# Patient Record
Sex: FEMALE | Race: BLACK OR AFRICAN AMERICAN | Employment: FULL TIME | URBAN - METROPOLITAN AREA
[De-identification: names, ages, dates, MRNs, and addresses within clinical notes are randomized per-mention and may not be internally consistent; named-entity substitution may affect disease eponyms.]

---

## 2017-02-10 ENCOUNTER — PROCEDURE VISIT (OUTPATIENT)
Dept: CARDIOLOGY CLINIC | Age: 52
End: 2017-02-10

## 2017-02-10 ENCOUNTER — OFFICE VISIT (OUTPATIENT)
Dept: CARDIOLOGY CLINIC | Age: 52
End: 2017-02-10

## 2017-02-10 VITALS
OXYGEN SATURATION: 94 % | HEIGHT: 67 IN | HEART RATE: 78 BPM | WEIGHT: 200 LBS | SYSTOLIC BLOOD PRESSURE: 130 MMHG | DIASTOLIC BLOOD PRESSURE: 84 MMHG | BODY MASS INDEX: 31.39 KG/M2

## 2017-02-10 DIAGNOSIS — R06.83 SNORES: ICD-10-CM

## 2017-02-10 DIAGNOSIS — I51.7 LVH (LEFT VENTRICULAR HYPERTROPHY): ICD-10-CM

## 2017-02-10 DIAGNOSIS — Q24.6 CONGENITAL HEART BLOCK: ICD-10-CM

## 2017-02-10 DIAGNOSIS — Z95.0 CARDIAC PACEMAKER IN SITU: ICD-10-CM

## 2017-02-10 DIAGNOSIS — I48.92 ATRIAL FIBRILLATION AND FLUTTER (HCC): Primary | ICD-10-CM

## 2017-02-10 DIAGNOSIS — R06.09 DYSPNEA ON EXERTION: ICD-10-CM

## 2017-02-10 DIAGNOSIS — I48.91 ATRIAL FIBRILLATION AND FLUTTER (HCC): Primary | ICD-10-CM

## 2017-02-10 PROCEDURE — 99214 OFFICE O/P EST MOD 30 MIN: CPT | Performed by: INTERNAL MEDICINE

## 2017-02-10 PROCEDURE — 93280 PM DEVICE PROGR EVAL DUAL: CPT | Performed by: INTERNAL MEDICINE

## 2017-05-19 ENCOUNTER — TELEPHONE (OUTPATIENT)
Dept: CARDIOLOGY CLINIC | Age: 52
End: 2017-05-19

## 2017-06-09 ENCOUNTER — TELEPHONE (OUTPATIENT)
Dept: SLEEP MEDICINE | Age: 52
End: 2017-06-09

## 2017-07-12 ENCOUNTER — TELEPHONE (OUTPATIENT)
Dept: CARDIOLOGY CLINIC | Age: 52
End: 2017-07-12

## 2017-07-28 ENCOUNTER — HOSPITAL ENCOUNTER (OUTPATIENT)
Dept: MAMMOGRAPHY | Age: 52
Discharge: HOME | End: 2017-07-28
Attending: INTERNAL MEDICINE | Admitting: INTERNAL MEDICINE

## 2017-07-28 DIAGNOSIS — Z12.31 VISIT FOR SCREENING MAMMOGRAM: ICD-10-CM

## 2017-09-20 ENCOUNTER — TELEPHONE (OUTPATIENT)
Dept: CARDIOLOGY CLINIC | Age: 52
End: 2017-09-20

## 2017-09-27 ENCOUNTER — OFFICE VISIT (OUTPATIENT)
Dept: CARDIOLOGY CLINIC | Age: 52
End: 2017-09-27

## 2017-09-27 VITALS
BODY MASS INDEX: 32.33 KG/M2 | SYSTOLIC BLOOD PRESSURE: 120 MMHG | WEIGHT: 206.4 LBS | DIASTOLIC BLOOD PRESSURE: 80 MMHG | HEART RATE: 80 BPM

## 2017-09-27 DIAGNOSIS — I48.91 ATRIAL FIBRILLATION AND FLUTTER (HCC): ICD-10-CM

## 2017-09-27 DIAGNOSIS — I51.7 LVH (LEFT VENTRICULAR HYPERTROPHY): ICD-10-CM

## 2017-09-27 DIAGNOSIS — Z95.0 PACEMAKER: ICD-10-CM

## 2017-09-27 DIAGNOSIS — I10 ESSENTIAL HYPERTENSION: Primary | ICD-10-CM

## 2017-09-27 DIAGNOSIS — E78.5 OTHER AND UNSPECIFIED HYPERLIPIDEMIA: ICD-10-CM

## 2017-09-27 DIAGNOSIS — I48.92 ATRIAL FIBRILLATION AND FLUTTER (HCC): ICD-10-CM

## 2017-09-27 PROCEDURE — 99214 OFFICE O/P EST MOD 30 MIN: CPT | Performed by: INTERNAL MEDICINE

## 2017-09-27 RX ORDER — AMLODIPINE BESYLATE 5 MG/1
5 TABLET ORAL DAILY
Qty: 90 TABLET | Refills: 3
Start: 2017-09-27 | End: 2018-06-29 | Stop reason: SDUPTHER

## 2017-11-20 RX ORDER — VALSARTAN 80 MG/1
TABLET ORAL
Qty: 90 TABLET | Refills: 3 | Status: SHIPPED | OUTPATIENT
Start: 2017-11-20 | End: 2018-01-23 | Stop reason: SDUPTHER

## 2017-11-20 NOTE — TELEPHONE ENCOUNTER
Ms. Santos Blanche reports her pharmacy has been trying to have her valsartan (DIOVAN) 80 MG tablet refilled but it doesn't appear that we have received that request.  Please fill this medication to [facility] on [address] right away as she has been out of this for a couple of days. She last saw Dr. Ange Hewitt 9-27-17.

## 2017-11-20 NOTE — TELEPHONE ENCOUNTER
Medication: Valsartan     Strength: 80mg    Directions: 1 tab daily    Last visit : 09/27/2017    Next Visit :09/28/2018

## 2017-12-14 ENCOUNTER — TELEPHONE (OUTPATIENT)
Dept: CARDIOLOGY CLINIC | Age: 52
End: 2017-12-14

## 2017-12-14 DIAGNOSIS — I48.91 ATRIAL FIBRILLATION AND FLUTTER (HCC): Primary | ICD-10-CM

## 2017-12-14 DIAGNOSIS — I48.92 ATRIAL FIBRILLATION AND FLUTTER (HCC): Primary | ICD-10-CM

## 2018-01-23 RX ORDER — VALSARTAN 80 MG/1
TABLET ORAL
Qty: 90 TABLET | Refills: 3 | Status: SHIPPED | OUTPATIENT
Start: 2018-01-23 | End: 2018-09-11

## 2018-02-28 ENCOUNTER — OFFICE VISIT (OUTPATIENT)
Dept: CARDIOLOGY CLINIC | Age: 53
End: 2018-02-28

## 2018-02-28 ENCOUNTER — PROCEDURE VISIT (OUTPATIENT)
Dept: CARDIOLOGY CLINIC | Age: 53
End: 2018-02-28

## 2018-02-28 VITALS
BODY MASS INDEX: 32.18 KG/M2 | DIASTOLIC BLOOD PRESSURE: 80 MMHG | WEIGHT: 205 LBS | HEIGHT: 67 IN | HEART RATE: 66 BPM | SYSTOLIC BLOOD PRESSURE: 118 MMHG

## 2018-02-28 DIAGNOSIS — Z95.0 CARDIAC PACEMAKER IN SITU: ICD-10-CM

## 2018-02-28 DIAGNOSIS — R42 VERTIGO: ICD-10-CM

## 2018-02-28 DIAGNOSIS — Q24.6 CONGENITAL HEART BLOCK: ICD-10-CM

## 2018-02-28 DIAGNOSIS — I48.91 ATRIAL FIBRILLATION, UNSPECIFIED TYPE (HCC): Primary | ICD-10-CM

## 2018-02-28 PROCEDURE — 93280 PM DEVICE PROGR EVAL DUAL: CPT | Performed by: INTERNAL MEDICINE

## 2018-02-28 PROCEDURE — 99214 OFFICE O/P EST MOD 30 MIN: CPT | Performed by: INTERNAL MEDICINE

## 2018-02-28 NOTE — PROGRESS NOTES
Chief Complaint   Patient presents with    Atrial Fibrillation     on flecainide    Hypertension    Dizziness     vertigo     HPI:  Ms. Tommy Castellanos is as an established patient. Her history includes AF, PPM for congenital heart block and syncope, and LVH. She was started on flecainide in Dec. 2015 by my partners at [facility]. She apparently spontaneously converted to sinus, but has had recurrent atrial fib. She is only taking flecainide 50 mg daily now, she doesn't like the way it makes her feel at higher dose. Has tried propafenone in the past also and didn't like the way that made her feel. Dual Pacemaker implant 2012 due to congenital heart block. Interval history: Today America Sandy presents to office for new symptoms of dizziness. Describes as spinning and happens from lying to sitting in the morning. Patient feeling well otherwise. She has been going more frequently getting hair styled and more water has been entering her ears. Patient denies lightheadedness, dizziness, chest pain, palpitations, orthopnea, edema, presyncope or syncope. Continues taking medications, including Xarelto. Referred for sleep study last visit that has not been completed to present. Interrogation today shows normal sensing and pacing. No episodes of atrial fib since last visit.  Battery life 5 years today    Past Medical History:   Diagnosis Date    Anemia     Atrial fibrillation and flutter (HCC)     Atrial flutter (HCC)     CHB (complete heart block) (HCC)     Congenital heart disease     GERD (gastroesophageal reflux disease)     Headache(784.0)     History of complete heart block     Hyperlipidemia     Hypertension     Syncope      Past Surgical History:   Procedure Laterality Date    PACEMAKER INSERTION  11/29/12    dual chamber PPM, [employer]    TUBAL LIGATION      UTERINE FIBROID SURGERY       Social History   Substance Use Topics    Smoking status: Never Smoker    Smokeless tobacco: Never Used    Alcohol use No scar.     7/15/2015 Echo: Left ventricular size is normal. There is mild concentric left   Ventricular hypertrophy. Ejection fraction is visually estimated to be 40% The inferoseptum is hypokinetic( rv paced). The left atrium is mildly dilated. The aortic valve appears very mildly sclerotic but opens well. The right ventricle is upper normal to mildly dilated in size. Mild mitral regurgitation is present. There is mild tricuspid regurgitation with RVSP estimated at 26 mmHg. Pacemaker / ICD lead was visualized in the right heart. ECG today : 2/2//2018 personally reviewed  Electronic ventricular pacemaker  -possibly demand type   Pacemaker EC    66bpm    Assessment:  1. Paroxysmal atrial fibrillation ([address]): HR regular & controlled. ECG showed SR today. Remains on Xarelto, Lopressor, flecainide   2. LVH (left ventricular hypertrophy): by ECHO 7/2015 with EF 40%. 3. Congenital heart block: episodes of syncope. S/P dual chamber pacemaker 11/29/12. Normal function today - no atrial arrhythmias      4. MOREIRA:    Has resolved, no complaints today   5. Snores: untreated sleep apnea can increase frequency of PAF  Previous sleep study referral to Dr. Alpa Guevara, not completed to date  6. Vertigo today    Plan:  1. Discuss vertigo/tiniitus with PCP for eval and treatment and return if not satisfied   2. No change in medications - continue Flecainide 50 mg daily and Xarelto 20 mg daily  3. Remote pacemaker transmission in three months  4.  FU in 1 year with device check same day and flecainide follow up    Dr. Etta Koenig.  [place]

## 2018-03-01 ENCOUNTER — TELEPHONE (OUTPATIENT)
Dept: CARDIOLOGY CLINIC | Age: 53
End: 2018-03-01

## 2018-03-01 NOTE — TELEPHONE ENCOUNTER
Pt said she is wondering if her she should be concerned with her heart rate being low and  having her pacemaker, they turned it up and down. Assessment:  1. Paroxysmal atrial fibrillation ([address]): HR regular & controlled. ECG showed SR today. Remains on Xarelto, Lopressor, flecainide   2. LVH (left ventricular hypertrophy): by ECHO 7/2015 with EF 40%. 3. Congenital heart block: episodes of syncope. S/P dual chamber pacemaker 11/29/12. Normal function today - no atrial arrhythmias      4. MOREIRA:    Has resolved, no complaints today   5. Snores: untreated sleep apnea can increase frequency of PAF  Previous sleep study referral to Dr. Lino Score, not completed to date  6. Vertigo today     Plan:  1. Discuss vertigo/tiniitus with PCP for eval and treatment and return if not satisfied   2. No change in medications - continue Flecainide 50 mg daily and Xarelto 20 mg daily  3. Remote pacemaker transmission in three months  4.  FU in 1 year with device check same day and flecainide follow up     Dr[facility] Minerv[facility] Linder. [facility]      Instructions         Return in about 1 year (around 2/28/2019). 1.  Discuss vertigo with PCP  2. No change in medications - continue Flecainide 50 mg daily and Xarelto 20 mg daily  3.   Remote pacemaker transmission in three months  4.  FU in 1 year with device check same day.

## 2018-03-01 NOTE — TELEPHONE ENCOUNTER
Pt calling was in yesterday with [facility], pt would like to talk to someone about her heart rate being low [facility] was talking about this in the appt. Pt was thinking about it last night and has more questions about this.  Pls call to advise Thank you

## 2018-04-30 ENCOUNTER — TELEPHONE (OUTPATIENT)
Dept: CARDIOLOGY CLINIC | Age: 53
End: 2018-04-30

## 2018-06-29 ENCOUNTER — OFFICE VISIT (OUTPATIENT)
Dept: CARDIOLOGY CLINIC | Age: 53
End: 2018-06-29

## 2018-06-29 ENCOUNTER — PROCEDURE VISIT (OUTPATIENT)
Dept: CARDIOLOGY CLINIC | Age: 53
End: 2018-06-29

## 2018-06-29 VITALS
DIASTOLIC BLOOD PRESSURE: 76 MMHG | SYSTOLIC BLOOD PRESSURE: 118 MMHG | HEIGHT: 67 IN | BODY MASS INDEX: 33.43 KG/M2 | HEART RATE: 73 BPM | WEIGHT: 213 LBS

## 2018-06-29 DIAGNOSIS — I51.7 LVH (LEFT VENTRICULAR HYPERTROPHY): ICD-10-CM

## 2018-06-29 DIAGNOSIS — R06.09 DYSPNEA ON EXERTION: ICD-10-CM

## 2018-06-29 DIAGNOSIS — Z79.899 ENCOUNTER FOR MONITORING FLECAINIDE THERAPY: ICD-10-CM

## 2018-06-29 DIAGNOSIS — Z51.81 ENCOUNTER FOR MONITORING FLECAINIDE THERAPY: ICD-10-CM

## 2018-06-29 DIAGNOSIS — Q24.6 CONGENITAL HEART BLOCK: ICD-10-CM

## 2018-06-29 DIAGNOSIS — R06.83 SNORES: ICD-10-CM

## 2018-06-29 DIAGNOSIS — I48.91 ATRIAL FIBRILLATION AND FLUTTER (HCC): Primary | ICD-10-CM

## 2018-06-29 DIAGNOSIS — I48.92 ATRIAL FIBRILLATION AND FLUTTER (HCC): Primary | ICD-10-CM

## 2018-06-29 DIAGNOSIS — Z95.0 CARDIAC PACEMAKER IN SITU: ICD-10-CM

## 2018-06-29 DIAGNOSIS — R42 VERTIGO: ICD-10-CM

## 2018-06-29 PROCEDURE — 93280 PM DEVICE PROGR EVAL DUAL: CPT | Performed by: INTERNAL MEDICINE

## 2018-06-29 PROCEDURE — 99215 OFFICE O/P EST HI 40 MIN: CPT | Performed by: INTERNAL MEDICINE

## 2018-06-29 RX ORDER — POTASSIUM CHLORIDE 750 MG/1
10 TABLET, EXTENDED RELEASE ORAL DAILY
Qty: 30 TABLET | Refills: 1 | Status: SHIPPED | OUTPATIENT
Start: 2018-06-29 | End: 2018-09-11

## 2018-06-29 RX ORDER — AMLODIPINE BESYLATE 2.5 MG/1
2.5 TABLET ORAL DAILY
Qty: 30 TABLET | Refills: 1 | Status: SHIPPED | OUTPATIENT
Start: 2018-06-29 | End: 2018-08-30 | Stop reason: SDUPTHER

## 2018-06-29 RX ORDER — METOPROLOL TARTRATE 50 MG/1
50 TABLET, FILM COATED ORAL 2 TIMES DAILY
Qty: 60 TABLET | Refills: 1 | Status: SHIPPED | OUTPATIENT
Start: 2018-06-29 | End: 2018-09-15 | Stop reason: SDUPTHER

## 2018-07-05 ENCOUNTER — HOSPITAL ENCOUNTER (OUTPATIENT)
Dept: OTHER | Age: 53
Discharge: HOME | End: 2018-07-05
Attending: INTERNAL MEDICINE | Admitting: INTERNAL MEDICINE

## 2018-07-05 DIAGNOSIS — Z79.899 ENCOUNTER FOR MONITORING FLECAINIDE THERAPY: ICD-10-CM

## 2018-07-05 DIAGNOSIS — Z51.81 ENCOUNTER FOR MONITORING FLECAINIDE THERAPY: ICD-10-CM

## 2018-07-05 LAB
ALBUMIN SERPL-MCNC: 4.6 G/DL (ref 3.4–5)
ANION GAP SERPL CALCULATED.3IONS-SCNC: 16 MMOL/L (ref 3–16)
BUN BLDV-MCNC: 8 MG/DL (ref 7–20)
CALCIUM SERPL-MCNC: 9.8 MG/DL (ref 8.3–10.6)
CHLORIDE BLD-SCNC: 103 MMOL/L (ref 99–110)
CO2: 23 MMOL/L (ref 21–32)
CREAT SERPL-MCNC: 0.7 MG/DL (ref 0.6–1.1)
GFR AFRICAN AMERICAN: >60
GFR NON-AFRICAN AMERICAN: >60
GLUCOSE BLD-MCNC: 89 MG/DL (ref 70–99)
PHOSPHORUS: 2.5 MG/DL (ref 2.5–4.9)
POTASSIUM SERPL-SCNC: 3.8 MMOL/L (ref 3.5–5.1)
SODIUM BLD-SCNC: 142 MMOL/L (ref 136–145)

## 2018-07-10 ENCOUNTER — TELEPHONE (OUTPATIENT)
Dept: CARDIOLOGY CLINIC | Age: 53
End: 2018-07-10

## 2018-07-10 NOTE — TELEPHONE ENCOUNTER
Pt states she has already returned monitor to [facility].  Notified her that her potassium results were WNL

## 2018-07-11 ENCOUNTER — TELEPHONE (OUTPATIENT)
Dept: CARDIOLOGY CLINIC | Age: 53
End: 2018-07-11

## 2018-07-11 NOTE — TELEPHONE ENCOUNTER
Pt calling wanting the results of her labs from last week done at [facility]?  Pls call to advise Thank you

## 2018-07-24 ENCOUNTER — HOSPITAL ENCOUNTER (OUTPATIENT)
Dept: NON INVASIVE DIAGNOSTICS | Age: 53
Discharge: HOME | End: 2018-07-24
Attending: INTERNAL MEDICINE | Admitting: INTERNAL MEDICINE

## 2018-07-24 DIAGNOSIS — I51.7 LVH (LEFT VENTRICULAR HYPERTROPHY): ICD-10-CM

## 2018-07-24 DIAGNOSIS — I51.7 CARDIOMEGALY: ICD-10-CM

## 2018-07-24 LAB
LEFT VENTRICULAR EJECTION FRACTION HIGH VALUE: 25 %
LEFT VENTRICULAR EJECTION FRACTION MODE: NORMAL
LV EF: 25 %
LVEF MODALITY: NORMAL

## 2018-07-30 ENCOUNTER — HOSPITAL ENCOUNTER (OUTPATIENT)
Dept: MAMMOGRAPHY | Age: 53
Discharge: HOME | End: 2018-07-30
Payer: COMMERCIAL

## 2018-07-30 ENCOUNTER — TELEPHONE (OUTPATIENT)
Dept: CARDIOLOGY CLINIC | Age: 53
End: 2018-07-30

## 2018-07-30 DIAGNOSIS — Z12.31 VISIT FOR SCREENING MAMMOGRAM: ICD-10-CM

## 2018-07-30 PROCEDURE — 77067 SCR MAMMO BI INCL CAD: CPT

## 2018-07-31 NOTE — TELEPHONE ENCOUNTER
Her LVEF has dropped to 25%, per [facility] note FU in 6-7 months would be dec 2018, should she follow up sooner?

## 2018-08-01 ENCOUNTER — TELEPHONE (OUTPATIENT)
Dept: CARDIOLOGY CLINIC | Age: 53
End: 2018-08-01

## 2018-08-01 NOTE — TELEPHONE ENCOUNTER
Notified pt that she has had decrease in LVEF.  Pt confirmed that she is not currently taking flecainide

## 2018-08-08 NOTE — TELEPHONE ENCOUNTER
Spoke on phone. She is concerned about CAD with unexplained reduced ef. She has chest pain and sob with exertion. She will logically need bi-v upgrade, thus venogram to determine if extraction needed.   Please remind me this Friday when at [facility] to work out the details of scheduing

## 2018-08-28 ENCOUNTER — TELEPHONE (OUTPATIENT)
Dept: CARDIOLOGY CLINIC | Age: 53
End: 2018-08-28

## 2018-08-28 NOTE — TELEPHONE ENCOUNTER
I spoke to Dr. Juanjo Briceño about this pt. He acknowledges her anxiety. He plans to pursue a venogram with the patient but is aware that this will likely involve further questions-- therefore, he wants to keep the appt with her in office at [facility] at the scheduled appt on 9/19/18 at [facility] location. He wants it at [facility] because he wants to view the images from the ECHO which are viewable at [facility]. Please explain to her that he has no openings prior to this date due to being at multiple other locations. Everything will be scheduled, handled and explained with her on the appt date on 9/19/18. Please let her know that this is per Dr. Juanjo Briceño. Thanks!

## 2018-08-28 NOTE — TELEPHONE ENCOUNTER
Pt calling to check with [facility] if she needs to move up appt and if he still wants her to have angiogram. Please call to adv thank you

## 2018-08-30 RX ORDER — AMLODIPINE BESYLATE 2.5 MG/1
TABLET ORAL
Qty: 30 TABLET | Refills: 3 | Status: SHIPPED | OUTPATIENT
Start: 2018-08-30 | End: 2018-09-19 | Stop reason: ALTCHOICE

## 2018-08-31 ENCOUNTER — TELEPHONE (OUTPATIENT)
Dept: CARDIOLOGY CLINIC | Age: 53
End: 2018-08-31

## 2018-08-31 NOTE — TELEPHONE ENCOUNTER
Medication Refill    When was your last appointment with cardiology?  (if 1year or longer, please schedule an appointment)    Medication needing refilled:amlodipine     Doseage of the medication:2.5    How are you taking this medication (QD, BID, TID, QID, PRN):    Patient want a 30 or 90 day supply called in:    Which Pharmacy are we sending the medication to:[facility] Phone number:    Pharmacy Fax number:NEEDS FOR THE WEEKEND

## 2018-08-31 NOTE — TELEPHONE ENCOUNTER
Spoke to the pt. This refill was sent 8/30/18, to [facility], [phone].

## 2018-09-11 PROBLEM — R07.9 CHEST PAIN: Status: ACTIVE | Noted: 2018-09-11

## 2018-09-17 RX ORDER — METOPROLOL TARTRATE 50 MG/1
TABLET, FILM COATED ORAL
Qty: 60 TABLET | Refills: 5 | Status: SHIPPED | OUTPATIENT
Start: 2018-09-17 | End: 2019-02-07 | Stop reason: ALTCHOICE

## 2018-09-19 ENCOUNTER — OFFICE VISIT (OUTPATIENT)
Dept: CARDIOLOGY CLINIC | Age: 53
End: 2018-09-19

## 2018-09-19 ENCOUNTER — TELEPHONE (OUTPATIENT)
Dept: CARDIOLOGY CLINIC | Age: 53
End: 2018-09-19

## 2018-09-19 ENCOUNTER — PROCEDURE VISIT (OUTPATIENT)
Dept: CARDIOLOGY CLINIC | Age: 53
End: 2018-09-19

## 2018-09-19 VITALS
SYSTOLIC BLOOD PRESSURE: 117 MMHG | HEIGHT: 67 IN | WEIGHT: 209 LBS | HEART RATE: 64 BPM | DIASTOLIC BLOOD PRESSURE: 81 MMHG | BODY MASS INDEX: 32.8 KG/M2

## 2018-09-19 DIAGNOSIS — I48.0 PAROXYSMAL ATRIAL FIBRILLATION (HCC): Primary | ICD-10-CM

## 2018-09-19 DIAGNOSIS — R06.83 SNORES: ICD-10-CM

## 2018-09-19 DIAGNOSIS — I10 ESSENTIAL HYPERTENSION: ICD-10-CM

## 2018-09-19 DIAGNOSIS — Z95.0 CARDIAC PACEMAKER IN SITU: ICD-10-CM

## 2018-09-19 DIAGNOSIS — Z95.0 PACEMAKER: ICD-10-CM

## 2018-09-19 DIAGNOSIS — I48.0 PAROXYSMAL ATRIAL FIBRILLATION (HCC): ICD-10-CM

## 2018-09-19 DIAGNOSIS — I51.9 LV DYSFUNCTION: ICD-10-CM

## 2018-09-19 DIAGNOSIS — Z95.0 PACEMAKER: Primary | ICD-10-CM

## 2018-09-19 DIAGNOSIS — Q24.6 CONGENITAL HEART BLOCK: ICD-10-CM

## 2018-09-19 PROCEDURE — 93280 PM DEVICE PROGR EVAL DUAL: CPT | Performed by: INTERNAL MEDICINE

## 2018-09-19 PROCEDURE — 99215 OFFICE O/P EST HI 40 MIN: CPT | Performed by: INTERNAL MEDICINE

## 2018-09-19 RX ORDER — LISINOPRIL 10 MG/1
10 TABLET ORAL DAILY
Qty: 90 TABLET | Refills: 3 | Status: SHIPPED | OUTPATIENT
Start: 2018-09-19 | End: 2018-10-03 | Stop reason: SDUPTHER

## 2018-10-03 ENCOUNTER — HOSPITAL ENCOUNTER (OUTPATIENT)
Age: 53
Discharge: HOME | End: 2018-10-03
Payer: COMMERCIAL

## 2018-10-03 ENCOUNTER — OFFICE VISIT (OUTPATIENT)
Dept: CARDIOLOGY CLINIC | Age: 53
End: 2018-10-03
Payer: COMMERCIAL

## 2018-10-03 VITALS
WEIGHT: 209 LBS | HEIGHT: 67 IN | SYSTOLIC BLOOD PRESSURE: 138 MMHG | BODY MASS INDEX: 32.8 KG/M2 | DIASTOLIC BLOOD PRESSURE: 86 MMHG | HEART RATE: 68 BPM

## 2018-10-03 DIAGNOSIS — I50.22 SYSTOLIC CHF, CHRONIC (HCC): Primary | ICD-10-CM

## 2018-10-03 DIAGNOSIS — I10 ESSENTIAL HYPERTENSION: ICD-10-CM

## 2018-10-03 DIAGNOSIS — Z95.0 CARDIAC PACEMAKER IN SITU: ICD-10-CM

## 2018-10-03 DIAGNOSIS — I48.0 PAROXYSMAL ATRIAL FIBRILLATION (HCC): ICD-10-CM

## 2018-10-03 DIAGNOSIS — I50.22 SYSTOLIC CHF, CHRONIC (HCC): ICD-10-CM

## 2018-10-03 LAB
ANION GAP SERPL CALCULATED.3IONS-SCNC: 14 MMOL/L (ref 3–16)
BUN BLDV-MCNC: 13 MG/DL (ref 7–20)
CALCIUM SERPL-MCNC: 9.5 MG/DL (ref 8.3–10.6)
CHLORIDE BLD-SCNC: 105 MMOL/L (ref 99–110)
CO2: 23 MMOL/L (ref 21–32)
CREAT SERPL-MCNC: 1 MG/DL (ref 0.6–1.1)
GFR AFRICAN AMERICAN: >60
GFR NON-AFRICAN AMERICAN: 58
GLUCOSE BLD-MCNC: 82 MG/DL (ref 70–99)
POTASSIUM SERPL-SCNC: 4.4 MMOL/L (ref 3.5–5.1)
PRO-BNP: 325 PG/ML (ref 0–124)
SODIUM BLD-SCNC: 142 MMOL/L (ref 136–145)

## 2018-10-03 PROCEDURE — 83880 ASSAY OF NATRIURETIC PEPTIDE: CPT

## 2018-10-03 PROCEDURE — 80048 BASIC METABOLIC PNL TOTAL CA: CPT

## 2018-10-03 PROCEDURE — 36415 COLL VENOUS BLD VENIPUNCTURE: CPT

## 2018-10-03 PROCEDURE — 99244 OFF/OP CNSLTJ NEW/EST MOD 40: CPT | Performed by: INTERNAL MEDICINE

## 2018-10-03 RX ORDER — LISINOPRIL 10 MG/1
TABLET ORAL
Qty: 90 TABLET | Refills: 3 | Status: SHIPPED | OUTPATIENT
Start: 2018-10-03 | End: 2018-12-14 | Stop reason: SDUPTHER

## 2018-10-03 NOTE — PROGRESS NOTES
appetite loss, blood in stools. No change in bowel or bladder habits. · Genitourinary: No dysuria, trouble voiding, or hematuria. · Musculoskeletal:  No gait disturbance, weakness or joint complaints. · Integumentary: No rash or pruritis. · Neurological: No headache, diplopia, change in muscle strength, numbness or tingling. No change in gait, balance, coordination, mood, affect, memory, mentation, behavior. · Psychiatric: No anxiety, no depression. · Endocrine: No malaise, fatigue or temperature intolerance. No excessive thirst, fluid intake, or urination. No tremor. · Hematologic/Lymphatic: No abnormal bruising or bleeding, blood clots or swollen lymph nodes. · Allergic/Immunologic: No nasal congestion or hives. Physical Examination:    Vitals:    10/03/18 1151   BP: 138/86   Pulse: 68   Weight: 209 lb (94.8 kg)   Height: 5' 6.5\" (1.689 m)     Body mass index is 33.23 kg/m². Wt Readings from Last 3 Encounters:   10/03/18 209 lb (94.8 kg)   09/19/18 209 lb (94.8 kg)   09/11/18 210 lb 5.1 oz (95.4 kg)     BP Readings from Last 3 Encounters:   10/03/18 138/86   09/19/18 117/81   09/11/18 121/65     Constitutional and General Appearance:   WD/WN in NAD  HEENT:  NC/AT  PRIYANKA  No problems with hearing  Skin:  Warm, dry  Respiratory:  · Normal excursion and expansion without use of accessory muscles  · Resp Auscultation: Normal breath sounds without dullness  Cardiovascular:  · The apical impulses not displaced  · Heart tones are crisp and normal  · Cervical veins are not engorged  · The carotid upstroke is normal in amplitude and contour without delay or bruit  · JVP less than 8 cm H2O  RRR with nl S1 and S2 without m,r,g  · Peripheral pulses are symmetrical and full  · There is no clubbing, cyanosis of the extremities.   · No edema  · Femoral Arteries: 2+ and equal  · Pedal Pulses: 2+ and equal   Neck:  · No thyromegaly  Abdomen:  · No masses or tenderness  · Liver/Spleen: No Abnormalities

## 2018-10-03 NOTE — LETTER
 Sulfa Antibiotics      Current Outpatient Prescriptions   Medication Sig Dispense Refill    metoprolol tartrate (LOPRESSOR) 50 MG tablet TAKE ONE TABLET BY MOUTH TWICE A DAY 60 tablet 5    rivaroxaban (XARELTO) 20 MG TABS tablet Take 1 tablet by mouth daily (with breakfast) 90 tablet 3    ranitidine (ZANTAC) 150 MG tablet Take 1 tablet by mouth as needed for Heartburn 90 tablet 3    atorvastatin (LIPITOR) 40 MG tablet Take 1 tablet by mouth daily 60 tablet 1    IRON, FERROUS GLUCONATE, PO Take by mouth 2 times daily       lisinopril (PRINIVIL;ZESTRIL) 10 MG tablet Take 1 tablet by mouth daily 90 tablet 3     No current facility-administered medications for this visit. Past Medical History:   Diagnosis Date    Anemia     Atrial fibrillation and flutter (HCC)     Atrial flutter (HCC)     CHB (complete heart block) (HCC)     Congenital heart disease     GERD (gastroesophageal reflux disease)     Headache(784.0)     History of complete heart block     Hyperlipidemia     Hypertension     Syncope     Systolic CHF, chronic ([facility] [address] 10/3/2018     Past Surgical History:   Procedure Laterality Date    PACEMAKER INSERTION  11/29/12    dual chamber PPM, [employer]    TUBAL LIGATION      UTERINE FIBROID SURGERY       Family History   Problem Relation Age of Onset    Cancer Father     Heart Disease Neg Hx     High Blood Pressure Neg Hx     High Cholesterol Neg Hx      Social History     Social History    Marital status:      Spouse name: N/A    Number of children: N/A    Years of education: N/A     Occupational History    Not on file.      Social History Main Topics    Smoking status: Never Smoker    Smokeless tobacco: Never Used    Alcohol use No    Drug use: No    Sexual activity: Not Currently      Comment:      Other Topics Concern    Not on file     Social History Narrative    No narrative on file       Review of Systems:

## 2018-10-11 ENCOUNTER — TELEPHONE (OUTPATIENT)
Dept: CARDIOLOGY CLINIC | Age: 53
End: 2018-10-11

## 2018-10-17 ENCOUNTER — TELEPHONE (OUTPATIENT)
Dept: CARDIOLOGY CLINIC | Age: 53
End: 2018-10-17

## 2018-10-17 DIAGNOSIS — I50.22 SYSTOLIC CHF, CHRONIC (HCC): ICD-10-CM

## 2018-10-17 DIAGNOSIS — I10 ESSENTIAL HYPERTENSION: Primary | ICD-10-CM

## 2018-10-17 DIAGNOSIS — Q24.6 CONGENITAL HEART BLOCK: ICD-10-CM

## 2018-10-17 DIAGNOSIS — I51.7 LVH (LEFT VENTRICULAR HYPERTROPHY): ICD-10-CM

## 2018-10-17 DIAGNOSIS — I48.0 PAROXYSMAL ATRIAL FIBRILLATION (HCC): ICD-10-CM

## 2018-10-17 RX ORDER — SPIRONOLACTONE 25 MG/1
12.5 TABLET ORAL DAILY
Qty: 30 TABLET | Refills: 5 | Status: SHIPPED | OUTPATIENT
Start: 2018-10-17 | End: 2019-03-21 | Stop reason: SDUPTHER

## 2018-10-18 ENCOUNTER — HOSPITAL ENCOUNTER (OUTPATIENT)
Age: 53
Discharge: HOME | End: 2018-10-18
Payer: COMMERCIAL

## 2018-10-18 ENCOUNTER — TELEPHONE (OUTPATIENT)
Dept: CARDIOLOGY CLINIC | Age: 53
End: 2018-10-18

## 2018-10-18 DIAGNOSIS — I51.7 LVH (LEFT VENTRICULAR HYPERTROPHY): ICD-10-CM

## 2018-10-18 DIAGNOSIS — I50.22 SYSTOLIC CHF, CHRONIC (HCC): ICD-10-CM

## 2018-10-18 DIAGNOSIS — Z79.899 ENCOUNTER FOR LONG-TERM (CURRENT) USE OF MEDICATIONS: ICD-10-CM

## 2018-10-18 DIAGNOSIS — R06.02 SOB (SHORTNESS OF BREATH): ICD-10-CM

## 2018-10-18 DIAGNOSIS — I50.22 SYSTOLIC CHF, CHRONIC (HCC): Primary | ICD-10-CM

## 2018-10-18 DIAGNOSIS — Q24.6 CONGENITAL HEART BLOCK: ICD-10-CM

## 2018-10-18 LAB
ANION GAP SERPL CALCULATED.3IONS-SCNC: 12 MMOL/L (ref 3–16)
BUN BLDV-MCNC: 17 MG/DL (ref 7–20)
CALCIUM SERPL-MCNC: 9.3 MG/DL (ref 8.3–10.6)
CHLORIDE BLD-SCNC: 106 MMOL/L (ref 99–110)
CO2: 24 MMOL/L (ref 21–32)
CREAT SERPL-MCNC: 0.9 MG/DL (ref 0.6–1.1)
GFR AFRICAN AMERICAN: >60
GFR NON-AFRICAN AMERICAN: >60
GLUCOSE BLD-MCNC: 86 MG/DL (ref 70–99)
POTASSIUM SERPL-SCNC: 4.2 MMOL/L (ref 3.5–5.1)
PRO-BNP: 472 PG/ML (ref 0–124)
SODIUM BLD-SCNC: 142 MMOL/L (ref 136–145)

## 2018-10-18 PROCEDURE — 80048 BASIC METABOLIC PNL TOTAL CA: CPT

## 2018-10-18 PROCEDURE — 83880 ASSAY OF NATRIURETIC PEPTIDE: CPT

## 2018-10-18 PROCEDURE — 36415 COLL VENOUS BLD VENIPUNCTURE: CPT

## 2018-10-18 NOTE — TELEPHONE ENCOUNTER
I spoke with patient ad we reviewed her medications. She states that she has had a headache on and off since her stress test a few weeks ago. She thinks it's been since she was given contrast for the test.  She has bee having  occasional headaches that has resolved over the past 24 hours. She discussed the findings of her stress test with Dr Cherelle Li I her last OV 10/3. She feels better today, currently no headache. Picked up her spironolactone pills from [place] and will start this medication. Ashlee Quinones it was explained to her what the spironolactone and lisinopril was for and she verbalized understanding. Al questions were answered. She was offered an appointment with KAYLA NP tomorrow to follow up on her BP and symptoms she had earlier in the week. She wants to wait ans see how she feels in a few days. She was instructed to take her BP daily for the next few days and call if SBP is greater than 140 or if she has continuous headache, dizziness. She states that she has a little sob with walking distances since the stress test. She will talya back with any problems and if doing ok will see NATHALIE/KAYLA NP at CHF class 10/30.

## 2018-10-29 ENCOUNTER — TELEPHONE (OUTPATIENT)
Dept: CARDIOLOGY CLINIC | Age: 53
End: 2018-10-29

## 2018-10-30 ENCOUNTER — OFFICE VISIT (OUTPATIENT)
Dept: PULMONOLOGY | Age: 53
End: 2018-10-30
Payer: COMMERCIAL

## 2018-10-30 ENCOUNTER — OFFICE VISIT (OUTPATIENT)
Dept: CARDIOLOGY CLINIC | Age: 53
End: 2018-10-30

## 2018-10-30 VITALS
HEIGHT: 67 IN | OXYGEN SATURATION: 99 % | HEART RATE: 65 BPM | DIASTOLIC BLOOD PRESSURE: 86 MMHG | SYSTOLIC BLOOD PRESSURE: 136 MMHG | BODY MASS INDEX: 32.65 KG/M2 | WEIGHT: 208 LBS

## 2018-10-30 VITALS
HEIGHT: 67 IN | HEART RATE: 80 BPM | DIASTOLIC BLOOD PRESSURE: 74 MMHG | WEIGHT: 208.8 LBS | BODY MASS INDEX: 32.77 KG/M2 | SYSTOLIC BLOOD PRESSURE: 130 MMHG

## 2018-10-30 DIAGNOSIS — E66.9 NON MORBID OBESITY, UNSPECIFIED OBESITY TYPE: Chronic | ICD-10-CM

## 2018-10-30 DIAGNOSIS — R06.83 SNORING: ICD-10-CM

## 2018-10-30 DIAGNOSIS — I48.0 PAROXYSMAL ATRIAL FIBRILLATION (HCC): Chronic | ICD-10-CM

## 2018-10-30 DIAGNOSIS — G47.10 HYPERSOMNIA: Primary | ICD-10-CM

## 2018-10-30 DIAGNOSIS — I10 ESSENTIAL HYPERTENSION: Chronic | ICD-10-CM

## 2018-10-30 DIAGNOSIS — I50.22 SYSTOLIC CHF, CHRONIC (HCC): Primary | ICD-10-CM

## 2018-10-30 DIAGNOSIS — I50.22 SYSTOLIC CHF, CHRONIC (HCC): Chronic | ICD-10-CM

## 2018-10-30 PROCEDURE — 99999 PR OFFICE/OUTPT VISIT,PROCEDURE ONLY: CPT | Performed by: NURSE PRACTITIONER

## 2018-10-30 PROCEDURE — 99244 OFF/OP CNSLTJ NEW/EST MOD 40: CPT | Performed by: INTERNAL MEDICINE

## 2018-10-30 ASSESSMENT — ENCOUNTER SYMPTOMS
APNEA: 0
VOMITING: 0
ABDOMINAL PAIN: 0
CHEST TIGHTNESS: 0
RHINORRHEA: 0
ALLERGIC/IMMUNOLOGIC NEGATIVE: 1
EYE PAIN: 0
CHOKING: 0
SHORTNESS OF BREATH: 0
PHOTOPHOBIA: 0
NAUSEA: 0
ABDOMINAL DISTENTION: 0

## 2018-10-30 ASSESSMENT — SLEEP AND FATIGUE QUESTIONNAIRES
HOW LIKELY ARE YOU TO NOD OFF OR FALL ASLEEP WHILE WATCHING TV: 2
HOW LIKELY ARE YOU TO NOD OFF OR FALL ASLEEP WHEN YOU ARE A PASSENGER IN A CAR FOR AN HOUR WITHOUT A BREAK: 1
HOW LIKELY ARE YOU TO NOD OFF OR FALL ASLEEP WHILE SITTING INACTIVE IN A PUBLIC PLACE: 1
HOW LIKELY ARE YOU TO NOD OFF OR FALL ASLEEP WHILE SITTING AND READING: 0
NECK CIRCUMFERENCE (INCHES): 15.5
HOW LIKELY ARE YOU TO NOD OFF OR FALL ASLEEP IN A CAR, WHILE STOPPED FOR A FEW MINUTES IN TRAFFIC: 0
HOW LIKELY ARE YOU TO NOD OFF OR FALL ASLEEP WHILE SITTING QUIETLY AFTER LUNCH WITHOUT ALCOHOL: 1
HOW LIKELY ARE YOU TO NOD OFF OR FALL ASLEEP WHILE LYING DOWN TO REST IN THE AFTERNOON WHEN CIRCUMSTANCES PERMIT: 3
ESS TOTAL SCORE: 8
HOW LIKELY ARE YOU TO NOD OFF OR FALL ASLEEP WHILE SITTING AND TALKING TO SOMEONE: 0

## 2018-10-30 NOTE — PROGRESS NOTES
Negative for difficulty urinating, dysuria, frequency and urgency. Musculoskeletal: Negative. Negative for neck pain and neck stiffness. Skin: Negative. Allergic/Immunologic: Negative. Neurological: Negative for tremors, seizures, syncope and weakness. Hematological: Negative for adenopathy. Does not bruise/bleed easily. Psychiatric/Behavioral: Positive for sleep disturbance. Negative for agitation, behavioral problems and confusion. Objective:     Vitals:  Weight BMI   Wt Readings from Last 3 Encounters:   10/30/18 208 lb (94.3 kg)   10/30/18 208 lb 12.8 oz (94.7 kg)   10/03/18 209 lb (94.8 kg)    Body mass index is 33.07 kg/m². BP HR SaO2   BP Readings from Last 3 Encounters:   10/30/18 136/86   10/30/18 130/74   10/03/18 138/86    Pulse Readings from Last 3 Encounters:   10/30/18 65   10/30/18 80   10/03/18 68    SpO2 Readings from Last 3 Encounters:   10/30/18 99%   09/11/18 100%   02/10/17 94%        Physical Exam   Constitutional: She is oriented to person, place, and time. Vital signs are normal. She appears well-developed and well-nourished. Non-toxic appearance. She does not have a sickly appearance. She is not intubated. HENT:   Head: Normocephalic and atraumatic. Not macrocephalic and not microcephalic. Right Ear: External ear normal.   Left Ear: External ear normal.   Nose: Mucosal edema and septal deviation present. Mouth/Throat: Uvula is midline and mucous membranes are normal. Posterior oropharyngeal edema present. No oropharyngeal exudate or tonsillar abscesses. Tonsils:   normal size, normal appearance  Post. Pharynx:   normal mucosa  Neck circumference: 15.5  Inches     Eyes: Pupils are equal, round, and reactive to light. Conjunctivae, EOM and lids are normal.   Neck: Trachea normal and normal range of motion. Neck supple. No tracheal deviation present. No thyroid mass and no thyromegaly present.    Cardiovascular: Normal rate, S1 normal and S2 normal.  A regularly

## 2018-10-31 ENCOUNTER — TELEPHONE (OUTPATIENT)
Dept: CARDIOLOGY CLINIC | Age: 53
End: 2018-10-31

## 2018-11-09 ENCOUNTER — HOSPITAL ENCOUNTER (OUTPATIENT)
Age: 53
Discharge: HOME | End: 2018-11-09
Payer: COMMERCIAL

## 2018-11-09 ENCOUNTER — TELEPHONE (OUTPATIENT)
Dept: CARDIOLOGY CLINIC | Age: 53
End: 2018-11-09

## 2018-11-09 DIAGNOSIS — I50.22 SYSTOLIC CHF, CHRONIC (HCC): ICD-10-CM

## 2018-11-09 DIAGNOSIS — I50.22 SYSTOLIC CHF, CHRONIC (HCC): Primary | ICD-10-CM

## 2018-11-09 DIAGNOSIS — R06.09 DYSPNEA ON EXERTION: ICD-10-CM

## 2018-11-09 LAB
ANION GAP SERPL CALCULATED.3IONS-SCNC: 11 MMOL/L (ref 3–16)
BUN BLDV-MCNC: 12 MG/DL (ref 7–20)
CALCIUM SERPL-MCNC: 9.6 MG/DL (ref 8.3–10.6)
CHLORIDE BLD-SCNC: 107 MMOL/L (ref 99–110)
CO2: 25 MMOL/L (ref 21–32)
CREAT SERPL-MCNC: 0.9 MG/DL (ref 0.6–1.1)
GFR AFRICAN AMERICAN: >60
GFR NON-AFRICAN AMERICAN: >60
GLUCOSE BLD-MCNC: 71 MG/DL (ref 70–99)
POTASSIUM SERPL-SCNC: 3.9 MMOL/L (ref 3.5–5.1)
PRO-BNP: 894 PG/ML (ref 0–124)
SODIUM BLD-SCNC: 143 MMOL/L (ref 136–145)

## 2018-11-09 PROCEDURE — 80048 BASIC METABOLIC PNL TOTAL CA: CPT

## 2018-11-09 PROCEDURE — 83880 ASSAY OF NATRIURETIC PEPTIDE: CPT

## 2018-11-09 PROCEDURE — 36415 COLL VENOUS BLD VENIPUNCTURE: CPT

## 2018-11-15 ENCOUNTER — TELEPHONE (OUTPATIENT)
Dept: CARDIOLOGY CLINIC | Age: 53
End: 2018-11-15

## 2018-11-27 ENCOUNTER — TELEPHONE (OUTPATIENT)
Dept: CARDIOLOGY CLINIC | Age: 53
End: 2018-11-27

## 2018-11-27 DIAGNOSIS — I50.22 SYSTOLIC CHF, CHRONIC (HCC): Primary | ICD-10-CM

## 2018-11-28 NOTE — TELEPHONE ENCOUNTER
I spoke with pt and she agreed  To take the Monday appointment. She will check her schedule when she get home to check her calendar and will let us know which appointment she will keep.

## 2018-11-30 ENCOUNTER — HOSPITAL ENCOUNTER (OUTPATIENT)
Age: 53
Discharge: HOME | End: 2018-11-30
Payer: COMMERCIAL

## 2018-11-30 ENCOUNTER — HOSPITAL ENCOUNTER (OUTPATIENT)
Dept: SLEEP CENTER | Age: 53
Discharge: HOME | End: 2018-11-30
Payer: COMMERCIAL

## 2018-11-30 DIAGNOSIS — R06.83 SNORING: ICD-10-CM

## 2018-11-30 DIAGNOSIS — I50.22 SYSTOLIC CHF, CHRONIC (HCC): ICD-10-CM

## 2018-11-30 DIAGNOSIS — I50.22 SYSTOLIC CHF, CHRONIC (HCC): Chronic | ICD-10-CM

## 2018-11-30 DIAGNOSIS — G47.10 HYPERSOMNIA: ICD-10-CM

## 2018-11-30 LAB
ANION GAP SERPL CALCULATED.3IONS-SCNC: 11 MMOL/L (ref 3–16)
BUN BLDV-MCNC: 12 MG/DL (ref 7–20)
CALCIUM SERPL-MCNC: 10 MG/DL (ref 8.3–10.6)
CHLORIDE BLD-SCNC: 103 MMOL/L (ref 99–110)
CO2: 26 MMOL/L (ref 21–32)
CREAT SERPL-MCNC: 0.9 MG/DL (ref 0.6–1.1)
GFR AFRICAN AMERICAN: >60
GFR NON-AFRICAN AMERICAN: >60
GLUCOSE BLD-MCNC: 87 MG/DL (ref 70–99)
POTASSIUM SERPL-SCNC: 4.1 MMOL/L (ref 3.5–5.1)
PRO-BNP: 441 PG/ML (ref 0–124)
SODIUM BLD-SCNC: 140 MMOL/L (ref 136–145)

## 2018-11-30 PROCEDURE — 95810 POLYSOM 6/> YRS 4/> PARAM: CPT

## 2018-11-30 PROCEDURE — 83880 ASSAY OF NATRIURETIC PEPTIDE: CPT

## 2018-11-30 PROCEDURE — 36415 COLL VENOUS BLD VENIPUNCTURE: CPT

## 2018-11-30 PROCEDURE — 80048 BASIC METABOLIC PNL TOTAL CA: CPT

## 2018-12-03 ENCOUNTER — TELEPHONE (OUTPATIENT)
Dept: CARDIOLOGY CLINIC | Age: 53
End: 2018-12-03

## 2018-12-06 PROCEDURE — 95810 POLYSOM 6/> YRS 4/> PARAM: CPT | Performed by: INTERNAL MEDICINE

## 2018-12-10 ENCOUNTER — TELEPHONE (OUTPATIENT)
Dept: PULMONOLOGY | Age: 53
End: 2018-12-10

## 2018-12-14 ENCOUNTER — HOSPITAL ENCOUNTER (OUTPATIENT)
Age: 53
Discharge: HOME | End: 2018-12-14
Payer: COMMERCIAL

## 2018-12-14 ENCOUNTER — TELEPHONE (OUTPATIENT)
Dept: CARDIOLOGY CLINIC | Age: 53
End: 2018-12-14

## 2018-12-14 ENCOUNTER — OFFICE VISIT (OUTPATIENT)
Dept: CARDIOLOGY CLINIC | Age: 53
End: 2018-12-14
Payer: COMMERCIAL

## 2018-12-14 VITALS
WEIGHT: 204 LBS | SYSTOLIC BLOOD PRESSURE: 136 MMHG | BODY MASS INDEX: 32.02 KG/M2 | HEIGHT: 67 IN | HEART RATE: 61 BPM | DIASTOLIC BLOOD PRESSURE: 89 MMHG

## 2018-12-14 DIAGNOSIS — Q24.6 CONGENITAL HEART BLOCK: ICD-10-CM

## 2018-12-14 DIAGNOSIS — I50.22 SYSTOLIC CHF, CHRONIC (HCC): Primary | ICD-10-CM

## 2018-12-14 DIAGNOSIS — I50.22 SYSTOLIC CHF, CHRONIC (HCC): ICD-10-CM

## 2018-12-14 DIAGNOSIS — Z95.0 CARDIAC PACEMAKER IN SITU: ICD-10-CM

## 2018-12-14 DIAGNOSIS — I48.0 PAROXYSMAL ATRIAL FIBRILLATION (HCC): Primary | ICD-10-CM

## 2018-12-14 DIAGNOSIS — I10 ESSENTIAL HYPERTENSION: ICD-10-CM

## 2018-12-14 DIAGNOSIS — Z95.0 PACEMAKER: ICD-10-CM

## 2018-12-14 DIAGNOSIS — R06.09 DYSPNEA ON EXERTION: ICD-10-CM

## 2018-12-14 LAB
ANION GAP SERPL CALCULATED.3IONS-SCNC: 14 MMOL/L (ref 3–16)
BUN BLDV-MCNC: 15 MG/DL (ref 7–20)
CALCIUM SERPL-MCNC: 9.4 MG/DL (ref 8.3–10.6)
CHLORIDE BLD-SCNC: 103 MMOL/L (ref 99–110)
CO2: 23 MMOL/L (ref 21–32)
CREAT SERPL-MCNC: 0.9 MG/DL (ref 0.6–1.1)
GFR AFRICAN AMERICAN: >60
GFR NON-AFRICAN AMERICAN: >60
GLUCOSE BLD-MCNC: 85 MG/DL (ref 70–99)
POTASSIUM SERPL-SCNC: 4.5 MMOL/L (ref 3.5–5.1)
PRO-BNP: 389 PG/ML (ref 0–124)
SODIUM BLD-SCNC: 140 MMOL/L (ref 136–145)

## 2018-12-14 PROCEDURE — 93280 PM DEVICE PROGR EVAL DUAL: CPT | Performed by: INTERNAL MEDICINE

## 2018-12-14 PROCEDURE — 80048 BASIC METABOLIC PNL TOTAL CA: CPT

## 2018-12-14 PROCEDURE — 36415 COLL VENOUS BLD VENIPUNCTURE: CPT

## 2018-12-14 PROCEDURE — 83880 ASSAY OF NATRIURETIC PEPTIDE: CPT

## 2018-12-14 PROCEDURE — 99214 OFFICE O/P EST MOD 30 MIN: CPT | Performed by: INTERNAL MEDICINE

## 2018-12-14 RX ORDER — LISINOPRIL 20 MG/1
TABLET ORAL
Qty: 60 TABLET | Refills: 5 | Status: SHIPPED | OUTPATIENT
Start: 2018-12-14 | End: 2019-01-07 | Stop reason: SINTOL

## 2019-01-03 DIAGNOSIS — G47.33 OBSTRUCTIVE SLEEP APNEA (ADULT) (PEDIATRIC): Primary | ICD-10-CM

## 2019-01-07 ENCOUNTER — OFFICE VISIT (OUTPATIENT)
Dept: CARDIOLOGY CLINIC | Age: 54
End: 2019-01-07
Payer: COMMERCIAL

## 2019-01-07 VITALS
BODY MASS INDEX: 32.38 KG/M2 | WEIGHT: 206.3 LBS | OXYGEN SATURATION: 98 % | SYSTOLIC BLOOD PRESSURE: 122 MMHG | HEIGHT: 67 IN | HEART RATE: 62 BPM | DIASTOLIC BLOOD PRESSURE: 60 MMHG | RESPIRATION RATE: 15 BRPM

## 2019-01-07 DIAGNOSIS — I48.0 PAROXYSMAL ATRIAL FIBRILLATION (HCC): ICD-10-CM

## 2019-01-07 DIAGNOSIS — Z95.0 PACEMAKER: ICD-10-CM

## 2019-01-07 DIAGNOSIS — G47.33 OSA (OBSTRUCTIVE SLEEP APNEA): ICD-10-CM

## 2019-01-07 DIAGNOSIS — I50.42 CHRONIC COMBINED SYSTOLIC AND DIASTOLIC HEART FAILURE (HCC): Primary | ICD-10-CM

## 2019-01-07 PROCEDURE — 99214 OFFICE O/P EST MOD 30 MIN: CPT | Performed by: CLINICAL NURSE SPECIALIST

## 2019-01-07 RX ORDER — LOSARTAN POTASSIUM 100 MG/1
100 TABLET ORAL DAILY
Qty: 90 TABLET | Refills: 1 | Status: SHIPPED | OUTPATIENT
Start: 2019-01-07 | End: 2019-08-07 | Stop reason: SDUPTHER

## 2019-02-01 ENCOUNTER — HOSPITAL ENCOUNTER (OUTPATIENT)
Dept: SLEEP CENTER | Age: 54
Discharge: HOME | End: 2019-02-01
Payer: COMMERCIAL

## 2019-02-01 DIAGNOSIS — G47.33 OBSTRUCTIVE SLEEP APNEA (ADULT) (PEDIATRIC): ICD-10-CM

## 2019-02-01 PROCEDURE — 95811 POLYSOM 6/>YRS CPAP 4/> PARM: CPT

## 2019-02-06 PROCEDURE — 95811 POLYSOM 6/>YRS CPAP 4/> PARM: CPT | Performed by: INTERNAL MEDICINE

## 2019-02-07 ENCOUNTER — OFFICE VISIT (OUTPATIENT)
Dept: CARDIOLOGY CLINIC | Age: 54
End: 2019-02-07
Payer: COMMERCIAL

## 2019-02-07 ENCOUNTER — HOSPITAL ENCOUNTER (OUTPATIENT)
Age: 54
Discharge: HOME | End: 2019-02-07
Payer: COMMERCIAL

## 2019-02-07 VITALS
RESPIRATION RATE: 16 BRPM | WEIGHT: 206.4 LBS | SYSTOLIC BLOOD PRESSURE: 138 MMHG | HEIGHT: 67 IN | DIASTOLIC BLOOD PRESSURE: 84 MMHG | HEART RATE: 64 BPM | BODY MASS INDEX: 32.39 KG/M2 | OXYGEN SATURATION: 97 %

## 2019-02-07 DIAGNOSIS — I50.42 CHRONIC COMBINED SYSTOLIC AND DIASTOLIC HEART FAILURE (HCC): Primary | ICD-10-CM

## 2019-02-07 DIAGNOSIS — I50.42 CHRONIC COMBINED SYSTOLIC AND DIASTOLIC HEART FAILURE (HCC): ICD-10-CM

## 2019-02-07 DIAGNOSIS — G47.33 OSA (OBSTRUCTIVE SLEEP APNEA): ICD-10-CM

## 2019-02-07 DIAGNOSIS — I48.0 PAF (PAROXYSMAL ATRIAL FIBRILLATION) (HCC): ICD-10-CM

## 2019-02-07 DIAGNOSIS — Z95.0 PACEMAKER: ICD-10-CM

## 2019-02-07 LAB
ANION GAP SERPL CALCULATED.3IONS-SCNC: 14 MMOL/L (ref 3–16)
BUN BLDV-MCNC: 12 MG/DL (ref 7–20)
CALCIUM SERPL-MCNC: 9.4 MG/DL (ref 8.3–10.6)
CHLORIDE BLD-SCNC: 105 MMOL/L (ref 99–110)
CO2: 24 MMOL/L (ref 21–32)
CREAT SERPL-MCNC: 0.9 MG/DL (ref 0.6–1.1)
GFR AFRICAN AMERICAN: >60
GFR NON-AFRICAN AMERICAN: >60
GLUCOSE BLD-MCNC: 85 MG/DL (ref 70–99)
POTASSIUM SERPL-SCNC: 3.9 MMOL/L (ref 3.5–5.1)
PRO-BNP: 651 PG/ML (ref 0–124)
SODIUM BLD-SCNC: 143 MMOL/L (ref 136–145)

## 2019-02-07 PROCEDURE — 83880 ASSAY OF NATRIURETIC PEPTIDE: CPT

## 2019-02-07 PROCEDURE — 80048 BASIC METABOLIC PNL TOTAL CA: CPT

## 2019-02-07 PROCEDURE — 36415 COLL VENOUS BLD VENIPUNCTURE: CPT

## 2019-02-07 PROCEDURE — 99214 OFFICE O/P EST MOD 30 MIN: CPT | Performed by: CLINICAL NURSE SPECIALIST

## 2019-02-07 RX ORDER — METOPROLOL TARTRATE 50 MG/1
TABLET, FILM COATED ORAL
Qty: 60 TABLET | Refills: 5 | Status: CANCELLED | OUTPATIENT
Start: 2019-02-07

## 2019-02-07 RX ORDER — CARVEDILOL 12.5 MG/1
12.5 TABLET ORAL 2 TIMES DAILY
Qty: 60 TABLET | Refills: 2 | Status: SHIPPED | OUTPATIENT
Start: 2019-02-07 | End: 2019-03-21

## 2019-02-11 ENCOUNTER — TELEPHONE (OUTPATIENT)
Dept: PULMONOLOGY | Age: 54
End: 2019-02-11

## 2019-02-11 ENCOUNTER — TELEPHONE (OUTPATIENT)
Dept: CARDIOLOGY CLINIC | Age: 54
End: 2019-02-11

## 2019-03-18 ENCOUNTER — PROCEDURE VISIT (OUTPATIENT)
Dept: CARDIOLOGY CLINIC | Age: 54
End: 2019-03-18
Payer: COMMERCIAL

## 2019-03-18 ENCOUNTER — OFFICE VISIT (OUTPATIENT)
Dept: CARDIOLOGY CLINIC | Age: 54
End: 2019-03-18
Payer: COMMERCIAL

## 2019-03-18 VITALS
RESPIRATION RATE: 14 BRPM | WEIGHT: 201 LBS | DIASTOLIC BLOOD PRESSURE: 74 MMHG | SYSTOLIC BLOOD PRESSURE: 136 MMHG | HEIGHT: 66 IN | HEART RATE: 68 BPM | BODY MASS INDEX: 32.3 KG/M2 | OXYGEN SATURATION: 99 %

## 2019-03-18 DIAGNOSIS — Z95.0 PACEMAKER: ICD-10-CM

## 2019-03-18 DIAGNOSIS — I10 ESSENTIAL HYPERTENSION: ICD-10-CM

## 2019-03-18 DIAGNOSIS — G47.33 OBSTRUCTIVE SLEEP APNEA (ADULT) (PEDIATRIC): ICD-10-CM

## 2019-03-18 DIAGNOSIS — I51.9 LV DYSFUNCTION: ICD-10-CM

## 2019-03-18 DIAGNOSIS — Q24.6 CONGENITAL HEART BLOCK: ICD-10-CM

## 2019-03-18 DIAGNOSIS — I48.0 PAROXYSMAL ATRIAL FIBRILLATION (HCC): Primary | ICD-10-CM

## 2019-03-18 PROCEDURE — 99214 OFFICE O/P EST MOD 30 MIN: CPT | Performed by: INTERNAL MEDICINE

## 2019-03-18 PROCEDURE — 93280 PM DEVICE PROGR EVAL DUAL: CPT | Performed by: INTERNAL MEDICINE

## 2019-03-18 PROCEDURE — 93000 ELECTROCARDIOGRAM COMPLETE: CPT | Performed by: INTERNAL MEDICINE

## 2019-03-19 ENCOUNTER — HOSPITAL ENCOUNTER (EMERGENCY)
Age: 54
Discharge: HOME | End: 2019-03-19
Attending: FAMILY MEDICINE
Payer: COMMERCIAL

## 2019-03-19 ENCOUNTER — APPOINTMENT (OUTPATIENT)
Dept: CT IMAGING | Age: 54
End: 2019-03-19
Payer: COMMERCIAL

## 2019-03-19 VITALS
BODY MASS INDEX: 28.41 KG/M2 | SYSTOLIC BLOOD PRESSURE: 153 MMHG | HEART RATE: 57 BPM | DIASTOLIC BLOOD PRESSURE: 108 MMHG | TEMPERATURE: 97.3 F | HEIGHT: 67 IN | WEIGHT: 181 LBS | RESPIRATION RATE: 16 BRPM | OXYGEN SATURATION: 100 %

## 2019-03-19 DIAGNOSIS — R51.9 ACUTE NONINTRACTABLE HEADACHE, UNSPECIFIED HEADACHE TYPE: Primary | ICD-10-CM

## 2019-03-19 DIAGNOSIS — R11.0 NAUSEA: ICD-10-CM

## 2019-03-19 DIAGNOSIS — I48.20 CHRONIC ATRIAL FIBRILLATION (HCC): ICD-10-CM

## 2019-03-19 DIAGNOSIS — Z79.01 ADEQUATE ANTICOAGULATION ON ANTICOAGULANT THERAPY: ICD-10-CM

## 2019-03-19 LAB
A/G RATIO: 1.6 (ref 1.1–2.2)
ALBUMIN SERPL-MCNC: 4.6 G/DL (ref 3.4–5)
ALP BLD-CCNC: 62 U/L (ref 40–129)
ALT SERPL-CCNC: 15 U/L (ref 10–40)
ANION GAP SERPL CALCULATED.3IONS-SCNC: 13 MMOL/L (ref 3–16)
AST SERPL-CCNC: 17 U/L (ref 15–37)
BASOPHILS ABSOLUTE: 0 K/UL (ref 0–0.2)
BASOPHILS RELATIVE PERCENT: 1.2 %
BILIRUB SERPL-MCNC: 0.7 MG/DL (ref 0–1)
BUN BLDV-MCNC: 11 MG/DL (ref 7–20)
CALCIUM SERPL-MCNC: 9.2 MG/DL (ref 8.3–10.6)
CHLORIDE BLD-SCNC: 106 MMOL/L (ref 99–110)
CO2: 23 MMOL/L (ref 21–32)
CREAT SERPL-MCNC: 0.8 MG/DL (ref 0.6–1.1)
EKG ATRIAL RATE: 70 BPM
EKG DIAGNOSIS: NORMAL
EKG Q-T INTERVAL: 500 MS
EKG QRS DURATION: 168 MS
EKG QTC CALCULATION (BAZETT): 515 MS
EKG R AXIS: 99 DEGREES
EKG T AXIS: -76 DEGREES
EKG VENTRICULAR RATE: 64 BPM
EOSINOPHILS ABSOLUTE: 0.2 K/UL (ref 0–0.6)
EOSINOPHILS RELATIVE PERCENT: 5.9 %
GFR AFRICAN AMERICAN: >60
GFR NON-AFRICAN AMERICAN: >60
GLOBULIN: 2.8 G/DL
GLUCOSE BLD-MCNC: 99 MG/DL (ref 70–99)
HCT VFR BLD CALC: 41.8 % (ref 36–48)
HEMOGLOBIN: 14 G/DL (ref 12–16)
INR BLD: 1.24 (ref 0.86–1.14)
LYMPHOCYTES ABSOLUTE: 1.2 K/UL (ref 1–5.1)
LYMPHOCYTES RELATIVE PERCENT: 31 %
MCH RBC QN AUTO: 31.3 PG (ref 26–34)
MCHC RBC AUTO-ENTMCNC: 33.5 G/DL (ref 31–36)
MCV RBC AUTO: 93.4 FL (ref 80–100)
MONOCYTES ABSOLUTE: 0.3 K/UL (ref 0–1.3)
MONOCYTES RELATIVE PERCENT: 7.5 %
NEUTROPHILS ABSOLUTE: 2.2 K/UL (ref 1.7–7.7)
NEUTROPHILS RELATIVE PERCENT: 54.4 %
PDW BLD-RTO: 15.7 % (ref 12.4–15.4)
PLATELET # BLD: 225 K/UL (ref 135–450)
PMV BLD AUTO: 8.1 FL (ref 5–10.5)
POTASSIUM SERPL-SCNC: 4.5 MMOL/L (ref 3.5–5.1)
PRO-BNP: 539 PG/ML (ref 0–124)
PROTHROMBIN TIME: 14.1 SEC (ref 9.8–13)
RBC # BLD: 4.47 M/UL (ref 4–5.2)
SODIUM BLD-SCNC: 142 MMOL/L (ref 136–145)
TOTAL PROTEIN: 7.4 G/DL (ref 6.4–8.2)
TROPONIN: <0.01 NG/ML
WBC # BLD: 4 K/UL (ref 4–11)

## 2019-03-19 PROCEDURE — 84484 ASSAY OF TROPONIN QUANT: CPT

## 2019-03-19 PROCEDURE — 93010 ELECTROCARDIOGRAM REPORT: CPT | Performed by: INTERNAL MEDICINE

## 2019-03-19 PROCEDURE — 80053 COMPREHEN METABOLIC PANEL: CPT

## 2019-03-19 PROCEDURE — 85610 PROTHROMBIN TIME: CPT

## 2019-03-19 PROCEDURE — 83880 ASSAY OF NATRIURETIC PEPTIDE: CPT

## 2019-03-19 PROCEDURE — 70450 CT HEAD/BRAIN W/O DYE: CPT

## 2019-03-19 PROCEDURE — 93005 ELECTROCARDIOGRAM TRACING: CPT | Performed by: FAMILY MEDICINE

## 2019-03-19 PROCEDURE — 6360000002 HC RX W HCPCS: Performed by: PHYSICIAN ASSISTANT

## 2019-03-19 PROCEDURE — 85025 COMPLETE CBC W/AUTO DIFF WBC: CPT

## 2019-03-19 PROCEDURE — 96375 TX/PRO/DX INJ NEW DRUG ADDON: CPT

## 2019-03-19 PROCEDURE — 96374 THER/PROPH/DIAG INJ IV PUSH: CPT

## 2019-03-19 PROCEDURE — 99284 EMERGENCY DEPT VISIT MOD MDM: CPT

## 2019-03-19 RX ORDER — DIPHENHYDRAMINE HYDROCHLORIDE 50 MG/ML
25 INJECTION INTRAMUSCULAR; INTRAVENOUS ONCE
Status: COMPLETED | OUTPATIENT
Start: 2019-03-19 | End: 2019-03-19

## 2019-03-19 RX ORDER — BUTALBITAL, ACETAMINOPHEN AND CAFFEINE 300; 40; 50 MG/1; MG/1; MG/1
1 CAPSULE ORAL EVERY 4 HOURS PRN
Qty: 21 CAPSULE | Refills: 0 | Status: SHIPPED | OUTPATIENT
Start: 2019-03-19 | End: 2019-08-07 | Stop reason: SDUPTHER

## 2019-03-19 RX ORDER — ONDANSETRON 4 MG/1
4 TABLET, ORALLY DISINTEGRATING ORAL EVERY 8 HOURS PRN
Qty: 20 TABLET | Refills: 0 | Status: SHIPPED | OUTPATIENT
Start: 2019-03-19 | End: 2020-08-31

## 2019-03-19 RX ORDER — TIZANIDINE 4 MG/1
4 TABLET ORAL EVERY 6 HOURS PRN
COMMUNITY
End: 2019-03-21

## 2019-03-19 RX ADMIN — PROCHLORPERAZINE EDISYLATE 10 MG: 5 INJECTION INTRAMUSCULAR; INTRAVENOUS at 06:27

## 2019-03-19 RX ADMIN — DIPHENHYDRAMINE HYDROCHLORIDE 25 MG: 50 INJECTION, SOLUTION INTRAMUSCULAR; INTRAVENOUS at 06:27

## 2019-03-19 ASSESSMENT — ENCOUNTER SYMPTOMS
PHOTOPHOBIA: 1
BACK PAIN: 1
SINUS PAIN: 0
SORE THROAT: 0
DIARRHEA: 0
SHORTNESS OF BREATH: 0
VOMITING: 0
NAUSEA: 1
CHEST TIGHTNESS: 0
ABDOMINAL PAIN: 0

## 2019-03-19 ASSESSMENT — PAIN DESCRIPTION - ONSET: ONSET: GRADUAL

## 2019-03-19 ASSESSMENT — PAIN DESCRIPTION - PROGRESSION: CLINICAL_PROGRESSION: GRADUALLY WORSENING

## 2019-03-19 ASSESSMENT — PAIN DESCRIPTION - ORIENTATION: ORIENTATION: POSTERIOR

## 2019-03-19 ASSESSMENT — PAIN DESCRIPTION - PAIN TYPE: TYPE: ACUTE PAIN

## 2019-03-19 ASSESSMENT — PAIN SCALES - GENERAL: PAINLEVEL_OUTOF10: 9

## 2019-03-19 ASSESSMENT — PAIN DESCRIPTION - LOCATION: LOCATION: HEAD

## 2019-03-19 ASSESSMENT — PAIN DESCRIPTION - FREQUENCY: FREQUENCY: CONTINUOUS

## 2019-03-20 RX ORDER — RIVAROXABAN 20 MG/1
TABLET, FILM COATED ORAL
Qty: 90 TABLET | Refills: 3 | Status: SHIPPED | OUTPATIENT
Start: 2019-03-20 | End: 2020-04-16

## 2019-03-21 ENCOUNTER — OFFICE VISIT (OUTPATIENT)
Dept: CARDIOLOGY CLINIC | Age: 54
End: 2019-03-21
Payer: COMMERCIAL

## 2019-03-21 VITALS
RESPIRATION RATE: 15 BRPM | DIASTOLIC BLOOD PRESSURE: 74 MMHG | HEART RATE: 54 BPM | SYSTOLIC BLOOD PRESSURE: 122 MMHG | WEIGHT: 201.8 LBS | OXYGEN SATURATION: 98 % | BODY MASS INDEX: 31.67 KG/M2 | HEIGHT: 67 IN

## 2019-03-21 DIAGNOSIS — G47.33 OSA (OBSTRUCTIVE SLEEP APNEA): ICD-10-CM

## 2019-03-21 DIAGNOSIS — I50.22 SYSTOLIC CHF, CHRONIC (HCC): Primary | ICD-10-CM

## 2019-03-21 DIAGNOSIS — Z95.0 PACEMAKER: ICD-10-CM

## 2019-03-21 DIAGNOSIS — I48.0 PAROXYSMAL ATRIAL FIBRILLATION (HCC): ICD-10-CM

## 2019-03-21 PROCEDURE — 99214 OFFICE O/P EST MOD 30 MIN: CPT | Performed by: CLINICAL NURSE SPECIALIST

## 2019-03-21 RX ORDER — SPIRONOLACTONE 25 MG/1
25 TABLET ORAL DAILY
Qty: 30 TABLET | Refills: 0 | Status: SHIPPED | OUTPATIENT
Start: 2019-03-21 | End: 2019-04-02 | Stop reason: SDUPTHER

## 2019-03-21 RX ORDER — CARVEDILOL 12.5 MG/1
6.25 TABLET ORAL 2 TIMES DAILY
Qty: 60 TABLET | Refills: 0
Start: 2019-03-21 | End: 2019-04-02 | Stop reason: SDUPTHER

## 2019-03-25 ENCOUNTER — TELEPHONE (OUTPATIENT)
Dept: CARDIOLOGY CLINIC | Age: 54
End: 2019-03-25

## 2019-03-26 ENCOUNTER — HOSPITAL ENCOUNTER (OUTPATIENT)
Dept: CARDIAC CATH/INVASIVE PROCEDURES | Age: 54
Setting detail: OUTPATIENT SURGERY
Discharge: HOME | End: 2019-03-26
Attending: INTERNAL MEDICINE | Admitting: INTERNAL MEDICINE
Payer: COMMERCIAL

## 2019-03-26 PROBLEM — I82.B12 LEFT SUBCLAVIAN VEIN THROMBOSIS (HCC): Status: ACTIVE | Noted: 2019-03-26

## 2019-03-26 PROCEDURE — 36005 INJECTION EXT VENOGRAPHY: CPT

## 2019-03-26 PROCEDURE — 36005 INJECTION EXT VENOGRAPHY: CPT | Performed by: INTERNAL MEDICINE

## 2019-03-26 PROCEDURE — 75820 VEIN X-RAY ARM/LEG: CPT

## 2019-03-26 PROCEDURE — 6360000004 HC RX CONTRAST MEDICATION: Performed by: INTERNAL MEDICINE

## 2019-03-26 RX ADMIN — IOPAMIDOL 10 ML: 755 INJECTION, SOLUTION INTRAVENOUS at 12:49

## 2019-03-28 ENCOUNTER — TELEPHONE (OUTPATIENT)
Dept: CARDIOLOGY CLINIC | Age: 54
End: 2019-03-28

## 2019-03-28 NOTE — TELEPHONE ENCOUNTER
Pt states when she was here on 3/21/19, [employer] was going to send a letter to PCP for leave of absence from work. Please call pt to advise if that has been done.

## 2019-04-02 ENCOUNTER — OFFICE VISIT (OUTPATIENT)
Dept: CARDIOLOGY CLINIC | Age: 54
End: 2019-04-02
Payer: COMMERCIAL

## 2019-04-02 ENCOUNTER — HOSPITAL ENCOUNTER (OUTPATIENT)
Age: 54
Discharge: HOME | End: 2019-04-02
Payer: COMMERCIAL

## 2019-04-02 VITALS
HEIGHT: 67 IN | BODY MASS INDEX: 32.8 KG/M2 | WEIGHT: 209 LBS | HEART RATE: 64 BPM | OXYGEN SATURATION: 99 % | DIASTOLIC BLOOD PRESSURE: 84 MMHG | RESPIRATION RATE: 15 BRPM | SYSTOLIC BLOOD PRESSURE: 148 MMHG

## 2019-04-02 DIAGNOSIS — I50.22 SYSTOLIC CHF, CHRONIC (HCC): ICD-10-CM

## 2019-04-02 DIAGNOSIS — G47.33 OSA (OBSTRUCTIVE SLEEP APNEA): ICD-10-CM

## 2019-04-02 DIAGNOSIS — I48.0 PAROXYSMAL ATRIAL FIBRILLATION (HCC): ICD-10-CM

## 2019-04-02 DIAGNOSIS — Z95.0 PACEMAKER: ICD-10-CM

## 2019-04-02 DIAGNOSIS — I50.42 CHRONIC COMBINED SYSTOLIC AND DIASTOLIC HEART FAILURE (HCC): Primary | ICD-10-CM

## 2019-04-02 LAB
ANION GAP SERPL CALCULATED.3IONS-SCNC: 11 MMOL/L (ref 3–16)
BUN BLDV-MCNC: 10 MG/DL (ref 7–20)
CALCIUM SERPL-MCNC: 9.2 MG/DL (ref 8.3–10.6)
CHLORIDE BLD-SCNC: 104 MMOL/L (ref 99–110)
CO2: 28 MMOL/L (ref 21–32)
CREAT SERPL-MCNC: 0.8 MG/DL (ref 0.6–1.1)
GFR AFRICAN AMERICAN: >60
GFR NON-AFRICAN AMERICAN: >60
GLUCOSE BLD-MCNC: 70 MG/DL (ref 70–99)
POTASSIUM SERPL-SCNC: 3.7 MMOL/L (ref 3.5–5.1)
PRO-BNP: 1261 PG/ML (ref 0–124)
SODIUM BLD-SCNC: 143 MMOL/L (ref 136–145)

## 2019-04-02 PROCEDURE — 80048 BASIC METABOLIC PNL TOTAL CA: CPT

## 2019-04-02 PROCEDURE — 36415 COLL VENOUS BLD VENIPUNCTURE: CPT

## 2019-04-02 PROCEDURE — 99214 OFFICE O/P EST MOD 30 MIN: CPT | Performed by: CLINICAL NURSE SPECIALIST

## 2019-04-02 PROCEDURE — 83880 ASSAY OF NATRIURETIC PEPTIDE: CPT

## 2019-04-02 RX ORDER — CARVEDILOL 12.5 MG/1
12.5 TABLET ORAL 2 TIMES DAILY
Qty: 60 TABLET | Refills: 0 | Status: SHIPPED | OUTPATIENT
Start: 2019-04-02 | End: 2019-08-07 | Stop reason: SDUPTHER

## 2019-04-02 RX ORDER — AMLODIPINE BESYLATE 2.5 MG/1
2.5 TABLET ORAL DAILY
Qty: 30 TABLET | Refills: 3 | Status: SHIPPED | OUTPATIENT
Start: 2019-04-02 | End: 2019-08-07 | Stop reason: SDUPTHER

## 2019-04-02 RX ORDER — SPIRONOLACTONE 25 MG/1
25 TABLET ORAL DAILY
Qty: 30 TABLET | Refills: 0 | Status: SHIPPED | OUTPATIENT
Start: 2019-04-02 | End: 2019-08-07 | Stop reason: SDUPTHER

## 2019-04-02 NOTE — PATIENT INSTRUCTIONS
1.  Recommend her to be off work while she adjust to cpap and awaiting LV lead replacement at least 3 months  2. Add norvasc 2.5 mg   3. Check blood work today  4.   RTO April 22 at 1 pm

## 2019-04-02 NOTE — PROGRESS NOTES
[address]  Progress Note    Primary Care Doctor:  Samira Gardner    Chief Complaint   Patient presents with   [facility] BP has been high lately since last week, fluctuating  170/110 last evening    Congestive Heart Failure    Atrial Fibrillation    Sleep Apnea    Headache    Dizziness        History of Present Illness:  46 y.o. female with history of sHF, LVH, AF on xarelto (follows with Dr Steffi Perez), had been on flecainide, dual chamber pacemaker 2012 due to congenital heart block  LVEF had been 55% in 2015 and now 25%. No lisinopril due to cough. She is a cook at [employer]    I had the pleasure of seeing Gaurang Adams in follow up for sHF. She is ambulatory and by herself today. She is here for an earlier visit for hypertension and dizziness. Last week she noticed her BP was 170/110 last evening. She has had some chronic headaches which she has Fioricet and is not using them. She has tried tylenol which is not helping. She went to  her cpap and was told she needed to pay money upfront and was not ready for it. She is now scheduled to pick it up next Monday. She denies any chest pain, edema or sob. Past Medical History:   has a past medical history of Anemia, Atrial fibrillation and flutter ([address] Atrial flutter ([address] CHB (complete heart block) ([address] Congenital heart disease, GERD (gastroesophageal reflux disease), Headache(784.0), History of complete heart block, Hyperlipidemia, Hypertension, Syncope, and Systolic CHF, chronic ([address] Surgical History:   has a past surgical history that includes Uterine fibroid surgery; Pacemaker insertion (11/29/12); and Tubal ligation. Social History:   reports that she has never smoked. She has never used smokeless tobacco. She reports that she does not drink alcohol or use drugs.    Family History:   Family History   Problem Relation Age of Onset    Cancer Father     Heart Disease Neg Hx     High Blood Pressure Neg Hx     High Cholesterol Neg Hx        Home Medications:  Prior to Admission medications    Medication Sig Start Date End Date Taking? Authorizing Provider   spironolactone (ALDACTONE) 25 MG tablet Take 1 tablet by mouth daily 3/21/19  Yes CATRACHITO Keenan - CNS   carvedilol (COREG) 12.5 MG tablet Take 0.5 tablets by mouth 2 times daily  Patient taking differently: Take 6.25 mg by mouth 2 times daily Indications: taking 1 tablet po bid now per PCP  3/21/19  Yes CATRACHITO Jennings - CNS   XARELTO 20 MG TABS tablet TAKE ONE TABLET BY MOUTH DAILY WITH BREAKFAST 3/20/19  Yes Tarik Liang MD   butalbital-APAP-caffeine (FIORICET) -40 MG CAPS per capsule Take 1 capsule by mouth every 4 hours as needed for Headaches or Migraine 3/19/19 4/2/19 Yes Rossi Ojeda PA-C   ondansetron (ZOFRAN ODT) 4 MG disintegrating tablet Take 1 tablet by mouth every 8 hours as needed for Nausea 3/19/19  Yes Rossi Ojeda PA-C   losartan (COZAAR) 100 MG tablet Take 1 tablet by mouth daily 1/7/19  Yes CATRACHITO Keenan - CNS   ranitidine (ZANTAC) 150 MG tablet Take 1 tablet by mouth as needed for Heartburn 11/18/16  Yes Grace Sanchez MD   atorvastatin (LIPITOR) 40 MG tablet Take 1 tablet by mouth daily 11/18/16  Yes Grace Sanchez MD        Allergies:  Latex; Codeine; Lisinopril; and Sulfa antibiotics     Review of Systems:   · Constitutional: there has been no unanticipated weight loss. There's been no change in energy level, sleep pattern, or activity level. Headache/dizziness  · Eyes: No visual changes or diplopia. No scleral icterus. · ENT: No Headaches, hearing loss or vertigo. No mouth sores or sore throat. · Cardiovascular: Reviewed in HPI  · Respiratory: No cough or wheezing, no sputum production. No hematemesis. · Gastrointestinal: No abdominal pain, appetite loss, blood in stools. No change in bowel or bladder habits.   · Genitourinary: No dysuria, trouble voiding, or hematuria. · Musculoskeletal:  No gait disturbance, weakness or joint complaints. · Integumentary: No rash or pruritis. · Neurological: No headache, diplopia, change in muscle strength, numbness or tingling. No change in gait, balance, coordination, mood, affect, memory, mentation, behavior. · Psychiatric: No anxiety, no depression. · Endocrine: No malaise, fatigue or temperature intolerance. No excessive thirst, fluid intake, or urination. No tremor. · Hematologic/Lymphatic: No abnormal bruising or bleeding, blood clots or swollen lymph nodes. · Allergic/Immunologic: No nasal congestion or hives. Physical Examination:    Vitals:    04/02/19 1046 04/02/19 1053   BP: (!) 158/82 (!) 148/84   Site: Left Upper Arm Left Upper Arm   Position: Sitting Sitting   Cuff Size: Large Adult Large Adult   Pulse: 64    Resp: 15    SpO2: 99%    Weight: 209 lb (94.8 kg)    Height: 5' 6.6\" (1.692 m)         Constitutional and General Appearance: Warm and dry, no apparent distress, normal coloration  HEENT:  Normocephalic, atraumatic  Respiratory:  · Normal excursion and expansion without use of accessory muscles  · Resp Auscultation: Normal breath sounds without dullness  Cardiovascular:  · The apical impulses not displaced  · Heart tones are crisp and normal  · JVP normal cm H2O  · irregular rate and rhythm  · Peripheral pulses are symmetrical and full  · There is no clubbing, cyanosis of the extremities.   · no edema  · Pedal Pulses: 2+ and equal   Abdomen:  · No masses or tenderness  · Liver/Spleen: No Abnormalities Noted  Neurological/Psychiatric:  · Alert and oriented in all spheres  · Moves all extremities well  · Exhibits normal gait balance and coordination  · No abnormalities of mood, affect, memory, mentation, or behavior are noted    Lab Data:    CBC:   Lab Results   Component Value Date    WBC 4.0 03/19/2019    WBC 5.9 09/11/2018    WBC 5.1 11/24/2015    RBC 4.47 03/19/2019    RBC 4.25 09/11/2018    RBC 3.73 11/24/2015    HGB 14.0 03/19/2019    HGB 12.8 09/11/2018    HGB 9.9 11/24/2015    HCT 41.8 03/19/2019    HCT 37.6 09/11/2018    HCT 31.1 11/24/2015    MCV 93.4 03/19/2019    MCV 88.6 09/11/2018    MCV 83.3 11/24/2015    RDW 15.7 03/19/2019    RDW 17.4 09/11/2018    RDW 17.0 11/24/2015     03/19/2019     09/11/2018     11/24/2015     BMP:  Lab Results   Component Value Date     03/19/2019     02/07/2019     12/14/2018    K 4.5 03/19/2019    K 3.9 02/07/2019    K 4.5 12/14/2018     03/19/2019     02/07/2019     12/14/2018    CO2 23 03/19/2019    CO2 24 02/07/2019    CO2 23 12/14/2018    PHOS 2.5 07/05/2018    BUN 11 03/19/2019    BUN 12 02/07/2019    BUN 15 12/14/2018    CREATININE 0.8 03/19/2019    CREATININE 0.9 02/07/2019    CREATININE 0.9 12/14/2018     BNP:   Lab Results   Component Value Date    PROBNP 539 03/19/2019    PROBNP 651 02/07/2019    PROBNP 389 12/14/2018     Cardiac Imaging:    Stress test 9/11/18  Enlarged LV with mild global hypokinesis. EF 51%  There is normal isotope uptake at stress and rest. There is no evidence of  myocardial ischemia or scar. ECHO 7/24/18:  Summary   -Left ventricular cavity size is mildly dilated. -There is moderate concentric left ventricular hypertrophy.   -Overall left ventricular systolic function appears severely reduced with  and ejection fraction on the 25 % range.   -There is diffuse global hypokinesis. -Grade II diastolic dysfunction with elevated LV filling pressures. E/e\"=16.2.   -Mitral annular calcification is present. -Mild-moderate mitral regurgitation.   -Aortic valve appears sclerotic but opens adequately. -Trivial aortic regurgitation.   -Trivial tricuspid regurgitation with RVSP of 26 mmHg. -Mild pulmonic regurgitation present.   -Dilated left atrium with a volume of 72 ml.   -Pacer / ICD wire is visualized in the right heart.     GXT cathie 7/15/2015:   Left ventricular ejection fraction of 55%. The LV wall motion is normal.  There is normal isotope uptake at stress and rest.   There is no evidence of myocardial ischemia or scar. Assessment:    1. Chronic combined systolic and diastolic heart failure (HCC) on ace, BB and aldosterone antagonist   2. Paroxysmal atrial fibrillation (HCC) on xarelto   3. DERECK (obstructive sleep apnea) in process of obtaining cpap   4. Pacemaker        Plan:   1. Recommend her to be off work while she adjusts to cpap and awaiting LV lead replacement at least 3 months. She also is depressed which I have asked her to speak with her PCP regarding this. 2.  Add norvasc 2.5 mg for BP control  3. Check blood work today  4. RTO April 22 at 1 pm    I appreciate the opportunity of cooperating in the care of this individual.    BELKIS Chao, 4/2/2019, 11:12 AM    QUALITY MEASURES  1. Tobacco Cessation Counseling: NA  2. Retake of BP if >140/90:   NA  3. Documentation to PCP/referring for new patient:  Sent to PCP at close of office visit  4. CAD patient on anti-platelet: NA  5. CAD patient on STATIN therapy:  NA  6.  Patient with CHF and aFib on anticoagulation:  Yes

## 2019-04-03 ENCOUNTER — TELEPHONE (OUTPATIENT)
Dept: CARDIOLOGY CLINIC | Age: 54
End: 2019-04-03

## 2019-04-03 DIAGNOSIS — I50.42 CHRONIC COMBINED SYSTOLIC AND DIASTOLIC HEART FAILURE (HCC): Primary | ICD-10-CM

## 2019-04-03 RX ORDER — FUROSEMIDE 20 MG/1
20 TABLET ORAL DAILY
Qty: 30 TABLET | Refills: 1 | Status: SHIPPED | OUTPATIENT
Start: 2019-04-03 | End: 2019-06-14

## 2019-04-03 NOTE — TELEPHONE ENCOUNTER
I relayed results and instructions on lasix and recheck of labs to the patient. Informed her script was sent to her [facility] and to stay on spironolactone as well.   She verbalized understanding

## 2019-04-03 NOTE — TELEPHONE ENCOUNTER
----- Message from Marylene Hirschfeld, APRN - CNS sent at 4/3/2019  8:40 AM EDT -----  Fluid level is up which is new  Want to start some lasix 20 mg daily  Make sure she knows to keep taking aldactone as well  Recheck labs in 2 weeks  thanks

## 2019-04-04 ENCOUNTER — TELEPHONE (OUTPATIENT)
Dept: CARDIOLOGY CLINIC | Age: 54
End: 2019-04-04

## 2019-04-04 NOTE — TELEPHONE ENCOUNTER
Called pt to advise her that [facility] is OOT this week, she said it was RG that told her:    Plan:   1. Recommend her to be off work while she adjusts to cpap and awaiting LV lead replacement at least 3 months. She also is depressed which I have asked her to speak with her PCP regarding this. 2.  Add norvasc 2.5 mg for BP control  3. Check blood work today  4.   RTO April 22 at 1 pm     She needs FMLA filled out for this please

## 2019-04-04 NOTE — TELEPHONE ENCOUNTER
Pt calling wants to do her Rehab at [facility] will need the referral faxed to them at [phone] Ph [phone].  Pls call to advise Thank you

## 2019-04-04 NOTE — TELEPHONE ENCOUNTER
LM indicating that rehab was not discussed at office visits and also NPRG's comments. Instructed patient if she did want rehab, NPRG would place the order and we would fax it to [facility], but reminded her that she would have to pay out of pocket for this. Instructed to call us back only if she does want rehab ordered.

## 2019-04-05 NOTE — TELEPHONE ENCOUNTER
I LM on patient's phone regarding [employer] paperwork. It was on my desk this morning. I will let her know when it is done next week    As to the lasix, I could not find an allergy to this.   I found an allergy for lisinopril  Would like for her to try lasix or let me know when and where she had allergy for lasix  thanks

## 2019-04-12 ENCOUNTER — HOSPITAL ENCOUNTER (OUTPATIENT)
Age: 54
Discharge: HOME | End: 2019-04-12
Payer: COMMERCIAL

## 2019-04-12 ENCOUNTER — TELEPHONE (OUTPATIENT)
Dept: CARDIOLOGY CLINIC | Age: 54
End: 2019-04-12

## 2019-04-12 DIAGNOSIS — I50.42 CHRONIC COMBINED SYSTOLIC AND DIASTOLIC HEART FAILURE (HCC): ICD-10-CM

## 2019-04-12 LAB
ANION GAP SERPL CALCULATED.3IONS-SCNC: 11 MMOL/L (ref 3–16)
BUN BLDV-MCNC: 11 MG/DL (ref 7–20)
CALCIUM SERPL-MCNC: 9.2 MG/DL (ref 8.3–10.6)
CHLORIDE BLD-SCNC: 105 MMOL/L (ref 99–110)
CO2: 25 MMOL/L (ref 21–32)
CREAT SERPL-MCNC: 1.1 MG/DL (ref 0.6–1.1)
GFR AFRICAN AMERICAN: >60
GFR NON-AFRICAN AMERICAN: 52
GLUCOSE BLD-MCNC: 72 MG/DL (ref 70–99)
POTASSIUM SERPL-SCNC: 4 MMOL/L (ref 3.5–5.1)
PRO-BNP: 261 PG/ML (ref 0–124)
SODIUM BLD-SCNC: 141 MMOL/L (ref 136–145)

## 2019-04-12 PROCEDURE — 83880 ASSAY OF NATRIURETIC PEPTIDE: CPT

## 2019-04-12 PROCEDURE — 80048 BASIC METABOLIC PNL TOTAL CA: CPT

## 2019-04-12 PROCEDURE — 36415 COLL VENOUS BLD VENIPUNCTURE: CPT

## 2019-04-12 NOTE — TELEPHONE ENCOUNTER
Pt calling needs to know when she can  her FMLA paperwork? Pt states her employer needs this as soon as possible. NPRG told her to give her 5 days that was on 04/05/19.  Pls call to advise Thank you

## 2019-04-12 NOTE — TELEPHONE ENCOUNTER
I called the patient and let her know that the forms [facility] Hailee Felix MA on 4/8/19. I wasn't sure where originals were, so I made copies of scanned paperwork and let the patient know I would place at the , she is going to  this afternoon. I checked with RYAN BEAR after speaking with the patient, she stated she mailed the originals to the patient last week.

## 2019-04-15 ENCOUNTER — TELEPHONE (OUTPATIENT)
Dept: CARDIOLOGY CLINIC | Age: 54
End: 2019-04-15

## 2019-04-19 ENCOUNTER — TELEPHONE (OUTPATIENT)
Dept: CARDIOLOGY CLINIC | Age: 54
End: 2019-04-19

## 2019-04-19 NOTE — TELEPHONE ENCOUNTER
Viviane from [facility] scheduling states she has an order for cardiac rehab signed by [facility] and they require a doctors signature. Please resend to fax [phone]. Any questions please call [phone].

## 2019-04-19 NOTE — TELEPHONE ENCOUNTER
Pt has called back, her work is denying the 3 month leave. She stated that she now needs a work excuse for employer for the time from 4-1-19 to 4-23-19. She would like to get this as soon as possible. Pls call to advise. Thank you.

## 2019-04-19 NOTE — TELEPHONE ENCOUNTER
Her work needs to send us back a copy of what the patient is disputing  I see what I sent in (media section) and there is not date on it  Maybe her PCP put a date on it

## 2019-04-19 NOTE — TELEPHONE ENCOUNTER
I did not send it to [facility] rehab, I sent it to [facility] rehab  Please ask them fax us the referral

## 2019-04-22 ENCOUNTER — HOSPITAL ENCOUNTER (OUTPATIENT)
Age: 54
Discharge: HOME | End: 2019-04-22
Payer: COMMERCIAL

## 2019-04-22 ENCOUNTER — TELEPHONE (OUTPATIENT)
Dept: CARDIOLOGY CLINIC | Age: 54
End: 2019-04-22

## 2019-04-22 ENCOUNTER — OFFICE VISIT (OUTPATIENT)
Dept: CARDIOLOGY CLINIC | Age: 54
End: 2019-04-22
Payer: COMMERCIAL

## 2019-04-22 ENCOUNTER — PROCEDURE VISIT (OUTPATIENT)
Dept: CARDIOLOGY CLINIC | Age: 54
End: 2019-04-22
Payer: COMMERCIAL

## 2019-04-22 VITALS
HEART RATE: 74 BPM | HEIGHT: 67 IN | DIASTOLIC BLOOD PRESSURE: 82 MMHG | SYSTOLIC BLOOD PRESSURE: 122 MMHG | BODY MASS INDEX: 32.04 KG/M2 | OXYGEN SATURATION: 98 % | WEIGHT: 204.12 LBS

## 2019-04-22 DIAGNOSIS — Q24.6 CONGENITAL HEART BLOCK: ICD-10-CM

## 2019-04-22 DIAGNOSIS — Z95.0 PACEMAKER: ICD-10-CM

## 2019-04-22 DIAGNOSIS — I50.42 CHRONIC COMBINED SYSTOLIC AND DIASTOLIC HEART FAILURE (HCC): Primary | ICD-10-CM

## 2019-04-22 DIAGNOSIS — I50.22 SYSTOLIC CHF, CHRONIC (HCC): ICD-10-CM

## 2019-04-22 DIAGNOSIS — I48.0 PAROXYSMAL ATRIAL FIBRILLATION (HCC): ICD-10-CM

## 2019-04-22 DIAGNOSIS — G47.33 OSA (OBSTRUCTIVE SLEEP APNEA): ICD-10-CM

## 2019-04-22 DIAGNOSIS — I10 ESSENTIAL HYPERTENSION: ICD-10-CM

## 2019-04-22 DIAGNOSIS — R06.09 DYSPNEA ON EXERTION: ICD-10-CM

## 2019-04-22 LAB
ANION GAP SERPL CALCULATED.3IONS-SCNC: 12 MMOL/L (ref 3–16)
BUN BLDV-MCNC: 11 MG/DL (ref 7–20)
CALCIUM SERPL-MCNC: 9.3 MG/DL (ref 8.3–10.6)
CHLORIDE BLD-SCNC: 107 MMOL/L (ref 99–110)
CO2: 26 MMOL/L (ref 21–32)
CREAT SERPL-MCNC: 0.9 MG/DL (ref 0.6–1.1)
GFR AFRICAN AMERICAN: >60
GFR NON-AFRICAN AMERICAN: >60
GLUCOSE BLD-MCNC: 91 MG/DL (ref 70–99)
POTASSIUM SERPL-SCNC: 3.8 MMOL/L (ref 3.5–5.1)
PRO-BNP: 378 PG/ML (ref 0–124)
SODIUM BLD-SCNC: 145 MMOL/L (ref 136–145)

## 2019-04-22 PROCEDURE — 83880 ASSAY OF NATRIURETIC PEPTIDE: CPT

## 2019-04-22 PROCEDURE — 36415 COLL VENOUS BLD VENIPUNCTURE: CPT

## 2019-04-22 PROCEDURE — 99214 OFFICE O/P EST MOD 30 MIN: CPT | Performed by: CLINICAL NURSE SPECIALIST

## 2019-04-22 PROCEDURE — 80048 BASIC METABOLIC PNL TOTAL CA: CPT

## 2019-04-22 PROCEDURE — 93280 PM DEVICE PROGR EVAL DUAL: CPT | Performed by: INTERNAL MEDICINE

## 2019-04-22 NOTE — PROGRESS NOTES
Home Medications:  Prior to Admission medications    Medication Sig Start Date End Date Taking? Authorizing Provider   furosemide (LASIX) 20 MG tablet Take 1 tablet by mouth daily 4/3/19  Yes CATRACHITO Luke   spironolactone (ALDACTONE) 25 MG tablet Take 1 tablet by mouth daily 4/2/19  Yes CATRACHITO Melendez   carvedilol (COREG) 12.5 MG tablet Take 1 tablet by mouth 2 times daily 4/2/19  Yes CATRACHITO Melendez   amLODIPine (NORVASC) 2.5 MG tablet Take 1 tablet by mouth daily 4/2/19  Yes CATRACHITO Melendez   XARELTO 20 MG TABS tablet TAKE ONE TABLET BY MOUTH DAILY WITH BREAKFAST 3/20/19  Yes Flip Wallace MD   ondansetron (ZOFRAN ODT) 4 MG disintegrating tablet Take 1 tablet by mouth every 8 hours as needed for Nausea 3/19/19  Yes Ty Gillespie PA-C   losartan (COZAAR) 100 MG tablet Take 1 tablet by mouth daily 1/7/19  Yes CATRACHITO Keenan - CNS   ranitidine (ZANTAC) 150 MG tablet Take 1 tablet by mouth as needed for Heartburn 11/18/16  Yes Unruly Velez MD   atorvastatin (LIPITOR) 40 MG tablet Take 1 tablet by mouth daily 11/18/16  Yes Unruly Velez MD   butalbital-APAP-caffeine (FIORICET) -40 MG CAPS per capsule Take 1 capsule by mouth every 4 hours as needed for Headaches or Migraine 3/19/19 4/2/19  Ty Gillespie PA-C        Allergies:  Latex; Codeine; Lisinopril; and Sulfa antibiotics     Review of Systems:   · Constitutional: there has been no unanticipated weight loss. There's been no change in energy level, sleep pattern, or activity level. fatigue  · Eyes: No visual changes or diplopia. No scleral icterus. · ENT: No Headaches, hearing loss or vertigo. No mouth sores or sore throat. · Cardiovascular: Reviewed in HPI  · Respiratory: No cough or wheezing, no sputum production. No hematemesis. · Gastrointestinal: No abdominal pain, appetite loss, blood in stools. No change in bowel or bladder habits.   · Genitourinary: No dysuria, trouble voiding, or hematuria. · Musculoskeletal:  No gait disturbance, weakness or joint complaints. · Integumentary: No rash or pruritis. · Neurological: No headache, diplopia, change in muscle strength, numbness or tingling. No change in gait, balance, coordination, mood, affect, memory, mentation, behavior. · Psychiatric: No anxiety, no depression. · Endocrine: No malaise, fatigue or temperature intolerance. No excessive thirst, fluid intake, or urination. No tremor. · Hematologic/Lymphatic: No abnormal bruising or bleeding, blood clots or swollen lymph nodes. · Allergic/Immunologic: No nasal congestion or hives. Physical Examination:    Vitals:    04/22/19 1254   BP: 122/82   Site: Left Upper Arm   Position: Sitting   Cuff Size: Medium Adult   Pulse: 74   SpO2: 98%   Weight: 204 lb 1.9 oz (92.6 kg)   Height: 5' 6.6\" (1.692 m)        Constitutional and General Appearance: Warm and dry, no apparent distress, normal coloration  HEENT:  Normocephalic, atraumatic  Respiratory:  · Normal excursion and expansion without use of accessory muscles  · Resp Auscultation: Normal breath sounds without dullness  Cardiovascular:  · The apical impulses not displaced  · Heart tones are crisp and normal  · JVP normal cm H2O  · irregular rate and rhythm  · Peripheral pulses are symmetrical and full  · There is no clubbing, cyanosis of the extremities.   · no edema  · Pedal Pulses: 2+ and equal   Abdomen:  · No masses or tenderness  · Liver/Spleen: No Abnormalities Noted  Neurological/Psychiatric:  · Alert and oriented in all spheres  · Moves all extremities well  · Exhibits normal gait balance and coordination  · No abnormalities of mood, affect, memory, mentation, or behavior are noted    Lab Data:    CBC:   Lab Results   Component Value Date    WBC 4.0 03/19/2019    WBC 5.9 09/11/2018    WBC 5.1 11/24/2015    RBC 4.47 03/19/2019    RBC 4.25 09/11/2018    RBC 3.73 11/24/2015    HGB 14.0 03/19/2019    HGB 12.8 09/11/2018    HGB 9.9 11/24/2015    HCT 41.8 03/19/2019    HCT 37.6 09/11/2018    HCT 31.1 11/24/2015    MCV 93.4 03/19/2019    MCV 88.6 09/11/2018    MCV 83.3 11/24/2015    RDW 15.7 03/19/2019    RDW 17.4 09/11/2018    RDW 17.0 11/24/2015     03/19/2019     09/11/2018     11/24/2015     BMP:  Lab Results   Component Value Date     04/12/2019     04/02/2019     03/19/2019    K 4.0 04/12/2019    K 3.7 04/02/2019    K 4.5 03/19/2019     04/12/2019     04/02/2019     03/19/2019    CO2 25 04/12/2019    CO2 28 04/02/2019    CO2 23 03/19/2019    PHOS 2.5 07/05/2018    BUN 11 04/12/2019    BUN 10 04/02/2019    BUN 11 03/19/2019    CREATININE 1.1 04/12/2019    CREATININE 0.8 04/02/2019    CREATININE 0.8 03/19/2019     BNP:   Lab Results   Component Value Date    PROBNP 261 04/12/2019    PROBNP 1,261 04/02/2019    PROBNP 539 03/19/2019     Cardiac Imaging:    Stress test 9/11/18  Enlarged LV with mild global hypokinesis. EF 51%  There is normal isotope uptake at stress and rest. There is no evidence of  myocardial ischemia or scar. ECHO 7/24/18:  Summary   -Left ventricular cavity size is mildly dilated. -There is moderate concentric left ventricular hypertrophy.   -Overall left ventricular systolic function appears severely reduced with  and ejection fraction on the 25 % range.   -There is diffuse global hypokinesis. -Grade II diastolic dysfunction with elevated LV filling pressures. E/e\"=16.2.   -Mitral annular calcification is present. -Mild-moderate mitral regurgitation.   -Aortic valve appears sclerotic but opens adequately. -Trivial aortic regurgitation.   -Trivial tricuspid regurgitation with RVSP of 26 mmHg. -Mild pulmonic regurgitation present.   -Dilated left atrium with a volume of 72 ml.   -Pacer / ICD wire is visualized in the right heart. GXT cathie 7/15/2015:   Left ventricular ejection fraction of 55%.   The LV wall motion is normal.  There is normal isotope uptake at stress and rest.   There is no evidence of myocardial ischemia or scar. Assessment:    1. Chronic combined systolic and diastolic heart failure (HCC) on ace, BB and aldosterone antagonist   2. Paroxysmal atrial fibrillation (HCC) on xarelto   3. DERECK (obstructive sleep apnea) in process of obtaining cpap   4. Pacemaker        Plan:   1. Continue all current medications   2. RTO in 2 months  3. Will fill out [place] life form and patient will come to pick it up    Recommend patient being off work from 4/2-7/1/19    Check echo after lead implant, possible extraction. I appreciate the opportunity of cooperating in the care of this individual.    Karla Barnes, CNS, 4/22/2019, 1:10 PM    QUALITY MEASURES  1. Tobacco Cessation Counseling: NA  2. Retake of BP if >140/90:   NA  3. Documentation to PCP/referring for new patient:  Sent to PCP at close of office visit  4. CAD patient on anti-platelet: NA  5. CAD patient on STATIN therapy:  NA  6.  Patient with CHF and aFib on anticoagulation:  Yes

## 2019-04-22 NOTE — TELEPHONE ENCOUNTER
Anni Schroeder from [address] calling states pt called there last week to [facility] rehab, Anni Schroeder needs a referral signed by a Dr christianson and NP. She will also need last Echo Results, Labs (specifically a Lipid) and any other testing on pt faxed to [phone].  Pls call to advise Thank you

## 2019-04-23 ENCOUNTER — TELEPHONE (OUTPATIENT)
Dept: CARDIOLOGY CLINIC | Age: 54
End: 2019-04-23

## 2019-04-23 NOTE — TELEPHONE ENCOUNTER
Calling to let us know the pt has decided to rehab at [facility], after all. Can a referral be sent, with a doctors signature? It can not be an NP. Will need other notes/test results faxed, as well.     Yes I have the request and will have Dr Nery Birch sign it tomorrow

## 2019-04-25 ENCOUNTER — TELEPHONE (OUTPATIENT)
Dept: CARDIOLOGY CLINIC | Age: 54
End: 2019-04-25

## 2019-04-25 NOTE — TELEPHONE ENCOUNTER
I faxed a disability form on this patient yesterday, but did not receive an order for rehab to fax to [facility] on her.     I've printed out requested medical records, will fax with form once received

## 2019-04-25 NOTE — TELEPHONE ENCOUNTER
Patient is asking if you have faxed ([phone]) the specific dates off to [employer] that she discussed with you at last OV? There wasn't a form, she stated you were going to indicate dates to be off work from 4/2/19 to 7/1/19.

## 2019-04-25 NOTE — TELEPHONE ENCOUNTER
----- Message from Marylene Hirschfeld, APRN - CNS sent at 4/24/2019  8:54 PM EDT -----  Labs are good   Continue current medications  thanks

## 2019-04-26 ENCOUNTER — TELEPHONE (OUTPATIENT)
Dept: CARDIOLOGY CLINIC | Age: 54
End: 2019-04-26

## 2019-04-29 ENCOUNTER — TELEPHONE (OUTPATIENT)
Dept: CARDIOLOGY CLINIC | Age: 54
End: 2019-04-29

## 2019-04-29 NOTE — TELEPHONE ENCOUNTER
Pt asking if specfic dates of 4/2/19 -7/1/19 were faxed to [address] [place] [address] and if [facility] paper work was done.  Please call pt to advise

## 2019-04-30 NOTE — TELEPHONE ENCOUNTER
This information was faxed on 4/25/19 (it was scanned into chart under media) patient made aware today via phone conversation

## 2019-05-01 ENCOUNTER — TELEPHONE (OUTPATIENT)
Dept: CARDIOLOGY CLINIC | Age: 54
End: 2019-05-01

## 2019-05-01 NOTE — TELEPHONE ENCOUNTER
Wanted to make SERGEI aware that today pt had multiple PVCs on her EKG. She has had this on other rehab days but today was more than the others. As regards to her rehab she is progressing well. Pls call with any questions. Thank you.

## 2019-05-08 ENCOUNTER — TELEPHONE (OUTPATIENT)
Dept: CARDIOLOGY CLINIC | Age: 54
End: 2019-05-08

## 2019-05-08 NOTE — TELEPHONE ENCOUNTER
Called and spoke with patient who informed me that they have not received any of her information. She did not discuss picking up the original forms. I informed he that these forms have been faxed 3 times with confirmation. I did ask that the patient contact [employer] and find out the correct fax number and find out who's number of the fax that we have on file belongs to. She will contact me back.

## 2019-05-08 NOTE — TELEPHONE ENCOUNTER
Call received from [facility] in [address] at [address] She informed me that they have not received the Doctor's statement. Although we have faxed it to [facility]. She ask that I fax it to her self at [phone]. Sent and confirmation received.

## 2019-05-14 ENCOUNTER — OFFICE VISIT (OUTPATIENT)
Dept: CARDIOLOGY CLINIC | Age: 54
End: 2019-05-14
Payer: COMMERCIAL

## 2019-05-14 ENCOUNTER — PROCEDURE VISIT (OUTPATIENT)
Dept: CARDIOLOGY CLINIC | Age: 54
End: 2019-05-14
Payer: COMMERCIAL

## 2019-05-14 VITALS
OXYGEN SATURATION: 98 % | DIASTOLIC BLOOD PRESSURE: 96 MMHG | WEIGHT: 206 LBS | HEIGHT: 67 IN | BODY MASS INDEX: 32.33 KG/M2 | HEART RATE: 57 BPM | SYSTOLIC BLOOD PRESSURE: 142 MMHG

## 2019-05-14 DIAGNOSIS — Z95.0 PACEMAKER: ICD-10-CM

## 2019-05-14 DIAGNOSIS — I51.9 LV DYSFUNCTION: ICD-10-CM

## 2019-05-14 DIAGNOSIS — I48.91 ATRIAL FIBRILLATION AND FLUTTER (HCC): Primary | ICD-10-CM

## 2019-05-14 DIAGNOSIS — I48.92 ATRIAL FIBRILLATION AND FLUTTER (HCC): Primary | ICD-10-CM

## 2019-05-14 DIAGNOSIS — I48.0 PAROXYSMAL ATRIAL FIBRILLATION (HCC): ICD-10-CM

## 2019-05-14 DIAGNOSIS — G47.33 OBSTRUCTIVE SLEEP APNEA (ADULT) (PEDIATRIC): ICD-10-CM

## 2019-05-14 DIAGNOSIS — Z01.818 PRE-OP TESTING: ICD-10-CM

## 2019-05-14 DIAGNOSIS — Q24.6 CONGENITAL HEART BLOCK: ICD-10-CM

## 2019-05-14 PROCEDURE — 93000 ELECTROCARDIOGRAM COMPLETE: CPT | Performed by: INTERNAL MEDICINE

## 2019-05-14 PROCEDURE — 99214 OFFICE O/P EST MOD 30 MIN: CPT | Performed by: INTERNAL MEDICINE

## 2019-05-14 PROCEDURE — 93280 PM DEVICE PROGR EVAL DUAL: CPT | Performed by: INTERNAL MEDICINE

## 2019-05-14 NOTE — PROGRESS NOTES
[address]   Electrophysiology    Date: 5/14/2019      CC: SOB   HPI: Jackie Victor is a 46 y.o. female with a PMH of complete AV block, s/p dual chamber pacemaker implantation in 2012 by Dr. Sean Oropeza, persistent atrial fibrillation, systolic CHF, HTN, HLD, who has been referred for upgrading the device to Biv-AICD. She's had history of paroxysmal atrial fibrillation the past, treated initially with flecainide however flecainide was stopped due to severe LV dysfunction. She is on anticoagulation. She states that cardioversion discussed with her but she was not interested in at the time. Patient is following with heart failure team.  She is participating cardiac rehab. Patient is on optimal medical therapy with beta blockers and ARB and Aldactone. Has noted increasing shortness of breath with activity but she feels lightheaded and dizzy. An echocardiography was done and showed severe LV dysfunction with ejection fraction of 25%. Patient has occasional fluttering however feels dizzy and lightheaded frequently. She was seen by Dr. Sean Oropeza for upgrading the device to BiV-ICD. The left-sided venogram has been done which showed acute occlusion of the subclavian vein. Patient has been referred for extraction and upgrade. Today she reports having dystonic exertion with activity. Denies having any syncope. Compliant with her medical therapy. She tires very easily.       Past Medical History:   Diagnosis Date    Anemia     Atrial fibrillation and flutter (HCC)     Atrial flutter (HCC)     CHB (complete heart block) (HCC)     Congenital heart disease     GERD (gastroesophageal reflux disease)     Headache(784.0)     History of complete heart block     Hyperlipidemia     Hypertension     Syncope     Systolic CHF, chronic ([facility] [address] 10/3/2018        Past Surgical History:   Procedure Laterality Date    PACEMAKER INSERTION  11/29/12    dual chamber PPM, [employer]    TUBAL LIGATION  UTERINE FIBROID SURGERY         Allergies   Allergen Reactions    Latex     Codeine      Hives      Lisinopril      cough    Sulfa Antibiotics        Social History:   reports that she has never smoked. She has never used smokeless tobacco. She reports that she does not drink alcohol or use drugs. Family History:  family history includes Cancer in her father. Review of System:  All other systems reviewed except for that noted above. Pertinent negatives and positives are:      General: negative for fever, chills + fatigue  Ophthalmic ROS: negative for - eye pain or loss of vision  ENT ROS: negative for - headaches, sore throat   Respiratory: negative for - cough, sputum + MOREIRA  Cardiovascular: Reviewed in HPI  Gastrointestinal: negative for - abdominal pain, diarrhea, N/V  Hematology: negative for - bleeding, blood clots, bruising or jaundice  Genito-Urinary:  negative for - Dysuria or incontinence  Musculoskeletal: negative for - Joint swelling, muscle pain  Neurological: negative for - confusion, dizziness, headaches   Psychiatric: No anxiety, no depression. Dermatological: negative for - rash    Physical Examination:  Vitals:    05/14/19 1531   BP: (!) 142/96   Pulse:    SpO2:       Wt Readings from Last 3 Encounters:   05/14/19 206 lb (93.4 kg)   04/22/19 204 lb 1.9 oz (92.6 kg)   04/02/19 209 lb (94.8 kg)       · Constitutional: Oriented. No distress. · Head: Normocephalic and atraumatic. · Mouth/Throat: Oropharynx is clear and moist.   · Eyes: Conjunctivae normal. EOM are normal.   · Neck: Neck supple. No rigidity. No JVD present. · Cardiovascular: Normal rate, Irregular rhythm, S1&S2. Faint systolic murmur  · Pulmonary/Chest: Bilateral respiratory sounds. No wheezes, No rhonchi. · Abdominal: Soft. Bowel sounds present. No distension, No tenderness. · Musculoskeletal: No tenderness. No edema    · Lymphadenopathy: Has no cervical adenopathy. · Neurological: Alert and oriented. Cranial nerve appears intact, No Gross deficit   · Skin: Skin is warm and dry. No rash noted. · Psychiatric: Has a normal behavior       Labs, diagnostic and imaging results reviewed. Reviewed. Lab Results   Component Value Date    CREATININE 0.9 2019    CREATININE 1.1 2019    AST 17 2019    ALT 15 2019       EC19  Afib, paced ventricular rhythm     Lexiscan cathie 18:  Enlarged LV with mild global hypokinesis.  EF 51%  There is normal isotope uptake at stress and rest.   There is no evidence of myocardial ischemia or scar.     ECHO 18:  Summary   -Left ventricular cavity size is mildly dilated.   -There is moderate concentric left ventricular hypertrophy.   -Overall left ventricular systolic function appears severely reduced with   and ejection fraction on the 25 % range.   -There is diffuse global hypokinesis.   -Grade II diastolic dysfunction with elevated LV filling pressures.   E/e\"=16.2.   -Mitral annular calcification is present.   -Mild-moderate mitral regurgitation.   -Aortic valve appears sclerotic but opens adequately.   -Trivial aortic regurgitation.   -Trivial tricuspid regurgitation with RVSP of 26 mmHg.   -Mild pulmonic regurgitation present.   -Dilated left atrium with a volume of 72 ml.   -Pacer / ICD wire is visualized in the right heart.       Medication:  Current Outpatient Medications   Medication Sig Dispense Refill    furosemide (LASIX) 20 MG tablet Take 1 tablet by mouth daily 30 tablet 1    spironolactone (ALDACTONE) 25 MG tablet Take 1 tablet by mouth daily 30 tablet 0    carvedilol (COREG) 12.5 MG tablet Take 1 tablet by mouth 2 times daily 60 tablet 0    amLODIPine (NORVASC) 2.5 MG tablet Take 1 tablet by mouth daily 30 tablet 3    XARELTO 20 MG TABS tablet TAKE ONE TABLET BY MOUTH DAILY WITH BREAKFAST 90 tablet 3    butalbital-APAP-caffeine (FIORICET) -40 MG CAPS per capsule Take 1 capsule by mouth every 4 hours as needed for Headaches or Migraine 21 capsule 0    ondansetron (ZOFRAN ODT) 4 MG disintegrating tablet Take 1 tablet by mouth every 8 hours as needed for Nausea 20 tablet 0    losartan (COZAAR) 100 MG tablet Take 1 tablet by mouth daily 90 tablet 1    ranitidine (ZANTAC) 150 MG tablet Take 1 tablet by mouth as needed for Heartburn 90 tablet 3    atorvastatin (LIPITOR) 40 MG tablet Take 1 tablet by mouth daily 60 tablet 1     No current facility-administered medications for this visit. Assessment and plan:     Non-Ischemic cardiomyopathy, severe LV systolic function with EF: 25%, NYHAFC III, Stage C on medical therapy, Wide QRS  > 150 msec, paced rhythm. Patient is on guideline directed medical therapy for more than three months with BB and ARB. Decision aid tool for patient considering ICD implantation for primary prevention \"[place] Program for Patient-Centered Decision\" were used for shared decision making and a copy was given to patient for review. Patient has a resonable expectation of survival of more than one year. Patient is a candidate for AICD implantation for primary prevention. Also being paced frequently because of complete AV block by her pacemaker and has a wide QRS, therefore she is a candidate for biventricular AICD. 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 Best option would be extraction of the RV lead and replacement with RV defibrillator lead an adding left ventricular lead. Risks, benefits and alternative of each were discussed in detail with patient. Printed material about lead extraction, risks, benefits and alternatives were given to patient. Patient was encouraged to review information given, and call back with any questions about risks, benefits and alternative of procedure. Patient understand that lead extraction could be a potentially life threatening procedure.    The risks, benefits and alternatives of the lead extraction and AICD implantation were discussed with the patient. The risks including, but not limited to, the risks of bleeding, infection, pain, device malfunction, lead dislodgement, radiation exposure, injury to cardiac and surrounding structures (including pneumothorax), stroke, cardiac perforation, tamponade, need for emergent heart surgery, injury to esophagus, myocardial infarction and death were discussed in detail. Verbalized understanding and would like to proceed with it. Will hold anticoagulation 2 days prior to procedure. Continue with aggressive medical therapy. Persistent atrial fibrillation:     She has had persistent atrial fibrillation. She used to be treated with flecainide which stopped due to LV dysfunction. Discussed cardioversion/ablation in future after bi-V upgrade   Remains on Xarelto and Coreg       CHB    S/P dual chamber pacemaker 11/29/12. Wide LB paced QRS   Upgrade to Biv PPM to be scheduled with laser lead extraction     LV function 25% by ECHO 7/24/18. (EF as 51% by stress test 9/11/18).      DERECK    Continue with CPAP     Thank you for allowing me to participate in the care of Mert Uribe. Further evaluation will be based upon the patient's clinical course and testing results. All questions and concerns were addressed to the patient/family. Alternatives to my treatment were discussed. I have discussed the above stated plan and the patient verbalized understanding and agreed with the plan. NOTE: This report was transcribed using voice recognition software. Every effort was made to ensure accuracy, however, inadvertent computerized transcription errors may be present. Kacie Cerda MD, MPH  [facility]   Office: [phone]     Scribe attestation:  This note was scribed in the presence of Kacie Cerda MD by Liana Jenkins RN  Physician Attestation: I, Dr. Kacie Cerda, confirm that the scribe's documentation has been prepared under my direction and personally reviewed by me in its entirety. I also confirm that the note above accurately reflects all work, treatment, procedures, and medical decision making performed by me.

## 2019-05-14 NOTE — PROGRESS NOTES
Patient comes in for programming evaluation for her pacemaker. All sensing and pacing parameters are within normal range. Pt has known AF and is taking Xarelto. No changes need to be made at this time. Please see interrogation for more detail. Patient will see Dr. Vicky Fofana today.

## 2019-05-15 ENCOUNTER — TELEPHONE (OUTPATIENT)
Dept: CARDIOLOGY CLINIC | Age: 54
End: 2019-05-15

## 2019-05-15 NOTE — TELEPHONE ENCOUNTER
Pt saw RMM yesterday. He wants to replace her pacemaker with a defibrillator. She is wanting to know if she needs to see [facility] before this is done or will he discuss this with M before hand? Pls call to advise. Thank you.

## 2019-05-16 ENCOUNTER — TELEPHONE (OUTPATIENT)
Dept: CARDIOLOGY CLINIC | Age: 54
End: 2019-05-16

## 2019-05-16 NOTE — TELEPHONE ENCOUNTER
The form has an entry for      Symptoms first occurred on 10/3/18; this was her first visit with heart     failure, saw SERGEI. I entered this on the form at bottom as first date seen in our office. We can't imagine what else they are looking for.    Refaxed to [employer]

## 2019-05-16 NOTE — TELEPHONE ENCOUNTER
Patient says there is a date missing on the disability form that was filled out for her . She will not get any money until the form is corrected and re-faxed.  Please call patient to discuss

## 2019-05-16 NOTE — TELEPHONE ENCOUNTER
[employer] calling stating they received the form that was just sent this morning and they are still missing the date of disability (the first date treated).  Please correct form and re-fax

## 2019-06-07 ENCOUNTER — TELEPHONE (OUTPATIENT)
Dept: CARDIOLOGY CLINIC | Age: 54
End: 2019-06-07

## 2019-06-07 NOTE — TELEPHONE ENCOUNTER
Called Shira gerber. Explained that she is scheduled for a laser lead extraction and biv implant.  Patient is asymptomatic with PVC's

## 2019-06-14 ENCOUNTER — HOSPITAL ENCOUNTER (OUTPATIENT)
Age: 54
Discharge: HOME | End: 2019-06-14
Payer: COMMERCIAL

## 2019-06-14 ENCOUNTER — TELEPHONE (OUTPATIENT)
Dept: CARDIOLOGY CLINIC | Age: 54
End: 2019-06-14

## 2019-06-14 ENCOUNTER — OFFICE VISIT (OUTPATIENT)
Dept: CARDIOLOGY CLINIC | Age: 54
End: 2019-06-14
Payer: COMMERCIAL

## 2019-06-14 VITALS
OXYGEN SATURATION: 98 % | SYSTOLIC BLOOD PRESSURE: 116 MMHG | HEIGHT: 67 IN | DIASTOLIC BLOOD PRESSURE: 78 MMHG | HEART RATE: 60 BPM | BODY MASS INDEX: 32.33 KG/M2 | WEIGHT: 206 LBS

## 2019-06-14 DIAGNOSIS — Z95.0 PACEMAKER: ICD-10-CM

## 2019-06-14 DIAGNOSIS — I50.22 SYSTOLIC CHF, CHRONIC (HCC): ICD-10-CM

## 2019-06-14 DIAGNOSIS — G47.33 OSA (OBSTRUCTIVE SLEEP APNEA): ICD-10-CM

## 2019-06-14 DIAGNOSIS — I50.42 CHRONIC COMBINED SYSTOLIC AND DIASTOLIC HEART FAILURE (HCC): Primary | ICD-10-CM

## 2019-06-14 DIAGNOSIS — I48.0 PAROXYSMAL ATRIAL FIBRILLATION (HCC): ICD-10-CM

## 2019-06-14 LAB
ANION GAP SERPL CALCULATED.3IONS-SCNC: 11 MMOL/L (ref 3–16)
BUN BLDV-MCNC: 11 MG/DL (ref 7–20)
CALCIUM SERPL-MCNC: 9.9 MG/DL (ref 8.3–10.6)
CHLORIDE BLD-SCNC: 105 MMOL/L (ref 99–110)
CO2: 25 MMOL/L (ref 21–32)
CREAT SERPL-MCNC: 0.8 MG/DL (ref 0.6–1.1)
GFR AFRICAN AMERICAN: >60
GFR NON-AFRICAN AMERICAN: >60
GLUCOSE BLD-MCNC: 87 MG/DL (ref 70–99)
POTASSIUM SERPL-SCNC: 3.9 MMOL/L (ref 3.5–5.1)
PRO-BNP: 509 PG/ML (ref 0–124)
SODIUM BLD-SCNC: 141 MMOL/L (ref 136–145)

## 2019-06-14 PROCEDURE — 99214 OFFICE O/P EST MOD 30 MIN: CPT | Performed by: CLINICAL NURSE SPECIALIST

## 2019-06-14 PROCEDURE — 83880 ASSAY OF NATRIURETIC PEPTIDE: CPT

## 2019-06-14 PROCEDURE — 80048 BASIC METABOLIC PNL TOTAL CA: CPT

## 2019-06-14 PROCEDURE — 36415 COLL VENOUS BLD VENIPUNCTURE: CPT

## 2019-06-14 RX ORDER — FUROSEMIDE 20 MG/1
TABLET ORAL
Qty: 30 TABLET | Refills: 1 | Status: SHIPPED | OUTPATIENT
Start: 2019-06-14 | End: 2019-08-07 | Stop reason: SDUPTHER

## 2019-06-14 RX ORDER — BUTALBITAL, ACETAMINOPHEN AND CAFFEINE 50; 325; 40 MG/1; MG/1; MG/1
1 TABLET ORAL EVERY 4 HOURS PRN
COMMUNITY
End: 2021-04-07 | Stop reason: ALTCHOICE

## 2019-06-14 NOTE — TELEPHONE ENCOUNTER
----- Message from CATRACHITO Eden - CNS sent at 6/14/2019  2:24 PM EDT -----  Fluid level up a little  Recommend lasix 20mg 4 days a week  thanks

## 2019-06-14 NOTE — PROGRESS NOTES
MD Allison   furosemide (LASIX) 20 MG tablet Take 1 tablet by mouth daily  Patient taking differently: Take 10 mg by mouth daily  4/3/19  Yes Cris Octdamion, APRN - CNS   spironolactone (ALDACTONE) 25 MG tablet Take 1 tablet by mouth daily 4/2/19  Yes Pat Octave, APRN - CNS   carvedilol (COREG) 12.5 MG tablet Take 1 tablet by mouth 2 times daily 4/2/19  Yes Pat Octave, APRN - CNS   amLODIPine (NORVASC) 2.5 MG tablet Take 1 tablet by mouth daily 4/2/19  Yes Pat Octave, APRN - CNS   XARELTO 20 MG TABS tablet TAKE ONE TABLET BY MOUTH DAILY WITH BREAKFAST 3/20/19  Yes Vanessa Batres MD   ondansetron (ZOFRAN ODT) 4 MG disintegrating tablet Take 1 tablet by mouth every 8 hours as needed for Nausea 3/19/19  Yes Nesha Kaplan PA-C   losartan (COZAAR) 100 MG tablet Take 1 tablet by mouth daily 1/7/19  Yes Rema Rivers APRN - CNS   ranitidine (ZANTAC) 150 MG tablet Take 1 tablet by mouth as needed for Heartburn 11/18/16  Yes Nanette Ibarra MD   atorvastatin (LIPITOR) 40 MG tablet Take 1 tablet by mouth daily 11/18/16  Yes Nanette Ibarra MD   butalbital-APAP-caffeine (FIORICET) -40 MG CAPS per capsule Take 1 capsule by mouth every 4 hours as needed for Headaches or Migraine 3/19/19 5/14/19  Nesha Kaplan PA-C        Allergies:  Latex; Codeine; Lisinopril; and Sulfa antibiotics     Review of Systems:   · Constitutional: there has been no unanticipated weight loss. There's been no change in energy level, sleep pattern, or activity level. · Eyes: No visual changes or diplopia. No scleral icterus. · ENT: No Headaches, hearing loss or vertigo. No mouth sores or sore throat. · Cardiovascular: Reviewed in HPI  · Respiratory: No cough or wheezing, no sputum production. No hematemesis. · Gastrointestinal: No abdominal pain, appetite loss, blood in stools. No change in bowel or bladder habits. · Genitourinary: No dysuria, trouble voiding, or hematuria.   · Musculoskeletal: No gait disturbance, weakness or joint complaints. · Integumentary: No rash or pruritis. · Neurological: No headache, diplopia, change in muscle strength, numbness or tingling. No change in gait, balance, coordination, mood, affect, memory, mentation, behavior. · Psychiatric: No anxiety, no depression. · Endocrine: No malaise, fatigue or temperature intolerance. No excessive thirst, fluid intake, or urination. No tremor. · Hematologic/Lymphatic: No abnormal bruising or bleeding, blood clots or swollen lymph nodes. · Allergic/Immunologic: No nasal congestion or hives. Physical Examination:    Vitals:    06/14/19 0920   BP: 116/78   Pulse: 60   SpO2: 98%   Weight: 206 lb (93.4 kg)   Height: 5' 6.5\" (1.689 m)        Constitutional and General Appearance: Warm and dry, no apparent distress, normal coloration  HEENT:  Normocephalic, atraumatic  Respiratory:  · Normal excursion and expansion without use of accessory muscles  · Resp Auscultation: Normal breath sounds without dullness  Cardiovascular:  · The apical impulses not displaced  · Heart tones are crisp and normal  · JVP normal cm H2O  · irregular rate and rhythm  · Peripheral pulses are symmetrical and full  · There is no clubbing, cyanosis of the extremities.   · no edema  · Pedal Pulses: 2+ and equal   Abdomen:  · No masses or tenderness  · Liver/Spleen: No Abnormalities Noted  Neurological/Psychiatric:  · Alert and oriented in all spheres  · Moves all extremities well  · Exhibits normal gait balance and coordination  · No abnormalities of mood, affect, memory, mentation, or behavior are noted    Lab Data:    CBC:   Lab Results   Component Value Date    WBC 4.0 03/19/2019    WBC 5.9 09/11/2018    WBC 5.1 11/24/2015    RBC 4.47 03/19/2019    RBC 4.25 09/11/2018    RBC 3.73 11/24/2015    HGB 14.0 03/19/2019    HGB 12.8 09/11/2018    HGB 9.9 11/24/2015    HCT 41.8 03/19/2019    HCT 37.6 09/11/2018    HCT 31.1 11/24/2015    MCV 93.4 03/19/2019    MCV 88.6 09/11/2018    MCV 83.3 11/24/2015    RDW 15.7 03/19/2019    RDW 17.4 09/11/2018    RDW 17.0 11/24/2015     03/19/2019     09/11/2018     11/24/2015     BMP:  Lab Results   Component Value Date     04/22/2019     04/12/2019     04/02/2019    K 3.8 04/22/2019    K 4.0 04/12/2019    K 3.7 04/02/2019     04/22/2019     04/12/2019     04/02/2019    CO2 26 04/22/2019    CO2 25 04/12/2019    CO2 28 04/02/2019    PHOS 2.5 07/05/2018    BUN 11 04/22/2019    BUN 11 04/12/2019    BUN 10 04/02/2019    CREATININE 0.9 04/22/2019    CREATININE 1.1 04/12/2019    CREATININE 0.8 04/02/2019     BNP:   Lab Results   Component Value Date    PROBNP 378 04/22/2019    PROBNP 261 04/12/2019    PROBNP 1,261 04/02/2019     Cardiac Imaging:    Stress test 9/11/18  Enlarged LV with mild global hypokinesis. EF 51%  There is normal isotope uptake at stress and rest. There is no evidence of  myocardial ischemia or scar. ECHO 7/24/18:  Summary   -Left ventricular cavity size is mildly dilated. -There is moderate concentric left ventricular hypertrophy.   -Overall left ventricular systolic function appears severely reduced with  and ejection fraction on the 25 % range.   -There is diffuse global hypokinesis. -Grade II diastolic dysfunction with elevated LV filling pressures. E/e\"=16.2.   -Mitral annular calcification is present. -Mild-moderate mitral regurgitation.   -Aortic valve appears sclerotic but opens adequately. -Trivial aortic regurgitation.   -Trivial tricuspid regurgitation with RVSP of 26 mmHg. -Mild pulmonic regurgitation present.   -Dilated left atrium with a volume of 72 ml.   -Pacer / ICD wire is visualized in the right heart. GXT cathie 7/15/2015:   Left ventricular ejection fraction of 55%. The LV wall motion is normal.  There is normal isotope uptake at stress and rest.   There is no evidence of myocardial ischemia or scar. Assessment:    1.  Chronic combined systolic and diastolic heart failure (HCC) on ace, BB and aldosterone antagonist   2. Paroxysmal atrial fibrillation (HCC) on xarelto   3. DERECK (obstructive sleep apnea) in process of obtaining cpap   4. Pacemaker        Plan:   1. Check blood work   2. Check echo with next appt   3. Continue all current medications  4. RTO in 3 months  5. Recommend you calling regarding cpap machine      I appreciate the opportunity of cooperating in the care of this individual.    BELKIS Sandoval, 6/14/2019, 9:24 AM    QUALITY MEASURES  1. Tobacco Cessation Counseling: NA  2. Retake of BP if >140/90:   NA  3. Documentation to PCP/referring for new patient:  Sent to PCP at close of office visit  4. CAD patient on anti-platelet: NA  5. CAD patient on STATIN therapy:  NA  6.  Patient with CHF and aFib on anticoagulation:  Yes

## 2019-06-19 ENCOUNTER — TELEPHONE (OUTPATIENT)
Dept: CARDIOLOGY CLINIC | Age: 54
End: 2019-06-19

## 2019-06-19 NOTE — TELEPHONE ENCOUNTER
LM for patient to call and let us know if she wants the Continuing Disability Claim form from [facility] faxed .   Does she want to  the original? Or we can mail it to her.  (will place copy to scan with fax confirmation if faxed for her chart)

## 2019-06-24 ENCOUNTER — TELEPHONE (OUTPATIENT)
Dept: CARDIOLOGY CLINIC | Age: 54
End: 2019-06-24

## 2019-06-24 NOTE — TELEPHONE ENCOUNTER
No return call received. I looked back at past form encounters and noted:     Me      5/16/19 11:43 AM   Note      Patient stated that the [facility] form needs to indicate the off dates of 4/2/- 7/1/19 and fax it to [employer], NOT [facility] as [employer] will take care of communicating with [facility].     Dates written on [facility] form and faxed to Yasmani Charles [phone] per request            I faxed this [address] form, physician statement dated 6/17/19 to [address] at [phone]  Fax Confirmation

## 2019-06-25 NOTE — TELEPHONE ENCOUNTER
Disability claim form faxed to [employer] at number provided below.       Fax confirmation [phone] 7:44am
She received a fax that was ineligible. She believes it was disability claim form. She is asking if it can be resent to a new fax number of [phone]? Thank you.
53
none

## 2019-07-01 ENCOUNTER — OFFICE VISIT (OUTPATIENT)
Dept: CARDIOLOGY CLINIC | Age: 54
End: 2019-07-01
Payer: COMMERCIAL

## 2019-07-01 ENCOUNTER — NURSE ONLY (OUTPATIENT)
Dept: CARDIOLOGY CLINIC | Age: 54
End: 2019-07-01
Payer: COMMERCIAL

## 2019-07-01 VITALS
HEIGHT: 66 IN | SYSTOLIC BLOOD PRESSURE: 142 MMHG | DIASTOLIC BLOOD PRESSURE: 88 MMHG | HEART RATE: 38 BPM | WEIGHT: 206 LBS | RESPIRATION RATE: 16 BRPM | BODY MASS INDEX: 33.11 KG/M2

## 2019-07-01 DIAGNOSIS — Z95.0 PACEMAKER: ICD-10-CM

## 2019-07-01 DIAGNOSIS — I44.2 COMPLETE HEART BLOCK (HCC): ICD-10-CM

## 2019-07-01 DIAGNOSIS — I48.19 PERSISTENT ATRIAL FIBRILLATION (HCC): ICD-10-CM

## 2019-07-01 DIAGNOSIS — Q24.6 CONGENITAL HEART BLOCK: ICD-10-CM

## 2019-07-01 DIAGNOSIS — I50.30 CONGESTIVE HEART FAILURE WITH LEFT VENTRICULAR DIASTOLIC DYSFUNCTION, NYHA CLASS 2 (HCC): Primary | ICD-10-CM

## 2019-07-01 DIAGNOSIS — Z95.0 CARDIAC PACEMAKER: ICD-10-CM

## 2019-07-01 PROCEDURE — 93280 PM DEVICE PROGR EVAL DUAL: CPT | Performed by: INTERNAL MEDICINE

## 2019-07-01 PROCEDURE — 99213 OFFICE O/P EST LOW 20 MIN: CPT | Performed by: INTERNAL MEDICINE

## 2019-07-01 NOTE — PROGRESS NOTES
Procedure Laterality Date    PACEMAKER INSERTION  12    dual chamber PPM, [employer]tr[employer]    TUBAL LIGATION      UTERINE FIBROID SURGERY       Social History     Tobacco Use    Smoking status: Never Smoker    Smokeless tobacco: Never Used   Substance Use Topics    Alcohol use: No    Drug use: No     Family History   Problem Relation Age of Onset    Cancer Father     Heart Disease Neg Hx     High Blood Pressure Neg Hx     High Cholesterol Neg Hx      Review of Systems   General: No fevers, chills, fatigue, or night sweats. fatigue  HEENT: No blurry or decreased vision. No changes in hearing, nasal discharge or sore throat. Tinnitus right ear/? Water in ear  Cardiovascular: See HPI. Respiratory: No cough, hemoptysis, or wheezing. No history of asthma. Snores   Gastrointestinal: No abdominal pain, but anemic. Genito-Urinary: No dysuria or hematuria. No urgency or polyuria. Musculoskeletal: No complaints of joint pain, joint swelling or muscular weakness/soreness. Neurological:vertigo    No numbness/tingling, speech problems or weakness. No history of a stroke or TIA. Psychological: No anxiety or depression  Hematological and Lymphatic: No abnormal bleeding or bruising, blood clots, jaundice. Endocrine: No malaise/lethargy, palpitations, polydipsia/polyuria, temperature intolerance or unexpected weight changes. Skin: No rashes or non-healing ulcers. Physical Exam:  There were no vitals filed for this visit. General:  Alert and oriented. No acute distress. heavy  HEENT:    No trauma. EOMI. Neck:  Supple, no LAD    Cardiovascular: RRR. S1 S2 without murmur, gallop, or rub. Lungs:  Clear to auscultation and percussion bilaterally. No rales, wheezes, or rhonchi. Abd:  Soft, non-tender, non-distended. No peritoneal signs. Ext:  No clubbing, cyanosis. Trace edema. Neuro:  No gross abnormalities  Skin:  No rashes or skin breakdown.    Device site intact    EC19

## 2019-07-02 ENCOUNTER — TELEPHONE (OUTPATIENT)
Dept: CARDIOLOGY CLINIC | Age: 54
End: 2019-07-02

## 2019-07-08 ENCOUNTER — TELEPHONE (OUTPATIENT)
Dept: CARDIOLOGY CLINIC | Age: 54
End: 2019-07-08

## 2019-07-31 ENCOUNTER — HOSPITAL ENCOUNTER (OUTPATIENT)
Dept: MAMMOGRAPHY | Age: 54
Discharge: HOME | End: 2019-07-31
Payer: COMMERCIAL

## 2019-07-31 DIAGNOSIS — Z12.31 VISIT FOR SCREENING MAMMOGRAM: ICD-10-CM

## 2019-07-31 PROCEDURE — 77067 SCR MAMMO BI INCL CAD: CPT

## 2019-08-07 ENCOUNTER — OFFICE VISIT (OUTPATIENT)
Dept: CARDIOLOGY CLINIC | Age: 54
End: 2019-08-07
Payer: COMMERCIAL

## 2019-08-07 VITALS
SYSTOLIC BLOOD PRESSURE: 118 MMHG | RESPIRATION RATE: 16 BRPM | DIASTOLIC BLOOD PRESSURE: 70 MMHG | HEART RATE: 60 BPM | WEIGHT: 207.8 LBS | HEIGHT: 67 IN | BODY MASS INDEX: 32.62 KG/M2 | OXYGEN SATURATION: 99 %

## 2019-08-07 DIAGNOSIS — I48.0 PAROXYSMAL ATRIAL FIBRILLATION (HCC): ICD-10-CM

## 2019-08-07 DIAGNOSIS — I50.42 CHRONIC COMBINED SYSTOLIC AND DIASTOLIC HEART FAILURE (HCC): Primary | ICD-10-CM

## 2019-08-07 DIAGNOSIS — Z95.0 PACEMAKER: ICD-10-CM

## 2019-08-07 DIAGNOSIS — G47.33 OSA (OBSTRUCTIVE SLEEP APNEA): ICD-10-CM

## 2019-08-07 PROCEDURE — 99214 OFFICE O/P EST MOD 30 MIN: CPT | Performed by: CLINICAL NURSE SPECIALIST

## 2019-08-07 RX ORDER — AMLODIPINE BESYLATE 2.5 MG/1
2.5 TABLET ORAL DAILY
Qty: 30 TABLET | Refills: 3 | Status: SHIPPED | OUTPATIENT
Start: 2019-08-07 | End: 2019-09-30

## 2019-08-07 RX ORDER — LOSARTAN POTASSIUM 100 MG/1
100 TABLET ORAL DAILY
Qty: 90 TABLET | Refills: 1 | Status: SHIPPED | OUTPATIENT
Start: 2019-08-07 | End: 2020-02-24 | Stop reason: ALTCHOICE

## 2019-08-07 RX ORDER — SPIRONOLACTONE 25 MG/1
25 TABLET ORAL DAILY
Qty: 30 TABLET | Refills: 0 | Status: SHIPPED | OUTPATIENT
Start: 2019-08-07 | End: 2019-08-28 | Stop reason: SDUPTHER

## 2019-08-07 RX ORDER — FUROSEMIDE 20 MG/1
TABLET ORAL
Qty: 30 TABLET | Refills: 1 | Status: SHIPPED | OUTPATIENT
Start: 2019-08-07 | End: 2019-10-29 | Stop reason: SDUPTHER

## 2019-08-07 RX ORDER — CARVEDILOL 12.5 MG/1
12.5 TABLET ORAL 2 TIMES DAILY
Qty: 60 TABLET | Refills: 0 | Status: SHIPPED | OUTPATIENT
Start: 2019-08-07 | End: 2019-08-28 | Stop reason: SDUPTHER

## 2019-08-08 ENCOUNTER — TELEPHONE (OUTPATIENT)
Dept: CARDIOLOGY CLINIC | Age: 54
End: 2019-08-08

## 2019-08-08 NOTE — TELEPHONE ENCOUNTER
Continuing Disability Claim form dated 8/8/19 completed, copy made and placed in scan file for chart and phone call made LVM for patient that she may pick original up at front dest.

## 2019-08-09 ENCOUNTER — TELEPHONE (OUTPATIENT)
Dept: CARDIOLOGY CLINIC | Age: 54
End: 2019-08-09

## 2019-08-12 ENCOUNTER — HOSPITAL ENCOUNTER (OUTPATIENT)
Age: 54
Discharge: HOME | End: 2019-08-12
Payer: COMMERCIAL

## 2019-08-12 ENCOUNTER — HOSPITAL ENCOUNTER (OUTPATIENT)
Dept: NON INVASIVE DIAGNOSTICS | Age: 54
Discharge: HOME | End: 2019-08-12
Payer: COMMERCIAL

## 2019-08-12 DIAGNOSIS — I10 ESSENTIAL HYPERTENSION: ICD-10-CM

## 2019-08-12 DIAGNOSIS — I50.42 CHRONIC COMBINED SYSTOLIC AND DIASTOLIC HEART FAILURE (HCC): ICD-10-CM

## 2019-08-12 DIAGNOSIS — R06.09 DYSPNEA ON EXERTION: ICD-10-CM

## 2019-08-12 DIAGNOSIS — I50.22 SYSTOLIC CHF, CHRONIC (HCC): ICD-10-CM

## 2019-08-12 LAB
ANION GAP SERPL CALCULATED.3IONS-SCNC: 12 MMOL/L (ref 3–16)
BUN BLDV-MCNC: 10 MG/DL (ref 7–20)
CALCIUM SERPL-MCNC: 9.5 MG/DL (ref 8.3–10.6)
CHLORIDE BLD-SCNC: 104 MMOL/L (ref 99–110)
CO2: 26 MMOL/L (ref 21–32)
CREAT SERPL-MCNC: 0.8 MG/DL (ref 0.6–1.1)
GFR AFRICAN AMERICAN: >60
GFR NON-AFRICAN AMERICAN: >60
GLUCOSE BLD-MCNC: 94 MG/DL (ref 70–99)
LEFT VENTRICULAR EJECTION FRACTION HIGH VALUE: 35 %
LEFT VENTRICULAR EJECTION FRACTION MODE: NORMAL
LV EF: 30 %
POTASSIUM SERPL-SCNC: 4 MMOL/L (ref 3.5–5.1)
PRO-BNP: 401 PG/ML (ref 0–124)
SODIUM BLD-SCNC: 142 MMOL/L (ref 136–145)

## 2019-08-12 PROCEDURE — 80048 BASIC METABOLIC PNL TOTAL CA: CPT

## 2019-08-12 PROCEDURE — 36415 COLL VENOUS BLD VENIPUNCTURE: CPT

## 2019-08-12 PROCEDURE — 93308 TTE F-UP OR LMTD: CPT

## 2019-08-12 PROCEDURE — 83880 ASSAY OF NATRIURETIC PEPTIDE: CPT

## 2019-08-13 ENCOUNTER — TELEPHONE (OUTPATIENT)
Dept: CARDIOLOGY CLINIC | Age: 54
End: 2019-08-13

## 2019-08-13 NOTE — TELEPHONE ENCOUNTER
----- Message from CATRACHITO Fraser - CNS sent at 8/13/2019  1:48 PM EDT -----  Labs are great  Continue current medications  thanks

## 2019-08-14 NOTE — TELEPHONE ENCOUNTER
Pt calling. The letter needs to have the starting date 8/19/19 and that there are no restrictions. Please re-write letter and call pt to advise when it is done.

## 2019-08-27 NOTE — PROGRESS NOTES
carvedilol (COREG) 12.5 MG tablet Take 1 tablet by mouth 2 times daily 8/7/19   Ric Salguero, APRN - CNS   losartan (COZAAR) 100 MG tablet Take 1 tablet by mouth daily 8/7/19   Rema Bentley, APRN - CNS   amLODIPine (NORVASC) 2.5 MG tablet Take 1 tablet by mouth daily 8/7/19   Rema Arevaloesting, APRN - CNS   butalbital-acetaminophen-caffeine (FIORICET, ESGIC) -40 MG per tablet Take 1 tablet by mouth every 4 hours as needed for Headaches    Historical Provider, MD   XARELTO 20 MG TABS tablet TAKE ONE TABLET BY MOUTH DAILY WITH BREAKFAST 3/20/19   Christiano Parry MD   ondansetron (ZOFRAN ODT) 4 MG disintegrating tablet Take 1 tablet by mouth every 8 hours as needed for Nausea 3/19/19   Monte Brunner, PA-C   ranitidine (ZANTAC) 150 MG tablet Take 1 tablet by mouth as needed for Heartburn 11/18/16   Jax Oconnor MD   atorvastatin (LIPITOR) 40 MG tablet Take 1 tablet by mouth daily 11/18/16   Jax Oconnor MD        Allergies:  Latex; Codeine; Lisinopril; and Sulfa antibiotics     ROS:   Review of Systems   Constitutional: Positive for fatigue. Respiratory: Negative. Cardiovascular: Negative. Gastrointestinal: Negative. Genitourinary: Negative. Musculoskeletal: Negative. Skin: Negative. Neurological: Negative. Hematological: Negative. Psychiatric/Behavioral: Negative. Physical Examination:    Vitals:    08/28/19 1307   BP: 108/64   Site: Left Upper Arm   Position: Sitting   Cuff Size: Large Adult   Pulse: (!) 37   Resp: 15   SpO2: 99%   Weight: 205 lb 8 oz (93.2 kg)   Height: 5' 6.5\" (1.689 m)           Physical Exam   Constitutional: She is oriented to person, place, and time. She appears well-developed and well-nourished. HENT:   Head: Normocephalic and atraumatic. Eyes: Pupils are equal, round, and reactive to light. EOM are normal.   Neck: Normal range of motion. Neck supple.    Cardiovascular: Normal rate, normal heart sounds and intact distal

## 2019-08-28 ENCOUNTER — NURSE ONLY (OUTPATIENT)
Dept: CARDIOLOGY CLINIC | Age: 54
End: 2019-08-28
Payer: COMMERCIAL

## 2019-08-28 ENCOUNTER — OFFICE VISIT (OUTPATIENT)
Dept: CARDIOLOGY CLINIC | Age: 54
End: 2019-08-28
Payer: COMMERCIAL

## 2019-08-28 VITALS
DIASTOLIC BLOOD PRESSURE: 64 MMHG | WEIGHT: 205.5 LBS | BODY MASS INDEX: 32.25 KG/M2 | OXYGEN SATURATION: 99 % | HEART RATE: 61 BPM | SYSTOLIC BLOOD PRESSURE: 108 MMHG | RESPIRATION RATE: 15 BRPM | HEIGHT: 67 IN

## 2019-08-28 DIAGNOSIS — Z95.0 CARDIAC PACEMAKER IN SITU: ICD-10-CM

## 2019-08-28 DIAGNOSIS — Q24.6 CONGENITAL HEART BLOCK: ICD-10-CM

## 2019-08-28 DIAGNOSIS — Z95.0 PACEMAKER: ICD-10-CM

## 2019-08-28 DIAGNOSIS — I42.0 DILATED CARDIOMYOPATHY (HCC): Primary | ICD-10-CM

## 2019-08-28 DIAGNOSIS — I10 ESSENTIAL HYPERTENSION: ICD-10-CM

## 2019-08-28 DIAGNOSIS — I48.0 PAROXYSMAL ATRIAL FIBRILLATION (HCC): ICD-10-CM

## 2019-08-28 DIAGNOSIS — I50.22 SYSTOLIC CHF, CHRONIC (HCC): ICD-10-CM

## 2019-08-28 PROCEDURE — 99214 OFFICE O/P EST MOD 30 MIN: CPT | Performed by: NURSE PRACTITIONER

## 2019-08-28 PROCEDURE — 93280 PM DEVICE PROGR EVAL DUAL: CPT | Performed by: INTERNAL MEDICINE

## 2019-08-28 RX ORDER — CARVEDILOL 12.5 MG/1
12.5 TABLET ORAL 2 TIMES DAILY
Qty: 60 TABLET | Refills: 5 | Status: SHIPPED | OUTPATIENT
Start: 2019-08-28 | End: 2020-02-24 | Stop reason: SDUPTHER

## 2019-08-28 RX ORDER — SPIRONOLACTONE 25 MG/1
25 TABLET ORAL DAILY
Qty: 30 TABLET | Refills: 2 | Status: SHIPPED | OUTPATIENT
Start: 2019-08-28 | End: 2020-02-24 | Stop reason: SDUPTHER

## 2019-08-28 ASSESSMENT — ENCOUNTER SYMPTOMS
GASTROINTESTINAL NEGATIVE: 1
RESPIRATORY NEGATIVE: 1

## 2019-08-28 NOTE — LETTER
[address] [place], [place] [address]shaquille Velazco [address] [id number]  Phone: [phone]  Fax: [phone]    CATRACHITO Grant CNP        August 28, 2019     Patient: Antoinette Golden   YOB: 1965   Date of Visit: 8/28/2019       To Whom It May Concern: It is my medical opinion that Wilmer Brochure may return to light duty immediately with the following restrictions: lifting/carrying not to exceed 10 lbs. , pushing/pulling not to exceed 10 lbs. , bending/stooping not to exceed 2 x per hour, no ladders, no stairs, no overhead work. If you have any questions or concerns, please don't hesitate to call.     Sincerely,        CATRACHITO Grant CNP

## 2019-08-29 ENCOUNTER — TELEPHONE (OUTPATIENT)
Dept: CARDIOLOGY CLINIC | Age: 54
End: 2019-08-29

## 2019-08-29 NOTE — TELEPHONE ENCOUNTER
Saw Marnie Roxi yesterday and when she got home she got a letter in the mail saying the the date on her FMLA papers had  . Could NPKV or NPRG pull up her FMLA papers and just update with new dates and fax in ?  Please call patient to let her know this was done

## 2019-09-06 ENCOUNTER — OFFICE VISIT (OUTPATIENT)
Dept: PULMONOLOGY | Age: 54
End: 2019-09-06
Payer: COMMERCIAL

## 2019-09-06 VITALS
BODY MASS INDEX: 33.27 KG/M2 | HEIGHT: 66 IN | HEART RATE: 69 BPM | OXYGEN SATURATION: 99 % | SYSTOLIC BLOOD PRESSURE: 110 MMHG | WEIGHT: 207 LBS | DIASTOLIC BLOOD PRESSURE: 70 MMHG

## 2019-09-06 DIAGNOSIS — I10 ESSENTIAL HYPERTENSION: ICD-10-CM

## 2019-09-06 DIAGNOSIS — I51.7 LVH (LEFT VENTRICULAR HYPERTROPHY): ICD-10-CM

## 2019-09-06 DIAGNOSIS — I42.0 DILATED CARDIOMYOPATHY (HCC): ICD-10-CM

## 2019-09-06 DIAGNOSIS — G47.33 OBSTRUCTIVE SLEEP APNEA (ADULT) (PEDIATRIC): Primary | ICD-10-CM

## 2019-09-06 DIAGNOSIS — E66.9 CLASS 1 OBESITY WITHOUT SERIOUS COMORBIDITY WITH BODY MASS INDEX (BMI) OF 33.0 TO 33.9 IN ADULT, UNSPECIFIED OBESITY TYPE: ICD-10-CM

## 2019-09-06 DIAGNOSIS — I48.19 PERSISTENT ATRIAL FIBRILLATION (HCC): ICD-10-CM

## 2019-09-06 PROBLEM — E66.811 CLASS 1 OBESITY WITHOUT SERIOUS COMORBIDITY WITH BODY MASS INDEX (BMI) OF 33.0 TO 33.9 IN ADULT: Status: ACTIVE | Noted: 2019-09-06

## 2019-09-06 PROCEDURE — 99214 OFFICE O/P EST MOD 30 MIN: CPT | Performed by: NURSE PRACTITIONER

## 2019-09-06 ASSESSMENT — ENCOUNTER SYMPTOMS
EYE PAIN: 0
COUGH: 0
SHORTNESS OF BREATH: 0
RHINORRHEA: 0
EYE REDNESS: 0
SINUS PRESSURE: 0
APNEA: 0
ABDOMINAL DISTENTION: 0
ABDOMINAL PAIN: 0

## 2019-09-06 ASSESSMENT — SLEEP AND FATIGUE QUESTIONNAIRES
HOW LIKELY ARE YOU TO NOD OFF OR FALL ASLEEP WHILE SITTING AND READING: 0
HOW LIKELY ARE YOU TO NOD OFF OR FALL ASLEEP WHILE SITTING INACTIVE IN A PUBLIC PLACE: 0
HOW LIKELY ARE YOU TO NOD OFF OR FALL ASLEEP WHILE WATCHING TV: 1
HOW LIKELY ARE YOU TO NOD OFF OR FALL ASLEEP WHEN YOU ARE A PASSENGER IN A CAR FOR AN HOUR WITHOUT A BREAK: 0
HOW LIKELY ARE YOU TO NOD OFF OR FALL ASLEEP WHILE SITTING QUIETLY AFTER LUNCH WITHOUT ALCOHOL: 0
HOW LIKELY ARE YOU TO NOD OFF OR FALL ASLEEP WHILE SITTING AND TALKING TO SOMEONE: 0
HOW LIKELY ARE YOU TO NOD OFF OR FALL ASLEEP IN A CAR, WHILE STOPPED FOR A FEW MINUTES IN TRAFFIC: 0
HOW LIKELY ARE YOU TO NOD OFF OR FALL ASLEEP WHILE LYING DOWN TO REST IN THE AFTERNOON WHEN CIRCUMSTANCES PERMIT: 2
ESS TOTAL SCORE: 3

## 2019-09-06 NOTE — PROGRESS NOTES
Aniceto Figueroa         : 1965    Diagnosis: [x] DERECK (G47.33) [] CSA (G47.31) [] Apnea (G47.30)   Length of Need: [x] 12 Months [] 99 Months [] Other:    Machine (JIM!): [x] Respironics Dream Station      Auto [] ResMed AirSense     Auto [] Other:     []  CPAP () [] Bilevel ()   Mode: [] Auto [] Spontaneous    Mode: [] Auto [] Spontaneous                 AUTO BIPAP - Settings (Pedro)  IPAP Max: 25 cmH2O  EPAP Min: 11 cmH2O  Pressure Support Min: 3  Pressure Support Max: 7          Comfort Settings:   - Ramp Pressure:  cmH2O                                        - Ramp time: 15 min                                     -  Flex/EPR - 3 full time                                    - For ResMed Bilevel (TiMax-4 sec   TiMin- 0.2 sec)     Humidifier: [x] Heated ()        [x] Water chamber replacement ()/ 1 per 6 months        Mask:   [x] Nasal () /1 per 3 months [] Full Face () /1 per 3 months   [x] Patient choice -Size and fit mask [] Patient Choice - Size and fit mask   [] Dispense:  [] Dispense:    [x] Headgear () / 1 per 3 months [] Headgear () / 1 per 3 months   [] Replacement Nasal Cushion ()/2 per month [] Interface Replacement ()/1 per month   [x] Replacement Nasal Pillows ()/2 per month         Tubing: [x] Heated ()/1 per 3 months    [] Standard ()/1 per 3 months [] Other:           Filters: [x] Non-disposable ()/1 per 6 months     [x] Ultra-Fine, Disposable ()/2 per month        Miscellaneous: [] Chin Strap ()/ 1 per 6 months [] O2 bleed-in:       LPM   [] Oximetry on CPAP/Bilevel []  Other:          Start Order Date: 19    MEDICAL JUSTIFICATION:  I, the undersigned, certify that the above prescribed supplies are medically necessary for this patients wellbeing.   In my opinion, the supplies are both reasonable and necessary in reference to accepted standards of medicalpractice in treatment of this patients condition.     CATRACHITO Kingston CNP      NPI: [id number]       Order Signed Date: 09/06/19    Electronically signed by CATRACHITO Kingston CNP on 9/6/2019 at 11:54 AM

## 2019-09-06 NOTE — PROGRESS NOTES
comorbidity with body mass index (BMI) of 33.0 to 33.9 in adult, unspecified obesity type         The chronic medical conditions listed are directly related to the primary diagnosis listed above. The management of the primary diagnosis affects the secondary diagnosis and vice versa. Review of Systems   Constitutional: Negative for appetite change, chills, fatigue and fever. HENT: Negative for congestion, nosebleeds, rhinorrhea and sinus pressure. Eyes: Negative for pain and redness. Respiratory: Negative for apnea, cough and shortness of breath. Cardiovascular: Negative for chest pain and palpitations. Gastrointestinal: Negative for abdominal distention and abdominal pain. Neurological: Negative for dizziness and headaches. Psychiatric/Behavioral: Negative for sleep disturbance.        Social History     Socioeconomic History    Marital status:      Spouse name: Not on file    Number of children: Not on file    Years of education: Not on file    Highest education level: Not on file   Occupational History    Not on file   Social Needs    Financial resource strain: Not on file    Food insecurity:     Worry: Not on file     Inability: Not on file    Transportation needs:     Medical: Not on file     Non-medical: Not on file   Tobacco Use    Smoking status: Never Smoker    Smokeless tobacco: Never Used   Substance and Sexual Activity    Alcohol use: No    Drug use: No    Sexual activity: Not Currently     Comment:    Lifestyle    Physical activity:     Days per week: Not on file     Minutes per session: Not on file    Stress: Not on file   Relationships    Social connections:     Talks on phone: Not on file     Gets together: Not on file     Attends Congregational service: Not on file     Active member of club or organization: Not on file     Attends meetings of clubs or organizations: Not on file     Relationship status: Not on file    Intimate partner violence:     Fear of current or ex partner: Not on file     Emotionally abused: Not on file     Physically abused: Not on file     Forced sexual activity: Not on file   Other Topics Concern    Not on file   Social History Narrative    Not on file       Prior to Admission medications    Medication Sig Start Date End Date Taking?  Authorizing Provider   spironolactone (ALDACTONE) 25 MG tablet Take 1 tablet by mouth daily 8/28/19  Yes Kiya Mendietag, APRN - CNP   carvedilol (COREG) 12.5 MG tablet Take 1 tablet by mouth 2 times daily 8/28/19  Yes Kiya Mendietag, APRN - CNP   furosemide (LASIX) 20 MG tablet 20 mg 4 days a week 8/7/19  Yes Rema Leigh, APRN - CNS   losartan (COZAAR) 100 MG tablet Take 1 tablet by mouth daily 8/7/19  Yes Rema Rivers, APRN - CNS   amLODIPine (NORVASC) 2.5 MG tablet Take 1 tablet by mouth daily 8/7/19  Yes Steven Savage, APRN - CNS   butalbital-acetaminophen-caffeine (FIORICET, ESGIC) -40 MG per tablet Take 1 tablet by mouth every 4 hours as needed for Headaches   Yes Historical Provider, MD   XARELTO 20 MG TABS tablet TAKE ONE TABLET BY MOUTH DAILY WITH BREAKFAST 3/20/19  Yes Sophia Espinoza MD   ondansetron (ZOFRAN ODT) 4 MG disintegrating tablet Take 1 tablet by mouth every 8 hours as needed for Nausea 3/19/19  Yes Teresa Walls PA-C   ranitidine (ZANTAC) 150 MG tablet Take 1 tablet by mouth as needed for Heartburn 11/18/16  Yes Mikel Guerrier MD   atorvastatin (LIPITOR) 40 MG tablet Take 1 tablet by mouth daily 11/18/16  Yes Mikel Guerrier MD       Allergies as of 09/06/2019 - Review Complete 09/06/2019   Allergen Reaction Noted    Latex  10/26/2012    Codeine  10/26/2012    Lisinopril  01/07/2019    Sulfa antibiotics  10/26/2012       Patient Active Problem List   Diagnosis    HTN (hypertension)    Other and unspecified hyperlipidemia    Congenital heart block    Pacemaker    Cardiac pacemaker in situ    Dyspnea on exertion    Headache    LVH (left ventricular hypertrophy)    Persistent atrial fibrillation (HCC)    Chest pain    Systolic CHF, chronic (HCC)    Hypersomnia    Obstructive sleep apnea (adult) (pediatric)    LV dysfunction    Left subclavian vein thrombosis (HCC)    Dilated cardiomyopathy (HCC)    Class 1 obesity without serious comorbidity with body mass index (BMI) of 33.0 to 33.9 in adult       Past Medical History:   Diagnosis Date    Anemia     Atrial fibrillation and flutter (HCC)     Atrial flutter (HCC)     CHB (complete heart block) (HCC)     Class 1 obesity without serious comorbidity with body mass index (BMI) of 33.0 to 33.9 in adult 9/6/2019    Congenital heart disease     GERD (gastroesophageal reflux disease)     Headache(784.0)     History of complete heart block     Hyperlipidemia     Hypertension     Syncope     Systolic CHF, chronic ([facility] [address] 10/3/2018       Past Surgical History:   Procedure Laterality Date    PACEMAKER INSERTION  11/29/12    dual chamber PPM, [employer]    TUBAL LIGATION      UTERINE FIBROID SURGERY         Family History   Problem Relation Age of Onset    Cancer Father     Heart Disease Neg Hx     High Blood Pressure Neg Hx     High Cholesterol Neg Hx        Vitals:  Weight BMI   Wt Readings from Last 3 Encounters:   09/06/19 207 lb (93.9 kg)   08/28/19 205 lb 8 oz (93.2 kg)   08/07/19 207 lb 12.8 oz (94.3 kg)    Body mass index is 33.41 kg/m². BP HR SaO2   BP Readings from Last 3 Encounters:   09/06/19 110/70   08/28/19 108/64   08/07/19 118/70    Pulse Readings from Last 3 Encounters:   09/06/19 69   08/28/19 61   08/07/19 60    SpO2 Readings from Last 3 Encounters:   09/06/19 99%   08/28/19 99%   08/07/19 99%        Assessment/Plan:     Dilated cardiomyopathy (HCC)  Chronic- Stable. Cont meds per PCP and other physicians. HTN (hypertension)  Chronic- Stable. Cont meds per PCP and other physicians. LVH (left ventricular hypertrophy)  Chronic- Stable.   Cont meds per PCP and other

## 2019-09-06 NOTE — PROGRESS NOTES
condition.     CATRACHITO Durham CNP      NPI: [id number]       Order Signed Date: 09/06/19    Electronically signed by CATRACHITO Durham CNP on 9/6/2019 at 11:54 AM

## 2019-09-11 ENCOUNTER — TELEPHONE (OUTPATIENT)
Dept: CARDIOLOGY CLINIC | Age: 54
End: 2019-09-11

## 2019-09-11 NOTE — TELEPHONE ENCOUNTER
Pt calling needs to talk to [facility] about her disability papers.  Pls call to advise Thank you

## 2019-09-17 ENCOUNTER — TELEPHONE (OUTPATIENT)
Dept: CARDIOLOGY CLINIC | Age: 54
End: 2019-09-17

## 2019-09-18 NOTE — TELEPHONE ENCOUNTER
Shwetha Garcia is out of the office  We did receive some paper work which will have to go to medical records for copies

## 2019-09-30 ENCOUNTER — OFFICE VISIT (OUTPATIENT)
Dept: CARDIOLOGY CLINIC | Age: 54
End: 2019-09-30
Payer: COMMERCIAL

## 2019-09-30 VITALS
HEIGHT: 67 IN | DIASTOLIC BLOOD PRESSURE: 80 MMHG | WEIGHT: 206 LBS | HEART RATE: 60 BPM | BODY MASS INDEX: 32.33 KG/M2 | SYSTOLIC BLOOD PRESSURE: 130 MMHG | OXYGEN SATURATION: 97 % | RESPIRATION RATE: 16 BRPM

## 2019-09-30 DIAGNOSIS — G47.33 OSA (OBSTRUCTIVE SLEEP APNEA): ICD-10-CM

## 2019-09-30 DIAGNOSIS — I48.0 PAROXYSMAL ATRIAL FIBRILLATION (HCC): Primary | ICD-10-CM

## 2019-09-30 DIAGNOSIS — Z95.0 PACEMAKER: ICD-10-CM

## 2019-09-30 DIAGNOSIS — I50.42 CHRONIC COMBINED SYSTOLIC AND DIASTOLIC HEART FAILURE (HCC): ICD-10-CM

## 2019-09-30 PROCEDURE — 99214 OFFICE O/P EST MOD 30 MIN: CPT | Performed by: CLINICAL NURSE SPECIALIST

## 2019-09-30 RX ORDER — AMLODIPINE BESYLATE 5 MG/1
5 TABLET ORAL DAILY
Qty: 30 TABLET | Refills: 1 | Status: SHIPPED | OUTPATIENT
Start: 2019-09-30 | End: 2019-10-22 | Stop reason: SDUPTHER

## 2019-09-30 NOTE — PROGRESS NOTES
MCV 88.6 09/11/2018    MCV 83.3 11/24/2015    RDW 15.7 03/19/2019    RDW 17.4 09/11/2018    RDW 17.0 11/24/2015     03/19/2019     09/11/2018     11/24/2015     BMP:  Lab Results   Component Value Date     08/12/2019     06/14/2019     04/22/2019    K 4.0 08/12/2019    K 3.9 06/14/2019    K 3.8 04/22/2019     08/12/2019     06/14/2019     04/22/2019    CO2 26 08/12/2019    CO2 25 06/14/2019    CO2 26 04/22/2019    PHOS 2.5 07/05/2018    BUN 10 08/12/2019    BUN 11 06/14/2019    BUN 11 04/22/2019    CREATININE 0.8 08/12/2019    CREATININE 0.8 06/14/2019    CREATININE 0.9 04/22/2019     BNP:   Lab Results   Component Value Date    PROBNP 401 08/12/2019    PROBNP 509 06/14/2019    PROBNP 378 04/22/2019     Cardiac Imaging:       Echo 8/12/2019  Summary   -Limited echocardiogram was performed to evaluate LV ejection fraction.   -Left ventricular cavity size is mildly dilated.   -There is moderate concentric left ventricular hypertrophy.   -Overall left ventricular systolic function appears moderately reduced with   an ejection fraction on the 30-35%.  -There is moderate global diffuse hypokinesis.   -Indeterminate diastolic dysfunction due to irregular heart rate.   -Moderate mitral regurgitation.   -Aortic valve appears sclerotic but opens adequately.   -Mild to moderate tricuspid regurgitation.   -The left atrium is moderately dilated.   -Pacer / ICD wire is visualized in the right heart. Stress test 9/11/18  Enlarged LV with mild global hypokinesis. EF 51%  There is normal isotope uptake at stress and rest. There is no evidence of  myocardial ischemia or scar. ECHO 7/24/18:  Summary   -Left ventricular cavity size is mildly dilated. -There is moderate concentric left ventricular hypertrophy.   -Overall left ventricular systolic function appears severely reduced with  and ejection fraction on the 25 % range.   -There is diffuse global hypokinesis.

## 2019-10-07 ENCOUNTER — TELEPHONE (OUTPATIENT)
Dept: CARDIOLOGY CLINIC | Age: 54
End: 2019-10-07

## 2019-10-22 DIAGNOSIS — I50.42 CHRONIC COMBINED SYSTOLIC AND DIASTOLIC HEART FAILURE (HCC): ICD-10-CM

## 2019-10-23 RX ORDER — AMLODIPINE BESYLATE 5 MG/1
5 TABLET ORAL DAILY
Qty: 30 TABLET | Refills: 3 | Status: SHIPPED | OUTPATIENT
Start: 2019-10-23 | End: 2020-01-21 | Stop reason: SDUPTHER

## 2019-10-30 RX ORDER — FUROSEMIDE 20 MG/1
TABLET ORAL
Qty: 16 TABLET | Refills: 1 | Status: SHIPPED | OUTPATIENT
Start: 2019-10-30 | End: 2019-12-23

## 2019-12-17 ENCOUNTER — TELEPHONE (OUTPATIENT)
Dept: CARDIOLOGY CLINIC | Age: 54
End: 2019-12-17

## 2019-12-18 ENCOUNTER — NURSE ONLY (OUTPATIENT)
Dept: CARDIOLOGY CLINIC | Age: 54
End: 2019-12-18
Payer: COMMERCIAL

## 2019-12-18 ENCOUNTER — HOSPITAL ENCOUNTER (OUTPATIENT)
Age: 54
Discharge: HOME | End: 2019-12-18
Payer: COMMERCIAL

## 2019-12-18 DIAGNOSIS — Q24.6 CONGENITAL HEART BLOCK: ICD-10-CM

## 2019-12-18 DIAGNOSIS — I50.42 CHRONIC COMBINED SYSTOLIC AND DIASTOLIC HEART FAILURE (HCC): ICD-10-CM

## 2019-12-18 DIAGNOSIS — Z95.0 PACEMAKER: ICD-10-CM

## 2019-12-18 LAB
ANION GAP SERPL CALCULATED.3IONS-SCNC: 10 MMOL/L (ref 3–16)
BUN BLDV-MCNC: 11 MG/DL (ref 7–20)
CALCIUM SERPL-MCNC: 9.6 MG/DL (ref 8.3–10.6)
CHLORIDE BLD-SCNC: 105 MMOL/L (ref 99–110)
CO2: 27 MMOL/L (ref 21–32)
CREAT SERPL-MCNC: 0.9 MG/DL (ref 0.6–1.1)
GFR AFRICAN AMERICAN: >60
GFR NON-AFRICAN AMERICAN: >60
GLUCOSE BLD-MCNC: 91 MG/DL (ref 70–99)
POTASSIUM SERPL-SCNC: 4 MMOL/L (ref 3.5–5.1)
PRO-BNP: 261 PG/ML (ref 0–124)
SODIUM BLD-SCNC: 142 MMOL/L (ref 136–145)

## 2019-12-18 PROCEDURE — 83880 ASSAY OF NATRIURETIC PEPTIDE: CPT

## 2019-12-18 PROCEDURE — 93280 PM DEVICE PROGR EVAL DUAL: CPT | Performed by: INTERNAL MEDICINE

## 2019-12-18 PROCEDURE — 36415 COLL VENOUS BLD VENIPUNCTURE: CPT

## 2019-12-18 PROCEDURE — 80048 BASIC METABOLIC PNL TOTAL CA: CPT

## 2019-12-19 ENCOUNTER — TELEPHONE (OUTPATIENT)
Dept: CARDIOLOGY CLINIC | Age: 54
End: 2019-12-19

## 2020-01-16 ENCOUNTER — NURSE ONLY (OUTPATIENT)
Dept: CARDIOLOGY CLINIC | Age: 55
End: 2020-01-16
Payer: COMMERCIAL

## 2020-01-16 ENCOUNTER — HOSPITAL ENCOUNTER (OUTPATIENT)
Age: 55
Discharge: HOME | End: 2020-01-16
Payer: COMMERCIAL

## 2020-01-16 ENCOUNTER — OFFICE VISIT (OUTPATIENT)
Dept: CARDIOLOGY CLINIC | Age: 55
End: 2020-01-16
Payer: COMMERCIAL

## 2020-01-16 VITALS
HEIGHT: 67 IN | BODY MASS INDEX: 32.39 KG/M2 | OXYGEN SATURATION: 99 % | DIASTOLIC BLOOD PRESSURE: 83 MMHG | WEIGHT: 206.4 LBS | HEART RATE: 80 BPM | SYSTOLIC BLOOD PRESSURE: 140 MMHG

## 2020-01-16 LAB
ANION GAP SERPL CALCULATED.3IONS-SCNC: 13 MMOL/L (ref 3–16)
BUN BLDV-MCNC: 11 MG/DL (ref 7–20)
CALCIUM SERPL-MCNC: 9.6 MG/DL (ref 8.3–10.6)
CHLORIDE BLD-SCNC: 103 MMOL/L (ref 99–110)
CO2: 22 MMOL/L (ref 21–32)
CREAT SERPL-MCNC: 0.9 MG/DL (ref 0.6–1.1)
GFR AFRICAN AMERICAN: >60
GFR NON-AFRICAN AMERICAN: >60
GLUCOSE BLD-MCNC: 87 MG/DL (ref 70–99)
POTASSIUM SERPL-SCNC: 4.1 MMOL/L (ref 3.5–5.1)
PRO-BNP: 464 PG/ML (ref 0–124)
SODIUM BLD-SCNC: 138 MMOL/L (ref 136–145)

## 2020-01-16 PROCEDURE — 99214 OFFICE O/P EST MOD 30 MIN: CPT | Performed by: CLINICAL NURSE SPECIALIST

## 2020-01-16 PROCEDURE — 83880 ASSAY OF NATRIURETIC PEPTIDE: CPT

## 2020-01-16 PROCEDURE — 93280 PM DEVICE PROGR EVAL DUAL: CPT | Performed by: INTERNAL MEDICINE

## 2020-01-16 PROCEDURE — 80048 BASIC METABOLIC PNL TOTAL CA: CPT

## 2020-01-16 PROCEDURE — 36415 COLL VENOUS BLD VENIPUNCTURE: CPT

## 2020-01-16 NOTE — PROGRESS NOTES
Patient comes in for programming evaluation for her pacemaker. Interrogations shows known AF. Patient remains on Xarelto. All sensing and pacing parameters are within normal range. No changes need to be made at this time. Please see interrogation for more detail. Patient will follow up in 3 months in office or remotely.

## 2020-01-16 NOTE — PROGRESS NOTES
[facility]  Progress Note    Primary Care Doctor:  Samira Gardner    Chief Complaint   Patient presents with    3 Month Follow-Up        History of Present Illness:  53 y.o. female with history of sHF, LVH, AF on xarelto (follows with Dr Janelle Li), had been on flecainide, dual chamber pacemaker 2012 due to congenital heart block  LVEF had been 55% in 2015 and now 25%. No lisinopril due to cough. She is a cook at [employer]    I had the pleasure of seeing Evangelina Capone in follow up for sHF. She is ambulatory and by her self today. She has been using her cpap for a few months but only 3 days a week. Her HR shows 46, pacer interrogated shows PVCs HR is 80. She continues to feel fatigued, no sob, palpitations or leg edema. We have discussed entresto in past as she was not interested. Past Medical History:   has a past medical history of Anemia, Atrial fibrillation and flutter ([address] Atrial flutter ([address] CHB (complete heart block) ([address] Class 1 obesity without serious comorbidity with body mass index (BMI) of 33.0 to 33.9 in adult, Congenital heart disease, GERD (gastroesophageal reflux disease), Headache(784.0), History of complete heart block, Hyperlipidemia, Hypertension, Syncope, and Systolic CHF, chronic ([address] Surgical History:   has a past surgical history that includes Uterine fibroid surgery; Pacemaker insertion (11/29/12); and Tubal ligation. Social History:   reports that she has never smoked. She has never used smokeless tobacco. She reports that she does not drink alcohol or use drugs. Family History:   Family History   Problem Relation Age of Onset    Cancer Father     Heart Disease Neg Hx     High Blood Pressure Neg Hx     High Cholesterol Neg Hx        Home Medications:  Prior to Admission medications    Medication Sig Start Date End Date Taking?  Authorizing Provider   furosemide (LASIX) 20 MG tablet TAKE ONE TABLET BY MOUTH 4 DAYS A WEEK 12/23/19  Yes CATRACHITO Ochoa - CNS   amLODIPine (NORVASC) 5 MG tablet Take 1 tablet by mouth daily 10/23/19  Yes CATRACHITO Craft - CNS   spironolactone (ALDACTONE) 25 MG tablet Take 1 tablet by mouth daily 8/28/19  Yes CATRACHITO Tuttle - CNP   carvedilol (COREG) 12.5 MG tablet Take 1 tablet by mouth 2 times daily 8/28/19  Yes CATRACHITO Tuttle - CNP   losartan (COZAAR) 100 MG tablet Take 1 tablet by mouth daily 8/7/19  Yes CATRACHITO Ochoa - CNS   butalbital-acetaminophen-caffeine (FIORICET, ESGIC) -40 MG per tablet Take 1 tablet by mouth every 4 hours as needed for Headaches   Yes Historical Provider, MD   XARELTO 20 MG TABS tablet TAKE ONE TABLET BY MOUTH DAILY WITH BREAKFAST 3/20/19  Yes Robby Ley MD   ondansetron (ZOFRAN ODT) 4 MG disintegrating tablet Take 1 tablet by mouth every 8 hours as needed for Nausea 3/19/19  Yes Nima Correia PA-C   atorvastatin (LIPITOR) 40 MG tablet Take 1 tablet by mouth daily 11/18/16  Yes Ximena Alcocer MD   ranitidine (ZANTAC) 150 MG tablet Take 1 tablet by mouth as needed for Heartburn  Patient not taking: Reported on 1/16/2020 11/18/16   Ximena Alcocer MD        Allergies:  Latex; Codeine; Lisinopril; and Sulfa antibiotics     Review of Systems:   · Constitutional: there has been no unanticipated weight loss. There's been no change in energy level, sleep pattern, or activity level. · Eyes: No visual changes or diplopia. No scleral icterus. · ENT: No Headaches, hearing loss or vertigo. No mouth sores or sore throat. · Cardiovascular: Reviewed in HPI  · Respiratory: No cough or wheezing, no sputum production. No hematemesis. · Gastrointestinal: No abdominal pain, appetite loss, blood in stools. No change in bowel or bladder habits. · Genitourinary: No dysuria, trouble voiding, or hematuria. · Musculoskeletal:  No gait disturbance, weakness or joint complaints. · Integumentary: No rash or pruritis.   · Neurological: No headache, diplopia, change in muscle strength, numbness or tingling. No change in gait, balance, coordination, mood, affect, memory, mentation, behavior. · Psychiatric: No anxiety, no depression. · Endocrine: No malaise, fatigue or temperature intolerance. No excessive thirst, fluid intake, or urination. No tremor. · Hematologic/Lymphatic: No abnormal bruising or bleeding, blood clots or swollen lymph nodes. · Allergic/Immunologic: No nasal congestion or hives. Physical Examination:    Vitals:    01/16/20 1135   BP: (!) 140/83   Site: Left Upper Arm   Position: Sitting   Cuff Size: Large Adult   Pulse: (!) 46   SpO2: 99%   Weight: 206 lb 6.4 oz (93.6 kg)   Height: 5' 6.5\" (1.689 m)        Constitutional and General Appearance: Warm and dry, no apparent distress, normal coloration  HEENT:  Normocephalic, atraumatic  Respiratory:  · Normal excursion and expansion without use of accessory muscles  · Resp Auscultation: Normal breath sounds without dullness  Cardiovascular:  · The apical impulses not displaced  · Heart tones are crisp and normal  · JVP normal cm H2O  · regular rate and rhythm  · Peripheral pulses are symmetrical and full  · There is no clubbing, cyanosis of the extremities.   · no edema  · Pedal Pulses: 2+ and equal   Abdomen:  · No masses or tenderness  · Liver/Spleen: No Abnormalities Noted  Neurological/Psychiatric:  · Alert and oriented in all spheres  · Moves all extremities well  · Exhibits normal gait balance and coordination  · No abnormalities of mood, affect, memory, mentation, or behavior are noted    Lab Data:    CBC:   Lab Results   Component Value Date    WBC 4.0 03/19/2019    WBC 5.9 09/11/2018    WBC 5.1 11/24/2015    RBC 4.47 03/19/2019    RBC 4.25 09/11/2018    RBC 3.73 11/24/2015    HGB 14.0 03/19/2019    HGB 12.8 09/11/2018    HGB 9.9 11/24/2015    HCT 41.8 03/19/2019    HCT 37.6 09/11/2018    HCT 31.1 11/24/2015    MCV 93.4 03/19/2019    MCV 88.6 09/11/2018    MCV 83.3 11/24/2015    RDW 15.7 03/19/2019    RDW 17.4 09/11/2018    RDW 17.0 11/24/2015     03/19/2019     09/11/2018     11/24/2015     BMP:  Lab Results   Component Value Date     12/18/2019     08/12/2019     06/14/2019    K 4.0 12/18/2019    K 4.0 08/12/2019    K 3.9 06/14/2019     12/18/2019     08/12/2019     06/14/2019    CO2 27 12/18/2019    CO2 26 08/12/2019    CO2 25 06/14/2019    PHOS 2.5 07/05/2018    BUN 11 12/18/2019    BUN 10 08/12/2019    BUN 11 06/14/2019    CREATININE 0.9 12/18/2019    CREATININE 0.8 08/12/2019    CREATININE 0.8 06/14/2019     BNP:   Lab Results   Component Value Date    PROBNP 261 12/18/2019    PROBNP 401 08/12/2019    PROBNP 509 06/14/2019     Cardiac Imaging:     Echo 8/12/2019  Summary   -Limited echocardiogram was performed to evaluate LV ejection fraction.   -Left ventricular cavity size is mildly dilated.   -There is moderate concentric left ventricular hypertrophy.   -Overall left ventricular systolic function appears moderately reduced with   an ejection fraction on the 30-35%.  -There is moderate global diffuse hypokinesis.   -Indeterminate diastolic dysfunction due to irregular heart rate.   -Moderate mitral regurgitation.   -Aortic valve appears sclerotic but opens adequately.   -Mild to moderate tricuspid regurgitation.   -The left atrium is moderately dilated.   -Pacer / ICD wire is visualized in the right heart. Stress test 9/11/18  Enlarged LV with mild global hypokinesis. EF 51%  There is normal isotope uptake at stress and rest. There is no evidence of  myocardial ischemia or scar. ECHO 7/24/18:  Summary   -Left ventricular cavity size is mildly dilated. -There is moderate concentric left ventricular hypertrophy.   -Overall left ventricular systolic function appears severely reduced with  and ejection fraction on the 25 % range.   -There is diffuse global hypokinesis.    -Grade II diastolic dysfunction with elevated LV filling pressures. E/e\"=16.2.   -Mitral annular calcification is present. -Mild-moderate mitral regurgitation.   -Aortic valve appears sclerotic but opens adequately. -Trivial aortic regurgitation.   -Trivial tricuspid regurgitation with RVSP of 26 mmHg. -Mild pulmonic regurgitation present.   -Dilated left atrium with a volume of 72 ml.   -Pacer / ICD wire is visualized in the right heart. GXT cathie 7/15/2015:   Left ventricular ejection fraction of 55%. The LV wall motion is normal.  There is normal isotope uptake at stress and rest.   There is no evidence of myocardial ischemia or scar. Assessment:    1. Chronic combined systolic and diastolic heart failure (HCC) on ace, BB and aldosterone antagonist   2. Paroxysmal atrial fibrillation (HCC) on xarelto   3. DERECK (obstructive sleep apnea) using cpap   4. Pacemaker        Plan:   1. Will check echo   2. If ejection fraction still low, will switch to entresto. She agrees to trying this  3. RTO in 1 month  4. Labs done prior to appointment today  5. Letter for work done  6. Wear cpap every night    I appreciate the opportunity of cooperating in the care of this individual.    BELKIS Lucero, 1/16/2020, 11:42 AM    QUALITY MEASURES  1. Tobacco Cessation Counseling: NA  2. Retake of BP if >140/90:   NA  3. Documentation to PCP/referring for new patient:  Sent to PCP at close of office visit  4. CAD patient on anti-platelet: NA  5. CAD patient on STATIN therapy:  NA  6.  Patient with CHF and aFib on anticoagulation:  Yes

## 2020-01-17 ENCOUNTER — TELEPHONE (OUTPATIENT)
Dept: CARDIOLOGY CLINIC | Age: 55
End: 2020-01-17

## 2020-01-17 NOTE — TELEPHONE ENCOUNTER
----- Message from CATRACHITO Mccain - CNS sent at 1/17/2020  8:26 AM EST -----  Fluid level up just a little bit  Take an extra 20 mg of lasix for 2 days  thanks
Patient informed of results and instructions on lasix.   Verbalized understanding
- DVT: heparin gtt

## 2020-01-21 ENCOUNTER — TELEPHONE (OUTPATIENT)
Dept: CARDIOLOGY CLINIC | Age: 55
End: 2020-01-21

## 2020-01-21 RX ORDER — AMLODIPINE BESYLATE 5 MG/1
5 TABLET ORAL DAILY
Qty: 30 TABLET | Refills: 3 | Status: SHIPPED | OUTPATIENT
Start: 2020-01-21 | End: 2020-06-09 | Stop reason: ALTCHOICE

## 2020-02-24 ENCOUNTER — OFFICE VISIT (OUTPATIENT)
Dept: CARDIOLOGY CLINIC | Age: 55
End: 2020-02-24
Payer: COMMERCIAL

## 2020-02-24 ENCOUNTER — HOSPITAL ENCOUNTER (OUTPATIENT)
Dept: NON INVASIVE DIAGNOSTICS | Age: 55
Discharge: HOME | End: 2020-02-24
Payer: COMMERCIAL

## 2020-02-24 VITALS
BODY MASS INDEX: 33.12 KG/M2 | DIASTOLIC BLOOD PRESSURE: 62 MMHG | HEIGHT: 67 IN | WEIGHT: 211 LBS | SYSTOLIC BLOOD PRESSURE: 124 MMHG | OXYGEN SATURATION: 98 % | HEART RATE: 68 BPM

## 2020-02-24 LAB
LEFT VENTRICULAR EJECTION FRACTION HIGH VALUE: 35 %
LEFT VENTRICULAR EJECTION FRACTION MODE: NORMAL
LV EF: 30 %

## 2020-02-24 PROCEDURE — 93308 TTE F-UP OR LMTD: CPT

## 2020-02-24 PROCEDURE — 93321 DOPPLER ECHO F-UP/LMTD STD: CPT

## 2020-02-24 PROCEDURE — 99214 OFFICE O/P EST MOD 30 MIN: CPT | Performed by: CLINICAL NURSE SPECIALIST

## 2020-02-24 PROCEDURE — 93320 DOPPLER ECHO COMPLETE: CPT

## 2020-02-24 RX ORDER — CARVEDILOL 12.5 MG/1
12.5 TABLET ORAL 2 TIMES DAILY
Qty: 60 TABLET | Refills: 5 | Status: SHIPPED | OUTPATIENT
Start: 2020-02-24 | End: 2020-08-31 | Stop reason: SDUPTHER

## 2020-02-24 RX ORDER — FUROSEMIDE 20 MG/1
TABLET ORAL
Qty: 16 TABLET | Refills: 0 | Status: SHIPPED | OUTPATIENT
Start: 2020-02-24 | End: 2020-03-10

## 2020-02-24 RX ORDER — LOSARTAN POTASSIUM 100 MG/1
100 TABLET ORAL DAILY
Qty: 90 TABLET | Refills: 1 | Status: CANCELLED | OUTPATIENT
Start: 2020-02-24

## 2020-02-24 RX ORDER — SPIRONOLACTONE 25 MG/1
25 TABLET ORAL DAILY
Qty: 30 TABLET | Refills: 0 | Status: SHIPPED | OUTPATIENT
Start: 2020-02-24 | End: 2020-04-14 | Stop reason: SDUPTHER

## 2020-02-24 NOTE — PATIENT INSTRUCTIONS
1.  Stop losartan  2. Start entresto 24-26 mg twice a day  3. Check blood work in 7-10 days  4. RTO in 1 month  5.   Refilled lasix, aldactone and coreg

## 2020-02-25 ENCOUNTER — TELEPHONE (OUTPATIENT)
Dept: CARDIOLOGY CLINIC | Age: 55
End: 2020-02-25

## 2020-02-28 ENCOUNTER — TELEPHONE (OUTPATIENT)
Dept: CARDIOLOGY CLINIC | Age: 55
End: 2020-02-28

## 2020-02-28 NOTE — TELEPHONE ENCOUNTER
Called and spoke with patient and informed her that it was noted in last visit to continue medication and refills were sent.

## 2020-02-28 NOTE — TELEPHONE ENCOUNTER
Needs to go over med list . Karen Kitchen remember if she is supposed to continue taking the carvedilol and spironolactone or not .

## 2020-03-10 RX ORDER — FUROSEMIDE 20 MG/1
TABLET ORAL
Qty: 17 TABLET | Refills: 0 | Status: SHIPPED | OUTPATIENT
Start: 2020-03-10 | End: 2020-04-09

## 2020-03-25 NOTE — TELEPHONE ENCOUNTER
Contacted the patient this morning and relayed NPRG's comments. She stated that someone else had called her and relayed this information - I do not see any documentation on this. I apologized to the patient for the double contact on this. She verbalized understanding.

## 2020-04-09 ENCOUNTER — TELEPHONE (OUTPATIENT)
Dept: CARDIOLOGY CLINIC | Age: 55
End: 2020-04-09

## 2020-04-09 RX ORDER — FUROSEMIDE 20 MG/1
TABLET ORAL
Qty: 17 TABLET | Refills: 0 | Status: SHIPPED | OUTPATIENT
Start: 2020-04-09 | End: 2020-04-14 | Stop reason: SDUPTHER

## 2020-04-14 ENCOUNTER — VIRTUAL VISIT (OUTPATIENT)
Dept: CARDIOLOGY CLINIC | Age: 55
End: 2020-04-14
Payer: COMMERCIAL

## 2020-04-14 PROCEDURE — 99441 PR PHYS/QHP TELEPHONE EVALUATION 5-10 MIN: CPT | Performed by: CLINICAL NURSE SPECIALIST

## 2020-04-14 RX ORDER — FUROSEMIDE 20 MG/1
TABLET ORAL
Qty: 17 TABLET | Refills: 0 | Status: SHIPPED | OUTPATIENT
Start: 2020-04-14 | End: 2020-05-08

## 2020-04-14 RX ORDER — SACUBITRIL AND VALSARTAN 24; 26 MG/1; MG/1
1 TABLET, FILM COATED ORAL 2 TIMES DAILY
Qty: 60 TABLET | Refills: 0 | Status: SHIPPED
Start: 2020-04-14 | End: 2020-06-09 | Stop reason: SINTOL

## 2020-04-14 RX ORDER — SPIRONOLACTONE 25 MG/1
25 TABLET ORAL DAILY
Qty: 30 TABLET | Refills: 0 | Status: SHIPPED | OUTPATIENT
Start: 2020-04-14 | End: 2020-06-09 | Stop reason: SDUPTHER

## 2020-04-14 NOTE — PROGRESS NOTES
Pramod Vincent is a 53 y.o. female evaluated via telephone on 4/14/2020. Consent:  She and/or health care decision maker is aware that that she may receive a bill for this telephone service, depending on her insurance coverage, and has provided verbal consent to proceed: Yes      Documentation:  I communicated with the patient and/or health care decision maker. Details of this discussion including any medical advice provided: me      I affirm this is a Patient Initiated Episode with an Established Patient who has not had a related appointment within my department in the past 7 days or scheduled within the next 24 hours. Total Time: minutes: 5-10 minutes    Note: not billable if this call serves to triage the patient into an appointment for the relevant concern      [facility]  Progress Note    Primary Care Doctor:  No primary care provider on file. Chief Complaint   Patient presents with    Follow-up     unsure if sob or anxiety due to all this stuff that's going on        History of Present Illness:  53 y.o. female with history of sHF, LVH, AF on xarelto (follows with Dr Tonie Boyce), had been on flecainide, dual chamber pacemaker 2012 due to congenital heart block  LVEF had been 55% in 2015 and now 25%. No lisinopril due to cough. She is a cook at [employer]    I had the pleasure of seeing/virtual Pramod Vincent in follow up for sHF. She is unable to weigh self due to no scale. She spoke to her PCP yesterday for sinus congestion and was told to start claritin. She denies any chest pain, palpitations, lightheadedness or edema. She does state that she is having a lot of anxiety due to current coronavirus in the world. She is taking all her medications and needs refills.       Past Medical History:   has a past medical history of Anemia, Atrial fibrillation and flutter ([address] Atrial flutter ([address] CHB (complete heart block) ([address] Class 1 obesity without serious K 4.1 01/16/2020    K 4.0 12/18/2019    K 4.0 08/12/2019     01/16/2020     12/18/2019     08/12/2019    CO2 22 01/16/2020    CO2 27 12/18/2019    CO2 26 08/12/2019    PHOS 2.5 07/05/2018    BUN 11 01/16/2020    BUN 11 12/18/2019    BUN 10 08/12/2019    CREATININE 0.9 01/16/2020    CREATININE 0.9 12/18/2019    CREATININE 0.8 08/12/2019     BNP:   Lab Results   Component Value Date    PROBNP 464 01/16/2020    PROBNP 261 12/18/2019    PROBNP 401 08/12/2019     Cardiac Imaging:  Echo 2/24/2020  Summary   -Limited echocardiogram was performed to evaluate EF.   -Normal left ventricle size, mild to moderate left ventricular hypertrophy,   and moderately reduced systolic function with an estimated ejection fraction   of 30-35%. -There is moderate diffuse hypokinesis. -Grade III diastolic dysfunction . E/e\"=10.7.   -Mild mitral regurgitation.   -Mild tricuspid regurgitation.   -The left atrium is mild to moderate dilated. -Right ventricular systolic function appears mildly reduced .   -Estimated pulmonary artery systolic pressure is borderline normal at 28-33   mmHg assuming a right atrial pressure of 8 mmHg.   -Pacer / ICD wire is visualized in the right heart.     Echo 8/12/2019  Summary   -Limited echocardiogram was performed to evaluate LV ejection fraction.   -Left ventricular cavity size is mildly dilated.   -There is moderate concentric left ventricular hypertrophy.   -Overall left ventricular systolic function appears moderately reduced with   an ejection fraction on the 30-35%.  -There is moderate global diffuse hypokinesis.   -Indeterminate diastolic dysfunction due to irregular heart rate.   -Moderate mitral regurgitation.   -Aortic valve appears sclerotic but opens adequately.   -Mild to moderate tricuspid regurgitation.   -The left atrium is moderately dilated.   -Pacer / ICD wire is visualized in the right heart. Stress test 9/11/18  Enlarged LV with mild global hypokinesis. EF 51%  There is normal isotope uptake at stress and rest. There is no evidence of  myocardial ischemia or scar. ECHO 7/24/18:  Summary   -Left ventricular cavity size is mildly dilated. -There is moderate concentric left ventricular hypertrophy.   -Overall left ventricular systolic function appears severely reduced with  and ejection fraction on the 25 % range.   -There is diffuse global hypokinesis. -Grade II diastolic dysfunction with elevated LV filling pressures. E/e\"=16.2.   -Mitral annular calcification is present. -Mild-moderate mitral regurgitation.   -Aortic valve appears sclerotic but opens adequately. -Trivial aortic regurgitation.   -Trivial tricuspid regurgitation with RVSP of 26 mmHg. -Mild pulmonic regurgitation present.   -Dilated left atrium with a volume of 72 ml.   -Pacer / ICD wire is visualized in the right heart. GXT cathie 7/15/2015:   Left ventricular ejection fraction of 55%. The LV wall motion is normal.  There is normal isotope uptake at stress and rest.   There is no evidence of myocardial ischemia or scar. 4/14/2020    TELEHEALTH EVALUATION -- Audio/Visual [facility])    Review of Systems    Prior to Visit Medications    Medication Sig Taking?  Authorizing Provider   furosemide (LASIX) 20 MG tablet TAKE ONE TABLET BY MOUTH 4 DAYS A WEEK Yes CATRACHITO Keenan - CNS   spironolactone (ALDACTONE) 25 MG tablet Take 1 tablet by mouth daily Yes CATRACHITO Ramey - CNS   carvedilol (COREG) 12.5 MG tablet Take 1 tablet by mouth 2 times daily Yes CATRACHITO Ramey - CNS   sacubitril-valsartan (ENTRESTO) 24-26 MG per tablet Take 1 tablet by mouth 2 times daily Yes CATRACHITO Keenan - CNS   amLODIPine (NORVASC) 5 MG tablet Take 1 tablet by mouth daily Yes CATRACHITO Ramey - CNS   butalbital-acetaminophen-caffeine (FIORICET, ESGIC) -40 MG per tablet Take 1 tablet by mouth every 4 hours as needed for Headaches Yes Historical Provider, MD   XARELTO 20 MG TABS tablet TAKE ONE TABLET BY MOUTH DAILY WITH BREAKFAST Yes Frank Mcgrath MD   ondansetron (ZOFRAN ODT) 4 MG disintegrating tablet Take 1 tablet by mouth every 8 hours as needed for Nausea Yes Dallas Cullen PA-C   atorvastatin (LIPITOR) 40 MG tablet Take 1 tablet by mouth daily Yes Everton Tiwari MD       Social History     Tobacco Use    Smoking status: Never Smoker    Smokeless tobacco: Never Used   Substance Use Topics    Alcohol use: No    Drug use: No        PHYSICAL EXAMINATION:  [ INSTRUCTIONS:  \"[x]\" Indicates a positive item  \"[]\" Indicates a negative item  -- DELETE ALL ITEMS NOT EXAMINED]  Vital Signs: (As obtained by patient/caregiver or practitioner observation)    Blood pressure-  Heart rate-    Respiratory rate-    Temperature-  Pulse oximetry-     Constitutional: [x] Appears well-developed and well-nourished [x] No apparent distress      [] Abnormal-   Mental status  [x] Alert and awake  [x] Oriented to person/place/time []Able to follow commands      Eyes:  EOM    [x]  Normal  [] Abnormal-  Sclera  [x]  Normal  [] Abnormal -         Discharge []  None visible  [] Abnormal -    HENT:   [x] Normocephalic, atraumatic.   [] Abnormal   [x] Mouth/Throat: Mucous membranes are moist.     External Ears [x] Normal  [] Abnormal-     Neck: [] No visualized mass     Pulmonary/Chest: [x] Respiratory effort normal.  [x] No visualized signs of difficulty breathing or respiratory distress        [] Abnormal-      Musculoskeletal:   [] Normal gait with no signs of ataxia         [] Normal range of motion of neck        [] Abnormal-       Neurological:        [x] No Facial Asymmetry (Cranial nerve 7 motor function) (limited exam to video visit)          [] No gaze palsy        [] Abnormal-         Skin:        [x] No significant exanthematous lesions or discoloration noted on facial skin         [] Abnormal-            Psychiatric:       [x] Normal Affect [] No

## 2020-04-16 RX ORDER — RIVAROXABAN 20 MG/1
TABLET, FILM COATED ORAL
Qty: 90 TABLET | Refills: 1 | Status: SHIPPED | OUTPATIENT
Start: 2020-04-16 | End: 2020-10-20

## 2020-04-16 NOTE — TELEPHONE ENCOUNTER
LF 3/20/19 #90, 3 by [facility] was VV 4/14/20 with HF NPRG  FU 5/28/20  Labs 1/16/20

## 2020-05-11 ENCOUNTER — TELEPHONE (OUTPATIENT)
Dept: CARDIOLOGY CLINIC | Age: 55
End: 2020-05-11

## 2020-05-12 ENCOUNTER — HOSPITAL ENCOUNTER (OUTPATIENT)
Age: 55
Discharge: HOME | End: 2020-05-12
Payer: MEDICAID

## 2020-05-12 LAB
ANION GAP SERPL CALCULATED.3IONS-SCNC: 11 MMOL/L (ref 3–16)
BUN BLDV-MCNC: 10 MG/DL (ref 7–20)
CALCIUM SERPL-MCNC: 9.3 MG/DL (ref 8.3–10.6)
CHLORIDE BLD-SCNC: 105 MMOL/L (ref 99–110)
CO2: 20 MMOL/L (ref 21–32)
CREAT SERPL-MCNC: 0.9 MG/DL (ref 0.6–1.1)
GFR AFRICAN AMERICAN: >60
GFR NON-AFRICAN AMERICAN: >60
GLUCOSE BLD-MCNC: 101 MG/DL (ref 70–99)
POTASSIUM SERPL-SCNC: 3.8 MMOL/L (ref 3.5–5.1)
PRO-BNP: 514 PG/ML (ref 0–124)
SODIUM BLD-SCNC: 136 MMOL/L (ref 136–145)

## 2020-05-12 PROCEDURE — 80048 BASIC METABOLIC PNL TOTAL CA: CPT

## 2020-05-12 PROCEDURE — 36415 COLL VENOUS BLD VENIPUNCTURE: CPT

## 2020-05-12 PROCEDURE — 83880 ASSAY OF NATRIURETIC PEPTIDE: CPT

## 2020-05-13 ENCOUNTER — TELEPHONE (OUTPATIENT)
Dept: CARDIOLOGY CLINIC | Age: 55
End: 2020-05-13

## 2020-06-09 ENCOUNTER — OFFICE VISIT (OUTPATIENT)
Dept: CARDIOLOGY CLINIC | Age: 55
End: 2020-06-09
Payer: MEDICAID

## 2020-06-09 VITALS
BODY MASS INDEX: 33.9 KG/M2 | OXYGEN SATURATION: 97 % | DIASTOLIC BLOOD PRESSURE: 64 MMHG | WEIGHT: 216 LBS | HEART RATE: 59 BPM | HEIGHT: 67 IN | SYSTOLIC BLOOD PRESSURE: 122 MMHG

## 2020-06-09 PROCEDURE — 3017F COLORECTAL CA SCREEN DOC REV: CPT | Performed by: CLINICAL NURSE SPECIALIST

## 2020-06-09 PROCEDURE — G8417 CALC BMI ABV UP PARAM F/U: HCPCS | Performed by: CLINICAL NURSE SPECIALIST

## 2020-06-09 PROCEDURE — G8427 DOCREV CUR MEDS BY ELIG CLIN: HCPCS | Performed by: CLINICAL NURSE SPECIALIST

## 2020-06-09 PROCEDURE — 99214 OFFICE O/P EST MOD 30 MIN: CPT | Performed by: CLINICAL NURSE SPECIALIST

## 2020-06-09 PROCEDURE — 1036F TOBACCO NON-USER: CPT | Performed by: CLINICAL NURSE SPECIALIST

## 2020-06-09 RX ORDER — FUROSEMIDE 20 MG/1
TABLET ORAL
Qty: 17 TABLET | Refills: 0 | OUTPATIENT
Start: 2020-06-09

## 2020-06-09 RX ORDER — AMLODIPINE BESYLATE 5 MG/1
5 TABLET ORAL DAILY
Qty: 30 TABLET | Refills: 3 | Status: CANCELLED | OUTPATIENT
Start: 2020-06-09

## 2020-06-09 RX ORDER — SPIRONOLACTONE 25 MG/1
25 TABLET ORAL DAILY
Qty: 30 TABLET | Refills: 1 | Status: SHIPPED | OUTPATIENT
Start: 2020-06-09 | End: 2020-08-31 | Stop reason: SDUPTHER

## 2020-06-09 RX ORDER — FUROSEMIDE 20 MG/1
TABLET ORAL
Qty: 17 TABLET | Refills: 0 | Status: SHIPPED | OUTPATIENT
Start: 2020-06-09 | End: 2020-07-06 | Stop reason: SDUPTHER

## 2020-06-09 RX ORDER — HYDRALAZINE HYDROCHLORIDE 25 MG/1
25 TABLET, FILM COATED ORAL 2 TIMES DAILY
Qty: 60 TABLET | Refills: 1 | Status: SHIPPED | OUTPATIENT
Start: 2020-06-09 | End: 2020-08-31 | Stop reason: SDUPTHER

## 2020-06-09 NOTE — PROGRESS NOTES
RDW 17.4 09/11/2018    RDW 17.0 11/24/2015     03/19/2019     09/11/2018     11/24/2015     BMP:  Lab Results   Component Value Date     05/12/2020     01/16/2020     12/18/2019    K 3.8 05/12/2020    K 4.1 01/16/2020    K 4.0 12/18/2019     05/12/2020     01/16/2020     12/18/2019    CO2 20 05/12/2020    CO2 22 01/16/2020    CO2 27 12/18/2019    PHOS 2.5 07/05/2018    BUN 10 05/12/2020    BUN 11 01/16/2020    BUN 11 12/18/2019    CREATININE 0.9 05/12/2020    CREATININE 0.9 01/16/2020    CREATININE 0.9 12/18/2019     BNP:   Lab Results   Component Value Date    PROBNP 514 05/12/2020    PROBNP 464 01/16/2020    PROBNP 261 12/18/2019     Cardiac Imaging:  Echo 2/24/2020  Summary   -Limited echocardiogram was performed to evaluate EF.   -Normal left ventricle size, mild to moderate left ventricular hypertrophy,   and moderately reduced systolic function with an estimated ejection fraction   of 30-35%. -There is moderate diffuse hypokinesis. -Grade III diastolic dysfunction . E/e\"=10.7.   -Mild mitral regurgitation.   -Mild tricuspid regurgitation.   -The left atrium is mild to moderate dilated. -Right ventricular systolic function appears mildly reduced .   -Estimated pulmonary artery systolic pressure is borderline normal at 28-33   mmHg assuming a right atrial pressure of 8 mmHg.   -Pacer / ICD wire is visualized in the right heart.     Echo 8/12/2019  Summary   -Limited echocardiogram was performed to evaluate LV ejection fraction.   -Left ventricular cavity size is mildly dilated.   -There is moderate concentric left ventricular hypertrophy.   -Overall left ventricular systolic function appears moderately reduced with   an ejection fraction on the 30-35%.    -There is moderate global diffuse hypokinesis.   -Indeterminate diastolic dysfunction due to irregular heart rate.   -Moderate mitral regurgitation.   -Aortic valve appears sclerotic but opens

## 2020-06-10 RX ORDER — SACUBITRIL AND VALSARTAN 24; 26 MG/1; MG/1
TABLET, FILM COATED ORAL
Qty: 60 TABLET | Refills: 0 | OUTPATIENT
Start: 2020-06-10

## 2020-06-26 ENCOUNTER — HOSPITAL ENCOUNTER (EMERGENCY)
Age: 55
Discharge: LEFT BEFORE DISCHARGE | End: 2020-06-26
Attending: EMERGENCY MEDICINE
Payer: MEDICAID

## 2020-06-26 VITALS
RESPIRATION RATE: 16 BRPM | DIASTOLIC BLOOD PRESSURE: 99 MMHG | OXYGEN SATURATION: 96 % | HEART RATE: 80 BPM | TEMPERATURE: 98.1 F | SYSTOLIC BLOOD PRESSURE: 153 MMHG

## 2020-06-26 PROCEDURE — 4500000002 HC ER NO CHARGE

## 2020-06-27 ENCOUNTER — HOSPITAL ENCOUNTER (EMERGENCY)
Age: 55
Discharge: HOME | End: 2020-06-27
Attending: EMERGENCY MEDICINE
Payer: MEDICAID

## 2020-06-27 ENCOUNTER — APPOINTMENT (OUTPATIENT)
Dept: GENERAL RADIOLOGY | Age: 55
End: 2020-06-27
Payer: MEDICAID

## 2020-06-27 VITALS
HEART RATE: 61 BPM | WEIGHT: 216 LBS | HEIGHT: 67 IN | BODY MASS INDEX: 33.9 KG/M2 | SYSTOLIC BLOOD PRESSURE: 129 MMHG | DIASTOLIC BLOOD PRESSURE: 74 MMHG | OXYGEN SATURATION: 100 % | RESPIRATION RATE: 19 BRPM | TEMPERATURE: 98 F

## 2020-06-27 LAB
ALBUMIN SERPL-MCNC: 4.4 G/DL (ref 3.4–5)
ALP BLD-CCNC: 51 U/L (ref 40–129)
ALT SERPL-CCNC: 13 U/L (ref 10–40)
ANION GAP SERPL CALCULATED.3IONS-SCNC: 11 MMOL/L (ref 3–16)
AST SERPL-CCNC: 25 U/L (ref 15–37)
BASE EXCESS VENOUS: 0 MMOL/L (ref -3–3)
BASOPHILS ABSOLUTE: 0.1 K/UL (ref 0–0.2)
BASOPHILS RELATIVE PERCENT: 1.8 %
BILIRUB SERPL-MCNC: 1.1 MG/DL (ref 0–1)
BILIRUBIN DIRECT: <0.2 MG/DL (ref 0–0.3)
BILIRUBIN, INDIRECT: ABNORMAL MG/DL (ref 0–1)
BUN BLDV-MCNC: 7 MG/DL (ref 7–20)
CALCIUM SERPL-MCNC: 9.6 MG/DL (ref 8.3–10.6)
CARBOXYHEMOGLOBIN: 1.9 % (ref 0–1.5)
CHLORIDE BLD-SCNC: 106 MMOL/L (ref 99–110)
CO2: 22 MMOL/L (ref 21–32)
CREAT SERPL-MCNC: 0.8 MG/DL (ref 0.6–1.1)
EKG ATRIAL RATE: 56 BPM
EKG DIAGNOSIS: NORMAL
EKG Q-T INTERVAL: 486 MS
EKG QRS DURATION: 170 MS
EKG QTC CALCULATION (BAZETT): 486 MS
EKG R AXIS: 53 DEGREES
EKG T AXIS: 243 DEGREES
EKG VENTRICULAR RATE: 60 BPM
EOSINOPHILS ABSOLUTE: 0.2 K/UL (ref 0–0.6)
EOSINOPHILS RELATIVE PERCENT: 4.4 %
GFR AFRICAN AMERICAN: >60
GFR NON-AFRICAN AMERICAN: >60
GLUCOSE BLD-MCNC: 115 MG/DL (ref 70–99)
HCO3 VENOUS: 23.1 MMOL/L (ref 23–29)
HCT VFR BLD CALC: 40.9 % (ref 36–48)
HEMOGLOBIN: 14 G/DL (ref 12–16)
INR BLD: 1.43 (ref 0.86–1.14)
LACTIC ACID: 1.1 MMOL/L (ref 0.4–2)
LIPASE: 23 U/L (ref 13–60)
LYMPHOCYTES ABSOLUTE: 1.1 K/UL (ref 1–5.1)
LYMPHOCYTES RELATIVE PERCENT: 23.4 %
MCH RBC QN AUTO: 31.6 PG (ref 26–34)
MCHC RBC AUTO-ENTMCNC: 34.1 G/DL (ref 31–36)
MCV RBC AUTO: 92.5 FL (ref 80–100)
METHEMOGLOBIN VENOUS: 0.3 %
MONOCYTES ABSOLUTE: 0.4 K/UL (ref 0–1.3)
MONOCYTES RELATIVE PERCENT: 9.1 %
NEUTROPHILS ABSOLUTE: 2.8 K/UL (ref 1.7–7.7)
NEUTROPHILS RELATIVE PERCENT: 61.3 %
O2 CONTENT, VEN: 19 VOL %
O2 SAT, VEN: 100 %
O2 THERAPY: ABNORMAL
PCO2, VEN: 32.3 MMHG (ref 40–50)
PDW BLD-RTO: 15.3 % (ref 12.4–15.4)
PH VENOUS: 7.46 (ref 7.35–7.45)
PLATELET # BLD: 261 K/UL (ref 135–450)
PMV BLD AUTO: 7.5 FL (ref 5–10.5)
PO2, VEN: 171 MMHG (ref 25–40)
POTASSIUM REFLEX MAGNESIUM: 5.1 MMOL/L (ref 3.5–5.1)
PRO-BNP: 267 PG/ML (ref 0–124)
PROTHROMBIN TIME: 16.6 SEC (ref 10–13.2)
RBC # BLD: 4.43 M/UL (ref 4–5.2)
S PYO AG THROAT QL: NEGATIVE
SODIUM BLD-SCNC: 139 MMOL/L (ref 136–145)
TCO2 CALC VENOUS: 54 MMOL/L
TOTAL PROTEIN: 7.7 G/DL (ref 6.4–8.2)
TROPONIN: <0.01 NG/ML
WBC # BLD: 4.6 K/UL (ref 4–11)

## 2020-06-27 PROCEDURE — 93010 ELECTROCARDIOGRAM REPORT: CPT | Performed by: INTERNAL MEDICINE

## 2020-06-27 PROCEDURE — 83690 ASSAY OF LIPASE: CPT

## 2020-06-27 PROCEDURE — 80048 BASIC METABOLIC PNL TOTAL CA: CPT

## 2020-06-27 PROCEDURE — 85610 PROTHROMBIN TIME: CPT

## 2020-06-27 PROCEDURE — 2580000003 HC RX 258: Performed by: NURSE PRACTITIONER

## 2020-06-27 PROCEDURE — 80076 HEPATIC FUNCTION PANEL: CPT

## 2020-06-27 PROCEDURE — U0002 COVID-19 LAB TEST NON-CDC: HCPCS

## 2020-06-27 PROCEDURE — 87081 CULTURE SCREEN ONLY: CPT

## 2020-06-27 PROCEDURE — 96374 THER/PROPH/DIAG INJ IV PUSH: CPT

## 2020-06-27 PROCEDURE — 83605 ASSAY OF LACTIC ACID: CPT

## 2020-06-27 PROCEDURE — 82803 BLOOD GASES ANY COMBINATION: CPT

## 2020-06-27 PROCEDURE — 93005 ELECTROCARDIOGRAM TRACING: CPT | Performed by: EMERGENCY MEDICINE

## 2020-06-27 PROCEDURE — 85025 COMPLETE CBC W/AUTO DIFF WBC: CPT

## 2020-06-27 PROCEDURE — 6360000002 HC RX W HCPCS: Performed by: NURSE PRACTITIONER

## 2020-06-27 PROCEDURE — 87880 STREP A ASSAY W/OPTIC: CPT

## 2020-06-27 PROCEDURE — 99285 EMERGENCY DEPT VISIT HI MDM: CPT

## 2020-06-27 PROCEDURE — U0003 INFECTIOUS AGENT DETECTION BY NUCLEIC ACID (DNA OR RNA); SEVERE ACUTE RESPIRATORY SYNDROME CORONAVIRUS 2 (SARS-COV-2) (CORONAVIRUS DISEASE [COVID-19]), AMPLIFIED PROBE TECHNIQUE, MAKING USE OF HIGH THROUGHPUT TECHNOLOGIES AS DESCRIBED BY CMS-2020-01-R: HCPCS

## 2020-06-27 PROCEDURE — 84484 ASSAY OF TROPONIN QUANT: CPT

## 2020-06-27 PROCEDURE — 83880 ASSAY OF NATRIURETIC PEPTIDE: CPT

## 2020-06-27 PROCEDURE — 96375 TX/PRO/DX INJ NEW DRUG ADDON: CPT

## 2020-06-27 PROCEDURE — 71045 X-RAY EXAM CHEST 1 VIEW: CPT

## 2020-06-27 RX ORDER — ONDANSETRON 2 MG/ML
4 INJECTION INTRAMUSCULAR; INTRAVENOUS EVERY 30 MIN PRN
Status: DISCONTINUED | OUTPATIENT
Start: 2020-06-27 | End: 2020-06-27 | Stop reason: HOSPADM

## 2020-06-27 RX ORDER — 0.9 % SODIUM CHLORIDE 0.9 %
1000 INTRAVENOUS SOLUTION INTRAVENOUS ONCE
Status: COMPLETED | OUTPATIENT
Start: 2020-06-27 | End: 2020-06-27

## 2020-06-27 RX ORDER — KETOROLAC TROMETHAMINE 30 MG/ML
30 INJECTION, SOLUTION INTRAMUSCULAR; INTRAVENOUS ONCE
Status: COMPLETED | OUTPATIENT
Start: 2020-06-27 | End: 2020-06-27

## 2020-06-27 RX ADMIN — KETOROLAC TROMETHAMINE 30 MG: 30 INJECTION, SOLUTION INTRAMUSCULAR at 09:42

## 2020-06-27 RX ADMIN — SODIUM CHLORIDE 1000 ML: 9 INJECTION, SOLUTION INTRAVENOUS at 09:42

## 2020-06-27 ASSESSMENT — ENCOUNTER SYMPTOMS
NAUSEA: 0
SHORTNESS OF BREATH: 1
COUGH: 1
VOMITING: 0
BLOOD IN STOOL: 0
ABDOMINAL PAIN: 0
RHINORRHEA: 0
CONSTIPATION: 0
SORE THROAT: 0
DIARRHEA: 0

## 2020-06-27 ASSESSMENT — PAIN SCALES - GENERAL: PAINLEVEL_OUTOF10: 4

## 2020-06-27 NOTE — ED NOTES
Discharge instructions given with verbal understanding.  Pt agrees to self isolate for 15 days     Lino Curry RN  06/27/20 4619

## 2020-06-27 NOTE — ED PROVIDER NOTES
Please see the [facility] note independent  EKG: Sinus rhythm, normal axis, no murmurs, no acute ST segment abnormality.       Ramos Ferreira MD  42/40/06 6651
FUNCTION PANEL   LIPASE   COVID-19   COVID-19   COVID-19       All other labs werewithin normal range or not returned as of this dictation. EKG: All EKG's are interpreted by the Emergency Department Physician who either signs or Co-signs this chart in the absence of acardiologist.  Please see their note for interpretation of EKG. RADIOLOGY:   Interpretation per the Radiologist below, if available at the time of this note:    XR CHEST PORTABLE    (Results Pending)     No results found. PROCEDURES   Unless otherwise noted below, none     Procedures    CRITICAL CARE TIME     n/a    CONSULTS:  None      EMERGENCY DEPARTMENT COURSE and DIFFERENTIAL DIAGNOSIS/MDM:   Vitals:    Vitals:    06/27/20 0838   BP: (!) 144/84   Pulse: 60   Resp: 18   Temp: 98 °F (36.7 °C)   TempSrc: Oral   SpO2: 98%   Weight: 216 lb (98 kg)   Height: 5' 6.5\" (1.689 m)       Jesica Felix was given the following medications:  Medications   0.9 % sodium chloride bolus (has no administration in time range)   ketorolac (TORADOL) injection 30 mg (has no administration in time range)   ondansetron (ZOFRAN) injection 4 mg (has no administration in time range)       Jesica Felix was evaluated in the emergency department with concern for shortness of breath. She also has cough. She is not tachypneic, tachycardic, or hypoxic on room air. Laboratory evaluation is unremarkable. There is no evidence of sepsis, meningitis, epiglottitis bacterial , acute bacterial sinusitis, streptococcal pharyngitis, otitis media, or other bacterial infection that would be managed with antibiotics. The patient is tolerating PO without difficulty and is in no acute distress on exam.  BS clear to ascultation. Supportive care recommended at this time and the patient can be managed as an outpatient. Strict return precautions given for changing or worsening pain, trouble swallowing or breathing, neck stiffness, fever, or rash.  The patient was specifically

## 2020-06-28 ENCOUNTER — HOSPITAL ENCOUNTER (EMERGENCY)
Age: 55
Discharge: HOME | End: 2020-06-28
Attending: STUDENT IN AN ORGANIZED HEALTH CARE EDUCATION/TRAINING PROGRAM
Payer: MEDICAID

## 2020-06-28 VITALS
HEART RATE: 61 BPM | BODY MASS INDEX: 34.55 KG/M2 | RESPIRATION RATE: 18 BRPM | DIASTOLIC BLOOD PRESSURE: 94 MMHG | OXYGEN SATURATION: 98 % | HEIGHT: 66 IN | TEMPERATURE: 98 F | SYSTOLIC BLOOD PRESSURE: 142 MMHG | WEIGHT: 215 LBS

## 2020-06-28 LAB — SARS-COV-2, PCR: NOT DETECTED

## 2020-06-28 PROCEDURE — 6370000000 HC RX 637 (ALT 250 FOR IP): Performed by: STUDENT IN AN ORGANIZED HEALTH CARE EDUCATION/TRAINING PROGRAM

## 2020-06-28 PROCEDURE — 99282 EMERGENCY DEPT VISIT SF MDM: CPT

## 2020-06-28 RX ORDER — LIDOCAINE HYDROCHLORIDE 20 MG/ML
15 SOLUTION OROPHARYNGEAL PRN
Qty: 1 BOTTLE | Refills: 0 | Status: SHIPPED | OUTPATIENT
Start: 2020-06-28 | End: 2020-08-31

## 2020-06-28 RX ORDER — LIDOCAINE HYDROCHLORIDE 20 MG/ML
15 SOLUTION OROPHARYNGEAL ONCE
Status: COMPLETED | OUTPATIENT
Start: 2020-06-28 | End: 2020-06-28

## 2020-06-28 RX ADMIN — LIDOCAINE HYDROCHLORIDE 15 ML: 20 SOLUTION ORAL; TOPICAL at 07:52

## 2020-06-28 ASSESSMENT — PAIN SCALES - GENERAL: PAINLEVEL_OUTOF10: 9

## 2020-06-28 NOTE — ED NOTES
Pt. Mouth is red with no obvious signs of thrush that she states she has, given Viscous lidocaine to swish, swirl, and spit. Told to return if running a fever of has an abscess open and begin draining puss to flollw up with dentist or PCP.  Take tylenol or Ibuprofen for fever greater than 100.4     Luis Gay RN  06/28/20 1309

## 2020-06-28 NOTE — ED PROVIDER NOTES
9:12 am COMPARISON: Chest x-ray dated 09/11/2018 HISTORY: ORDERING SYSTEM PROVIDED HISTORY: sob ro chf/pna TECHNOLOGIST PROVIDED HISTORY: Reason for exam:->sob ro chf/pna Reason for Exam: SHORTNESS OF BREATH AND NAUSEA. NON SMOKER. HISTORY OF CHF, HTN, GERD, CONGENTIAL HEART DISEASE, ATRIAL FLUTTER AND A-FIB. Acuity: Acute Type of Exam: Initial FINDINGS: LINES/TUBES/OTHER: Left subclavian dual lead pacemaker is noted with leads grossly unchanged in position. HEART/MEDIASTINUM: The cardiomediastinal silhouette is within normal limits. PLEURA/LUNGS: There are no focal consolidations or pleural effusions. There is no appreciable pneumothorax. BONES/SOFT TISSUE: No acute abnormality. No radiographic evidence of acute pulmonary disease. PROCEDURES:   Procedures    ASSESSMENT AND PLAN:  Lety Harvey is a 63 y.o. female [place] this morning with concerns that she had thrush or we did believe that she had thrush. Exam was unremarkable and no thrush. Nonetheless, patient continues to insist that she had thrush. However, it was only with pictures that we showed patient that she was convinced. I also brought multiple or other colleagues to convince patient who did not believe that she did not have thrush. At the end she was convinced. I gave her lidocaine oral mouthwash for any tongue soreness. Was discharged home in stable condition. I believe her symptoms were psychogenic in nature. ClINICAL IMPRESSION:  1.  Soreness of tongue          PATIENT REFERRED TO:  [facility]) Pre-Services  [phone]  Schedule an appointment as soon as possible for a visit in 2 days        DISCHARGE MEDICATIONS:  Discharge Medication List as of 6/28/2020  8:12 AM      START taking these medications    Details   lidocaine viscous hcl (XYLOCAINE) 2 % SOLN solution Take 15 mLs by mouth as needed for Irritation, Disp-1 Bottle, R-0Print           DISCONTINUED MEDICATIONS:  Discharge Medication List as of 6/28/2020  8:12 AM DISPOSITION Decision To Discharge 06/28/2020 08:05:58 AM  -We have instructed the patient, Karely Mayer) to return to the ED or call her PCP if her pain/symptoms worsen. -Findings and recommendations explained to patient. She expressed understanding and agreed with the plan. Yakelin Jenkins MD (electronically signed)  6/29/2020  _________________________________________________________________________________________  _________________________________________________________________________________________  This record is transcribed utilizing voice recognition technology. There are inherent limitations in this technology. In addition, there may be limitations in editing of this report. If there are any discrepancies, please contact me directly.         Yakelin Jenkins MD  06/29/20 0150

## 2020-06-29 ENCOUNTER — CARE COORDINATION (OUTPATIENT)
Dept: FAMILY MEDICINE CLINIC | Age: 55
End: 2020-06-29

## 2020-06-29 LAB — S PYO THROAT QL CULT: NORMAL

## 2020-07-06 RX ORDER — FUROSEMIDE 20 MG/1
TABLET ORAL
Qty: 17 TABLET | Refills: 0 | Status: SHIPPED | OUTPATIENT
Start: 2020-07-06 | End: 2020-08-31 | Stop reason: SDUPTHER

## 2020-07-10 ENCOUNTER — APPOINTMENT (OUTPATIENT)
Dept: CT IMAGING | Age: 55
End: 2020-07-10
Payer: MEDICAID

## 2020-07-10 ENCOUNTER — HOSPITAL ENCOUNTER (EMERGENCY)
Age: 55
Discharge: HOME | End: 2020-07-10
Payer: MEDICAID

## 2020-07-10 VITALS
HEIGHT: 66 IN | SYSTOLIC BLOOD PRESSURE: 159 MMHG | TEMPERATURE: 98 F | RESPIRATION RATE: 16 BRPM | OXYGEN SATURATION: 100 % | WEIGHT: 210 LBS | DIASTOLIC BLOOD PRESSURE: 91 MMHG | HEART RATE: 60 BPM | BODY MASS INDEX: 33.75 KG/M2

## 2020-07-10 LAB
A/G RATIO: 1.2 (ref 1.1–2.2)
ALBUMIN SERPL-MCNC: 4.3 G/DL (ref 3.4–5)
ALP BLD-CCNC: 48 U/L (ref 40–129)
ALT SERPL-CCNC: 17 U/L (ref 10–40)
ANION GAP SERPL CALCULATED.3IONS-SCNC: 13 MMOL/L (ref 3–16)
AST SERPL-CCNC: 36 U/L (ref 15–37)
BASOPHILS ABSOLUTE: 0 K/UL (ref 0–0.2)
BASOPHILS RELATIVE PERCENT: 0.5 %
BILIRUB SERPL-MCNC: 0.8 MG/DL (ref 0–1)
BILIRUBIN URINE: NEGATIVE
BLOOD, URINE: NEGATIVE
BUN BLDV-MCNC: 5 MG/DL (ref 7–20)
CALCIUM SERPL-MCNC: 9.4 MG/DL (ref 8.3–10.6)
CHLORIDE BLD-SCNC: 104 MMOL/L (ref 99–110)
CLARITY: CLEAR
CO2: 21 MMOL/L (ref 21–32)
COLOR: YELLOW
CREAT SERPL-MCNC: 0.8 MG/DL (ref 0.6–1.1)
EKG ATRIAL RATE: 60 BPM
EKG DIAGNOSIS: NORMAL
EKG Q-T INTERVAL: 492 MS
EKG QRS DURATION: 170 MS
EKG QTC CALCULATION (BAZETT): 492 MS
EKG R AXIS: 57 DEGREES
EKG T AXIS: 265 DEGREES
EKG VENTRICULAR RATE: 60 BPM
EOSINOPHILS ABSOLUTE: 0.2 K/UL (ref 0–0.6)
EOSINOPHILS RELATIVE PERCENT: 4.9 %
GFR AFRICAN AMERICAN: >60
GFR NON-AFRICAN AMERICAN: >60
GLOBULIN: 3.5 G/DL
GLUCOSE BLD-MCNC: 106 MG/DL (ref 70–99)
GLUCOSE URINE: NEGATIVE MG/DL
HCT VFR BLD CALC: 40.1 % (ref 36–48)
HEMOGLOBIN: 13.5 G/DL (ref 12–16)
KETONES, URINE: NEGATIVE MG/DL
LACTIC ACID: 1 MMOL/L (ref 0.4–2)
LEUKOCYTE ESTERASE, URINE: NEGATIVE
LIPASE: 25 U/L (ref 13–60)
LYMPHOCYTES ABSOLUTE: 1.1 K/UL (ref 1–5.1)
LYMPHOCYTES RELATIVE PERCENT: 22.4 %
MCH RBC QN AUTO: 31.6 PG (ref 26–34)
MCHC RBC AUTO-ENTMCNC: 33.7 G/DL (ref 31–36)
MCV RBC AUTO: 93.7 FL (ref 80–100)
MICROSCOPIC EXAMINATION: NORMAL
MONOCYTES ABSOLUTE: 0.4 K/UL (ref 0–1.3)
MONOCYTES RELATIVE PERCENT: 9 %
NEUTROPHILS ABSOLUTE: 3.1 K/UL (ref 1.7–7.7)
NEUTROPHILS RELATIVE PERCENT: 63.2 %
NITRITE, URINE: NEGATIVE
PDW BLD-RTO: 15.2 % (ref 12.4–15.4)
PH UA: 7.5 (ref 5–8)
PLATELET # BLD: 258 K/UL (ref 135–450)
PMV BLD AUTO: 7.9 FL (ref 5–10.5)
POTASSIUM REFLEX MAGNESIUM: 4.8 MMOL/L (ref 3.5–5.1)
PROTEIN UA: NEGATIVE MG/DL
RBC # BLD: 4.27 M/UL (ref 4–5.2)
SODIUM BLD-SCNC: 138 MMOL/L (ref 136–145)
SPECIFIC GRAVITY UA: 1.01 (ref 1–1.03)
TOTAL PROTEIN: 7.8 G/DL (ref 6.4–8.2)
TROPONIN: <0.01 NG/ML
URINE REFLEX TO CULTURE: NORMAL
URINE TYPE: NORMAL
UROBILINOGEN, URINE: 0.2 E.U./DL
WBC # BLD: 5 K/UL (ref 4–11)

## 2020-07-10 PROCEDURE — 83605 ASSAY OF LACTIC ACID: CPT

## 2020-07-10 PROCEDURE — 2580000003 HC RX 258: Performed by: PHYSICIAN ASSISTANT

## 2020-07-10 PROCEDURE — 80053 COMPREHEN METABOLIC PANEL: CPT

## 2020-07-10 PROCEDURE — 74177 CT ABD & PELVIS W/CONTRAST: CPT

## 2020-07-10 PROCEDURE — 96365 THER/PROPH/DIAG IV INF INIT: CPT

## 2020-07-10 PROCEDURE — 93010 ELECTROCARDIOGRAM REPORT: CPT | Performed by: INTERNAL MEDICINE

## 2020-07-10 PROCEDURE — 81003 URINALYSIS AUTO W/O SCOPE: CPT

## 2020-07-10 PROCEDURE — 85025 COMPLETE CBC W/AUTO DIFF WBC: CPT

## 2020-07-10 PROCEDURE — 6370000000 HC RX 637 (ALT 250 FOR IP): Performed by: PHYSICIAN ASSISTANT

## 2020-07-10 PROCEDURE — 6360000002 HC RX W HCPCS: Performed by: PHYSICIAN ASSISTANT

## 2020-07-10 PROCEDURE — 99284 EMERGENCY DEPT VISIT MOD MDM: CPT

## 2020-07-10 PROCEDURE — 83690 ASSAY OF LIPASE: CPT

## 2020-07-10 PROCEDURE — 93005 ELECTROCARDIOGRAM TRACING: CPT | Performed by: EMERGENCY MEDICINE

## 2020-07-10 PROCEDURE — 6360000004 HC RX CONTRAST MEDICATION: Performed by: PHYSICIAN ASSISTANT

## 2020-07-10 PROCEDURE — 84484 ASSAY OF TROPONIN QUANT: CPT

## 2020-07-10 RX ORDER — 0.9 % SODIUM CHLORIDE 0.9 %
1000 INTRAVENOUS SOLUTION INTRAVENOUS ONCE
Status: COMPLETED | OUTPATIENT
Start: 2020-07-10 | End: 2020-07-10

## 2020-07-10 RX ORDER — OMEPRAZOLE 40 MG/1
40 CAPSULE, DELAYED RELEASE ORAL
Qty: 90 CAPSULE | Refills: 1 | Status: SHIPPED | OUTPATIENT
Start: 2020-07-10 | End: 2020-12-07

## 2020-07-10 RX ORDER — DIPHENOXYLATE HYDROCHLORIDE AND ATROPINE SULFATE 2.5; .025 MG/1; MG/1
1 TABLET ORAL 4 TIMES DAILY PRN
Qty: 20 TABLET | Refills: 0 | Status: SHIPPED | OUTPATIENT
Start: 2020-07-10 | End: 2020-07-20

## 2020-07-10 RX ORDER — ONDANSETRON 2 MG/ML
4 INJECTION INTRAMUSCULAR; INTRAVENOUS EVERY 30 MIN PRN
Status: COMPLETED | OUTPATIENT
Start: 2020-07-10 | End: 2020-07-10

## 2020-07-10 RX ORDER — DICYCLOMINE HYDROCHLORIDE 10 MG/1
10 CAPSULE ORAL
Qty: 30 CAPSULE | Refills: 0 | Status: SHIPPED | OUTPATIENT
Start: 2020-07-10 | End: 2020-08-31 | Stop reason: ALTCHOICE

## 2020-07-10 RX ORDER — DICYCLOMINE HYDROCHLORIDE 10 MG/1
10 CAPSULE ORAL ONCE
Status: COMPLETED | OUTPATIENT
Start: 2020-07-10 | End: 2020-07-10

## 2020-07-10 RX ADMIN — SODIUM CHLORIDE 1000 ML: 9 INJECTION, SOLUTION INTRAVENOUS at 10:58

## 2020-07-10 RX ADMIN — IOPAMIDOL 75 ML: 755 INJECTION, SOLUTION INTRAVENOUS at 11:20

## 2020-07-10 RX ADMIN — ONDANSETRON 4 MG: 2 INJECTION INTRAMUSCULAR; INTRAVENOUS at 10:58

## 2020-07-10 RX ADMIN — DICYCLOMINE HYDROCHLORIDE 10 MG: 10 CAPSULE ORAL at 12:41

## 2020-07-10 RX ADMIN — ONDANSETRON 4 MG: 2 INJECTION INTRAMUSCULAR; INTRAVENOUS at 11:44

## 2020-07-10 ASSESSMENT — PAIN SCALES - GENERAL: PAINLEVEL_OUTOF10: 8

## 2020-07-10 ASSESSMENT — PAIN DESCRIPTION - LOCATION: LOCATION: ABDOMEN

## 2020-07-10 ASSESSMENT — PAIN DESCRIPTION - PAIN TYPE: TYPE: ACUTE PAIN

## 2020-07-10 NOTE — ED NOTES
Pt d/c instructions given, v/u. New scripts for home discussed, pt v/u. IV removed without complications. A&O with no signs of distress. Wheel chair offered, pt declined. Denies needs at this time. Pt ambualted to exit.         Edilma Hayward RN  07/10/20 1300

## 2020-07-10 NOTE — ED NOTES
Unsuccussful IV attempt x2 by this RN. Second RN asked for attempt.        Manny Cesar RN  07/10/20 3289

## 2020-07-10 NOTE — ED NOTES
Unsuccussful IV attempt x2 by Luana IVERSON. Ga Melara RN asked for attempt.        Belgica Padilla RN  07/10/20 2244

## 2020-07-10 NOTE — ED PROVIDER NOTES
EKG:  Read by me in the absence of a cardiologist shows:   Ventricular paced rhythm, axis left, conduction delay from pacing, associated repolarization abnormalities, no major change from prior study     We discussed this patient's clinical presentation but I did not perform face-to-face evaluation. Please see SHARAN Kenneyd note for further information on this visit.         Deni Monson MD  07/10/20 1038

## 2020-07-14 ASSESSMENT — ENCOUNTER SYMPTOMS
NAUSEA: 1
SORE THROAT: 0
ABDOMINAL PAIN: 1
CONSTIPATION: 0
COUGH: 0
BACK PAIN: 0
SHORTNESS OF BREATH: 0
DIARRHEA: 1
EYE PAIN: 0
VOMITING: 1
RHINORRHEA: 0

## 2020-07-14 NOTE — ED PROVIDER NOTES
[facility]        Pt Name: Sofie Christy  MRN: [id number]  [place] 1965  Date of evaluation: 7/10/2020  Provider: Arthur Carroll PA-C  PCP: No primary care provider on file. Evaluation by LOUIS. My supervising physician was available for consultation. CHIEF COMPLAINT       Chief Complaint   Patient presents with    Abdominal Pain     Pt reporting abdominal pain with emesis and diarrhea since yesterday. lower abdominal with pain through to her chest that feels like heartburn       HISTORY OF PRESENT ILLNESS   (Location/Symptom, Timing/Onset, Context/Setting, Quality, Duration, Modifying Factors, Severity)  Note limiting factors. Sofie Christy is a 63 y.o. female who presents here to the emergency department, the patient states that she developed heartburn, and her epigastrium with a little bit of some nausea and some vomiting and some diarrhea. She states that her pain level is 8/10, she saw her family doctor, was referred to the GI however because of the epigastric discomfort and the diarrhea and the abdominal pain she decided to come to the emergency department for evaluation. Nothing seems to make her feel completely better. Nothing seems to make it worse as well. Nursing Notes were all reviewed and agreed with or any disagreements were addressed  in the HPI. REVIEW OF SYSTEMS    (2-9 systems for level 4, 10 or more for level 5)     Review of Systems   Constitutional: Negative for chills, diaphoresis and fever. HENT: Negative for congestion, ear pain, rhinorrhea and sore throat. Eyes: Negative for pain and visual disturbance. Respiratory: Negative for cough and shortness of breath. Cardiovascular: Positive for chest pain. Negative for palpitations and leg swelling. Gastrointestinal: Positive for abdominal pain, diarrhea, nausea and vomiting. Negative for constipation.    Genitourinary: Negative for decreased urine volume, dysuria, frequency and urgency. Musculoskeletal: Negative for back pain and neck pain. Skin: Negative for rash and wound. Neurological: Negative for dizziness and light-headedness.        PAST MEDICAL HISTORY     Past Medical History:   Diagnosis Date    Anemia     Atrial fibrillation and flutter ([facility]     Atrial flutter (HCC)     CHB (complete heart block) (HCC)     Class 1 obesity without serious comorbidity with body mass index (BMI) of 33.0 to 33.9 in adult 9/6/2019    Congenital heart disease     GERD (gastroesophageal reflux disease)     Headache(784.0)     History of complete heart block     Hyperlipidemia     Hypertension     Syncope     Systolic CHF, chronic ([facility] 10/3/2018       SURGICAL HISTORY     Past Surgical History:   Procedure Laterality Date    PACEMAKER INSERTION  11/29/12    dual chamber PPM, [employer]    TUBAL LIGATION      UTERINE FIBROID SURGERY         CURRENTMEDICATIONS       Discharge Medication List as of 7/10/2020 12:53 PM      CONTINUE these medications which have NOT CHANGED    Details   furosemide (LASIX) 20 MG tablet TAKE ONE TABLET BY MOUTH 4 DAYS A WEEK, Disp-17 tablet, R-0Normal      lidocaine viscous hcl (XYLOCAINE) 2 % SOLN solution Take 15 mLs by mouth as needed for Irritation, Disp-1 Bottle, R-0Print      spironolactone (ALDACTONE) 25 MG tablet Take 1 tablet by mouth daily, Disp-30 tablet, R-1Normal      hydrALAZINE (APRESOLINE) 25 MG tablet Take 1 tablet by mouth 2 times daily, Disp-60 tablet, R-1Normal      XARELTO 20 MG TABS tablet TAKE ONE TABLET BY MOUTH DAILY WITH BREAKFAST, Disp-90 tablet, R-1Normal      carvedilol (COREG) 12.5 MG tablet Take 1 tablet by mouth 2 times daily, Disp-60 tablet, R-5Normal      butalbital-acetaminophen-caffeine (FIORICET, ESGIC) -40 MG per tablet Take 1 tablet by mouth every 4 hours as needed for HeadachesHistorical Med      ondansetron (ZOFRAN ODT) 4 MG disintegrating tablet Take 1 tablet by mouth every 8 hours as needed for Nausea, Disp-20 tablet, R-0Print      atorvastatin (LIPITOR) 40 MG tablet Take 1 tablet by mouth daily, Disp-60 tablet, R-1             ALLERGIES     Latex; Codeine; Entresto [sacubitril-valsartan];  Lisinopril; and Sulfa antibiotics    FAMILYHISTORY       Family History   Problem Relation Age of Onset    Cancer Father     Heart Disease Neg Hx     High Blood Pressure Neg Hx     High Cholesterol Neg Hx         SOCIAL HISTORY       Social History     Socioeconomic History    Marital status:      Spouse name: None    Number of children: None    Years of education: None    Highest education level: None   Occupational History    None   Social Needs    Financial resource strain: None    Food insecurity     Worry: None     Inability: None    Transportation needs     Medical: None     Non-medical: None   Tobacco Use    Smoking status: Never Smoker    Smokeless tobacco: Never Used   Substance and Sexual Activity    Alcohol use: No    Drug use: No    Sexual activity: Not Currently     Comment:    Lifestyle    Physical activity     Days per week: None     Minutes per session: None    Stress: None   Relationships    Social connections     Talks on phone: None     Gets together: None     Attends Spiritism service: None     Active member of club or organization: None     Attends meetings of clubs or organizations: None     Relationship status: None    Intimate partner violence     Fear of current or ex partner: None     Emotionally abused: None     Physically abused: None     Forced sexual activity: None   Other Topics Concern    None   Social History Narrative    None       SCREENINGS             PHYSICAL EXAM    (up to 7 for level 4, 8 or more for level 5)     ED Triage Vitals [07/10/20 0850]   BP Temp Temp Source Pulse Resp SpO2 Height Weight   (!) 145/89 98 °F (36.7 °C) Oral 62 16 96 % 5' 6\" (1.676 m) 210 lb (95.3 kg)       Physical Exam  Vitals signs and nursing note reviewed. Constitutional:       Appearance: Normal appearance. She is well-developed. She is not diaphoretic. HENT:      Head: Normocephalic and atraumatic. Right Ear: External ear normal.      Left Ear: External ear normal.      Nose: Nose normal.   Eyes:      General:         Right eye: No discharge. Left eye: No discharge. Neck:      Musculoskeletal: Normal range of motion and neck supple. Cardiovascular:      Rate and Rhythm: Normal rate and regular rhythm. Heart sounds: Normal heart sounds. No murmur. No friction rub. No gallop. Pulmonary:      Effort: Pulmonary effort is normal. No respiratory distress. Breath sounds: Normal breath sounds. No stridor. No wheezing or rales. Chest:      Chest wall: No tenderness. Abdominal:      General: Bowel sounds are normal. There is no distension or abdominal bruit. Palpations: Abdomen is soft. Abdomen is not rigid. There is no mass or pulsatile mass. Tenderness: There is abdominal tenderness in the epigastric area, left upper quadrant and left lower quadrant. There is no guarding or rebound. Negative signs include Welsh's sign and McBurney's sign. Musculoskeletal: Normal range of motion. Skin:     General: Skin is warm and dry. Coloration: Skin is not pale. Neurological:      Mental Status: She is alert and oriented to person, place, and time.    Psychiatric:         Behavior: Behavior normal.         DIAGNOSTIC RESULTS   LABS:    Labs Reviewed   COMPREHENSIVE METABOLIC PANEL W/ REFLEX TO MG FOR LOW K - Abnormal; Notable for the following components:       Result Value    Glucose 106 (*)     BUN 5 (*)     All other components within normal limits    Narrative:     Performed at:  [facility] [address], [place], [facility] [address]   Phone [phone]   CBC WITH AUTO DIFFERENTIAL    Narrative:     Performed at:  [facility]  [place] [address]   [address], [address]   Phone [phone]   LIPASE    Narrative:     Performed at:  [facility] [address],  [place], [address]   Phone [phone]   LACTIC ACID, PLASMA    Narrative:     Performed at:  [facility]  [address],  [place], [address]   Phone [phone]   URINE RT REFLEX TO CULTURE    Narrative:     Performed at:  [facility] [address], [place]   Phone [phone]   TROPONIN    Narrative:     Performed at:  [facility] [address], [place]   Phone [phone]       All other labs were within normal range or not returned as of this dictation. EKG: All EKG's are interpreted by the Emergency Department Physician who either signs orCo-signs this chart in the absence of a cardiologist.  Please see their note for interpretation of EKG. RADIOLOGY:   Non-plain film images such as CT, Ultrasound and MRI are read by the radiologist. Plain radiographic images are visualized andpreliminarily interpreted by the  ED Provider with the below findings:        Interpretation perthe Radiologist below, if available at the time of this note:    CT ABDOMEN PELVIS W IV CONTRAST Additional Contrast? None   Final Result   Enlarged heterogeneous uterine fundus, likely a large fibroid. Pelvic   ultrasound recommended for further evaluation on a nonemergent basis. Otherwise unremarkable CT abdomen and pelvis. Ct Abdomen Pelvis W Iv Contrast Additional Contrast? None    Result Date: 7/10/2020  EXAMINATION: CT OF THE ABDOMEN AND PELVIS WITH CONTRAST 7/10/2020 11:22 am TECHNIQUE: CT of the abdomen and pelvis was performed with the administration of intravenous contrast. Multiplanar reformatted images are provided for review.  Dose modulation, iterative reconstruction, and/or weight based adjustment of the mA/kV was utilized to reduce the radiation dose to as low as reasonably achievable. COMPARISON: None. HISTORY: ORDERING SYSTEM PROVIDED HISTORY: epigastric and lower abdominal pain with diarrhea TECHNOLOGIST PROVIDED HISTORY: If patient is on cardiac monitor and/or pulse ox, they may be taken off cardiac monitor and pulse ox, left on O2 if currently on. All monitors reattached when patient returns to room. Additional Contrast?->None Reason for exam:->epigastric and lower abdominal pain with diarrhea Reason for Exam: epigastric and lower abdominal pain with diarrhea Acuity: Acute Type of Exam: Initial FINDINGS: Lower Chest: Mild linear opacities are seen within both lung bases which may be related to atelectasis versus scar. Organs: The liver, spleen, pancreas, gallbladder, and adrenal glands are without acute process. Both kidneys are symmetric in size and enhancement. No evidence of hydronephrosis or hydroureter. GI/Bowel: The stomach is collapsed, limiting evaluation. The small and large bowel are normal in caliber. No areas of focal wall thickening or obstruction identified. The appendix is visualized and appears normal. Pelvis: The bladder is unremarkable. Lower uterus is unremarkable. The uterine fundus is enlarged and heterogeneous with a probable large fibroid which measures 11 x 7 by 5 cm. Some calcifications are seen along the left aspect of this. The adnexa are unremarkable. Peritoneum/Retroperitoneum: The visualized vasculature is without acute process. No lymphadenopathy, free intraperitoneal air, free fluid, or focal fluid collection identified. Bones/Soft Tissues: There is a tiny fat containing umbilical hernia. Soft tissues and osseous structures are without acute process. Enlarged heterogeneous uterine fundus, likely a large fibroid. Pelvic ultrasound recommended for further evaluation on a nonemergent basis. Otherwise unremarkable CT abdomen and pelvis.          PROCEDURES   Unless otherwise noted below, none     Procedures    CRITICAL CARE TIME   N/A    CONSULTS:  None      EMERGENCY DEPARTMENT COURSE and DIFFERENTIAL DIAGNOSIS/MDM:   Vitals:    Vitals:    07/10/20 1200 07/10/20 1215 07/10/20 1230 07/10/20 1245   BP: (!) 155/90 (!) 151/87 (!) 157/90 (!) 159/91   Pulse: 60 60 60 60   Resp: 18 20 14 16   Temp:       TempSrc:       SpO2:       Weight:       Height:           Patient was given thefollowing medications:  Medications   0.9 % sodium chloride bolus (0 mLs Intravenous Stopped 7/10/20 1249)   ondansetron (ZOFRAN) injection 4 mg (4 mg Intravenous Given 7/10/20 1144)   dicyclomine (BENTYL) capsule 10 mg (10 mg Oral Given 7/10/20 1241)   iopamidol (ISOVUE-370) 76 % injection 75 mL (75 mLs Intravenous Given 7/10/20 1120)           Reevaluation is reassuring, the patient stated that she felt much better after the medications. There is no evidence of diverticulitis, acute perforation, appendicitis, or acute infection at this time. We will encourage her to follow-up with her GI doctor in the next several days, and try the medications that we prescribed. FINAL IMPRESSION      1. Lower abdominal pain    2. Gastroesophageal reflux disease, esophagitis presence not specified          DISPOSITION/PLAN   DISPOSITION Decision To Discharge 07/10/2020 12:48:24 PM      PATIENT REFERREDTO:  go to your GI doctor    Call   For a recheck in 2-7 days      DISCHARGE MEDICATIONS:  Discharge Medication List as of 7/10/2020 12:53 PM      START taking these medications    Details   omeprazole (PRILOSEC) 40 MG delayed release capsule Take 1 capsule by mouth every morning (before breakfast), Disp-90 capsule, R-1Print      dicyclomine (BENTYL) 10 MG capsule Take 1 capsule by mouth 4 times daily (before meals and nightly) for 20 days, Disp-30 capsule, R-0Print      diphenoxylate-atropine (LOMOTIL) 2.5-0.025 MG per tablet Take 1 tablet by mouth 4 times daily as needed for Diarrhea for up to 10 days. , Disp-20 tablet,

## 2020-08-05 ENCOUNTER — HOSPITAL ENCOUNTER (OUTPATIENT)
Dept: MAMMOGRAPHY | Age: 55
Discharge: HOME | End: 2020-08-05
Payer: MEDICAID

## 2020-08-05 PROCEDURE — 77067 SCR MAMMO BI INCL CAD: CPT

## 2020-08-31 ENCOUNTER — OFFICE VISIT (OUTPATIENT)
Dept: CARDIOLOGY CLINIC | Age: 55
End: 2020-08-31
Payer: MEDICAID

## 2020-08-31 ENCOUNTER — TELEPHONE (OUTPATIENT)
Dept: CARDIOLOGY CLINIC | Age: 55
End: 2020-08-31

## 2020-08-31 ENCOUNTER — HOSPITAL ENCOUNTER (OUTPATIENT)
Age: 55
Discharge: HOME | End: 2020-08-31
Payer: MEDICAID

## 2020-08-31 VITALS
DIASTOLIC BLOOD PRESSURE: 76 MMHG | WEIGHT: 203 LBS | BODY MASS INDEX: 31.86 KG/M2 | OXYGEN SATURATION: 99 % | HEIGHT: 67 IN | SYSTOLIC BLOOD PRESSURE: 118 MMHG | HEART RATE: 60 BPM

## 2020-08-31 LAB
ANION GAP SERPL CALCULATED.3IONS-SCNC: 10 MMOL/L (ref 3–16)
BUN BLDV-MCNC: 6 MG/DL (ref 7–20)
CALCIUM SERPL-MCNC: 9.8 MG/DL (ref 8.3–10.6)
CHLORIDE BLD-SCNC: 105 MMOL/L (ref 99–110)
CO2: 27 MMOL/L (ref 21–32)
CREAT SERPL-MCNC: 0.9 MG/DL (ref 0.6–1.1)
GFR AFRICAN AMERICAN: >60
GFR NON-AFRICAN AMERICAN: >60
GLUCOSE BLD-MCNC: 111 MG/DL (ref 70–99)
POTASSIUM SERPL-SCNC: 4 MMOL/L (ref 3.5–5.1)
PRO-BNP: 373 PG/ML (ref 0–124)
SODIUM BLD-SCNC: 142 MMOL/L (ref 136–145)

## 2020-08-31 PROCEDURE — G8427 DOCREV CUR MEDS BY ELIG CLIN: HCPCS | Performed by: CLINICAL NURSE SPECIALIST

## 2020-08-31 PROCEDURE — 3017F COLORECTAL CA SCREEN DOC REV: CPT | Performed by: CLINICAL NURSE SPECIALIST

## 2020-08-31 PROCEDURE — 36415 COLL VENOUS BLD VENIPUNCTURE: CPT

## 2020-08-31 PROCEDURE — G8417 CALC BMI ABV UP PARAM F/U: HCPCS | Performed by: CLINICAL NURSE SPECIALIST

## 2020-08-31 PROCEDURE — 80048 BASIC METABOLIC PNL TOTAL CA: CPT

## 2020-08-31 PROCEDURE — 1036F TOBACCO NON-USER: CPT | Performed by: CLINICAL NURSE SPECIALIST

## 2020-08-31 PROCEDURE — 83880 ASSAY OF NATRIURETIC PEPTIDE: CPT

## 2020-08-31 PROCEDURE — 99214 OFFICE O/P EST MOD 30 MIN: CPT | Performed by: CLINICAL NURSE SPECIALIST

## 2020-08-31 RX ORDER — SPIRONOLACTONE 25 MG/1
25 TABLET ORAL DAILY
Qty: 30 TABLET | Refills: 2 | Status: SHIPPED | OUTPATIENT
Start: 2020-08-31 | End: 2021-06-11 | Stop reason: SDUPTHER

## 2020-08-31 RX ORDER — CARVEDILOL 12.5 MG/1
12.5 TABLET ORAL 2 TIMES DAILY
Qty: 60 TABLET | Refills: 5 | Status: ON HOLD
Start: 2020-08-31 | End: 2021-01-25 | Stop reason: HOSPADM

## 2020-08-31 RX ORDER — ATORVASTATIN CALCIUM 40 MG/1
40 TABLET, FILM COATED ORAL DAILY
Qty: 60 TABLET | Refills: 3 | Status: CANCELLED | OUTPATIENT
Start: 2020-08-31

## 2020-08-31 RX ORDER — HYDRALAZINE HYDROCHLORIDE 25 MG/1
25 TABLET, FILM COATED ORAL 2 TIMES DAILY
Qty: 60 TABLET | Refills: 2 | Status: SHIPPED | OUTPATIENT
Start: 2020-08-31 | End: 2020-12-07 | Stop reason: DRUGHIGH

## 2020-08-31 RX ORDER — FAMOTIDINE 20 MG/1
40 TABLET, FILM COATED ORAL 2 TIMES DAILY
COMMUNITY
Start: 2020-08-13 | End: 2020-10-22

## 2020-08-31 RX ORDER — FUROSEMIDE 20 MG/1
TABLET ORAL
Qty: 17 TABLET | Refills: 2 | Status: SHIPPED | OUTPATIENT
Start: 2020-08-31 | End: 2020-12-07 | Stop reason: SDUPTHER

## 2020-08-31 NOTE — TELEPHONE ENCOUNTER
----- Message from CATRACHITO Burns - CNS sent at 8/31/2020  3:16 PM EDT -----  Fluid level is up from 267 to 373  Ask her to take a lasix 20 mg daily for 2 days  thanks

## 2020-08-31 NOTE — PATIENT INSTRUCTIONS
1. Check blood work today  2. Refer to Dr Rhodia Mohs for GI evaluation belching, sour taste  3. Refills   4.   RTO in 3 months

## 2020-08-31 NOTE — PROGRESS NOTES
[address]  Progress Note    Primary Care Doctor:  No primary care provider on file. Chief Complaint   Patient presents with    Follow-up     2 month f/u    Congestive Heart Failure    Cardiomyopathy    Other     having a colonoscopy soon        History of Present Illness:  63 y.o. female with history of sHF, LVH, AF on xarelto (follows with Dr Tello Perez), had been on flecainide, dual chamber pacemaker 2012 due to congenital heart block  LVEF had been 55% in 2015 and now 25%. No lisinopril due to cough. She is a cook at [address]  Angioedema to [facility] 3/2020    I had the pleasure of seeing/virtual Juan Freedman in follow up for sHF. She is ambulatory and by her self today. She is complaining of sour taste in her mouth, belching and bloated abdomen. She saw GI at [facility] and is to schedule EGD and colonoscopy. She denies any sob, palpitations, edema, chest pain or lightheadedness. She is tolerating hydralazine as she can not tolerate entresto. Past Medical History:   has a past medical history of Anemia, Atrial fibrillation and flutter ([address] Atrial flutter ([address] CHB (complete heart block) ([address] Class 1 obesity without serious comorbidity with body mass index (BMI) of 33.0 to 33.9 in adult, Congenital heart disease, GERD (gastroesophageal reflux disease), Headache(784.0), History of complete heart block, Hyperlipidemia, Hypertension, Syncope, and Systolic CHF, chronic ([address] Surgical History:   has a past surgical history that includes Uterine fibroid surgery; Pacemaker insertion (11/29/12); and Tubal ligation. Social History:    reports that she has never smoked. She has never used smokeless tobacco. She reports that she does not drink alcohol or use drugs.    Family History:   Family History   Problem Relation Age of Onset    Cancer Father     Heart Disease Neg Hx     High Blood Pressure Neg Hx     High Cholesterol Neg Hx        Home Medications:  Prior to Admission medications    Medication Sig Start Date End Date Taking? Authorizing Provider   famotidine (PEPCID) 20 MG tablet Take 40 mg by mouth 2 times daily 8/13/20  Yes Historical Provider, MD   omeprazole (PRILOSEC) 40 MG delayed release capsule Take 1 capsule by mouth every morning (before breakfast) 7/10/20  Yes Izzy Santana PA-C   furosemide (LASIX) 20 MG tablet TAKE ONE TABLET BY MOUTH 4 DAYS A WEEK 7/6/20  Yes Rema Rivers APRN - CNS   spironolactone (ALDACTONE) 25 MG tablet Take 1 tablet by mouth daily 6/9/20  Yes Rema Rivers APRN - CNS   hydrALAZINE (APRESOLINE) 25 MG tablet Take 1 tablet by mouth 2 times daily 6/9/20  Yes CATRACHITO Arnold - CNS   XARELTO 20 MG TABS tablet TAKE ONE TABLET BY MOUTH DAILY WITH BREAKFAST 4/16/20  Yes CATRACHITO Gallagher - CNP   carvedilol (COREG) 12.5 MG tablet Take 1 tablet by mouth 2 times daily 2/24/20  Yes CATRACHITO Arnold - CNS   butalbital-acetaminophen-caffeine (FIORICET, ESGIC) -40 MG per tablet Take 1 tablet by mouth every 4 hours as needed for Headaches   Yes Historical Provider, MD   ondansetron (ZOFRAN ODT) 4 MG disintegrating tablet Take 1 tablet by mouth every 8 hours as needed for Nausea 3/19/19  Yes Devyn Augustine PA-C   atorvastatin (LIPITOR) 40 MG tablet Take 1 tablet by mouth daily 11/18/16  Yes Jorge Herron MD   dicyclomine (BENTYL) 10 MG capsule Take 1 capsule by mouth 4 times daily (before meals and nightly) for 20 days 7/10/20 7/30/20  Izzy Santana PA-C   lidocaine viscous hcl (XYLOCAINE) 2 % SOLN solution Take 15 mLs by mouth as needed for Irritation  Patient not taking: Reported on 8/31/2020 6/28/20   Minh Heredia MD        Allergies:  Latex; Codeine; Entresto [sacubitril-valsartan]; Lisinopril; and Sulfa antibiotics     Review of Systems:   · Constitutional: there has been no unanticipated weight loss. There's been no change in energy level, sleep pattern, or activity level.     · Eyes: No visual changes or diplopia. No scleral icterus. · ENT: No Headaches, hearing loss or vertigo. No mouth sores or sore throat. · Cardiovascular: Reviewed in HPI  · Respiratory: No cough or wheezing, no sputum production. No hematemesis. · Gastrointestinal: No abdominal pain, appetite loss, blood in stools. No change in bowel or bladder habits. · Genitourinary: No dysuria, trouble voiding, or hematuria. · Musculoskeletal:  No gait disturbance, weakness or joint complaints. · Integumentary: No rash or pruritis. · Neurological: No headache, diplopia, change in muscle strength, numbness or tingling. No change in gait, balance, coordination, mood, affect, memory, mentation, behavior. · Psychiatric: No anxiety, no depression. · Endocrine: No malaise, fatigue or temperature intolerance. No excessive thirst, fluid intake, or urination. No tremor. · Hematologic/Lymphatic: No abnormal bruising or bleeding, blood clots or swollen lymph nodes. · Allergic/Immunologic: No nasal congestion or hives. Physical Examination:    Vitals:    08/31/20 0836   BP: 118/76   Site: Left Upper Arm   Position: Sitting   Cuff Size: Large Adult   Pulse: 60   SpO2: 99%   Weight: 203 lb (92.1 kg)   Height: 5' 6.5\" (1.689 m)        Constitutional and General Appearance: Warm and dry, no apparent distress, normal coloration  HEENT:  Normocephalic, atraumatic  Respiratory:  · Normal excursion and expansion without use of accessory muscles  · Resp Auscultation: Normal breath sounds without dullness  Cardiovascular:  · The apical impulses not displaced  · Heart tones are crisp and normal  · JVP normal cm H2O  · regular rate and rhythm  · Peripheral pulses are symmetrical and full  · There is no clubbing, cyanosis of the extremities.   · no edema  · Pedal Pulses: 2+ and equal   Abdomen:  · No masses or tenderness  · Liver/Spleen: No Abnormalities Noted  Neurological/Psychiatric:  · Alert and oriented in all spheres  · Moves all extremities of 8 mmHg.   -Pacer / ICD wire is visualized in the right heart.     Echo 8/12/2019  Summary   -Limited echocardiogram was performed to evaluate LV ejection fraction.   -Left ventricular cavity size is mildly dilated.   -There is moderate concentric left ventricular hypertrophy.   -Overall left ventricular systolic function appears moderately reduced with   an ejection fraction on the 30-35%.  -There is moderate global diffuse hypokinesis.   -Indeterminate diastolic dysfunction due to irregular heart rate.   -Moderate mitral regurgitation.   -Aortic valve appears sclerotic but opens adequately.   -Mild to moderate tricuspid regurgitation.   -The left atrium is moderately dilated.   -Pacer / ICD wire is visualized in the right heart. Stress test 9/11/18  Enlarged LV with mild global hypokinesis. EF 51%  There is normal isotope uptake at stress and rest. There is no evidence of  myocardial ischemia or scar. ECHO 7/24/18:  Summary   -Left ventricular cavity size is mildly dilated. -There is moderate concentric left ventricular hypertrophy.   -Overall left ventricular systolic function appears severely reduced with  and ejection fraction on the 25 % range.   -There is diffuse global hypokinesis. -Grade II diastolic dysfunction with elevated LV filling pressures. E/e\"=16.2.   -Mitral annular calcification is present. -Mild-moderate mitral regurgitation.   -Aortic valve appears sclerotic but opens adequately. -Trivial aortic regurgitation.   -Trivial tricuspid regurgitation with RVSP of 26 mmHg. -Mild pulmonic regurgitation present.   -Dilated left atrium with a volume of 72 ml.   -Pacer / ICD wire is visualized in the right heart. GXT cathie 7/15/2015:   Left ventricular ejection fraction of 55%. The LV wall motion is normal.  There is normal isotope uptake at stress and rest.   There is no evidence of myocardial ischemia or scar. Assessment:    1.  Chronic combined systolic and diastolic heart failure (HCC) on hydralazine, BB and aldosterone antagonist   2. Paroxysmal atrial fibrillation (HCC) on xarelto   3. DERECK (obstructive sleep apnea)    4. Pacemaker    5. Belching        Plan:   1. Check blood work today  2. Refer to Dr Billy Calderon for GI evaluation belching, sour taste  3. Refills   4. RTO in 3 months    Discussed with Dr Meredith Rutherford, ok for patient to hold xarelto and proceed with EGD/[facility] at [facility] if patient chooses to have it done there. Needs to be done in hospital setting    She needs to follow up with device clinic and Dr Dejan Chau, CNS, 8/31/2020, 8:45 AM    QUALITY MEASURES  1. Tobacco Cessation Counseling: NA  2. Retake of BP if >140/90:   NA  3. Documentation to PCP/referring for new patient: no PCP  4. CAD patient on anti-platelet: NA  5. CAD patient on STATIN therapy:  NA  6.  Patient with CHF and aFib on anticoagulation:  Yes

## 2020-09-21 ENCOUNTER — TELEPHONE (OUTPATIENT)
Dept: CARDIOLOGY CLINIC | Age: 55
End: 2020-09-21

## 2020-09-21 NOTE — TELEPHONE ENCOUNTER
Wants to let [facility] know that she has a virtual appt with Dr Kumar Harvey 10/14/20 at 3pm . Patient is asking that [facility] [place] send whatever she was going to send to Dr Kumar Harvey.

## 2020-10-08 ENCOUNTER — OFFICE VISIT (OUTPATIENT)
Dept: CARDIOLOGY CLINIC | Age: 55
End: 2020-10-08
Payer: MEDICAID

## 2020-10-08 ENCOUNTER — NURSE ONLY (OUTPATIENT)
Dept: CARDIOLOGY CLINIC | Age: 55
End: 2020-10-08
Payer: MEDICAID

## 2020-10-08 VITALS
HEART RATE: 73 BPM | OXYGEN SATURATION: 99 % | WEIGHT: 197 LBS | TEMPERATURE: 97.5 F | BODY MASS INDEX: 30.92 KG/M2 | HEIGHT: 67 IN | SYSTOLIC BLOOD PRESSURE: 130 MMHG | DIASTOLIC BLOOD PRESSURE: 84 MMHG

## 2020-10-08 PROCEDURE — 93000 ELECTROCARDIOGRAM COMPLETE: CPT | Performed by: INTERNAL MEDICINE

## 2020-10-08 PROCEDURE — G8484 FLU IMMUNIZE NO ADMIN: HCPCS | Performed by: INTERNAL MEDICINE

## 2020-10-08 PROCEDURE — 1036F TOBACCO NON-USER: CPT | Performed by: INTERNAL MEDICINE

## 2020-10-08 PROCEDURE — G8417 CALC BMI ABV UP PARAM F/U: HCPCS | Performed by: INTERNAL MEDICINE

## 2020-10-08 PROCEDURE — 3017F COLORECTAL CA SCREEN DOC REV: CPT | Performed by: INTERNAL MEDICINE

## 2020-10-08 PROCEDURE — 93288 INTERROG EVL PM/LDLS PM IP: CPT | Performed by: INTERNAL MEDICINE

## 2020-10-08 PROCEDURE — 99213 OFFICE O/P EST LOW 20 MIN: CPT | Performed by: INTERNAL MEDICINE

## 2020-10-08 PROCEDURE — G8427 DOCREV CUR MEDS BY ELIG CLIN: HCPCS | Performed by: INTERNAL MEDICINE

## 2020-10-08 NOTE — PROGRESS NOTES
[facility]   Cardiac Consultation    Referring Provider:  No primary care provider on file. Chief Complaint   Patient presents with    Follow-up     afib - sometimes SOB, mainly fatigue    Pre-op Exam     colonoscopy/EDG    Other     [employer] Care link in office today       HPI:  Darius Greer is a 63 y.o. female is an established patient being seen in follow up for pacer placed for congential complete heart block. Her history includes AF, PPM for congenital heart block and syncope. She was started on flecainide in Dec. 2015 by my partners at [facility] for afib but it was stopped after LV function declined. She is being treated for chf by chf team. 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 She has chronic fatigue. She has increased shortness of breath with very little walking, which persists. She finds herself having to stop and rest while just walking into work. She tires very easily. The heart failure team wanted her to do cardiac rehab, and that has helped. Her sleep study was profoundly abnormal. She failed cpap machine, but bi-pap better. She has not been using bi-pap machine but maybe 3 times a week. Interval history: Today she prsents to office for follow up she states she got her bi-pap machine but has not been complaint with wearing states she wears about three times a week without any change in her level of fatigue. Her pacer device was interrogated today and is functioning normally. Interestingly she has been pacing less, today 45% sensed. She states she has been having a lot of stomach issues and has not been able to eat a lot and she is scheduled to undergo and upper and lower endoscopy.        Past Medical History:   Diagnosis Date    Anemia     Atrial fibrillation and flutter (HCC)     Atrial flutter (HCC)     CHB (complete heart block) (HCC)     Congenital heart disease     GERD (gastroesophageal reflux percussion bilaterally. No rales, wheezes, or rhonchi. Abd:  Soft, non-tender, non-distended. No peritoneal signs. Ext:  No clubbing, cyanosis. Trace edema. Neuro:  No gross abnormalities  Skin:  No rashes or skin breakdown. ECG: 10/8/20  N/A  QTcH      Lexiscan cathie 9/11/18:  Enlarged LV with mild global hypokinesis. EF 51%  There is normal isotope uptake at stress and rest.   There is no evidence of myocardial ischemia or scar. ECHO 7/24/18:  Summary   -Left ventricular cavity size is mildly dilated.   -There is moderate concentric left ventricular hypertrophy.   -Overall left ventricular systolic function appears severely reduced with   and ejection fraction on the 25 % range.   -There is diffuse global hypokinesis.   -Grade II diastolic dysfunction with elevated LV filling pressures.   E/e\"=16.2.   -Mitral annular calcification is present.   -Mild-moderate mitral regurgitation.   -Aortic valve appears sclerotic but opens adequately.   -Trivial aortic regurgitation.   -Trivial tricuspid regurgitation with RVSP of 26 mmHg.   -Mild pulmonic regurgitation present.   -Dilated left atrium with a volume of 72 ml.   -Pacer / ICD wire is visualized in the right heart. Venogram: 3/28/19  Results: multiple collaterals without clear connection between axillary and subclavian or maybe even innominate vein. 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 The LV wall motion is normal.  There is normal isotope uptake at stress and rest.   There is no evidence of myocardial ischemia or scar.     Echo 2/24/2020   Summary   -Limited echocardiogram was performed to evaluate EF.   -Normal left ventricle size, mild to moderate left ventricular hypertrophy, and moderately reduced systolic function with an estimated ejection fraction of 30-35%. -There is moderate diffuse hypokinesis. -Grade III diastolic dysfunction . E/e\"=10.7.   -Mild mitral regurgitation.   -Mild tricuspid regurgitation.   -The left atrium is mild to moderate dilated. -Right ventricular systolic function appears mildly reduced .   -Estimated pulmonary artery systolic pressure is borderline normal at 28-33 mmHg assuming a right atrial pressure of 8 mmHg.   -Pacer / ICD wire is visualized in the right heart. ECHO 7/15/2015:   Left ventricular size is normal. There is mild concentric left   Ventricular hypertrophy. Ejection fraction is visually estimated to be 40%. The inferoseptum is hypokinetic (rv paced). The left atrium is mildly dilated. The aortic valve appears very mildly sclerotic but opens well. The right ventricle is upper normal to mildly dilated in size. Mild mitral regurgitation is present. There is mild tricuspid regurgitation with RVSP estimated at 26 mmHg. Pacemaker / ICD lead was visualized in the right heart. Assessment:    1) Paroxysmal atrial fibrillation ([address]): She has a [id number] Score (CHF, HTN, gender)  On Xarelto 20 mg daily for  thromboembolic risk reduction. Tolerating well no signs or symptoms of abnormal bruising or bleeding. On Carvedilol for rate control of afib, anti-remodeling. 2) Chronic combined systolic and diastolic heart failure  LV function 25% by ECHO 7/24/18. (EF as 51% by stress test 9/11/18). S/P dual chamber pacemaker 11/29/12. Wide LB paced QRS, right at 150 ms  LVEF 30-35% per Echo 2/24/2020. Recommended implantation of LV lead for upgrade to BiV ICD for primary prevention of SCD. Patient will be seeing Electrophysiologist Dr. Elizabeth Cespedes in the near future to discuss. Euvolemic  On Guideline directed medical therapy followed by chf team   Aldosterone antagonist: Spironolactone   Diuretic: Furosemide.     Beta Blocker: Carvedilol    Complete Heart

## 2020-10-12 NOTE — PROGRESS NOTES
carelink express - see pdf for details  PCA Runs: >[phone]  73% AT/AF Sharon for device lifetime  presents in Afib  26mo left on battery  normal device function

## 2020-10-20 ENCOUNTER — TELEPHONE (OUTPATIENT)
Dept: CARDIOLOGY CLINIC | Age: 55
End: 2020-10-20

## 2020-10-20 RX ORDER — RIVAROXABAN 20 MG/1
TABLET, FILM COATED ORAL
Qty: 90 TABLET | Refills: 3 | Status: SHIPPED | OUTPATIENT
Start: 2020-10-20 | End: 2021-05-19 | Stop reason: ALTCHOICE

## 2020-10-20 NOTE — TELEPHONE ENCOUNTER
CARDIAC CLEARANCE     What type of procedure are you having?  colonoscopy  Which physician is performing your procedure? Dr Nabila Olmedo    When is your procedure scheduled for?  10/27/20  Where are you having this procedure? [facility]  Are you taking Blood Thinners? xarelto   If so what? (Name/dose/frequesncy)     Does the surgeon want you to stop your blood thinner? YES If so for how long? 3 days prior    Phone Number and Contact Name for Physicians office: [phone].       Fax number to send information:    Also asking to send her allergies

## 2020-10-20 NOTE — TELEPHONE ENCOUNTER
Medication Refill    Medication needing refilled: xarelto     Dosage of the medication: 20 mg    How are you taking this medication (QD, BID, TID, QID, PRN): once daily    30 or 90 day supply called in: 90 day     Which Pharmacy are we sending the medication to?: [facility], [place]

## 2020-10-21 ENCOUNTER — OFFICE VISIT (OUTPATIENT)
Dept: PRIMARY CARE CLINIC | Age: 55
End: 2020-10-21
Payer: MEDICAID

## 2020-10-21 PROCEDURE — G8428 CUR MEDS NOT DOCUMENT: HCPCS | Performed by: NURSE PRACTITIONER

## 2020-10-21 PROCEDURE — G8417 CALC BMI ABV UP PARAM F/U: HCPCS | Performed by: NURSE PRACTITIONER

## 2020-10-21 PROCEDURE — 99211 OFF/OP EST MAY X REQ PHY/QHP: CPT | Performed by: NURSE PRACTITIONER

## 2020-10-21 NOTE — PATIENT INSTRUCTIONS

## 2020-10-21 NOTE — TELEPHONE ENCOUNTER
Received previous request on 10/19/2020. Letter has already been typed and faxed. Will resend to [facility] along with allergies.

## 2020-10-21 NOTE — PROGRESS NOTES
Jorge Yasmeen received a viral test for COVID-19. They were educated on isolation and quarantine as appropriate. For any symptoms, they were directed to seek care from their PCP, given contact information to establish with a doctor, directed to an urgent care or the emergency room.

## 2020-10-22 NOTE — PROGRESS NOTES
Name_______________________________________Printed:____________________  Date and time of surgery___10/27/2020   0900_____________________Arrival Time:____0730   [facility]____________   1. The instructions given regarding when and if a patient needs to stop oral intake prior to surgery varies. Follow the specific instructions you were given                  ___Nothing to eat or to drink after Midnight the night before. __X__Endoscopy patient follow your DRS instructions-generally you will be doing a part of the prep after Midnight                   ____Carbo loading or ERAS instructions will be given to select patients-if you have been given those instructions -please do the following                           The evening before your surgery after dinner before midnight drink 40 ounces of gatorade. If you are diabetic use sugar free. The morning of surgery drink 40 ounces of water. This needs to be finished 3 hours prior to your surgery start time. 2. Take the following pills with a small sip of water on the morning of surgery___HYDRALAZINE, CARVEDILOL________________________________________________                  Do not take blood pressure medications ending in pril or sartan the mono prior to surgery or the morning of surgery_   3. Aspirin, Ibuprofen, Advil, Naproxen, Vitamin E and other Anti-inflammatory products and supplements should be stopped for 5 -7days before surgery or as directed by your physician. 4. Check with your Doctor regarding stopping Plavix, Coumadin,Eliquis, Lovenox,Effient,Pradaxa,Xarelto, Fragmin or other blood thinners and follow their instructions. 5. Do not smoke, and do not drink any alcoholic beverages 24 hours prior to surgery. This includes NA Beer. Refrain from the usage of any recreational drugs. 6. You may brush your teeth and gargle the morning of surgery. DO NOT SWALLOW WATER   7.  You MUST make arrangements for a responsible adult to stay on site while you are here and take you home after your surgery. You will not be allowed to leave alone or drive yourself home. It is strongly suggested someone stay with you the first 24 hrs. Your surgery will be cancelled if you do not have a ride home. 8. A parent/legal guardian must accompany a child scheduled for surgery and plan to stay at the hospital until the child is discharged. Please do not bring other children with you. 9. Please wear simple, loose fitting clothing to the hospital.  Nancy Wallis not bring valuables (money, credit cards, checkbooks, etc.) Do not wear any makeup (including no eye makeup) or nail polish on your fingers or toes. 10. DO NOT wear any jewelry or piercings on day of surgery. All body piercing jewelry must be removed. 11. If you have ___dentures, they will be removed before going to the OR; we will provide you a container. If you wear ___contact lenses or ___glasses, they will be removed; please bring a case for them. 12. Please see your family doctor/pediatrician for a history & physical and/or concerning medications. Bring any test results/reports from your physician's office. PCP__________________Phone___________H&P Appt. Date________             13 If you  have a Living Will and Durable Power of  for Healthcare, please bring in a copy. 15. Notify your Surgeon if you develop any illness between now and surgery  time, cough, cold, fever, sore throat, nausea, vomiting, etc.  Please notify your surgeon if you experience dizziness, shortness of breath or blurred vision between now & the time of your surgery             15. DO NOT shave your operative site 96 hours prior to surgery. For face & neck surgery, men may use an electric razor 48 hours prior to surgery. 16. Shower the night before or morning of surgery using an antibacterial soap or as you have been instructed.              17. To provide excellent care visitors will be limited to one in the room at any given time. 18.  Please bring picture ID and insurance card. 19.  Visit our web site for additional information:  [email or web address]              20.During flu season no children under the age of 15 are permitted in the hospital for the safety of all patients. 21. If you take a long acting insulin in the evening only  take half of your usual  dose the night  before your procedure              22. If you use a c-pap please bring DOS if staying overnight,             23.For your convenience [facility] has a pharmacy on site to fill your prescriptions. 24. If you use oxygen and have a portable tank please bring it  with you the DOS             25. Bring a complete list of all your medications with name and dose include any supplements. 26. Other__________________________________________   *Please call pre admission testing if you any further questions   [facility] [address] [place] [address]. [place]  [phone]   [address] [place] [address]       All above information reviewed with patient in person or by phone. Patient verbalizes understanding. All questions and concerns addressed. Patient/Rep___PATIENT_________________                                                                                                                             There is a one visitor policy at [facility] for all surgeries and endoscopies. Whether the visitor can stay or will be asked to wait in the car will depend on the current policy and if social distancing can be maintained. The policy is subject to change at any time. Please make sure the visitor has a cell phone that is on,charged and able to accept calls, as this may be the way that the staff communicates with them. Pain management is NO VISITOR policyThe patients ride is expected to remain in the car with a cell phone for communication. If the ride is leaving the hospital grounds please make sure they are back in time for pickup. Have the patient inform the staff on arrival what their rides plans are while the patient is in the facility. At the MAIN there is one visitor allowed. Please note that the visitor policy is subject to change.        PRE OP INSTRUCTIONS

## 2020-10-23 LAB — SARS-COV-2, PCR: NOT DETECTED

## 2020-10-23 NOTE — RESULT ENCOUNTER NOTE

## 2020-10-27 ENCOUNTER — HOSPITAL ENCOUNTER (OUTPATIENT)
Age: 55
Setting detail: OUTPATIENT SURGERY
Discharge: HOME | End: 2020-10-27
Attending: INTERNAL MEDICINE | Admitting: INTERNAL MEDICINE
Payer: MEDICAID

## 2020-10-27 ENCOUNTER — ANESTHESIA (OUTPATIENT)
Dept: ENDOSCOPY | Age: 55
End: 2020-10-27
Payer: MEDICAID

## 2020-10-27 ENCOUNTER — ANESTHESIA EVENT (OUTPATIENT)
Dept: ENDOSCOPY | Age: 55
End: 2020-10-27
Payer: MEDICAID

## 2020-10-27 VITALS
RESPIRATION RATE: 16 BRPM | WEIGHT: 188 LBS | DIASTOLIC BLOOD PRESSURE: 98 MMHG | TEMPERATURE: 98.3 F | HEIGHT: 67 IN | OXYGEN SATURATION: 100 % | BODY MASS INDEX: 29.51 KG/M2 | SYSTOLIC BLOOD PRESSURE: 169 MMHG | HEART RATE: 61 BPM

## 2020-10-27 VITALS
SYSTOLIC BLOOD PRESSURE: 117 MMHG | OXYGEN SATURATION: 100 % | RESPIRATION RATE: 1 BRPM | DIASTOLIC BLOOD PRESSURE: 64 MMHG

## 2020-10-27 PROCEDURE — 6360000002 HC RX W HCPCS: Performed by: INTERNAL MEDICINE

## 2020-10-27 PROCEDURE — 3609017100 HC EGD: Performed by: INTERNAL MEDICINE

## 2020-10-27 PROCEDURE — 2709999900 HC NON-CHARGEABLE SUPPLY: Performed by: INTERNAL MEDICINE

## 2020-10-27 PROCEDURE — 7100000010 HC PHASE II RECOVERY - FIRST 15 MIN: Performed by: INTERNAL MEDICINE

## 2020-10-27 PROCEDURE — 3700000000 HC ANESTHESIA ATTENDED CARE: Performed by: INTERNAL MEDICINE

## 2020-10-27 PROCEDURE — 88305 TISSUE EXAM BY PATHOLOGIST: CPT

## 2020-10-27 PROCEDURE — 6370000000 HC RX 637 (ALT 250 FOR IP): Performed by: ANESTHESIOLOGY

## 2020-10-27 PROCEDURE — 2580000003 HC RX 258: Performed by: INTERNAL MEDICINE

## 2020-10-27 PROCEDURE — 6360000002 HC RX W HCPCS: Performed by: NURSE ANESTHETIST, CERTIFIED REGISTERED

## 2020-10-27 PROCEDURE — 7100000001 HC PACU RECOVERY - ADDTL 15 MIN: Performed by: INTERNAL MEDICINE

## 2020-10-27 PROCEDURE — 3609010600 HC COLONOSCOPY POLYPECTOMY SNARE/COLD BIOPSY: Performed by: INTERNAL MEDICINE

## 2020-10-27 PROCEDURE — 2500000003 HC RX 250 WO HCPCS: Performed by: NURSE ANESTHETIST, CERTIFIED REGISTERED

## 2020-10-27 PROCEDURE — 3700000001 HC ADD 15 MINUTES (ANESTHESIA): Performed by: INTERNAL MEDICINE

## 2020-10-27 PROCEDURE — 7100000000 HC PACU RECOVERY - FIRST 15 MIN: Performed by: INTERNAL MEDICINE

## 2020-10-27 PROCEDURE — 7100000011 HC PHASE II RECOVERY - ADDTL 15 MIN: Performed by: INTERNAL MEDICINE

## 2020-10-27 RX ORDER — ONDANSETRON 2 MG/ML
4 INJECTION INTRAMUSCULAR; INTRAVENOUS EVERY 6 HOURS PRN
Status: DISCONTINUED | OUTPATIENT
Start: 2020-10-27 | End: 2020-10-27 | Stop reason: HOSPADM

## 2020-10-27 RX ORDER — METOCLOPRAMIDE 5 MG/1
5 TABLET ORAL 4 TIMES DAILY
Qty: 120 TABLET | Refills: 5 | Status: SHIPPED | OUTPATIENT
Start: 2020-10-27 | End: 2021-04-07 | Stop reason: ALTCHOICE

## 2020-10-27 RX ORDER — KETAMINE HYDROCHLORIDE 10 MG/ML
INJECTION, SOLUTION INTRAMUSCULAR; INTRAVENOUS PRN
Status: DISCONTINUED | OUTPATIENT
Start: 2020-10-27 | End: 2020-10-27 | Stop reason: SDUPTHER

## 2020-10-27 RX ORDER — PROPOFOL 10 MG/ML
INJECTION, EMULSION INTRAVENOUS PRN
Status: DISCONTINUED | OUTPATIENT
Start: 2020-10-27 | End: 2020-10-27 | Stop reason: SDUPTHER

## 2020-10-27 RX ORDER — LIDOCAINE HYDROCHLORIDE 20 MG/ML
INJECTION, SOLUTION EPIDURAL; INFILTRATION; INTRACAUDAL; PERINEURAL PRN
Status: DISCONTINUED | OUTPATIENT
Start: 2020-10-27 | End: 2020-10-27 | Stop reason: SDUPTHER

## 2020-10-27 RX ORDER — CARVEDILOL 6.25 MG/1
6.25 TABLET ORAL ONCE
Status: COMPLETED | OUTPATIENT
Start: 2020-10-27 | End: 2020-10-27

## 2020-10-27 RX ORDER — GLYCOPYRROLATE 1 MG/5 ML
SYRINGE (ML) INTRAVENOUS PRN
Status: DISCONTINUED | OUTPATIENT
Start: 2020-10-27 | End: 2020-10-27 | Stop reason: SDUPTHER

## 2020-10-27 RX ORDER — EPHEDRINE SULFATE 50 MG/ML
INJECTION INTRAVENOUS PRN
Status: DISCONTINUED | OUTPATIENT
Start: 2020-10-27 | End: 2020-10-27 | Stop reason: SDUPTHER

## 2020-10-27 RX ORDER — ETOMIDATE 2 MG/ML
INJECTION INTRAVENOUS PRN
Status: DISCONTINUED | OUTPATIENT
Start: 2020-10-27 | End: 2020-10-27 | Stop reason: SDUPTHER

## 2020-10-27 RX ORDER — SODIUM CHLORIDE 9 MG/ML
INJECTION, SOLUTION INTRAVENOUS CONTINUOUS
Status: DISCONTINUED | OUTPATIENT
Start: 2020-10-27 | End: 2020-10-27 | Stop reason: HOSPADM

## 2020-10-27 RX ADMIN — EPHEDRINE SULFATE 5 MG: 50 INJECTION, SOLUTION INTRAVENOUS at 09:28

## 2020-10-27 RX ADMIN — SODIUM CHLORIDE: 9 INJECTION, SOLUTION INTRAVENOUS at 09:07

## 2020-10-27 RX ADMIN — PROPOFOL 10 MG: 10 INJECTION, EMULSION INTRAVENOUS at 09:30

## 2020-10-27 RX ADMIN — PROPOFOL 30 MG: 10 INJECTION, EMULSION INTRAVENOUS at 09:18

## 2020-10-27 RX ADMIN — PROPOFOL 30 MG: 10 INJECTION, EMULSION INTRAVENOUS at 09:26

## 2020-10-27 RX ADMIN — PROPOFOL 30 MG: 10 INJECTION, EMULSION INTRAVENOUS at 09:20

## 2020-10-27 RX ADMIN — Medication 0.2 MG: at 09:12

## 2020-10-27 RX ADMIN — ETOMIDATE 10 MG: 20 INJECTION, SOLUTION INTRAVENOUS at 09:12

## 2020-10-27 RX ADMIN — PROPOFOL 10 MG: 10 INJECTION, EMULSION INTRAVENOUS at 09:28

## 2020-10-27 RX ADMIN — PROPOFOL 30 MG: 10 INJECTION, EMULSION INTRAVENOUS at 09:24

## 2020-10-27 RX ADMIN — KETAMINE HYDROCHLORIDE 30 MG: 10 INJECTION, SOLUTION INTRAMUSCULAR; INTRAVENOUS at 09:12

## 2020-10-27 RX ADMIN — SODIUM CHLORIDE: 9 INJECTION, SOLUTION INTRAVENOUS at 07:43

## 2020-10-27 RX ADMIN — EPHEDRINE SULFATE 5 MG: 50 INJECTION, SOLUTION INTRAVENOUS at 09:31

## 2020-10-27 RX ADMIN — PROPOFOL 30 MG: 10 INJECTION, EMULSION INTRAVENOUS at 09:22

## 2020-10-27 RX ADMIN — ONDANSETRON 4 MG: 2 INJECTION INTRAMUSCULAR; INTRAVENOUS at 07:45

## 2020-10-27 RX ADMIN — LIDOCAINE HYDROCHLORIDE 80 MG: 20 INJECTION, SOLUTION EPIDURAL; INFILTRATION; INTRACAUDAL; PERINEURAL at 09:10

## 2020-10-27 RX ADMIN — PROPOFOL 10 MG: 10 INJECTION, EMULSION INTRAVENOUS at 09:32

## 2020-10-27 RX ADMIN — CARVEDILOL 6.25 MG: 6.25 TABLET, FILM COATED ORAL at 10:30

## 2020-10-27 RX ADMIN — PROPOFOL 10 MG: 10 INJECTION, EMULSION INTRAVENOUS at 09:29

## 2020-10-27 RX ADMIN — ETOMIDATE 10 MG: 20 INJECTION, SOLUTION INTRAVENOUS at 09:15

## 2020-10-27 ASSESSMENT — PULMONARY FUNCTION TESTS
PIF_VALUE: 1

## 2020-10-27 ASSESSMENT — PAIN - FUNCTIONAL ASSESSMENT: PAIN_FUNCTIONAL_ASSESSMENT: 0-10

## 2020-10-27 NOTE — H&P
[address] [place] [address]   Pre-operative History and Physical    Patient: Isauro Smith  : 1965  CSN:     History Obtained From: patient and/or guardian. HISTORY OF PRESENT ILLNESS:    The patient is a 63 y.o. female with GERD, alternating constipation, diarrhea and weight loss here for EGD and colonoscopy. Past Medical History:        Diagnosis Date    Anemia     Atrial fibrillation and flutter (HCC)     Atrial flutter (HCC)     CHB (complete heart block) (HCC)     Class 1 obesity without serious comorbidity with body mass index (BMI) of 33.0 to 33.9 in adult 2019    Congenital heart disease     GERD (gastroesophageal reflux disease)     Headache(784.0)     History of complete heart block     Hyperlipidemia     Hypertension     Syncope     Systolic CHF, chronic ([facility] [address] 10/3/2018     Past Surgical History:        Procedure Laterality Date    PACEMAKER INSERTION  12    dual chamber PPM, [employer]    TUBAL LIGATION      UTERINE FIBROID SURGERY       Medications Prior to Admission:   No current facility-administered medications on file prior to encounter.       Current Outpatient Medications on File Prior to Encounter   Medication Sig Dispense Refill    XARELTO 20 MG TABS tablet TAKE ONE TABLET BY MOUTH DAILY WITH BREAKFAST 90 tablet 3    carvedilol (COREG) 12.5 MG tablet Take 1 tablet by mouth 2 times daily 60 tablet 5    furosemide (LASIX) 20 MG tablet TAKE ONE TABLET BY MOUTH 4 DAYS A WEEK (Patient taking differently: Take 20 mg by mouth AS NEEDED FOR SWELLING) 17 tablet 2    hydrALAZINE (APRESOLINE) 25 MG tablet Take 1 tablet by mouth 2 times daily 60 tablet 2    spironolactone (ALDACTONE) 25 MG tablet Take 1 tablet by mouth daily 30 tablet 2    omeprazole (PRILOSEC) 40 MG delayed release capsule Take 1 capsule by mouth every morning (before breakfast) 90 capsule 1    butalbital-acetaminophen-caffeine (FIORICET, ESGIC) -40 MG per tablet Take 1 tablet by mouth every 4 hours as needed for Headaches      atorvastatin (LIPITOR) 40 MG tablet Take 1 tablet by mouth daily 60 tablet 1     Allergies:  Latex; Codeine; Entresto [sacubitril-valsartan]; Lisinopril; and Sulfa antibiotics        Social History:   Social History     Tobacco Use    Smoking status: Never Smoker    Smokeless tobacco: Never Used   Substance Use Topics    Alcohol use: No     Family History:   Family History   Problem Relation Age of Onset    Cancer Father     Heart Disease Neg Hx     High Blood Pressure Neg Hx     High Cholesterol Neg Hx        PHYSICAL EXAM:      Ht 5' 7\" (1.702 m)   Wt 188 lb (85.3 kg)   BMI 29.44 kg/m²  I        Heart:  RRR,  Normal S1S2    Lungs:  CTA Bilat, normal effort    Abdomen:  ND NT      ASA Grade:  ASA 3 - Patient with moderate systemic disease with functional limitations    Mallampati Class:  Class I: Soft palate, uvula, fauces, pillars visible  __________  Class II: Soft palate, uvula, fauces visible  ____X_____   Class III: Soft palate, base of uvula visible  __________  Class IV: Hard palate only visible   __________      ASSESSMENT AND PLAN:    1. Patient is a 63 y.o. female here for EGD and colonoscopy under anesthesia. 2.  Procedure options, risks and benefits reviewed with patient. Patient expresses understanding.      Edd Velasquez MD  [address]  10/27/2020

## 2020-10-27 NOTE — PROGRESS NOTES
Awake alert & oriented. States some relief from sore throat. Dr Elkins Mantle here & gave pt an Rx for reglan. Abdomen soft. Patient discharged per wheelchair to the care of responsible party. No additional questions voiced related to discharge information. Patient discharged with all personal items.

## 2020-10-27 NOTE — ANESTHESIA POSTPROCEDURE EVALUATION
Department of Anesthesiology  Postprocedure Note    Patient: Kelly Sethi  MRN: [id number]  YOB: 1965  Date of evaluation: 10/27/2020  Time:  10:18 AM     Procedure Summary     Date:  10/27/20 Room / Location:  [address] [place] [address]    Anesthesia Start:  9093 Anesthesia Stop:  1121    Procedures:       EGD DIAGNOSTIC ONLY (N/A Abdomen)      COLONOSCOPY POLYPECTOMY SNARE/COLD BIOPSY (N/A ) Diagnosis:  (GERD K21.9)    Surgeon:  Lucy Melton MD Responsible Provider:  Ruben Amaral MD    Anesthesia Type:  MAC ASA Status:  2          Anesthesia Type: MAC    Sandra Phase I: Sandra Score: 8    Sandra Phase II:      Last vitals: Reviewed and per EMR flowsheets.        Anesthesia Post Evaluation    Patient location during evaluation: PACU  Patient participation: complete - patient participated  Level of consciousness: awake and awake and alert  Pain score: 2  Airway patency: patent  Nausea & Vomiting: no vomiting  Complications: no  Cardiovascular status: blood pressure returned to baseline  Respiratory status: acceptable  Hydration status: euvolemic

## 2020-10-27 NOTE — PROGRESS NOTES
Dr Charles Labor informed pt did not have coreg within 24 hours. Order received. Pt took am dose from home meds.

## 2020-10-27 NOTE — BRIEF OP NOTE
Brief Postoperative Note - Full Note in Chart Review/Procedures tab       Patient: Jrared Guerrero  YOB: 1965  MRN: [id number]    Date of Procedure: 10/27/2020    Pre-Op Diagnosis:  GERD K21.9     CRC screening    Post-Op Diagnosis: Same       Procedure(s):  EGD DIAGNOSTIC ONLY  COLONOSCOPY POLYPECTOMY SNARE/COLD BIOPSY    Surgeon(s):  Edd Velasquez MD    Assistant:  * No surgical staff found *    Anesthesia: Monitor Anesthesia Care    Estimated Blood Loss (mL): Minimal    Complications: None    Specimens:   ID Type Source Tests Collected by Time Destination   A : Descending colon polyp x1 Tissue Colon SURGICAL PATHOLOGY Edd Velasquez MD 10/27/2020 8706        Implants:  * No implants in log *      Drains: * No LDAs found *    Findings:  Small hiatal hernia    Otherwise normal Esophagogastroduodenoscopy     Benign neoplasm of descending colon - D12.4    Normal terminal ileum    Rec:  Check pathology - patient to call office in 5 days for results. Recall colonoscopy in 5 - 10 yrs depending on pathology  Benefiber 1 tbsp by mouth twice daily  Follow up in office in 3 months  Follow up with primary MD as usual.  Resume Xarelto tomorrow - 10/28/2020.     Electronically signed by Edd Velasquez MD on 10/27/2020 at 9:42 AM

## 2020-10-27 NOTE — ANESTHESIA PRE PROCEDURE
Department of Anesthesiology  Preprocedure Note       Name:  Darius Greer   Age:  63 y.o.  :  1965                                          MRN:  [id number]         Date:  10/27/2020      Surgeon: Tashi Gonzales):  Yoni Hammond MD    Procedure: Procedure(s):  EGD DIAGNOSTIC ONLY  COLONOSCOPY DIAGNOSTIC    Medications prior to admission:   Prior to Admission medications    Medication Sig Start Date End Date Taking? Authorizing Provider   XARELTO 20 MG TABS tablet TAKE ONE TABLET BY MOUTH DAILY WITH BREAKFAST 10/20/20   Stefania Murdock MD   carvedilol (COREG) 12.5 MG tablet Take 1 tablet by mouth 2 times daily 20   Rema Rivers, APRN - CNS   furosemide (LASIX) 20 MG tablet TAKE ONE TABLET BY MOUTH 4 DAYS A WEEK  Patient taking differently: Take 20 mg by mouth AS NEEDED FOR SWELLING 20   Majo Bhandari APRN - CNS   hydrALAZINE (APRESOLINE) 25 MG tablet Take 1 tablet by mouth 2 times daily 20   Majo Bhandari APRN - CNS   spironolactone (ALDACTONE) 25 MG tablet Take 1 tablet by mouth daily 20   Rema Rivers, APRN - CNS   omeprazole (PRILOSEC) 40 MG delayed release capsule Take 1 capsule by mouth every morning (before breakfast) 7/10/20   Delray Files, PA-C   butalbital-acetaminophen-caffeine (FIORICET, ESGIC) -16 MG per tablet Take 1 tablet by mouth every 4 hours as needed for Headaches    Historical Provider, MD   atorvastatin (LIPITOR) 40 MG tablet Take 1 tablet by mouth daily 16   Constantine Benson MD       Current medications:    No current facility-administered medications for this encounter.       Current Outpatient Medications   Medication Sig Dispense Refill    XARELTO 20 MG TABS tablet TAKE ONE TABLET BY MOUTH DAILY WITH BREAKFAST 90 tablet 3    carvedilol (COREG) 12.5 MG tablet Take 1 tablet by mouth 2 times daily 60 tablet 5    furosemide (LASIX) 20 MG tablet TAKE ONE TABLET BY MOUTH 4 DAYS A WEEK (Patient taking differently: Take 20 mg by mouth AS NEEDED FOR SWELLING) 17 tablet 2    hydrALAZINE (APRESOLINE) 25 MG tablet Take 1 tablet by mouth 2 times daily 60 tablet 2    spironolactone (ALDACTONE) 25 MG tablet Take 1 tablet by mouth daily 30 tablet 2    omeprazole (PRILOSEC) 40 MG delayed release capsule Take 1 capsule by mouth every morning (before breakfast) 90 capsule 1    butalbital-acetaminophen-caffeine (FIORICET, ESGIC) -40 MG per tablet Take 1 tablet by mouth every 4 hours as needed for Headaches      atorvastatin (LIPITOR) 40 MG tablet Take 1 tablet by mouth daily 60 tablet 1       Allergies: Allergies   Allergen Reactions    Latex     Codeine      Hives      Entresto [Sacubitril-Valsartan]      Lips and tongue swelling    Lisinopril      cough    Sulfa Antibiotics Nausea Only       Problem List:    Patient Active Problem List   Diagnosis Code    HTN (hypertension) I10    Other and unspecified hyperlipidemia E78.5    Congenital heart block Q24.6    Pacemaker Z95.0    Cardiac pacemaker in situ Z95.0    Dyspnea on exertion R06.00    Headache R51.9    LVH (left ventricular hypertrophy) I51.7    Persistent atrial fibrillation (HCC) I48.19    Chest pain Q83.4    Systolic CHF, chronic (HCC) I50.22    Hypersomnia G47.10    Obstructive sleep apnea (adult) (pediatric) G47.33    LV dysfunction I51.9    Left subclavian vein thrombosis (HCC) I82. B12    Dilated cardiomyopathy (HCC) I42.0    Class 1 obesity without serious comorbidity with body mass index (BMI) of 33.0 to 33.9 in adult E66.9, Z68.33       Past Medical History:        Diagnosis Date    Anemia     Atrial fibrillation and flutter (HCC)     Atrial flutter (HCC)     CHB (complete heart block) (HCC)     Class 1 obesity without serious comorbidity with body mass index (BMI) of 33.0 to 33.9 in adult 9/6/2019    Congenital heart disease     GERD (gastroesophageal reflux disease)     Headache(784.0)     History of complete heart block     Hyperlipidemia  Hypertension     Syncope     Systolic CHF, chronic ([facility] [address] 10/3/2018       Past Surgical History:        Procedure Laterality Date    PACEMAKER INSERTION  11/29/12    dual chamber PPM, [employer]    TUBAL LIGATION      UTERINE FIBROID SURGERY         Social History:    Social History     Tobacco Use    Smoking status: Never Smoker    Smokeless tobacco: Never Used   Substance Use Topics    Alcohol use: No                                Counseling given: Not Answered      Vital Signs (Current):   Vitals:    10/22/20 1345   Weight: 197 lb (89.4 kg)   Height: 5' 7\" (1.702 m)                                              BP Readings from Last 3 Encounters:   10/08/20 130/84   08/31/20 118/76   07/10/20 (!) 159/91       NPO Status:                                                                                 BMI:   Wt Readings from Last 3 Encounters:   10/22/20 197 lb (89.4 kg)   10/08/20 197 lb (89.4 kg)   08/31/20 203 lb (92.1 kg)     Body mass index is 30.85 kg/m². CBC:   Lab Results   Component Value Date    WBC 5.0 07/10/2020    RBC 4.27 07/10/2020    HGB 13.5 07/10/2020    HCT 40.1 07/10/2020    MCV 93.7 07/10/2020    RDW 15.2 07/10/2020     07/10/2020       CMP:   Lab Results   Component Value Date     08/31/2020    K 4.0 08/31/2020    K 4.8 07/10/2020     08/31/2020    CO2 27 08/31/2020    BUN 6 08/31/2020    CREATININE 0.9 08/31/2020    GFRAA >60 08/31/2020    AGRATIO 1.2 07/10/2020    LABGLOM >60 08/31/2020    GLUCOSE 111 08/31/2020    PROT 7.8 07/10/2020    CALCIUM 9.8 08/31/2020    BILITOT 0.8 07/10/2020    ALKPHOS 48 07/10/2020    AST 36 07/10/2020    ALT 17 07/10/2020       POC Tests: No results for input(s): POCGLU, POCNA, POCK, POCCL, POCBUN, POCHEMO, POCHCT in the last 72 hours.     Coags:   Lab Results   Component Value Date    PROTIME 16.6 06/27/2020    INR 1.43 06/27/2020       HCG (If Applicable): No results found for: PREGTESTUR, PREGSERUM, HCG, HCGQUANT     ABGs: No results found for: PHART, PO2ART, UNQ0KCD, TZL8XPY, BEART, H4JHFYAY     Type & Screen (If Applicable):  No results found for: LABABO, LABRH    Drug/Infectious Status (If Applicable):  No results found for: HIV, HEPCAB    COVID-19 Screening (If Applicable):   Lab Results   Component Value Date    COVID19 Not Detected 10/21/2020         Anesthesia Evaluation  Patient summary reviewed and Nursing notes reviewed  Airway: Mallampati: II  TM distance: >3 FB   Neck ROM: full  Mouth opening: > = 3 FB Dental:          Pulmonary:   (+) sleep apnea: on CPAP,                             Cardiovascular:  Exercise tolerance: poor (<4 METS),   (+) hypertension: moderate, pacemaker: pacemaker, CHF: diastolic,                ROS comment: Echo 2019   -Limited echocardiogram was performed to evaluate EF.   -Normal left ventricle size, mild to moderate left ventricular hypertrophy,   and moderately reduced systolic function with an estimated ejection fraction   of 30-35%. -There is moderate diffuse hypokinesis. -Grade III diastolic dysfunction . E/e\"=10.7.   -Mild mitral regurgitation.   -Mild tricuspid regurgitation.   -The left atrium is mild to moderate dilated. -Right ventricular systolic function appears mildly reduced .   -Estimated pulmonary artery systolic pressure is borderline normal at 28-33   mmHg assuming a right atrial pressure of 8 mmHg.   -Pacer / ICD wire is visualized in the right heart. Neuro/Psych:   (+) headaches: migraine headaches,             GI/Hepatic/Renal:   (+) GERD: well controlled,           Endo/Other:                     Abdominal:           Vascular:                                        Anesthesia Plan      MAC     ASA 2       Induction: intravenous. MIPS: Prophylactic antiemetics administered. Anesthetic plan and risks discussed with patient. Plan discussed with CRNA.                   Marifer Davenport MD   10/27/2020

## 2020-12-07 ENCOUNTER — TELEPHONE (OUTPATIENT)
Dept: CARDIOLOGY CLINIC | Age: 55
End: 2020-12-07

## 2020-12-07 ENCOUNTER — OFFICE VISIT (OUTPATIENT)
Dept: CARDIOLOGY CLINIC | Age: 55
End: 2020-12-07
Payer: MEDICAID

## 2020-12-07 ENCOUNTER — HOSPITAL ENCOUNTER (OUTPATIENT)
Age: 55
Discharge: HOME | End: 2020-12-07
Payer: MEDICAID

## 2020-12-07 VITALS
OXYGEN SATURATION: 98 % | HEIGHT: 67 IN | HEART RATE: 60 BPM | DIASTOLIC BLOOD PRESSURE: 90 MMHG | BODY MASS INDEX: 29.82 KG/M2 | SYSTOLIC BLOOD PRESSURE: 148 MMHG | WEIGHT: 190 LBS

## 2020-12-07 LAB
ANION GAP SERPL CALCULATED.3IONS-SCNC: 10 MMOL/L (ref 3–16)
BUN BLDV-MCNC: 10 MG/DL (ref 7–20)
CALCIUM SERPL-MCNC: 9.2 MG/DL (ref 8.3–10.6)
CHLORIDE BLD-SCNC: 107 MMOL/L (ref 99–110)
CO2: 25 MMOL/L (ref 21–32)
CREAT SERPL-MCNC: 0.7 MG/DL (ref 0.6–1.1)
GFR AFRICAN AMERICAN: >60
GFR NON-AFRICAN AMERICAN: >60
GLUCOSE BLD-MCNC: 90 MG/DL (ref 70–99)
MAGNESIUM: 2.1 MG/DL (ref 1.8–2.4)
POTASSIUM SERPL-SCNC: 3.6 MMOL/L (ref 3.5–5.1)
PRO-BNP: 2064 PG/ML (ref 0–124)
SODIUM BLD-SCNC: 142 MMOL/L (ref 136–145)

## 2020-12-07 PROCEDURE — 80048 BASIC METABOLIC PNL TOTAL CA: CPT

## 2020-12-07 PROCEDURE — G8417 CALC BMI ABV UP PARAM F/U: HCPCS | Performed by: CLINICAL NURSE SPECIALIST

## 2020-12-07 PROCEDURE — 36415 COLL VENOUS BLD VENIPUNCTURE: CPT

## 2020-12-07 PROCEDURE — 99214 OFFICE O/P EST MOD 30 MIN: CPT | Performed by: CLINICAL NURSE SPECIALIST

## 2020-12-07 PROCEDURE — G8484 FLU IMMUNIZE NO ADMIN: HCPCS | Performed by: CLINICAL NURSE SPECIALIST

## 2020-12-07 PROCEDURE — G8427 DOCREV CUR MEDS BY ELIG CLIN: HCPCS | Performed by: CLINICAL NURSE SPECIALIST

## 2020-12-07 PROCEDURE — 83880 ASSAY OF NATRIURETIC PEPTIDE: CPT

## 2020-12-07 PROCEDURE — 1036F TOBACCO NON-USER: CPT | Performed by: CLINICAL NURSE SPECIALIST

## 2020-12-07 PROCEDURE — 83735 ASSAY OF MAGNESIUM: CPT

## 2020-12-07 PROCEDURE — 3017F COLORECTAL CA SCREEN DOC REV: CPT | Performed by: CLINICAL NURSE SPECIALIST

## 2020-12-07 RX ORDER — HYDRALAZINE HYDROCHLORIDE 25 MG/1
25 TABLET, FILM COATED ORAL 3 TIMES DAILY
Qty: 90 TABLET | Refills: 2 | Status: ON HOLD
Start: 2020-12-07 | End: 2021-01-21 | Stop reason: SINTOL

## 2020-12-07 RX ORDER — FUROSEMIDE 20 MG/1
TABLET ORAL
Qty: 17 TABLET | Refills: 2 | Status: SHIPPED | OUTPATIENT
Start: 2020-12-07 | End: 2020-12-08

## 2020-12-07 NOTE — TELEPHONE ENCOUNTER
----- Message from CATRACHITO Baez - CNS sent at 12/7/2020  2:56 PM EST -----  Her fluid level is really elevated 373->[phone]  I want her to increase lasix to 40 mg for 1 week and then 20 mg daily  Watch her BP and weight  Check labs again in 10 days  thanks

## 2020-12-07 NOTE — PATIENT INSTRUCTIONS
1.  Increase hydralazine to 25 mg three times a day  2. Check blood work including magnesium  3. Refilled lasix and hydralazine  4. Try and increase wearing cpap to every night  5. Call me if BP remains >130  6. RTO in 3 months  7.   Make an appt with Dr Dyan Suárez regarding pacing lead

## 2020-12-07 NOTE — PROGRESS NOTES
[address]  Progress Note    Primary Care Doctor:  No primary care provider on file. Chief Complaint   Patient presents with    3 Month Follow-Up    Congestive Heart Failure        History of Present Illness:  63 y.o. female with history of sHF, LVH, AF on xarelto (follows with Dr Edward Mar), had been on flecainide, dual chamber pacemaker 2012 due to congenital heart block  LVEF had been 55% in 2015 and now 25%. No lisinopril due to cough. She is a cook at [address]  Angioedema to [facility] 3/2020    I had the pleasure of seeing/virtual Suzie Pfeiffer in follow up for sHF. She is ambulatory and by her self today. She saw Dr Chelsey Araya and had EGD (hiatal hernia) and colonoscopy (polyp) done. She is now on reglan and fiber. She is complaining of some pain in his left upper thigh when she sleeps. Her norvasc was stopped by her PCP and BP is elevated. She saw Dr Edward Mar recently for device and appt. She has not made an appt with Dr Gatito Calderon for lead upgrade. She denies any sob, edema or palpitations. She is wearing her cpap 3 nights a week all night. Past Medical History:   has a past medical history of Anemia, Atrial fibrillation and flutter ([address] Atrial flutter ([address] CHB (complete heart block) ([address] Class 1 obesity without serious comorbidity with body mass index (BMI) of 33.0 to 33.9 in adult, Congenital heart disease, GERD (gastroesophageal reflux disease), Headache(784.0), History of complete heart block, Hyperlipidemia, Hypertension, Syncope, and Systolic CHF, chronic ([address] Surgical History:   has a past surgical history that includes Uterine fibroid surgery; Pacemaker insertion (11/29/12); Tubal ligation; Upper gastrointestinal endoscopy (N/A, 10/27/2020); and Colonoscopy (N/A, 10/27/2020). Social History:    reports that she has never smoked. She has never used smokeless tobacco. She reports that she does not drink alcohol or use drugs.    Family History: Family History   Problem Relation Age of Onset    Cancer Father     Heart Disease Neg Hx     High Blood Pressure Neg Hx     High Cholesterol Neg Hx        Home Medications:  Prior to Admission medications    Medication Sig Start Date End Date Taking? Authorizing Provider   metoclopramide (REGLAN) 5 MG tablet Take 1 tablet by mouth 4 times daily 10/27/20  Yes Jimmy Vick MD   XARELTO 20 MG TABS tablet TAKE ONE TABLET BY MOUTH DAILY WITH BREAKFAST 10/20/20  Yes Keya Hardy MD   carvedilol (COREG) 12.5 MG tablet Take 1 tablet by mouth 2 times daily 8/31/20  Yes CATRACHITO Glover - CNS   furosemide (LASIX) 20 MG tablet TAKE ONE TABLET BY MOUTH 4 DAYS A WEEK  Patient taking differently: Take 20 mg by mouth AS NEEDED FOR SWELLING 8/31/20  Yes CATRACHITO Macedo - CNS   hydrALAZINE (APRESOLINE) 25 MG tablet Take 1 tablet by mouth 2 times daily 8/31/20  Yes CATRACHITO Keenan - CNS   spironolactone (ALDACTONE) 25 MG tablet Take 1 tablet by mouth daily 8/31/20  Yes CATRACHITO Glover - CNS   butalbital-acetaminophen-caffeine (FIORICET, ESGIC) -40 MG per tablet Take 1 tablet by mouth every 4 hours as needed for Headaches   Yes Historical Provider, MD   atorvastatin (LIPITOR) 40 MG tablet Take 1 tablet by mouth daily 11/18/16  Yes Yonas Davis MD        Allergies:  Latex; Codeine; Entresto [sacubitril-valsartan]; Lisinopril; and Sulfa antibiotics     Review of Systems:   · Constitutional: there has been no unanticipated weight loss. There's been no change in energy level, sleep pattern, or activity level. · Eyes: No visual changes or diplopia. No scleral icterus. · ENT: No Headaches, hearing loss or vertigo. No mouth sores or sore throat. · Cardiovascular: Reviewed in HPI  · Respiratory: No cough or wheezing, no sputum production. No hematemesis. · Gastrointestinal: No abdominal pain, appetite loss, blood in stools.  No change in bowel or bladder habits. · Genitourinary: No dysuria, trouble voiding, or hematuria. · Musculoskeletal:  No gait disturbance, weakness or joint complaints. · Integumentary: No rash or pruritis. · Neurological: No headache, diplopia, change in muscle strength, numbness or tingling. No change in gait, balance, coordination, mood, affect, memory, mentation, behavior. · Psychiatric: No anxiety, no depression. · Endocrine: No malaise, fatigue or temperature intolerance. No excessive thirst, fluid intake, or urination. No tremor. · Hematologic/Lymphatic: No abnormal bruising or bleeding, blood clots or swollen lymph nodes. · Allergic/Immunologic: No nasal congestion or hives. Physical Examination:    Vitals:    12/07/20 0833   BP: (!) 148/90   Site: Left Upper Arm   Position: Sitting   Cuff Size: Medium Adult   Pulse: 60   SpO2: 98%   Weight: 190 lb (86.2 kg)   Height: 5' 7\" (1.702 m)        Constitutional and General Appearance: Warm and dry, no apparent distress, normal coloration  HEENT:  Normocephalic, atraumatic  Respiratory:  · Normal excursion and expansion without use of accessory muscles  · Resp Auscultation: Normal breath sounds without dullness  Cardiovascular:  · The apical impulses not displaced  · Heart tones are crisp and normal  · JVP normal cm H2O  · regular rate and rhythm  · Peripheral pulses are symmetrical and full  · There is no clubbing, cyanosis of the extremities.   · no edema  · Pedal Pulses: 2+ and equal   Abdomen:  · No masses or tenderness  · Liver/Spleen: No Abnormalities Noted  Neurological/Psychiatric:  · Alert and oriented in all spheres  · Moves all extremities well  · Exhibits normal gait balance and coordination  · No abnormalities of mood, affect, memory, mentation, or behavior are noted      Lab Data:    CBC:   Lab Results   Component Value Date    WBC 5.0 07/10/2020    WBC 4.6 06/27/2020    WBC 4.0 03/19/2019    RBC 4.27 07/10/2020    RBC 4.43 06/27/2020    RBC 4.47 03/19/2019    HGB 13.5 07/10/2020    HGB 14.0 06/27/2020    HGB 14.0 03/19/2019    HCT 40.1 07/10/2020    HCT 40.9 06/27/2020    HCT 41.8 03/19/2019    MCV 93.7 07/10/2020    MCV 92.5 06/27/2020    MCV 93.4 03/19/2019    RDW 15.2 07/10/2020    RDW 15.3 06/27/2020    RDW 15.7 03/19/2019     07/10/2020     06/27/2020     03/19/2019     BMP:  Lab Results   Component Value Date     08/31/2020     07/10/2020     06/27/2020    K 4.0 08/31/2020    K 4.8 07/10/2020    K 5.1 06/27/2020    K 3.8 05/12/2020    K 4.1 01/16/2020     08/31/2020     07/10/2020     06/27/2020    CO2 27 08/31/2020    CO2 21 07/10/2020    CO2 22 06/27/2020    PHOS 2.5 07/05/2018    BUN 6 08/31/2020    BUN 5 07/10/2020    BUN 7 06/27/2020    CREATININE 0.9 08/31/2020    CREATININE 0.8 07/10/2020    CREATININE 0.8 06/27/2020     BNP:   Lab Results   Component Value Date    PROBNP 373 08/31/2020    PROBNP 267 06/27/2020    PROBNP 514 05/12/2020     Cardiac Imaging:  Echo 2/24/2020  Summary   -Limited echocardiogram was performed to evaluate EF.   -Normal left ventricle size, mild to moderate left ventricular hypertrophy,   and moderately reduced systolic function with an estimated ejection fraction   of 30-35%. -There is moderate diffuse hypokinesis. -Grade III diastolic dysfunction . E/e\"=10.7.   -Mild mitral regurgitation.   -Mild tricuspid regurgitation.   -The left atrium is mild to moderate dilated.    -Right ventricular systolic function appears mildly reduced .   -Estimated pulmonary artery systolic pressure is borderline normal at 28-33   mmHg assuming a right atrial pressure of 8 mmHg.   -Pacer / ICD wire is visualized in the right heart.     Echo 8/12/2019  Summary   -Limited echocardiogram was performed to evaluate LV ejection fraction.   -Left ventricular cavity size is mildly dilated.   -There is moderate concentric left ventricular hypertrophy.   -Overall left ventricular systolic function appears moderately reduced with   an ejection fraction on the 30-35%.  -There is moderate global diffuse hypokinesis.   -Indeterminate diastolic dysfunction due to irregular heart rate.   -Moderate mitral regurgitation.   -Aortic valve appears sclerotic but opens adequately.   -Mild to moderate tricuspid regurgitation.   -The left atrium is moderately dilated.   -Pacer / ICD wire is visualized in the right heart. Stress test 9/11/18  Enlarged LV with mild global hypokinesis. EF 51%  There is normal isotope uptake at stress and rest. There is no evidence of  myocardial ischemia or scar. ECHO 7/24/18:  Summary   -Left ventricular cavity size is mildly dilated. -There is moderate concentric left ventricular hypertrophy.   -Overall left ventricular systolic function appears severely reduced with  and ejection fraction on the 25 % range.   -There is diffuse global hypokinesis. -Grade II diastolic dysfunction with elevated LV filling pressures. E/e\"=16.2.   -Mitral annular calcification is present. -Mild-moderate mitral regurgitation.   -Aortic valve appears sclerotic but opens adequately. -Trivial aortic regurgitation.   -Trivial tricuspid regurgitation with RVSP of 26 mmHg. -Mild pulmonic regurgitation present.   -Dilated left atrium with a volume of 72 ml.   -Pacer / ICD wire is visualized in the right heart. GXT cathie 7/15/2015:   Left ventricular ejection fraction of 55%. The LV wall motion is normal.  There is normal isotope uptake at stress and rest.   There is no evidence of myocardial ischemia or scar. Assessment:    1. Chronic combined systolic and diastolic heart failure (HCC) on hydralazine, BB and aldosterone antagonist   2. Paroxysmal atrial fibrillation (HCC) on xarelto   3. DERECK (obstructive sleep apnea)    4. Pacemaker        Plan:   1. Increase hydralazine to 25 mg three times a day  2. Check blood work including magnesium  3. Refilled lasix and hydralazine  4.   Try and increase wearing cpap to every night  5. Call me if BP remains >130  6. RTO in 3 months  7. Make an appt with Dr Sheree Schilder regarding pacing lead      BELKIS Griffiths, 12/7/2020, 8:40 AM    QUALITY MEASURES  1. Tobacco Cessation Counseling: NA  2. Retake of BP if >140/90:   NA  3. Documentation to PCP/referring for new patient: no PCP  4. CAD patient on anti-platelet: NA  5. CAD patient on STATIN therapy:  NA  6.  Patient with CHF and aFib on anticoagulation:  Yes

## 2020-12-08 RX ORDER — FUROSEMIDE 20 MG/1
TABLET ORAL
Qty: 90 TABLET | Refills: 1 | Status: ON HOLD
Start: 2020-12-08 | End: 2021-01-25 | Stop reason: HOSPADM

## 2020-12-15 ENCOUNTER — TELEPHONE (OUTPATIENT)
Dept: CARDIOLOGY CLINIC | Age: 55
End: 2020-12-15

## 2020-12-15 ENCOUNTER — NURSE ONLY (OUTPATIENT)
Dept: CARDIOLOGY CLINIC | Age: 55
End: 2020-12-15
Payer: MEDICAID

## 2020-12-15 PROCEDURE — 93280 PM DEVICE PROGR EVAL DUAL: CPT | Performed by: INTERNAL MEDICINE

## 2020-12-15 NOTE — TELEPHONE ENCOUNTER
Pt calling back. She states that the Lasix is making her fell terrible. She is so dizzy that she is stumbling aroud. She says she cant take it any more. She says she has a bad HA her BP was 159/102.  Her BP is not stable

## 2020-12-15 NOTE — TELEPHONE ENCOUNTER
Last OV 12/7/20 KAYLA CNP  Spoke to the pt-states the Furosemide 20 mg dosage is too much. She just doesn't feel right. Stated she will be in this morning for a device check, around 10:30.

## 2020-12-15 NOTE — TELEPHONE ENCOUNTER
I spoke with pt and she states that she has been taking this med for months and has always made her feel this way.

## 2020-12-15 NOTE — TELEPHONE ENCOUNTER
I have asked her to change her hydralazine to 50 mg twice a day and take lasix 20 mg bid at different time then her hydralazine  She is on reglan which I asked her to cut back to 3 times a day  I am concerned that she is having increased HF symptoms along with drinking gatorade  I have offered her a dose of IV lasix  If she is not better tomorrow she will call me

## 2020-12-15 NOTE — PROGRESS NOTES
Patient comes in for programming evaluation for her pacemaker. All sensing and pacing parameters are within normal range. Battery life 27 months   AP 35%.  56.6%. Known AF with a 58.2% burden. Currently in AF today. Patient remains on Xarelto and Coreg. No changes need to be made at this time. Please see interrogation for more detail. Patient will follow up in 3 months in office or remotely.

## 2021-01-05 ENCOUNTER — TELEPHONE (OUTPATIENT)
Dept: CARDIOLOGY CLINIC | Age: 56
End: 2021-01-05

## 2021-01-05 NOTE — TELEPHONE ENCOUNTER
The pharmacist wont refill her furosemide because of a prior reaction . Patient made an appt with [facility] for this Friday to discuss meds. Patient asking for orders to get labs done before appt . She wants to check her kidney function. Please call to let her know orders are in system and she will have them drawn tomorrow.

## 2021-01-06 ENCOUNTER — HOSPITAL ENCOUNTER (OUTPATIENT)
Age: 56
Discharge: HOME | End: 2021-01-06
Payer: MEDICAID

## 2021-01-06 DIAGNOSIS — I50.42 CHRONIC COMBINED SYSTOLIC AND DIASTOLIC HEART FAILURE (HCC): ICD-10-CM

## 2021-01-06 LAB
ANION GAP SERPL CALCULATED.3IONS-SCNC: 10 MMOL/L (ref 3–16)
BUN BLDV-MCNC: 8 MG/DL (ref 7–20)
CALCIUM SERPL-MCNC: 9.3 MG/DL (ref 8.3–10.6)
CHLORIDE BLD-SCNC: 106 MMOL/L (ref 99–110)
CO2: 25 MMOL/L (ref 21–32)
CREAT SERPL-MCNC: 0.7 MG/DL (ref 0.6–1.1)
GFR AFRICAN AMERICAN: >60
GFR NON-AFRICAN AMERICAN: >60
GLUCOSE BLD-MCNC: 83 MG/DL (ref 70–99)
POTASSIUM SERPL-SCNC: 3.4 MMOL/L (ref 3.5–5.1)
PRO-BNP: 722 PG/ML (ref 0–124)
SODIUM BLD-SCNC: 141 MMOL/L (ref 136–145)

## 2021-01-06 PROCEDURE — 83880 ASSAY OF NATRIURETIC PEPTIDE: CPT

## 2021-01-06 PROCEDURE — 80048 BASIC METABOLIC PNL TOTAL CA: CPT

## 2021-01-06 PROCEDURE — 36415 COLL VENOUS BLD VENIPUNCTURE: CPT

## 2021-01-07 ENCOUNTER — TELEPHONE (OUTPATIENT)
Dept: CARDIOLOGY CLINIC | Age: 56
End: 2021-01-07

## 2021-01-19 PROCEDURE — 93005 ELECTROCARDIOGRAM TRACING: CPT | Performed by: EMERGENCY MEDICINE

## 2021-01-19 PROCEDURE — 99284 EMERGENCY DEPT VISIT MOD MDM: CPT

## 2021-01-20 ENCOUNTER — HOSPITAL ENCOUNTER (INPATIENT)
Age: 56
LOS: 5 days | Discharge: HOME | DRG: 179 | End: 2021-01-25
Attending: EMERGENCY MEDICINE | Admitting: INTERNAL MEDICINE
Payer: MEDICAID

## 2021-01-20 ENCOUNTER — APPOINTMENT (OUTPATIENT)
Dept: GENERAL RADIOLOGY | Age: 56
DRG: 179 | End: 2021-01-20
Payer: MEDICAID

## 2021-01-20 DIAGNOSIS — R52 PAIN IN PACEMAKER POCKET: ICD-10-CM

## 2021-01-20 DIAGNOSIS — R55 NEAR SYNCOPE: ICD-10-CM

## 2021-01-20 DIAGNOSIS — I47.29 PAROXYSMAL VENTRICULAR TACHYCARDIA: Primary | ICD-10-CM

## 2021-01-20 PROBLEM — I47.20 VT (VENTRICULAR TACHYCARDIA) (HCC): Status: ACTIVE | Noted: 2021-01-20

## 2021-01-20 PROBLEM — I47.20 PAROXYSMAL VENTRICULAR TACHYCARDIA: Status: ACTIVE | Noted: 2021-01-20

## 2021-01-20 LAB
A/G RATIO: 1.6 (ref 1.1–2.2)
ALBUMIN SERPL-MCNC: 4.6 G/DL (ref 3.4–5)
ALP BLD-CCNC: 66 U/L (ref 40–129)
ALT SERPL-CCNC: 19 U/L (ref 10–40)
ANION GAP SERPL CALCULATED.3IONS-SCNC: 15 MMOL/L (ref 3–16)
APTT: 49 SEC (ref 24.2–36.2)
AST SERPL-CCNC: 28 U/L (ref 15–37)
BASOPHILS ABSOLUTE: 0 K/UL (ref 0–0.2)
BASOPHILS RELATIVE PERCENT: 0.7 %
BILIRUB SERPL-MCNC: 0.3 MG/DL (ref 0–1)
BUN BLDV-MCNC: 6 MG/DL (ref 7–20)
CALCIUM SERPL-MCNC: 9.8 MG/DL (ref 8.3–10.6)
CHLORIDE BLD-SCNC: 106 MMOL/L (ref 99–110)
CO2: 19 MMOL/L (ref 21–32)
CREAT SERPL-MCNC: 0.7 MG/DL (ref 0.6–1.1)
EKG ATRIAL RATE: 357 BPM
EKG ATRIAL RATE: 357 BPM
EKG DIAGNOSIS: NORMAL
EKG DIAGNOSIS: NORMAL
EKG Q-T INTERVAL: 476 MS
EKG Q-T INTERVAL: 488 MS
EKG QRS DURATION: 142 MS
EKG QRS DURATION: 168 MS
EKG QTC CALCULATION (BAZETT): 491 MS
EKG QTC CALCULATION (BAZETT): 491 MS
EKG R AXIS: 61 DEGREES
EKG R AXIS: 92 DEGREES
EKG T AXIS: 257 DEGREES
EKG T AXIS: 257 DEGREES
EKG VENTRICULAR RATE: 61 BPM
EKG VENTRICULAR RATE: 64 BPM
EOSINOPHILS ABSOLUTE: 0.2 K/UL (ref 0–0.6)
EOSINOPHILS RELATIVE PERCENT: 3.1 %
GFR AFRICAN AMERICAN: >60
GFR NON-AFRICAN AMERICAN: >60
GLOBULIN: 2.9 G/DL
GLUCOSE BLD-MCNC: 107 MG/DL (ref 70–99)
HCT VFR BLD CALC: 30.6 % (ref 36–48)
HCT VFR BLD CALC: 32.7 % (ref 36–48)
HEMOGLOBIN: 10 G/DL (ref 12–16)
HEMOGLOBIN: 10.6 G/DL (ref 12–16)
LV EF: 28 %
LVEF MODALITY: NORMAL
LYMPHOCYTES ABSOLUTE: 1.3 K/UL (ref 1–5.1)
LYMPHOCYTES RELATIVE PERCENT: 21.7 %
MAGNESIUM: 2.4 MG/DL (ref 1.8–2.4)
MCH RBC QN AUTO: 30.6 PG (ref 26–34)
MCH RBC QN AUTO: 30.6 PG (ref 26–34)
MCHC RBC AUTO-ENTMCNC: 32.5 G/DL (ref 31–36)
MCHC RBC AUTO-ENTMCNC: 32.5 G/DL (ref 31–36)
MCV RBC AUTO: 94.1 FL (ref 80–100)
MCV RBC AUTO: 94.3 FL (ref 80–100)
MONOCYTES ABSOLUTE: 0.4 K/UL (ref 0–1.3)
MONOCYTES RELATIVE PERCENT: 6.4 %
NEUTROPHILS ABSOLUTE: 4.1 K/UL (ref 1.7–7.7)
NEUTROPHILS RELATIVE PERCENT: 68.1 %
PDW BLD-RTO: 15.9 % (ref 12.4–15.4)
PDW BLD-RTO: 16.3 % (ref 12.4–15.4)
PLATELET # BLD: 264 K/UL (ref 135–450)
PLATELET # BLD: 269 K/UL (ref 135–450)
PMV BLD AUTO: 7.9 FL (ref 5–10.5)
PMV BLD AUTO: 8 FL (ref 5–10.5)
POTASSIUM REFLEX MAGNESIUM: 3.8 MMOL/L (ref 3.5–5.1)
PRO-BNP: 1221 PG/ML (ref 0–124)
RBC # BLD: 3.25 M/UL (ref 4–5.2)
RBC # BLD: 3.46 M/UL (ref 4–5.2)
REASON FOR REJECTION: NORMAL
REJECTED TEST: NORMAL
SODIUM BLD-SCNC: 140 MMOL/L (ref 136–145)
TOTAL PROTEIN: 7.5 G/DL (ref 6.4–8.2)
TROPONIN: <0.01 NG/ML
WBC # BLD: 5.3 K/UL (ref 4–11)
WBC # BLD: 6.1 K/UL (ref 4–11)

## 2021-01-20 PROCEDURE — 83735 ASSAY OF MAGNESIUM: CPT

## 2021-01-20 PROCEDURE — 99152 MOD SED SAME PHYS/QHP 5/>YRS: CPT

## 2021-01-20 PROCEDURE — 2580000003 HC RX 258: Performed by: INTERNAL MEDICINE

## 2021-01-20 PROCEDURE — 85730 THROMBOPLASTIN TIME PARTIAL: CPT

## 2021-01-20 PROCEDURE — 2500000003 HC RX 250 WO HCPCS

## 2021-01-20 PROCEDURE — 93458 L HRT ARTERY/VENTRICLE ANGIO: CPT | Performed by: INTERNAL MEDICINE

## 2021-01-20 PROCEDURE — 2000000000 HC ICU R&B

## 2021-01-20 PROCEDURE — 5A2204Z RESTORATION OF CARDIAC RHYTHM, SINGLE: ICD-10-PCS | Performed by: INTERNAL MEDICINE

## 2021-01-20 PROCEDURE — C1751 CATH, INF, PER/CENT/MIDLINE: HCPCS

## 2021-01-20 PROCEDURE — 92960 CARDIOVERSION ELECTRIC EXT: CPT

## 2021-01-20 PROCEDURE — 84484 ASSAY OF TROPONIN QUANT: CPT

## 2021-01-20 PROCEDURE — 85027 COMPLETE CBC AUTOMATED: CPT

## 2021-01-20 PROCEDURE — 36415 COLL VENOUS BLD VENIPUNCTURE: CPT

## 2021-01-20 PROCEDURE — 6360000002 HC RX W HCPCS

## 2021-01-20 PROCEDURE — 2709999900 HC NON-CHARGEABLE SUPPLY

## 2021-01-20 PROCEDURE — 99152 MOD SED SAME PHYS/QHP 5/>YRS: CPT | Performed by: INTERNAL MEDICINE

## 2021-01-20 PROCEDURE — 36592 COLLECT BLOOD FROM PICC: CPT

## 2021-01-20 PROCEDURE — 93005 ELECTROCARDIOGRAM TRACING: CPT | Performed by: EMERGENCY MEDICINE

## 2021-01-20 PROCEDURE — 6360000002 HC RX W HCPCS: Performed by: EMERGENCY MEDICINE

## 2021-01-20 PROCEDURE — 86900 BLOOD TYPING SEROLOGIC ABO: CPT

## 2021-01-20 PROCEDURE — 2580000003 HC RX 258: Performed by: EMERGENCY MEDICINE

## 2021-01-20 PROCEDURE — 6360000002 HC RX W HCPCS: Performed by: INTERNAL MEDICINE

## 2021-01-20 PROCEDURE — 99153 MOD SED SAME PHYS/QHP EA: CPT

## 2021-01-20 PROCEDURE — 6370000000 HC RX 637 (ALT 250 FOR IP): Performed by: INTERNAL MEDICINE

## 2021-01-20 PROCEDURE — 4A023N7 MEASUREMENT OF CARDIAC SAMPLING AND PRESSURE, LEFT HEART, PERCUTANEOUS APPROACH: ICD-10-PCS | Performed by: ANESTHESIOLOGY

## 2021-01-20 PROCEDURE — 36556 INSERT NON-TUNNEL CV CATH: CPT | Performed by: INTERNAL MEDICINE

## 2021-01-20 PROCEDURE — 92960 CARDIOVERSION ELECTRIC EXT: CPT | Performed by: INTERNAL MEDICINE

## 2021-01-20 PROCEDURE — 83880 ASSAY OF NATRIURETIC PEPTIDE: CPT

## 2021-01-20 PROCEDURE — C1769 GUIDE WIRE: HCPCS

## 2021-01-20 PROCEDURE — 85025 COMPLETE CBC W/AUTO DIFF WBC: CPT

## 2021-01-20 PROCEDURE — 6360000004 HC RX CONTRAST MEDICATION: Performed by: INTERNAL MEDICINE

## 2021-01-20 PROCEDURE — 93458 L HRT ARTERY/VENTRICLE ANGIO: CPT

## 2021-01-20 PROCEDURE — 93010 ELECTROCARDIOGRAM REPORT: CPT | Performed by: INTERNAL MEDICINE

## 2021-01-20 PROCEDURE — 86923 COMPATIBILITY TEST ELECTRIC: CPT

## 2021-01-20 PROCEDURE — 86850 RBC ANTIBODY SCREEN: CPT

## 2021-01-20 PROCEDURE — 99291 CRITICAL CARE FIRST HOUR: CPT | Performed by: INTERNAL MEDICINE

## 2021-01-20 PROCEDURE — 71045 X-RAY EXAM CHEST 1 VIEW: CPT

## 2021-01-20 PROCEDURE — 80053 COMPREHEN METABOLIC PANEL: CPT

## 2021-01-20 PROCEDURE — 86901 BLOOD TYPING SEROLOGIC RH(D): CPT

## 2021-01-20 PROCEDURE — C1894 INTRO/SHEATH, NON-LASER: HCPCS

## 2021-01-20 PROCEDURE — 93306 TTE W/DOPPLER COMPLETE: CPT

## 2021-01-20 RX ORDER — PROMETHAZINE HYDROCHLORIDE 25 MG/1
12.5 TABLET ORAL EVERY 6 HOURS PRN
Status: DISCONTINUED | OUTPATIENT
Start: 2021-01-20 | End: 2021-01-25 | Stop reason: HOSPADM

## 2021-01-20 RX ORDER — ONDANSETRON 2 MG/ML
4 INJECTION INTRAMUSCULAR; INTRAVENOUS EVERY 6 HOURS PRN
Status: DISCONTINUED | OUTPATIENT
Start: 2021-01-20 | End: 2021-01-25

## 2021-01-20 RX ORDER — HYDRALAZINE HYDROCHLORIDE 25 MG/1
25 TABLET, FILM COATED ORAL 3 TIMES DAILY
Status: DISCONTINUED | OUTPATIENT
Start: 2021-01-20 | End: 2021-01-21

## 2021-01-20 RX ORDER — ACETAMINOPHEN 325 MG/1
650 TABLET ORAL EVERY 6 HOURS PRN
Status: DISCONTINUED | OUTPATIENT
Start: 2021-01-20 | End: 2021-01-25 | Stop reason: HOSPADM

## 2021-01-20 RX ORDER — CARVEDILOL 6.25 MG/1
12.5 TABLET ORAL 2 TIMES DAILY
Status: DISCONTINUED | OUTPATIENT
Start: 2021-01-20 | End: 2021-01-20

## 2021-01-20 RX ORDER — POLYETHYLENE GLYCOL 3350 17 G/17G
17 POWDER, FOR SOLUTION ORAL DAILY PRN
Status: DISCONTINUED | OUTPATIENT
Start: 2021-01-20 | End: 2021-01-25 | Stop reason: HOSPADM

## 2021-01-20 RX ORDER — HEPARIN SODIUM 1000 [USP'U]/ML
60 INJECTION, SOLUTION INTRAVENOUS; SUBCUTANEOUS PRN
Status: DISCONTINUED | OUTPATIENT
Start: 2021-01-20 | End: 2021-01-25

## 2021-01-20 RX ORDER — ACETAMINOPHEN 650 MG/1
650 SUPPOSITORY RECTAL EVERY 6 HOURS PRN
Status: DISCONTINUED | OUTPATIENT
Start: 2021-01-20 | End: 2021-01-25 | Stop reason: HOSPADM

## 2021-01-20 RX ORDER — FUROSEMIDE 20 MG/1
20 TABLET ORAL DAILY
Status: DISCONTINUED | OUTPATIENT
Start: 2021-01-20 | End: 2021-01-25 | Stop reason: HOSPADM

## 2021-01-20 RX ORDER — ATORVASTATIN CALCIUM 40 MG/1
40 TABLET, FILM COATED ORAL DAILY
Status: DISCONTINUED | OUTPATIENT
Start: 2021-01-20 | End: 2021-01-25 | Stop reason: HOSPADM

## 2021-01-20 RX ORDER — SODIUM CHLORIDE 0.9 % (FLUSH) 0.9 %
10 SYRINGE (ML) INJECTION EVERY 12 HOURS SCHEDULED
Status: DISCONTINUED | OUTPATIENT
Start: 2021-01-20 | End: 2021-01-25 | Stop reason: HOSPADM

## 2021-01-20 RX ORDER — BUTALBITAL, ACETAMINOPHEN AND CAFFEINE 50; 325; 40 MG/1; MG/1; MG/1
1 TABLET ORAL EVERY 4 HOURS PRN
Status: DISCONTINUED | OUTPATIENT
Start: 2021-01-20 | End: 2021-01-25 | Stop reason: HOSPADM

## 2021-01-20 RX ORDER — SODIUM CHLORIDE 0.9 % (FLUSH) 0.9 %
10 SYRINGE (ML) INJECTION PRN
Status: DISCONTINUED | OUTPATIENT
Start: 2021-01-20 | End: 2021-01-25 | Stop reason: HOSPADM

## 2021-01-20 RX ORDER — HEPARIN SODIUM 10000 [USP'U]/100ML
12 INJECTION, SOLUTION INTRAVENOUS CONTINUOUS
Status: DISPENSED | OUTPATIENT
Start: 2021-01-20 | End: 2021-01-22

## 2021-01-20 RX ORDER — CARVEDILOL 25 MG/1
25 TABLET ORAL 2 TIMES DAILY
Status: DISCONTINUED | OUTPATIENT
Start: 2021-01-20 | End: 2021-01-25 | Stop reason: HOSPADM

## 2021-01-20 RX ORDER — HYDRALAZINE HYDROCHLORIDE 20 MG/ML
10 INJECTION INTRAMUSCULAR; INTRAVENOUS EVERY 4 HOURS PRN
Status: DISCONTINUED | OUTPATIENT
Start: 2021-01-20 | End: 2021-01-21

## 2021-01-20 RX ORDER — HEPARIN SODIUM 1000 [USP'U]/ML
30 INJECTION, SOLUTION INTRAVENOUS; SUBCUTANEOUS PRN
Status: DISCONTINUED | OUTPATIENT
Start: 2021-01-20 | End: 2021-01-25

## 2021-01-20 RX ORDER — METOCLOPRAMIDE 10 MG/1
5 TABLET ORAL 4 TIMES DAILY PRN
Status: DISCONTINUED | OUTPATIENT
Start: 2021-01-20 | End: 2021-01-25 | Stop reason: HOSPADM

## 2021-01-20 RX ORDER — SPIRONOLACTONE 25 MG/1
25 TABLET ORAL DAILY
Status: DISCONTINUED | OUTPATIENT
Start: 2021-01-20 | End: 2021-01-25 | Stop reason: HOSPADM

## 2021-01-20 RX ADMIN — CARVEDILOL 25 MG: 25 TABLET, FILM COATED ORAL at 21:08

## 2021-01-20 RX ADMIN — ACETAMINOPHEN 650 MG: 325 TABLET ORAL at 16:12

## 2021-01-20 RX ADMIN — IOPAMIDOL 54 ML: 755 INJECTION, SOLUTION INTRAVENOUS at 15:55

## 2021-01-20 RX ADMIN — ACETAMINOPHEN 650 MG: 325 TABLET ORAL at 22:07

## 2021-01-20 RX ADMIN — CARVEDILOL 12.5 MG: 6.25 TABLET, FILM COATED ORAL at 07:40

## 2021-01-20 RX ADMIN — AMIODARONE HYDROCHLORIDE 0.5 MG/MIN: 50 INJECTION, SOLUTION INTRAVENOUS at 20:51

## 2021-01-20 RX ADMIN — AMIODARONE HYDROCHLORIDE 0.5 MG/MIN: 50 INJECTION, SOLUTION INTRAVENOUS at 05:28

## 2021-01-20 RX ADMIN — HEPARIN SODIUM 12 UNITS/KG/HR: 10000 INJECTION, SOLUTION INTRAVENOUS at 18:26

## 2021-01-20 RX ADMIN — Medication 10 ML: at 21:09

## 2021-01-20 ASSESSMENT — PAIN DESCRIPTION - PROGRESSION: CLINICAL_PROGRESSION: NOT CHANGED

## 2021-01-20 ASSESSMENT — PAIN DESCRIPTION - LOCATION: LOCATION: NECK

## 2021-01-20 ASSESSMENT — PAIN DESCRIPTION - ONSET: ONSET: GRADUAL

## 2021-01-20 ASSESSMENT — PAIN DESCRIPTION - PAIN TYPE: TYPE: ACUTE PAIN;SURGICAL PAIN

## 2021-01-20 ASSESSMENT — PAIN SCALES - GENERAL
PAINLEVEL_OUTOF10: 0
PAINLEVEL_OUTOF10: 5
PAINLEVEL_OUTOF10: 0

## 2021-01-20 ASSESSMENT — PAIN DESCRIPTION - DESCRIPTORS: DESCRIPTORS: HEADACHE

## 2021-01-20 ASSESSMENT — PAIN DESCRIPTION - ORIENTATION: ORIENTATION: MID

## 2021-01-20 NOTE — PROGRESS NOTES
Pt returned to CVU from cath lab. Pt in NSR with occasional PAC's. Right radial TR band in place with 14 mL air. Will start removing air at 1600. Pt lethargic, however, denies pain. Will continue to monitor.     Electronically signed by Anuj Kimball RN on 1/20/2021 at 4:22 PM

## 2021-01-20 NOTE — PROGRESS NOTES
Report received from CVU RN. Pt's heart rhythm is A-flutter with rate in 60's.     Electronically signed by Perez Beaver RN on 1/20/2021 at 7:26 AM

## 2021-01-20 NOTE — PRE SEDATION
[facility] 10[facility]2018         Surgical History:  Past Surgical History:   Procedure Laterality Date    COLONOSCOPY N/A 10/27/2020    COLONOSCOPY POLYPECTOMY SNARE/COLD BIOPSY performed by Ajay Ferguson MD at [place] [address]  11/29/12    dual chamber PPM, [employer]    TUBAL LIGATION      UPPER GASTROINTESTINAL ENDOSCOPY N/A 10/27/2020    EGD DIAGNOSTIC ONLY performed by Ajay Ferguson MD at [place] [address]           Medications:  Current Facility-Administered Medications   Medication Dose Route Frequency Provider Last Rate Last Admin    amiodarone (CORDARONE) 450 mg in dextrose 5 % 250 mL infusion  0.5 mg/min Intravenous Continuous Ericka Kenny V, DO 16.7 mL/hr at 01/20/21 0528 0.5 mg/min at 01/20/21 0528    sodium chloride flush 0.9 % injection 10 mL  10 mL Intravenous 2 times per day Eulice Marking, MD        sodium chloride flush 0.9 % injection 10 mL  10 mL Intravenous PRN Eulice Marking, MD        promethazine (PHENERGAN) tablet 12.5 mg  12.5 mg Oral Q6H PRN Eulice Marking, MD        Or    ondansetron (ZOFRAN) injection 4 mg  4 mg Intravenous Q6H PRN Eulice Marking, MD        polyethylene glycol (GLYCOLAX) packet 17 g  17 g Oral Daily PRN Eulice Marking, MD        acetaminophen (TYLENOL) tablet 650 mg  650 mg Oral Q6H PRN Eulice Marking, MD        Or    acetaminophen (TYLENOL) suppository 650 mg  650 mg Rectal Q6H PRN Eulice Marking, MD        butalbital-acetaminophen-caffeine (FIORICET, ESGIC) per tablet 1 tablet  1 tablet Oral Q4H PRN Eulice Marking, MD        rivaroxaban (XARELTO) tablet 20 mg  20 mg Oral Daily with breakfast Eulice Marking, MD        atorvastatin (LIPITOR) tablet 40 mg  40 mg Oral Daily Eulice Marking, MD        hydrALAZINE (APRESOLINE) tablet 25 mg  25 mg Oral TID Eulice Marking, MD        carvedilol (COREG) tablet 12.5 mg  12.5 mg Oral BID Eulice Marking, MD   12.5 mg at 01/20/21 0740    furosemide (LASIX) tablet 20 mg  20 mg Oral Daily Renae Case MD        spironolactone (ALDACTONE) tablet 25 mg  25 mg Oral Daily Renae Case MD        metoclopramide (REGLAN) tablet 5 mg  5 mg Oral 4x Daily PRN Renae Case MD        perflutren lipid microspheres (DEFINITY) injection 1.65 mg  1.5 mL Intravenous ONCE PRN CATRACHITO Lynch - CNP               Pre-Sedation:    Pre-Sedation Documentation and Exam:  I have assessed the patient and agree with the H&P present on the chart. Prior History of Anesthesia Complications:   none    Modified Mallampati:  II (soft palate, uvula, fauces visible)    ASA Classification:  Class 2 - A normal healthy patient with mild systemic disease      Sandra Scale: Activity:  2 - Able to move 4 extremities voluntarily on command  Respiration:  2 - Able to breathe deeply and cough freely  Circulation:  2 - BP+/- 20mmHg of normal  Consciousness:  2 - Fully awake  Oxygen Saturation (color):  2 - Able to maintain oxygen saturation >92% on room air    Sedation/Anesthesia Plan:  Guard the patient's safety and welfare. Minimize physical discomfort and pain. Minimize negative psychological responses to treatment by providing sedation and analgesia and maximize the potential amnesia. Patient to meet pre-procedure discharge plan.     Medication Planned:  midazolam intravenously and fentanyl intravenously    Patient is an appropriate candidate for plan of sedation: yes      Electronically signed by Chinedu Davidson MD on 1/20/2021 at 12:39 PM

## 2021-01-20 NOTE — PROGRESS NOTES
[facility] PROGRESS NOTE    1/20/2021 7:25 AM        Name: Matteo Taylor . Admitted: 1/20/2021  Primary Care Provider: No primary care provider on file. (Tel: None)      Subjective: Wava Prim Pt was seen this am with  at bedside  She was also just previously seen by Dr Tawanna Vargas. Pt was agreeable to planned cardioversion today to get her out of At flutter. During our bedside conversation she began to rub her chest and then the  monitor showed a 7 second run of V tach. She was tearful following the event.   Cardiology again at bedside     Chart review also indicates a 4 second run of V tach in the ED     Reviewed interval ancillary notes    Current Medications      amiodarone (CORDARONE) 450 mg in dextrose 5 % 250 mL infusion, Continuous      sodium chloride flush 0.9 % injection 10 mL, 2 times per day      sodium chloride flush 0.9 % injection 10 mL, PRN      promethazine (PHENERGAN) tablet 12.5 mg, Q6H PRN    Or      ondansetron (ZOFRAN) injection 4 mg, Q6H PRN      polyethylene glycol (GLYCOLAX) packet 17 g, Daily PRN      acetaminophen (TYLENOL) tablet 650 mg, Q6H PRN    Or      acetaminophen (TYLENOL) suppository 650 mg, Q6H PRN      butalbital-acetaminophen-caffeine (FIORICET, ESGIC) per tablet 1 tablet, Q4H PRN      rivaroxaban (XARELTO) tablet 20 mg, Daily with breakfast      atorvastatin (LIPITOR) tablet 40 mg, Daily      hydrALAZINE (APRESOLINE) tablet 25 mg, TID      carvedilol (COREG) tablet 12.5 mg, BID      furosemide (LASIX) tablet 20 mg, Daily      spironolactone (ALDACTONE) tablet 25 mg, Daily      metoclopramide (REGLAN) tablet 5 mg, 4x Daily PRN        Objective:  BP (!) 164/115   Pulse 71   Temp 96.9 °F (36.1 °C) (Temporal)   Resp 22   Ht 5' 7\" (1.702 m)   Wt 190 lb 0.6 oz (86.2 kg)   SpO2 100%   BMI 29.76 kg/m²   No intake or output data in the 24 hours ending 01/20/21 0725   Wt Readings from Last 3 Encounters:   01/20/21 190 lb 0.6 oz (86.2 kg)   12/07/20 190 lb (86.2 kg)   10/27/20 188 lb (85.3 kg)       General appearance:  Appears anxious after V tach , tearful   Eyes: Sclera clear. Pupils equal.  ENT: Moist oral mucosa. Trachea midline, no adenopathy. Cardiovascular: Regular rhythm, normal S1, S2. No murmur. No edema in lower extremities  Respiratory: Not using accessory muscles. Good inspiratory effort. Clear to auscultation bilaterally, no wheeze or crackles. GI: Abdomen soft, no tenderness, not distended, normal bowel sounds  Musculoskeletal: No cyanosis in digits, neck supple  Neurology: CN 2-12 grossly intact. No speech or motor deficits  Psych: Normal affect. Alert and oriented in time, place and person  Skin: Warm, dry, normal turgor    Labs and Tests:  CBC:   Recent Labs     01/20/21  0433   WBC 6.1   HGB 10.6*        BMP:    Recent Labs     01/20/21  0433      K 3.8      CO2 19*   BUN 6*   CREATININE 0.7   GLUCOSE 107*     Hepatic:   Recent Labs     01/20/21  0433   AST 28   ALT 19   BILITOT 0.3   ALKPHOS 66     Chest xray  No acute disease. EKG shows paced rhythm     Echo  Feb 2020  Summary   -Limited echocardiogram was performed to evaluate EF.   -Normal left ventricle size, mild to moderate left ventricular hypertrophy,   and moderately reduced systolic function with an estimated ejection fraction   of 30-35%. -There is moderate diffuse hypokinesis. -Grade III diastolic dysfunction . E/e\"=10.7.   -Mild mitral regurgitation.   -Mild tricuspid regurgitation.   -The left atrium is mild to moderate dilated. -Right ventricular systolic function appears mildly reduced .   -Estimated pulmonary artery systolic pressure is borderline normal at 28-33   mmHg assuming a right atrial pressure of 8 mmHg.   -Pacer / ICD wire is visualized in the right heart.     Problem List  Principal Problem:    Paroxysmal ventricular tachycardia (HCC)  Active Problems:    HTN (hypertension)    Cardiac pacemaker in situ    Persistent atrial fibrillation (HCC)    Systolic CHF, chronic (HCC)    Obstructive sleep apnea (adult) (pediatric)    Dilated cardiomyopathy (HCC)    Class 1 obesity without serious comorbidity with body mass index (BMI) of 33.0 to 33.9 in adult  Resolved Problems:    * No resolved hospital problems. *       Assessment & Plan:   1. Paroxysmal V Tach:  Pt on amiodarone,  Dr Marcia Cook at bedside. EP to coordinate with CT surgery and anesthesia BIV pacer with ICD. Magnesium and electrolytes in range    2. Persistent Atrial fib:  Anticipate cardioversion this am at the bedside. She is AC with Xarelto   3. HTN:  On BB, hydralazine and Aldactone  4.  Chronic systolic HF : on BB, aldactone,  Has noted allergy to ACE  5. DERECK;  CPAP at hs       Diet: DIET GENERAL;  Code:Full Code  DVT PPX      CATRACHITO Patel - CNP   1/20/2021 7:25 AM

## 2021-01-20 NOTE — PROGRESS NOTES
1114: 8 second run of V-tach, self resolved. Pt describes feeling \"funny and lightheaded\". Pulse not lost.  Returned to A-flutter, plan for cardioversion at bedside.     Electronically signed by Lauri Etienne RN on 1/20/2021 at 11:35 AM

## 2021-01-20 NOTE — PROGRESS NOTES
H&P Update    I have reviewed the history and physical and examined the patient and updated with relevant changes. Consent: I have discussed with the patient and/or the patient representative the indication, alternatives, and the possible risks and/or complications of the planned procedure and the anesthesia methods. The patient and/or patient representative appear to understand and agree to proceed. Vitals:    01/20/21 1130   BP: (!) 176/104   Pulse: 61   Resp: 24   Temp:    SpO2:      Prior to Admission medications    Medication Sig Start Date End Date Taking?  Authorizing Provider   furosemide (LASIX) 20 MG tablet 40 mg for 1 week and then 20 mg daily 12/8/20  Yes CATRACHITO Modi - CNS   hydrALAZINE (APRESOLINE) 25 MG tablet Take 1 tablet by mouth 3 times daily 12/7/20  Yes CATRACHITO Keenan - CNS   metoclopramide (REGLAN) 5 MG tablet Take 1 tablet by mouth 4 times daily  Patient taking differently: Take 5 mg by mouth 4 times daily as needed  10/27/20  Yes Karlos Alba MD   XARELTO 20 MG TABS tablet TAKE ONE TABLET BY MOUTH DAILY WITH BREAKFAST 10/20/20  Yes Dada Garcia MD   carvedilol (COREG) 12.5 MG tablet Take 1 tablet by mouth 2 times daily 8/31/20  Yes CATRACHITO Stevens   spironolactone (ALDACTONE) 25 MG tablet Take 1 tablet by mouth daily 8/31/20  Yes CATRACHITO Stevens   butalbital-acetaminophen-caffeine (FIORICET, ESGIC) -40 MG per tablet Take 1 tablet by mouth every 4 hours as needed for Headaches   Yes Historical Provider, MD   atorvastatin (LIPITOR) 40 MG tablet Take 1 tablet by mouth daily 11/18/16  Yes Narciso Staton MD     Past Medical History:   Diagnosis Date    Anemia     Atrial fibrillation and flutter ([address]     Atrial flutter ([address]     CHB (complete heart block) ([address]     Class 1 obesity without serious comorbidity with body mass index (BMI) of 33.0 to 33.9 in adult 9/6/2019    Congenital heart disease     GERD (gastroesophageal reflux disease)     Headache(784.0)     History of complete heart block     Hyperlipidemia     Hypertension     Syncope     Systolic [facility] chronic ([facility] 10/3/2018     Past Surgical History:   Procedure Laterality Date    COLONOSCOPY N/A 10/27/2020    COLONOSCOPY POLYPECTOMY SNARE/COLD BIOPSY performed by Kendra Carson MD at [place] [address]  11/29/12    dual chamber PPM, [employer]    TUBAL LIGATION      UPPER GASTROINTESTINAL ENDOSCOPY N/A 10/27/2020    EGD DIAGNOSTIC ONLY performed by Kendra Carson MD at [place] [address]       Allergies   Allergen Reactions    Latex     Codeine      Hives      Entresto [Sacubitril-Valsartan]      Lips and tongue swelling    Lisinopril      cough    Nitroglycerin Hives    Sulfa Antibiotics Nausea Only       Pre-Sedation Documentation and Exam:   I have personally completed a history, physical exam & review of systems for this patient (see notes).     Mallampati Airway Assessment:  Class I     Prior History of Anesthesia Complications:   None    ASA Classification:  Class 3 - A patient with severe systemic disease that limits activity but is not incapacitating    Sedation/ Anesthesia Plan:   Intravenous sedation    Medications Planned:   Brevital intravenously     Patient is an appropriate candidate for plan of sedation:   Yes    Electronically signed by Tita Ayala MD on 1/20/2021 at 3:33 PM

## 2021-01-20 NOTE — PROGRESS NOTES
Pt to cath lab with Zoll monitor per cath lab RN's.     Electronically signed by Edwin Baeza RN on 1/20/2021 at 2:02 PM

## 2021-01-20 NOTE — ED PROVIDER NOTES
CHIEF COMPLAINT  Dizziness (since 1900 off and on tonight. Pt stated that every time she takes a certain medicine, it makes her feel dizzy but this is the worst. Medicine is hydralazine)      HISTORY OF PRESENT ILLNESS  Brent Knapp is a 63 y.o. female who presents to the ED with feelings of palpitations and lightheadedness tonight. She has a history of pacemaker and complete heart block as well as CHF. She is apparently also candidate for biventricular pacemaker in the future. She has a history of syncope with a heart block but denies any syncope tonight. No other complaints, modifying factors or associated symptoms. I have reviewed the following from the nursing documentation.     Past Medical History:   Diagnosis Date    Anemia     Atrial fibrillation and flutter (HCC)     Atrial flutter (HCC)     CHB (complete heart block) (HCC)     Class 1 obesity without serious comorbidity with body mass index (BMI) of 33.0 to 33.9 in adult 9/6/2019    Congenital heart disease     GERD (gastroesophageal reflux disease)     Headache(784.0)     History of complete heart block     Hyperlipidemia     Hypertension     Syncope     [facility]     Past Surgical History:   Procedure Laterality Date    COLONOSCOPY N/A 10/27/2020    COLONOSCOPY POLYPECTOMY SNARE/COLD BIOPSY performed by Al Noe MD at [place] [address]  11/29/12    dual chamber PPM, [employer]    TUBAL LIGATION      UPPER GASTROINTESTINAL ENDOSCOPY N/A 10/27/2020    EGD DIAGNOSTIC ONLY performed by Al Noe MD at [facility]       Family History   Problem Relation Age of Onset    Cancer Father     Heart Disease Neg Hx     High Blood Pressure Neg Hx     High Cholesterol Neg Hx      Social History     Socioeconomic History    Marital status:      Spouse name: Not on file    Number of children: Not on file    Years of education: Not on file    Highest education level: Not on file   Occupational History    Not on file   Social Needs    Financial resource strain: Not on file    Food insecurity     Worry: Not on file     Inability: Not on file    Transportation needs     Medical: Not on file     Non-medical: Not on file   Tobacco Use    Smoking status: Never Smoker    Smokeless tobacco: Never Used   Substance and Sexual Activity    Alcohol use: No    Drug use: No    Sexual activity: Not on file   Lifestyle    Physical activity     Days per week: Not on file     Minutes per session: Not on file    Stress: Not on file   Relationships    Social connections     Talks on phone: Not on file     Gets together: Not on file     Attends Orthodox service: Not on file     Active member of club or organization: Not on file     Attends meetings of clubs or organizations: Not on file     Relationship status: Not on file    Intimate partner violence     Fear of current or ex partner: Not on file     Emotionally abused: Not on file     Physically abused: Not on file     Forced sexual activity: Not on file   Other Topics Concern    Not on file   Social History Narrative    Not on file     No current facility-administered medications for this encounter.       Current Outpatient Medications   Medication Sig Dispense Refill    furosemide (LASIX) 20 MG tablet 40 mg for 1 week and then 20 mg daily 90 tablet 1    hydrALAZINE (APRESOLINE) 25 MG tablet Take 1 tablet by mouth 3 times daily 90 tablet 2    metoclopramide (REGLAN) 5 MG tablet Take 1 tablet by mouth 4 times daily (Patient taking differently: Take 5 mg by mouth 4 times daily as needed ) 120 tablet 5    XARELTO 20 MG TABS tablet TAKE ONE TABLET BY MOUTH DAILY WITH BREAKFAST 90 tablet 3    carvedilol (COREG) 12.5 MG tablet Take 1 tablet by mouth 2 times daily 60 tablet 5    spironolactone (ALDACTONE) 25 MG tablet Take 1 tablet by mouth daily 30 tablet 2    butalbital-acetaminophen-caffeine (FIORICET, ESGIC) -40 MG per tablet Take 1 tablet by mouth every 4 hours as needed for Headaches      atorvastatin (LIPITOR) 40 MG tablet Take 1 tablet by mouth daily 60 tablet 1     Allergies   Allergen Reactions    Latex     Codeine      Hives      Entresto [Sacubitril-Valsartan]      Lips and tongue swelling    Lisinopril      cough    Nitroglycerin Hives    Sulfa Antibiotics Nausea Only       REVIEW OF SYSTEMS  10 systems reviewed, pertinent positives per HPI otherwise noted to be negative. PHYSICAL EXAM  BP (!) 171/90   Pulse 62   Temp 97.3 °F (36.3 °C)   Resp 20   Ht 5' 7\" (1.702 m)   Wt 189 lb (85.7 kg)   SpO2 100%   BMI 29.60 kg/m²   GENERAL APPEARANCE: Awake and alert. Cooperative. No acute distress. HEAD: Normocephalic. Atraumatic. EYES: PERRL. EOM's grossly intact. ENT: Mucous membranes are moist.   NECK: Supple. HEART: RRR. CHEST/LUNGS: Chest atraumatic, nontender, respirations unlabored. CTAB. Good air exchange. Speaking comfortably in full sentences. BACK: No midline spinal tenderness or step-off. ABDOMEN: Soft. Non-distended. Non-tender. No guarding or rebound. Normal bowel sounds. EXTREMITIES: No peripheral edema. Moves all extremities equally. All extremities neurovascularly intact. RECTAL/: Deferred  SKIN: Warm and dry. No acute rashes. NEUROLOGICAL: Alert and oriented. CN 2-12 intact, No gross facial drooping. Strength 5/5, sensation intact. Normal coordination. Gait normal.   PSYCHIATRIC: Normal mood and affect. LABS  I have reviewed all labs for this visit.    Results for orders placed or performed during the hospital encounter of 01/20/21   CBC Auto Differential   Result Value Ref Range    WBC 6.1 4.0 - 11.0 K/uL    RBC 3.46 (L) 4.00 - 5.20 M/uL    Hemoglobin 10.6 (L) 12.0 - 16.0 g/dL    Hematocrit 32.7 (L) 36.0 - 48.0 %    MCV 94.3 80.0 - 100.0 fL    MCH 30.6 26.0 - 34.0 pg    MCHC 32.5 31.0 - 36.0 g/dL    RDW 16.3 (H) 12.4 - 15.4 %    Platelets 120 614 - 943 K/uL    MPV 8.0 5.0 - 10.5 fL    Neutrophils % 68.1 %    Lymphocytes % 21.7 %    Monocytes % 6.4 %    Eosinophils % 3.1 %    Basophils % 0.7 %    Neutrophils Absolute 4.1 1.7 - 7.7 K/uL    Lymphocytes Absolute 1.3 1.0 - 5.1 K/uL    Monocytes Absolute 0.4 0.0 - 1.3 K/uL    Eosinophils Absolute 0.2 0.0 - 0.6 K/uL    Basophils Absolute 0.0 0.0 - 0.2 K/uL   EKG 12 Lead   Result Value Ref Range    Ventricular Rate 64 BPM    Atrial Rate 357 BPM    QRS Duration 142 ms    Q-T Interval 476 ms    QTc Calculation (Bazett) 491 ms    R Axis 92 degrees    T Axis 257 degrees    Diagnosis Electronic ventricular pacemaker        EKG  EKG interpreted by me.  pacemaker but appears to have underlying atrial flutter with PVCs. No significant ST elevation or depression. RADIOLOGY  X-RAYS:  I have reviewed radiologic plain film image(s). ALL OTHER NON-PLAIN FILM IMAGES SUCH AS CT, ULTRASOUND AND MRI HAVE BEEN READ BY THE RADIOLOGIST. XR CHEST PORTABLE    (Results Pending)          CRITICAL CARE TIME ATTESTATION (45 minutes):    Due to the high probability of imminent or life-threatening deterioration this patient required my direct attention, interventions and personal management. I personally provided 45 minutes of critical care time exclusive of time spent on separate billable procedures. Time includes review and interpretation of laboratory data, review and interpretation of radiology results, discussions with consultants as applicable while monitoring for potential decompensation. Interventions were otherwise performed as documented above. ED COURSE/MDM  Patient seen and evaluated. Pacemaker interrogated I did speak with [employer] representative appears the patient has been having paroxysmal episodes of ventricular tachycardia as well as persistent atrial tachycardic arrhythmia that is reading up to 400. It appears device although his operating is programmed.   Her heart rate has been consistently in the 60s here as well as on the monitor in the EKG. Blood pressure is remained stable. I will speak with cardiology. 0507: I spoke with cardiology NP Pablo Moon will start amiodarone drip. All diagnostic tests reviewed and results discussed with patient. Plan of care discussed with patient. Patient in agreement with plan. CLINICAL IMPRESSION  1. Paroxysmal ventricular tachycardia ([address]    2. Near syncope        Blood pressure (!) 171/90, pulse 62, temperature 97.3 °F (36.3 °C), resp. rate 20, height 5' 7\" (1.702 m), weight 189 lb (85.7 kg), SpO2 100 %, not currently breastfeeding. DISPOSITION  Zoë Saldana was admitted in stable condition. This chart was generated in part by using Dragon Dictation system and may contain errors related to that system including errors in grammar, punctuation, and spelling, as well as words and phrases that may be inappropriate. When dictating, effort is made to correct spelling/grammar errors. If there are any questions or concerns please feel free to contact the dictating provider for clarification.      Mathieu Medina DO  ATTENDING, [address] [facility], DO  01/20/21 7330

## 2021-01-20 NOTE — CONSULTS
[facility]   Electrophysiology Consultation   Date: 1/20/2021  Reason for Consultation: Palpitation    Consult Requesting Physician: Mariaelena Rutledge MD   Chief Complaint   Patient presents with    Dizziness     since 1900 off and on tonight. Pt stated that every time she takes a certain medicine, it makes her feel dizzy but this is the worst. Medicine is hydralazine       CC: Palpitation   HPI: Nick Nguyen is a 63 y.o. female who has presented to the hospital with palpitation and lighthededness. Patient has history of AV block s/p dual chamber pacemaker implantation in 2012, HFrEF, HTN, HLD and persistent atrial fibrillation      She was seen in 2019 for upgrading the device to Biv-AICD. 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 Patient canceled her lead extraction and the BiV upgrade. States that she was afraid of the procedure at the time. She states that for the past few days she has been feeling dizzy and lightheaded. She also has had fluttering and feel like that she may blackout the past.  She has had episodes of nonsustained VT on telemetry in the emergency room and has been started on IV amiodarone. Patient also feels some chest pressure with these episodes of nonsustained ventricular tachycardia. While in CDU she had two runs of symptomatic ventricular tachycardia, associated with presyncope and symptoms. Patient has had history of paroxysmal atrial fibrillation in the past and was treated on Xarelto. She remains in atrial fibrillation. She is compliant with her anticoagulation. Assessment and plan:     - Symptomatic ventricular tachycardia   History of cardiomyopathy and LV dysfunction. Wide QRS pacing rhythm. I had seen the patient in office before and lead extraction and Biv-AICD upgrade had been discussed with her.  She is on OMT and follows with heart failure team.      Again discussed risk and benefits of lead extraction with her and her family members at the bedside. The risks including, but not limited to, the risks of bleeding, infection, pain, device malfunction, lead dislodgement, radiation exposure, injury to cardiac and surrounding structures (including pneumothorax), stroke, cardiac perforation, tamponade, need for emergent heart surgery, injury to esophagus, myocardial infarction and death were discussed in detail. Patient understand that lead extraction could be a potentially life threatening procedure. Patient verbalized understanding and would like to proceed. Needs coverage with CT surgery and also anesthesia prior to lead extraction and BiV ICD upgrade. Will have interventional cardiology to assess if she needs a cardiac cath. - Persistent Atrial fibrillation   Patient appears to be in persistent atrial fibrillation. She has history of paroxysmal atrial fibrillation the past but appears to be in persistent atrial fibrillation per her device interrogation. She has been on anticoagulation with Xarelto and was compliant with it. Atrial fibrillation can trigger her ventricular tachycardia. Discussed treatment options including cardioversion and/or ablation. She is on IV amiodarone loading therapy at this time. Plan for cardioversion today and if she has recurrent A. fib flutter then ablation can be considered in future     - HFrEF:    On medical therapy with carvedilol   She has allergic reaction to ACE inhibitor and also Entresto   Last ejection fraction was low 35%. - Congenital AV block:    S/p pacemaker implantation on 11/29/2012   Device interrogataion with atrial fibrillation since 1/19/2021 Afib    Persistent atiral fibrillation    AP 57%   : 68%     Discussed with nursing staff.        Active Hospital Problems    Diagnosis Date Noted    Persistent atrial fibrillation [facility]) [I48.19]      Priority: High    Cardiac pacemaker in situ [Z95.0] 12/12/2012     Priority: High    HTN (hypertension) [I10] 10/26/2012     Priority: High    Paroxysmal ventricular tachycardia ([facility] [I47.2] 2021    Class 1 obesity without serious comorbidity with body mass index (BMI) of 33.0 to 33.9 in adult [E66.9, Z68.33] 2019    Dilated cardiomyopathy ([facility] [I42.0] 2019    Obstructive sleep apnea (adult) (pediatric) [R13.42]     Systolic CHF, chronic ([facility] [I50.22] 10/03/2018       Diagnostic studies:     ECG: Atrial fibrillation/flutter, paced ventricular rhythm     2018: Myoview    Enlarged LV with mild global hypokinesis. EF 51%    There is normal isotope uptake at stress and rest. There is no evidence of    myocardial ischemia or scar.           Echo: 2020  -Limited echocardiogram was performed to evaluate EF.   -Normal left ventricle size, mild to moderate left ventricular hypertrophy,   and moderately reduced systolic function with an estimated ejection fraction   of 30-35%. -There is moderate diffuse hypokinesis. -Grade III diastolic dysfunction . E/e\"=10.7.   -Mild mitral regurgitation.   -Mild tricuspid regurgitation.   -The left atrium is mild to moderate dilated. -Right ventricular systolic function appears mildly reduced .   -Estimated pulmonary artery systolic pressure is borderline normal at 28-33   mmHg assuming a right atrial pressure of 8 mmHg.   -Pacer / ICD wire is visualized in the right heart. I independently reviewed the cardiac diagnostic studies, ECG and relevant imaging studies.      Lab Results   Component Value Date    LVEF 30 2020    LVEFMODE Echo 2020     No results found for: TSHFT4, TSH    Physical Examination:  Vitals:    21 0730   BP: (!) 161/86   Pulse: 60   Resp: 20   Temp: 96.8 °F (36 °C)   SpO2: 100%      In: 35.7 [I.V.:35.7]  Out: -    Wt Readings from Last 3 Encounters:   21 190 lb 0.6 oz (86.2 kg)   20 190 lb (86.2 kg)   10/27/20 188 lb (85.3 kg)     Temp  Av °F (36.1 °C)  Min: 96.8 °F (36 °C)  Max: 97.3 °F (36.3 °C)  Pulse Av.8  Min: 60  Max: 71  BP  Min: 149/119  Max: 181/98  SpO2  Av.4 %  Min: 97 %  Max: 100 %    Intake/Output Summary (Last 24 hours) at 2021 0842  Last data filed at 2021 0730  Gross per 24 hour   Intake 35.7 ml   Output    Net 35.7 ml         I independently reviewed all cardiac tracing from cardiac telemetry. · Constitutional: Oriented. No distress. · Head: Normocephalic and atraumatic. · Mouth/Throat: Oropharynx is clear and moist.   · Eyes: Conjunctivae normal. EOM are normal.   · Neck: Neck supple. No JVD present. · Cardiovascular: Normal rate, regular rhythm, S1&S2. · Pulmonary/Chest: Bilateral respiratory sounds. No rhonchi. · Abdominal: Soft. No tenderness. · Musculoskeletal: No tenderness. No edema    · Lymphadenopathy: Has no cervical adenopathy. · Neurological: Alert and oriented. Follows command, No Gross deficit   · Skin: Skin is warm, No rash noted. · Psychiatric: Has a normal behavior       Scheduled Meds:   sodium chloride flush  10 mL Intravenous 2 times per day    rivaroxaban  20 mg Oral Daily with breakfast    atorvastatin  40 mg Oral Daily    hydrALAZINE  25 mg Oral TID    carvedilol  12.5 mg Oral BID    furosemide  20 mg Oral Daily    spironolactone  25 mg Oral Daily     Continuous Infusions:   amiodarone 450mg/250ml D5W infusion 0.5 mg/min (21 0528)     PRN Meds:.sodium chloride flush, promethazine **OR** ondansetron, polyethylene glycol, acetaminophen **OR** acetaminophen, butalbital-acetaminophen-caffeine, metoclopramide     Review of System:  [x] Full ROS obtained and negative except as mentioned in HPI    Prior to Admission medications    Medication Sig Start Date End Date Taking?  Authorizing Provider   furosemide (LASIX) 20 MG tablet 40 mg for 1 week and then 20 mg daily 20  Yes CATRACHITO Gould - CNS   hydrALAZINE (APRESOLINE) 25 MG tablet Take 1 tablet by mouth 3 times daily 20  Yes CATRACHITO Patiño - CNS metoclopramide (REGLAN) 5 MG tablet Take 1 tablet by mouth 4 times daily  Patient taking differently: Take 5 mg by mouth 4 times daily as needed  10/27/20  Yes Kendra Carson MD   XARELTO 20 MG TABS tablet TAKE ONE TABLET BY MOUTH DAILY WITH BREAKFAST 10/20/20  Yes Carvin Phoenix, MD   carvedilol (COREG) 12.5 MG tablet Take 1 tablet by mouth 2 times daily 8/31/20  Yes Rema Rivers, APRN - CNS   spironolactone (ALDACTONE) 25 MG tablet Take 1 tablet by mouth daily 8/31/20  Yes [facility]   butalbital-acetaminophen-caffeine (FIORICET, ESGIC) -40 MG per tablet Take 1 tablet by mouth every 4 hours as needed for Headaches   Yes Historical Provider, MD   atorvastatin (LIPITOR) 40 MG tablet Take 1 tablet by mouth daily 11/18/16  Yes Daryn Gardiner MD       Past Medical History:   Diagnosis Date    Anemia     Atrial fibrillation and flutter ([address]     Atrial flutter ([address]     CHB (complete heart block) ([address]     Class 1 obesity without serious comorbidity with body mass index (BMI) of 33.0 to 33.9 in adult 9/6/2019    Congenital heart disease     GERD (gastroesophageal reflux disease)     Headache(784.0)     History of complete heart block     Hyperlipidemia     Hypertension     Syncope     Systolic CHF, chronic ([address] 10/3/2018        Past Surgical History:   Procedure Laterality Date    COLONOSCOPY N/A 10/27/2020    COLONOSCOPY POLYPECTOMY SNARE/COLD BIOPSY performed by Kendra Carson MD at [place] [address]  11/29/12    dual chamber PPM, [employer]    TUBAL LIGATION      UPPER GASTROINTESTINAL ENDOSCOPY N/A 10/27/2020    EGD DIAGNOSTIC ONLY performed by Kendra Carson MD at [place] [address]         Allergies   Allergen Reactions    Latex     Codeine      Hives      Entresto [Sacubitril-Valsartan]      Lips and tongue swelling    Lisinopril      cough    Nitroglycerin Hives    Sulfa Antibiotics Nausea Only       Social History:  Reviewed. reports that she has never smoked. She has never used smokeless tobacco. She reports that she does not drink alcohol or use drugs. Family History:  Reviewed. Reviewed. No family history of SCD. Relevant and available labs, and cardiovascular diagnostics reviewed. Reviewed. Recent Labs     01/20/21  0433      K 3.8      CO2 19*   BUN 6*   CREATININE 0.7     Recent Labs     01/20/21  0433   WBC 6.1   HGB 10.6*   HCT 32.7*   MCV 94.3        Estimated Creatinine Clearance: 101 mL/min (based on SCr of 0.7 mg/dL). No results found for: BNP    I independently reviewed all cardiac tracing from cardiac telemetry. I independently reviewed relevant and available cardiac diagnostic tests ECG, CXR, Echo, Stress test, Device interrogation, Holter, CT scan. Complex medical condition with multiple medical problems affecting prognosis and outcome of EP interventions  Severe exacerbation of underlying medical condition requiring hospitalization and at risk of decompensation. Total critical care time was 40 minutes, for caring of this patient with life-threatening condition and organ failure, excluding separately reportable procedures. Services, included in critical care time were chart data review, documentation time, obtaining info from patient, review of nursing notes, and vital sign assessment and management of the patient. All questions and concerns were addressed to the patient/family. Alternatives to my treatment were discussed. I have discussed the above stated plan and the patient verbalized understanding and agreed with the plan. NOTE: This report was transcribed using voice recognition software. Every effort was made to ensure accuracy, however, inadvertent computerized transcription errors may be present.      Christ Loo MD, MPH  [facility]   Office: [phone]  Fax: [phone]

## 2021-01-20 NOTE — PROCEDURES
[facility]     Electrophysiology Procedure Note       Date of Procedure: 1/20/2021  Patient's Name: Nick Nguyen  YOB: 1965   Medical Record Number: [id number]  Procedure Performed by: Ajay Harris MD    Procedures performed:  IV sedation. External Electrical cardioversion     Indication of the procedure: Persistent atrial flutter     Details of procedure: The patient was brought to the cath lab area in a fasting and non-sedated state. The risks, benefits and alternatives of the procedure were discussed with the patient. The patient opted to proceed with the procedure. Written informed consent was signed and placed in the chart. A timeout protocol was completed to identify the patient and the procedure being performed. Patient is on chronic anticoagulation therapy. Then we used brevital for sedation and electrical DC cardioversion was perfomred using 200J, synchronized shock. Patient was converted to sinus rhythm. The patient tolerated the procedure well and there were no complications.      Conclusion:   Successful external DC cardioversion of atrial  flutter

## 2021-01-20 NOTE — OP NOTE
[address]  Procedure Note    Procedure: LHC, insertion RIJ TLC  Indication: VT, sob    Procedure Details:  Consent Access Bleed R Sedat Start Stop Versed Fentanyl Contrast Fluoro EBL Comp Spec   Yes RRA low MCSFC 1434 1518 0.5 25 35 4.7 <20 None None   *Ultrasound Note: Ultrasound guidance used to determine aforementioned artery patency, size (>2mm), anatomic variations and ideal puncture location. Real-time ultrasound utilized concurrent with vascular needle entry into the artery. Image(s) permanently recorded and reported in the patient chart. *Sedation Note: MCSFC = minimal conscious sedation for comfort    Findings:  Artery Findings/Result   LM Normal   LAD Normal   Cx Normal   RI NA   RCA Normal   LVEDP 6   LVG NA     Intervention:   None    Post Cath Dx:   Normal coronaries    Procedure Note  Patient with no adequate IV access with amiodarone infusing. TLC placed in RIJ under sterile conditions with fluoro guidance. No complications or blood loss.

## 2021-01-20 NOTE — H&P
Hospital Medicine History & Physical      PCP: No primary care provider on file. Date of Admission: 1/20/2021    Date of Service: Pt seen/examined on 01/20/21 and Admitted to Inpatient with expected LOS greater than two midnights due to medical therapy. Chief Complaint: Palpitations and dizziness      History Of Present Illness:  Gama العلي is 63 y.o. female who presented with complaint of palpitations. Symptom onset was acute for a time period of 1 day. The severity is described as moderate. The course of his symptoms over time is current. The symptoms improved with amiodrone drip and worsened with none. The patient's symptom is associated with dizziness. Gama العلي is 63 y.o. female with history of dilated cardiomyopathy and ventricular tachycardia, pacemaker present with history of complete heart block. She presents to the ER with complaint of palpitation and lightheadedness  Patient is a candidate for biventricular pacemaker in the future  Patient denies any syncope or chest pain today  In the ED patient was found and short runs of V. Tach  In the ED pacemaker was interrogated by [employer]. This showed intermittent V. tach since December 2020 as well as persistent a flutter currently tonight  Patient in the ED was started on amiodarone drip and since then no further V. Tach.    Patient is well-known to Dr. Talia King and is admitted to the ICU for further management      Past Medical History:          Diagnosis Date    Anemia     Atrial fibrillation and flutter ([address]     Atrial flutter ([address]     CHB (complete heart block) ([address]     Class 1 obesity without serious comorbidity with body mass index (BMI) of 33.0 to 33.9 in adult 9/6/2019    Congenital heart disease     GERD (gastroesophageal reflux disease)     Headache(784.0)     History of complete heart block     Hyperlipidemia     Hypertension     Syncope     Systolic CHF, chronic ([address] 10/3/2018       Past Surgical History:          Procedure Laterality Date    COLONOSCOPY N/A 10/27/2020    COLONOSCOPY POLYPECTOMY SNARE/COLD BIOPSY performed by Jaron Lane MD at [place] [address]  11/29/12    dual chamber PPM, [employer]    TUBAL LIGATION      UPPER GASTROINTESTINAL ENDOSCOPY N/A 10/27/2020    EGD DIAGNOSTIC ONLY performed by Jaron Lane MD at [place] [address]         Medications Prior to Admission:      Prior to Admission medications    Medication Sig Start Date End Date Taking? Authorizing Provider   furosemide (LASIX) 20 MG tablet 40 mg for 1 week and then 20 mg daily 12/8/20  Yes CATRACHITO Sneed   hydrALAZINE (APRESOLINE) 25 MG tablet Take 1 tablet by mouth 3 times daily 12/7/20  Yes CATRACHITO Rivas   metoclopramide (REGLAN) 5 MG tablet Take 1 tablet by mouth 4 times daily  Patient taking differently: Take 5 mg by mouth 4 times daily as needed  10/27/20  Yes Jaron Lane MD   XARELTO 20 MG TABS tablet TAKE ONE TABLET BY MOUTH DAILY WITH BREAKFAST 10/20/20  Yes Rashard Rubi MD   carvedilol (COREG) 12.5 MG tablet Take 1 tablet by mouth 2 times daily 8/31/20  Yes CATRACHITO Rivas   spironolactone (ALDACTONE) 25 MG tablet Take 1 tablet by mouth daily 8/31/20  Yes CATRACHITO Rivas   butalbital-acetaminophen-caffeine (FIORICET, ESGIC) -40 MG per tablet Take 1 tablet by mouth every 4 hours as needed for Headaches   Yes Historical Provider, MD   atorvastatin (LIPITOR) 40 MG tablet Take 1 tablet by mouth daily 11/18/16  Yes Anderson Lagos MD       Allergies:  Latex, Codeine, Entresto [sacubitril-valsartan], Lisinopril, Nitroglycerin, and Sulfa antibiotics    Social History:      The patient currently lives at home    TOBACCO:   reports that she has never smoked. She has never used smokeless tobacco.  ETOH:   reports no history of alcohol use.   E-Cigarettes/Vaping Use     Questions Responses    E-Cigarette/Vaping Use Never User    Start Date     Passive Exposure     Quit Date     Counseling Given     Comments             Family History:      Reviewed in detail and negative for DM, CAD, Cancer, CVA. Positive as follows:        Problem Relation Age of Onset    Cancer Father     Heart Disease Neg Hx     High Blood Pressure Neg Hx     High Cholesterol Neg Hx        REVIEW OF SYSTEMS:   Pertinent positives as noted in the HPI. All other systems reviewed and negative. PHYSICAL EXAM PERFORMED:    BP (!) 157/95   Pulse 60   Temp 97.3 °F (36.3 °C)   Resp 23   Ht 5' 7\" (1.702 m)   Wt 189 lb (85.7 kg)   SpO2 100%   BMI 29.60 kg/m²     General appearance:  No apparent distress, appears stated age and cooperative. HEENT:  Normal cephalic, atraumatic without obvious deformity. Pupils equal, round, and reactive to light. Extra ocular muscles intact. Conjunctivae/corneas clear. Neck: Supple, with full range of motion. No jugular venous distention. Trachea midline. Respiratory:  Normal respiratory effort. Clear to auscultation, bilaterally without Rales/Wheezes/Rhonchi. Cardiovascular:  Regular rate and rhythm with normal S1/S2 without murmurs, rubs or gallops. Abdomen: Soft, non-tender, non-distended with normal bowel sounds. Musculoskeletal:  No clubbing, cyanosis or edema bilaterally. Full range of motion without deformity. Skin: Skin color, texture, turgor normal.  No rashes or lesions. Neurologic:  Neurovascularly intact without any focal sensory/motor deficits.  Cranial nerves: II-XII intact, grossly non-focal.  Psychiatric:  Alert and oriented, thought content appropriate, normal insight  Capillary Refill: Brisk,< 3 seconds   Peripheral Pulses: +2 palpable, equal bilaterally       Labs:     Recent Labs     01/20/21  0433   WBC 6.1   HGB 10.6*   HCT 32.7*        Recent Labs     01/20/21  0433      K 3.8      CO2 19*   BUN 6*   CREATININE 0.7   CALCIUM 9.8 Recent Labs     01/20/21  0433   AST 28   ALT 19   BILITOT 0.3   ALKPHOS 66     No results for input(s): INR in the last 72 hours. Recent Labs     01/20/21  0433   TROPONINI <0.01       Urinalysis:      Lab Results   Component Value Date    NITRU Negative 07/10/2020    BLOODU Negative 07/10/2020    SPECGRAV 1.015 07/10/2020    GLUCOSEU Negative 07/10/2020       Radiology:     CXR: I have reviewed the CXR with the following interpretation: No acute cardiopulmonary disease  EKG:  I have reviewed the EKG with the following interpretation: Electronically ventricular paced rhythm      ASSESSMENT:    1. Paroxysmal ventricular tachycardia   2. Paroxysmal atrial fibrillation and atrial flutter  3. Cardiac pacemaker in situ  4. Dilated cardiomyopathy  5. Systolic CHF, chronic   6. Hypertension  7. Obstructive sleep apnea  8. Morbid obesity        PLAN:    1. Admit to CVICU with telemetry  2. Continue amiodarone drip  3. Consult cardiology  4. Pacemaker already interrogated in the ED. Review data with cardiology. 5. Patient complains dizziness with hydralazine. Started recently ? 6. Review her current BP meds with cardiology, adjust as needed  7. Continue usual diuretics Lasix and Aldactone with no change in doses  8. Continue Xarelto 20 mg daily      DVT Prophylaxis: On Xarelto  Diet: Regular diet  Code Status: Full code    PT/OT Eval Status: Not ordered    Dispo - Admit as inpatient       Jagruti Schmitt MD    Thank you No primary care provider on file. for the opportunity to be involved in this patient's care. If you have any questions or concerns please feel free to contact me at [phone].

## 2021-01-20 NOTE — CARE COORDINATION
[facility] Case Management Discharge Planning Assessment     RN/SW discharge planner met with patient (and family member) to discuss reason for admission,   current living situation, and potential needs at the time of discharge    Insurance/Demographics Verified: [employer] [address]   [] Apartment [] Condo [] Hotel [] Homeless [x] House [] Shelter   [] AL/IL/SNF           []  LTC     Confirmed w/:   Living w/: [] Alone [] Children [] Parent [] Spouse        Primary Care Provider / Last visit to PCP   Florencio Andrade 16 NOV 2020   Specialty Providers   [] [address]           Level of Functioning / DME   [x] no DME [x] Independent w/ ADLs [] Dependent w/ ADLs   [] BiPAP/CPAP [] BSC [] Cane [] Rollator   [] Hospital Bed [] Home O2   w/   [] Scooter [] Shower Chair [] Cailin Elder [] Wheelchair        Current [employer]   [x] n/a  []  CoA: w/: [] HD [] PD   [] [facility]           [] [facility] Aide  [] Nursing   [] PT  [] OT  [] SW        Medication compliance issues:  none identified    Financial issues that could impact healthcare:   none identified      Discussed and provided facilities of choice if transition to a skilled nursing facility is required at   the time of discharge. Tentative Discharge Planning   [] Acute Rehab [] Home Alone [] Home w/ Family   [] [facility]                                                                  [] [place]   []  RN [] PT [] OT [] SW   [] Hospice [] LTAC/Select   [] SNF /  [] LTC        Discussed with patient and/or family that on the day of discharge home tentative time of   discharge will be between 10 AM and noon.      Transportation at the time of discharge: dagoberto (Piyush Hunt)  OSMANI Fletcher, RN   RN Case Manager  [facility] Case Management  [phone]

## 2021-01-20 NOTE — ED NOTES
Bed: 10  Expected date:   Expected time:   Means of arrival: [facility]  Comments:     Heber Garrett RN  01/20/21 7750

## 2021-01-21 LAB
ABO/RH: NORMAL
ANION GAP SERPL CALCULATED.3IONS-SCNC: 12 MMOL/L (ref 3–16)
ANTIBODY SCREEN: NORMAL
APTT: 71.3 SEC (ref 24.2–36.2)
APTT: 77.6 SEC (ref 24.2–36.2)
APTT: 79.4 SEC (ref 24.2–36.2)
BASOPHILS ABSOLUTE: 0.1 K/UL (ref 0–0.2)
BASOPHILS RELATIVE PERCENT: 0.9 %
BUN BLDV-MCNC: 8 MG/DL (ref 7–20)
CALCIUM SERPL-MCNC: 9.6 MG/DL (ref 8.3–10.6)
CHLORIDE BLD-SCNC: 104 MMOL/L (ref 99–110)
CO2: 24 MMOL/L (ref 21–32)
CREAT SERPL-MCNC: 0.9 MG/DL (ref 0.6–1.1)
EOSINOPHILS ABSOLUTE: 0.2 K/UL (ref 0–0.6)
EOSINOPHILS RELATIVE PERCENT: 3.4 %
GFR AFRICAN AMERICAN: >60
GFR NON-AFRICAN AMERICAN: >60
GLUCOSE BLD-MCNC: 106 MG/DL (ref 70–99)
HCT VFR BLD CALC: 31.9 % (ref 36–48)
HEMOGLOBIN: 10.4 G/DL (ref 12–16)
INR BLD: 1.11 (ref 0.86–1.14)
LYMPHOCYTES ABSOLUTE: 1.6 K/UL (ref 1–5.1)
LYMPHOCYTES RELATIVE PERCENT: 26.5 %
MCH RBC QN AUTO: 30.7 PG (ref 26–34)
MCHC RBC AUTO-ENTMCNC: 32.5 G/DL (ref 31–36)
MCV RBC AUTO: 94.4 FL (ref 80–100)
MONOCYTES ABSOLUTE: 0.5 K/UL (ref 0–1.3)
MONOCYTES RELATIVE PERCENT: 7.7 %
NEUTROPHILS ABSOLUTE: 3.7 K/UL (ref 1.7–7.7)
NEUTROPHILS RELATIVE PERCENT: 61.5 %
PDW BLD-RTO: 16.1 % (ref 12.4–15.4)
PLATELET # BLD: 268 K/UL (ref 135–450)
PMV BLD AUTO: 8 FL (ref 5–10.5)
POTASSIUM SERPL-SCNC: 3.5 MMOL/L (ref 3.5–5.1)
PROTHROMBIN TIME: 12.9 SEC (ref 10–13.2)
RBC # BLD: 3.38 M/UL (ref 4–5.2)
SODIUM BLD-SCNC: 140 MMOL/L (ref 136–145)
WBC # BLD: 6 K/UL (ref 4–11)

## 2021-01-21 PROCEDURE — 2000000000 HC ICU R&B

## 2021-01-21 PROCEDURE — 6370000000 HC RX 637 (ALT 250 FOR IP): Performed by: INTERNAL MEDICINE

## 2021-01-21 PROCEDURE — 85610 PROTHROMBIN TIME: CPT

## 2021-01-21 PROCEDURE — 80048 BASIC METABOLIC PNL TOTAL CA: CPT

## 2021-01-21 PROCEDURE — 6360000002 HC RX W HCPCS: Performed by: NURSE PRACTITIONER

## 2021-01-21 PROCEDURE — 6370000000 HC RX 637 (ALT 250 FOR IP): Performed by: NURSE PRACTITIONER

## 2021-01-21 PROCEDURE — 85730 THROMBOPLASTIN TIME PARTIAL: CPT

## 2021-01-21 PROCEDURE — 6360000002 HC RX W HCPCS: Performed by: EMERGENCY MEDICINE

## 2021-01-21 PROCEDURE — 85025 COMPLETE CBC W/AUTO DIFF WBC: CPT

## 2021-01-21 PROCEDURE — 99233 SBSQ HOSP IP/OBS HIGH 50: CPT | Performed by: NURSE PRACTITIONER

## 2021-01-21 PROCEDURE — 2580000003 HC RX 258: Performed by: EMERGENCY MEDICINE

## 2021-01-21 PROCEDURE — 2580000003 HC RX 258: Performed by: INTERNAL MEDICINE

## 2021-01-21 PROCEDURE — 36592 COLLECT BLOOD FROM PICC: CPT

## 2021-01-21 RX ORDER — NIFEDIPINE 30 MG/1
30 TABLET, EXTENDED RELEASE ORAL DAILY PRN
Status: DISCONTINUED | OUTPATIENT
Start: 2021-01-21 | End: 2021-01-25 | Stop reason: HOSPADM

## 2021-01-21 RX ORDER — M-VIT,TX,IRON,MINS/CALC/FOLIC 27MG-0.4MG
1 TABLET ORAL DAILY
COMMUNITY

## 2021-01-21 RX ADMIN — NIFEDIPINE 30 MG: 30 TABLET, FILM COATED, EXTENDED RELEASE ORAL at 10:53

## 2021-01-21 RX ADMIN — Medication 10 ML: at 20:30

## 2021-01-21 RX ADMIN — BUTALBITAL, ACETAMINOPHEN, AND CAFFEINE 1 TABLET: 50; 325; 40 TABLET ORAL at 20:36

## 2021-01-21 RX ADMIN — CARVEDILOL 25 MG: 25 TABLET, FILM COATED ORAL at 20:30

## 2021-01-21 RX ADMIN — AMIODARONE HYDROCHLORIDE 0.5 MG/MIN: 50 INJECTION, SOLUTION INTRAVENOUS at 11:59

## 2021-01-21 RX ADMIN — HEPARIN SODIUM 10 UNITS/KG/HR: 10000 INJECTION, SOLUTION INTRAVENOUS at 20:31

## 2021-01-21 RX ADMIN — CARVEDILOL 25 MG: 25 TABLET, FILM COATED ORAL at 09:06

## 2021-01-21 RX ADMIN — Medication 10 ML: at 09:08

## 2021-01-21 RX ADMIN — SPIRONOLACTONE 25 MG: 25 TABLET ORAL at 09:06

## 2021-01-21 RX ADMIN — BUTALBITAL, ACETAMINOPHEN, AND CAFFEINE 1 TABLET: 50; 325; 40 TABLET ORAL at 05:23

## 2021-01-21 RX ADMIN — FUROSEMIDE 20 MG: 20 TABLET ORAL at 09:06

## 2021-01-21 ASSESSMENT — PAIN SCALES - GENERAL
PAINLEVEL_OUTOF10: 0

## 2021-01-21 ASSESSMENT — PAIN DESCRIPTION - ONSET: ONSET: ON-GOING

## 2021-01-21 ASSESSMENT — PAIN DESCRIPTION - ORIENTATION: ORIENTATION: MID

## 2021-01-21 ASSESSMENT — PAIN DESCRIPTION - DESCRIPTORS: DESCRIPTORS: HEADACHE

## 2021-01-21 ASSESSMENT — PAIN DESCRIPTION - PROGRESSION: CLINICAL_PROGRESSION: RESOLVED

## 2021-01-21 ASSESSMENT — PAIN DESCRIPTION - PAIN TYPE: TYPE: ACUTE PAIN

## 2021-01-21 NOTE — PROGRESS NOTES
[facility] PROGRESS NOTE    1/21/2021 4:13 PM        Name: Alize Lanier . Admitted: 1/20/2021  Primary Care Provider: No primary care provider on file. (Tel: None)    Brief Course:  64 yo F with dilated cardiomyopathy, HTN, complete heart block s/p PPM, Afib on Xarelto, chronic systolic CHF, anemia, obesity came to ER with complaints of dizziness. Admitted as inpatient for VT. Followed by Cardiology. Patient found to be in atrial flutter with runs of symptomatic VT. Underwent LHC 1/20/2020 that showed normal coronaries. She had episode of VT with syncope in cath lab prior to [facility]. Underwent Successful external DC cardioversion of atrial  flutter on 1/20/21. For laser lead extraction with BiV ICD placement on 1/22/21. CC:  Dizziness    Subjective:  . Patient denies CP, SOB, HA or abdominal pain. Not dizzy. Ready to proceed for ICD.     Reviewed interval ancillary notes    Current Medications      NIFEdipine (PROCARDIA XL) extended release tablet 30 mg, Daily PRN      amiodarone (CORDARONE) 450 mg in dextrose 5 % 250 mL infusion, Continuous      sodium chloride flush 0.9 % injection 10 mL, 2 times per day      sodium chloride flush 0.9 % injection 10 mL, PRN      promethazine (PHENERGAN) tablet 12.5 mg, Q6H PRN    Or      ondansetron (ZOFRAN) injection 4 mg, Q6H PRN      polyethylene glycol (GLYCOLAX) packet 17 g, Daily PRN      acetaminophen (TYLENOL) tablet 650 mg, Q6H PRN    Or      acetaminophen (TYLENOL) suppository 650 mg, Q6H PRN      butalbital-acetaminophen-caffeine (FIORICET, ESGIC) per tablet 1 tablet, Q4H PRN      atorvastatin (LIPITOR) tablet 40 mg, Daily      furosemide (LASIX) tablet 20 mg, Daily      spironolactone (ALDACTONE) tablet 25 mg, Daily      metoclopramide (REGLAN) tablet 5 mg, 4x Daily PRN      perflutren lipid microspheres (DEFINITY) injection 1.65 mg, ONCE PRN      heparin (porcine) injection 5,170 Units, PRN      heparin (porcine) injection 2,590 Units, PRN      heparin 25,000 units in dextrose 5% 250 mL (premix) infusion, Continuous      carvedilol (COREG) tablet 25 mg, BID        Objective:  /82   Pulse 78   Temp 98.1 °F (36.7 °C) (Temporal)   Resp 17   Ht 5' 7\" (1.702 m)   Wt 190 lb 0.6 oz (86.2 kg)   SpO2 99%   BMI 29.76 kg/m²     Intake/Output Summary (Last 24 hours) at 1/21/2021 1613  Last data filed at 1/21/2021 1400  Gross per 24 hour   Intake 1491 ml   Output 1050 ml   Net 441 ml      Wt Readings from Last 3 Encounters:   01/21/21 190 lb 0.6 oz (86.2 kg)   12/07/20 190 lb (86.2 kg)   10/27/20 188 lb (85.3 kg)       General appearance:  Appears comfortable, sitting in chair  Eyes: Sclera clear. Pupils equal.  ENT: Moist oral mucosa. Trachea midline, no adenopathy. Cardiovascular: Regular rhythm, normal S1, S2. No murmur. No edema in lower extremities. L PPM  Respiratory: Not using accessory muscles. Good inspiratory effort. Clear to auscultation bilaterally, no wheeze or crackles. GI: Abdomen soft, no tenderness, not distended, normal bowel sounds  Musculoskeletal: No cyanosis in digits, neck supple  Neurology: Grossly intact. No speech or motor deficits  Psych: Normal affect. Alert and oriented in time, place and person  Skin: Warm, dry, normal turgor  Extremity exam shows brisk capillary refill. Peripheral pulses are palpable in lower extremities     Labs and Tests:  CBC:   Recent Labs     01/20/21  0433 01/20/21  1650   WBC 6.1 5.3   HGB 10.6* 10.0*    269     BMP:    Recent Labs     01/20/21 0433      K 3.8      CO2 19*   BUN 6*   CREATININE 0.7   GLUCOSE 107*     Hepatic:   Recent Labs     01/20/21 0433   AST 28   ALT 19   BILITOT 0.3   ALKPHOS 66     XR CHEST PORTABLE   Final Result   No acute disease.                Problem List  Principal Problem:    VT (ventricular tachycardia) (HCC)  Active Problems:    HTN

## 2021-01-21 NOTE — PROGRESS NOTES
[facility]   Electrophysiology Progress Note   Date: 1/21/2021  Admit Date: 1/20/2021     Reason for follow up: VT    Chief Complaint:   Chief Complaint   Patient presents with    Dizziness     since 1900 off and on tonight. Pt stated that every time she takes a certain medicine, it makes her feel dizzy but this is the worst. Medicine is hydralazine     History of Present Illness: History obtained from patient and medical record. Kimberly Paula is a 63 y.o. female with a past medical history of HTN, HLD, CHB s/p PPM, anemia, atrial fibrillation, ,sCHF, and nonischemic cardiomyopathy who presented with dizziness and found to be in aflutter with runs of symptomatic VT. S/p LHC 1/20/2020 that showed normal coronaries. She had episode of VT with syncope in cath lab prior to [facility]. Interval Hx: Today, she is being seen for follow up. Plans for laser lead extraction with biv-AICD implant on Friday. On amiodarone gtt. On heparin gtt for aflutter. No arrhythmia on monitor overnight tonight. Reports palpitations overnight. No new complaints today. No major events overnight. Denies having chest pain, shortness of breath, orthopnea/PND, cough, or dizziness. Patient seen and examined. Clinical notes reviewed. Telemetry reviewed.     Problem List:   Patient Active Problem List    Diagnosis Date Noted    Persistent atrial fibrillation ([facility]      Priority: High    LVH (left ventricular hypertrophy) 06/11/2014     Priority: High    Headache 01/16/2013     Priority: High    Cardiac pacemaker in situ 12/12/2012     Priority: High    Dyspnea on exertion 12/12/2012     Priority: High    Pacemaker 11/30/2012     Priority: High    HTN (hypertension) 10/26/2012     Priority: High    Other and unspecified hyperlipidemia 10/26/2012     Priority: High    Congenital heart block 10/26/2012     Priority: High    VT (ventricular tachycardia) ([facility] 01/20/2021    Class 1 obesity without serious comorbidity with body mass index (BMI) of 33.0 to 33.9 in adult 09/06/2019    Dilated cardiomyopathy ([facility] 08/28/2019    Left subclavian vein thrombosis ([facility] 03/26/2019    LV dysfunction 03/18/2019    Obstructive sleep apnea (adult) (pediatric)     Hypersomnia     Syst[facility], chronic ([facility] 10/03/2018    Chest pain 09/11/2018      Assessment and Plan:  VT   - Symptomatic NSVT with syncope yesterday   - No recurence on monitor, occ PVC couplet   - Admits to intermittent palpitations    - On amiodarone gtt   - Plans for laser lead extraction and Biv-AICD tomorrow  Persistent Atrial Firbillation   - S/p DCCV 1/20/2021   - On heparin gtt   - On amiodarone   - Follow as OP for recurrence and consider ablation  CHB   - S/p PPM   -  on monitor   - Device has been interrogated and reviewed by EP provider  Cardiomyopathy/sCHF   - Appears compensated   - On BB, aldactone, and lasix   - Allergy to ACE/ARB   - Continue strict I&O, daily wts, and sodium/fluid restriction  HTN   - Not tolerating hydralazine, allergy to ACE/ARB, on full dose BB, will add nifedipine      All pertinent information and plan of care discussed with the EP physician. Multiple medical conditions with risk of decompensation. Allergies: Allergies   Allergen Reactions    Latex     Codeine      Hives      Entresto [Sacubitril-Valsartan]      Lips and tongue swelling    Lisinopril      cough    Nitroglycerin Hives    Sulfa Antibiotics Nausea Only       Home Meds:  Prior to Visit Medications    Medication Sig Taking?  Authorizing Provider   furosemide (LASIX) 20 MG tablet 40 mg for 1 week and then 20 mg daily Yes Rema Herron APRN - CNS   hydrALAZINE (APRESOLINE) 25 MG tablet Take 1 tablet by mouth 3 times daily Yes CATRACHITO Liu - CNS   metoclopramide (REGLAN) 5 MG tablet Take 1 tablet by mouth 4 times daily  Patient taking differently: Take 5 mg by mouth 4 times daily as needed  Yes Eliana Ramos MD   XARELTO 20 MG TABS tablet TAKE ONE TABLET BY MOUTH DAILY WITH BREAKFAST Yes Nette Gee MD   carvedilol (COREG) 12.5 MG tablet Take 1 tablet by mouth 2 times daily Yes CATRACHITO Palumbo - CNS   spironolactone (ALDACTONE) 25 MG tablet Take 1 tablet by mouth daily Yes CATRACHITO Palumbo - CNS   butalbital-acetaminophen-caffeine (FIORICET, ESGIC) -40 MG per tablet Take 1 tablet by mouth every 4 hours as needed for Headaches Yes Historical Provider, MD   atorvastatin (LIPITOR) 40 MG tablet Take 1 tablet by mouth daily Yes Naya Yoon MD      Scheduled Meds:   sodium chloride flush  10 mL Intravenous 2 times per day    rivaroxaban  20 mg Oral Daily with breakfast    atorvastatin  40 mg Oral Daily    hydrALAZINE  25 mg Oral TID    furosemide  20 mg Oral Daily    spironolactone  25 mg Oral Daily    carvedilol  25 mg Oral BID     Continuous Infusions:   amiodarone 450mg/250ml D5W infusion 0.5 mg/min (01/20/21 2051)    heparin (PORCINE) Infusion 10 Units/kg/hr (01/21/21 0116)     PRN Meds:sodium chloride flush, promethazine **OR** ondansetron, polyethylene glycol, acetaminophen **OR** acetaminophen, butalbital-acetaminophen-caffeine, metoclopramide, perflutren lipid microspheres, heparin (porcine), heparin (porcine), hydrALAZINE   Past Medical History:  Past Medical History:   Diagnosis Date    Anemia     Atrial fibrillation and flutter (HCC)     Atrial flutter (HCC)     CHB (complete heart block) (HCC)     Class 1 obesity without serious comorbidity with body mass index (BMI) of 33.0 to 33.9 in adult 9/6/2019    Congenital heart disease     GERD (gastroesophageal reflux disease)     Headache(784.0)     History of complete heart block     Hyperlipidemia     Hypertension     Syncope     Systolic CHF, chronic ([facility] 10/3/2018        Past Surgical History:    has a past surgical history that includes Uterine fibroid surgery; Pacemaker insertion (11/29/12); Tubal ligation;  Upper gastrointestinal endoscopy (N/A, 10/27/2020); and Colonoscopy (N/A, 10/27/2020). Social History:  Reviewed. reports that she has never smoked. She has never used smokeless tobacco. She reports that she does not drink alcohol or use drugs. Family History:  Reviewed. family history includes Cancer in her father. Denies family history of sudden cardiac death, arrhythmia, premature CAD    Review of Systems:  · Constitutional: Negative for fever, weight changes, or weakness  · Skin: Negative for bruising, bleeding, blood clots, or changes in skin pigment  · HEENT: Negative for vision changes or dysphagia  · Respiratory: Reviewed in HPI  · Cardiovascular: Reviewed in HPI  · Gastrointestinal: Negative for abdominal pain, N/V/D, constipation, or black/tarry stools  · Genito-Urinary: Negative for hematuria  · Musculoskeletal: No focal weakness  · Neurological/Psych: Negative for confusion or TIA-like symptoms. No anxiety, depression, or insomnia    Physical Examination:  Vitals:    01/21/21 0800   BP: (!) 144/97   Pulse: 78   Resp: 16   Temp: 96.9 °F (36.1 °C)   SpO2: 98%      In: 951 [P.O.:480; I.V.:471]  Out: 1050    Wt Readings from Last 3 Encounters:   01/21/21 190 lb 0.6 oz (86.2 kg)   12/07/20 190 lb (86.2 kg)   10/27/20 188 lb (85.3 kg)       Intake/Output Summary (Last 24 hours) at 1/21/2021 0841  Last data filed at 1/21/2021 0800  Gross per 24 hour   Intake 951 ml   Output 1050 ml   Net -99 ml     Telemetry: Personally Reviewed SR/paced/occ PVCs  · Constitutional: Cooperative and in no apparent distress, and appears well nourished  · Skin: Warm and pink; no cyanosis, bruising, or clubbing  · HEENT: Symmetric and normocephalic. Conjunctiva pink with clear sclera. Mucus membranes pink and moist.   · Cardiovascular: regular and rhythm. S1 & S2, negative for murmurs. Peripheral pulses 2+, capillary refill < 3 seconds. negative elevation of JVP. No edema  · Respiratory: Respirations symmetric and unlabored.  Lungs clear to auscultation bilaterally, no wheezing, crackles, or rhonchi  · Gastrointestinal: Abdomen soft and round. Bowel sounds normoactive in all quadrants. · Musculoskeletal: No focal weakness. · Neurologic/Psych: Awake and orientated to person, place and time. Calm affect, appropriate mood    Pertinent labs, diagnostic, device, and imaging results reviewed as a part of this visit    Labs:    BMP:   Recent Labs     21  0433      K 3.8      CO2 19*   BUN 6*   CREATININE 0.7   MG 2.40     Estimated Creatinine Clearance: 101 mL/min (based on SCr of 0.7 mg/dL). CBC:   Recent Labs     21  0433 21  1650   WBC 6.1 5.3   HGB 10.6* 10.0*   HCT 32.7* 30.6*   MCV 94.3 94.1    269     Thyroid: No results found for: TSH, E0ZRGKJ, J5AGCYM, THYROIDAB  Lipids: No results found for: CHOL, HDL, TRIG  LFTS:   Lab Results   Component Value Date    ALT 19 2021    AST 28 2021    ALKPHOS 66 2021    PROT 7.5 2021    AGRATIO 1.6 2021    BILITOT 0.3 2021     Cardiac Enzymes:   Lab Results   Component Value Date    TROPONINI <0.01 2021    TROPONINI <0.01 07/10/2020    TROPONINI <0.01 2020     Coags:   Lab Results   Component Value Date    PROTIME 16.6 2020    INR 1.43 2020     EC2021:     ECHO:  2021  Summary   Left ventricular size is mildly increased. Left ventricular systolic function is severely depressed with ejection   fraction estimated at 25-30%. Grade II diastolic dysfunction with elevated LV filling pressures. Moderate mitral regurgitation. The left atrium is moderate to severely dilated. Aortic valve appears sclerotic but opens adequately. Mild aortic regurgitation is present. Mild to moderate tricuspid regurgitation. Systolic pulmonary artery pressure   (SPAP) is estimated at 35 mmHg. Pacer / ICD wire is visualized in the right ventricle. Normal right ventricular size and function.     Cath: 1/20/2021  Findings:  Artery Findings/Result   LM Normal   LAD Normal   Cx Normal   RI NA   RCA Normal   LVEDP 6   LVG NA      Intervention:         None    All questions and concerns were addressed to the patient. Alternatives to my treatment were discussed. I have discussed the above stated plan with patient and the nurse. The patient verbalized understanding and agreed with the plan. Thank you for allowing to us to participate in the care of Tonya Preciado.     Star Harper, CATRACHITO-CNP  [address]   Office: [phone]

## 2021-01-21 NOTE — ACP (ADVANCE CARE PLANNING)
Advanced Care Planning Note. Purpose of Encounter: Advanced care planning in light of VT  Parties In Attendance: Patient  Decisional Capacity: Yes  Subjective: Patient denies CP, SOB, HA or abdominal pain  Objective: Cr 0.7 on 21  Goals of Care Determination: Patient wants full support (CPR, vent, surgery, HD, trach, PEG)  Plan:  Hep gtt. ICD. Amio gtt  Code Status: Full code   Time spent on Advanced care Plannin minutes  Advanced Care Planning Documents: Completed advanced directives on chart, sister is the POA.     Dia Kayser, MD  2021 4:22 PM

## 2021-01-22 ENCOUNTER — ANESTHESIA (OUTPATIENT)
Dept: CARDIAC CATH/INVASIVE PROCEDURES | Age: 56
DRG: 179 | End: 2021-01-22
Payer: MEDICAID

## 2021-01-22 ENCOUNTER — ANESTHESIA EVENT (OUTPATIENT)
Dept: CARDIAC CATH/INVASIVE PROCEDURES | Age: 56
DRG: 179 | End: 2021-01-22
Payer: MEDICAID

## 2021-01-22 ENCOUNTER — APPOINTMENT (OUTPATIENT)
Dept: GENERAL RADIOLOGY | Age: 56
DRG: 179 | End: 2021-01-22
Payer: MEDICAID

## 2021-01-22 VITALS
DIASTOLIC BLOOD PRESSURE: 102 MMHG | OXYGEN SATURATION: 100 % | TEMPERATURE: 95.2 F | SYSTOLIC BLOOD PRESSURE: 133 MMHG | RESPIRATION RATE: 3 BRPM

## 2021-01-22 LAB
ANION GAP SERPL CALCULATED.3IONS-SCNC: 9 MMOL/L (ref 3–16)
APTT: 78.2 SEC (ref 24.2–36.2)
BASOPHILS ABSOLUTE: 0 K/UL (ref 0–0.2)
BASOPHILS RELATIVE PERCENT: 0.8 %
BUN BLDV-MCNC: 8 MG/DL (ref 7–20)
CALCIUM SERPL-MCNC: 9.2 MG/DL (ref 8.3–10.6)
CHLORIDE BLD-SCNC: 103 MMOL/L (ref 99–110)
CO2: 26 MMOL/L (ref 21–32)
CREAT SERPL-MCNC: 0.8 MG/DL (ref 0.6–1.1)
EOSINOPHILS ABSOLUTE: 0.2 K/UL (ref 0–0.6)
EOSINOPHILS RELATIVE PERCENT: 3.3 %
GFR AFRICAN AMERICAN: >60
GFR NON-AFRICAN AMERICAN: >60
GLUCOSE BLD-MCNC: 106 MG/DL (ref 70–99)
HCT VFR BLD CALC: 29.6 % (ref 36–48)
HEMOGLOBIN: 9.6 G/DL (ref 12–16)
INR BLD: 1.11 (ref 0.86–1.14)
LV EF: 28 %
LVEF MODALITY: NORMAL
LYMPHOCYTES ABSOLUTE: 1.2 K/UL (ref 1–5.1)
LYMPHOCYTES RELATIVE PERCENT: 22.8 %
MAGNESIUM: 2.1 MG/DL (ref 1.8–2.4)
MCH RBC QN AUTO: 30.4 PG (ref 26–34)
MCHC RBC AUTO-ENTMCNC: 32.4 G/DL (ref 31–36)
MCV RBC AUTO: 93.9 FL (ref 80–100)
MONOCYTES ABSOLUTE: 0.5 K/UL (ref 0–1.3)
MONOCYTES RELATIVE PERCENT: 8.9 %
NEUTROPHILS ABSOLUTE: 3.3 K/UL (ref 1.7–7.7)
NEUTROPHILS RELATIVE PERCENT: 64.2 %
PDW BLD-RTO: 16.3 % (ref 12.4–15.4)
PLATELET # BLD: 250 K/UL (ref 135–450)
PMV BLD AUTO: 7.6 FL (ref 5–10.5)
POTASSIUM SERPL-SCNC: 3.1 MMOL/L (ref 3.5–5.1)
PROTHROMBIN TIME: 12.9 SEC (ref 10–13.2)
RBC # BLD: 3.15 M/UL (ref 4–5.2)
SODIUM BLD-SCNC: 138 MMOL/L (ref 136–145)
WBC # BLD: 5.2 K/UL (ref 4–11)

## 2021-01-22 PROCEDURE — 85730 THROMBOPLASTIN TIME PARTIAL: CPT

## 2021-01-22 PROCEDURE — 3600000017 HC SURGERY HYBRID ADDL 15MIN

## 2021-01-22 PROCEDURE — C1777 LEAD, AICD, ENDO SINGLE COIL: HCPCS

## 2021-01-22 PROCEDURE — 02HL3KZ INSERTION OF DEFIBRILLATOR LEAD INTO LEFT VENTRICLE, PERCUTANEOUS APPROACH: ICD-10-PCS | Performed by: INTERNAL MEDICINE

## 2021-01-22 PROCEDURE — 2580000003 HC RX 258: Performed by: INTERNAL MEDICINE

## 2021-01-22 PROCEDURE — 0JPT0PZ REMOVAL OF CARDIAC RHYTHM RELATED DEVICE FROM TRUNK SUBCUTANEOUS TISSUE AND FASCIA, OPEN APPROACH: ICD-10-PCS | Performed by: INTERNAL MEDICINE

## 2021-01-22 PROCEDURE — 6370000000 HC RX 637 (ALT 250 FOR IP): Performed by: INTERNAL MEDICINE

## 2021-01-22 PROCEDURE — 36592 COLLECT BLOOD FROM PICC: CPT

## 2021-01-22 PROCEDURE — B24BZZ4 ULTRASONOGRAPHY OF HEART WITH AORTA, TRANSESOPHAGEAL: ICD-10-PCS | Performed by: INTERNAL MEDICINE

## 2021-01-22 PROCEDURE — 71045 X-RAY EXAM CHEST 1 VIEW: CPT

## 2021-01-22 PROCEDURE — 2000000000 HC ICU R&B

## 2021-01-22 PROCEDURE — C1769 GUIDE WIRE: HCPCS

## 2021-01-22 PROCEDURE — 36556 INSERT NON-TUNNEL CV CATH: CPT | Performed by: INTERNAL MEDICINE

## 2021-01-22 PROCEDURE — 6360000002 HC RX W HCPCS: Performed by: INTERNAL MEDICINE

## 2021-01-22 PROCEDURE — 2709999900 HC NON-CHARGEABLE SUPPLY

## 2021-01-22 PROCEDURE — 33225 L VENTRIC PACING LEAD ADD-ON: CPT | Performed by: INTERNAL MEDICINE

## 2021-01-22 PROCEDURE — C1887 CATHETER, GUIDING: HCPCS

## 2021-01-22 PROCEDURE — 6360000004 HC RX CONTRAST MEDICATION: Performed by: INTERNAL MEDICINE

## 2021-01-22 PROCEDURE — 02HK3KZ INSERTION OF DEFIBRILLATOR LEAD INTO RIGHT VENTRICLE, PERCUTANEOUS APPROACH: ICD-10-PCS | Performed by: INTERNAL MEDICINE

## 2021-01-22 PROCEDURE — C1882 AICD, OTHER THAN SING/DUAL: HCPCS

## 2021-01-22 PROCEDURE — C1773 RET DEV, INSERTABLE: HCPCS

## 2021-01-22 PROCEDURE — 33234 REMOVAL OF PACEMAKER SYSTEM: CPT | Performed by: INTERNAL MEDICINE

## 2021-01-22 PROCEDURE — 33210 INSERT ELECTRD/PM CATH SNGL: CPT | Performed by: INTERNAL MEDICINE

## 2021-01-22 PROCEDURE — 80048 BASIC METABOLIC PNL TOTAL CA: CPT

## 2021-01-22 PROCEDURE — 3600000007 HC SURGERY HYBRID BASE

## 2021-01-22 PROCEDURE — 85025 COMPLETE CBC W/AUTO DIFF WBC: CPT

## 2021-01-22 PROCEDURE — 02HV33Z INSERTION OF INFUSION DEVICE INTO SUPERIOR VENA CAVA, PERCUTANEOUS APPROACH: ICD-10-PCS | Performed by: INTERNAL MEDICINE

## 2021-01-22 PROCEDURE — C1900 LEAD, CORONARY VENOUS: HCPCS

## 2021-01-22 PROCEDURE — C1894 INTRO/SHEATH, NON-LASER: HCPCS

## 2021-01-22 PROCEDURE — 2500000003 HC RX 250 WO HCPCS

## 2021-01-22 PROCEDURE — 33249 INSJ/RPLCMT DEFIB W/LEAD(S): CPT | Performed by: INTERNAL MEDICINE

## 2021-01-22 PROCEDURE — 6360000002 HC RX W HCPCS: Performed by: EMERGENCY MEDICINE

## 2021-01-22 PROCEDURE — 2580000003 HC RX 258

## 2021-01-22 PROCEDURE — 6360000002 HC RX W HCPCS: Performed by: NURSE ANESTHETIST, CERTIFIED REGISTERED

## 2021-01-22 PROCEDURE — C2628 CATHETER, OCCLUSION: HCPCS

## 2021-01-22 PROCEDURE — APPNB15 APP NON BILLABLE TIME 0-15 MINS: Performed by: NURSE PRACTITIONER

## 2021-01-22 PROCEDURE — 2720000010 HC SURG SUPPLY STERILE

## 2021-01-22 PROCEDURE — 02PA3MZ REMOVAL OF CARDIAC LEAD FROM HEART, PERCUTANEOUS APPROACH: ICD-10-PCS | Performed by: INTERNAL MEDICINE

## 2021-01-22 PROCEDURE — 6360000002 HC RX W HCPCS

## 2021-01-22 PROCEDURE — 36620 INSERTION CATHETER ARTERY: CPT | Performed by: INTERNAL MEDICINE

## 2021-01-22 PROCEDURE — 04HY32Z INSERTION OF MONITORING DEVICE INTO LOWER ARTERY, PERCUTANEOUS APPROACH: ICD-10-PCS | Performed by: INTERNAL MEDICINE

## 2021-01-22 PROCEDURE — 2580000003 HC RX 258: Performed by: EMERGENCY MEDICINE

## 2021-01-22 PROCEDURE — 2580000003 HC RX 258: Performed by: NURSE ANESTHETIST, CERTIFIED REGISTERED

## 2021-01-22 PROCEDURE — 85610 PROTHROMBIN TIME: CPT

## 2021-01-22 PROCEDURE — 83735 ASSAY OF MAGNESIUM: CPT

## 2021-01-22 PROCEDURE — 0JH609Z INSERTION OF CARDIAC RESYNCHRONIZATION DEFIBRILLATOR PULSE GENERATOR INTO CHEST SUBCUTANEOUS TISSUE AND FASCIA, OPEN APPROACH: ICD-10-PCS | Performed by: INTERNAL MEDICINE

## 2021-01-22 PROCEDURE — 2500000003 HC RX 250 WO HCPCS: Performed by: NURSE ANESTHETIST, CERTIFIED REGISTERED

## 2021-01-22 PROCEDURE — 99233 SBSQ HOSP IP/OBS HIGH 50: CPT | Performed by: INTERNAL MEDICINE

## 2021-01-22 PROCEDURE — 2780000010 HC IMPLANT OTHER

## 2021-01-22 PROCEDURE — B2111ZZ FLUOROSCOPY OF MULTIPLE CORONARY ARTERIES USING LOW OSMOLAR CONTRAST: ICD-10-PCS | Performed by: INTERNAL MEDICINE

## 2021-01-22 RX ORDER — FENTANYL CITRATE 50 UG/ML
INJECTION, SOLUTION INTRAMUSCULAR; INTRAVENOUS PRN
Status: DISCONTINUED | OUTPATIENT
Start: 2021-01-22 | End: 2021-01-22 | Stop reason: SDUPTHER

## 2021-01-22 RX ORDER — SODIUM CHLORIDE 0.9 % (FLUSH) 0.9 %
10 SYRINGE (ML) INJECTION PRN
Status: DISCONTINUED | OUTPATIENT
Start: 2021-01-22 | End: 2021-01-25 | Stop reason: HOSPADM

## 2021-01-22 RX ORDER — SODIUM CHLORIDE 9 MG/ML
INJECTION, SOLUTION INTRAVENOUS CONTINUOUS PRN
Status: DISCONTINUED | OUTPATIENT
Start: 2021-01-22 | End: 2021-01-22 | Stop reason: SDUPTHER

## 2021-01-22 RX ORDER — SODIUM CHLORIDE 9 MG/ML
INJECTION, SOLUTION INTRAVENOUS
Status: COMPLETED
Start: 2021-01-22 | End: 2021-01-22

## 2021-01-22 RX ORDER — DEXAMETHASONE SODIUM PHOSPHATE 4 MG/ML
INJECTION, SOLUTION INTRA-ARTICULAR; INTRALESIONAL; INTRAMUSCULAR; INTRAVENOUS; SOFT TISSUE PRN
Status: DISCONTINUED | OUTPATIENT
Start: 2021-01-22 | End: 2021-01-22 | Stop reason: SDUPTHER

## 2021-01-22 RX ORDER — MEPERIDINE HYDROCHLORIDE 25 MG/ML
12.5 INJECTION INTRAMUSCULAR; INTRAVENOUS; SUBCUTANEOUS EVERY 5 MIN PRN
Status: DISCONTINUED | OUTPATIENT
Start: 2021-01-22 | End: 2021-01-25 | Stop reason: HOSPADM

## 2021-01-22 RX ORDER — EPINEPHRINE 1 MG/ML
INJECTION, SOLUTION, CONCENTRATE INTRAVENOUS PRN
Status: DISCONTINUED | OUTPATIENT
Start: 2021-01-22 | End: 2021-01-22 | Stop reason: SDUPTHER

## 2021-01-22 RX ORDER — ESMOLOL HYDROCHLORIDE 10 MG/ML
INJECTION INTRAVENOUS PRN
Status: DISCONTINUED | OUTPATIENT
Start: 2021-01-22 | End: 2021-01-22 | Stop reason: SDUPTHER

## 2021-01-22 RX ORDER — VANCOMYCIN HYDROCHLORIDE 1 G/20ML
INJECTION, POWDER, LYOPHILIZED, FOR SOLUTION INTRAVENOUS PRN
Status: DISCONTINUED | OUTPATIENT
Start: 2021-01-22 | End: 2021-01-22 | Stop reason: SDUPTHER

## 2021-01-22 RX ORDER — FENTANYL CITRATE 50 UG/ML
50 INJECTION, SOLUTION INTRAMUSCULAR; INTRAVENOUS EVERY 5 MIN PRN
Status: DISCONTINUED | OUTPATIENT
Start: 2021-01-22 | End: 2021-01-25 | Stop reason: HOSPADM

## 2021-01-22 RX ORDER — ONDANSETRON 2 MG/ML
4 INJECTION INTRAMUSCULAR; INTRAVENOUS
Status: ACTIVE | OUTPATIENT
Start: 2021-01-22 | End: 2021-01-22

## 2021-01-22 RX ORDER — FENTANYL CITRATE 50 UG/ML
25 INJECTION, SOLUTION INTRAMUSCULAR; INTRAVENOUS EVERY 5 MIN PRN
Status: DISCONTINUED | OUTPATIENT
Start: 2021-01-22 | End: 2021-01-25 | Stop reason: HOSPADM

## 2021-01-22 RX ORDER — ROCURONIUM BROMIDE 10 MG/ML
INJECTION, SOLUTION INTRAVENOUS PRN
Status: DISCONTINUED | OUTPATIENT
Start: 2021-01-22 | End: 2021-01-22 | Stop reason: SDUPTHER

## 2021-01-22 RX ORDER — ONDANSETRON 2 MG/ML
INJECTION INTRAMUSCULAR; INTRAVENOUS PRN
Status: DISCONTINUED | OUTPATIENT
Start: 2021-01-22 | End: 2021-01-22 | Stop reason: SDUPTHER

## 2021-01-22 RX ORDER — POTASSIUM CHLORIDE 29.8 MG/ML
20 INJECTION INTRAVENOUS PRN
Status: DISCONTINUED | OUTPATIENT
Start: 2021-01-22 | End: 2021-01-25 | Stop reason: HOSPADM

## 2021-01-22 RX ORDER — PROPOFOL 10 MG/ML
INJECTION, EMULSION INTRAVENOUS PRN
Status: DISCONTINUED | OUTPATIENT
Start: 2021-01-22 | End: 2021-01-22 | Stop reason: SDUPTHER

## 2021-01-22 RX ORDER — ONDANSETRON 2 MG/ML
4 INJECTION INTRAMUSCULAR; INTRAVENOUS EVERY 6 HOURS PRN
Status: DISCONTINUED | OUTPATIENT
Start: 2021-01-22 | End: 2021-01-25 | Stop reason: HOSPADM

## 2021-01-22 RX ORDER — LABETALOL HYDROCHLORIDE 5 MG/ML
5 INJECTION, SOLUTION INTRAVENOUS EVERY 10 MIN PRN
Status: DISCONTINUED | OUTPATIENT
Start: 2021-01-22 | End: 2021-01-25 | Stop reason: HOSPADM

## 2021-01-22 RX ORDER — TRAMADOL HYDROCHLORIDE 50 MG/1
50 TABLET ORAL EVERY 6 HOURS PRN
Status: DISCONTINUED | OUTPATIENT
Start: 2021-01-22 | End: 2021-01-25 | Stop reason: HOSPADM

## 2021-01-22 RX ORDER — SODIUM CHLORIDE 0.9 % (FLUSH) 0.9 %
10 SYRINGE (ML) INJECTION EVERY 12 HOURS SCHEDULED
Status: DISCONTINUED | OUTPATIENT
Start: 2021-01-22 | End: 2021-01-25 | Stop reason: HOSPADM

## 2021-01-22 RX ADMIN — DEXAMETHASONE SODIUM PHOSPHATE 4 MG: 4 INJECTION, SOLUTION INTRAMUSCULAR; INTRAVENOUS at 07:50

## 2021-01-22 RX ADMIN — FENTANYL CITRATE 50 MCG: 50 INJECTION INTRAMUSCULAR; INTRAVENOUS at 08:40

## 2021-01-22 RX ADMIN — VANCOMYCIN HYDROCHLORIDE 1500 MG: 1 INJECTION, POWDER, LYOPHILIZED, FOR SOLUTION INTRAVENOUS at 07:48

## 2021-01-22 RX ADMIN — ACETAMINOPHEN 650 MG: 325 TABLET ORAL at 22:41

## 2021-01-22 RX ADMIN — FENTANYL CITRATE 25 MCG: 50 INJECTION INTRAMUSCULAR; INTRAVENOUS at 07:29

## 2021-01-22 RX ADMIN — TRAMADOL HYDROCHLORIDE 50 MG: 50 TABLET, FILM COATED ORAL at 12:20

## 2021-01-22 RX ADMIN — PROPOFOL 40 MG: 10 INJECTION, EMULSION INTRAVENOUS at 07:33

## 2021-01-22 RX ADMIN — CARVEDILOL 25 MG: 25 TABLET, FILM COATED ORAL at 12:22

## 2021-01-22 RX ADMIN — FENTANYL CITRATE 25 MCG: 50 INJECTION INTRAMUSCULAR; INTRAVENOUS at 07:28

## 2021-01-22 RX ADMIN — EPINEPHRINE 10 MCG: 1 INJECTION, SOLUTION INTRAMUSCULAR; SUBCUTANEOUS at 08:51

## 2021-01-22 RX ADMIN — SODIUM CHLORIDE: 9 INJECTION, SOLUTION INTRAVENOUS at 06:47

## 2021-01-22 RX ADMIN — ATORVASTATIN CALCIUM 40 MG: 40 TABLET, FILM COATED ORAL at 22:42

## 2021-01-22 RX ADMIN — POTASSIUM CHLORIDE 20 MEQ: 29.8 INJECTION, SOLUTION INTRAVENOUS at 04:01

## 2021-01-22 RX ADMIN — ESMOLOL HYDROCHLORIDE 10 MG: 10 INJECTION, SOLUTION INTRAVENOUS at 08:40

## 2021-01-22 RX ADMIN — Medication 1 SPRAY: at 13:17

## 2021-01-22 RX ADMIN — Medication 10 ML: at 12:23

## 2021-01-22 RX ADMIN — Medication 10 ML: at 12:21

## 2021-01-22 RX ADMIN — SUGAMMADEX 200 MG: 100 INJECTION, SOLUTION INTRAVENOUS at 10:13

## 2021-01-22 RX ADMIN — ROCURONIUM BROMIDE 10 MG: 10 INJECTION, SOLUTION INTRAVENOUS at 09:42

## 2021-01-22 RX ADMIN — ROCURONIUM BROMIDE 30 MG: 10 INJECTION, SOLUTION INTRAVENOUS at 08:37

## 2021-01-22 RX ADMIN — TRAMADOL HYDROCHLORIDE 50 MG: 50 TABLET, FILM COATED ORAL at 18:36

## 2021-01-22 RX ADMIN — FUROSEMIDE 20 MG: 20 TABLET ORAL at 12:22

## 2021-01-22 RX ADMIN — VANCOMYCIN HYDROCHLORIDE 1500 MG: 10 INJECTION, POWDER, LYOPHILIZED, FOR SOLUTION INTRAVENOUS at 18:52

## 2021-01-22 RX ADMIN — FENTANYL CITRATE 25 MCG: 50 INJECTION INTRAMUSCULAR; INTRAVENOUS at 07:32

## 2021-01-22 RX ADMIN — ACETAMINOPHEN 650 MG: 325 TABLET ORAL at 15:28

## 2021-01-22 RX ADMIN — ONDANSETRON 4 MG: 2 INJECTION INTRAMUSCULAR; INTRAVENOUS at 07:50

## 2021-01-22 RX ADMIN — BENZOCAINE AND MENTHOL 1 LOZENGE: 15; 3.6 LOZENGE ORAL at 21:12

## 2021-01-22 RX ADMIN — ROCURONIUM BROMIDE 10 MG: 10 INJECTION, SOLUTION INTRAVENOUS at 09:05

## 2021-01-22 RX ADMIN — SODIUM CHLORIDE: 9 INJECTION, SOLUTION INTRAVENOUS at 07:44

## 2021-01-22 RX ADMIN — PROPOFOL 20 MG: 10 INJECTION, EMULSION INTRAVENOUS at 07:35

## 2021-01-22 RX ADMIN — FENTANYL CITRATE 25 MCG: 50 INJECTION INTRAMUSCULAR; INTRAVENOUS at 07:25

## 2021-01-22 RX ADMIN — ROCURONIUM BROMIDE 10 MG: 10 INJECTION, SOLUTION INTRAVENOUS at 08:11

## 2021-01-22 RX ADMIN — ONDANSETRON 4 MG: 2 INJECTION INTRAMUSCULAR; INTRAVENOUS at 10:44

## 2021-01-22 RX ADMIN — ESMOLOL HYDROCHLORIDE 10 MG: 10 INJECTION, SOLUTION INTRAVENOUS at 08:37

## 2021-01-22 RX ADMIN — POTASSIUM CHLORIDE 20 MEQ: 29.8 INJECTION, SOLUTION INTRAVENOUS at 05:25

## 2021-01-22 RX ADMIN — ACETAMINOPHEN 650 MG: 325 TABLET ORAL at 05:35

## 2021-01-22 RX ADMIN — BENZOCAINE AND MENTHOL 1 LOZENGE: 15; 3.6 LOZENGE ORAL at 12:21

## 2021-01-22 RX ADMIN — PHENYLEPHRINE HYDROCHLORIDE 20 MCG/MIN: 10 INJECTION INTRAVENOUS at 08:46

## 2021-01-22 RX ADMIN — FENTANYL CITRATE 50 MCG: 50 INJECTION INTRAMUSCULAR; INTRAVENOUS at 08:37

## 2021-01-22 RX ADMIN — SPIRONOLACTONE 25 MG: 25 TABLET ORAL at 12:22

## 2021-01-22 RX ADMIN — IOPAMIDOL 15 ML: 755 INJECTION, SOLUTION INTRAVENOUS at 10:05

## 2021-01-22 RX ADMIN — ROCURONIUM BROMIDE 40 MG: 10 INJECTION, SOLUTION INTRAVENOUS at 07:35

## 2021-01-22 RX ADMIN — AMIODARONE HYDROCHLORIDE 0.5 MG/MIN: 50 INJECTION, SOLUTION INTRAVENOUS at 03:02

## 2021-01-22 RX ADMIN — PROPOFOL 20 MG: 10 INJECTION, EMULSION INTRAVENOUS at 07:34

## 2021-01-22 RX ADMIN — CARVEDILOL 25 MG: 25 TABLET, FILM COATED ORAL at 22:42

## 2021-01-22 RX ADMIN — ONDANSETRON 4 MG: 2 INJECTION INTRAMUSCULAR; INTRAVENOUS at 05:29

## 2021-01-22 RX ADMIN — BENZOCAINE AND MENTHOL 1 LOZENGE: 15; 3.6 LOZENGE ORAL at 18:36

## 2021-01-22 ASSESSMENT — PAIN DESCRIPTION - PAIN TYPE
TYPE: SURGICAL PAIN
TYPE: ACUTE PAIN

## 2021-01-22 ASSESSMENT — PULMONARY FUNCTION TESTS
PIF_VALUE: 24
PIF_VALUE: 22
PIF_VALUE: 21
PIF_VALUE: 0
PIF_VALUE: 21
PIF_VALUE: 23
PIF_VALUE: 21
PIF_VALUE: 23
PIF_VALUE: 21
PIF_VALUE: 23
PIF_VALUE: 24
PIF_VALUE: 2
PIF_VALUE: 21
PIF_VALUE: 23
PIF_VALUE: 22
PIF_VALUE: 28
PIF_VALUE: 22
PIF_VALUE: 17
PIF_VALUE: 24
PIF_VALUE: 21
PIF_VALUE: 23
PIF_VALUE: 21
PIF_VALUE: 22
PIF_VALUE: 21
PIF_VALUE: 0
PIF_VALUE: 22
PIF_VALUE: 21
PIF_VALUE: 23
PIF_VALUE: 0
PIF_VALUE: 21
PIF_VALUE: 22
PIF_VALUE: 22
PIF_VALUE: 24
PIF_VALUE: 21
PIF_VALUE: 0
PIF_VALUE: 23
PIF_VALUE: 22
PIF_VALUE: 21
PIF_VALUE: 3
PIF_VALUE: 23
PIF_VALUE: 23
PIF_VALUE: 19
PIF_VALUE: 23
PIF_VALUE: 19
PIF_VALUE: 22
PIF_VALUE: 23
PIF_VALUE: 22
PIF_VALUE: 2
PIF_VALUE: 23
PIF_VALUE: 23
PIF_VALUE: 22
PIF_VALUE: 23
PIF_VALUE: 21
PIF_VALUE: 22
PIF_VALUE: 21
PIF_VALUE: 22
PIF_VALUE: 24
PIF_VALUE: 23
PIF_VALUE: 21
PIF_VALUE: 23
PIF_VALUE: 18
PIF_VALUE: 21
PIF_VALUE: 22
PIF_VALUE: 27
PIF_VALUE: 22
PIF_VALUE: 24
PIF_VALUE: 0
PIF_VALUE: 1
PIF_VALUE: 2
PIF_VALUE: 22
PIF_VALUE: 0
PIF_VALUE: 19
PIF_VALUE: 21
PIF_VALUE: 23
PIF_VALUE: 21
PIF_VALUE: 22
PIF_VALUE: 23
PIF_VALUE: 22
PIF_VALUE: 21
PIF_VALUE: 3
PIF_VALUE: 21
PIF_VALUE: 22
PIF_VALUE: 0
PIF_VALUE: 21
PIF_VALUE: 21
PIF_VALUE: 22
PIF_VALUE: 23
PIF_VALUE: 22
PIF_VALUE: 23
PIF_VALUE: 21
PIF_VALUE: 0
PIF_VALUE: 23
PIF_VALUE: 24
PIF_VALUE: 20
PIF_VALUE: 17
PIF_VALUE: 22
PIF_VALUE: 3
PIF_VALUE: 22
PIF_VALUE: 0
PIF_VALUE: 24
PIF_VALUE: 19
PIF_VALUE: 0
PIF_VALUE: 21

## 2021-01-22 ASSESSMENT — PAIN SCALES - GENERAL
PAINLEVEL_OUTOF10: 0
PAINLEVEL_OUTOF10: 3
PAINLEVEL_OUTOF10: 0
PAINLEVEL_OUTOF10: 4
PAINLEVEL_OUTOF10: 7
PAINLEVEL_OUTOF10: 0
PAINLEVEL_OUTOF10: 0

## 2021-01-22 ASSESSMENT — PAIN DESCRIPTION - PROGRESSION
CLINICAL_PROGRESSION: GRADUALLY IMPROVING

## 2021-01-22 ASSESSMENT — PAIN DESCRIPTION - ORIENTATION: ORIENTATION: RIGHT;UPPER

## 2021-01-22 NOTE — FLOWSHEET NOTE
01/21/21 1907   Encounter Summary   Services provided to: Patient   Referral/Consult From: Nurse;Physician   Support System Family members;Significant other   Continue Visiting   (Completed Healthcare POA 1/21/21 TJR)   Complexity of Encounter High   Length of Encounter 1 hour   Advance Care Planning Yes   Advance Directives (For Healthcare)   Healthcare Directive Yes, patient has an advance directive for healthcare treatment   Type of Healthcare Directive Durable power of  for health care   Copy in Chart Yes, copy in chart   Chart Copy Status : [facility] Appointed Adult siblings   Healthcare Agent's Name Roxann Glaser [facility] Agent's Phone Number [phone]   If you are unable to speak for yourself, does your Healthcare Agent or Legal Spokesperson know your healthcare wishes? Yes       Patient completed HCPOA   Please see ACP Note for full info.     Electronically signed by Grisel Hightower on 1/21/2021 at 7:12 PM

## 2021-01-22 NOTE — ACP (ADVANCE CARE PLANNING)
Advance Care Planning     General Advance Care Planning (ACP) Conversation    Date of Conversation: 1/21/2021   Conducted with: Patient with Decision Making CapacityPatient with Decision Making Capacity    Healthcare Decision Maker:    Primary Decision Maker: Carolina Alfredo - Sister [phone]    Secondary Decision Maker: Velinda Babinski - [facility] - [phone]    Click here to complete [employer] including selection of the Healthcare Decision Maker Relationship (ie \"Primary\")      Content/Action Overview:   Has ACP document(s) on file - reflects the patient's care preferences  Reviewed DNR/DNI and patient elects Full Code (Attempt Resuscitation)        Length of Voluntary ACP Conversation in minutes:  1 hour    Brent Parkinson

## 2021-01-22 NOTE — ANESTHESIA POSTPROCEDURE EVALUATION
Department of Anesthesiology  Postprocedure Note    Patient: Milly Preston  MRN: [id number]  YOB: 1965  Date of evaluation: 1/22/2021  Time:  10:56 AM     Procedure Summary     Date: 01/22/21 Room / Location: [facility] Cath Lab    Anesthesia Start: 8846 Anesthesia Stop: 1054    Procedure: LASER LEAD EXTRACTION Diagnosis:     Scheduled Providers:  Responsible Provider: Franko Forrest MD    Anesthesia Type: general ASA Status: 3          Anesthesia Type: general    Sandra Phase I:      Sandra Phase II:      Last vitals: Reviewed and per EMR flowsheets.        Anesthesia Post Evaluation    Patient location during evaluation: PACU  Patient participation: complete - patient participated  Level of consciousness: awake  Airway patency: patent  Nausea & Vomiting: no nausea and no vomiting  Complications: no  Cardiovascular status: blood pressure returned to baseline  Respiratory status: acceptable  Hydration status: euvolemic

## 2021-01-22 NOTE — PROGRESS NOTES
Continue IV amiodarone and switch to PO tomorrow if no recurrent VT on monitor. Dosing will be amiodarone 400 mg bid x 1 week, 400 mg daily for 2 weeks and then 200 mg daily after that.      Julio Alvarez, APRN-CNP

## 2021-01-22 NOTE — PROCEDURES
[address]     Electrophysiology Procedure Note       Date of Procedure: 1/22/2021  Patient's Name: Nick Nguyen  YOB: 1965   Medical Record Number: [id number]  Procedure Performed by: Noemi Walker MD    Procedures performed:    Laser lead extraction of the right ventricular pacing lead        Removal of pacemaker pulse generator    Insertion of a new right ventricular defibrillator  lead under fluoroscopy    Insertion of a new left ventricular lead under fluoroscopy    Implantation of a new MRI compatible Biv-AICD pulse generator    Insertion of a temproary pacemaker    Insertion of right femoral venous central line    Insertion of right femoral arterial line    Removal of temporary pacing wire    Programming of the pacemaker before the procedure   Electronic analysis of lead and device    Ultrasound for venous and arterial access   Insertion of Bridge balloon   Venogram of extremity  This was an unusually difficult and more prolonged procedure due to the need for snaring of the lead. Indication of the procedure:   Nick Nguyen is 63 y.o. female   with near 100% ventricular pacing and newly developed cardiomyopathy and's symptomatic ventricular tachycardia who requires ICD for secondary prevention and also resynchronization therapy due to severely reduced ejection fraction and congestive heart failure symptoms class II 3. Patient was followed by     Details of procedure: The patient was brought to the electrophysiology laboratory in stable condition. The patient was in a fasting and non-sedated state. The risks, benefits and alternatives of the lead extraction were discussed with patient and his family members.  The risks including, but not limited to, the risks of bleeding, infection, radiation exposure, injury to vascular, cardiac and surrounding structures (including pneumothorax), stroke, cardiac perforation, tamponade, need for emergent open heart surgery, myocardial infarction and death were discussed in detail. The patient opted to proceed with the procedure. A timeout protocol was completed to identify the patient and the procedure being performed. Patient underwent general anesthesia. Cardiothoracic surgery was informed. 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 Patient and family members decided to proceed with lead extraction, and implanting a new lead on the same side of the old implant. Written informed consent was signed and placed in the chart. Prophylactic antibiotic was given. Selective venography of the left upper extremity was previously done which showed total occlusion of the vein. Patient had history of occluded left subclavian. 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 The patient was prepped and draped in a sterile fashion. A timeout protocol was completed to identify the patient and the procedure being performed. Patient underwent general anesthesia with anesthesia team. Cardiothoracic surgery was informed. Chest and groin was prepped and draped in the usual sterile fashion. two 6F and a 12 Japanese right femoral venous sheath was placed inside the right femoral vein using modified seldinger technique. Device was interrogated, and programmed to VVI 70. An amplantz wire was advanced from the  right  12F femoral sheath to the right subclavian vein for potential deployment of balloon in SVC if needed.   The balloon was then advanced and positioning was confirmed after contrast insertion. Then the balloon was retracted back into IVC. Since the patient was pacemaker dependant, a temporary pacing wire was inserted into the right ventricle under fluoroscopy and was connected to a temporary pacemaker. ZAMZAM probe was inserted and images were obtained. A 4F right femoral arterial line was placed inside the right femoral artery for hemodynamic monitoring during the procedure. After injection of 2% lidocaine in the left pectoral area, an incision was made over the old scar and extended to to the pocket using electrocautery and blunt dissection. The old pulse generator was explanted. Leads and device were released from the scar tissues inside the pocket using electrocautery. We attempted again to perform access. Using a micropuncture needle access into left subclavian vein was achieved however injection of contrast showed total occlusion of the vein. The a stylet was advanced inside the  ventricular lead and under fluoroscopy we tried to retract the screw at the tip of the lead which was not successful. And during this process the lead spontaneously pulled back some. The lead was cut. A locking stylet was advanced inside the lead and locked in. Suture tie was made around the lead. Laser sheath over the lead and using gentle traction and counter-traction and advancement of the laser sheath over the lead, the sheath was advanced alf through the subclavian vein through some scar tissue. However the lead pulled back and due to the fact that we did not have any possibility of traction anymore and no access we then had to go back to the groin and advanced the gooseneck snare into the right atrium. The tip of the right ventricular lead was a snared and locked and pulled back. I then went back up and continue with the extraction using the railing that we provided.   We were able to release the lead from all attachments and advanced the sheath to the tip of the lead.  Then the gooseneck snare was released. Lead was removed and 2 wires were advanced through the sheath for maintaining access. A long 9 [place] sheath was advanced to the access. Under fluoroscopy, we advance a new right ventricular defibrillator lead and placed it into the RV. The lead was fixated to the RV. After confirming appropriate function, the sheath was split and removed. The lead was secured to the underlying tissue using suture material.       Then using a long sheath and guidewire, we gained access to the coronary sinus. CS venography was performed and a high lateral and a postero lateral branch of the coronary sinus was found. Then using a wire, a sheath was advanced into the posterior lateral branch. Then under fluoroscopy a LV lead was advanced into this branch. After confirming appropriate function, the sheath was split and removed. The lead was secured to the underlying tissue using suture material.       . The pocket was irrigated with an   solution. The leads were then connected to the new pulse generator  (Biv-AICD) which was then placed into the cleaned pocket. The pulse generator was sutured inside the pocket. The pocket was then closed in three separate subcutaneous layers using 2-0 & 3-0 Vicryl and subcuticular layer using 4-0 Vicryl. TYRX was used to reduce risk of infection. The skin was covered with pressure dressing. All sponge and needle counts were reported as correct at the end of the procedure. The patient tolerated the procedure well and there were no immediate complications. Patient was transported to the holding area in stable condition. EBL less than 50 ml     Lead and device information:           Plan:   The patient will be admitted to the CVU and have usual post-implant care, including chest x-ray, and antibiotic therapy and interrogation of the device.

## 2021-01-22 NOTE — ANESTHESIA PRE PROCEDURE
NIFEdipine (PROCARDIA XL) extended release tablet 30 mg  30 mg Oral Daily PRN CATRACHITO Christianson CNP   30 mg at 01/21/21 1053    amiodarone (CORDARONE) 450 mg in dextrose 5 % 250 mL infusion  0.5 mg/min Intravenous Continuous Jayden Kimmie V, DO 16.7 mL/hr at 01/22/21 0302 0.5 mg/min at 01/22/21 0302    sodium chloride flush 0.9 % injection 10 mL  10 mL Intravenous 2 times per day Jagruti Schmitt MD   10 mL at 01/21/21 2030    sodium chloride flush 0.9 % injection 10 mL  10 mL Intravenous PRN Jagruti Schmitt MD        promethazine (PHENERGAN) tablet 12.5 mg  12.5 mg Oral Q6H PRN Jagruti Schmitt MD        Or    ondansetron (ZOFRAN) injection 4 mg  4 mg Intravenous Q6H PRN Jagruti Schmitt MD   4 mg at 01/22/21 0529    polyethylene glycol (GLYCOLAX) packet 17 g  17 g Oral Daily PRN Jagruti Schmitt MD        acetaminophen (TYLENOL) tablet 650 mg  650 mg Oral Q6H PRN Jagruti Schmitt MD   650 mg at 01/22/21 0535    Or    acetaminophen (TYLENOL) suppository 650 mg  650 mg Rectal Q6H PRN Jagruti Schmitt MD        butalbital-acetaminophen-caffeine (FIORICET, ESGIC) per tablet 1 tablet  1 tablet Oral Q4H PRN Jagruti Schmitt MD   1 tablet at 01/21/21 2036    atorvastatin (LIPITOR) tablet 40 mg  40 mg Oral Daily Jagruti Schmitt MD        furosemide (LASIX) tablet 20 mg  20 mg Oral Daily Jagruti Schmitt MD   20 mg at 01/21/21 3830    spironolactone (ALDACTONE) tablet 25 mg  25 mg Oral Daily Jagruti Schmitt MD   25 mg at 01/21/21 1054    metoclopramide (REGLAN) tablet 5 mg  5 mg Oral 4x Daily PRN Jagruti Schmitt MD        perflutren lipid microspheres (DEFINITY) injection 1.65 mg  1.5 mL Intravenous ONCE PRN ACTRACHITO Christianson CNP        heparin (porcine) injection 5,170 Units  60 Units/kg Intravenous PRN Gordo Krause MD        heparin (porcine) injection 2,590 Units  30 Units/kg Intravenous LANCE Krause MD        carvedilol (COREG) tablet 25 mg  25 mg Oral BID Gordo Krause MD   25 mg at 01/21/21 2030 [facility]    UTERINE FIBROID SURGERY         Social History:    Social History     Tobacco Use    Smoking status: Never Smoker    Smokeless tobacco: Never Used   Substance Use Topics    Alcohol use: No                                Counseling given: Not Answered      Vital Signs (Current): There were no vitals filed for this visit. BP Readings from Last 3 Encounters:   01/22/21 117/75   12/07/20 (!) 148/90   10/27/20 (!) 169/98       NPO Status:                                                                                 BMI:   Wt Readings from Last 3 Encounters:   01/22/21 187 lb 2.7 oz (84.9 kg)   12/07/20 190 lb (86.2 kg)   10/27/20 188 lb (85.3 kg)     There is no height or weight on file to calculate BMI.    CBC:   Lab Results   Component Value Date    WBC 5.2 01/22/2021    RBC 3.15 01/22/2021    HGB 9.6 01/22/2021    HCT 29.6 01/22/2021    MCV 93.9 01/22/2021    RDW 16.3 01/22/2021     01/22/2021       CMP:   Lab Results   Component Value Date     01/22/2021    K 3.1 01/22/2021    K 3.8 01/20/2021     01/22/2021    CO2 26 01/22/2021    BUN 8 01/22/2021    CREATININE 0.8 01/22/2021    GFRAA >60 01/22/2021    AGRATIO 1.6 01/20/2021    LABGLOM >60 01/22/2021    GLUCOSE 106 01/22/2021    PROT 7.5 01/20/2021    CALCIUM 9.2 01/22/2021    BILITOT 0.3 01/20/2021    ALKPHOS 66 01/20/2021    AST 28 01/20/2021    ALT 19 01/20/2021       POC Tests: No results for input(s): POCGLU, POCNA, POCK, POCCL, POCBUN, POCHEMO, POCHCT in the last 72 hours.     Coags:   Lab Results   Component Value Date    PROTIME 12.9 01/22/2021    INR 1.11 01/22/2021    APTT 78.2 01/22/2021       HCG (If Applicable): No results found for: PREGTESTUR, PREGSERUM, HCG, HCGQUANT     ABGs: No results found for: PHART, PO2ART, HCG7IPM, CAD9HYJ, BEART, I2ERIYPU     Type & Screen (If Applicable):  No results found for: LABABO, LABRH    Drug/Infectious Status (If Applicable):  No results found for: HIV, HEPCAB    COVID-19 Screening (If Applicable):   Lab Results   Component Value Date    COVID19 Not Detected 10/21/2020         Anesthesia Evaluation  Patient summary reviewed and Nursing notes reviewed  Airway: Mallampati: II  TM distance: >3 FB   Neck ROM: full  Mouth opening: > = 3 FB Dental:          Pulmonary:   (+) sleep apnea: on CPAP,                             Cardiovascular:  Exercise tolerance: poor (<4 METS),   (+) hypertension: moderate, pacemaker: pacemaker, CHF: diastolic,                ROS comment: Echo 2019   -Limited echocardiogram was performed to evaluate EF.   -Normal left ventricle size, mild to moderate left ventricular hypertrophy,   and moderately reduced systolic function with an estimated ejection fraction   of 30-35%. -There is moderate diffuse hypokinesis. -Grade III diastolic dysfunction . E/e\"=10.7.   -Mild mitral regurgitation.   -Mild tricuspid regurgitation.   -The left atrium is mild to moderate dilated. -Right ventricular systolic function appears mildly reduced .   -Estimated pulmonary artery systolic pressure is borderline normal at 28-33   mmHg assuming a right atrial pressure of 8 mmHg.   -Pacer / ICD wire is visualized in the right heart. Neuro/Psych:   (+) headaches: migraine headaches,             GI/Hepatic/Renal:   (+) GERD: well controlled,           Endo/Other:                     Abdominal:           Vascular:                                          Anesthesia Plan      general     ASA 3       Induction: intravenous. MIPS: Prophylactic antiemetics administered. Anesthetic plan and risks discussed with patient. Plan discussed with CRNA.                   Amara Chatterjee MD   1/22/2021

## 2021-01-22 NOTE — FLOWSHEET NOTE
Pt arrived to unit. VSS. Pt remains on 2 L O2 for comfort. R groin site is CDI, pressure dressing applied secondary to pt coughing frequently. Pt verbalizes throat discomfort, states, \"I feel like I can't breathe\". Ice chips administered. Pt placed in reverse trendelenburg. CXR taken.  Electronically signed by Bentley Baer RN on 1/22/2021 at 11:27 AM

## 2021-01-22 NOTE — PROGRESS NOTES
[facility] PROGRESS NOTE    1/22/2021 2:46 PM        Name: Zoë Saldana . Admitted: 1/20/2021  Primary Care Provider: No primary care provider on file. (Tel: None)      Brief Course:  64 yo F with dilated cardiomyopathy, HTN, complete heart block s/p PPM, Afib on Xarelto, chronic systolic CHF, anemia, obesity came to ER with complaints of dizziness. Admitted as inpatient for VT. Followed by Cardiology. Patient found to be in atrial flutter with runs of symptomatic VT. Underwent LHC 1/20/2020 that showed normal coronaries. She had episode of VT with syncope in cath lab prior to [address] [place] [address]. Underwent Successful external DC cardioversion of atrial  flutter on 1/20/21. ·  On 1/22/21, Laser lead extraction of the right ventricular pacing lead   ·    · Removal of pacemaker pulse generator   · Insertion of a new right ventricular defibrillator  lead under fluoroscopy   · Insertion of a new left ventricular lead under fluoroscopy   · Implantation of a new MRI compatible Biv-AICD pulse generator   · Insertion of a temproary pacemaker   · Insertion of right femoral venous central line   · Insertion of right femoral arterial line   · Removal of temporary pacing wire   · Programming of the pacemaker before the procedure  · Electronic analysis of lead and device   · Ultrasound for venous and arterial access  · Insertion of Bridge balloon  Venogram of extremity      CC:  Dizziness    Subjective:  . Seen after ICD in CVU. Complaining of sore throat and SOB. No CP, HA or abdominal pain. No fevers.     Reviewed interval ancillary notes    Current Medications      potassium chloride 20 mEq/50 mL IVPB (Central Line), PRN      meperidine (DEMEROL) injection 12.5 mg, Q5 Min PRN      ondansetron (ZOFRAN) injection 4 mg, Once PRN      labetalol (NORMODYNE;TRANDATE) injection 5 mg, Q10 Min PRN      fentaNYL (SUBLIMAZE) injection 25 mcg, Q5 Min PRN      fentaNYL (SUBLIMAZE) injection 25 mcg, Q5 Min PRN      fentaNYL (SUBLIMAZE) injection 50 mcg, Q5 Min PRN      sodium chloride flush 0.9 % injection 10 mL, 2 times per day      sodium chloride flush 0.9 % injection 10 mL, PRN      vancomycin (VANCOCIN) 1,500 mg in dextrose 5 % 250 mL IVPB, Q12H      traMADol (ULTRAM) tablet 50 mg, Q6H PRN      ondansetron (ZOFRAN) injection 4 mg, Q6H PRN      phenol 1.4 % mouth spray 1 spray, Q2H PRN      benzocaine-menthol (CEPACOL SORE THROAT) lozenge 1 lozenge, Q2H PRN      NIFEdipine (PROCARDIA XL) extended release tablet 30 mg, Daily PRN      amiodarone (CORDARONE) 450 mg in dextrose 5 % 250 mL infusion, Continuous      sodium chloride flush 0.9 % injection 10 mL, 2 times per day      sodium chloride flush 0.9 % injection 10 mL, PRN      promethazine (PHENERGAN) tablet 12.5 mg, Q6H PRN    Or      ondansetron (ZOFRAN) injection 4 mg, Q6H PRN      polyethylene glycol (GLYCOLAX) packet 17 g, Daily PRN      acetaminophen (TYLENOL) tablet 650 mg, Q6H PRN    Or      acetaminophen (TYLENOL) suppository 650 mg, Q6H PRN      butalbital-acetaminophen-caffeine (FIORICET, ESGIC) per tablet 1 tablet, Q4H PRN      atorvastatin (LIPITOR) tablet 40 mg, Daily      furosemide (LASIX) tablet 20 mg, Daily      spironolactone (ALDACTONE) tablet 25 mg, Daily      metoclopramide (REGLAN) tablet 5 mg, 4x Daily PRN      perflutren lipid microspheres (DEFINITY) injection 1.65 mg, ONCE PRN      heparin (porcine) injection 5,170 Units, PRN      heparin (porcine) injection 2,590 Units, PRN      carvedilol (COREG) tablet 25 mg, BID        Objective:  /79   Pulse 75   Temp 95.8 °F (35.4 °C) (Temporal)   Resp 18   Ht 5' 7\" (1.702 m)   Wt 187 lb 2.7 oz (84.9 kg)   SpO2 97%   BMI 29.32 kg/m²     Intake/Output Summary (Last 24 hours) at 1/22/2021 1446  Last data filed at 1/22/2021 1319  Gross per 24 hour   Intake 1617.5 ml   Output 550 ml   Net 1067.5 ml      Wt Readings from Last 3 Encounters:   01/22/21 187 lb 2.7 oz (84.9 kg)   12/07/20 190 lb (86.2 kg)   10/27/20 188 lb (85.3 kg)       General appearance:  Appears comfortable, sitting in bed  Eyes: Sclera clear. Pupils equal.  ENT: Moist oral mucosa. Trachea midline, no adenopathy. No stridor  Cardiovascular: Regular rhythm, normal S1, S2. No murmur. No edema in lower extremities. L ICD is covered  Respiratory: Not using accessory muscles. Good inspiratory effort. Clear to auscultation bilaterally, no wheeze or crackles. GI: Abdomen soft, no tenderness, not distended, normal bowel sounds  Musculoskeletal: No cyanosis in digits, neck supple  Neurology: Grossly intact. No speech or motor deficits  Psych: Normal affect. Alert and oriented in time, place and person  Skin: Warm, dry, normal turgor  Extremity exam shows brisk capillary refill. Peripheral pulses are palpable in lower extremities     Labs and Tests:  CBC:   Recent Labs     01/20/21  1650 01/21/21  1810 01/22/21  0310   WBC 5.3 6.0 5.2   HGB 10.0* 10.4* 9.6*    268 250     BMP:    Recent Labs     01/20/21  0433 01/21/21  1810 01/22/21  0310    140 138   K 3.8 3.5 3.1*    104 103   CO2 19* 24 26   BUN 6* 8 8   CREATININE 0.7 0.9 0.8   GLUCOSE 107* 106* 106*     Hepatic:   Recent Labs     01/20/21  0433   AST 28   ALT 19   BILITOT 0.3   ALKPHOS 66     XR CHEST PORTABLE   Final Result   No acute findings. No pneumothorax. Gaseous distention of the stomach. XR CHEST PORTABLE   Final Result   No acute disease.                Problem List  Principal Problem:    Paroxysmal ventricular tachycardia (HCC)  Active Problems:    HTN (hypertension)    Cardiac pacemaker in situ    Persistent atrial fibrillation (HCC)    Systolic CHF, chronic (HCC)    Obstructive sleep apnea (adult) (pediatric)    Dilated cardiomyopathy (HCC)    Class 1 obesity without serious comorbidity with body mass index (BMI) of 33.0 to 33.9 in

## 2021-01-22 NOTE — FLOWSHEET NOTE
Schultz catheter removed. Pt tolerated well. Sarah care performed.  Electronically signed by Faraz Perez RN on 1/22/2021 at 5:10 PM

## 2021-01-22 NOTE — PROGRESS NOTES
[facility] 80   Electrophysiology Progress Note     Admit Date: 2021     Reason for follow up: VT, cardiomyopathy     HPI and Interval History:   Patient seen and examined. Clinical notes reviewed. Telemetry reviewed. No new complaint today. No major events overnight. Denies having chest pain, shortness of breath, dyspnea on exertion, Orthopnea, PND at the time of this visit. Review of System:  All other systems reviewed and are negative except for that noted above. Pertinent negatives are:     · General: negative for fever, chills   · Ophthalmic ROS: negative for - eye pain or loss of vision  · ENT ROS: negative for - headaches, sore throat   · Respiratory: negative for - cough, sputum  · Cardiovascular: Reviewed in HPI  · Gastrointestinal: negative for - abdominal pain, diarrhea, N/V  · Hematology: negative for - bleeding, blood clots, bruising or jaundice  · Genito-Urinary:  negative for - Dysuria or incontinence  · Musculoskeletal: negative for - Joint swelling, muscle pain  · Neurological: negative for - confusion, dizziness, headaches   · Psychiatric: No anxiety, no depression. · Dermatological: negative for - rash      Physical Examination:  Vitals:    21 0315   BP: 117/75   Pulse: 75   Resp: 18   Temp: 97 °F (36.1 °C)   SpO2: 95%      In: 477.5 [I.V.:477.5]  Out: -    Wt Readings from Last 3 Encounters:   21 187 lb 2.7 oz (84.9 kg)   20 190 lb (86.2 kg)   10/27/20 188 lb (85.3 kg)     Temp  Av.5 °F (36.4 °C)  Min: 96.9 °F (36.1 °C)  Max: 98.1 °F (36.7 °C)  Pulse  Av.2  Min: 75  Max: 86  BP  Min: 110/78  Max: 145/83  SpO2  Av.8 %  Min: 95 %  Max: 99 %  FiO2   Av.4 %  Min: 21 %  Max: 99 %    Intake/Output Summary (Last 24 hours) at 2021 0730  Last data filed at 2021 0630  Gross per 24 hour   Intake 1257.5 ml   Output    Net 1257.5 ml       · Telemetry: Atrial fibrillation   · Constitutional: Oriented. No distress.    · Head: Normocephalic and atraumatic. · Mouth/Throat: Oropharynx is clear and moist.   · Eyes: Conjunctivae normal. EOM are normal.   · Neck: Neck supple. No rigidity. No JVD present. · Cardiovascular: Normal rate, regular rhythm, S1&S2. · Pulmonary/Chest: Bilateral respiratory sounds. No wheezes, No rhonchi. · Abdominal: Soft. Bowel sounds present. No distension, No tenderness. · Musculoskeletal: No tenderness. No edema    · Lymphadenopathy: Has no cervical adenopathy. · Neurological: Alert and oriented. Cranial nerve appears intact, No Gross deficit   · Skin: Skin is warm and dry. No rash noted. · Psychiatric: Has a normal behavior     Labs, diagnostic and imaging results reviewed. Reviewed. Recent Labs     01/20/21  0433 01/21/21  1810 01/22/21  0310    140 138   K 3.8 3.5 3.1*    104 103   CO2 19* 24 26   BUN 6* 8 8   CREATININE 0.7 0.9 0.8     Recent Labs     01/20/21  1650 01/21/21  1810 01/22/21  0310   WBC 5.3 6.0 5.2   HGB 10.0* 10.4* 9.6*   HCT 30.6* 31.9* 29.6*   MCV 94.1 94.4 93.9    268 250     Lab Results   Component Value Date    TROPONINI <0.01 01/20/2021     Estimated Creatinine Clearance: 88 mL/min (based on SCr of 0.8 mg/dL).    No results found for: BNP  Lab Results   Component Value Date    PROTIME 12.9 01/22/2021    PROTIME 12.9 01/21/2021    PROTIME 16.6 06/27/2020    INR 1.11 01/22/2021    INR 1.11 01/21/2021    INR 1.43 06/27/2020     No results found for: CHOL, HDL, TRIG    Scheduled Meds:   sodium chloride flush  10 mL Intravenous 2 times per day    atorvastatin  40 mg Oral Daily    furosemide  20 mg Oral Daily    spironolactone  25 mg Oral Daily    carvedilol  25 mg Oral BID     Continuous Infusions:   amiodarone 450mg/250ml D5W infusion 0.5 mg/min (01/22/21 0302)     PRN Meds:potassium chloride, meperidine, ondansetron, labetalol, fentanNYL, fentanNYL, fentanNYL, NIFEdipine, sodium chloride flush, promethazine **OR** ondansetron, polyethylene glycol, acetaminophen **OR** acetaminophen, butalbital-acetaminophen-caffeine, metoclopramide, perflutren lipid microspheres, heparin (porcine), heparin (porcine)     Patient Active Problem List    Diagnosis Date Noted    Persistent atrial fibrillation ([facility]      Priority: High    LVH (left ventricular hypertrophy) 06/11/2014     Priority: High    Headache 01/16/2013     Priority: High    Cardiac pacemaker in situ 12/12/2012     Priority: High    Dyspnea on exertion 12/12/2012     Priority: High    Pacemaker 11/30/2012     Priority: High    HTN (hypertension) 10/26/2012     Priority: High    Other and unspecified hyperlipidemia 10/26/2012     Priority: High    Congenital heart block 10/26/2012     Priority: High    VT (ventricular tachycardia) ([address] 01/20/2021    Class 1 obesity without serious comorbidity with body mass index (BMI) of 33.0 to 33.9 in adult 09/06/2019    Dilated cardiomyopathy ([address] 08/28/2019    Left subclavian vein thrombosis ([address] 03/26/2019    LV dysfunction 03/18/2019    Obstructive sleep apnea (adult) (pediatric)     Hypersomnia     Systolic CHF, chronic ([address] 10/03/2018    Chest pain 09/11/2018      Active Hospital Problems    Diagnosis Date Noted    Persistent atrial fibrillation ([address] [I48.19]      Priority: High    Cardiac pacemaker in situ [Z95.0] 12/12/2012     Priority: High    HTN (hypertension) [I10] 10/26/2012     Priority: High    VT (ventricular tachycardia) ([address] [I47.2] 01/20/2021    Class 1 obesity without serious comorbidity with body mass index (BMI) of 33.0 to 33.9 in adult [E66.9, Z68.33] 09/06/2019    Dilated cardiomyopathy ([address] [I42.0] 08/28/2019    Obstructive sleep apnea (adult) (pediatric) [L14.36]     Systolic CHF, chronic ([address] [I50.22] 10/03/2018       Assessment:       Plan:   Assessment and Plan:  VT              - Symptomatic NSVT with syncope yesterday              - No recurence on monitor, occ PVC couplet              - Admits to intermittent palpitations - On amiodarone gtt              - will proceed with laser lead extraction and Biv-AICD     Risks, benefits and alternative of lead extraction were discussed with patient. The risks including, but not limited to, the risks of bleeding, infection, pain, device malfunction, lead dislodgement, radiation exposure, injury to cardiac and surrounding structures (including pneumothorax), stroke, cardiac perforation, tamponade, need for emergent heart surgery, injury to esophagus, myocardial infarction and death were discussed in detail. Patient understand that lead extraction could be a potentially life threatening procedure. Educational material about lead extraction, risks, benefits and alternatives were given to patient. Patient was encouraged to review information, and call back with any questions. Patient verbalized understanding and would like to proceed. Persistent Atrial Firbillation              - S/p DCCV 1/20/2021              - On heparin gtt              - On amiodarone              - Follow as OP for recurrence and consider ablation  CHB              - S/p PPM              -  on monitor              - Device has been interrogated and reviewed by EP provider  Cardiomyopathy/sCHF              - Appears compensated              - On BB, aldactone, and lasix              - Allergy to ACE/ARB              - Continue strict I&O, daily wts, and sodium/fluid restriction  HTN              - Not tolerating hydralazine, allergy to ACE/ARB, on full dose BB, will add nifedipine             NOTE: This report was transcribed using voice recognition software. Every effort was made to ensure accuracy, however, inadvertent computerized transcription errors may be present.

## 2021-01-23 LAB
ANION GAP SERPL CALCULATED.3IONS-SCNC: 12 MMOL/L (ref 3–16)
APTT: 27.8 SEC (ref 24.2–36.2)
BASOPHILS ABSOLUTE: 0 K/UL (ref 0–0.2)
BASOPHILS RELATIVE PERCENT: 0.4 %
BUN BLDV-MCNC: 8 MG/DL (ref 7–20)
CALCIUM SERPL-MCNC: 9.1 MG/DL (ref 8.3–10.6)
CHLORIDE BLD-SCNC: 106 MMOL/L (ref 99–110)
CO2: 22 MMOL/L (ref 21–32)
CREAT SERPL-MCNC: 0.9 MG/DL (ref 0.6–1.1)
EOSINOPHILS ABSOLUTE: 0 K/UL (ref 0–0.6)
EOSINOPHILS RELATIVE PERCENT: 0.2 %
GFR AFRICAN AMERICAN: >60
GFR NON-AFRICAN AMERICAN: >60
GLUCOSE BLD-MCNC: 110 MG/DL (ref 70–99)
HCT VFR BLD CALC: 25.8 % (ref 36–48)
HEMOGLOBIN: 8.4 G/DL (ref 12–16)
LYMPHOCYTES ABSOLUTE: 1.1 K/UL (ref 1–5.1)
LYMPHOCYTES RELATIVE PERCENT: 12.3 %
MAGNESIUM: 2 MG/DL (ref 1.8–2.4)
MCH RBC QN AUTO: 30.8 PG (ref 26–34)
MCHC RBC AUTO-ENTMCNC: 32.7 G/DL (ref 31–36)
MCV RBC AUTO: 94.2 FL (ref 80–100)
MONOCYTES ABSOLUTE: 0.9 K/UL (ref 0–1.3)
MONOCYTES RELATIVE PERCENT: 10.6 %
NEUTROPHILS ABSOLUTE: 6.6 K/UL (ref 1.7–7.7)
NEUTROPHILS RELATIVE PERCENT: 76.5 %
PDW BLD-RTO: 16 % (ref 12.4–15.4)
PLATELET # BLD: 220 K/UL (ref 135–450)
PMV BLD AUTO: 7.6 FL (ref 5–10.5)
POTASSIUM SERPL-SCNC: 3.5 MMOL/L (ref 3.5–5.1)
RBC # BLD: 2.74 M/UL (ref 4–5.2)
SODIUM BLD-SCNC: 140 MMOL/L (ref 136–145)
WBC # BLD: 8.7 K/UL (ref 4–11)

## 2021-01-23 PROCEDURE — 6370000000 HC RX 637 (ALT 250 FOR IP): Performed by: INTERNAL MEDICINE

## 2021-01-23 PROCEDURE — 85730 THROMBOPLASTIN TIME PARTIAL: CPT

## 2021-01-23 PROCEDURE — 2000000000 HC ICU R&B

## 2021-01-23 PROCEDURE — 93284 PRGRMG EVAL IMPLANTABLE DFB: CPT | Performed by: INTERNAL MEDICINE

## 2021-01-23 PROCEDURE — 2580000003 HC RX 258: Performed by: INTERNAL MEDICINE

## 2021-01-23 PROCEDURE — 80048 BASIC METABOLIC PNL TOTAL CA: CPT

## 2021-01-23 PROCEDURE — 85025 COMPLETE CBC W/AUTO DIFF WBC: CPT

## 2021-01-23 PROCEDURE — 83735 ASSAY OF MAGNESIUM: CPT

## 2021-01-23 RX ORDER — AMIODARONE HYDROCHLORIDE 200 MG/1
400 TABLET ORAL 2 TIMES DAILY
Status: DISCONTINUED | OUTPATIENT
Start: 2021-01-23 | End: 2021-01-25

## 2021-01-23 RX ADMIN — FUROSEMIDE 20 MG: 20 TABLET ORAL at 08:37

## 2021-01-23 RX ADMIN — BENZOCAINE AND MENTHOL 1 LOZENGE: 15; 3.6 LOZENGE ORAL at 16:17

## 2021-01-23 RX ADMIN — ACETAMINOPHEN 650 MG: 325 TABLET ORAL at 21:24

## 2021-01-23 RX ADMIN — AMIODARONE HYDROCHLORIDE 400 MG: 200 TABLET ORAL at 16:07

## 2021-01-23 RX ADMIN — CARVEDILOL 25 MG: 25 TABLET, FILM COATED ORAL at 08:37

## 2021-01-23 RX ADMIN — CARVEDILOL 25 MG: 25 TABLET, FILM COATED ORAL at 21:24

## 2021-01-23 RX ADMIN — BENZOCAINE AND MENTHOL 1 LOZENGE: 15; 3.6 LOZENGE ORAL at 11:28

## 2021-01-23 RX ADMIN — ACETAMINOPHEN 650 MG: 325 TABLET ORAL at 13:51

## 2021-01-23 RX ADMIN — Medication 10 ML: at 08:38

## 2021-01-23 RX ADMIN — Medication 10 ML: at 21:35

## 2021-01-23 RX ADMIN — TRAMADOL HYDROCHLORIDE 50 MG: 50 TABLET, FILM COATED ORAL at 11:27

## 2021-01-23 RX ADMIN — SPIRONOLACTONE 25 MG: 25 TABLET ORAL at 08:37

## 2021-01-23 RX ADMIN — ATORVASTATIN CALCIUM 40 MG: 40 TABLET, FILM COATED ORAL at 21:24

## 2021-01-23 ASSESSMENT — PAIN SCALES - GENERAL
PAINLEVEL_OUTOF10: 2
PAINLEVEL_OUTOF10: 3
PAINLEVEL_OUTOF10: 4
PAINLEVEL_OUTOF10: 2
PAINLEVEL_OUTOF10: 0
PAINLEVEL_OUTOF10: 2

## 2021-01-23 ASSESSMENT — PAIN DESCRIPTION - PROGRESSION: CLINICAL_PROGRESSION: GRADUALLY IMPROVING

## 2021-01-23 ASSESSMENT — PAIN DESCRIPTION - PAIN TYPE: TYPE: ACUTE PAIN;SURGICAL PAIN

## 2021-01-23 ASSESSMENT — PAIN DESCRIPTION - ORIENTATION
ORIENTATION: LEFT
ORIENTATION: LEFT

## 2021-01-23 NOTE — PLAN OF CARE
Problem: Falls - Risk of:  Goal: Will remain free from falls  Description: Will remain free from falls  Outcome: Ongoing     Problem: Pain:  Goal: Pain level will decrease  Description: Pain level will decrease  Outcome: Ongoing     Problem: Cardiac:  Goal: Ability to maintain an adequate cardiac output will improve  Description: Ability to maintain an adequate cardiac output will improve  Outcome: Ongoing       Electronically signed by Tico Nguyen RN on 1/23/2021 at 10:35 AM

## 2021-01-23 NOTE — PROGRESS NOTES
[facility] PROGRESS NOTE    1/23/2021 1:59 PM        Name: Brigette Ward . Admitted: 1/20/2021  Primary Care Provider: No primary care provider on file. (Tel: None)      Brief Course:  62 yo F with dilated cardiomyopathy, HTN, complete heart block s/p PPM, Afib on Xarelto, chronic systolic CHF, anemia, obesity came to ER with complaints of dizziness. Admitted as inpatient for VT. Followed by Cardiology. Patient found to be in atrial flutter with runs of symptomatic VT. Underwent LHC 1/20/2020 that showed normal coronaries. She had episode of VT with syncope in cath lab prior to [facility]. Underwent Successful external DC cardioversion of atrial  flutter on 1/20/21. ·  On 1/22/21, Laser lead extraction of the right ventricular pacing lead   ·    · Removal of pacemaker pulse generator   · Insertion of a new right ventricular defibrillator  lead under fluoroscopy   · Insertion of a new left ventricular lead under fluoroscopy   · Implantation of a new MRI compatible Biv-AICD pulse generator   · Insertion of a temproary pacemaker   · Insertion of right femoral venous central line   · Insertion of right femoral arterial line   · Removal of temporary pacing wire   · Programming of the pacemaker before the procedure  · Electronic analysis of lead and device   · Ultrasound for venous and arterial access  · Insertion of Bridge balloon  Venogram of extremity     Needs PT/OT eval when ok with Cardio as she says not ready to go home on 1/23. CC:  Dizziness    Subjective:  . Patient feels weak and tired. Sore in L neck and chest at ICD. No CP, HA or abdominal pain. No fevers.     Reviewed interval ancillary notes    Current Medications      potassium chloride 20 mEq/50 mL IVPB (Central Line), PRN      meperidine (DEMEROL) injection 12.5 mg, Q5 Min PRN      labetalol (NORMODYNE;TRANDATE) injection 5 mg, Q10 Min PRN     fentaNYL (SUBLIMAZE) injection 25 mcg, Q5 Min PRN      fentaNYL (SUBLIMAZE) injection 25 mcg, Q5 Min PRN      fentaNYL (SUBLIMAZE) injection 50 mcg, Q5 Min PRN      sodium chloride flush 0.9 % injection 10 mL, 2 times per day      sodium chloride flush 0.9 % injection 10 mL, PRN      traMADol (ULTRAM) tablet 50 mg, Q6H PRN      ondansetron (ZOFRAN) injection 4 mg, Q6H PRN      phenol 1.4 % mouth spray 1 spray, Q2H PRN      benzocaine-menthol (CEPACOL SORE THROAT) lozenge 1 lozenge, Q2H PRN      NIFEdipine (PROCARDIA XL) extended release tablet 30 mg, Daily PRN      amiodarone (CORDARONE) 450 mg in dextrose 5 % 250 mL infusion, Continuous      sodium chloride flush 0.9 % injection 10 mL, 2 times per day      sodium chloride flush 0.9 % injection 10 mL, PRN      promethazine (PHENERGAN) tablet 12.5 mg, Q6H PRN    Or      ondansetron (ZOFRAN) injection 4 mg, Q6H PRN      polyethylene glycol (GLYCOLAX) packet 17 g, Daily PRN      acetaminophen (TYLENOL) tablet 650 mg, Q6H PRN    Or      acetaminophen (TYLENOL) suppository 650 mg, Q6H PRN      butalbital-acetaminophen-caffeine (FIORICET, ESGIC) per tablet 1 tablet, Q4H PRN      atorvastatin (LIPITOR) tablet 40 mg, Daily      furosemide (LASIX) tablet 20 mg, Daily      spironolactone (ALDACTONE) tablet 25 mg, Daily      metoclopramide (REGLAN) tablet 5 mg, 4x Daily PRN      perflutren lipid microspheres (DEFINITY) injection 1.65 mg, ONCE PRN      heparin (porcine) injection 5,170 Units, PRN      heparin (porcine) injection 2,590 Units, PRN      carvedilol (COREG) tablet 25 mg, BID        Objective:  /70   Pulse 75   Temp 97 °F (36.1 °C) (Temporal)   Resp 18   Ht 5' 7\" (1.702 m)   Wt 194 lb 0.1 oz (88 kg)   SpO2 96%   BMI 30.39 kg/m²     Intake/Output Summary (Last 24 hours) at 1/23/2021 1359  Last data filed at 1/23/2021 0602  Gross per 24 hour   Intake 480 ml   Output 875 ml   Net -395 ml      Wt Readings from Last 3 Encounters: 01/23/21 194 lb 0.1 oz (88 kg)   12/07/20 190 lb (86.2 kg)   10/27/20 188 lb (85.3 kg)       General appearance:  Appears comfortable, sitting in bed  Eyes: Sclera clear. Pupils equal.  ENT: Moist oral mucosa. Trachea midline, no adenopathy. No stridor  Cardiovascular: Regular rhythm, normal S1, S2. No murmur. No edema in lower extremities. L ICD is covered  Respiratory: Not using accessory muscles. Good inspiratory effort. Clear to auscultation bilaterally, no wheeze or crackles. GI: Abdomen soft, no tenderness, not distended, normal bowel sounds  Musculoskeletal: No cyanosis in digits, neck supple  Neurology: Grossly intact. No speech or motor deficits  Psych: Normal affect. Alert and oriented in time, place and person  Skin: Warm, dry, normal turgor  Extremity exam shows brisk capillary refill. Peripheral pulses are palpable in lower extremities     Labs and Tests:  CBC:   Recent Labs     01/21/21  1810 01/22/21  0310 01/23/21  0528   WBC 6.0 5.2 8.7   HGB 10.4* 9.6* 8.4*    250 220     BMP:    Recent Labs     01/21/21  1810 01/22/21  0310 01/23/21  0528    138 140   K 3.5 3.1* 3.5    103 106   CO2 24 26 22   BUN 8 8 8   CREATININE 0.9 0.8 0.9   GLUCOSE 106* 106* 110*     Hepatic:   No results for input(s): AST, ALT, ALB, BILITOT, ALKPHOS in the last 72 hours. XR CHEST PORTABLE   Final Result   No acute findings. No pneumothorax. Gaseous distention of the stomach. XR CHEST PORTABLE   Final Result   No acute disease. Problem List  Principal Problem:    Paroxysmal ventricular tachycardia (HCC)  Active Problems:    HTN (hypertension)    Cardiac pacemaker in situ    Persistent atrial fibrillation (HCC)    Systolic CHF, chronic (HCC)    Obstructive sleep apnea (adult) (pediatric)    Dilated cardiomyopathy (HCC)    Class 1 obesity without serious comorbidity with body mass index (BMI) of 33.0 to 33.9 in adult  Resolved Problems:    * No resolved hospital problems. *       Assessment & Plan:   1. Cardiac diet  2. Off Hep gtt per Cardio on 1/22/21  3. Cont Coreg and Lasix  4. Stopped Xarelto   5. Cont Aldactone  6. Cont Amio gtt  7. PT/OT eval when activity ok with Cardio    IV Access: R IJ TLC  Schultz: No  Diet: DIET CARDIAC;  Code:Full Code  DVT PPX SCD  Disposition Home    Discussed with patient and nursing. Needs PT/OT eval when ok with Cardio. Home when ok with Cardio.     Tosha Arellano MD   1/23/2021 1:59 PM

## 2021-01-24 LAB
ANION GAP SERPL CALCULATED.3IONS-SCNC: 10 MMOL/L (ref 3–16)
BLOOD BANK DISPENSE STATUS: NORMAL
BLOOD BANK PRODUCT CODE: NORMAL
BPU ID: NORMAL
BUN BLDV-MCNC: 10 MG/DL (ref 7–20)
CALCIUM SERPL-MCNC: 9.1 MG/DL (ref 8.3–10.6)
CHLORIDE BLD-SCNC: 105 MMOL/L (ref 99–110)
CO2: 24 MMOL/L (ref 21–32)
CREAT SERPL-MCNC: 1 MG/DL (ref 0.6–1.1)
DESCRIPTION BLOOD BANK: NORMAL
GFR AFRICAN AMERICAN: >60
GFR NON-AFRICAN AMERICAN: 57
GLUCOSE BLD-MCNC: 150 MG/DL (ref 70–99)
MAGNESIUM: 1.9 MG/DL (ref 1.8–2.4)
POTASSIUM REFLEX MAGNESIUM: 3 MMOL/L (ref 3.5–5.1)
POTASSIUM SERPL-SCNC: 5 MMOL/L (ref 3.5–5.1)
SODIUM BLD-SCNC: 139 MMOL/L (ref 136–145)

## 2021-01-24 PROCEDURE — 6370000000 HC RX 637 (ALT 250 FOR IP): Performed by: INTERNAL MEDICINE

## 2021-01-24 PROCEDURE — 2000000000 HC ICU R&B

## 2021-01-24 PROCEDURE — 99232 SBSQ HOSP IP/OBS MODERATE 35: CPT | Performed by: NURSE PRACTITIONER

## 2021-01-24 PROCEDURE — 84132 ASSAY OF SERUM POTASSIUM: CPT

## 2021-01-24 PROCEDURE — 2580000003 HC RX 258: Performed by: INTERNAL MEDICINE

## 2021-01-24 PROCEDURE — 6360000002 HC RX W HCPCS: Performed by: INTERNAL MEDICINE

## 2021-01-24 PROCEDURE — 80048 BASIC METABOLIC PNL TOTAL CA: CPT

## 2021-01-24 PROCEDURE — 83735 ASSAY OF MAGNESIUM: CPT

## 2021-01-24 RX ORDER — MAGNESIUM SULFATE IN WATER 40 MG/ML
2000 INJECTION, SOLUTION INTRAVENOUS ONCE
Status: COMPLETED | OUTPATIENT
Start: 2021-01-24 | End: 2021-01-24

## 2021-01-24 RX ADMIN — AMIODARONE HYDROCHLORIDE 400 MG: 200 TABLET ORAL at 08:14

## 2021-01-24 RX ADMIN — BENZOCAINE AND MENTHOL 1 LOZENGE: 15; 3.6 LOZENGE ORAL at 01:16

## 2021-01-24 RX ADMIN — PROMETHAZINE HYDROCHLORIDE 12.5 MG: 25 TABLET ORAL at 14:33

## 2021-01-24 RX ADMIN — Medication 10 ML: at 22:26

## 2021-01-24 RX ADMIN — ENOXAPARIN SODIUM 40 MG: 40 INJECTION SUBCUTANEOUS at 13:39

## 2021-01-24 RX ADMIN — POTASSIUM BICARBONATE 40 MEQ: 782 TABLET, EFFERVESCENT ORAL at 16:08

## 2021-01-24 RX ADMIN — Medication 10 ML: at 08:15

## 2021-01-24 RX ADMIN — CARVEDILOL 25 MG: 25 TABLET, FILM COATED ORAL at 22:25

## 2021-01-24 RX ADMIN — POTASSIUM BICARBONATE 40 MEQ: 782 TABLET, EFFERVESCENT ORAL at 17:43

## 2021-01-24 RX ADMIN — POTASSIUM BICARBONATE 40 MEQ: 782 TABLET, EFFERVESCENT ORAL at 13:39

## 2021-01-24 RX ADMIN — AMIODARONE HYDROCHLORIDE 400 MG: 200 TABLET ORAL at 20:13

## 2021-01-24 RX ADMIN — SPIRONOLACTONE 25 MG: 25 TABLET ORAL at 08:14

## 2021-01-24 RX ADMIN — TRAMADOL HYDROCHLORIDE 50 MG: 50 TABLET, FILM COATED ORAL at 20:13

## 2021-01-24 RX ADMIN — TRAMADOL HYDROCHLORIDE 50 MG: 50 TABLET, FILM COATED ORAL at 06:49

## 2021-01-24 RX ADMIN — CARVEDILOL 25 MG: 25 TABLET, FILM COATED ORAL at 08:14

## 2021-01-24 RX ADMIN — Medication 10 ML: at 22:27

## 2021-01-24 RX ADMIN — FUROSEMIDE 20 MG: 20 TABLET ORAL at 08:14

## 2021-01-24 RX ADMIN — MAGNESIUM SULFATE HEPTAHYDRATE 2000 MG: 40 INJECTION, SOLUTION INTRAVENOUS at 12:28

## 2021-01-24 ASSESSMENT — PAIN SCALES - GENERAL
PAINLEVEL_OUTOF10: 3
PAINLEVEL_OUTOF10: 0
PAINLEVEL_OUTOF10: 8
PAINLEVEL_OUTOF10: 5

## 2021-01-24 ASSESSMENT — PAIN DESCRIPTION - PROGRESSION
CLINICAL_PROGRESSION: NOT CHANGED

## 2021-01-24 ASSESSMENT — PAIN DESCRIPTION - PAIN TYPE
TYPE: ACUTE PAIN
TYPE: ACUTE PAIN;SURGICAL PAIN

## 2021-01-24 ASSESSMENT — PAIN DESCRIPTION - LOCATION: LOCATION: THROAT

## 2021-01-24 ASSESSMENT — PAIN DESCRIPTION - ORIENTATION: ORIENTATION: RIGHT

## 2021-01-24 ASSESSMENT — PAIN DESCRIPTION - ONSET: ONSET: ON-GOING

## 2021-01-24 ASSESSMENT — PAIN - FUNCTIONAL ASSESSMENT: PAIN_FUNCTIONAL_ASSESSMENT: ACTIVITIES ARE NOT PREVENTED

## 2021-01-24 NOTE — PROGRESS NOTES
hours.    Urinalysis:      Lab Results   Component Value Date    NITRU Negative 07/10/2020    BLOODU Negative 07/10/2020    SPECGRAV 1.015 07/10/2020    GLUCOSEU Negative 07/10/2020       Radiology:  XR CHEST PORTABLE   Final Result   No acute findings. No pneumothorax. Gaseous distention of the stomach. XR CHEST PORTABLE   Final Result   No acute disease. Assessment/Plan:    Active Hospital Problems    Diagnosis Date Noted    [facility] [I48.19]      Priority: High    Cardiac pacemaker in situ [Z95.0] 12/12/2012     Priority: High    HTN (hypertension) [I10] 10/26/2012     Priority: High    Paroxysmal ventricular tachycardia (HCC) [I47.2] 01/20/2021    Class 1 obesity without serious comorbidity with body mass index (BMI) of 33.0 to 33.9 in adult [E66.9, Z68.33] 09/06/2019    Dilated cardiomyopathy ([facility] [address] [I42.0] 08/28/2019    Obstructive sleep apnea (adult) (pediatric) [Q20.78]     Systolic CHF, chronic ([facility] [address] [I50.22] 10/03/2018       Acute Medical Issues Being Addressed:  40-year-old admitted with all potation's and dizziness    Persistent atrial fibrillation with nonsustained ventricular tachycardia with syncope  S/p laser lead extraction and BiV AICD  Was on amnio drip has been changed over to oral amiodarone- 400 mg bid x 1 week, 400 mg daily for 2 weeks and then 200 mg daily after that. Patient found to be in atrial flutter with runs of symptomatic VT.   Underwent LHC 1/20/2020 that showed normal coronaries  We will discuss with cardiology regarding timing of restarting anticoagulation    Chronic severe systolic heart failure  Currently on carvedilol Lasix spironolactone  Patient allergic to ACE inhibitors hence no ACE or ARB is used  Currently euvolemic    Hypokalemia  Replace aggressively with magnesium and repeat levels    Await physical and occupational therapy evaluation and then discharge probably tomorrow  DVT Prophylaxis: sc lovenox for now Diet: DIET CARDIAC;  Code Status: Full Code      Dispo - once acute medical processes have resolved    Felicity Fritz MD

## 2021-01-24 NOTE — PROGRESS NOTES
[facility]   Cardiology Progress Note     Date: 1/24/2021  Admit Date: 1/20/2021     Reason for consultation: atrial fibrillation    Chief Complaint:   Chief Complaint   Patient presents with    Dizziness     since 1900 off and on tonight. Pt stated that every time she takes a certain medicine, it makes her feel dizzy but this is the worst. Medicine is hydralazine       History of Present Illness: History obtained from patient and medical record. Araseli Rapp is a 63 y.o. female with a past medical history of HTN, HLD, CHB s/p PPM, anemia, atrial fibrillation, ,sCHF, and nonischemic cardiomyopathy who presented with dizziness and found to be in aflutter with runs of symptomatic VT. S/p LHC 1/20/2020 that showed normal coronaries. She had episode of VT with syncope in cath lab prior to [facility]. She had BiV AICD 1/22/21. Interval Hx: Today, she is nauseated from potassium. She has been getting up and moving without difficulty. Dressing to left chest and sling in place      Patient seen and examined. Clinical notes reviewed. Telemetry reviewed. No new complaints today. No major events overnight. Denies having chest pain, palpitations, shortness of breath, orthopnea/PND, cough, or dizziness at the time of this visit. Allergies: Allergies   Allergen Reactions    Latex     Codeine      Hives      Entresto [Sacubitril-Valsartan]      Lips and tongue swelling    Lisinopril      cough    Nitroglycerin Hives    Sulfa Antibiotics Nausea Only    Hydralazine      headaches       Home Meds:  Prior to Visit Medications    Medication Sig Taking?  Authorizing Provider   Multiple Vitamins-Minerals (THERAPEUTIC MULTIVITAMIN-MINERALS) tablet Take 1 tablet by mouth daily Yes Historical Provider, MD   furosemide (LASIX) 20 MG tablet 40 mg for 1 week and then 20 mg daily Yes Rema Rivers, APRN - CNS   metoclopramide (REGLAN) 5 MG tablet Take 1 tablet by mouth 4 times daily Patient taking differently: Take 5 mg by mouth 4 times daily as needed  Yes Duane Peyer, MD   XARELTO 20 MG TABS tablet TAKE ONE TABLET BY MOUTH DAILY WITH BREAKFAST Yes Su Watts MD   carvedilol (COREG) 12.5 MG tablet Take 1 tablet by mouth 2 times daily Yes CATRACHITO Sauer - CNS   spironolactone (ALDACTONE) 25 MG tablet Take 1 tablet by mouth daily Yes CATRACHITO Sauer - CNS   butalbital-acetaminophen-caffeine (FIORICET, ESGIC) -40 MG per tablet Take 1 tablet by mouth every 4 hours as needed for Headaches Yes Historical Provider, MD   atorvastatin (LIPITOR) 40 MG tablet Take 1 tablet by mouth daily Yes Breanna Davenport MD      Scheduled Meds:   potassium bicarb-citric acid  40 mEq Oral Q2H    enoxaparin  40 mg Subcutaneous Daily    amiodarone  400 mg Oral BID    sodium chloride flush  10 mL Intravenous 2 times per day    sodium chloride flush  10 mL Intravenous 2 times per day    atorvastatin  40 mg Oral Daily    furosemide  20 mg Oral Daily    spironolactone  25 mg Oral Daily    carvedilol  25 mg Oral BID     Continuous Infusions:  PRN Meds:potassium chloride, meperidine, labetalol, fentanNYL, fentanNYL, fentanNYL, sodium chloride flush, traMADol, ondansetron, phenol, benzocaine-menthol, NIFEdipine, sodium chloride flush, promethazine **OR** ondansetron, polyethylene glycol, acetaminophen **OR** acetaminophen, butalbital-acetaminophen-caffeine, metoclopramide, perflutren lipid microspheres, heparin (porcine), heparin (porcine)     Past Medical History:  Past Medical History:   Diagnosis Date    Anemia     Atrial fibrillation and flutter (HCC)     Atrial flutter (HCC)     [facility] block) [facility]     Class 1 obesity without serious comorbidity with body mass index (BMI) of 33.0 to 33.9 in adult 9/6/2019    Congenital heart disease     GERD (gastroesophageal reflux disease)     Headache(784.0)     History of complete heart block     Hyperlipidemia  Hypertension     Syncope     Systolic [facility], chronic ([facility] 10/3/2018        Past Surgical History:    has a past surgical history that includes Uterine fibroid surgery; Pacemaker insertion (11/29/12); Tubal ligation; Upper gastrointestinal endoscopy (N/A, 10/27/2020); and Colonoscopy (N/A, 10/27/2020). Social History:  Reviewed. reports that she has never smoked. She has never used smokeless tobacco. She reports that she does not drink alcohol or use drugs. Family History:  Reviewed. family history includes Cancer in her father. Denies family history of sudden cardiac death, arrhythmia, premature CAD    Review of Systems:  · Constitutional: Negative for fever, night sweats, chills, weight changes, or weakness  · Skin: Negative for rash, dry skin, pruritus, bruising, bleeding, blood clots, or changes in skin pigment  · HEENT: Negative for vision changes, ringing in the ears, sore throat, dysphagia, or swollen lymph nodes  · Respiratory: Reviewed in HPI  · Cardiovascular: Reviewed in HPI  · Gastrointestinal: Negative for abdominal pain, N/V/D, constipation, or black/tarry stools  · Genito-Urinary: Negative for dysuria, incontinence, urgency, or hematuria  · Musculoskeletal: Negative for joint swelling, muscle pain, or injuries  · Neurological/Psych: Negative for confusion, seizures, headaches, balance issues or TIA-like symptoms.  No anxiety, depression, or insomnia    Physical Examination:  Vitals:    01/24/21 0752   BP: 114/76   Pulse: 76   Resp: 20   Temp: 98 °F (36.7 °C)   SpO2: 95%      In: -   Out: 1400    Wt Readings from Last 3 Encounters:   01/24/21 190 lb 11.2 oz (86.5 kg)   12/07/20 190 lb (86.2 kg)   10/27/20 188 lb (85.3 kg)       Intake/Output Summary (Last 24 hours) at 1/24/2021 1453  Last data filed at 1/24/2021 0653  Gross per 24 hour   Intake    Output 1400 ml   Net -1400 ml       Telemetry: Personally Reviewed  - AV paced Docia Limb <0.01 07/10/2020    TROPONINI <0.01 2020     Coags:   Lab Results   Component Value Date    PROTIME 12.9 2021    INR 1.11 2021     EC2021:      ECHO:  2021  Summary   Left ventricular size is mildly increased.   Left ventricular systolic function is severely depressed with ejection   fraction estimated at 25-30%.  Grade II diastolic dysfunction with elevated LV filling pressures.   Moderate mitral regurgitation.   The left atrium is moderate to severely dilated.   Aortic valve appears sclerotic but opens adequately.   Mild aortic regurgitation is present.   Mild to moderate tricuspid regurgitation.  Systolic pulmonary artery pressure   (SPAP) is estimated at 35 mmHg.   Pacer / ICD wire is visualized in the right ventricle.   Normal right ventricular size and function.     Cath: 2021  Findings:  Artery Findings/Result   LM Normal   LAD Normal   Cx Normal   RI NA   RCA Normal   LVEDP 6   LVG NA      Intervention:         None    Problem List:   Patient Active Problem List    Diagnosis Date Noted    Persistent atrial fibrillation ([facility]      Priority: High    LVH (left ventricular hypertrophy) 2014     Priority: High    Headache 2013     Priority: High    Cardiac pacemaker in situ 2012     Priority: High    Dyspnea on exertion 2012     Priority: High    Pacemaker 2012     Priority: High    HTN (hypertension) 10/26/2012     Priority: High    Other and unspecified hyperlipidemia 10/26/2012     Priority: High    Congenital heart block 10/26/2012     Priority: High    Paroxysmal ventricular tachycardia ([facility] 2021    Class 1 obesity without serious comorbidity with body mass index (BMI) of 33.0 to 33.9 in adult 2019    Dilated cardiomyopathy ([address] 2019    Left subclavian vein thrombosis ([address] 2019    LV dysfunction 2019    Obstructive sleep apnea (adult) (pediatric)     Hypersomnia  Systolic CHF, chronic ([facility] 10/03/2018    Chest pain 09/11/2018        Assessment and Plan:     VT  ~ no ectopy on monitor overnight, AV paced  ~ s/p laser lead extraction and Biv-AICD 1/22  ~ cath (1/20/21) normal coronaries   ~ PO amio     Persistent Afib  ~ s/p DCCV 1/20/21  ~ on PO amio   ~ f/u OP for recurrence and consider ablation    CHB  ~ s/p PPM  ~ AV paced on the monitor     Systolic HF  ~ on BB / spironolactone / lasix   ~ allergyy to ACE/ARB  ~ appears compensated   ~ weights stable, down 4 lbs overnight     HTN  ~ controlled     Plan: Discussed with Dr. Kimberlee Severs. OK to resume xarelto tomorrow. Multiple medical conditions with risk of decompensation. All pertinent information and plan of care discussed with the physician. All questions and concerns were addressed to the patient. Alternatives to my treatment were discussed. I have discussed the above stated plan with patient and the nurse. The patient verbalized understanding and agreed with the plan. Thank you for allowing to us to participate in the care of Matteo Taylor.     Jarrod WINSTON-CNP  [facility]   Office: [phone]

## 2021-01-25 ENCOUNTER — NURSE ONLY (OUTPATIENT)
Dept: CARDIOLOGY CLINIC | Age: 56
End: 2021-01-25
Payer: MEDICAID

## 2021-01-25 VITALS
TEMPERATURE: 98.1 F | BODY MASS INDEX: 29.76 KG/M2 | OXYGEN SATURATION: 97 % | RESPIRATION RATE: 20 BRPM | HEIGHT: 67 IN | SYSTOLIC BLOOD PRESSURE: 108 MMHG | WEIGHT: 189.6 LBS | DIASTOLIC BLOOD PRESSURE: 73 MMHG | HEART RATE: 75 BPM

## 2021-01-25 DIAGNOSIS — I42.0 DILATED CARDIOMYOPATHY (HCC): ICD-10-CM

## 2021-01-25 DIAGNOSIS — Z95.810 ICD (IMPLANTABLE CARDIOVERTER-DEFIBRILLATOR), BIVENTRICULAR, IN SITU: ICD-10-CM

## 2021-01-25 DIAGNOSIS — I51.9 LV DYSFUNCTION: ICD-10-CM

## 2021-01-25 DIAGNOSIS — I51.7 LVH (LEFT VENTRICULAR HYPERTROPHY): ICD-10-CM

## 2021-01-25 DIAGNOSIS — I50.22 SYSTOLIC CHF, CHRONIC (HCC): ICD-10-CM

## 2021-01-25 DIAGNOSIS — I47.29 PAROXYSMAL VENTRICULAR TACHYCARDIA: ICD-10-CM

## 2021-01-25 DIAGNOSIS — Q24.6 CONGENITAL HEART BLOCK: ICD-10-CM

## 2021-01-25 DIAGNOSIS — I48.19 PERSISTENT ATRIAL FIBRILLATION (HCC): ICD-10-CM

## 2021-01-25 DIAGNOSIS — Z95.810 ICD (IMPLANTABLE CARDIOVERTER-DEFIBRILLATOR), BIVENTRICULAR, IN SITU: Primary | ICD-10-CM

## 2021-01-25 LAB
ANION GAP SERPL CALCULATED.3IONS-SCNC: 10 MMOL/L (ref 3–16)
BASOPHILS ABSOLUTE: 0.1 K/UL (ref 0–0.2)
BASOPHILS RELATIVE PERCENT: 0.9 %
BUN BLDV-MCNC: 11 MG/DL (ref 7–20)
CALCIUM SERPL-MCNC: 9.2 MG/DL (ref 8.3–10.6)
CHLORIDE BLD-SCNC: 103 MMOL/L (ref 99–110)
CO2: 25 MMOL/L (ref 21–32)
CREAT SERPL-MCNC: 0.8 MG/DL (ref 0.6–1.1)
EOSINOPHILS ABSOLUTE: 0.2 K/UL (ref 0–0.6)
EOSINOPHILS RELATIVE PERCENT: 3.4 %
GFR AFRICAN AMERICAN: >60
GFR NON-AFRICAN AMERICAN: >60
GLUCOSE BLD-MCNC: 112 MG/DL (ref 70–99)
HCT VFR BLD CALC: 27 % (ref 36–48)
HEMOGLOBIN: 8.8 G/DL (ref 12–16)
LYMPHOCYTES ABSOLUTE: 1.1 K/UL (ref 1–5.1)
LYMPHOCYTES RELATIVE PERCENT: 20.2 %
MAGNESIUM: 2.4 MG/DL (ref 1.8–2.4)
MCH RBC QN AUTO: 30.4 PG (ref 26–34)
MCHC RBC AUTO-ENTMCNC: 32.4 G/DL (ref 31–36)
MCV RBC AUTO: 93.7 FL (ref 80–100)
MONOCYTES ABSOLUTE: 0.5 K/UL (ref 0–1.3)
MONOCYTES RELATIVE PERCENT: 9.2 %
NEUTROPHILS ABSOLUTE: 3.8 K/UL (ref 1.7–7.7)
NEUTROPHILS RELATIVE PERCENT: 66.3 %
PDW BLD-RTO: 15.7 % (ref 12.4–15.4)
PLATELET # BLD: 210 K/UL (ref 135–450)
PMV BLD AUTO: 7.3 FL (ref 5–10.5)
POTASSIUM SERPL-SCNC: 4.3 MMOL/L (ref 3.5–5.1)
RBC # BLD: 2.88 M/UL (ref 4–5.2)
SODIUM BLD-SCNC: 138 MMOL/L (ref 136–145)
WBC # BLD: 5.7 K/UL (ref 4–11)

## 2021-01-25 PROCEDURE — 6370000000 HC RX 637 (ALT 250 FOR IP): Performed by: INTERNAL MEDICINE

## 2021-01-25 PROCEDURE — 2580000003 HC RX 258: Performed by: INTERNAL MEDICINE

## 2021-01-25 PROCEDURE — 85025 COMPLETE CBC W/AUTO DIFF WBC: CPT

## 2021-01-25 PROCEDURE — 36592 COLLECT BLOOD FROM PICC: CPT

## 2021-01-25 PROCEDURE — 80048 BASIC METABOLIC PNL TOTAL CA: CPT

## 2021-01-25 PROCEDURE — 83735 ASSAY OF MAGNESIUM: CPT

## 2021-01-25 PROCEDURE — 99233 SBSQ HOSP IP/OBS HIGH 50: CPT | Performed by: INTERNAL MEDICINE

## 2021-01-25 PROCEDURE — 93284 PRGRMG EVAL IMPLANTABLE DFB: CPT | Performed by: INTERNAL MEDICINE

## 2021-01-25 RX ORDER — FUROSEMIDE 20 MG/1
20 TABLET ORAL DAILY
Qty: 60 TABLET | Refills: 3 | Status: ON HOLD | OUTPATIENT
Start: 2021-01-26 | End: 2021-04-19 | Stop reason: SDUPTHER

## 2021-01-25 RX ORDER — NIFEDIPINE 30 MG/1
30 TABLET, FILM COATED, EXTENDED RELEASE ORAL DAILY PRN
Qty: 30 TABLET | Refills: 3 | Status: ON HOLD | OUTPATIENT
Start: 2021-01-25 | End: 2021-02-09 | Stop reason: HOSPADM

## 2021-01-25 RX ORDER — CARVEDILOL 25 MG/1
25 TABLET ORAL 2 TIMES DAILY
Qty: 60 TABLET | Refills: 3 | Status: ON HOLD | OUTPATIENT
Start: 2021-01-25 | End: 2021-04-19 | Stop reason: HOSPADM

## 2021-01-25 RX ORDER — TRAMADOL HYDROCHLORIDE 50 MG/1
50 TABLET ORAL EVERY 6 HOURS PRN
Qty: 12 TABLET | Refills: 0 | Status: SHIPPED | OUTPATIENT
Start: 2021-01-25 | End: 2021-01-28

## 2021-01-25 RX ORDER — AMIODARONE HYDROCHLORIDE 400 MG/1
TABLET ORAL
Qty: 30 TABLET | Refills: 1 | Status: SHIPPED | OUTPATIENT
Start: 2021-01-25 | End: 2021-02-24 | Stop reason: SDUPTHER

## 2021-01-25 RX ORDER — AMIODARONE HYDROCHLORIDE 200 MG/1
400 TABLET ORAL DAILY
Status: DISCONTINUED | OUTPATIENT
Start: 2021-01-25 | End: 2021-01-25 | Stop reason: HOSPADM

## 2021-01-25 RX ADMIN — CARVEDILOL 25 MG: 25 TABLET, FILM COATED ORAL at 08:58

## 2021-01-25 RX ADMIN — Medication 10 ML: at 08:58

## 2021-01-25 RX ADMIN — SPIRONOLACTONE 25 MG: 25 TABLET ORAL at 08:58

## 2021-01-25 RX ADMIN — FUROSEMIDE 20 MG: 20 TABLET ORAL at 08:58

## 2021-01-25 RX ADMIN — AMIODARONE HYDROCHLORIDE 400 MG: 200 TABLET ORAL at 08:58

## 2021-01-25 RX ADMIN — TRAMADOL HYDROCHLORIDE 50 MG: 50 TABLET, FILM COATED ORAL at 08:58

## 2021-01-25 ASSESSMENT — PAIN SCALES - GENERAL
PAINLEVEL_OUTOF10: 4
PAINLEVEL_OUTOF10: 0

## 2021-01-25 ASSESSMENT — PAIN DESCRIPTION - PROGRESSION
CLINICAL_PROGRESSION: NOT CHANGED
CLINICAL_PROGRESSION: NOT CHANGED

## 2021-01-25 NOTE — DISCHARGE SUMMARY
Patient: Brent Knapp     Gender: female  : 1965   Age: 63 y.o. MRN: [id number]    Admitting Physician: Nikkie Ames MD  Discharge Physician: Alec Bhat MD     Code Status: Full Code     Admit Date: 2021   Discharge Date:  2021    Disposition:  Home    Discharge Diagnoses:  Persistent atrial fibrillation with nonsustained ventricular tachycardia with syncope  Chronic severe systolic heart failure  Active Hospital Problems    Diagnosis Date Noted    Persistent atrial fibrillation ([address] [I48.19]      Priority: High    HTN (hypertension) [I10] 10/26/2012     Priority: High    Paroxysmal ventricular tachycardia ([address] [I47.2] 2021    Class 1 obesity without serious comorbidity with body mass index (BMI) of 33.0 to 33.9 in adult [E66.9, Z68.33] 2019    Dilated cardiomyopathy ([address] [I42.0] 2019    Obstructive sleep apnea (adult) (pediatric) [X31.96]     Systolic CHF, chronic ([address] [I50.22] 10/03/2018       Follow-up appointments:  one week    Outpatient to do list: fu with EP    Condition at Discharge:  Stable    Hospital Course:     63 y.o. female presented with recurrent episodes of symptomatic ventricular tachycardia, lightheadedness and dizziness. She has history of AV block status post dual-chamber pacemaker in , HFrEF, hypertension, hyperlipidemia and persistent atrial fibrillation. Underwent cardioversion for atrial fibrillation. Had cardiac catheterization for ventricular tachycardia which showed no significant obstructive coronary artery disease. Underwent lead extraction of right ventricular pacing lead and upgrade to biventricular AICD. He also has been loaded with amiodarone.       50-year-old admitted with all potation's and dizziness     Persistent atrial fibrillation with nonsustained ventricular tachycardia with syncope  S/p laser lead extraction and BiV AICD  Was on amnio drip has been changed over to oral amiodarone- 400 mg bid x 1 week, 400 mg daily for 2 weeks and then 200 mg daily after that.    Patient found to be in atrial flutter with runs of symptomatic VT.  Underwent LHC 1/20/2020 that showed normal coronaries  Started on xarelto  Chronic severe systolic heart failure  Currently on carvedilol Lasix spironolactone  Patient allergic to ACE inhibitors hence no ACE or ARB is used  Currently euvolemic       Discharge Medications:   Current Discharge Medication List      START taking these medications    Details   amiodarone (PACERONE) 400 MG tablet Take  2 tab daily for 2 weeks then 1 tab daily  Qty: 30 tablet, Refills: 1      NIFEdipine (ADALAT CC) 30 MG extended release tablet Take 1 tablet by mouth daily as needed (BP >160/90)  Qty: 30 tablet, Refills: 3           Current Discharge Medication List      CONTINUE these medications which have CHANGED    Details   carvedilol (COREG) 25 MG tablet Take 1 tablet by mouth 2 times daily  Qty: 60 tablet, Refills: 3      furosemide (LASIX) 20 MG tablet Take 1 tablet by mouth daily  Qty: 60 tablet, Refills: 3           Current Discharge Medication List      CONTINUE these medications which have NOT CHANGED    Details   Multiple Vitamins-Minerals (THERAPEUTIC MULTIVITAMIN-MINERALS) tablet Take 1 tablet by mouth daily      metoclopramide (REGLAN) 5 MG tablet Take 1 tablet by mouth 4 times daily  Qty: 120 tablet, Refills: 5      XARELTO 20 MG TABS tablet TAKE ONE TABLET BY MOUTH DAILY WITH BREAKFAST  Qty: 90 tablet, Refills: 3      spironolactone (ALDACTONE) 25 MG tablet Take 1 tablet by mouth daily  Qty: 30 tablet, Refills: 2      butalbital-acetaminophen-caffeine (FIORICET, ESGIC) -40 MG per tablet Take 1 tablet by mouth every 4 hours as needed for Headaches    Associated Diagnoses: Chronic combined systolic and diastolic heart failure (HCC)      atorvastatin (LIPITOR) 40 MG tablet Take 1 tablet by mouth daily  Qty: 60 tablet, Refills: 1           Current Discharge Medication List          Discharge ROS:  A complete review of systems was asked and negative     Discharge Exam:    /73   Pulse 75   Temp 98.1 °F (36.7 °C) (Temporal)   Resp 20   Ht 5' 7\" (1.702 m)   Wt 189 lb 9.5 oz (86 kg)   SpO2 97%   BMI 29.69 kg/m²   General appearance:  NAD  HEENT:   Normal cephalic, atraumatic, moist mucous membranes, no oropharyngeal erythema or exudate  Heart[de-identified] Normal s1/s2, RRR, no murmurs, gallops, or rubs. no leg edema  Lungs:  Normal respiratory effort. Clear to auscultation, bilaterally without Rales/Wheezes/Rhonchi. Abdomen: Soft, non-tender, non-distended, bowel sounds present, no masses  Musculoskeletal:  No clubbing, no cyanosis, *  Neurologic:  Neurovascularly intact without any focal sensory/motor deficits. Cranial nerves: II-XII intact, grossly non-focal.  Left pacemaker site dressed  Labs: For convenience and continuity at follow-up the following most recent labs are provided:    Lab Results   Component Value Date    WBC 5.7 01/25/2021    HGB 8.8 01/25/2021    HCT 27.0 01/25/2021    MCV 93.7 01/25/2021     01/25/2021     01/25/2021    K 4.3 01/25/2021    K 3.0 01/24/2021     01/25/2021    CO2 25 01/25/2021    BUN 11 01/25/2021    CREATININE 0.8 01/25/2021    CALCIUM 9.2 01/25/2021    PHOS 2.5 07/05/2018    ALKPHOS 66 01/20/2021    ALT 19 01/20/2021    AST 28 01/20/2021    BILITOT 0.3 01/20/2021    BILIDIR <0.2 06/27/2020    LABALBU 4.6 01/20/2021     Lab Results   Component Value Date    INR 1.11 01/22/2021    INR 1.11 01/21/2021    INR 1.43 (H) 06/27/2020           The patient was seen and examined on day of discharge and this discharge summary is in conjunction with any daily progress note from day of discharge. Time Spent on discharge is 45 minutes  in the examination, evaluation, counseling and review of medications and discharge plan.       Note that greater  than 30 minutes was spent in preparing discharge papers, discussing discharge with patient, medication review, etc. Signed:    Alec Bhat MD   1/25/2021      Thank you No primary care provider on file. for the opportunity to be involved in this patient's care.  If you have any questions or concerns please feel free to contact me

## 2021-01-25 NOTE — PROGRESS NOTES
[facility]   Electrophysiology Progress Note     Admit Date: 1/20/2021     Reason for follow up: Lead extraction    HPI and Interval History: 63 y.o. female presented with recurrent episodes of symptomatic ventricular tachycardia, lightheadedness and dizziness. She has history of AV block status post dual-chamber pacemaker in 2012, HFrEF, hypertension, hyperlipidemia and persistent atrial fibrillation. Underwent cardioversion for atrial fibrillation. Had cardiac catheterization for ventricular tachycardia which showed no significant obstructive coronary artery disease. Underwent lead extraction of right ventricular pacing lead and upgrade to biventricular AICD. He also has been loaded with amiodarone. Patient seen and examined. Clinical notes reviewed. Telemetry reviewed. No new complaint today. No major events overnight. Denies having chest pain, shortness of breath, dyspnea on exertion, Orthopnea, PND at the time of this visit. Assessment and plan:     -Symptomatic ventricular tachycardia   Status post cardiac cath with no significant coronary artery disease   She has history of HFrEF. Change amiodarone to 400 mg daily for 2 weeks followed by 200 daily after that. Status post AICD implantation.    -History of AV block, status post biventricular ICD implantation   Device functions normally   Chest x-ray with no pneumothorax. Incision intact and clean   Follow-up in device clinic within a week to check incision and device. Follow-up after month in EP office    -Persistent atrial fibrillation   Patient has remained in sinus rhythm after cardioversion   High [id number] score and high risk for stroke and thromboembolism. Anticoagulation is recommended. Resume Xarelto. We have discussed treatment strategy for atrial fibrillation. If she has recurrent atrial fibrillation, will consider ablation. -HFrEF   Continue with medical therapy.   She has an allergy to Examination:  Vitals:    21 0500   BP: 108/73   Pulse: 75   Resp: 20   Temp: 98.1 °F (36.7 °C)   SpO2: 97%      In: 10 [I.V.:10]  Out: -    Wt Readings from Last 3 Encounters:   21 189 lb 9.5 oz (86 kg)   20 190 lb (86.2 kg)   10/27/20 188 lb (85.3 kg)     Temp  Av.1 °F (36.7 °C)  Min: 97.3 °F (36.3 °C)  Max: 98.8 °F (37.1 °C)  Pulse  Av.3  Min: 75  Max: 82  BP  Min: 108/73  Max: 138/82  SpO2  Av.7 %  Min: 96 %  Max: 97 %    Intake/Output Summary (Last 24 hours) at 2021 0817  Last data filed at 2021 2226  Gross per 24 hour   Intake 10 ml   Output    Net 10 ml       I independently reviewed all cardiac tracing from cardiac telemetry. · Constitutional: Oriented. No distress. · Head: Normocephalic and atraumatic. · Mouth/Throat: Oropharynx is clear and moist.   · Eyes: Conjunctivae normal. EOM are normal.   · Neck: Neck supple. No JVD present. · Cardiovascular: Normal rate, regular rhythm, S1&S2. Incision clean. · Pulmonary/Chest: Bilateral respiratory sounds. No rhonchi. · Abdominal: Soft. No tenderness. · Musculoskeletal: No tenderness. No edema    · Lymphadenopathy: Has no cervical adenopathy. · Neurological: Alert and oriented. Follows command, No Gross deficit   · Skin: Skin is warm, No rash noted.    · Psychiatric: Has a normal behavior     Scheduled Meds:   enoxaparin  40 mg Subcutaneous Daily    amiodarone  400 mg Oral BID    sodium chloride flush  10 mL Intravenous 2 times per day    sodium chloride flush  10 mL Intravenous 2 times per day    atorvastatin  40 mg Oral Daily    furosemide  20 mg Oral Daily    spironolactone  25 mg Oral Daily    carvedilol  25 mg Oral BID     Continuous Infusions:  PRN Meds:potassium chloride, meperidine, labetalol, fentanNYL, fentanNYL, fentanNYL, sodium chloride flush, traMADol, ondansetron, phenol, benzocaine-menthol, NIFEdipine, sodium chloride flush, promethazine **OR** ondansetron, polyethylene glycol, cardiac tracing from cardiac telemetry. I independently reviewed relevant and available cardiac diagnostic tests ECG, CXR, Echo, Stress test, Device interrogation, Holter, CT scan. Thank you for allowing me to participate in the care of Kenrick Galvez     All questions and concerns were addressed to the patient/family. Alternatives to my treatment were discussed. I have discussed the above stated plan and the patient verbalized understanding and agreed with the plan. NOTE: This report was transcribed using voice recognition software. Every effort was made to ensure accuracy, however, inadvertent computerized transcription errors may be present.      Dennys Melgar MD, MPH  [facility]   Office: [phone]  Fax: [phone]

## 2021-01-25 NOTE — PROGRESS NOTES
CLINICAL PHARMACY NOTE: MEDS TO [address] Select Patient?: No  Total # of Prescriptions Filled: 4   The following medications were delivered to the patient:  · Nifedipine  · Carvedilol  · Furosemide  · Amiodarone  Total # of Interventions Completed: 1  Time Spent (min): 75    Additional Documentation:  Clarified amiodarone scrpt with Dr Jarrod Alonso on perfect serve  Delivered to patient    Ashanti Hernandez

## 2021-01-27 ENCOUNTER — TELEPHONE (OUTPATIENT)
Dept: CARDIOLOGY CLINIC | Age: 56
End: 2021-01-27

## 2021-01-27 NOTE — TELEPHONE ENCOUNTER
Pt complains of being hot her temperature is 98. 1. Denies fever, chills, diziness,SOB. She complains of redness under her breast that is tender. Instructed to keep site clean and dry. She is coming in to the office tomorrow and we will check.

## 2021-01-28 ENCOUNTER — NURSE ONLY (OUTPATIENT)
Dept: CARDIOLOGY CLINIC | Age: 56
End: 2021-01-28
Payer: MEDICAID

## 2021-01-28 DIAGNOSIS — Q24.6 CONGENITAL HEART BLOCK: ICD-10-CM

## 2021-01-28 DIAGNOSIS — I51.9 LV DYSFUNCTION: ICD-10-CM

## 2021-01-28 DIAGNOSIS — I51.7 LVH (LEFT VENTRICULAR HYPERTROPHY): ICD-10-CM

## 2021-01-28 DIAGNOSIS — Z95.810 ICD (IMPLANTABLE CARDIOVERTER-DEFIBRILLATOR), BIVENTRICULAR, IN SITU: ICD-10-CM

## 2021-01-28 DIAGNOSIS — I42.0 DILATED CARDIOMYOPATHY (HCC): Primary | ICD-10-CM

## 2021-01-28 DIAGNOSIS — I50.22 SYSTOLIC CHF, CHRONIC (HCC): ICD-10-CM

## 2021-01-28 DIAGNOSIS — I47.29 PAROXYSMAL VENTRICULAR TACHYCARDIA: ICD-10-CM

## 2021-01-28 PROCEDURE — 93284 PRGRMG EVAL IMPLANTABLE DFB: CPT | Performed by: INTERNAL MEDICINE

## 2021-01-28 PROCEDURE — 93281 PM DEVICE PROGR EVAL MULTI: CPT | Performed by: INTERNAL MEDICINE

## 2021-01-28 NOTE — PROGRESS NOTES
Patient comes in for programming evaluation for her defibrillator. Upgrade to BiV ICD on 1/22/2021. JOSEFA shows all sensing and pacing parameters are within normal range. Lead trends stable. Battery life 6.2 years  AP 95.4%.  96.2%. No episodes noted. Patient remains on Xarelto, amiodarone and Coreg. ZAYRA Jeronimo reviewed Wound site today. Please see her note. No changes need to be made at this time. Please see interrogation for more detail. Optivol is initializing. Patient will follow up as scheduled. Home Monitor shipped on 1/27/2021.

## 2021-01-28 NOTE — PROGRESS NOTES
Bandage removed from device site. Some mild swelling and tenderness observed. No drainage redness or warmth to the site. Patient instructed that she can shower starting today and pat the area dry and await the steri strips to come off. Contacted [facility] regarding if her remote box has been shipped.  Site under right breast slightly irritated advised to put antibiotic ointment on it if it continues to bother her

## 2021-02-03 ENCOUNTER — TELEPHONE (OUTPATIENT)
Dept: CARDIOLOGY CLINIC | Age: 56
End: 2021-02-03

## 2021-02-03 NOTE — TELEPHONE ENCOUNTER
Please schedule her with me tomorrow anytime with device interrogation. I cannot see her on Friday. If symptoms are severe or worsen she should go to the emergency room.       CATRACHITO Casillas-CNP

## 2021-02-03 NOTE — TELEPHONE ENCOUNTER
Pt calling she is feeling short of breath weakness and asking if she can come in sooner for her appt on 02/08?  pls call to advise thank you

## 2021-02-04 ENCOUNTER — NURSE ONLY (OUTPATIENT)
Dept: CARDIOLOGY CLINIC | Age: 56
End: 2021-02-04
Payer: MEDICAID

## 2021-02-04 ENCOUNTER — OFFICE VISIT (OUTPATIENT)
Dept: CARDIOLOGY CLINIC | Age: 56
End: 2021-02-04
Payer: MEDICAID

## 2021-02-04 ENCOUNTER — HOSPITAL ENCOUNTER (OUTPATIENT)
Age: 56
Discharge: HOME | End: 2021-02-04
Payer: MEDICAID

## 2021-02-04 VITALS
BODY MASS INDEX: 31.12 KG/M2 | DIASTOLIC BLOOD PRESSURE: 88 MMHG | WEIGHT: 193.6 LBS | OXYGEN SATURATION: 98 % | SYSTOLIC BLOOD PRESSURE: 118 MMHG | HEART RATE: 77 BPM | HEIGHT: 66 IN

## 2021-02-04 DIAGNOSIS — Z95.810 ICD (IMPLANTABLE CARDIOVERTER-DEFIBRILLATOR), BIVENTRICULAR, IN SITU: ICD-10-CM

## 2021-02-04 DIAGNOSIS — I47.20 VENTRICULAR TACHYCARDIA: Primary | ICD-10-CM

## 2021-02-04 DIAGNOSIS — I42.0 DILATED CARDIOMYOPATHY (HCC): ICD-10-CM

## 2021-02-04 DIAGNOSIS — Q24.6 CONGENITAL HEART BLOCK: ICD-10-CM

## 2021-02-04 DIAGNOSIS — I48.0 PAROXYSMAL ATRIAL FIBRILLATION (HCC): ICD-10-CM

## 2021-02-04 DIAGNOSIS — I51.9 LV DYSFUNCTION: ICD-10-CM

## 2021-02-04 DIAGNOSIS — D64.9 ANEMIA, UNSPECIFIED TYPE: ICD-10-CM

## 2021-02-04 DIAGNOSIS — I50.22 SYSTOLIC CHF, CHRONIC (HCC): ICD-10-CM

## 2021-02-04 DIAGNOSIS — I50.43 ACUTE ON CHRONIC COMBINED SYSTOLIC AND DIASTOLIC CHF (CONGESTIVE HEART FAILURE) (HCC): ICD-10-CM

## 2021-02-04 DIAGNOSIS — I47.29 PAROXYSMAL VENTRICULAR TACHYCARDIA: ICD-10-CM

## 2021-02-04 DIAGNOSIS — I51.7 LVH (LEFT VENTRICULAR HYPERTROPHY): ICD-10-CM

## 2021-02-04 DIAGNOSIS — I48.19 PERSISTENT ATRIAL FIBRILLATION (HCC): ICD-10-CM

## 2021-02-04 LAB
HCT VFR BLD CALC: 28.8 % (ref 36–48)
HEMOGLOBIN: 9.4 G/DL (ref 12–16)
MCH RBC QN AUTO: 30.3 PG (ref 26–34)
MCHC RBC AUTO-ENTMCNC: 32.7 G/DL (ref 31–36)
MCV RBC AUTO: 92.4 FL (ref 80–100)
PDW BLD-RTO: 15.4 % (ref 12.4–15.4)
PLATELET # BLD: 388 K/UL (ref 135–450)
PMV BLD AUTO: 7.3 FL (ref 5–10.5)
RBC # BLD: 3.11 M/UL (ref 4–5.2)
WBC # BLD: 5.5 K/UL (ref 4–11)

## 2021-02-04 PROCEDURE — 1036F TOBACCO NON-USER: CPT | Performed by: NURSE PRACTITIONER

## 2021-02-04 PROCEDURE — G8427 DOCREV CUR MEDS BY ELIG CLIN: HCPCS | Performed by: NURSE PRACTITIONER

## 2021-02-04 PROCEDURE — 93000 ELECTROCARDIOGRAM COMPLETE: CPT | Performed by: NURSE PRACTITIONER

## 2021-02-04 PROCEDURE — 1111F DSCHRG MED/CURRENT MED MERGE: CPT | Performed by: NURSE PRACTITIONER

## 2021-02-04 PROCEDURE — 93284 PRGRMG EVAL IMPLANTABLE DFB: CPT | Performed by: INTERNAL MEDICINE

## 2021-02-04 PROCEDURE — G8484 FLU IMMUNIZE NO ADMIN: HCPCS | Performed by: NURSE PRACTITIONER

## 2021-02-04 PROCEDURE — 3017F COLORECTAL CA SCREEN DOC REV: CPT | Performed by: NURSE PRACTITIONER

## 2021-02-04 PROCEDURE — 99214 OFFICE O/P EST MOD 30 MIN: CPT | Performed by: NURSE PRACTITIONER

## 2021-02-04 PROCEDURE — 85027 COMPLETE CBC AUTOMATED: CPT

## 2021-02-04 PROCEDURE — 36415 COLL VENOUS BLD VENIPUNCTURE: CPT

## 2021-02-04 PROCEDURE — G8417 CALC BMI ABV UP PARAM F/U: HCPCS | Performed by: NURSE PRACTITIONER

## 2021-02-04 NOTE — PATIENT INSTRUCTIONS
- Lasix 40 mg daily for 3 days  - Get blood work today   - Reduce amiodarone 400 mg daily on 2/6  - Call office Monday if symptoms not improved, go to the emergency room if worsening  - Follow up with HF in 2 weeks if symptoms not resolved

## 2021-02-04 NOTE — PROGRESS NOTES
Patient comes in for programming evaluation for her defibrillator. All sensing and pacing parameters are within normal range. Battery life 6.6 years  AP 92.3%.  98.8%. No episodes noted. Patient remains on Xarelto, amiodarone and Coreg. No changes need to be made at this time. Please see interrogation for more detail. Optivol is still initializing. Patient will see Gerardo Zhang today and follow up in 3 months in office or remotely.

## 2021-02-04 NOTE — PROGRESS NOTES
[facility], [place]   Electrophysiology  Hernandez Shelter, APRN-CNP  Attending EP: Dr. Velia Rush   Date: 2/4/2021  I had the privilege of visiting Antonio Hazel in the office. Chief Complaint: No chief complaint on file. History of Present Illness: History obtained from patient and medical record. Antonio Hazel is 63 y.o. female with a past medical history of HTN, CHB s/p PPM, sCHF, CHD, syncope, and atrial fibrillation/flutter. 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 S/p LHC 1/20/2020 that showed normal coronaries. She had episode of VT with syncope in cath lab prior to [facility]. S/p extraction of RV pacing lead and Biv-AICD upgrade for cardiomyopathy and VT. Also loaded with amiodarone. Interval history: Today, Antonio Hazel is being seen for acute visit for SOB over the last 3-4 days. Reports orthopnea her hands felt a little tight at first and she was unable to get her rings on and off. She denies BLE swelling or weight gain and she does weight herself daily. Denies CP or palpitations. There is no arrhythmia on her device. She continues to have some mild swelling around the device site. Also admits to back pain on inspiration. Device interrogation looks normal and no arrhythmia noted. Incision healing well, mild swelling, mild discomfort remains. Denies having chest pain, palpitations, shortness of breath, orthopnea/PND, cough, or dizziness at the time of this visit. With regard to medication therapy the patient has been compliant with prescribed regimen. She has tolerated therapy to date.      Assessment:  Symptomatic VT    - Device shows no arrythmia   - On amiodarone 400 mg 2 times daily with taper as ordered   - No recurrence on device   - No VT on device  Implantable device/Cardiomyopathy   - S/p extraction of RV pacing lead and Biv-AICD upgrade   - The CIED was interrogated and I reviewed all data    - Device interrogation today shows FLORENCIO 6.6 yrs, AT/AF 0, AP 92.3%,  98.8%  Persistent Atrial Firbillation   - S/p DCCV 1/20/2021   - On Xarelto 20 mg daily, no s/s of bleeding   - On amiodarone and Coreg   - Device shows No afib since placement  CHB   - Pacing with dual chamber Biv-AICD   -   on ECG  Acute on Chronic combined diastolic and systolic heart failure (NYHA Class III)  - SOB x 3 days with orthopnea and mild peripheral edema                    ~ EF 25-30% per echo   - On BB, aldactone, and lasix   - Allergy to ACE/ARB   - Aggressive medical therapy with risk factor modification    - Discussed importance of daily monitoring weight, low sodium diet and fluid restriction   - Follows with HF team  HTN-goal <130/80   - Controlled   - Continue current medications   - Encouraged patient to check BP at home, log and bring to office visits  - Discussed lifestyle modifications, weight loss, low sodium diet   Anemia   - Worsening SOB   - Chronic anemia on AC, will check    - No reports of bleeding or abnormal stool  Plan  - Lasix 40 mg daily for 3 days  - Reduce amiodarone 400 mg daily on 2/6  - Call office Monday if symptoms not improved, go to the emergency room if worsening  - Follow up with HF in 2 weeks if symptoms not resolved    F/U: Follow-up with EP as scheduled  -Follow up with device clinic as scheduled  -Call Kylah  at [phone] with any questions    Allergies: Allergies   Allergen Reactions    Latex     Codeine      Hives      Entresto [Sacubitril-Valsartan]      Lips and tongue swelling    Lisinopril      cough    Nitroglycerin Hives    Sulfa Antibiotics Nausea Only    Hydralazine      headaches     Home Medications:  Prior to Visit Medications    Medication Sig Taking?  Authorizing Provider   amiodarone (PACERONE) 400 MG tablet Take  2 tab daily for 2 weeks then 1 tab daily  Felicity Fritz MD carvedilol (COREG) 25 MG tablet Take 1 tablet by mouth 2 times daily  Shakeel Hall MD   NIFEdipine (ADALAT CC) 30 MG extended release tablet Take 1 tablet by mouth daily as needed (BP >160/90)  Shakeel Hall MD   furosemide (LASIX) 20 MG tablet Take 1 tablet by mouth daily  Shakeel Hall MD   Multiple Vitamins-Minerals (THERAPEUTIC MULTIVITAMIN-MINERALS) tablet Take 1 tablet by mouth daily  Historical Provider, MD   metoclopramide (REGLAN) 5 MG tablet Take 1 tablet by mouth 4 times daily  Patient taking differently: Take 5 mg by mouth 4 times daily as needed   Chiara Craven MD   XARELTO 20 MG TABS tablet TAKE ONE TABLET BY MOUTH DAILY WITH BREAKFAST  Onelia Lyn MD   spironolactone (ALDACTONE) 25 MG tablet Take 1 tablet by mouth daily  Caretha Chaz, APRN - CNS   butalbital-acetaminophen-caffeine (FIORICET, ESGIC) -40 MG per tablet Take 1 tablet by mouth every 4 hours as needed for Headaches  Historical Provider, MD   atorvastatin (LIPITOR) 40 MG tablet Take 1 tablet by mouth daily  Fermín Lance MD      Past Medical History:  Past Medical History:   Diagnosis Date    Anemia     Atrial fibrillation and flutter ([address]     Atrial flutter ([address]     CHB (complete heart block) ([address]     Class 1 obesity without serious comorbidity with body mass index (BMI) of 33.0 to 33.9 in adult 9/6/2019    Congenital heart disease     GERD (gastroesophageal reflux disease)     Headache(784.0)     History of complete heart block     Hyperlipidemia     Hypertension     Syncope     Systolic CHF, chronic ([address] 10/3/2018     Past Surgical History:    has a past surgical history that includes Uterine fibroid surgery; Pacemaker insertion (11/29/12); Tubal ligation; Upper gastrointestinal endoscopy (N/A, 10/27/2020); and Colonoscopy (N/A, 10/27/2020). Social History:  Reviewed. reports that she has never smoked.  She has never used smokeless tobacco. She reports that she does not drink alcohol or use activities with a goal of 150 min/week of moderate level activity or 10,000 steps per day. Encouraged to perform as much activity as tolerated     I have addressed the patient's cardiac risk factors and adjusted pharmacologic treatment as needed. In addition, I have reinforced the need for patient directed risk factor modification. I independently reviewed the device check interrogation and ECG    All questions and concerns were addressed with the patient. Alternatives to treatment were discussed. Thank you for allowing to us to participate in the care of Brent Knapp.     CATRACHITO Bergeron-CNP  [facility]    Office: [phone]

## 2021-02-05 ENCOUNTER — TELEPHONE (OUTPATIENT)
Dept: CARDIOLOGY CLINIC | Age: 56
End: 2021-02-05

## 2021-02-05 NOTE — TELEPHONE ENCOUNTER
----- Message from CATRACHITO Gutierrez CNP sent at 2/5/2021 10:38 AM EST -----  Please let patient know her blood counts are improved.  No change in [address] [place] [address],[address], APRN-CNP

## 2021-02-08 ENCOUNTER — APPOINTMENT (OUTPATIENT)
Dept: GENERAL RADIOLOGY | Age: 56
DRG: 203 | End: 2021-02-08
Payer: MEDICAID

## 2021-02-08 ENCOUNTER — TELEPHONE (OUTPATIENT)
Dept: CARDIOLOGY CLINIC | Age: 56
End: 2021-02-08

## 2021-02-08 ENCOUNTER — HOSPITAL ENCOUNTER (INPATIENT)
Age: 56
LOS: 1 days | Discharge: HOME | DRG: 203 | End: 2021-02-09
Attending: EMERGENCY MEDICINE | Admitting: INTERNAL MEDICINE
Payer: MEDICAID

## 2021-02-08 ENCOUNTER — APPOINTMENT (OUTPATIENT)
Dept: CT IMAGING | Age: 56
DRG: 203 | End: 2021-02-08
Payer: MEDICAID

## 2021-02-08 DIAGNOSIS — R06.02 SHORTNESS OF BREATH: Primary | ICD-10-CM

## 2021-02-08 DIAGNOSIS — I51.7 CARDIOMEGALY: ICD-10-CM

## 2021-02-08 PROBLEM — R06.09 EXERTIONAL DYSPNEA: Status: ACTIVE | Noted: 2021-02-08

## 2021-02-08 LAB
A/G RATIO: 1.5 (ref 1.1–2.2)
ALBUMIN SERPL-MCNC: 4.7 G/DL (ref 3.4–5)
ALP BLD-CCNC: 75 U/L (ref 40–129)
ALT SERPL-CCNC: 24 U/L (ref 10–40)
ANION GAP SERPL CALCULATED.3IONS-SCNC: 12 MMOL/L (ref 3–16)
APTT: 36.4 SEC (ref 24.2–36.2)
AST SERPL-CCNC: 21 U/L (ref 15–37)
BASOPHILS ABSOLUTE: 0 K/UL (ref 0–0.2)
BASOPHILS RELATIVE PERCENT: 0.9 %
BILIRUB SERPL-MCNC: 0.4 MG/DL (ref 0–1)
BUN BLDV-MCNC: 9 MG/DL (ref 7–20)
CALCIUM SERPL-MCNC: 9.7 MG/DL (ref 8.3–10.6)
CHLORIDE BLD-SCNC: 100 MMOL/L (ref 99–110)
CO2: 25 MMOL/L (ref 21–32)
CREAT SERPL-MCNC: 0.8 MG/DL (ref 0.6–1.1)
EKG ATRIAL RATE: 77 BPM
EKG DIAGNOSIS: NORMAL
EKG P AXIS: 9 DEGREES
EKG P-R INTERVAL: 128 MS
EKG Q-T INTERVAL: 494 MS
EKG QRS DURATION: 150 MS
EKG QTC CALCULATION (BAZETT): 559 MS
EKG R AXIS: 216 DEGREES
EKG T AXIS: 47 DEGREES
EKG VENTRICULAR RATE: 77 BPM
EOSINOPHILS ABSOLUTE: 0.2 K/UL (ref 0–0.6)
EOSINOPHILS RELATIVE PERCENT: 4 %
GFR AFRICAN AMERICAN: >60
GFR NON-AFRICAN AMERICAN: >60
GLOBULIN: 3.2 G/DL
GLUCOSE BLD-MCNC: 102 MG/DL (ref 70–99)
HCT VFR BLD CALC: 31.4 % (ref 36–48)
HEMOGLOBIN: 10.3 G/DL (ref 12–16)
INR BLD: 1.59 (ref 0.86–1.14)
LYMPHOCYTES ABSOLUTE: 1.2 K/UL (ref 1–5.1)
LYMPHOCYTES RELATIVE PERCENT: 21.6 %
MAGNESIUM: 1.8 MG/DL (ref 1.8–2.4)
MCH RBC QN AUTO: 30.5 PG (ref 26–34)
MCHC RBC AUTO-ENTMCNC: 32.8 G/DL (ref 31–36)
MCV RBC AUTO: 93.1 FL (ref 80–100)
MONOCYTES ABSOLUTE: 0.5 K/UL (ref 0–1.3)
MONOCYTES RELATIVE PERCENT: 8.5 %
NEUTROPHILS ABSOLUTE: 3.6 K/UL (ref 1.7–7.7)
NEUTROPHILS RELATIVE PERCENT: 65 %
PDW BLD-RTO: 15.6 % (ref 12.4–15.4)
PLATELET # BLD: 403 K/UL (ref 135–450)
PMV BLD AUTO: 6.8 FL (ref 5–10.5)
POTASSIUM SERPL-SCNC: 3.2 MMOL/L (ref 3.5–5.1)
PRO-BNP: 76 PG/ML (ref 0–124)
PROTHROMBIN TIME: 18.5 SEC (ref 10–13.2)
RBC # BLD: 3.37 M/UL (ref 4–5.2)
SODIUM BLD-SCNC: 137 MMOL/L (ref 136–145)
TOTAL PROTEIN: 7.9 G/DL (ref 6.4–8.2)
TROPONIN: <0.01 NG/ML
WBC # BLD: 5.6 K/UL (ref 4–11)

## 2021-02-08 PROCEDURE — 6360000004 HC RX CONTRAST MEDICATION: Performed by: PHYSICIAN ASSISTANT

## 2021-02-08 PROCEDURE — G0378 HOSPITAL OBSERVATION PER HR: HCPCS

## 2021-02-08 PROCEDURE — 2000000000 HC ICU R&B

## 2021-02-08 PROCEDURE — 85730 THROMBOPLASTIN TIME PARTIAL: CPT

## 2021-02-08 PROCEDURE — 71045 X-RAY EXAM CHEST 1 VIEW: CPT

## 2021-02-08 PROCEDURE — 85025 COMPLETE CBC W/AUTO DIFF WBC: CPT

## 2021-02-08 PROCEDURE — 82746 ASSAY OF FOLIC ACID SERUM: CPT

## 2021-02-08 PROCEDURE — 83735 ASSAY OF MAGNESIUM: CPT

## 2021-02-08 PROCEDURE — 99284 EMERGENCY DEPT VISIT MOD MDM: CPT

## 2021-02-08 PROCEDURE — 96374 THER/PROPH/DIAG INJ IV PUSH: CPT

## 2021-02-08 PROCEDURE — 71260 CT THORAX DX C+: CPT

## 2021-02-08 PROCEDURE — 80053 COMPREHEN METABOLIC PANEL: CPT

## 2021-02-08 PROCEDURE — 84484 ASSAY OF TROPONIN QUANT: CPT

## 2021-02-08 PROCEDURE — U0003 INFECTIOUS AGENT DETECTION BY NUCLEIC ACID (DNA OR RNA); SEVERE ACUTE RESPIRATORY SYNDROME CORONAVIRUS 2 (SARS-COV-2) (CORONAVIRUS DISEASE [COVID-19]), AMPLIFIED PROBE TECHNIQUE, MAKING USE OF HIGH THROUGHPUT TECHNOLOGIES AS DESCRIBED BY CMS-2020-01-R: HCPCS

## 2021-02-08 PROCEDURE — 6370000000 HC RX 637 (ALT 250 FOR IP): Performed by: EMERGENCY MEDICINE

## 2021-02-08 PROCEDURE — 4500000024 HC ED LEVEL 4 PROCEDURE

## 2021-02-08 PROCEDURE — 85610 PROTHROMBIN TIME: CPT

## 2021-02-08 PROCEDURE — 93005 ELECTROCARDIOGRAM TRACING: CPT | Performed by: EMERGENCY MEDICINE

## 2021-02-08 PROCEDURE — 82728 ASSAY OF FERRITIN: CPT

## 2021-02-08 PROCEDURE — 6360000002 HC RX W HCPCS: Performed by: PHYSICIAN ASSISTANT

## 2021-02-08 PROCEDURE — 85045 AUTOMATED RETICULOCYTE COUNT: CPT

## 2021-02-08 PROCEDURE — 93010 ELECTROCARDIOGRAM REPORT: CPT | Performed by: INTERNAL MEDICINE

## 2021-02-08 PROCEDURE — 83550 IRON BINDING TEST: CPT

## 2021-02-08 PROCEDURE — 83880 ASSAY OF NATRIURETIC PEPTIDE: CPT

## 2021-02-08 PROCEDURE — 2100000000 HC CCU R&B

## 2021-02-08 PROCEDURE — 83540 ASSAY OF IRON: CPT

## 2021-02-08 PROCEDURE — 82607 VITAMIN B-12: CPT

## 2021-02-08 RX ORDER — BUTALBITAL, ACETAMINOPHEN AND CAFFEINE 50; 325; 40 MG/1; MG/1; MG/1
1 TABLET ORAL EVERY 4 HOURS PRN
Status: DISCONTINUED | OUTPATIENT
Start: 2021-02-08 | End: 2021-02-09 | Stop reason: HOSPADM

## 2021-02-08 RX ORDER — MECLIZINE HCL 12.5 MG/1
25 TABLET ORAL 3 TIMES DAILY PRN
Status: DISCONTINUED | OUTPATIENT
Start: 2021-02-08 | End: 2021-02-09 | Stop reason: HOSPADM

## 2021-02-08 RX ORDER — ATORVASTATIN CALCIUM 40 MG/1
40 TABLET, FILM COATED ORAL NIGHTLY
Status: DISCONTINUED | OUTPATIENT
Start: 2021-02-09 | End: 2021-02-09 | Stop reason: HOSPADM

## 2021-02-08 RX ORDER — M-VIT,TX,IRON,MINS/CALC/FOLIC 27MG-0.4MG
1 TABLET ORAL DAILY
Status: DISCONTINUED | OUTPATIENT
Start: 2021-02-09 | End: 2021-02-09 | Stop reason: HOSPADM

## 2021-02-08 RX ORDER — ONDANSETRON 2 MG/ML
4 INJECTION INTRAMUSCULAR; INTRAVENOUS ONCE
Status: COMPLETED | OUTPATIENT
Start: 2021-02-08 | End: 2021-02-08

## 2021-02-08 RX ORDER — SPIRONOLACTONE 25 MG/1
25 TABLET ORAL DAILY
Status: DISCONTINUED | OUTPATIENT
Start: 2021-02-09 | End: 2021-02-09 | Stop reason: HOSPADM

## 2021-02-08 RX ORDER — ONDANSETRON 2 MG/ML
4 INJECTION INTRAMUSCULAR; INTRAVENOUS EVERY 6 HOURS PRN
Status: CANCELLED | OUTPATIENT
Start: 2021-02-08

## 2021-02-08 RX ORDER — POTASSIUM CHLORIDE 20 MEQ/1
40 TABLET, EXTENDED RELEASE ORAL PRN
Status: DISCONTINUED | OUTPATIENT
Start: 2021-02-08 | End: 2021-02-09 | Stop reason: HOSPADM

## 2021-02-08 RX ORDER — POLYETHYLENE GLYCOL 3350 17 G/17G
17 POWDER, FOR SOLUTION ORAL DAILY PRN
Status: DISCONTINUED | OUTPATIENT
Start: 2021-02-08 | End: 2021-02-09 | Stop reason: HOSPADM

## 2021-02-08 RX ORDER — AMIODARONE HYDROCHLORIDE 200 MG/1
400 TABLET ORAL DAILY
Status: DISCONTINUED | OUTPATIENT
Start: 2021-02-09 | End: 2021-02-09 | Stop reason: HOSPADM

## 2021-02-08 RX ORDER — CARVEDILOL 25 MG/1
25 TABLET ORAL 2 TIMES DAILY
Status: DISCONTINUED | OUTPATIENT
Start: 2021-02-09 | End: 2021-02-09 | Stop reason: SDUPTHER

## 2021-02-08 RX ORDER — SODIUM CHLORIDE 0.9 % (FLUSH) 0.9 %
10 SYRINGE (ML) INJECTION PRN
Status: DISCONTINUED | OUTPATIENT
Start: 2021-02-08 | End: 2021-02-09 | Stop reason: HOSPADM

## 2021-02-08 RX ORDER — ACETAMINOPHEN 325 MG/1
650 TABLET ORAL EVERY 6 HOURS PRN
Status: DISCONTINUED | OUTPATIENT
Start: 2021-02-08 | End: 2021-02-09 | Stop reason: HOSPADM

## 2021-02-08 RX ORDER — SODIUM CHLORIDE 0.9 % (FLUSH) 0.9 %
10 SYRINGE (ML) INJECTION EVERY 12 HOURS SCHEDULED
Status: DISCONTINUED | OUTPATIENT
Start: 2021-02-09 | End: 2021-02-09 | Stop reason: HOSPADM

## 2021-02-08 RX ORDER — POTASSIUM CHLORIDE 20 MEQ/1
40 TABLET, EXTENDED RELEASE ORAL ONCE
Status: COMPLETED | OUTPATIENT
Start: 2021-02-08 | End: 2021-02-08

## 2021-02-08 RX ORDER — POTASSIUM CHLORIDE 20 MEQ/1
40 TABLET, EXTENDED RELEASE ORAL ONCE
Status: DISCONTINUED | OUTPATIENT
Start: 2021-02-08 | End: 2021-02-09 | Stop reason: HOSPADM

## 2021-02-08 RX ORDER — FUROSEMIDE 20 MG/1
20 TABLET ORAL DAILY
Status: DISCONTINUED | OUTPATIENT
Start: 2021-02-09 | End: 2021-02-09 | Stop reason: HOSPADM

## 2021-02-08 RX ORDER — MAGNESIUM SULFATE 1 G/100ML
1000 INJECTION INTRAVENOUS PRN
Status: DISCONTINUED | OUTPATIENT
Start: 2021-02-08 | End: 2021-02-09 | Stop reason: HOSPADM

## 2021-02-08 RX ORDER — ACETAMINOPHEN 650 MG/1
650 SUPPOSITORY RECTAL EVERY 6 HOURS PRN
Status: DISCONTINUED | OUTPATIENT
Start: 2021-02-08 | End: 2021-02-09 | Stop reason: HOSPADM

## 2021-02-08 RX ORDER — POTASSIUM CHLORIDE 7.45 MG/ML
10 INJECTION INTRAVENOUS PRN
Status: DISCONTINUED | OUTPATIENT
Start: 2021-02-08 | End: 2021-02-09 | Stop reason: HOSPADM

## 2021-02-08 RX ORDER — PROMETHAZINE HYDROCHLORIDE 25 MG/1
12.5 TABLET ORAL EVERY 6 HOURS PRN
Status: CANCELLED | OUTPATIENT
Start: 2021-02-08

## 2021-02-08 RX ORDER — OXYCODONE HYDROCHLORIDE 5 MG/1
5 TABLET ORAL EVERY 6 HOURS PRN
Status: DISCONTINUED | OUTPATIENT
Start: 2021-02-08 | End: 2021-02-09 | Stop reason: HOSPADM

## 2021-02-08 RX ADMIN — ONDANSETRON 4 MG: 2 INJECTION INTRAMUSCULAR; INTRAVENOUS at 18:45

## 2021-02-08 RX ADMIN — IOPAMIDOL 75 ML: 755 INJECTION, SOLUTION INTRAVENOUS at 18:40

## 2021-02-08 RX ADMIN — POTASSIUM CHLORIDE 40 MEQ: 1500 TABLET, EXTENDED RELEASE ORAL at 23:00

## 2021-02-08 ASSESSMENT — ENCOUNTER SYMPTOMS
VOMITING: 0
CHEST TIGHTNESS: 1
SHORTNESS OF BREATH: 1
ABDOMINAL PAIN: 0
NAUSEA: 0
DIARRHEA: 0
WHEEZING: 0
RHINORRHEA: 0
COUGH: 0

## 2021-02-08 ASSESSMENT — PAIN SCALES - GENERAL: PAINLEVEL_OUTOF10: 7

## 2021-02-08 NOTE — TELEPHONE ENCOUNTER
Pt calling she has some questions about her medication does of amiodarone pls call to advise thank you

## 2021-02-08 NOTE — ED PROVIDER NOTES
[facility]        Pt Name: Danie Barajas  MRN: [id number]  [place] 1965  Date of evaluation: 2/8/2021  Provider: Yudi Wong PA-C  PCP: No primary care provider on file. I have seen and evaluated this patient with my supervising physician Bernardo Swain, [address] [place] [address]       Chief Complaint   Patient presents with    Chest Pain     From home, chest pain 8/10 and SOB X2 days, PT states it feels like a heaviness, no radiating. Defibrillator placed 1 week ago        HISTORY OF PRESENT ILLNESS   (Location, Timing/Onset, Context/Setting, Quality, Duration, Modifying Factors, Severity, Associated Signs and Symptoms)  Note limiting factors. Danie Barajas is a 63 y.o. female who presents for evaluation of shortness of breath over the past 2 to 3 days. Worse with exertion. Reports chest tightness that she rates an 8/10 but denies any other chest pains. States that her whole chest feels heavy. There is no radiation of the pain. She states that she did have a cough several days ago that is since resolved, nonproductive. No fevers or chills. Of note, she does have history of atrial fibrillation is anticoagulated on Xarelto as well as frequent episodes of V. tach for which she has a defibrillator that was placed 1 week ago. States that she contacted her cardiologist regarding current symptoms and they encouraged her come to the ED for further evaluation management. She denies abdominal pain nausea vomiting or diarrhea. No dizziness/lightheadedness, weakness, visual disturbance or syncope. She has no other complaints or concerns at this time. Nursing Notes were all reviewed and agreed with or any disagreements were addressed in the HPI. REVIEW OF SYSTEMS    (2-9 systems for level 4, 10 or more for level 5)     Review of Systems   Constitutional: Negative for appetite change, chills and fever. HENT: Negative for congestion and rhinorrhea. Eyes: Negative for visual disturbance. Respiratory: Positive for chest tightness and shortness of breath. Negative for cough and wheezing. Cardiovascular: Negative for chest pain. Gastrointestinal: Negative for abdominal pain, diarrhea, nausea and vomiting. Genitourinary: Negative for difficulty urinating, dysuria and hematuria. Musculoskeletal: Negative for neck pain and neck stiffness. Skin: Negative for rash. Neurological: Negative for dizziness, syncope, weakness, light-headedness and headaches. Positives and Pertinent negatives as per HPI. Except as noted above in the ROS, all other systems were reviewed and negative.        PAST MEDICAL HISTORY     Past Medical History:   Diagnosis Date    Anemia     Atrial fibrillation and flutter ([address]     Atrial flutter ([address]     CHB (complete heart block) (HCC)     Class 1 obesity without serious comorbidity with body mass index (BMI) of 33.0 to 33.9 in adult 9/6/2019    Congenital heart disease     GERD (gastroesophageal reflux disease)     Headache(784.0)     History of complete heart block     Hyperlipidemia     Hypertension     Syncope     Systolic CHF, chronic ([address] 10/3/2018         SURGICAL HISTORY     Past Surgical History:   Procedure Laterality Date    COLONOSCOPY N/A 10/27/2020    COLONOSCOPY POLYPECTOMY SNARE/COLD BIOPSY performed by Sheryl Pérez MD at [place] [address]  11/29/12    dual chamber PPM, [employer]    TUBAL LIGATION      UPPER GASTROINTESTINAL ENDOSCOPY N/A 10/27/2020    EGD DIAGNOSTIC ONLY performed by Sheryl Pérez MD at [address] [place] [address]/[facility]       Previous Medications    AMIODARONE (PACERONE) 400 MG TABLET    Take  2 tab daily for 2 weeks then 1 tab daily    ATORVASTATIN (LIPITOR) 40 MG TABLET    Take 1 tablet by mouth daily BUTALBITAL-ACETAMINOPHEN-CAFFEINE (FIORICET, ESGIC) -40 MG PER TABLET    Take 1 tablet by mouth every 4 hours as needed for Headaches    CARVEDILOL (COREG) 25 MG TABLET    Take 1 tablet by mouth 2 times daily    FUROSEMIDE (LASIX) 20 MG TABLET    Take 1 tablet by mouth daily    METOCLOPRAMIDE (REGLAN) 5 MG TABLET    Take 1 tablet by mouth 4 times daily    MULTIPLE VITAMINS-MINERALS (THERAPEUTIC MULTIVITAMIN-MINERALS) TABLET    Take 1 tablet by mouth daily    NIFEDIPINE (ADALAT CC) 30 MG EXTENDED RELEASE TABLET    Take 1 tablet by mouth daily as needed (BP >160/90)    SPIRONOLACTONE (ALDACTONE) 25 MG TABLET    Take 1 tablet by mouth daily    XARELTO 20 MG TABS TABLET    TAKE ONE TABLET BY MOUTH DAILY WITH BREAKFAST         ALLERGIES     Latex, Codeine, Entresto [sacubitril-valsartan], Lisinopril, Nitroglycerin, Sulfa antibiotics, and Hydralazine    FAMILYHISTORY       Family History   Problem Relation Age of Onset    Cancer Father     Heart Disease Neg Hx     High Blood Pressure Neg Hx     High Cholesterol Neg Hx           SOCIAL HISTORY       Social History     Tobacco Use    Smoking status: Never Smoker    Smokeless tobacco: Never Used   Substance Use Topics    Alcohol use: No    Drug use: No       SCREENINGS    [place] Coma Scale  Eye Opening: Spontaneous  Best Verbal Response: Oriented  Best Motor Response: Obeys commands  Cali Coma Scale Score: 15        PHYSICAL EXAM    (up to 7 for level 4, 8 or more for level 5)     ED Triage Vitals   BP Temp Temp Source Pulse Resp SpO2 Height Weight   02/08/21 1451 02/08/21 1451 02/08/21 1451 02/08/21 1451 02/08/21 1451 02/08/21 1451 02/08/21 1451 02/08/21 1448   128/89 98.4 °F (36.9 °C) Oral 77 18 100 % 5' 6.5\" (1.689 m) 193 lb 6 oz (87.7 kg)       Physical Exam  Vitals signs and nursing note reviewed. Constitutional:       General: She is not in acute distress. Appearance: She is well-developed. She is ill-appearing. She is not toxic-appearing or diaphoretic. HENT:      Head: Normocephalic and atraumatic. Right Ear: External ear normal.      Left Ear: External ear normal.      Nose: Nose normal.   Eyes:      General:         Right eye: No discharge. Left eye: No discharge. Neck:      Musculoskeletal: Normal range of motion and neck supple. Cardiovascular:      Rate and Rhythm: Normal rate and regular rhythm. Heart sounds: Normal heart sounds. Pulmonary:      Effort: Pulmonary effort is normal. No respiratory distress. Breath sounds: Normal breath sounds. Chest:      Chest wall: No tenderness. Abdominal:      General: There is no distension. Palpations: Abdomen is soft. Tenderness: There is no abdominal tenderness. Musculoskeletal: Normal range of motion. Skin:     General: Skin is warm and dry. Neurological:      Mental Status: She is alert and oriented to person, place, and time.    Psychiatric:         Behavior: Behavior normal.         DIAGNOSTIC RESULTS   LABS:    Labs Reviewed   CBC WITH AUTO DIFFERENTIAL - Abnormal; Notable for the following components:       Result Value    RBC 3.37 (*)     Hemoglobin 10.3 (*)     Hematocrit 31.4 (*)     RDW 15.6 (*)     All other components within normal limits    Narrative:     Performed at:  [facility] [address], [place], [address] [facility]   Phone [phone]   COMPREHENSIVE METABOLIC PANEL - Abnormal; Notable for the following components:    Potassium 3.2 (*)     Glucose 102 (*)     All other components within normal limits    Narrative:     Performed at:  [facility] [address], [place], [facility]   Phone [phone]   APTT - Abnormal; Notable for the following components:    aPTT 36.4 (*)     All other components within normal limits    Narrative:     Performed at: [facility]  [address] [facility],  [place], [address]   Phone [phone]   PROTIME-INR - Abnormal; Notable for the following components:    Protime 18.5 (*)     INR 1.59 (*)     All other components within normal limits    Narrative:     Performed at:  [facility]  [address] [facility],  [place], [address]   Phone [phone]   TROPONIN    Narrative:     Performed at:  [facility] [address] [facility],  [place], [address]   Phone [phone]   BRAIN NATRIURETIC PEPTIDE    Narrative:     Performed at:  [facility] [address] [facility],  [place], [address]   Phone [phone]       All other labs were within normal range or not returned as of this dictation. EKG: All EKG's are interpreted by the Emergency Department Physician in the absence of a cardiologist.  Please see their note for interpretation of EKG. RADIOLOGY:   Non-plain film images such as CT, Ultrasound and MRI are read by the radiologist. Plain radiographic images are visualized and preliminarily interpreted by the ED Provider with the below findings:        Interpretation per the Radiologist below, if available at the time of this note:    CT CHEST PULMONARY EMBOLISM W CONTRAST   Final Result   No pulmonary embolus is identified. Subsegmental atelectasis in the lung bases. Moderate cardiomegaly. XR CHEST PORTABLE   Final Result   Cardiomegaly with no acute cardiopulmonary process demonstrated           No results found.         PROCEDURES   Unless otherwise noted below, none     Procedures    CRITICAL CARE TIME   N/A    CONSULTS:  IP CONSULT TO CARDIOLOGY      EMERGENCY DEPARTMENT COURSE and DIFFERENTIAL DIAGNOSIS/MDM:   Vitals:    Vitals:    02/08/21 2000 02/08/21 2030 02/08/21 2100 02/08/21 2130   BP: 131/76 126/72 127/80 132/78   Pulse: 75 76 75 75   Resp: 16 21 17 19   Temp: TempSrc:       SpO2:       Weight:       Height:           Patient was given the following medications:  Medications   iopamidol (ISOVUE-370) 76 % injection 75 mL (75 mLs Intravenous Given 2/8/21 1840)   ondansetron (ZOFRAN) injection 4 mg (4 mg Intravenous Given 2/8/21 1845)   iopamidol (ISOVUE-370) 76 % injection 75 mL (75 mLs Intravenous Given 2/8/21 1840)           Patient presents for evaluation of shortness of breath and chest tightness. On exam, she appears uncomfortable but is in no acute distress and nontoxic. Vitals are stable and she is afebrile. Lungs are clear to auscultation bilaterally, chest is nontender and abdomen is benign. AICD present in the left upper chest with no sign of infection, increasing redness swelling or drainage. Please see attending note for EKG interpretation. CBC and CMP are remarkable for mild hypokalemia with a K of 3.2. She was given oral supplementation in the ED. Troponin is negative. Coags elevated with INR of 1.59. BNP 76.  Covid swab is pending. CT shows no evidence of pulmonary emboli. She has subsegmental atelectasis in the lung bases and moderate cardiomegaly. Pacemaker was interrogated and shows questionable 6-second episode of V. tach yesterday at 133 bpm.  He states that the cut off for cardioversion is 182 bpm.  Otherwise unremarkable. I spoke with the on-call cardiology nurse practitioner who recommends admission for cardiac consultation and likely echo to evaluate for possible etiology of cardiomegaly and dyspnea. Hospitalist resume care the patient this time. Patient was informed and agreeable. She is stable for admission.       Critical Care

## 2021-02-08 NOTE — ED NOTES
Pt reports that sometimes the contrast dye used during CT makes her nauseated. Order received for zofran in case of need. Pt to CT.      Armida Almeida RN  02/08/21 1839

## 2021-02-08 NOTE — TELEPHONE ENCOUNTER
Continues with SOB with some improvement. Increase 40 mg bid for 3 more days. Follow up with NPRG sooner. Continue amiodarone 200 mg bid for 2 weeks and then 200 mg daily     Please send interrogation. If becomes severe please go to the emergency room.      CATRACHITO Brown-CNP

## 2021-02-08 NOTE — TELEPHONE ENCOUNTER
Spoke to the pt-stated she feels like her air gets \" cut off\", since decreasing the Amiodarone. The botttle states 400 mg tabs, but she has 200 mg tabs in it. Confirmed the information with the [address] They do not stock the Amiodarone 400 mg tabs, so the 200 mg tabs were substituted.

## 2021-02-09 VITALS
HEIGHT: 67 IN | OXYGEN SATURATION: 99 % | DIASTOLIC BLOOD PRESSURE: 82 MMHG | TEMPERATURE: 97.8 F | WEIGHT: 176.15 LBS | BODY MASS INDEX: 27.65 KG/M2 | HEART RATE: 75 BPM | SYSTOLIC BLOOD PRESSURE: 119 MMHG | RESPIRATION RATE: 16 BRPM

## 2021-02-09 LAB
A/G RATIO: 1.6 (ref 1.1–2.2)
ALBUMIN SERPL-MCNC: 4.3 G/DL (ref 3.4–5)
ALP BLD-CCNC: 66 U/L (ref 40–129)
ALT SERPL-CCNC: 20 U/L (ref 10–40)
ANION GAP SERPL CALCULATED.3IONS-SCNC: 9 MMOL/L (ref 3–16)
AST SERPL-CCNC: 17 U/L (ref 15–37)
BILIRUB SERPL-MCNC: 0.4 MG/DL (ref 0–1)
BUN BLDV-MCNC: 8 MG/DL (ref 7–20)
CALCIUM SERPL-MCNC: 9.3 MG/DL (ref 8.3–10.6)
CHLORIDE BLD-SCNC: 104 MMOL/L (ref 99–110)
CO2: 26 MMOL/L (ref 21–32)
CREAT SERPL-MCNC: 0.8 MG/DL (ref 0.6–1.1)
FERRITIN: 65.6 NG/ML (ref 15–150)
FOLATE: >20 NG/ML (ref 4.78–24.2)
GFR AFRICAN AMERICAN: >60
GFR NON-AFRICAN AMERICAN: >60
GLOBULIN: 2.7 G/DL
GLUCOSE BLD-MCNC: 98 MG/DL (ref 70–99)
HCT VFR BLD CALC: 29.3 % (ref 36–48)
IMMATURE RETIC FRACT: 0.53 (ref 0.21–0.37)
IRON SATURATION: 11 % (ref 15–50)
IRON: 33 UG/DL (ref 37–145)
LV EF: 33 %
LVEF MODALITY: NORMAL
MAGNESIUM: 2 MG/DL (ref 1.8–2.4)
PHOSPHORUS: 3.2 MG/DL (ref 2.5–4.9)
POTASSIUM SERPL-SCNC: 3.8 MMOL/L (ref 3.5–5.1)
RETICULOCYTE ABSOLUTE COUNT: 0.1 M/UL (ref 0.02–0.1)
RETICULOCYTE COUNT PCT: 3.2 % (ref 0.5–2.18)
SARS-COV-2: NOT DETECTED
SODIUM BLD-SCNC: 139 MMOL/L (ref 136–145)
TOTAL IRON BINDING CAPACITY: 311 UG/DL (ref 260–445)
TOTAL PROTEIN: 7 G/DL (ref 6.4–8.2)
TROPONIN: <0.01 NG/ML
TROPONIN: <0.01 NG/ML
VITAMIN B-12: 789 PG/ML (ref 211–911)

## 2021-02-09 PROCEDURE — G0378 HOSPITAL OBSERVATION PER HR: HCPCS

## 2021-02-09 PROCEDURE — 6370000000 HC RX 637 (ALT 250 FOR IP): Performed by: INTERNAL MEDICINE

## 2021-02-09 PROCEDURE — 84484 ASSAY OF TROPONIN QUANT: CPT

## 2021-02-09 PROCEDURE — 84100 ASSAY OF PHOSPHORUS: CPT

## 2021-02-09 PROCEDURE — 2580000003 HC RX 258: Performed by: INTERNAL MEDICINE

## 2021-02-09 PROCEDURE — 93306 TTE W/DOPPLER COMPLETE: CPT

## 2021-02-09 PROCEDURE — 80053 COMPREHEN METABOLIC PANEL: CPT

## 2021-02-09 PROCEDURE — 83735 ASSAY OF MAGNESIUM: CPT

## 2021-02-09 PROCEDURE — 99223 1ST HOSP IP/OBS HIGH 75: CPT | Performed by: INTERNAL MEDICINE

## 2021-02-09 RX ORDER — DOCUSATE SODIUM 100 MG/1
100 CAPSULE, LIQUID FILLED ORAL DAILY
Status: DISCONTINUED | OUTPATIENT
Start: 2021-02-09 | End: 2021-02-09 | Stop reason: HOSPADM

## 2021-02-09 RX ORDER — CARVEDILOL 25 MG/1
25 TABLET ORAL 2 TIMES DAILY WITH MEALS
Status: DISCONTINUED | OUTPATIENT
Start: 2021-02-09 | End: 2021-02-09 | Stop reason: HOSPADM

## 2021-02-09 RX ORDER — LANOLIN ALCOHOL/MO/W.PET/CERES
3 CREAM (GRAM) TOPICAL ONCE
Status: DISCONTINUED | OUTPATIENT
Start: 2021-02-09 | End: 2021-02-09 | Stop reason: HOSPADM

## 2021-02-09 RX ORDER — LANOLIN ALCOHOL/MO/W.PET/CERES
3 CREAM (GRAM) TOPICAL NIGHTLY PRN
Status: DISCONTINUED | OUTPATIENT
Start: 2021-02-09 | End: 2021-02-09 | Stop reason: HOSPADM

## 2021-02-09 RX ORDER — FERROUS SULFATE 325(65) MG
325 TABLET ORAL 2 TIMES DAILY WITH MEALS
Status: DISCONTINUED | OUTPATIENT
Start: 2021-02-09 | End: 2021-02-09 | Stop reason: HOSPADM

## 2021-02-09 RX ADMIN — FUROSEMIDE 20 MG: 20 TABLET ORAL at 09:08

## 2021-02-09 RX ADMIN — ACETAMINOPHEN 650 MG: 325 TABLET ORAL at 12:29

## 2021-02-09 RX ADMIN — FERROUS SULFATE TAB 325 MG (65 MG ELEMENTAL FE) 325 MG: 325 (65 FE) TAB at 09:08

## 2021-02-09 RX ADMIN — Medication 1 TABLET: at 09:08

## 2021-02-09 RX ADMIN — AMIODARONE HYDROCHLORIDE 400 MG: 200 TABLET ORAL at 09:08

## 2021-02-09 RX ADMIN — CARVEDILOL 25 MG: 25 TABLET, FILM COATED ORAL at 09:08

## 2021-02-09 RX ADMIN — CARVEDILOL 25 MG: 25 TABLET, FILM COATED ORAL at 00:13

## 2021-02-09 RX ADMIN — Medication 10 ML: at 09:08

## 2021-02-09 RX ADMIN — SPIRONOLACTONE 25 MG: 25 TABLET ORAL at 09:08

## 2021-02-09 ASSESSMENT — PAIN SCALES - GENERAL
PAINLEVEL_OUTOF10: 0
PAINLEVEL_OUTOF10: 0
PAINLEVEL_OUTOF10: 6
PAINLEVEL_OUTOF10: 0

## 2021-02-09 NOTE — CARE COORDINATION
Patient admitted as Observation/OPIB or Inpatient status with a Readmission Risk Score of 18% or less. with an anticipated short hospitalization length of stay. Chart reviewed and patient lives in private residence. The patient is independent in ADLs and IADLs and has presented with no needs for discharge at this time. Discussed with patient's nurse and requested that case management be notified if discharge needs are identified. Case Management will continue to follow progress and update discharge plan as needed.     Jason Tracey BSN, RN  RN Case Manager  [phone]

## 2021-02-09 NOTE — CONSULTS
 furosemide  20 mg Oral Daily    therapeutic multivitamin-minerals  1 tablet Oral Daily    spironolactone  25 mg Oral Daily    rivaroxaban  20 mg Oral Daily    sodium chloride flush  10 mL Intravenous 2 times per day    potassium chloride  40 mEq Oral Once         Allergies:  Latex, Codeine, Entresto [sacubitril-valsartan], Lisinopril, Nitroglycerin, Sulfa antibiotics, and Hydralazine     ? Medications and dosages reviewed. ROS:  ?Full ROS obtained and negative except as mentioned in HPI      Physical Examination:    Vitals:    02/09/21 0815   BP: 119/82   Pulse: 75   Resp: 17   Temp: 97.2 °F (36.2 °C)   SpO2: 99%        · GENERAL: Well developed, well nourished, No acute distress  · NEUROLOGICAL: Alert and oriented  · PSYCH: Calm affect  · SKIN: Warm and dry, No visible rash,   · EYES: Pupils equal and round, Sclera non-icteric,   · HENT:  External ears and nose unremarkable, mucus membranes moist  · MUSCULOSKELETAL: Normal cephalic, neck supple  · CAROTID: Normal upstroke, no bruits  · CARDIAC: JVP normal, Normal PMI, regular rate and rhythm, normal S1S2, no murmur, rub, or gallop  · RESPIRATORY: Normal respiratory effort, clear to auscultation bilaterally  · EXTREMITIES: No edema  · GASTROINTESTINAL: normal bowel sounds, soft, non-tender, No hepatomegaly     All testing and labs listed below were personally reviewed. CTPA  Impression:  No pulmonary embolus is identified. Subsegmental atelectasis in the lung bases. Moderate cardiomegaly. CXR    Left subclavian AICD noted. Interval removal of right IJ central venous   line. Cardiomegaly. Pulmonary vasculature within normal limits. Lungs   clear. Costophrenic angles sharp   Impression:  Cardiomegaly with no acute cardiopulmonary process demonstrated     LABS  WBC5.6K/uL RBC3. 37Low M/uL Whascjyoho83.3Low g/dL Avhoguqyve81.4Low % MCV93. 1fL MCH30.5pg MCHC32.8g/dL RDW15. 6High % Zasjqoaed537R/uL Calcium9.7mg/dL Total Protein7.9g/dL Albumin4.7g/dL Albumin/Globulin Ratio1. 5 Total Bilirubin0.4mg/dL Alkaline Iicnefesmox93E/L ALT24U/L AST21U/L Globulin3.2g/dL   Evqaaa475ejsu/L Potassium3.2Low mmol/L Xuuqrxjx604egdq/L ZK602atpj/L Anion Gap12 Jtkxhoq945Palb mg/dL BUN9mg/dL CREATININE0.8mg/dL   Pro-IOQ11qw/mL   Retic Ct Pct3. 20High % Retic Ct Abs0.102High M/uL Immature Retic Fract0. 53High Qcrbeqtocs76.3Low %   Mlbh81Myc ug/dL QMLG448kj/dL Iron Edbzszlrpy22Jya %   Gjfpao647alfj/L Potassium3.8mmol/L Mvygkcpm064wrdl/L VD186kgyn/L Anion Gap9 Eyhealz67kj/dL BUN8mg/dL CREATININE0.8mg/dL   Troponin<0.01ng/mL     ZAMZAM 1/22/21   Ejection fraction is visually estimated to be 25-30 %. Mild mitral regurgitation is present. There is no evidence of mass or thrombus in the left atrium or appendage. The left atrium is dilated. Tricuspid aortic valve. Mild aortic regurgitation is present. Pacemaker / ICD lead is visualized in the right atrium. Mobile structure on   RV lead in atrium. ECHO 1/20/21   Left ventricular size is mildly increased. Left ventricular systolic function is severely depressed with ejection   fraction estimated at 25-30%. Grade II diastolic dysfunction with elevated LV filling pressures. Moderate mitral regurgitation. The left atrium is moderate to severely dilated. Aortic valve appears sclerotic but opens adequately. Mild aortic regurgitation is present. Mild to moderate tricuspid regurgitation. Systolic pulmonary artery pressure   (SPAP) is estimated at 35 mmHg. Pacer / ICD wire is visualized in the right ventricle. Normal right ventricular size and function    EKG: (tracings reviewed)  AV sequential pacemaker      CARDIAC CATH 1/20/2021  Findings:  Artery Findings/Result   LM Normal   LAD Normal   Cx Normal   RI NA   RCA Normal   LVEDP 6   LVG NA           Assessment:     Shortness of breath:  Etiology unclear. All testing normal. No evidence of decompensated CHF. COVID now back and negative. I do not have any other suggestions and cardiac status appears stable. Cardiomyopathy:  Non ischemia. S/p AICD. On coreg. Angioedema with entresto. Stable. No CHF    Afib:  Remains in sinus on amio load    VT:  No recurrence. Has AICD.  Follow    Plan:  Stable cardiac status  OK to d/c from cardiac standpoint and f/u 2/17 as planned    Thank you for allowing me to participate in the care of this individual.      Heath Balderas M.D., [facility]

## 2021-02-09 NOTE — ED NOTES
Pt continues to rest in no distress at this time. Pt provided with water and icepack per request. Pt denies further needs. Remains connected to cardiac monitor. Waiting for dispo.      Jimy Weiss RN  02/08/21 2045

## 2021-02-09 NOTE — H&P
Hospital Medicine History and Physical    2/8/2021    Date of Admission: 2/8/2021    Date of Service: Pt seen/examined on 2/8/2021 and admitted to inpatient. Assessment/plan:  1. Exertional dyspnea, chest discomfort. Admit to CVICU for close monitoring. Obtain serial troponin. Continue home dose of Xarelto. Echocardiogram ordered per cardiology recommendation (based on information obtained from the emergency room provider). Follow-up pending COVID-19 test result (for rule out). 2. Status post biventricular AICD placement. She does have some postoperative left chest wall tenderness on exam.. Oxycodone for pain. 3. Chronic combined systolic and diastolic CHF with most recent ejection fraction of 25 to 98%, grade 2 diastolic dysfunction. Currently not in CHF exacerbation. Continue home dose of diuretics (Aldactone, Lasix), Coreg. Consider adding ACE inhibitor/ARB. Maintain on fluid restrictions. Monitor volume status closely. 4. History of ventricular tachycardia status post AICD placement. Patient was recently prescribed amiodarone, advised to take 400 mg twice daily x2 weeks; start amiodarone 400 mg daily tomorrow (it has been more than 2 weeks since the prescription was started). 5. Prolonged QTC noted on EKG. Likely secondary to underlying AICD placement. Potassium was slightly low in the emergency room, p.o. replacement given. Monitor closely. Avoid further QTprolonging medications, besides amiodarone. 6. Hypokalemia. Received 40 mEq of p.o. potassium in the emergency room. Will give additional 40 mEq. Will check magnesium level. Continue to monitor electrolytes, replace as needed. 7. History of normocytic anemia. Check iron studies, B12/folate. Monitor counts closely.     Activities: Up with assist  Prophylaxis: On Xarelto  Code status: Full code    ==========================================================  Chief complaint:  Chief Complaint   Patient presents with  Chest Pain     From home, chest pain 8/10 and SOB X2 days, PT states it feels like a heaviness, no radiating. Defibrillator placed 1 week ago        History of Presenting Illness: This is a pleasant 63 y.o. female with history of normocytic anemia, atrial fibrillation/flutter, history of complete heart block, gastroesophageal reflux disease, chronic combined systolic and diastolic heart failure (most recent echocardiogram with ejection fraction of 25 to 84%, grade 2 diastolic dysfunction), who is status post MRI-compatible biventricular AICD placement for ventricular tachycardia (on January 22, 2021), who presents to the emergency room with complaints of chest pain/heaviness, exertional dyspnea, ongoing for 2 days. No syncope and no AICD discharge. No cough or fever or chills.   She discussed with her cardiologist who recommends presentation to the emergency room with plan to obtain echocardiogram.    Past Medical History:      Diagnosis Date    Anemia     Atrial fibrillation and flutter ([address]     Atrial flutter ([address]     CHB (complete heart block) ([address]     Class 1 obesity without serious comorbidity with body mass index (BMI) of 33.0 to 33.9 in adult 9/6/2019    Congenital heart disease     GERD (gastroesophageal reflux disease)     Headache(784.0)     History of complete heart block     Hyperlipidemia     Hypertension     Syncope     Systolic CHF, chronic ([address] 10/3/2018       Past Surgical History:      Procedure Laterality Date    COLONOSCOPY N/A 10/27/2020    COLONOSCOPY POLYPECTOMY SNARE/COLD BIOPSY performed by Master Wheeler MD at [place] [id number]  11/29/12    dual chamber PPM, [employer]    TUBAL LIGATION      UPPER GASTROINTESTINAL ENDOSCOPY N/A 10/27/2020    EGD DIAGNOSTIC ONLY performed by Master Wheeler MD at [place] [id number]         Medications (prior to admission):  Prior to Admission medications Medication Sig Start Date End Date Taking? Authorizing Provider   amiodarone (PACERONE) 400 MG tablet Take  2 tab daily for 2 weeks then 1 tab daily 1/25/21  Yes Rodolfo Smith MD   carvedilol (COREG) 25 MG tablet Take 1 tablet by mouth 2 times daily 1/25/21  Yes Rodolfo Smith MD   NIFEdipine (ADALAT CC) 30 MG extended release tablet Take 1 tablet by mouth daily as needed (BP >160/90) 1/25/21  Yes Rodolfo Smith MD   furosemide (LASIX) 20 MG tablet Take 1 tablet by mouth daily 1/26/21  Yes Rodolfo Smith MD   Multiple Vitamins-Minerals (THERAPEUTIC MULTIVITAMIN-MINERALS) tablet Take 1 tablet by mouth daily   Yes Historical Provider, MD   metoclopramide (REGLAN) 5 MG tablet Take 1 tablet by mouth 4 times daily  Patient taking differently: Take 5 mg by mouth 4 times daily as needed  10/27/20  Yes Tammy Dickey MD   XARELTO 20 MG TABS tablet TAKE ONE TABLET BY MOUTH DAILY WITH BREAKFAST 10/20/20  Yes Edelmira Alegre MD   spironolactone (ALDACTONE) 25 MG tablet Take 1 tablet by mouth daily 8/31/20  Yes Paddy Due, APRN - CNS   butalbital-acetaminophen-caffeine (FIORICET, ESGIC) -40 MG per tablet Take 1 tablet by mouth every 4 hours as needed for Headaches   Yes Historical Provider, MD   atorvastatin (LIPITOR) 40 MG tablet Take 1 tablet by mouth daily 11/18/16  Yes Natali Lyn MD       Allergy(ies):  Latex, Codeine, Entresto [sacubitril-valsartan], Lisinopril, Nitroglycerin, Sulfa antibiotics, and Hydralazine    Social History:  TOBACCO:  reports that she has never smoked. She has never used smokeless tobacco.  ETOH:  reports no history of alcohol use. Family History:      Problem Relation Age of Onset    Cancer Father     Heart Disease Neg Hx     High Blood Pressure Neg Hx     High Cholesterol Neg Hx        Review of Systems:  Pertinent positives are listed in HPI. At least 10-point ROS reviewed and were negative.      Vitals and physical examination: /81   Pulse 75   Temp 98.4 °F (36.9 °C) (Oral)   Resp 22   Ht 5' 6.5\" (1.689 m)   Wt 190 lb (86.2 kg)   SpO2 100%   BMI 30.21 kg/m²   Gen/overall appearance: Not in acute distress. Alert. Oriented x3. Head: Normocephalic, atraumatic  Eyes: EOMI, good acuity  ENT: Oral mucosa moist  Neck: No JVD, thyromegaly  CVS: Nml S1S2, no MRG, RRR  Pulm: Clear bilaterally. No crackles/wheezes  Gastrointestinal: Soft, NT/ND, +BS  Musculoskeletal: No edema. Warm  Neuro: No focal deficit. Moves extremity spontaneously. Psychiatry: Appropriate affect. Not agitated. Skin: Warm, dry with normal turgor. No rash  Capillary refill: Brisk,< 3 seconds   Peripheral Pulses: +2 palpable, equal bilaterally       Labs/imaging/EKG:  CBC:   Recent Labs     02/08/21  1543   WBC 5.6   HGB 10.3*        BMP:    Recent Labs     02/08/21  1543      K 3.2*      CO2 25   BUN 9   CREATININE 0.8   GLUCOSE 102*     Hepatic:   Recent Labs     02/08/21  1543   AST 21   ALT 24   BILITOT 0.4   ALKPHOS 75         Xr Chest Portable    Result Date: 2/8/2021  EXAMINATION: ONE XRAY VIEW OF THE CHEST 2/8/2021 3:19 pm COMPARISON: 01/22/2021 HISTORY: ORDERING SYSTEM PROVIDED HISTORY: SOB TECHNOLOGIST PROVIDED HISTORY: Reason for exam:->SOB Reason for Exam: sob Acuity: Unknown Type of Exam: Unknown FINDINGS: Left subclavian AICD noted. Interval removal of right IJ central venous line. Cardiomegaly. Pulmonary vasculature within normal limits. Lungs clear.   Costophrenic angles sharp     Cardiomegaly with no acute cardiopulmonary process demonstrated       Ct Chest Pulmonary Embolism W Contrast    Result Date: 2/8/2021

## 2021-02-09 NOTE — DISCHARGE SUMMARY
Hospital Discharge Summary    Patient's PCP: No primary care provider on file. Admit Date: 2/8/2021   Discharge Date: 2/9/2021    Admitting Physician: Dr. Brayden Rodriguez MD  Discharge Physician: Dr. Ashanti Lynch     Consults:   IP CONSULT TO CARDIOLOGY    Brief HPI:   This is a pleasant 63 y.o. female with history of normocytic anemia, atrial fibrillation/flutter, history of complete heart block, gastroesophageal reflux disease, chronic combined systolic and diastolic heart failure (most recent echocardiogram with ejection fraction of 25 to 97%, grade 2 diastolic dysfunction), who is status post MRI-compatible biventricular AICD placement for ventricular tachycardia (on January 22, 2021), who presents to the emergency room with complaints of chest pain/heaviness, exertional dyspnea, ongoing for 2 days. No syncope and no AICD discharge. No cough or fever or chills. She discussed with her cardiologist who recommends presentation to the emergency room with plan to obtain echocardiogram.      Brief hospital course:   1. Exertional dyspnea, chest discomfort. Admit to CVICU for close monitoring. Obtain serial troponin. Continue home dose of Xarelto. Echocardiogram ordered per cardiology recommendation (based on information obtained from the emergency room provider). Follow-up pending COVID-19 test result (for rule out). 2. Status post biventricular AICD placement. She does have some postoperative left chest wall tenderness on exam.. Oxycodone for pain. 3. Chronic combined systolic and diastolic CHF with most recent ejection fraction of 25 to 12%, grade 2 diastolic dysfunction. Currently not in CHF exacerbation. Continue home dose of diuretics (Aldactone, Lasix), Coreg. Consider adding ACE inhibitor/ARB. Maintain on fluid restrictions. Monitor volume status closely. 4. History of ventricular tachycardia status post AICD placement.   Patient was recently prescribed amiodarone, advised to take 400 mg twice daily x2 weeks; start amiodarone 400 mg daily tomorrow (it has been more than 2 weeks since the prescription was started). 5. Prolonged QTC noted on EKG. Likely secondary to underlying AICD placement. Potassium was slightly low in the emergency room, p.o. replacement given. Monitor closely. Avoid further QTprolonging medications, besides amiodarone. 6. Hypokalemia. Received 40 mEq of p.o. potassium in the emergency room. Will give additional 40 mEq. Will check magnesium level. Continue to monitor electrolytes, replace as needed. 7. History of normocytic anemia. Check iron studies, B12/folate. Monitor counts closely. Patient was evaluated by cardiology, Covid test negative. Patient had echocardiogram with no change to prior, no irregular rhythms noted, not decompensated. Patient's  did state that she did not take her amiodarone as prescribed and was only taking 200 mg daily instead of 400 mg daily and he feels that is why she was having some lightheadedness. I advised patient to take medicine as was prescribed by electrophysiologist and he can follow back up with electrophysiologist as scheduled and call their office for any further needs. Discharge Diagnoses: Active Problems:    Exertional dyspnea  Resolved Problems:    * No resolved hospital problems. *      Physical Exam: /82   Pulse 75   Temp 97.8 °F (36.6 °C) (Temporal)   Resp 16   Ht 5' 7\" (1.702 m)   Wt 176 lb 2.4 oz (79.9 kg)   SpO2 99%   BMI 27.59 kg/m²   Gen/overall appearance: Not in acute distress. Alert. Head: Normocephalic, atraumatic  Eyes: EOMI, good acuity  ENT:- Oral mucosa moist  Neck: No JVD, thyromegaly  CVS: Nml S1S2, no MRG, RRR  Pulm: Clear bilaterally. No crackles/wheezes  Gastrointestinal: Soft, NT/ND, +BS  Musculoskeletal: No edema. Warm  Neuro: No focal deficit. Moves extremity spontaneously. Psychiatry: Appropriate affect. Not agitated.   Skin: Warm, dry with normal lisethgor. No rash        Significant diagnostic studies that may require follow up:  Echo Transesophageal    Result Date: 1/22/2021  Transesophageal Echocardiography Report (ZAMZAM)  Demographics   Patient Name      Ivan Rios   Date of Study     01/22/2021          Gender             Female   Patient Number    [id number]          Date of Birth      1965   Visit Number      [id number]           Age                63 year(s)   Accession Number  [id number]          Room Number        65   Corporate ID      [id number]            Kev Mendoza MD  Interpreting       Linda Harris MD  Physician                             Physician   The procedure was explained in detail to the patient. Risks,  complications and alternative treatments were reviewed. Written consent  was obtained. Procedure Type of Study   ZAMZAM procedure:ECHOCARDIOGRAM TRANSESOPHAGEAL. Procedure Date Date: 01/22/2021 Start: 08:45 AM Study Location: [address] [place] [address] Technical Quality: Adequate visualization Additional Indications:Lead extraction. Patient Status: Routine Height: 67 inches Weight: 190 pounds BSA: 1.98 m2 BMI: 29.76 kg/m2 ZAMZAM Performed By: the attending alone  Procedure Informed Consent  Procedure consent form obtained. Conclusions   Summary  Ejection fraction is visually estimated to be 25-30 %. Mild mitral regurgitation is present. There is no evidence of mass or thrombus in the left atrium or appendage. The left atrium is dilated. Tricuspid aortic valve. Mild aortic regurgitation is present. Pacemaker / ICD lead is visualized in the right atrium. Mobile structure on  RV lead in atrium.    Signature   ------------------------------------------------------------------  Electronically signed by Linda Harris MD (Interpreting  physician) on 01/22/2021 at 03:56 PM  ------------------------------------------------------------------   Findings   Left Ventricle  Ejection fraction is visually estimated to be 25-30 %. Mitral Valve  Mild mitral regurgitation is present. Left Atrium  There is no evidence of mass or thrombus in the left atrium or appendage. The left atrium is dilated. Aortic Valve  Tricuspid aortic valve. Mild aortic regurgitation is present. Aorta  The aortic root is normal in size. Right Ventricle  Right ventricular systolic function is normal .  Pacer / ICD wire is visualized in the right ventricle. Tricuspid Valve  Mild tricuspid regurgitation. Right Atrium  Pacemaker / ICD lead is visualized in the right atrium. Mobile structure on  RV lead in atrium. Pulmonic Valve  No evidence of pulmonic valve regurgitation. Pericardial Effusion  There is a trivial pericardial effusion. Pleural Effusion  There is no pleural effusion. Echo Complete 2d W Doppler W Color    Result Date: 2/9/2021  Transthoracic Echocardiography Report (TTE)  Demographics   Patient Name       Leticia Awad   Date of Study      02/09/2021         Gender              Female   Patient Number     [id number]         Date of Birth       1965   Visit Number       [id number]          Age                 63 year(s)   Accession Number   [id number]         Room Number         2918   Corporate ID       [id number]           Sidra Alfaro, RVT   Ordering Physician Claudine Mendez Interpreting        Lencho Lamar MD                 Physician           N, , [facility]  Procedure Type of Study   TTE procedure:ECHOCARDIOGRAM COMPLETE 2D W DOPPLER W COLOR. Procedure Date Date: 02/09/2021 Start: 10:27 AM Study Location: [facility] Technical Quality: Adequate visualization Additional Indications:Recent AICD [employer].  Patient Status: Routine Height: 67 inches Weight: 176 pounds BSA: 1.92 m2 BMI: 27.57 kg/m2 BP: 160/95 mmHg  Conclusions   Summary  Left ventricular size is mildly increased. Moderately reduced global systolic function with an ejection fraction  estimated at 30-35%. Global hypokinesis noted. Grade II diastolic dysfunction with elevated LV filling pressures. There is mild mitral regurgitation noted. Mild left atrial enlargement noted. Aortic valve appears sclerotic but opens adequately. Mild aortic regurgitation. There is mild tricuspid regurgitation with a RVSP estimation of 25 mmHg. Pacer / ICD wire is visualized in the right ventricle. The right ventricle is normal in size and function. Signature   ------------------------------------------------------------------  Electronically signed by Tamy Joy, [facility]  (Interpreting physician) on 02/09/2021 at 12:05 PM  ------------------------------------------------------------------   Findings   Left Ventricle  Normal left ventricular wall thickness. Left ventricular size is mildly increased. Moderately reduced global systolic function with an ejection fraction  estimated at 30-35%. Global hypokinesis noted. Grade II diastolic dysfunction with elevated LV filling pressures. Avg.  E/e'=15.0   Mitral Valve  Thickened mitral valve without evidence of stenosis. There is mild mitral regurgitation noted. Left Atrium  Mild left atrial enlargement noted. Aortic Valve  Aortic valve appears sclerotic but opens adequately. Mild aortic regurgitation. Aorta  The aortic root is normal in size. Right Ventricle  The right ventricle is normal in size and function. TAPSE is estimated at  1.78 cm. Pacer / ICD wire is visualized in the right ventricle. Tricuspid Valve  Tricuspid valve is structurally normal.  There is mild tricuspid regurgitation with a RVSP estimation of 25 mmHg. Right Atrium  The right atrial size is normal.   Pulmonic Valve  The pulmonic valve is structurally normal. Trivial pulmonic regurgitation  present. Pericardial Effusion  No pericardial effusion noted.    Pleural Effusion No pleural effusion. Miscellaneous  The inferior vena cava appears normal in size with normal respiratory  variation. M-Mode/2D Measurements (cm)   LV Diastolic Dimension: 1.73 cm LV Systolic Dimension: 9.85 cm  LV Septum Diastolic: 2.52 cm  LV PW Diastolic: 1.47 cm        AO Root Dimension: 2.7 cm                                  LA Dimension: 4.2 cm                                  LA Area: 21.2 cm2  LVOT: 2 cm                      LA volume/Index: 68.2 ml /36 ml/m2  Doppler Measurements   AV Peak Velocity: 175 cm/s     MV Peak E-Wave: 67.3 cm/s  AV Peak Gradient: 12.25 mmHg   MV Peak A-Wave: 39 cm/s  AV Mean Gradient: 6 mmHg       MV E/A Ratio: 1.73  LVOT Peak Velocity: 102 cm/s  AV Area (Continuity):1.8 cm2   TR Velocity:227 cm/s  TR Gradient:20.61 mmHg  Estimated RAP:3 mmHg  Estimated RVSP: 23 mmHg  E' Septal Velocity: 3.71 cm/s  E' Lateral Velocity: 6.58 cm/s   Aortic Valve   Peak Velocity: 175 cm/s    Mean Velocity: 112 cm/s  Peak Gradient: 12.25 mmHg  Mean Gradient: 6 mmHg  Area (continuity): 1.8 cm2  AV VTI: 35.7 cm  Aorta   Aortic Root: 2.7 cm  Ascending Aorta: 2.9 cm  LVOT Diameter: 2 cm      Echo Complete    Result Date: 1/20/2021  Transthoracic Echocardiography Report (TTE)  Demographics   Patient Name        Mireya MATUTE   Date of Study       01/20/2021  Gender                 Female   Patient Number      [id number]  Date of Birth          1965   Visit Number        [id number]   Age                    63 year(s)   Accession Number    [id number]  Room Number            3909   Corporate ID        [id number]    Sonographer            Zhang Rosas RVT   Ordering Physician              Interpreting Physician Stacey Licea DO, [address]  Procedure Type of Study   TTE procedure:ECHOCARDIOGRAM COMPLETE 2D W DOPPLER W COLOR.   Procedure Date Date: 01/20/2021 Start: 01:03 PM Study Location: Portable Technical Quality: Adequate visualization Additional Indications:Arrhythmia, pacemaker. Patient Status: Routine Height: 67 inches Weight: 190 pounds BSA: 1.98 m2 BMI: 29.76 kg/m2 HR: 60 bpm BP: 166/89 mmHg  Conclusions   Summary  Left ventricular size is mildly increased. Left ventricular systolic function is severely depressed with ejection  fraction estimated at 25-30%. Grade II diastolic dysfunction with elevated LV filling pressures. Moderate mitral regurgitation. The left atrium is moderate to severely dilated. Aortic valve appears sclerotic but opens adequately. Mild aortic regurgitation is present. Mild to moderate tricuspid regurgitation. Systolic pulmonary artery pressure  (SPAP) is estimated at 35 mmHg. Pacer / ICD wire is visualized in the right ventricle. Normal right ventricular size and function. Signature   ------------------------------------------------------------------  Electronically signed by Anita Mohan, [facility]  (Interpreting physician) on 01/20/2021 at 03:05 PM  ------------------------------------------------------------------   Findings   Left Ventricle  Left ventricular systolic function is severely depressed with ejection  fraction estimated at 25-30%. Global hypokinesis with regional variations. There is mild concentric left ventricular hypertrophy. Left ventricular size is mildly increased. Grade II diastolic dysfunction with elevated LV filling pressures. Mitral Valve  Mitral valve leaflets appear mildly thickened. Moderate mitral regurgitation. Left Atrium  The left atrium is moderate to severely dilated. Aortic Valve  Aortic valve appears sclerotic but opens adequately. Mild aortic regurgitation is present. Aorta  The aortic root is normal in size. Right Ventricle  Pacer / ICD wire is visualized in the right ventricle. Normal right ventricular size and function.    Tricuspid Valve  Tricuspid valve is structurally normal.  Mild to subclavian AICD noted. Interval removal of right IJ central venous line. Cardiomegaly. Pulmonary vasculature within normal limits. Lungs clear. Costophrenic angles sharp     Cardiomegaly with no acute cardiopulmonary process demonstrated     Xr Chest Portable    Result Date: 1/22/2021  EXAMINATION: ONE XRAY VIEW OF THE CHEST 1/22/2021 11:07 am COMPARISON: January 28, 2021 HISTORY: ORDERING SYSTEM PROVIDED HISTORY: Pacemaker placement and rule out pneumothorax TECHNOLOGIST PROVIDED HISTORY: Reason for exam:->Pacemaker placement and rule out pneumothorax Reason for Exam: Pacemaker placement and rule out pneumothorax Acuity: Acute Type of Exam: Initial FINDINGS: Cardiac silhouette is enlarged. Left-sided cardiac device. Right IJ catheter with tip projecting in the region of the cavoatrial junction. Lungs appear clear. Gaseous distention of the stomach. No acute findings. No pneumothorax. Gaseous distention of the stomach. Xr Chest Portable    Result Date: 1/20/2021  EXAMINATION: ONE XRAY VIEW OF THE CHEST 1/20/2021 3:40 am COMPARISON: 06/27/2020 HISTORY: ORDERING SYSTEM PROVIDED HISTORY: cp TECHNOLOGIST PROVIDED HISTORY: Reason for exam:->cp Reason for Exam: chest pain and dizziness Acuity: Acute Type of Exam: Initial Relevant Medical/Surgical History: previous A-FIB and FLUTTER,,hypertension,CHB and CHF FINDINGS: The lungs are clear. The costophrenic angles are sharp. The heart size is mildly enlarged. Left chest pacemaker is again seen. There is no discernible pneumothorax. No acute disease. Ct Chest Pulmonary Embolism W Contrast    Result Date: 2/8/2021  EXAMINATION: CTA OF THE CHEST 2/8/2021 6:22 pm TECHNIQUE: CTA of the chest was performed after the administration of intravenous contrast.  Multiplanar reformatted images are provided for review. MIP images are provided for review.  Dose modulation, iterative reconstruction, and/or weight based adjustment of the mA/kV was utilized to reduce the radiation dose to as low as reasonably achievable. COMPARISON: Chest x-ray, today. Yaneth Luther HISTORY: ORDERING SYSTEM PROVIDED HISTORY: sop, post-op TECHNOLOGIST PROVIDED HISTORY: Reason for exam:->sop, post-op Decision Support Exception->Emergency Medical Condition (MA) Reason for Exam: sop, post-op Acuity: Acute Type of Exam: Initial Relevant Medical/Surgical History: unable to raise left arm due to recent pacemaker placement. FINDINGS: Pulmonary Arteries: Pulmonary arteries are adequately opacified for evaluation. No evidence of intraluminal filling defect to suggest pulmonary embolism. Main pulmonary artery is normal in caliber. Mediastinum: There is a left transvenous biventricular ICD. Moderate cardiomegaly is noted. Thoracic aorta is tortuous, without aneurysm. There is no mediastinal or hilar adenopathy. Lungs/pleura: Bibasilar bands of subsegmental atelectasis are present. No pleural effusion is identified. Upper Abdomen: The visualized adrenal glands are unremarkable. Small hiatal hernia is noted. Soft Tissues/Bones: No acute osseous abnormalities appreciated. The chest wall is otherwise unremarkable. No pulmonary embolus is identified. Subsegmental atelectasis in the lung bases. Moderate cardiomegaly. Treatments: As above.       Discharge Medications:     Medication List      CHANGE how you take these medications    metoclopramide 5 MG tablet  Commonly known as: REGLAN  Take 1 tablet by mouth 4 times daily  What changed:   · when to take this  · reasons to take this        CONTINUE taking these medications    amiodarone 400 MG tablet  Commonly known as: PACERONE  Take  2 tab daily for 2 weeks then 1 tab daily  Notes to patient: Use: Restore a regular heart rhythm, lower heart rate  Side effects: upset stomach, constipation, dizziness, headache     atorvastatin 40 MG tablet  Commonly known as: LIPITOR  Take 1 tablet by mouth daily  Notes to patient: Use:  Cholesterol reduction Side effects:  Muscle pain or soreness, stomach and intestinal upset     butalbital-acetaminophen-caffeine -40 MG per tablet  Commonly known as: FIORICET, ESGIC     carvedilol 25 MG tablet  Commonly known as: COREG  Take 1 tablet by mouth 2 times daily  Notes to patient: Use:  Lowers blood pressure and heart rate, takes workload off heart  Side effects:  Tiredness, shortness of breath, trouble sleeping, impotence     furosemide 20 MG tablet  Commonly known as: LASIX  Take 1 tablet by mouth daily  Notes to patient: Use: treat heart failure, fluid retention, lower blood pressure. Side effects: frequent urination, weakness, muscle cramps, increased sensitivity to light, nausea, and dizziness     spironolactone 25 MG tablet  Commonly known as: ALDACTONE  Take 1 tablet by mouth daily  Notes to patient: Use: treat heart failure, fluid retention, lower blood pressure. Side effects: frequent urination, weakness, muscle cramps, increased sensitivity to light, nausea, and dizziness     therapeutic multivitamin-minerals tablet  Notes to patient: supplement     Xarelto 20 MG Tabs tablet  Generic drug: rivaroxaban  TAKE ONE TABLET BY MOUTH DAILY WITH BREAKFAST  Notes to patient: Use:  Reduces risk of blood clots  Side effects:  Unusual bleeding, bruising, stomach irritation        STOP taking these medications    NIFEdipine 30 MG extended release tablet  Commonly known as: ADALAT CC            Activity: activity as tolerated  Diet: DIET CARDIAC; Daily Fluid Restriction: 2000 ml      Disposition: home  Discharged Condition: Stable  Follow Up:   No follow-up provider specified. Code status:  Full Code         Total time spent on discharge, finalizing medications, referrals and arranging outpatient follow up was more than 30 minutes      Thank you Dr. Dudley primary care provider on file. for the opportunity to be involved in this patients care.

## 2021-02-09 NOTE — PROGRESS NOTES
Clinical Pharmacy Note    HF Core Measures Assessment    Latest EF: 25-30% Jan 2021    ACE/ARB (EF<40%):  No - hx cough with lisinopril + lip swelling with entresto  Evidence based BB (bisoprolol, metoprolol XL, carvedilol) (EF<40%): carvedilol 25 mg BID at home  Aldosterone Antagonist (EF<35%): spironolactone 25 mg daily    (May consider the use of hydralazine + nitrate in patients intolerant to ACEI/ARB)    Patrick Harris, PharmD, BCPS  Clinical Pharmacist  [id number]

## 2021-02-11 ENCOUNTER — TELEPHONE (OUTPATIENT)
Dept: CARDIOLOGY CLINIC | Age: 56
End: 2021-02-11

## 2021-02-11 NOTE — TELEPHONE ENCOUNTER
Pt calling she felt like a \"Shock\" in her chest and she sent a transmission over and wants to know if someone could look at it and see if anything showed up? Pt states her phone doesn't ring it just lights up so if she doesn't answer she wants a detailed msg left on VM.  Pls call to advise Thank you

## 2021-02-17 ENCOUNTER — HOSPITAL ENCOUNTER (OUTPATIENT)
Age: 56
Discharge: HOME | End: 2021-02-17
Payer: MEDICAID

## 2021-02-17 ENCOUNTER — TELEPHONE (OUTPATIENT)
Dept: CARDIOLOGY CLINIC | Age: 56
End: 2021-02-17

## 2021-02-17 ENCOUNTER — OFFICE VISIT (OUTPATIENT)
Dept: CARDIOLOGY CLINIC | Age: 56
End: 2021-02-17
Payer: MEDICAID

## 2021-02-17 VITALS
HEART RATE: 75 BPM | SYSTOLIC BLOOD PRESSURE: 118 MMHG | HEIGHT: 67 IN | BODY MASS INDEX: 30.59 KG/M2 | OXYGEN SATURATION: 97 % | DIASTOLIC BLOOD PRESSURE: 62 MMHG | WEIGHT: 194.9 LBS

## 2021-02-17 DIAGNOSIS — I47.20 VENTRICULAR TACHYCARDIA: ICD-10-CM

## 2021-02-17 DIAGNOSIS — E55.9 HYPOVITAMINOSIS D: ICD-10-CM

## 2021-02-17 DIAGNOSIS — Z95.810 ICD (IMPLANTABLE CARDIOVERTER-DEFIBRILLATOR), BIVENTRICULAR, IN SITU: ICD-10-CM

## 2021-02-17 DIAGNOSIS — D50.9 IRON DEFICIENCY ANEMIA, UNSPECIFIED IRON DEFICIENCY ANEMIA TYPE: ICD-10-CM

## 2021-02-17 DIAGNOSIS — I50.22 SYSTOLIC CHF, CHRONIC (HCC): ICD-10-CM

## 2021-02-17 DIAGNOSIS — G47.33 OSA (OBSTRUCTIVE SLEEP APNEA): ICD-10-CM

## 2021-02-17 DIAGNOSIS — I50.42 CHRONIC COMBINED SYSTOLIC AND DIASTOLIC HEART FAILURE (HCC): Primary | ICD-10-CM

## 2021-02-17 DIAGNOSIS — I48.0 PAROXYSMAL ATRIAL FIBRILLATION (HCC): ICD-10-CM

## 2021-02-17 DIAGNOSIS — I50.42 CHRONIC COMBINED SYSTOLIC AND DIASTOLIC HEART FAILURE (HCC): ICD-10-CM

## 2021-02-17 LAB
ANION GAP SERPL CALCULATED.3IONS-SCNC: 12 MMOL/L (ref 3–16)
BUN BLDV-MCNC: 8 MG/DL (ref 7–20)
CALCIUM SERPL-MCNC: 9.9 MG/DL (ref 8.3–10.6)
CHLORIDE BLD-SCNC: 104 MMOL/L (ref 99–110)
CO2: 27 MMOL/L (ref 21–32)
CREAT SERPL-MCNC: 0.9 MG/DL (ref 0.6–1.1)
GFR AFRICAN AMERICAN: >60
GFR NON-AFRICAN AMERICAN: >60
GLUCOSE BLD-MCNC: 96 MG/DL (ref 70–99)
POTASSIUM SERPL-SCNC: 3.8 MMOL/L (ref 3.5–5.1)
PRO-BNP: 82 PG/ML (ref 0–124)
SODIUM BLD-SCNC: 143 MMOL/L (ref 136–145)
VITAMIN D 25-HYDROXY: 16.1 NG/ML

## 2021-02-17 PROCEDURE — 80048 BASIC METABOLIC PNL TOTAL CA: CPT

## 2021-02-17 PROCEDURE — 1036F TOBACCO NON-USER: CPT | Performed by: CLINICAL NURSE SPECIALIST

## 2021-02-17 PROCEDURE — G8417 CALC BMI ABV UP PARAM F/U: HCPCS | Performed by: CLINICAL NURSE SPECIALIST

## 2021-02-17 PROCEDURE — G8484 FLU IMMUNIZE NO ADMIN: HCPCS | Performed by: CLINICAL NURSE SPECIALIST

## 2021-02-17 PROCEDURE — 1111F DSCHRG MED/CURRENT MED MERGE: CPT | Performed by: CLINICAL NURSE SPECIALIST

## 2021-02-17 PROCEDURE — 80151 DRUG ASSAY AMIODARONE: CPT

## 2021-02-17 PROCEDURE — G8427 DOCREV CUR MEDS BY ELIG CLIN: HCPCS | Performed by: CLINICAL NURSE SPECIALIST

## 2021-02-17 PROCEDURE — 3017F COLORECTAL CA SCREEN DOC REV: CPT | Performed by: CLINICAL NURSE SPECIALIST

## 2021-02-17 PROCEDURE — 99214 OFFICE O/P EST MOD 30 MIN: CPT | Performed by: CLINICAL NURSE SPECIALIST

## 2021-02-17 PROCEDURE — 36415 COLL VENOUS BLD VENIPUNCTURE: CPT

## 2021-02-17 PROCEDURE — 82306 VITAMIN D 25 HYDROXY: CPT

## 2021-02-17 PROCEDURE — 83880 ASSAY OF NATRIURETIC PEPTIDE: CPT

## 2021-02-17 RX ORDER — AMIODARONE HYDROCHLORIDE 200 MG/1
200 TABLET ORAL DAILY
COMMUNITY
Start: 2021-01-25 | End: 2021-02-24 | Stop reason: SDUPTHER

## 2021-02-17 RX ORDER — SODIUM CHLORIDE 0.9 % (FLUSH) 0.9 %
10 SYRINGE (ML) INJECTION PRN
Status: CANCELLED | OUTPATIENT
Start: 2021-02-17

## 2021-02-17 RX ORDER — SACUBITRIL AND VALSARTAN 24; 26 MG/1; MG/1
0.5 TABLET, FILM COATED ORAL NIGHTLY
Qty: 60 TABLET | Refills: 0
Start: 2021-02-17 | End: 2021-06-11

## 2021-02-17 RX ORDER — SODIUM CHLORIDE 9 MG/ML
INJECTION, SOLUTION INTRAVENOUS CONTINUOUS
Status: CANCELLED | OUTPATIENT
Start: 2021-02-17

## 2021-02-17 NOTE — PATIENT INSTRUCTIONS
1.  Continue amiodarone 200 mg daily  2. Will check amiodarone level and other labs   3. Will schedule a couple doses of IV iron  4. I will call you after I speak to Dr Gita Fuller  5.   RTO 1 month

## 2021-02-17 NOTE — PROGRESS NOTES
[facility]  Progress Note    Primary Care Doctor:  No primary care provider on file. Chief Complaint   Patient presents with    Congestive Heart Failure    Shortness of Breath        History of Present Illness:  63 y.o. female with history of sHF, LVH, AF on xarelto (follows with Dr Aram Harden), had been on flecainide, dual chamber pacemaker 2012 due to congenital heart block  LVEF had been 55% in 2015 and now 25%. No lisinopril due to cough. She is a cook at [employer]  Lip numbness to entresto 3/2020 hydralazine caused headaches  10/2020 EGD (hiatal hernia) and colonoscopy (polyp)     I had the pleasure of seeing/virtual Arvind Mclain in follow up for sHF. She is ambulatory and by her self today. She has been in the hospital twice since I last saw her. She had an ICD placed 1/22/21, C done 1/20/21 normal cors. Iron studies are low and her Hgb has been low since beginning of Jan.  She recently had EGD and colon done. She denies any dark stools. She is confused on her dose of amiodarone. She stopped the hydralazine due to headaches, no lisinopril for cough and entresto for lip numbness (no swelling). She is agreeable to retry the entresto. She continues to have some warmth feelings, no arrhythmias seen on device today. She denies any edema or chest pain. Past Medical History:   has a past medical history of Anemia, Atrial fibrillation and flutter ([address] Atrial flutter ([address] CHB (complete heart block) ([address] Class 1 obesity without serious comorbidity with body mass index (BMI) of 33.0 to 33.9 in adult, Congenital heart disease, GERD (gastroesophageal reflux disease), Headache(784.0), History of complete heart block, Hyperlipidemia, Hypertension, Syncope, and Systolic CHF, chronic ([address] Surgical History:   has a past surgical history that includes Uterine fibroid surgery; Pacemaker insertion (11/29/12); Tubal ligation;  Upper gastrointestinal endoscopy (N/A, 10/27/2020); and Colonoscopy (N/A, 10/27/2020). Social History:    reports that she has never smoked. She has never used smokeless tobacco. She reports that she does not drink alcohol or use drugs. Family History:   Family History   Problem Relation Age of Onset    Cancer Father     Heart Disease Neg Hx     High Blood Pressure Neg Hx     High Cholesterol Neg Hx        Home Medications:  Prior to Admission medications    Medication Sig Start Date End Date Taking?  Authorizing Provider   amiodarone (CORDARONE) 200 MG tablet Take 200 mg by mouth daily  1/25/21  Yes Historical Provider, MD   carvedilol (COREG) 25 MG tablet Take 1 tablet by mouth 2 times daily 1/25/21  Yes Felicity Fritz MD   furosemide (LASIX) 20 MG tablet Take 1 tablet by mouth daily 1/26/21  Yes Felicity Fritz MD   Multiple Vitamins-Minerals (THERAPEUTIC MULTIVITAMIN-MINERALS) tablet Take 1 tablet by mouth daily   Yes Historical Provider, MD   metoclopramide (REGLAN) 5 MG tablet Take 1 tablet by mouth 4 times daily  Patient taking differently: Take 5 mg by mouth 4 times daily as needed  10/27/20  Yes Chen Guadalupe MD   XARELTO 20 MG TABS tablet TAKE ONE TABLET BY MOUTH DAILY WITH BREAKFAST 10/20/20  Yes Marisela Milian MD   spironolactone (ALDACTONE) 25 MG tablet Take 1 tablet by mouth daily 8/31/20  Yes Phyllistine CATRACHITO Cosby - CNS   butalbital-acetaminophen-caffeine (FIORICET, ESGIC) -40 MG per tablet Take 1 tablet by mouth every 4 hours as needed for Headaches   Yes Historical Provider, MD   atorvastatin (LIPITOR) 40 MG tablet Take 1 tablet by mouth daily 11/18/16  Yes Juanis Reyes MD   amiodarone (PACERONE) 400 MG tablet Take  2 tab daily for 2 weeks then 1 tab daily  Patient not taking: Reported on 2/17/2021 1/25/21   Felicity Fritz MD        Allergies:  Latex, Codeine, Entresto [sacubitril-valsartan], Lisinopril, Nitroglycerin, Sulfa antibiotics, and Hydralazine     Review of Systems:   · Constitutional: there has been no unanticipated weight loss. There's been no change in energy level, sleep pattern, or activity level. · Eyes: No visual changes or diplopia. No scleral icterus. · ENT: No Headaches, hearing loss or vertigo. No mouth sores or sore throat. · Cardiovascular: Reviewed in HPI  · Respiratory: No cough or wheezing, no sputum production. No hematemesis. · Gastrointestinal: No abdominal pain, appetite loss, blood in stools. No change in bowel or bladder habits. · Genitourinary: No dysuria, trouble voiding, or hematuria. · Musculoskeletal:  No gait disturbance, weakness or joint complaints. · Integumentary: No rash or pruritis. · Neurological: No headache, diplopia, change in muscle strength, numbness or tingling. No change in gait, balance, coordination, mood, affect, memory, mentation, behavior. · Psychiatric: No anxiety, no depression. · Endocrine: No malaise, fatigue or temperature intolerance. No excessive thirst, fluid intake, or urination. No tremor. · Hematologic/Lymphatic: No abnormal bruising or bleeding, blood clots or swollen lymph nodes. · Allergic/Immunologic: No nasal congestion or hives. Physical Examination:    Vitals:    02/17/21 1013   BP: 118/62   Pulse: 75   SpO2: 97%   Weight: 194 lb 14.4 oz (88.4 kg)   Height: 5' 6.5\" (1.689 m)        Constitutional and General Appearance: Warm and dry, no apparent distress, normal coloration  HEENT:  Normocephalic, atraumatic  Respiratory:  · Normal excursion and expansion without use of accessory muscles  · Resp Auscultation: Normal breath sounds without dullness  Cardiovascular:  · The apical impulses not displaced  · Heart tones are crisp and normal  · JVP normal cm H2O  · regular rate and rhythm  · Peripheral pulses are symmetrical and full  · There is no clubbing, cyanosis of the extremities.   · no edema  · Pedal Pulses: 2+ and equal   Abdomen:  · No masses or tenderness  · Liver/Spleen: No Abnormalities Noted  Neurological/Psychiatric:  · Alert and oriented in all spheres  · Moves all extremities well  · Exhibits normal gait balance and coordination  · No abnormalities of mood, affect, memory, mentation, or behavior are noted      Lab Data:    CBC:   Lab Results   Component Value Date    WBC 5.6 02/08/2021    WBC 5.5 02/04/2021    WBC 5.7 01/25/2021    RBC 3.37 02/08/2021    RBC 3.11 02/04/2021    RBC 2.88 01/25/2021    HGB 10.3 02/08/2021    HGB 9.4 02/04/2021    HGB 8.8 01/25/2021    HCT 29.3 02/08/2021    HCT 31.4 02/08/2021    HCT 28.8 02/04/2021    MCV 93.1 02/08/2021    MCV 92.4 02/04/2021    MCV 93.7 01/25/2021    RDW 15.6 02/08/2021    RDW 15.4 02/04/2021    RDW 15.7 01/25/2021     02/08/2021     02/04/2021     01/25/2021     BMP:  Lab Results   Component Value Date     02/09/2021     02/08/2021     01/25/2021    K 3.8 02/09/2021    K 3.2 02/08/2021    K 4.3 01/25/2021    K 3.0 01/24/2021    K 3.8 01/20/2021    K 4.8 07/10/2020     02/09/2021     02/08/2021     01/25/2021    CO2 26 02/09/2021    CO2 25 02/08/2021    CO2 25 01/25/2021    PHOS 3.2 02/09/2021    PHOS 2.5 07/05/2018    BUN 8 02/09/2021    BUN 9 02/08/2021    BUN 11 01/25/2021    CREATININE 0.8 02/09/2021    CREATININE 0.8 02/08/2021    CREATININE 0.8 01/25/2021     BNP:   Lab Results   Component Value Date    PROBNP 76 02/08/2021    PROBNP 1,221 01/20/2021    PROBNP 722 01/06/2021     Cardiac Imaging:  Echo 2/9/2021  Summary   Left ventricular size is mildly increased. Moderately reduced global systolic function with an ejection fraction   estimated at 30-35%. Global hypokinesis noted. Grade II diastolic dysfunction with elevated LV filling pressures. There is mild mitral regurgitation noted. Mild left atrial enlargement noted. Aortic valve appears sclerotic but opens adequately. Mild aortic regurgitation. There is mild tricuspid regurgitation with a RVSP estimation of 25 mmHg.       Pacer / ICD wire is visualized in the right ventricle. The right ventricle is normal in size and function    [facility] Dr Amirah Benjamin 1/20/21  Findings:  Artery Findings/Result   LM Normal   LAD Normal   Cx Normal   RI NA   RCA Normal   LVEDP 6   LVG NA      Intervention:         None     Post Cath Dx:       Normal coronaries      Echo 2/24/2020  Summary   -Limited echocardiogram was performed to evaluate EF.   -Normal left ventricle size, mild to moderate left ventricular hypertrophy,   and moderately reduced systolic function with an estimated ejection fraction   of 30-35%. -There is moderate diffuse hypokinesis. -Grade III diastolic dysfunction . E/e\"=10.7.   -Mild mitral regurgitation.   -Mild tricuspid regurgitation.   -The left atrium is mild to moderate dilated. -Right ventricular systolic function appears mildly reduced .   -Estimated pulmonary artery systolic pressure is borderline normal at 28-33   mmHg assuming a right atrial pressure of 8 mmHg.   -Pacer / ICD wire is visualized in the right heart.     Echo 8/12/2019  Summary   -Limited echocardiogram was performed to evaluate LV ejection fraction.   -Left ventricular cavity size is mildly dilated.   -There is moderate concentric left ventricular hypertrophy.   -Overall left ventricular systolic function appears moderately reduced with   an ejection fraction on the 30-35%.  -There is moderate global diffuse hypokinesis.   -Indeterminate diastolic dysfunction due to irregular heart rate.   -Moderate mitral regurgitation.   -Aortic valve appears sclerotic but opens adequately.   -Mild to moderate tricuspid regurgitation.   -The left atrium is moderately dilated.   -Pacer / ICD wire is visualized in the right heart. Stress test 9/11/18  Enlarged LV with mild global hypokinesis. EF 51%  There is normal isotope uptake at stress and rest. There is no evidence of  myocardial ischemia or scar. ECHO 7/24/18:  Summary   -Left ventricular cavity size is mildly dilated.    -There is moderate concentric left ventricular hypertrophy.   -Overall left ventricular systolic function appears severely reduced with  and ejection fraction on the 25 % range.   -There is diffuse global hypokinesis. -Grade II diastolic dysfunction with elevated LV filling pressures. E/e\"=16.2.   -Mitral annular calcification is present. -Mild-moderate mitral regurgitation.   -Aortic valve appears sclerotic but opens adequately. -Trivial aortic regurgitation.   -Trivial tricuspid regurgitation with RVSP of 26 mmHg. -Mild pulmonic regurgitation present.   -Dilated left atrium with a volume of 72 ml.   -Pacer / ICD wire is visualized in the right heart. GXT cathie 7/15/2015:   Left ventricular ejection fraction of 55%. The LV wall motion is normal.  There is normal isotope uptake at stress and rest.   There is no evidence of myocardial ischemia or scar. Assessment:    1. Chronic combined systolic and diastolic heart failure (HCC) on bb and aldosterone antagonist   2. Ventricular tachycardia ([address]    3. Hypovitaminosis D    4. Paroxysmal atrial fibrillation (HCC) on xarelto   5. DERECK (obstructive sleep apnea)    6. ICD (implantable cardioverter-defibrillator), biventricular, in situ    7. Iron deficiency anemia, unspecified iron deficiency anemia type        Plan:   1. Continue amiodarone 200 mg daily  2. Will check amiodarone level and other labs   3. Will schedule a couple doses of IV iron in infusion center  4. I will call you after I speak to Dr Michele Padilla  5. RTO 1 month    Discussed with Dr Michele Padilla and will restart entresto half of a 24-26 mg at night and slowly increase     Discussed with NPAL clarification of amiodarone and to check amiodarone level    BELKIS Pickering, 2/17/2021, 12:24 PM    QUALITY MEASURES  1. Tobacco Cessation Counseling: NA  2. Retake of BP if >140/90:   NA  3. Documentation to PCP/referring for new patient: no PCP  4. CAD patient on anti-platelet: NA  5.  CAD patient on STATIN therapy:  NA  6.  Patient with CHF and aFib on anticoagulation:  Yes

## 2021-02-17 NOTE — TELEPHONE ENCOUNTER
Lab results not in yet. Our office can call when lab results are in to go over medication and lab results.

## 2021-02-18 ENCOUNTER — TELEPHONE (OUTPATIENT)
Dept: CARDIOLOGY CLINIC | Age: 56
End: 2021-02-18

## 2021-02-18 DIAGNOSIS — I50.42 CHRONIC COMBINED SYSTOLIC AND DIASTOLIC HEART FAILURE (HCC): Primary | ICD-10-CM

## 2021-02-18 RX ORDER — ERGOCALCIFEROL 1.25 MG/1
50000 CAPSULE ORAL WEEKLY
Qty: 4 CAPSULE | Refills: 5 | Status: SHIPPED | OUTPATIENT
Start: 2021-02-18 | End: 2021-08-02

## 2021-02-18 NOTE — TELEPHONE ENCOUNTER
Pt call again for results . Found in results. Relayed message per NPRG. She verbalized understanding.

## 2021-02-18 NOTE — PROGRESS NOTES
[facility]   Electrophysiology Follow up   Date: 2/18/2021  I had the privilege of visiting Brigette Ward in the office. CC: VT    HPI: Brigette Ward is a 63 y.o. female with a past medical history of HTN, CHB s/p PPM, sCHF with HFrEF, CHD, syncope, and atrial fibrillation/flutter. She was seen in 2019 for device upgrade and left sided venogram showed occlusion of left subclavian     Hospitalized 1/20/2021 with dizziness and found to be in aflutter. She also had runs of symptomatic VT. S/p C 1/20/2020 that showed normal coronaries. She had episode of VT with syncope in cath lab prior to [facility]. S/p extraction of RV pacing lead and Biv-AICD upgrade for cardiomyopathy and VT. Also loaded with amiodarone. Farida Shepard presents to the office in follow up. She states that she is doing ok except for having a heaviness in her chest and back. Evaluated by HF and found to have FeDef anemia. She is scheduled to have an iron infusion tomorrow. She does not have complaints of arrhythmia since implantation and none noted on her device    Assessment and plan:     Symptomatic VT                      - On amiodarone 200 mg daily   - No recurrence on current therapy   - I  Have discussed with patient side effects of amiodarone including but not limited to thyroid disease, discoloration of skin and cornea and pulmonary fibrosis. Patient would like to continue with it and we discussed weaning off amiodarone in next 3-6 months.  If recurrent off amiodarone she may need an ablation       Cardiomyopathy              - S/p extraction of RV pacing lead and Biv-AICD upgrade 1/22/2021              - The CIED was interrogated and I reviewed all data               - Device interrogation today shows FLORENCIO 7 yrs, AP 97.8%,  99.7%       Persistent Atrial Firbillation/Flutter    She was in atrial flutter in hospital.              - S/p DCCV 1/20/2021  Paroxysmal ventricular tachycardia ([facility] 2021    Class 1 obesity without serious comorbidity with body mass index (BMI) of 33.0 to 33.9 in adult 2019    Dilated cardiomyopathy ([facility] 2019    Left subclavian vein thrombosis ([facility] 2019    LV dysfunction 2019    Obstructive sleep apnea (adult) (pediatric)     Hypersomnia     Systolic CHF, chronic ([facility] 10/03/2018    Chest pain 2018     Diagnostic studies:        EC21  Biv paced     ECHO:  2021  Summary   Left ventricular size is mildly increased.   Left ventricular systolic function is severely depressed with ejection   fraction estimated at 25-30%.  Grade II diastolic dysfunction with elevated LV filling pressures.   Moderate mitral regurgitation.   The left atrium is moderate to severely dilated.   Aortic valve appears sclerotic but opens adequately.   Mild aortic regurgitation is present.   Mild to moderate tricuspid regurgitation. Systolic pulmonary artery pressure   (SPAP) is estimated at 35 mmHg.   Pacer / ICD wire is visualized in the right ventricle.   Normal right ventricular size and function.     Cath: 2021  Findings:  Artery Findings/Result  LM Normal  LAD Normal  Cx Normal  RI NA  RCA Normal  LVEDP 6  LVG NA     Intervention:         None      I independently reviewed the cardiac diagnostic studies, ECG and relevant imaging studies. Lab Results   Component Value Date    LVEF 28 2021    LVEFMODE Echo 2020     No results found for: TSHFT4, TSH      Physical Examination:  There were no vitals filed for this visit. Wt Readings from Last 3 Encounters:   21 194 lb 14.4 oz (88.4 kg)   21 176 lb 2.4 oz (79.9 kg)   21 193 lb 9.6 oz (87.8 kg)       · Constitutional: Oriented. No distress. · Head: Normocephalic and atraumatic. · Mouth/Throat: Oropharynx is clear and moist.   · Eyes: Conjunctivae normal. EOM are normal.   · Neck: Neck supple. No JVD present. butalbital-acetaminophen-caffeine (FIORICET, ESGIC) -40 MG per tablet Take 1 tablet by mouth every 4 hours as needed for Headaches    Historical Provider, MD   atorvastatin (LIPITOR) 40 MG tablet Take 1 tablet by mouth daily 11/18/16   Christina Hook MD       Allergies   Allergen Reactions    Latex     Codeine      Hives      Lisinopril      cough    Nitroglycerin Hives    Sulfa Antibiotics Nausea Only    Hydralazine      headaches       Social History:  Reviewed. reports that she has never smoked. She has never used smokeless tobacco. She reports that she does not drink alcohol or use drugs. Family History:  Reviewed. Reviewed. No family history of SCD. Relevant and available labs, and cardiovascular diagnostics reviewed. Reviewed. I independently reviewed relevant and available cardiac diagnostic tests ECG, CXR, Echo, Stress test, Device interrogation, Holter, CT scan. - The patient is counseled to follow a low salt diet to assure blood pressure remains controlled for cardiovascular risk factor modification.   - The patient is counseled to avoid excess caffeine, and energy drinks as this may exacerbated ectopy and arrhythmia. - The patient is counseled to get regular exercise 3-5 times per week to control cardiovascular risk factors. - The patient is counseled to lose weigt to control cardiovascular risk factors. - The patient is counseled to avoid tobacco use. All questions and concerns were addressed to the patient/family. Alternatives to my treatment were discussed. I have discussed the above stated plan and the patient verbalized understanding and agreed with the plan. Scribe attestation:  This note was scribed in the presence of Tomas Dsouza MD by Noe Coe RN Physician Attestation: I, Dr. Mona Jo, confirm that the scribe's documentation has been prepared under my direction and personally reviewed by me in its entirety. I also confirm that the note above accurately reflects all work, treatment, procedures, and medical decision making performed by me. NOTE: This report was transcribed using voice recognition software. Every effort was made to ensure accuracy, however, inadvertent computerized transcription errors may be present.      Mona Jo MD, MPH  [facility]   Office: [phone]  Fax: [phone]

## 2021-02-18 NOTE — TELEPHONE ENCOUNTER
----- Message from CATRACHITO Fletcher - CNS sent at 2/18/2021  8:20 AM EST -----  Vitamin d level is low start vitamin d 95960 once a week   Script sent to pharmacy  Fluid level is great and potassium is normal    Recheck labs in 2 weeks as I started her on entresto  thanks

## 2021-02-19 LAB
AMIODARONE LEVEL: 1 UG/ML (ref 0.5–2)
DES-AMIOD: 0.6 UG/ML

## 2021-02-23 ENCOUNTER — NURSE ONLY (OUTPATIENT)
Dept: CARDIOLOGY CLINIC | Age: 56
End: 2021-02-23
Payer: MEDICAID

## 2021-02-23 ENCOUNTER — OFFICE VISIT (OUTPATIENT)
Dept: CARDIOLOGY CLINIC | Age: 56
End: 2021-02-23
Payer: MEDICAID

## 2021-02-23 VITALS
BODY MASS INDEX: 31.6 KG/M2 | WEIGHT: 196.66 LBS | HEIGHT: 66 IN | HEART RATE: 80 BPM | DIASTOLIC BLOOD PRESSURE: 82 MMHG | OXYGEN SATURATION: 99 % | SYSTOLIC BLOOD PRESSURE: 116 MMHG

## 2021-02-23 DIAGNOSIS — Q24.6 CONGENITAL HEART BLOCK: ICD-10-CM

## 2021-02-23 DIAGNOSIS — I42.0 DILATED CARDIOMYOPATHY (HCC): ICD-10-CM

## 2021-02-23 DIAGNOSIS — Z95.810 ICD (IMPLANTABLE CARDIOVERTER-DEFIBRILLATOR), BIVENTRICULAR, IN SITU: ICD-10-CM

## 2021-02-23 DIAGNOSIS — I47.29 PAROXYSMAL VENTRICULAR TACHYCARDIA: ICD-10-CM

## 2021-02-23 DIAGNOSIS — I51.9 LV DYSFUNCTION: ICD-10-CM

## 2021-02-23 DIAGNOSIS — I10 ESSENTIAL HYPERTENSION: Primary | ICD-10-CM

## 2021-02-23 DIAGNOSIS — I50.22 SYSTOLIC CHF, CHRONIC (HCC): ICD-10-CM

## 2021-02-23 DIAGNOSIS — I48.19 PERSISTENT ATRIAL FIBRILLATION (HCC): ICD-10-CM

## 2021-02-23 DIAGNOSIS — I51.7 LVH (LEFT VENTRICULAR HYPERTROPHY): ICD-10-CM

## 2021-02-23 PROCEDURE — 3017F COLORECTAL CA SCREEN DOC REV: CPT | Performed by: INTERNAL MEDICINE

## 2021-02-23 PROCEDURE — G8484 FLU IMMUNIZE NO ADMIN: HCPCS | Performed by: INTERNAL MEDICINE

## 2021-02-23 PROCEDURE — 93289 INTERROG DEVICE EVAL HEART: CPT | Performed by: INTERNAL MEDICINE

## 2021-02-23 PROCEDURE — 1111F DSCHRG MED/CURRENT MED MERGE: CPT | Performed by: INTERNAL MEDICINE

## 2021-02-23 PROCEDURE — G8417 CALC BMI ABV UP PARAM F/U: HCPCS | Performed by: INTERNAL MEDICINE

## 2021-02-23 PROCEDURE — 1036F TOBACCO NON-USER: CPT | Performed by: INTERNAL MEDICINE

## 2021-02-23 PROCEDURE — G8427 DOCREV CUR MEDS BY ELIG CLIN: HCPCS | Performed by: INTERNAL MEDICINE

## 2021-02-23 PROCEDURE — 93284 PRGRMG EVAL IMPLANTABLE DFB: CPT | Performed by: INTERNAL MEDICINE

## 2021-02-23 PROCEDURE — 99214 OFFICE O/P EST MOD 30 MIN: CPT | Performed by: INTERNAL MEDICINE

## 2021-02-23 RX ORDER — NIFEDIPINE 30 MG/1
30 TABLET, FILM COATED, EXTENDED RELEASE ORAL DAILY
COMMUNITY
End: 2021-02-23 | Stop reason: SDUPTHER

## 2021-02-23 RX ORDER — NIFEDIPINE 30 MG/1
30 TABLET, FILM COATED, EXTENDED RELEASE ORAL DAILY
Qty: 90 TABLET | Refills: 3 | Status: SHIPPED | OUTPATIENT
Start: 2021-02-23 | End: 2021-06-11 | Stop reason: ALTCHOICE

## 2021-02-23 NOTE — PROGRESS NOTES
Patient comes in for programming evaluation for her defibrillator. All sensing and pacing parameters are within normal range. Battery life 6.6 years  AP 97.8%.  99.7%. No episodes noted. Patient remains on Xarelto, amiodarone and Coreg. Today we changed the VT Monitor Interval from 400 ms (150 bpm) to 430 ms (140 bpm). Please see interrogation for more detail. Optivol is still initializing. Patient will see Dr. Cristo Barkley today and follow up in 3 months in office or remotely.

## 2021-02-24 ENCOUNTER — HOSPITAL ENCOUNTER (OUTPATIENT)
Dept: ONCOLOGY | Age: 56
Setting detail: INFUSION SERIES
Discharge: HOME | End: 2021-02-24
Payer: MEDICAID

## 2021-02-24 VITALS
DIASTOLIC BLOOD PRESSURE: 68 MMHG | TEMPERATURE: 97.4 F | RESPIRATION RATE: 16 BRPM | HEART RATE: 74 BPM | SYSTOLIC BLOOD PRESSURE: 110 MMHG

## 2021-02-24 DIAGNOSIS — D50.9 IRON DEFICIENCY ANEMIA, UNSPECIFIED IRON DEFICIENCY ANEMIA TYPE: Primary | ICD-10-CM

## 2021-02-24 PROCEDURE — 2580000003 HC RX 258: Performed by: CLINICAL NURSE SPECIALIST

## 2021-02-24 PROCEDURE — 96365 THER/PROPH/DIAG IV INF INIT: CPT

## 2021-02-24 PROCEDURE — 6360000002 HC RX W HCPCS: Performed by: CLINICAL NURSE SPECIALIST

## 2021-02-24 RX ORDER — SODIUM CHLORIDE 9 MG/ML
INJECTION, SOLUTION INTRAVENOUS CONTINUOUS
Status: CANCELLED | OUTPATIENT
Start: 2021-03-03

## 2021-02-24 RX ORDER — SODIUM CHLORIDE 0.9 % (FLUSH) 0.9 %
10 SYRINGE (ML) INJECTION PRN
Status: CANCELLED | OUTPATIENT
Start: 2021-03-03

## 2021-02-24 RX ORDER — AMIODARONE HYDROCHLORIDE 200 MG/1
200 TABLET ORAL DAILY
Qty: 90 TABLET | Refills: 1 | Status: ON HOLD
Start: 2021-02-24 | End: 2021-04-19 | Stop reason: HOSPADM

## 2021-02-24 RX ORDER — SODIUM CHLORIDE 0.9 % (FLUSH) 0.9 %
10 SYRINGE (ML) INJECTION PRN
Status: DISCONTINUED | OUTPATIENT
Start: 2021-02-24 | End: 2021-02-25 | Stop reason: HOSPADM

## 2021-02-24 RX ORDER — SODIUM CHLORIDE 9 MG/ML
INJECTION, SOLUTION INTRAVENOUS CONTINUOUS
Status: ACTIVE | OUTPATIENT
Start: 2021-02-24 | End: 2021-02-24

## 2021-02-24 RX ADMIN — SODIUM CHLORIDE: 9 INJECTION, SOLUTION INTRAVENOUS at 10:07

## 2021-02-24 RX ADMIN — Medication 200 MG: at 10:09

## 2021-02-24 RX ADMIN — Medication 10 ML: at 10:07

## 2021-02-24 NOTE — PROGRESS NOTES
Patient ambulatory to department for first of two doses of venofer. Tolerated venofer infusion well with no adverse rx noted. Given avs with information about venofer. Verbally told about most common possible side effects. To return next week for next infusion.

## 2021-02-24 NOTE — TELEPHONE ENCOUNTER
Received refill request for amiodarone from [facility] pharmacy.     Last ov: 2021 RMM    Last labs:bmp 2021,     Last EK2021    Last Refill: 2021 #30 with 1 refill    Next appointment:2021 RMM

## 2021-02-24 NOTE — TELEPHONE ENCOUNTER
Medication Refill    Medication needing refilled:amiodarone   Dosage of the medication:    How are you taking this medication (QD, BID, TID, QID, PRN):    30 or 90 day supply called in:    When will you run out of your medication:    Which Pharmacy are we sending the medication to?: [facility]

## 2021-03-03 ENCOUNTER — HOSPITAL ENCOUNTER (OUTPATIENT)
Dept: ONCOLOGY | Age: 56
Setting detail: INFUSION SERIES
Discharge: HOME | End: 2021-03-03
Payer: MEDICAID

## 2021-03-03 VITALS
SYSTOLIC BLOOD PRESSURE: 105 MMHG | RESPIRATION RATE: 16 BRPM | DIASTOLIC BLOOD PRESSURE: 76 MMHG | HEART RATE: 73 BPM | TEMPERATURE: 97.4 F

## 2021-03-03 DIAGNOSIS — D50.9 IRON DEFICIENCY ANEMIA, UNSPECIFIED IRON DEFICIENCY ANEMIA TYPE: Primary | ICD-10-CM

## 2021-03-03 PROCEDURE — 6360000002 HC RX W HCPCS: Performed by: CLINICAL NURSE SPECIALIST

## 2021-03-03 PROCEDURE — 2580000003 HC RX 258: Performed by: CLINICAL NURSE SPECIALIST

## 2021-03-03 PROCEDURE — 96365 THER/PROPH/DIAG IV INF INIT: CPT

## 2021-03-03 RX ORDER — SODIUM CHLORIDE 9 MG/ML
INJECTION, SOLUTION INTRAVENOUS CONTINUOUS
Status: CANCELLED | OUTPATIENT
Start: 2021-03-03

## 2021-03-03 RX ORDER — SODIUM CHLORIDE 9 MG/ML
INJECTION, SOLUTION INTRAVENOUS CONTINUOUS
Status: DISCONTINUED | OUTPATIENT
Start: 2021-03-03 | End: 2021-03-03

## 2021-03-03 RX ORDER — SODIUM CHLORIDE 0.9 % (FLUSH) 0.9 %
10 SYRINGE (ML) INJECTION PRN
Status: CANCELLED | OUTPATIENT
Start: 2021-03-03

## 2021-03-03 RX ORDER — SODIUM CHLORIDE 0.9 % (FLUSH) 0.9 %
10 SYRINGE (ML) INJECTION PRN
Status: DISCONTINUED | OUTPATIENT
Start: 2021-03-03 | End: 2021-03-03

## 2021-03-03 RX ADMIN — SODIUM CHLORIDE: 9 INJECTION, SOLUTION INTRAVENOUS at 09:30

## 2021-03-03 RX ADMIN — Medication 10 ML: at 09:31

## 2021-03-03 RX ADMIN — Medication 200 MG: at 09:30

## 2021-03-11 ENCOUNTER — TELEPHONE (OUTPATIENT)
Dept: CARDIOLOGY CLINIC | Age: 56
End: 2021-03-11

## 2021-03-11 ENCOUNTER — NURSE ONLY (OUTPATIENT)
Dept: CARDIOLOGY CLINIC | Age: 56
End: 2021-03-11
Payer: MEDICAID

## 2021-03-11 DIAGNOSIS — I42.0 DILATED CARDIOMYOPATHY (HCC): ICD-10-CM

## 2021-03-11 DIAGNOSIS — I47.29 PAROXYSMAL VENTRICULAR TACHYCARDIA: ICD-10-CM

## 2021-03-11 DIAGNOSIS — I48.19 PERSISTENT ATRIAL FIBRILLATION (HCC): ICD-10-CM

## 2021-03-11 DIAGNOSIS — Q24.6 CONGENITAL HEART BLOCK: ICD-10-CM

## 2021-03-11 DIAGNOSIS — I50.22 SYSTOLIC CHF, CHRONIC (HCC): ICD-10-CM

## 2021-03-11 DIAGNOSIS — I51.7 LVH (LEFT VENTRICULAR HYPERTROPHY): ICD-10-CM

## 2021-03-11 DIAGNOSIS — Z95.810 ICD (IMPLANTABLE CARDIOVERTER-DEFIBRILLATOR), BIVENTRICULAR, IN SITU: ICD-10-CM

## 2021-03-11 DIAGNOSIS — I51.9 LV DYSFUNCTION: ICD-10-CM

## 2021-03-11 PROCEDURE — 93284 PRGRMG EVAL IMPLANTABLE DFB: CPT | Performed by: INTERNAL MEDICINE

## 2021-03-11 NOTE — TELEPHONE ENCOUNTER
We received remote transmission from patient's monitor at home. Transmission shows normal sensing and pacing function. No episodes noted. What Patient is describing sounds like it could be PNS. Will Bring Patient into the office today for a device check. Called and spoke with Patient. She will be here today for a 12 o'clock device check.  Thanks

## 2021-03-11 NOTE — TELEPHONE ENCOUNTER
Last 2 days having rapid HR that is getting worse . The rapid HR always happens when sitting or laying down . When she leans forward it helps slow it down . She feels the rapid HR at the top of her belly . She sent a transmission last night and this morning could some one look at them and call her ?

## 2021-03-17 ENCOUNTER — OFFICE VISIT (OUTPATIENT)
Dept: CARDIOLOGY CLINIC | Age: 56
End: 2021-03-17
Payer: MEDICAID

## 2021-03-17 ENCOUNTER — HOSPITAL ENCOUNTER (OUTPATIENT)
Age: 56
Discharge: HOME | End: 2021-03-17
Payer: MEDICAID

## 2021-03-17 VITALS
BODY MASS INDEX: 31.55 KG/M2 | SYSTOLIC BLOOD PRESSURE: 106 MMHG | WEIGHT: 201 LBS | HEART RATE: 76 BPM | OXYGEN SATURATION: 100 % | DIASTOLIC BLOOD PRESSURE: 64 MMHG | HEIGHT: 67 IN

## 2021-03-17 DIAGNOSIS — Z95.810 ICD (IMPLANTABLE CARDIOVERTER-DEFIBRILLATOR), BIVENTRICULAR, IN SITU: ICD-10-CM

## 2021-03-17 DIAGNOSIS — E55.9 HYPOVITAMINOSIS D: ICD-10-CM

## 2021-03-17 DIAGNOSIS — I50.42 CHRONIC COMBINED SYSTOLIC AND DIASTOLIC HEART FAILURE (HCC): Primary | ICD-10-CM

## 2021-03-17 DIAGNOSIS — I50.22 SYSTOLIC CHF, CHRONIC (HCC): ICD-10-CM

## 2021-03-17 DIAGNOSIS — G47.33 OSA (OBSTRUCTIVE SLEEP APNEA): ICD-10-CM

## 2021-03-17 DIAGNOSIS — I48.0 PAROXYSMAL ATRIAL FIBRILLATION (HCC): ICD-10-CM

## 2021-03-17 DIAGNOSIS — D50.9 IRON DEFICIENCY ANEMIA, UNSPECIFIED IRON DEFICIENCY ANEMIA TYPE: ICD-10-CM

## 2021-03-17 DIAGNOSIS — I50.42 CHRONIC COMBINED SYSTOLIC AND DIASTOLIC HEART FAILURE (HCC): ICD-10-CM

## 2021-03-17 LAB
ANION GAP SERPL CALCULATED.3IONS-SCNC: 11 MMOL/L (ref 3–16)
BUN BLDV-MCNC: 7 MG/DL (ref 7–20)
CALCIUM SERPL-MCNC: 9.3 MG/DL (ref 8.3–10.6)
CHLORIDE BLD-SCNC: 105 MMOL/L (ref 99–110)
CO2: 27 MMOL/L (ref 21–32)
CREAT SERPL-MCNC: 0.9 MG/DL (ref 0.6–1.1)
GFR AFRICAN AMERICAN: >60
GFR NON-AFRICAN AMERICAN: >60
GLUCOSE BLD-MCNC: 94 MG/DL (ref 70–99)
HCT VFR BLD CALC: 34.8 % (ref 36–48)
HEMOGLOBIN: 11.6 G/DL (ref 12–16)
MCH RBC QN AUTO: 30.2 PG (ref 26–34)
MCHC RBC AUTO-ENTMCNC: 33.3 G/DL (ref 31–36)
MCV RBC AUTO: 90.7 FL (ref 80–100)
PDW BLD-RTO: 16 % (ref 12.4–15.4)
PLATELET # BLD: 270 K/UL (ref 135–450)
PMV BLD AUTO: 8.1 FL (ref 5–10.5)
POTASSIUM SERPL-SCNC: 3.8 MMOL/L (ref 3.5–5.1)
PRO-BNP: 97 PG/ML (ref 0–124)
RBC # BLD: 3.84 M/UL (ref 4–5.2)
SODIUM BLD-SCNC: 143 MMOL/L (ref 136–145)
VITAMIN D 25-HYDROXY: 18.4 NG/ML
WBC # BLD: 4.2 K/UL (ref 4–11)

## 2021-03-17 PROCEDURE — G8427 DOCREV CUR MEDS BY ELIG CLIN: HCPCS | Performed by: CLINICAL NURSE SPECIALIST

## 2021-03-17 PROCEDURE — G8417 CALC BMI ABV UP PARAM F/U: HCPCS | Performed by: CLINICAL NURSE SPECIALIST

## 2021-03-17 PROCEDURE — 82306 VITAMIN D 25 HYDROXY: CPT

## 2021-03-17 PROCEDURE — 36415 COLL VENOUS BLD VENIPUNCTURE: CPT

## 2021-03-17 PROCEDURE — 3017F COLORECTAL CA SCREEN DOC REV: CPT | Performed by: CLINICAL NURSE SPECIALIST

## 2021-03-17 PROCEDURE — 83880 ASSAY OF NATRIURETIC PEPTIDE: CPT

## 2021-03-17 PROCEDURE — G8484 FLU IMMUNIZE NO ADMIN: HCPCS | Performed by: CLINICAL NURSE SPECIALIST

## 2021-03-17 PROCEDURE — 1036F TOBACCO NON-USER: CPT | Performed by: CLINICAL NURSE SPECIALIST

## 2021-03-17 PROCEDURE — 99214 OFFICE O/P EST MOD 30 MIN: CPT | Performed by: CLINICAL NURSE SPECIALIST

## 2021-03-17 PROCEDURE — 85027 COMPLETE CBC AUTOMATED: CPT

## 2021-03-17 PROCEDURE — 80048 BASIC METABOLIC PNL TOTAL CA: CPT

## 2021-03-17 NOTE — PROGRESS NOTES
[facility], [place], operated by [facility]  Progress Note    Primary Care Doctor:  No primary care provider on file. Chief Complaint   Patient presents with    Congestive Heart Failure        History of Present Illness:  63 y.o. female with history of sHF, LVH, AF on xarelto (follows with Dr Maru Rich), had been on flecainide, dual chamber pacemaker 2012 due to congenital heart block  LVEF had been 55% in 2015 and now 25%. No lisinopril due to cough. She is a cook at [employer]  Lip numbness to entresto 3/2020 hydralazine caused headaches  10/2020 EGD (hiatal hernia) and colonoscopy (polyp)   ICD placed 1/22/21, LHC done 1/20/21 normal cors    I had the pleasure of seeing/virtual Brigette Ward in follow up for sHF. She is ambulatory and by her self today. Her optival shows normal impedence and increased activity level. She is complaining of muscle tightness in the back of her neck that has a specific area of touch. She states that pain can start in her lower back and moves up. She had #1 covid vaccine. She is tolerating entresto half of a 24-26 just at night time. BP is on the soft side today to increase. She denies any increased sob, palpitations or edema. Past Medical History:   has a past medical history of Anemia, Atrial fibrillation and flutter ([address] Atrial flutter ([address] CHB (complete heart block) ([address] Class 1 obesity without serious comorbidity with body mass index (BMI) of 33.0 to 33.9 in adult, Congenital heart disease, GERD (gastroesophageal reflux disease), Headache(784.0), History of complete heart block, Hyperlipidemia, Hypertension, Syncope, and Systolic CHF, chronic ([address] Surgical History:   has a past surgical history that includes Uterine fibroid surgery; Pacemaker insertion (11/29/12); Tubal ligation; Upper gastrointestinal endoscopy (N/A, 10/27/2020); Colonoscopy (N/A, 10/27/2020); and Cardiac defibrillator placement (01/2021).    Social History:    reports that she has never smoked. She has never used smokeless tobacco. She reports that she does not drink alcohol or use drugs. Family History:   Family History   Problem Relation Age of Onset    Cancer Father     Heart Disease Neg Hx     High Blood Pressure Neg Hx     High Cholesterol Neg Hx        Home Medications:  Prior to Admission medications    Medication Sig Start Date End Date Taking?  Authorizing Provider   amiodarone (CORDARONE) 200 MG tablet Take 1 tablet by mouth daily 2/24/21  Yes CATRACHITO Moran - CNP   vitamin D (ERGOCALCIFEROL) 1.25 MG (21989 UT) CAPS capsule Take 1 capsule by mouth once a week 2/18/21  Yes CATRACHITO Alvarez   sacubitril-valsartan (ENTRESTO) 24-26 MG per tablet Take 0.5 tablets by mouth nightly 2/17/21  Yes CATRACHITO Alvarez   carvedilol (COREG) 25 MG tablet Take 1 tablet by mouth 2 times daily 1/25/21  Yes José Miguel Clolins MD   furosemide (LASIX) 20 MG tablet Take 1 tablet by mouth daily 1/26/21  Yes José Miguel Collins MD   Multiple Vitamins-Minerals (THERAPEUTIC MULTIVITAMIN-MINERALS) tablet Take 1 tablet by mouth daily   Yes Historical Provider, MD   XARELTO 20 MG TABS tablet TAKE ONE TABLET BY MOUTH DAILY WITH BREAKFAST 10/20/20  Yes Su Watts MD   spironolactone (ALDACTONE) 25 MG tablet Take 1 tablet by mouth daily 8/31/20  Yes CATRACHITO Alvarez   butalbital-acetaminophen-caffeine (FIORICET, ESGIC) -40 MG per tablet Take 1 tablet by mouth every 4 hours as needed for Headaches   Yes Historical Provider, MD   atorvastatin (LIPITOR) 40 MG tablet Take 1 tablet by mouth daily 11/18/16  Yes Breanna Davenport MD   NIFEdipine (ADALAT CC) 30 MG extended release tablet Take 1 tablet by mouth daily 2/23/21   Destin Hunter MD   metoclopramide (REGLAN) 5 MG tablet Take 1 tablet by mouth 4 times daily  Patient not taking: Reported on 3/17/2021 10/27/20   Duane Peyer, MD        Allergies:  Latex, Codeine, Lisinopril, Nitroglycerin, Sulfa antibiotics, and Liver/Spleen: No Abnormalities Noted  Neurological/Psychiatric:  · Alert and oriented in all spheres  · Moves all extremities well  · Exhibits normal gait balance and coordination  · No abnormalities of mood, affect, memory, mentation, or behavior are noted      Lab Data:    CBC:   Lab Results   Component Value Date    WBC 5.6 02/08/2021    WBC 5.5 02/04/2021    WBC 5.7 01/25/2021    RBC 3.37 02/08/2021    RBC 3.11 02/04/2021    RBC 2.88 01/25/2021    HGB 10.3 02/08/2021    HGB 9.4 02/04/2021    HGB 8.8 01/25/2021    HCT 29.3 02/08/2021    HCT 31.4 02/08/2021    HCT 28.8 02/04/2021    MCV 93.1 02/08/2021    MCV 92.4 02/04/2021    MCV 93.7 01/25/2021    RDW 15.6 02/08/2021    RDW 15.4 02/04/2021    RDW 15.7 01/25/2021     02/08/2021     02/04/2021     01/25/2021     BMP:  Lab Results   Component Value Date     02/17/2021     02/09/2021     02/08/2021    K 3.8 02/17/2021    K 3.8 02/09/2021    K 3.2 02/08/2021    K 3.0 01/24/2021    K 3.8 01/20/2021    K 4.8 07/10/2020     02/17/2021     02/09/2021     02/08/2021    CO2 27 02/17/2021    CO2 26 02/09/2021    CO2 25 02/08/2021    PHOS 3.2 02/09/2021    PHOS 2.5 07/05/2018    BUN 8 02/17/2021    BUN 8 02/09/2021    BUN 9 02/08/2021    CREATININE 0.9 02/17/2021    CREATININE 0.8 02/09/2021    CREATININE 0.8 02/08/2021     BNP:   Lab Results   Component Value Date    PROBNP 82 02/17/2021    PROBNP 76 02/08/2021    PROBNP 1,221 01/20/2021     Cardiac Imaging:  Echo 2/9/2021  Summary   Left ventricular size is mildly increased. Moderately reduced global systolic function with an ejection fraction   estimated at 30-35%. Global hypokinesis noted. Grade II diastolic dysfunction with elevated LV filling pressures. There is mild mitral regurgitation noted. Mild left atrial enlargement noted. Aortic valve appears sclerotic but opens adequately. Mild aortic regurgitation.    There is mild tricuspid regurgitation with a RVSP estimation of 25 mmHg. Pacer / ICD wire is visualized in the right ventricle. The right ventricle is normal in size and function    [facility]  Veronica Knee 1/20/21  Findings:  Artery Findings/Result   LM Normal   LAD Normal   Cx Normal   RI NA   RCA Normal   LVEDP 6   LVG NA      Intervention:         None     Post Cath Dx:       Normal coronaries      Echo 2/24/2020  Summary   -Limited echocardiogram was performed to evaluate EF.   -Normal left ventricle size, mild to moderate left ventricular hypertrophy,   and moderately reduced systolic function with an estimated ejection fraction   of 30-35%. -There is moderate diffuse hypokinesis. -Grade III diastolic dysfunction . E/e\"=10.7.   -Mild mitral regurgitation.   -Mild tricuspid regurgitation.   -The left atrium is mild to moderate dilated. -Right ventricular systolic function appears mildly reduced .   -Estimated pulmonary artery systolic pressure is borderline normal at 28-33   mmHg assuming a right atrial pressure of 8 mmHg.   -Pacer / ICD wire is visualized in the right heart.     Echo 8/12/2019  Summary   -Limited echocardiogram was performed to evaluate LV ejection fraction.   -Left ventricular cavity size is mildly dilated.   -There is moderate concentric left ventricular hypertrophy.   -Overall left ventricular systolic function appears moderately reduced with   an ejection fraction on the 30-35%.  -There is moderate global diffuse hypokinesis.   -Indeterminate diastolic dysfunction due to irregular heart rate.   -Moderate mitral regurgitation.   -Aortic valve appears sclerotic but opens adequately.   -Mild to moderate tricuspid regurgitation.   -The left atrium is moderately dilated.   -Pacer / ICD wire is visualized in the right heart. Stress test 9/11/18  Enlarged LV with mild global hypokinesis. EF 51%  There is normal isotope uptake at stress and rest. There is no evidence of  myocardial ischemia or scar.     ECHO 7/24/18:  Summary -Left ventricular cavity size is mildly dilated. -There is moderate concentric left ventricular hypertrophy.   -Overall left ventricular systolic function appears severely reduced with  and ejection fraction on the 25 % range.   -There is diffuse global hypokinesis. -Grade II diastolic dysfunction with elevated LV filling pressures. E/e\"=16.2.   -Mitral annular calcification is present. -Mild-moderate mitral regurgitation.   -Aortic valve appears sclerotic but opens adequately. -Trivial aortic regurgitation.   -Trivial tricuspid regurgitation with RVSP of 26 mmHg. -Mild pulmonic regurgitation present.   -Dilated left atrium with a volume of 72 ml.   -Pacer / ICD wire is visualized in the right heart. GXT cathie 7/15/2015:   Left ventricular ejection fraction of 55%. The LV wall motion is normal.  There is normal isotope uptake at stress and rest.   There is no evidence of myocardial ischemia or scar. Assessment:    1. Chronic combined systolic and diastolic heart failure (HCC) on arni, bb and aldosterone antagonist   2. Hypovitaminosis D    3. Paroxysmal atrial fibrillation (HCC)    4. DERECK (obstructive sleep apnea)    5. ICD (implantable cardioverter-defibrillator), biventricular, in situ    6. Iron deficiency anemia, unspecified iron deficiency anemia type        Plan:   1. Continue all current medications  2. Speak to PCP regarding back and neck discomfort  3. Check blood work today  4. RTO in 2-3 months    BELKIS Cordova, 3/17/2021, 10:15 AM    QUALITY MEASURES  1. Tobacco Cessation Counseling: NA  2. Retake of BP if >140/90:   NA  3. Documentation to PCP/referring for new patient: no PCP  4. CAD patient on anti-platelet: NA  5. CAD patient on STATIN therapy:  NA  6.  Patient with CHF and aFib on anticoagulation:  Yes

## 2021-03-17 NOTE — PATIENT INSTRUCTIONS
1.  Continue all current medications  2. Speak to PCP regarding back and neck discomfort  3. Check blood work today  4.   RTO in 2-3 months

## 2021-03-18 ENCOUNTER — TELEPHONE (OUTPATIENT)
Dept: CARDIOLOGY CLINIC | Age: 56
End: 2021-03-18

## 2021-03-18 NOTE — TELEPHONE ENCOUNTER
----- Message from CATRACHITO Pope - CNS sent at 3/17/2021  6:46 PM EDT -----  Hemoglobin much improved   Vitamin d still low continue vitamin d [id number] once a week  Kidney labs are perfect  thanks

## 2021-04-07 ENCOUNTER — TELEPHONE (OUTPATIENT)
Dept: CARDIOLOGY CLINIC | Age: 56
End: 2021-04-07

## 2021-04-07 ENCOUNTER — HOSPITAL ENCOUNTER (INPATIENT)
Age: 56
LOS: 12 days | Discharge: HOME | DRG: 220 | End: 2021-04-19
Attending: HOSPITALIST | Admitting: HOSPITALIST
Payer: MEDICAID

## 2021-04-07 ENCOUNTER — APPOINTMENT (OUTPATIENT)
Dept: GENERAL RADIOLOGY | Age: 56
DRG: 220 | End: 2021-04-07
Payer: MEDICAID

## 2021-04-07 DIAGNOSIS — D64.9 SYMPTOMATIC ANEMIA: Primary | ICD-10-CM

## 2021-04-07 DIAGNOSIS — R06.09 EXERTIONAL DYSPNEA: ICD-10-CM

## 2021-04-07 DIAGNOSIS — R10.0 SURGICAL ABDOMEN: ICD-10-CM

## 2021-04-07 DIAGNOSIS — I50.22 SYSTOLIC CHF, CHRONIC (HCC): ICD-10-CM

## 2021-04-07 DIAGNOSIS — R26.81 GAIT INSTABILITY: ICD-10-CM

## 2021-04-07 DIAGNOSIS — D64.9 ANEMIA, UNSPECIFIED TYPE: ICD-10-CM

## 2021-04-07 DIAGNOSIS — Z95.810 STATUS POST IMPLANTATION OF AUTOMATIC CARDIOVERTER/DEFIBRILLATOR (AICD): ICD-10-CM

## 2021-04-07 DIAGNOSIS — R53.83 PROFOUND FATIGUE: ICD-10-CM

## 2021-04-07 DIAGNOSIS — Z79.01 ADEQUATE ANTICOAGULATION ON ANTICOAGULANT THERAPY: ICD-10-CM

## 2021-04-07 LAB
A/G RATIO: 1.7 (ref 1.1–2.2)
ABO/RH: NORMAL
ALBUMIN SERPL-MCNC: 4.3 G/DL (ref 3.4–5)
ALP BLD-CCNC: 52 U/L (ref 40–129)
ALT SERPL-CCNC: 20 U/L (ref 10–40)
ANION GAP SERPL CALCULATED.3IONS-SCNC: 9 MMOL/L (ref 3–16)
ANTIBODY SCREEN: NORMAL
AST SERPL-CCNC: 23 U/L (ref 15–37)
BASE EXCESS VENOUS: -0.6 MMOL/L (ref -3–3)
BASOPHILS ABSOLUTE: 0.1 K/UL (ref 0–0.2)
BASOPHILS RELATIVE PERCENT: 0.6 %
BILIRUB SERPL-MCNC: <0.2 MG/DL (ref 0–1)
BUN BLDV-MCNC: 25 MG/DL (ref 7–20)
CALCIUM SERPL-MCNC: 8.8 MG/DL (ref 8.3–10.6)
CARBOXYHEMOGLOBIN: 3.4 % (ref 0–1.5)
CHLORIDE BLD-SCNC: 107 MMOL/L (ref 99–110)
CO2: 23 MMOL/L (ref 21–32)
CREAT SERPL-MCNC: 0.8 MG/DL (ref 0.6–1.1)
EKG ATRIAL RATE: 75 BPM
EKG DIAGNOSIS: NORMAL
EKG P-R INTERVAL: 120 MS
EKG Q-T INTERVAL: 494 MS
EKG QRS DURATION: 154 MS
EKG QTC CALCULATION (BAZETT): 551 MS
EKG R AXIS: -86 DEGREES
EKG T AXIS: 71 DEGREES
EKG VENTRICULAR RATE: 75 BPM
EOSINOPHILS ABSOLUTE: 0.2 K/UL (ref 0–0.6)
EOSINOPHILS RELATIVE PERCENT: 2 %
GFR AFRICAN AMERICAN: >60
GFR NON-AFRICAN AMERICAN: >60
GLOBULIN: 2.5 G/DL
GLUCOSE BLD-MCNC: 107 MG/DL (ref 70–99)
HCO3 VENOUS: 24.5 MMOL/L (ref 23–29)
HCT VFR BLD CALC: 21.6 % (ref 36–48)
HCT VFR BLD CALC: 22.5 % (ref 36–48)
HEMOGLOBIN, VEN, REDUCED: 5 %
HEMOGLOBIN: 7.1 G/DL (ref 12–16)
HEMOGLOBIN: 7.4 G/DL (ref 12–16)
INR BLD: 1.52 (ref 0.86–1.14)
LACTIC ACID, SEPSIS: 0.8 MMOL/L (ref 0.4–1.9)
LACTIC ACID, SEPSIS: 1.2 MMOL/L (ref 0.4–1.9)
LIPASE: 25 U/L (ref 13–60)
LYMPHOCYTES ABSOLUTE: 1.3 K/UL (ref 1–5.1)
LYMPHOCYTES RELATIVE PERCENT: 12.3 %
MCH RBC QN AUTO: 30.2 PG (ref 26–34)
MCHC RBC AUTO-ENTMCNC: 33 G/DL (ref 31–36)
MCV RBC AUTO: 91.4 FL (ref 80–100)
METHEMOGLOBIN VENOUS: 0.7 %
MONOCYTES ABSOLUTE: 0.6 K/UL (ref 0–1.3)
MONOCYTES RELATIVE PERCENT: 6.2 %
NEUTROPHILS ABSOLUTE: 8.2 K/UL (ref 1.7–7.7)
NEUTROPHILS RELATIVE PERCENT: 78.9 %
O2 CONTENT, VEN: 9 VOL %
O2 SAT, VEN: 95 %
O2 THERAPY: ABNORMAL
OCCULT BLOOD DIAGNOSTIC: ABNORMAL
PCO2, VEN: 41.2 MMHG (ref 40–50)
PDW BLD-RTO: 18 % (ref 12.4–15.4)
PH VENOUS: 7.38 (ref 7.35–7.45)
PLATELET # BLD: 242 K/UL (ref 135–450)
PMV BLD AUTO: 8.1 FL (ref 5–10.5)
PO2, VEN: 70.6 MMHG (ref 25–40)
POTASSIUM REFLEX MAGNESIUM: 3.7 MMOL/L (ref 3.5–5.1)
PRO-BNP: 43 PG/ML (ref 0–124)
PROTHROMBIN TIME: 17.7 SEC (ref 10–13.2)
RBC # BLD: 2.37 M/UL (ref 4–5.2)
SODIUM BLD-SCNC: 139 MMOL/L (ref 136–145)
TCO2 CALC VENOUS: 58 MMOL/L
TOTAL PROTEIN: 6.8 G/DL (ref 6.4–8.2)
TROPONIN: <0.01 NG/ML
WBC # BLD: 10.4 K/UL (ref 4–11)

## 2021-04-07 PROCEDURE — 36415 COLL VENOUS BLD VENIPUNCTURE: CPT

## 2021-04-07 PROCEDURE — 6360000002 HC RX W HCPCS: Performed by: PHYSICIAN ASSISTANT

## 2021-04-07 PROCEDURE — C9113 INJ PANTOPRAZOLE SODIUM, VIA: HCPCS | Performed by: PHYSICIAN ASSISTANT

## 2021-04-07 PROCEDURE — 83605 ASSAY OF LACTIC ACID: CPT

## 2021-04-07 PROCEDURE — 84484 ASSAY OF TROPONIN QUANT: CPT

## 2021-04-07 PROCEDURE — 1200000000 HC SEMI PRIVATE

## 2021-04-07 PROCEDURE — 2580000003 HC RX 258: Performed by: PHYSICIAN ASSISTANT

## 2021-04-07 PROCEDURE — 86901 BLOOD TYPING SEROLOGIC RH(D): CPT

## 2021-04-07 PROCEDURE — 86923 COMPATIBILITY TEST ELECTRIC: CPT

## 2021-04-07 PROCEDURE — 85025 COMPLETE CBC W/AUTO DIFF WBC: CPT

## 2021-04-07 PROCEDURE — 83690 ASSAY OF LIPASE: CPT

## 2021-04-07 PROCEDURE — 86900 BLOOD TYPING SEROLOGIC ABO: CPT

## 2021-04-07 PROCEDURE — 85610 PROTHROMBIN TIME: CPT

## 2021-04-07 PROCEDURE — 87040 BLOOD CULTURE FOR BACTERIA: CPT

## 2021-04-07 PROCEDURE — 82803 BLOOD GASES ANY COMBINATION: CPT

## 2021-04-07 PROCEDURE — G0328 FECAL BLOOD SCRN IMMUNOASSAY: HCPCS

## 2021-04-07 PROCEDURE — 93005 ELECTROCARDIOGRAM TRACING: CPT | Performed by: PHYSICIAN ASSISTANT

## 2021-04-07 PROCEDURE — 6370000000 HC RX 637 (ALT 250 FOR IP): Performed by: INTERNAL MEDICINE

## 2021-04-07 PROCEDURE — 85014 HEMATOCRIT: CPT

## 2021-04-07 PROCEDURE — 85018 HEMOGLOBIN: CPT

## 2021-04-07 PROCEDURE — 96365 THER/PROPH/DIAG IV INF INIT: CPT

## 2021-04-07 PROCEDURE — 86850 RBC ANTIBODY SCREEN: CPT

## 2021-04-07 PROCEDURE — 36430 TRANSFUSION BLD/BLD COMPNT: CPT

## 2021-04-07 PROCEDURE — 80053 COMPREHEN METABOLIC PANEL: CPT

## 2021-04-07 PROCEDURE — P9016 RBC LEUKOCYTES REDUCED: HCPCS

## 2021-04-07 PROCEDURE — 93010 ELECTROCARDIOGRAM REPORT: CPT | Performed by: INTERNAL MEDICINE

## 2021-04-07 PROCEDURE — 71045 X-RAY EXAM CHEST 1 VIEW: CPT

## 2021-04-07 PROCEDURE — 83880 ASSAY OF NATRIURETIC PEPTIDE: CPT

## 2021-04-07 PROCEDURE — 99283 EMERGENCY DEPT VISIT LOW MDM: CPT

## 2021-04-07 PROCEDURE — 2580000003 HC RX 258: Performed by: HOSPITALIST

## 2021-04-07 RX ORDER — PROMETHAZINE HYDROCHLORIDE 25 MG/1
12.5 TABLET ORAL EVERY 6 HOURS PRN
Status: DISCONTINUED | OUTPATIENT
Start: 2021-04-07 | End: 2021-04-13 | Stop reason: SDUPTHER

## 2021-04-07 RX ORDER — SPIRONOLACTONE 25 MG/1
25 TABLET ORAL DAILY
Status: DISCONTINUED | OUTPATIENT
Start: 2021-04-08 | End: 2021-04-19 | Stop reason: HOSPADM

## 2021-04-07 RX ORDER — POLYETHYLENE GLYCOL 3350 17 G/17G
17 POWDER, FOR SOLUTION ORAL DAILY PRN
Status: DISCONTINUED | OUTPATIENT
Start: 2021-04-07 | End: 2021-04-19 | Stop reason: HOSPADM

## 2021-04-07 RX ORDER — SODIUM CHLORIDE 0.9 % (FLUSH) 0.9 %
5-40 SYRINGE (ML) INJECTION EVERY 12 HOURS SCHEDULED
Status: DISCONTINUED | OUTPATIENT
Start: 2021-04-07 | End: 2021-04-13 | Stop reason: SDUPTHER

## 2021-04-07 RX ORDER — SODIUM CHLORIDE 9 MG/ML
25 INJECTION, SOLUTION INTRAVENOUS PRN
Status: DISCONTINUED | OUTPATIENT
Start: 2021-04-07 | End: 2021-04-13 | Stop reason: SDUPTHER

## 2021-04-07 RX ORDER — FUROSEMIDE 20 MG/1
20 TABLET ORAL DAILY
Status: DISCONTINUED | OUTPATIENT
Start: 2021-04-08 | End: 2021-04-19 | Stop reason: HOSPADM

## 2021-04-07 RX ORDER — ACETAMINOPHEN 650 MG/1
650 SUPPOSITORY RECTAL EVERY 6 HOURS PRN
Status: DISCONTINUED | OUTPATIENT
Start: 2021-04-07 | End: 2021-04-19 | Stop reason: HOSPADM

## 2021-04-07 RX ORDER — NIFEDIPINE 30 MG/1
30 TABLET, EXTENDED RELEASE ORAL DAILY
Status: DISCONTINUED | OUTPATIENT
Start: 2021-04-08 | End: 2021-04-19 | Stop reason: HOSPADM

## 2021-04-07 RX ORDER — CARVEDILOL 25 MG/1
25 TABLET ORAL 2 TIMES DAILY WITH MEALS
Status: DISCONTINUED | OUTPATIENT
Start: 2021-04-07 | End: 2021-04-07

## 2021-04-07 RX ORDER — ATORVASTATIN CALCIUM 40 MG/1
40 TABLET, FILM COATED ORAL DAILY
Status: DISCONTINUED | OUTPATIENT
Start: 2021-04-08 | End: 2021-04-19 | Stop reason: HOSPADM

## 2021-04-07 RX ORDER — ACETAMINOPHEN 325 MG/1
650 TABLET ORAL EVERY 6 HOURS PRN
Status: DISCONTINUED | OUTPATIENT
Start: 2021-04-07 | End: 2021-04-19 | Stop reason: HOSPADM

## 2021-04-07 RX ORDER — SODIUM CHLORIDE 9 MG/ML
INJECTION, SOLUTION INTRAVENOUS PRN
Status: DISCONTINUED | OUTPATIENT
Start: 2021-04-07 | End: 2021-04-13 | Stop reason: SDUPTHER

## 2021-04-07 RX ORDER — SODIUM CHLORIDE 0.9 % (FLUSH) 0.9 %
5-40 SYRINGE (ML) INJECTION PRN
Status: DISCONTINUED | OUTPATIENT
Start: 2021-04-07 | End: 2021-04-13 | Stop reason: SDUPTHER

## 2021-04-07 RX ORDER — CARVEDILOL 6.25 MG/1
6.25 TABLET ORAL 2 TIMES DAILY WITH MEALS
Status: DISCONTINUED | OUTPATIENT
Start: 2021-04-07 | End: 2021-04-19 | Stop reason: HOSPADM

## 2021-04-07 RX ORDER — ONDANSETRON 2 MG/ML
4 INJECTION INTRAMUSCULAR; INTRAVENOUS EVERY 6 HOURS PRN
Status: DISCONTINUED | OUTPATIENT
Start: 2021-04-07 | End: 2021-04-13 | Stop reason: SDUPTHER

## 2021-04-07 RX ORDER — AMIODARONE HYDROCHLORIDE 200 MG/1
200 TABLET ORAL DAILY
Status: DISCONTINUED | OUTPATIENT
Start: 2021-04-08 | End: 2021-04-09

## 2021-04-07 RX ADMIN — SODIUM CHLORIDE 80 MG: 9 INJECTION, SOLUTION INTRAVENOUS at 13:22

## 2021-04-07 RX ADMIN — CARVEDILOL 6.25 MG: 6.25 TABLET, FILM COATED ORAL at 18:31

## 2021-04-07 RX ADMIN — SODIUM CHLORIDE 8 MG/HR: 9 INJECTION, SOLUTION INTRAVENOUS at 14:35

## 2021-04-07 RX ADMIN — Medication 10 ML: at 20:07

## 2021-04-07 RX ADMIN — SODIUM CHLORIDE 8 MG/HR: 9 INJECTION, SOLUTION INTRAVENOUS at 22:56

## 2021-04-07 ASSESSMENT — ENCOUNTER SYMPTOMS
SHORTNESS OF BREATH: 1
VOMITING: 0
NAUSEA: 1
COUGH: 0
ABDOMINAL PAIN: 1
DIARRHEA: 0
BACK PAIN: 0
CHEST TIGHTNESS: 0

## 2021-04-07 ASSESSMENT — PAIN SCALES - GENERAL: PAINLEVEL_OUTOF10: 0

## 2021-04-07 ASSESSMENT — PAIN DESCRIPTION - DESCRIPTORS: DESCRIPTORS: ACHING

## 2021-04-07 NOTE — TELEPHONE ENCOUNTER
Please have her send an interrogation, the amiodarone should be preventing VT not causing it. Please let me know once complete and I will review it.

## 2021-04-07 NOTE — CONSULTS
Gastroenterology Consult Note        Patient: Dejan Hughes  : 1965  Acct#:      Date:  2021    Subjective:       History of Present Illness  Patient is a 63 y.o.  female admitted with anemia who is seen in consult for anemia. History of CHF with an EF of 25%, atrial fibrillation on Xarelto (last took this last night), status post pacemaker for congenital heart block. Status post ICD placement 2021. Left heart cath 2021 with normal coronaries per last cardiology note. She was seen by Dr. Tung Galindo 3/17/2021 for 1 month history of epigastric pain. Pain is described as sharp and intermittent. It can radiate to the back and may come on with eating. But other times just comes on randomly. Not brought on with exertion. No associated nausea or vomiting. She had an ultrasound in the office that did show gallstones versus sludge. Plan was for an EGD next week. Patient reports fatigue and weakness over the past few days. Came to the ER for this. Noted to be anemic with a hemoglobin of 7. She reports dark stool over the past 2 days. No hematochezia. Her mid abdominal pain is stable. Has had shortness of breath with activity. Denies NSAID use. Has noted some blood with blowing her nose for the past 3 days. No vaginal bleeding.     Past Medical History:   Diagnosis Date    Anemia     Atrial fibrillation and flutter (HCC)     Atrial flutter (HCC)     CHB (complete heart block) (HCC)     Class 1 obesity without serious comorbidity with body mass index (BMI) of 33.0 to 33.9 in adult 2019    Congenital heart disease     GERD (gastroesophageal reflux disease)     Headache(784.0)     History of complete heart block     Hyperlipidemia     Hypertension     PONV (postoperative nausea and vomiting)     Sleep apnea     uses CPAP    Syncope     Systolic CHF, chronic ([facility] 10/3/2018      Past Surgical History:   Procedure Laterality Date    CARDIAC DEFIBRILLATOR PLACEMENT  01/2021    [employer]tr[employer]    COLONOSCOPY N/A 10/27/2020    COLONOSCOPY POLYPECTOMY SNARE/COLD BIOPSY performed by Marlon Spears MD at [place] [address]  11/29/12    dual chamber PPM, [employer]    TUBAL LIGATION      UPPER GASTROINTESTINAL ENDOSCOPY N/A 10/27/2020    EGD DIAGNOSTIC ONLY performed by Marlon Spears MD at [place] [id number]        Past Endoscopic History  EGD and colonoscopy with Dr Christopher Gil 10/2020     Pre-Op Diagnosis:          GERD K21.9                                            CRC screening  Findings:           Small hiatal hernia                              Otherwise normal Esophagogastroduodenoscopy                               Benign neoplasm of descending colon - D12.4                              Normal terminal ileum     Rec:  Check pathology - patient to call office in 5 days for results. Recall colonoscopy in 5 - 10 yrs depending on pathology  Benefiber 1 tbsp by mouth twice daily  Follow up in office in 3 months  Follow up with primary MD as usual.  Resume Xarelto tomorrow - 10/28/2020.     Admission Meds  No current facility-administered medications on file prior to encounter.       Current Outpatient Medications on File Prior to Encounter   Medication Sig Dispense Refill    amiodarone (CORDARONE) 200 MG tablet Take 1 tablet by mouth daily 90 tablet 1    NIFEdipine (ADALAT CC) 30 MG extended release tablet Take 1 tablet by mouth daily 90 tablet 3    vitamin D (ERGOCALCIFEROL) 1.25 MG (81806 UT) CAPS capsule Take 1 capsule by mouth once a week 4 capsule 5    sacubitril-valsartan (ENTRESTO) 24-26 MG per tablet Take 0.5 tablets by mouth nightly 60 tablet 0    carvedilol (COREG) 25 MG tablet Take 1 tablet by mouth 2 times daily 60 tablet 3    furosemide (LASIX) 20 MG tablet Take 1 tablet by mouth daily 60 tablet 3    Multiple Vitamins-Minerals (THERAPEUTIC MULTIVITAMIN-MINERALS) tablet Take 1 tablet by mouth daily      metoclopramide (REGLAN) 5 MG tablet Take 1 tablet by mouth 4 times daily (Patient not taking: Reported on 3/17/2021) 120 tablet 5    XARELTO 20 MG TABS tablet TAKE ONE TABLET BY MOUTH DAILY WITH BREAKFAST 90 tablet 3    spironolactone (ALDACTONE) 25 MG tablet Take 1 tablet by mouth daily 30 tablet 2    butalbital-acetaminophen-caffeine (FIORICET, ESGIC) -40 MG per tablet Take 1 tablet by mouth every 4 hours as needed for Headaches      atorvastatin (LIPITOR) 40 MG tablet Take 1 tablet by mouth daily 60 tablet 1           Allergies  Allergies   Allergen Reactions    Latex      rash    Codeine      Hives      Lisinopril      cough    Nitroglycerin Hives    Sulfa Antibiotics Nausea Only    Hydralazine      headaches      Social   Social History     Tobacco Use    Smoking status: Never Smoker    Smokeless tobacco: Never Used   Substance Use Topics    Alcohol use: No        Family History   Problem Relation Age of Onset    Cancer Father     Heart Disease Neg Hx     High Blood Pressure Neg Hx     High Cholesterol Neg Hx         Review of Systems  Constitutional: negative for fevers, chills, sweats    Ears, nose, mouth, throat, and face: negative for nasal congestion and sore throat   Respiratory: negative for cough and shortness of breath   Cardiovascular: negative for chest pain and dyspnea   Gastrointestinal: see hpi   Genitourinary:negative for dysuria and frequency   Integument/breast: negative for pruritus and rash   Hematologic/lymphatic: negative for lymphadenopathy and easy bruising   Musculoskeletal:negative for arthralgias and myalgias   Neurological: negative for dizziness and weakness         Physical Exam  Blood pressure 112/69, pulse 78, temperature 98.3 °F (36.8 °C), temperature source Oral, resp. rate 18, height 5' 6.5\" (1.689 m), weight 198 lb (89.8 kg), SpO2 100 %, not currently breastfeeding.     General appearance: alert, cooperative, no distress, appears stated age  Eyes: Anicteric  Head: Normocephalic, without obvious abnormality  Lungs: clear to auscultation bilaterally, Normal Effort  Heart: regular rate and rhythm, normal S1 and S2, no murmurs or rubs  Abdomen: soft, mild tenderness in the epigastrium and right upper quadrant just below right costal margin. Bowel sounds normal. No masses,  no organomegaly. Extremities: atraumatic, no cyanosis or edema  Skin: warm and dry, no jaundice  Neuro: Grossly intact, A&OX3  Musculoskeletal: 5/5  strength BUE      Data Review:    Recent Labs     04/07/21  1012   WBC 10.4   HGB 7.1*   HCT 21.6*   MCV 91.4        Recent Labs     04/07/21  1012      K 3.7      CO2 23   BUN 25*   CREATININE 0.8     Recent Labs     04/07/21  1012   AST 23   ALT 20   BILITOT <0.2   ALKPHOS 52     Recent Labs     04/07/21  1012   LIPASE 25.0     Recent Labs     04/07/21  1012   PROTIME 17.7*   INR 1.52*     No results for input(s): PTT in the last 72 hours. No results for input(s): OCCULTBLD in the last 72 hours. Imaging Studies:      Assessment:     Active Problems:    * No active hospital problems. *  Resolved Problems:    * No resolved hospital problems. *    Anemia -hemoglobin was down to 8.4 in January. Patient reports receiving a couple infusions of iron since then. Hemoglobin was up to 11.6 in March. Now hemoglobin 7.1. Patient reports dark stools over the past 2 days. Stool was brown per report from ER. Intermittent epigastric/right upper quadrant pain - recent ultrasound in the office showed gallstones versus sludge. CHF  A. Fib-last took Xarelto last night. Recommendations:   -Getting 1 unit packed red blood cells today  -Serial H&H  -IV PPI  -clear liquids today  -NPO midnight  -Plan EGD tomorrow after transfusion today    Discussed with Dr. Geetha Wahl PA-C  [facility]    I have personally performed a face to face diagnostic evaluation on this patient.   I have interviewed and examined the patient and I agree with the findings and recommended plan of care. In summary, my findings and plan are the following: Pt reports a 1 year h/o epigastric pain and anemia. EGD and colon in Oct unrevealing. Pt states stool was dark but denies melena or hematochezia. Stool Heme +. Pt on Xarelto. Will plan on EGD in am after transfusion. If EGD normal, plan on a capsule endo.        Kaia Clemens MD  [address] and [address]

## 2021-04-07 NOTE — ED PROVIDER NOTES
[facility]        Pt Name: Lele Milian  MRN: [id number]  [place] 1965  Date of evaluation: 4/7/2021  Provider: Renetta Kelsey PA-C  PCP: No primary care provider on file. LOUIS. I have evaluated this patient. My supervising physician was available for consultation. CHIEF COMPLAINT       Chief Complaint   Patient presents with    Fatigue     Reports taking medication for her heart, believes it to be \"amiodarone\" since January that makes her feel weak. HISTORY OF PRESENT ILLNESS   (Location, Timing/Onset, Context/Setting, Quality, Duration, Modifying Factors, Severity, Associated Signs and Symptoms)  Note limiting factors. Lele Milian is a 63 y.o. female presents the emergency department with difficulties as a pertains to profound fatigue malaise with intermittent symptoms of chest pain and shortness of breath. Patient states that she has not been feeling well for a number of weeks. She initially thought that the symptoms could be related to the addition of amiodarone to her regimen for her heart disease. Patient states she did place a call to cardiology and at one point they actually cut her dose in half but she still having symptoms. She states she does have some intermittent chest discomfort as well as shortness of breath which is mostly along the lines of dyspnea on exertion. She states that she is under active care of Radha Zaratechild from [facility]. She has had some difficulties with reports of dark coloration in her stool and is supposed to have an upcoming endoscopy for GI related symptoms next week. Patient is noted to be anticoagulated with Eliquis. She does have a history of anemia and confirms that the stool has changed in color but she is not had any active rectal bleeding she is aware of. He she is equivocal nausea but no vomiting or diarrhea.   States that she has had ongoing care and recent COLONOSCOPY POLYPECTOMY SNARE/COLD BIOPSY performed by Matt Glasgow MD at [facility] 3  11/29/12    dual chamber PPM, Medtronic    TUBAL LIGATION      UPPER GASTROINTESTINAL ENDOSCOPY N/A 10/27/2020    EGD DIAGNOSTIC ONLY performed by Matt Glasgow MD at [address] [place] [address]       Previous Medications    AMIODARONE (CORDARONE) 200 MG TABLET    Take 1 tablet by mouth daily    ATORVASTATIN (LIPITOR) 40 MG TABLET    Take 1 tablet by mouth daily    CARVEDILOL (COREG) 25 MG TABLET    Take 1 tablet by mouth 2 times daily    FUROSEMIDE (LASIX) 20 MG TABLET    Take 1 tablet by mouth daily    MULTIPLE VITAMINS-MINERALS (THERAPEUTIC MULTIVITAMIN-MINERALS) TABLET    Take 1 tablet by mouth daily    NIFEDIPINE (ADALAT CC) 30 MG EXTENDED RELEASE TABLET    Take 1 tablet by mouth daily    SACUBITRIL-VALSARTAN (ENTRESTO) 24-26 MG PER TABLET    Take 0.5 tablets by mouth nightly    SPIRONOLACTONE (ALDACTONE) 25 MG TABLET    Take 1 tablet by mouth daily    VITAMIN D (ERGOCALCIFEROL) 1.25 MG (84562 UT) CAPS CAPSULE    Take 1 capsule by mouth once a week    XARELTO 20 MG TABS TABLET    TAKE ONE TABLET BY MOUTH DAILY WITH BREAKFAST         ALLERGIES     Latex, Codeine, Lisinopril, Nitroglycerin, Sulfa antibiotics, and Hydralazine    FAMILYHISTORY       Family History   Problem Relation Age of Onset    Cancer Father     Heart Disease Neg Hx     High Blood Pressure Neg Hx     High Cholesterol Neg Hx           SOCIAL HISTORY       Social History     Tobacco Use    Smoking status: Never Smoker    Smokeless tobacco: Never Used   Substance Use Topics    Alcohol use: No    Drug use: No       SCREENINGS             PHYSICAL EXAM    (up to 7 for level 4, 8 or more for level 5)     ED Triage Vitals [04/07/21 0934]   BP Temp Temp Source Pulse Resp SpO2 Height Weight   112/69 98.3 °F (36.8 °C) Oral 78 18 100 % 5' 6.5\" (1.689 m) 198 lb (89.8 kg) Physical Exam  Vitals signs and nursing note reviewed. Constitutional:       General: She is awake. She is not in acute distress. Appearance: Normal appearance. She is well-developed. She is not ill-appearing or diaphoretic. HENT:      Head: Normocephalic and atraumatic. No raccoon eyes, Bhatt's sign, contusion or laceration. Right Ear: Hearing and external ear normal.      Left Ear: Hearing and external ear normal.      Nose: Nose normal.      Mouth/Throat:      Lips: Pink. Mouth: Mucous membranes are moist.   Eyes:      General: No scleral icterus. Right eye: No discharge. Left eye: No discharge. Conjunctiva/sclera: Conjunctivae normal.   Neck:      Musculoskeletal: Normal range of motion. Vascular: No JVD. Cardiovascular:      Rate and Rhythm: Normal rate and regular rhythm. Heart sounds: No murmur. No friction rub. No gallop. Comments: Defibrillator chest noted. No significant peripheral edema bilateral lower extremities. Calf supple. Negative Homans. Pulmonary:      Effort: Pulmonary effort is normal. No accessory muscle usage or respiratory distress. Breath sounds: Normal breath sounds. No wheezing, rhonchi or rales. Abdominal:      General: Abdomen is flat. There is no distension. Palpations: Abdomen is soft. Abdomen is not rigid. There is no mass. Tenderness: There is no abdominal tenderness. There is no guarding or rebound. Genitourinary:     Rectum: Guaiac result negative. Comments: Exterior skin tags without jessica hemorrhoids noted. No active rectal bleeding. Brown stool rectal vault. Guaiac grossly negative. Skin:     General: Skin is warm and dry. Neurological:      Mental Status: She is alert and oriented to person, place, and time. GCS: GCS eye subscore is 4. GCS verbal subscore is 5. GCS motor subscore is 6. Cranial Nerves: No cranial nerve deficit. Sensory: No sensory deficit. Coordination: Coordination normal.   Psychiatric:         Behavior: Behavior normal. Behavior is cooperative.          DIAGNOSTIC RESULTS   LABS:    Labs Reviewed   CBC WITH AUTO DIFFERENTIAL - Abnormal; Notable for the following components:       Result Value    RBC 2.37 (*)     Hemoglobin 7.1 (*)     Hematocrit 21.6 (*)     RDW 18.0 (*)     Neutrophils Absolute 8.2 (*)     All other components within normal limits    Narrative:     Performed at:  [facility] [address]   Phone [phone]   COMPREHENSIVE METABOLIC PANEL W/ REFLEX TO MG FOR LOW K - Abnormal; Notable for the following components:    Glucose 107 (*)     BUN 25 (*)     All other components within normal limits    Narrative:     Performed at:  [facility] [address]   Phone [phone]   PROTIME-INR - Abnormal; Notable for the following components:    Protime 17.7 (*)     INR 1.52 (*)     All other components within normal limits    Narrative:     Performed at:  [facility] [address], [place]   Phone [phone]   CULTURE, BLOOD 1   CULTURE, BLOOD 2   LIPASE    Narrative:     Performed at:  [facility] [address], [place]   Phone [phone]   TROPONIN    Narrative:     Performed at:  [facility] [address], [place]   Phone [phone] PEPTIDE    Narrative:     Performed at:  [facility] [address], [place]   Phone [phone]   LACTATE, SEPSIS    Narrative:     Performed at:  [facility] [address], [place]   Phone [phone]   URINE RT REFLEX TO CULTURE   BLOOD GAS, VENOUS   LACTATE, SEPSIS   BLOOD OCCULT STOOL DIAGNOSTIC   TYPE AND SCREEN PREPARE RBC (CROSSMATCH)       All other labs were within normal range or not returned as of this dictation. EKG: All EKG's are interpreted by the Emergency Department Physician in the absence of a cardiologist.  Please see their note for interpretation of EKG. RADIOLOGY:   Non-plain film images such as CT, Ultrasound and MRI are read by the radiologist. Plain radiographic images are visualized and preliminarily interpreted by the ED Provider with the below findings:        Interpretation per the Radiologist below, if available at the time of this note:    XR CHEST PORTABLE   Final Result   No active cardiopulmonary disease           No results found. PROCEDURES   Unless otherwise noted below, none     Procedures    CRITICAL CARE TIME   Because of the high probability of sudden clinical deterioration of the patients condition and to prevent further deterioration, my critical care time involved my full attention to the patients condition, and included chart data review, documentation, medication ordering, viewing the patients old records, reevaluation of the patient's cardiac, pulmonary, and neurological status. Reassessing vital signs. Consutlations with off service physician. Ordering, interpreting reviewing diagnostic testing. Therefore my critical care time was 35 minutes of direct attention to the patients condition and did not include time spent on procedures. I have discussed with the patient the rationale for blood transfusion, its benefits in treating or preventing symptomatic anemia, reversal anticoagulant effect and dangerously low hemoglobin levels which could lead to fatigue, organ damage or death, and its risk which include: mild transfusion reactions, rare risk of blood borne infection, or more serious but rare allergic reactions. I have discussed the alternatives to transfusion, including the risk and consequences of not receiving transfusion.  The patient had an opportunity to ask questions and had agreed to proceed with transfusion of packed red blood cells. CONSULTS:  IP CONSULT TO GI  IP CONSULT TO CARDIOLOGY      EMERGENCY DEPARTMENT COURSE and DIFFERENTIAL DIAGNOSIS/MDM:   Vitals:    Vitals:    04/07/21 0934   BP: 112/69   Pulse: 78   Resp: 18   Temp: 98.3 °F (36.8 °C)   TempSrc: Oral   SpO2: 100%   Weight: 198 lb (89.8 kg)   Height: 5' 6.5\" (1.689 m)       Patient was given the following medications:  Medications   0.9 % sodium chloride infusion (has no administration in time range)   pantoprazole (PROTONIX) 80 mg in sodium chloride 0.9 % 50 mL bolus (80 mg Intravenous New Bag 4/7/21 1322)   pantoprazole (PROTONIX) 80 mg in sodium chloride 0.9 % 100 mL infusion (has no administration in time range)           The patient's detailed history of present illness is documented as above. Upon arrival to the emergency department the patient's vital signs are as documented. The patient is noted to be hemodynamically stable and afebrile. Physical examination findings are as above. IV access was obtained laboratory testing and work-up was initiated. Today's EKG demonstrates a paced rhythm. Rate is 75. Please see attending physician details for further EKG interpretation as well as potential comparison. CBC demonstrates no evidence of leukocytosis. She does have significant worsening anemia today with a hemoglobin of 7.1 and hematocrit of 21.6. This plus reports of GI potential source of the fact that she is anticoagulated I did discuss with the patient options for this and she was consented and transfused. BUN is 25 creatinine is 0.8 nonfasting glucose 107 electrolytes and LFTs unremarkable. Lipase 25. Troponin is less than 0.01 proBNP is 43 while her INR is 1.52 on novel anticoagulation portable chest x-ray demonstrates no evidence of acute cardiopulmonary process.   In light of the above-mentioned and because of the patient's symptomatic anemia I did discuss with her in great detail blood transfusion. She is markedly symptomatic is anticoagulated and has a potential intermittent bleeding source as above to have an endoscopy. She was started on Protonix bolus as well as drip telephone call was placed to GI she was consented 2 units of packed red blood cells have been crossed with the initial unit being transfused. I did place a telephone call spoke to cardiology who sent the patient here to the emergency department as well to GI. The case was discussed with Pierre Willis. She is aware of the above-mentioned. GI will follow. I then spoke with Dr. Karl Baig. He is aware that the interrogation is still outstanding but I do have a good reason to believe that the patient's anemia are the likely cause of her fatigue and will contact him accordingly if anything should come up abnormal on her interrogation which would be highly unlikely as she is not had what she reports to be any particular events. Case was then discussed with the hospitalist.  Patient will be admitted to medical surgical services for ongoing care management the diagnoses below. FINAL IMPRESSION      1. Symptomatic anemia    2. Adequate anticoagulation on anticoagulant therapy    3. Profound fatigue    4.  Status post implantation of automatic cardioverter/defibrillator (AICD)          DISPOSITION/PLAN   DISPOSITION Decision To Admit 04/07/2021 12:32:59 PM      DISCONTINUED MEDICATIONS:  Discontinued Medications    BUTALBITAL-ACETAMINOPHEN-CAFFEINE (FIORICET, ESGIC) -40 MG PER TABLET    Take 1 tablet by mouth every 4 hours as needed for Headaches    METOCLOPRAMIDE (REGLAN) 5 MG TABLET    Take 1 tablet by mouth 4 times daily            (Please note that portions of this note were completed with a voice recognition program.  Efforts were made to edit the dictations but occasionally words are mis-transcribed.)    Azucena Smith PA-C (electronically signed)            Raymundo Reece PA-C  04/07/21 2151

## 2021-04-07 NOTE — LETTER
[facility]  [address] [address]  Phone: [phone]    No name on file. April 19, 2021     Patient: Emre Godoy   YOB: 1965   Date of Visit: 4/7/2021       To Whom It May Concern: It is my medical opinion that someone stay with patient first 24 hours upon discharge. Discharge date 4/19/2021. If you have any questions or concerns, please don't hesitate to call.     Sincerely,

## 2021-04-07 NOTE — ED NOTES
Pharmacy Medication History Note      List of current medications patient is taking is complete. Source of information: Claudia Dixonj 22 made to medication list:  Medications flagged for removal (include reason, ex. noncompliance):  none    Medications removed (include reason, ex. therapy complete or physician discontinued):  Reglan- therapy completed  Fioricet- therapy completed    Medications added/doses adjusted:  none    Other notes (ex. Recent course of antibiotics, Coumadin dosing):  Denies use of other OTC or herbal medications. Last dose times updated. Jeffrey Tamez, PharmD  ED Pharmacist [id number]  4/7/2021    No current facility-administered medications on file prior to encounter.       Current Outpatient Medications on File Prior to Encounter   Medication Sig Dispense Refill    amiodarone (CORDARONE) 200 MG tablet Take 1 tablet by mouth daily 90 tablet 1    NIFEdipine (ADALAT CC) 30 MG extended release tablet Take 1 tablet by mouth daily 90 tablet 3    vitamin D (ERGOCALCIFEROL) 1.25 MG (61120 UT) CAPS capsule Take 1 capsule by mouth once a week 4 capsule 5    sacubitril-valsartan (ENTRESTO) 24-26 MG per tablet Take 0.5 tablets by mouth nightly 60 tablet 0    carvedilol (COREG) 25 MG tablet Take 1 tablet by mouth 2 times daily 60 tablet 3    furosemide (LASIX) 20 MG tablet Take 1 tablet by mouth daily 60 tablet 3    Multiple Vitamins-Minerals (THERAPEUTIC MULTIVITAMIN-MINERALS) tablet Take 1 tablet by mouth daily      XARELTO 20 MG TABS tablet TAKE ONE TABLET BY MOUTH DAILY WITH BREAKFAST 90 tablet 3    spironolactone (ALDACTONE) 25 MG tablet Take 1 tablet by mouth daily 30 tablet 2    atorvastatin (LIPITOR) 40 MG tablet Take 1 tablet by mouth daily 60 tablet 1    [DISCONTINUED] metoclopramide (REGLAN) 5 MG tablet Take 1 tablet by mouth 4 times daily (Patient not taking: Reported on 3/17/2021) 120 tablet 5    [DISCONTINUED] butalbital-acetaminophen-caffeine (FIORICET, ESGIC) -40 MG per tablet Take 1 tablet by mouth every 4 hours as needed for Headaches

## 2021-04-07 NOTE — TELEPHONE ENCOUNTER
Pt calling she is having issues with the amiodarone. It is causing her to have VTach, Muscle weakness and she's weak. Needs to know what to do?  Pls call to advise Thank you

## 2021-04-07 NOTE — ED PROVIDER NOTES
I did not see or evaluate this patient. I only interpreted their EKG. Reveals V paced rhythm. No scarbosa criteria met. Ventricular rate 75.   Similar to prior EKG performed in February 2020      Willam Bell MD  04/07/21 1432

## 2021-04-07 NOTE — H&P
HOSPITALISTS HISTORY AND PHYSICAL    4/7/2021 2:09 PM    Patient Information:  Abhijeet Means is a 63 y.o. female [id number]  PCP:  No primary care provider on file. (Tel: None )    Chief complaint:    Chief Complaint   Patient presents with    Fatigue     Reports taking medication for her heart, believes it to be \"amiodarone\" since January that makes her feel weak. History of Present Illness:  Perla Mata is a 63 y.o. female who presented with Complaints of generalized fatigue weak and not feeling well. Patient with complex medical history of CHF A. fib CAD chronic anemia who has been feeling weak and tired. With low hemoglobin. Profound weakness malaise with chest pain and shortness of breath times. Has not felt well for weeks. Patient is letting it could be related to amiodarone that was the new regimen for him. Was seen by cardiology in the office was sent here for further evaluation. Patient has been having intermittent shortness of breath with dyspnea on exertion. No fever chills. Follows Dr. Jordan Polo for GI patient has had some difficulty with dark coloration of stools and suppressed EGD in next week. Patient is on anticoagulation Eliquis  REVIEW OF SYSTEMS:   Constitutional: Negative for fever,chills or night sweats  ENT: Negative for rhinorrhea, epistaxis, hoarseness, sore throat. Respiratory: Negative for shortness of breath,wheezing  Cardiovascular: Negative for chest pain, palpitations   Gastrointestinal: Negative for nausea, vomiting, diarrhea  Genitourinary: Negative for polyuria, dysuria   Hematologic/Lymphatic: Negative for bleeding tendency, easy bruising  Musculoskeletal: Negative for myalgias and arthralgias  Neurologic: Negative for confusion,dysarthria. Skin: Negative for itching,rash, good capillary refill. Psychiatric: Negative for depression,anxiety, agitation.   Endocrine: Negative for polydipsia,polyuria,heat Yenny Ray intolerance. Past Medical History:   has a past medical history of Anemia, Atrial fibrillation and flutter ([facility] Atrial flutter ([facility] Class 1 obesity without serious comorbidity with body mass index (BMI) of 33.0 to 33.9 in adult, Congenital heart disease, GERD (gastroesophageal reflux disease), Headache(784.0), History of complete heart block, Hyperlipidemia, Hypertension, PONV (postoperative nausea and vomiting), Sleep apnea, Syncope, and Systolic CHF, chronic ([facility] Past Surgical History:   has a past surgical history that includes Uterine fibroid surgery; Pacemaker insertion (11/29/12); Tubal ligation; Upper gastrointestinal endoscopy (N/A, 10/27/2020); Colonoscopy (N/A, 10/27/2020); and Cardiac defibrillator placement (01/2021). Medications:  No current facility-administered medications on file prior to encounter. Current Outpatient Medications on File Prior to Encounter   Medication Sig Dispense Refill    amiodarone (CORDARONE) 200 MG tablet Take 1 tablet by mouth daily 90 tablet 1    NIFEdipine (ADALAT CC) 30 MG extended release tablet Take 1 tablet by mouth daily 90 tablet 3    vitamin D (ERGOCALCIFEROL) 1.25 MG (99888 UT) CAPS capsule Take 1 capsule by mouth once a week 4 capsule 5    sacubitril-valsartan (ENTRESTO) 24-26 MG per tablet Take 0.5 tablets by mouth nightly 60 tablet 0    carvedilol (COREG) 25 MG tablet Take 1 tablet by mouth 2 times daily 60 tablet 3    furosemide (LASIX) 20 MG tablet Take 1 tablet by mouth daily 60 tablet 3    Multiple Vitamins-Minerals (THERAPEUTIC MULTIVITAMIN-MINERALS) tablet Take 1 tablet by mouth daily      XARELTO 20 MG TABS tablet TAKE ONE TABLET BY MOUTH DAILY WITH BREAKFAST 90 tablet 3    spironolactone (ALDACTONE) 25 MG tablet Take 1 tablet by mouth daily 30 tablet 2    atorvastatin (LIPITOR) 40 MG tablet Take 1 tablet by mouth daily 60 tablet 1       Allergies:   Allergies   Allergen Reactions    Latex rash    Codeine      Hives      Lisinopril      cough    Nitroglycerin Hives    Sulfa Antibiotics Nausea Only    Hydralazine      headaches        Social History:   reports that she has never smoked. She has never used smokeless tobacco. She reports that she does not drink alcohol or use drugs. Family History:  family history includes Cancer in her father. ,     Physical Exam:  /69   Pulse 78   Temp 98.3 °F (36.8 °C) (Oral)   Resp 18   Ht 5' 6.5\" (1.689 m)   Wt 198 lb (89.8 kg)   SpO2 100%   BMI 31.48 kg/m²     General appearance:  Appears comfortable. Well nourished  Eyes: Sclera clear, pupils equal  ENT: Moist mucus membranes, no thrush. Trachea midline. Cardiovascular: Regular rhythm, normal S1, S2. No murmur, gallop, rub. No edema in lower extremities  Respiratory: Clear to auscultation bilaterally, no wheeze, good inspiratory effort  Gastrointestinal: Abdomen soft, non-tender, not distended, normal bowel sounds  Musculoskeletal: No cyanosis in digits, neck supple  Neurology: Cranial nerves grossly intact. Alert and oriented in time, place and person. No speech or motor deficits  Psychiatry: Appropriate affect. Not agitated  Skin: Warm, dry, normal turgor, no rash    Labs:  CBC:   Lab Results   Component Value Date    WBC 10.4 04/07/2021    RBC 2.37 04/07/2021    HGB 7.1 04/07/2021    HCT 21.6 04/07/2021    MCV 91.4 04/07/2021    MCH 30.2 04/07/2021    MCHC 33.0 04/07/2021    RDW 18.0 04/07/2021     04/07/2021    MPV 8.1 04/07/2021     BMP:    Lab Results   Component Value Date     04/07/2021    K 3.7 04/07/2021     04/07/2021    CO2 23 04/07/2021    BUN 25 04/07/2021    CREATININE 0.8 04/07/2021    CALCIUM 8.8 04/07/2021    GFRAA >60 04/07/2021    LABGLOM >60 04/07/2021    GLUCOSE 107 04/07/2021       Chest Xray:   EKG:    I visualized CXR images and EKG strips       Problem List  Active Problems:    Fatigue  Resolved Problems:    * No resolved hospital problems. *        Assessment/Plan:   Symptomatic anemia  -With concern for possible bleed given patient presentation  -IV Protonix started  -Frequent H&H monitoring  -Patient is getting transfused 2 units of blood  -GI consulted further evaluation per them      Chronic CHF  -Hold off on Entresto continue rest of the medication  -Continue Lasix    Chronic A. Fib  -On amiodarone which will be continued  -We will hold off on anticoagulation for now until further work-up        Allyssa Reid MD    4/7/2021 2:09 PM

## 2021-04-07 NOTE — TELEPHONE ENCOUNTER
Spoke with the patient and she is currently in the ED here at [facility] so a transmission is not able to be sent at this time

## 2021-04-07 NOTE — LETTER
[facility]  EP Procedure Sheet    4/9/21  Ariela Talavera  1965  EP Procedures     Pacemaker implant (single/dual)  EP Study    ICD implant (single/dual)  Atrial flutter ablation (ZAMZAM Y/N)    Biv implant ICD  Tilt Table    Biv implant PPM  Atrial fibrillation ablation (ZAMZAM Yes)    Generator Change (PPM/ICD/BiV)  SVT ablation    Lead revision (RV/LA/RA) (<1 month)  VT ablation      Lead extraction +/- upgrade (BiV/PPM/ICD)  VT Ischemic/ non-ischemic    Loop implant/ removal  VT RVOT    Cardioversion  VT Left sided   xxx ZAMZAM (Watchman consideration)  AVN ablation     Equipment     Medtronic   WONG Mapping System    [place]  [id number] [address] [employer]  CryoAblation    Biotronik  Laser Lead Extraction     EP Procedures Scheduling Request    # hours Requested   Scheduled  Date:   Specific Day  Completed    Anesthesia  F/u Date:   CT surgery backup  COVID     Overnight stay      Location [facility]       Pre-Procedure Labs / Imaging     PT/INR  Type & cross    CBC  Units PRBC    BMP/Mg  Units FFP    Venogram  Cardiac CTA for Pulmonary vein mapping     RN INITIALS:     Patient Instructions  Do not eat or drink after midnight the night prior to procedure  Dx: PAF

## 2021-04-07 NOTE — PROGRESS NOTES
Pt up to unit from ED. Vitals obtained. Pt denies pain at this time. Pt oriented to room and how to use call light, pt verbalized understanding. Pt expresses no further needs at this time. Call light in reach. Fall precautions in place.

## 2021-04-08 ENCOUNTER — ANESTHESIA EVENT (OUTPATIENT)
Dept: ENDOSCOPY | Age: 56
DRG: 220 | End: 2021-04-08
Payer: MEDICAID

## 2021-04-08 ENCOUNTER — ANESTHESIA (OUTPATIENT)
Dept: ENDOSCOPY | Age: 56
DRG: 220 | End: 2021-04-08
Payer: MEDICAID

## 2021-04-08 ENCOUNTER — APPOINTMENT (OUTPATIENT)
Dept: CT IMAGING | Age: 56
DRG: 220 | End: 2021-04-08
Payer: MEDICAID

## 2021-04-08 VITALS
SYSTOLIC BLOOD PRESSURE: 96 MMHG | DIASTOLIC BLOOD PRESSURE: 53 MMHG | OXYGEN SATURATION: 99 % | RESPIRATION RATE: 14 BRPM

## 2021-04-08 LAB
ANION GAP SERPL CALCULATED.3IONS-SCNC: 7 MMOL/L (ref 3–16)
ANISOCYTOSIS: ABNORMAL
BASOPHILS ABSOLUTE: 0 K/UL (ref 0–0.2)
BASOPHILS RELATIVE PERCENT: 0.6 %
BLOOD BANK DISPENSE STATUS: NORMAL
BLOOD BANK DISPENSE STATUS: NORMAL
BLOOD BANK PRODUCT CODE: NORMAL
BLOOD BANK PRODUCT CODE: NORMAL
BPU ID: NORMAL
BPU ID: NORMAL
BUN BLDV-MCNC: 22 MG/DL (ref 7–20)
CALCIUM SERPL-MCNC: 8.6 MG/DL (ref 8.3–10.6)
CHLORIDE BLD-SCNC: 110 MMOL/L (ref 99–110)
CO2: 23 MMOL/L (ref 21–32)
CREAT SERPL-MCNC: 0.8 MG/DL (ref 0.6–1.1)
DESCRIPTION BLOOD BANK: NORMAL
DESCRIPTION BLOOD BANK: NORMAL
EOSINOPHILS ABSOLUTE: 0.2 K/UL (ref 0–0.6)
EOSINOPHILS RELATIVE PERCENT: 1.9 %
GFR AFRICAN AMERICAN: >60
GFR NON-AFRICAN AMERICAN: >60
GLUCOSE BLD-MCNC: 102 MG/DL (ref 70–99)
HCT VFR BLD CALC: 20.1 % (ref 36–48)
HCT VFR BLD CALC: 22.2 % (ref 36–48)
HCT VFR BLD CALC: 23.9 % (ref 36–48)
HCT VFR BLD CALC: 23.9 % (ref 36–48)
HCT VFR BLD CALC: 24.4 % (ref 36–48)
HEMOGLOBIN: 6.6 G/DL (ref 12–16)
HEMOGLOBIN: 7.1 G/DL (ref 12–16)
HEMOGLOBIN: 7.8 G/DL (ref 12–16)
HEMOGLOBIN: 7.9 G/DL (ref 12–16)
HEMOGLOBIN: 8 G/DL (ref 12–16)
LYMPHOCYTES ABSOLUTE: 1.6 K/UL (ref 1–5.1)
LYMPHOCYTES RELATIVE PERCENT: 20.1 %
MACROCYTES: ABNORMAL
MCH RBC QN AUTO: 28.9 PG (ref 26–34)
MCHC RBC AUTO-ENTMCNC: 32.9 G/DL (ref 31–36)
MCV RBC AUTO: 87.8 FL (ref 80–100)
MONOCYTES ABSOLUTE: 0.6 K/UL (ref 0–1.3)
MONOCYTES RELATIVE PERCENT: 7.4 %
NEUTROPHILS ABSOLUTE: 5.6 K/UL (ref 1.7–7.7)
NEUTROPHILS RELATIVE PERCENT: 70 %
PDW BLD-RTO: 22.5 % (ref 12.4–15.4)
PLATELET # BLD: 189 K/UL (ref 135–450)
PLATELET SLIDE REVIEW: ADEQUATE
PMV BLD AUTO: 7.2 FL (ref 5–10.5)
POLYCHROMASIA: ABNORMAL
POTASSIUM SERPL-SCNC: 3.5 MMOL/L (ref 3.5–5.1)
RBC # BLD: 2.72 M/UL (ref 4–5.2)
SLIDE REVIEW: ABNORMAL
SODIUM BLD-SCNC: 140 MMOL/L (ref 136–145)
WBC # BLD: 8 K/UL (ref 4–11)

## 2021-04-08 PROCEDURE — 36569 INSJ PICC 5 YR+ W/O IMAGING: CPT

## 2021-04-08 PROCEDURE — APPNB30 APP NON BILLABLE TIME 0-30 MINS: Performed by: NURSE PRACTITIONER

## 2021-04-08 PROCEDURE — 6360000004 HC RX CONTRAST MEDICATION

## 2021-04-08 PROCEDURE — 7100000000 HC PACU RECOVERY - FIRST 15 MIN: Performed by: INTERNAL MEDICINE

## 2021-04-08 PROCEDURE — C9113 INJ PANTOPRAZOLE SODIUM, VIA: HCPCS | Performed by: PHYSICIAN ASSISTANT

## 2021-04-08 PROCEDURE — 88341 IMHCHEM/IMCYTCHM EA ADD ANTB: CPT

## 2021-04-08 PROCEDURE — 85018 HEMOGLOBIN: CPT

## 2021-04-08 PROCEDURE — 6360000002 HC RX W HCPCS: Performed by: NURSE ANESTHETIST, CERTIFIED REGISTERED

## 2021-04-08 PROCEDURE — 3609013800 HC EGD SUBMUCOSAL/BOTOX INJECTION: Performed by: INTERNAL MEDICINE

## 2021-04-08 PROCEDURE — 85014 HEMATOCRIT: CPT

## 2021-04-08 PROCEDURE — 3700000001 HC ADD 15 MINUTES (ANESTHESIA): Performed by: INTERNAL MEDICINE

## 2021-04-08 PROCEDURE — 2580000003 HC RX 258

## 2021-04-08 PROCEDURE — 85025 COMPLETE CBC W/AUTO DIFF WBC: CPT

## 2021-04-08 PROCEDURE — 2580000003 HC RX 258: Performed by: NURSE PRACTITIONER

## 2021-04-08 PROCEDURE — 6360000002 HC RX W HCPCS: Performed by: PHYSICIAN ASSISTANT

## 2021-04-08 PROCEDURE — 2500000003 HC RX 250 WO HCPCS: Performed by: NURSE ANESTHETIST, CERTIFIED REGISTERED

## 2021-04-08 PROCEDURE — C1751 CATH, INF, PER/CENT/MIDLINE: HCPCS

## 2021-04-08 PROCEDURE — 80048 BASIC METABOLIC PNL TOTAL CA: CPT

## 2021-04-08 PROCEDURE — 3609013000 HC EGD TRANSORAL CONTROL BLEEDING ANY METHOD: Performed by: INTERNAL MEDICINE

## 2021-04-08 PROCEDURE — 6360000002 HC RX W HCPCS: Performed by: INTERNAL MEDICINE

## 2021-04-08 PROCEDURE — 3700000000 HC ANESTHESIA ATTENDED CARE: Performed by: INTERNAL MEDICINE

## 2021-04-08 PROCEDURE — 6360000004 HC RX CONTRAST MEDICATION: Performed by: SURGERY

## 2021-04-08 PROCEDURE — 88305 TISSUE EXAM BY PATHOLOGIST: CPT

## 2021-04-08 PROCEDURE — 0W3P8ZZ CONTROL BLEEDING IN GASTROINTESTINAL TRACT, VIA NATURAL OR ARTIFICIAL OPENING ENDOSCOPIC: ICD-10-PCS | Performed by: INTERNAL MEDICINE

## 2021-04-08 PROCEDURE — 2709999900 HC NON-CHARGEABLE SUPPLY: Performed by: INTERNAL MEDICINE

## 2021-04-08 PROCEDURE — 6370000000 HC RX 637 (ALT 250 FOR IP): Performed by: INTERNAL MEDICINE

## 2021-04-08 PROCEDURE — 2580000003 HC RX 258: Performed by: INTERNAL MEDICINE

## 2021-04-08 PROCEDURE — 2720000010 HC SURG SUPPLY STERILE: Performed by: INTERNAL MEDICINE

## 2021-04-08 PROCEDURE — 7100000001 HC PACU RECOVERY - ADDTL 15 MIN: Performed by: INTERNAL MEDICINE

## 2021-04-08 PROCEDURE — 2580000003 HC RX 258: Performed by: HOSPITALIST

## 2021-04-08 PROCEDURE — 3609012400 HC EGD TRANSORAL BIOPSY SINGLE/MULTIPLE: Performed by: INTERNAL MEDICINE

## 2021-04-08 PROCEDURE — 2580000003 HC RX 258: Performed by: PHYSICIAN ASSISTANT

## 2021-04-08 PROCEDURE — 36415 COLL VENOUS BLD VENIPUNCTURE: CPT

## 2021-04-08 PROCEDURE — 1200000000 HC SEMI PRIVATE

## 2021-04-08 PROCEDURE — 74177 CT ABD & PELVIS W/CONTRAST: CPT

## 2021-04-08 PROCEDURE — 88342 IMHCHEM/IMCYTCHM 1ST ANTB: CPT

## 2021-04-08 PROCEDURE — 99222 1ST HOSP IP/OBS MODERATE 55: CPT | Performed by: SURGERY

## 2021-04-08 PROCEDURE — APPSS60 APP SPLIT SHARED TIME 46-60 MINUTES: Performed by: NURSE PRACTITIONER

## 2021-04-08 PROCEDURE — 6360000002 HC RX W HCPCS: Performed by: NURSE PRACTITIONER

## 2021-04-08 RX ORDER — SODIUM CHLORIDE 9 MG/ML
INJECTION, SOLUTION INTRAVENOUS CONTINUOUS
Status: DISCONTINUED | OUTPATIENT
Start: 2021-04-08 | End: 2021-04-13 | Stop reason: SDUPTHER

## 2021-04-08 RX ORDER — SODIUM CHLORIDE 9 MG/ML
INJECTION, SOLUTION INTRAVENOUS
Status: COMPLETED
Start: 2021-04-08 | End: 2021-04-08

## 2021-04-08 RX ORDER — LIDOCAINE HYDROCHLORIDE 20 MG/ML
INJECTION, SOLUTION EPIDURAL; INFILTRATION; INTRACAUDAL; PERINEURAL PRN
Status: DISCONTINUED | OUTPATIENT
Start: 2021-04-08 | End: 2021-04-08 | Stop reason: SDUPTHER

## 2021-04-08 RX ORDER — SODIUM CHLORIDE 9 MG/ML
INJECTION, SOLUTION INTRAVENOUS PRN
Status: COMPLETED | OUTPATIENT
Start: 2021-04-08 | End: 2021-04-08

## 2021-04-08 RX ORDER — PROPOFOL 10 MG/ML
INJECTION, EMULSION INTRAVENOUS CONTINUOUS PRN
Status: DISCONTINUED | OUTPATIENT
Start: 2021-04-08 | End: 2021-04-08 | Stop reason: SDUPTHER

## 2021-04-08 RX ORDER — ONDANSETRON 2 MG/ML
INJECTION INTRAMUSCULAR; INTRAVENOUS PRN
Status: DISCONTINUED | OUTPATIENT
Start: 2021-04-08 | End: 2021-04-08 | Stop reason: SDUPTHER

## 2021-04-08 RX ORDER — EPINEPHRINE 0.1 MG/ML
SYRINGE (ML) INJECTION PRN
Status: DISCONTINUED | OUTPATIENT
Start: 2021-04-08 | End: 2021-04-08 | Stop reason: HOSPADM

## 2021-04-08 RX ORDER — PROPOFOL 10 MG/ML
INJECTION, EMULSION INTRAVENOUS PRN
Status: DISCONTINUED | OUTPATIENT
Start: 2021-04-08 | End: 2021-04-08 | Stop reason: SDUPTHER

## 2021-04-08 RX ADMIN — SODIUM CHLORIDE: 9 INJECTION, SOLUTION INTRAVENOUS at 08:45

## 2021-04-08 RX ADMIN — SODIUM CHLORIDE 8 MG/HR: 9 INJECTION, SOLUTION INTRAVENOUS at 11:48

## 2021-04-08 RX ADMIN — SODIUM CHLORIDE: 9 INJECTION, SOLUTION INTRAVENOUS at 00:13

## 2021-04-08 RX ADMIN — PROPOFOL 50 MG: 10 INJECTION, EMULSION INTRAVENOUS at 09:05

## 2021-04-08 RX ADMIN — SODIUM CHLORIDE: 9 INJECTION, SOLUTION INTRAVENOUS at 08:54

## 2021-04-08 RX ADMIN — PROPOFOL 50 MG: 10 INJECTION, EMULSION INTRAVENOUS at 09:03

## 2021-04-08 RX ADMIN — Medication 10 ML: at 21:08

## 2021-04-08 RX ADMIN — IOHEXOL 50 ML: 240 INJECTION, SOLUTION INTRATHECAL; INTRAVASCULAR; INTRAVENOUS; ORAL at 15:53

## 2021-04-08 RX ADMIN — PROPOFOL 150 MCG/KG/MIN: 10 INJECTION, EMULSION INTRAVENOUS at 09:05

## 2021-04-08 RX ADMIN — PROPOFOL 100 MG: 10 INJECTION, EMULSION INTRAVENOUS at 08:59

## 2021-04-08 RX ADMIN — IOPAMIDOL 75 ML: 755 INJECTION, SOLUTION INTRAVENOUS at 18:05

## 2021-04-08 RX ADMIN — ONDANSETRON 4 MG: 2 INJECTION INTRAMUSCULAR; INTRAVENOUS at 08:56

## 2021-04-08 RX ADMIN — PROPOFOL 50 MG: 10 INJECTION, EMULSION INTRAVENOUS at 09:01

## 2021-04-08 RX ADMIN — IOHEXOL 50 ML: 240 INJECTION, SOLUTION INTRATHECAL; INTRAVASCULAR; INTRAVENOUS; ORAL at 10:55

## 2021-04-08 RX ADMIN — CARVEDILOL 6.25 MG: 6.25 TABLET, FILM COATED ORAL at 16:29

## 2021-04-08 RX ADMIN — LIDOCAINE HYDROCHLORIDE 100 MG: 20 INJECTION, SOLUTION EPIDURAL; INFILTRATION; INTRACAUDAL; PERINEURAL at 08:59

## 2021-04-08 RX ADMIN — PIPERACILLIN AND TAZOBACTAM 3375 MG: 3; .375 INJECTION, POWDER, LYOPHILIZED, FOR SOLUTION INTRAVENOUS at 11:58

## 2021-04-08 ASSESSMENT — PULMONARY FUNCTION TESTS
PIF_VALUE: 1

## 2021-04-08 ASSESSMENT — PAIN - FUNCTIONAL ASSESSMENT: PAIN_FUNCTIONAL_ASSESSMENT: 0-10

## 2021-04-08 ASSESSMENT — PAIN DESCRIPTION - LOCATION: LOCATION: ABDOMEN

## 2021-04-08 NOTE — OP NOTE
Operative Note      EGD    Large amount of retained blood noted in distal D2/D3. Visualization initially difficult due to active bleeding but appeared to have a lesion on stalk with active bleeding. Scope exchanged for a peds colonoscopy and then able to advance beyond the lesion. Active oozing persisted but exact source on the lesion difficult to identify. 3 clips were placed at the base and epi injected. lesion better visualized at that point. appeared to be a large lipoma. Bx obtained to r/o a GIST but submucosal tissue appeared to be adipose. Pt off of Xarelto for a little more than 24 hrs    Surgical consult. Not sure that this lesion would explain the pain the pt has been having off and on for months.  Recent US showed small gallstones so will ask surgery to address as well    Aline Alejo MD

## 2021-04-08 NOTE — CONSULTS
[facility]     Consult                                                     Patient Name: Reford Estimable  MRN: [id number]  YOB: 1965  Admission date: 4/7/2021 12:21 PM   Date of evaluation: 4/8/2021  Primary Care Physician: No primary care provider on file. Requesting physician: Onel Knox MD  Reason for consult: Abdominal pain  History of Present Illness:    Ms. Billy Prieto is a 63 y.o. female who presents to ED primarily with fatigue that had become so debilitating she had difficulty ambulating. Progressively worse for the last few weeks. Also notes sharp epigastric abdominal pain that is constant but exacerbated with eating, hunching forward. Adjusting to plant-based diet. Additionally notes nausea, hot flashes, dyspnea, icteric sclera, diarrhea with dark but not black stools. Lost 20 lbs unintentionally but gained back over the last year. Denies fever, chills, chest pain, emesis, dysuria. Anemia is known and workup with Dr. Liv Pardo EGD/colonoscopy 10/2020 was unrevealing. Cholelithiasis also confirmed by prior ultrasound. EGD today showed a bleeding lipoma in second portion of duodenum that was controlled. Abdominal surgical history includes uterine fibroidectomy and tubal ligation. Medical history includes CHF, atrial fibrillation, medical coagulopathy on Xarelto, DERECK, GERD.  No personal or family history of colorectal, pancreatic, or gastric malignancy    Past Medical History:        Diagnosis Date    Anemia     Atrial fibrillation and flutter (HCC)     Atrial flutter (HCC)     CHB (complete heart block) (HCC)     Class 1 obesity without serious comorbidity with body mass index (BMI) of 33.0 to 33.9 in adult 9/6/2019    Congenital heart disease     GERD (gastroesophageal reflux disease)     Headache(784.0)     History of complete heart block     Hyperlipidemia     Hypertension     PONV (postoperative nausea and vomiting)     Sleep apnea     uses CPAP    Syncope     Systolic CHF, chronic ([facility] [address] 10/3/2018       Past Surgical History:        Procedure Laterality Date    CARDIAC DEFIBRILLATOR PLACEMENT  01/2021    [employer]tr[employer]    COLONOSCOPY N/A 10/27/2020    COLONOSCOPY POLYPECTOMY SNARE/COLD BIOPSY performed by Polo Miller MD at [place] [address]  11/29/12    dual chamber PPM, [employer]    TUBAL LIGATION      UPPER GASTROINTESTINAL ENDOSCOPY N/A 10/27/2020    EGD DIAGNOSTIC ONLY performed by Polo Miller MD at [place] [address] N/A 4/8/2021    EGD CONTROL HEMORRHAGE WITH APPLICATION OF 3 CLIPS TO DUODENAL MASS performed by Luiz Alfred MD at [place] [address] N/A 4/8/2021    EGD BIOPSY DUODENAL MASS performed by Luiz Alfred MD at [place] [address] N/A 4/8/2021    EGD SUBMUCOSAL INJECTION OF 0.6 MG OF EPINEPRINE INTO BASE OF DUODENAL MASS performed by Luiz Alfred MD at [place] [address]         Scheduled Meds:   piperacillin-tazobactam  3,375 mg Intravenous Q8H    amiodarone  200 mg Oral Daily    atorvastatin  40 mg Oral Daily    furosemide  20 mg Oral Daily    NIFEdipine  30 mg Oral Daily    spironolactone  25 mg Oral Daily    sodium chloride flush  5-40 mL Intravenous 2 times per day    carvedilol  6.25 mg Oral BID WC     Continuous Infusions:   sodium chloride 100 mL/hr at 04/08/21 0845    sodium chloride      pantoprozole (PROTONIX) infusion 8 mg/hr (04/08/21 1515)    sodium chloride       PRN Meds:.sodium chloride, sodium chloride flush, sodium chloride, promethazine **OR** ondansetron, polyethylene glycol, acetaminophen **OR** acetaminophen    Prior to Admission medications    Medication Sig Start Date End Date Taking?  Authorizing Provider   amiodarone (CORDARONE) 200 MG tablet Take 1 tablet by mouth daily 2/24/21  Yes Jason Aguiar APRN - CNP   NIFEdipine (ADALAT CC) 30 MG extended release tablet Take 1 tablet by mouth daily 2/23/21  Yes Geovanni Saravia MD   vitamin D (ERGOCALCIFEROL) 1.25 MG (22264 UT) CAPS capsule Take 1 capsule by mouth once a week 2/18/21  Yes CATRACHITO Nolen   sacubitril-valsartan (ENTRESTO) 24-26 MG per tablet Take 0.5 tablets by mouth nightly 2/17/21  Yes CATRACHITO Nolen   carvedilol (COREG) 25 MG tablet Take 1 tablet by mouth 2 times daily 1/25/21  Yes Paty Damon MD   furosemide (LASIX) 20 MG tablet Take 1 tablet by mouth daily 1/26/21  Yes Paty Damon MD   Multiple Vitamins-Minerals (THERAPEUTIC MULTIVITAMIN-MINERALS) tablet Take 1 tablet by mouth daily   Yes Historical Provider, MD   XARELTO 20 MG TABS tablet TAKE ONE TABLET BY MOUTH DAILY WITH BREAKFAST 10/20/20  Yes Heidy Lieberman MD   spironolactone (ALDACTONE) 25 MG tablet Take 1 tablet by mouth daily 8/31/20  Yes CATRACHITO Nolen   atorvastatin (LIPITOR) 40 MG tablet Take 1 tablet by mouth daily 11/18/16  Yes Thu Killian MD        Allergies:  Latex, Codeine, Lisinopril, Nitroglycerin, Sulfa antibiotics, and Hydralazine    Social History:   Social History     Socioeconomic History    Marital status:      Spouse name: None    Number of children: 3    Years of education: None    Highest education level: None   Occupational History    None   Social Needs    Financial resource strain: None    Food insecurity     Worry: None     Inability: None    Transportation needs     Medical: None     Non-medical: None   Tobacco Use    Smoking status: Never Smoker    Smokeless tobacco: Never Used   Substance and Sexual Activity    Alcohol use: No    Drug use: No    Sexual activity: Yes     Partners: Male   Lifestyle    Physical activity     Days per week: None     Minutes per session: None    Stress: None   Relationships    Social connections     Talks on phone: None     Gets together: None     Attends [employer] witness service: None Active member of club or organization: None     Attends meetings of clubs or organizations: None     Relationship status: None    Intimate partner violence     Fear of current or ex partner: None     Emotionally abused: None     Physically abused: None     Forced sexual activity: None   Other Topics Concern    None   Social History Narrative    None       Family History:    Family History   Problem Relation Age of Onset    Cancer Father     Heart Disease Neg Hx     High Blood Pressure Neg Hx     High Cholesterol Neg Hx        Review of Systems:  CONSTITUTIONAL:  Negative except as above  HEENT:  negative  RESPIRATORY:  negative  CARDIOVASCULAR:  negative  GASTROINTESTINAL:  negative except as above   GENITOURINARY:  negative  HEMATOLOGIC/LYMPHATIC:  negative  NEUROLOGICAL:  Negative      Vital Signs:  Patient Vitals for the past 24 hrs:   BP Temp Temp src Pulse Resp SpO2 Weight   04/08/21 1627 124/78 97.6 °F (36.4 °C) Axillary 74 18 100 % --   04/08/21 1225 116/78 98.2 °F (36.8 °C) Oral 79 18 100 % --   04/08/21 1023 114/66 97.7 °F (36.5 °C) Oral 79 18 94 % --   04/08/21 1000 130/80 -- -- 81 19 98 % --   04/08/21 0950 126/69 97.7 °F (36.5 °C) Temporal 76 16 99 % --   04/08/21 0945 123/67 -- -- 77 20 98 % --   04/08/21 0940 111/71 -- -- 78 20 97 % --   04/08/21 0934 117/64 97.4 °F (36.3 °C) Temporal 77 21 97 % --   04/08/21 0824 108/67 98.9 °F (37.2 °C) Temporal 80 16 100 % --   04/08/21 0815 -- -- -- 80 -- -- --   04/08/21 0800 120/69 97.9 °F (36.6 °C) Oral 76 18 100 % --   04/08/21 0453 -- -- -- -- -- -- 203 lb 1.6 oz (92.1 kg)   04/08/21 0417 102/62 98.2 °F (36.8 °C) Oral 75 18 98 % --   04/08/21 0338 103/66 97.9 °F (36.6 °C) Oral 73 18 96 % --   04/08/21 0128 108/74 98.2 °F (36.8 °C) Oral 74 18 96 % --   04/08/21 0052 101/62 98.3 °F (36.8 °C) Oral 75 18 98 % --   04/07/21 2300 102/69 97.5 °F (36.4 °C) Oral 76 16 98 % --   04/07/21 1959 106/70 98.4 °F (36.9 °C) Oral 75 16 99 % --   04/07/21 1745 116/68 97.9 °F (36.6 °C) Oral 61 16 97 % --      TEMPERATURE HISTORY 24H: Temp (24hrs), Av °F (36.7 °C), Min:97.4 °F (36.3 °C), Max:98.9 °F (37.2 °C)    BLOOD PRESSURE HISTORY: Systolic (40CAK), KJB:451 , Min:96 , MAS:083    Diastolic (42KEG), RYF:48, Min:52, Max:80    Admission Weight: 198 lb (89.8 kg)       Intake/Output Summary (Last 24 hours) at 2021 1702  Last data filed at 2021 1005  Gross per 24 hour   Intake 870.83 ml   Output --   Net 870.83 ml     Drain/tube Output:         Physical Exam:  CONSTITUTIONAL:  alert, no apparent distress   NEUROLOGIC:  Mental Status Exam:  Level of Alertness:   awake  Orientation:  Oriented to person, place, time  EYES:  sclera clear  HENT:  normocepalic, without obvious abnormality  NECK:  supple, symmetrical, trachea midline  LUNGS:  clear to auscultation  CARDIOVASCULAR:  regular rate and rhythm   ABDOMEN: soft, non-distended, mild epigastric tenderness with radiation to right upper quadrant  SKIN:  no bruising or bleeding, normal skin color, texture, turgor and no redness, warmth, or swelling      Labs:    CBC:    Recent Labs     21  1012 21  1012 21  2338 21  0552 21  1046   WBC 10.4  --   --  8.0  --    HGB 7.1*   < > 6.6* 7.8* 8.0*   HCT 21.6*   < > 20.1* 23.9* 24.4*     --   --  189  --     < > = values in this interval not displayed. BMP:    Recent Labs     21  1012 21  0552    140   K 3.7 3.5    110   CO2 23 23   BUN 25* 22*   CREATININE 0.8 0.8   GLUCOSE 107* 102*     Hepatic:   Recent Labs     21  1012   AST 23   ALT 20   BILITOT <0.2   ALKPHOS 52     Amylase: No results for input(s): AMYLASE in the last 72 hours. Lipase:   Recent Labs     21  1012   LIPASE 25.0     Mag:    No results for input(s): MG in the last 72 hours. Phos:   No results for input(s): PHOS in the last 72 hours.    Coags:   Recent Labs     21  1012   INR 1.52*       Imaging:  I have personally reviewed the following films:  Xr Chest Portable    Result Date: 4/7/2021  EXAMINATION: ONE XRAY VIEW OF THE CHEST 4/7/2021 9:53 am COMPARISON: 02/08/2021 HISTORY: ORDERING SYSTEM PROVIDED HISTORY: CP TECHNOLOGIST PROVIDED HISTORY: Reason for exam:->CP Reason for Exam: Fatigue (Reports taking medication for her heart, believes it to be \"amiodarone\" since January that makes her feel weak. ) Acuity: Unknown Type of Exam: Unknown FINDINGS: Left subclavian triple lead pacer noted. Cardiomegaly. Pulmonary vasculature within normal limits. Lungs clear. Costophrenic angles sharp     No active cardiopulmonary disease       Cultures:  Relevant cultures documented under results section     Assessment:  Patient Active Problem List   Diagnosis    HTN (hypertension)    Other and unspecified hyperlipidemia    Congenital heart block    Dyspnea on exertion    Headache    LVH (left ventricular hypertrophy)    Persistent atrial fibrillation (HCC)    Chest pain    Systolic CHF, chronic (HCC)    Hypersomnia    Obstructive sleep apnea (adult) (pediatric)    LV dysfunction    Left subclavian vein thrombosis (HCC)    Dilated cardiomyopathy (HCC)    Class 1 obesity without serious comorbidity with body mass index (BMI) of 33.0 to 33.9 in adult    Paroxysmal ventricular tachycardia ([address]    ICD (implantable cardioverter-defibrillator), biventricular, in situ    Exertional dyspnea    Iron deficiency anemia    Fatigue    Symptomatic anemia     Duodenal mass  Cholelithiasis  Upper GI bleed  Acute blood loss anemia  CHF  Atrial fibrillation  Medical coagulopathy    Plan:  1. Monitor for active bleeding, vital signs, passing blood, hemoglobin. If re-bleeds, will need IR consultation for embolization  2. Hold anticoagulation  3. PPI gtt per GI  4. Pathology pending, check tumor markers. Will eventually likely need resection. Ideally continue workup as outpatient with endoscopic ultrasound. CT pancreas protocol ordered  5.  Antibiotics for possible cholecystitis, may need cholecystectomy at some point. Would be part of whipple if needed. Delay cholecystectomy for now  6. Pain medication and antiemetics as needed with caution as may mask worsening exam  7. Defer management of remainder of medical comorbidities to primary and consulting teams    This plan was discussed at length with the patient. She was understanding and in agreement with the plan  Thank you for the consult and allowing me to participate in the care of this patient. I look forward to following her this admission    Flavio Garnica MD, FACS  4/8/2021  5:02 PM

## 2021-04-08 NOTE — PROGRESS NOTES
Pt back up to unit from CT. VSS. Pt repositioned in bed and expresses no further needs at this time. Call light in reach, fall precautions in place.

## 2021-04-08 NOTE — PROGRESS NOTES
Pt arrived from Endo to PACU bay 8. Report received from OR staff. Pt arouses easily to voice. RA, NSR, VSS. Will continue to monitor.

## 2021-04-08 NOTE — ANESTHESIA PRE PROCEDURE
 Persistent atrial fibrillation (HCC) I48.19    Chest pain U33.4    Systolic CHF, chronic (HCC) I50.22    Hypersomnia G47.10    Obstructive sleep apnea (adult) (pediatric) G47.33    LV dysfunction I51.9    Left subclavian vein thrombosis (HCC) I82. B12    Dilated cardiomyopathy (HCC) I42.0    Class 1 obesity without serious comorbidity with body mass index (BMI) of 33.0 to 33.9 in adult E66.9, Z68.33    Paroxysmal ventricular tachycardia (HCC) I47.2    ICD (implantable cardioverter-defibrillator), biventricular, in situ Z95.810    Exertional dyspnea R06.00    Iron deficiency anemia D50.9    Fatigue R53.83       Past Medical History:        Diagnosis Date    Anemia     Atrial fibrillation and flutter (HCC)     Atrial flutter (HCC)     CHB (complete heart block) (HCC)     Class 1 obesity without serious comorbidity with body mass index (BMI) of 33.0 to 33.9 in adult 9/6/2019    Congenital heart disease     GERD (gastroesophageal reflux disease)     Headache(784.0)     History of complete heart block     Hyperlipidemia     Hypertension     PONV (postoperative nausea and vomiting)     Sleep apnea     uses CPAP    Syncope     Systolic CHF, chronic ([facility] [address] 10/3/2018       Past Surgical History:        Procedure Laterality Date    CARDIAC DEFIBRILLATOR PLACEMENT  01/2021    [employer]    COLONOSCOPY N/A 10/27/2020    COLONOSCOPY POLYPECTOMY SNARE/COLD BIOPSY performed by Karlene Bourgeois MD at [place] [address]  11/29/12    dual chamber PPM, [employer]    TUBAL LIGATION      UPPER GASTROINTESTINAL ENDOSCOPY N/A 10/27/2020    EGD DIAGNOSTIC ONLY performed by Karlene Bourgeois MD at [place] [address]         Social History:    Social History     Tobacco Use    Smoking status: Never Smoker    Smokeless tobacco: Never Used   Substance Use Topics    Alcohol use:  No                                Counseling given: Not Answered      Vital Signs (Current): There were no vitals filed for this visit. BP Readings from Last 3 Encounters:   04/08/21 120/69   03/17/21 106/64   03/03/21 105/76       NPO Status:                                                                                 BMI:   Wt Readings from Last 3 Encounters:   04/08/21 203 lb 1.6 oz (92.1 kg)   03/17/21 201 lb (91.2 kg)   02/23/21 196 lb 10.6 oz (89.2 kg)     There is no height or weight on file to calculate BMI.    CBC:   Lab Results   Component Value Date    WBC 8.0 04/08/2021    RBC 2.72 04/08/2021    HGB 7.8 04/08/2021    HCT 23.9 04/08/2021    MCV 87.8 04/08/2021    RDW 22.5 04/08/2021     04/08/2021       CMP:   Lab Results   Component Value Date     04/08/2021    K 3.5 04/08/2021    K 3.7 04/07/2021     04/08/2021    CO2 23 04/08/2021    BUN 22 04/08/2021    CREATININE 0.8 04/08/2021    GFRAA >60 04/08/2021    AGRATIO 1.7 04/07/2021    LABGLOM >60 04/08/2021    GLUCOSE 102 04/08/2021    PROT 6.8 04/07/2021    CALCIUM 8.6 04/08/2021    BILITOT <0.2 04/07/2021    ALKPHOS 52 04/07/2021    AST 23 04/07/2021    ALT 20 04/07/2021       POC Tests: No results for input(s): POCGLU, POCNA, POCK, POCCL, POCBUN, POCHEMO, POCHCT in the last 72 hours.     Coags:   Lab Results   Component Value Date    PROTIME 17.7 04/07/2021    INR 1.52 04/07/2021    APTT 36.4 02/08/2021       HCG (If Applicable): No results found for: PREGTESTUR, PREGSERUM, HCG, HCGQUANT     ABGs: No results found for: PHART, PO2ART, ICN1HZG, FAB6CUH, BEART, R8YIOJTN     Type & Screen (If Applicable):  No results found for: LABABO, LABRH    Drug/Infectious Status (If Applicable):  No results found for: HIV, HEPCAB    COVID-19 Screening (If Applicable):   Lab Results   Component Value Date    COVID19 Not Detected 02/08/2021    COVID19 Not Detected 10/21/2020         Anesthesia Evaluation  Patient summary reviewed and Nursing notes reviewed   history of anesthetic

## 2021-04-08 NOTE — PROGRESS NOTES
Received from floor per stretcher by transport. Alert and oriented. Respirations easy on room air. VSS.

## 2021-04-08 NOTE — CARE COORDINATION
CM attempted to see pt and having a bedside procedure done at this time. Pt had EGD today with 3 clips placed. Surgery and GI following, CT of pancreas to be done. Pt currently on room air and no therapy ordered at this time. CM will follow for d/c plan.      Clay Bernstien RN, BSN  [phone]

## 2021-04-08 NOTE — ANESTHESIA POSTPROCEDURE EVALUATION
Department of Anesthesiology  Postprocedure Note    Patient: Mahsa Sanchez  MRN: [id number]  YOB: 1965  Date of evaluation: 4/8/2021  Time:  10:59 AM     Procedure Summary     Date: 04/08/21 Room / Location: [address] [place] [address]    Anesthesia Start: 7394 Anesthesia Stop: 1200    Procedures:       EGD CONTROL HEMORRHAGE WITH APPLICATION OF 3 CLIPS TO DUODENAL MASS (N/A Abdomen)      EGD BIOPSY DUODENAL MASS (N/A Abdomen)      EGD SUBMUCOSAL INJECTION OF 0.6 MG OF EPINEPRINE INTO BASE OF DUODENAL MASS (N/A Abdomen) Diagnosis: (Anemia)    Surgeons: Faisal Donovan MD Responsible Provider: Clint Lorenzo MD    Anesthesia Type: MAC ASA Status: 3          Anesthesia Type: MAC    Sandra Phase I: Sandra Score: 10    Sandra Phase II:      Last vitals: Reviewed and per EMR flowsheets.        Anesthesia Post Evaluation    Patient location during evaluation: PACU  Patient participation: complete - patient participated  Level of consciousness: awake and alert  Airway patency: patent  Nausea & Vomiting: no nausea and no vomiting  Complications: no  Cardiovascular status: hemodynamically stable  Respiratory status: acceptable  Hydration status: stable

## 2021-04-08 NOTE — PROGRESS NOTES
Prior to being transferred to floor, patient stated she was having pain in stomach. 5/10. Patient declined any pain meds until she was back up on floor.

## 2021-04-08 NOTE — PROGRESS NOTES
Shift assessment completed. Routine vitals stable. Scheduled medications given. Patient is awake, alert and oriented. Respirations are easy and unlabored. Patient does not appear to be in distress. Call light within reach. Bed alarm on. Microcalcifications of the breast  04/18/2018  Left Breast  Active  Danelle Velasquez

## 2021-04-09 LAB
A/G RATIO: 1.7 (ref 1.1–2.2)
ALBUMIN SERPL-MCNC: 3.8 G/DL (ref 3.4–5)
ALP BLD-CCNC: 42 U/L (ref 40–129)
ALT SERPL-CCNC: 16 U/L (ref 10–40)
ANION GAP SERPL CALCULATED.3IONS-SCNC: 8 MMOL/L (ref 3–16)
APTT: 18.3 SEC (ref 24.2–36.2)
AST SERPL-CCNC: 20 U/L (ref 15–37)
BASOPHILS ABSOLUTE: 0 K/UL (ref 0–0.2)
BASOPHILS RELATIVE PERCENT: 0.5 %
BILIRUB SERPL-MCNC: 0.4 MG/DL (ref 0–1)
BUN BLDV-MCNC: 15 MG/DL (ref 7–20)
CA 125: 3.8 U/ML (ref 0–35)
CALCIUM SERPL-MCNC: 8.4 MG/DL (ref 8.3–10.6)
CEA: 1.2 NG/ML (ref 0–5)
CHLORIDE BLD-SCNC: 109 MMOL/L (ref 99–110)
CO2: 23 MMOL/L (ref 21–32)
CREAT SERPL-MCNC: 0.8 MG/DL (ref 0.6–1.1)
EOSINOPHILS ABSOLUTE: 0.2 K/UL (ref 0–0.6)
EOSINOPHILS RELATIVE PERCENT: 2.5 %
GFR AFRICAN AMERICAN: >60
GFR NON-AFRICAN AMERICAN: >60
GLOBULIN: 2.2 G/DL
GLUCOSE BLD-MCNC: 98 MG/DL (ref 70–99)
HCT VFR BLD CALC: 20.8 % (ref 36–48)
HCT VFR BLD CALC: 21.5 % (ref 36–48)
HCT VFR BLD CALC: 22.5 % (ref 36–48)
HEMOGLOBIN: 6.7 G/DL (ref 12–16)
HEMOGLOBIN: 7.2 G/DL (ref 12–16)
HEMOGLOBIN: 7.3 G/DL (ref 12–16)
INR BLD: 1.07 (ref 0.86–1.14)
LACTIC ACID: 0.8 MMOL/L (ref 0.4–2)
LIPASE: 35 U/L (ref 13–60)
LYMPHOCYTES ABSOLUTE: 1.1 K/UL (ref 1–5.1)
LYMPHOCYTES RELATIVE PERCENT: 15.7 %
MAGNESIUM: 2.2 MG/DL (ref 1.8–2.4)
MCH RBC QN AUTO: 29.6 PG (ref 26–34)
MCHC RBC AUTO-ENTMCNC: 33.3 G/DL (ref 31–36)
MCV RBC AUTO: 89 FL (ref 80–100)
MONOCYTES ABSOLUTE: 0.5 K/UL (ref 0–1.3)
MONOCYTES RELATIVE PERCENT: 7 %
NEUTROPHILS ABSOLUTE: 5.2 K/UL (ref 1.7–7.7)
NEUTROPHILS RELATIVE PERCENT: 74.3 %
PDW BLD-RTO: 22.4 % (ref 12.4–15.4)
PHOSPHORUS: 3.4 MG/DL (ref 2.5–4.9)
PLATELET # BLD: 183 K/UL (ref 135–450)
PMV BLD AUTO: 7.2 FL (ref 5–10.5)
POTASSIUM SERPL-SCNC: 3.5 MMOL/L (ref 3.5–5.1)
PREALBUMIN: 25.4 MG/DL (ref 20–40)
PROTHROMBIN TIME: 12.4 SEC (ref 10–13.2)
RBC # BLD: 2.42 M/UL (ref 4–5.2)
SODIUM BLD-SCNC: 140 MMOL/L (ref 136–145)
TOTAL PROTEIN: 6 G/DL (ref 6.4–8.2)
TRANSFERRIN: 212 MG/DL (ref 200–360)
WBC # BLD: 7 K/UL (ref 4–11)

## 2021-04-09 PROCEDURE — 85014 HEMATOCRIT: CPT

## 2021-04-09 PROCEDURE — 6360000002 HC RX W HCPCS: Performed by: PHYSICIAN ASSISTANT

## 2021-04-09 PROCEDURE — 2580000003 HC RX 258: Performed by: NURSE PRACTITIONER

## 2021-04-09 PROCEDURE — 85018 HEMOGLOBIN: CPT

## 2021-04-09 PROCEDURE — 83690 ASSAY OF LIPASE: CPT

## 2021-04-09 PROCEDURE — 83735 ASSAY OF MAGNESIUM: CPT

## 2021-04-09 PROCEDURE — 36415 COLL VENOUS BLD VENIPUNCTURE: CPT

## 2021-04-09 PROCEDURE — 2580000003 HC RX 258

## 2021-04-09 PROCEDURE — 82378 CARCINOEMBRYONIC ANTIGEN: CPT

## 2021-04-09 PROCEDURE — 36592 COLLECT BLOOD FROM PICC: CPT

## 2021-04-09 PROCEDURE — 2580000003 HC RX 258: Performed by: INTERNAL MEDICINE

## 2021-04-09 PROCEDURE — 6360000002 HC RX W HCPCS: Performed by: NURSE PRACTITIONER

## 2021-04-09 PROCEDURE — 84466 ASSAY OF TRANSFERRIN: CPT

## 2021-04-09 PROCEDURE — 82105 ALPHA-FETOPROTEIN SERUM: CPT

## 2021-04-09 PROCEDURE — 2580000003 HC RX 258: Performed by: HOSPITALIST

## 2021-04-09 PROCEDURE — 80053 COMPREHEN METABOLIC PANEL: CPT

## 2021-04-09 PROCEDURE — 6370000000 HC RX 637 (ALT 250 FOR IP): Performed by: HOSPITALIST

## 2021-04-09 PROCEDURE — 86304 IMMUNOASSAY TUMOR CA 125: CPT

## 2021-04-09 PROCEDURE — 85730 THROMBOPLASTIN TIME PARTIAL: CPT

## 2021-04-09 PROCEDURE — 84100 ASSAY OF PHOSPHORUS: CPT

## 2021-04-09 PROCEDURE — APPSS15 APP SPLIT SHARED TIME 0-15 MINUTES: Performed by: NURSE PRACTITIONER

## 2021-04-09 PROCEDURE — 86301 IMMUNOASSAY TUMOR CA 19-9: CPT

## 2021-04-09 PROCEDURE — 6370000000 HC RX 637 (ALT 250 FOR IP): Performed by: INTERNAL MEDICINE

## 2021-04-09 PROCEDURE — 99255 IP/OBS CONSLTJ NEW/EST HI 80: CPT | Performed by: INTERNAL MEDICINE

## 2021-04-09 PROCEDURE — 84134 ASSAY OF PREALBUMIN: CPT

## 2021-04-09 PROCEDURE — 85610 PROTHROMBIN TIME: CPT

## 2021-04-09 PROCEDURE — 2580000003 HC RX 258: Performed by: PHYSICIAN ASSISTANT

## 2021-04-09 PROCEDURE — 85025 COMPLETE CBC W/AUTO DIFF WBC: CPT

## 2021-04-09 PROCEDURE — APPNB30 APP NON BILLABLE TIME 0-30 MINS: Performed by: NURSE PRACTITIONER

## 2021-04-09 PROCEDURE — 1200000000 HC SEMI PRIVATE

## 2021-04-09 PROCEDURE — C9113 INJ PANTOPRAZOLE SODIUM, VIA: HCPCS | Performed by: PHYSICIAN ASSISTANT

## 2021-04-09 PROCEDURE — 83605 ASSAY OF LACTIC ACID: CPT

## 2021-04-09 PROCEDURE — 99232 SBSQ HOSP IP/OBS MODERATE 35: CPT | Performed by: SURGERY

## 2021-04-09 RX ORDER — AMIODARONE HYDROCHLORIDE 200 MG/1
100 TABLET ORAL DAILY
Status: DISCONTINUED | OUTPATIENT
Start: 2021-04-10 | End: 2021-04-19 | Stop reason: HOSPADM

## 2021-04-09 RX ORDER — PANTOPRAZOLE SODIUM 40 MG/1
40 TABLET, DELAYED RELEASE ORAL
Status: DISCONTINUED | OUTPATIENT
Start: 2021-04-10 | End: 2021-04-14

## 2021-04-09 RX ORDER — SODIUM CHLORIDE 9 MG/ML
INJECTION, SOLUTION INTRAVENOUS
Status: COMPLETED
Start: 2021-04-09 | End: 2021-04-09

## 2021-04-09 RX ORDER — CARVEDILOL 3.12 MG/1
3.12 TABLET ORAL ONCE
Status: COMPLETED | OUTPATIENT
Start: 2021-04-09 | End: 2021-04-09

## 2021-04-09 RX ADMIN — SPIRONOLACTONE 25 MG: 25 TABLET ORAL at 10:23

## 2021-04-09 RX ADMIN — CARVEDILOL 3.12 MG: 3.12 TABLET, FILM COATED ORAL at 18:50

## 2021-04-09 RX ADMIN — PIPERACILLIN AND TAZOBACTAM 3375 MG: 3; .375 INJECTION, POWDER, LYOPHILIZED, FOR SOLUTION INTRAVENOUS at 10:23

## 2021-04-09 RX ADMIN — CARVEDILOL 6.25 MG: 6.25 TABLET, FILM COATED ORAL at 10:23

## 2021-04-09 RX ADMIN — PIPERACILLIN AND TAZOBACTAM 3375 MG: 3; .375 INJECTION, POWDER, LYOPHILIZED, FOR SOLUTION INTRAVENOUS at 01:34

## 2021-04-09 RX ADMIN — AMIODARONE HYDROCHLORIDE 200 MG: 200 TABLET ORAL at 10:23

## 2021-04-09 RX ADMIN — PIPERACILLIN AND TAZOBACTAM 3375 MG: 3; .375 INJECTION, POWDER, LYOPHILIZED, FOR SOLUTION INTRAVENOUS at 18:50

## 2021-04-09 RX ADMIN — NIFEDIPINE 30 MG: 30 TABLET, FILM COATED, EXTENDED RELEASE ORAL at 10:23

## 2021-04-09 RX ADMIN — ACETAMINOPHEN 650 MG: 325 TABLET ORAL at 15:40

## 2021-04-09 RX ADMIN — Medication 10 ML: at 10:23

## 2021-04-09 RX ADMIN — Medication 10 ML: at 22:08

## 2021-04-09 RX ADMIN — FUROSEMIDE 20 MG: 20 TABLET ORAL at 10:23

## 2021-04-09 RX ADMIN — SODIUM CHLORIDE 8 MG/HR: 9 INJECTION, SOLUTION INTRAVENOUS at 01:50

## 2021-04-09 RX ADMIN — SODIUM CHLORIDE: 9 INJECTION, SOLUTION INTRAVENOUS at 15:39

## 2021-04-09 RX ADMIN — ATORVASTATIN CALCIUM 40 MG: 40 TABLET, FILM COATED ORAL at 22:12

## 2021-04-09 RX ADMIN — SODIUM CHLORIDE 100 ML: 9 INJECTION, SOLUTION INTRAVENOUS at 10:28

## 2021-04-09 ASSESSMENT — PAIN SCALES - GENERAL
PAINLEVEL_OUTOF10: 0
PAINLEVEL_OUTOF10: 0

## 2021-04-09 NOTE — PROGRESS NOTES
Physician Progress Note      Luis Irving  CSN #:                  [id number]  :                       1965  ADMIT DATE:       2021 12:21 PM  DISCH DATE:  RESPONDING  PROVIDER #:        Bhavin Gandhi MD          QUERY TEXT:    Pt admitted with fatigue. Noted documentation of upper GI bleed on  by   surgery. If possible, please document in progress notes and discharge   summary:      The medical record reflects the following:  Risk Factors: Unknown etiology pending tests. DD lipoma, pancreatic mass, GIST  Clinical Indicators: HGB a few weeks ago was 11.6, 7.1 on admission. Large   amount of blood in the distal duodenum. Per GI \"Visualization initially   difficult due to active bleeding but appeared to have a lesion on stalk with   active bleeding. ..to be a large lipoma. \"  Treatment: PRBC, epinephrine, 3 clips  Options provided:  -- Upper GI bleed confirmed present on admission  -- Upper GI bleed confirmed not present on admission  -- Upper GI bleed ruled out  -- Other - I will add my own diagnosis  -- Disagree - Not applicable / Not valid  -- Disagree - Clinically unable to determine / Unknown  -- Refer to Clinical Documentation Reviewer    PROVIDER RESPONSE TEXT:    The diagnosis of Upper GI bleed was confirmed as present on admission.     Query created by: Jam Castañeda on 2021 12:57 PM      Electronically signed by:  Bhavin Gandhi MD 2021 1:26 PM

## 2021-04-09 NOTE — PROGRESS NOTES
Gastroenterology Progress Note            Willadean Baumgarten is a 63 y.o. female patient. 1. Symptomatic anemia    2. Adequate anticoagulation on anticoagulant therapy    3. Profound fatigue    4. Status post implantation of automatic cardioverter/defibrillator (AICD)        SUBJECTIVE:  Pt denies any abd pain or melena (no BM). ROS:  Cardiovascular ROS: no chest pain or dyspnea on exertion  Gastrointestinal ROS: no abdominal pain, change in bowel habits, or black or bloody stools  Respiratory ROS: no cough, shortness of breath, or wheezing    Physical    VITALS:  BP (!) 150/80   Pulse 89   Temp 97.8 °F (36.6 °C) (Axillary)   Resp 18   Ht 5' 6.5\" (1.689 m)   Wt 202 lb 8 oz (91.9 kg)   SpO2 100%   BMI 32.19 kg/m²   TEMPERATURE:  Current - Temp: 97.8 °F (36.6 °C); Max - Temp  Av.7 °F (36.5 °C)  Min: 97.4 °F (36.3 °C)  Max: 98.2 °F (36.8 °C)    NAD  RRR, Nl s1s2  Lungs CTA Bilaterally, normal effort  Abdomen soft, ND, NT, no HSM, Bowel sounds normal  AAOx3, No asterixis     Data    CBC:   Lab Results   Component Value Date    WBC 7.0 2021    RBC 2.42 2021    HGB 7.2 2021    HCT 21.5 2021    MCV 89.0 2021    MCH 29.6 2021    MCHC 33.3 2021    RDW 22.4 2021     2021    MPV 7.2 2021     Hepatic Function Panel:    Lab Results   Component Value Date    ALKPHOS 42 2021    ALT 16 2021    AST 20 2021    PROT 6.0 2021    BILITOT 0.4 2021    BILIDIR <0.2 2020    IBILI see below 2020       Assessment:     GI bleed: Pt with chronic iron def of unknown origin admitted with jessica GI blood loss. EGD found a large lesion in the 3rd portion of the duodenum. After bleeding was slowed with clips and epi, it appeared to be a large lipoma (GIST less likely but possible). Bx obtained. CT did not demonstrate an overt lipoma (will review with RAD).  Pt did well since the procedure with no signs of active bleeding. Hg stable. Pt also has been having chronic upper abd pain - ? Ulcerated lipoma vs cholelithiasis. Pt now off Xarelto for 48 hrs. Trying to arrange for EUS today but unlikely to happen. ?  MRI but pt with clips (can do an exam but limited scan time - <15 min). Hg stable. Plan:  If no further bleeding, anticipate attempt at EUS next week although not sure can reach with an EUS.   Surgery on board  Cardiology likely will need to be on board if surgery anticipated given pt's significant heart history   Continue PPI for now  Clears for now    Mika Oro MD

## 2021-04-09 NOTE — CONSULTS
[address]   Electrophysiology Consultation   Date: 4/9/2021  Reason for Consultation: Cardiomypathy   Consult Requesting Physician: Adwoa Medina MD   Chief Complaint   Patient presents with    Fatigue     Reports taking medication for her heart, believes it to be \"amiodarone\" since January that makes her feel weak. CC: Fatigue    HPI: Renay Finn is a 63 y.o. female who presented to hospital complaining of generalized fatigue and not feeling well. Patient noted to be severely anemic and received 20 soft blood transfusion. Seen by GI and underwent EGD and found to have active bleeding and treated with clips and epi injection. She had a lesion and distal stomach appeared to be a large lipoma. Biopsy has been done. Patient has history of atrial fibrillation/flutter and was on Xarelto which has been stopped. 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 Cardiac catheterization on 1/20/2020 with normal coronaries. RV lead was extracted and biventricular AICD was inserted on 1/22/2021. She was also loaded with amiodarone. Due to atrial fibrillation/flutter she was treated with Xarelto. She has cardioversion on 1/20/2021. Assessment and plan:     -Paroxysmal atrial fibrillation/ flutter   Patient has history of atrial flutter/fibrillation in the past and has high [id number] score require anticoagulation. However he has had GI bleeding and is not a good candidate for long-term anticoagulation. Status post EGD   GI opinion with regard to when anticoagulation can be resumed    COURTNEY closure with watchman were discussed. Other options including AtriClip by CT surgery and LARIAT were discussed. Risks, benefits and alternatives were discussed.  Explained that these procedures may decrease the risk of stroke but it cannot eliminate the risk of stroke. Watchman device implantation was discussed with patient. I explained to the patient that most ischemic stroke in patient with atrial fibrillation are due to a thrombus/clot in the left atrial appendage. However some patients do have a stroke with different reasons including hemorrhage. Watchman device only protects against the stroke associated with left atrial appendage related clots/thrombus. Success rate and complications associated with such a procedure have been discussed. It was also emphasized that the watchman procedure can only be pursued if there is no evidence of thrombus in the left atrial appendage. It was also explained the need for short term coumadin therapy followed by antiplatelet therapy. Patient will need a preoperative ZAMZAM. I have explained that Watchman procedure is an invasive procedure and has risks associated with it including but not limited to bleeding, injury to the heart, infection, radiation exposure, stroke, cardiac perforation, tamponade, need for emergent heart surgery, myocardial infarction and death. Will arrange for ZAMZAM as outpatient to assess for watchman eligibility and further work-up. Resume anticoagulation when okay with GI. If he is at high risk for bleeding per GI opinion then will hold off on anticoagulation for now. - Patient also states that the amiodarone causing her fatigue. Reduce the dose 100 mg.       -AV block status post biventricular AICD for HFrEF   Follow-up in device clinic    -HFrEF   On Coreg   Resume Entresto when blood pressure stable and no further intervention is needed. Needs follow-up with heart failure team      No further EP recommendation at this time we will arrange for outpatient ZAMZAM. Resume Entresto at discharge. Anticoagulation timing per GI if she can tolerate it. Schedule office will arrange for outpatient ZAMZAM and follow-up. Will sign off. Call with questions. Discussed with nursing staff.      Active obtained and negative except as mentioned in HPI    Prior to Admission medications    Medication Sig Start Date End Date Taking?  Authorizing Provider   amiodarone (CORDARONE) 200 MG tablet Take 1 tablet by mouth daily 2/24/21  Yes Joanie Nurse, CATRACHITO - CNP   NIFEdipine (ADALAT CC) 30 MG extended release tablet Take 1 tablet by mouth daily 2/23/21  Yes Nevin Hutson MD   vitamin D (ERGOCALCIFEROL) 1.25 MG (12085 UT) CAPS capsule Take 1 capsule by mouth once a week 2/18/21  Yes CATRACHITO Brasher   sacubitril-valsartan (ENTRESTO) 24-26 MG per tablet Take 0.5 tablets by mouth nightly 2/17/21  Yes CATRACHITO Brasher   carvedilol (COREG) 25 MG tablet Take 1 tablet by mouth 2 times daily 1/25/21  Yes Trenton Ramey MD   furosemide (LASIX) 20 MG tablet Take 1 tablet by mouth daily 1/26/21  Yes Trenton Ramey MD   Multiple Vitamins-Minerals (THERAPEUTIC MULTIVITAMIN-MINERALS) tablet Take 1 tablet by mouth daily   Yes Historical Provider, MD   XARELTO 20 MG TABS tablet TAKE ONE TABLET BY MOUTH DAILY WITH BREAKFAST 10/20/20  Yes Alma Delia Cook MD   spironolactone (ALDACTONE) 25 MG tablet Take 1 tablet by mouth daily 8/31/20  Yes CATRACHITO Brasher   atorvastatin (LIPITOR) 40 MG tablet Take 1 tablet by mouth daily 11/18/16  Yes Yasmin Xavier MD       Past Medical History:   Diagnosis Date    Anemia     [facility]     Atrial flutter [facility]     CHB (complete heart block) ([facility]     Class 1 obesity without serious comorbidity with body mass index (BMI) of 33.0 to 33.9 in adult 9/6/2019    Congenital heart disease     GERD (gastroesophageal reflux disease)     Headache(784.0)     History of complete heart block     Hyperlipidemia     Hypertension     PONV (postoperative nausea and vomiting)     Sleep apnea     uses CPAP    Syncope     Systolic CHF, chronic ([facility] [address] 10/3/2018        Past Surgical History:   Procedure Laterality Date    CARDIAC DEFIBRILLATOR PLACEMENT  01/2021    [employer]    COLONOSCOPY N/A 10/27/2020    COLONOSCOPY POLYPECTOMY SNARE/COLD BIOPSY performed by Arnulfo Gilmore MD at [place] [address]  11/29/12    dual chamber PPM, [employer]    TUBAL LIGATION      UPPER GASTROINTESTINAL ENDOSCOPY N/A 10/27/2020    EGD DIAGNOSTIC ONLY performed by Arnulfo Gilmore MD at [place] [address] 4/8/2021    EGD CONTROL HEMORRHAGE WITH APPLICATION OF 3 CLIPS TO DUODENAL MASS performed by Valentine Martinez MD at [place] [address] N/A 4/8/2021    EGD BIOPSY DUODENAL MASS performed by Valentine Martinez MD at [place] [address] N/A 4/8/2021    EGD SUBMUCOSAL INJECTION OF 0.6 MG OF EPINEPRINE INTO BASE OF DUODENAL MASS performed by Valentine Martinez MD at [place] [address]         Allergies   Allergen Reactions    Latex      rash    Codeine      Hives      Lisinopril      cough    Nitroglycerin Hives    Sulfa Antibiotics Nausea Only    Hydralazine      headaches       Social History:  Reviewed. reports that she has never smoked. She has never used smokeless tobacco. She reports that she does not drink alcohol or use drugs. Family History:  Reviewed. Reviewed. No family history of SCD. Relevant and available labs, and cardiovascular diagnostics reviewed. Reviewed.    Recent Labs     04/07/21  1012 04/08/21  0552 04/09/21  0448    140 140   K 3.7 3.5 3.5    110 109   CO2 23 23 23   PHOS  --   --  3.4   BUN 25* 22* 15   CREATININE 0.8 0.8 0.8     Recent Labs     04/07/21  1012 04/07/21  1012 04/08/21  0552 04/08/21  0552 04/08/21  1656 04/08/21  2320 04/09/21  0448   WBC 10.4  --  8.0  --   --   --  7.0   HGB 7.1*   < > 7.8*   < > 7.9* 7.1* 7.2*   HCT 21.6*   < > 23.9*   < > 23.9* 22.2* 21.5*   MCV 91.4  --  87.8  --   --   --  89.0     --  189  --   --   --  183

## 2021-04-09 NOTE — PROGRESS NOTES
[address] [facility]        Progress Note    Patient Name: Vishal Baldwin  MRN: [id number]  YOB: 1965  Date of Evaluation: 2021    Chief Complaint: Fatigue    Subjective:  No acute events overnight  Pain resolved, denies any at present  No nausea or vomiting, tolerating clear liquid diet  No further blood per rectum or stool  Resting in bed at this time      Vital Signs:  Patient Vitals for the past 24 hrs:   BP Temp Temp src Pulse Resp SpO2 Weight   21 0416 -- -- -- -- -- -- 202 lb 8 oz (91.9 kg)   21 0404 (!) 150/80 97.8 °F (36.6 °C) Axillary 89 18 100 % --   21 2303 106/74 98 °F (36.7 °C) Oral 75 18 96 % --   21 2100 (!) 104/58 97.9 °F (36.6 °C) Oral 69 18 -- --   21 1830 117/75 97.4 °F (36.3 °C) Axillary 77 18 100 % --   21 1627 124/78 97.6 °F (36.4 °C) Axillary 74 18 100 % --   21 1225 116/78 98.2 °F (36.8 °C) Oral 79 18 100 % --   21 1023 114/66 97.7 °F (36.5 °C) Oral 79 18 94 % --   21 1000 130/80 -- -- 81 19 98 % --   21 0950 126/69 97.7 °F (36.5 °C) Temporal 76 16 99 % --   21 0945 123/67 -- -- 77 20 98 % --   21 0940 111/71 -- -- 78 20 97 % --   21 0934 117/64 97.4 °F (36.3 °C) Temporal 77 21 97 % --   21 0824 108/67 98.9 °F (37.2 °C) Temporal 80 16 100 % --      TEMPERATURE HISTORY 24H: Temp (24hrs), Av.9 °F (36.6 °C), Min:97.4 °F (36.3 °C), Max:98.9 °F (37.2 °C)    BLOOD PRESSURE HISTORY: Systolic (54SCK), NQU:852 , Min:96 , MZO:367    Diastolic (31MBY), QIF:21, Min:53, Max:80      Intake/Output:  I/O last 3 completed shifts: In: 475 [I.V.:475]  Out: -   No intake/output data recorded.   Drain/tube Output:       Physical Exam:  General: awake, alert, oriented to  person, place, time  Cardiovascular:  regular rate and rhythm and no murmur noted  Lungs: clear to auscultation  Abdomen: soft, nontender, nondistended, bowel sounds normal     Labs:  CBC:    Recent Labs 04/07/21  1012 04/07/21  1012 04/08/21  0552 04/08/21  0552 04/08/21  1656 04/08/21  2320 04/09/21  0448   WBC 10.4  --  8.0  --   --   --  7.0   HGB 7.1*   < > 7.8*   < > 7.9* 7.1* 7.2*   HCT 21.6*   < > 23.9*   < > 23.9* 22.2* 21.5*     --  189  --   --   --  183    < > = values in this interval not displayed. BMP:    Recent Labs     04/07/21  1012 04/08/21  0552 04/09/21  0448    140 140   K 3.7 3.5 3.5    110 109   CO2 23 23 23   BUN 25* 22* 15   CREATININE 0.8 0.8 0.8   GLUCOSE 107* 102* 98     Hepatic:    Recent Labs     04/07/21  1012 04/09/21  0448   AST 23 20   ALT 20 16   BILITOT <0.2 0.4   ALKPHOS 52 42     Amylase:  No results found for: AMYLASE  Lipase:    Lab Results   Component Value Date    LIPASE 35.0 04/09/2021    LIPASE 25.0 04/07/2021    LIPASE 25.0 07/10/2020      Mag:    Lab Results   Component Value Date    MG 2.20 04/09/2021    MG 2.00 02/09/2021     Phos:     Lab Results   Component Value Date    PHOS 3.4 04/09/2021    PHOS 3.2 02/09/2021      Coags:   Lab Results   Component Value Date    PROTIME 12.4 04/09/2021    INR 1.07 04/09/2021    APTT 18.3 04/09/2021       Cultures:  Anaerobic culture  No results found for: LABANAE  Fungus stain  No results found for requested labs within last 30 days. Gram stain  No results found for requested labs within last 30 days. Organism  No results found for: [facility]  Surgical culture  No results found for: CXSURG  Blood culture 1  Results in Past 30 Days  Result Component Current Result Ref Range Previous Result Ref Range   Blood Culture, Routine No Growth to date. Any change in status will be called. (4/7/2021)  Not in Time Range      Blood culture 2  Results in Past 30 Days  Result Component Current Result Ref Range Previous Result Ref Range   Culture, Blood 2 No Growth to date.   Any change in status will be called. (4/7/2021)  Not in Time Range      Fecal occult  Results in Past 30 Days  Result Component Current Result Ref Range Previous Result Ref Range   Occult Blood Diagnostic Result: POSITIVE  Normal range: Negative   (A) (4/7/2021)  Not in Time Range      GI bacterial pathogens by PCR  No results found for requested labs within last 30 days. C. difficile  No results found for requested labs within last 30 days. Urine culture  No results found for: LABURIN    Pathology:  Pathology results pending     Imaging:  I have personally reviewed the following films:    Ct Abdomen Pelvis W Iv Contrast Additional Contrast? Oral    Result Date: 4/8/2021  EXAMINATION: CT OF THE ABDOMEN AND PELVIS WITH CONTRAST 4/8/2021 6:05 pm TECHNIQUE: CT of the abdomen and pelvis was performed with the administration of intravenous contrast. Multiplanar reformatted images are provided for review. Dose modulation, iterative reconstruction, and/or weight based adjustment of the mA/kV was utilized to reduce the radiation dose to as low as reasonably achievable. COMPARISON: CT abdomen and pelvis July 10, 2020 HISTORY: ORDERING SYSTEM PROVIDED HISTORY: Pancreas protocol, possible pancreatic mass/lesion in 2nd portion duodenum, near ampulla? TECHNOLOGIST PROVIDED HISTORY: Reason for exam:->Pancreas protocol, possible pancreatic mass/lesion in 2nd portion duodenum, near ampulla? Additional Contrast?->Oral Reason for Exam: Pancreas protocol, possible pancreatic mass/lesion in 2nd portion duodenum, near ampulla? Acuity: Acute Type of Exam: Initial FINDINGS: Lower chest: No acute findings. No evidence of metastatic disease of the visualized lower chest. Organs: No definitive pancreatic mass is demonstrated. This includes the area of the 2nd duodenum near the ampulla. No acute findings or evidence of metastatic disease of the liver or other organs throughout the abdomen. GI/Bowel: There are 3 new surgical clips in the proximal jejunum/distal duodenum location at the ligament Treitz location. No acute findings of bowel throughout the abdomen and pelvis.   No obstruction. No evidence of colitis. Pelvis: No evidence of metastatic disease. No acute findings. Normal appearance of the bladder. Fibroid uterus again noted, no significant change. Normal appearance of the bladder. Peritoneum/Retroperitoneum: Normal size lymph nodes. No ascites. No acute findings. Bones/Soft Tissues: No suspicious osteoblastic bone lesions and no definitive suspicious osteolytic bone lesions. No acute osseous findings. No definitive evidence of a pancreatic mass and no evidence of metastatic disease or acute abnormality of the abdomen/pelvis. If persistent concern for an occult pancreatic mass consider endoscopic ultrasound or PET-CT. 3 separate surgical clips placed within bowel lumen near the ligament of Treitz since the comparison. This is presumed to be related to interval GI bleeding treatment. Fibroid uterus without significant change in appearance from the comparison     Scheduled Meds:   piperacillin-tazobactam  3,375 mg Intravenous Q8H    amiodarone  200 mg Oral Daily    atorvastatin  40 mg Oral Daily    furosemide  20 mg Oral Daily    NIFEdipine  30 mg Oral Daily    spironolactone  25 mg Oral Daily    sodium chloride flush  5-40 mL Intravenous 2 times per day    carvedilol  6.25 mg Oral BID WC     Continuous Infusions:   sodium chloride 100 mL/hr at 04/08/21 0845    sodium chloride      pantoprozole (PROTONIX) infusion 8 mg/hr (04/09/21 0150)    sodium chloride       PRN Meds:.sodium chloride, sodium chloride flush, sodium chloride, promethazine **OR** ondansetron, polyethylene glycol, acetaminophen **OR** acetaminophen      Assessment:  63 y.o. female admitted with   1. Symptomatic anemia    2. Adequate anticoagulation on anticoagulant therapy    3. Profound fatigue    4.  Status post implantation of automatic cardioverter/defibrillator (AICD)        Duodenal mass  Abdominal pain  Cholelithiasis  Upper GI bleed  Acute blood loss anemia  CHF, EF 30-35%  Atrial fibrillation  Medical coagulopathy, on Xarelto--last dose on 4/6/2021      Plan:  1. Monitor for bleeding, if re-bleeds would need repeat endoscopy verses IR intervention; PPI gtt, GI following  2. CT pancreas protocol unrevealing, EGD pathology pending, tumor markers pending, eventual EUS per GI  3. Advance to full liquid diet as tolerated; monitor bowel function  4. IV hydration until PO intake is adequate; monitor and correct electrolytes  5. Antibiotics--on Zosyn for possible cholecystitis, no surgical intervention at this time  6. Activity as tolerated  7. PRN analgesics and antiemetics--minimizing narcotics as tolerated  8. Hold anticoagulants  9. Management of medical comorbid etiologies per primary team and consulting services    EDUCATION:  Educated patient on plan of care and disease process--all questions answered. Plans discussed with patient and nursing. Reviewed and discussed with Dr. Evita Heard. Signed:  CATRACHITO Hernandez - CNP  4/9/2021 8:20 AM    I have personally performed a face to face diagnostic evaluation on this patient. I have interviewed and examined the patient and I agree with the assessment above. In summary, my findings and plan are the following:   Ms. Asim Green is a 63 y.o. female who presents with   Duodenal mass  Cholelithiasis  Upper GI bleed  Acute blood loss anemia  CHF  Atrial fibrillation  Medical coagulopathy     Plan:  1. Monitor for active bleeding, vital signs, passing blood, hemoglobin. Stable currently but still anemic. If re-bleeds, will need IR consultation for embolization vs endoscopy. GI input noted, possible EUS  2. Hold anticoagulation  3. PPI gtt per GI  4. Pathology pending, check tumor markers. Will eventually likely need resection if mass can be confirmed. CT pancreas protocol negative for mass, but was visualized on recent endoscopy clearly and biopsied  5. Antibiotics for possible cholecystitis, may need cholecystectomy at some point.  Would be part of whipple if needed. Delay cholecystectomy for now  6. Pain medication and antiemetics as needed with caution as may mask worsening exam  7. Defer management of remainder of medical comorbidities to primary and consulting teams    Flavio Mendoza MD, FACS  4/9/2021  1:45 PM

## 2021-04-09 NOTE — PROGRESS NOTES
[facility] PROGRESS NOTE    4/9/2021 3:58 PM        Name: Zuhair Reyes . Admitted: 4/7/2021  Primary Care Provider: No primary care provider on file. (Tel: None)                        Subjective:  . No acute events overnight. Resting well. Pain control. Diet ok. Labs reviewed  Denies any chest pain sob. Reviewed interval ancillary notes    Current Medications  [START ON 4/10/2021] pantoprazole (PROTONIX) tablet 40 mg, QAM AC  [START ON 4/10/2021] amiodarone (CORDARONE) tablet 100 mg, Daily  0.9 % sodium chloride infusion, Continuous  piperacillin-tazobactam (ZOSYN) 3,375 mg in dextrose 5 % 50 mL IVPB extended infusion (mini-bag), Q8H  0.9 % sodium chloride infusion, PRN  atorvastatin (LIPITOR) tablet 40 mg, Daily  furosemide (LASIX) tablet 20 mg, Daily  NIFEdipine (PROCARDIA XL) extended release tablet 30 mg, Daily  spironolactone (ALDACTONE) tablet 25 mg, Daily  sodium chloride flush 0.9 % injection 5-40 mL, 2 times per day  sodium chloride flush 0.9 % injection 5-40 mL, PRN  0.9 % sodium chloride infusion, PRN  promethazine (PHENERGAN) tablet 12.5 mg, Q6H PRN    Or  ondansetron (ZOFRAN) injection 4 mg, Q6H PRN  polyethylene glycol (GLYCOLAX) packet 17 g, Daily PRN  acetaminophen (TYLENOL) tablet 650 mg, Q6H PRN    Or  acetaminophen (TYLENOL) suppository 650 mg, Q6H PRN  carvedilol (COREG) tablet 6.25 mg, BID WC        Objective:  /76   Pulse 76   Temp 97.4 °F (36.3 °C) (Oral)   Resp 18   Ht 5' 6.5\" (1.689 m)   Wt 202 lb 8 oz (91.9 kg)   SpO2 100%   BMI 32.19 kg/m²   No intake or output data in the 24 hours ending 04/09/21 1558   Wt Readings from Last 3 Encounters:   04/09/21 202 lb 8 oz (91.9 kg)   03/17/21 201 lb (91.2 kg)   02/23/21 196 lb 10.6 oz (89.2 kg)       General appearance:  Appears comfortable  Eyes: Sclera clear.  Pupils equal.  ENT: Moist oral mucosa. Trachea midline, no adenopathy. Cardiovascular: Regular rhythm, normal S1, S2. No murmur. No edema in lower extremities  Respiratory: Not using accessory muscles. Good inspiratory effort. Clear to auscultation bilaterally, no wheeze or crackles. GI: Abdomen soft, no tenderness, not distended, normal bowel sounds  Musculoskeletal: No cyanosis in digits, neck supple  Neurology: CN 2-12 grossly intact. No speech or motor deficits  Psych: Normal affect. Alert and oriented in time, place and person  Skin: Warm, dry, normal turgor    Labs and Tests:  CBC:   Recent Labs     04/07/21  1012 04/07/21  1012 04/08/21  0552 04/08/21  0552 04/08/21  2320 04/09/21  0448 04/09/21  1017   WBC 10.4  --  8.0  --   --  7.0  --    HGB 7.1*   < > 7.8*   < > 7.1* 7.2* 7.3*     --  189  --   --  183  --     < > = values in this interval not displayed. BMP:    Recent Labs     04/07/21  1012 04/08/21  0552 04/09/21  0448    140 140   K 3.7 3.5 3.5    110 109   CO2 23 23 23   BUN 25* 22* 15   CREATININE 0.8 0.8 0.8   GLUCOSE 107* 102* 98     Hepatic:   Recent Labs     04/07/21  1012 04/09/21  0448   AST 23 20   ALT 20 16   BILITOT <0.2 0.4   ALKPHOS 52 42       Discussed care with family and patient             Spent 30  minutes with patient and family at bedside and on unit reviewing medical records and labs, spent greater than 50% time counseling patient and family on diagnosis and plan   Problem List  Active Problems:    Fatigue    Symptomatic anemia  Resolved Problems:    * No resolved hospital problems. *       Assessment & Plan:   1. Symptomatic anemia  -With GI bleed. -Underwent EGD found to have large large lesion of the duodenum  -Patient underwent 3 clipping. With epi  -patient also had a large lipoma biopsy was done at the time  -Hemoglobin has been stable since then  -Continue to remain off Xarelto for 48 hours      Duodenal mass  -Surgery is following. CT scan noted.   -Pathology pending.  -Continue to follow the recommendation    A. Dionna  -Appreciate cardiology recommendation  -    Heart failure  -Continue current regimen.         Diet: DIET FULL LIQUID;  Code:Full Code  DVT PPX lovedorindax       Barby Kirkpatrick MD   4/9/2021 3:58 PM

## 2021-04-10 LAB
ANION GAP SERPL CALCULATED.3IONS-SCNC: 10 MMOL/L (ref 3–16)
BASOPHILS ABSOLUTE: 0 K/UL (ref 0–0.2)
BASOPHILS RELATIVE PERCENT: 0.7 %
BLOOD BANK DISPENSE STATUS: NORMAL
BLOOD BANK DISPENSE STATUS: NORMAL
BLOOD BANK PRODUCT CODE: NORMAL
BLOOD BANK PRODUCT CODE: NORMAL
BPU ID: NORMAL
BPU ID: NORMAL
BUN BLDV-MCNC: 9 MG/DL (ref 7–20)
CALCIUM SERPL-MCNC: 8.5 MG/DL (ref 8.3–10.6)
CHLORIDE BLD-SCNC: 109 MMOL/L (ref 99–110)
CO2: 23 MMOL/L (ref 21–32)
CREAT SERPL-MCNC: 1 MG/DL (ref 0.6–1.1)
DESCRIPTION BLOOD BANK: NORMAL
DESCRIPTION BLOOD BANK: NORMAL
EOSINOPHILS ABSOLUTE: 0.3 K/UL (ref 0–0.6)
EOSINOPHILS RELATIVE PERCENT: 4 %
GFR AFRICAN AMERICAN: >60
GFR NON-AFRICAN AMERICAN: 57
GLUCOSE BLD-MCNC: 102 MG/DL (ref 70–99)
GLUCOSE BLD-MCNC: 111 MG/DL (ref 70–99)
HCT VFR BLD CALC: 23.6 % (ref 36–48)
HCT VFR BLD CALC: 24.9 % (ref 36–48)
HEMOGLOBIN: 7.8 G/DL (ref 12–16)
HEMOGLOBIN: 8.1 G/DL (ref 12–16)
LYMPHOCYTES ABSOLUTE: 1.1 K/UL (ref 1–5.1)
LYMPHOCYTES RELATIVE PERCENT: 16 %
MCH RBC QN AUTO: 29.4 PG (ref 26–34)
MCHC RBC AUTO-ENTMCNC: 32.3 G/DL (ref 31–36)
MCV RBC AUTO: 90.8 FL (ref 80–100)
MONOCYTES ABSOLUTE: 0.6 K/UL (ref 0–1.3)
MONOCYTES RELATIVE PERCENT: 8.3 %
NEUTROPHILS ABSOLUTE: 4.7 K/UL (ref 1.7–7.7)
NEUTROPHILS RELATIVE PERCENT: 71 %
PDW BLD-RTO: 20.6 % (ref 12.4–15.4)
PERFORMED ON: ABNORMAL
PLATELET # BLD: 209 K/UL (ref 135–450)
PMV BLD AUTO: 7.2 FL (ref 5–10.5)
POTASSIUM SERPL-SCNC: 3.5 MMOL/L (ref 3.5–5.1)
RBC # BLD: 2.75 M/UL (ref 4–5.2)
SODIUM BLD-SCNC: 142 MMOL/L (ref 136–145)
WBC # BLD: 6.6 K/UL (ref 4–11)

## 2021-04-10 PROCEDURE — 6360000002 HC RX W HCPCS: Performed by: NURSE PRACTITIONER

## 2021-04-10 PROCEDURE — 80048 BASIC METABOLIC PNL TOTAL CA: CPT

## 2021-04-10 PROCEDURE — 85025 COMPLETE CBC W/AUTO DIFF WBC: CPT

## 2021-04-10 PROCEDURE — 2580000003 HC RX 258: Performed by: NURSE PRACTITIONER

## 2021-04-10 PROCEDURE — 94760 N-INVAS EAR/PLS OXIMETRY 1: CPT

## 2021-04-10 PROCEDURE — 85014 HEMATOCRIT: CPT

## 2021-04-10 PROCEDURE — 6370000000 HC RX 637 (ALT 250 FOR IP): Performed by: INTERNAL MEDICINE

## 2021-04-10 PROCEDURE — 1200000000 HC SEMI PRIVATE

## 2021-04-10 PROCEDURE — 36430 TRANSFUSION BLD/BLD COMPNT: CPT

## 2021-04-10 PROCEDURE — 6370000000 HC RX 637 (ALT 250 FOR IP): Performed by: HOSPITALIST

## 2021-04-10 PROCEDURE — 85018 HEMOGLOBIN: CPT

## 2021-04-10 PROCEDURE — 99232 SBSQ HOSP IP/OBS MODERATE 35: CPT | Performed by: SURGERY

## 2021-04-10 PROCEDURE — 2580000003 HC RX 258: Performed by: HOSPITALIST

## 2021-04-10 PROCEDURE — 99221 1ST HOSP IP/OBS SF/LOW 40: CPT | Performed by: PSYCHIATRY & NEUROLOGY

## 2021-04-10 PROCEDURE — 6360000002 HC RX W HCPCS: Performed by: HOSPITALIST

## 2021-04-10 PROCEDURE — 2580000003 HC RX 258: Performed by: INTERNAL MEDICINE

## 2021-04-10 RX ORDER — SODIUM CHLORIDE 9 MG/ML
INJECTION, SOLUTION INTRAVENOUS PRN
Status: DISCONTINUED | OUTPATIENT
Start: 2021-04-10 | End: 2021-04-13 | Stop reason: SDUPTHER

## 2021-04-10 RX ADMIN — CARVEDILOL 6.25 MG: 6.25 TABLET, FILM COATED ORAL at 16:59

## 2021-04-10 RX ADMIN — NIFEDIPINE 30 MG: 30 TABLET, FILM COATED, EXTENDED RELEASE ORAL at 08:41

## 2021-04-10 RX ADMIN — SODIUM CHLORIDE: 9 INJECTION, SOLUTION INTRAVENOUS at 06:42

## 2021-04-10 RX ADMIN — ATORVASTATIN CALCIUM 40 MG: 40 TABLET, FILM COATED ORAL at 22:02

## 2021-04-10 RX ADMIN — SPIRONOLACTONE 25 MG: 25 TABLET ORAL at 08:40

## 2021-04-10 RX ADMIN — FUROSEMIDE 20 MG: 20 TABLET ORAL at 08:40

## 2021-04-10 RX ADMIN — ONDANSETRON 4 MG: 2 INJECTION INTRAMUSCULAR; INTRAVENOUS at 03:53

## 2021-04-10 RX ADMIN — PANTOPRAZOLE SODIUM 40 MG: 40 TABLET, DELAYED RELEASE ORAL at 08:40

## 2021-04-10 RX ADMIN — PIPERACILLIN AND TAZOBACTAM 3375 MG: 3; .375 INJECTION, POWDER, LYOPHILIZED, FOR SOLUTION INTRAVENOUS at 03:24

## 2021-04-10 RX ADMIN — PIPERACILLIN AND TAZOBACTAM 3375 MG: 3; .375 INJECTION, POWDER, LYOPHILIZED, FOR SOLUTION INTRAVENOUS at 18:42

## 2021-04-10 RX ADMIN — Medication 10 ML: at 22:02

## 2021-04-10 RX ADMIN — CARVEDILOL 6.25 MG: 6.25 TABLET, FILM COATED ORAL at 08:40

## 2021-04-10 RX ADMIN — PIPERACILLIN AND TAZOBACTAM 3375 MG: 3; .375 INJECTION, POWDER, LYOPHILIZED, FOR SOLUTION INTRAVENOUS at 11:43

## 2021-04-10 ASSESSMENT — PAIN SCALES - GENERAL
PAINLEVEL_OUTOF10: 0
PAINLEVEL_OUTOF10: 6

## 2021-04-10 ASSESSMENT — PAIN - FUNCTIONAL ASSESSMENT
PAIN_FUNCTIONAL_ASSESSMENT: ACTIVITIES ARE NOT PREVENTED
PAIN_FUNCTIONAL_ASSESSMENT: ACTIVITIES ARE NOT PREVENTED

## 2021-04-10 ASSESSMENT — PAIN DESCRIPTION - PAIN TYPE
TYPE: ACUTE PAIN
TYPE: ACUTE PAIN

## 2021-04-10 ASSESSMENT — PAIN DESCRIPTION - FREQUENCY
FREQUENCY: CONTINUOUS
FREQUENCY: INTERMITTENT

## 2021-04-10 ASSESSMENT — PAIN DESCRIPTION - PROGRESSION: CLINICAL_PROGRESSION: GRADUALLY WORSENING

## 2021-04-10 ASSESSMENT — PAIN DESCRIPTION - ORIENTATION
ORIENTATION: RIGHT
ORIENTATION: RIGHT

## 2021-04-10 ASSESSMENT — PAIN DESCRIPTION - DESCRIPTORS: DESCRIPTORS: SHARP

## 2021-04-10 NOTE — PROGRESS NOTES
Samia Riley is a 63 y.o. female patient.     Chief complaint-GI bleeding    HPI-63year-old female seen in follow-up for GI bleeding  Current Facility-Administered Medications   Medication Dose Route Frequency Provider Last Rate Last Admin    0.9 % sodium chloride infusion   Intravenous PRN Rere Mcginnis PA-C        pantoprazole (PROTONIX) tablet 40 mg  40 mg Oral QAM AC Katrina Garcia MD   40 mg at 04/10/21 0840    amiodarone (CORDARONE) tablet 100 mg  100 mg Oral Daily Ramon Tim MD        0.9 % sodium chloride infusion   Intravenous Continuous Katrina Garcia  mL/hr at 04/10/21 0642 New Bag at 04/10/21 0642    piperacillin-tazobactam (ZOSYN) 3,375 mg in dextrose 5 % 50 mL IVPB extended infusion (mini-bag)  3,375 mg Intravenous Q8H CATRACHITO Ramirez - CNP 12.5 mL/hr at 04/10/21 1143 3,375 mg at 04/10/21 1143    0.9 % sodium chloride infusion   Intravenous PRN Mickiel Fabry, PA-C        atorvastatin (LIPITOR) tablet 40 mg  40 mg Oral Daily Matt Greco MD   40 mg at 04/09/21 2212    furosemide (LASIX) tablet 20 mg  20 mg Oral Daily Matt Greco MD   20 mg at 04/10/21 0840    NIFEdipine (PROCARDIA XL) extended release tablet 30 mg  30 mg Oral Daily Matt Greco MD   30 mg at 04/10/21 0841    spironolactone (ALDACTONE) tablet 25 mg  25 mg Oral Daily Dusty Manuel MD   25 mg at 04/10/21 0840    sodium chloride flush 0.9 % injection 5-40 mL  5-40 mL Intravenous 2 times per day Dusty Manuel MD   10 mL at 04/09/21 2208    sodium chloride flush 0.9 % injection 5-40 mL  5-40 mL Intravenous PRN Matt Greco MD        0.9 % sodium chloride infusion  25 mL Intravenous PRN Matt Greco MD        promethazine (PHENERGAN) tablet 12.5 mg  12.5 mg Oral Q6H PRN Matt Greco MD        Or    ondansetron (ZOFRAN) injection 4 mg  4 mg Intravenous Q6H PRN Dusty Manuel MD   4 mg at 04/10/21 0353    polyethylene glycol (GLYCOLAX) packet 17 g  17 g Oral Daily PRN Dusty Manuel MD        acetaminophen (TYLENOL) tablet 650 mg  650 mg Oral Q6H PRN Matt Greco MD   650 mg at 04/09/21 1540    Or    acetaminophen (TYLENOL) suppository 650 mg  650 mg Rectal Q6H PRN Matt Greco MD        carvedilol (COREG) tablet 6.25 mg  6.25 mg Oral BID WC Maddie Hernandez MD   6.25 mg at 04/10/21 0840     Allergies   Allergen Reactions    Latex      rash    Codeine      Hives      Lisinopril      cough    Nitroglycerin Hives    Sulfa Antibiotics Nausea Only    Hydralazine      headaches     Active Problems:    Fatigue    Symptomatic anemia  Resolved Problems:    * No resolved hospital problems. *    Blood pressure 108/67, pulse 76, temperature 97.9 °F (36.6 °C), resp. rate 18, height 5' 6.5\" (1.689 m), weight 201 lb 9.6 oz (91.4 kg), SpO2 100 %, not currently breastfeeding. Subjective:  Symptoms:  Stable. Activity level: Normal.    Pain:  She reports no pain. Objective:  General Appearance:  Comfortable. Vital signs: (most recent): Blood pressure 108/67, pulse 76, temperature 97.9 °F (36.6 °C), resp. rate 18, height 5' 6.5\" (1.689 m), weight 201 lb 9.6 oz (91.4 kg), SpO2 100 %, not currently breastfeeding. Output: Producing urine and producing stool. Lungs:  Normal effort and normal respiratory rate. Abdomen: Abdomen is soft and non-distended. There is no abdominal tenderness. Neurological: Patient is alert and oriented to person, place and time. Skin:  Warm and dry. 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 .  The patient required another unit of packed red blood cells overnight. She had been on Xarelto prior to admission. CT scan reviewed. It shows clips near the base of the ligament of Treitz. No masses noted at the site or in the pancreas. Continue supportive care and transfusion as needed.   Will plan to discuss with GI team and will continue to follow.     Uriah Cheng MD  4/10/2021

## 2021-04-10 NOTE — PROGRESS NOTES
Pt would like to have more to eat than full liquids message sent to Dr Elder Khalil will wait til the AM and check with gi.

## 2021-04-10 NOTE — PROGRESS NOTES
Gastroenterology Progress Note            Sally Urbina is a 63 y.o. female patient. 1. Symptomatic anemia    2. Adequate anticoagulation on anticoagulant therapy    3. Profound fatigue    4. Status post implantation of automatic cardioverter/defibrillator (AICD)        SUBJECTIVE:  No further bleeding thus far. No ab pain. No cp or sob    Physical    VITALS:  /67   Pulse 76   Temp 97.9 °F (36.6 °C)   Resp 18   Ht 5' 6.5\" (1.689 m)   Wt 201 lb 9.6 oz (91.4 kg)   SpO2 100%   BMI 32.05 kg/m²   TEMPERATURE:  Current - Temp: 97.9 °F (36.6 °C); Max - Temp  Av.8 °F (36.6 °C)  Min: 97.3 °F (36.3 °C)  Max: 98.2 °F (36.8 °C)    Abdomen soft, ND, NT, no HSM, Bowel sounds normal   rrr nl s1s2  Cta bilat. Nl effort. Data      Recent Labs     21  0552 21  1017 21  2250 04/10/21  0422   WBC 8.0  --  7.0  --   --  6.6   HGB 7.8*   < > 7.2* 7.3* 6.7* 8.1*   HCT 23.9*   < > 21.5* 22.5* 20.8* 24.9*   MCV 87.8  --  89.0  --   --  90.8     --  183  --   --  209    < > = values in this interval not displayed. Recent Labs     21  0552 04/09/21  0448 04/10/21  0422    140 142   K 3.5 3.5 3.5    109 109   CO2 23 23 23   PHOS  --  3.4  --    BUN 22* 15 9   CREATININE 0.8 0.8 1.0     Recent Labs     21   AST 20   ALT 16   BILITOT 0.4   ALKPHOS 42     Recent Labs     21   LIPASE 35.0             ASSESSMENT   1. Hematochezia- from lesion in D3 that appears c/w lipoma but ddx includes GIST. 2. Acute blood loss anemia      PLAN    1. prbc support to keep hgb >7.   2. Will try to arrange for eus of lesion. 3. Will likely need surgical resection.      Wilman Garcia MD  [address] [facility]

## 2021-04-10 NOTE — CONSULTS
Psychiatry Consultation/Initial Inpatient Amparo Salinas M.D.  4/10/2021  2:38 PM      Referring Provider:  Angela Arana MD    Recommendations:    1. Adj d/o c dep mood-This pt has been through a lot, especially in the last several months. Her new dx of an abd mass has been \"the icing on the cake,\" and is causing her anxiety, anger, and sadness, all quite appropriate given the circumstances. She elects to forego psychopharm tx at this point, and I've recommend therapy for her when she leave the hospital, which she whole-heartedly embraced. She'll see if she can get a list of therapists so perhaps we can recommend one. Thank you for allowing me to care for this patient. Please call the psych consult line with any further questions. Diagnosis:    Axis I  Adj d/o c dep mood    Axis III       Diagnosis Date    Anemia     Atrial fibrillation and flutter (HCC)     Atrial flutter (HCC)     CHB (complete heart block) (HCC)     Class 1 obesity without serious comorbidity with body mass index (BMI) of 33.0 to 33.9 in adult 9/6/2019    Congenital heart disease     GERD (gastroesophageal reflux disease)     Headache(784.0)     History of complete heart block     Hyperlipidemia     Hypertension     PONV (postoperative nausea and vomiting)     Sleep apnea     uses CPAP    Syncope     [facility]2018      Active Problems:    Fatigue    Symptomatic anemia  Resolved Problems:    * No resolved hospital problems. *       Axis IV  Family stress, new illness.        pantoprazole  40 mg Oral QAM AC    amiodarone  100 mg Oral Daily    piperacillin-tazobactam  3,375 mg Intravenous Q8H    atorvastatin  40 mg Oral Daily    furosemide  20 mg Oral Daily    NIFEdipine  30 mg Oral Daily    spironolactone  25 mg Oral Daily    sodium chloride flush  5-40 mL Intravenous 2 times per day    carvedilol  6.25 mg Oral BID WC     sodium chloride, sodium chloride, sodium chloride flush, sodium chloride, promethazine **OR** ondansetron, polyethylene glycol, acetaminophen **OR** acetaminophen    Examination  Review of Systems - Negative except abd pain, fatigue    Recent Results (from the past 168 hour(s))   CBC Auto Differential    Collection Time: 04/07/21 10:12 AM   Result Value Ref Range    WBC 10.4 4.0 - 11.0 K/uL    RBC 2.37 (L) 4.00 - 5.20 M/uL    Hemoglobin 7.1 (L) 12.0 - 16.0 g/dL    Hematocrit 21.6 (L) 36.0 - 48.0 %    MCV 91.4 80.0 - 100.0 fL    MCH 30.2 26.0 - 34.0 pg    MCHC 33.0 31.0 - 36.0 g/dL    RDW 18.0 (H) 12.4 - 15.4 %    Platelets 661 930 - 603 K/uL    MPV 8.1 5.0 - 10.5 fL    Neutrophils % 78.9 %    Lymphocytes % 12.3 %    Monocytes % 6.2 %    Eosinophils % 2.0 %    Basophils % 0.6 %    Neutrophils Absolute 8.2 (H) 1.7 - 7.7 K/uL    Lymphocytes Absolute 1.3 1.0 - 5.1 K/uL    Monocytes Absolute 0.6 0.0 - 1.3 K/uL    Eosinophils Absolute 0.2 0.0 - 0.6 K/uL    Basophils Absolute 0.1 0.0 - 0.2 K/uL   Comprehensive Metabolic Panel w/ Reflex to MG    Collection Time: 04/07/21 10:12 AM   Result Value Ref Range    Sodium 139 136 - 145 mmol/L    Potassium reflex Magnesium 3.7 3.5 - 5.1 mmol/L    Chloride 107 99 - 110 mmol/L    CO2 23 21 - 32 mmol/L    Anion Gap 9 3 - 16    Glucose 107 (H) 70 - 99 mg/dL    BUN 25 (H) 7 - 20 mg/dL    CREATININE 0.8 0.6 - 1.1 mg/dL    GFR Non-African American >60 >60    GFR African American >60 >60    Calcium 8.8 8.3 - 10.6 mg/dL    Total Protein 6.8 6.4 - 8.2 g/dL    Albumin 4.3 3.4 - 5.0 g/dL    Albumin/Globulin Ratio 1.7 1.1 - 2.2    Total Bilirubin <0.2 0.0 - 1.0 mg/dL    Alkaline Phosphatase 52 40 - 129 U/L    ALT 20 10 - 40 U/L    AST 23 15 - 37 U/L    Globulin 2.5 g/dL   Lipase    Collection Time: 04/07/21 10:12 AM   Result Value Ref Range    Lipase 25.0 13.0 - 60.0 U/L   Troponin    Collection Time: 04/07/21 10:12 AM   Result Value Ref Range    Troponin <0.01 <0.01 ng/mL   Brain Natriuretic Peptide    Collection Time: 04/07/21 10:12 AM Result Value Ref Range    Pro-BNP 43 0 - 124 pg/mL   Protime-INR    Collection Time: 04/07/21 10:12 AM   Result Value Ref Range    Protime 17.7 (H) 10.0 - 13.2 sec    INR 1.52 (H) 0.86 - 1.14   Lactate, Sepsis    Collection Time: 04/07/21 10:12 AM   Result Value Ref Range    Lactic Acid, Sepsis 1.2 0.4 - 1.9 mmol/L   Culture, Blood 1    Collection Time: 04/07/21 10:12 AM    Specimen: Blood   Result Value Ref Range    Blood Culture, Routine       No Growth to date. Any change in status will be called. Culture, Blood 2    Collection Time: 04/07/21 10:12 AM    Specimen: Blood   Result Value Ref Range    Culture, Blood 2       No Growth to date. Any change in status will be called.    TYPE AND SCREEN    Collection Time: 04/07/21 10:12 AM   Result Value Ref Range    ABO/Rh O NEG     Antibody Screen NEG    PREPARE RBC (CROSSMATCH), 2 Units    Collection Time: 04/07/21 10:12 AM   Result Value Ref Range    Product Code Blood Bank W7570K05     Description Blood Bank Red Blood Cells, Leuko-reduced     Unit Number [id number]     Dispense Status Blood Bank transfused     Product Code Blood Bank [id number]     Description Blood Bank Red Blood Cells, Apheresis, Leuko-reduced     Unit Number [id number]     Dispense Status Blood Bank released    PREPARE RBC (CROSSMATCH), 1 Units    Collection Time: 04/07/21 10:12 AM   Result Value Ref Range    Product Code Blood Bank O7791T84     Description Blood Bank Red Blood Cells, Leuko-reduced     Unit Number [id number]     Dispense Status Blood Bank transfused     Product Code Blood Bank [id number]     Description Blood Bank Red Blood Cells, Leuko-reduced     Unit Number [id number]     Dispense Status Blood Bank transfused    EKG 12 Lead    Collection Time: 04/07/21 10:17 AM   Result Value Ref Range    Ventricular Rate 75 BPM    Atrial Rate 75 BPM    P-R Interval 120 ms    QRS Duration 154 ms    Q-T Interval 494 ms    QTc Calculation (Bazett) 551 ms    R Axis -86 degrees    T Axis 71 degrees    Diagnosis       AV sequential or dual chamber electronic pacemakerConfirmed by LISA Rankin MD ([id number]) on 4/7/2021 5:15:27 PM   Blood Gas, Venous    Collection Time: 04/07/21  2:00 PM   Result Value Ref Range    pH, Samir 7.382 7.350 - 7.450    pCO2, Samir 41.2 40.0 - 50.0 mmHg    pO2, Samir 70.6 (H) 25 - 40 mmHg    HCO3, Venous 24.5 23.0 - 29.0 mmol/L    Base Excess, Samir -0.6 -3.0 - 3.0 mmol/L    O2 Sat, Samir 95 Not Established %    Carboxyhemoglobin 3.4 (H) 0.0 - 1.5 %    MetHgb, Samir 0.7 <1.5 %    TC02 (Calc), Samir 58 Not Established mmol/L    O2 Content, Samir 9 Not Established VOL %    O2 Therapy Unknown     Hemoglobin, Samir, Reduced 5 %   Lactate, Sepsis    Collection Time: 04/07/21  2:00 PM   Result Value Ref Range    Lactic Acid, Sepsis 0.8 0.4 - 1.9 mmol/L   Blood occult stool #1    Collection Time: 04/07/21  2:00 PM   Result Value Ref Range    Occult Blood Diagnostic Result: POSITIVE  Normal range: Negative   (A)    Hemoglobin and hematocrit, blood    Collection Time: 04/07/21  6:34 PM   Result Value Ref Range    Hemoglobin 7.4 (L) 12.0 - 16.0 g/dL    Hematocrit 22.5 (L) 36.0 - 48.0 %   Hemoglobin and hematocrit, blood    Collection Time: 04/07/21 11:38 PM   Result Value Ref Range    Hemoglobin 6.6 (LL) 12.0 - 16.0 g/dL    Hematocrit 20.1 (LL) 36.0 - 48.0 %   CBC auto differential    Collection Time: 04/08/21  5:52 AM   Result Value Ref Range    WBC 8.0 4.0 - 11.0 K/uL    RBC 2.72 (L) 4.00 - 5.20 M/uL    Hemoglobin 7.8 (L) 12.0 - 16.0 g/dL    Hematocrit 23.9 (L) 36.0 - 48.0 %    MCV 87.8 80.0 - 100.0 fL    MCH 28.9 26.0 - 34.0 pg    MCHC 32.9 31.0 - 36.0 g/dL    RDW 22.5 (H) 12.4 - 15.4 %    Platelets 042 060 - 510 K/uL    MPV 7.2 5.0 - 10.5 fL    PLATELET SLIDE REVIEW Adequate     SLIDE REVIEW see below     Neutrophils % 70.0 %    Lymphocytes % 20.1 %    Monocytes % 7.4 %    Eosinophils % 1.9 %    Basophils % 0.6 %    Neutrophils Absolute 5.6 1.7 - 7.7 K/uL    Lymphocytes Absolute 1.6 1.0 - 5.1 K/uL    Monocytes Absolute 0.6 0.0 - 1.3 K/uL    Eosinophils Absolute 0.2 0.0 - 0.6 K/uL    Basophils Absolute 0.0 0.0 - 0.2 K/uL    Anisocytosis 2+ (A)     Macrocytes Occasional (A)     Polychromasia 1+ (A)    Basic Metabolic Panel    Collection Time: 04/08/21  5:52 AM   Result Value Ref Range    Sodium 140 136 - 145 mmol/L    Potassium 3.5 3.5 - 5.1 mmol/L    Chloride 110 99 - 110 mmol/L    CO2 23 21 - 32 mmol/L    Anion Gap 7 3 - 16    Glucose 102 (H) 70 - 99 mg/dL    BUN 22 (H) 7 - 20 mg/dL    CREATININE 0.8 0.6 - 1.1 mg/dL    GFR Non-African American >60 >60    GFR African American >60 >60    Calcium 8.6 8.3 - 10.6 mg/dL   Hemoglobin and hematocrit, blood    Collection Time: 04/08/21 10:46 AM   Result Value Ref Range    Hemoglobin 8.0 (L) 12.0 - 16.0 g/dL    Hematocrit 24.4 (L) 36.0 - 48.0 %   Hemoglobin and hematocrit, blood    Collection Time: 04/08/21  4:56 PM   Result Value Ref Range    Hemoglobin 7.9 (L) 12.0 - 16.0 g/dL    Hematocrit 23.9 (L) 36.0 - 48.0 %   Hemoglobin and hematocrit, blood    Collection Time: 04/08/21 11:20 PM   Result Value Ref Range    Hemoglobin 7.1 (L) 12.0 - 16.0 g/dL    Hematocrit 22.2 (L) 36.0 - 48.0 %   Protime-INR    Collection Time: 04/09/21  4:47 AM   Result Value Ref Range    Protime 12.4 10.0 - 13.2 sec    INR 1.07 0.86 - 1.14   Lactic Acid, Plasma    Collection Time: 04/09/21  4:47 AM   Result Value Ref Range    Lactic Acid 0.8 0.4 - 2.0 mmol/L   APTT    Collection Time: 04/09/21  4:47 AM   Result Value Ref Range    aPTT 18.3 (L) 24.2 - 36.2 sec   CBC auto differential    Collection Time: 04/09/21  4:48 AM   Result Value Ref Range    WBC 7.0 4.0 - 11.0 K/uL    RBC 2.42 (L) 4.00 - 5.20 M/uL    Hemoglobin 7.2 (L) 12.0 - 16.0 g/dL    Hematocrit 21.5 (L) 36.0 - 48.0 %    MCV 89.0 80.0 - 100.0 fL    MCH 29.6 26.0 - 34.0 pg    MCHC 33.3 31.0 - 36.0 g/dL    RDW 22.4 (H) 12.4 - 15.4 %    Platelets 645 999 - 274 K/uL    MPV 7.2 5.0 - 10.5 fL    Neutrophils % 74.3 %    Lymphocytes % 15.7 %    Monocytes % 7.0 %    Eosinophils % 2.5 %    Basophils % 0.5 %    Neutrophils Absolute 5.2 1.7 - 7.7 K/uL    Lymphocytes Absolute 1.1 1.0 - 5.1 K/uL    Monocytes Absolute 0.5 0.0 - 1.3 K/uL    Eosinophils Absolute 0.2 0.0 - 0.6 K/uL    Basophils Absolute 0.0 0.0 - 0.2 K/uL   Comprehensive Metabolic Panel    Collection Time: 04/09/21  4:48 AM   Result Value Ref Range    Sodium 140 136 - 145 mmol/L    Potassium 3.5 3.5 - 5.1 mmol/L    Chloride 109 99 - 110 mmol/L    CO2 23 21 - 32 mmol/L    Anion Gap 8 3 - 16    Glucose 98 70 - 99 mg/dL    BUN 15 7 - 20 mg/dL    CREATININE 0.8 0.6 - 1.1 mg/dL    GFR Non-African American >60 >60    GFR African American >60 >60    Calcium 8.4 8.3 - 10.6 mg/dL    Total Protein 6.0 (L) 6.4 - 8.2 g/dL    Albumin 3.8 3.4 - 5.0 g/dL    Albumin/Globulin Ratio 1.7 1.1 - 2.2    Total Bilirubin 0.4 0.0 - 1.0 mg/dL    Alkaline Phosphatase 42 40 - 129 U/L    ALT 16 10 - 40 U/L    AST 20 15 - 37 U/L    Globulin 2.2 g/dL   Lipase    Collection Time: 04/09/21  4:48 AM   Result Value Ref Range    Lipase 35.0 13.0 - 60.0 U/L   Magnesium    Collection Time: 04/09/21  4:48 AM   Result Value Ref Range    Magnesium 2.20 1.80 - 2.40 mg/dL   Phosphorus    Collection Time: 04/09/21  4:48 AM   Result Value Ref Range    Phosphorus 3.4 2.5 - 4.9 mg/dL   Prealbumin    Collection Time: 04/09/21  4:48 AM   Result Value Ref Range    Prealbumin 25.4 20.0 - 40.0 mg/dL   Transferrin    Collection Time: 04/09/21  4:48 AM   Result Value Ref Range    Transferrin 212.0 200.0 - 360.0 mg/dL       Collection Time: 04/09/21  4:48 AM   Result Value Ref Range     3.8 0.0 - 35.0 U/mL   CEA    Collection Time: 04/09/21  4:48 AM   Result Value Ref Range    CEA 1.2 0.0 - 5.0 ng/mL   Hemoglobin and hematocrit, blood    Collection Time: 04/09/21 10:17 AM   Result Value Ref Range    Hemoglobin 7.3 (L) 12.0 - 16.0 g/dL    Hematocrit 22.5 (L) 36.0 - 48.0 %   Hemoglobin and hematocrit, blood    Collection Time: reflux disease)     Headache(784.0)     History of complete heart block     Hyperlipidemia     Hypertension     PONV (postoperative nausea and vomiting)     Sleep apnea     uses CPAP    Syncope     Systolic CHF, chronic (HCC) 10/3/2018       Allergies   Allergen Reactions    Latex      rash    Codeine      Hives      Lisinopril      cough    Nitroglycerin Hives    Sulfa Antibiotics Nausea Only    Hydralazine      headaches       PPsyHx:  none    CD Hx:  n/a    Social History     Socioeconomic History    Marital status:      Spouse name: None    Number of children: 3    Years of education: None    Highest education level: None   Occupational History    None   Social Needs    Financial resource strain: None    Food insecurity     Worry: None     Inability: None    Transportation needs     Medical: None     Non-medical: None   Tobacco Use    Smoking status: Never Smoker    Smokeless tobacco: Never Used   Substance and Sexual Activity    Alcohol use: No    Drug use: No    Sexual activity: Yes     Partners: Male   Lifestyle    Physical activity     Days per week: None     Minutes per session: None    Stress: None   Relationships    Social connections     Talks on phone: None     Gets together: None     Attends Yazidism service: None     Active member of club or organization: None     Attends meetings of clubs or organizations: None     Relationship status: None    Intimate partner violence     Fear of current or ex partner: None     Emotionally abused: None     Physically abused: None     Forced sexual activity: None   Other Topics Concern    None   Social History Narrative    None       Family History   Problem Relation Age of Onset    Cancer Father     Heart Disease Neg Hx     High Blood Pressure Neg Hx     High Cholesterol Neg Hx

## 2021-04-10 NOTE — PROGRESS NOTES
[facility] PROGRESS NOTE    4/10/2021 12:43 PM        Name: Brayan Chavez . Admitted: 4/7/2021  Primary Care Provider: No primary care provider on file. (Tel: None)                        Subjective:  . No acute events overnight. Resting well. Pain control. Diet ok. Labs reviewed  Denies any chest pain sob.  -Noted drop in hemoglobin.     Reviewed interval ancillary notes    Current Medications  0.9 % sodium chloride infusion, PRN  pantoprazole (PROTONIX) tablet 40 mg, QAM AC  amiodarone (CORDARONE) tablet 100 mg, Daily  0.9 % sodium chloride infusion, Continuous  piperacillin-tazobactam (ZOSYN) 3,375 mg in dextrose 5 % 50 mL IVPB extended infusion (mini-bag), Q8H  0.9 % sodium chloride infusion, PRN  atorvastatin (LIPITOR) tablet 40 mg, Daily  furosemide (LASIX) tablet 20 mg, Daily  NIFEdipine (PROCARDIA XL) extended release tablet 30 mg, Daily  spironolactone (ALDACTONE) tablet 25 mg, Daily  sodium chloride flush 0.9 % injection 5-40 mL, 2 times per day  sodium chloride flush 0.9 % injection 5-40 mL, PRN  0.9 % sodium chloride infusion, PRN  promethazine (PHENERGAN) tablet 12.5 mg, Q6H PRN    Or  ondansetron (ZOFRAN) injection 4 mg, Q6H PRN  polyethylene glycol (GLYCOLAX) packet 17 g, Daily PRN  acetaminophen (TYLENOL) tablet 650 mg, Q6H PRN    Or  acetaminophen (TYLENOL) suppository 650 mg, Q6H PRN  carvedilol (COREG) tablet 6.25 mg, BID WC        Objective:  /67   Pulse 76   Temp 97.9 °F (36.6 °C)   Resp 18   Ht 5' 6.5\" (1.689 m)   Wt 201 lb 9.6 oz (91.4 kg)   SpO2 100%   BMI 32.05 kg/m²     Intake/Output Summary (Last 24 hours) at 4/10/2021 1243  Last data filed at 4/10/2021 0840  Gross per 24 hour   Intake 2456 ml   Output --   Net 2456 ml      Wt Readings from Last 3 Encounters:   04/10/21 201 lb 9.6 oz (91.4 kg)   03/17/21 201 lb (91.2 kg)   02/23/21 196 lb 10.6 oz (89.2 kg)       General appearance:  Appears comfortable  Eyes: Sclera clear. Pupils equal.  ENT: Moist oral mucosa. Trachea midline, no adenopathy. Cardiovascular: Regular rhythm, normal S1, S2. No murmur. No edema in lower extremities  Respiratory: Not using accessory muscles. Good inspiratory effort. Clear to auscultation bilaterally, no wheeze or crackles. GI: Abdomen soft, no tenderness, not distended, normal bowel sounds  Musculoskeletal: No cyanosis in digits, neck supple  Neurology: CN 2-12 grossly intact. No speech or motor deficits  Psych: Normal affect. Alert and oriented in time, place and person  Skin: Warm, dry, normal turgor    Labs and Tests:  CBC:   Recent Labs     04/08/21  0552 04/08/21  0552 04/09/21  0448 04/09/21  1017 04/09/21  2250 04/10/21  0422   WBC 8.0  --  7.0  --   --  6.6   HGB 7.8*   < > 7.2* 7.3* 6.7* 8.1*     --  183  --   --  209    < > = values in this interval not displayed. BMP:    Recent Labs     04/08/21  0552 04/09/21  0448 04/10/21  0422    140 142   K 3.5 3.5 3.5    109 109   CO2 23 23 23   BUN 22* 15 9   CREATININE 0.8 0.8 1.0   GLUCOSE 102* 98 102*     Hepatic:   Recent Labs     04/09/21 0448   AST 20   ALT 16   BILITOT 0.4   ALKPHOS 42       Discussed care with family and patient             Spent 30  minutes with patient and family at bedside and on unit reviewing medical records and labs, spent greater than 50% time counseling patient and family on diagnosis and plan   Problem List  Active Problems:    Fatigue    Symptomatic anemia  Resolved Problems:    * No resolved hospital problems. *       Assessment & Plan:   1. Symptomatic anemia  -With GI bleed. -Underwent EGD found to have large large lesion of the duodenum  -Patient underwent 3 clipping.   With epi  -patient also had a large lipoma biopsy was done at the time  -Hemoglobin did drop getting 1 unit transfused last night  -Keep hemoglobin above 7  -Plan for EUS of the lesion  -Surgery also following. Duodenal mass  -Surgery is following. CT scan noted. -Pathology pending.  -Continue to follow the recommendation    A. Fib  -Appreciate cardiology recommendation  -    Heart failure  -Continue current regimen.         Diet: DIET FULL LIQUID;  Code:Full Code  DVT PPX lovenox       Denise Smith MD   4/10/2021 12:42 PM

## 2021-04-10 NOTE — PROGRESS NOTES
Patient complaining of sharp stabbing pain to right side. States that she gets it everytime she takes amiodarone. Offered patient tylenol but she declines at this time.

## 2021-04-10 NOTE — PROGRESS NOTES
Patient called out complaining of nausea at this time. No emesis at present. Patient complaining of sharp stabbing right sided pain at this time. Patient was complaining of pain earlier and offered patient tylenol and she declined. PRN zofran given at this time and awaiting response. Offered to message MD and get patient something for pain but patient states that she does not want anything else for pain. Patient resting in bed at this time.

## 2021-04-11 LAB
ANION GAP SERPL CALCULATED.3IONS-SCNC: 9 MMOL/L (ref 3–16)
BASOPHILS ABSOLUTE: 0 K/UL (ref 0–0.2)
BASOPHILS RELATIVE PERCENT: 0.5 %
BLOOD CULTURE, ROUTINE: NORMAL
BUN BLDV-MCNC: 5 MG/DL (ref 7–20)
CALCIUM SERPL-MCNC: 8.5 MG/DL (ref 8.3–10.6)
CHLORIDE BLD-SCNC: 109 MMOL/L (ref 99–110)
CO2: 24 MMOL/L (ref 21–32)
CREAT SERPL-MCNC: 0.9 MG/DL (ref 0.6–1.1)
CULTURE, BLOOD 2: NORMAL
EOSINOPHILS ABSOLUTE: 0.2 K/UL (ref 0–0.6)
EOSINOPHILS RELATIVE PERCENT: 4.4 %
GFR AFRICAN AMERICAN: >60
GFR NON-AFRICAN AMERICAN: >60
GLUCOSE BLD-MCNC: 108 MG/DL (ref 70–99)
HCT VFR BLD CALC: 23.1 % (ref 36–48)
HCT VFR BLD CALC: 24.3 % (ref 36–48)
HCT VFR BLD CALC: 24.9 % (ref 36–48)
HCT VFR BLD CALC: 26.5 % (ref 36–48)
HEMOGLOBIN: 7.7 G/DL (ref 12–16)
HEMOGLOBIN: 8.1 G/DL (ref 12–16)
HEMOGLOBIN: 8.2 G/DL (ref 12–16)
HEMOGLOBIN: 8.7 G/DL (ref 12–16)
LYMPHOCYTES ABSOLUTE: 0.8 K/UL (ref 1–5.1)
LYMPHOCYTES RELATIVE PERCENT: 13.4 %
MCH RBC QN AUTO: 30 PG (ref 26–34)
MCHC RBC AUTO-ENTMCNC: 33.2 G/DL (ref 31–36)
MCV RBC AUTO: 90.1 FL (ref 80–100)
MONOCYTES ABSOLUTE: 0.4 K/UL (ref 0–1.3)
MONOCYTES RELATIVE PERCENT: 7.3 %
NEUTROPHILS ABSOLUTE: 4.2 K/UL (ref 1.7–7.7)
NEUTROPHILS RELATIVE PERCENT: 74.4 %
PDW BLD-RTO: 21 % (ref 12.4–15.4)
PLATELET # BLD: 235 K/UL (ref 135–450)
PMV BLD AUTO: 6.8 FL (ref 5–10.5)
POTASSIUM SERPL-SCNC: 3 MMOL/L (ref 3.5–5.1)
POTASSIUM SERPL-SCNC: 3.2 MMOL/L (ref 3.5–5.1)
RBC # BLD: 2.7 M/UL (ref 4–5.2)
SODIUM BLD-SCNC: 142 MMOL/L (ref 136–145)
WBC # BLD: 5.6 K/UL (ref 4–11)

## 2021-04-11 PROCEDURE — 85025 COMPLETE CBC W/AUTO DIFF WBC: CPT

## 2021-04-11 PROCEDURE — 6370000000 HC RX 637 (ALT 250 FOR IP): Performed by: INTERNAL MEDICINE

## 2021-04-11 PROCEDURE — 6360000002 HC RX W HCPCS: Performed by: NURSE PRACTITIONER

## 2021-04-11 PROCEDURE — 1200000000 HC SEMI PRIVATE

## 2021-04-11 PROCEDURE — 85014 HEMATOCRIT: CPT

## 2021-04-11 PROCEDURE — 85018 HEMOGLOBIN: CPT

## 2021-04-11 PROCEDURE — 2580000003 HC RX 258: Performed by: HOSPITALIST

## 2021-04-11 PROCEDURE — 80048 BASIC METABOLIC PNL TOTAL CA: CPT

## 2021-04-11 PROCEDURE — 99232 SBSQ HOSP IP/OBS MODERATE 35: CPT | Performed by: SURGERY

## 2021-04-11 PROCEDURE — 84132 ASSAY OF SERUM POTASSIUM: CPT

## 2021-04-11 PROCEDURE — 6370000000 HC RX 637 (ALT 250 FOR IP): Performed by: HOSPITALIST

## 2021-04-11 PROCEDURE — 2580000003 HC RX 258

## 2021-04-11 PROCEDURE — 2580000003 HC RX 258: Performed by: NURSE PRACTITIONER

## 2021-04-11 PROCEDURE — 2580000003 HC RX 258: Performed by: INTERNAL MEDICINE

## 2021-04-11 PROCEDURE — 36415 COLL VENOUS BLD VENIPUNCTURE: CPT

## 2021-04-11 PROCEDURE — 6360000002 HC RX W HCPCS: Performed by: HOSPITALIST

## 2021-04-11 RX ORDER — POTASSIUM CHLORIDE 20 MEQ/1
40 TABLET, EXTENDED RELEASE ORAL PRN
Status: DISCONTINUED | OUTPATIENT
Start: 2021-04-11 | End: 2021-04-19 | Stop reason: HOSPADM

## 2021-04-11 RX ORDER — POTASSIUM CHLORIDE 7.45 MG/ML
10 INJECTION INTRAVENOUS PRN
Status: DISCONTINUED | OUTPATIENT
Start: 2021-04-11 | End: 2021-04-19 | Stop reason: HOSPADM

## 2021-04-11 RX ORDER — SODIUM CHLORIDE 9 MG/ML
INJECTION, SOLUTION INTRAVENOUS
Status: COMPLETED
Start: 2021-04-11 | End: 2021-04-11

## 2021-04-11 RX ORDER — POTASSIUM CHLORIDE 20 MEQ/1
40 TABLET, EXTENDED RELEASE ORAL ONCE
Status: COMPLETED | OUTPATIENT
Start: 2021-04-11 | End: 2021-04-11

## 2021-04-11 RX ADMIN — PIPERACILLIN AND TAZOBACTAM 3375 MG: 3; .375 INJECTION, POWDER, LYOPHILIZED, FOR SOLUTION INTRAVENOUS at 12:19

## 2021-04-11 RX ADMIN — Medication 10 ML: at 20:37

## 2021-04-11 RX ADMIN — CARVEDILOL 6.25 MG: 6.25 TABLET, FILM COATED ORAL at 15:44

## 2021-04-11 RX ADMIN — POTASSIUM CHLORIDE 10 MEQ: 7.46 INJECTION, SOLUTION INTRAVENOUS at 12:20

## 2021-04-11 RX ADMIN — SODIUM CHLORIDE: 9 INJECTION, SOLUTION INTRAVENOUS at 05:19

## 2021-04-11 RX ADMIN — PANTOPRAZOLE SODIUM 40 MG: 40 TABLET, DELAYED RELEASE ORAL at 08:01

## 2021-04-11 RX ADMIN — NIFEDIPINE 30 MG: 30 TABLET, FILM COATED, EXTENDED RELEASE ORAL at 08:01

## 2021-04-11 RX ADMIN — PIPERACILLIN AND TAZOBACTAM 3375 MG: 3; .375 INJECTION, POWDER, LYOPHILIZED, FOR SOLUTION INTRAVENOUS at 02:55

## 2021-04-11 RX ADMIN — AMIODARONE HYDROCHLORIDE 100 MG: 200 TABLET ORAL at 15:46

## 2021-04-11 RX ADMIN — POLYETHYLENE GLYCOL 3350 17 G: 17 POWDER, FOR SOLUTION ORAL at 18:54

## 2021-04-11 RX ADMIN — CARVEDILOL 6.25 MG: 6.25 TABLET, FILM COATED ORAL at 08:01

## 2021-04-11 RX ADMIN — POTASSIUM CHLORIDE 10 MEQ: 7.46 INJECTION, SOLUTION INTRAVENOUS at 14:40

## 2021-04-11 RX ADMIN — ATORVASTATIN CALCIUM 40 MG: 40 TABLET, FILM COATED ORAL at 20:37

## 2021-04-11 RX ADMIN — FUROSEMIDE 20 MG: 20 TABLET ORAL at 08:01

## 2021-04-11 RX ADMIN — POTASSIUM CHLORIDE 40 MEQ: 1500 TABLET, EXTENDED RELEASE ORAL at 18:54

## 2021-04-11 RX ADMIN — PIPERACILLIN AND TAZOBACTAM 3375 MG: 3; .375 INJECTION, POWDER, LYOPHILIZED, FOR SOLUTION INTRAVENOUS at 18:53

## 2021-04-11 RX ADMIN — SODIUM CHLORIDE: 9 INJECTION, SOLUTION INTRAVENOUS at 23:04

## 2021-04-11 RX ADMIN — SODIUM CHLORIDE: 9 INJECTION, SOLUTION INTRAVENOUS at 14:46

## 2021-04-11 RX ADMIN — SPIRONOLACTONE 25 MG: 25 TABLET ORAL at 09:09

## 2021-04-11 NOTE — PROGRESS NOTES
Gastroenterology Progress Note            Cam Ellison is a 63 y.o. female patient. 1. Symptomatic anemia    2. Adequate anticoagulation on anticoagulant therapy    3. Profound fatigue    4. Status post implantation of automatic cardioverter/defibrillator (AICD)        SUBJECTIVE:  No bleeding. Denies ab pain. Physical    VITALS:  BP 98/65   Pulse 75   Temp 97.7 °F (36.5 °C)   Resp 18   Ht 5' 6.5\" (1.689 m)   Wt 199 lb 11.2 oz (90.6 kg)   SpO2 95%   BMI 31.75 kg/m²   TEMPERATURE:  Current - Temp: 97.7 °F (36.5 °C); Max - Temp  Av.7 °F (36.5 °C)  Min: 97.1 °F (36.2 °C)  Max: 98 °F (36.7 °C)    Abdomen soft, ND, NT, no HSM, Bowel sounds normal   Obese  nad  eomi  cta bilat nl effort. Data      Recent Labs     04/09/21  0448 04/09/21  0448 04/10/21  0422 04/10/21  1955 04/11/21  0305   WBC 7.0  --  6.6  --   --    HGB 7.2*   < > 8.1* 7.8* 7.7*   HCT 21.5*   < > 24.9* 23.6* 23.1*   MCV 89.0  --  90.8  --   --      --  209  --   --     < > = values in this interval not displayed. Recent Labs     04/09/21  0448 04/10/21  0422    142   K 3.5 3.5    109   CO2 23 23   PHOS 3.4  --    BUN 15 9   CREATININE 0.8 1.0     Recent Labs     21   AST 20   ALT 16   BILITOT 0.4   ALKPHOS 42     Recent Labs     21   LIPASE 35.0       ASSESSMENT   1. Hematochezia- from lesion in D3 that appears c/w lipoma but ddx includes GIST. 2. Acute blood loss anemia        PLAN    1. prbc support to keep hgb >7.    2. Will try to arrange for eus of lesion.     3. Will likely need surgical resection.      Barry Sky MD  [address]

## 2021-04-11 NOTE — PROGRESS NOTES
Pt feeling good this AM.  VS are stable and she is advanced to soft diet and NPO at midnite for possible procedure. I was unable to get a good blood draw from midline, so lab jacquelin blood at 8:30 and 2. Potassium was 3.o so will start k replacement IV. Pt placed on tele. Pt decided to take her amnodorone  This afternoon.

## 2021-04-11 NOTE — PROGRESS NOTES
[facility] PROGRESS NOTE    4/11/2021 8:45 AM        Name: Bam Flores . Admitted: 4/7/2021  Primary Care Provider: No primary care provider on file. (Tel: None)                        Subjective:  . No acute events overnight. Resting well. Pain control. Diet ok. Labs reviewed  Denies any chest pain sob.  -Noted drop in hemoglobin.     Reviewed interval ancillary notes    Current Medications  0.9 % sodium chloride infusion, PRN  pantoprazole (PROTONIX) tablet 40 mg, QAM AC  amiodarone (CORDARONE) tablet 100 mg, Daily  0.9 % sodium chloride infusion, Continuous  piperacillin-tazobactam (ZOSYN) 3,375 mg in dextrose 5 % 50 mL IVPB extended infusion (mini-bag), Q8H  0.9 % sodium chloride infusion, PRN  atorvastatin (LIPITOR) tablet 40 mg, Daily  furosemide (LASIX) tablet 20 mg, Daily  NIFEdipine (PROCARDIA XL) extended release tablet 30 mg, Daily  spironolactone (ALDACTONE) tablet 25 mg, Daily  sodium chloride flush 0.9 % injection 5-40 mL, 2 times per day  sodium chloride flush 0.9 % injection 5-40 mL, PRN  0.9 % sodium chloride infusion, PRN  promethazine (PHENERGAN) tablet 12.5 mg, Q6H PRN    Or  ondansetron (ZOFRAN) injection 4 mg, Q6H PRN  polyethylene glycol (GLYCOLAX) packet 17 g, Daily PRN  acetaminophen (TYLENOL) tablet 650 mg, Q6H PRN    Or  acetaminophen (TYLENOL) suppository 650 mg, Q6H PRN  carvedilol (COREG) tablet 6.25 mg, BID WC        Objective:  BP 98/65   Pulse 75   Temp 97.7 °F (36.5 °C)   Resp 18   Ht 5' 6.5\" (1.689 m)   Wt 199 lb 11.2 oz (90.6 kg)   SpO2 95%   BMI 31.75 kg/m²     Intake/Output Summary (Last 24 hours) at 4/11/2021 0845  Last data filed at 4/10/2021 1658  Gross per 24 hour   Intake 800 ml   Output --   Net 800 ml      Wt Readings from Last 3 Encounters:   04/11/21 199 lb 11.2 oz (90.6 kg)   03/17/21 201 lb (91.2 kg)   02/23/21 196 CT scan noted. -Pathology pending.  -Continue to follow the recommendation    A. Fib  -Appreciate cardiology recommendation  -    Heart failure  -Continue current regimen. Diet: DIET LOW FIBER;   Diet NPO, After Midnight  Code:Full Code  DVT PPX lovenox       Linda Coleman MD   4/11/2021 8:45 AM

## 2021-04-11 NOTE — PROGRESS NOTES
Pt on tele to receive IV potassium. HR is 75 V paced. Pt received I 1/2 10 meq bags then she said it made her mid chest hurt. I stopped the potassium and her chest pain is better and would prefer oral potassium pill.  What would you like her to have and when should we recheck

## 2021-04-11 NOTE — PROGRESS NOTES
Jean Carlos Truong is a 63 y.o. female patient.     Chief complaint-upper GI bleeding    HPI-63year-old female seen in follow-up for upper GI bleeding  Current Facility-Administered Medications   Medication Dose Route Frequency Provider Last Rate Last Admin    potassium chloride (KLOR-CON M) extended release tablet 40 mEq  40 mEq Oral PRN Angelita Lancaster MD        Or    potassium bicarb-citric acid (EFFER-K) effervescent tablet 40 mEq  40 mEq Oral PRN Matt Greco MD        Or    potassium chloride 10 mEq/100 mL IVPB (Peripheral Line)  10 mEq Intravenous PRN Angelita Lancaster  mL/hr at 04/11/21 1220 10 mEq at 04/11/21 1220    sodium chloride 0.9 % infusion             0.9 % sodium chloride infusion   Intravenous PRN Huong Ley PA-C        pantoprazole (PROTONIX) tablet 40 mg  40 mg Oral QAM AC Tony Kim MD   40 mg at 04/11/21 0801    amiodarone (CORDARONE) tablet 100 mg  100 mg Oral Daily Teresa Zimmerman MD        0.9 % sodium chloride infusion   Intravenous Continuous Matt Greco MD 75 mL/hr at 04/11/21 1200 Rate Change at 04/11/21 1200    piperacillin-tazobactam (ZOSYN) 3,375 mg in dextrose 5 % 50 mL IVPB extended infusion (mini-bag)  3,375 mg Intravenous Q8H CATRACHITO Bonilla - CNP 12.5 mL/hr at 04/11/21 1219 3,375 mg at 04/11/21 1219    0.9 % sodium chloride infusion   Intravenous PRN Tera Arellano PA-C        atorvastatin (LIPITOR) tablet 40 mg  40 mg Oral Daily Matt Greco MD   40 mg at 04/10/21 2202    furosemide (LASIX) tablet 20 mg  20 mg Oral Daily Matt Greco MD   20 mg at 04/11/21 0801    NIFEdipine (PROCARDIA XL) extended release tablet 30 mg  30 mg Oral Daily Matt Greco MD   30 mg at 04/11/21 0801    spironolactone (ALDACTONE) tablet 25 mg  25 mg Oral Daily Angelita Lancaster MD   25 mg at 04/11/21 0909    sodium chloride flush 0.9 % injection 5-40 mL  5-40 mL Intravenous 2 times per day Angelita Lancaster MD   10 mL at 04/10/21 7821    sodium chloride flush 0.9 % injection 5-40 mL  5-40 mL Intravenous PRN Matt Greco MD        0.9 % sodium chloride infusion  25 mL Intravenous PRN Matt Greco MD        promethazine (PHENERGAN) tablet 12.5 mg  12.5 mg Oral Q6H PRN Matt Greco MD        Or    ondansetron (ZOFRAN) injection 4 mg  4 mg Intravenous Q6H PRN Matt Greco MD   4 mg at 04/10/21 0353    polyethylene glycol (GLYCOLAX) packet 17 g  17 g Oral Daily PRN Matt Greco MD        acetaminophen (TYLENOL) tablet 650 mg  650 mg Oral Q6H PRN Matt Greco MD   650 mg at 04/09/21 1540    Or    acetaminophen (TYLENOL) suppository 650 mg  650 mg Rectal Q6H PRN Matt Greco MD        carvedilol (COREG) tablet 6.25 mg  6.25 mg Oral BID WC Lonia Cough, MD   6.25 mg at 04/11/21 0801     Allergies   Allergen Reactions    Latex      rash    Codeine      Hives      Lisinopril      cough    Nitroglycerin Hives    Sulfa Antibiotics Nausea Only    Hydralazine      headaches     Active Problems:    Fatigue    Symptomatic anemia  Resolved Problems:    * No resolved hospital problems. *    Blood pressure 98/65, pulse 75, temperature 97.7 °F (36.5 °C), resp. rate 18, height 5' 6.5\" (1.689 m), weight 199 lb 11.2 oz (90.6 kg), SpO2 95 %, not currently breastfeeding. Subjective:  Symptoms:  Stable. Diet:  Dietary issues: Soft diet ordered. Activity level: Normal.    Pain:  She reports no pain. Objective:  General Appearance:  Comfortable. Vital signs: (most recent): Blood pressure 98/65, pulse 75, temperature 97.7 °F (36.5 °C), resp. rate 18, height 5' 6.5\" (1.689 m), weight 199 lb 11.2 oz (90.6 kg), SpO2 95 %, not currently breastfeeding. Output: Producing urine and producing stool. Lungs:  Normal effort and normal respiratory rate. Abdomen: Abdomen is soft and non-distended. There is no abdominal tenderness. Neurological: Patient is alert and oriented to person, place and time. Skin:  Warm and dry.       Assessment & Plan 78-year-old female seen in follow-up for an upper GI bleed which is felt to be secondary to an ulceration overlying a lipoma in the fourth portion of the duodenum/proximal jejunum. No signs of ongoing bleeding at this point time. EUS is planned for sometime this week per the GI team.  Soft diet today.     Alireza Mc MD  4/11/2021

## 2021-04-12 ENCOUNTER — ANESTHESIA EVENT (OUTPATIENT)
Dept: OPERATING ROOM | Age: 56
DRG: 220 | End: 2021-04-12
Payer: MEDICAID

## 2021-04-12 ENCOUNTER — APPOINTMENT (OUTPATIENT)
Dept: GENERAL RADIOLOGY | Age: 56
DRG: 220 | End: 2021-04-12
Payer: MEDICAID

## 2021-04-12 LAB
ANION GAP SERPL CALCULATED.3IONS-SCNC: 8 MMOL/L (ref 3–16)
BASOPHILS ABSOLUTE: 0 K/UL (ref 0–0.2)
BASOPHILS RELATIVE PERCENT: 0.5 %
BUN BLDV-MCNC: 8 MG/DL (ref 7–20)
CALCIUM SERPL-MCNC: 8.7 MG/DL (ref 8.3–10.6)
CHLORIDE BLD-SCNC: 112 MMOL/L (ref 99–110)
CO2: 23 MMOL/L (ref 21–32)
CREAT SERPL-MCNC: 0.9 MG/DL (ref 0.6–1.1)
EKG ATRIAL RATE: 75 BPM
EKG DIAGNOSIS: NORMAL
EKG P-R INTERVAL: 182 MS
EKG Q-T INTERVAL: 504 MS
EKG QRS DURATION: 164 MS
EKG QTC CALCULATION (BAZETT): 562 MS
EKG R AXIS: -41 DEGREES
EKG T AXIS: 53 DEGREES
EKG VENTRICULAR RATE: 75 BPM
EOSINOPHILS ABSOLUTE: 0.3 K/UL (ref 0–0.6)
EOSINOPHILS RELATIVE PERCENT: 3.9 %
GFR AFRICAN AMERICAN: >60
GFR NON-AFRICAN AMERICAN: >60
GLUCOSE BLD-MCNC: 104 MG/DL (ref 70–99)
HCT VFR BLD CALC: 24.4 % (ref 36–48)
HCT VFR BLD CALC: 25 % (ref 36–48)
HCT VFR BLD CALC: 25.7 % (ref 36–48)
HCT VFR BLD CALC: 27.1 % (ref 36–48)
HEMOGLOBIN: 8 G/DL (ref 12–16)
HEMOGLOBIN: 8.3 G/DL (ref 12–16)
HEMOGLOBIN: 8.3 G/DL (ref 12–16)
HEMOGLOBIN: 8.9 G/DL (ref 12–16)
LYMPHOCYTES ABSOLUTE: 1.1 K/UL (ref 1–5.1)
LYMPHOCYTES RELATIVE PERCENT: 16.6 %
MCH RBC QN AUTO: 29.5 PG (ref 26–34)
MCHC RBC AUTO-ENTMCNC: 32.6 G/DL (ref 31–36)
MCV RBC AUTO: 90.4 FL (ref 80–100)
MONOCYTES ABSOLUTE: 0.5 K/UL (ref 0–1.3)
MONOCYTES RELATIVE PERCENT: 8.3 %
NEUTROPHILS ABSOLUTE: 4.5 K/UL (ref 1.7–7.7)
NEUTROPHILS RELATIVE PERCENT: 70.7 %
PDW BLD-RTO: 21.6 % (ref 12.4–15.4)
PLATELET # BLD: 249 K/UL (ref 135–450)
PMV BLD AUTO: 7.1 FL (ref 5–10.5)
POTASSIUM SERPL-SCNC: 3.5 MMOL/L (ref 3.5–5.1)
RBC # BLD: 2.7 M/UL (ref 4–5.2)
SODIUM BLD-SCNC: 143 MMOL/L (ref 136–145)
WBC # BLD: 6.4 K/UL (ref 4–11)

## 2021-04-12 PROCEDURE — 36415 COLL VENOUS BLD VENIPUNCTURE: CPT

## 2021-04-12 PROCEDURE — APPSS15 APP SPLIT SHARED TIME 0-15 MINUTES: Performed by: NURSE PRACTITIONER

## 2021-04-12 PROCEDURE — 1200000000 HC SEMI PRIVATE

## 2021-04-12 PROCEDURE — 80048 BASIC METABOLIC PNL TOTAL CA: CPT

## 2021-04-12 PROCEDURE — 2580000003 HC RX 258: Performed by: NURSE PRACTITIONER

## 2021-04-12 PROCEDURE — 74018 RADEX ABDOMEN 1 VIEW: CPT

## 2021-04-12 PROCEDURE — 85025 COMPLETE CBC W/AUTO DIFF WBC: CPT

## 2021-04-12 PROCEDURE — 93005 ELECTROCARDIOGRAM TRACING: CPT | Performed by: PHYSICIAN ASSISTANT

## 2021-04-12 PROCEDURE — 2580000003 HC RX 258: Performed by: HOSPITALIST

## 2021-04-12 PROCEDURE — 99232 SBSQ HOSP IP/OBS MODERATE 35: CPT | Performed by: SURGERY

## 2021-04-12 PROCEDURE — 85018 HEMOGLOBIN: CPT

## 2021-04-12 PROCEDURE — APPNB30 APP NON BILLABLE TIME 0-30 MINS: Performed by: NURSE PRACTITIONER

## 2021-04-12 PROCEDURE — 85014 HEMATOCRIT: CPT

## 2021-04-12 PROCEDURE — 6370000000 HC RX 637 (ALT 250 FOR IP): Performed by: HOSPITALIST

## 2021-04-12 PROCEDURE — 6370000000 HC RX 637 (ALT 250 FOR IP): Performed by: INTERNAL MEDICINE

## 2021-04-12 PROCEDURE — 6360000002 HC RX W HCPCS: Performed by: NURSE PRACTITIONER

## 2021-04-12 PROCEDURE — 93010 ELECTROCARDIOGRAM REPORT: CPT | Performed by: INTERNAL MEDICINE

## 2021-04-12 RX ORDER — PANTOPRAZOLE SODIUM 40 MG/1
40 TABLET, DELAYED RELEASE ORAL
Qty: 30 TABLET | Refills: 3 | Status: SHIPPED | OUTPATIENT
Start: 2021-04-13

## 2021-04-12 RX ADMIN — PIPERACILLIN AND TAZOBACTAM 3375 MG: 3; .375 INJECTION, POWDER, LYOPHILIZED, FOR SOLUTION INTRAVENOUS at 18:19

## 2021-04-12 RX ADMIN — ATORVASTATIN CALCIUM 40 MG: 40 TABLET, FILM COATED ORAL at 20:47

## 2021-04-12 RX ADMIN — PIPERACILLIN AND TAZOBACTAM 3375 MG: 3; .375 INJECTION, POWDER, LYOPHILIZED, FOR SOLUTION INTRAVENOUS at 11:15

## 2021-04-12 RX ADMIN — NIFEDIPINE 30 MG: 30 TABLET, FILM COATED, EXTENDED RELEASE ORAL at 09:20

## 2021-04-12 RX ADMIN — PIPERACILLIN AND TAZOBACTAM 3375 MG: 3; .375 INJECTION, POWDER, LYOPHILIZED, FOR SOLUTION INTRAVENOUS at 03:14

## 2021-04-12 RX ADMIN — SPIRONOLACTONE 25 MG: 25 TABLET ORAL at 09:20

## 2021-04-12 RX ADMIN — CARVEDILOL 6.25 MG: 6.25 TABLET, FILM COATED ORAL at 16:28

## 2021-04-12 RX ADMIN — CARVEDILOL 6.25 MG: 6.25 TABLET, FILM COATED ORAL at 09:20

## 2021-04-12 RX ADMIN — Medication 10 ML: at 08:00

## 2021-04-12 RX ADMIN — FUROSEMIDE 20 MG: 20 TABLET ORAL at 09:20

## 2021-04-12 NOTE — PROGRESS NOTES
[facility]        Progress Note    Patient Name: Courtney Villarreal  MRN: [id number]  YOB: 1965  Date of Evaluation: 2021    Chief Complaint: Fatigue    Subjective:  No acute events overnight  Pain resolved, denies any at present  No nausea or vomiting, tolerating soft diet  Passing stool, still somewhat dark but not black  Up to chair at this time      Vital Signs:  Patient Vitals for the past 24 hrs:   BP Temp Temp src Pulse Resp SpO2 Weight   21 1222 99/70 98 °F (36.7 °C) Axillary 79 18 99 % --   21 0815 (!) 151/70 98.1 °F (36.7 °C) Axillary 77 18 98 % --   21 0431 -- -- -- -- -- -- 209 lb 6 oz (95 kg)   21 0429 115/75 97.7 °F (36.5 °C) Oral 75 18 95 % --   21 2340 114/74 98 °F (36.7 °C) Oral 77 18 96 % --   21 2106 -- -- -- 75 -- -- --   21 2033 103/67 97.8 °F (36.6 °C) Oral 75 18 97 % --   21 1614 (!) 110/58 98 °F (36.7 °C) Axillary 75 18 99 % --      TEMPERATURE HISTORY 24H: Temp (24hrs), Av.9 °F (36.6 °C), Min:97.7 °F (36.5 °C), Max:98.1 °F (36.7 °C)    BLOOD PRESSURE HISTORY: Systolic (63WVC), RRH:684 , Min:98 , ABELARDO:349    Diastolic (18MEQ), POJ:86, Min:58, Max:75      Intake/Output:  I/O last 3 completed shifts: In: 350 [P.O.:350]  Out: -   No intake/output data recorded. Drain/tube Output:       Physical Exam:  General: awake, alert, oriented to  person, place, time  Cardiovascular:  regular rate and rhythm and no murmur noted  Lungs: clear to auscultation  Abdomen: soft, nontender, nondistended, bowel sounds normal     Labs:  CBC:    Recent Labs     04/10/21  0422 04/10/21  0422 21  0840 21  0840 21  2127 21  04221  1036   WBC 6.6  --  5.6  --   --  6.4  --    HGB 8.1*   < > 8.1*   < > 8.2* 8.0* 8.3*   HCT 24.9*   < > 24.3*   < > 24.9* 24.4* 25.7*     --  235  --   --  249  --     < > = values in this interval not displayed.      BMP:    Recent Labs 04/10/21  0422 04/11/21  0840 04/11/21  1716 04/12/21 0421    142  --  143   K 3.5 3.0* 3.2* 3.5    109  --  112*   CO2 23 24  --  23   BUN 9 5*  --  8   CREATININE 1.0 0.9  --  0.9   GLUCOSE 102* 108*  --  104*     Hepatic:    No results for input(s): AST, ALT, ALB, BILITOT, ALKPHOS in the last 72 hours. Amylase:  No results found for: AMYLASE  Lipase:    Lab Results   Component Value Date    LIPASE 35.0 04/09/2021    LIPASE 25.0 04/07/2021    LIPASE 25.0 07/10/2020      Mag:    Lab Results   Component Value Date    MG 2.20 04/09/2021    MG 2.00 02/09/2021     Phos:     Lab Results   Component Value Date    PHOS 3.4 04/09/2021    PHOS 3.2 02/09/2021      Coags:   Lab Results   Component Value Date    PROTIME 12.4 04/09/2021    INR 1.07 04/09/2021    APTT 18.3 04/09/2021       Cultures:  Anaerobic culture  No results found for: LABANAE  Fungus stain  No results found for requested labs within last 30 days. Gram stain  No results found for requested labs within last 30 days. Organism  No results found for: [facility]  Surgical culture  No results found for: CXSURG  Blood culture 1  Results in Past 30 Days  Result Component Current Result Ref Range Previous Result Ref Range   Blood Culture, Routine No Growth after 4 days of incubation. (4/7/2021)  Not in Time Range      Blood culture 2  Results in Past 30 Days  Result Component Current Result Ref Range Previous Result Ref Range   Culture, Blood 2 No Growth after 4 days of incubation. (4/7/2021)  Not in Time Range      Fecal occult  Results in Past 30 Days  Result Component Current Result Ref Range Previous Result Ref Range   Occult Blood Diagnostic Result: POSITIVE  Normal range: Negative   (A) (4/7/2021)  Not in Time Range      GI bacterial pathogens by PCR  No results found for requested labs within last 30 days. C. difficile  No results found for requested labs within last 30 days.      Urine culture  No results found for: [facility]    Pathology:  EGD 4/8/2021--FINAL DIAGNOSIS:     Duodenum, mass, biopsy:      - Small intestinal mucosa with no significant pathologic change- See        Comment. COMMENT: Small portion of subepithelial/ submucosal tissue is present and   contains benign smooth muscle (Desmin positive, GIST marker    negative). No evidence of malignancy is identified in this biopsy. Clinical correlation. Imaging:  I have personally reviewed the following films:    No results found. Scheduled Meds:   pantoprazole  40 mg Oral QAM AC    amiodarone  100 mg Oral Daily    piperacillin-tazobactam  3,375 mg Intravenous Q8H    atorvastatin  40 mg Oral Daily    furosemide  20 mg Oral Daily    NIFEdipine  30 mg Oral Daily    spironolactone  25 mg Oral Daily    sodium chloride flush  5-40 mL Intravenous 2 times per day    carvedilol  6.25 mg Oral BID WC     Continuous Infusions:   sodium chloride      sodium chloride 75 mL/hr at 04/11/21 2304    sodium chloride      sodium chloride       PRN Meds:.potassium chloride **OR** potassium alternative oral replacement **OR** potassium chloride, sodium chloride, sodium chloride, sodium chloride flush, sodium chloride, promethazine **OR** ondansetron, polyethylene glycol, acetaminophen **OR** acetaminophen      Assessment:  63 y.o. female admitted with   1. Symptomatic anemia    2. Adequate anticoagulation on anticoagulant therapy    3. Profound fatigue    4. Status post implantation of automatic cardioverter/defibrillator (AICD)        Duodenal mass  Abdominal pain  Cholelithiasis  Upper GI bleed  Acute blood loss anemia  CHF, EF 30-35%  Atrial fibrillation  Medical coagulopathy, on Xarelto--last dose on 4/6/2021      Plan:  1. Possible surgical resection of duodenal lesion with cholecystectomy tomorrow verses conservative management/monitoring; pathology from EGD unrevealing, CEA/ WNL, CA 19-9 pending  2.  Hemoglobin remains stable, continue PPI, GI following  3. Tolerating soft/general diet; monitor bowel function  4. IV hydration until PO intake is adequate; monitor and correct electrolytes  5. Antibiotics--on Zosyn for possible cholecystitis  6. Activity as tolerated  7. PRN analgesics and antiemetics--minimizing narcotics as tolerated  8. Hold anticoagulants  9. Management of medical comorbid etiologies per primary team and consulting services    EDUCATION:  Educated patient on plan of care and disease process--all questions answered. Plans discussed with patient and nursing. Reviewed and discussed with Dr. Enrique Johnson. Signed:  CATRACHITO Ren - CNP  4/12/2021 3:37 PM    I have personally performed a face to face diagnostic evaluation on this patient. I have interviewed and examined the patient and I agree with the assessment above. In summary, my findings and plan are the following:   Ms. Prince Pickett is a 63 y.o. female who presents with   Duodenal mass  Cholelithiasis  Upper GI bleed  Acute blood loss anemia  CHF, EF 30-35%  Atrial fibrillation  Medical coagulopathy, on Xarelto--last dose on 4/6/2021    Plan:  1. With history of upper GI bleed, acute blood loss anemia, duodenal mass with visualized bleeding and unable to rule out GIST tumor, recommend excision. Additionally this would not explain the post prandial pain for the last few months and recommend cholecystectomy as well. Details of procedure discussed. All questions answered. Risks include bleeding, infection, damage to surrounding structures, need for additional procedures as well as the perioperative risks associated with general anesthesia itself. Alternatives include observation with close surveillance. Wishes to proceed and tentatively on OR schedule for tomorrow morning   2. NPO after midnight  3. Perioperative antibiotics  4. IVF with caution, history of CHF  5. Pain medication and antiemetics as needed with caution  6.  Defer management of remainder of medical comorbidities to

## 2021-04-12 NOTE — PROGRESS NOTES
[facility] PROGRESS NOTE    4/12/2021 9:00 AM        Name: Venita Ruff . Admitted: 4/7/2021  Primary Care Provider: No primary care provider on file. (Tel: None)                        Subjective:  . No acute events overnight. Resting well. Pain control. Diet ok. Labs reviewed  Denies any chest pain sob.  -Noted drop in hemoglobin.     Reviewed interval ancillary notes    Current Medications  potassium chloride (KLOR-CON M) extended release tablet 40 mEq, PRN    Or  potassium bicarb-citric acid (EFFER-K) effervescent tablet 40 mEq, PRN    Or  potassium chloride 10 mEq/100 mL IVPB (Peripheral Line), PRN  0.9 % sodium chloride infusion, PRN  pantoprazole (PROTONIX) tablet 40 mg, QAM AC  amiodarone (CORDARONE) tablet 100 mg, Daily  0.9 % sodium chloride infusion, Continuous  piperacillin-tazobactam (ZOSYN) 3,375 mg in dextrose 5 % 50 mL IVPB extended infusion (mini-bag), Q8H  0.9 % sodium chloride infusion, PRN  atorvastatin (LIPITOR) tablet 40 mg, Daily  furosemide (LASIX) tablet 20 mg, Daily  NIFEdipine (PROCARDIA XL) extended release tablet 30 mg, Daily  spironolactone (ALDACTONE) tablet 25 mg, Daily  sodium chloride flush 0.9 % injection 5-40 mL, 2 times per day  sodium chloride flush 0.9 % injection 5-40 mL, PRN  0.9 % sodium chloride infusion, PRN  promethazine (PHENERGAN) tablet 12.5 mg, Q6H PRN    Or  ondansetron (ZOFRAN) injection 4 mg, Q6H PRN  polyethylene glycol (GLYCOLAX) packet 17 g, Daily PRN  acetaminophen (TYLENOL) tablet 650 mg, Q6H PRN    Or  acetaminophen (TYLENOL) suppository 650 mg, Q6H PRN  carvedilol (COREG) tablet 6.25 mg, BID WC        Objective:  BP (!) 151/70   Pulse 77   Temp 98.1 °F (36.7 °C) (Axillary)   Resp 18   Ht 5' 6.5\" (1.689 m)   Wt 209 lb 6 oz (95 kg)   SpO2 98%   BMI 33.29 kg/m²     Intake/Output Summary (Last 24 hours) at 4/12/2021 0900  Last data filed at 4/11/2021 1830  Gross per 24 hour   Intake 900 ml   Output --   Net 900 ml      Wt Readings from Last 3 Encounters:   04/12/21 209 lb 6 oz (95 kg)   03/17/21 201 lb (91.2 kg)   02/23/21 196 lb 10.6 oz (89.2 kg)       General appearance:  Appears comfortable  Eyes: Sclera clear. Pupils equal.  ENT: Moist oral mucosa. Trachea midline, no adenopathy. Cardiovascular: Regular rhythm, normal S1, S2. No murmur. No edema in lower extremities  Respiratory: Not using accessory muscles. Good inspiratory effort. Clear to auscultation bilaterally, no wheeze or crackles. GI: Abdomen soft, no tenderness, not distended, normal bowel sounds  Musculoskeletal: No cyanosis in digits, neck supple  Neurology: CN 2-12 grossly intact. No speech or motor deficits  Psych: Normal affect. Alert and oriented in time, place and person  Skin: Warm, dry, normal turgor    Labs and Tests:  CBC:   Recent Labs     04/10/21  0422 04/10/21  0422 04/11/21  0840 04/11/21  1407 04/11/21  2127 04/12/21  0421   WBC 6.6  --  5.6  --   --  6.4   HGB 8.1*   < > 8.1* 8.7* 8.2* 8.0*     --  235  --   --  249    < > = values in this interval not displayed. BMP:    Recent Labs     04/10/21  0422 04/11/21  0840 04/11/21  1716 04/12/21  0421    142  --  143   K 3.5 3.0* 3.2* 3.5    109  --  112*   CO2 23 24  --  23   BUN 9 5*  --  8   CREATININE 1.0 0.9  --  0.9   GLUCOSE 102* 108*  --  104*     Hepatic:   No results for input(s): AST, ALT, ALB, BILITOT, ALKPHOS in the last 72 hours. Discussed care with family and patient             Spent 30  minutes with patient and family at bedside and on unit reviewing medical records and labs, spent greater than 50% time counseling patient and family on diagnosis and plan   Problem List  Active Problems:    Fatigue    Symptomatic anemia  Resolved Problems:    * No resolved hospital problems. *       Assessment & Plan:   1. Symptomatic anemia  -With GI bleed. Probably secondary to ulceration overying lipoma   -Underwent EGD found to have large large lesion of the duodenum  -Patient underwent 3 clipping. With epi  -patient also had a large lipoma biopsy was done at the time  -hgb stable  Continue to hold [facility]            Duodenal mass  -Surgery is following. CT scan noted. -Pathology pending.  -plan for ex lap and excision likely tomorrow     A. Fib  -Appreciate cardiology recommendation  -    Heart failure  -Continue current regimen. Diet: DIET LOW FIBER;   Diet NPO, After Midnight Exceptions are: Sips with Meds  Code:Full Code  DVT PPX lovenox       Porsha Leblanc MD   4/12/2021 9:00 AM

## 2021-04-12 NOTE — PROGRESS NOTES
Physician Progress Note      Corey Vasquez  CSN #:                  [id number]  :                       1965  ADMIT DATE:       2021 12:21 PM  DISCH DATE:  RESPONDING  PROVIDER #:        Venancio Simms MD          QUERY TEXT:    Patient admitted with a bleed from a duodenal lesion and is on chronic   anticoagulation. If possible, please document in the progress notes and   discharge summary if you are evaluating and/or treating any of the following: The medical record reflects the following:  Risk Factors: Mass in duodenum  Clinical Indicators: Pt being worked up for a mass in the duodenum. large   amount of blood found during EGD. Treatment: Stop Xarelto, EGD with clips and epinephrine injection, multiple   units of PRBC  Options provided:  -- Bleeding associated with Xarelto. -- Bleeding unrelated to anticoagulation  -- Other - I will add my own diagnosis  -- Disagree - Not applicable / Not valid  -- Disagree - Clinically unable to determine / Unknown  -- Refer to Clinical Documentation Reviewer    PROVIDER RESPONSE TEXT:    Patient bleeding is unrelated to anticoagulation.     Query created by: Casper Alfaro on 2021 6:54 AM      Electronically signed by:  Venancio Simms MD 2021 8:17 AM

## 2021-04-12 NOTE — PROGRESS NOTES
Messaged on-call NP about patient's complaints of chest discomfort. 0147 Stat EKG ordered. 0200 Patient states that her pain has decreased. She does not need anything for pain at this time. No new pain medications ordered.

## 2021-04-12 NOTE — PROGRESS NOTES
Patient's head to toe assessment complete at this time. Routine VSS. Patient is resting comfortably in bed with respirations even and unlabored. Patient is awake, alert, and oriented. Midline flushed, no brisk blood return noted. All nightly medications given per MAR. No other needs expressed at this time, call light within reach. Bed alarm engaged. Will continue to monitor.

## 2021-04-12 NOTE — PROGRESS NOTES
Shift assessment complete. VSS. Scheduled medications given. No complaints of pain. Call light within reach.  Bed alarm on. NO further needs at this time

## 2021-04-12 NOTE — ANESTHESIA PRE PROCEDURE
Department of Anesthesiology  Preprocedure Note       Name:  Catherine López   Age:  63 y.o.  :  1965                                          MRN:  [id number]         Date:  2021      Surgeon: Lucien Harrington):  Divya Sotelo MD    Procedure: Procedure(s):  EXPLORATORY LAPAROTOMY, RESECTION OF DUODENAL MASS, CHOLECYSTECTOMY WITH INTRAOPERATIVE CHOLANGIOGRAM, POSSIBLE BOWEL RESECTION    Medications prior to admission:   Prior to Admission medications    Medication Sig Start Date End Date Taking? Authorizing Provider   pantoprazole (PROTONIX) 40 MG tablet Take 1 tablet by mouth every morning (before breakfast) 21   Ye Rush MD   amiodarone (CORDARONE) 200 MG tablet Take 1 tablet by mouth daily 21   CATRACHITO Rudolph - CNP   NIFEdipine (ADALAT CC) 30 MG extended release tablet Take 1 tablet by mouth daily 21   Danni Gaona MD   vitamin D (ERGOCALCIFEROL) 1.25 MG (17507 UT) CAPS capsule Take 1 capsule by mouth once a week 21   CATRACHITO Rueda - BELKIS   sacubitril-valsartan (ENTRESTO) 24-26 MG per tablet Take 0.5 tablets by mouth nightly 21   CATRACHITO Robins   carvedilol (COREG) 25 MG tablet Take 1 tablet by mouth 2 times daily 21   Ondina Stark MD   furosemide (LASIX) 20 MG tablet Take 1 tablet by mouth daily 21   Ondina Stark MD   Multiple Vitamins-Minerals (THERAPEUTIC MULTIVITAMIN-MINERALS) tablet Take 1 tablet by mouth daily    Historical Provider, MD   XARELTO 20 MG TABS tablet TAKE ONE TABLET BY MOUTH DAILY WITH BREAKFAST 10/20/20   Tiburcio Miller MD   spironolactone (ALDACTONE) 25 MG tablet Take 1 tablet by mouth daily 20   CATRACHITO Rueda   atorvastatin (LIPITOR) 40 MG tablet Take 1 tablet by mouth daily 16   Alvaro Reed MD       Current medications:    No current facility-administered medications for this visit.       Current Outpatient Medications   Medication Sig Dispense Refill    [START ON 4/13/2021] pantoprazole (PROTONIX) 40 MG tablet Take 1 tablet by mouth every morning (before breakfast) 30 tablet 3     Facility-Administered Medications Ordered in Other Visits   Medication Dose Route Frequency Provider Last Rate Last Admin    potassium chloride (KLOR-CON M) extended release tablet 40 mEq  40 mEq Oral PRN Barry Jones MD        Or    potassium bicarb-citric acid (EFFER-K) effervescent tablet 40 mEq  40 mEq Oral PRN Barry Jones MD        Or    potassium chloride 10 mEq/100 mL IVPB (Peripheral Line)  10 mEq Intravenous PRN Barry Jones MD   Stopped at 04/11/21 1630    0.9 % sodium chloride infusion   Intravenous PRN Barber Victor PA-C        pantoprazole (PROTONIX) tablet 40 mg  40 mg Oral QAM AC Valentine Martinez MD   40 mg at 04/11/21 0801    amiodarone (CORDARONE) tablet 100 mg  100 mg Oral Daily Sonia Ware MD   Stopped at 04/12/21 0920    0.9 % sodium chloride infusion   Intravenous Continuous Matt Greco MD 75 mL/hr at 04/11/21 2304 New Bag at 04/11/21 2304    piperacillin-tazobactam (ZOSYN) 3,375 mg in dextrose 5 % 50 mL IVPB extended infusion (mini-bag)  3,375 mg Intravenous Q8H CATRACHITO Anna - CNP   Stopped at 04/12/21 0722    0.9 % sodium chloride infusion   Intravenous PRN Thong Patino PA-C        atorvastatin (LIPITOR) tablet 40 mg  40 mg Oral Daily Matt Greco MD   40 mg at 04/11/21 2037    furosemide (LASIX) tablet 20 mg  20 mg Oral Daily Matt Greco MD   20 mg at 04/12/21 0920    NIFEdipine (PROCARDIA XL) extended release tablet 30 mg  30 mg Oral Daily Matt Greco MD   30 mg at 04/12/21 0920    spironolactone (ALDACTONE) tablet 25 mg  25 mg Oral Daily Barry Jones MD   25 mg at 04/12/21 0920    sodium chloride flush 0.9 % injection 5-40 mL  5-40 mL Intravenous 2 times per day Barry Jones MD   10 mL at 04/11/21 2037    sodium chloride flush 0.9 % injection 5-40 mL  5-40 mL Intravenous PRN Matt Greco MD        0.9 % sodium chloride infusion 25 mL Intravenous PRN Matt Greco MD        promethazine (PHENERGAN) tablet 12.5 mg  12.5 mg Oral Q6H PRN Matt Greco MD        Or    ondansetron (ZOFRAN) injection 4 mg  4 mg Intravenous Q6H PRN Matt Greco MD   4 mg at 04/10/21 0353    polyethylene glycol (GLYCOLAX) packet 17 g  17 g Oral Daily PRN Porsha Leblanc MD   17 g at 04/11/21 1854    acetaminophen (TYLENOL) tablet 650 mg  650 mg Oral Q6H PRN Matt Greco MD   650 mg at 04/09/21 1540    Or    acetaminophen (TYLENOL) suppository 650 mg  650 mg Rectal Q6H PRN Matt Greco MD        carvedilol (COREG) tablet 6.25 mg  6.25 mg Oral BID WC Paco Cifuentes MD   6.25 mg at 04/12/21 0920       Allergies: Allergies   Allergen Reactions    Latex      rash    Codeine      Hives      Lisinopril      cough    Nitroglycerin Hives    Sulfa Antibiotics Nausea Only    Hydralazine      headaches       Problem List:    Patient Active Problem List   Diagnosis Code    HTN (hypertension) I10    Other and unspecified hyperlipidemia E78.5    Congenital heart block Q24.6    Dyspnea on exertion R06.00    Headache R51.9    LVH (left ventricular hypertrophy) I51.7    Persistent atrial fibrillation (HCC) I48.19    Chest pain D80.0    Systolic CHF, chronic (HCC) I50.22    Hypersomnia G47.10    Obstructive sleep apnea (adult) (pediatric) G47.33    LV dysfunction I51.9    Left subclavian vein thrombosis (HCC) I82. B12    Dilated cardiomyopathy (HCC) I42.0    Class 1 obesity without serious comorbidity with body mass index (BMI) of 33.0 to 33.9 in adult E66.9, Z68.33    Paroxysmal ventricular tachycardia (HCC) I47.2    ICD (implantable cardioverter-defibrillator), biventricular, in situ Z95.810    Exertional dyspnea R06.00    Iron deficiency anemia D50.9    Fatigue R53.83    Symptomatic anemia D64.9       Past Medical History:        Diagnosis Date    Anemia     Atrial fibrillation and flutter (HCC)     Atrial flutter (HCC)     CHB fibrillation and atrial flutter, CHF: diastolic and systolic, MOREIRA:,             Echocardiogram reviewed               ROS comment: Echo 2019   -Limited echocardiogram was performed to evaluate EF.   -Normal left ventricle size, mild to moderate left ventricular hypertrophy,   and moderately reduced systolic function with an estimated ejection fraction   of 30-35%. -There is moderate diffuse hypokinesis. -Grade III diastolic dysfunction . E/e\"=10.7.   -Mild mitral regurgitation.   -Mild tricuspid regurgitation.   -The left atrium is mild to moderate dilated. -Right ventricular systolic function appears mildly reduced .   -Estimated pulmonary artery systolic pressure is borderline normal at 28-33   mmHg assuming a right atrial pressure of 8 mmHg.   -Pacer / ICD wire is visualized in the right heart. Neuro/Psych:   (+) headaches: migraine headaches,             GI/Hepatic/Renal:   (+) GERD: well controlled,           Endo/Other:                     Abdominal:           Vascular:                                          Anesthesia Plan      MAC and general     ASA 3       Induction: intravenous. Plan discussed with CRNA and attending.                   CATRACHITO Bruno - CRNA   4/12/2021

## 2021-04-12 NOTE — PROGRESS NOTES
Routine vitals stable. Scheduled medications given. No complaints of pain. Pt denies any further needs at this time. Call light within reach.

## 2021-04-12 NOTE — PROGRESS NOTES
Gastroenterology Progress Note            Cam Ellison is a 63 y.o. female patient. 1. Symptomatic anemia    2. Adequate anticoagulation on anticoagulant therapy    3. Profound fatigue    4. Status post implantation of automatic cardioverter/defibrillator (AICD)        SUBJECTIVE:   Feels well. Some dark stool still   No cp or sob    Physical    VITALS:  BP (!) 151/70   Pulse 77   Temp 98.1 °F (36.7 °C) (Axillary)   Resp 18   Ht 5' 6.5\" (1.689 m)   Wt 209 lb 6 oz (95 kg)   SpO2 98%   BMI 33.29 kg/m²   TEMPERATURE:  Current - Temp: 98.1 °F (36.7 °C); Max - Temp  Av.9 °F (36.6 °C)  Min: 97.7 °F (36.5 °C)  Max: 98.1 °F (36.7 °C)    Abdomen soft, ND, NT, no HSM, Bowel sounds normal   nad  rrr nl s1s2  aox3    Data      Recent Labs     04/10/21  0422 04/10/21  0422 04/11/21  0840 21  1407 21  2127 21  0421   WBC 6.6  --  5.6  --   --  6.4   HGB 8.1*   < > 8.1* 8.7* 8.2* 8.0*   HCT 24.9*   < > 24.3* 26.5* 24.9* 24.4*   MCV 90.8  --  90.1  --   --  90.4     --  235  --   --  249    < > = values in this interval not displayed. Recent Labs     04/10/21  0422 04/11/21  0840 21  1716 21  0421    142  --  143   K 3.5 3.0* 3.2* 3.5    109  --  112*   CO2 23 24  --  23   BUN 9 5*  --  8   CREATININE 1.0 0.9  --  0.9           ASSESSMENT   1. Hematochezia- from lesion in D3 that appears c/w lipoma but ddx includes GIST.    2. Acute blood loss anemia        PLAN    1. prbc support to keep hgb >7.    2. Will likely need surgical resection.   D/w Dr. Matteo Camp. I do not think EUS changes the treatment option here. I did d/w my partner Dr. Melinda Mac and he agrees.   Also it is very distal in the duodenum and may not be reachable with eus scope.      Barry Sky MD  [address]

## 2021-04-13 ENCOUNTER — ANESTHESIA (OUTPATIENT)
Dept: OPERATING ROOM | Age: 56
DRG: 220 | End: 2021-04-13
Payer: MEDICAID

## 2021-04-13 ENCOUNTER — APPOINTMENT (OUTPATIENT)
Dept: GENERAL RADIOLOGY | Age: 56
DRG: 220 | End: 2021-04-13
Payer: MEDICAID

## 2021-04-13 ENCOUNTER — NURSE ONLY (OUTPATIENT)
Dept: CARDIOLOGY CLINIC | Age: 56
End: 2021-04-13
Payer: MEDICAID

## 2021-04-13 VITALS
OXYGEN SATURATION: 100 % | SYSTOLIC BLOOD PRESSURE: 93 MMHG | RESPIRATION RATE: 11 BRPM | TEMPERATURE: 97.2 F | DIASTOLIC BLOOD PRESSURE: 54 MMHG

## 2021-04-13 DIAGNOSIS — I50.22 SYSTOLIC CHF, CHRONIC (HCC): ICD-10-CM

## 2021-04-13 DIAGNOSIS — I48.19 PERSISTENT ATRIAL FIBRILLATION (HCC): ICD-10-CM

## 2021-04-13 DIAGNOSIS — I47.29 PAROXYSMAL VENTRICULAR TACHYCARDIA: ICD-10-CM

## 2021-04-13 DIAGNOSIS — Q24.6 CONGENITAL HEART BLOCK: ICD-10-CM

## 2021-04-13 DIAGNOSIS — Z95.810 ICD (IMPLANTABLE CARDIOVERTER-DEFIBRILLATOR), BIVENTRICULAR, IN SITU: ICD-10-CM

## 2021-04-13 DIAGNOSIS — I51.9 LV DYSFUNCTION: ICD-10-CM

## 2021-04-13 DIAGNOSIS — I51.7 LVH (LEFT VENTRICULAR HYPERTROPHY): ICD-10-CM

## 2021-04-13 DIAGNOSIS — I42.0 DILATED CARDIOMYOPATHY (HCC): ICD-10-CM

## 2021-04-13 LAB
ABO/RH: NORMAL
AFP: 2.4 UG/L
ANION GAP SERPL CALCULATED.3IONS-SCNC: 8 MMOL/L (ref 3–16)
ANTIBODY SCREEN: NORMAL
BASOPHILS ABSOLUTE: 0 K/UL (ref 0–0.2)
BASOPHILS RELATIVE PERCENT: 0.7 %
BUN BLDV-MCNC: 6 MG/DL (ref 7–20)
CA 19-9: 8 U/ML (ref 0–35)
CALCIUM SERPL-MCNC: 8.6 MG/DL (ref 8.3–10.6)
CHLORIDE BLD-SCNC: 112 MMOL/L (ref 99–110)
CO2: 23 MMOL/L (ref 21–32)
CREAT SERPL-MCNC: 0.9 MG/DL (ref 0.6–1.1)
EOSINOPHILS ABSOLUTE: 0.3 K/UL (ref 0–0.6)
EOSINOPHILS RELATIVE PERCENT: 5.7 %
GFR AFRICAN AMERICAN: >60
GFR NON-AFRICAN AMERICAN: >60
GLUCOSE BLD-MCNC: 99 MG/DL (ref 70–99)
HCT VFR BLD CALC: 24.7 % (ref 36–48)
HCT VFR BLD CALC: 26.5 % (ref 36–48)
HCT VFR BLD CALC: 29.2 % (ref 36–48)
HEMOGLOBIN: 8.4 G/DL (ref 12–16)
HEMOGLOBIN: 8.5 G/DL (ref 12–16)
HEMOGLOBIN: 9.2 G/DL (ref 12–16)
LYMPHOCYTES ABSOLUTE: 1 K/UL (ref 1–5.1)
LYMPHOCYTES RELATIVE PERCENT: 18 %
MCH RBC QN AUTO: 30.6 PG (ref 26–34)
MCHC RBC AUTO-ENTMCNC: 33.9 G/DL (ref 31–36)
MCV RBC AUTO: 90.2 FL (ref 80–100)
MONOCYTES ABSOLUTE: 0.4 K/UL (ref 0–1.3)
MONOCYTES RELATIVE PERCENT: 7.5 %
NEUTROPHILS ABSOLUTE: 3.8 K/UL (ref 1.7–7.7)
NEUTROPHILS RELATIVE PERCENT: 68.1 %
PDW BLD-RTO: 21.9 % (ref 12.4–15.4)
PLATELET # BLD: 264 K/UL (ref 135–450)
PMV BLD AUTO: 6.6 FL (ref 5–10.5)
POTASSIUM SERPL-SCNC: 3.3 MMOL/L (ref 3.5–5.1)
RBC # BLD: 2.74 M/UL (ref 4–5.2)
SODIUM BLD-SCNC: 143 MMOL/L (ref 136–145)
WBC # BLD: 5.6 K/UL (ref 4–11)

## 2021-04-13 PROCEDURE — 2500000003 HC RX 250 WO HCPCS: Performed by: NURSE ANESTHETIST, CERTIFIED REGISTERED

## 2021-04-13 PROCEDURE — 3600000004 HC SURGERY LEVEL 4 BASE: Performed by: SURGERY

## 2021-04-13 PROCEDURE — 36415 COLL VENOUS BLD VENIPUNCTURE: CPT

## 2021-04-13 PROCEDURE — 43631 REMOVAL OF STOMACH PARTIAL: CPT | Performed by: SURGERY

## 2021-04-13 PROCEDURE — 1200000000 HC SEMI PRIVATE

## 2021-04-13 PROCEDURE — 2580000003 HC RX 258: Performed by: NURSE PRACTITIONER

## 2021-04-13 PROCEDURE — 6370000000 HC RX 637 (ALT 250 FOR IP): Performed by: INTERNAL MEDICINE

## 2021-04-13 PROCEDURE — 6370000000 HC RX 637 (ALT 250 FOR IP): Performed by: NURSE PRACTITIONER

## 2021-04-13 PROCEDURE — 6360000002 HC RX W HCPCS: Performed by: NURSE PRACTITIONER

## 2021-04-13 PROCEDURE — 6360000002 HC RX W HCPCS: Performed by: SURGERY

## 2021-04-13 PROCEDURE — 94760 N-INVAS EAR/PLS OXIMETRY 1: CPT

## 2021-04-13 PROCEDURE — 7100000001 HC PACU RECOVERY - ADDTL 15 MIN: Performed by: SURGERY

## 2021-04-13 PROCEDURE — 0FT40ZZ RESECTION OF GALLBLADDER, OPEN APPROACH: ICD-10-PCS | Performed by: SURGERY

## 2021-04-13 PROCEDURE — 85025 COMPLETE CBC W/AUTO DIFF WBC: CPT

## 2021-04-13 PROCEDURE — C1729 CATH, DRAINAGE: HCPCS | Performed by: SURGERY

## 2021-04-13 PROCEDURE — 2580000003 HC RX 258: Performed by: SURGERY

## 2021-04-13 PROCEDURE — 88304 TISSUE EXAM BY PATHOLOGIST: CPT

## 2021-04-13 PROCEDURE — 6360000002 HC RX W HCPCS: Performed by: NURSE ANESTHETIST, CERTIFIED REGISTERED

## 2021-04-13 PROCEDURE — 85018 HEMOGLOBIN: CPT

## 2021-04-13 PROCEDURE — 6360000004 HC RX CONTRAST MEDICATION: Performed by: SURGERY

## 2021-04-13 PROCEDURE — 80048 BASIC METABOLIC PNL TOTAL CA: CPT

## 2021-04-13 PROCEDURE — 2709999900 HC NON-CHARGEABLE SUPPLY: Performed by: SURGERY

## 2021-04-13 PROCEDURE — 2580000003 HC RX 258: Performed by: HOSPITALIST

## 2021-04-13 PROCEDURE — 85014 HEMATOCRIT: CPT

## 2021-04-13 PROCEDURE — 86850 RBC ANTIBODY SCREEN: CPT

## 2021-04-13 PROCEDURE — 86901 BLOOD TYPING SEROLOGIC RH(D): CPT

## 2021-04-13 PROCEDURE — 86900 BLOOD TYPING SEROLOGIC ABO: CPT

## 2021-04-13 PROCEDURE — 74300 X-RAY BILE DUCTS/PANCREAS: CPT

## 2021-04-13 PROCEDURE — 2580000003 HC RX 258: Performed by: NURSE ANESTHETIST, CERTIFIED REGISTERED

## 2021-04-13 PROCEDURE — 88305 TISSUE EXAM BY PATHOLOGIST: CPT

## 2021-04-13 PROCEDURE — 6360000002 HC RX W HCPCS: Performed by: ANESTHESIOLOGY

## 2021-04-13 PROCEDURE — 47605 CHOLECYSTECTOMY W/CHOLANG: CPT | Performed by: SURGERY

## 2021-04-13 PROCEDURE — 3700000000 HC ANESTHESIA ATTENDED CARE: Performed by: SURGERY

## 2021-04-13 PROCEDURE — 0DB90ZZ EXCISION OF DUODENUM, OPEN APPROACH: ICD-10-PCS | Performed by: SURGERY

## 2021-04-13 PROCEDURE — 7100000000 HC PACU RECOVERY - FIRST 15 MIN: Performed by: SURGERY

## 2021-04-13 PROCEDURE — 3600000014 HC SURGERY LEVEL 4 ADDTL 15MIN: Performed by: SURGERY

## 2021-04-13 PROCEDURE — 3700000001 HC ADD 15 MINUTES (ANESTHESIA): Performed by: SURGERY

## 2021-04-13 RX ORDER — KETAMINE HCL IN NACL, ISO-OSM 100MG/10ML
SYRINGE (ML) INJECTION PRN
Status: DISCONTINUED | OUTPATIENT
Start: 2021-04-13 | End: 2021-04-13 | Stop reason: SDUPTHER

## 2021-04-13 RX ORDER — MIDAZOLAM HYDROCHLORIDE 1 MG/ML
INJECTION INTRAMUSCULAR; INTRAVENOUS PRN
Status: DISCONTINUED | OUTPATIENT
Start: 2021-04-13 | End: 2021-04-13 | Stop reason: SDUPTHER

## 2021-04-13 RX ORDER — SODIUM CHLORIDE 0.9 % (FLUSH) 0.9 %
5-40 SYRINGE (ML) INJECTION EVERY 12 HOURS SCHEDULED
Status: DISCONTINUED | OUTPATIENT
Start: 2021-04-13 | End: 2021-04-19 | Stop reason: HOSPADM

## 2021-04-13 RX ORDER — SODIUM CHLORIDE 9 MG/ML
25 INJECTION, SOLUTION INTRAVENOUS PRN
Status: DISCONTINUED | OUTPATIENT
Start: 2021-04-13 | End: 2021-04-13 | Stop reason: HOSPADM

## 2021-04-13 RX ORDER — HYDROMORPHONE HYDROCHLORIDE 1 MG/ML
0.5 INJECTION, SOLUTION INTRAMUSCULAR; INTRAVENOUS; SUBCUTANEOUS
Status: DISCONTINUED | OUTPATIENT
Start: 2021-04-13 | End: 2021-04-18

## 2021-04-13 RX ORDER — HYDRALAZINE HYDROCHLORIDE 20 MG/ML
5 INJECTION INTRAMUSCULAR; INTRAVENOUS EVERY 10 MIN PRN
Status: DISCONTINUED | OUTPATIENT
Start: 2021-04-13 | End: 2021-04-13 | Stop reason: HOSPADM

## 2021-04-13 RX ORDER — DEXAMETHASONE SODIUM PHOSPHATE 4 MG/ML
INJECTION, SOLUTION INTRA-ARTICULAR; INTRALESIONAL; INTRAMUSCULAR; INTRAVENOUS; SOFT TISSUE PRN
Status: DISCONTINUED | OUTPATIENT
Start: 2021-04-13 | End: 2021-04-13 | Stop reason: SDUPTHER

## 2021-04-13 RX ORDER — ONDANSETRON 2 MG/ML
4 INJECTION INTRAMUSCULAR; INTRAVENOUS EVERY 6 HOURS PRN
Status: DISCONTINUED | OUTPATIENT
Start: 2021-04-13 | End: 2021-04-19 | Stop reason: HOSPADM

## 2021-04-13 RX ORDER — POTASSIUM BICARBONATE 25 MEQ/1
25 TABLET, EFFERVESCENT ORAL ONCE
Status: DISCONTINUED | OUTPATIENT
Start: 2021-04-13 | End: 2021-04-13

## 2021-04-13 RX ORDER — PROPOFOL 10 MG/ML
INJECTION, EMULSION INTRAVENOUS PRN
Status: DISCONTINUED | OUTPATIENT
Start: 2021-04-13 | End: 2021-04-13 | Stop reason: SDUPTHER

## 2021-04-13 RX ORDER — ONDANSETRON 2 MG/ML
4 INJECTION INTRAMUSCULAR; INTRAVENOUS
Status: DISCONTINUED | OUTPATIENT
Start: 2021-04-13 | End: 2021-04-13 | Stop reason: HOSPADM

## 2021-04-13 RX ORDER — ONDANSETRON 2 MG/ML
INJECTION INTRAMUSCULAR; INTRAVENOUS PRN
Status: DISCONTINUED | OUTPATIENT
Start: 2021-04-13 | End: 2021-04-13 | Stop reason: SDUPTHER

## 2021-04-13 RX ORDER — HYDROMORPHONE HYDROCHLORIDE 1 MG/ML
0.25 INJECTION, SOLUTION INTRAMUSCULAR; INTRAVENOUS; SUBCUTANEOUS
Status: DISCONTINUED | OUTPATIENT
Start: 2021-04-13 | End: 2021-04-18

## 2021-04-13 RX ORDER — SODIUM CHLORIDE 0.9 % (FLUSH) 0.9 %
10 SYRINGE (ML) INJECTION PRN
Status: DISCONTINUED | OUTPATIENT
Start: 2021-04-13 | End: 2021-04-13 | Stop reason: HOSPADM

## 2021-04-13 RX ORDER — MAGNESIUM HYDROXIDE 1200 MG/15ML
LIQUID ORAL CONTINUOUS PRN
Status: COMPLETED | OUTPATIENT
Start: 2021-04-13 | End: 2021-04-13

## 2021-04-13 RX ORDER — HYDROMORPHONE HCL 110MG/55ML
PATIENT CONTROLLED ANALGESIA SYRINGE INTRAVENOUS PRN
Status: DISCONTINUED | OUTPATIENT
Start: 2021-04-13 | End: 2021-04-13 | Stop reason: SDUPTHER

## 2021-04-13 RX ORDER — ONDANSETRON 4 MG/1
4 TABLET, ORALLY DISINTEGRATING ORAL EVERY 6 HOURS PRN
Status: DISCONTINUED | OUTPATIENT
Start: 2021-04-13 | End: 2021-04-19 | Stop reason: HOSPADM

## 2021-04-13 RX ORDER — LIDOCAINE HYDROCHLORIDE 20 MG/ML
INJECTION, SOLUTION EPIDURAL; INFILTRATION; INTRACAUDAL; PERINEURAL PRN
Status: DISCONTINUED | OUTPATIENT
Start: 2021-04-13 | End: 2021-04-13 | Stop reason: SDUPTHER

## 2021-04-13 RX ORDER — FENTANYL CITRATE 50 UG/ML
25 INJECTION, SOLUTION INTRAMUSCULAR; INTRAVENOUS EVERY 5 MIN PRN
Status: DISCONTINUED | OUTPATIENT
Start: 2021-04-13 | End: 2021-04-13 | Stop reason: HOSPADM

## 2021-04-13 RX ORDER — SODIUM CHLORIDE, SODIUM LACTATE, POTASSIUM CHLORIDE, CALCIUM CHLORIDE 600; 310; 30; 20 MG/100ML; MG/100ML; MG/100ML; MG/100ML
INJECTION, SOLUTION INTRAVENOUS CONTINUOUS
Status: DISCONTINUED | OUTPATIENT
Start: 2021-04-13 | End: 2021-04-13

## 2021-04-13 RX ORDER — LABETALOL HYDROCHLORIDE 5 MG/ML
5 INJECTION, SOLUTION INTRAVENOUS EVERY 10 MIN PRN
Status: DISCONTINUED | OUTPATIENT
Start: 2021-04-13 | End: 2021-04-13 | Stop reason: HOSPADM

## 2021-04-13 RX ORDER — GLYCOPYRROLATE 1 MG/5 ML
SYRINGE (ML) INTRAVENOUS PRN
Status: DISCONTINUED | OUTPATIENT
Start: 2021-04-13 | End: 2021-04-13 | Stop reason: SDUPTHER

## 2021-04-13 RX ORDER — SODIUM CHLORIDE 9 MG/ML
25 INJECTION, SOLUTION INTRAVENOUS PRN
Status: DISCONTINUED | OUTPATIENT
Start: 2021-04-13 | End: 2021-04-19 | Stop reason: HOSPADM

## 2021-04-13 RX ORDER — PROCHLORPERAZINE EDISYLATE 5 MG/ML
5 INJECTION INTRAMUSCULAR; INTRAVENOUS
Status: COMPLETED | OUTPATIENT
Start: 2021-04-13 | End: 2021-04-13

## 2021-04-13 RX ORDER — SODIUM CHLORIDE 9 MG/ML
INJECTION, SOLUTION INTRAVENOUS CONTINUOUS PRN
Status: DISCONTINUED | OUTPATIENT
Start: 2021-04-13 | End: 2021-04-13 | Stop reason: SDUPTHER

## 2021-04-13 RX ORDER — SODIUM CHLORIDE 9 MG/ML
INJECTION, SOLUTION INTRAVENOUS CONTINUOUS
Status: DISCONTINUED | OUTPATIENT
Start: 2021-04-13 | End: 2021-04-18

## 2021-04-13 RX ORDER — POTASSIUM BICARBONATE 25 MEQ/1
25 TABLET, EFFERVESCENT ORAL ONCE
Status: COMPLETED | OUTPATIENT
Start: 2021-04-13 | End: 2021-04-13

## 2021-04-13 RX ORDER — VECURONIUM BROMIDE 1 MG/ML
INJECTION, POWDER, LYOPHILIZED, FOR SOLUTION INTRAVENOUS PRN
Status: DISCONTINUED | OUTPATIENT
Start: 2021-04-13 | End: 2021-04-13 | Stop reason: SDUPTHER

## 2021-04-13 RX ORDER — HYDROMORPHONE HCL 110MG/55ML
0.5 PATIENT CONTROLLED ANALGESIA SYRINGE INTRAVENOUS EVERY 5 MIN PRN
Status: DISCONTINUED | OUTPATIENT
Start: 2021-04-13 | End: 2021-04-13 | Stop reason: HOSPADM

## 2021-04-13 RX ORDER — MORPHINE SULFATE 2 MG/ML
2 INJECTION, SOLUTION INTRAMUSCULAR; INTRAVENOUS
Status: DISCONTINUED | OUTPATIENT
Start: 2021-04-13 | End: 2021-04-13 | Stop reason: ALTCHOICE

## 2021-04-13 RX ORDER — FENTANYL CITRATE 50 UG/ML
INJECTION, SOLUTION INTRAMUSCULAR; INTRAVENOUS PRN
Status: DISCONTINUED | OUTPATIENT
Start: 2021-04-13 | End: 2021-04-13 | Stop reason: SDUPTHER

## 2021-04-13 RX ORDER — LIDOCAINE HYDROCHLORIDE 10 MG/ML
1 INJECTION, SOLUTION EPIDURAL; INFILTRATION; INTRACAUDAL; PERINEURAL
Status: DISCONTINUED | OUTPATIENT
Start: 2021-04-13 | End: 2021-04-13 | Stop reason: HOSPADM

## 2021-04-13 RX ORDER — MAGNESIUM SULFATE HEPTAHYDRATE 500 MG/ML
INJECTION, SOLUTION INTRAMUSCULAR; INTRAVENOUS PRN
Status: DISCONTINUED | OUTPATIENT
Start: 2021-04-13 | End: 2021-04-13 | Stop reason: SDUPTHER

## 2021-04-13 RX ORDER — SODIUM CHLORIDE 0.9 % (FLUSH) 0.9 %
10 SYRINGE (ML) INJECTION EVERY 12 HOURS SCHEDULED
Status: DISCONTINUED | OUTPATIENT
Start: 2021-04-13 | End: 2021-04-13 | Stop reason: HOSPADM

## 2021-04-13 RX ORDER — SODIUM CHLORIDE 0.9 % (FLUSH) 0.9 %
5-40 SYRINGE (ML) INJECTION PRN
Status: DISCONTINUED | OUTPATIENT
Start: 2021-04-13 | End: 2021-04-19 | Stop reason: HOSPADM

## 2021-04-13 RX ORDER — MORPHINE SULFATE 4 MG/ML
4 INJECTION, SOLUTION INTRAMUSCULAR; INTRAVENOUS
Status: DISCONTINUED | OUTPATIENT
Start: 2021-04-13 | End: 2021-04-13 | Stop reason: ALTCHOICE

## 2021-04-13 RX ORDER — SUCCINYLCHOLINE/SOD CL,ISO/PF 200MG/10ML
SYRINGE (ML) INTRAVENOUS PRN
Status: DISCONTINUED | OUTPATIENT
Start: 2021-04-13 | End: 2021-04-13 | Stop reason: SDUPTHER

## 2021-04-13 RX ADMIN — Medication 10 MG: at 09:15

## 2021-04-13 RX ADMIN — DEXAMETHASONE SODIUM PHOSPHATE 8 MG: 4 INJECTION, SOLUTION INTRAMUSCULAR; INTRAVENOUS at 07:50

## 2021-04-13 RX ADMIN — SUGAMMADEX 200 MG: 100 INJECTION, SOLUTION INTRAVENOUS at 09:38

## 2021-04-13 RX ADMIN — Medication 10 ML: at 21:31

## 2021-04-13 RX ADMIN — Medication 10 MG: at 08:41

## 2021-04-13 RX ADMIN — PROPOFOL 150 MG: 10 INJECTION, EMULSION INTRAVENOUS at 07:38

## 2021-04-13 RX ADMIN — HYDROMORPHONE HYDROCHLORIDE 0.5 MG: 2 INJECTION, SOLUTION INTRAMUSCULAR; INTRAVENOUS; SUBCUTANEOUS at 10:45

## 2021-04-13 RX ADMIN — Medication 0.2 MG: at 07:23

## 2021-04-13 RX ADMIN — SODIUM CHLORIDE: 9 INJECTION, SOLUTION INTRAVENOUS at 08:25

## 2021-04-13 RX ADMIN — HYDROMORPHONE HYDROCHLORIDE 0.5 MG: 1 INJECTION, SOLUTION INTRAMUSCULAR; INTRAVENOUS; SUBCUTANEOUS at 14:09

## 2021-04-13 RX ADMIN — HYDROMORPHONE HYDROCHLORIDE 0.5 MG: 1 INJECTION, SOLUTION INTRAMUSCULAR; INTRAVENOUS; SUBCUTANEOUS at 21:30

## 2021-04-13 RX ADMIN — FENTANYL CITRATE 25 MCG: 50 INJECTION, SOLUTION INTRAMUSCULAR; INTRAVENOUS at 10:15

## 2021-04-13 RX ADMIN — FENTANYL CITRATE 50 MCG: 50 INJECTION, SOLUTION INTRAMUSCULAR; INTRAVENOUS at 07:38

## 2021-04-13 RX ADMIN — PIPERACILLIN AND TAZOBACTAM 3375 MG: 3; .375 INJECTION, POWDER, LYOPHILIZED, FOR SOLUTION INTRAVENOUS at 18:21

## 2021-04-13 RX ADMIN — HYDROMORPHONE HYDROCHLORIDE 0.5 MG: 2 INJECTION, SOLUTION INTRAMUSCULAR; INTRAVENOUS; SUBCUTANEOUS at 10:35

## 2021-04-13 RX ADMIN — FENTANYL CITRATE 25 MCG: 50 INJECTION, SOLUTION INTRAMUSCULAR; INTRAVENOUS at 09:26

## 2021-04-13 RX ADMIN — PIPERACILLIN AND TAZOBACTAM 3375 MG: 3; .375 INJECTION, POWDER, LYOPHILIZED, FOR SOLUTION INTRAVENOUS at 12:30

## 2021-04-13 RX ADMIN — SODIUM CHLORIDE: 9 INJECTION, SOLUTION INTRAVENOUS at 03:35

## 2021-04-13 RX ADMIN — SODIUM CHLORIDE: 9 INJECTION, SOLUTION INTRAVENOUS at 07:30

## 2021-04-13 RX ADMIN — FENTANYL CITRATE 25 MCG: 50 INJECTION, SOLUTION INTRAMUSCULAR; INTRAVENOUS at 10:30

## 2021-04-13 RX ADMIN — Medication 160 MG: at 07:38

## 2021-04-13 RX ADMIN — POTASSIUM BICARBONATE 25 MEQ: 978 TABLET, EFFERVESCENT ORAL at 15:47

## 2021-04-13 RX ADMIN — PANTOPRAZOLE SODIUM 40 MG: 40 TABLET, DELAYED RELEASE ORAL at 05:33

## 2021-04-13 RX ADMIN — MAGNESIUM SULFATE HEPTAHYDRATE 1 G: 500 INJECTION, SOLUTION INTRAMUSCULAR; INTRAVENOUS at 07:50

## 2021-04-13 RX ADMIN — MIDAZOLAM 2 MG: 1 INJECTION INTRAMUSCULAR; INTRAVENOUS at 07:38

## 2021-04-13 RX ADMIN — VECURONIUM BROMIDE 10 MG: 1 INJECTION, POWDER, LYOPHILIZED, FOR SOLUTION INTRAVENOUS at 07:50

## 2021-04-13 RX ADMIN — HYDROMORPHONE HYDROCHLORIDE 0.5 MG: 2 INJECTION, SOLUTION INTRAMUSCULAR; INTRAVENOUS; SUBCUTANEOUS at 10:18

## 2021-04-13 RX ADMIN — ONDANSETRON 4 MG: 2 INJECTION INTRAMUSCULAR; INTRAVENOUS at 07:50

## 2021-04-13 RX ADMIN — LIDOCAINE HYDROCHLORIDE 100 MG: 20 INJECTION, SOLUTION EPIDURAL; INFILTRATION; INTRACAUDAL; PERINEURAL at 07:38

## 2021-04-13 RX ADMIN — PROCHLORPERAZINE EDISYLATE 5 MG: 5 INJECTION INTRAMUSCULAR; INTRAVENOUS at 10:18

## 2021-04-13 RX ADMIN — SODIUM CHLORIDE: 9 INJECTION, SOLUTION INTRAVENOUS at 11:48

## 2021-04-13 RX ADMIN — Medication 10 MG: at 08:19

## 2021-04-13 RX ADMIN — ENOXAPARIN SODIUM 40 MG: 40 INJECTION SUBCUTANEOUS at 18:21

## 2021-04-13 RX ADMIN — PIPERACILLIN AND TAZOBACTAM 3375 MG: 3; .375 INJECTION, POWDER, LYOPHILIZED, FOR SOLUTION INTRAVENOUS at 03:27

## 2021-04-13 RX ADMIN — Medication 10 ML: at 12:39

## 2021-04-13 RX ADMIN — HYDROMORPHONE HYDROCHLORIDE 0.5 MG: 2 INJECTION, SOLUTION INTRAMUSCULAR; INTRAVENOUS; SUBCUTANEOUS at 09:42

## 2021-04-13 RX ADMIN — ATORVASTATIN CALCIUM 40 MG: 40 TABLET, FILM COATED ORAL at 21:31

## 2021-04-13 ASSESSMENT — PULMONARY FUNCTION TESTS
PIF_VALUE: 16
PIF_VALUE: 18
PIF_VALUE: 17
PIF_VALUE: 18
PIF_VALUE: 17
PIF_VALUE: 1
PIF_VALUE: 17
PIF_VALUE: 17
PIF_VALUE: 1
PIF_VALUE: 17
PIF_VALUE: 17
PIF_VALUE: 18
PIF_VALUE: 18
PIF_VALUE: 17
PIF_VALUE: 1
PIF_VALUE: 17
PIF_VALUE: 17
PIF_VALUE: 18
PIF_VALUE: 17
PIF_VALUE: 18
PIF_VALUE: 14
PIF_VALUE: 17
PIF_VALUE: 4
PIF_VALUE: 1
PIF_VALUE: 17
PIF_VALUE: 16
PIF_VALUE: 17
PIF_VALUE: 16
PIF_VALUE: 16
PIF_VALUE: 17
PIF_VALUE: 18
PIF_VALUE: 17
PIF_VALUE: 18
PIF_VALUE: 17
PIF_VALUE: 16
PIF_VALUE: 1
PIF_VALUE: 17
PIF_VALUE: 14
PIF_VALUE: 16
PIF_VALUE: 17
PIF_VALUE: 17
PIF_VALUE: 16
PIF_VALUE: 16
PIF_VALUE: 18
PIF_VALUE: 16
PIF_VALUE: 18
PIF_VALUE: 17
PIF_VALUE: 14
PIF_VALUE: 18
PIF_VALUE: 14
PIF_VALUE: 17
PIF_VALUE: 16
PIF_VALUE: 18
PIF_VALUE: 17
PIF_VALUE: 17
PIF_VALUE: 1
PIF_VALUE: 17
PIF_VALUE: 17
PIF_VALUE: 16
PIF_VALUE: 17

## 2021-04-13 ASSESSMENT — PAIN SCALES - GENERAL
PAINLEVEL_OUTOF10: 6
PAINLEVEL_OUTOF10: 7
PAINLEVEL_OUTOF10: 9
PAINLEVEL_OUTOF10: 7
PAINLEVEL_OUTOF10: 0
PAINLEVEL_OUTOF10: 7

## 2021-04-13 ASSESSMENT — PAIN DESCRIPTION - PAIN TYPE
TYPE: SURGICAL PAIN
TYPE: SURGICAL PAIN

## 2021-04-13 ASSESSMENT — PAIN DESCRIPTION - PROGRESSION: CLINICAL_PROGRESSION: GRADUALLY IMPROVING

## 2021-04-13 ASSESSMENT — PAIN DESCRIPTION - DESCRIPTORS: DESCRIPTORS: PATIENT UNABLE TO DESCRIBE

## 2021-04-13 NOTE — PROGRESS NOTES
Routine vitals stable. Patient awake and alert. No complaints of pain. Respirations easy and unlabored. Pt denies any further needs at this time. Call light within reach. Bed alarm on.

## 2021-04-13 NOTE — PROGRESS NOTES
[facility] Device Check/Pre surgery  Rep Checked Device  Therapies disabled  Patient programmed DOO 75 per anesthesia

## 2021-04-13 NOTE — ANESTHESIA PRE PROCEDURE
Department of Anesthesiology  Preprocedure Note       Name:  Mahsa Sanchez   Age:  63 y.o.  :  1965                                          MRN:  [id number]         Date:  2021      Surgeon: Ok Floor):  Tommy Olson MD    Procedure: Procedure(s):  EXPLORATORY LAPAROTOMY, RESECTION OF DUODENAL MASS, CHOLECYSTECTOMY WITH INTRAOPERATIVE CHOLANGIOGRAM, POSSIBLE BOWEL RESECTION    Medications prior to admission:   Prior to Admission medications    Medication Sig Start Date End Date Taking? Authorizing Provider   pantoprazole (PROTONIX) 40 MG tablet Take 1 tablet by mouth every morning (before breakfast) 21   Porsha Leblanc MD   amiodarone (CORDARONE) 200 MG tablet Take 1 tablet by mouth daily 21   CATRACHITO Cuba - CNP   NIFEdipine (ADALAT CC) 30 MG extended release tablet Take 1 tablet by mouth daily 21   Adam Bañuelos MD   vitamin D (ERGOCALCIFEROL) 1.25 MG (62479 UT) CAPS capsule Take 1 capsule by mouth once a week 21   CATRACHITO Dorantes - BELKIS   sacubitril-valsartan (ENTRESTO) 24-26 MG per tablet Take 0.5 tablets by mouth nightly 21   CATRACHITO Biggs - CNS   carvedilol (COREG) 25 MG tablet Take 1 tablet by mouth 2 times daily 21   Barbara Cruz MD   furosemide (LASIX) 20 MG tablet Take 1 tablet by mouth daily 21   Barbara Cruz MD   Multiple Vitamins-Minerals (THERAPEUTIC MULTIVITAMIN-MINERALS) tablet Take 1 tablet by mouth daily    Historical Provider, MD   XARELTO 20 MG TABS tablet TAKE ONE TABLET BY MOUTH DAILY WITH BREAKFAST 10/20/20   Sandee Shi MD   spironolactone (ALDACTONE) 25 MG tablet Take 1 tablet by mouth daily 20   CATRACHITO Dorantes   atorvastatin (LIPITOR) 40 MG tablet Take 1 tablet by mouth daily 16   Tano Valenzuela MD       Current medications:    No current facility-administered medications for this visit.       Current Outpatient Medications   Medication Sig Dispense Refill    pantoprazole (PROTONIX) 40 MG tablet Take 1 tablet by mouth every morning (before breakfast) 30 tablet 3     Facility-Administered Medications Ordered in Other Visits   Medication Dose Route Frequency Provider Last Rate Last Admin    potassium chloride (KLOR-CON M) extended release tablet 40 mEq  40 mEq Oral PRN Porsha Leblanc MD        Or    potassium bicarb-citric acid (EFFER-K) effervescent tablet 40 mEq  40 mEq Oral PRN Porsha Leblanc MD        Or    potassium chloride 10 mEq/100 mL IVPB (Peripheral Line)  10 mEq Intravenous PRN Porsha Leblanc MD   Stopped at 04/11/21 1630    0.9 % sodium chloride infusion   Intravenous PRN Ray Smith PA-C        pantoprazole (PROTONIX) tablet 40 mg  40 mg Oral QAM AC Faisal Donovan MD   40 mg at 04/13/21 0533    amiodarone (CORDARONE) tablet 100 mg  100 mg Oral Daily Adam Bañuelos MD   Stopped at 04/12/21 0920    0.9 % sodium chloride infusion   Intravenous Continuous Matt Greco MD 75 mL/hr at 04/13/21 0335 New Bag at 04/13/21 0335    piperacillin-tazobactam (ZOSYN) 3,375 mg in dextrose 5 % 50 mL IVPB extended infusion (mini-bag)  3,375 mg Intravenous Q8H CATRACHITO Kline - CNP 12.5 mL/hr at 04/13/21 0327 3,375 mg at 04/13/21 0327    0.9 % sodium chloride infusion   Intravenous PRN Radha Everett PA-C        atorvastatin (LIPITOR) tablet 40 mg  40 mg Oral Daily Matt Greco MD   40 mg at 04/12/21 2047    furosemide (LASIX) tablet 20 mg  20 mg Oral Daily Matt Greco MD   20 mg at 04/12/21 0920    NIFEdipine (PROCARDIA XL) extended release tablet 30 mg  30 mg Oral Daily Matt Greco MD   30 mg at 04/12/21 0920    spironolactone (ALDACTONE) tablet 25 mg  25 mg Oral Daily Porsha Leblanc MD   25 mg at 04/12/21 0920    sodium chloride flush 0.9 % injection 5-40 mL  5-40 mL Intravenous 2 times per day Porsha Leblanc MD   10 mL at 04/12/21 0800    sodium chloride flush 0.9 % injection 5-40 mL  5-40 mL Intravenous PRN Matt Greco MD        0.9 % sodium chloride infusion 25 mL Intravenous PRN Matt Greco MD        promethazine (PHENERGAN) tablet 12.5 mg  12.5 mg Oral Q6H PRN Matt Greco MD        Or    ondansetron (ZOFRAN) injection 4 mg  4 mg Intravenous Q6H PRN Matt Greco MD   4 mg at 04/10/21 0353    polyethylene glycol (GLYCOLAX) packet 17 g  17 g Oral Daily PRN Guillermina Alvarez MD   17 g at 04/11/21 1854    acetaminophen (TYLENOL) tablet 650 mg  650 mg Oral Q6H PRN Matt Greco MD   650 mg at 04/09/21 1540    Or    acetaminophen (TYLENOL) suppository 650 mg  650 mg Rectal Q6H PRN Matt Greco MD        carvedilol (COREG) tablet 6.25 mg  6.25 mg Oral BID WC Babs Elias MD   6.25 mg at 04/12/21 1628       Allergies: Allergies   Allergen Reactions    Latex      rash    Codeine      Hives      Lisinopril      cough    Nitroglycerin Hives    Sulfa Antibiotics Nausea Only    Hydralazine      headaches       Problem List:    Patient Active Problem List   Diagnosis Code    HTN (hypertension) I10    Other and unspecified hyperlipidemia E78.5    Congenital heart block Q24.6    Dyspnea on exertion R06.00    Headache R51.9    LVH (left ventricular hypertrophy) I51.7    Persistent atrial fibrillation (HCC) I48.19    Chest pain U38.9    Systolic CHF, chronic (HCC) I50.22    Hypersomnia G47.10    Obstructive sleep apnea (adult) (pediatric) G47.33    LV dysfunction I51.9    Left subclavian vein thrombosis (HCC) I82. B12    Dilated cardiomyopathy (HCC) I42.0    Class 1 obesity without serious comorbidity with body mass index (BMI) of 33.0 to 33.9 in adult E66.9, Z68.33    Paroxysmal ventricular tachycardia (HCC) I47.2    ICD (implantable cardioverter-defibrillator), biventricular, in situ Z95.810    Exertional dyspnea R06.00    Iron deficiency anemia D50.9    Fatigue R53.83    Symptomatic anemia D64.9       Past Medical History:        Diagnosis Date    Anemia     Atrial fibrillation and flutter (HCC)     Atrial flutter (HCC)     CHB (complete heart block) (HCC)     Class 1 obesity without serious comorbidity with body mass index (BMI) of 33.0 to 33.9 in adult 9/6/2019    Congenital heart disease     GERD (gastroesophageal reflux disease)     Headache(784.0)     History of complete heart block     Hyperlipidemia     Hypertension     PONV (postoperative nausea and vomiting)     Sleep apnea     uses CPAP    Syncope     Systolic CHF, chronic ([address] 10/3/2018       Past Surgical History:        Procedure Laterality Date    CARDIAC DEFIBRILLATOR PLACEMENT  01/2021    [employer]    COLONOSCOPY N/A 10/27/2020    COLONOSCOPY POLYPECTOMY SNARE/COLD BIOPSY performed by Claudia Cerda MD at [place] [address]  11/29/12    dual chamber PPM, [employer]    TUBAL LIGATION      UPPER GASTROINTESTINAL ENDOSCOPY N/A 10/27/2020    EGD DIAGNOSTIC ONLY performed by Claudia Cerda MD at [address] [place] [address] 4/8/2021    EGD CONTROL HEMORRHAGE WITH APPLICATION OF 3 CLIPS TO DUODENAL MASS performed by Kvng Bond MD at [address] [place] [address] N/A 4/8/2021    EGD BIOPSY DUODENAL MASS performed by Kvng Bond MD at [address] [place] [address] N/A 4/8/2021    EGD SUBMUCOSAL INJECTION OF 0.6 MG OF EPINEPRINE INTO BASE OF DUODENAL MASS performed by Kvng Bond MD at [place] [address]         Social History:    Social History     Tobacco Use    Smoking status: Never Smoker    Smokeless tobacco: Never Used   Substance Use Topics    Alcohol use: No                                Counseling given: Not Answered      Vital Signs (Current): There were no vitals filed for this visit.                                            BP Readings from Last 3 Encounters:   04/13/21 116/77   04/08/21 (!) 96/53   03/17/21 106/64       NPO Status: BMI:   Wt Readings from Last 3 Encounters:   04/13/21 216 lb 1.6 oz (98 kg)   03/17/21 201 lb (91.2 kg)   02/23/21 196 lb 10.6 oz (89.2 kg)     There is no height or weight on file to calculate BMI.    CBC:   Lab Results   Component Value Date    WBC 5.6 04/13/2021    RBC 2.74 04/13/2021    HGB 8.4 04/13/2021    HCT 24.7 04/13/2021    MCV 90.2 04/13/2021    RDW 21.9 04/13/2021     04/13/2021       CMP:   Lab Results   Component Value Date     04/13/2021    K 3.3 04/13/2021    K 3.7 04/07/2021     04/13/2021    CO2 23 04/13/2021    BUN 6 04/13/2021    CREATININE 0.9 04/13/2021    GFRAA >60 04/13/2021    AGRATIO 1.7 04/09/2021    LABGLOM >60 04/13/2021    GLUCOSE 99 04/13/2021    PROT 6.0 04/09/2021    CALCIUM 8.6 04/13/2021    BILITOT 0.4 04/09/2021    ALKPHOS 42 04/09/2021    AST 20 04/09/2021    ALT 16 04/09/2021       POC Tests:   No results for input(s): POCGLU, POCNA, POCK, POCCL, POCBUN, POCHEMO, POCHCT in the last 72 hours. Coags:   Lab Results   Component Value Date    PROTIME 12.4 04/09/2021    INR 1.07 04/09/2021    APTT 18.3 04/09/2021       HCG (If Applicable): No results found for: PREGTESTUR, PREGSERUM, HCG, HCGQUANT     ABGs: No results found for: PHART, PO2ART, ZHX6NUL, HGU4PQW, BEART, K8BVGTOB     Type & Screen (If Applicable):  No results found for: LABABO, LABRH    Drug/Infectious Status (If Applicable):  No results found for: HIV, HEPCAB    COVID-19 Screening (If Applicable):   Lab Results   Component Value Date    COVID19 Not Detected 02/08/2021    COVID19 Not Detected 10/21/2020         Anesthesia Evaluation  Patient summary reviewed and Nursing notes reviewed   history of anesthetic complications: PONV.   Airway: Mallampati: II  TM distance: >3 FB   Neck ROM: full  Mouth opening: > = 3 FB Dental:          Pulmonary:   (+) sleep apnea: on CPAP,                             Cardiovascular:  Exercise tolerance: poor (<4 METS),   (+) hypertension: moderate, pacemaker: pacemaker, AICD and dependent, dysrhythmias: atrial fibrillation and atrial flutter, CHF: diastolic and systolic, MOREIRA:,             Echocardiogram reviewed               ROS comment: Echo 2019   -Limited echocardiogram was performed to evaluate EF.   -Normal left ventricle size, mild to moderate left ventricular hypertrophy,   and moderately reduced systolic function with an estimated ejection fraction   of 30-35%. -There is moderate diffuse hypokinesis. -Grade III diastolic dysfunction . E/e\"=10.7.   -Mild mitral regurgitation.   -Mild tricuspid regurgitation.   -The left atrium is mild to moderate dilated. -Right ventricular systolic function appears mildly reduced .   -Estimated pulmonary artery systolic pressure is borderline normal at 28-33   mmHg assuming a right atrial pressure of 8 mmHg.   -Pacer / ICD wire is visualized in the right heart. Neuro/Psych:   (+) headaches: migraine headaches,             GI/Hepatic/Renal:   (+) GERD: well controlled,           Endo/Other:                     Abdominal:           Vascular:                                          Anesthesia Plan      general     ASA 3     (Pacemaker interrogated pre op, AICD off and set to asynchronous mode 75 bpm)  Induction: intravenous. MIPS: Postoperative opioids intended and Prophylactic antiemetics administered. Anesthetic plan and risks discussed with patient. Use of blood products discussed with patient whom. Plan discussed with CRNA.                   Chris Payne MD   4/13/2021

## 2021-04-13 NOTE — BRIEF OP NOTE
[facility] and Laparoscopic Surgery  Brief Operative Note  Pt Name: Zuhair Heading  CSN: [id number]  YOB: 1965    Date of Procedure: 4/13/2021    Pre-operative Diagnosis: Duodenal mass, symptomatic cholelithiasis    Post-operative Diagnosis: same     Procedure: Exploratory laparotomy, cholecystectomy with cholangiogram, excision of duodenal mass    Surgeon(s):  Jacquelyn Drummond MD     Surgical Assistant: Byron Beltran; [facility]    Anesthesia:  General anesthesia    Findings: Full note dictated, Dictation Job Number: [id number]    Estimated Blood Loss: less than 549  ml    Complications: None    Specimens: gallbladder, duodenal mass  ID Type Source Tests Collected by Time Destination   A : A) GALL BLADDER Tissue Gallbladder SURGICAL PATHOLOGY Jacquelyn Drummond MD 4/13/2021 0804    B : B) DUODENAL MASS Tissue Tissue SURGICAL PATHOLOGY Jacquelyn Drummond MD 4/13/2021 0914       Implants:  * No implants in log *    Drains: FRED in right abdomen   * No LDAs found *    Condition: stable     Disposition and Post-operative plan: PACU, med/surg gruber     Flavio Deras MD, FACS  4/13/2021  9:38 AM

## 2021-04-13 NOTE — PROGRESS NOTES
Pt arrived to PACU from OR in stable condition and is alert to verbal stimuli. Pt can move extremities to command. Respirations are even on 5l O2 per SM. Skin warm, dry, and with appropriate for ethnicity color. Abd is soft. Pain is tolerable at this time. Midline abdominal surgical site(s) intact with dressing= staples, xeroform, 4X4, tape; CDI     FRED drain RLQ to bulb suction, bright red drainage 50mL emptied in PACU  NG tube in left nostril at 62cm with green drainage in tubing; placed by CRNA in OR. AICD completely reactivated by [employer] representative in PACU.     S/P: exploratory laparotomy, resection of duodenal mass, cholecystectomy with intraoperative cholangiogram with Dr. Mignon Yu at [address] BSN, RN, [facility]  PACU, Pre-op, SDS

## 2021-04-13 NOTE — PROGRESS NOTES
Pt maintained on NPO as ordered.  Scheduled for possible resection of duodenal lesion with cholecystectomy  as ordered

## 2021-04-13 NOTE — PROGRESS NOTES
Hospitalist Progress Note      PCP: No primary care provider on file. Date of Admission: 4/7/2021    Chief Complaint: Fatigue    Hospital Course:  Zuhair Reyes is a 63 y.o. female who presented with Complaints of generalized fatigue weak and not feeling well. Patient with complex medical history of CHF A. fib CAD chronic anemia who has been feeling weak and tired. With low hemoglobin. Profound weakness malaise with chest pain and shortness of breath times. Has not felt well for weeks. Patient is letting it could be related to amiodarone that was the new regimen for him. Was seen by cardiology in the office was sent here for further evaluation. Patient has been having intermittent shortness of breath with dyspnea on exertion. No fever chills. Follows Dr. Marlon Resendez for GI patient has had some difficulty with dark coloration of stools and suppressed EGD in next week. Patient is on anticoagulation Eliquis    Subjective: Patient drowsy postop. Denies any current needs. Assessment/Plan:    Symptomatic anemia  GI bleed  -Probably secondary to ulceration overlying lipoma  -Had EGD, found to have a large lesion of the duodenum, underwent 3 clipping, also a large lipoma biopsy    Duodenal mass  -Status post excision 4/13/2021  -add IS  -zosyn    A. Fib  -EP consulted, due to GI bleed not a good candidate for long-term anticoagulation.   Was on Eliquis at home.  -Plan for [facility] as outpatient to assess for watchman eligibility and further work-up  -Resume anticoagulation when okay with GI  -Reduced amiodarone dose to 100 mg due to it causing her fatigue  -ordered tele     Depression  -Psychiatry was consulted and did evaluate the patient, see note    Hypokalemia  -replacement ordered  -bmp in am    History of heart failure, reduced EF  -Continue Coreg  -Resume Entresto when blood pressure stable no further intervention needed  -We will need follow-up with heart failure team    Medications: sensory/motor deficits. Psychiatric: Alert and oriented, thought content appropriate, flat affect  Capillary Refill: Brisk,< 3 seconds   Peripheral Pulses: +2 palpable, equal bilaterally       Labs:   Recent Labs     04/11/21  0840 04/11/21  0840 04/12/21  0421 04/12/21  0421 04/12/21  2220 04/13/21  0441 04/13/21  1255   WBC 5.6  --  6.4  --   --  5.6  --    HGB 8.1*   < > 8.0*   < > 8.3* 8.4* 9.2*   HCT 24.3*   < > 24.4*   < > 25.0* 24.7* 29.2*     --  249  --   --  264  --     < > = values in this interval not displayed. Recent Labs     04/11/21  0840 04/11/21  1716 04/12/21  0421 04/13/21  0441     --  143 143   K 3.0* 3.2* 3.5 3.3*     --  112* 112*   CO2 24  --  23 23   BUN 5*  --  8 6*   CREATININE 0.9  --  0.9 0.9   CALCIUM 8.5  --  8.7 8.6     No results for input(s): AST, ALT, BILIDIR, BILITOT, ALKPHOS in the last 72 hours. No results for input(s): INR in the last 72 hours. No results for input(s): Manjit Pippins in the last 72 hours. Urinalysis:      Lab Results   Component Value Date    NITRU Negative 07/10/2020    BLOODU Negative 07/10/2020    SPECGRAV 1.015 07/10/2020    GLUCOSEU Negative 07/10/2020       Radiology:  FL CHOLANGIOGRAM OR   Final Result   Normal intraoperative laparoscopic cholangiogram.  No common duct stone. XR ABDOMEN (KUB) (SINGLE AP VIEW)   Final Result   Indeterminate intestinal gas pattern secondary to paucity of small bowel gas. CT ABDOMEN PELVIS W IV CONTRAST Additional Contrast? Oral   Final Result   No definitive evidence of a pancreatic mass and no evidence of metastatic   disease or acute abnormality of the abdomen/pelvis. If persistent concern for an occult pancreatic mass consider endoscopic   ultrasound or PET-CT. 3 separate surgical clips placed within bowel lumen near the ligament of   Treitz since the comparison. This is presumed to be related to interval GI   bleeding treatment.       Fibroid uterus without significant change in appearance from the comparison         XR CHEST PORTABLE   Final Result   No active cardiopulmonary disease               Active Hospital Problems    Diagnosis    Symptomatic anemia [D64.9]    Fatigue [R53.83]         DVT Prophylaxis: Lovenox  Diet: Diet NPO Effective Now Exceptions are: Sips with Meds  Code Status: Full Code    PT/OT Eval Status: Ordered evaluation    Dispo -home once medically stable    Rosa M Whitten, APRN - CNP      NOTE:  This report was transcribed using voice recognition software. Every effort was made to ensure accuracy; however, inadvertent computerized transcription errors may be present.

## 2021-04-14 LAB
A/G RATIO: 1.4 (ref 1.1–2.2)
ALBUMIN SERPL-MCNC: 3.6 G/DL (ref 3.4–5)
ALP BLD-CCNC: 46 U/L (ref 40–129)
ALT SERPL-CCNC: 16 U/L (ref 10–40)
ANION GAP SERPL CALCULATED.3IONS-SCNC: 8 MMOL/L (ref 3–16)
APTT: 30.2 SEC (ref 24.2–36.2)
AST SERPL-CCNC: 22 U/L (ref 15–37)
BASOPHILS ABSOLUTE: 0 K/UL (ref 0–0.2)
BASOPHILS RELATIVE PERCENT: 0.2 %
BILIRUB SERPL-MCNC: 0.6 MG/DL (ref 0–1)
BUN BLDV-MCNC: 9 MG/DL (ref 7–20)
CALCIUM SERPL-MCNC: 8.3 MG/DL (ref 8.3–10.6)
CHLORIDE BLD-SCNC: 112 MMOL/L (ref 99–110)
CO2: 22 MMOL/L (ref 21–32)
CREAT SERPL-MCNC: 0.9 MG/DL (ref 0.6–1.1)
EOSINOPHILS ABSOLUTE: 0 K/UL (ref 0–0.6)
EOSINOPHILS RELATIVE PERCENT: 0 %
GFR AFRICAN AMERICAN: >60
GFR NON-AFRICAN AMERICAN: >60
GLOBULIN: 2.5 G/DL
GLUCOSE BLD-MCNC: 106 MG/DL (ref 70–99)
HCT VFR BLD CALC: 24 % (ref 36–48)
HCT VFR BLD CALC: 24.7 % (ref 36–48)
HCT VFR BLD CALC: 26.1 % (ref 36–48)
HEMOGLOBIN: 7.8 G/DL (ref 12–16)
HEMOGLOBIN: 8.1 G/DL (ref 12–16)
HEMOGLOBIN: 8.2 G/DL (ref 12–16)
INR BLD: 1.27 (ref 0.86–1.14)
LIPASE: 12 U/L (ref 13–60)
LYMPHOCYTES ABSOLUTE: 0.6 K/UL (ref 1–5.1)
LYMPHOCYTES RELATIVE PERCENT: 4.7 %
MAGNESIUM: 2.2 MG/DL (ref 1.8–2.4)
MCH RBC QN AUTO: 29.2 PG (ref 26–34)
MCHC RBC AUTO-ENTMCNC: 32.4 G/DL (ref 31–36)
MCV RBC AUTO: 90.1 FL (ref 80–100)
MONOCYTES ABSOLUTE: 1.1 K/UL (ref 0–1.3)
MONOCYTES RELATIVE PERCENT: 9 %
NEUTROPHILS ABSOLUTE: 10.2 K/UL (ref 1.7–7.7)
NEUTROPHILS RELATIVE PERCENT: 86.1 %
PDW BLD-RTO: 21.6 % (ref 12.4–15.4)
PHOSPHORUS: 2.8 MG/DL (ref 2.5–4.9)
PLATELET # BLD: 295 K/UL (ref 135–450)
PMV BLD AUTO: 6.7 FL (ref 5–10.5)
POTASSIUM SERPL-SCNC: 3.5 MMOL/L (ref 3.5–5.1)
PROTHROMBIN TIME: 14.8 SEC (ref 10–13.2)
RBC # BLD: 2.66 M/UL (ref 4–5.2)
SODIUM BLD-SCNC: 142 MMOL/L (ref 136–145)
TOTAL PROTEIN: 6.1 G/DL (ref 6.4–8.2)
WBC # BLD: 11.9 K/UL (ref 4–11)

## 2021-04-14 PROCEDURE — 6370000000 HC RX 637 (ALT 250 FOR IP): Performed by: INTERNAL MEDICINE

## 2021-04-14 PROCEDURE — 6360000002 HC RX W HCPCS: Performed by: NURSE PRACTITIONER

## 2021-04-14 PROCEDURE — 83690 ASSAY OF LIPASE: CPT

## 2021-04-14 PROCEDURE — 85730 THROMBOPLASTIN TIME PARTIAL: CPT

## 2021-04-14 PROCEDURE — 80053 COMPREHEN METABOLIC PANEL: CPT

## 2021-04-14 PROCEDURE — 6360000002 HC RX W HCPCS: Performed by: SURGERY

## 2021-04-14 PROCEDURE — 84100 ASSAY OF PHOSPHORUS: CPT

## 2021-04-14 PROCEDURE — APPNB30 APP NON BILLABLE TIME 0-30 MINS: Performed by: NURSE PRACTITIONER

## 2021-04-14 PROCEDURE — C9113 INJ PANTOPRAZOLE SODIUM, VIA: HCPCS | Performed by: NURSE PRACTITIONER

## 2021-04-14 PROCEDURE — 2580000003 HC RX 258: Performed by: INTERNAL MEDICINE

## 2021-04-14 PROCEDURE — 97162 PT EVAL MOD COMPLEX 30 MIN: CPT

## 2021-04-14 PROCEDURE — 6370000000 HC RX 637 (ALT 250 FOR IP): Performed by: SURGERY

## 2021-04-14 PROCEDURE — 85025 COMPLETE CBC W/AUTO DIFF WBC: CPT

## 2021-04-14 PROCEDURE — APPSS15 APP SPLIT SHARED TIME 0-15 MINUTES: Performed by: NURSE PRACTITIONER

## 2021-04-14 PROCEDURE — 85014 HEMATOCRIT: CPT

## 2021-04-14 PROCEDURE — 97165 OT EVAL LOW COMPLEX 30 MIN: CPT

## 2021-04-14 PROCEDURE — 83735 ASSAY OF MAGNESIUM: CPT

## 2021-04-14 PROCEDURE — 94761 N-INVAS EAR/PLS OXIMETRY MLT: CPT

## 2021-04-14 PROCEDURE — 97530 THERAPEUTIC ACTIVITIES: CPT

## 2021-04-14 PROCEDURE — 1200000000 HC SEMI PRIVATE

## 2021-04-14 PROCEDURE — 2580000003 HC RX 258: Performed by: SURGERY

## 2021-04-14 PROCEDURE — 99024 POSTOP FOLLOW-UP VISIT: CPT | Performed by: SURGERY

## 2021-04-14 PROCEDURE — 6360000002 HC RX W HCPCS: Performed by: INTERNAL MEDICINE

## 2021-04-14 PROCEDURE — 85610 PROTHROMBIN TIME: CPT

## 2021-04-14 PROCEDURE — 85018 HEMOGLOBIN: CPT

## 2021-04-14 PROCEDURE — 36415 COLL VENOUS BLD VENIPUNCTURE: CPT

## 2021-04-14 RX ORDER — PANTOPRAZOLE SODIUM 40 MG/10ML
40 INJECTION, POWDER, LYOPHILIZED, FOR SOLUTION INTRAVENOUS DAILY
Status: DISCONTINUED | OUTPATIENT
Start: 2021-04-14 | End: 2021-04-19 | Stop reason: HOSPADM

## 2021-04-14 RX ADMIN — HYDROMORPHONE HYDROCHLORIDE 0.5 MG: 1 INJECTION, SOLUTION INTRAMUSCULAR; INTRAVENOUS; SUBCUTANEOUS at 03:05

## 2021-04-14 RX ADMIN — Medication 10 ML: at 20:34

## 2021-04-14 RX ADMIN — FUROSEMIDE 20 MG: 20 TABLET ORAL at 08:32

## 2021-04-14 RX ADMIN — ATORVASTATIN CALCIUM 40 MG: 40 TABLET, FILM COATED ORAL at 20:34

## 2021-04-14 RX ADMIN — HYDROMORPHONE HYDROCHLORIDE 0.5 MG: 1 INJECTION, SOLUTION INTRAMUSCULAR; INTRAVENOUS; SUBCUTANEOUS at 09:12

## 2021-04-14 RX ADMIN — PIPERACILLIN AND TAZOBACTAM 3375 MG: 3; .375 INJECTION, POWDER, LYOPHILIZED, FOR SOLUTION INTRAVENOUS at 11:05

## 2021-04-14 RX ADMIN — DEXTROSE MONOHYDRATE 100 MG: 50 INJECTION, SOLUTION INTRAVENOUS at 18:04

## 2021-04-14 RX ADMIN — PIPERACILLIN AND TAZOBACTAM 3375 MG: 3; .375 INJECTION, POWDER, LYOPHILIZED, FOR SOLUTION INTRAVENOUS at 20:33

## 2021-04-14 RX ADMIN — ENOXAPARIN SODIUM 40 MG: 40 INJECTION SUBCUTANEOUS at 18:01

## 2021-04-14 RX ADMIN — HYDROMORPHONE HYDROCHLORIDE 0.5 MG: 1 INJECTION, SOLUTION INTRAMUSCULAR; INTRAVENOUS; SUBCUTANEOUS at 17:57

## 2021-04-14 RX ADMIN — PANTOPRAZOLE SODIUM 40 MG: 40 INJECTION, POWDER, FOR SOLUTION INTRAVENOUS at 14:24

## 2021-04-14 RX ADMIN — CARVEDILOL 6.25 MG: 6.25 TABLET, FILM COATED ORAL at 08:32

## 2021-04-14 RX ADMIN — PIPERACILLIN AND TAZOBACTAM 3375 MG: 3; .375 INJECTION, POWDER, LYOPHILIZED, FOR SOLUTION INTRAVENOUS at 03:07

## 2021-04-14 RX ADMIN — Medication 1 SPRAY: at 16:52

## 2021-04-14 RX ADMIN — SODIUM CHLORIDE: 9 INJECTION, SOLUTION INTRAVENOUS at 18:06

## 2021-04-14 RX ADMIN — NIFEDIPINE 30 MG: 30 TABLET, FILM COATED, EXTENDED RELEASE ORAL at 08:32

## 2021-04-14 RX ADMIN — ONDANSETRON 4 MG: 2 INJECTION INTRAMUSCULAR; INTRAVENOUS at 03:11

## 2021-04-14 RX ADMIN — SPIRONOLACTONE 25 MG: 25 TABLET ORAL at 08:32

## 2021-04-14 RX ADMIN — CARVEDILOL 6.25 MG: 6.25 TABLET, FILM COATED ORAL at 16:50

## 2021-04-14 RX ADMIN — HYDROMORPHONE HYDROCHLORIDE 0.5 MG: 1 INJECTION, SOLUTION INTRAMUSCULAR; INTRAVENOUS; SUBCUTANEOUS at 21:35

## 2021-04-14 RX ADMIN — HYDROMORPHONE HYDROCHLORIDE 0.5 MG: 1 INJECTION, SOLUTION INTRAMUSCULAR; INTRAVENOUS; SUBCUTANEOUS at 13:28

## 2021-04-14 RX ADMIN — Medication 1 SPRAY: at 11:12

## 2021-04-14 ASSESSMENT — PAIN DESCRIPTION - PAIN TYPE
TYPE: SURGICAL PAIN

## 2021-04-14 ASSESSMENT — PAIN DESCRIPTION - LOCATION
LOCATION: ABDOMEN

## 2021-04-14 ASSESSMENT — PAIN DESCRIPTION - ORIENTATION
ORIENTATION: RIGHT
ORIENTATION: RIGHT

## 2021-04-14 ASSESSMENT — PAIN SCALES - GENERAL
PAINLEVEL_OUTOF10: 5
PAINLEVEL_OUTOF10: 8
PAINLEVEL_OUTOF10: 8
PAINLEVEL_OUTOF10: 6
PAINLEVEL_OUTOF10: 7

## 2021-04-14 ASSESSMENT — PAIN DESCRIPTION - DESCRIPTORS: DESCRIPTORS: DISCOMFORT;SORE

## 2021-04-14 NOTE — PROGRESS NOTES
Hospitalist Progress Note      PCP: No primary care provider on file. Date of Admission: 4/7/2021    Chief Complaint: Fatigue    Hospital Course:  Jerome Shukla is a 63 y.o. female who presented with Complaints of generalized fatigue weak and not feeling well. Patient with complex medical history of CHF A. fib CAD chronic anemia who has been feeling weak and tired. With low hemoglobin. Profound weakness malaise with chest pain and shortness of breath times. Has not felt well for weeks. Patient is letting it could be related to amiodarone that was the new regimen for him. Was seen by cardiology in the office was sent here for further evaluation. Patient has been having intermittent shortness of breath with dyspnea on exertion. No fever chills. Follows Dr. Ravi Emerson for GI patient has had some difficulty with dark coloration of stools and suppressed EGD in next week. Patient is on anticoagulation Eliquis    Subjective: Patient sitting up in chair. Reports her throat hurts due to the NG tube. Patient denied chest pain, shortness of breath, palpitations, abdominal pain, nausea vomiting, diarrhea, dysuria, headache lightheadedness or dizziness. Voiding without difficulty. Reviewed plan of care with patient and fiance, they verbalized understanding and agreement. Denied further questions or needs. Assessment/Plan:    Symptomatic anemia  GI bleed  -Probably secondary to ulceration overlying lipoma  -Had EGD, found to have a large lesion of the duodenum, underwent 3 clipping, also a large lipoma biopsy    Duodenal mass  POD 1  -Status post excision 4/13/2021  -Per surgery, NG decompression, on 4/16 will order UGI to evaluate duodenal repair. If normal, will remove NG and advance diet  -Monitor bowel function, no flatus or stool yet  - IS  -devi SABILLON Fib  -EP consulted, due to GI bleed not a good candidate for long-term anticoagulation.   Was on Xarelto at home.  -Plan for ZAMZAM as outpatient to assess for watchman eligibility and further work-up  -Resume anticoagulation when okay with GI, holding Xarelto for now. Continue Lovenox 40 mg subcu daily  -Reduced amiodarone dose to 100 mg due to it causing her fatigue  -ordered tele     Depression  -Psychiatry was consulted and did evaluate the patient, see note    Hypokalemia  -replacement ordered  -bmp in am    History of heart failure, reduced EF 30 to 35%  -Continue Coreg  -Resume Entresto when blood pressure stable no further intervention needed  -We will need follow-up with heart failure team    Medications:  Reviewed    Infusion Medications    sodium chloride      sodium chloride 125 mL/hr at 04/13/21 1148     Scheduled Medications    pantoprazole  40 mg Intravenous Daily    sodium chloride flush  5-40 mL Intravenous 2 times per day    enoxaparin  40 mg Subcutaneous QPM    amiodarone  100 mg Oral Daily    piperacillin-tazobactam  3,375 mg Intravenous Q8H    atorvastatin  40 mg Oral Daily    furosemide  20 mg Oral Daily    NIFEdipine  30 mg Oral Daily    spironolactone  25 mg Oral Daily    carvedilol  6.25 mg Oral BID WC     PRN Meds: sodium chloride flush, sodium chloride, ondansetron **OR** ondansetron, phenol, HYDROmorphone **OR** HYDROmorphone, potassium chloride **OR** potassium alternative oral replacement **OR** potassium chloride, polyethylene glycol, acetaminophen **OR** acetaminophen      Intake/Output Summary (Last 24 hours) at 4/14/2021 1609  Last data filed at 4/14/2021 1511  Gross per 24 hour   Intake 0 ml   Output 1295 ml   Net -1295 ml       Physical Exam Performed:    /76   Pulse 75   Temp 97.7 °F (36.5 °C) (Oral)   Resp 16   Ht 5' 6.5\" (1.689 m)   Wt 216 lb 8 oz (98.2 kg)   SpO2 92%   BMI 34.42 kg/m²     General appearance: No apparent distress, appears stated age and cooperative. HEENT: Pupils equal, round, and reactive to light. Conjunctivae/corneas clear. Neck: Supple, with full range of motion.  No jugular venous distention. Trachea midline. Respiratory:  Normal respiratory effort. Clear to auscultation, bilaterally without Rales/Wheezes/Rhonchi. Cardiovascular: Irregular regular rate and rhythm with normal S1/S2 without murmurs, rubs or gallops. Abdomen: Hypoactive bowel sounds, abdominal tenderness with palpation, large abdominal dressing, NG tube  Musculoskeletal: No clubbing, cyanosis or edema bilaterally. Full range of motion without deformity. Skin: Skin color, texture, turgor normal.  No rashes or lesions. Neurologic:  Neurovascularly intact without any focal sensory/motor deficits. Psychiatric: Alert and oriented, thought content appropriate, flat affect  Capillary Refill: Brisk,< 3 seconds   Peripheral Pulses: +2 palpable, equal bilaterally       Labs:   Recent Labs     04/12/21 0421 04/12/21 0421 04/13/21 0441 04/13/21  0441 04/13/21  1909 04/14/21  0031 04/14/21  0610   WBC 6.4  --  5.6  --   --   --  11.9*   HGB 8.0*   < > 8.4*   < > 8.5* 8.1* 7.8*   HCT 24.4*   < > 24.7*   < > 26.5* 24.7* 24.0*     --  264  --   --   --  295    < > = values in this interval not displayed. Recent Labs     04/12/21 0421 04/13/21 0441 04/14/21  0610    143 142   K 3.5 3.3* 3.5   * 112* 112*   CO2 23 23 22   BUN 8 6* 9   CREATININE 0.9 0.9 0.9   CALCIUM 8.7 8.6 8.3   PHOS  --   --  2.8     Recent Labs     04/14/21  0610   AST 22   ALT 16   BILITOT 0.6   ALKPHOS 46     Recent Labs     04/14/21  0610   INR 1.27*     No results for input(s): CKTOTAL, TROPONINI in the last 72 hours. Urinalysis:      Lab Results   Component Value Date    NITRU Negative 07/10/2020    BLOODU Negative 07/10/2020    SPECGRAV 1.015 07/10/2020    GLUCOSEU Negative 07/10/2020       Radiology:  FL CHOLANGIOGRAM OR   Final Result   Normal intraoperative laparoscopic cholangiogram.  No common duct stone.          XR ABDOMEN (KUB) (SINGLE AP VIEW)   Final Result   Indeterminate intestinal gas pattern secondary to paucity of small bowel gas. CT ABDOMEN PELVIS W IV CONTRAST Additional Contrast? Oral   Final Result   No definitive evidence of a pancreatic mass and no evidence of metastatic   disease or acute abnormality of the abdomen/pelvis. If persistent concern for an occult pancreatic mass consider endoscopic   ultrasound or PET-CT. 3 separate surgical clips placed within bowel lumen near the ligament of   Treitz since the comparison. This is presumed to be related to interval GI   bleeding treatment. Fibroid uterus without significant change in appearance from the comparison         XR CHEST PORTABLE   Final Result   No active cardiopulmonary disease         VL Extremity Venous Right    (Results Pending)         Active Hospital Problems    Diagnosis    Symptomatic anemia [D64.9]    Fatigue [R53.83]         DVT Prophylaxis: Lovenox  Diet: Diet NPO Effective Now Exceptions are: Sips with Meds  Code Status: Full Code    PT/OT Eval Status: Ordered evaluation    Dispo -home once medically stable with [facility]. Rx for rolling walker placed    CATRACHITO Serrato - CNP      NOTE:  This report was transcribed using voice recognition software. Every effort was made to ensure accuracy; however, inadvertent computerized transcription errors may be present.

## 2021-04-14 NOTE — PROGRESS NOTES
Comprehensive Nutrition Assessment    Type and Reason for Visit:  Initial(LOS)    Nutrition Recommendations/Plan:   1. Start oral diet: Clears-->fulls--> Cardiac; low fiber (h/o CHF)  2. When oral diet resumes begin Ensure clear BID    Nutrition Assessment:  NPO today s/p colectomy, resection duodenal mass, and cholecystectomy 4/13. Prior to surgery, pt reported a good appetite with intake over 50%. Pt has increased nutrient needs r/t recent surgery and wound. Will trial ensure clear once if oral diet resumes. Weight history shows fluctuations but no significant weight loss. Will monitor for oral diet vs nutrition support. Malnutrition Assessment:  Malnutrition Status:  No malnutrition    Context:  Acute Illness     Findings of the 6 clinical characteristics of malnutrition:  Energy Intake:  Mild decrease in energy intake (Comment)  Weight Loss:  No significant weight loss     Body Fat Loss:  No significant body fat loss     Muscle Mass Loss:  No significant muscle mass loss    Fluid Accumulation:  1 - Mild Extremities   Strength:  Not Performed    Estimated Daily Nutrient Needs:  Energy (kcal):  2010-2116; Weight Used for Energy Requirements:  Admission(92kg)     Protein (g):  ; Weight Used for Protein Requirements:  Ideal(60kg IBW (1.2-2.0g))        Fluid (ml/day):   ; Method Used for Fluid Requirements:  1 ml/kcal      Nutrition Related Findings:  NG tube (suction), LBM 3/13, LLE +1, pitting, +4.7L      Wounds:  Surgical Incision       Current Nutrition Therapies:    Diet NPO Effective Now Exceptions are: Sips with Meds    Anthropometric Measures:  · Height: 5' 6.5\" (168.9 cm)  · Current Body Weight: 216 lb (98 kg)   · Admission Body Weight: 203 lb (92.1 kg)      · Ideal Body Weight: 133 lbs; % Ideal Body Weight 162.4 %   · BMI: 34.3  · Adjusted Body Weight:  ; No Adjustment    · BMI Categories: Obese Class 1 (BMI 30.0-34. 9)       Nutrition Diagnosis:   · Inadequate oral intake related to altered GI function, altered GI structure, inadequate protein-energy intake as evidenced by NPO or clear liquid status due to medical condition      Nutrition Interventions:   Food and/or Nutrient Delivery:  Start Oral Diet, Start Oral Nutrition Supplement  Nutrition Education/Counseling:  No recommendation at this time   Coordination of Nutrition Care:  Continue to monitor while inpatient    Goals:  Oral diet vs. nutrition support       Nutrition Monitoring and Evaluation:   Behavioral-Environmental Outcomes:  None Identified   Food/Nutrient Intake Outcomes:  Diet Advancement/Tolerance, IVF Intake  Physical Signs/Symptoms Outcomes:  Fluid Status or Edema, GI Status     Discharge Planning: Too soon to determine     Electronically signed by [facility] on 4/14/21 at 11:58 AM EDT  Contact: [phone]    I have reviewed the notes and assessment performed by [facility], I concur with her/his documentation of Zuhair Reyes.   Electronically signed by Montana Ferraro RD, LD on 4/14/2021 at 2:16 PM

## 2021-04-14 NOTE — CARE COORDINATION
CM met with pt earlier today and discussed possible home care needs and pt's insurance may limit choices. Pt states she has no home care preference. She states depending on how she is at discharge may need transport home and assistance d/t she lives in a second floor apartment with 18 steps to go up and no elevator. CM noted therapy evaluations recommend home care. Pt has [facility] insurance. Right fax referral sent to [facility]. Pt will need DME order for walker on d/c.     Ryland Lee RN, BSN  [phone]

## 2021-04-14 NOTE — PROGRESS NOTES
Patient dressing intact but oozing, marked area where dressing is saturated. Call placed to Dr. Sandoval Given is on call, waiting for call back. Perfect serve also sent to Oswald Wheeler NP.    1850: Stat HH ordered, results pending. Per Dr. Dede Agee replace dressing and cleanse site with betadine. New dressing clean, dry, intact.

## 2021-04-14 NOTE — OP NOTE
[facility] [address]                     [address] [facility] [facility], [address] [facility]                                OPERATIVE REPORT    PATIENT NAME: Antoinette Sandy                     :        1965  MED REC NO:   [id number]                          ROOM:       [id number]  ACCOUNT NO:   [de-identified]                           ADMIT DATE: 2021  PROVIDER:     Carlos A Koenig MD    DATE OF PROCEDURE:  2021    PREOPERATIVE DIAGNOSES:  Duodenal mass and symptomatic cholelithiasis. POSTOPERATIVE DIAGNOSES:  Duodenal mass and symptomatic cholelithiasis. OPERATION PERFORMED:  Exploratory laparotomy, cholecystectomy with  cholangiogram and excision of duodenal mass. SURGEON:  Carlos A Koenig MD    ANESTHESIA:  General.    INDICATIONS:  This is a 50-year-old female who presents with multiple  admissions including postprandial right upper quadrant pain with  documented cholelithiasis as well as upper GI bleeding in which an EGD  showed a large lipoma with stigmata of bleeding that was controlled with  clips and injection. This could not be removed endoscopically due to  its location and depth and unable to be worked up with endoscopic  ultrasound due to the depth in the duodenum as well. The patient did  have bleeding that had been recurrent. 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 The risks, benefits, and alternative  treatments of an exploratory laparotomy with cholecystectomy and  cholangiogram as well as excision of duodenal mass and possible bowel  resection were discussed. Details of the procedure were discussed. All  questions were answered. The patient understood, agreed, and wished to  proceed. OPERATIVE PROCEDURE:  The patient was brought to the operating suite,  laid in supine position. General anesthesia was induced and well  tolerated. The patient's abdomen was prepped and draped in the usual  sterile fashion.   After a proper timeout was performed verifying  operative site, procedure and patient a generous upper midline incision  was made with scalpel. This was deepened through the subcutaneous  tissue down to the level of the fascia with electrocautery. The  peritoneum was explored and Bookwalter placed for wide exposure of the  abdomen. The small bowel was ran up to ligament of Treitz inferior to  the transverse colon mesentery and no clips or abnormalities were  palpated indicating the mass that had been marked with the clips was in  the duodenum. The lesser sac was opened with a cautery  the  transverse colon from the greater omentum with cautery. The duodenum  was identified at the fourth, third, second and first portion along its  course. The likely mass was appreciated in the junction between the  second and third portion of the duodenum although the mass itself was  large and somewhat mobile and difficult to fully appreciate and its  location was further clarified with fluoroscopic guidance later in the  case. At this point, as the gallbladder was the more anterior  structure, I proceeded with cholecystectomy prior to addressing the  duodenal mass. The dome of the gallbladder was grasped and retracted  cephalad. The neck of the gallbladder was grasped and retracted  laterally exposing the Calot's triangle. The cystic duct, cystic artery  and the neck of the gallbladder were all clearly identified and  dissected circumferentially exposing the critical view of safety. A  clip was placed high and lateral in the cystic duct and just medial to  this was incised with laparoscopic scissors. The cholangiogram catheter  was inserted through a stab incision in the right upper quadrant and  placed into the cystic duct without difficulty. Using fluoroscopic  guidance, dye was injected through the cystic duct into the common bile  duct into the duodenum without obstruction.   The dye was then forcefully  injected allowing good retrograde flow into the common hepatic duct and  biliary radicles. The cholangiogram was normal.  Cholangiogram catheter  was then removed. The cystic duct was then clipped medially and  divided. The cystic artery was also clipped medially and laterally, and  divided as well. The peritoneal attachments between the liver and  gallbladder were lysed with electrocautery. The gallbladder was then  removed from its bed and placed off the table as specimen. The  gallbladder fossa was inspected for hemostasis which had been obtained  with pressure and cautery and the gallbladder fossa was irrigated and  suctioned until the irrigant was clear. Attention was then turned to  the duodenal mass where using fluoroscopic guidance and a hemostat, I  was able to identify and confirm that the clips were at the junction  between the second and third portion of the duodenum as appreciated by  physical exam.  The duodenum was widely mobilized with Kocher maneuver  where the lateral attachments were taken down with cautery and I was  able to feel the duodenum circumferential as well as the head of the  pancreas and was able to get wide mobilization on the portion of the  duodenum that would need to be addressed for the duodenal mass. A  longitudinal incision was made in the lateral duodenum over the palpable  mass with cautery. This was done longitudinally and then stay sutures  were placed to expose the duodenotomy and would later be closed in a  transverse fashion. The mass was easily developed into the wound and  was rather pedunculated and mass stalk was at the medial deep aspect of  the small bowel. I used cautery to excise the mass at an area that  appeared to have normal small bowel mucosa with cautery. This was then  passed off as specimen for further analysis. The full-thickness biopsy  on the posterior wall of the duodenum was closed with multiple  interrupted 3-0 silk sutures.   Hemostasis was then verified and this  closure appeared to be watertight and tacked without complication or  ischemia. The mass had been removed in its entirety and the duodenotomy  which had been opened in longitudinal fashion would be closed in a  transverse fashion to minimize the risk of stricture. This was done  with multiple interrupted 3-0 silk sutures. The suture line was  inspected and was without any signs of ischemia, leak or complication. The abdomen was copiously irrigated with warm normal saline. A 19 Rolan  drain was left over the duodenotomy repair site. An NG had been  verified to be in the gastric body and hemostasis was verified in the  abdomen. The fascia was then closed with looped 0 PDS suture. The  wound was irrigated and the skin was then closed with staples. The  wound was then cleaned and dressed with gauze and tape. The patient  tolerated the procedure well and was transferred to the PACU in stable  condition. SPECIMEN:  Duodenal mass and gallbladder. DRAINS:  FRED drain as above. COMPLICATIONS:  None. ESTIMATED BLOOD LOSS:  Less than 100 mL.         Spenser Cary MD    D: 04/13/2021 23:11:40       T: 04/14/2021 3:34:26     [id number]  Job#: [id number]     Doc#: [id number]    CC:

## 2021-04-14 NOTE — PROGRESS NOTES
[facility]        Progress Note    Patient Name: Lele Milian  MRN: [id number]  YOB: 1965  Date of Evaluation: 2021    Subjective:  No acute events overnight  Pain controlled  No nausea or vomiting, NGT in place, complains of dyspepsia  No flatus or BM, denies bloating  Up to chair at this time      Post-Operative Day #1    Vital Signs:  Patient Vitals for the past 24 hrs:   BP Temp Temp src Pulse Resp SpO2 Height Weight   21 1200 122/73 97.8 °F (36.6 °C) Oral 73 16 95 % -- --   21 1108 -- -- -- -- -- -- 5' 6.5\" (1.689 m) --   21 0815 126/80 97.9 °F (36.6 °C) Oral 81 16 93 % -- --   21 0458 120/72 98.2 °F (36.8 °C) Oral 76 16 94 % -- 216 lb 8 oz (98.2 kg)   21 0019 122/76 98.2 °F (36.8 °C) Oral 74 16 96 % -- --   21 2130 (!) 141/95 98.6 °F (37 °C) Oral 87 16 95 % -- --   21 1639 -- -- -- -- 16 92 % -- --   21 1548 119/81 98.7 °F (37.1 °C) Axillary 87 16 92 % -- --      TEMPERATURE HISTORY 24H: Temp (24hrs), Av.2 °F (36.8 °C), Min:97.8 °F (36.6 °C), Max:98.7 °F (37.1 °C)    BLOOD PRESSURE HISTORY: Systolic (79AEN), CBN:720 , Min:81 , JCE:582    Diastolic (85IUQ), ZKM:36, Min:50, Max:95      Intake/Output:  I/O last 3 completed shifts: In: 1200 [I.V.:1200]  Out: 1875 [ERUPL:3463; Emesis/NG output:50; Drains:250]  No intake/output data recorded.   Drain/tube Output:  Closed/Suction Drain Right RLQ Bulb 19 Lithuanian-Output (ml): 50 ml    Physical Exam:  General: awake, alert, oriented to  person, place, time  Abdomen: soft, non-distended, incisional tenderness only, bowel sounds present   Drain: serosanguinous   Skin/Wound: surgical dressing in place, small amount of old drainage, otherwise no apparent issues    Labs:  CBC:    Recent Labs     21  0421 21  0421 21  0441 21  0441 21  1909 21  0031 21  0610   WBC 6.4  --  5.6  --   --   --  11.9*   HGB 8.0*   < > 8.4* < > 8.5* 8.1* 7.8*   HCT 24.4*   < > 24.7*   < > 26.5* 24.7* 24.0*     --  264  --   --   --  295    < > = values in this interval not displayed. BMP:    Recent Labs     04/12/21  0421 04/13/21  0441 04/14/21  0610    143 142   K 3.5 3.3* 3.5   * 112* 112*   CO2 23 23 22   BUN 8 6* 9   CREATININE 0.9 0.9 0.9   GLUCOSE 104* 99 106*     Hepatic:    Recent Labs     04/14/21  0610   AST 22   ALT 16   BILITOT 0.6   ALKPHOS 46     Amylase:  No results found for: AMYLASE  Lipase:    Lab Results   Component Value Date    LIPASE 12.0 04/14/2021    LIPASE 35.0 04/09/2021    LIPASE 25.0 04/07/2021      Mag:    Lab Results   Component Value Date    MG 2.20 04/14/2021    MG 2.20 04/09/2021     Phos:     Lab Results   Component Value Date    PHOS 2.8 04/14/2021    PHOS 3.4 04/09/2021      Coags:   Lab Results   Component Value Date    PROTIME 14.8 04/14/2021    INR 1.27 04/14/2021    APTT 30.2 04/14/2021       Cultures:  Anaerobic culture  No results found for: LABANAE  Fungus stain  No results found for requested labs within last 30 days. Gram stain  No results found for requested labs within last 30 days. Organism  No results found for: [facility]  Surgical culture  No results found for: CXSURG  Blood culture 1  Results in Past 30 Days  Result Component Current Result Ref Range Previous Result Ref Range   Blood Culture, Routine No Growth after 4 days of incubation. (4/7/2021)  Not in Time Range      Blood culture 2  Results in Past 30 Days  Result Component Current Result Ref Range Previous Result Ref Range   Culture, Blood 2 No Growth after 4 days of incubation. (4/7/2021)  Not in Time Range      Fecal occult  Results in Past 30 Days  Result Component Current Result Ref Range Previous Result Ref Range   Occult Blood Diagnostic Result: POSITIVE  Normal range: Negative   (A) (4/7/2021)  Not in Time Range      GI bacterial pathogens by PCR  No results found for requested labs within last 30 days.      C. difficile  No results found for requested labs within last 30 days. Urine culture  No results found for: LABURIN    Pathology:  Pathology results pending     Imaging:  I have personally reviewed the following films:    Xr Abdomen (kub) (single Ap View)    Result Date: 4/12/2021  EXAMINATION: ONE SUPINE XRAY VIEW(S) OF THE ABDOMEN 4/12/2021 9:02 pm COMPARISON: None. HISTORY: ORDERING SYSTEM PROVIDED HISTORY: followup duodenal clips TECHNOLOGIST PROVIDED HISTORY: Reason for exam:->followup duodenal clips Reason for Exam: f/u duodenal clips Acuity: Unknown Type of Exam: Unknown FINDINGS: Multiple surgical clips overlie the upper mid abdomen. There is residual contrast in the descending and sigmoid colon. There is an overall paucity of small bowel gas. Indeterminate intestinal gas pattern secondary to paucity of small bowel gas. Fl Cholangiogram Or    Result Date: 4/13/2021  EXAMINATION: SPOT IMAGES FROM AN INTRAOPERATIVE CHOLANGIOGRAM 4/13/2021 8:13 am COMPARISON: None. HISTORY: ORDERING SYSTEM PROVIDED HISTORY: check for stones TECHNOLOGIST PROVIDED HISTORY: Reason for exam:->check for stones Acuity: Unknown FLUOROSCOPY DOSE AND TYPE OR TIME AND EXPOSURES: 17 seconds. Single cine loop of fluoroscopic images. FINDINGS: Common duct is well opacified with contrast.  No intraluminal filling defect identified. Free flow of contrast into the duodenum occurs. No extravasation. Intrahepatic ducts normal in size. Normal intraoperative laparoscopic cholangiogram.  No common duct stone.      Scheduled Meds:   sodium chloride flush  5-40 mL Intravenous 2 times per day    enoxaparin  40 mg Subcutaneous QPM    pantoprazole  40 mg Oral QAM AC    amiodarone  100 mg Oral Daily    piperacillin-tazobactam  3,375 mg Intravenous Q8H    atorvastatin  40 mg Oral Daily    furosemide  20 mg Oral Daily    NIFEdipine  30 mg Oral Daily    spironolactone  25 mg Oral Daily    carvedilol  6.25 mg Oral BID WC Continuous Infusions:   sodium chloride      sodium chloride 125 mL/hr at 04/13/21 1148     PRN Meds:.sodium chloride flush, sodium chloride, ondansetron **OR** ondansetron, phenol, HYDROmorphone **OR** HYDROmorphone, potassium chloride **OR** potassium alternative oral replacement **OR** potassium chloride, polyethylene glycol, acetaminophen **OR** acetaminophen      Assessment:  63 y.o. female admitted with   1. Symptomatic anemia    2. Adequate anticoagulation on anticoagulant therapy    3. Profound fatigue    4. Status post implantation of automatic cardioverter/defibrillator (AICD)        Status-post exploratory laparotomy, cholecystectomy with cholangiogram and excision of duodenal mass on 4/13/2021 for duodenal mass and symptomatic cholelithiasis  Upper GI bleed  Acute blood loss anemia  CHF, EF 30-35%  Atrial fibrillation  Medical coagulopathy, on Xarelto      Plan:  1. Remove Schultz catheter; FRED drain care and monitoring output  2. NPO with NGT decompression, ice chips are okay, continue PPI but switch to IV until taking PO, will get UGI study on Friday prior to removal of NGT; monitor bowel function  3. IV hydration; monitor and correct electrolytes  4. Continue IV antibiotics  5. Activity as tolerated, ambulate TID--PT/OT evaluation and treatment  6. Pulmonary toilet, incentive spirometry  7. PRN analgesics and antiemetics--minimizing narcotics as tolerated  8. DVT prophylaxis with Lovenox and SCD's; hold Xarelto  9. Management of medical comorbid etiologies per primary team and consulting services    EDUCATION:  Educated patient on plan of care and disease process--all questions answered. Plans discussed with patient and nursing. Reviewed and discussed with Dr. Ester Dugan. Signed:  CATRACHITO Mendez - CNP  4/14/2021 2:03 PM    I have personally performed a face to face diagnostic evaluation on this patient. I have interviewed and examined the patient and I agree with the assessment above.  In summary, my findings and plan are the following:   Ms. Estela Rankin is a 63 y.o. female who presents with   Status-post exploratory laparotomy, cholecystectomy with cholangiogram and excision of duodenal mass on 4/13/2021 for duodenal mass and symptomatic cholelithiasis  Upper GI bleed  Acute blood loss anemia  CHF, EF 30-35%  Atrial fibrillation  Medical coagulopathy, on Xarelto    Plan:  1. NG decompression, on 4/16 will order UGI to evaluate duodenal repair. If normal, will remove NG and advance diet  2. Monitor bowel function, no flatus or stool yet  3. IVF, monitor and replace electrolytes  4. Pain control, minimize narcotics  5. Increase activity as able  6. Antibiotics  7. Good urine output, remove bryant  8. Defer management of remainder of medical comorbidities to primary and consulting teams    Flavio Dugan MD, FACS  4/14/2021  3:54 PM

## 2021-04-14 NOTE — PROGRESS NOTES
high-level IADLs  Assessment: Pt is currently functioning below occupational baseline and demo the deficits listed above, pt would benefit from continued skilled OT services to address these deficits and increase independence, safety, and ease with all occupational pursuits  Treatment Diagnosis: Decreased ADL/IADL status, functional mobility, and functional transfers d/t Symptomatic anemia  Prognosis: Good  Decision Making: Low Complexity  OT Education: OT Role;Plan of Care;Transfer Training;Energy Conservation  Patient Education: pt verbalized understanding  REQUIRES OT FOLLOW UP: Yes  Activity Tolerance  Activity Tolerance: Patient limited by pain; Patient Tolerated treatment well;Patient limited by fatigue  Safety Devices  Safety Devices in place: Yes  Type of devices: All fall risk precautions in place; Patient at risk for falls;Call light within reach; Chair alarm in place  Restraints  Initially in place: No           Patient Diagnosis(es): The primary encounter diagnosis was Symptomatic anemia. Diagnoses of Adequate anticoagulation on anticoagulant therapy, Profound fatigue, and Status post implantation of automatic cardioverter/defibrillator (AICD) were also pertinent to this visit. has a past medical history of Anemia, Atrial fibrillation and flutter ([address] Atrial flutter ([address] CHB (complete heart block) ([address] Class 1 obesity without serious comorbidity with body mass index (BMI) of 33.0 to 33.9 in adult, Congenital heart disease, GERD (gastroesophageal reflux disease), Headache(784.0), History of complete heart block, Hyperlipidemia, Hypertension, PONV (postoperative nausea and vomiting), Sleep apnea, Syncope, and Systolic CHF, chronic ([address] has a past surgical history that includes Uterine fibroid surgery; Pacemaker insertion (11/29/12); Tubal ligation; Upper gastrointestinal endoscopy (N/A, 10/27/2020); Colonoscopy (N/A, 10/27/2020); Cardiac defibrillator placement (01/2021);  Upper gastrointestinal endoscopy (N/A, 4/8/2021); Upper gastrointestinal endoscopy (N/A, 4/8/2021); Upper gastrointestinal endoscopy (N/A, 4/8/2021); and colectomy (N/A, 4/13/2021). Treatment Diagnosis: Decreased ADL/IADL status, functional mobility, and functional transfers d/t Symptomatic anemia      Restrictions  Restrictions/Precautions  Restrictions/Precautions: Fall Risk, Modified Diet(HIGH FALL RISK;Diet NPO Effective Now Exceptions are: Sips with Meds)  Required Braces or Orthoses?: No  Position Activity Restriction  Other position/activity restrictions: Ms. Sury Gomez is a 63 y.o. female who presents to ED primarily with fatigue that had become so debilitating she had difficulty ambulating. Progressively worse for the last few weeks. Also notes sharp epigastric abdominal pain that is constant but exacerbated with eating, hunching forward. Adjusting to plant-based diet. Additionally notes nausea, hot flashes, dyspnea, icteric sclera, diarrhea with dark but not black stools. Lost 20 lbs unintentionally but gained back over the last year. Denies fever, chills, chest pain, emesis, dysuria. Anemia is known and workup with Dr. Dada Ramirez EGD/colonoscopy 10/2020 was unrevealing. Cholelithiasis also confirmed by prior ultrasound. EGD today showed a bleeding lipoma in second portion of duodenum that was controlled. Abdominal surgical history includes uterine fibroidectomy and tubal ligation. Medical history includes CHF, atrial fibrillation, medical coagulopathy on Xarelto, DERECK, GERD. No personal or family history of colorectal, pancreatic, or gastric malignancy    Subjective   General  Chart Reviewed: Yes  Patient assessed for rehabilitation services?: Yes  Additional Pertinent Hx: PMH: Ms. Sury Gomez is a 63 y.o. female who presents to ED primarily with fatigue that had become so debilitating she had difficulty ambulating. Progressively worse for the last few weeks.  Also notes sharp epigastric abdominal pain that is constant but exacerbated with eating, hunching forward. Adjusting to plant-based diet. Additionally notes nausea, hot flashes, dyspnea, icteric sclera, diarrhea with dark but not black stools. Lost 20 lbs unintentionally but gained back over the last year. Denies fever, chills, chest pain, emesis, dysuria. Anemia is known and workup with Dr. Christopher Gil EGD/colonoscopy 10/2020 was unrevealing. Cholelithiasis also confirmed by prior ultrasound. EGD today showed a bleeding lipoma in second portion of duodenum that was controlled. Abdominal surgical history includes uterine fibroidectomy and tubal ligation. Medical history includes CHF, atrial fibrillation, medical coagulopathy on Xarelto, DERECK, GERD. No personal or family history of colorectal, pancreatic, or gastric malignancy  Family / Caregiver Present: No  Referring Practitioner: CATRACHITO Swenson CNP  Diagnosis: Symptomatic anemia  Subjective  Subjective: Pt in recliner chair upon arrival, pleasant and agreeable to OT/PT evaluation  Patient Currently in Pain: Yes  Pain Assessment  Pain Assessment: 0-10  Pain Level: 7  Pain Type: Surgical pain  Pain Location: Abdomen  Pain Descriptors: Discomfort; Sore  Pre Treatment Pain Screening  Intervention List: Patient able to continue with treatment  Comments / Details: RN present at [facility] to administer pain medication  Vital Signs  Patient Currently in Pain: Yes    Social/Functional History  Social/Functional History  Lives With: Significant other  Type of Home: Apartment  Home Layout: One level  Home Access: Stairs to enter with rails  Entrance Stairs - Number of Steps: 18 ANDREIA  Bathroom Shower/Tub: Tub/Shower unit  Bathroom Toilet: Standard  ADL Assistance: Independent  Homemaking Assistance: Independent  Ambulation Assistance: Independent  Transfer Assistance: Independent  Active : No  Occupation: Part time employment  Type of occupation: [employer]  Leisure & Hobbies: walking  Additional Comments: No falls; been having abdominal issues for the past 6 months       Objective   Vision: Impaired  Vision Exceptions: Wears glasses at all times  Hearing: Within functional limits    Orientation  Overall Orientation Status: Within Normal Limits  Observation/Palpation  Observation: NG tube; clamped by RN for PT/OT evaluation  Balance  Sitting Balance: Stand by assistance  Standing Balance: Contact guard assistance  Standing Balance  Time: 10 minutes  Activity: functional mobility/transfers  Comment: use of RW, no LOB  Functional Mobility  Functional - Mobility Device: Rolling Walker  Activity: Other  Assist Level: Contact guard assistance  Functional Mobility Comments: Pt ambulated 40' in [facility] decreased step length and speed.   ADL  Additional Comments: Pt declined ADL participation this date  Tone RUE  RUE Tone: Normotonic  Tone LUE  LUE Tone: Normotonic  Coordination  Movements Are Fluid And Coordinated: Yes  Transfers  Sit to stand: Minimal assistance  Stand to sit: Minimal assistance  Transfer Comments: verbal cues for safe hand placement  Cognition  Overall Cognitive Status: WNL  Perception  Overall Perceptual Status: WFL  Sensation  Overall Sensation Status: WFL  LUE AROM (degrees)  LUE AROM : WNL  Left Hand AROM (degrees)  Left Hand AROM: WNL  RUE AROM (degrees)  RUE AROM : WNL  Right Hand AROM (degrees)  Right Hand AROM: WNL         Plan   Plan  Times per week: 3-5x/wk  Times per day: Daily  Current Treatment Recommendations: Strengthening, Balance Training, Functional Mobility Training, Endurance Training, Safety Education & Training, Patient/Caregiver Education & Training, Equipment Evaluation, Education, & procurement, Home Management Training, Self-Care / ADL    G-Code     OutComes Score                                                  AM-PAC Score        AM-[facility] Inpatient Daily Activity Raw Score: 17 (04/14/21 1419)  AM-PAC Inpatient ADL T-Scale Score : 37.26 (04/14/21 1419)  ADL Inpatient CMS 0-100% Score: 50.11 (04/14/21 1419)  ADL Inpatient CMS G-Code Modifier : CK (04/14/21 1419)    Goals  Short term goals  Time Frame for Short term goals: d/c  Short term goal 1: Pt will complete toileting at MOD I level  Short term goal 2: Pt will complete functional transfer to ADL surface at MOD I level  Short term goal 3: Pt will complete functional mobility to<>from bathroomw with LRAD at MOD I level  Short term goal 4: Pt will complete bed mobility at MOD I level  Short term goal 5: Pt will complete UB/LB ADLs at MOD I level  Long term goals  Time Frame for Long term goals : LTG=STG  Patient Goals   Patient goals : to return home       Therapy Time   Individual Concurrent Group Co-treatment   Time In 1330         Time Out 1358         Minutes 28         Timed Code Treatment Minutes: [address], [address] [place] [address] [id number]

## 2021-04-14 NOTE — PROGRESS NOTES
Physical Therapy    Facility/Department: [address] ONCOLOGY  Initial Assessment    NAME: Iris Swan  : 1965  MRN: [id number]    Date of Service: 2021    Discharge Recommendations: Iris Swan scored a 16/24 on the AM-PAC short mobility form. Current research shows that an AM-PAC score of 18 or greater is typically associated with a discharge to the patient's home setting. Based on the patient's AM-PAC score and their current functional mobility deficits, it is recommended that the patient have 2-3 sessions per week of Physical Therapy at d/c to increase the patient's independence. At this time, this patient demonstrates the endurance and safety to discharge home with HPPT and a follow up treatment frequency of 2-3x/wk. Please see assessment section for further patient specific details. If patient discharges prior to next session this note will serve as a discharge summary. Please see below for the latest assessment towards goals. HOME HEALTH CARE: LEVEL 3 SAFETY     - Initial home health evaluation to occur within 24-48 hours, in patient home   - Therapy evaluations in home within 24-48 hours of discharge; including DME and home safety   - Frontload therapy 5 days, then 3x a week   - Therapy to evaluate if patient has [address] needs for personal care   -  evaluation within 24-48 hours, includes evaluation of resources and insurance to determine [place], [place], LTC, and Medicaid options         PT Equipment Recommendations  Equipment Needed: Yes  Mobility Devices: Adalberto [place]os[place]: Rolling    Assessment   Body structures, Functions, Activity limitations: Decreased functional mobility ; Decreased strength;Decreased endurance; Increased pain;Decreased balance  Assessment: Pt is limited by the above deficits and would benefit from skilled PT services to maximize pt functional mobility and safety prior to discharge. Pt is below baseline and has 18 steps to access her apartment. Recommend HHPT at discharge and assist as needed from her family. Treatment Diagnosis: decreased strength, functional mobility and endurance  Prognosis: Good  Decision Making: Medium Complexity  PT Education: Goals;PT Role;Plan of Care  Patient Education: Pt verbalized understanding  Barriers to Learning: none  REQUIRES PT FOLLOW UP: Yes  Activity Tolerance  Activity Tolerance: Patient Tolerated treatment well  Activity Tolerance: no dizziness, no SOB; moved slowly due to pain, but able to tolerate mobility in room       Patient Diagnosis(es): The primary encounter diagnosis was Symptomatic anemia. Diagnoses of Adequate anticoagulation on anticoagulant therapy, Profound fatigue, and Status post implantation of automatic cardioverter/defibrillator (AICD) were also pertinent to this visit. has a past medical history of Anemia, Atrial fibrillation and flutter ([address] Atrial flutter ([address] CHB (complete heart block) ([address] Class 1 obesity without serious comorbidity with body mass index (BMI) of 33.0 to 33.9 in adult, Congenital heart disease, GERD (gastroesophageal reflux disease), Headache(784.0), History of complete heart block, Hyperlipidemia, Hypertension, PONV (postoperative nausea and vomiting), Sleep apnea, Syncope, and Systolic CHF, chronic ([address] has a past surgical history that includes Uterine fibroid surgery; Pacemaker insertion (11/29/12); Tubal ligation; Upper gastrointestinal endoscopy (N/A, 10/27/2020); Colonoscopy (N/A, 10/27/2020); Cardiac defibrillator placement (01/2021); Upper gastrointestinal endoscopy (N/A, 4/8/2021); Upper gastrointestinal endoscopy (N/A, 4/8/2021); Upper gastrointestinal endoscopy (N/A, 4/8/2021); and colectomy (N/A, 4/13/2021).     Restrictions  Restrictions/Precautions  Restrictions/Precautions: Fall Risk, Modified Diet(HIGH FALL RISK;Diet NPO Effective Now Exceptions are: Sips with Meds)  Required Braces or Orthoses?: No  Position Activity Restriction  Other position/activity restrictions: Ms. Billy Prieto is a 63 y.o. female who presents to ED primarily with fatigue that had become so debilitating she had difficulty ambulating. Progressively worse for the last few weeks. Also notes sharp epigastric abdominal pain that is constant but exacerbated with eating, hunching forward. Adjusting to plant-based diet. Additionally notes nausea, hot flashes, dyspnea, icteric sclera, diarrhea with dark but not black stools. Lost 20 lbs unintentionally but gained back over the last year. Denies fever, chills, chest pain, emesis, dysuria. Anemia is known and workup with Dr. Liv Pardo EGD/colonoscopy 10/2020 was unrevealing. Cholelithiasis also confirmed by prior ultrasound. EGD today showed a bleeding lipoma in second portion of duodenum that was controlled. Abdominal surgical history includes uterine fibroidectomy and tubal ligation. Medical history includes CHF, atrial fibrillation, medical coagulopathy on Xarelto, DERECK, GERD. No personal or family history of colorectal, pancreatic, or gastric malignancy     Vision/Hearing  Vision: Impaired  Vision Exceptions: Wears glasses at all times  Hearing: Within functional limits       Subjective  General  Chart Reviewed: Yes  Patient assessed for rehabilitation services?: Yes  Family / Caregiver Present: No  Diagnosis: Fatigue;  Exploratory laparotomy, cholecystectomy withcholangiogram and excision of duodenal mass 4/13/21  Follows Commands: Within Functional Limits  General Comment  Comments: Pt seated in chair upon arrival; agreeable to PT/OT  Subjective  Subjective: Pt reports pain 7/10, surgical pain; nursing called to room and adminstered pain medication prior to PT/OT  Pain Screening  Patient Currently in Pain: Yes  Vital Signs  Patient Currently in Pain: Yes       Orientation  Orientation  Overall Orientation Status: Within Normal Limits     Social/Functional History  Social/Functional History  Lives With: Significant other  Type of Home: marches as tolerated with pain     Plan   Plan  Times per week: 3-5x  Times per day: Daily  Current Treatment Recommendations: Strengthening, Balance Training, Transfer Training, Functional Mobility Training, Gait Training, Stair training, Home Exercise Program, Safety Education & Training  Safety Devices  Type of devices: Call light within reach, All fall risk precautions in place, Chair alarm in place, Gait belt, Patient at risk for falls, Left in chair, Nurse notified(ZAYRA Ahmadi)    AM-PAC Score  AM-PAC Inpatient Mobility Raw Score : 16 (04/14/21 1418)  AM-PAC Inpatient T-Scale Score : 40.78 (04/14/21 1418)  Mobility Inpatient CMS 0-100% Score: 54.16 (04/14/21 1418)  Mobility Inpatient CMS G-Code Modifier : CK (04/14/21 1418)     Goals  Short term goals  Time Frame for Short term goals: discharge  Short term goal 1: bed mobility with mod I  Short term goal 2: sit to/from stand with supervision  Short term goal 3: amb 100 ft with or without AD with supervision  Short term goal 4: amb up/down flight of steps with railing with SBA  Patient Goals   Patient goals : discharge home with assistance       Therapy Time   Individual Concurrent Group Co-treatment   Time In 1330         Time Out 1358         Minutes 28         Timed Code Treatment Minutes: [facility], [address] [facility], [address] [place] [address]

## 2021-04-15 LAB
A/G RATIO: 1.2 (ref 1.1–2.2)
ALBUMIN SERPL-MCNC: 3.4 G/DL (ref 3.4–5)
ALP BLD-CCNC: 46 U/L (ref 40–129)
ALT SERPL-CCNC: 14 U/L (ref 10–40)
ANION GAP SERPL CALCULATED.3IONS-SCNC: 9 MMOL/L (ref 3–16)
AST SERPL-CCNC: 21 U/L (ref 15–37)
BASOPHILS ABSOLUTE: 0.1 K/UL (ref 0–0.2)
BASOPHILS RELATIVE PERCENT: 0.5 %
BILIRUB SERPL-MCNC: 0.5 MG/DL (ref 0–1)
BUN BLDV-MCNC: 9 MG/DL (ref 7–20)
CALCIUM SERPL-MCNC: 8.3 MG/DL (ref 8.3–10.6)
CHLORIDE BLD-SCNC: 109 MMOL/L (ref 99–110)
CO2: 22 MMOL/L (ref 21–32)
CREAT SERPL-MCNC: 0.9 MG/DL (ref 0.6–1.1)
EOSINOPHILS ABSOLUTE: 0.1 K/UL (ref 0–0.6)
EOSINOPHILS RELATIVE PERCENT: 0.9 %
GFR AFRICAN AMERICAN: >60
GFR NON-AFRICAN AMERICAN: >60
GLOBULIN: 2.9 G/DL
GLUCOSE BLD-MCNC: 100 MG/DL (ref 70–99)
GLUCOSE BLD-MCNC: 122 MG/DL (ref 70–99)
HCT VFR BLD CALC: 23.9 % (ref 36–48)
HEMOGLOBIN: 7.9 G/DL (ref 12–16)
LYMPHOCYTES ABSOLUTE: 0.8 K/UL (ref 1–5.1)
LYMPHOCYTES RELATIVE PERCENT: 7.3 %
MAGNESIUM: 2.1 MG/DL (ref 1.8–2.4)
MCH RBC QN AUTO: 29.6 PG (ref 26–34)
MCHC RBC AUTO-ENTMCNC: 33.1 G/DL (ref 31–36)
MCV RBC AUTO: 89.5 FL (ref 80–100)
MONOCYTES ABSOLUTE: 0.9 K/UL (ref 0–1.3)
MONOCYTES RELATIVE PERCENT: 8.5 %
NEUTROPHILS ABSOLUTE: 8.7 K/UL (ref 1.7–7.7)
NEUTROPHILS RELATIVE PERCENT: 82.8 %
PDW BLD-RTO: 21.2 % (ref 12.4–15.4)
PERFORMED ON: ABNORMAL
PHOSPHORUS: 2.2 MG/DL (ref 2.5–4.9)
PLATELET # BLD: 306 K/UL (ref 135–450)
PMV BLD AUTO: 7 FL (ref 5–10.5)
POTASSIUM SERPL-SCNC: 3.2 MMOL/L (ref 3.5–5.1)
RBC # BLD: 2.67 M/UL (ref 4–5.2)
SODIUM BLD-SCNC: 140 MMOL/L (ref 136–145)
TOTAL PROTEIN: 6.3 G/DL (ref 6.4–8.2)
TROPONIN: <0.01 NG/ML
TSH REFLEX: 2.3 UIU/ML (ref 0.27–4.2)
WBC # BLD: 10.5 K/UL (ref 4–11)

## 2021-04-15 PROCEDURE — 6360000002 HC RX W HCPCS: Performed by: SURGERY

## 2021-04-15 PROCEDURE — 6360000002 HC RX W HCPCS: Performed by: INTERNAL MEDICINE

## 2021-04-15 PROCEDURE — 80053 COMPREHEN METABOLIC PANEL: CPT

## 2021-04-15 PROCEDURE — 36415 COLL VENOUS BLD VENIPUNCTURE: CPT

## 2021-04-15 PROCEDURE — 93005 ELECTROCARDIOGRAM TRACING: CPT | Performed by: INTERNAL MEDICINE

## 2021-04-15 PROCEDURE — 2500000003 HC RX 250 WO HCPCS: Performed by: SURGERY

## 2021-04-15 PROCEDURE — 2580000003 HC RX 258: Performed by: INTERNAL MEDICINE

## 2021-04-15 PROCEDURE — 6370000000 HC RX 637 (ALT 250 FOR IP): Performed by: INTERNAL MEDICINE

## 2021-04-15 PROCEDURE — 99255 IP/OBS CONSLTJ NEW/EST HI 80: CPT | Performed by: INTERNAL MEDICINE

## 2021-04-15 PROCEDURE — 6370000000 HC RX 637 (ALT 250 FOR IP): Performed by: SURGERY

## 2021-04-15 PROCEDURE — 1200000000 HC SEMI PRIVATE

## 2021-04-15 PROCEDURE — 99024 POSTOP FOLLOW-UP VISIT: CPT | Performed by: SURGERY

## 2021-04-15 PROCEDURE — 84443 ASSAY THYROID STIM HORMONE: CPT

## 2021-04-15 PROCEDURE — 2580000003 HC RX 258: Performed by: NURSE PRACTITIONER

## 2021-04-15 PROCEDURE — 85025 COMPLETE CBC W/AUTO DIFF WBC: CPT

## 2021-04-15 PROCEDURE — 6360000002 HC RX W HCPCS: Performed by: NURSE PRACTITIONER

## 2021-04-15 PROCEDURE — 94761 N-INVAS EAR/PLS OXIMETRY MLT: CPT

## 2021-04-15 PROCEDURE — 2580000003 HC RX 258: Performed by: SURGERY

## 2021-04-15 PROCEDURE — C9113 INJ PANTOPRAZOLE SODIUM, VIA: HCPCS | Performed by: NURSE PRACTITIONER

## 2021-04-15 PROCEDURE — 84484 ASSAY OF TROPONIN QUANT: CPT

## 2021-04-15 PROCEDURE — APPNB15 APP NON BILLABLE TIME 0-15 MINS: Performed by: NURSE PRACTITIONER

## 2021-04-15 PROCEDURE — 93971 EXTREMITY STUDY: CPT

## 2021-04-15 PROCEDURE — 6370000000 HC RX 637 (ALT 250 FOR IP): Performed by: NURSE PRACTITIONER

## 2021-04-15 PROCEDURE — 83735 ASSAY OF MAGNESIUM: CPT

## 2021-04-15 PROCEDURE — 84100 ASSAY OF PHOSPHORUS: CPT

## 2021-04-15 RX ORDER — POTASSIUM BICARBONATE 25 MEQ/1
50 TABLET, EFFERVESCENT ORAL ONCE
Status: COMPLETED | OUTPATIENT
Start: 2021-04-15 | End: 2021-04-15

## 2021-04-15 RX ADMIN — HYDROMORPHONE HYDROCHLORIDE 0.25 MG: 1 INJECTION, SOLUTION INTRAMUSCULAR; INTRAVENOUS; SUBCUTANEOUS at 22:06

## 2021-04-15 RX ADMIN — PIPERACILLIN AND TAZOBACTAM 3375 MG: 3; .375 INJECTION, POWDER, LYOPHILIZED, FOR SOLUTION INTRAVENOUS at 03:56

## 2021-04-15 RX ADMIN — PIPERACILLIN AND TAZOBACTAM 3375 MG: 3; .375 INJECTION, POWDER, LYOPHILIZED, FOR SOLUTION INTRAVENOUS at 23:41

## 2021-04-15 RX ADMIN — Medication 1 SPRAY: at 02:44

## 2021-04-15 RX ADMIN — DEXTROSE MONOHYDRATE 100 MG: 50 INJECTION, SOLUTION INTRAVENOUS at 22:06

## 2021-04-15 RX ADMIN — PANTOPRAZOLE SODIUM 40 MG: 40 INJECTION, POWDER, FOR SOLUTION INTRAVENOUS at 08:29

## 2021-04-15 RX ADMIN — ONDANSETRON 4 MG: 2 INJECTION INTRAMUSCULAR; INTRAVENOUS at 23:38

## 2021-04-15 RX ADMIN — SODIUM CHLORIDE: 9 INJECTION, SOLUTION INTRAVENOUS at 10:42

## 2021-04-15 RX ADMIN — POTASSIUM CHLORIDE 40 MEQ: 1500 TABLET, EXTENDED RELEASE ORAL at 06:03

## 2021-04-15 RX ADMIN — PIPERACILLIN AND TAZOBACTAM 3375 MG: 3; .375 INJECTION, POWDER, LYOPHILIZED, FOR SOLUTION INTRAVENOUS at 13:00

## 2021-04-15 RX ADMIN — FUROSEMIDE 20 MG: 20 TABLET ORAL at 08:30

## 2021-04-15 RX ADMIN — ENOXAPARIN SODIUM 40 MG: 40 INJECTION SUBCUTANEOUS at 18:47

## 2021-04-15 RX ADMIN — POTASSIUM BICARBONATE 50 MEQ: 978 TABLET, EFFERVESCENT ORAL at 13:00

## 2021-04-15 RX ADMIN — POTASSIUM PHOSPHATE, MONOBASIC AND POTASSIUM PHOSPHATE, DIBASIC 20 MMOL: 224; 236 INJECTION, SOLUTION, CONCENTRATE INTRAVENOUS at 11:04

## 2021-04-15 RX ADMIN — NIFEDIPINE 30 MG: 30 TABLET, FILM COATED, EXTENDED RELEASE ORAL at 08:30

## 2021-04-15 RX ADMIN — CARVEDILOL 6.25 MG: 6.25 TABLET, FILM COATED ORAL at 18:47

## 2021-04-15 RX ADMIN — CARVEDILOL 6.25 MG: 6.25 TABLET, FILM COATED ORAL at 08:30

## 2021-04-15 RX ADMIN — SPIRONOLACTONE 25 MG: 25 TABLET ORAL at 08:30

## 2021-04-15 RX ADMIN — ONDANSETRON 4 MG: 2 INJECTION INTRAMUSCULAR; INTRAVENOUS at 02:43

## 2021-04-15 RX ADMIN — HYDROMORPHONE HYDROCHLORIDE 0.5 MG: 1 INJECTION, SOLUTION INTRAMUSCULAR; INTRAVENOUS; SUBCUTANEOUS at 10:39

## 2021-04-15 RX ADMIN — HYDROMORPHONE HYDROCHLORIDE 0.5 MG: 1 INJECTION, SOLUTION INTRAMUSCULAR; INTRAVENOUS; SUBCUTANEOUS at 03:54

## 2021-04-15 RX ADMIN — AMIODARONE HYDROCHLORIDE 100 MG: 200 TABLET ORAL at 08:30

## 2021-04-15 RX ADMIN — Medication 10 ML: at 08:29

## 2021-04-15 RX ADMIN — SODIUM CHLORIDE: 9 INJECTION, SOLUTION INTRAVENOUS at 22:06

## 2021-04-15 ASSESSMENT — PAIN SCALES - GENERAL
PAINLEVEL_OUTOF10: 4
PAINLEVEL_OUTOF10: 4
PAINLEVEL_OUTOF10: 7
PAINLEVEL_OUTOF10: 6
PAINLEVEL_OUTOF10: 7

## 2021-04-15 NOTE — PROGRESS NOTES
[facility]   Electrophysiology Progress Note   Date: 4/15/2021  Admit Date: 4/7/2021     Reason for follow up: PVC    Chief Complaint:   Chief Complaint   Patient presents with    Fatigue     Reports taking medication for her heart, believes it to be \"amiodarone\" since January that makes her feel weak. History of Present Illness: History obtained from patient and medical record. Lele Milian is a 63 y.o. female with a past medical history of HTN, CHB s/p PPM, sCHF, CHD, syncope, and atrial fibrillation/flutter. 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 S/p C 1/20/2020 that showed normal coronaries. She had episode of VT with syncope in cath lab prior to [facility]. S/p extraction of RV pacing lead and Biv-AICD upgrade for cardiomyopathy and VT. Also loaded with amiodarone. Admitted with fatigue and found to be acutely anemic and received multiple transfusions. S/p EGD with active bleeding and s/p clips and epi injection, also a lesion was noted and she bx sent. s/p exp lap, cholecystectomy and excision of duodenal mass. She will be considered for COURTNEY occlusion as OP. Interval Hx: Today, she is being seen for follow up. She was having frequent PVC on monitor and refused amiodarone therapy. Today PVCs are fewer on monitor. No arrhythmia. She only wants to take amiodarone every other day. No new complaints today. No major events overnight. Denies having chest pain, palpitations, shortness of breath,or dizziness. Patient seen and examined. Clinical notes reviewed. Telemetry reviewed.     Problem List:   Patient Active Problem List    Diagnosis Date Noted    Persistent atrial fibrillation [facility])      Priority: High    LVH (left ventricular hypertrophy) 06/11/2014     Priority: High    Headache 01/16/2013     Priority: High (before breakfast) Yes Iván Logan MD   amiodarone (CORDARONE) 200 MG tablet Take 1 tablet by mouth daily Yes CATRACHITO Dixon CNP   NIFEdipine (ADALAT CC) 30 MG extended release tablet Take 1 tablet by mouth daily Yes Candelaria Burnham MD   vitamin D (ERGOCALCIFEROL) 1.25 MG (59728 UT) CAPS capsule Take 1 capsule by mouth once a week Yes CATRACHITO Keenan - CNS   sacubitril-valsartan (ENTRESTO) 24-26 MG per tablet Take 0.5 tablets by mouth nightly Yes CATRACHITO Lewis - CNS   carvedilol (COREG) 25 MG tablet Take 1 tablet by mouth 2 times daily Yes Nicole Lincoln MD   furosemide (LASIX) 20 MG tablet Take 1 tablet by mouth daily Yes Nicole Lincoln MD   Multiple Vitamins-Minerals (THERAPEUTIC MULTIVITAMIN-MINERALS) tablet Take 1 tablet by mouth daily Yes Kell Cooper MD   XARELTO 20 MG TABS tablet TAKE ONE TABLET BY MOUTH DAILY WITH BREAKFAST Yes Mabel Mac MD   spironolactone (ALDACTONE) 25 MG tablet Take 1 tablet by mouth daily Yes CATRACHITO Lewis - CNS   atorvastatin (LIPITOR) 40 MG tablet Take 1 tablet by mouth daily Yes Argelia Herrera MD      Scheduled Meds:   potassium phosphate IVPB  20 mmol Intravenous Once    pantoprazole  40 mg Intravenous Daily    anidulafungin  100 mg Intravenous Q24H    sodium chloride flush  5-40 mL Intravenous 2 times per day    enoxaparin  40 mg Subcutaneous QPM    amiodarone  100 mg Oral Daily    piperacillin-tazobactam  3,375 mg Intravenous Q8H    atorvastatin  40 mg Oral Daily    furosemide  20 mg Oral Daily    NIFEdipine  30 mg Oral Daily    spironolactone  25 mg Oral Daily    carvedilol  6.25 mg Oral BID WC     Continuous Infusions:   sodium chloride      sodium chloride 75 mL/hr at 04/15/21 1248     PRN Meds:sodium chloride flush, sodium chloride, ondansetron **OR** ondansetron, phenol, HYDROmorphone **OR** HYDROmorphone, potassium chloride **OR** potassium alternative oral replacement **OR** potassium chloride, polyethylene glycol, acetaminophen **OR** acetaminophen   Past Medical History:  Past Medical History:   Diagnosis Date    Anemia     Atrial fibrillation and flutter (HCC)     Atrial flutter (HCC)     CHB (complete heart block) (HCC)     Class 1 obesity without serious comorbidity with body mass index (BMI) of 33.0 to 33.9 in adult 9/6/2019    Congenital heart disease     GERD (gastroesophageal reflux disease)     Headache(784.0)     History of complete heart block     Hyperlipidemia     Hypertension     PONV (postoperative nausea and vomiting)     Sleep apnea     uses CPAP    Syncope     Systolic CHF, chronic ([facility] [address] 10/3/2018        Past Surgical History:    has a past surgical history that includes Uterine fibroid surgery; Pacemaker insertion (11/29/12); Tubal ligation; Upper gastrointestinal endoscopy (N/A, 10/27/2020); Colonoscopy (N/A, 10/27/2020); Cardiac defibrillator placement (01/2021); Upper gastrointestinal endoscopy (N/A, 4/8/2021); Upper gastrointestinal endoscopy (N/A, 4/8/2021); Upper gastrointestinal endoscopy (N/A, 4/8/2021); and colectomy (N/A, 4/13/2021). Social History:  Reviewed. reports that she has never smoked. She has never used smokeless tobacco. She reports that she does not drink alcohol or use drugs. Family History:  Reviewed. family history includes Cancer in her father. Denies family history of sudden cardiac death, arrhythmia, premature CAD    Review of Systems:  · Constitutional: Negative for fever, weight changes, or weakness  · Skin: Negative for bruising, bleeding  · HEENT: Negative for vision changes  · Respiratory: Reviewed in HPI  · Cardiovascular: Reviewed in HPI  · Gastrointestinal: Negative for  N/V/D, constipation, or black/tarry stools  · Genito-Urinary: Negative for hematuria  · Musculoskeletal: No focal weakness  · Neurological/Psych: Negative for confusion or TIA-like symptoms.      Physical Examination:  Vitals:    04/15/21 1245   BP: 132/76 Pulse: 84   Resp: 16   Temp: 98.1 °F (36.7 °C)   SpO2: 95%      In: -   Out: 1580    Wt Readings from Last 3 Encounters:   04/15/21 216 lb 12.8 oz (98.3 kg)   03/17/21 201 lb (91.2 kg)   02/23/21 196 lb 10.6 oz (89.2 kg)       Intake/Output Summary (Last 24 hours) at 4/15/2021 1441  Last data filed at 4/15/2021 9574  Gross per 24 hour   Intake --   Output 1580 ml   Net -1580 ml     Telemetry: Personally Reviewed Paced, PVCs  · Constitutional: Cooperative and in no apparent distress, and appears well nourished  · Skin: Warm and pink; no cyanosis, bruising, or clubbing + surgical incisions  · HEENT: Symmetric and normocephalic. Conjunctiva pink with clear sclera. Mucus membranes pink and moist.   · Cardiovascular: regular and rhythm. S1 & S2, negative for murmurs. Peripheral pulses 2+, capillary refill < 3 seconds. negative elevation of JVP. · Respiratory: Respirations symmetric and unlabored. Lungs clear to auscultation bilaterally, no wheezing, crackles, or rhonchi  · Gastrointestinal: Abdomen soft and round. Bowel sounds normoactive in all quadrants. · Musculoskeletal: No focal weakness. · Neurologic/Psych: Awake and orientated to person, place and time. Calm affect, appropriate mood    Pertinent labs, diagnostic, device, and imaging results reviewed as a part of this visit    Labs:    BMP:   Recent Labs     04/13/21 0441 04/14/21  0610 04/15/21  0436    142 140   K 3.3* 3.5 3.2*   * 112* 109   CO2 23 22 22   PHOS  --  2.8 2.2*   BUN 6* 9 9   CREATININE 0.9 0.9 0.9   MG  --  2.20 2.10     Estimated Creatinine Clearance: 83 mL/min (based on SCr of 0.9 mg/dL). CBC:   Recent Labs     04/13/21 0441 04/13/21 0441 04/14/21  0610 04/14/21  1853 04/15/21  0436   WBC 5.6  --  11.9*  --  10.5   HGB 8.4*   < > 7.8* 8.2* 7.9*   HCT 24.7*   < > 24.0* 26.1* 23.9*   MCV 90.2  --  90.1  --  89.5     --  295  --  306    < > = values in this interval not displayed.      Thyroid: No results found for: TSH, Z6FSKXU, D6YJGGD, THYROIDAB  Lipids: No results found for: CHOL, HDL, TRIG  LFTS:   Lab Results   Component Value Date    ALT 14 04/15/2021    AST 21 04/15/2021    ALKPHOS 46 04/15/2021    PROT 6.3 04/15/2021    AGRATIO 1.2 04/15/2021    BILITOT 0.5 04/15/2021     Cardiac Enzymes:   Lab Results   Component Value Date    TROPONINI <0.01 04/15/2021    TROPONINI <0.01 2021    TROPONINI <0.01 2021     Coags:   Lab Results   Component Value Date    PROTIME 14.8 2021    INR 1.27 2021     EC/15/2021: Paced, PVC    ECHO:  2021  Summary   Left ventricular size is mildly increased. Moderately reduced global systolic function with an ejection fraction   estimated at 30-35%. Global hypokinesis noted. Grade II diastolic dysfunction with elevated LV filling pressures. There is mild mitral regurgitation noted. Mild left atrial enlargement noted. Aortic valve appears sclerotic but opens adequately. Mild aortic regurgitation. There is mild tricuspid regurgitation with a RVSP estimation of 25 mmHg. Pacer / ICD wire is visualized in the right ventricle. The right ventricle is normal in size and function. ECHO:  2021  Summary   Left ventricular size is mildly increased. Left ventricular systolic function is severely depressed with ejection   fraction estimated at 25-30%. Grade II diastolic dysfunction with elevated LV filling pressures. Moderate mitral regurgitation. The left atrium is moderate to severely dilated. Aortic valve appears sclerotic but opens adequately. Mild aortic regurgitation is present. Mild to moderate tricuspid regurgitation. Systolic pulmonary artery pressure   (SPAP) is estimated at 35 mmHg. Pacer / ICD wire is visualized in the right ventricle. Normal right ventricular size and function.     Cath: 2021  Findings:  Artery Findings/Result   LM Normal   LAD Normal   Cx Normal   RI NA   RCA Normal   LVEDP 6   LVG NA    Intervention:         None    All questions and concerns were addressed to the patient. Alternatives to my treatment were discussed. I have discussed the above stated plan with patient and the nurse. The patient verbalized understanding and agreed with the plan. Thank you for allowing to us to participate in the care of Renay Finn.     SELVIN Selby  [facility]   Office: [phone]

## 2021-04-15 NOTE — PROGRESS NOTES
candidate for long-term anticoagulation. Was on Xarelto at home.  -Plan for ZAMZAM as outpatient to assess for watchman eligibility and further work-up  -Resume anticoagulation when okay with GI, holding Xarelto for now. Continue Lovenox 40 mg subcu daily  -Reduced amiodarone dose to 100 mg due to it causing her fatigue, and pt is still symptomatic. Pt refusing amiodarone now. Having palpitations and PVCs seen on tele and EKG. Called and left message for cardiology, asking them to see patient again   -tele     Depression  -Psychiatry was consulted and did evaluate the patient, see note  -pt requested to see psych again today, discussed with Dr. Phoenix Norton, who recommended  consult, as pt is not wanting to start any medication at this time. Pt agreeable to this.     Hypokalemia  -replacement ordered  -bmp in am    History of heart failure, reduced EF 30 to 35%  -Continue Coreg  -Resume Entresto when blood pressure stable no further intervention needed  -We will need follow-up with heart failure team    Medications:  Reviewed    Infusion Medications    sodium chloride      sodium chloride 125 mL/hr at 04/15/21 1042     Scheduled Medications    potassium bicarbonate  50 mEq Oral Once    potassium phosphate IVPB  20 mmol Intravenous Once    pantoprazole  40 mg Intravenous Daily    anidulafungin  100 mg Intravenous Q24H    sodium chloride flush  5-40 mL Intravenous 2 times per day    enoxaparin  40 mg Subcutaneous QPM    amiodarone  100 mg Oral Daily    piperacillin-tazobactam  3,375 mg Intravenous Q8H    atorvastatin  40 mg Oral Daily    furosemide  20 mg Oral Daily    NIFEdipine  30 mg Oral Daily    spironolactone  25 mg Oral Daily    carvedilol  6.25 mg Oral BID      PRN Meds: sodium chloride flush, sodium chloride, ondansetron **OR** ondansetron, phenol, HYDROmorphone **OR** HYDROmorphone, potassium chloride **OR** potassium alternative oral replacement **OR** potassium chloride, polyethylene 0.6 0.5   ALKPHOS 46 46     Recent Labs     04/14/21  0610   INR 1.27*     Recent Labs     04/15/21  0436   TROPONINI <0.01       Urinalysis:      Lab Results   Component Value Date    NITRU Negative 07/10/2020    BLOODU Negative 07/10/2020    SPECGRAV 1.015 07/10/2020    GLUCOSEU Negative 07/10/2020       Radiology:  FL CHOLANGIOGRAM OR   Final Result   Normal intraoperative laparoscopic cholangiogram.  No common duct stone. XR ABDOMEN (KUB) (SINGLE AP VIEW)   Final Result   Indeterminate intestinal gas pattern secondary to paucity of small bowel gas. CT ABDOMEN PELVIS W IV CONTRAST Additional Contrast? Oral   Final Result   No definitive evidence of a pancreatic mass and no evidence of metastatic   disease or acute abnormality of the abdomen/pelvis. If persistent concern for an occult pancreatic mass consider endoscopic   ultrasound or PET-CT. 3 separate surgical clips placed within bowel lumen near the ligament of   [place] since the comparison. This is presumed to be related to interval GI   bleeding treatment. Fibroid uterus without significant change in appearance from the comparison         XR CHEST PORTABLE   Final Result   No active cardiopulmonary disease         VL Extremity Venous Right    (Results Pending)   XR ABDOMEN (KUB) (SINGLE AP VIEW)    (Results Pending)         Active Hospital Problems    Diagnosis    Symptomatic anemia [D64.9]    Fatigue [R53.83]         DVT Prophylaxis: Lovenox  Diet: Diet NPO Effective Now Exceptions are: Sips with Meds  Code Status: Full Code    PT/OT Eval Status: Ordered evaluation    Dispo -home once medically stable with [facility]. Rx for rolling walker placed    Ceasar Lights, APRN - CNP      NOTE:  This report was transcribed using voice recognition software. Every effort was made to ensure accuracy; however, inadvertent computerized transcription errors may be present.

## 2021-04-15 NOTE — CARE COORDINATION
CM spoke to Cameron Padilla with [facility] earlier this afternoon who states they accept pt's insurance but are at capacity and cannot accept her as a patient at this time. CM called [facility] and spoke to Vianney Saini who states they accept pt's insurance but pt would need to be independent with FRED drain. CM had spoken to pt's RN Karey Harrison and she will begin teaching FRED drain care. Pt may or may not go home with drain. [phone] home care orders, demographics, H&P, last NP note and last surgery note. CM met with pt and updated on trying to find accepting home care. Per surgery note NG may be removed tomorrow. CM will continue to follow for d/c plan.     Ector Schwartz RN, BSN  [phone]

## 2021-04-15 NOTE — PROGRESS NOTES
Assessments complete, and meds given per order. No complaints at this time. Call light and belongings in reach. Plan of care discussed with patient and/or family. Will continue to monitor.

## 2021-04-15 NOTE — PROGRESS NOTES
Attempted to see patient x 2 to discuss medication side effects per request from primary team.  Unfortunately she was off the unit for testing both times. Discussed with nursing.   Will attempt in the am.     CATRACHITO Linn-CNP

## 2021-04-15 NOTE — CONSULTS
Infectious Diseases Inpatient Consult Note      Reason for Consult:  GI bleed and duodenal mass biopsy positive for Candida     Requesting Physician:  CATRACHITO Rowan    Primary Care Physician:  No primary care provider on file. History Obtained From:  Epic     CHIEF COMPLAINT:     Chief Complaint   Patient presents with    Fatigue     Reports taking medication for her heart, believes it to be \"amiodarone\" since January that makes her feel weak. HISTORY OF PRESENT ILLNESS:  63 y.o. woman with CHF, A FIB, CAD, PPM, on Eliquis  admitted with feeling weak and fatigue has see GI as out patient for abdominal pain  And there was a plan for EGD to evaluate anemia and now admitted with melena and seen by GI underwent EGD on 4/8/21 noted large amount of blood and active oozing noted and there was possible lipoma in the duodenal area. She was seen by Gen surgery  and taken to OR for Exploratory laparotomy, cholecystectomy with cholangiogram, excision of duodenal mass on 4/13/21. Biopsy on the mass  Reported to be \"\"Benign submucosal lipoma with focal fat necrosis and fungal elements   consistent with Candida sp. \" Negative for dysplasia or malignancy. Given the path findings we are consulted for recommendations. She has NG tube in place post surgery and WBC normal and Hb at  7.9.    Location : upper abd pain , gi bleed       Quality :  Aching      Severity : 10/10     Duration : 1 week     Timing : intermittent  Context : anticoagulation     Modifying factors : none  Associated signs and symptoms: no fevers no chills         Past Medical History:    Past Medical History:   Diagnosis Date    Anemia     Atrial fibrillation and flutter (HCC)     Atrial flutter (HCC)     CHB (complete heart block) (HCC)     Class 1 obesity without serious comorbidity with body mass index (BMI) of 33.0 to 33.9 in adult 9/6/2019    Congenital heart disease     GERD (gastroesophageal reflux disease)     Headache(784.0)     History of complete heart block     Hyperlipidemia     Hypertension     PONV (postoperative nausea and vomiting)     Sleep apnea     uses CPAP    Syncope     Systolic CHF, chronic ([address] 10/3/2018       Past Surgical History:    Past Surgical History:   Procedure Laterality Date    CARDIAC DEFIBRILLATOR PLACEMENT  01/2021    [employer]    COLECTOMY N/A 4/13/2021    EXPLORATORY LAPAROTOMY, RESECTION OF DUODENAL MASS, CHOLECYSTECTOMY WITH INTRAOPERATIVE CHOLANGIOGRAM performed by Patrick Gray MD at [facility] COLONOSCOPY N/A 10/27/2020    COLONOSCOPY POLYPECTOMY SNARE/COLD BIOPSY performed by John Palma MD at [place] [address]  11/29/12    dual chamber PPM, [employer]    TUBAL LIGATION      UPPER GASTROINTESTINAL ENDOSCOPY N/A 10/27/2020    EGD DIAGNOSTIC ONLY performed by John Palma MD at [place] [address] N/A 4/8/2021    EGD CONTROL HEMORRHAGE WITH APPLICATION OF 3 CLIPS TO DUODENAL MASS performed by Faisal Pepper MD at [place] [address] N/A 4/8/2021    EGD BIOPSY DUODENAL MASS performed by Faisal Pepper MD at [place] [address] N/A 4/8/2021    EGD SUBMUCOSAL INJECTION OF 0.6 MG OF EPINEPRINE INTO BASE OF DUODENAL MASS performed by Faisal Pepper MD at [place] [id number]         Current Medications:    Outpatient Medications Marked as Taking for the 4/7/21 encounter [facility] Encounter)   Medication Sig Dispense Refill    pantoprazole (PROTONIX) 40 MG tablet Take 1 tablet by mouth every morning (before breakfast) 30 tablet 3    amiodarone (CORDARONE) 200 MG tablet Take 1 tablet by mouth daily 90 tablet 1    NIFEdipine (ADALAT CC) 30 MG extended release tablet Take 1 tablet by mouth daily 90 tablet 3    vitamin D (ERGOCALCIFEROL) 1.25 MG (64261 UT) CAPS capsule Take 1 capsule by mouth once a week 4 capsule 5    sacubitril-valsartan (ENTRESTO) 24-26 MG per tablet Take 0.5 tablets by mouth nightly 60 tablet 0    carvedilol (COREG) 25 MG tablet Take 1 tablet by mouth 2 times daily 60 tablet 3    furosemide (LASIX) 20 MG tablet Take 1 tablet by mouth daily 60 tablet 3    Multiple Vitamins-Minerals (THERAPEUTIC MULTIVITAMIN-MINERALS) tablet Take 1 tablet by mouth daily      XARELTO 20 MG TABS tablet TAKE ONE TABLET BY MOUTH DAILY WITH BREAKFAST 90 tablet 3    spironolactone (ALDACTONE) 25 MG tablet Take 1 tablet by mouth daily 30 tablet 2    atorvastatin (LIPITOR) 40 MG tablet Take 1 tablet by mouth daily 60 tablet 1       Allergies:  Latex, Codeine, Lisinopril, Nitroglycerin, Sulfa antibiotics, and Hydralazine    Immunizations : There is no immunization history on file for this patient.       Social History:    Social History     Tobacco Use    Smoking status: Never Smoker    Smokeless tobacco: Never Used   Substance Use Topics    Alcohol use: No    Drug use: No     Social History     Tobacco Use   Smoking Status Never Smoker   Smokeless Tobacco Never Used      Family History   Problem Relation Age of Onset    Cancer Father     Heart Disease Neg Hx     High Blood Pressure Neg Hx     High Cholesterol Neg Hx          REVIEW OF SYSTEMS:     Constitutional:  negative for fevers, chills, night sweats  Eyes:  negative for blurred vision, eye discharge, visual disturbance   HEENT:  negative for hearing loss, ear drainage,nasal congestion  Respiratory:  negative for cough, shortness of breath or hemoptysis   Cardiovascular:  negative for chest pain, palpitations, syncope  Gastrointestinal:  negative for nausea, vomiting, diarrhea, constipation, abdominal pain ++   Genitourinary:  negative for frequency, dysuria, urinary incontinence, hematuria  Hematologic/Lymphatic:  negative for easy bruising, bleeding and lymphadenopathy  Allergic/Immunologic:  negative for recurrent infections, angioedema, anaphylaxis Endocrine:  negative for weight changes, polyuria, polydipsia and polyphagia  Musculoskeletal:  negative for joint  pain, swelling, decreased range of motion  Integumentary: No rashes, skin lesions  Neurological:  negative for headaches, slurred speech, unilateral weakness  Psychiatric: negative for hallucinations,confusion,agitation.      PHYSICAL EXAM:      Vitals:    /76   Pulse 83   Temp 98.2 °F (36.8 °C) (Oral)   Resp 16   Ht 5' 6.5\" (1.689 m)   Wt 216 lb 12.8 oz (98.3 kg)   SpO2 96%   BMI 34.47 kg/m²     General Appearance: alert,in some acute distress,+  pallor, no icterus NG tube +  Skin: warm and dry, no rash or erythema  Head: normocephalic and atraumatic  Eyes: pupils equal, round, and reactive to light, conjunctivae normal  ENT: tympanic membrane, external ear and ear canal normal bilaterally, nose without deformity, nasal mucosa and turbinates normal without polyps  Neck: supple and non-tender without mass, no thyromegaly  no cervical lymphadenopathy  Pulmonary/Chest: clear to auscultation bilaterally- no wheezes, rales or rhonchi, normal air movement, no respiratory distress  Cardiovascular: normal rate, regular rhythm, normal S1 and S2, no murmurs, rubs, clicks, or gallops, no carotid bruits  Abdomen: soft, + -tender, non-distended, sluggish bowel sounds, no masses or organomegaly  Mid line incision +   Extremities: no cyanosis, clubbing or edema  Musculoskeletal: normal range of motion, no joint swelling, deformity or tenderness  Integumentary: No rashes, no abnormal skin lesions, no petechiae  Neurologic: reflexes normal and symmetric, no cranial nerve deficit  Psych:  Orientation, sensorium, mood normal   Lines: IV    DATA:    CBC:   Lab Results   Component Value Date    WBC 10.5 04/15/2021    HGB 7.9 (L) 04/15/2021    HCT 23.9 (L) 04/15/2021    MCV 89.5 04/15/2021     04/15/2021     RENAL:   Lab Results   Component Value Date    CREATININE 0.9 04/15/2021    BUN 9 04/15/2021  04/15/2021    K 3.2 (L) 04/15/2021     04/15/2021    CO2 22 04/15/2021     SED RATE: No results found for: SEDRATE  CK: No results found for: CKTOTAL  CRP: No results found for: CRP  Hepatic Function Panel:   Lab Results   Component Value Date    ALKPHOS 46 04/15/2021    ALT 14 04/15/2021    AST 21 04/15/2021    PROT 6.3 04/15/2021    BILITOT 0.5 04/15/2021    BILIDIR <0.2 06/27/2020    IBILI see below 06/27/2020    LABALBU 3.4 04/15/2021     UA:  Lab Results   Component Value Date    COLORU Yellow 07/10/2020    CLARITYU Clear 07/10/2020    GLUCOSEU Negative 07/10/2020    BILIRUBINUR Negative 07/10/2020    KETUA Negative 07/10/2020    SPECGRAV 1.015 07/10/2020    BLOODU Negative 07/10/2020    PHUR 7.5 07/10/2020    PROTEINU Negative 07/10/2020    UROBILINOGEN 0.2 07/10/2020    NITRU Negative 07/10/2020    LEUKOCYTESUR Negative 07/10/2020    LABMICR Not Indicated 07/10/2020    URINETYPE NotGiven 07/10/2020      Urine Microscopic: No results found for: LABCAST, BACTERIA, COMU, HYALCAST, WBCUA, RBCUA, EPIU  Urine Reflex to Culture:   Lab Results   Component Value Date    URRFLXCULT Not Indicated 07/10/2020     Hb lowest : 6.7  On this admit     FINAL DIAGNOSIS:  4/13/21    A. Gallbladder, cholecystectomy:   - Mild chronic cholecystitis with cholelithiasis. B. 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 - Negative for dysplasia or malignancy. COMMENT:  A yellow-white exudate occupies part of the mucosal surface and   microscopic examination reveals fungal elements consistent with Candida   sp. Clinical correlation is recommended.     BUCCA/BUCCA           FINAL DIAGNOSIS:  4/8/21      Duodenum, mass, biopsy:      - Small intestinal mucosa with no significant pathologic change- See        Comment.      COMMENT: Small portion of subepithelial/ submucosal tissue is present and   contains benign smooth muscle (Desmin Intravenous Daily    anidulafungin  100 mg Intravenous Q24H    sodium chloride flush  5-40 mL Intravenous 2 times per day    enoxaparin  40 mg Subcutaneous QPM    amiodarone  100 mg Oral Daily    piperacillin-tazobactam  3,375 mg Intravenous Q8H    atorvastatin  40 mg Oral Daily    furosemide  20 mg Oral Daily    NIFEdipine  30 mg Oral Daily    spironolactone  25 mg Oral Daily    carvedilol  6.25 mg Oral BID WC       Continuous Infusions:   sodium chloride      sodium chloride 125 mL/hr at 04/15/21 1042       PRN Meds:  sodium chloride flush, sodium chloride, ondansetron **OR** ondansetron, phenol, HYDROmorphone **OR** HYDROmorphone, potassium chloride **OR** potassium alternative oral replacement **OR** potassium chloride, polyethylene glycol, acetaminophen **OR** acetaminophen    Imaging:   FL CHOLANGIOGRAM OR   Final Result   Normal intraoperative laparoscopic cholangiogram.  No common duct stone. XR ABDOMEN (KUB) (SINGLE AP VIEW)   Final Result   Indeterminate intestinal gas pattern secondary to paucity of small bowel gas. CT ABDOMEN PELVIS W IV CONTRAST Additional Contrast? Oral   Final Result   No definitive evidence of a pancreatic mass and no evidence of metastatic   disease or acute abnormality of the abdomen/pelvis. If persistent concern for an occult pancreatic mass consider endoscopic   ultrasound or PET-CT. 3 separate surgical clips placed within bowel lumen near the ligament of   [place] since the comparison. This is presumed to be related to interval GI   bleeding treatment. Fibroid uterus without significant change in appearance from the comparison         XR CHEST PORTABLE   Final Result   No active cardiopulmonary disease         VL Extremity Venous Right    (Results Pending)       All pertinent images and reports for the current Hospitalization were reviewed by me.     IMPRESSION:    Patient Active Problem List   Diagnosis    HTN (hypertension)    Other and unspecified hyperlipidemia    Congenital heart block    Dyspnea on exertion    Headache    LVH (left ventricular hypertrophy)    Persistent atrial fibrillation (HCC)    Chest pain    Systolic CHF, chronic (HCC)    Hypersomnia    Obstructive sleep apnea (adult) (pediatric)    LV dysfunction    Left subclavian vein thrombosis (HCC)    Dilated cardiomyopathy (HCC)    Class 1 obesity without serious comorbidity with body mass index (BMI) of 33.0 to 33.9 in adult    Paroxysmal ventricular tachycardia (HCC)    ICD (implantable cardioverter-defibrillator), biventricular, in situ    Exertional dyspnea    Iron deficiency anemia    Fatigue    Symptomatic anemia     UGI bleed  S/p EGD on 4/8/21  Anemia  AICD in place  On Chronic anticoagulation   Abdominal pain x 1 month before admit  Duodenal mass   Cholecystectomy  S/p Exp Exploratory laparotomy, cholecystectomy with cholangiogram, excision of duodenal mass  on 4/13/21  Pathology on the mass Bening submucosal lipoma with candida species     Candida on the biopsy indicate normal resident Sandre Calhoun in the Upper GI tract and given the exploratory laparotomy would cont IV antifungal coverage until her GI tract is fully functional other wise no long term antifungal coverage necessary    Like wise as there was no perforation would cont IV abx until she is clinically better      Labs, Microbiology, Radiology and pertinent results from current hospitalization and care every where were reviewed by me as a part of the consultation. PLAN :  1. Cont IV Zosyn until her GI tract is full functional  2. Cont IV Anidulafungin x 100 mg until her GI tract is functional and able to take oral diet  3. Do not anticipate antifungal therapy at d/c  4.  Candida on the biopsy is a normal resident Sandre Calhoun but given GI bleed and surgery would cont IV therapy as in patient    Discussed with patient/Family and Nursing   Risk of Complications/Morbidity: High      · Illness(es)/ Infection present that pose threat to bodily function. · There is potential for severe exacerbation of infection/side effects of treatment. · Therapy requires intensive monitoring for antimicrobial agent toxicity. Thanks for allowing me to participate in your patient's care please call me with any questions or concerns.     Dr. Tabby Phillips MD  [address] [place] [address] Physician  Phone: [phone]   Fax : [phone]

## 2021-04-15 NOTE — PROGRESS NOTES
Pt. with complaints of chest pain, heart palpitations and generalized weakness. STAT EKG ordered. EKG showed \"Demand pacemaker; interpretation is based on intrinsic rhythm. Sinus rhythm with Premature ventricular complexes or Fusion complexes. Left axis deviation. Right bundle branch block. Septal infarct, age undetermined. T wave abnormality, consider lateral ischemia. Abnormal ECG When compared with ECG of 12-APR-2021 01:59, Sinus rhythm has replaced Electronic ventricular pacemaker\". RN notified MD who ordered troponin.    Troponin <0.01

## 2021-04-15 NOTE — PROGRESS NOTES
[facility] and Laparoscopic Surgery        Progress Note    Patient Name: Reford Estimable  MRN: [id number]  YOB: 1965  Date of Evaluation: 4/15/2021    Subjective:  No acute events overnight  Pain controlled  No nausea or vomiting, NGT in place  No flatus or BM, feels rumbling    Post-Operative Day #2    Vital Signs:  Patient Vitals for the past 24 hrs:   BP Temp Temp src Pulse Resp SpO2 Weight   04/15/21 0827 125/76 98.2 °F (36.8 °C) Oral 83 16 96 % --   04/15/21 0458 -- -- -- -- -- -- 216 lb 12.8 oz (98.3 kg)   04/15/21 0410 -- -- -- -- 20 90 % --   04/15/21 0354 118/79 98.7 °F (37.1 °C) Oral 75 16 90 % --   21 2336 98/65 98.5 °F (36.9 °C) Oral 75 16 90 % --   21 2027 117/77 98 °F (36.7 °C) Oral 74 16 92 % --   21 1612 -- -- -- -- 16 92 % --   21 1600 138/76 97.7 °F (36.5 °C) Oral 75 16 92 % --      TEMPERATURE HISTORY 24H: Temp (24hrs), Av.2 °F (36.8 °C), Min:97.7 °F (36.5 °C), Max:98.7 °F (37.1 °C)    BLOOD PRESSURE HISTORY: Systolic (02CKP), QRE:133 , Min:98 , QXE:146    Diastolic (95CIN), RHK:26, Min:65, Max:80      Intake/Output:  I/O last 3 completed shifts: In: 0   Out: 1580 [Urine:1400; Emesis/NG output:100; Drains:80]  No intake/output data recorded. Drain/tube Output:  Closed/Suction Drain Right RLQ Bulb 19 Japanese-Output (ml): 40 ml    Physical Exam:  General: awake, alert, oriented to  person, place, time  Abdomen: soft, non-distended, appropriate incisional tenderness, incision clean dry and intact  Drain: serosanguinous     Labs:  CBC:    Recent Labs     21  0441 21  0441 21  0610 21  1853 04/15/21  0436   WBC 5.6  --  11.9*  --  10.5   HGB 8.4*   < > 7.8* 8.2* 7.9*   HCT 24.7*   < > 24.0* 26.1* 23.9*     --  295  --  306    < > = values in this interval not displayed.      BMP:    Recent Labs     21  04421  0610 04/15/21  0436    142 140   K 3.3* 3.5 3.2*   * 112* 109   CO2 23 22 22 BUN 6* 9 9   CREATININE 0.9 0.9 0.9   GLUCOSE 99 106* 100*     Hepatic:    Recent Labs     04/14/21  0610 04/15/21  0436   AST 22 21   ALT 16 14   BILITOT 0.6 0.5   ALKPHOS 46 46     Amylase:  No results found for: AMYLASE  Lipase:    Lab Results   Component Value Date    LIPASE 12.0 04/14/2021    LIPASE 35.0 04/09/2021    LIPASE 25.0 04/07/2021      Mag:    Lab Results   Component Value Date    MG 2.10 04/15/2021    MG 2.20 04/14/2021     Phos:     Lab Results   Component Value Date    PHOS 2.2 04/15/2021    PHOS 2.8 04/14/2021      Coags:   Lab Results   Component Value Date    PROTIME 14.8 04/14/2021    INR 1.27 04/14/2021    APTT 30.2 04/14/2021       Cultures:  Anaerobic culture  No results found for: LABANAE  Fungus stain  No results found for requested labs within last 30 days. Gram stain  No results found for requested labs within last 30 days. Organism  No results found for: [facility]  Surgical culture  No results found for: CXSURG  Blood culture 1  Results in Past 30 Days  Result Component Current Result Ref Range Previous Result Ref Range   Blood Culture, Routine No Growth after 4 days of incubation. (4/7/2021)  Not in Time Range      Blood culture 2  Results in Past 30 Days  Result Component Current Result Ref Range Previous Result Ref Range   Culture, Blood 2 No Growth after 4 days of incubation. (4/7/2021)  Not in Time Range      Fecal occult  Results in Past 30 Days  Result Component Current Result Ref Range Previous Result Ref Range   Occult Blood Diagnostic Result: POSITIVE  Normal range: Negative   (A) (4/7/2021)  Not in Time Range      GI bacterial pathogens by PCR  No results found for requested labs within last 30 days. C. difficile  No results found for requested labs within last 30 days. Urine culture  No results found for: LABURIN    Pathology:  FINAL DIAGNOSIS:     A. Gallbladder, cholecystectomy:   - Mild chronic cholecystitis with cholelithiasis.      B. Duodenal mass, excision: - Benign submucosal lipoma with focal fat necrosis and fungal elements   consistent with Candida sp. - Negative for dysplasia or malignancy. COMMENT:  A yellow-white exudate occupies part of the mucosal surface and   microscopic examination reveals fungal elements consistent with Candida   sp. Clinical correlation is recommended.     BUCCA/BUCCA     Imaging:  I have personally reviewed the following films:    Xr Abdomen (kub) (single Ap View)    Result Date: 4/12/2021  EXAMINATION: ONE SUPINE XRAY VIEW(S) OF THE ABDOMEN 4/12/2021 9:02 pm COMPARISON: None. HISTORY: ORDERING SYSTEM PROVIDED HISTORY: followup duodenal clips TECHNOLOGIST PROVIDED HISTORY: Reason for exam:->followup duodenal clips Reason for Exam: f/u duodenal clips Acuity: Unknown Type of Exam: Unknown FINDINGS: Multiple surgical clips overlie the upper mid abdomen. There is residual contrast in the descending and sigmoid colon. There is an overall paucity of small bowel gas. Indeterminate intestinal gas pattern secondary to paucity of small bowel gas. Fl Cholangiogram Or    Result Date: 4/13/2021  EXAMINATION: SPOT IMAGES FROM AN INTRAOPERATIVE CHOLANGIOGRAM 4/13/2021 8:13 am COMPARISON: None. HISTORY: ORDERING SYSTEM PROVIDED HISTORY: check for stones TECHNOLOGIST PROVIDED HISTORY: Reason for exam:->check for stones Acuity: Unknown FLUOROSCOPY DOSE AND TYPE OR TIME AND EXPOSURES: 17 seconds. Single cine loop of fluoroscopic images. FINDINGS: Common duct is well opacified with contrast.  No intraluminal filling defect identified. Free flow of contrast into the duodenum occurs. No extravasation. Intrahepatic ducts normal in size. Normal intraoperative laparoscopic cholangiogram.  No common duct stone.      Scheduled Meds:   potassium bicarbonate  50 mEq Oral Once    potassium phosphate IVPB  20 mmol Intravenous Once    pantoprazole  40 mg Intravenous Daily    anidulafungin  100 mg Intravenous Q24H    sodium chloride flush 5-40 mL Intravenous 2 times per day    enoxaparin  40 mg Subcutaneous QPM    amiodarone  100 mg Oral Daily    piperacillin-tazobactam  3,375 mg Intravenous Q8H    atorvastatin  40 mg Oral Daily    furosemide  20 mg Oral Daily    NIFEdipine  30 mg Oral Daily    spironolactone  25 mg Oral Daily    carvedilol  6.25 mg Oral BID WC     Continuous Infusions:   sodium chloride      sodium chloride 125 mL/hr at 04/15/21 1042     PRN Meds:.sodium chloride flush, sodium chloride, ondansetron **OR** ondansetron, phenol, HYDROmorphone **OR** HYDROmorphone, potassium chloride **OR** potassium alternative oral replacement **OR** potassium chloride, polyethylene glycol, acetaminophen **OR** acetaminophen      Assessment:  63 y.o. female admitted with   1. Symptomatic anemia    2. Adequate anticoagulation on anticoagulant therapy    3. Profound fatigue    4. Status post implantation of automatic cardioverter/defibrillator (AICD)    5. Gait instability    6. Exertional dyspnea      Ms. Gamal Arenas is a 63 y.o. female who presents with   Status-post exploratory laparotomy, cholecystectomy with cholangiogram and excision of duodenal mass on 4/13/2021 for duodenal mass and symptomatic cholelithiasis, pathology benign  Upper GI bleed  Acute blood loss anemia  CHF, EF 30-35%  Atrial fibrillation  Medical coagulopathy, on Xarelto    Plan:  1. NG decompression, follow UGI tomorrow to evaluate duodenal repair. If normal, will remove NG and advance diet  2. Monitor bowel function, no flatus or stool yet  3. IVF, monitor and replace electrolytes  4. Pain control, minimize narcotics  5. Increase activity as able  6. Antibiotics per ID  7. Defer management of remainder of medical comorbidities to primary and consulting teams    Flavio Jackson MD, FACS  4/15/2021  12:32 PM

## 2021-04-15 NOTE — PROGRESS NOTES
Pt. up to use the bedside commode at this time. Pt. reports increased pain with movement, however, tolerated activity fairly well.

## 2021-04-15 NOTE — PROGRESS NOTES
Pt. stating that we need to get in contact with [employer] representative. She saw a representative on 4/13 in PACU per note. She states that the representative needs to come back out and make an adjustment on her device.

## 2021-04-15 NOTE — ACP (ADVANCE CARE PLANNING)
Advanced Care Planning Note. Purpose of Encounter: Advanced care planning in light of duodenal mass, afib, HFrEF  Parties In Attendance: Patient  Decisional Capacity: Yes  Subjective: pt presented with fatigue and abdominal pain  Objective: found to have duodenal mass, GI bleed  Goals of Care Determination: Pt reports in the event of cardiac or respiratory arrest she wants to receive full resuscitative measures, including chest compressions, intubation, defibrillation/cardioversion, ACLS medications.   Plan:  Continue current medical treatment  Code Status: full code   Time spent on Advanced care Plannin min  Advanced Care Planning Documents: on chart  CATRACHITO Christina CNP  4/15/2021 12:30 PM

## 2021-04-15 NOTE — CONSULTS
Received consult for depression from ARCADIO Varghese. Discussed with her and patient was looking for someone to talk to due to her mood/stress. Pt was seen by Dr. Joe De Jesus on 4/10. His consultation was reviewed. At that time patient was not interested in medications for her depression and he had recommended outpatient therapy. I suggested spiritual services consult if patient is spiritual and open to this form of support. ARCADIO Varghese d/w patient and she was open to this. Please feel free to re-consult if further psychiatry assessment is necessary.      Marilee Santos MD  Psychiatry

## 2021-04-15 NOTE — PROGRESS NOTES
Shift assessment complete. VSS. Scheduled medications given. PRN dilaudid given for surgical pain. Fall precautions in place. Call light within reach. Pt. denies further needs at this time.

## 2021-04-16 ENCOUNTER — APPOINTMENT (OUTPATIENT)
Dept: GENERAL RADIOLOGY | Age: 56
DRG: 220 | End: 2021-04-16
Payer: MEDICAID

## 2021-04-16 LAB
A/G RATIO: 1.3 (ref 1.1–2.2)
ALBUMIN SERPL-MCNC: 3.4 G/DL (ref 3.4–5)
ALP BLD-CCNC: 44 U/L (ref 40–129)
ALT SERPL-CCNC: 13 U/L (ref 10–40)
ANION GAP SERPL CALCULATED.3IONS-SCNC: 9 MMOL/L (ref 3–16)
AST SERPL-CCNC: 19 U/L (ref 15–37)
BASOPHILS ABSOLUTE: 0 K/UL (ref 0–0.2)
BASOPHILS RELATIVE PERCENT: 0.3 %
BILIRUB SERPL-MCNC: 0.5 MG/DL (ref 0–1)
BUN BLDV-MCNC: 7 MG/DL (ref 7–20)
CALCIUM SERPL-MCNC: 8.6 MG/DL (ref 8.3–10.6)
CHLORIDE BLD-SCNC: 109 MMOL/L (ref 99–110)
CO2: 24 MMOL/L (ref 21–32)
CREAT SERPL-MCNC: 0.8 MG/DL (ref 0.6–1.1)
EKG ATRIAL RATE: 79 BPM
EKG DIAGNOSIS: NORMAL
EKG P-R INTERVAL: 112 MS
EKG Q-T INTERVAL: 452 MS
EKG QRS DURATION: 170 MS
EKG QTC CALCULATION (BAZETT): 518 MS
EKG R AXIS: -73 DEGREES
EKG T AXIS: 112 DEGREES
EKG VENTRICULAR RATE: 79 BPM
EOSINOPHILS ABSOLUTE: 0.3 K/UL (ref 0–0.6)
EOSINOPHILS RELATIVE PERCENT: 3.3 %
GFR AFRICAN AMERICAN: >60
GFR NON-AFRICAN AMERICAN: >60
GLOBULIN: 2.6 G/DL
GLUCOSE BLD-MCNC: 102 MG/DL (ref 70–99)
HCT VFR BLD CALC: 23.1 % (ref 36–48)
HEMOGLOBIN: 7.6 G/DL (ref 12–16)
LYMPHOCYTES ABSOLUTE: 0.9 K/UL (ref 1–5.1)
LYMPHOCYTES RELATIVE PERCENT: 10 %
MCH RBC QN AUTO: 29.6 PG (ref 26–34)
MCHC RBC AUTO-ENTMCNC: 32.8 G/DL (ref 31–36)
MCV RBC AUTO: 90.3 FL (ref 80–100)
MONOCYTES ABSOLUTE: 0.8 K/UL (ref 0–1.3)
MONOCYTES RELATIVE PERCENT: 9.5 %
NEUTROPHILS ABSOLUTE: 6.6 K/UL (ref 1.7–7.7)
NEUTROPHILS RELATIVE PERCENT: 76.9 %
PDW BLD-RTO: 20.9 % (ref 12.4–15.4)
PLATELET # BLD: 289 K/UL (ref 135–450)
PMV BLD AUTO: 6.6 FL (ref 5–10.5)
POTASSIUM SERPL-SCNC: 3.1 MMOL/L (ref 3.5–5.1)
POTASSIUM SERPL-SCNC: 3.5 MMOL/L (ref 3.5–5.1)
RBC # BLD: 2.56 M/UL (ref 4–5.2)
SODIUM BLD-SCNC: 142 MMOL/L (ref 136–145)
TOTAL PROTEIN: 6 G/DL (ref 6.4–8.2)
WBC # BLD: 8.6 K/UL (ref 4–11)

## 2021-04-16 PROCEDURE — APPSS15 APP SPLIT SHARED TIME 0-15 MINUTES: Performed by: NURSE PRACTITIONER

## 2021-04-16 PROCEDURE — 80053 COMPREHEN METABOLIC PANEL: CPT

## 2021-04-16 PROCEDURE — 36415 COLL VENOUS BLD VENIPUNCTURE: CPT

## 2021-04-16 PROCEDURE — 6360000004 HC RX CONTRAST MEDICATION

## 2021-04-16 PROCEDURE — 93010 ELECTROCARDIOGRAM REPORT: CPT | Performed by: INTERNAL MEDICINE

## 2021-04-16 PROCEDURE — 6360000002 HC RX W HCPCS: Performed by: NURSE PRACTITIONER

## 2021-04-16 PROCEDURE — 99024 POSTOP FOLLOW-UP VISIT: CPT | Performed by: SURGERY

## 2021-04-16 PROCEDURE — 74240 X-RAY XM UPR GI TRC 1CNTRST: CPT

## 2021-04-16 PROCEDURE — C9113 INJ PANTOPRAZOLE SODIUM, VIA: HCPCS | Performed by: NURSE PRACTITIONER

## 2021-04-16 PROCEDURE — 6370000000 HC RX 637 (ALT 250 FOR IP): Performed by: SURGERY

## 2021-04-16 PROCEDURE — 6360000002 HC RX W HCPCS: Performed by: INTERNAL MEDICINE

## 2021-04-16 PROCEDURE — 85025 COMPLETE CBC W/AUTO DIFF WBC: CPT

## 2021-04-16 PROCEDURE — 84132 ASSAY OF SERUM POTASSIUM: CPT

## 2021-04-16 PROCEDURE — 2580000003 HC RX 258: Performed by: INTERNAL MEDICINE

## 2021-04-16 PROCEDURE — 99232 SBSQ HOSP IP/OBS MODERATE 35: CPT | Performed by: NURSE PRACTITIONER

## 2021-04-16 PROCEDURE — 6370000000 HC RX 637 (ALT 250 FOR IP): Performed by: INTERNAL MEDICINE

## 2021-04-16 PROCEDURE — 6360000002 HC RX W HCPCS: Performed by: SURGERY

## 2021-04-16 PROCEDURE — 2580000003 HC RX 258: Performed by: SURGERY

## 2021-04-16 PROCEDURE — 1200000000 HC SEMI PRIVATE

## 2021-04-16 PROCEDURE — APPNB30 APP NON BILLABLE TIME 0-30 MINS: Performed by: NURSE PRACTITIONER

## 2021-04-16 RX ORDER — POLYETHYLENE GLYCOL 3350 17 G/17G
17 POWDER, FOR SOLUTION ORAL DAILY
Status: DISCONTINUED | OUTPATIENT
Start: 2021-04-16 | End: 2021-04-19 | Stop reason: HOSPADM

## 2021-04-16 RX ORDER — ACETAMINOPHEN 325 MG/1
650 TABLET ORAL EVERY 6 HOURS
Status: DISCONTINUED | OUTPATIENT
Start: 2021-04-16 | End: 2021-04-19 | Stop reason: HOSPADM

## 2021-04-16 RX ORDER — OXYCODONE HYDROCHLORIDE 5 MG/1
5 TABLET ORAL EVERY 4 HOURS PRN
Status: DISCONTINUED | OUTPATIENT
Start: 2021-04-16 | End: 2021-04-19 | Stop reason: HOSPADM

## 2021-04-16 RX ORDER — DOCUSATE SODIUM 100 MG/1
100 CAPSULE, LIQUID FILLED ORAL 2 TIMES DAILY
Status: DISCONTINUED | OUTPATIENT
Start: 2021-04-16 | End: 2021-04-19 | Stop reason: HOSPADM

## 2021-04-16 RX ORDER — OXYCODONE HYDROCHLORIDE 5 MG/1
10 TABLET ORAL EVERY 4 HOURS PRN
Status: DISCONTINUED | OUTPATIENT
Start: 2021-04-16 | End: 2021-04-19 | Stop reason: HOSPADM

## 2021-04-16 RX ADMIN — DEXTROSE MONOHYDRATE 100 MG: 50 INJECTION, SOLUTION INTRAVENOUS at 22:18

## 2021-04-16 RX ADMIN — ACETAMINOPHEN 650 MG: 325 TABLET ORAL at 20:25

## 2021-04-16 RX ADMIN — POTASSIUM CHLORIDE 10 MEQ: 7.46 INJECTION, SOLUTION INTRAVENOUS at 15:24

## 2021-04-16 RX ADMIN — ACETAMINOPHEN 650 MG: 325 TABLET ORAL at 15:23

## 2021-04-16 RX ADMIN — ATORVASTATIN CALCIUM 40 MG: 40 TABLET, FILM COATED ORAL at 20:25

## 2021-04-16 RX ADMIN — Medication 10 ML: at 20:29

## 2021-04-16 RX ADMIN — NIFEDIPINE 30 MG: 30 TABLET, FILM COATED, EXTENDED RELEASE ORAL at 09:26

## 2021-04-16 RX ADMIN — CARVEDILOL 6.25 MG: 6.25 TABLET, FILM COATED ORAL at 15:23

## 2021-04-16 RX ADMIN — PIPERACILLIN AND TAZOBACTAM 3375 MG: 3; .375 INJECTION, POWDER, LYOPHILIZED, FOR SOLUTION INTRAVENOUS at 16:48

## 2021-04-16 RX ADMIN — POTASSIUM CHLORIDE 10 MEQ: 7.46 INJECTION, SOLUTION INTRAVENOUS at 11:43

## 2021-04-16 RX ADMIN — POTASSIUM CHLORIDE 10 MEQ: 7.46 INJECTION, SOLUTION INTRAVENOUS at 09:21

## 2021-04-16 RX ADMIN — AMIODARONE HYDROCHLORIDE 100 MG: 200 TABLET ORAL at 09:26

## 2021-04-16 RX ADMIN — SPIRONOLACTONE 25 MG: 25 TABLET ORAL at 09:32

## 2021-04-16 RX ADMIN — POTASSIUM CHLORIDE 10 MEQ: 7.46 INJECTION, SOLUTION INTRAVENOUS at 09:20

## 2021-04-16 RX ADMIN — POTASSIUM CHLORIDE 10 MEQ: 7.46 INJECTION, SOLUTION INTRAVENOUS at 13:23

## 2021-04-16 RX ADMIN — CARVEDILOL 6.25 MG: 6.25 TABLET, FILM COATED ORAL at 09:42

## 2021-04-16 RX ADMIN — PANTOPRAZOLE SODIUM 40 MG: 40 INJECTION, POWDER, FOR SOLUTION INTRAVENOUS at 09:26

## 2021-04-16 RX ADMIN — IOHEXOL 50 ML: 350 INJECTION, SOLUTION INTRAVENOUS at 11:13

## 2021-04-16 RX ADMIN — ENOXAPARIN SODIUM 40 MG: 40 INJECTION SUBCUTANEOUS at 17:52

## 2021-04-16 RX ADMIN — FUROSEMIDE 20 MG: 20 TABLET ORAL at 09:26

## 2021-04-16 RX ADMIN — IOHEXOL 150 ML: 350 INJECTION, SOLUTION INTRAVENOUS at 11:14

## 2021-04-16 RX ADMIN — PIPERACILLIN AND TAZOBACTAM 3375 MG: 3; .375 INJECTION, POWDER, LYOPHILIZED, FOR SOLUTION INTRAVENOUS at 09:32

## 2021-04-16 ASSESSMENT — PAIN SCALES - GENERAL
PAINLEVEL_OUTOF10: 0
PAINLEVEL_OUTOF10: 0
PAINLEVEL_OUTOF10: 3

## 2021-04-16 ASSESSMENT — PAIN DESCRIPTION - PROGRESSION
CLINICAL_PROGRESSION: GRADUALLY IMPROVING

## 2021-04-16 ASSESSMENT — PAIN DESCRIPTION - PAIN TYPE: TYPE: SURGICAL PAIN

## 2021-04-16 NOTE — CARE COORDINATION
GREGG spoke to BEE RYAN with [facility] this afternoon and she is having pt reviewed to see if she can be accepted, if not [facility] has agreed to take pt.      Fausto Hair RN, BSN  [phone]

## 2021-04-16 NOTE — PROGRESS NOTES
Comprehensive Nutrition Assessment    Type and Reason for Visit:  Reassess    Nutrition Recommendations/Plan:   1. Ensure Clear BID. 2. Start oral diet: Clears-->fulls-->Cardiac; low fiber (h/o CHF). 3. Encourage po and supplement intake. Nutrition Assessment:  Patient to have upper GI today and if normal, to remove NGT and advance diet, per chart review. Patient reports she is having flatus, but no BM. Patient remains nutritionally compromised as she was NPO at time of RD visit. Malnutrition Assessment:  Malnutrition Status:  No malnutrition      Estimated Daily Nutrient Needs:  Energy (kcal):  9668-2071; Weight Used for Energy Requirements:  (continue with current comparative standards)     Protein (g):  ; Weight Used for Protein Requirements:  Ideal(60kg IBW (1.2-2.0g))        Method Used for Fluid Requirements:  1 ml/kcal      Nutrition Related Findings:  NGT, no BM yet, +2 edema BLE      Wounds:  Surgical Incision       Current Nutrition Therapies:    DIET CLEAR LIQUID; Anthropometric Measures:  · Height: 5' 6.5\" (168.9 cm)  · Current Body Weight: 202 lb (91.6 kg)   · Admission Body Weight: 203 lb (92.1 kg)    · Ideal Body Weight: 133 lbs; % Ideal Body Weight 151.9 %   · BMI: 32.1  · BMI Categories: Obese Class 1 (BMI 30.0-34. 9)       Nutrition Diagnosis:   · Inadequate oral intake related to altered GI function, altered GI structure, inadequate protein-energy intake as evidenced by NPO or clear liquid status due to medical condition    · Increased nutrient needs related to increase demand for energy/nutrients as evidenced by wounds      Nutrition Interventions:   Food and/or Nutrient Delivery:  Start Oral Diet, Start Oral Nutrition Supplement(diet per surgery)  Nutrition Education/Counseling:  No recommendation at this time   Coordination of Nutrition Care:  Continue to monitor while inpatient    Goals:  Patient will consume >/=50% of all meal trays.        Nutrition Monitoring and Evaluation:   Behavioral-Environmental Outcomes:  None Identified   Food/Nutrient Intake Outcomes:  Diet Advancement/Tolerance, Food and Nutrient Intake, Supplement Intake  Physical Signs/Symptoms Outcomes:  Fluid Status or Edema, GI Status     Discharge Planning: Too soon to determine     Electronically signed by Wendy Escobedo.  [place], [address] [place] [address],  on 4/16/21 at 2:38 PM EDT    Contact: [phone]

## 2021-04-16 NOTE — PROGRESS NOTES
Hospitalist Progress Note      PCP: No primary care provider on file. Date of Admission: 4/7/2021    Chief Complaint: Fatigue    Hospital Course:  Briseida Liriano is a 63 y.o. female who presented with Complaints of generalized fatigue weak and not feeling well. Patient with complex medical history of CHF A. fib CAD chronic anemia who has been feeling weak and tired. With low hemoglobin. Profound weakness malaise with chest pain and shortness of breath times. Has not felt well for weeks. Patient is letting it could be related to amiodarone that was the new regimen for him. Was seen by cardiology in the office was sent here for further evaluation. Patient has been having intermittent shortness of breath with dyspnea on exertion. No fever chills. Follows Dr. Karl Smith for GI patient has had some difficulty with dark coloration of stools and suppressed EGD in next week. Patient is on anticoagulation Eliquis    Subjective: Patient sitting up in chair. In much better spirits today. States she feels much improved and has minimal abdominal discomfort. NG tube has been removed. She is passing gas and has had bowel movements. Tolerating clear liquid tray currently. Patient denied chest pain, shortness of breath, palpitations,, nausea vomiting, diarrhea, dysuria, headache lightheadedness or dizziness. Voiding without difficulty. Reviewed plan of care with patient and fiance, they verbalized understanding and agreement. Denied further questions or needs.         Assessment/Plan:    Symptomatic anemia  GI bleed  -Probably secondary to ulceration overlying lipoma  -Had EGD, found to have a large lesion of the duodenum, underwent 3 clipping, also a large lipoma biopsy    Duodenal mass  Cholelithiasis  POD 3  -Status post  exploratory laparotomy, cholecystectomy with cholangiogram and excision of duodenal mass on 4/13/2021 for duodenal mass and symptomatic cholelithiasis  -Per surgery, NG decompression, 4/16 UGI performed to evaluate duodenal repair- stable, NG removed 4/16  -Monitor bowel function, no flatus or stool yet  -duodenal mass pathology report showing fungal component, ordered anidulafungin, consult ID. Per ID continue Zosyn and antifungal until her GI tract is functional and able to take oral diet. Do not anticipate antifungal therapy at discharge. Candida on the biopsy is a normal resident brittany but given GI bleed and surgery would continue IV therapy as an inpatient  - IS, encourage ambulation  -devi SABILLON Fib  -EP consulted, due to GI bleed not a good candidate for long-term anticoagulation. Was on Xarelto at home.  -Plan for [facility] as outpatient to assess for watchman eligibility and further work-up  -Resume anticoagulation when okay with GI, holding Xarelto for now. Continue Lovenox 40 mg subcu daily and SCDs  -Reduced amiodarone dose to 100 mg due to it causing her fatigue, and pt is still symptomatic. Pt refusing amiodarone now. Having palpitations and PVCs seen on tele and EKG. Called and left message for cardiology, asking them to see patient again   -tele     Depression  -Psychiatry was consulted and did evaluate the patient, see note  -Continue with  care as needed    Hypokalemia  -replacement ordered  -bmp in am    History of heart failure, reduced EF 30 to 35%  -Continue Coreg  -Resume Entresto when blood pressure stable no further intervention needed  -We will need follow-up with heart failure team  -Since patient is taking p.o. now, decrease IV fluids further to 50 mils an hour, monitor intake, once known patient is tolerating oral intake will discontinue fluids.       Medications:  Reviewed    Infusion Medications    sodium chloride      sodium chloride 75 mL/hr at 04/15/21 2206     Scheduled Medications    polyethylene glycol  17 g Oral Daily    docusate sodium  100 mg Oral BID    acetaminophen  650 mg Oral Q6H    pantoprazole  40 mg Intravenous Daily    anidulafungin  100 mg Intravenous Q24H    sodium chloride flush  5-40 mL Intravenous 2 times per day    enoxaparin  40 mg Subcutaneous QPM    amiodarone  100 mg Oral Daily    piperacillin-tazobactam  3,375 mg Intravenous Q8H    atorvastatin  40 mg Oral Daily    furosemide  20 mg Oral Daily    NIFEdipine  30 mg Oral Daily    spironolactone  25 mg Oral Daily    carvedilol  6.25 mg Oral BID WC     PRN Meds: oxyCODONE **OR** oxyCODONE, sodium chloride flush, sodium chloride, ondansetron **OR** ondansetron, phenol, HYDROmorphone **OR** HYDROmorphone, potassium chloride **OR** potassium alternative oral replacement **OR** potassium chloride, polyethylene glycol, acetaminophen **OR** acetaminophen      Intake/Output Summary (Last 24 hours) at 4/16/2021 1551  Last data filed at 4/16/2021 1513  Gross per 24 hour   Intake 270 ml   Output 1860 ml   Net -1590 ml       Physical Exam Performed:    /73   Pulse 75   Temp 97.4 °F (36.3 °C) (Oral)   Resp 18   Ht 5' 6.5\" (1.689 m)   Wt 202 lb 5 oz (91.8 kg)   SpO2 98%   BMI 32.16 kg/m²     General appearance: No apparent distress, appears stated age and cooperative. HEENT: Pupils equal, round, and reactive to light. Conjunctivae/corneas clear. Neck: Supple, with full range of motion. No jugular venous distention. Trachea midline. Respiratory:  Normal respiratory effort. Clear to auscultation, bilaterally without Rales/Wheezes/Rhonchi. Cardiovascular: Irregular regular rate and rhythm with normal S1/S2 without murmurs, rubs or gallops. Abdomen: Normal active bowel sounds, abdominal tenderness with palpation, large abdominal dressing,   Musculoskeletal: No clubbing, cyanosis or edema bilaterally. Full range of motion without deformity. Skin: Skin color, texture, turgor normal.  No rashes or lesions. Neurologic:  Neurovascularly intact without any focal sensory/motor deficits.    Psychiatric: Alert and oriented, thought content appropriate, improved affect  Capillary Refill: Brisk,< 3 seconds   Peripheral Pulses: +2 palpable, equal bilaterally       Labs:   Recent Labs     04/14/21  0610 04/14/21  1853 04/15/21  0436 04/16/21  0526   WBC 11.9*  --  10.5 8.6   HGB 7.8* 8.2* 7.9* 7.6*   HCT 24.0* 26.1* 23.9* 23.1*     --  306 289     Recent Labs     04/14/21  0610 04/15/21  0436 04/16/21  0526    140 142   K 3.5 3.2* 3.1*   * 109 109   CO2 22 22 24   BUN 9 9 7   CREATININE 0.9 0.9 0.8   CALCIUM 8.3 8.3 8.6   PHOS 2.8 2.2*  --      Recent Labs     04/14/21  0610 04/15/21  0436 04/16/21  0526   AST 22 21 19   ALT 16 14 13   BILITOT 0.6 0.5 0.5   ALKPHOS 46 46 44     Recent Labs     04/14/21  0610   INR 1.27*     Recent Labs     04/15/21  0436   TROPONINI <0.01       Urinalysis:      Lab Results   Component Value Date    NITRU Negative 07/10/2020    BLOODU Negative 07/10/2020    SPECGRAV 1.015 07/10/2020    GLUCOSEU Negative 07/10/2020       Radiology:  FL UGI   Preliminary Result   1. Mild prominence of folds in the region of the pyloric antrum and base of   duodenal bulb. Finding is nonspecific; however, could be on the basis of   gastritis/peptic ulcer disease. 2. Suboptimal evaluation of the duodenal sweep related to poor primary   peristalsis with slow movement of contrast material from stomach into   proximal small bowel. 3. Nonspecific segmental area of luminal narrowing involving the immediate   postbulbar duodenal sweep. 4. No definite evidence of extravasation of contrast material from the   duodenal sweep as described above. VL Extremity Venous Right         FL CHOLANGIOGRAM OR   Final Result   Normal intraoperative laparoscopic cholangiogram.  No common duct stone. XR ABDOMEN (KUB) (SINGLE AP VIEW)   Final Result   Indeterminate intestinal gas pattern secondary to paucity of small bowel gas.          CT ABDOMEN PELVIS W IV CONTRAST Additional Contrast? Oral   Final Result   No definitive evidence of a pancreatic mass and no evidence of metastatic   disease or acute abnormality of the abdomen/pelvis. If persistent concern for an occult pancreatic mass consider endoscopic   ultrasound or PET-CT. 3 separate surgical clips placed within bowel lumen near the ligament of   Treitz since the comparison. This is presumed to be related to interval GI   bleeding treatment. Fibroid uterus without significant change in appearance from the comparison         XR CHEST PORTABLE   Final Result   No active cardiopulmonary disease               Active Hospital Problems    Diagnosis    Symptomatic anemia [D64.9]    Fatigue [R53.83]         DVT Prophylaxis: Lovenox  Diet: DIET CLEAR LIQUID;  Dietary Nutrition Supplements: Clear Liquid Oral Supplement  Code Status: Full Code    PT/OT Eval Status: Ordered evaluation    Dispo -home once medically stable with [facility]. Rx for rolling walker placed    Reymundo Warner, APRN - CNP      NOTE:  This report was transcribed using voice recognition software. Every effort was made to ensure accuracy; however, inadvertent computerized transcription errors may be present.

## 2021-04-16 NOTE — PROGRESS NOTES
[facility]        Progress Note    Patient Name: Emre Godoy  MRN: [id number]  YOB: 1965  Date of Evaluation: 2021    Subjective:  No acute events overnight  Pain controlled, denies any pain medication since last night  No nausea or vomiting, NGT in place  Passing flatus now, no BM, denies bloating  Resting in bed at this time      Post-Operative Day #3    Vital Signs:  Patient Vitals for the past 24 hrs:   BP Temp Temp src Pulse Resp SpO2 Weight   21 0915 114/72 97.8 °F (36.6 °C) Oral 85 16 100 % --   21 0615 -- -- -- -- -- -- 202 lb 5 oz (91.8 kg)   21 0611 109/67 98.1 °F (36.7 °C) Oral 79 18 97 % --   04/15/21 2345 122/78 98.1 °F (36.7 °C) Oral 83 18 94 % --   04/15/21 2203 109/69 98.1 °F (36.7 °C) Oral 79 18 96 % --   04/15/21 1830 118/65 98.2 °F (36.8 °C) Temporal 77 19 97 % --   04/15/21 1245 132/76 98.1 °F (36.7 °C) Oral 84 16 95 % --      TEMPERATURE HISTORY 24H: Temp (24hrs), Av.1 °F (36.7 °C), Min:97.8 °F (36.6 °C), Max:98.2 °F (36.8 °C)    BLOOD PRESSURE HISTORY: Systolic (24HQL), JMB:081 , Min:98 , FSP:803    Diastolic (84IZR), TOX:92, Min:65, Max:79      Intake/Output:  I/O last 3 completed shifts:  In: -   Out: 1510 [Urine:1400; Drains:110]  I/O this shift:  In: 30 [P.O.:30]  Out: 300 [Urine:300]  Drain/tube Output:  Closed/Suction Drain Right RLQ Bulb 19 Bahamian-Output (ml): 50 ml    Physical Exam:  General: awake, alert, oriented to person, place, time  Abdomen: soft, non-distended, incisional tenderness only, bowel sounds present   Drain: serosanguinous   Skin/Wound: healing well, no drainage--small amount of old dried blood, no erythema, well approximated with staples    Labs:  CBC:    Recent Labs     21  0610 21  1853 04/15/21  0436 21  0526   WBC 11.9*  --  10.5 8.6   HGB 7.8* 8.2* 7.9* 7.6*   HCT 24.0* 26.1* 23.9* 23.1*     --  306 289     BMP:    Recent Labs     21  0610 04/15/21  0436 04/16/21  0526    140 142   K 3.5 3.2* 3.1*   * 109 109   CO2 22 22 24   BUN 9 9 7   CREATININE 0.9 0.9 0.8   GLUCOSE 106* 100* 102*     Hepatic:    Recent Labs     04/14/21  0610 04/15/21  0436 04/16/21  0526   AST 22 21 19   ALT 16 14 13   BILITOT 0.6 0.5 0.5   ALKPHOS 46 46 44     Amylase:  No results found for: AMYLASE  Lipase:    Lab Results   Component Value Date    LIPASE 12.0 04/14/2021    LIPASE 35.0 04/09/2021    LIPASE 25.0 04/07/2021      Mag:    Lab Results   Component Value Date    MG 2.10 04/15/2021    MG 2.20 04/14/2021     Phos:     Lab Results   Component Value Date    PHOS 2.2 04/15/2021    PHOS 2.8 04/14/2021      Coags:   Lab Results   Component Value Date    PROTIME 14.8 04/14/2021    INR 1.27 04/14/2021    APTT 30.2 04/14/2021       Cultures:  Anaerobic culture  No results found for: LABANAE  Fungus stain  No results found for requested labs within last 30 days. Gram stain  No results found for requested labs within last 30 days. Organism  No results found for: [facility]  Surgical culture  No results found for: CXSURG  Blood culture 1  Results in Past 30 Days  Result Component Current Result Ref Range Previous Result Ref Range   Blood Culture, Routine No Growth after 4 days of incubation. (4/7/2021)  Not in Time Range      Blood culture 2  Results in Past 30 Days  Result Component Current Result Ref Range Previous Result Ref Range   Culture, Blood 2 No Growth after 4 days of incubation. (4/7/2021)  Not in Time Range      Fecal occult  Results in Past 30 Days  Result Component Current Result Ref Range Previous Result Ref Range   Occult Blood Diagnostic Result: POSITIVE  Normal range: Negative   (A) (4/7/2021)  Not in Time Range      GI bacterial pathogens by PCR  No results found for requested labs within last 30 days. C. difficile  No results found for requested labs within last 30 days.      Urine culture  No results found for: LABURIN    Pathology:  OR Gender              Female   Patient Number    [id number]         Date of Birth       1965   Visit Number      [id number]          Age                 63 year(s)   Accession Number  [id number]         Room Number         4095   Corporate ID      [id number]           ELEUTERIO Rae   Ordering          Aj Glasgow,   Interpreting        Elton Kendall MD  Physician         CNP                Physician  Procedure Type of Study:   Veins:Lower Extremities DVT Study, VL EXTREMITY VENOUS DUPLEX RIGHT. Tech Comments Right No evidence of deep vein or superficial vein thrombosis involving the right lower extremity and the left common femoral vein. Scheduled Meds:   pantoprazole  40 mg Intravenous Daily    anidulafungin  100 mg Intravenous Q24H    sodium chloride flush  5-40 mL Intravenous 2 times per day    enoxaparin  40 mg Subcutaneous QPM    amiodarone  100 mg Oral Daily    piperacillin-tazobactam  3,375 mg Intravenous Q8H    atorvastatin  40 mg Oral Daily    furosemide  20 mg Oral Daily    NIFEdipine  30 mg Oral Daily    spironolactone  25 mg Oral Daily    carvedilol  6.25 mg Oral BID WC     Continuous Infusions:   sodium chloride      sodium chloride 75 mL/hr at 04/15/21 2206     PRN Meds:.sodium chloride flush, sodium chloride, ondansetron **OR** ondansetron, phenol, HYDROmorphone **OR** HYDROmorphone, potassium chloride **OR** potassium alternative oral replacement **OR** potassium chloride, polyethylene glycol, acetaminophen **OR** acetaminophen      Assessment:  63 y.o. female admitted with   1. Symptomatic anemia    2. Adequate anticoagulation on anticoagulant therapy    3. Profound fatigue    4. Status post implantation of automatic cardioverter/defibrillator (AICD)    5. Gait instability    6.  Exertional dyspnea        Status-post exploratory laparotomy, cholecystectomy with cholangiogram and excision of duodenal mass on 4/13/2021 for duodenal mass and symptomatic cholelithiasis  Upper GI bleed  Acute blood loss anemia  CHF, EF 30-35%  Atrial fibrillation  Medical coagulopathy, on Xarelto      Plan:  1. FRED drain care and monitoring output  2. NPO with NGT decompression, continue PPI, UGI study today--if looks okay with remove NGT and begin advancing diet, bowel regimen; monitor bowel function--passing flatus now  3. IV hydration; monitor and correct electrolytes  4. Antibiotics per ID  5. Activity as tolerated, ambulate TID--PT/OT following  6. Pulmonary toilet, incentive spirometry  7. PRN analgesics and antiemetics--minimizing narcotics as tolerated  8. DVT prophylaxis with Lovenox and SCD's; hold Xarelto  9. Management of medical comorbid etiologies per primary team and consulting services    EDUCATION:  Educated patient on plan of care and disease process--all questions answered. Plans discussed with patient and nursing. Reviewed and discussed with Dr. Abdulkadir Reyes. Signed:  CATRACHITO Mims - CNP  4/16/2021 10:25 AM    I have personally performed a face to face diagnostic evaluation on this patient. I have interviewed and examined the patient and I agree with the assessment above. In summary, my findings and plan are the following:   Ms. Sharon Luis is a 63 y.o. female who presents with   Status-post exploratory laparotomy, cholecystectomy with cholangiogram and excision of duodenal mass on 4/13/2021 for duodenal mass and symptomatic cholelithiasis, pathology benign  Upper GI bleed  Acute blood loss anemia  CHF, EF 30-35%  Atrial fibrillation  Medical coagulopathy, on Xarelto     Plan:  1. UGI without extravasation, remove NG and start clears  2. Monitor bowel function, passing flatus but no stool, will start bowel regimen  3. IVF, monitor and replace electrolytes  4. Pain control, minimize narcotics  5. Increase activity as able  6. Antibiotics per ID  7.  Defer management of remainder of medical comorbidities to primary and consulting teams  8. Discharge planning, anticipate 1-3 days from surgical standpoint    Flavio Camp MD, FACS  4/16/2021  11:46 AM

## 2021-04-16 NOTE — DISCHARGE INSTR - COC
Continuity of Care Form    Patient Name: Courtney Villarreal YOB: 1965  MRN:  [id number]    Admit date:  2021  Discharge date:  21    Code Status Order: Full Code   Advance Directives:   Advance Care Flowsheet Documentation       Date/Time Healthcare Directive Type of Healthcare Directive Copy in [address] Agent's Name Healthcare Agent's Phone Number    21 1932  Yes, patient has an advance directive for healthcare treatment  --  No, copy requested from family  --  --  --    21 0835  Yes, patient has an advance directive for healthcare treatment  --  --  --  --  --    21 1809  Yes, patient has an advance directive for healthcare treatment  Durable power of  for health care; Health care treatment directive  No, copy requested from family  Adult siblings  Shu Franco  [phone]            Admitting Physician:  Iván Logan MD  PCP: No primary care provider on file. Discharging Nurse: MATILDA [facility] Unit/Room#: 1EB-3628/6424-14  Discharging Unit Phone Number: [phone]    Emergency Contact:   Extended Emergency Contact Information  Primary Emergency Contact: Martell De Jesus [address] Phone: [phone]  Mobile Phone: [phone]  Relation: Brother/Sister  Secondary Emergency Contact: Huong Mar  Mobile Phone: [phone]  Relation: Other  Preferred language: English   needed?  No    Past Surgical History:  Past Surgical History:   Procedure Laterality Date    CARDIAC DEFIBRILLATOR PLACEMENT  2021    [employer]    COLECTOMY N/A 2021    EXPLORATORY LAPAROTOMY, RESECTION OF DUODENAL MASS, CHOLECYSTECTOMY WITH INTRAOPERATIVE CHOLANGIOGRAM performed by Richelle Murry MD at [facility] 81 COLONOSCOPY N/A 10/27/2020    COLONOSCOPY POLYPECTOMY SNARE/COLD BIOPSY performed by Barrett Reid MD at [place] [address]  12    dual chamber PPM, [employer]    TUBAL 02/08/21 COVID-19 (Ordered)   02/09/21 Rule-Out Test Resulted    COVID-19 Rule Out 06/27/20 06/27/20 06/27/20 COVID-19 (Ordered)   06/28/20 Rule-Out Test Resulted            Nurse Assessment:  Last Vital Signs: /72   Pulse 85   Temp 97.8 °F (36.6 °C) (Oral)   Resp 16   Ht 5' 6.5\" (1.689 m)   Wt 202 lb 5 oz (91.8 kg)   SpO2 100%   BMI 32.16 kg/m²     Last documented pain score (0-10 scale): Pain Level: 0  Last Weight:   Wt Readings from Last 1 Encounters:   04/16/21 202 lb 5 oz (91.8 kg)     Mental Status:  oriented and alert    IV Access:  - None    Nursing Mobility/ADLs:  Walking   Independent  Transfer  Independent  Bathing  Assisted  Dressing  Independent  [address]  Independent  Med Delivery   whole    Wound Care Documentation and Therapy:        Elimination:  Continence:   · Bowel: Yes  · Bladder: Yes  Urinary Catheter: None   Colostomy/Ileostomy/Ileal Conduit: No       Date of Last BM: 4/18/2021    Intake/Output Summary (Last 24 hours) at 4/16/2021 1201  Last data filed at 4/16/2021 8720  Gross per 24 hour   Intake 30 ml   Output 1810 ml   Net -1780 ml     I/O last 3 completed shifts:  In: -   Out: 1510 [Urine:1400; Drains:110]    Safety Concerns:     na    Impairments/Disabilities:      surgical restrictions     Nutrition Therapy:  Current Nutrition Therapy:   - Oral Diet:  General    Routes of Feeding: Oral  Liquids: Thin Liquids  Daily Fluid Restriction: no  Last Modified Barium Swallow with Video (Video Swallowing Test): not done    Treatments at the Time of Hospital Discharge:   Respiratory Treatments: na  Oxygen Therapy:  is not on home oxygen therapy. Ventilator:    - No ventilator support    Rehab Therapies: SN,PT,OT  Weight Bearing Status/Restrictions: No weight bearing restirctions  Other Medical Equipment (for information only, NOT a DME order):  na  Other Treatments: HOME CARE NURSE TO DRAW CBC, BMP ON 04/21/21  CALL  RESULTS TO PCP  Ju Miguel @ [phone]  [facility]: LEVEL 3 SAFETY        -Initial home health evaluation to occur within 24-48 hours, in patient home    -Home health agency to establish plan of care for patient over 60 day period    -Medication Reconciliation    -PT/OT/Speech evaluations in home within 24-48 hours of discharge; including  -DME and home safety    -Frontload therapy 5 days, then 3x a week    -OT to evaluate if patient has [address] needs for personal care    - evaluation within 24-48 hours, includes evaluation of resources   and insurance to determine [place], [place], LTC, and Medicaid options    -PCP Visit scheduled within three to seven days of discharge     Patient's personal belongings (please select all that are sent with patient):  Russell    RN SIGNATURE:  Electronically signed by Indira Harmon RN on 4/19/21 at 2:51 PM EDT    CASE MANAGEMENT/SOCIAL WORK SECTION    Inpatient Status Date: 4/7/2021    Readmission Risk Assessment Score:  Readmission Risk              Risk of Unplanned Readmission:        23           Discharging to Facility/ Agency   Name: Kayleigh Gray  will call for Appointment  Phone: [phone]  Fax: [phone]    Dialysis Facility (if applicable)   · Name:  · Address:  · Dialysis Schedule:  · Phone:  · Fax:    / signature: Yessica Medina RN, BSN  [phone]       PHYSICIAN SECTION    Prognosis: Good    Condition at Discharge: Stable    Rehab Potential (if transferring to Rehab): Good    Recommended Labs or Other Treatments After Discharge: PT/OT, home health nurse, cbc to be drawn in 3 days for anemia and AC therapy, bmp in 3 days for chf    Physician Certification: I certify the above information and transfer of Shima Bolton  is necessary for the continuing treatment of the diagnosis listed and that she requires Home Care for less 30 days.      Update Admission H&P: No change in H&P    PHYSICIAN SIGNATURE:  Electronically signed by CATRACHITO López - EDILSON/ Jorge Neal MD on 4/19/21 at 11:54 AM EDT

## 2021-04-17 LAB
A/G RATIO: 1.6 (ref 1.1–2.2)
ALBUMIN SERPL-MCNC: 3.6 G/DL (ref 3.4–5)
ALP BLD-CCNC: 40 U/L (ref 40–129)
ALT SERPL-CCNC: 12 U/L (ref 10–40)
ANION GAP SERPL CALCULATED.3IONS-SCNC: 7 MMOL/L (ref 3–16)
AST SERPL-CCNC: 16 U/L (ref 15–37)
BASOPHILS ABSOLUTE: 0 K/UL (ref 0–0.2)
BASOPHILS RELATIVE PERCENT: 0.6 %
BILIRUB SERPL-MCNC: 0.4 MG/DL (ref 0–1)
BUN BLDV-MCNC: 6 MG/DL (ref 7–20)
CALCIUM SERPL-MCNC: 8.4 MG/DL (ref 8.3–10.6)
CHLORIDE BLD-SCNC: 110 MMOL/L (ref 99–110)
CO2: 24 MMOL/L (ref 21–32)
CREAT SERPL-MCNC: 0.7 MG/DL (ref 0.6–1.1)
EOSINOPHILS ABSOLUTE: 0.4 K/UL (ref 0–0.6)
EOSINOPHILS RELATIVE PERCENT: 7.1 %
GFR AFRICAN AMERICAN: >60
GFR NON-AFRICAN AMERICAN: >60
GLOBULIN: 2.3 G/DL
GLUCOSE BLD-MCNC: 90 MG/DL (ref 70–99)
HCT VFR BLD CALC: 22 % (ref 36–48)
HCT VFR BLD CALC: 22.8 % (ref 36–48)
HEMOGLOBIN: 7.1 G/DL (ref 12–16)
HEMOGLOBIN: 7.4 G/DL (ref 12–16)
LYMPHOCYTES ABSOLUTE: 0.8 K/UL (ref 1–5.1)
LYMPHOCYTES RELATIVE PERCENT: 13.3 %
MAGNESIUM: 1.9 MG/DL (ref 1.8–2.4)
MCH RBC QN AUTO: 28.6 PG (ref 26–34)
MCHC RBC AUTO-ENTMCNC: 32.2 G/DL (ref 31–36)
MCV RBC AUTO: 88.8 FL (ref 80–100)
MONOCYTES ABSOLUTE: 0.5 K/UL (ref 0–1.3)
MONOCYTES RELATIVE PERCENT: 8.6 %
NEUTROPHILS ABSOLUTE: 4.3 K/UL (ref 1.7–7.7)
NEUTROPHILS RELATIVE PERCENT: 70.4 %
PDW BLD-RTO: 20.9 % (ref 12.4–15.4)
PHOSPHORUS: 2.4 MG/DL (ref 2.5–4.9)
PLATELET # BLD: 298 K/UL (ref 135–450)
PMV BLD AUTO: 6.6 FL (ref 5–10.5)
POTASSIUM SERPL-SCNC: 3.3 MMOL/L (ref 3.5–5.1)
RBC # BLD: 2.47 M/UL (ref 4–5.2)
SODIUM BLD-SCNC: 141 MMOL/L (ref 136–145)
TOTAL PROTEIN: 5.9 G/DL (ref 6.4–8.2)
WBC # BLD: 6.1 K/UL (ref 4–11)

## 2021-04-17 PROCEDURE — 6360000002 HC RX W HCPCS: Performed by: NURSE PRACTITIONER

## 2021-04-17 PROCEDURE — 6370000000 HC RX 637 (ALT 250 FOR IP): Performed by: NURSE PRACTITIONER

## 2021-04-17 PROCEDURE — 6370000000 HC RX 637 (ALT 250 FOR IP): Performed by: INTERNAL MEDICINE

## 2021-04-17 PROCEDURE — 2580000003 HC RX 258: Performed by: SURGERY

## 2021-04-17 PROCEDURE — 1200000000 HC SEMI PRIVATE

## 2021-04-17 PROCEDURE — 83735 ASSAY OF MAGNESIUM: CPT

## 2021-04-17 PROCEDURE — 84100 ASSAY OF PHOSPHORUS: CPT

## 2021-04-17 PROCEDURE — 36415 COLL VENOUS BLD VENIPUNCTURE: CPT

## 2021-04-17 PROCEDURE — 85014 HEMATOCRIT: CPT

## 2021-04-17 PROCEDURE — 80053 COMPREHEN METABOLIC PANEL: CPT

## 2021-04-17 PROCEDURE — 6360000002 HC RX W HCPCS: Performed by: SURGERY

## 2021-04-17 PROCEDURE — C9113 INJ PANTOPRAZOLE SODIUM, VIA: HCPCS | Performed by: NURSE PRACTITIONER

## 2021-04-17 PROCEDURE — 85025 COMPLETE CBC W/AUTO DIFF WBC: CPT

## 2021-04-17 PROCEDURE — 85018 HEMOGLOBIN: CPT

## 2021-04-17 PROCEDURE — 2580000003 HC RX 258: Performed by: NURSE PRACTITIONER

## 2021-04-17 PROCEDURE — 6370000000 HC RX 637 (ALT 250 FOR IP): Performed by: SURGERY

## 2021-04-17 PROCEDURE — APPNB30 APP NON BILLABLE TIME 0-30 MINS: Performed by: NURSE PRACTITIONER

## 2021-04-17 PROCEDURE — APPSS15 APP SPLIT SHARED TIME 0-15 MINUTES: Performed by: NURSE PRACTITIONER

## 2021-04-17 PROCEDURE — 6360000002 HC RX W HCPCS: Performed by: INTERNAL MEDICINE

## 2021-04-17 PROCEDURE — 99024 POSTOP FOLLOW-UP VISIT: CPT | Performed by: SURGERY

## 2021-04-17 PROCEDURE — 2580000003 HC RX 258: Performed by: INTERNAL MEDICINE

## 2021-04-17 RX ORDER — FUROSEMIDE 20 MG/1
20 TABLET ORAL ONCE
Status: COMPLETED | OUTPATIENT
Start: 2021-04-17 | End: 2021-04-17

## 2021-04-17 RX ADMIN — Medication 10 ML: at 09:13

## 2021-04-17 RX ADMIN — ATORVASTATIN CALCIUM 40 MG: 40 TABLET, FILM COATED ORAL at 20:47

## 2021-04-17 RX ADMIN — ACETAMINOPHEN 650 MG: 325 TABLET ORAL at 02:20

## 2021-04-17 RX ADMIN — Medication 10 ML: at 20:50

## 2021-04-17 RX ADMIN — POTASSIUM & SODIUM PHOSPHATES POWDER PACK 280-160-250 MG 500 MG: 280-160-250 PACK at 13:17

## 2021-04-17 RX ADMIN — FUROSEMIDE 20 MG: 20 TABLET ORAL at 09:13

## 2021-04-17 RX ADMIN — SPIRONOLACTONE 25 MG: 25 TABLET ORAL at 10:00

## 2021-04-17 RX ADMIN — CARVEDILOL 6.25 MG: 6.25 TABLET, FILM COATED ORAL at 09:12

## 2021-04-17 RX ADMIN — PIPERACILLIN AND TAZOBACTAM 3375 MG: 3; .375 INJECTION, POWDER, LYOPHILIZED, FOR SOLUTION INTRAVENOUS at 09:13

## 2021-04-17 RX ADMIN — PIPERACILLIN AND TAZOBACTAM 3375 MG: 3; .375 INJECTION, POWDER, LYOPHILIZED, FOR SOLUTION INTRAVENOUS at 00:59

## 2021-04-17 RX ADMIN — ACETAMINOPHEN 650 MG: 325 TABLET ORAL at 09:12

## 2021-04-17 RX ADMIN — POTASSIUM CHLORIDE 40 MEQ: 1500 TABLET, EXTENDED RELEASE ORAL at 09:12

## 2021-04-17 RX ADMIN — ENOXAPARIN SODIUM 40 MG: 40 INJECTION SUBCUTANEOUS at 16:48

## 2021-04-17 RX ADMIN — NIFEDIPINE 30 MG: 30 TABLET, FILM COATED, EXTENDED RELEASE ORAL at 09:13

## 2021-04-17 RX ADMIN — PANTOPRAZOLE SODIUM 40 MG: 40 INJECTION, POWDER, FOR SOLUTION INTRAVENOUS at 09:13

## 2021-04-17 RX ADMIN — DEXTROSE MONOHYDRATE 100 MG: 50 INJECTION, SOLUTION INTRAVENOUS at 22:27

## 2021-04-17 RX ADMIN — PIPERACILLIN AND TAZOBACTAM 3375 MG: 3; .375 INJECTION, POWDER, LYOPHILIZED, FOR SOLUTION INTRAVENOUS at 15:25

## 2021-04-17 RX ADMIN — CARVEDILOL 6.25 MG: 6.25 TABLET, FILM COATED ORAL at 15:24

## 2021-04-17 RX ADMIN — FUROSEMIDE 20 MG: 20 TABLET ORAL at 13:17

## 2021-04-17 RX ADMIN — SODIUM CHLORIDE: 9 INJECTION, SOLUTION INTRAVENOUS at 19:06

## 2021-04-17 ASSESSMENT — PAIN DESCRIPTION - PAIN TYPE
TYPE: SURGICAL PAIN
TYPE: SURGICAL PAIN

## 2021-04-17 ASSESSMENT — PAIN SCALES - GENERAL
PAINLEVEL_OUTOF10: 0
PAINLEVEL_OUTOF10: 0

## 2021-04-17 ASSESSMENT — PAIN DESCRIPTION - LOCATION: LOCATION: ABDOMEN

## 2021-04-17 NOTE — PROGRESS NOTES
[facility] and Laparoscopic [facility]        Progress Note    Patient Name: Yasmin Mcclure  MRN: [id number]  [place]: 1965  Date of Evaluation: 2021    Subjective:  No acute events overnight  Pain controlled  Nausea upon waking, resolved, and no vomiting since NGT removed, tolerating clear liquids  Passing flatus and some dark colored stool, denies bloating  Up to chair at this time    Post-Operative Day #4      Vital Signs:  Patient Vitals for the past 24 hrs:   BP Temp Temp src Pulse Resp SpO2 Height Weight   21 0900 122/83 97.6 °F (36.4 °C) Oral 75 16 100 % -- --   21 0539 -- -- -- -- -- -- -- 204 lb 1 oz (92.6 kg)   21 0415 107/66 98.2 °F (36.8 °C) Temporal 75 18 94 % -- --   21 0049 111/69 97.9 °F (36.6 °C) Oral 76 20 95 % -- --   21 2101 -- -- -- 75 -- -- -- --   21 105/68 97.9 °F (36.6 °C) Oral 75 18 97 % -- --   21 1701 116/70 98 °F (36.7 °C) Oral 75 18 96 % -- --   21 1431 -- -- -- -- -- -- 5' 6.5\" (1.689 m) --   21 1242 109/73 97.4 °F (36.3 °C) Oral 75 18 98 % -- --      TEMPERATURE HISTORY 24H: Temp (24hrs), Av.8 °F (36.6 °C), Min:97.4 °F (36.3 °C), Max:98.2 °F (36.8 °C)    BLOOD PRESSURE HISTORY: Systolic (00JWQ), AJV:085 , Min:105 , XOR:250    Diastolic (76YEO), IS, Min:66, Max:83      Intake/Output:  I/O last 3 completed shifts:   In: 18 [P.O.:990]  Out: 425 [Urine:300; Drains:125]  I/O this shift:  In: 600 [P.O.:600]  Out: -   Drain/tube Output:  Closed/Suction Drain Right RLQ Bulb 19 Icelandic-Output (ml): 50 ml    Physical Exam:  General: awake, alert, oriented to person, place, time  Abdomen: soft, non-distended, incisional tenderness only, bowel sounds present   Drain: serosanguinous   Skin/Wound: healing well, no drainage, no erythema, well approximated with staples    Labs:  CBC:    Recent Labs     04/15/21  0436 21  0526 21  0436   WBC 10.5 8.6 6.1   HGB 7.9* 7.6* 7.1*   HCT 23.9* 23.1* 22.0*    289 298     BMP:    Recent Labs     04/15/21  0436 04/16/21  0526 04/16/21 1954 04/17/21 0436    142  --  141   K 3.2* 3.1* 3.5 3.3*    109  --  110   CO2 22 24  --  24   BUN 9 7  --  6*   CREATININE 0.9 0.8  --  0.7   GLUCOSE 100* 102*  --  90     Hepatic:    Recent Labs     04/15/21  0436 04/16/21  0526 04/17/21 0436   AST 21 19 16   ALT 14 13 12   BILITOT 0.5 0.5 0.4   ALKPHOS 46 44 40     Amylase:  No results found for: AMYLASE  Lipase:    Lab Results   Component Value Date    LIPASE 12.0 04/14/2021    LIPASE 35.0 04/09/2021    LIPASE 25.0 04/07/2021      Mag:    Lab Results   Component Value Date    MG 1.90 04/17/2021    MG 2.10 04/15/2021     Phos:     Lab Results   Component Value Date    PHOS 2.4 04/17/2021    PHOS 2.2 04/15/2021      Coags:   Lab Results   Component Value Date    PROTIME 14.8 04/14/2021    INR 1.27 04/14/2021    APTT 30.2 04/14/2021       Cultures:  Anaerobic culture  No results found for: LABANAE  Fungus stain  No results found for requested labs within last 30 days. Gram stain  No results found for requested labs within last 30 days. Organism  No results found for: [facility]  Surgical culture  No results found for: CXSURG  Blood culture 1  Results in Past 30 Days  Result Component Current Result Ref Range Previous Result Ref Range   Blood Culture, Routine No Growth after 4 days of incubation. (4/7/2021)  Not in Time Range      Blood culture 2  Results in Past 30 Days  Result Component Current Result Ref Range Previous Result Ref Range   Culture, Blood 2 No Growth after 4 days of incubation. (4/7/2021)  Not in Time Range      Fecal occult  Results in Past 30 Days  Result Component Current Result Ref Range Previous Result Ref Range   Occult Blood Diagnostic Result: POSITIVE  Normal range: Negative   (A) (4/7/2021)  Not in Time Range      GI bacterial pathogens by PCR  No results found for requested labs within last 30 days.      C. difficile  No results found for requested labs within last 30 days. Urine culture  No results found for: LABURIN    Pathology:  OR 4/13/2021--FINAL DIAGNOSIS:     A. Gallbladder, cholecystectomy:   - Mild chronic cholecystitis with cholelithiasis. B. 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 - Negative for dysplasia or malignancy. COMMENT:  A yellow-white exudate occupies part of the mucosal surface and   microscopic examination reveals fungal elements consistent with Candida   sp. Clinical correlation is recommended.     BUCCA/BUCCA       Preoperative Diagnosis:   Duodenal mass   Postoperative Diagnosis:  Duodenal mass     SPECIMEN:   A.  GALLBLADDER   B.  DUODENAL MASS     GROSS DESCRIPTION:   A. Received in formalin labeled \"Chelsi Lock, gallbladder\" and consist   of an intact gallbladder measuring 8 x 4 x 4 cm.  The serosa is blue,   smooth and glistening.  The wall is 0.1 cm in thickness.  The lumen   contains a large amount of thick green bile and numerous miniscule   gallstones, all measuring less than 0.1 cm in greatest dimension.  The   mucosa is tan-green and flattened.  Representative sections are submitted   in one cassette. B. The specimen is received in formalin labeled \"Chelsi Lock, duodenal   mass\" and consists of a pink-tan to red polypoid structure measuring 3 x   1.3 x 1.3 cm. The outer surface contains focal areas of yellow-white   exudate. Three silver metallic clips are identified near the base of the   polyp and are removed. The margin is inked black and the specimen is   trisected. The cut surfaces are pink-tan and uniform to yellow and   lobulated. The specimen is submitted entirely in 3 cassettes, 1 cross   section per cassette.     Imaging:  I have personally reviewed the following films:    Vl Extremity Venous Right    Result Date: 4/15/2021  Vascular Lower Extremities DVT Study Procedure -- PRELIMINARY SONOGRAPHER REPORT --   Demographics   Patient Name      Shirin MATUTE   Date of Study     04/15/2021         Gender              Female   Patient Number    [id number]         Date of Birth       1965   Visit Number      [id number]          Age                 63 year(s)   Accession Number  [id number]         Room Number         2785   Corporate ID      [id number]           Carla Almazan                                                           RVS   Ordering          Monticello Connelly,   Interpreting        Cam Noble MD  Physician         CNP                Physician  Procedure Type of Study:   Veins:Lower Extremities DVT Study, VL EXTREMITY VENOUS DUPLEX RIGHT. Tech Comments Right No evidence of deep vein or superficial vein thrombosis involving the right lower extremity and the left common femoral vein. Fl Ugi    Result Date: 4/16/2021  EXAMINATION: SINGLE CONTRAST UPPER GI SERIES 4/16/2021 HISTORY: ORDERING SYSTEM PROVIDED HISTORY: evaluate duodenal repair, water soluble contrast given through NG TECHNOLOGIST PROVIDED HISTORY: Reason for exam:->evaluate duodenal repair, water soluble contrast given through NG Reason for Exam: Evaluate duodenal repair, water soluble contrast given through NG Acuity: Acute Type of Exam: Initial CT abdomen/pelvis 4/8/2021 and intraoperative cholangiogram 4/13/2021. COMPARISON: None. TECHNIQUE: Multiple single contrast images of the esophagus, gastroesophageal junction and stomach were obtained following the oral administration of water soluble contrast FLUOROSCOPY DOSE AND TYPE OR TIME AND EXPOSURES: Fluoroscopic time: 5 minutes. Number of fluoroscopic images obtained:  5. Remaining fluoroscopic images that were obtained were generated using last image hold technique. FINDINGS: The study was initiated with patient in upright position.   Water-soluble contrast material was instilled via the patient's nasogastric tube with resultant opacification of the stomach. Air-fluid/fluid level is visualized within the stomach with the upright positioning. There is mild prominence of folds in the region of the pyloric antrum and base of the duodenal bulb. There is overall poor primary peristalsis of the stomach/proximal duodenum. With this in mind, patient was placed in right lateral decubitus position to facilitate movement of contrast material from stomach into proximal small bowel. There is a nonspecific segmental area of luminal narrowing of the immediate post bulbar duodenal sweep which did not significantly change during the examination. The entirety of the duodenal sweep is never well distended throughout the study related to slow movement of contrast material from stomach into duodenum. Evaluation of the duodenal sweep is also limited due to opacification of adjacent contrast filled colon. There is no definite evidence of extravasation of contrast material from the duodenal sweep. 1. Mild prominence of folds in the region of the pyloric antrum and base of duodenal bulb. Finding is nonspecific; however, could be on the basis of gastritis/peptic ulcer disease. 2. Suboptimal evaluation of the duodenal sweep related to poor primary peristalsis with slow movement of contrast material from stomach into proximal small bowel. 3. Nonspecific segmental area of luminal narrowing involving the immediate postbulbar duodenal sweep. 4. No definite evidence of extravasation of contrast material from the duodenal sweep as described above.      Scheduled Meds:   potassium & sodium phosphates  2 packet Oral Once    polyethylene glycol  17 g Oral Daily    docusate sodium  100 mg Oral BID    acetaminophen  650 mg Oral Q6H    pantoprazole  40 mg Intravenous Daily    anidulafungin  100 mg Intravenous Q24H    sodium chloride flush  5-40 mL Intravenous 2 times per day    enoxaparin  40 mg Subcutaneous QPM    amiodarone  100 mg Oral Daily    piperacillin-tazobactam 3,375 mg Intravenous Q8H    atorvastatin  40 mg Oral Daily    furosemide  20 mg Oral Daily    NIFEdipine  30 mg Oral Daily    spironolactone  25 mg Oral Daily    carvedilol  6.25 mg Oral BID WC     Continuous Infusions:   sodium chloride      sodium chloride 50 mL/hr at 04/16/21 1616     PRN Meds:.oxyCODONE **OR** oxyCODONE, sodium chloride flush, sodium chloride, ondansetron **OR** ondansetron, phenol, HYDROmorphone **OR** HYDROmorphone, potassium chloride **OR** potassium alternative oral replacement **OR** potassium chloride, polyethylene glycol, acetaminophen **OR** acetaminophen      Assessment:  63 y.o. female admitted with   1. Symptomatic anemia    2. Adequate anticoagulation on anticoagulant therapy    3. Profound fatigue    4. Status post implantation of automatic cardioverter/defibrillator (AICD)    5. Gait instability    6. Exertional dyspnea        Status-post exploratory laparotomy, cholecystectomy with cholangiogram and excision of duodenal mass on 4/13/2021 for duodenal mass and symptomatic cholelithiasis  Upper GI bleed  Acute blood loss anemia  CHF, EF 30-35%  Atrial fibrillation  Medical coagulopathy, on Xarelto      Plan:  1. FRED drain care and monitoring output  2. Advance to full liquid diet as tolerated, continue PPI; monitor bowel function--passing some dark stool, hemoglobin down, likely residual old blood, transfuse for hemoglobin less than 7, monitor  3. IV hydration; monitor and correct electrolytes  4. Antibiotics per ID  5. Activity as tolerated, ambulate TID, up to chair for meals--PT/OT following  6. Pulmonary toilet, incentive spirometry  7. PRN analgesics and antiemetics--minimizing narcotics as tolerated, transition to PO  8. DVT prophylaxis with Lovenox and SCD's; hold Xarelto  9. Management of medical comorbid etiologies per primary team and consulting services    EDUCATION:  Educated patient on plan of care and disease process--all questions answered.     Plans discussed with patient and nursing. Reviewed and discussed with Dr. Reina Capps.       Signed:  CATRACHITO Vásquez - CNP  4/17/2021 11:15 AM     Surg Staff:   Pt seen and examined with NP  See full note above  Pt progressing well, will adv to fulls po today  No bile in drain  Monitor labs / Hb, transfuse if drops any lower    Drusilla Boeck

## 2021-04-17 NOTE — PROGRESS NOTES
Patient's head to toe assessment completed at this time. VSS. Respirations are easy and unlabored. Patient is awake, alert, and oriented. Surgical dressing clean, dry, and intact. J.P. drain emptied, see flow sheets for output. All nightly medications given per MAR. Patient rates surgical pain 3/10at this time; scheduled Tylenol given. Patient independent in room, will call out appropriately. No other needs expressed at this time. Will continue to monitor.

## 2021-04-17 NOTE — PROGRESS NOTES
Hospitalist Progress Note      PCP: No primary care provider on file. Date of Admission: 4/7/2021    Chief Complaint: Fatigue    Hospital Course:  Joce Seymour is a 63 y.o. female who presented with Complaints of generalized fatigue weak and not feeling well. Patient with complex medical history of CHF A. fib CAD chronic anemia who has been feeling weak and tired. With low hemoglobin. Profound weakness malaise with chest pain and shortness of breath times. Has not felt well for weeks. Patient is letting it could be related to amiodarone that was the new regimen for him. Was seen by cardiology in the office was sent here for further evaluation. Patient has been having intermittent shortness of breath with dyspnea on exertion. No fever chills. Follows Dr. Vivi Beaver for GI patient has had some difficulty with dark coloration of stools and suppressed EGD in next week. Patient is on anticoagulation Eliquis    Subjective: Patient sitting up in chair. In much better spirits today. States she feels much improved and has minimal abdominal discomfort. NG tube has been removed. She is passing gas and has had bowel movements- that are dark. Tolerating clear liquid tray currently. S/o increased swelling in ankles and feet. Patient denied chest pain, shortness of breath, palpitations,, nausea vomiting, diarrhea, dysuria, headache lightheadedness or dizziness. Voiding without difficulty. Reviewed plan of care with patient and fiance, they verbalized understanding and agreement. Denied further questions or needs. Assessment/Plan:    Symptomatic anemia  GI bleed  -Probably secondary to ulceration overlying lipoma  -Had EGD, found to have a large lesion of the duodenum, underwent 3 clipping, also a large lipoma biopsy  -hgb 7.1, recheck H&H now, transfuse if Hgb < 7.  I have discussed with the patient the rationale for blood transfusion, its benefits in treating or preventing acute blood loss which could lead to fatigue, organ damage or death, and its risk which include: mild transfusion reactions, rare risk of blood borne infection, or more serious but rare allergic reactions. I have discussed the alternatives to transfusion, including the risk and consequences of not receiving transfusion. The patient had an opportunity to ask questions and had agreed to proceed with transfusion of packed red blood cells if needed. Duodenal mass  Cholelithiasis  POD 4  -Status post  exploratory laparotomy, cholecystectomy with cholangiogram and excision of duodenal mass on 4/13/2021 for duodenal mass and symptomatic cholelithiasis  -Per surgery, NG decompression, 4/16 UGI performed to evaluate duodenal repair- stable, NG removed 4/16  -Monitor bowel function, + flatus and dark stools  -duodenal mass pathology report showing fungal component, ordered anidulafungin, consult ID. Per ID continue Zosyn and antifungal until her GI tract is functional and able to take oral diet. Do not anticipate antifungal therapy at discharge. Candida on the biopsy is a normal resident brittany but given GI bleed and surgery would continue IV therapy as an inpatient  - IS, encourage ambulation  -zosyn    A. Fib  -EP consulted, due to GI bleed not a good candidate for long-term anticoagulation. Was on Xarelto at home.  -Plan for ZAMZAM as outpatient to assess for watchman eligibility and further work-up  -Resume anticoagulation when okay with GI, holding Xarelto for now. Continue Lovenox 40 mg subcu daily and SCDs  -cardiology changed amiodarone to QOD    Depression  -Psychiatry was consulted and did evaluate the patient, see note  -Continue with  care as needed    Hypokalemia  -replacement ordered  -bmp in am    History of heart failure, reduced EF 30 to 35%  -Continue Coreg  -Resume Entresto when blood pressure stable no further intervention needed  -We will need follow-up with heart failure team  -increased edema, had IVF.  Give extra lasix 20mg po today, to equal 40mg dose. Medications:  Reviewed    Infusion Medications    sodium chloride      sodium chloride 50 mL/hr at 04/16/21 1616     Scheduled Medications    potassium & sodium phosphates  2 packet Oral Once    polyethylene glycol  17 g Oral Daily    docusate sodium  100 mg Oral BID    acetaminophen  650 mg Oral Q6H    pantoprazole  40 mg Intravenous Daily    anidulafungin  100 mg Intravenous Q24H    sodium chloride flush  5-40 mL Intravenous 2 times per day    enoxaparin  40 mg Subcutaneous QPM    amiodarone  100 mg Oral Daily    piperacillin-tazobactam  3,375 mg Intravenous Q8H    atorvastatin  40 mg Oral Daily    furosemide  20 mg Oral Daily    NIFEdipine  30 mg Oral Daily    spironolactone  25 mg Oral Daily    carvedilol  6.25 mg Oral BID      PRN Meds: oxyCODONE **OR** oxyCODONE, sodium chloride flush, sodium chloride, ondansetron **OR** ondansetron, phenol, HYDROmorphone **OR** HYDROmorphone, potassium chloride **OR** potassium alternative oral replacement **OR** potassium chloride, polyethylene glycol, acetaminophen **OR** acetaminophen      Intake/Output Summary (Last 24 hours) at 4/17/2021 1244  Last data filed at 4/17/2021 1000  Gross per 24 hour   Intake 1080 ml   Output 125 ml   Net 955 ml       Physical Exam Performed:    /83   Pulse 75   Temp 97.6 °F (36.4 °C) (Oral)   Resp 16   Ht 5' 6.5\" (1.689 m)   Wt 204 lb 1 oz (92.6 kg)   SpO2 100%   BMI 32.44 kg/m²     General appearance: No apparent distress, appears stated age and cooperative. HEENT: Pupils equal, round, and reactive to light. Conjunctivae/corneas clear. Neck: Supple, with full range of motion. No jugular venous distention. Trachea midline. Respiratory:  Normal respiratory effort. Clear to auscultation, bilaterally without Rales/Wheezes/Rhonchi. Cardiovascular: Irregular regular rate and rhythm with normal S1/S2 without murmurs, rubs or gallops.   Abdomen: Normal active bowel sounds, abdominal tenderness with palpation, large abdominal dressing, FRED drain with serous sanginous fluid  Musculoskeletal: No clubbing, cyanosis, 1+ edema bilaterally. Full range of motion without deformity. Skin: Skin color, texture, turgor normal.  No rashes or lesions. Neurologic:  Neurovascularly intact without any focal sensory/motor deficits. Psychiatric: Alert and oriented, thought content appropriate, improved affect  Capillary Refill: Brisk,< 3 seconds   Peripheral Pulses: +2 palpable, equal bilaterally       Labs:   Recent Labs     04/15/21  0436 04/16/21  0526 04/17/21  0436   WBC 10.5 8.6 6.1   HGB 7.9* 7.6* 7.1*   HCT 23.9* 23.1* 22.0*    289 298     Recent Labs     04/15/21  0436 04/16/21  0526 04/16/21  1954 04/17/21  0436    142  --  141   K 3.2* 3.1* 3.5 3.3*    109  --  110   CO2 22 24  --  24   BUN 9 7  --  6*   CREATININE 0.9 0.8  --  0.7   CALCIUM 8.3 8.6  --  8.4   PHOS 2.2*  --   --  2.4*     Recent Labs     04/15/21  0436 04/16/21  0526 04/17/21  0436   AST 21 19 16   ALT 14 13 12   BILITOT 0.5 0.5 0.4   ALKPHOS 46 44 40     No results for input(s): INR in the last 72 hours. Recent Labs     04/15/21  0436   TROPONINI <0.01       Urinalysis:      Lab Results   Component Value Date    NITRU Negative 07/10/2020    BLOODU Negative 07/10/2020    SPECGRAV 1.015 07/10/2020    GLUCOSEU Negative 07/10/2020       Radiology:  [facility]GI   Preliminary Result   1. Mild prominence of folds in the region of the pyloric antrum and base of   duodenal bulb. Finding is nonspecific; however, could be on the basis of   gastritis/peptic ulcer disease. 2. Suboptimal evaluation of the duodenal sweep related to poor primary   peristalsis with slow movement of contrast material from stomach into   proximal small bowel. 3. Nonspecific segmental area of luminal narrowing involving the immediate   postbulbar duodenal sweep.    4. No definite evidence of extravasation of contrast material from the   duodenal sweep as described above. VL Extremity Venous Right         FL CHOLANGIOGRAM OR   Final Result   Normal intraoperative laparoscopic cholangiogram.  No common duct stone. XR ABDOMEN (KUB) (SINGLE AP VIEW)   Final Result   Indeterminate intestinal gas pattern secondary to paucity of small bowel gas. CT ABDOMEN PELVIS W IV CONTRAST Additional Contrast? Oral   Final Result   No definitive evidence of a pancreatic mass and no evidence of metastatic   disease or acute abnormality of the abdomen/pelvis. If persistent concern for an occult pancreatic mass consider endoscopic   ultrasound or PET-CT. 3 separate surgical clips placed within bowel lumen near the ligament of   Treitz since the comparison. This is presumed to be related to interval GI   bleeding treatment. Fibroid uterus without significant change in appearance from the comparison         XR CHEST PORTABLE   Final Result   No active cardiopulmonary disease               Active Hospital Problems    Diagnosis    Symptomatic anemia [D64.9]    Fatigue [R53.83]         DVT Prophylaxis: Lovenox  Diet: Dietary Nutrition Supplements: Clear Liquid Oral Supplement  DIET FULL LIQUID;  Code Status: Full Code    PT/OT Eval Status: Ordered evaluation    Dispo -home once medically stable with [facility]. Rx for rolling walker placed    CATRACHITO Castro - CNP      NOTE:  This report was transcribed using voice recognition software. Every effort was made to ensure accuracy; however, inadvertent computerized transcription errors may be present.

## 2021-04-18 LAB
A/G RATIO: 1.4 (ref 1.1–2.2)
ALBUMIN SERPL-MCNC: 3.7 G/DL (ref 3.4–5)
ALP BLD-CCNC: 44 U/L (ref 40–129)
ALT SERPL-CCNC: 11 U/L (ref 10–40)
ANION GAP SERPL CALCULATED.3IONS-SCNC: 10 MMOL/L (ref 3–16)
AST SERPL-CCNC: 17 U/L (ref 15–37)
BASOPHILS ABSOLUTE: 0 K/UL (ref 0–0.2)
BASOPHILS RELATIVE PERCENT: 0.7 %
BILIRUB SERPL-MCNC: 0.4 MG/DL (ref 0–1)
BUN BLDV-MCNC: 3 MG/DL (ref 7–20)
CALCIUM SERPL-MCNC: 9 MG/DL (ref 8.3–10.6)
CHLORIDE BLD-SCNC: 108 MMOL/L (ref 99–110)
CO2: 25 MMOL/L (ref 21–32)
CREAT SERPL-MCNC: 0.8 MG/DL (ref 0.6–1.1)
EOSINOPHILS ABSOLUTE: 0.4 K/UL (ref 0–0.6)
EOSINOPHILS RELATIVE PERCENT: 6.6 %
GFR AFRICAN AMERICAN: >60
GFR NON-AFRICAN AMERICAN: >60
GLOBULIN: 2.7 G/DL
GLUCOSE BLD-MCNC: 101 MG/DL (ref 70–99)
HCT VFR BLD CALC: 23.5 % (ref 36–48)
HEMOGLOBIN: 7.9 G/DL (ref 12–16)
LYMPHOCYTES ABSOLUTE: 0.8 K/UL (ref 1–5.1)
LYMPHOCYTES RELATIVE PERCENT: 12.6 %
MCH RBC QN AUTO: 29.6 PG (ref 26–34)
MCHC RBC AUTO-ENTMCNC: 33.5 G/DL (ref 31–36)
MCV RBC AUTO: 88.4 FL (ref 80–100)
MONOCYTES ABSOLUTE: 0.7 K/UL (ref 0–1.3)
MONOCYTES RELATIVE PERCENT: 11.3 %
NEUTROPHILS ABSOLUTE: 4.1 K/UL (ref 1.7–7.7)
NEUTROPHILS RELATIVE PERCENT: 68.8 %
PDW BLD-RTO: 20.5 % (ref 12.4–15.4)
PLATELET # BLD: 342 K/UL (ref 135–450)
PMV BLD AUTO: 6.7 FL (ref 5–10.5)
POTASSIUM SERPL-SCNC: 3.3 MMOL/L (ref 3.5–5.1)
PRO-BNP: 183 PG/ML (ref 0–124)
RBC # BLD: 2.66 M/UL (ref 4–5.2)
SODIUM BLD-SCNC: 143 MMOL/L (ref 136–145)
TOTAL PROTEIN: 6.4 G/DL (ref 6.4–8.2)
WBC # BLD: 6 K/UL (ref 4–11)

## 2021-04-18 PROCEDURE — 6360000002 HC RX W HCPCS: Performed by: SURGERY

## 2021-04-18 PROCEDURE — 36415 COLL VENOUS BLD VENIPUNCTURE: CPT

## 2021-04-18 PROCEDURE — 99024 POSTOP FOLLOW-UP VISIT: CPT | Performed by: SURGERY

## 2021-04-18 PROCEDURE — 97530 THERAPEUTIC ACTIVITIES: CPT

## 2021-04-18 PROCEDURE — 6360000002 HC RX W HCPCS: Performed by: NURSE PRACTITIONER

## 2021-04-18 PROCEDURE — 1200000000 HC SEMI PRIVATE

## 2021-04-18 PROCEDURE — 80053 COMPREHEN METABOLIC PANEL: CPT

## 2021-04-18 PROCEDURE — C9113 INJ PANTOPRAZOLE SODIUM, VIA: HCPCS | Performed by: NURSE PRACTITIONER

## 2021-04-18 PROCEDURE — 2580000003 HC RX 258: Performed by: SURGERY

## 2021-04-18 PROCEDURE — 6370000000 HC RX 637 (ALT 250 FOR IP): Performed by: INTERNAL MEDICINE

## 2021-04-18 PROCEDURE — 85025 COMPLETE CBC W/AUTO DIFF WBC: CPT

## 2021-04-18 PROCEDURE — 2580000003 HC RX 258: Performed by: INTERNAL MEDICINE

## 2021-04-18 PROCEDURE — APPNB30 APP NON BILLABLE TIME 0-30 MINS: Performed by: NURSE PRACTITIONER

## 2021-04-18 PROCEDURE — 97535 SELF CARE MNGMENT TRAINING: CPT

## 2021-04-18 PROCEDURE — APPSS15 APP SPLIT SHARED TIME 0-15 MINUTES: Performed by: NURSE PRACTITIONER

## 2021-04-18 PROCEDURE — 6360000002 HC RX W HCPCS: Performed by: INTERNAL MEDICINE

## 2021-04-18 PROCEDURE — 83880 ASSAY OF NATRIURETIC PEPTIDE: CPT

## 2021-04-18 PROCEDURE — 6370000000 HC RX 637 (ALT 250 FOR IP): Performed by: SURGERY

## 2021-04-18 RX ORDER — FUROSEMIDE 10 MG/ML
20 INJECTION INTRAMUSCULAR; INTRAVENOUS ONCE
Status: COMPLETED | OUTPATIENT
Start: 2021-04-18 | End: 2021-04-18

## 2021-04-18 RX ADMIN — ENOXAPARIN SODIUM 40 MG: 40 INJECTION SUBCUTANEOUS at 16:55

## 2021-04-18 RX ADMIN — ONDANSETRON 4 MG: 2 INJECTION INTRAMUSCULAR; INTRAVENOUS at 22:58

## 2021-04-18 RX ADMIN — PIPERACILLIN AND TAZOBACTAM 3375 MG: 3; .375 INJECTION, POWDER, LYOPHILIZED, FOR SOLUTION INTRAVENOUS at 09:10

## 2021-04-18 RX ADMIN — SPIRONOLACTONE 25 MG: 25 TABLET ORAL at 09:10

## 2021-04-18 RX ADMIN — CARVEDILOL 6.25 MG: 6.25 TABLET, FILM COATED ORAL at 09:10

## 2021-04-18 RX ADMIN — FUROSEMIDE 20 MG: 10 INJECTION, SOLUTION INTRAMUSCULAR; INTRAVENOUS at 12:42

## 2021-04-18 RX ADMIN — FUROSEMIDE 20 MG: 20 TABLET ORAL at 09:10

## 2021-04-18 RX ADMIN — Medication 10 ML: at 09:09

## 2021-04-18 RX ADMIN — PIPERACILLIN AND TAZOBACTAM 3375 MG: 3; .375 INJECTION, POWDER, LYOPHILIZED, FOR SOLUTION INTRAVENOUS at 16:55

## 2021-04-18 RX ADMIN — PANTOPRAZOLE SODIUM 40 MG: 40 INJECTION, POWDER, FOR SOLUTION INTRAVENOUS at 09:09

## 2021-04-18 RX ADMIN — AMIODARONE HYDROCHLORIDE 100 MG: 200 TABLET ORAL at 09:10

## 2021-04-18 RX ADMIN — DEXTROSE MONOHYDRATE 100 MG: 50 INJECTION, SOLUTION INTRAVENOUS at 23:15

## 2021-04-18 RX ADMIN — CARVEDILOL 6.25 MG: 6.25 TABLET, FILM COATED ORAL at 16:55

## 2021-04-18 RX ADMIN — NIFEDIPINE 30 MG: 30 TABLET, FILM COATED, EXTENDED RELEASE ORAL at 09:10

## 2021-04-18 RX ADMIN — PIPERACILLIN AND TAZOBACTAM 3375 MG: 3; .375 INJECTION, POWDER, LYOPHILIZED, FOR SOLUTION INTRAVENOUS at 00:51

## 2021-04-18 ASSESSMENT — PAIN SCALES - GENERAL: PAINLEVEL_OUTOF10: 0

## 2021-04-18 ASSESSMENT — PAIN DESCRIPTION - PROGRESSION: CLINICAL_PROGRESSION: GRADUALLY IMPROVING

## 2021-04-18 NOTE — PROGRESS NOTES
[facility] 82 [facility]ar[facility]        Progress Note    Patient Name: Reford Estimable  MRN: [id number]  YOB: 1965  Date of Evaluation: 2021    Subjective:  No acute events overnight  Pain controlled  No further complains of nausea, no vomiting, tolerating full liquids  Passing flatus and stool, denies bloating  Reports mild SOB  Up to chair at this time    Post-Operative Day #5      Vital Signs:  Patient Vitals for the past 24 hrs:   BP Temp Temp src Pulse Resp SpO2 Weight   21 0900 110/74 97.9 °F (36.6 °C) Oral 70 18 100 % --   21 0547 -- -- -- -- -- -- 199 lb (90.3 kg)   21 0444 108/69 98.1 °F (36.7 °C) Temporal 75 18 99 % --   21 0111 -- -- -- 75 -- -- --   21 0012 -- -- -- -- -- 99 % --   21 0004 125/81 97.8 °F (36.6 °C) Oral 71 16 96 % --   21 2045 118/80 98.2 °F (36.8 °C) Oral 88 18 98 % --   21 1445 125/84 98 °F (36.7 °C) Oral 76 16 100 % --      TEMPERATURE HISTORY 24H: Temp (24hrs), Av °F (36.7 °C), Min:97.8 °F (36.6 °C), Max:98.2 °F (36.8 °C)    BLOOD PRESSURE HISTORY: Systolic (29RRQ), MFP:607 , Min:107 , XMF:955    Diastolic (42QGX), WYZ:43, Min:66, Max:84      Intake/Output:  I/O last 3 completed shifts:   In: 18 [P.O.:1560]  Out: 500 [Urine:400; Drains:100]  I/O this shift:  In: 240 [P.O.:240]  Out: -   Drain/tube Output:  Closed/Suction Drain Right RLQ Bulb 19 Nigerien-Output (ml): 25 ml    Physical Exam:  General: awake, alert, oriented to person, place, time  Lungs: clear to ascultation, unlabored  Cardio: regular rate and rhythm  Abdomen: soft, non-distended, incisional tenderness only, bowel sounds present   Drain: serosanguinous   Skin/Wound: healing well, no drainage, no erythema, well approximated with staples    Labs:  CBC:    Recent Labs     21  0526 21  0436 21  1254 21  0611   WBC 8.6 6.1  --  6.0   HGB 7.6* 7.1* 7.4* 7.9*   HCT 23.1* 22.0* 22.8* 23.5*    298  --  342 BMP:    Recent Labs     04/16/21  0526 04/16/21 1954 04/17/21  0436 04/18/21  0610     --  141 143   K 3.1* 3.5 3.3* 3.3*     --  110 108   CO2 24  --  24 25   BUN 7  --  6* 3*   CREATININE 0.8  --  0.7 0.8   GLUCOSE 102*  --  90 101*     Hepatic:    Recent Labs     04/16/21  0526 04/17/21  0436 04/18/21  0610   AST 19 16 17   ALT 13 12 11   BILITOT 0.5 0.4 0.4   ALKPHOS 44 40 44     Amylase:  No results found for: AMYLASE  Lipase:    Lab Results   Component Value Date    LIPASE 12.0 04/14/2021    LIPASE 35.0 04/09/2021    LIPASE 25.0 04/07/2021      Mag:    Lab Results   Component Value Date    MG 1.90 04/17/2021    MG 2.10 04/15/2021     Phos:     Lab Results   Component Value Date    PHOS 2.4 04/17/2021    PHOS 2.2 04/15/2021      Coags:   Lab Results   Component Value Date    PROTIME 14.8 04/14/2021    INR 1.27 04/14/2021    APTT 30.2 04/14/2021       Cultures:  Anaerobic culture  No results found for: LABANAE  Fungus stain  No results found for requested labs within last 30 days. Gram stain  No results found for requested labs within last 30 days. Organism  No results found for: [facility]  Surgical culture  No results found for: CXSURG  Blood culture 1  Results in Past 30 Days  Result Component Current Result Ref Range Previous Result Ref Range   Blood Culture, Routine No Growth after 4 days of incubation. (4/7/2021)  Not in Time Range      Blood culture 2  Results in Past 30 Days  Result Component Current Result Ref Range Previous Result Ref Range   Culture, Blood 2 No Growth after 4 days of incubation. (4/7/2021)  Not in Time Range      Fecal occult  Results in Past 30 Days  Result Component Current Result Ref Range Previous Result Ref Range   Occult Blood Diagnostic Result: POSITIVE  Normal range: Negative   (A) (4/7/2021)  Not in Time Range      GI bacterial pathogens by PCR  No results found for requested labs within last 30 days.      C. difficile  No results found for requested labs within last 30 days. Urine culture  No results found for: LABURIN    Pathology:  OR 4/13/2021--FINAL DIAGNOSIS:     A. Gallbladder, cholecystectomy:   - Mild chronic cholecystitis with cholelithiasis. B. 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 - Negative for dysplasia or malignancy. COMMENT:  A yellow-white exudate occupies part of the mucosal surface and   microscopic examination reveals fungal elements consistent with Candida   sp. Clinical correlation is recommended.     BUCCA/BUCCA       Preoperative Diagnosis:   Duodenal mass   Postoperative Diagnosis:  Duodenal mass     SPECIMEN:   A.  GALLBLADDER   B.  DUODENAL MASS     GROSS DESCRIPTION:   A. Received in formalin labeled \"Chelsi Lock, gallbladder\" and consist   of an intact gallbladder measuring 8 x 4 x 4 cm.  The serosa is blue,   smooth and glistening.  The wall is 0.1 cm in thickness.  The lumen   contains a large amount of thick green bile and numerous miniscule   gallstones, all measuring less than 0.1 cm in greatest dimension.  The   mucosa is tan-green and flattened.  Representative sections are submitted   in one cassette. B. The specimen is received in formalin labeled \"Chelsi Lock, duodenal   mass\" and consists of a pink-tan to red polypoid structure measuring 3 x   1.3 x 1.3 cm. The outer surface contains focal areas of yellow-white   exudate. Three silver metallic clips are identified near the base of the   polyp and are removed. The margin is inked black and the specimen is   trisected. The cut surfaces are pink-tan and uniform to yellow and   lobulated. The specimen is submitted entirely in 3 cassettes, 1 cross   section per cassette.     Imaging:  I have personally reviewed the following films:    Fl Ugi    Result Date: 4/17/2021  EXAMINATION: SINGLE CONTRAST UPPER GI SERIES 4/16/2021 HISTORY: ORDERING SYSTEM PROVIDED HISTORY: evaluate duodenal repair, water soluble contrast given through NG TECHNOLOGIST PROVIDED HISTORY: Reason for exam:->evaluate duodenal repair, water soluble contrast given through NG Reason for Exam: Evaluate duodenal repair, water soluble contrast given through NG Acuity: Acute Type of Exam: Initial CT abdomen/pelvis 4/8/2021 and intraoperative cholangiogram 4/13/2021. COMPARISON: None. TECHNIQUE: Multiple single contrast images of the esophagus, gastroesophageal junction and stomach were obtained following the oral administration of water soluble contrast FLUOROSCOPY DOSE AND TYPE OR TIME AND EXPOSURES: Fluoroscopic time: 5 minutes. Number of fluoroscopic images obtained:  5. Remaining fluoroscopic images that were obtained were generated using last image hold technique. FINDINGS: The study was initiated with patient in upright position. Water-soluble contrast material was instilled via the patient's nasogastric tube with resultant opacification of the stomach. Air-fluid/fluid level is visualized within the stomach with the upright positioning. There is mild prominence of folds in the region of the pyloric antrum and base of the duodenal bulb. There is overall poor primary peristalsis of the stomach/proximal duodenum. With this in mind, patient was placed in right lateral decubitus position to facilitate movement of contrast material from stomach into proximal small bowel. There is a nonspecific segmental area of luminal narrowing of the immediate post bulbar duodenal sweep which did not significantly change during the examination. The entirety of the duodenal sweep is never well distended throughout the study related to slow movement of contrast material from stomach into duodenum. Evaluation of the duodenal sweep is also limited due to opacification of adjacent contrast filled colon. There is no definite evidence of extravasation of contrast material from the duodenal sweep.      1. Mild prominence of folds in the region of the pyloric antrum and base of duodenal bulb. Finding is nonspecific; however, could be on the basis of gastritis/peptic ulcer disease. 2. Suboptimal evaluation of the duodenal sweep related to poor primary peristalsis with slow movement of contrast material from stomach into proximal small bowel. 3. Nonspecific segmental area of luminal narrowing involving the immediate postbulbar duodenal sweep. 4. No definite evidence of extravasation of contrast material from the duodenal sweep as described above. Scheduled Meds:   furosemide  20 mg Intravenous Once    polyethylene glycol  17 g Oral Daily    docusate sodium  100 mg Oral BID    acetaminophen  650 mg Oral Q6H    pantoprazole  40 mg Intravenous Daily    anidulafungin  100 mg Intravenous Q24H    sodium chloride flush  5-40 mL Intravenous 2 times per day    enoxaparin  40 mg Subcutaneous QPM    amiodarone  100 mg Oral Daily    piperacillin-tazobactam  3,375 mg Intravenous Q8H    atorvastatin  40 mg Oral Daily    furosemide  20 mg Oral Daily    NIFEdipine  30 mg Oral Daily    spironolactone  25 mg Oral Daily    carvedilol  6.25 mg Oral BID WC     Continuous Infusions:   sodium chloride       PRN Meds:.oxyCODONE **OR** oxyCODONE, sodium chloride flush, sodium chloride, ondansetron **OR** ondansetron, phenol, potassium chloride **OR** potassium alternative oral replacement **OR** potassium chloride, polyethylene glycol, acetaminophen **OR** acetaminophen      Assessment:  63 y.o. female admitted with   1. Symptomatic anemia    2. Adequate anticoagulation on anticoagulant therapy    3. Profound fatigue    4. Status post implantation of automatic cardioverter/defibrillator (AICD)    5. Gait instability    6.  Exertional dyspnea        Status-post exploratory laparotomy, cholecystectomy with cholangiogram and excision of duodenal mass on 4/13/2021 for duodenal mass and symptomatic cholelithiasis  Upper GI bleed  Acute blood loss anemia  CHF, EF 30-35%  Atrial fibrillation  Medical coagulopathy, on Xarelto      Plan:  1. FRED drain care and monitoring output  2. Advance to general diet as tolerated, continue PPI; monitor bowel function--passing stool, hemoglobin better, monitor  3. Stop IV hydration; monitor and correct electrolytes  4. Antibiotics per ID  5. Activity as tolerated, ambulate TID, up to chair for meals--PT/OT following  6. Pulmonary toilet, incentive spirometry  7. PRN analgesics and antiemetics--minimizing narcotics as tolerated, transition to PO  8. DVT prophylaxis with Lovenox and SCD's; hold Xarelto  9. Management of medical comorbid etiologies per primary team and consulting services  10. Disposition: Anticipate discharge in the next 24-48 hours from a surgical perspective    EDUCATION:  Educated patient on plan of care and disease process--all questions answered. Plans discussed with patient and nursing. Reviewed and discussed with Dr. Hieu Vazquez.       Signed:  CATRACHITO Gifford - CNP  4/18/2021 10:46 AM     Surg Staff:   Pt seen and examined with NP  See full note above  Doing well  Adv diet today  Monitor FRED in place  Monitor Hb for any further bleeding - appears fine today  Likely DC home tomorrow if luanne po well    Nany Wyatt

## 2021-04-18 NOTE — PROGRESS NOTES
Patient's head to toe assessment complete at this time. VSS. Patient resting comfortably in bed with respirations even and unlabored. Surgical incision clean and dry; left open to air. FRED drain emptied, see flowhseets. Nightly medications given, see MAR for details. Patient independent in room, call out appropriately. No other needs expressed, call light within reach. Will continue to monitor.

## 2021-04-18 NOTE — PROGRESS NOTES
Hospitalist Progress Note      PCP: No primary care provider on file. Date of Admission: 4/7/2021    Chief Complaint: Fatigue    Hospital Course:  Iris Swan is a 63 y.o. female who presented with Complaints of generalized fatigue weak and not feeling well. Patient with complex medical history of CHF A. fib CAD chronic anemia who has been feeling weak and tired. With low hemoglobin. Profound weakness malaise with chest pain and shortness of breath times. Has not felt well for weeks. Patient is letting it could be related to amiodarone that was the new regimen for him. Was seen by cardiology in the office was sent here for further evaluation. Patient has been having intermittent shortness of breath with dyspnea on exertion. No fever chills. Follows Dr. Dada Ramirez for GI patient has had some difficulty with dark coloration of stools and suppressed EGD in next week. Patient is on anticoagulation Eliquis    Subjective: Patient sitting up in chair. In much better spirits today. States she feels much improved and has minimal abdominal discomfort. NG tube has been removed. She is passing gas and has had bowel movements. Tolerating clear liquid tray currently. Patient denied chest pain, shortness of breath, palpitations,, nausea vomiting, diarrhea, dysuria, headache lightheadedness or dizziness. Voiding without difficulty. Reviewed plan of care with patient and fiance, they verbalized understanding and agreement. Denied further questions or needs.         Assessment/Plan:    Symptomatic anemia  GI bleed  -Probably secondary to ulceration overlying lipoma  -Had EGD, found to have a large lesion of the duodenum, underwent 3 clipping, also a large lipoma biopsy  -hgb 7.9 (7.1)    Duodenal mass  Cholelithiasis  POD 5  -Status post  exploratory laparotomy, cholecystectomy with cholangiogram and excision of duodenal mass on 4/13/2021 for duodenal mass and symptomatic cholelithiasis  -Per surgery, NG BID    acetaminophen  650 mg Oral Q6H    pantoprazole  40 mg Intravenous Daily    anidulafungin  100 mg Intravenous Q24H    sodium chloride flush  5-40 mL Intravenous 2 times per day    enoxaparin  40 mg Subcutaneous QPM    amiodarone  100 mg Oral Daily    piperacillin-tazobactam  3,375 mg Intravenous Q8H    atorvastatin  40 mg Oral Daily    furosemide  20 mg Oral Daily    NIFEdipine  30 mg Oral Daily    spironolactone  25 mg Oral Daily    carvedilol  6.25 mg Oral BID      PRN Meds: oxyCODONE **OR** oxyCODONE, sodium chloride flush, sodium chloride, ondansetron **OR** ondansetron, phenol, HYDROmorphone **OR** HYDROmorphone, potassium chloride **OR** potassium alternative oral replacement **OR** potassium chloride, polyethylene glycol, acetaminophen **OR** acetaminophen      Intake/Output Summary (Last 24 hours) at 4/18/2021 1039  Last data filed at 4/18/2021 0907  Gross per 24 hour   Intake 1200 ml   Output 500 ml   Net 700 ml       Physical Exam Performed:    /74   Pulse 70   Temp 97.9 °F (36.6 °C) (Oral)   Resp 18   Ht 5' 6.5\" (1.689 m)   Wt 199 lb (90.3 kg)   SpO2 100%   BMI 31.64 kg/m²     General appearance: No apparent distress, appears stated age and cooperative. HEENT: Pupils equal, round, and reactive to light. Conjunctivae/corneas clear. Neck: Supple, with full range of motion. No jugular venous distention. Trachea midline. Respiratory:  Normal respiratory effort. Clear to auscultation, bilaterally without Rales/Wheezes/Rhonchi. Cardiovascular: Irregular regular rate and rhythm with normal S1/S2 without murmurs, rubs or gallops. Abdomen: Normal active bowel sounds, abdominal tenderness with palpation, large abdominal dressing, FRED drain with serous sanginous fluid  Musculoskeletal: No clubbing, cyanosis, 1+ edema bilaterally. Full range of motion without deformity. Skin: Skin color, texture, turgor normal.  No rashes or lesions.   Neurologic:  Neurovascularly intact without any focal sensory/motor deficits. Psychiatric: Alert and oriented, thought content appropriate, improved affect  Capillary Refill: Brisk,< 3 seconds   Peripheral Pulses: +2 palpable, equal bilaterally       Labs:   Recent Labs     04/16/21  0526 04/17/21  0436 04/17/21  1254 04/18/21  0611   WBC 8.6 6.1  --  6.0   HGB 7.6* 7.1* 7.4* 7.9*   HCT 23.1* 22.0* 22.8* 23.5*    298  --  342     Recent Labs     04/16/21  0526 04/16/21  1954 04/17/21  0436 04/18/21  0610     --  141 143   K 3.1* 3.5 3.3* 3.3*     --  110 108   CO2 24  --  24 25   BUN 7  --  6* 3*   CREATININE 0.8  --  0.7 0.8   CALCIUM 8.6  --  8.4 9.0   PHOS  --   --  2.4*  --      Recent Labs     04/16/21  0526 04/17/21  0436 04/18/21  0610   AST 19 16 17   ALT 13 12 11   BILITOT 0.5 0.4 0.4   ALKPHOS 44 40 44     No results for input(s): INR in the last 72 hours. No results for input(s): Paralee Fontan in the last 72 hours. Urinalysis:      Lab Results   Component Value Date    NITRU Negative 07/10/2020    BLOODU Negative 07/10/2020    SPECGRAV 1.015 07/10/2020    GLUCOSEU Negative 07/10/2020       Radiology:  FL UGI   Final Result   1. Mild prominence of folds in the region of the pyloric antrum and base of   duodenal bulb. Finding is nonspecific; however, could be on the basis of   gastritis/peptic ulcer disease. 2. Suboptimal evaluation of the duodenal sweep related to poor primary   peristalsis with slow movement of contrast material from stomach into   proximal small bowel. 3. Nonspecific segmental area of luminal narrowing involving the immediate   postbulbar duodenal sweep. 4. No definite evidence of extravasation of contrast material from the   duodenal sweep as described above. VL Extremity Venous Right         FL CHOLANGIOGRAM OR   Final Result   Normal intraoperative laparoscopic cholangiogram.  No common duct stone.          XR ABDOMEN (KUB) (SINGLE AP VIEW)   Final Result   Indeterminate intestinal gas pattern secondary to paucity of small bowel gas. CT ABDOMEN PELVIS W IV CONTRAST Additional Contrast? Oral   Final Result   No definitive evidence of a pancreatic mass and no evidence of metastatic   disease or acute abnormality of the abdomen/pelvis. If persistent concern for an occult pancreatic mass consider endoscopic   ultrasound or PET-CT. 3 separate surgical clips placed within bowel lumen near the ligament of   [place] since the comparison. This is presumed to be related to interval GI   bleeding treatment. Fibroid uterus without significant change in appearance from the comparison         XR CHEST PORTABLE   Final Result   No active cardiopulmonary disease               Active Hospital Problems    Diagnosis    Symptomatic anemia [D64.9]    Fatigue [R53.83]         DVT Prophylaxis: Lovenox  Diet: Dietary Nutrition Supplements: Clear Liquid Oral Supplement  DIET FULL LIQUID;  Code Status: Full Code    PT/OT Eval Status: Ordered evaluation    Dispo -home once medically stable with [facility]. Rx for rolling walker placed    CATRACHITO Cooper - CNP      NOTE:  This report was transcribed using voice recognition software. Every effort was made to ensure accuracy; however, inadvertent computerized transcription errors may be present.

## 2021-04-18 NOTE — PROGRESS NOTES
Pt denies any pain refused scheduled tylenol all shift. Pt continent of stool this is soft brown. Pt ambulated in room and hallway independently today. Surgical incision open to air staples approximated FRED drain is cared for by patient. All education effective for home needs. Pt instructed on lovenox injection at this time pt demonstrated to nurse for possible home needs.  Pt up in chair call light in reach

## 2021-04-18 NOTE — PROGRESS NOTES
Patient placed on 2L supplemental oxygen at this time for complaints of SOB. All lung bases are clear, no wheezing or crackles heard. Will continue to monitor.

## 2021-04-18 NOTE — PROGRESS NOTES
restrictions: Ms. Earl Higuera is a 63 y.o. female who presents to ED primarily with fatigue that had become so debilitating she had difficulty ambulating. Progressively worse for the last few weeks. Also notes sharp epigastric abdominal pain that is constant but exacerbated with eating, hunching forward. Adjusting to plant-based diet. Additionally notes nausea, hot flashes, dyspnea, icteric sclera, diarrhea with dark but not black stools. Lost 20 lbs unintentionally but gained back over the last year. Denies fever, chills, chest pain, emesis, dysuria. Anemia is known and workup with Dr. Jeancarlos Prabhakar EGD/colonoscopy 10/2020 was unrevealing. Cholelithiasis also confirmed by prior ultrasound. EGD today showed a bleeding lipoma in second portion of duodenum that was controlled. Abdominal surgical history includes uterine fibroidectomy and tubal ligation. Medical history includes CHF, atrial fibrillation, medical coagulopathy on Xarelto, DERECK, GERD. No personal or family history of colorectal, pancreatic, or gastric malignancy  Subjective   General  Chart Reviewed: Yes  Patient assessed for rehabilitation services?: Yes  Additional Pertinent Hx: PMH: Ms. Earl Higuera is a 63 y.o. female who presents to ED primarily with fatigue that had become so debilitating she had difficulty ambulating. Progressively worse for the last few weeks. Also notes sharp epigastric abdominal pain that is constant but exacerbated with eating, hunching forward. Adjusting to plant-based diet. Additionally notes nausea, hot flashes, dyspnea, icteric sclera, diarrhea with dark but not black stools. Lost 20 lbs unintentionally but gained back over the last year. Denies fever, chills, chest pain, emesis, dysuria. Anemia is known and workup with Dr. Jeancarlos Prabhakar EGD/colonoscopy 10/2020 was unrevealing. Cholelithiasis also confirmed by prior ultrasound. EGD today showed a bleeding lipoma in second portion of duodenum that was controlled.  Abdominal surgical history includes uterine fibroidectomy and tubal ligation. Medical history includes CHF, atrial fibrillation, medical coagulopathy on Xarelto, DERECK, GERD. No personal or family history of colorectal, pancreatic, or gastric malignancy  Family / Caregiver Present: Yes  Referring Practitioner: CATRACHITO Garcia CNP  Diagnosis: Symptomatic anemia  Subjective  Subjective: pt was sitting in chair upon arrival to session, pt pleasant and agreeable to therapy, OT spent time in with pt and family (x5 minutes), pt requested therapist to come back at later time so she could finish eating  Pre Treatment Pain Screening  Comments / Details: pt denies pain but reports a \"pulling and soreness\" in her abdomen  Vital Signs  Patient Currently in Pain: Denies   Orientation  Orientation  Overall Orientation Status: Within Normal Limits  Objective    ADL  Additional Comments: pt declined ADLs on this date        Balance  Sitting Balance: Modified independent   Standing Balance: Modified independent   Standing Balance  Time: 13 minutes  Activity: functional mobility/transfers  Comment: pt declined use of RW on this date, pt with no LOB  Functional Mobility  Activity: Other  Assist Level: Supervision  Functional Mobility Comments: pt was challenged to complete functional mobility a short community distance in hospital hallway ~50 feet; pt reported that her abdomen felt sore and she felt overall weak, but she felt steady on her feet.  No LOB throughout, good awareness of safety when navigating environmental hazards and barriers  Toilet Transfers  Toilet - Technique: Stand step  Equipment Used: Standard toilet  Toilet Transfer: Modified independent  Toilet Transfers Comments: pt with good technique and adherence to abdominal protection strategies  Bed mobility  Rolling to Right: Modified independent  Supine to Sit: Modified independent  Sit to Supine: Modified independent  Scooting: Modified independent  Comment: good adherence to abdominal protection strategies  Transfers  Stand Step Transfers: Supervision  Sit to stand: Modified independent  Stand to sit: Modified independent   Cognition  Overall Cognitive Status: WNL     Perception  Overall Perceptual Status: [facility]      Plan   Plan  Times per week: 3-5x/wk  Times per day: Daily  Current Treatment Recommendations: Strengthening, Balance Training, Functional Mobility Training, Endurance Training, Safety Education & Training, Patient/Caregiver Education & Training, Equipment Evaluation, Education, & procurement, Home Management Training, Self-Care / ADL    AM-PAC Score     AM-PAC Inpatient Daily Activity Raw Score: 19 (04/18/21 1439)  AM-PAC Inpatient ADL T-Scale Score : 40.22 (04/18/21 1439)  ADL Inpatient CMS 0-100% Score: 42.8 (04/18/21 1439)  ADL Inpatient CMS G-Code Modifier : CK (04/18/21 1439)    Goals  Short term goals  Time Frame for Short term goals: d/c  Short term goal 1: Pt will complete toileting at MOD I level -- pt declined toileting 4/18  Short term goal 2: Pt will complete functional transfer to ADL surface at MOD I level -- GOAL MET 4/18  Short term goal 3: Pt will complete functional mobility to<>from bathroomw with LRAD at MOD I level -- supervision level 3/18  Short term goal 4: Pt will complete bed mobility at MOD I level -- GOAL MET 4/18  Short term goal 5: Pt will complete UB/LB ADLs at MOD I level -- pt declined 4/18  Long term goals  Time Frame for Long term goals : LTG=STG  Patient Goals   Patient goals : to return home       Therapy Time   Individual Concurrent Group Co-treatment   Time In 1335         Time Out 1340         Minutes 5         Timed Code Treatment Minutes: 5 Minutes        Individual Concurrent Group Co-treatment   Time In 1401         Time Out 1419         Minutes 18         Timed Code Treatment Minutes: 18 Minutes    Total time: 23 minutes   *second time secondary to pt finishing her lunch and requested OT to come back \"in a little bit\"     Adan Cummings, OTR/L -- [id number]

## 2021-04-19 VITALS
RESPIRATION RATE: 18 BRPM | WEIGHT: 195.3 LBS | TEMPERATURE: 98 F | BODY MASS INDEX: 30.65 KG/M2 | OXYGEN SATURATION: 96 % | DIASTOLIC BLOOD PRESSURE: 72 MMHG | SYSTOLIC BLOOD PRESSURE: 113 MMHG | HEART RATE: 75 BPM | HEIGHT: 67 IN

## 2021-04-19 PROBLEM — E66.09 CLASS 1 OBESITY DUE TO EXCESS CALORIES WITH BODY MASS INDEX (BMI) OF 31.0 TO 31.9 IN ADULT: Status: ACTIVE | Noted: 2019-09-06

## 2021-04-19 LAB
A/G RATIO: 1.8 (ref 1.1–2.2)
ALBUMIN SERPL-MCNC: 3.7 G/DL (ref 3.4–5)
ALP BLD-CCNC: 46 U/L (ref 40–129)
ALT SERPL-CCNC: 12 U/L (ref 10–40)
ANION GAP SERPL CALCULATED.3IONS-SCNC: 14 MMOL/L (ref 3–16)
AST SERPL-CCNC: 23 U/L (ref 15–37)
BASOPHILS ABSOLUTE: 0.1 K/UL (ref 0–0.2)
BASOPHILS RELATIVE PERCENT: 0.8 %
BILIRUB SERPL-MCNC: 0.3 MG/DL (ref 0–1)
BUN BLDV-MCNC: 4 MG/DL (ref 7–20)
CALCIUM SERPL-MCNC: 8.5 MG/DL (ref 8.3–10.6)
CHLORIDE BLD-SCNC: 107 MMOL/L (ref 99–110)
CO2: 21 MMOL/L (ref 21–32)
CREAT SERPL-MCNC: 1 MG/DL (ref 0.6–1.1)
EOSINOPHILS ABSOLUTE: 0.3 K/UL (ref 0–0.6)
EOSINOPHILS RELATIVE PERCENT: 4.8 %
GFR AFRICAN AMERICAN: >60
GFR NON-AFRICAN AMERICAN: 57
GLOBULIN: 2.1 G/DL
GLUCOSE BLD-MCNC: 94 MG/DL (ref 70–99)
HCT VFR BLD CALC: 23.1 % (ref 36–48)
HEMOGLOBIN: 7.5 G/DL (ref 12–16)
LYMPHOCYTES ABSOLUTE: 1 K/UL (ref 1–5.1)
LYMPHOCYTES RELATIVE PERCENT: 16.1 %
MCH RBC QN AUTO: 28.4 PG (ref 26–34)
MCHC RBC AUTO-ENTMCNC: 32.5 G/DL (ref 31–36)
MCV RBC AUTO: 87.6 FL (ref 80–100)
MONOCYTES ABSOLUTE: 0.8 K/UL (ref 0–1.3)
MONOCYTES RELATIVE PERCENT: 11.7 %
NEUTROPHILS ABSOLUTE: 4.3 K/UL (ref 1.7–7.7)
NEUTROPHILS RELATIVE PERCENT: 66.6 %
PDW BLD-RTO: 20.9 % (ref 12.4–15.4)
PLATELET # BLD: 362 K/UL (ref 135–450)
PMV BLD AUTO: 6.7 FL (ref 5–10.5)
POTASSIUM SERPL-SCNC: 3.6 MMOL/L (ref 3.5–5.1)
RBC # BLD: 2.64 M/UL (ref 4–5.2)
REASON FOR REJECTION: NORMAL
REJECTED TEST: NORMAL
SODIUM BLD-SCNC: 142 MMOL/L (ref 136–145)
TOTAL PROTEIN: 5.8 G/DL (ref 6.4–8.2)
WBC # BLD: 6.4 K/UL (ref 4–11)

## 2021-04-19 PROCEDURE — 6370000000 HC RX 637 (ALT 250 FOR IP): Performed by: SURGERY

## 2021-04-19 PROCEDURE — 99024 POSTOP FOLLOW-UP VISIT: CPT | Performed by: SURGERY

## 2021-04-19 PROCEDURE — 6360000002 HC RX W HCPCS: Performed by: SURGERY

## 2021-04-19 PROCEDURE — 80053 COMPREHEN METABOLIC PANEL: CPT

## 2021-04-19 PROCEDURE — 97530 THERAPEUTIC ACTIVITIES: CPT

## 2021-04-19 PROCEDURE — 36415 COLL VENOUS BLD VENIPUNCTURE: CPT

## 2021-04-19 PROCEDURE — 2580000003 HC RX 258: Performed by: SURGERY

## 2021-04-19 PROCEDURE — C9113 INJ PANTOPRAZOLE SODIUM, VIA: HCPCS | Performed by: NURSE PRACTITIONER

## 2021-04-19 PROCEDURE — 6360000002 HC RX W HCPCS: Performed by: NURSE PRACTITIONER

## 2021-04-19 PROCEDURE — 94760 N-INVAS EAR/PLS OXIMETRY 1: CPT

## 2021-04-19 PROCEDURE — 6370000000 HC RX 637 (ALT 250 FOR IP): Performed by: INTERNAL MEDICINE

## 2021-04-19 PROCEDURE — APPSS15 APP SPLIT SHARED TIME 0-15 MINUTES: Performed by: NURSE PRACTITIONER

## 2021-04-19 PROCEDURE — 99232 SBSQ HOSP IP/OBS MODERATE 35: CPT | Performed by: INTERNAL MEDICINE

## 2021-04-19 PROCEDURE — 6370000000 HC RX 637 (ALT 250 FOR IP): Performed by: NURSE PRACTITIONER

## 2021-04-19 PROCEDURE — APPNB30 APP NON BILLABLE TIME 0-30 MINS: Performed by: NURSE PRACTITIONER

## 2021-04-19 PROCEDURE — 85025 COMPLETE CBC W/AUTO DIFF WBC: CPT

## 2021-04-19 RX ORDER — FUROSEMIDE 20 MG/1
20 TABLET ORAL ONCE
Status: COMPLETED | OUTPATIENT
Start: 2021-04-19 | End: 2021-04-19

## 2021-04-19 RX ORDER — CARVEDILOL 6.25 MG/1
6.25 TABLET ORAL 2 TIMES DAILY WITH MEALS
Qty: 60 TABLET | Refills: 0 | Status: SHIPPED | OUTPATIENT
Start: 2021-04-19 | End: 2021-06-11 | Stop reason: SDUPTHER

## 2021-04-19 RX ORDER — AMIODARONE HYDROCHLORIDE 100 MG/1
100 TABLET ORAL EVERY OTHER DAY
Qty: 15 TABLET | Refills: 0 | Status: SHIPPED | OUTPATIENT
Start: 2021-04-19 | End: 2021-05-04 | Stop reason: DRUGHIGH

## 2021-04-19 RX ORDER — POTASSIUM CHLORIDE 20 MEQ/1
20 TABLET, EXTENDED RELEASE ORAL ONCE
Status: COMPLETED | OUTPATIENT
Start: 2021-04-19 | End: 2021-04-19

## 2021-04-19 RX ORDER — FUROSEMIDE 20 MG/1
20 TABLET ORAL DAILY
Qty: 32 TABLET | Refills: 0 | Status: SHIPPED | OUTPATIENT
Start: 2021-04-19 | End: 2021-06-11 | Stop reason: SDUPTHER

## 2021-04-19 RX ORDER — OXYCODONE HYDROCHLORIDE 5 MG/1
5 TABLET ORAL EVERY 6 HOURS PRN
Qty: 12 TABLET | Refills: 0 | Status: SHIPPED | OUTPATIENT
Start: 2021-04-19 | End: 2021-04-22

## 2021-04-19 RX ORDER — POTASSIUM CHLORIDE 20 MEQ/1
20 TABLET, EXTENDED RELEASE ORAL DAILY
Qty: 2 TABLET | Refills: 1 | Status: SHIPPED | OUTPATIENT
Start: 2021-04-20 | End: 2021-05-04 | Stop reason: ALTCHOICE

## 2021-04-19 RX ADMIN — FUROSEMIDE 20 MG: 20 TABLET ORAL at 07:21

## 2021-04-19 RX ADMIN — POTASSIUM CHLORIDE 20 MEQ: 1500 TABLET, EXTENDED RELEASE ORAL at 14:09

## 2021-04-19 RX ADMIN — ACETAMINOPHEN 650 MG: 325 TABLET ORAL at 10:20

## 2021-04-19 RX ADMIN — NIFEDIPINE 30 MG: 30 TABLET, FILM COATED, EXTENDED RELEASE ORAL at 07:45

## 2021-04-19 RX ADMIN — Medication 10 ML: at 07:21

## 2021-04-19 RX ADMIN — FUROSEMIDE 20 MG: 20 TABLET ORAL at 14:09

## 2021-04-19 RX ADMIN — ACETAMINOPHEN 650 MG: 325 TABLET ORAL at 02:13

## 2021-04-19 RX ADMIN — PIPERACILLIN AND TAZOBACTAM 3375 MG: 3; .375 INJECTION, POWDER, LYOPHILIZED, FOR SOLUTION INTRAVENOUS at 01:10

## 2021-04-19 RX ADMIN — PANTOPRAZOLE SODIUM 40 MG: 40 INJECTION, POWDER, FOR SOLUTION INTRAVENOUS at 07:21

## 2021-04-19 RX ADMIN — DOCUSATE SODIUM 100 MG: 100 CAPSULE ORAL at 07:21

## 2021-04-19 RX ADMIN — SPIRONOLACTONE 25 MG: 25 TABLET ORAL at 07:21

## 2021-04-19 RX ADMIN — PIPERACILLIN AND TAZOBACTAM 3375 MG: 3; .375 INJECTION, POWDER, LYOPHILIZED, FOR SOLUTION INTRAVENOUS at 07:45

## 2021-04-19 RX ADMIN — CARVEDILOL 6.25 MG: 6.25 TABLET, FILM COATED ORAL at 07:21

## 2021-04-19 ASSESSMENT — ENCOUNTER SYMPTOMS
PHOTOPHOBIA: 0
COLOR CHANGE: 0
EYE REDNESS: 0
COUGH: 0
STRIDOR: 0
RHINORRHEA: 0
NAUSEA: 0
EYE DISCHARGE: 0
CHOKING: 0
BLOOD IN STOOL: 0
DIARRHEA: 0
APNEA: 0
TROUBLE SWALLOWING: 0
CHEST TIGHTNESS: 0
FACIAL SWELLING: 0
SHORTNESS OF BREATH: 0
ABDOMINAL PAIN: 0

## 2021-04-19 ASSESSMENT — PAIN SCALES - GENERAL
PAINLEVEL_OUTOF10: 0

## 2021-04-19 ASSESSMENT — PAIN DESCRIPTION - PROGRESSION
CLINICAL_PROGRESSION: GRADUALLY IMPROVING

## 2021-04-19 NOTE — PROGRESS NOTES
Physical Therapy  Facility/Department: [address] ONCOLOGY  Daily Treatment Note  NAME: Bam Flores  : 1965  MRN: [id number]    Date of Service: 2021    Discharge Recommendations:  Bam Flores scored a 21/24 on the AM-PAC short mobility form. Current research shows that an AM-PAC score of 18 or greater is typically associated with a discharge to the patient's home setting. Based on the patient's AM-PAC score and their current functional mobility deficits, it is recommended that the patient have 2-3 sessions per week of Physical Therapy at d/c to increase the patient's independence. At this time, this patient demonstrates the endurance and safety to discharge home with home PT and a follow up treatment frequency of 2-3x/wk. Please see assessment section for further patient specific details. If patient discharges prior to next session this note will serve as a discharge summary. Please see below for the latest assessment towards goals. HOME HEALTH CARE: LEVEL 1 STANDARD    - Initial home health evaluation to occur within 24-48 hours, in patient home   - Therapy to evaluate with goal of regaining prior level of functioning   - Therapy to evaluate if patient has [id number] needs for personal care    PT Equipment Recommendations  Equipment Needed: No  Other: currently ambulating without AD    Assessment   Body structures, Functions, Activity limitations: Decreased functional mobility ; Decreased strength;Decreased endurance; Increased pain;Decreased balance  Assessment: Pt progressing well with functional mobility. Ambulating longer distances but at a slower radha due to abdominal discomfort. Navigated a flight of stairs with SBA/supervision but needed brief standing rests after 2-3 steps due to SOB. Continued skilled PT to promote return towards PLOF.   Treatment Diagnosis: decreased strength, functional mobility and endurance  Prognosis: Good  PT Education: Goals;PT Role;Plan of Care  Patient Education: Pt verbalized understanding  Barriers to Learning: none  REQUIRES PT FOLLOW UP: Yes  Activity Tolerance  Activity Tolerance: Patient Tolerated treatment well     Patient Diagnosis(es): The primary encounter diagnosis was Symptomatic anemia. Diagnoses of Adequate anticoagulation on anticoagulant therapy, Profound fatigue, Status post implantation of automatic cardioverter/defibrillator (AICD), Gait instability, and Exertional dyspnea were also pertinent to this visit. has a past medical history of Anemia, Atrial fibrillation and flutter ([address] Atrial flutter ([address] CHB (complete heart block) ([address] Class 1 obesity without serious comorbidity with body mass index (BMI) of 33.0 to 33.9 in adult, Congenital heart disease, GERD (gastroesophageal reflux disease), Headache(784.0), History of complete heart block, Hyperlipidemia, Hypertension, PONV (postoperative nausea and vomiting), Sleep apnea, Syncope, and Systolic CHF, chronic ([address] has a past surgical history that includes Uterine fibroid surgery; Pacemaker insertion (11/29/12); Tubal ligation; Upper gastrointestinal endoscopy (N/A, 10/27/2020); Colonoscopy (N/A, 10/27/2020); Cardiac defibrillator placement (01/2021); Upper gastrointestinal endoscopy (N/A, 4/8/2021); Upper gastrointestinal endoscopy (N/A, 4/8/2021); Upper gastrointestinal endoscopy (N/A, 4/8/2021); and colectomy (N/A, 4/13/2021). Restrictions  Restrictions/Precautions  Restrictions/Precautions: General Precautions(abdominal)  Required Braces or Orthoses?: No  Position Activity Restriction  Other position/activity restrictions: Ms. Virgilio Padilla is a 63 y.o. female who presents to ED primarily with fatigue that had become so debilitating she had difficulty ambulating. Progressively worse for the last few weeks. Also notes sharp epigastric abdominal pain that is constant but exacerbated with eating, hunching forward. Adjusting to plant-based diet.  Additionally notes nausea, hot flashes, dyspnea, icteric sclera, diarrhea with dark but not black stools. Lost 20 lbs unintentionally but gained back over the last year. Denies fever, chills, chest pain, emesis, dysuria. Anemia is known and workup with Dr. Dada Ramirez EGD/colonoscopy 10/2020 was unrevealing. Cholelithiasis also confirmed by prior ultrasound. EGD today showed a bleeding lipoma in second portion of duodenum that was controlled. Abdominal surgical history includes uterine fibroidectomy and tubal ligation. Medical history includes CHF, atrial fibrillation, medical coagulopathy on Xarelto, DERECK, GERD. No personal or family history of colorectal, pancreatic, or gastric malignancy  Subjective   General  Chart Reviewed: Yes  Response To Previous Treatment: Patient with no complaints from previous session.   Family / Caregiver Present: Yes  Subjective  Subjective: agreed to session for stairs, states she has been walking the unit about 3 times per day, no pain reported at rest  General Comment  Comments: sitting in chair at arrival          Orientation     Cognition      Objective   Bed mobility  Comment: not observed  Transfers  Sit to Stand: Modified independent(increased time due to discomfort in abdomen)  Stand to sit: Modified independent  Ambulation  Ambulation?: Yes  Ambulation 1  Surface: level tile  Device: No Device(IV pole initially)  Assistance: Supervision  Quality of Gait: mild med/lat sway, decreased arm swing, slightly guarded due to abdominal discomfort  Gait Deviations: Slow Cathryn  Distance: 200 ft  Stairs/Curb  Stairs?: Yes  Stairs  # Steps : 12  Stairs Height: 6\"  Rails: Bilateral(intermittently used either 1 or 2 railings)  Assistance: Stand by assistance  Comment: varied with step-to and reciprocal pattern, brief pause after 2-3 steps due to SOB reported, no increased pain     Balance  Posture: Good  Sitting - Static: Good  Sitting - Dynamic: Good  Standing - Static: Good;-  Standing - Dynamic: Fair;+

## 2021-04-19 NOTE — DISCHARGE SUMMARY
Hospital Medicine Discharge Summary    Patient ID: Perla [place]      Patient's PCP: No primary care provider on file. Admit Date: 4/7/2021     Discharge Date: 4/19/2021 4/19/2021    Admitting Physician: Ricardo Herrera MD     Discharge Physician: CATRACHITO Gallagher - CNP     Discharge Diagnoses:    Symptomatic anemia  GI bleed  Duodenal mass  Cholelithiasis  A. fib  History of V. tach  Depression  Hypokalemia  History of heart failure reduced EF of 30 to 35%  Mild fluid overload    Hospital Course:  Chelsi Lock is a 63 y.o. female who presented with Complaints of generalized fatigue weak and not feeling well.  Patient with complex medical history of CHF A. fib CAD chronic anemia who has been feeling weak and tired.  With low hemoglobin.  Profound weakness malaise with chest pain and shortness of breath times.  Has not felt well for weeks.  Patient is letting it could be related to amiodarone that was the new regimen for him.  Was seen by cardiology in the office was sent here for further evaluation.  Patient has been having intermittent shortness of breath with dyspnea on exertion.  No fever chills.  Follows Dr. Jordan Polo for GI patient has had some difficulty with dark coloration of stools and suppressed EGD in next week.  Patient is on anticoagulation Eliquis. Patient admitted for further work-up and evaluation. Assessment/Plan:     Symptomatic anemia  GI bleed  -Probably secondary to ulceration overlying lipoma  -Had EGD, found to have a large lesion of the duodenum, underwent 3 clipping, also a large lipoma biopsy  -hgb 7.5 at discharge. Repeat CBC in 3 days. Patient resuming Xarelto at discharge.   She had been on Lovenox 40 mg subcu daily while in hospital  -No signs of active bleeding.     Duodenal mass  Cholelithiasis  POD 6  -Status post  exploratory laparotomy, cholecystectomy with cholangiogram and excision of duodenal mass on 4/13/2021 for duodenal mass and symptomatic cholelithiasis  -Per surgery, NG decompression, 4/16 UGI performed to evaluate duodenal repair- stable, NG removed 4/16  -Patient having stool and flatus  -duodenal mass pathology report showing fungal component, ordered anidulafungin, consult ID. Per ID continue Zosyn and antifungal until her GI tract is functional and able to take oral diet. Do not anticipate antifungal therapy at discharge. Candida on the biopsy is a normal resident brittany but given GI bleed and surgery would continue IV therapy as an inpatient. -ID evaluated today, noted that antibiotics and antifungal not needed at discharge.  -Tolerating regular diet  -Ambulating without difficulty  -Follow-up with Dr. Lazarus Pennant in 2 weeks    A. Fib  Hx of Vtach  -EP consulted, due to GI bleed not a good candidate for long-term anticoagulation. Was on Xarelto at home.  -Plan for [facility] as outpatient to assess for watchman eligibility and further work-up  -Xarelto resumed on discharge  -Reduced amiodarone dose to 100 mg every other day due to it causing her fatigue,    Depression  -Psychiatry was consulted and did evaluate the patient, patient interested in outpatient counseling     Hypokalemia  -replacement ordered  -Resolved     History of heart failure, reduced EF 30 to 35%  Mild fluid overload  -Continue Coreg, dose decreased  -Resume Entresto when blood pressure stable no further intervention needed  -We will need follow-up with heart failure team  -Received IVF  -BNP elevated at 183, on April 7 BNP was 43  -edema to ble improved after lasix 40mg po yesterday, lost 2kg overnight.   -todays weight 88.6 kg (90.3kg) (92.6 kg) (admit weight 89.8kg)  -Lasix increased to 40 mg daily, patient still with increased edema to lower extremities but much improved. We will continue with Lasix 40 mg p.o. daily x2 days and then resume her 20 mg daily dose.   BMP in 3 days.  -Potassium prescribed at discharge to take while she is taking Lasix 40 mg daily  -Patient educated to convenience and continuity at follow-up the following most recent labs are provided:      CBC:    Lab Results   Component Value Date    WBC 6.4 04/19/2021    HGB 7.5 04/19/2021    HCT 23.1 04/19/2021     04/19/2021       Renal:    Lab Results   Component Value Date     04/19/2021    K 3.6 04/19/2021    K 3.7 04/07/2021     04/19/2021    CO2 21 04/19/2021    BUN 4 04/19/2021    CREATININE 1.0 04/19/2021    CALCIUM 8.5 04/19/2021    PHOS 2.4 04/17/2021         Significant Diagnostic Studies    Radiology:   [facility] UGI   Final Result   1. Mild prominence of folds in the region of the pyloric antrum and base of   duodenal bulb. Finding is nonspecific; however, could be on the basis of   gastritis/peptic ulcer disease. 2. Suboptimal evaluation of the duodenal sweep related to poor primary   peristalsis with slow movement of contrast material from stomach into   proximal small bowel. 3. Nonspecific segmental area of luminal narrowing involving the immediate   postbulbar duodenal sweep. 4. No definite evidence of extravasation of contrast material from the   duodenal sweep as described above. VL Extremity Venous Right   Final Result      FL CHOLANGIOGRAM OR   Final Result   Normal intraoperative laparoscopic cholangiogram.  No common duct stone. XR ABDOMEN (KUB) (SINGLE AP VIEW)   Final Result   Indeterminate intestinal gas pattern secondary to paucity of small bowel gas. CT ABDOMEN PELVIS W IV CONTRAST Additional Contrast? Oral   Final Result   No definitive evidence of a pancreatic mass and no evidence of metastatic   disease or acute abnormality of the abdomen/pelvis. If persistent concern for an occult pancreatic mass consider endoscopic   ultrasound or PET-CT. 3 separate surgical clips placed within bowel lumen near the ligament of   [place] since the comparison. This is presumed to be related to interval GI   bleeding treatment.       Fibroid uterus without significant change in appearance from the comparison         XR CHEST PORTABLE   Final Result   No active cardiopulmonary disease         VASCULAR REPORT    (Results Pending)          Consults:     IP CONSULT TO GI  IP CONSULT TO CARDIOLOGY  IP CONSULT TO GENERAL SURGERY  IP CONSULT TO CARDIOLOGY  IP CONSULT TO PSYCHIATRY  IP CONSULT TO HOME CARE NEEDS  IP CONSULT TO INFECTIOUS DISEASES  IP CONSULT TO PSYCHIATRY  IP CONSULT TO SPIRITUAL SERVICES  IP CONSULT TO HOME CARE NEEDS    Disposition: Home    Condition at Discharge: Stable    Discharge Instructions/Follow-up:          Follow up with PCP in 1 week  Follow up with cardiology as directed   Follow up with surgery in 2 weeks  Cbc, bmp in 3 days    Code Status:  Prior     Activity: activity as tolerated    Diet: cardiac diet      Discharge Medications:     Discharge Medication List as of 4/19/2021  2:53 PM           Details   oxyCODONE (ROXICODONE) 5 MG immediate release tablet Take 1 tablet by mouth every 6 hours as needed for Pain for up to 3 days. , Disp-12 tablet, R-0Print      potassium chloride (KLOR-CON M) 20 MEQ extended release tablet Take 1 tablet by mouth daily for 2 days Take while taking lasix 40mg, Disp-2 tablet, R-1Normal      pantoprazole (PROTONIX) 40 MG tablet Take 1 tablet by mouth every morning (before breakfast), Disp-30 tablet, R-3Normal              Details   amiodarone (PACERONE) 100 MG tablet Take 1 tablet by mouth every other day, Disp-15 tablet, R-0Normal      carvedilol (COREG) 6.25 MG tablet Take 1 tablet by mouth 2 times daily (with meals), Disp-60 tablet, R-0Normal      furosemide (LASIX) 20 MG tablet Take 1 tablet by mouth daily Take 40mg daily x 2 days, then 20mg daily, Disp-32 tablet, R-0Normal              Details   NIFEdipine (ADALAT CC) 30 MG extended release tablet Take 1 tablet by mouth daily, Disp-90 tablet, R-3Normal      vitamin D (ERGOCALCIFEROL) 1.25 MG (03363 UT) CAPS capsule Take 1 capsule by mouth once a week, Disp-4 capsule, R-5Normal      sacubitril-valsartan (ENTRESTO) 24-26 MG per tablet Take 0.5 tablets by mouth nightly, Disp-60 tablet, R-0NO PRINT      Multiple Vitamins-Minerals (THERAPEUTIC MULTIVITAMIN-MINERALS) tablet Take 1 tablet by mouth dailyHistorical Med      XARELTO 20 MG TABS tablet TAKE ONE TABLET BY MOUTH DAILY WITH BREAKFAST, Disp-90 tablet, R-3Normal      spironolactone (ALDACTONE) 25 MG tablet Take 1 tablet by mouth daily, Disp-30 tablet, R-2Normal      atorvastatin (LIPITOR) 40 MG tablet Take 1 tablet by mouth daily, Disp-60 tablet, R-1Normal             Time Spent on discharge is more than 30 minutes in the examination, evaluation, counseling and review of medications and discharge plan. Signed: CATRACHITO Barboza CNP   4/19/2021      Thank you No primary care provider on file. for the opportunity to be involved in this patient's care. If you have any questions or concerns please feel free to contact me at [phone]. NOTE:  This report was transcribed using voice recognition software. Every effort was made to ensure accuracy; however, inadvertent computerized transcription errors may be present.

## 2021-04-19 NOTE — PROGRESS NOTES
[facility] home care referral. Spoke with pt and re: home care plan of care/services. Agreeable. Demographic's verified. Aware of discharge with home care. Home care orders sent to [facility].

## 2021-04-19 NOTE — CARE COORDINATION
CM called Tash at [facility] home care and they are unable to take pt's insurance also. CM left vm for Tomasa with [facility] and also spoke to Clarisa with [facility] that pt will be discharging home today and Vianney Saini NP is placing updated home care orders. Patient discharged 4/19/2021 to home with [facility] services .   All discharge needs met per case management     Ector Schwartz RN, BSN  [phone]

## 2021-04-19 NOTE — PROGRESS NOTES
Shift assessment completed. Routine vitals stable. Scheduled medications refused by patient. Patient is awake, alert and oriented. Respirations are easy and unlabored. Patient does not appear to be in distress, no complaints of pain, resting comfortably at this time. Call light within reach.

## 2021-04-19 NOTE — PLAN OF CARE
Nutrition Problem #1: Inadequate oral intake  Intervention: Food and/or Nutrient Delivery: Start Oral Diet, Start Oral Nutrition Supplement(diet per surgery)  Nutritional Goals: Patient will consume >/=50% of all meal trays.
Problem: Infection:  Goal: Will remain free from infection  Description: Will remain free from infection  Outcome: Ongoing     Problem: Infection:  Goal: Will remain free from infection  Description: Will remain free from infection  Outcome: Ongoing     Problem: Safety:  Goal: Free from accidental physical injury  Description: Free from accidental physical injury  4/13/2021 1515 by Leroy Castle RN  Outcome: Ongoing  4/13/2021 0159 by Estefania Clement RN  Outcome: Ongoing  Goal: Free from intentional harm  Description: Free from intentional harm  Outcome: Ongoing     Problem: Safety:  Goal: Free from accidental physical injury  Description: Free from accidental physical injury  4/13/2021 1515 by Leroy Castle RN  Outcome: Ongoing  4/13/2021 0159 by Estefania Clement RN  Outcome: Ongoing  Goal: Free from intentional harm  Description: Free from intentional harm  Outcome: Ongoing     Problem: Daily Care:  Goal: Daily care needs are met  Description: Daily care needs are met  Outcome: Ongoing     Problem: Daily Care:  Goal: Daily care needs are met  Description: Daily care needs are met  Outcome: Ongoing     Problem: OXYGENATION/RESPIRATORY FUNCTION  Goal: Patient will maintain patent airway  Outcome: Ongoing  Goal: Patient will achieve/maintain normal respiratory rate/effort  Description: Respiratory rate and effort will be within normal limits for the patient  Outcome: Ongoing
Problem: Infection:  Goal: Will remain free from infection  Description: Will remain free from infection  Outcome: Ongoing     Problem: Safety:  Goal: Free from accidental physical injury  Description: Free from accidental physical injury  Outcome: Ongoing  Goal: Free from intentional harm  Description: Free from intentional harm  Outcome: Ongoing     Problem: Daily Care:  Goal: Daily care needs are met  Description: Daily care needs are met  Outcome: Ongoing     Problem: Discharge Planning:  Goal: Patients continuum of care needs are met  Description: Patients continuum of care needs are met  Outcome: Ongoing
Problem: Infection:  Goal: Will remain free from infection  Description: Will remain free from infection  Outcome: Ongoing     Problem: Safety:  Goal: Free from accidental physical injury  Description: Free from accidental physical injury  Outcome: Ongoing  Goal: Free from intentional harm  Description: Free from intentional harm  Outcome: Ongoing     Problem: Daily Care:  Goal: Daily care needs are met  Description: Daily care needs are met  Outcome: Ongoing     Problem: Discharge Planning:  Goal: Patients continuum of care needs are met  Description: Patients continuum of care needs are met  Outcome: Ongoing     Problem: Pain:  Goal: Pain level will decrease  Description: Pain level will decrease  Outcome: Ongoing  Goal: Control of acute pain  Description: Control of acute pain  Outcome: Ongoing  Goal: Control of chronic pain  Description: Control of chronic pain  Outcome: Ongoing     Problem: OXYGENATION/RESPIRATORY FUNCTION  Goal: Patient will maintain patent airway  Outcome: Ongoing  Goal: Patient will achieve/maintain normal respiratory rate/effort  Description: Respiratory rate and effort will be within normal limits for the patient  Outcome: Ongoing     Problem: HEMODYNAMIC STATUS  Goal: Patient has stable vital signs and fluid balance  Outcome: Ongoing     Problem: FLUID AND ELECTROLYTE IMBALANCE  Goal: Fluid and electrolyte balance are achieved/maintained  Outcome: Ongoing     Problem: ACTIVITY INTOLERANCE/IMPAIRED MOBILITY  Goal: Mobility/activity is maintained at optimum level for patient  Outcome: Ongoing
Problem: Infection:  Goal: Will remain free from infection  Description: Will remain free from infection  Outcome: Ongoing  Education Provided as followed:  Patient made aware of the tailored interventions falls plan using the TIPS TOOL.       Problem: Safety:  Goal: Free from accidental physical injury  Description: Free from accidental physical injury  Outcome: Ongoing  Goal: Free from intentional harm  Description: Free from intentional harm  Outcome: Ongoing     Problem: Daily Care:  Goal: Daily care needs are met  Description: Daily care needs are met  Outcome: Ongoing
Problem: Musculor/Skeletal Functional Status  Goal: Absence of falls  Outcome: Ongoing  Note: Pt remains free from falls. Safety precautions in place. Bed in lowest position, bed/chair wheels locked, call light with in reach, bedside table in reach. Will continue to monitor.
Problem: Safety:  Goal: Free from accidental physical injury  Description: Free from accidental physical injury  Outcome: Ongoing   Education Provided as followed:  \"Family/Patient made aware of the tailored interventions falls plan using the TIPS TOOL.
and rhythm and no murmur noted  ABDOMEN: soft, non-distended, upper abdominal tenderness noted, voluntary guarding absent, no masses palpated, normal bowel sounds, and hernia absent  Extremities: no edema  SKIN:  no bruising or bleeding    Assessment:  Upper abdominal pain  Upper GI bleeding  Acute blood loss anemia  CHF, EF 30-35%  Atrial fibrillation  Medical coagulopathy, on Xarelto--last dose on 4/6/2021    Plan:  1. Reverse coagulopathy, if rebleeds would recommend IR intervention initially prior to surgical intervention; PPI gtt, GI following  2. CT pancreas protocol to further evaluate for possible lesion/mass   3. NPO  4. Transfusion per primary team, IVF resuscitation; monitor and correct electrolytes  5. Antibiotics--started on Zosyn  6. PRN analgesics and antiemetics--minimizing narcotics as tolerated  7. Hold anticoagulants  8. Management of medical comorbid etiologies per primary team and consulting services    EDUCATION:  Educated patient on plan of care and disease process--all questions answered. Plans discussed with patient and nursing. Reviewed and discussed with Dr. Gurinder Kahn consult to follow.       Signed:  CATRACHITO Rosario CNP  4/8/2021 10:10 AM
functional level  Outcome: Ongoing  Goal: Absence of falls  Outcome: Ongoing
functional level  Outcome: Ongoing  Goal: Absence of falls  Outcome: Ongoing

## 2021-04-19 NOTE — PROGRESS NOTES
[facility]        Progress Note    Patient Name: Zuhair Reyes  MRN: [id number]  YOB: 1965  Date of Evaluation: 2021    Subjective:  No acute events overnight  Pain controlled  No nausea or vomiting, tolerating general diet  Passing flatus and stool, denies bloating  Ambulates without difficulty    Post-Operative Day #6      Vital Signs:  Patient Vitals for the past 24 hrs:   BP Temp Temp src Pulse Resp SpO2 Weight   21 1230 113/72 98 °F (36.7 °C) Oral 75 18 96 % --   21 0718 111/67 97.8 °F (36.6 °C) Oral 75 16 100 % --   21 0701 -- -- -- -- 16 100 % --   21 0630 -- -- -- -- -- -- 195 lb 4.8 oz (88.6 kg)   21 0425 111/64 98 °F (36.7 °C) Oral 75 16 98 % --   21 0001 100/65 97.8 °F (36.6 °C) Oral 76 16 97 % --   21 2052 105/72 97.7 °F (36.5 °C) Oral 83 16 99 % --   21 1645 100/70 98 °F (36.7 °C) Oral 78 16 100 % --      TEMPERATURE HISTORY 24H: Temp (24hrs), Av.9 °F (36.6 °C), Min:97.7 °F (36.5 °C), Max:98 °F (36.7 °C)    BLOOD PRESSURE HISTORY: Systolic (39PTL), MFU:441 , Min:100 , HCC:787    Diastolic (29RTG), KAK:91, Min:64, Max:74      Intake/Output:  I/O last 3 completed shifts:   In: 600 [P.O.:600]  Out: 25 [Drains:25]  I/O this shift:  In: 240 [P.O.:240]  Out: -   Drain/tube Output:  [REMOVED] Closed/Suction Drain Right RLQ Bulb 19 Bengali-Output (ml): 25 ml    Physical Exam:  General: awake, alert, oriented to person, place, time  Lungs: unlabored respirations  Abdomen: soft, non-distended, incisional tenderness only, bowel sounds present   Drain: serosanguinous   Skin/Wound: healing well, no drainage, no erythema, well approximated with staples    Labs:  CBC:    Recent Labs     21  0436 21  1254 21  0611 21  0559   WBC 6.1  --  6.0 6.4   HGB 7.1* 7.4* 7.9* 7.5*   HCT 22.0* 22.8* 23.5* 23.1*     --  342 362     BMP:    Recent Labs     21  0436 21  0610 04/19/21  0456    143 142   K 3.3* 3.3* 3.6    108 107   CO2 24 25 21   BUN 6* 3* 4*   CREATININE 0.7 0.8 1.0   GLUCOSE 90 101* 94     Hepatic:    Recent Labs     04/17/21  0436 04/18/21  0610 04/19/21  0456   AST 16 17 23   ALT 12 11 12   BILITOT 0.4 0.4 0.3   ALKPHOS 40 44 46     Amylase:  No results found for: AMYLASE  Lipase:    Lab Results   Component Value Date    LIPASE 12.0 04/14/2021    LIPASE 35.0 04/09/2021    LIPASE 25.0 04/07/2021      Mag:    Lab Results   Component Value Date    MG 1.90 04/17/2021    MG 2.10 04/15/2021     Phos:     Lab Results   Component Value Date    PHOS 2.4 04/17/2021    PHOS 2.2 04/15/2021      Coags:   Lab Results   Component Value Date    PROTIME 14.8 04/14/2021    INR 1.27 04/14/2021    APTT 30.2 04/14/2021       Cultures:  Anaerobic culture  No results found for: LABANAE  Fungus stain  No results found for requested labs within last 30 days. Gram stain  No results found for requested labs within last 30 days. Organism  No results found for: [facility]  Surgical culture  No results found for: CXSURG  Blood culture 1  Results in Past 30 Days  Result Component Current Result Ref Range Previous Result Ref Range   Blood Culture, Routine No Growth after 4 days of incubation. (4/7/2021)  Not in Time Range      Blood culture 2  Results in Past 30 Days  Result Component Current Result Ref Range Previous Result Ref Range   Culture, Blood 2 No Growth after 4 days of incubation. (4/7/2021)  Not in Time Range      Fecal occult  Results in Past 30 Days  Result Component Current Result Ref Range Previous Result Ref Range   Occult Blood Diagnostic Result: POSITIVE  Normal range: Negative   (A) (4/7/2021)  Not in Time Range      GI bacterial pathogens by PCR  No results found for requested labs within last 30 days. C. difficile  No results found for requested labs within last 30 days.      Urine culture  No results found for: LABURIN    Pathology:  OR 4/13/2021--FINAL DIAGNOSIS:     A. Gallbladder, cholecystectomy:   - Mild chronic cholecystitis with cholelithiasis. B. 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 - Negative for dysplasia or malignancy. COMMENT:  A yellow-white exudate occupies part of the mucosal surface and   microscopic examination reveals fungal elements consistent with Candida   sp. Clinical correlation is recommended.     BUCCA/BUCCA       Preoperative Diagnosis:   Duodenal mass   Postoperative Diagnosis:  Duodenal mass     SPECIMEN:   A.  GALLBLADDER   B.  DUODENAL MASS     GROSS DESCRIPTION:   A. Received in formalin labeled \"Chelsi Lock, gallbladder\" and consist   of an intact gallbladder measuring 8 x 4 x 4 cm.  The serosa is blue,   smooth and glistening.  The wall is 0.1 cm in thickness.  The lumen   contains a large amount of thick green bile and numerous miniscule   gallstones, all measuring less than 0.1 cm in greatest dimension.  The   mucosa is tan-green and flattened.  Representative sections are submitted   in one cassette. B. The specimen is received in formalin labeled \"Chelsi Lock, duodenal   mass\" and consists of a pink-tan to red polypoid structure measuring 3 x   1.3 x 1.3 cm. The outer surface contains focal areas of yellow-white   exudate. Three silver metallic clips are identified near the base of the   polyp and are removed. The margin is inked black and the specimen is   trisected. The cut surfaces are pink-tan and uniform to yellow and   lobulated. The specimen is submitted entirely in 3 cassettes, 1 cross   section per cassette. Imaging:  I have personally reviewed the following films:    No results found.     Scheduled Meds:   polyethylene glycol  17 g Oral Daily    docusate sodium  100 mg Oral BID    acetaminophen  650 mg Oral Q6H    pantoprazole  40 mg Intravenous Daily    anidulafungin  100 mg Intravenous Q24H    sodium chloride flush  5-40 mL Intravenous 2 times per day    enoxaparin  40 mg Subcutaneous QPM    amiodarone  100 mg Oral Daily    piperacillin-tazobactam  3,375 mg Intravenous Q8H    atorvastatin  40 mg Oral Daily    furosemide  20 mg Oral Daily    NIFEdipine  30 mg Oral Daily    spironolactone  25 mg Oral Daily    carvedilol  6.25 mg Oral BID WC     Continuous Infusions:   sodium chloride       PRN Meds:.oxyCODONE **OR** oxyCODONE, sodium chloride flush, sodium chloride, ondansetron **OR** ondansetron, phenol, potassium chloride **OR** potassium alternative oral replacement **OR** potassium chloride, polyethylene glycol, acetaminophen **OR** acetaminophen      Assessment:  63 y.o. female admitted with   1. Symptomatic anemia    2. Adequate anticoagulation on anticoagulant therapy    3. Profound fatigue    4. Status post implantation of automatic cardioverter/defibrillator (AICD)    5. Gait instability    6. Exertional dyspnea    7. Anemia, unspecified type    8. Surgical abdomen    9. Systolic CHF, chronic (HCC)        Status-post exploratory laparotomy, cholecystectomy with cholangiogram and excision of duodenal mass on 4/13/2021 for duodenal mass and symptomatic cholelithiasis  Upper GI bleed  Acute blood loss anemia  CHF, EF 30-35%  Atrial fibrillation  Medical coagulopathy, on Xarelto      Plan:  1. Remove FRED drain  2. General diet as tolerated, continue PPI; monitor bowel function--passing stool, hemoglobin stable  3. Monitor and correct electrolytes  4. Antibiotics per ID  5. Activity as tolerated, ambulate TID, up to chair for meals--PT/OT following  6. Pulmonary toilet, incentive spirometry  7. PRN analgesics and antiemetics--minimizing narcotics as tolerated, transition to PO  8. DVT prophylaxis with Lovenox and SCD's; okay to resume Xarelto from a surgical perspective  9. Management of medical comorbid etiologies per primary team and consulting services  10.  Disposition: Okay for discharge from a surgical perspective; follow up with Dr. Bryanna Oakes in 2 weeks    EDUCATION:  Educated patient on plan of care and disease process--all questions answered. Plans discussed with patient and nursing. Reviewed and discussed with Dr. Bryanna Oakes. Signed:  Guss HolsteinCATRACHITO - CNP  4/19/2021 2:15 PM     I have personally performed a face to face diagnostic evaluation on this patient. I have interviewed and examined the patient and I agree with the assessment above. In summary, my findings and plan are the following:   Ms. Arianna Mtz is a 63 y.o. female who presents with   Status-post exploratory laparotomy, cholecystectomy with cholangiogram and excision of duodenal mass on 4/13/2021 for duodenal mass and symptomatic cholelithiasis  Upper GI bleed  Acute blood loss anemia  CHF, EF 30-35%  Atrial fibrillation  Medical coagulopathy, on Xarelto    Plan:  1. Tolerating diet  2. Monitor bowel function, passing flatus and stool  3. Pain controlled  4. Increase activity as able  5. Antibiotics per ID  6. Remove FRED drain  7. Defer management of remainder of medical comorbidities to primary and consulting teams  8. Discharge planning, may discharge from surgical standpoint    Flavio Oakes MD, FACS  4/19/2021  2:30 PM

## 2021-04-19 NOTE — PROGRESS NOTES
IV ATB completed at this time. Midline removed pt tolerated well. Family to transport home. Discharge orders reviewed and questions answered.

## 2021-04-19 NOTE — PROGRESS NOTES
Infectious Diseases   Progress Note      Admission Date: 4/7/2021  Hospital Day: Hospital Day: 13   Attending: Vneita Delgado MD  Date of service: 4/19/2021     Chief complaint/ Reason for consult:     · Upper GI bleeding, s/p EGD on 4/8/2021  · S/p exploratory laparotomy, cholecystectomy on 4/13/2021  · Duodenal mass s/p excision on 4/13/2021, pathology of the mass indicated benign submucosal lipoma with Candida species  · Abdominal pain for 1 month before admission    Microbiology:        I have reviewed allavailable micro lab data and cultures    · Blood culture (2/2) - collected on 4/7/2021: Negative      Antibiotics and immunizations:       Current antibiotics: All antibiotics and their doses were reviewed by me    Recent Abx Admin                   piperacillin-tazobactam (ZOSYN) 3,375 mg in dextrose 5 % 50 mL IVPB extended infusion (mini-bag) (mg) 3,375 mg New Bag 04/19/21 0745     3,375 mg New Bag  0110     3,375 mg New Bag 04/18/21 1655    anidulafungin (ERAXIS) 100 mg in dextrose 5 % 130 mL IVPB (mg) 100 mg New Bag 04/18/21 2315                  Immunization History: All immunization history was reviewed by me today. There is no immunization history on file for this patient. Known drug allergies: All allergies were reviewed and updated    Allergies   Allergen Reactions    Latex      rash    Codeine      Hives      Lisinopril      cough    Nitroglycerin Hives    Sulfa Antibiotics Nausea Only    Hydralazine      headaches       Social history:     Social History:  All social andepidemiologic history was reviewed and updated by me today as needed. · Tobacco use:   reports that she has never smoked. She has never used smokeless tobacco.  · Alcohol use:   reports no history of alcohol use. · Currently lives in: [address] [place] [address]  ·  reports no history of drug use.          Assessment:     The patient is a 63 y.o. old female who  has a past medical history of Anemia, Atrial to minimize tubes/lines/drains as clinically appropriate to reduce chances of line associated infections. Patient education and counseling:        · The patient was educated in detail about the side-effects of various antibiotics and things to watch for like new rashes, lip swelling, severe reaction, worsening diarrhea, break through fever etc.  · Discussed patient's condition and what to expect. All of the patient's questions were addressed in a satisfactory manner and patient verbalized understanding all instructions. Weight loss counseling:    Extensive weight loss counseling was done. It is important to set a realistic weight loss goal. First goal should be to avoid gaining more weight and staying at current weight (or within 5 percent). People at high risk of developing diabetes who are able to lose 5 percent of their body weight and maintain this weight will reduce their risk of developing diabetes by about 50 percent and reduce their blood pressure. Losing more than 15 percent of  body weight and staying at this weight is an extremely good result, even if you never reach your \"dream\" or \"ideal\" weight. Lifestyle changes including changing eating habits, substituting excess carbohydrates with proteins, stress reduction, using self-help programs like Weight Watchers®, Overeaters Anonymous®, and Take Off Pounds Sensibly (TOPS)© , following DASH diet and increasing exercise or walking briskly daily for half hour to and hour 5-7 days a week was suggested among other measures. Information was given about various weight loss education programs and their websites like www.cdc.gov/healthyweight, www.choosemyplate.gov and www.health.gov/dietaryguidelines/    TIME SPENT TODAY:     - Spent over  26  minutes on visit (including interval history, physical exam, review of data including labs, cultures, imaging, development and implementation of treatment plan and coordination of complex care).  More than 50 percent of this includes face-to-face time spent with the patient for counseling and coordination of care. Thank you for involving me in the care of your patient. I will continue to follow. If you have anyadditional questions, please do not hesitate to contact me. Subjective: Interval history: Interval history was obtained from chart review and patient/ RN. The patient is afebrile. She has been tolerating antibiotics okay. No diarrhea     REVIEW OF SYSTEMS:      Review of Systems   Constitutional: Negative for chills, diaphoresis and unexpected weight change. HENT: Negative for congestion, ear discharge, ear pain, facial swelling, hearing loss, rhinorrhea and trouble swallowing. Eyes: Negative for photophobia, discharge, redness and visual disturbance. Respiratory: Negative for apnea, cough, choking, chest tightness, shortness of breath and stridor. Cardiovascular: Negative for chest pain and palpitations. Gastrointestinal: Negative for abdominal pain, blood in stool, diarrhea and nausea. Endocrine: Negative for polydipsia, polyphagia and polyuria. Genitourinary: Negative for difficulty urinating, dysuria, frequency, hematuria, menstrual problem and vaginal discharge. Musculoskeletal: Negative for arthralgias, joint swelling, myalgias and neck stiffness. Skin: Negative for color change and rash. Allergic/Immunologic: Negative for immunocompromised state. Neurological: Negative for dizziness, seizures, speech difficulty, light-headedness and headaches. Hematological: Negative for adenopathy. Psychiatric/Behavioral: Negative for agitation, hallucinations and suicidal ideas. Past Medical History: All past medical history reviewed today.     Past Medical History:   Diagnosis Date    Anemia     Atrial fibrillation and flutter (HCC)     Atrial flutter (HCC)     CHB (complete heart block) ([facility])     Class 1 obesity without serious comorbidity with body mass index (BMI) of 33.0 °F (36.6 °C) 98 °F (36.7 °C)   TempSrc:   Oral Oral   SpO2:  100% 100% 96%   Weight: 195 lb 4.8 oz (88.6 kg)      Height:           Physical Exam  Vitals signs and nursing note reviewed. Constitutional:       General: She is not in acute distress. Appearance: She is well-developed. She is not diaphoretic. HENT:      Head: Normocephalic. Right Ear: External ear normal.      Left Ear: External ear normal.      Nose: Nose normal.   Eyes:      General: No scleral icterus. Right eye: No discharge. Left eye: No discharge. Conjunctiva/sclera: Conjunctivae normal.      Pupils: Pupils are equal, round, and reactive to light. Neck:      Musculoskeletal: Normal range of motion and neck supple. Cardiovascular:      Rate and Rhythm: Normal rate and regular rhythm. Heart sounds: No murmur. No friction rub. Pulmonary:      Effort: Pulmonary effort is normal.      Breath sounds: No stridor. No wheezing or rales. Chest:      Chest wall: No tenderness. Abdominal:      Palpations: Abdomen is soft. There is no mass. Tenderness: There is no abdominal tenderness. There is no guarding or rebound. Musculoskeletal:         General: No tenderness. Lymphadenopathy:      Cervical: No cervical adenopathy. Skin:     General: Skin is warm and dry. Findings: No erythema or rash. Neurological:      Mental Status: She is alert and oriented to person, place, and time. Motor: No abnormal muscle tone. Psychiatric:         Judgment: Judgment normal.            Lines: All vascular access sites are healthy with no local erythema, discharge or tenderness. Intake and output:    I/O last 3 completed shifts: In: 600 [P.O.:600]  Out: 25 [Drains:25]    Lab Data:   All available labs and old records have been reviewed by me.     CBC:  Recent Labs     04/17/21  0436 04/17/21  1254 04/18/21  0611 04/19/21  0559   WBC 6.1  --  6.0 6.4   RBC 2.47*  --  2.66* 2.64*   HGB 7.1* 7.4* 7.9* 7.5* of the abdomen/pelvis. If persistent concern for an occult pancreatic mass consider endoscopic   ultrasound or PET-CT. 3 separate surgical clips placed within bowel lumen near the ligament of   Treitz since the comparison. This is presumed to be related to interval GI   bleeding treatment. Fibroid uterus without significant change in appearance from the comparison         XR CHEST PORTABLE   Final Result   No active cardiopulmonary disease             Medications: All current and past medications were reviewed.  polyethylene glycol  17 g Oral Daily    docusate sodium  100 mg Oral BID    acetaminophen  650 mg Oral Q6H    pantoprazole  40 mg Intravenous Daily    sodium chloride flush  5-40 mL Intravenous 2 times per day    enoxaparin  40 mg Subcutaneous QPM    amiodarone  100 mg Oral Daily    atorvastatin  40 mg Oral Daily    furosemide  20 mg Oral Daily    NIFEdipine  30 mg Oral Daily    spironolactone  25 mg Oral Daily    carvedilol  6.25 mg Oral BID WC        sodium chloride         oxyCODONE **OR** oxyCODONE, sodium chloride flush, sodium chloride, ondansetron **OR** ondansetron, phenol, potassium chloride **OR** potassium alternative oral replacement **OR** potassium chloride, polyethylene glycol, acetaminophen **OR** acetaminophen      Problem list:       Patient Active Problem List   Diagnosis Code    HTN (hypertension) I10    Other and unspecified hyperlipidemia E78.5    Congenital heart block Q24.6    Dyspnea on exertion R06.00    Headache R51.9    LVH (left ventricular hypertrophy) I51.7    Persistent atrial fibrillation (HCC) I48.19    Chest pain Y34.9    Systolic CHF, chronic (HCC) I50.22    Hypersomnia G47.10    Obstructive sleep apnea (adult) (pediatric) G47.33    LV dysfunction I51.9    Left subclavian vein thrombosis (HCC) I82. B12    Dilated cardiomyopathy (HCC) I42.0    Class 1 obesity due to excess calories with body mass index (BMI) of 31.0 to 31.9 in adult E66.09, Z68.31    Paroxysmal ventricular tachycardia (HCC) I47.2    Status post implantation of automatic cardioverter/defibrillator (AICD) Z95.810    Exertional dyspnea R06.00    Iron deficiency anemia D50.9    Profound fatigue R53.83    Anemia D64.9    Gait instability R26.81    Adequate anticoagulation on anticoagulant therapy Z79.01    Duodenal mass K31.89    Weight loss counseling, encounter for Z71.3       Please note that this chart was generated using Dragon dictation software. Although every effort was made to ensure the accuracy of this automated transcription, some errors in transcription may have occurred inadvertently. If you may need any clarification, please do not hesitate to contact me through EPIC or at the phone number provided below with my electronic signature. Any pictures or media included in this note were obtained after taking informed verbal consent from the patient and with their approval to include those in the patient's medical record.     Ann Bernstein MD, MPH  4/19/2021 , 2:26 PM   [facility]   [address] [place] [address], [address] [place] [address]  Office: [phone]  Fax: [phone]  Clinic days:  Tuesday & Thursday

## 2021-04-20 ENCOUNTER — TELEPHONE (OUTPATIENT)
Dept: CARDIOLOGY CLINIC | Age: 56
End: 2021-04-20

## 2021-04-20 NOTE — TELEPHONE ENCOUNTER
Was in hospital 11 days and just got home yesterday. She was taken off a lot of her meds . Is she supposed to take her entresto ? She thinks [facility] told her she was going to stop taking xarelto but doesn't know what she is supposed to be taking in its place .  Please call to advise

## 2021-04-20 NOTE — TELEPHONE ENCOUNTER
She should get blood work later this week by Friday. CBC/CMP. I will defer to HF to address Entresto which was not resumed due to hypotension. Will follow up if hgb improved to >8 then we can follow up with surgery about resuming AC. DO you want to see her sooner?     Hollie Camops, APRN-CNP

## 2021-04-20 NOTE — TELEPHONE ENCOUNTER
Schedule her an appt with me  May 5th add to 1215  Ask her to keep a log of her BP and bring with her  Hold on entresto for now

## 2021-04-21 ENCOUNTER — TELEPHONE (OUTPATIENT)
Dept: CARDIOLOGY CLINIC | Age: 56
End: 2021-04-21

## 2021-04-21 NOTE — TELEPHONE ENCOUNTER
We received remote transmission from patient's monitor at home. Transmission shows normal sensing and pacing function. EP will review. See interrogation under cardiology tab in the [address] [facility] [address] field for more details.

## 2021-04-21 NOTE — TELEPHONE ENCOUNTER
She just sent a phone transmission because between 3pm and 3:15 she had some chest discomfort. Could someone look at her transmission then call her ?

## 2021-04-23 PROCEDURE — 93284 PRGRMG EVAL IMPLANTABLE DFB: CPT | Performed by: INTERNAL MEDICINE

## 2021-04-26 ENCOUNTER — TELEPHONE (OUTPATIENT)
Dept: SURGERY | Age: 56
End: 2021-04-26

## 2021-04-26 NOTE — TELEPHONE ENCOUNTER
Snow Donovan -home care nurse called this afternoon stating that pt's incision is draining a milky discharge. No other sx. She is scheduled to come into the office to see Dr. Sai Jackson tomorrow. I instructed her to have the pt clean the site and keep covered, change dressing as needed and we will address it tomorrow at her appointment.

## 2021-04-27 ENCOUNTER — OFFICE VISIT (OUTPATIENT)
Dept: SURGERY | Age: 56
End: 2021-04-27

## 2021-04-27 VITALS — SYSTOLIC BLOOD PRESSURE: 118 MMHG | WEIGHT: 197 LBS | BODY MASS INDEX: 31.32 KG/M2 | DIASTOLIC BLOOD PRESSURE: 68 MMHG

## 2021-04-27 DIAGNOSIS — Z09 SURGERY FOLLOW-UP: Primary | ICD-10-CM

## 2021-04-27 PROCEDURE — 99024 POSTOP FOLLOW-UP VISIT: CPT | Performed by: SURGERY

## 2021-04-27 RX ORDER — AMOXICILLIN AND CLAVULANATE POTASSIUM 875; 125 MG/1; MG/1
1 TABLET, FILM COATED ORAL 2 TIMES DAILY
Qty: 20 TABLET | Refills: 0 | Status: SHIPPED | OUTPATIENT
Start: 2021-04-27 | End: 2021-05-07

## 2021-04-27 NOTE — PATIENT INSTRUCTIONS
Start oral antibiotics  Local wound care with dry gauze daily as needed  May shower normally, avoid scrubbing over incision  No heavy lifting over 20lbs for 6 weeks total postop  Diet and activity as tolerated otherwise  Follow up with general surgery office 1 week for wound check and staple removal

## 2021-04-27 NOTE — PROGRESS NOTES
[facility]  SUBJECTIVE:    Venita Ruff   1965   63 y.o. female presents for routine postoperative followup after exploratory laparotomy, cholecystectomy with cholangiogram and excision of benign duodenal mass on 4/13/2021 for upper GI bleeding and symptomatic cholelithiasis. Incisional pain is improving, no longer requiring narcotics. Tolerating diet and bowel movements normalizing.  Notes some small drainage around upper aspect of incision    Past Medical History:   Diagnosis Date    Anemia     Atrial fibrillation and flutter (HCC)     Atrial flutter (HCC)     CHB (complete heart block) (HCC)     Class 1 obesity without serious comorbidity with body mass index (BMI) of 33.0 to 33.9 in adult 9/6/2019    Congenital heart disease     GERD (gastroesophageal reflux disease)     Headache(784.0)     History of complete heart block     Hyperlipidemia     Hypertension     PONV (postoperative nausea and vomiting)     Sleep apnea     uses CPAP    Syncope     Systolic CHF, chronic ([facility] [address] 10/3/2018     Past Surgical History:   Procedure Laterality Date    CARDIAC DEFIBRILLATOR PLACEMENT  01/2021    [employer]tronic    COLECTOMY N/A 4/13/2021    EXPLORATORY LAPAROTOMY, RESECTION OF DUODENAL MASS, CHOLECYSTECTOMY WITH INTRAOPERATIVE CHOLANGIOGRAM performed by Lyndsey Swan MD at [place] [address] COLONOSCOPY N/A 10/27/2020    COLONOSCOPY POLYPECTOMY SNARE/COLD BIOPSY performed by Nir Gee MD at [place] [address]  11/29/12    dual chamber PPM, [employer]tronic    TUBAL LIGATION      UPPER GASTROINTESTINAL ENDOSCOPY N/A 10/27/2020    EGD DIAGNOSTIC ONLY performed by Nir Gee MD at  [facility] N/A 4/8/2021    EGD CONTROL HEMORRHAGE WITH APPLICATION OF 3 CLIPS TO DUODENAL MASS performed by Sandra Simons MD at [address] N/A 4/8/2021    EGD BIOPSY DUODENAL MASS performed by Frances Julien MD at [address] [place] [address] N/A 4/8/2021    EGD SUBMUCOSAL INJECTION OF 0.6 MG OF EPINEPRINE INTO BASE OF DUODENAL MASS performed by Frances Julien MD at [facility] History     Socioeconomic History    Marital status:      Spouse name: Not on file    Number of children: 3    Years of education: Not on file    Highest education level: Not on file   Occupational History    Not on file   Social Needs    Financial resource strain: Not on file    Food insecurity     Worry: Not on file     Inability: Not on file    Transportation needs     Medical: Not on file     Non-medical: Not on file   Tobacco Use    Smoking status: Never Smoker    Smokeless tobacco: Never Used   Substance and Sexual Activity    Alcohol use: No    Drug use: No    Sexual activity: Yes     Partners: Male   Lifestyle    Physical activity     Days per week: Not on file     Minutes per session: Not on file    Stress: Not on file   Relationships    Social connections     Talks on phone: Not on file     Gets together: Not on file     Attends Evangelical service: Not on file     Active member of club or organization: Not on file     Attends meetings of clubs or organizations: Not on file     Relationship status: Not on file    Intimate partner violence     Fear of current or ex partner: Not on file     Emotionally abused: Not on file     Physically abused: Not on file     Forced sexual activity: Not on file   Other Topics Concern    Not on file   Social History Narrative    Not on file      Family History   Problem Relation Age of Onset    Cancer Father     Heart Disease Neg Hx     High Blood Pressure Neg Hx     High Cholesterol Neg Hx      Current Outpatient Medications   Medication Sig Dispense Refill    amiodarone (PACERONE) 100 MG tablet Take 1 tablet by mouth every other day 15 tablet 0    carvedilol Outpatient Visit on 03/17/2021   Component Date Value Ref Range Status    WBC 03/17/2021 4.2  4.0 - 11.0 K/uL Final    RBC 03/17/2021 3.84* 4.00 - 5.20 M/uL Final    Hemoglobin 03/17/2021 11.6* 12.0 - 16.0 g/dL Final    Hematocrit 03/17/2021 34.8* 36.0 - 48.0 % Final    MCV 03/17/2021 90.7  80.0 - 100.0 fL Final    MCH 03/17/2021 30.2  26.0 - 34.0 pg Final    MCHC 03/17/2021 33.3  31.0 - 36.0 g/dL Final    RDW 03/17/2021 16.0* 12.4 - 15.4 % Final    Platelets 34/25/9700 270  135 - 450 K/uL Final    MPV 03/17/2021 8.1  5.0 - 10.5 fL Final    Sodium 03/17/2021 143  136 - 145 mmol/L Final    Potassium 03/17/2021 3.8  3.5 - 5.1 mmol/L Final    Chloride 03/17/2021 105  99 - 110 mmol/L Final    CO2 03/17/2021 27  21 - 32 mmol/L Final    Anion Gap 03/17/2021 11  3 - 16 Final    Glucose 03/17/2021 94  70 - 99 mg/dL Final    BUN 03/17/2021 7  7 - 20 mg/dL Final    CREATININE 03/17/2021 0.9  0.6 - 1.1 mg/dL Final    GFR Non- 03/17/2021 >60  >60 Final    Comment: >60 mL/min/1.73m2 EGFR, calc. for ages 25 and older using the  MDRD formula (not corrected for weight), is valid for stable  renal function.  GFR  03/17/2021 >60  >60 Final    Comment: Chronic Kidney Disease: less than 60 ml/min/1.73 sq.m. Kidney Failure: less than 15 ml/min/1.73 sq.m. Results valid for patients 18 years and older.  Calcium 03/17/2021 9.3  8.3 - 10.6 mg/dL Final    Pro-BNP 03/17/2021 97  0 - 124 pg/mL Final    Comment: Methodology by NT-proBNP    An age-independent cutoff point of 300 pg/ml has a 98%  negative predictive value excluding acute heart failure. Values exceeding the age-related cutoff values (450 pg/mL if  age<50, 900 if 50-75 and 1800 if >75) has 90% sensitivity and  84% specificity for diagnosing acute HF. In patients with  renal compromise (eGFR<60) values greater than 1200pg/ml have  a diagnostic sensitivity and specificity of 89% and 72% for  acute HF.       Vit D, 25-Hydroxy 03/17/2021 18.4* >=30 ng/mL Final    Comment: <=20 ng/mL. ........... Sueellen Payment Deficient  21-29 ng/mL. ......... Sueellen Payment Insufficient  >=30 ng/mL. ........ Sueellen Payment Sufficient         Xr Abdomen (kub) (single Ap View)    Result Date: 4/12/2021  EXAMINATION: ONE SUPINE XRAY VIEW(S) OF THE ABDOMEN 4/12/2021 9:02 pm COMPARISON: None. HISTORY: ORDERING SYSTEM PROVIDED HISTORY: followup duodenal clips TECHNOLOGIST PROVIDED HISTORY: Reason for exam:->followup duodenal clips Reason for Exam: f/u duodenal clips Acuity: Unknown Type of Exam: Unknown FINDINGS: Multiple surgical clips overlie the upper mid abdomen. There is residual contrast in the descending and sigmoid colon. There is an overall paucity of small bowel gas. Indeterminate intestinal gas pattern secondary to paucity of small bowel gas. Ct Abdomen Pelvis W Iv Contrast Additional Contrast? Oral    Result Date: 4/8/2021  EXAMINATION: CT OF THE ABDOMEN AND PELVIS WITH CONTRAST 4/8/2021 6:05 pm TECHNIQUE: CT of the abdomen and pelvis was performed with the administration of intravenous contrast. Multiplanar reformatted images are provided for review. Dose modulation, iterative reconstruction, and/or weight based adjustment of the mA/kV was utilized to reduce the radiation dose to as low as reasonably achievable. COMPARISON: CT abdomen and pelvis July 10, 2020 HISTORY: ORDERING SYSTEM PROVIDED HISTORY: Pancreas protocol, possible pancreatic mass/lesion in 2nd portion duodenum, near ampulla? TECHNOLOGIST PROVIDED HISTORY: Reason for exam:->Pancreas protocol, possible pancreatic mass/lesion in 2nd portion duodenum, near ampulla? Additional Contrast?->Oral Reason for Exam: Pancreas protocol, possible pancreatic mass/lesion in 2nd portion duodenum, near ampulla? Acuity: Acute Type of Exam: Initial FINDINGS: Lower chest: No acute findings. No evidence of metastatic disease of the visualized lower chest. Organs: No definitive pancreatic mass is demonstrated.   This includes the area of the 2nd duodenum near the ampulla. No acute findings or evidence of metastatic disease of the liver or other organs throughout the abdomen. GI/Bowel: There are 3 new surgical clips in the proximal jejunum/distal duodenum location at the ligament Treitz location. No acute findings of bowel throughout the abdomen and pelvis. No obstruction. No evidence of colitis. Pelvis: No evidence of metastatic disease. No acute findings. Normal appearance of the bladder. Fibroid uterus again noted, no significant change. Normal appearance of the bladder. Peritoneum/Retroperitoneum: Normal size lymph nodes. No ascites. No acute findings. Bones/Soft Tissues: No suspicious osteoblastic bone lesions and no definitive suspicious osteolytic bone lesions. No acute osseous findings. No definitive evidence of a pancreatic mass and no evidence of metastatic disease or acute abnormality of the abdomen/pelvis. If persistent concern for an occult pancreatic mass consider endoscopic ultrasound or PET-CT. 3 separate surgical clips placed within bowel lumen near the ligament of Treitz since the comparison. This is presumed to be related to interval GI bleeding treatment. Fibroid uterus without significant change in appearance from the comparison     Xr Chest Portable    Result Date: 4/7/2021  EXAMINATION: ONE XRAY VIEW OF THE CHEST 4/7/2021 9:53 am COMPARISON: 02/08/2021 HISTORY: ORDERING SYSTEM PROVIDED HISTORY: CP TECHNOLOGIST PROVIDED HISTORY: Reason for exam:->CP Reason for Exam: Fatigue (Reports taking medication for her heart, believes it to be \"amiodarone\" since January that makes her feel weak. ) Acuity: Unknown Type of Exam: Unknown FINDINGS: Left subclavian triple lead pacer noted. Cardiomegaly. Pulmonary vasculature within normal limits. Lungs clear.   Costophrenic angles sharp     No active cardiopulmonary disease     Vl Extremity Venous Right    Result Date: 4/19/2021  Lower Extremities DVT Study  Demographics +------------------------+----------+---------------+----------+ ! Common Femoral          !Yes       ! Yes            ! None      ! +------------------------+----------+---------------+----------+ ! Prox Femoral            !Yes       ! Yes            ! None      ! +------------------------+----------+---------------+----------+ ! Mid Femoral             !Yes       ! Yes            ! None      ! +------------------------+----------+---------------+----------+ ! Dist Femoral            !Yes       ! Yes            ! None      ! +------------------------+----------+---------------+----------+ ! Deep Femoral            !Yes       ! Yes            ! None      ! +------------------------+----------+---------------+----------+ ! Popliteal               !Yes       ! Yes            ! None      ! +------------------------+----------+---------------+----------+ ! GSV Below Knee          ! Yes       ! Yes            ! None      ! +------------------------+----------+---------------+----------+ ! Gastroc                 ! Yes       ! Yes            ! None      ! +------------------------+----------+---------------+----------+ ! Soleal                  !Yes       ! Yes            ! None      ! +------------------------+----------+---------------+----------+ ! PTV                     ! Yes       ! Yes            ! None      ! +------------------------+----------+---------------+----------+ ! Peroneal                !Yes       ! Yes            ! None      ! +------------------------+----------+---------------+----------+ ! GSV Calf                ! Yes       ! Yes            ! None      ! +------------------------+----------+---------------+----------+ ! SSV                     ! Yes       ! Yes            ! None      ! +------------------------+----------+---------------+----------+ Right Doppler Measurements +------------------------+------+------+------------+ ! Location                ! Signal!Reflux! Reflux (sec)!  +------------------------+------+------+------------+ duodenal repair, water soluble contrast given through NG TECHNOLOGIST PROVIDED HISTORY: Reason for exam:->evaluate duodenal repair, water soluble contrast given through NG Reason for Exam: Evaluate duodenal repair, water soluble contrast given through NG Acuity: Acute Type of Exam: Initial CT abdomen/pelvis 4/8/2021 and intraoperative cholangiogram 4/13/2021. COMPARISON: None. TECHNIQUE: Multiple single contrast images of the esophagus, gastroesophageal junction and stomach were obtained following the oral administration of water soluble contrast FLUOROSCOPY DOSE AND TYPE OR TIME AND EXPOSURES: Fluoroscopic time: 5 minutes. Number of fluoroscopic images obtained:  5. Remaining fluoroscopic images that were obtained were generated using last image hold technique. FINDINGS: The study was initiated with patient in upright position. Water-soluble contrast material was instilled via the patient's nasogastric tube with resultant opacification of the stomach. Air-fluid/fluid level is visualized within the stomach with the upright positioning. There is mild prominence of folds in the region of the pyloric antrum and base of the duodenal bulb. There is overall poor primary peristalsis of the stomach/proximal duodenum. With this in mind, patient was placed in right lateral decubitus position to facilitate movement of contrast material from stomach into proximal small bowel. There is a nonspecific segmental area of luminal narrowing of the immediate post bulbar duodenal sweep which did not significantly change during the examination. The entirety of the duodenal sweep is never well distended throughout the study related to slow movement of contrast material from stomach into duodenum. Evaluation of the duodenal sweep is also limited due to opacification of adjacent contrast filled colon. There is no definite evidence of extravasation of contrast material from the duodenal sweep.      1. Mild prominence of folds in the region of the pyloric antrum and base of duodenal bulb. Finding is nonspecific; however, could be on the basis of gastritis/peptic ulcer disease. 2. Suboptimal evaluation of the duodenal sweep related to poor primary peristalsis with slow movement of contrast material from stomach into proximal small bowel. 3. Nonspecific segmental area of luminal narrowing involving the immediate postbulbar duodenal sweep. 4. No definite evidence of extravasation of contrast material from the duodenal sweep as described above. Pathology:  OR 4/13/2021--FINAL DIAGNOSIS:     A. Gallbladder, cholecystectomy:   - Mild chronic cholecystitis with cholelithiasis. B. 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 - Negative for dysplasia or malignancy. COMMENT:  A yellow-white exudate occupies part of the mucosal surface and   microscopic examination reveals fungal elements consistent with Candida   sp. Clinical correlation is recommended. BUCCA/BUCCA       Assessment:  exploratory laparotomy, cholecystectomy with cholangiogram and excision of benign duodenal mass on 4/13/2021 for upper GI bleeding and symptomatic cholelithiasis    Plan:  Start oral antibiotics  Local wound care with dry gauze daily as needed  May shower normally, avoid scrubbing over incision  No heavy lifting over 20lbs for 6 weeks total postop  Diet and activity as tolerated otherwise  Follow up with general surgery office 1 week for wound check and staple removal    Flavio Burnette MD, FACS  4/27/2021  1:48 PM

## 2021-04-29 LAB
ANION GAP SERPL CALCULATED.3IONS-SCNC: 15 MMOL/L (ref 3–16)
BASOPHILS ABSOLUTE: 0 K/UL (ref 0–0.2)
BASOPHILS RELATIVE PERCENT: 0.7 %
BUN BLDV-MCNC: 6 MG/DL (ref 7–20)
CALCIUM SERPL-MCNC: 9 MG/DL (ref 8.3–10.6)
CHLORIDE BLD-SCNC: 103 MMOL/L (ref 99–110)
CO2: 25 MMOL/L (ref 21–32)
CREAT SERPL-MCNC: 0.7 MG/DL (ref 0.6–1.1)
EOSINOPHILS ABSOLUTE: 0.3 K/UL (ref 0–0.6)
EOSINOPHILS RELATIVE PERCENT: 5.5 %
GFR AFRICAN AMERICAN: >60
GFR NON-AFRICAN AMERICAN: >60
GLUCOSE BLD-MCNC: 72 MG/DL (ref 70–99)
HCT VFR BLD CALC: 25.5 % (ref 36–48)
HEMOGLOBIN: 8.5 G/DL (ref 12–16)
LYMPHOCYTES ABSOLUTE: 1 K/UL (ref 1–5.1)
LYMPHOCYTES RELATIVE PERCENT: 18.2 %
MCH RBC QN AUTO: 29.6 PG (ref 26–34)
MCHC RBC AUTO-ENTMCNC: 33.3 G/DL (ref 31–36)
MCV RBC AUTO: 88.9 FL (ref 80–100)
MONOCYTES ABSOLUTE: 0.5 K/UL (ref 0–1.3)
MONOCYTES RELATIVE PERCENT: 8.9 %
NEUTROPHILS ABSOLUTE: 3.6 K/UL (ref 1.7–7.7)
NEUTROPHILS RELATIVE PERCENT: 66.7 %
PDW BLD-RTO: 20.8 % (ref 12.4–15.4)
PLATELET # BLD: 371 K/UL (ref 135–450)
PMV BLD AUTO: 7 FL (ref 5–10.5)
POTASSIUM SERPL-SCNC: 3.7 MMOL/L (ref 3.5–5.1)
RBC # BLD: 2.87 M/UL (ref 4–5.2)
SODIUM BLD-SCNC: 143 MMOL/L (ref 136–145)
WBC # BLD: 5.4 K/UL (ref 4–11)

## 2021-05-03 ENCOUNTER — OFFICE VISIT (OUTPATIENT)
Dept: SURGERY | Age: 56
End: 2021-05-03

## 2021-05-03 VITALS — SYSTOLIC BLOOD PRESSURE: 122 MMHG | DIASTOLIC BLOOD PRESSURE: 80 MMHG | BODY MASS INDEX: 31.8 KG/M2 | WEIGHT: 200 LBS

## 2021-05-03 DIAGNOSIS — Z48.89 ENCOUNTER FOR POSTOPERATIVE CARE: Primary | ICD-10-CM

## 2021-05-03 PROCEDURE — 99024 POSTOP FOLLOW-UP VISIT: CPT | Performed by: SURGERY

## 2021-05-03 NOTE — PROGRESS NOTES
[facility]  SUBJECTIVE:    Deliah Heading   1965   63 y.o. female presents for routine postoperative followup after:  exploratory laparotomy, cholecystectomy with cholangiogram and excision of benign duodenal mass on 4/13/2021 for upper GI bleeding and symptomatic cholelithiasis. Incisional pain much improved. Wound much improved. Tolerating diet. Bowel movements normalizing.      Past Medical History:   Diagnosis Date    Anemia     Atrial fibrillation and flutter (HCC)     Atrial flutter (HCC)     CHB (complete heart block) (HCC)     Class 1 obesity without serious comorbidity with body mass index (BMI) of 33.0 to 33.9 in adult 9/6/2019    Congenital heart disease     GERD (gastroesophageal reflux disease)     Headache(784.0)     History of complete heart block     Hyperlipidemia     Hypertension     PONV (postoperative nausea and vomiting)     Sleep apnea     uses CPAP    Syncope     Systolic CHF, chronic ([facility] [address] 10/3/2018     Past Surgical History:   Procedure Laterality Date    CARDIAC DEFIBRILLATOR PLACEMENT  01/2021    Medtronic    COLECTOMY N/A 4/13/2021    EXPLORATORY LAPAROTOMY, RESECTION OF DUODENAL MASS, CHOLECYSTECTOMY WITH INTRAOPERATIVE CHOLANGIOGRAM performed by Jacquelyn Drummond MD at [place] [address] COLONOSCOPY N/A 10/27/2020    COLONOSCOPY POLYPECTOMY SNARE/COLD BIOPSY performed by Alec Ramirez MD at [place] [address]  11/29/12    dual chamber PPM, [employer]tronic    TUBAL LIGATION      UPPER GASTROINTESTINAL ENDOSCOPY N/A 10/27/2020    EGD DIAGNOSTIC ONLY performed by Alec Ramirez MD at  [facility] N/A 4/8/2021    EGD CONTROL HEMORRHAGE WITH APPLICATION OF 3 CLIPS TO DUODENAL MASS performed by Antoinette Gill MD at [address] [facility] N/A 4/8/2021    EGD BIOPSY DUODENAL MASS performed by Antoinette Gill MD at [address] [place] [address]  UPPER GASTROINTESTINAL ENDOSCOPY N/A 4/8/2021    EGD SUBMUCOSAL INJECTION OF 0.6 MG OF EPINEPRINE INTO BASE OF DUODENAL MASS performed by Joel Stovall MD at [facility] History     Socioeconomic History    Marital status:      Spouse name: Not on file    Number of children: 3    Years of education: Not on file    Highest education level: Not on file   Occupational History    Not on file   Social Needs    Financial resource strain: Not on file    Food insecurity     Worry: Not on file     Inability: Not on file    Transportation needs     Medical: Not on file     Non-medical: Not on file   Tobacco Use    Smoking status: Never Smoker    Smokeless tobacco: Never Used   Substance and Sexual Activity    Alcohol use: No    Drug use: No    Sexual activity: Yes     Partners: Male   Lifestyle    Physical activity     Days per week: Not on file     Minutes per session: Not on file    Stress: Not on file   Relationships    Social connections     Talks on phone: Not on file     Gets together: Not on file     Attends Taoist service: Not on file     Active member of club or organization: Not on file     Attends meetings of clubs or organizations: Not on file     Relationship status: Not on file    Intimate partner violence     Fear of current or ex partner: Not on file     Emotionally abused: Not on file     Physically abused: Not on file     Forced sexual activity: Not on file   Other Topics Concern    Not on file   Social History Narrative    Not on file      Family History   Problem Relation Age of Onset    Cancer Father     Heart Disease Neg Hx     High Blood Pressure Neg Hx     High Cholesterol Neg Hx      Current Outpatient Medications   Medication Sig Dispense Refill    amoxicillin-clavulanate (AUGMENTIN) 875-125 MG per tablet Take 1 tablet by mouth 2 times daily for 10 days 20 tablet 0    amiodarone (PACERONE) 100 MG tablet Take 1 tablet by mouth every other day 15 tablet 0    carvedilol (COREG) 6.25 MG tablet Take 1 tablet by mouth 2 times daily (with meals) 60 tablet 0    furosemide (LASIX) 20 MG tablet Take 1 tablet by mouth daily Take 40mg daily x 2 days, then 20mg daily 32 tablet 0    pantoprazole (PROTONIX) 40 MG tablet Take 1 tablet by mouth every morning (before breakfast) 30 tablet 3    NIFEdipine (ADALAT CC) 30 MG extended release tablet Take 1 tablet by mouth daily 90 tablet 3    vitamin D (ERGOCALCIFEROL) 1.25 MG (70829 UT) CAPS capsule Take 1 capsule by mouth once a week 4 capsule 5    sacubitril-valsartan (ENTRESTO) 24-26 MG per tablet Take 0.5 tablets by mouth nightly 60 tablet 0    Multiple Vitamins-Minerals (THERAPEUTIC MULTIVITAMIN-MINERALS) tablet Take 1 tablet by mouth daily      XARELTO 20 MG TABS tablet TAKE ONE TABLET BY MOUTH DAILY WITH BREAKFAST 90 tablet 3    spironolactone (ALDACTONE) 25 MG tablet Take 1 tablet by mouth daily 30 tablet 2    atorvastatin (LIPITOR) 40 MG tablet Take 1 tablet by mouth daily 60 tablet 1    potassium chloride (KLOR-CON M) 20 MEQ extended release tablet Take 1 tablet by mouth daily for 2 days Take while taking lasix 40mg 2 tablet 1     No current facility-administered medications for this visit.        Allergies   Allergen Reactions    Latex      rash    Codeine      Hives      Lisinopril      cough    Nitroglycerin Hives    Sulfa Antibiotics Nausea Only    Hydralazine      headaches        Review of Systems:  Review of systems performed and negative with the exception of the above findings    OBJECTIVE:  /80   Wt 200 lb (90.7 kg)   BMI 31.80 kg/m²      Physical Exam:  General appearance: alert, appears stated age, cooperative and no distress  Abdomen: soft, non-distended, appropriate incisional tenderness, incision clean dry and intact, wounds closed, erythema resolved, staples removed and replaced with steristrips    Orders Only on 04/28/2021   Component Date Value Ref Range Status    Sodium 04/28/2021 143  136 - 145 mmol/L Final    Potassium 04/28/2021 3.7  3.5 - 5.1 mmol/L Final    Chloride 04/28/2021 103  99 - 110 mmol/L Final    CO2 04/28/2021 25  21 - 32 mmol/L Final    Anion Gap 04/28/2021 15  3 - 16 Final    Glucose 04/28/2021 72  70 - 99 mg/dL Final    BUN 04/28/2021 6* 7 - 20 mg/dL Final    CREATININE 04/28/2021 0.7  0.6 - 1.1 mg/dL Final    GFR Non- 04/28/2021 >60  >60 Final    Comment: >60 mL/min/1.73m2 EGFR, calc. for ages 25 and older using the  MDRD formula (not corrected for weight), is valid for stable  renal function.  GFR  04/28/2021 >60  >60 Final    Comment: Chronic Kidney Disease: less than 60 ml/min/1.73 sq.m. Kidney Failure: less than 15 ml/min/1.73 sq.m. Results valid for patients 18 years and older.       Calcium 04/28/2021 9.0  8.3 - 10.6 mg/dL Final   Orders Only on 04/28/2021   Component Date Value Ref Range Status    WBC 04/28/2021 5.4  4.0 - 11.0 K/uL Final    RBC 04/28/2021 2.87* 4.00 - 5.20 M/uL Final    Hemoglobin 04/28/2021 8.5* 12.0 - 16.0 g/dL Final    Hematocrit 04/28/2021 25.5* 36.0 - 48.0 % Final    MCV 04/28/2021 88.9  80.0 - 100.0 fL Final    MCH 04/28/2021 29.6  26.0 - 34.0 pg Final    MCHC 04/28/2021 33.3  31.0 - 36.0 g/dL Final    RDW 04/28/2021 20.8* 12.4 - 15.4 % Final    Platelets 89/22/1904 371  135 - 450 K/uL Final    MPV 04/28/2021 7.0  5.0 - 10.5 fL Final    Neutrophils % 04/28/2021 66.7  % Final    Lymphocytes % 04/28/2021 18.2  % Final    Monocytes % 04/28/2021 8.9  % Final    Eosinophils % 04/28/2021 5.5  % Final    Basophils % 04/28/2021 0.7  % Final    Neutrophils Absolute 04/28/2021 3.6  1.7 - 7.7 K/uL Final    Lymphocytes Absolute 04/28/2021 1.0  1.0 - 5.1 K/uL Final    Monocytes Absolute 04/28/2021 0.5  0.0 - 1.3 K/uL Final    Eosinophils Absolute 04/28/2021 0.3  0.0 - 0.6 K/uL Final    Basophils Absolute 04/28/2021 0.0  0.0 - 0.2 K/uL Final   No results displayed because visit has over 200 results. Xr Abdomen (kub) (single Ap View)    Result Date: 4/12/2021  EXAMINATION: ONE SUPINE XRAY VIEW(S) OF THE ABDOMEN 4/12/2021 9:02 pm COMPARISON: None. HISTORY: ORDERING SYSTEM PROVIDED HISTORY: followup duodenal clips TECHNOLOGIST PROVIDED HISTORY: Reason for exam:->followup duodenal clips Reason for Exam: f/u duodenal clips Acuity: Unknown Type of Exam: Unknown FINDINGS: Multiple surgical clips overlie the upper mid abdomen. There is residual contrast in the descending and sigmoid colon. There is an overall paucity of small bowel gas. Indeterminate intestinal gas pattern secondary to paucity of small bowel gas. Ct Abdomen Pelvis W Iv Contrast Additional Contrast? Oral    Result Date: 4/8/2021  EXAMINATION: CT OF THE ABDOMEN AND PELVIS WITH CONTRAST 4/8/2021 6:05 pm TECHNIQUE: CT of the abdomen and pelvis was performed with the administration of intravenous contrast. Multiplanar reformatted images are provided for review. Dose modulation, iterative reconstruction, and/or weight based adjustment of the mA/kV was utilized to reduce the radiation dose to as low as reasonably achievable. COMPARISON: CT abdomen and pelvis July 10, 2020 HISTORY: ORDERING SYSTEM PROVIDED HISTORY: Pancreas protocol, possible pancreatic mass/lesion in 2nd portion duodenum, near ampulla? TECHNOLOGIST PROVIDED HISTORY: Reason for exam:->Pancreas protocol, possible pancreatic mass/lesion in 2nd portion duodenum, near ampulla? Additional Contrast?->Oral Reason for Exam: Pancreas protocol, possible pancreatic mass/lesion in 2nd portion duodenum, near ampulla? Acuity: Acute Type of Exam: Initial FINDINGS: Lower chest: No acute findings. No evidence of metastatic disease of the visualized lower chest. Organs: No definitive pancreatic mass is demonstrated. This includes the area of the 2nd duodenum near the ampulla.   No acute findings or evidence of metastatic disease of the liver or other organs throughout the abdomen. GI/Bowel: There are 3 new surgical clips in the proximal jejunum/distal duodenum location at the ligament Treitz location. No acute findings of bowel throughout the abdomen and pelvis. No obstruction. No evidence of colitis. Pelvis: No evidence of metastatic disease. No acute findings. Normal appearance of the bladder. Fibroid uterus again noted, no significant change. Normal appearance of the bladder. Peritoneum/Retroperitoneum: Normal size lymph nodes. No ascites. No acute findings. Bones/Soft Tissues: No suspicious osteoblastic bone lesions and no definitive suspicious osteolytic bone lesions. No acute osseous findings. No definitive evidence of a pancreatic mass and no evidence of metastatic disease or acute abnormality of the abdomen/pelvis. If persistent concern for an occult pancreatic mass consider endoscopic ultrasound or PET-CT. 3 separate surgical clips placed within bowel lumen near the ligament of Treitz since the comparison. This is presumed to be related to interval GI bleeding treatment. Fibroid uterus without significant change in appearance from the comparison     Xr Chest Portable    Result Date: 4/7/2021  EXAMINATION: ONE XRAY VIEW OF THE CHEST 4/7/2021 9:53 am COMPARISON: 02/08/2021 HISTORY: ORDERING SYSTEM PROVIDED HISTORY: CP TECHNOLOGIST PROVIDED HISTORY: Reason for exam:->CP Reason for Exam: Fatigue (Reports taking medication for her heart, believes it to be \"amiodarone\" since January that makes her feel weak. ) Acuity: Unknown Type of Exam: Unknown FINDINGS: Left subclavian triple lead pacer noted. Cardiomegaly. Pulmonary vasculature within normal limits. Lungs clear.   Costophrenic angles sharp     No active cardiopulmonary disease     Vl Extremity Venous Right    Result Date: 4/19/2021  Lower Extremities DVT Study  Demographics   Patient Name      Robert MATUTE   Date of Study     04/15/2021         Gender Female   Patient Number    [id number]         Date of Birth       1965   Visit Number      [id number]          Age                 63 year(s)   Accession Number  [id number]         Room Number         8295   Corporate ID      [id number]           ELEUTERIO Bird,   Interpreting        Blanca Acosta MD  Physician         CNP                Physician  Procedure Type of Study:   Veins:Lower Extremities DVT Study, VL EXTREMITY VENOUS DUPLEX RIGHT. Vascular Sonographer Report  Additional Indications:Pain Impressions Right Impression No evidence of deep vein or superficial vein thrombosis involving the right lower extremity and the left common femoral vein. Conclusions   Summary   There is no evidence of deep or superficial vein thrombosis of the right  lower extremity or the left common femoral vein. Signature   ------------------------------------------------------------------  Electronically signed by Blanca Acosta MD (Interpreting  physician) on 04/19/2021 at 03:26 PM  ------------------------------------------------------------------  Patient Status:Routine. [address] [place] [address] Vascular Lab. Technical Quality:Adequate visualization. Velocities are measured in cm/s ; Diameters are measured in mm Right Lower Extremities DVT Study Measurements Right 2D Measurements +------------------------+----------+---------------+----------+ ! Location                ! Visualized! Compressibility! Thrombosis! +------------------------+----------+---------------+----------+ ! Sapheno Femoral Junction! Yes       ! Yes            ! None      ! +------------------------+----------+---------------+----------+ ! GSV Thigh               ! Yes       ! Yes            ! None      ! +------------------------+----------+---------------+----------+ ! Common Femoral          !Yes       ! Yes !None      ! +------------------------+----------+---------------+----------+ ! Prox Femoral            !Yes       ! Yes            ! None      ! +------------------------+----------+---------------+----------+ ! Mid Femoral             !Yes       ! Yes            ! None      ! +------------------------+----------+---------------+----------+ ! Dist Femoral            !Yes       ! Yes            ! None      ! +------------------------+----------+---------------+----------+ ! Deep Femoral            !Yes       ! Yes            ! None      ! +------------------------+----------+---------------+----------+ ! Popliteal               !Yes       ! Yes            ! None      ! +------------------------+----------+---------------+----------+ ! GSV Below Knee          ! Yes       ! Yes            ! None      ! +------------------------+----------+---------------+----------+ ! Gastroc                 ! Yes       ! Yes            ! None      ! +------------------------+----------+---------------+----------+ ! Soleal                  !Yes       ! Yes            ! None      ! +------------------------+----------+---------------+----------+ ! PTV                     ! Yes       ! Yes            ! None      ! +------------------------+----------+---------------+----------+ ! Peroneal                !Yes       ! Yes            ! None      ! +------------------------+----------+---------------+----------+ ! GSV Calf                ! Yes       ! Yes            ! None      ! +------------------------+----------+---------------+----------+ ! SSV                     ! Yes       ! Yes            ! None      ! +------------------------+----------+---------------+----------+ Right Doppler Measurements +------------------------+------+------+------------+ ! Location                ! Signal!Reflux! Reflux (sec)! +------------------------+------+------+------------+ ! Sapheno Femoral Junction! Phasic!      !            ! +------------------------+------+------+------------+ ! Common duodenal repair, water soluble contrast given through NG Reason for Exam: Evaluate duodenal repair, water soluble contrast given through NG Acuity: Acute Type of Exam: Initial CT abdomen/pelvis 4/8/2021 and intraoperative cholangiogram 4/13/2021. COMPARISON: None. TECHNIQUE: Multiple single contrast images of the esophagus, gastroesophageal junction and stomach were obtained following the oral administration of water soluble contrast FLUOROSCOPY DOSE AND TYPE OR TIME AND EXPOSURES: Fluoroscopic time: 5 minutes. Number of fluoroscopic images obtained:  5. Remaining fluoroscopic images that were obtained were generated using last image hold technique. FINDINGS: The study was initiated with patient in upright position. Water-soluble contrast material was instilled via the patient's nasogastric tube with resultant opacification of the stomach. Air-fluid/fluid level is visualized within the stomach with the upright positioning. There is mild prominence of folds in the region of the pyloric antrum and base of the duodenal bulb. There is overall poor primary peristalsis of the stomach/proximal duodenum. With this in mind, patient was placed in right lateral decubitus position to facilitate movement of contrast material from stomach into proximal small bowel. There is a nonspecific segmental area of luminal narrowing of the immediate post bulbar duodenal sweep which did not significantly change during the examination. The entirety of the duodenal sweep is never well distended throughout the study related to slow movement of contrast material from stomach into duodenum. Evaluation of the duodenal sweep is also limited due to opacification of adjacent contrast filled colon. There is no definite evidence of extravasation of contrast material from the duodenal sweep. 1. Mild prominence of folds in the region of the pyloric antrum and base of duodenal bulb.   Finding is nonspecific; however, could be on the basis of gastritis/peptic ulcer disease. 2. Suboptimal evaluation of the duodenal sweep related to poor primary peristalsis with slow movement of contrast material from stomach into proximal small bowel. 3. Nonspecific segmental area of luminal narrowing involving the immediate postbulbar duodenal sweep. 4. No definite evidence of extravasation of contrast material from the duodenal sweep as described above. Assessment:  exploratory laparotomy, cholecystectomy with cholangiogram and excision of benign duodenal mass on 4/13/2021 for upper GI bleeding and symptomatic cholelithiasis     Plan:  Continue antibiotics as prescribed, does not need additional coverage  Wound care with dry dressings as needed  No restrictions regarding bathing  No heavy lifting over 20lbs for 6 weeks total postop  Diet and activity as tolerated otherwise  Follow up with general surgery office as needed    Flavio Melo MD, FACS  5/3/2021  1:30 PM

## 2021-05-03 NOTE — PATIENT INSTRUCTIONS
Continue antibiotics as prescribed, does not need additional coverage  Wound care with dry dressings as needed  No restrictions regarding bathing  No heavy lifting over 20lbs for 6 weeks total postop  Diet and activity as tolerated otherwise  Follow up with general surgery office as needed

## 2021-05-04 ENCOUNTER — OFFICE VISIT (OUTPATIENT)
Dept: CARDIOLOGY CLINIC | Age: 56
End: 2021-05-04
Payer: MEDICAID

## 2021-05-04 VITALS
OXYGEN SATURATION: 98 % | BODY MASS INDEX: 31.08 KG/M2 | WEIGHT: 198 LBS | DIASTOLIC BLOOD PRESSURE: 80 MMHG | HEART RATE: 81 BPM | SYSTOLIC BLOOD PRESSURE: 122 MMHG | HEIGHT: 67 IN

## 2021-05-04 DIAGNOSIS — G47.33 OSA (OBSTRUCTIVE SLEEP APNEA): ICD-10-CM

## 2021-05-04 DIAGNOSIS — Z95.810 ICD (IMPLANTABLE CARDIOVERTER-DEFIBRILLATOR), BIVENTRICULAR, IN SITU: ICD-10-CM

## 2021-05-04 DIAGNOSIS — D50.9 IRON DEFICIENCY ANEMIA, UNSPECIFIED IRON DEFICIENCY ANEMIA TYPE: ICD-10-CM

## 2021-05-04 DIAGNOSIS — E55.9 HYPOVITAMINOSIS D: ICD-10-CM

## 2021-05-04 DIAGNOSIS — I50.42 CHRONIC COMBINED SYSTOLIC AND DIASTOLIC HEART FAILURE (HCC): Primary | ICD-10-CM

## 2021-05-04 DIAGNOSIS — I48.0 PAROXYSMAL ATRIAL FIBRILLATION (HCC): ICD-10-CM

## 2021-05-04 PROCEDURE — 3017F COLORECTAL CA SCREEN DOC REV: CPT | Performed by: CLINICAL NURSE SPECIALIST

## 2021-05-04 PROCEDURE — 1111F DSCHRG MED/CURRENT MED MERGE: CPT | Performed by: CLINICAL NURSE SPECIALIST

## 2021-05-04 PROCEDURE — G8417 CALC BMI ABV UP PARAM F/U: HCPCS | Performed by: CLINICAL NURSE SPECIALIST

## 2021-05-04 PROCEDURE — 99214 OFFICE O/P EST MOD 30 MIN: CPT | Performed by: CLINICAL NURSE SPECIALIST

## 2021-05-04 PROCEDURE — 93290 INTERROG DEV EVAL ICPMS IP: CPT | Performed by: CLINICAL NURSE SPECIALIST

## 2021-05-04 PROCEDURE — G8427 DOCREV CUR MEDS BY ELIG CLIN: HCPCS | Performed by: CLINICAL NURSE SPECIALIST

## 2021-05-04 PROCEDURE — 1036F TOBACCO NON-USER: CPT | Performed by: CLINICAL NURSE SPECIALIST

## 2021-05-04 RX ORDER — AMIODARONE HYDROCHLORIDE 100 MG/1
100 TABLET ORAL DAILY
Qty: 15 TABLET | Refills: 0 | Status: SHIPPED
Start: 2021-05-04 | End: 2021-08-13 | Stop reason: ALTCHOICE

## 2021-05-04 NOTE — PROGRESS NOTES
[facility]  Progress Note    Primary Care Doctor:  No primary care provider on file. Chief Complaint   Patient presents with    Follow-up     recovering from stomach surgery staples removed yesterday    Congestive Heart Failure    Fatigue        History of Present Illness:  63 y.o. female with history of sHF, LVH, AF on xarelto (follows with Dr Rody Panda), had been on flecainide, dual chamber pacemaker 2012 due to congenital heart block  LVEF had been 55% in 2015 and now 25%. No lisinopril due to cough. She is a cook at [employer]  Lip numbness to entresto 3/2020 hydralazine caused headaches  10/2020 EGD (hiatal hernia) and colonoscopy (polyp)   ICD placed 1/22/21, LHC done 1/20/21 normal cors  4/21 benign duodenal mass with resection Dr Robert Ko    I had the pleasure of seeing Perla Mata in follow up for sHF. She is ambulatory and by her self today. Her optival shows normal impedence. She was in the hospital 4/7-19/2021 for symptomatic anemia, requiring surgery by Dr Robert Ko for overlying lipoma, large duodenum lesion requiring 3 clips. She has been off entresto and xarelto. She is currently on an antibiotic for infection on incision. She has normal post op surgical pain. Labs are stable and Hgb up to 8.5. She is not having any melena, chest pain, palpitations or sob. Past Medical History:   has a past medical history of Anemia, Atrial fibrillation and flutter ([address] Atrial flutter ([address] CHB (complete heart block) ([address] Class 1 obesity without serious comorbidity with body mass index (BMI) of 33.0 to 33.9 in adult, Congenital heart disease, GERD (gastroesophageal reflux disease), Headache(784.0), History of complete heart block, Hyperlipidemia, Hypertension, PONV (postoperative nausea and vomiting), Sleep apnea, Syncope, and Systolic CHF, chronic ([address] Surgical History:   has a past surgical history that includes Uterine fibroid surgery;  Pacemaker insertion (11/29/12); Tubal ligation; Upper gastrointestinal endoscopy (N/A, 10/27/2020); Colonoscopy (N/A, 10/27/2020); Cardiac defibrillator placement (01/2021); Upper gastrointestinal endoscopy (N/A, 4/8/2021); Upper gastrointestinal endoscopy (N/A, 4/8/2021); Upper gastrointestinal endoscopy (N/A, 4/8/2021); and colectomy (N/A, 4/13/2021). Social History:    reports that she has never smoked. She has never used smokeless tobacco. She reports that she does not drink alcohol or use drugs. Family History:   Family History   Problem Relation Age of Onset    Cancer Father     Heart Disease Neg Hx     High Blood Pressure Neg Hx     High Cholesterol Neg Hx        Home Medications:  Prior to Admission medications    Medication Sig Start Date End Date Taking?  Authorizing Provider   amoxicillin-clavulanate (AUGMENTIN) 875-125 MG per tablet Take 1 tablet by mouth 2 times daily for 10 days 4/27/21 5/7/21 Yes Matt Schafer MD   amiodarone (PACERONE) 100 MG tablet Take 1 tablet by mouth every other day 4/19/21  Yes CATRACHITO Madison CNP   carvedilol (COREG) 6.25 MG tablet Take 1 tablet by mouth 2 times daily (with meals) 4/19/21  Yes CATRACHITO Madison CNP   furosemide (LASIX) 20 MG tablet Take 1 tablet by mouth daily Take 40mg daily x 2 days, then 20mg daily  Patient taking differently: Take 40 mg by mouth daily  4/19/21  Yes CATRACHITO Madison CNP   pantoprazole (PROTONIX) 40 MG tablet Take 1 tablet by mouth every morning (before breakfast) 4/13/21  Yes Angelita Lancaster MD   NIFEdipine (ADALAT CC) 30 MG extended release tablet Take 1 tablet by mouth daily 2/23/21  Yes Teresa Zimmerman MD   vitamin D (ERGOCALCIFEROL) 1.25 MG (50243 UT) CAPS capsule Take 1 capsule by mouth once a week 2/18/21  Yes Rema Mendiola APRN - CNS   Multiple Vitamins-Minerals (THERAPEUTIC MULTIVITAMIN-MINERALS) tablet Take 1 tablet by mouth daily   Yes Historical Provider, MD   spironolactone (ALDACTONE) 25 MG tablet Take 1 tablet by mouth daily 8/31/20  Yes Rema Medina, APRN - CNS   atorvastatin (LIPITOR) 40 MG tablet Take 1 tablet by mouth daily 11/18/16  Yes Daryn Carlson MD   potassium chloride (KLOR-CON M) 20 MEQ extended release tablet Take 1 tablet by mouth daily for 2 days Take while taking lasix 40mg  Patient not taking: Reported on 5/4/2021 4/20/21 4/22/21  CATRACHITO Metcalf - CNP   sacubitril-valsartan (ENTRESTO) 24-26 MG per tablet Take 0.5 tablets by mouth nightly  Patient not taking: Reported on 5/4/2021 2/17/21   Rema Medina, CATRACHITO - CNS   XARELTO 20 MG TABS tablet TAKE ONE TABLET BY MOUTH DAILY WITH BREAKFAST  Patient not taking: Reported on 5/4/2021 10/20/20   Chucky Heart MD        Allergies:  Latex, Codeine, Lisinopril, Nitroglycerin, Sulfa antibiotics, and Hydralazine     Review of Systems:   · Constitutional: there has been no unanticipated weight loss. There's been no change in energy level, sleep pattern, or activity level. · Eyes: No visual changes or diplopia. No scleral icterus. · ENT: No Headaches, hearing loss or vertigo. No mouth sores or sore throat. · Cardiovascular: Reviewed in HPI  · Respiratory: No cough or wheezing, no sputum production. No hematemesis. · Gastrointestinal: No abdominal pain, appetite loss, blood in stools. No change in bowel or bladder habits. · Genitourinary: No dysuria, trouble voiding, or hematuria. · Musculoskeletal:  No gait disturbance, weakness or joint complaints. · Integumentary: No rash or pruritis. · Neurological: No headache, diplopia, change in muscle strength, numbness or tingling. No change in gait, balance, coordination, mood, affect, memory, mentation, behavior. · Psychiatric: No anxiety, no depression. · Endocrine: No malaise, fatigue or temperature intolerance. No excessive thirst, fluid intake, or urination. No tremor. · Hematologic/Lymphatic: No abnormal bruising or bleeding, blood clots or swollen lymph nodes.   · Allergic/Immunologic: No nasal congestion or hives. Physical Examination:    Vitals:    05/04/21 1136   BP: 122/80   Site: Left Upper Arm   Position: Sitting   Cuff Size: Medium Adult   Pulse: 81   SpO2: 98%   Weight: 198 lb (89.8 kg)   Height: 5' 6.5\" (1.689 m)        Constitutional and General Appearance: Warm and dry, no apparent distress, normal coloration  HEENT:  Normocephalic, atraumatic  Respiratory:  · Normal excursion and expansion without use of accessory muscles  · Resp Auscultation: Normal breath sounds without dullness  Cardiovascular:  · The apical impulses not displaced  · Heart tones are crisp and normal  · JVP normal cm H2O  · regular rate and rhythm  · Peripheral pulses are symmetrical and full  · There is no clubbing, cyanosis of the extremities.   · no edema  · Pedal Pulses: 2+ and equal   Abdomen:  · No masses or tenderness  · Liver/Spleen: No Abnormalities Noted  Neurological/Psychiatric:  · Alert and oriented in all spheres  · Moves all extremities well  · Exhibits normal gait balance and coordination  · No abnormalities of mood, affect, memory, mentation, or behavior are noted      Lab Data:    CBC:   Lab Results   Component Value Date    WBC 5.4 04/28/2021    WBC 6.4 04/19/2021    WBC 6.0 04/18/2021    RBC 2.87 04/28/2021    RBC 2.64 04/19/2021    RBC 2.66 04/18/2021    HGB 8.5 04/28/2021    HGB 7.5 04/19/2021    HGB 7.9 04/18/2021    HCT 25.5 04/28/2021    HCT 23.1 04/19/2021    HCT 23.5 04/18/2021    MCV 88.9 04/28/2021    MCV 87.6 04/19/2021    MCV 88.4 04/18/2021    RDW 20.8 04/28/2021    RDW 20.9 04/19/2021    RDW 20.5 04/18/2021     04/28/2021     04/19/2021     04/18/2021     BMP:  Lab Results   Component Value Date     04/28/2021     04/19/2021     04/18/2021    K 3.7 04/28/2021    K 3.6 04/19/2021    K 3.3 04/18/2021    K 3.7 04/07/2021    K 3.0 01/24/2021    K 3.8 01/20/2021     04/28/2021     04/19/2021     04/18/2021 CO2 25 04/28/2021    CO2 21 04/19/2021    CO2 25 04/18/2021    PHOS 2.4 04/17/2021    PHOS 2.2 04/15/2021    PHOS 2.8 04/14/2021    BUN 6 04/28/2021    BUN 4 04/19/2021    BUN 3 04/18/2021    CREATININE 0.7 04/28/2021    CREATININE 1.0 04/19/2021    CREATININE 0.8 04/18/2021     BNP:   Lab Results   Component Value Date    PROBNP 183 04/18/2021    PROBNP 43 04/07/2021    PROBNP 97 03/17/2021     Cardiac Imaging:  Echo 2/9/2021  Summary   Left ventricular size is mildly increased. Moderately reduced global systolic function with an ejection fraction   estimated at 30-35%. Global hypokinesis noted. Grade II diastolic dysfunction with elevated LV filling pressures. There is mild mitral regurgitation noted. Mild left atrial enlargement noted. Aortic valve appears sclerotic but opens adequately. Mild aortic regurgitation. There is mild tricuspid regurgitation with a RVSP estimation of 25 mmHg. Pacer / ICD wire is visualized in the right ventricle. The right ventricle is normal in size and function    [facility] Dr Renteria Late 1/20/21  Findings:  Artery Findings/Result   LM Normal   LAD Normal   Cx Normal   RI NA   RCA Normal   LVEDP 6   LVG NA      Intervention:         None     Post Cath Dx:       Normal coronaries      Echo 2/24/2020  Summary   -Limited echocardiogram was performed to evaluate EF.   -Normal left ventricle size, mild to moderate left ventricular hypertrophy,   and moderately reduced systolic function with an estimated ejection fraction   of 30-35%. -There is moderate diffuse hypokinesis. -Grade III diastolic dysfunction . E/e\"=10.7.   -Mild mitral regurgitation.   -Mild tricuspid regurgitation.   -The left atrium is mild to moderate dilated.    -Right ventricular systolic function appears mildly reduced .   -Estimated pulmonary artery systolic pressure is borderline normal at 28-33   mmHg assuming a right atrial pressure of 8 mmHg.   -Pacer / ICD wire is visualized in the right heart.    Echo 8/12/2019  Summary   -Limited echocardiogram was performed to evaluate LV ejection fraction.   -Left ventricular cavity size is mildly dilated.   -There is moderate concentric left ventricular hypertrophy.   -Overall left ventricular systolic function appears moderately reduced with   an ejection fraction on the 30-35%.  -There is moderate global diffuse hypokinesis.   -Indeterminate diastolic dysfunction due to irregular heart rate.   -Moderate mitral regurgitation.   -Aortic valve appears sclerotic but opens adequately.   -Mild to moderate tricuspid regurgitation.   -The left atrium is moderately dilated.   -Pacer / ICD wire is visualized in the right heart. Stress test 9/11/18  Enlarged LV with mild global hypokinesis. EF 51%  There is normal isotope uptake at stress and rest. There is no evidence of  myocardial ischemia or scar. ECHO 7/24/18:  Summary   -Left ventricular cavity size is mildly dilated. -There is moderate concentric left ventricular hypertrophy.   -Overall left ventricular systolic function appears severely reduced with  and ejection fraction on the 25 % range.   -There is diffuse global hypokinesis. -Grade II diastolic dysfunction with elevated LV filling pressures. E/e\"=16.2.   -Mitral annular calcification is present. -Mild-moderate mitral regurgitation.   -Aortic valve appears sclerotic but opens adequately. -Trivial aortic regurgitation.   -Trivial tricuspid regurgitation with RVSP of 26 mmHg. -Mild pulmonic regurgitation present.   -Dilated left atrium with a volume of 72 ml.   -Pacer / ICD wire is visualized in the right heart. GXT cathie 7/15/2015:   Left ventricular ejection fraction of 55%. The LV wall motion is normal.  There is normal isotope uptake at stress and rest.   There is no evidence of myocardial ischemia or scar. Assessment:    1. Chronic combined systolic and diastolic heart failure (HCC) on arni, bb and aldosterone antagonist   2. Hypovitaminosis D    3. Paroxysmal atrial fibrillation (HCC)    4. DERECK (obstructive sleep apnea)    5. ICD (implantable cardioverter-defibrillator), biventricular, in situ    6. Iron deficiency anemia, unspecified iron deficiency anemia type        Plan:     1. Add probnp to labs done with home care weekly  2. Restart entresto half of 24-26 mg at night  3. Earlier appt with Anand Schultz (Dr Chinedu Hare NP)  4. I will reach out to Dr José Miguel Dumont regarding resuming xarelto  5. RTO in 2 months with BELKIS Batista, 5/4/2021, 11:50 AM    QUALITY MEASURES  1. Tobacco Cessation Counseling: NA  2. Retake of BP if >140/90:   NA  3. Documentation to PCP/referring for new patient: no PCP  4. CAD patient on anti-platelet: NA  5. CAD patient on STATIN therapy:  NA  6.  Patient with CHF and aFib on anticoagulation:  Yes

## 2021-05-04 NOTE — PATIENT INSTRUCTIONS
1.  Add probnp to labs done with home care weekly  2. Restart entresto half of 24-26 mg at night  3. Earlier appt with Gisselle Sanches (Dr Nadeem Harris NP)  4. I will reach out to Dr Suzi Griffith regarding resuming xarelto  5.   RTO in 2 months with me

## 2021-05-06 LAB — PRO-BNP: 69 PG/ML (ref 0–124)

## 2021-05-06 PROCEDURE — 93284 PRGRMG EVAL IMPLANTABLE DFB: CPT | Performed by: INTERNAL MEDICINE

## 2021-05-07 ENCOUNTER — TELEPHONE (OUTPATIENT)
Dept: CARDIOLOGY CLINIC | Age: 56
End: 2021-05-07

## 2021-05-07 NOTE — TELEPHONE ENCOUNTER
She feels like her heart is skipping and racing . She sent a phone transmission. Could someone look at her transmission and call her to let her know if she is in afib or not?

## 2021-05-10 ENCOUNTER — TELEPHONE (OUTPATIENT)
Dept: CARDIOLOGY CLINIC | Age: 56
End: 2021-05-10

## 2021-05-10 NOTE — TELEPHONE ENCOUNTER
She has an appt with npal 5/19 but also has appt with RMM on 5/25 .  Patient says she was told she needed to be seen sooner than the 5/25 that is why she has the 5/19

## 2021-05-11 ENCOUNTER — HOSPITAL ENCOUNTER (OUTPATIENT)
Age: 56
Setting detail: SPECIMEN
Discharge: HOME | End: 2021-05-11
Payer: MEDICAID

## 2021-05-11 LAB — PRO-BNP: 133 PG/ML (ref 0–124)

## 2021-05-11 PROCEDURE — 36415 COLL VENOUS BLD VENIPUNCTURE: CPT

## 2021-05-11 PROCEDURE — 83880 ASSAY OF NATRIURETIC PEPTIDE: CPT

## 2021-05-14 ENCOUNTER — OFFICE VISIT (OUTPATIENT)
Dept: SURGERY | Age: 56
End: 2021-05-14

## 2021-05-14 VITALS
DIASTOLIC BLOOD PRESSURE: 82 MMHG | HEIGHT: 66 IN | SYSTOLIC BLOOD PRESSURE: 122 MMHG | WEIGHT: 199 LBS | BODY MASS INDEX: 31.98 KG/M2

## 2021-05-14 DIAGNOSIS — Z48.89 ENCOUNTER FOR POSTOPERATIVE CARE: Primary | ICD-10-CM

## 2021-05-14 PROCEDURE — 99024 POSTOP FOLLOW-UP VISIT: CPT | Performed by: SURGERY

## 2021-05-14 NOTE — PATIENT INSTRUCTIONS
Continue local wound care with dry gauze daily as needed  Does not appear infected, does not need antibiotics  If does not improve by next week, return for evaluation  If improves, follow as needed

## 2021-05-14 NOTE — PROGRESS NOTES
UPPER GASTROINTESTINAL ENDOSCOPY N/A 4/8/2021    EGD SUBMUCOSAL INJECTION OF 0.6 MG OF EPINEPRINE INTO BASE OF DUODENAL MASS performed by Sonya Jauregui MD at [facility] History     Socioeconomic History    Marital status:      Spouse name: Not on file    Number of children: 3    Years of education: Not on file    Highest education level: Not on file   Occupational History    Not on file   Social Needs    Financial resource strain: Not on file    Food insecurity     Worry: Not on file     Inability: Not on file    Transportation needs     Medical: Not on file     Non-medical: Not on file   Tobacco Use    Smoking status: Never Smoker    Smokeless tobacco: Never Used   Substance and Sexual Activity    Alcohol use: No    Drug use: No    Sexual activity: Yes     Partners: Male   Lifestyle    Physical activity     Days per week: Not on file     Minutes per session: Not on file    Stress: Not on file   Relationships    Social connections     Talks on phone: Not on file     Gets together: Not on file     Attends Mandaen service: Not on file     Active member of club or organization: Not on file     Attends meetings of clubs or organizations: Not on file     Relationship status: Not on file    Intimate partner violence     Fear of current or ex partner: Not on file     Emotionally abused: Not on file     Physically abused: Not on file     Forced sexual activity: Not on file   Other Topics Concern    Not on file   Social History Narrative    Not on file      Family History   Problem Relation Age of Onset    Cancer Father     Heart Disease Neg Hx     High Blood Pressure Neg Hx     High Cholesterol Neg Hx      Current Outpatient Medications   Medication Sig Dispense Refill    amiodarone (PACERONE) 100 MG tablet Take 1 tablet by mouth daily 15 tablet 0    carvedilol (COREG) 6.25 MG tablet Take 1 tablet by mouth 2 times daily (with meals) 60 05/11/2021 133* 0 - 124 pg/mL Final    Comment: Methodology by NT-proBNP    An age-independent cutoff point of 300 pg/ml has a 98%  negative predictive value excluding acute heart failure. Values exceeding the age-related cutoff values (450 pg/mL if  age<50, 900 if 50-75 and 1800 if >75) has 90% sensitivity and  84% specificity for diagnosing acute HF. In patients with  renal compromise (eGFR<60) values greater than 1200pg/ml have  a diagnostic sensitivity and specificity of 89% and 72% for  acute HF. Orders Only on 05/06/2021   Component Date Value Ref Range Status    Pro-BNP 05/06/2021 69  0 - 124 pg/mL Final    Comment: Methodology by NT-proBNP    An age-independent cutoff point of 300 pg/ml has a 98%  negative predictive value excluding acute heart failure. Values exceeding the age-related cutoff values (450 pg/mL if  age<50, 900 if 50-75 and 1800 if >75) has 90% sensitivity and  84% specificity for diagnosing acute HF. In patients with  renal compromise (eGFR<60) values greater than 1200pg/ml have  a diagnostic sensitivity and specificity of 89% and 72% for  acute HF. Orders Only on 04/28/2021   Component Date Value Ref Range Status    Sodium 04/28/2021 143  136 - 145 mmol/L Final    Potassium 04/28/2021 3.7  3.5 - 5.1 mmol/L Final    Chloride 04/28/2021 103  99 - 110 mmol/L Final    CO2 04/28/2021 25  21 - 32 mmol/L Final    Anion Gap 04/28/2021 15  3 - 16 Final    Glucose 04/28/2021 72  70 - 99 mg/dL Final    BUN 04/28/2021 6* 7 - 20 mg/dL Final    CREATININE 04/28/2021 0.7  0.6 - 1.1 mg/dL Final    GFR Non- 04/28/2021 >60  >60 Final    Comment: >60 mL/min/1.73m2 EGFR, calc. for ages 25 and older using the  MDRD formula (not corrected for weight), is valid for stable  renal function.  GFR  04/28/2021 >60  >60 Final    Comment: Chronic Kidney Disease: less than 60 ml/min/1.73 sq.m. Kidney Failure: less than 15 ml/min/1.73 sq.m.   Results valid VL EXTREMITY VENOUS DUPLEX RIGHT. Vascular Sonographer Report  Additional Indications:Pain Impressions Right Impression No evidence of deep vein or superficial vein thrombosis involving the right lower extremity and the left common femoral vein. Conclusions   Summary   There is no evidence of deep or superficial vein thrombosis of the right  lower extremity or the left common femoral vein. Signature   ------------------------------------------------------------------  Electronically signed by Franky Evans MD (Interpreting  physician) on 04/19/2021 at 03:26 PM  ------------------------------------------------------------------  Patient Status:Routine. [address] [place] [address] Vascular Lab. Technical Quality:Adequate visualization. Velocities are measured in cm/s ; Diameters are measured in mm Right Lower Extremities DVT Study Measurements Right 2D Measurements +------------------------+----------+---------------+----------+ ! Location                ! Visualized! Compressibility! Thrombosis! +------------------------+----------+---------------+----------+ ! Sapheno Femoral Junction! Yes       ! Yes            ! None      ! +------------------------+----------+---------------+----------+ ! GSV Thigh               ! Yes       ! Yes            ! None      ! +------------------------+----------+---------------+----------+ ! Common Femoral          !Yes       ! Yes            ! None      ! +------------------------+----------+---------------+----------+ ! Prox Femoral            !Yes       ! Yes            ! None      ! +------------------------+----------+---------------+----------+ ! Mid Femoral             !Yes       ! Yes            ! None      ! +------------------------+----------+---------------+----------+ ! Dist Femoral            !Yes       ! Yes            ! None      ! +------------------------+----------+---------------+----------+ ! Deep Femoral            !Yes       ! Yes            ! None      ! +--------------+----------+---------------+----------+ ! Location      ! Visualized! Compressibility! Thrombosis! +--------------+----------+---------------+----------+ ! Common Femoral!Yes       ! Yes            ! None      ! +--------------+----------+---------------+----------+ Left Doppler Measurements +--------------+------+------+------------+ ! Location      ! Signal!Reflux! Reflux (sec)! +--------------+------+------+------------+ ! Common Femoral!Phasic!      !            ! +--------------+------+------+------------+    [facility]    Result Date: 4/17/2021  EXAMINATION: SINGLE CONTRAST UPPER GI SERIES 4/16/2021 HISTORY: ORDERING SYSTEM PROVIDED HISTORY: evaluate duodenal repair, water soluble contrast given through NG TECHNOLOGIST PROVIDED HISTORY: Reason for exam:->evaluate duodenal repair, water soluble contrast given through NG Reason for Exam: Evaluate duodenal repair, water soluble contrast given through NG Acuity: Acute Type of Exam: Initial CT abdomen/pelvis 4/8/2021 and intraoperative cholangiogram 4/13/2021. COMPARISON: None. TECHNIQUE: Multiple single contrast images of the esophagus, gastroesophageal junction and stomach were obtained following the oral administration of water soluble contrast FLUOROSCOPY DOSE AND TYPE OR TIME AND EXPOSURES: Fluoroscopic time: 5 minutes. Number of fluoroscopic images obtained:  5. Remaining fluoroscopic images that were obtained were generated using last image hold technique. FINDINGS: The study was initiated with patient in upright position. Water-soluble contrast material was instilled via the patient's nasogastric tube with resultant opacification of the stomach. Air-fluid/fluid level is visualized within the stomach with the upright positioning. There is mild prominence of folds in the region of the pyloric antrum and base of the duodenal bulb. There is overall poor primary peristalsis of the stomach/proximal duodenum.  With this in mind, patient was placed in right lateral decubitus position to facilitate movement of contrast material from stomach into proximal small bowel. There is a nonspecific segmental area of luminal narrowing of the immediate post bulbar duodenal sweep which did not significantly change during the examination. The entirety of the duodenal sweep is never well distended throughout the study related to slow movement of contrast material from stomach into duodenum. Evaluation of the duodenal sweep is also limited due to opacification of adjacent contrast filled colon. There is no definite evidence of extravasation of contrast material from the duodenal sweep. 1. Mild prominence of folds in the region of the pyloric antrum and base of duodenal bulb. Finding is nonspecific; however, could be on the basis of gastritis/peptic ulcer disease. 2. Suboptimal evaluation of the duodenal sweep related to poor primary peristalsis with slow movement of contrast material from stomach into proximal small bowel. 3. Nonspecific segmental area of luminal narrowing involving the immediate postbulbar duodenal sweep. 4. No definite evidence of extravasation of contrast material from the duodenal sweep as described above. Assessment:  exploratory laparotomy, cholecystectomy with cholangiogram and excision of benign duodenal mass on 4/13/2021 for upper GI bleeding and symptomatic cholelithiasis. Plan:  Continue local wound care with dry gauze daily as needed  Does not appear infected, does not need antibiotics  If does not improve by next week, return for evaluation  If improves, follow as needed    Flavio Parker MD, FACS  5/14/2021  12:43 PM

## 2021-05-17 ENCOUNTER — HOSPITAL ENCOUNTER (EMERGENCY)
Age: 56
Discharge: HOME | End: 2021-05-17
Attending: EMERGENCY MEDICINE
Payer: MEDICAID

## 2021-05-17 ENCOUNTER — APPOINTMENT (OUTPATIENT)
Dept: CT IMAGING | Age: 56
End: 2021-05-17
Payer: MEDICAID

## 2021-05-17 ENCOUNTER — TELEPHONE (OUTPATIENT)
Dept: OTHER | Facility: CLINIC | Age: 56
End: 2021-05-17

## 2021-05-17 ENCOUNTER — APPOINTMENT (OUTPATIENT)
Dept: GENERAL RADIOLOGY | Age: 56
End: 2021-05-17
Payer: MEDICAID

## 2021-05-17 VITALS
DIASTOLIC BLOOD PRESSURE: 92 MMHG | HEIGHT: 66 IN | TEMPERATURE: 98.5 F | RESPIRATION RATE: 17 BRPM | WEIGHT: 195.4 LBS | SYSTOLIC BLOOD PRESSURE: 136 MMHG | OXYGEN SATURATION: 100 % | BODY MASS INDEX: 31.4 KG/M2 | HEART RATE: 75 BPM

## 2021-05-17 DIAGNOSIS — E04.1 THYROID NODULE: ICD-10-CM

## 2021-05-17 DIAGNOSIS — R07.9 CHEST PAIN, UNSPECIFIED TYPE: Primary | ICD-10-CM

## 2021-05-17 DIAGNOSIS — I48.20 CHRONIC ATRIAL FIBRILLATION (HCC): ICD-10-CM

## 2021-05-17 LAB
A/G RATIO: 1.7 (ref 1.1–2.2)
ABO/RH: NORMAL
ALBUMIN SERPL-MCNC: 4.7 G/DL (ref 3.4–5)
ALP BLD-CCNC: 67 U/L (ref 40–129)
ALT SERPL-CCNC: 12 U/L (ref 10–40)
ANION GAP SERPL CALCULATED.3IONS-SCNC: 10 MMOL/L (ref 3–16)
ANTIBODY SCREEN: NORMAL
AST SERPL-CCNC: 19 U/L (ref 15–37)
BASOPHILS ABSOLUTE: 0 K/UL (ref 0–0.2)
BASOPHILS RELATIVE PERCENT: 0.9 %
BILIRUB SERPL-MCNC: 0.3 MG/DL (ref 0–1)
BUN BLDV-MCNC: 9 MG/DL (ref 7–20)
CALCIUM SERPL-MCNC: 9.8 MG/DL (ref 8.3–10.6)
CHLORIDE BLD-SCNC: 102 MMOL/L (ref 99–110)
CO2: 27 MMOL/L (ref 21–32)
CREAT SERPL-MCNC: 0.8 MG/DL (ref 0.6–1.1)
EKG ATRIAL RATE: 81 BPM
EKG DIAGNOSIS: NORMAL
EKG P AXIS: 35 DEGREES
EKG P-R INTERVAL: 142 MS
EKG Q-T INTERVAL: 460 MS
EKG QRS DURATION: 150 MS
EKG QTC CALCULATION (BAZETT): 534 MS
EKG R AXIS: -85 DEGREES
EKG T AXIS: 83 DEGREES
EKG VENTRICULAR RATE: 81 BPM
EOSINOPHILS ABSOLUTE: 0.3 K/UL (ref 0–0.6)
EOSINOPHILS RELATIVE PERCENT: 5.8 %
GFR AFRICAN AMERICAN: >60
GFR NON-AFRICAN AMERICAN: >60
GLOBULIN: 2.7 G/DL
GLUCOSE BLD-MCNC: 79 MG/DL (ref 70–99)
HCT VFR BLD CALC: 33.6 % (ref 36–48)
HEMOGLOBIN: 10.8 G/DL (ref 12–16)
LYMPHOCYTES ABSOLUTE: 0.9 K/UL (ref 1–5.1)
LYMPHOCYTES RELATIVE PERCENT: 18.8 %
MCH RBC QN AUTO: 28.3 PG (ref 26–34)
MCHC RBC AUTO-ENTMCNC: 32.2 G/DL (ref 31–36)
MCV RBC AUTO: 88 FL (ref 80–100)
MONOCYTES ABSOLUTE: 0.4 K/UL (ref 0–1.3)
MONOCYTES RELATIVE PERCENT: 8.8 %
NEUTROPHILS ABSOLUTE: 3 K/UL (ref 1.7–7.7)
NEUTROPHILS RELATIVE PERCENT: 65.7 %
PDW BLD-RTO: 20.6 % (ref 12.4–15.4)
PLATELET # BLD: 267 K/UL (ref 135–450)
PMV BLD AUTO: 7.2 FL (ref 5–10.5)
POTASSIUM SERPL-SCNC: 3.7 MMOL/L (ref 3.5–5.1)
PRO-BNP: 75 PG/ML (ref 0–124)
RBC # BLD: 3.81 M/UL (ref 4–5.2)
SODIUM BLD-SCNC: 139 MMOL/L (ref 136–145)
TOTAL PROTEIN: 7.4 G/DL (ref 6.4–8.2)
TROPONIN: <0.01 NG/ML
TROPONIN: <0.01 NG/ML
WBC # BLD: 4.6 K/UL (ref 4–11)

## 2021-05-17 PROCEDURE — 80053 COMPREHEN METABOLIC PANEL: CPT

## 2021-05-17 PROCEDURE — 85025 COMPLETE CBC W/AUTO DIFF WBC: CPT

## 2021-05-17 PROCEDURE — 93005 ELECTROCARDIOGRAM TRACING: CPT | Performed by: EMERGENCY MEDICINE

## 2021-05-17 PROCEDURE — 96374 THER/PROPH/DIAG INJ IV PUSH: CPT

## 2021-05-17 PROCEDURE — 71260 CT THORAX DX C+: CPT

## 2021-05-17 PROCEDURE — 36415 COLL VENOUS BLD VENIPUNCTURE: CPT

## 2021-05-17 PROCEDURE — 6360000004 HC RX CONTRAST MEDICATION: Performed by: NURSE PRACTITIONER

## 2021-05-17 PROCEDURE — 71045 X-RAY EXAM CHEST 1 VIEW: CPT

## 2021-05-17 PROCEDURE — 83880 ASSAY OF NATRIURETIC PEPTIDE: CPT

## 2021-05-17 PROCEDURE — 6370000000 HC RX 637 (ALT 250 FOR IP): Performed by: NURSE PRACTITIONER

## 2021-05-17 PROCEDURE — 84484 ASSAY OF TROPONIN QUANT: CPT

## 2021-05-17 PROCEDURE — 86850 RBC ANTIBODY SCREEN: CPT

## 2021-05-17 PROCEDURE — 93010 ELECTROCARDIOGRAM REPORT: CPT | Performed by: INTERNAL MEDICINE

## 2021-05-17 PROCEDURE — 86901 BLOOD TYPING SEROLOGIC RH(D): CPT

## 2021-05-17 PROCEDURE — 99283 EMERGENCY DEPT VISIT LOW MDM: CPT

## 2021-05-17 PROCEDURE — 86900 BLOOD TYPING SEROLOGIC ABO: CPT

## 2021-05-17 PROCEDURE — 6360000002 HC RX W HCPCS: Performed by: NURSE PRACTITIONER

## 2021-05-17 RX ORDER — ASPIRIN 81 MG/1
324 TABLET, CHEWABLE ORAL ONCE
Status: COMPLETED | OUTPATIENT
Start: 2021-05-17 | End: 2021-05-17

## 2021-05-17 RX ORDER — ONDANSETRON 2 MG/ML
4 INJECTION INTRAMUSCULAR; INTRAVENOUS EVERY 30 MIN PRN
Status: DISCONTINUED | OUTPATIENT
Start: 2021-05-17 | End: 2021-05-17 | Stop reason: HOSPADM

## 2021-05-17 RX ORDER — AMIODARONE HYDROCHLORIDE 200 MG/1
100 TABLET ORAL ONCE
Status: COMPLETED | OUTPATIENT
Start: 2021-05-17 | End: 2021-05-17

## 2021-05-17 RX ADMIN — IOPAMIDOL 75 ML: 755 INJECTION, SOLUTION INTRAVENOUS at 16:44

## 2021-05-17 RX ADMIN — ASPIRIN 324 MG: 81 TABLET, CHEWABLE ORAL at 15:56

## 2021-05-17 RX ADMIN — ONDANSETRON 4 MG: 2 INJECTION INTRAMUSCULAR; INTRAVENOUS at 16:51

## 2021-05-17 RX ADMIN — AMIODARONE HYDROCHLORIDE 100 MG: 200 TABLET ORAL at 20:06

## 2021-05-17 ASSESSMENT — ENCOUNTER SYMPTOMS
ABDOMINAL PAIN: 0
DIARRHEA: 0
SHORTNESS OF BREATH: 0
NAUSEA: 0
VOMITING: 0
CHEST TIGHTNESS: 0

## 2021-05-17 NOTE — ED PROVIDER NOTES
[facility] Emergency Department      Pt Name: Susy Streeter  MRN: [id number]  Zacharygfosman 1965  Date of evaluation: 5/17/2021  Provider: Adriana Lopez MD  I independently performed a history and physical on Susy Streeter. All diagnostic, treatment, and disposition decisions were made by myself in conjunction with the advanced practice provider. HPI: Susy Streeter presented with   Chief Complaint   Patient presents with    Chest Pain     chest pain x 45 mins      Susy Streeter has a past medical history of Anemia, Atrial fibrillation and flutter ([address] Atrial flutter ([address] CHB (complete heart block) ([address] Class 1 obesity without serious comorbidity with body mass index (BMI) of 33.0 to 33.9 in adult (9/6/2019), Congenital heart disease, GERD (gastroesophageal reflux disease), Headache(784.0), History of complete heart block, Hyperlipidemia, Hypertension, PONV (postoperative nausea and vomiting), Sleep apnea, Syncope, and Systolic CHF, chronic ([address] (10/3/2018). She has a past surgical history that includes Uterine fibroid surgery; Pacemaker insertion (11/29/12); Tubal ligation; Upper gastrointestinal endoscopy (N/A, 10/27/2020); Colonoscopy (N/A, 10/27/2020); Cardiac defibrillator placement (01/2021); Upper gastrointestinal endoscopy (N/A, 4/8/2021); Upper gastrointestinal endoscopy (N/A, 4/8/2021); Upper gastrointestinal endoscopy (N/A, 4/8/2021); and colectomy (N/A, 4/13/2021). No current facility-administered medications on file prior to encounter.      Current Outpatient Medications on File Prior to Encounter   Medication Sig Dispense Refill    amiodarone (PACERONE) 100 MG tablet Take 1 tablet by mouth daily 15 tablet 0    carvedilol (COREG) 6.25 MG tablet Take 1 tablet by mouth 2 times daily (with meals) 60 tablet 0    furosemide (LASIX) 20 MG tablet Take 1 tablet by mouth daily Take 40mg daily x 2 days, then 20mg daily (Patient taking differently: Take 40 mg by mouth daily ) 32 tablet 0    pantoprazole (PROTONIX) 40 MG tablet Take 1 tablet by mouth every morning (before breakfast) 30 tablet 3    NIFEdipine (ADALAT CC) 30 MG extended release tablet Take 1 tablet by mouth daily 90 tablet 3    vitamin D (ERGOCALCIFEROL) 1.25 MG (94185 UT) CAPS capsule Take 1 capsule by mouth once a week 4 capsule 5    sacubitril-valsartan (ENTRESTO) 24-26 MG per tablet Take 0.5 tablets by mouth nightly 60 tablet 0    Multiple Vitamins-Minerals (THERAPEUTIC MULTIVITAMIN-MINERALS) tablet Take 1 tablet by mouth daily      XARELTO 20 MG TABS tablet TAKE ONE TABLET BY MOUTH DAILY WITH BREAKFAST 90 tablet 3    spironolactone (ALDACTONE) 25 MG tablet Take 1 tablet by mouth daily 30 tablet 2    atorvastatin (LIPITOR) 40 MG tablet Take 1 tablet by mouth daily 60 tablet 1     PHYSICAL EXAM  Vitals: /87   Pulse 79   Temp 98.5 °F (36.9 °C)   Resp 23   Ht 5' 6\" (1.676 m)   Wt 195 lb 6.4 oz (88.6 kg)   SpO2 99%   BMI 31.54 kg/m²   Constitutional:  63 y.o. female alert  HENT:  Atraumatic, oral mucosa moist  Neck:  No visible JVD, supple  Chest/Lungs:  Respiratory effort normal, clear, regular  Abdomen:  Non-distended, soft, NT, clean upper dressing  Back:  No gross deformity  Extremities:  Normal tone and perfusion, NT    Medical Decision Making and Plan: Briefly, this is an 63 y. o.female who presented with concern for chest discomfort while at rest.  Pain is currently gone. Recent GB surgery, needed abx for post operative abscess. CT imaging negative for PE, pt is currently off her anticoagulation. We did two sets of delayed cardiac markers, both of which are negative. The patient's history and evaluation suggests a less emergent etiology for the discomfort. We do not believe the patient is experiencing symptoms from acute coronary syndrome, aortic dissection, pulmonary embolism, pneumothorax, myocarditis, Boerhaave syndrome, pericardial tamponade, acute abdomen, amongst other emergencies.   She already

## 2021-05-17 NOTE — ED PROVIDER NOTES
[address] [facility]        Pt Name: Reford Laura  MRN: [id number]  Paola 1965  Date of evaluation: 5/17/2021  Provider: CATRACHITO Patel CNP  PCP: No primary care provider on file. Note Started: 1:20 PM EDT        I have seen and evaluated this patient with my supervising physician Olive Cowden, *. CHIEF COMPLAINT       Chief Complaint   Patient presents with    Chest Pain     chest pain x 45 mins        HISTORY OF PRESENT ILLNESS   (Location, Timing/Onset, Context/Setting, Quality, Duration, Modifying Factors, Severity, Associated Signs and Symptoms)  Note limiting factors. Reford Estimable is a 63 y.o. female presents to the ER with complaint of midsternal chest pain that is pressure-like, 5/10 without mitigating or exacerbating factors. Onset at noon while at rest.  States that she recently had her gallbladder removed and a mass removed from abdomen about 4 weeks ago, had to have several units of blood transfused due to anemia. And has been treated for post-surgical infection to abdomen. States that she is lightheaded with the chest pressure. Denies any headache, fever, dizziness, visual disturbances. No neck or back pain. No shortness of breath, cough, or congestion. No nausea, vomiting, diarrhea, constipation, or dysuria. No rash. Nursing Notes were all reviewed and agreed with or any disagreements were addressed in the HPI. REVIEW OF SYSTEMS    (2-9 systems for level 4, 10 or more for level 5)     Review of Systems   Constitutional: Negative for activity change, chills and fever. Respiratory: Negative for chest tightness and shortness of breath. Cardiovascular: Positive for chest pain. Gastrointestinal: Negative for abdominal pain, diarrhea, nausea and vomiting. Genitourinary: Negative for dysuria. Neurological: Positive for light-headedness.    All other systems reviewed and are negative. Positives and Pertinent negatives as per HPI. Except as noted above in the ROS, all other systems were reviewed and negative.        PAST MEDICAL HISTORY     Past Medical History:   Diagnosis Date    Anemia     Atrial fibrillation and flutter (HCC)     Atrial flutter ([facility]     CHB (complete heart block) (HCC)     Class 1 obesity without serious comorbidity with body mass index (BMI) of 33.0 to 33.9 in adult 9/6/2019    Congenital heart disease     GERD (gastroesophageal reflux disease)     Headache(784.0)     History of complete heart block     Hyperlipidemia     Hypertension     PONV (postoperative nausea and vomiting)     Sleep apnea     uses CPAP    Syncope     Systolic CHF, chronic ([facility] 10/3/2018         SURGICAL HISTORY     Past Surgical History:   Procedure Laterality Date    CARDIAC DEFIBRILLATOR PLACEMENT  01/2021    [employer]    COLECTOMY N/A 4/13/2021    EXPLORATORY LAPAROTOMY, RESECTION OF DUODENAL MASS, CHOLECYSTECTOMY WITH INTRAOPERATIVE CHOLANGIOGRAM performed by Jenna Flaherty MD at [place] [address] COLONOSCOPY N/A 10/27/2020    COLONOSCOPY POLYPECTOMY SNARE/COLD BIOPSY performed by Cherry Lancaster MD at [place] [address]  11/29/12    dual chamber PPM, [employer]    TUBAL LIGATION      UPPER GASTROINTESTINAL ENDOSCOPY N/A 10/27/2020    EGD DIAGNOSTIC ONLY [facility] N/A 4/8/2021    EGD CONTROL HEMORRHAGE WITH APPLICATION OF 3 CLIPS TO DUODENAL MASS performed by Sultana Lezama MD at  [facility] N/A 4/8/2021    EGD BIOPSY DUODENAL MASS performed by Sultana Lezama MD at [address] [place] [address] N/A 4/8/2021    EGD SUBMUCOSAL INJECTION OF 0.6 MG OF EPINEPRINE INTO BASE OF DUODENAL MASS performed by Sultana Lezama MD at [address] [place] [address]/[employer] atraumatic. Right Ear: External ear normal.      Left Ear: External ear normal.   Eyes:      General:         Right eye: No discharge. Left eye: No discharge. Neck:      Vascular: No JVD. Cardiovascular:      Rate and Rhythm: Normal rate. Rhythm irregular. Pulses: Normal pulses. Pulmonary:      Effort: Pulmonary effort is normal. No respiratory distress. Abdominal:      Palpations: Abdomen is soft. Musculoskeletal:         General: Normal range of motion. Cervical back: Normal range of motion and neck supple. Skin:     General: Skin is warm and dry. Coloration: Skin is not pale. Neurological:      Mental Status: She is alert and oriented to person, place, and time.    Psychiatric:         Behavior: Behavior normal.         DIAGNOSTIC RESULTS   LABS:    Labs Reviewed   CBC WITH AUTO DIFFERENTIAL - Abnormal; Notable for the following components:       Result Value    RBC 3.81 (*)     Hemoglobin 10.8 (*)     Hematocrit 33.6 (*)     RDW 20.6 (*)     Lymphocytes Absolute 0.9 (*)     All other components within normal limits    Narrative:     Performed at:  [facility] [address], [facility], [address] [employer]   Phone [phone]   COMPREHENSIVE METABOLIC PANEL    Narrative:     Performed at:  [facility] [address], [facility], [address] [employer]   Phone [phone]   TROPONIN    Narrative:     Performed at:  [facility] [address], [facility], [address] [employer] [employer]   Phone [phone]   BRAIN NATRIURETIC PEPTIDE    Narrative:     Performed at:  [facility] [address], [facility], [address] [employer] [employer]   Phone [phone]   TROPONIN    Narrative:     Performed at:  [facility] [address], [facility], [address] [employer]   Phone [phone]   TYPE AND SCREEN    Narrative:     Performed at:  [facility] following medications:  Medications   ondansetron (ZOFRAN) injection 4 mg (4 mg Intravenous Given 5/17/21 1651)   amiodarone (CORDARONE) tablet 100 mg (has no administration in time range)   aspirin chewable tablet 324 mg (324 mg Oral Given 5/17/21 1556)   iopamidol (ISOVUE-370) 76 % injection 75 mL (75 mLs Intravenous Given 5/17/21 1644)           Briefly, this is a 63year old female that presents to the ER with complaint of midsternal chest pain that is pressure-like, 5/10 without mitigating or exacerbating factors. Onset at noon while at rest.  States that she recently had her gallbladder removed and a mass removed from abdomen about 4 weeks ago, had to have several units of blood transfused due to anemia. And has been treated for post-surgical infection to abdomen. Patient was given aspirin here in the ER. Troponin and repeat troponin both negative. Heart score 3. CBC shows hemoglobin of 10.8, this is higher than patient has been recently and does not require transfusion. CMP is unremarkable. CT CHEST PULMONARY EMBOLISM W CONTRAST (Final result)  Result time 05/17/21 17:08:52  Final result by Dorene Hernandez MD (05/17/21 17:08:52)                Impression:    No evidence of pulmonary embolism or acute pulmonary abnormality. 1.5 cm hypodense nodule in the right lobe of the thyroid gland. Recommend   further evaluation with thyroid ultrasound. Patient does have follow-up with electrophysiology tomorrow to discuss chronic A. fib, not anticoagulated for consideration of watchman device. To be discharged home with instructions on close outpatient follow-up and strict return precautions. The patient has been evaluated and the history and physical exam suggest a benign etiology. I see nothing to suggest acute coronary syndrome, myocardial infarction, pulmonary embolism, thoracic aortic dissection, significant pericarditis, pneumonia, pneumothorax, or acute abdomen.   I feel the patient can be safely discharged to home with outpatient follow up. Instructions have been given for the patient to return to the Emergency Department for any worsening of the symptoms, including but not limited to increased pain, shortness of breath, abdominal pain or weakness. FINAL IMPRESSION      1. Chest pain, unspecified type    2. Chronic atrial fibrillation (HCC)    3. Thyroid nodule          DISPOSITION/PLAN   DISPOSITION Decision To Discharge 05/17/2021 07:04:12 PM      PATIENT REFERREDTO:  Teresa Zimmerman MD  [address] [place] [address].  [address] [place] [address]      go to your appointment      DISCHARGE MEDICATIONS:  New Prescriptions    No medications on file       DISCONTINUED MEDICATIONS:  Discontinued Medications    No medications on file              (Please note that portions of this note were completed with a voice recognition program.  Efforts were made to edit the dictations but occasionally words are mis-transcribed.)    CATRACHITO Reynoso CNP (electronically signed)           CATRACHITO Reynoso CNP  05/17/21 1911

## 2021-05-18 ENCOUNTER — TELEPHONE (OUTPATIENT)
Dept: CARDIOLOGY CLINIC | Age: 56
End: 2021-05-18

## 2021-05-18 RX ORDER — HYDRALAZINE HYDROCHLORIDE 25 MG/1
TABLET, FILM COATED ORAL
Qty: 60 TABLET | Refills: 1 | OUTPATIENT
Start: 2021-05-18

## 2021-05-18 NOTE — PROGRESS NOTES
[facility]   Electrophysiology  Raul Arellano, CATRACHITO-CNP  Attending EP: Dr. Cherise Villegas   Date: 5/19/2021  I had the privilege of visiting Dejan Hughes in the office. Chief Complaint:   Chief Complaint   Patient presents with    Follow-up    3 Month Follow-Up     History of Present Illness: History obtained from patient and medical record. Dejan Hughes is 63 y.o. female with a past medical history of HTN, CHB s/p PPM, sCHF, CHD, syncope, and atrial fibrillation/flutter. 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 S/p LHC 1/20/2020 that showed normal coronaries. She had episode of VT with syncope in cath lab prior to [address] [place] [address]. S/p extraction of RV pacing lead and Biv-AICD upgrade for cardiomyopathy and VT. Also loaded with amiodarone. Recently hospitalized for fatigue and found to be acutely anemic and received multiple transfusions. S/p EGD with active bleeding and s/p clips and epi injection, also a lesion was noted and she bx sent. s/p exp lap, cholecystectomy and excision of duodenal mass. She will be considered for COURTNEY occlusion as OP. Interval history: Today, Dejan Hughes is being seen for VT, atrail fibrillation, and hypertension. Reports episodes of dizziness that came on suddenly when she went from laying to standing. Episode lasted a few seconds and resolved spontaneously, she as seen in the ER because she thought it may be recurrent ventricular tachycardia and she was later discharged. She has had issues tolerating amiodarone and is now on 100 mg daily without issues. She had possible infection at wound site and was put on short-term abx. It is healing slowly. Her biopsy came back benign. She is following with Dr. Tung Galindo and Dr. Rubén Deras, she has not gotten any feedback regarding if/when it is safe to resume AC.   She is interested in Watchman procedure. Denies having chest pain, palpitations, shortness of breath, orthopnea/PND, cough, or dizziness at the time of this visit. With regard to medication therapy the patient has been compliant with prescribed regimen. She has tolerated therapy to date. Assessment:  Symptomatic VT    -  on ECG   - Has been treated with amiodarone, however she has not tolerated well due to GI complaints, Currently she is taking 100 mg daily and tolerating   - No recurrence on device   - Discussed weaning amiodarone and she was very hesitant, I think in the next few months if no recurrent arrhythmia this should be weaned, she is agreeable. CHB/Implantable device/Cardiomyopathy   - S/p extraction of RV pacing lead and Biv-AICD upgrade   - The CIED was interrogated and I reviewed all data    - Device interrogation today shows FLORENCIO 6.8 yrs, AT/AF 0, AP 94.9%,  979%  Persistent Atrial Fibrillation  - ECG today shows SR/  - Continue Coreg  - [id number] score: 3 (hypertension, CHF, gender) ; [id number] Vasc score and anticoagulation discussed. High risk for stroke and thromboembolism. Anticoagulation is recommended.   ~ AC on hold for anemia and s/p surgery, anemia now resolved, will discuss with gen surgery and GI to see if safe to trial [facility] cautiously  ~ Gave handout for Watchman procedure and she will consider and call if interested  - Afib risk factors including age, HTN, obesity, inactivity and DERECK were discussed with patient. Risk factor modification recommended  Lab Results   Component Value Date    TSHREFLEX 2.30 04/15/2021       - Treatment options including cardioversion, rate control strategy, antiarrhythmics, anticoagulation and possible ablation were discussed with patient. Risks, benefits and alternative of each treatment options were explained.  All questions answered    - S/p DCCV 1/20/2021    ~ On amiodarone therapy, will wean and if recurrent ablation can be considered  Acute on Chronic combined diastolic and systolic heart failure (NYHA Class III)  - Appears compensated                    ~ EF 25-30% per echo   - Continue BB, aldactone and lasix   - Allergy to ACE/ARB   - Aggressive medical therapy with risk factor modification    - Discussed importance of daily monitoring weight, low sodium diet and fluid restriction   - Follows with HF team  HTN-goal <130/80   - Controlled   - Continue current medications   - Encouraged patient to check BP at home, log and bring to office visits  - Discussed lifestyle modifications, weight loss, low sodium diet   Anemia   - Chronic, Stable   - H/H 10.8/33.6 5/17/2021  Plan  - Continue current mediations  - Resume AC cautiously if acceptable per GI and gen surgery  - Handout for Watchman proceed, she will consider and call if interested or discuss at follow up  - Call if symptoms develop    F/U: Follow-up with EP 3 months RM  -Follow up with device clinic as scheduled  -Call [facility] at [phone] with any questions    Allergies: Allergies   Allergen Reactions    Latex      rash    Codeine      Hives      Lisinopril      cough    Nitroglycerin Hives    Sulfa Antibiotics Nausea Only    Hydralazine      headaches     Home Medications:  Prior to Visit Medications    Medication Sig Taking?  Authorizing Provider   amiodarone (PACERONE) 100 MG tablet Take 1 tablet by mouth daily  CATRACHITO Keenan - CNS   carvedilol (COREG) 6.25 MG tablet Take 1 tablet by mouth 2 times daily (with meals)  CATRACHITO Alanis CNP   furosemide (LASIX) 20 MG tablet Take 1 tablet by mouth daily Take 40mg daily x 2 days, then 20mg daily  Patient taking differently: Take 40 mg by mouth daily   CATRACHITO Alanis CNP   pantoprazole (PROTONIX) 40 MG tablet Take 1 tablet by mouth every morning (before breakfast)  Matt Gerco MD   NIFEdipine (ADALAT CC) 30 MG extended release tablet Take 1 tablet by mouth daily  Shanae Dover MD   vitamin D (ERGOCALCIFEROL) 1.25 MG (06051 UT) CAPS capsule Take 1 capsule by mouth once a week  CATRACHITO Keenan - CNS   sacubitril-valsartan (ENTRESTO) 24-26 MG per tablet Take 0.5 tablets by mouth nightly  CATRACHITO Keenan - CNS   Multiple Vitamins-Minerals (THERAPEUTIC MULTIVITAMIN-MINERALS) tablet Take 1 tablet by mouth daily  Historical Provider, MD   XARELTO 20 MG TABS tablet TAKE ONE TABLET BY MOUTH DAILY WITH BREAKFAST  Isabell Villegas MD   spironolactone (ALDACTONE) 25 MG tablet Take 1 tablet by mouth daily  CATRACHITO Keenan - CNS   atorvastatin (LIPITOR) 40 MG tablet Take 1 tablet by mouth daily  Umu Enriquez MD      Past Medical History:  Past Medical History:   Diagnosis Date    Anemia     Atrial fibrillation and flutter (HCC)     Atrial flutter (HCC)     CHB (complete heart block) (HCC)     Class 1 obesity without serious comorbidity with body mass index (BMI) of 33.0 to 33.9 in adult 9/6/2019    Congenital heart disease     GERD (gastroesophageal reflux disease)     Headache(784.0)     History of complete heart block     Hyperlipidemia     Hypertension     PONV (postoperative nausea and vomiting)     Sleep apnea     uses CPAP    Syncope     Systolic CHF, chronic ([facility] [address] 10/3/2018     Past Surgical History:    has a past surgical history that includes Uterine fibroid surgery; Pacemaker insertion (11/29/12); Tubal ligation; Upper gastrointestinal endoscopy (N/A, 10/27/2020); Colonoscopy (N/A, 10/27/2020); Cardiac defibrillator placement (01/2021); Upper gastrointestinal endoscopy (N/A, 4/8/2021); Upper gastrointestinal endoscopy (N/A, 4/8/2021); Upper gastrointestinal endoscopy (N/A, 4/8/2021); and colectomy (N/A, 4/13/2021). Social History:  Reviewed. reports that she has never smoked. She has never used smokeless tobacco. She reports that she does not drink alcohol and does not use drugs. Family History:  Reviewed. family history includes Cancer in her father. Denies family history of sudden cardiac death, arrhythmia, premature CAD    Review of System:  · Constitutional: No weight changes or weakness  · HEENT: No visual changes. No mouth sores or sore throat. · Cardiovascular: denies chest pain, denies dyspnea on exertion, denies palpitations or denies loss of consciousness. No cough, hemoptysis, denies pleuritic pain, or phlebitis. admits to dizziness. · Respiratory: denies cough or wheezing. · Gastrointestinal: Negative, No blood in stools. · Genitourinary: No hematuria. · Neurological: No focal weakness  · Psychiatric: No confusion, anxiety, or depression. · Hem/Lymph: Denies abnormal bruising or bleeding. Physical Examination:  There were no vitals filed for this visit. Wt Readings from Last 3 Encounters:   05/17/21 195 lb 6.4 oz (88.6 kg)   05/14/21 199 lb (90.3 kg)   05/04/21 198 lb (89.8 kg)      Constitutional: Cooperative and in no apparent distress, and appears well nourished   Skin: Warm and pink; no cyanosis or bruising   HEENT: Symmetric and normocephalic. Conjunctiva pink with clear sclera. Mucus membranes pink and moist. No visible masses/goiter   Respiratory: Respirations symmetric and unlabored. Lungs clear to auscultation bilaterally, no wheezing, rhonchi, or crackles.  Cardiovascular:  regular rate and rhythm. S1 & S2 present, negative for murmur. negative elevation of JVP. No peripheral edema.  Musculoskeletal:  No focal weakness.  Neurological/Psych: Awake and orientated to person, place and time. Calm affect, appropriate mood.      Pertinent labs, diagnostic, device, and imaging results reviewed as a part of this visit     LABS    CBC:   Lab Results   Component Value Date    WBC 4.6 05/17/2021    HGB 10.8 (L) 05/17/2021    HCT 33.6 (L) 05/17/2021    MCV 88.0 05/17/2021     05/17/2021     BMP:   Lab Results   Component Value Date    CREATININE 0.8 05/17/2021    BUN 9 05/17/2021     05/17/2021    K 3.7 05/17/2021  2021    CO2 27 2021     CrCl cannot be calculated (Unknown ideal weight.). No results found for: BNP    Thyroid: No results found for: TSH, G9AMCRF, P1MCZKE, THYROIDAB  Lipid Panel: No results found for: CHOL, HDL, TRIG  LFTs:  Lab Results   Component Value Date    ALT 12 2021    AST 19 2021    ALKPHOS 67 2021    BILITOT 0.3 2021     Coags:   Lab Results   Component Value Date    PROTIME 14.8 (H) 2021    INR 1.27 (H) 2021    APTT 30.2 2021     EC2021 SR/ HR 75, , , QTc 480     ECHO:  2021  Summary   Left ventricular size is mildly increased. Moderately reduced global systolic function with an ejection fraction   estimated at 30-35%. Global hypokinesis noted. Grade II diastolic dysfunction with elevated LV filling pressures. There is mild mitral regurgitation noted. Mild left atrial enlargement noted. Aortic valve appears sclerotic but opens adequately. Mild aortic regurgitation. There is mild tricuspid regurgitation with a RVSP estimation of 25 mmHg. Pacer / ICD wire is visualized in the right ventricle. The right ventricle is normal in size and function. 2021  Summary   Left ventricular size is mildly increased. Left ventricular systolic function is severely depressed with ejection   fraction estimated at 25-30%. Grade II diastolic dysfunction with elevated LV filling pressures. Moderate mitral regurgitation. The left atrium is moderate to severely dilated. Aortic valve appears sclerotic but opens adequately. Mild aortic regurgitation is present. Mild to moderate tricuspid regurgitation. Systolic pulmonary artery pressure   (SPAP) is estimated at 35 mmHg. Pacer / ICD wire is visualized in the right ventricle. Normal right ventricular size and function.     Cath: 2021  Findings:  Artery Findings/Result   LM Normal   LAD Normal   Cx Normal   RI NA   RCA Normal   LVEDP 6   LVG NA    Intervention:         None    Diet & Exercise:   The patient is counseled to follow a low salt diet to assure blood pressure remains controlled for cardiovascular risk factor modification   The patient is counseled to avoid excess caffeine, and energy drinks as this may exacerbated ectopy and arrhythmia   The patient is counseled to lose weight to control cardiovascular risk factors   Exercise program discussed: To improve overall cardiovascular health, the patient is instructed to increase cardiovascular related activities with a goal of 150 min/week of moderate level activity or 10,000 steps per day. Encouraged to perform as much activity as tolerated     I have addressed the patient's cardiac risk factors and adjusted pharmacologic treatment as needed. In addition, I have reinforced the need for patient directed risk factor modification. I independently reviewed the device check interrogation and ECG    All questions and concerns were addressed with the patient. Alternatives to treatment were discussed. Thank you for allowing to us to participate in the care of Estherjarvis Flores.     CATRACHITO Jauregui-CNP  [facility]   Office: [phone]

## 2021-05-19 ENCOUNTER — NURSE ONLY (OUTPATIENT)
Dept: CARDIOLOGY CLINIC | Age: 56
End: 2021-05-19
Payer: MEDICAID

## 2021-05-19 ENCOUNTER — OFFICE VISIT (OUTPATIENT)
Dept: CARDIOLOGY CLINIC | Age: 56
End: 2021-05-19
Payer: MEDICAID

## 2021-05-19 VITALS
WEIGHT: 201 LBS | BODY MASS INDEX: 31.55 KG/M2 | OXYGEN SATURATION: 99 % | DIASTOLIC BLOOD PRESSURE: 72 MMHG | HEIGHT: 67 IN | SYSTOLIC BLOOD PRESSURE: 115 MMHG | HEART RATE: 76 BPM

## 2021-05-19 DIAGNOSIS — I50.22 SYSTOLIC CHF, CHRONIC (HCC): ICD-10-CM

## 2021-05-19 DIAGNOSIS — Z95.810 ICD (IMPLANTABLE CARDIOVERTER-DEFIBRILLATOR), BIVENTRICULAR, IN SITU: ICD-10-CM

## 2021-05-19 DIAGNOSIS — I42.0 DILATED CARDIOMYOPATHY (HCC): ICD-10-CM

## 2021-05-19 DIAGNOSIS — I48.0 PAROXYSMAL ATRIAL FIBRILLATION (HCC): ICD-10-CM

## 2021-05-19 DIAGNOSIS — I51.7 LVH (LEFT VENTRICULAR HYPERTROPHY): ICD-10-CM

## 2021-05-19 DIAGNOSIS — I10 ESSENTIAL HYPERTENSION: ICD-10-CM

## 2021-05-19 DIAGNOSIS — I51.9 LV DYSFUNCTION: ICD-10-CM

## 2021-05-19 DIAGNOSIS — I47.29 PAROXYSMAL VENTRICULAR TACHYCARDIA: ICD-10-CM

## 2021-05-19 DIAGNOSIS — Q24.6 CONGENITAL HEART BLOCK: ICD-10-CM

## 2021-05-19 DIAGNOSIS — I50.42 CHRONIC COMBINED SYSTOLIC AND DIASTOLIC HEART FAILURE (HCC): ICD-10-CM

## 2021-05-19 DIAGNOSIS — I47.20 VENTRICULAR TACHYCARDIA: Primary | ICD-10-CM

## 2021-05-19 PROCEDURE — 99214 OFFICE O/P EST MOD 30 MIN: CPT | Performed by: NURSE PRACTITIONER

## 2021-05-19 PROCEDURE — 3017F COLORECTAL CA SCREEN DOC REV: CPT | Performed by: NURSE PRACTITIONER

## 2021-05-19 PROCEDURE — 1111F DSCHRG MED/CURRENT MED MERGE: CPT | Performed by: NURSE PRACTITIONER

## 2021-05-19 PROCEDURE — G8417 CALC BMI ABV UP PARAM F/U: HCPCS | Performed by: NURSE PRACTITIONER

## 2021-05-19 PROCEDURE — 1036F TOBACCO NON-USER: CPT | Performed by: NURSE PRACTITIONER

## 2021-05-19 PROCEDURE — 93284 PRGRMG EVAL IMPLANTABLE DFB: CPT | Performed by: INTERNAL MEDICINE

## 2021-05-19 PROCEDURE — G8427 DOCREV CUR MEDS BY ELIG CLIN: HCPCS | Performed by: NURSE PRACTITIONER

## 2021-05-19 NOTE — PROGRESS NOTES
Patient comes in for programming evaluation for her defibrillator. All sensing and pacing parameters are within normal range. Battery life 6.8 years  AP 95.4%.  97.9%. No episodes noted. Patient remains on Coreg and amiodarone. Today we lowered the RV output to [employer] life. Please see interrogation for more detail. Optivol is within normal range. Patient will see Mckenzie Rocha today and follow up in 3 months in office or remotely.

## 2021-05-19 NOTE — PATIENT INSTRUCTIONS
- No medicaiton changes  - Will discuss blood thinner with Dr. Sai Jackson and Dr. White Pulse  - Call office if symptoms develop  - Gave handout for Watchman procedure  - Call if symptoms develop  - Follow up in 3 monthns

## 2021-05-21 ENCOUNTER — TELEPHONE (OUTPATIENT)
Dept: CARDIOLOGY CLINIC | Age: 56
End: 2021-05-21

## 2021-05-21 DIAGNOSIS — D64.9 ANEMIA, UNSPECIFIED TYPE: Primary | ICD-10-CM

## 2021-05-21 PROCEDURE — 93284 PRGRMG EVAL IMPLANTABLE DFB: CPT | Performed by: INTERNAL MEDICINE

## 2021-05-21 PROCEDURE — 93290 INTERROG DEV EVAL ICPMS IP: CPT | Performed by: INTERNAL MEDICINE

## 2021-05-21 NOTE — TELEPHONE ENCOUNTER
I also called Dr. Jann Mack office who states from GI perspective resumption of AC is acceptable. I called to discuss with patient and she is agreeable. States she will be getting CBC drawn on 6/2 per NPRG. Will order one to be drawn in 2 weeks. Sent Rx for 20 mg daily Xarelto, take with full meal. She will see RMM at follow up to see if she is a candidate for Watchman. She would like to be considered for it.      Will inform NPRG per pt request.     Marisol Hyman, CATRACHITO-CNP

## 2021-05-21 NOTE — TELEPHONE ENCOUNTER
----- Message from Eric Pool MD sent at 5/20/2021 10:20 AM EDT -----  She can restart anticoagulation from surgical standpoint  ----- Message -----  From: CATRACHITO Crump CNP  Sent: 5/20/2021   8:14 AM EDT  To: Eric Pool MD    Hello,       We have a mutual patient. From a surgical standpoint is it acceptable to resume [facility] for Ms. Matthew ? I will also confer with SELVIN Christianson

## 2021-05-28 ENCOUNTER — TELEPHONE (OUTPATIENT)
Dept: CARDIOLOGY CLINIC | Age: 56
End: 2021-05-28

## 2021-05-28 NOTE — TELEPHONE ENCOUNTER
Please have her stop the nifedipine for the time being to see how her BP responds.     Ronak Weller, APRN-CNP

## 2021-05-28 NOTE — TELEPHONE ENCOUNTER
Her b/p was 130/86 before she took her meds and an hour and a half later after her meds it was 90/55. She doesn't know which of her meds is causing her b/p to go so low . Does RMM think her meds need to be adjusted ? She doesn't feel bad but the low numbers just concerned her .  Please call to advise

## 2021-06-02 ENCOUNTER — CLINICAL DOCUMENTATION (OUTPATIENT)
Dept: OTHER | Age: 56
End: 2021-06-02

## 2021-06-03 ENCOUNTER — TELEPHONE (OUTPATIENT)
Dept: CARDIOLOGY CLINIC | Age: 56
End: 2021-06-03

## 2021-06-03 LAB
ANION GAP SERPL CALCULATED.3IONS-SCNC: 9 MMOL/L (ref 6–18)
BUN BLDV-MCNC: 10 MG/DL (ref 8–26)
CALCIUM SERPL-MCNC: 9.2 MG/DL (ref 8.5–10.4)
CHLORIDE BLD-SCNC: 104 MEQ/L (ref 98–111)
CO2: 27 MMOL/L (ref 21–31)
CREAT SERPL-MCNC: 0.98 MG/DL (ref 0.6–1.2)
GFR AFRICAN AMERICAN: 73 ML/MIN/1.73 M2
GFR NON-AFRICAN AMERICAN: 64 ML/MIN/1.73 M2
GLUCOSE BLD-MCNC: 106 MG/DL (ref 70–99)
POTASSIUM SERPL-SCNC: 3.6 MEQ/L (ref 3.6–5.1)
SODIUM BLD-SCNC: 140 MEQ/L (ref 135–145)

## 2021-06-03 NOTE — TELEPHONE ENCOUNTER
Pt calling she mistakenly took the NIFEdipine yesterday and it made her feel dizzy, she feels okay today, she wants to know if she can stop taking the Amiodarone?  pls call to advise thank you

## 2021-06-03 NOTE — TELEPHONE ENCOUNTER
Move nifedipine to night time and start tomorrow. She should check her BP when she is dizzy to ensure it is not secondary to low BP. She needs to continue amiodarone until she sees Dr. Alex Irene next visit.  May discuss further at that time    CATRACHITO Becerra-CNP

## 2021-06-03 NOTE — TELEPHONE ENCOUNTER
calling to let office know that she could only ubaldo the BMP and BNP but could not get the CBC w diff.  Patient will have togo to outside lab

## 2021-06-03 NOTE — TELEPHONE ENCOUNTER
Spoke to the pt-gave instructions per EP provider. Pt does not want to restart the Nifedipine. She feels very fatigued, weak, and dizzy when she takes it, and BP drops. Does not see Dr. Tera Lezama until 9/7/21. Asking for Otis Cary CNP, to review, and advise, upon her return.

## 2021-06-04 ENCOUNTER — HOSPITAL ENCOUNTER (OUTPATIENT)
Age: 56
Discharge: HOME | End: 2021-06-04
Payer: MEDICAID

## 2021-06-04 ENCOUNTER — HOSPITAL ENCOUNTER (OUTPATIENT)
Age: 56
Setting detail: SPECIMEN
Discharge: HOME | End: 2021-06-04
Payer: MEDICAID

## 2021-06-04 DIAGNOSIS — D64.9 ANEMIA, UNSPECIFIED TYPE: ICD-10-CM

## 2021-06-04 LAB
HCT VFR BLD CALC: 33.3 % (ref 36–48)
HEMOGLOBIN: 10.9 G/DL (ref 12–16)
MCH RBC QN AUTO: 28.8 PG (ref 26–34)
MCHC RBC AUTO-ENTMCNC: 32.6 G/DL (ref 31–36)
MCV RBC AUTO: 88.2 FL (ref 80–100)
PDW BLD-RTO: 19.8 % (ref 12.4–15.4)
PLATELET # BLD: 239 K/UL (ref 135–450)
PMV BLD AUTO: 8.5 FL (ref 5–10.5)
PRO-BNP: 199 PG/ML (ref 0–124)
RBC # BLD: 3.78 M/UL (ref 4–5.2)
WBC # BLD: 4.9 K/UL (ref 4–11)

## 2021-06-04 PROCEDURE — 36415 COLL VENOUS BLD VENIPUNCTURE: CPT

## 2021-06-04 PROCEDURE — 85027 COMPLETE CBC AUTOMATED: CPT

## 2021-06-04 PROCEDURE — 83880 ASSAY OF NATRIURETIC PEPTIDE: CPT

## 2021-06-07 DIAGNOSIS — Z79.01 ON RIVAROXABAN THERAPY: ICD-10-CM

## 2021-06-07 DIAGNOSIS — D64.9 ANEMIA, UNSPECIFIED TYPE: Primary | ICD-10-CM

## 2021-06-09 ENCOUNTER — TELEPHONE (OUTPATIENT)
Dept: CARDIOLOGY CLINIC | Age: 56
End: 2021-06-09

## 2021-06-10 NOTE — TELEPHONE ENCOUNTER
Pt sends frequent QD-TID manual carelink transmissions. Please call and remind her to only send manual carelink if she is advised to by our office. Her monitor is wireless and we will receive any alerts automatically.     Thanks

## 2021-06-10 NOTE — TELEPHONE ENCOUNTER
Patient informed of their home transmitter's function and asked to send in manual transmission per the office's request, or if she is feeling symptomatic and wishes for immediate feedback from me on the state of her device.

## 2021-06-11 ENCOUNTER — OFFICE VISIT (OUTPATIENT)
Dept: CARDIOLOGY CLINIC | Age: 56
End: 2021-06-11
Payer: MEDICAID

## 2021-06-11 VITALS
BODY MASS INDEX: 33.27 KG/M2 | WEIGHT: 207 LBS | HEIGHT: 66 IN | SYSTOLIC BLOOD PRESSURE: 120 MMHG | OXYGEN SATURATION: 99 % | HEART RATE: 71 BPM | DIASTOLIC BLOOD PRESSURE: 78 MMHG

## 2021-06-11 DIAGNOSIS — G47.33 OSA (OBSTRUCTIVE SLEEP APNEA): ICD-10-CM

## 2021-06-11 DIAGNOSIS — D50.9 IRON DEFICIENCY ANEMIA, UNSPECIFIED IRON DEFICIENCY ANEMIA TYPE: ICD-10-CM

## 2021-06-11 DIAGNOSIS — I50.42 CHRONIC COMBINED SYSTOLIC AND DIASTOLIC CHF, NYHA CLASS 2 (HCC): Primary | ICD-10-CM

## 2021-06-11 DIAGNOSIS — Z95.810 ICD (IMPLANTABLE CARDIOVERTER-DEFIBRILLATOR), BIVENTRICULAR, IN SITU: ICD-10-CM

## 2021-06-11 DIAGNOSIS — E55.9 HYPOVITAMINOSIS D: ICD-10-CM

## 2021-06-11 DIAGNOSIS — I48.0 PAROXYSMAL ATRIAL FIBRILLATION (HCC): ICD-10-CM

## 2021-06-11 PROCEDURE — 99214 OFFICE O/P EST MOD 30 MIN: CPT | Performed by: CLINICAL NURSE SPECIALIST

## 2021-06-11 PROCEDURE — G8427 DOCREV CUR MEDS BY ELIG CLIN: HCPCS | Performed by: CLINICAL NURSE SPECIALIST

## 2021-06-11 PROCEDURE — 3017F COLORECTAL CA SCREEN DOC REV: CPT | Performed by: CLINICAL NURSE SPECIALIST

## 2021-06-11 PROCEDURE — G8417 CALC BMI ABV UP PARAM F/U: HCPCS | Performed by: CLINICAL NURSE SPECIALIST

## 2021-06-11 PROCEDURE — 1036F TOBACCO NON-USER: CPT | Performed by: CLINICAL NURSE SPECIALIST

## 2021-06-11 RX ORDER — FUROSEMIDE 20 MG/1
40 TABLET ORAL DAILY
Qty: 30 TABLET | Refills: 2 | Status: SHIPPED | OUTPATIENT
Start: 2021-06-11 | End: 2021-08-23 | Stop reason: SDUPTHER

## 2021-06-11 RX ORDER — CARVEDILOL 6.25 MG/1
6.25 TABLET ORAL 2 TIMES DAILY WITH MEALS
Qty: 60 TABLET | Refills: 2 | Status: SHIPPED | OUTPATIENT
Start: 2021-06-11 | End: 2021-08-23 | Stop reason: SDUPTHER

## 2021-06-11 RX ORDER — ATORVASTATIN CALCIUM 40 MG/1
40 TABLET, FILM COATED ORAL DAILY
Qty: 60 TABLET | Refills: 2 | Status: SHIPPED | OUTPATIENT
Start: 2021-06-11

## 2021-06-11 RX ORDER — SACUBITRIL AND VALSARTAN 24; 26 MG/1; MG/1
0.5 TABLET, FILM COATED ORAL 2 TIMES DAILY
Qty: 30 TABLET | Refills: 2 | Status: SHIPPED | OUTPATIENT
Start: 2021-06-11 | End: 2021-07-19 | Stop reason: SDUPTHER

## 2021-06-11 RX ORDER — SPIRONOLACTONE 25 MG/1
25 TABLET ORAL DAILY
Qty: 30 TABLET | Refills: 2 | Status: SHIPPED | OUTPATIENT
Start: 2021-06-11 | End: 2021-12-10 | Stop reason: SDUPTHER

## 2021-06-11 NOTE — PATIENT INSTRUCTIONS
1.  Increase entresto to half a pill twice a day  2. Scripts refilled   3. Check iron studies and lipids next week with home care  4. Continue all other medications  5. Let me know if BP remains >120  6.   RTO in 2 months

## 2021-06-11 NOTE — PROGRESS NOTES
[facility]  Progress Note    Primary Care Doctor:  No primary care provider on file. Chief Complaint   Patient presents with    Hypertension    Chest Pain     no cardio symptons        History of Present Illness:  63 y.o. female with history of sHF, LVH, AF on xarelto (follows with Dr Evita Oconnor), had been on flecainide, dual chamber pacemaker 2012 due to congenital heart block  LVEF had been 55% in 2015 and now 25%. No lisinopril due to cough. She is a cook at [employer]  Lip numbness to entresto 3/2020 hydralazine caused headaches  10/2020 EGD (hiatal hernia) and colonoscopy (polyp)   ICD placed 1/22/21, LHC done 1/20/21 normal cors  4/21 benign duodenal mass with resection Dr Archer Found  4/7-19/2021 for symptomatic anemia, requiring surgery by Dr Archer Found for overlying lipoma, large duodenum lesion requiring 3 clips    I had the pleasure of seeing Samia Riley in follow up for sHF. She is ambulatory and by her self today. Her optival shows normal impedence. She stopped her amiodarone as it was causing muscle aches all over. She also stopped her imdur as it was making her feel \"funny\". Her BP has been elevated at home. She is tolerating the entresto at night. She denies any chest pain, palpitations, lightheadedness or edema. optival shows normal thoracic impedence    Past Medical History:   has a past medical history of Anemia, Atrial fibrillation and flutter ([address] Atrial flutter ([address] CHB (complete heart block) ([address] Class 1 obesity without serious comorbidity with body mass index (BMI) of 33.0 to 33.9 in adult, Congenital heart disease, GERD (gastroesophageal reflux disease), Headache(784.0), History of complete heart block, Hyperlipidemia, Hypertension, PONV (postoperative nausea and vomiting), Sleep apnea, Syncope, and Systolic CHF, chronic ([address] Surgical History:   has a past surgical history that includes Uterine fibroid surgery;  Pacemaker insertion (11/29/12); daily 11/18/16  Yes Xander Marcus MD   amiodarone (PACERONE) 100 MG tablet Take 1 tablet by mouth daily  Patient not taking: Reported on 6/11/2021 5/4/21   Per Ac APRN - CNS   NIFEdipine (ADALAT CC) 30 MG extended release tablet Take 1 tablet by mouth daily  Patient not taking: Reported on 6/11/2021 2/23/21   Shanae Dover MD        Allergies:  Latex, Codeine, Lisinopril, Nitroglycerin, Sulfa antibiotics, and Hydralazine     Review of Systems:   · Constitutional: there has been no unanticipated weight loss. There's been no change in energy level, sleep pattern, or activity level. · Eyes: No visual changes or diplopia. No scleral icterus. · ENT: No Headaches, hearing loss or vertigo. No mouth sores or sore throat. · Cardiovascular: Reviewed in HPI  · Respiratory: No cough or wheezing, no sputum production. No hematemesis. · Gastrointestinal: No abdominal pain, appetite loss, blood in stools. No change in bowel or bladder habits. · Genitourinary: No dysuria, trouble voiding, or hematuria. · Musculoskeletal:  No gait disturbance, weakness or joint complaints. · Integumentary: No rash or pruritis. · Neurological: No headache, diplopia, change in muscle strength, numbness or tingling. No change in gait, balance, coordination, mood, affect, memory, mentation, behavior. · Psychiatric: No anxiety, no depression. · Endocrine: No malaise, fatigue or temperature intolerance. No excessive thirst, fluid intake, or urination. No tremor. · Hematologic/Lymphatic: No abnormal bruising or bleeding, blood clots or swollen lymph nodes. · Allergic/Immunologic: No nasal congestion or hives.     Physical Examination:    Vitals:    06/11/21 0903   BP: 120/78   Pulse: 71   SpO2: 99%   Weight: 207 lb (93.9 kg)   Height: 5' 6\" (1.676 m)        Constitutional and General Appearance: Warm and dry, no apparent distress, normal coloration  HEENT:  Normocephalic, atraumatic  Respiratory:  · Normal excursion and 2/9/2021  Summary   Left ventricular size is mildly increased. Moderately reduced global systolic function with an ejection fraction   estimated at 30-35%. Global hypokinesis noted. Grade II diastolic dysfunction with elevated LV filling pressures. There is mild mitral regurgitation noted. Mild left atrial enlargement noted. Aortic valve appears sclerotic but opens adequately. Mild aortic regurgitation. There is mild tricuspid regurgitation with a RVSP estimation of 25 mmHg. Pacer / ICD wire is visualized in the right ventricle. The right ventricle is normal in size and function    [facility] Dr Han Peers 1/20/21  Findings:  Artery Findings/Result   LM Normal   LAD Normal   Cx Normal   RI NA   RCA Normal   LVEDP 6   LVG NA      Intervention:         None     Post Cath Dx:       Normal coronaries      Echo 2/24/2020  Summary   -Limited echocardiogram was performed to evaluate EF.   -Normal left ventricle size, mild to moderate left ventricular hypertrophy,   and moderately reduced systolic function with an estimated ejection fraction   of 30-35%. -There is moderate diffuse hypokinesis. -Grade III diastolic dysfunction . E/e\"=10.7.   -Mild mitral regurgitation.   -Mild tricuspid regurgitation.   -The left atrium is mild to moderate dilated. -Right ventricular systolic function appears mildly reduced .   -Estimated pulmonary artery systolic pressure is borderline normal at 28-33   mmHg assuming a right atrial pressure of 8 mmHg.   -Pacer / ICD wire is visualized in the right heart.     Echo 8/12/2019  Summary   -Limited echocardiogram was performed to evaluate LV ejection fraction.   -Left ventricular cavity size is mildly dilated.   -There is moderate concentric left ventricular hypertrophy.   -Overall left ventricular systolic function appears moderately reduced with   an ejection fraction on the 30-35%.    -There is moderate global diffuse hypokinesis.   -Indeterminate diastolic dysfunction due to irregular heart rate.   -Moderate mitral regurgitation.   -Aortic valve appears sclerotic but opens adequately.   -Mild to moderate tricuspid regurgitation.   -The left atrium is moderately dilated.   -Pacer / ICD wire is visualized in the right heart. Stress test 9/11/18  Enlarged LV with mild global hypokinesis. EF 51%  There is normal isotope uptake at stress and rest. There is no evidence of  myocardial ischemia or scar. ECHO 7/24/18:  Summary   -Left ventricular cavity size is mildly dilated. -There is moderate concentric left ventricular hypertrophy.   -Overall left ventricular systolic function appears severely reduced with  and ejection fraction on the 25 % range.   -There is diffuse global hypokinesis. -Grade II diastolic dysfunction with elevated LV filling pressures. E/e\"=16.2.   -Mitral annular calcification is present. -Mild-moderate mitral regurgitation.   -Aortic valve appears sclerotic but opens adequately. -Trivial aortic regurgitation.   -Trivial tricuspid regurgitation with RVSP of 26 mmHg. -Mild pulmonic regurgitation present.   -Dilated left atrium with a volume of 72 ml.   -Pacer / ICD wire is visualized in the right heart. GXT cathie 7/15/2015:   Left ventricular ejection fraction of 55%. The LV wall motion is normal.  There is normal isotope uptake at stress and rest.   There is no evidence of myocardial ischemia or scar. Assessment:    1. Chronic combined systolic and diastolic heart failure (HCC) on arni, bb and aldosterone antagonist   2. Hypovitaminosis D    3. Paroxysmal atrial fibrillation (HCC)    4. DERECK (obstructive sleep apnea)    5. ICD (implantable cardioverter-defibrillator), biventricular, in situ    6. Iron deficiency anemia, unspecified iron deficiency anemia type        Plan:   1. Increase entresto to half a pill twice a day  2. Scripts refilled   3. Check iron studies and lipids next week with home care  4. Continue all other medications  5.   Let me

## 2021-07-02 ENCOUNTER — TELEPHONE (OUTPATIENT)
Dept: CARDIOLOGY CLINIC | Age: 56
End: 2021-07-02

## 2021-07-02 NOTE — TELEPHONE ENCOUNTER
Last OV 6/11/21 EDILSON GARZA    Spoke to the pt-she has had the irregular heart rate since Thursday.  No other symptoms

## 2021-07-06 ENCOUNTER — HOSPITAL ENCOUNTER (OUTPATIENT)
Age: 56
Discharge: HOME | End: 2021-07-06
Payer: MEDICAID

## 2021-07-06 LAB
A/G RATIO: 1.5 (ref 1.1–2.2)
ALBUMIN SERPL-MCNC: 4.5 G/DL (ref 3.4–5)
ALP BLD-CCNC: 69 U/L (ref 40–129)
ALT SERPL-CCNC: 14 U/L (ref 10–40)
AMYLASE: 62 U/L (ref 25–115)
ANION GAP SERPL CALCULATED.3IONS-SCNC: 17 MMOL/L (ref 3–16)
AST SERPL-CCNC: 25 U/L (ref 15–37)
BASOPHILS ABSOLUTE: 0.1 K/UL (ref 0–0.2)
BASOPHILS RELATIVE PERCENT: 1 %
BILIRUB SERPL-MCNC: 0.5 MG/DL (ref 0–1)
BUN BLDV-MCNC: 11 MG/DL (ref 7–20)
CALCIUM SERPL-MCNC: 9.4 MG/DL (ref 8.3–10.6)
CHLORIDE BLD-SCNC: 105 MMOL/L (ref 99–110)
CO2: 21 MMOL/L (ref 21–32)
CREAT SERPL-MCNC: 0.9 MG/DL (ref 0.6–1.1)
EOSINOPHILS ABSOLUTE: 0.5 K/UL (ref 0–0.6)
EOSINOPHILS RELATIVE PERCENT: 9.3 %
GFR AFRICAN AMERICAN: >60
GFR NON-AFRICAN AMERICAN: >60
GLOBULIN: 3 G/DL
GLUCOSE BLD-MCNC: 95 MG/DL (ref 70–99)
HCT VFR BLD CALC: 34.4 % (ref 36–48)
HEMOGLOBIN: 11.5 G/DL (ref 12–16)
LIPASE: 33 U/L (ref 13–60)
LYMPHOCYTES ABSOLUTE: 1.2 K/UL (ref 1–5.1)
LYMPHOCYTES RELATIVE PERCENT: 23.3 %
MCH RBC QN AUTO: 29.4 PG (ref 26–34)
MCHC RBC AUTO-ENTMCNC: 33.5 G/DL (ref 31–36)
MCV RBC AUTO: 87.8 FL (ref 80–100)
MONOCYTES ABSOLUTE: 0.4 K/UL (ref 0–1.3)
MONOCYTES RELATIVE PERCENT: 7.7 %
NEUTROPHILS ABSOLUTE: 3.1 K/UL (ref 1.7–7.7)
NEUTROPHILS RELATIVE PERCENT: 58.7 %
PDW BLD-RTO: 18.4 % (ref 12.4–15.4)
PLATELET # BLD: 204 K/UL (ref 135–450)
PMV BLD AUTO: 8.4 FL (ref 5–10.5)
POTASSIUM SERPL-SCNC: 4.3 MMOL/L (ref 3.5–5.1)
RBC # BLD: 3.92 M/UL (ref 4–5.2)
SODIUM BLD-SCNC: 143 MMOL/L (ref 136–145)
TOTAL PROTEIN: 7.5 G/DL (ref 6.4–8.2)
WBC # BLD: 5.3 K/UL (ref 4–11)

## 2021-07-06 PROCEDURE — 36415 COLL VENOUS BLD VENIPUNCTURE: CPT

## 2021-07-06 PROCEDURE — 82150 ASSAY OF AMYLASE: CPT

## 2021-07-06 PROCEDURE — 83690 ASSAY OF LIPASE: CPT

## 2021-07-06 PROCEDURE — 80053 COMPREHEN METABOLIC PANEL: CPT

## 2021-07-06 PROCEDURE — 85025 COMPLETE CBC W/AUTO DIFF WBC: CPT

## 2021-07-06 NOTE — TELEPHONE ENCOUNTER
We received remote transmission from patient's monitor at home. Transmission shows normal sensing and pacing function. Current ECG APBVP at 81 bpm.  Battery life 6.7 years  AP 94.4%.  97.6%. No episodes noted since 6/10/2021  Optivol is within normal range. EP will review. See interrogation under cardiology tab in the [address] [place] [address] [address] field for more details.

## 2021-07-07 ENCOUNTER — OFFICE VISIT (OUTPATIENT)
Dept: SURGERY | Age: 56
End: 2021-07-07

## 2021-07-07 VITALS — SYSTOLIC BLOOD PRESSURE: 120 MMHG | WEIGHT: 208 LBS | BODY MASS INDEX: 33.57 KG/M2 | DIASTOLIC BLOOD PRESSURE: 74 MMHG

## 2021-07-07 DIAGNOSIS — Z48.89 ENCOUNTER FOR POSTOPERATIVE CARE: Primary | ICD-10-CM

## 2021-07-07 PROCEDURE — 99024 POSTOP FOLLOW-UP VISIT: CPT | Performed by: SURGERY

## 2021-07-07 NOTE — PROGRESS NOTES
[facility]  SUBJECTIVE:    Nadeen Jaquez   1965   61 y.o. female presents for postoperative followup after exploratory laparotomy, cholecystectomy with cholangiogram and excision of benign duodenal mass on 4/13/2021 for upper GI bleeding and symptomatic cholelithiasis. Patient was well until last week when she experienced severe epigastric incisional pain while twisting the top off a jar. Pain is slowly improving with Tylenol but still present. Worse with twisting and strenuous activity.   Tolerated diet, bowels normal, no other complaints    Past Medical History:   Diagnosis Date    Anemia     Atrial fibrillation and flutter (HCC)     Atrial flutter (HCC)     CHB (complete heart block) (HCC)     Class 1 obesity without serious comorbidity with body mass index (BMI) of 33.0 to 33.9 in adult 9/6/2019    Congenital heart disease     GERD (gastroesophageal reflux disease)     Headache(784.0)     History of complete heart block     Hyperlipidemia     Hypertension     PONV (postoperative nausea and vomiting)     Sleep apnea     uses CPAP    Syncope     Systolic CHF, chronic ([facility] [address] 10/3/2018     Past Surgical History:   Procedure Laterality Date    CARDIAC DEFIBRILLATOR PLACEMENT  01/2021    [employer]tronic    COLECTOMY N/A 4/13/2021    EXPLORATORY LAPAROTOMY, RESECTION OF DUODENAL MASS, CHOLECYSTECTOMY WITH INTRAOPERATIVE CHOLANGIOGRAM performed by Deb Schafer MD at [facility] COLONOSCOPY N/A 10/27/2020    COLONOSCOPY POLYPECTOMY SNARE/COLD BIOPSY performed by Susie Mendez MD at [place] [address]  11/29/12    dual chamber PPM, [employer]    TUBAL LIGATION      UPPER GASTROINTESTINAL ENDOSCOPY N/A 10/27/2020    EGD DIAGNOSTIC ONLY performed by Susie Mendez MD at [place] [address] 4/8/2021    EGD CONTROL HEMORRHAGE WITH APPLICATION OF 3 CLIPS TO DUODENAL MASS performed by Magno Minor Daniel Martin MD at [address] N/A 4/8/2021    EGD BIOPSY DUODENAL MASS [facility] N[facility] 4/8/2021    EGD SUBMUCOSAL INJECTION OF 0.6 MG OF EPINEPRINE INTO BASE OF DUODENAL MASS performed by Jabier Christian MD at [facility] History     Socioeconomic History    Marital status:      Spouse name: Not on file    Number of children: 3    Years of education: Not on file    Highest education level: Not on file   Occupational History    Not on file   Tobacco Use    Smoking status: Never Smoker    Smokeless tobacco: Never Used   Vaping Use    Vaping Use: Never used   Substance and Sexual Activity    Alcohol use: No    Drug use: No    Sexual activity: Yes     Partners: Male   Other Topics Concern    Not on file   Social History Narrative    Not on file     Social Determinants of Health     Financial Resource Strain:     Difficulty of Paying Living Expenses:    Food Insecurity:     Worried About Running Out of Food in the Last Year:     Ran Out of Food in the Last Year:    Transportation Needs:     Lack of Transportation (Medical):      Lack of Transportation (Non-Medical):    Physical Activity:     Days of Exercise per Week:     Minutes of Exercise per Session:    Stress:     Feeling of Stress :    Social Connections:     Frequency of Communication with Friends and Family:     Frequency of Social Gatherings with Friends and Family:     Attends Oriental orthodox Services:     Active Member of Clubs or Organizations:     Attends Club or Organization Meetings:     Marital Status:    Intimate Partner Violence:     Fear of Current or Ex-Partner:     Emotionally Abused:     Physically Abused:     Sexually Abused:       Family History   Problem Relation Age of Onset    Cancer Father     Heart Disease Neg Hx     High Blood Pressure Neg Hx  High Cholesterol Neg Hx      Current Outpatient Medications   Medication Sig Dispense Refill    carvedilol (COREG) 6.25 MG tablet Take 1 tablet by mouth 2 times daily (with meals) 60 tablet 2    furosemide (LASIX) 20 MG tablet Take 2 tablets by mouth daily 30 tablet 2    sacubitril-valsartan (ENTRESTO) 24-26 MG per tablet Take 0.5 tablets by mouth 2 times daily 30 tablet 2    spironolactone (ALDACTONE) 25 MG tablet Take 1 tablet by mouth daily 30 tablet 2    atorvastatin (LIPITOR) 40 MG tablet Take 1 tablet by mouth daily 60 tablet 2    rivaroxaban (XARELTO) 20 MG TABS tablet Take 1 tablet by mouth daily (with breakfast) 30 tablet 3    amiodarone (PACERONE) 100 MG tablet Take 1 tablet by mouth daily 15 tablet 0    pantoprazole (PROTONIX) 40 MG tablet Take 1 tablet by mouth every morning (before breakfast) 30 tablet 3    vitamin D (ERGOCALCIFEROL) 1.25 MG (77300 UT) CAPS capsule Take 1 capsule by mouth once a week 4 capsule 5    Multiple Vitamins-Minerals (THERAPEUTIC MULTIVITAMIN-MINERALS) tablet Take 1 tablet by mouth daily       No current facility-administered medications for this visit.       Allergies   Allergen Reactions    Latex      rash    Codeine      Hives      Lisinopril      cough    Nitroglycerin Hives    Sulfa Antibiotics Nausea Only    Hydralazine      headaches        Review of Systems:  Review of systems performed and negative with the exception of the above findings    OBJECTIVE:  /74   Wt 208 lb (94.3 kg)   BMI 33.57 kg/m²      Physical Exam:  General appearance: alert, appears stated age, cooperative and no distress  Head: Normocephalic, without obvious abnormality, atraumatic  Abdomen: soft, non-distended, non-tender, incision clean dry and intact and healing well without signs of complication or hernia    Hospital Outpatient Visit on 07/06/2021   Component Date Value Ref Range Status    WBC 07/06/2021 5.3  4.0 - 11.0 K/uL Final    RBC 07/06/2021 3.92* 4.00 - 5.20 M/uL Final    Hemoglobin 07/06/2021 11.5* 12.0 - 16.0 g/dL Final    Hematocrit 07/06/2021 34.4* 36.0 - 48.0 % Final    MCV 07/06/2021 87.8  80.0 - 100.0 fL Final    MCH 07/06/2021 29.4  26.0 - 34.0 pg Final    MCHC 07/06/2021 33.5  31.0 - 36.0 g/dL Final    RDW 07/06/2021 18.4* 12.4 - 15.4 % Final    Platelets 13/67/8167 204  135 - 450 K/uL Final    MPV 07/06/2021 8.4  5.0 - 10.5 fL Final    Neutrophils % 07/06/2021 58.7  % Final    Lymphocytes % 07/06/2021 23.3  % Final    Monocytes % 07/06/2021 7.7  % Final    Eosinophils % 07/06/2021 9.3  % Final    Basophils % 07/06/2021 1.0  % Final    Neutrophils Absolute 07/06/2021 3.1  1.7 - 7.7 K/uL Final    Lymphocytes Absolute 07/06/2021 1.2  1.0 - 5.1 K/uL Final    Monocytes Absolute 07/06/2021 0.4  0.0 - 1.3 K/uL Final    Eosinophils Absolute 07/06/2021 0.5  0.0 - 0.6 K/uL Final    Basophils Absolute 07/06/2021 0.1  0.0 - 0.2 K/uL Final    Sodium 07/06/2021 143  136 - 145 mmol/L Final    Potassium 07/06/2021 4.3  3.5 - 5.1 mmol/L Final    Chloride 07/06/2021 105  99 - 110 mmol/L Final    CO2 07/06/2021 21  21 - 32 mmol/L Final    Anion Gap 07/06/2021 17* 3 - 16 Final    Glucose 07/06/2021 95  70 - 99 mg/dL Final    BUN 07/06/2021 11  7 - 20 mg/dL Final    CREATININE 07/06/2021 0.9  0.6 - 1.1 mg/dL Final    GFR Non- 07/06/2021 >60  >60 Final    Comment: >60 mL/min/1.73m2 EGFR, calc. for ages 25 and older using the  MDRD formula (not corrected for weight), is valid for stable  renal function.  GFR  07/06/2021 >60  >60 Final    Comment: Chronic Kidney Disease: less than 60 ml/min/1.73 sq.m. Kidney Failure: less than 15 ml/min/1.73 sq.m. Results valid for patients 18 years and older.       Calcium 07/06/2021 9.4  8.3 - 10.6 mg/dL Final    Total Protein 07/06/2021 7.5  6.4 - 8.2 g/dL Final    Albumin 07/06/2021 4.5  3.4 - 5.0 g/dL Final    Albumin/Globulin Ratio 07/06/2021 1.5  1.1 - 2.2 Final    days  Avoid strenuous activity for 2 weeks  If symptoms improved by next week, can follow general surgery as needed. If symptoms do not improve, will contact office for CT of abdomen pelvis to rule out complication and then follow-up after the imaging    Flavio Mariano MD, FACS  7/7/2021  3:06 PM

## 2021-07-19 ENCOUNTER — TELEPHONE (OUTPATIENT)
Dept: CARDIOLOGY CLINIC | Age: 56
End: 2021-07-19

## 2021-07-19 RX ORDER — SACUBITRIL AND VALSARTAN 24; 26 MG/1; MG/1
0.5 TABLET, FILM COATED ORAL 2 TIMES DAILY
Qty: 30 TABLET | Refills: 2 | Status: SHIPPED | OUTPATIENT
Start: 2021-07-19 | End: 2021-08-13

## 2021-07-19 NOTE — TELEPHONE ENCOUNTER
Medication Refill    Medication needing refilled:entresto  Dosage of the medication:    How are you taking this medication (QD, BID, TID, QID, PRN):    30 or 90 day supply called in:    When will you run out of your medication: 2 pills left     Which Pharmacy are we sending the medication to?:   chloe Gorman

## 2021-07-21 ENCOUNTER — TELEPHONE (OUTPATIENT)
Dept: CARDIOLOGY CLINIC | Age: 56
End: 2021-07-21

## 2021-07-21 NOTE — TELEPHONE ENCOUNTER
Prior Authorization Palmira Handy Ortiz: Raheel Jameson  On cover my meds    Approved today  Request Reference Number: [id number]. ENTRESTO TAB 24-26MG is approved through 07/21/2022. For further questions, call [employer] at [phone].

## 2021-07-21 NOTE — TELEPHONE ENCOUNTER
chloe is telling her they cant refill her entresto until a PA is done . She is out of her entresto and wants to know if the office has samples she can get until the PA is done .  Please call to advise

## 2021-08-02 RX ORDER — ERGOCALCIFEROL 1.25 MG/1
CAPSULE ORAL
Qty: 12 CAPSULE | Refills: 1 | Status: ON HOLD | OUTPATIENT
Start: 2021-08-02 | End: 2022-01-28

## 2021-08-02 NOTE — TELEPHONE ENCOUNTER
Last ov 6/11/21  Pending appt 8/13/21  Last refill 2/18/21 #4x5  Last labs 6/17/21 cmp in care everywhere

## 2021-08-06 ENCOUNTER — HOSPITAL ENCOUNTER (OUTPATIENT)
Dept: MAMMOGRAPHY | Age: 56
Discharge: HOME | End: 2021-08-06
Payer: MEDICAID

## 2021-08-06 VITALS — BODY MASS INDEX: 30.53 KG/M2 | WEIGHT: 190 LBS | HEIGHT: 66 IN

## 2021-08-06 DIAGNOSIS — Z12.31 VISIT FOR SCREENING MAMMOGRAM: ICD-10-CM

## 2021-08-06 PROCEDURE — 77067 SCR MAMMO BI INCL CAD: CPT

## 2021-08-13 ENCOUNTER — HOSPITAL ENCOUNTER (OUTPATIENT)
Age: 56
Discharge: HOME | End: 2021-08-13
Payer: MEDICAID

## 2021-08-13 ENCOUNTER — NURSE ONLY (OUTPATIENT)
Dept: CARDIOLOGY CLINIC | Age: 56
End: 2021-08-13
Payer: MEDICAID

## 2021-08-13 ENCOUNTER — OFFICE VISIT (OUTPATIENT)
Dept: CARDIOLOGY CLINIC | Age: 56
End: 2021-08-13
Payer: MEDICAID

## 2021-08-13 VITALS
SYSTOLIC BLOOD PRESSURE: 120 MMHG | OXYGEN SATURATION: 97 % | HEIGHT: 67 IN | DIASTOLIC BLOOD PRESSURE: 72 MMHG | WEIGHT: 216.1 LBS | BODY MASS INDEX: 33.92 KG/M2 | HEART RATE: 78 BPM

## 2021-08-13 DIAGNOSIS — I50.42 CHRONIC COMBINED SYSTOLIC AND DIASTOLIC CHF, NYHA CLASS 2 (HCC): Primary | ICD-10-CM

## 2021-08-13 DIAGNOSIS — I50.42 CHRONIC COMBINED SYSTOLIC AND DIASTOLIC HEART FAILURE (HCC): ICD-10-CM

## 2021-08-13 DIAGNOSIS — I48.0 PAROXYSMAL ATRIAL FIBRILLATION (HCC): ICD-10-CM

## 2021-08-13 DIAGNOSIS — G47.33 OSA (OBSTRUCTIVE SLEEP APNEA): ICD-10-CM

## 2021-08-13 DIAGNOSIS — Z95.810 STATUS POST IMPLANTATION OF AUTOMATIC CARDIOVERTER/DEFIBRILLATOR (AICD): ICD-10-CM

## 2021-08-13 DIAGNOSIS — Z95.810 ICD (IMPLANTABLE CARDIOVERTER-DEFIBRILLATOR), BIVENTRICULAR, IN SITU: ICD-10-CM

## 2021-08-13 DIAGNOSIS — D50.9 IRON DEFICIENCY ANEMIA, UNSPECIFIED IRON DEFICIENCY ANEMIA TYPE: ICD-10-CM

## 2021-08-13 DIAGNOSIS — E55.9 HYPOVITAMINOSIS D: ICD-10-CM

## 2021-08-13 PROCEDURE — 3017F COLORECTAL CA SCREEN DOC REV: CPT | Performed by: CLINICAL NURSE SPECIALIST

## 2021-08-13 PROCEDURE — 93290 INTERROG DEV EVAL ICPMS IP: CPT | Performed by: CLINICAL NURSE SPECIALIST

## 2021-08-13 PROCEDURE — G8427 DOCREV CUR MEDS BY ELIG CLIN: HCPCS | Performed by: CLINICAL NURSE SPECIALIST

## 2021-08-13 PROCEDURE — 1036F TOBACCO NON-USER: CPT | Performed by: CLINICAL NURSE SPECIALIST

## 2021-08-13 PROCEDURE — 99214 OFFICE O/P EST MOD 30 MIN: CPT | Performed by: CLINICAL NURSE SPECIALIST

## 2021-08-13 PROCEDURE — G8417 CALC BMI ABV UP PARAM F/U: HCPCS | Performed by: CLINICAL NURSE SPECIALIST

## 2021-08-13 RX ORDER — SACUBITRIL AND VALSARTAN 24; 26 MG/1; MG/1
TABLET, FILM COATED ORAL
Qty: 60 TABLET | Refills: 1 | Status: SHIPPED | OUTPATIENT
Start: 2021-08-13 | End: 2021-11-02

## 2021-08-13 NOTE — PROGRESS NOTES
[address]  Progress Note    Primary Care Doctor:  No primary care provider on file. Chief Complaint   Patient presents with    Follow-up    Congestive Heart Failure        History of Present Illness:  61 y.o. female with history of sHF, LVH, AF on xarelto (follows with Dr Enio Strickland), had been on flecainide, dual chamber pacemaker 2012 due to congenital heart block  LVEF had been 55% in 2015 and now 25%. No lisinopril due to cough. She is a cook at [employer]  Lip numbness to entresto 3/2020 hydralazine caused headaches  10/2020 EGD (hiatal hernia) and colonoscopy (polyp)   ICD placed 1/22/21, LHC done 1/20/21 normal cors  4/21 benign duodenal mass with resection Dr Eber Tafoya  4/7-19/2021 for symptomatic anemia, requiring surgery by Dr Eber Tafoya for overlying lipoma, large duodenum lesion requiring 3 clips    [facility]. She is ambulatory and by her self today. Her optival shows normal impedence. She feels much better since her duodenal mass surgery. Her weight is up at home 211 which she admits to chips and ice cream.  She denies any shortness of breath, chest pain, lightheadedness or palpitations. Past Medical History:   has a past medical history of Anemia, Atrial fibrillation and flutter ([address] Atrial flutter ([address] CHB (complete heart block) ([address] Class 1 obesity without serious comorbidity with body mass index (BMI) of 33.0 to 33.9 in adult, Congenital heart disease, GERD (gastroesophageal reflux disease), Headache(784.0), History of complete heart block, Hyperlipidemia, Hypertension, PONV (postoperative nausea and vomiting), Sleep apnea, Syncope, and Systolic CHF, chronic ([address] Surgical History:   has a past surgical history that includes Uterine fibroid surgery; Pacemaker insertion (11/29/12); Tubal ligation; Upper gastrointestinal endoscopy (N/A, 10/27/2020); Colonoscopy (N/A, 10/27/2020);  Cardiac defibrillator placement (01/2021); Upper gastrointestinal endoscopy (N/A, 4/8/2021); Upper gastrointestinal endoscopy (N/A, 4/8/2021); Upper gastrointestinal endoscopy (N/A, 4/8/2021); and colectomy (N/A, 4/13/2021). Social History:    reports that she has never smoked. She has never used smokeless tobacco. She reports that she does not drink alcohol and does not use drugs. Family History:   Family History   Problem Relation Age of Onset    Cancer Father     Heart Disease Neg Hx     High Blood Pressure Neg Hx     High Cholesterol Neg Hx        Home Medications:  Prior to Admission medications    Medication Sig Start Date End Date Taking?  Authorizing Provider   vitamin D (ERGOCALCIFEROL) 1.25 MG (61865 UT) CAPS capsule TAKE ONE CAPSULE BY MOUTH ONCE WEEKLY 8/2/21  Yes CATRACHITO Beth   sacubitril-valsartan (ENTRESTO) 24-26 MG per tablet Take 0.5 tablets by mouth 2 times daily 7/19/21  Yes CATRACHITO Fletcher   carvedilol (COREG) 6.25 MG tablet Take 1 tablet by mouth 2 times daily (with meals) 6/11/21  Yes CATRACHITO Fletcher   furosemide (LASIX) 20 MG tablet Take 2 tablets by mouth daily 6/11/21  Yes CATRACHITO Keenan - CNS   spironolactone (ALDACTONE) 25 MG tablet Take 1 tablet by mouth daily 6/11/21  Yes CATRACHITO Beth   atorvastatin (LIPITOR) 40 MG tablet Take 1 tablet by mouth daily 6/11/21  Yes CATRACHITO Keenan   rivaroxaban (XARELTO) 20 MG TABS tablet Take 1 tablet by mouth daily (with breakfast) 5/21/21  Yes CATRACHITO Santoyo CNP   pantoprazole (PROTONIX) 40 MG tablet Take 1 tablet by mouth every morning (before breakfast) 4/13/21  Yes Adwoa Verduzco MD   Multiple Vitamins-Minerals (THERAPEUTIC MULTIVITAMIN-MINERALS) tablet Take 1 tablet by mouth daily   Yes Historical Provider, MD        Allergies:  Latex, Codeine, Lisinopril, Nitroglycerin, Sulfa antibiotics, and Hydralazine     Review of Systems:   · Constitutional: there has been no unanticipated weight loss. There's been no change in energy level, sleep pattern, or activity level. · Eyes: No visual changes or diplopia. No scleral icterus. · ENT: No Headaches, hearing loss or vertigo. No mouth sores or sore throat. · Cardiovascular: Reviewed in HPI  · Respiratory: No cough or wheezing, no sputum production. No hematemesis. · Gastrointestinal: No abdominal pain, appetite loss, blood in stools. No change in bowel or bladder habits. · Genitourinary: No dysuria, trouble voiding, or hematuria. · Musculoskeletal:  No gait disturbance, weakness or joint complaints. · Integumentary: No rash or pruritis. · Neurological: No headache, diplopia, change in muscle strength, numbness or tingling. No change in gait, balance, coordination, mood, affect, memory, mentation, behavior. · Psychiatric: No anxiety, no depression. · Endocrine: No malaise, fatigue or temperature intolerance. No excessive thirst, fluid intake, or urination. No tremor. · Hematologic/Lymphatic: No abnormal bruising or bleeding, blood clots or swollen lymph nodes. · Allergic/Immunologic: No nasal congestion or hives. Physical Examination:    Vitals:    08/13/21 0857   BP: 120/72   Pulse: 78   SpO2: 97%   Weight: 216 lb 1.6 oz (98 kg)   Height: 5' 6.5\" (1.689 m)        Constitutional and General Appearance: Warm and dry, no apparent distress, normal coloration  HEENT:  Normocephalic, atraumatic  Respiratory:  · Normal excursion and expansion without use of accessory muscles  · Resp Auscultation: Normal breath sounds without dullness  Cardiovascular:  · The apical impulses not displaced  · Heart tones are crisp and normal  · JVP normal cm H2O  · regular rate and rhythm  · Peripheral pulses are symmetrical and full  · There is no clubbing, cyanosis of the extremities.   · no edema  · Pedal Pulses: 2+ and equal   Abdomen:  · No masses or tenderness  · Liver/Spleen: No Abnormalities Noted  Neurological/Psychiatric:  · Alert and oriented in all spheres  · Moves all extremities well  · Exhibits normal gait balance and coordination  · No abnormalities of mood, affect, memory, mentation, or behavior are noted    Lab Data:    CBC:   Lab Results   Component Value Date    WBC 5.3 07/06/2021    WBC 4.9 06/04/2021    WBC 4.6 05/17/2021    RBC 3.92 07/06/2021    RBC 3.78 06/04/2021    RBC 3.81 05/17/2021    HGB 11.5 07/06/2021    HGB 10.9 06/04/2021    HGB 10.8 05/17/2021    HCT 34.4 07/06/2021    HCT 33.3 06/04/2021    HCT 33.6 05/17/2021    MCV 87.8 07/06/2021    MCV 88.2 06/04/2021    MCV 88.0 05/17/2021    RDW 18.4 07/06/2021    RDW 19.8 06/04/2021    RDW 20.6 05/17/2021     07/06/2021     06/04/2021     05/17/2021     BMP:  Lab Results   Component Value Date     07/06/2021     06/03/2021     05/17/2021    K 4.3 07/06/2021    K 3.6 06/03/2021    K 3.7 05/17/2021    K 3.7 04/07/2021    K 3.0 01/24/2021    K 3.8 01/20/2021     07/06/2021     06/03/2021     05/17/2021    CO2 21 07/06/2021    CO2 27 06/03/2021    CO2 27 05/17/2021    PHOS 2.4 04/17/2021    PHOS 2.2 04/15/2021    PHOS 2.8 04/14/2021    BUN 11 07/06/2021    BUN 10 06/03/2021    BUN 9 05/17/2021    CREATININE 0.9 07/06/2021    CREATININE 0.98 06/03/2021    CREATININE 0.8 05/17/2021     BNP:   Lab Results   Component Value Date    PROBNP 199 06/04/2021    PROBNP 75 05/17/2021    PROBNP 133 05/11/2021     Cardiac Imaging:  Echo 2/9/2021  Summary   Left ventricular size is mildly increased. Moderately reduced global systolic function with an ejection fraction   estimated at 30-35%. Global hypokinesis noted. Grade II diastolic dysfunction with elevated LV filling pressures. There is mild mitral regurgitation noted. Mild left atrial enlargement noted. Aortic valve appears sclerotic but opens adequately. Mild aortic regurgitation. There is mild tricuspid regurgitation with a RVSP estimation of 25 mmHg.       Beckey Shackle / ICD wire is visualized in the right ventricle. The right ventricle is normal in size and function    [facility] 1/20/21  Findings:  Artery Findings/Result   LM Normal   LAD Normal   Cx Normal   RI NA   RCA Normal   LVEDP 6   LVG NA      Intervention:         None     Post Cath Dx:       Normal coronaries      Echo 2/24/2020  Summary   -Limited echocardiogram was performed to evaluate EF.   -Normal left ventricle size, mild to moderate left ventricular hypertrophy,   and moderately reduced systolic function with an estimated ejection fraction   of 30-35%. -There is moderate diffuse hypokinesis. -Grade III diastolic dysfunction . E/e\"=10.7.   -Mild mitral regurgitation.   -Mild tricuspid regurgitation.   -The left atrium is mild to moderate dilated. -Right ventricular systolic function appears mildly reduced .   -Estimated pulmonary artery systolic pressure is borderline normal at 28-33   mmHg assuming a right atrial pressure of 8 mmHg.   -Pacer / ICD wire is visualized in the right heart.     Echo 8/12/2019  Summary   -Limited echocardiogram was performed to evaluate LV ejection fraction.   -Left ventricular cavity size is mildly dilated.   -There is moderate concentric left ventricular hypertrophy.   -Overall left ventricular systolic function appears moderately reduced with   an ejection fraction on the 30-35%.  -There is moderate global diffuse hypokinesis.   -Indeterminate diastolic dysfunction due to irregular heart rate.   -Moderate mitral regurgitation.   -Aortic valve appears sclerotic but opens adequately.   -Mild to moderate tricuspid regurgitation.   -The left atrium is moderately dilated.   -Pacer / ICD wire is visualized in the right heart. Stress test 9/11/18  Enlarged LV with mild global hypokinesis. EF 51%  There is normal isotope uptake at stress and rest. There is no evidence of  myocardial ischemia or scar. ECHO 7/24/18:  Summary   -Left ventricular cavity size is mildly dilated. -There is moderate concentric left ventricular hypertrophy.   -Overall left ventricular systolic function appears severely reduced with  and ejection fraction on the 25 % range.   -There is diffuse global hypokinesis. -Grade II diastolic dysfunction with elevated LV filling pressures. E/e\"=16.2.   -Mitral annular calcification is present. -Mild-moderate mitral regurgitation.   -Aortic valve appears sclerotic but opens adequately. -Trivial aortic regurgitation.   -Trivial tricuspid regurgitation with RVSP of 26 mmHg. -Mild pulmonic regurgitation present.   -Dilated left atrium with a volume of 72 ml.   -Pacer / ICD wire is visualized in the right heart. GXT cathie 7/15/2015:   Left ventricular ejection fraction of 55%. The LV wall motion is normal.  There is normal isotope uptake at stress and rest.   There is no evidence of myocardial ischemia or scar. Assessment:    1. Chronic combined systolic and diastolic heart failure (HCC) on arni, bb and aldosterone antagonist   2. Hypovitaminosis D    3. Paroxysmal atrial fibrillation (HCC)    4. DERECK (obstructive sleep apnea)    5. ICD (implantable cardioverter-defibrillator), biventricular, in situ    6. Iron deficiency anemia, unspecified iron deficiency anemia type        Plan:   1. Increase entresto half 24-26 in the morning and a full 24-26 in the evening  2. Blood work in 3 weeks  3. Continue all other medications  4.   RTO 4 months    CATRACHITO Morel - CNS, CNS, 8/13/2021, 9:13 AM

## 2021-08-13 NOTE — PATIENT INSTRUCTIONS
1.  Increase entresto half 24-26 in the morning and a full 24-26 in the evening  2. Blood work in 3 weeks  3. Continue all other medications  4.   RTO 4 months

## 2021-08-20 RX ORDER — AMIODARONE HYDROCHLORIDE 200 MG/1
TABLET ORAL
Qty: 90 TABLET | Refills: 1 | OUTPATIENT
Start: 2021-08-20

## 2021-08-23 RX ORDER — FUROSEMIDE 20 MG/1
40 TABLET ORAL DAILY
Qty: 30 TABLET | Refills: 2 | Status: SHIPPED | OUTPATIENT
Start: 2021-08-23 | End: 2021-10-11 | Stop reason: SDUPTHER

## 2021-08-23 RX ORDER — CARVEDILOL 6.25 MG/1
6.25 TABLET ORAL 2 TIMES DAILY WITH MEALS
Qty: 60 TABLET | Refills: 2 | Status: SHIPPED | OUTPATIENT
Start: 2021-08-23 | End: 2021-12-06

## 2021-08-23 NOTE — TELEPHONE ENCOUNTER
Prescription refill    Last OV:8-13-21    Last Refill:6-11-21    Labs:7-6-21 BMP    Future Appt:12-12-21 NPRG    Rx pending

## 2021-08-24 ENCOUNTER — NURSE ONLY (OUTPATIENT)
Dept: CARDIOLOGY CLINIC | Age: 56
End: 2021-08-24
Payer: MEDICAID

## 2021-08-24 DIAGNOSIS — Q24.6 CONGENITAL HEART BLOCK: ICD-10-CM

## 2021-08-24 DIAGNOSIS — I51.7 LVH (LEFT VENTRICULAR HYPERTROPHY): ICD-10-CM

## 2021-08-24 DIAGNOSIS — Z95.810 ICD (IMPLANTABLE CARDIOVERTER-DEFIBRILLATOR), BIVENTRICULAR, IN SITU: ICD-10-CM

## 2021-08-24 DIAGNOSIS — I47.29 PAROXYSMAL VENTRICULAR TACHYCARDIA: ICD-10-CM

## 2021-08-24 DIAGNOSIS — I42.0 DILATED CARDIOMYOPATHY (HCC): ICD-10-CM

## 2021-08-24 DIAGNOSIS — I51.9 LV DYSFUNCTION: ICD-10-CM

## 2021-08-24 DIAGNOSIS — I50.22 SYSTOLIC CHF, CHRONIC (HCC): ICD-10-CM

## 2021-08-24 PROCEDURE — 93297 REM INTERROG DEV EVAL ICPMS: CPT | Performed by: INTERNAL MEDICINE

## 2021-08-24 PROCEDURE — 93296 REM INTERROG EVL PM/IDS: CPT | Performed by: INTERNAL MEDICINE

## 2021-08-24 PROCEDURE — 93295 DEV INTERROG REMOTE 1/2/MLT: CPT | Performed by: INTERNAL MEDICINE

## 2021-08-24 NOTE — PROGRESS NOTES
We received remote transmission from patient's monitor at home. Transmission shows normal sensing and pacing function. EP physician will review. See interrogation under cardiology tab in the [address] [facility] [address] field for more details. Optivol is within normal range.

## 2021-09-10 ENCOUNTER — TELEPHONE (OUTPATIENT)
Dept: CARDIOLOGY CLINIC | Age: 56
End: 2021-09-10

## 2021-09-10 ENCOUNTER — HOSPITAL ENCOUNTER (OUTPATIENT)
Age: 56
Discharge: HOME | End: 2021-09-10
Payer: MEDICAID

## 2021-09-10 DIAGNOSIS — D50.9 IRON DEFICIENCY ANEMIA, UNSPECIFIED IRON DEFICIENCY ANEMIA TYPE: ICD-10-CM

## 2021-09-10 DIAGNOSIS — I50.42 CHRONIC COMBINED SYSTOLIC AND DIASTOLIC CHF, NYHA CLASS 2 (HCC): ICD-10-CM

## 2021-09-10 DIAGNOSIS — E55.9 HYPOVITAMINOSIS D: ICD-10-CM

## 2021-09-10 LAB
A/G RATIO: 1.5 (ref 1.1–2.2)
ALBUMIN SERPL-MCNC: 4.3 G/DL (ref 3.4–5)
ALP BLD-CCNC: 83 U/L (ref 40–129)
ALT SERPL-CCNC: 13 U/L (ref 10–40)
ANION GAP SERPL CALCULATED.3IONS-SCNC: 10 MMOL/L (ref 3–16)
AST SERPL-CCNC: 23 U/L (ref 15–37)
BILIRUB SERPL-MCNC: 0.8 MG/DL (ref 0–1)
BUN BLDV-MCNC: 10 MG/DL (ref 7–20)
CALCIUM SERPL-MCNC: 9.5 MG/DL (ref 8.3–10.6)
CHLORIDE BLD-SCNC: 109 MMOL/L (ref 99–110)
CHOLESTEROL, TOTAL: 108 MG/DL (ref 0–199)
CO2: 26 MMOL/L (ref 21–32)
CREAT SERPL-MCNC: 0.7 MG/DL (ref 0.6–1.1)
FERRITIN: 36.9 NG/ML (ref 15–150)
GFR AFRICAN AMERICAN: >60
GFR NON-AFRICAN AMERICAN: >60
GLOBULIN: 2.9 G/DL
GLUCOSE BLD-MCNC: 105 MG/DL (ref 70–99)
HCT VFR BLD CALC: 36.4 % (ref 36–48)
HDLC SERPL-MCNC: 55 MG/DL (ref 40–60)
HEMOGLOBIN: 12.2 G/DL (ref 12–16)
IRON SATURATION: 29 % (ref 15–50)
IRON: 82 UG/DL (ref 37–145)
LDL CHOLESTEROL CALCULATED: 39 MG/DL
MCH RBC QN AUTO: 29.4 PG (ref 26–34)
MCHC RBC AUTO-ENTMCNC: 33.5 G/DL (ref 31–36)
MCV RBC AUTO: 87.8 FL (ref 80–100)
PDW BLD-RTO: 17.2 % (ref 12.4–15.4)
PLATELET # BLD: 235 K/UL (ref 135–450)
PMV BLD AUTO: 8.1 FL (ref 5–10.5)
POTASSIUM SERPL-SCNC: 3.6 MMOL/L (ref 3.5–5.1)
PRO-BNP: 322 PG/ML (ref 0–124)
RBC # BLD: 4.14 M/UL (ref 4–5.2)
SODIUM BLD-SCNC: 145 MMOL/L (ref 136–145)
TOTAL IRON BINDING CAPACITY: 281 UG/DL (ref 260–445)
TOTAL PROTEIN: 7.2 G/DL (ref 6.4–8.2)
TRIGL SERPL-MCNC: 71 MG/DL (ref 0–150)
VITAMIN D 25-HYDROXY: 29 NG/ML
VLDLC SERPL CALC-MCNC: 14 MG/DL
WBC # BLD: 4.8 K/UL (ref 4–11)

## 2021-09-10 PROCEDURE — 82306 VITAMIN D 25 HYDROXY: CPT

## 2021-09-10 PROCEDURE — 82728 ASSAY OF FERRITIN: CPT

## 2021-09-10 PROCEDURE — 83880 ASSAY OF NATRIURETIC PEPTIDE: CPT

## 2021-09-10 PROCEDURE — 80053 COMPREHEN METABOLIC PANEL: CPT

## 2021-09-10 PROCEDURE — 80061 LIPID PANEL: CPT

## 2021-09-10 PROCEDURE — 83540 ASSAY OF IRON: CPT

## 2021-09-10 PROCEDURE — 36415 COLL VENOUS BLD VENIPUNCTURE: CPT

## 2021-09-10 PROCEDURE — 85027 COMPLETE CBC AUTOMATED: CPT

## 2021-09-10 PROCEDURE — 83550 IRON BINDING TEST: CPT

## 2021-09-10 NOTE — TELEPHONE ENCOUNTER
Called lab and they where scheduled for p/u in 5 minutes but Ray Gu states she got it changed just in time.

## 2021-09-10 NOTE — TELEPHONE ENCOUNTER
Called patient and relayed message below with patient verbal understanding and patient encouraged to call office back with any questions.

## 2021-09-10 NOTE — TELEPHONE ENCOUNTER
Pt calling for NPRG she is feeling weak, about the same as she did awhile ago when she was in hospital. She went an had her labs done this morning and would like for NPRG to look at the results and let her know what to do?  Pls call to advise Thank you

## 2021-09-10 NOTE — TELEPHONE ENCOUNTER
Patient notified lab results are not back yet and we will call once NPRG reviews. Patient states she does not have any swelling or SOB, just feeling extremely weak x 3 days. Please advise.

## 2021-09-13 ENCOUNTER — TELEPHONE (OUTPATIENT)
Dept: CARDIOLOGY CLINIC | Age: 56
End: 2021-09-13

## 2021-09-24 ENCOUNTER — OFFICE VISIT (OUTPATIENT)
Dept: SURGERY | Age: 56
End: 2021-09-24

## 2021-09-24 VITALS — WEIGHT: 216 LBS | SYSTOLIC BLOOD PRESSURE: 105 MMHG | BODY MASS INDEX: 34.34 KG/M2 | DIASTOLIC BLOOD PRESSURE: 66 MMHG

## 2021-09-24 DIAGNOSIS — Z48.89 ENCOUNTER FOR POSTOPERATIVE CARE: Primary | ICD-10-CM

## 2021-09-24 PROCEDURE — 99024 POSTOP FOLLOW-UP VISIT: CPT | Performed by: SURGERY

## 2021-09-24 NOTE — PROGRESS NOTES
[facility] and Laparoscopic Surgery  SUBJECTIVE:    Celina Torres   1965   61 y.o. female presents for routine postoperative followup after exploratory laparotomy, cholecystectomy with cholangiogram and excision of benign duodenal mass on 4/13/2021 for upper GI bleeding and symptomatic cholelithiasis. Over the last several weeks patient was noticed to have a bulge in the upper aspect of the incision most pronounced with standing lifting and straining. Was not present at last postoperative visit in July confirmed by patient. With concern for possible hernia or other abdominal issue presents for evaluation.   Denies fevers chills nausea vomiting change in bowel movements or other complaints    Past Medical History:   Diagnosis Date    Anemia     Atrial fibrillation and flutter (HCC)     Atrial flutter (HCC)     CHB (complete heart block) (HCC)     Class 1 obesity without serious comorbidity with body mass index (BMI) of 33.0 to 33.9 in adult 9/6/2019    Congenital heart disease     GERD (gastroesophageal reflux disease)     Headache(784.0)     History of complete heart block     Hyperlipidemia     Hypertension     PONV (postoperative nausea and vomiting)     Sleep apnea     uses CPAP    Syncope     Systolic CHF, chronic ([facility] 10/3/2018     Past Surgical History:   Procedure Laterality Date    CARDIAC DEFIBRILLATOR PLACEMENT  01/2021    [employer]    COLECTOMY N/A 4/13/2021    EXPLORATORY LAPAROTOMY, RESECTION OF DUODENAL MASS, CHOLECYSTECTOMY WITH INTRAOPERATIVE CHOLANGIOGRAM performed by Allen Ha MD at [place] [id number] COLONOSCOPY N/A 10/27/2020    COLONOSCOPY POLYPECTOMY SNARE/COLD BIOPSY performed by Monie Morelos MD at [place] [address]  11/29/12    dual chamber PPM, [employer]    TUBAL LIGATION      UPPER GASTROINTESTINAL ENDOSCOPY N/A 10/27/2020    EGD DIAGNOSTIC ONLY performed by Monie Morelos MD at [facility] [employer] GASTROINTESTINAL ENDOSCOPY N/A 4/8/2021    EGD CONTROL HEMORRHAGE WITH APPLICATION OF 3 CLIPS TO DUODENAL MASS performed by Ric Tillman MD at [facility] ENDOSCOPY N/A 4/8/2021    EGD BIOPSY DUODENAL MASS performed by Ric Tillman MD at [place] [address] N/A 4/8/2021    EGD SUBMUCOSAL INJECTION OF 0.6 MG OF EPINEPRINE INTO BASE OF DUODENAL MASS performed by Ric Tillman MD at [facility] History     Socioeconomic History    Marital status:      Spouse name: Not on file    Number of children: 3    Years of education: Not on file    Highest education level: Not on file   Occupational History    Not on file   Tobacco Use    Smoking status: Never Smoker    Smokeless tobacco: Never Used   Vaping Use    Vaping Use: Never used   Substance and Sexual Activity    Alcohol use: No    Drug use: No    Sexual activity: Yes     Partners: Male   Other Topics Concern    Not on file   Social History Narrative    Not on file     Social Determinants of Health     Financial Resource Strain:     Difficulty of Paying Living Expenses:    Food Insecurity:     Worried About Running Out of Food in the Last Year:     Ran Out of Food in the Last Year:    Transportation Needs:     Lack of Transportation (Medical):      Lack of Transportation (Non-Medical):    Physical Activity:     Days of Exercise per Week:     Minutes of Exercise per Session:    Stress:     Feeling of Stress :    Social Connections:     Frequency of Communication with Friends and Family:     Frequency of Social Gatherings with Friends and Family:     Attends Anabaptism Services:     Active Member of Clubs or Organizations:     Attends Club or Organization Meetings:     Marital Status:    Intimate Partner Violence:     Fear of Current or Ex-Partner:     Emotionally Abused:     Physically Abused:     Sexually Abused: Family History   Problem Relation Age of Onset    Cancer Father     Heart Disease Neg Hx     High Blood Pressure Neg Hx     High Cholesterol Neg Hx      Current Outpatient Medications   Medication Sig Dispense Refill    furosemide (LASIX) 20 MG tablet Take 2 tablets by mouth daily 30 tablet 2    carvedilol (COREG) 6.25 MG tablet Take 1 tablet by mouth 2 times daily (with meals) 60 tablet 2    sacubitril-valsartan (ENTRESTO) 24-26 MG per tablet Half tablet in the morning and full tablet in the evening and as directed 60 tablet 1    vitamin D (ERGOCALCIFEROL) 1.25 MG (65013 UT) CAPS capsule TAKE ONE CAPSULE BY MOUTH ONCE WEEKLY 12 capsule 1    spironolactone (ALDACTONE) 25 MG tablet Take 1 tablet by mouth daily 30 tablet 2    atorvastatin (LIPITOR) 40 MG tablet Take 1 tablet by mouth daily 60 tablet 2    rivaroxaban (XARELTO) 20 MG TABS tablet Take 1 tablet by mouth daily (with breakfast) 30 tablet 3    pantoprazole (PROTONIX) 40 MG tablet Take 1 tablet by mouth every morning (before breakfast) 30 tablet 3    Multiple Vitamins-Minerals (THERAPEUTIC MULTIVITAMIN-MINERALS) tablet Take 1 tablet by mouth daily       No current facility-administered medications for this visit.       Allergies   Allergen Reactions    Latex      rash    Codeine      Hives      Lisinopril      cough    Nitroglycerin Hives    Sulfa Antibiotics Nausea Only    Hydralazine      headaches        Review of Systems:  Review of systems performed and negative with the exception of the above findings    OBJECTIVE:  /66   Wt 216 lb (98 kg)   BMI 34.34 kg/m²      Physical Exam:  General appearance: alert, appears stated age, cooperative and no distress  Head: Normocephalic, without obvious abnormality, atraumatic  Lungs: clear to auscultation bilaterally  Heart: regular rate and rhythm, S1, S2 normal, no murmur, click, rub or gallop  Abdomen: Soft, nondistended, nontender, upper abdomen with subtle bulge in upper aspect of incision consistent with possible incisional hernia, difficult to appreciate fascial edges    Hospital Outpatient Visit on 09/10/2021   Component Date Value Ref Range Status    Vit D, 25-Hydroxy 09/10/2021 29.0* >=30 ng/mL Final    Comment: <=20 ng/mL. ........... Luna Fide Deficient  21-29 ng/mL. ......... Luna Fide Insufficient  >=30 ng/mL. ........ Luna Fide Sufficient      Iron 09/10/2021 82  37 - 145 ug/dL Final    TIBC 09/10/2021 281  260 - 445 ug/dL Final    Iron Saturation 09/10/2021 29  15 - 50 % Final    Cholesterol, Total 09/10/2021 108  0 - 199 mg/dL Final    Triglycerides 09/10/2021 71  0 - 150 mg/dL Final    HDL 09/10/2021 55  40 - 60 mg/dL Final    LDL Calculated 09/10/2021 39  <100 mg/dL Final    VLDL Cholesterol Calculated 09/10/2021 14  Not Established mg/dL Final    Ferritin 09/10/2021 36.9  15.0 - 150.0 ng/mL Final    Pro-BNP 09/10/2021 322* 0 - 124 pg/mL Final    Comment: Methodology by NT-proBNP    An age-independent cutoff point of 300 pg/ml has a 98%  negative predictive value excluding acute heart failure. Values exceeding the age-related cutoff values (450 pg/mL if  age<50, 900 if 50-75 and 1800 if >75) has 90% sensitivity and  84% specificity for diagnosing acute HF. In patients with  renal compromise (eGFR<60) values greater than 1200pg/ml have  a diagnostic sensitivity and specificity of 89% and 72% for  acute HF.  Sodium 09/10/2021 145  136 - 145 mmol/L Final    Potassium 09/10/2021 3.6  3.5 - 5.1 mmol/L Final    Chloride 09/10/2021 109  99 - 110 mmol/L Final    CO2 09/10/2021 26  21 - 32 mmol/L Final    Anion Gap 09/10/2021 10  3 - 16 Final    Glucose 09/10/2021 105* 70 - 99 mg/dL Final    BUN 09/10/2021 10  7 - 20 mg/dL Final    CREATININE 09/10/2021 0.7  0.6 - 1.1 mg/dL Final    GFR Non- 09/10/2021 >60  >60 Final    Comment: >60 mL/min/1.73m2 EGFR, calc. for ages 25 and older using the  MDRD formula (not corrected for weight), is valid for stable  renal function.       GFR  09/10/2021 >60  >60 Final    Comment: Chronic Kidney Disease: less than 60 ml/min/1.73 sq.m. Kidney Failure: less than 15 ml/min/1.73 sq.m. Results valid for patients 18 years and older.  Calcium 09/10/2021 9.5  8.3 - 10.6 mg/dL Final    Total Protein 09/10/2021 7.2  6.4 - 8.2 g/dL Final    Albumin 09/10/2021 4.3  3.4 - 5.0 g/dL Final    Albumin/Globulin Ratio 09/10/2021 1.5  1.1 - 2.2 Final    Total Bilirubin 09/10/2021 0.8  0.0 - 1.0 mg/dL Final    Alkaline Phosphatase 09/10/2021 83  40 - 129 U/L Final    ALT 09/10/2021 13  10 - 40 U/L Final    AST 09/10/2021 23  15 - 37 U/L Final    Globulin 09/10/2021 2.9  g/dL Final    WBC 09/10/2021 4.8  4.0 - 11.0 K/uL Final    RBC 09/10/2021 4.14  4.00 - 5.20 M/uL Final    Hemoglobin 09/10/2021 12.2  12.0 - 16.0 g/dL Final    Hematocrit 09/10/2021 36.4  36.0 - 48.0 % Final    MCV 09/10/2021 87.8  80.0 - 100.0 fL Final    MCH 09/10/2021 29.4  26.0 - 34.0 pg Final    MCHC 09/10/2021 33.5  31.0 - 36.0 g/dL Final    RDW 09/10/2021 17.2* 12.4 - 15.4 % Final    Platelets 97/73/4360 235  135 - 450 K/uL Final    MPV 09/10/2021 8.1  5.0 - 10.5 fL Final     Assessment:  exploratory laparotomy, cholecystectomy with cholangiogram and excision of benign duodenal mass on 4/13/2021 for upper GI bleeding and symptomatic cholelithiasis   Possible incisional hernia    Plan: We will order CT of the abdomen pelvis to evaluate abdominal wall for possible incisional hernia as well as other etiologies for the bulge. Will return to office after the imaging to discuss results and further options    Flavio Buchanan MD, FACS  9/24/2021  10:42 AM

## 2021-10-11 RX ORDER — FUROSEMIDE 20 MG/1
40 TABLET ORAL DAILY
Qty: 180 TABLET | Refills: 0 | Status: SHIPPED | OUTPATIENT
Start: 2021-10-11 | End: 2021-12-10 | Stop reason: SDUPTHER

## 2021-10-11 NOTE — TELEPHONE ENCOUNTER
Prescription refill    Last OV:08/13/2021    Last Refill:08/23/2021    Labs:09/10/2021    Future Appt:12/10/2021

## 2021-10-11 NOTE — TELEPHONE ENCOUNTER
Medication Refill    Medication needing refilled: furosemide (LASIX) 20 MG tablet     Dosage of the medication:20 mg    How are you taking this medication (QD, BID, TID, QID, PRN): 2 tabs daily    30 or 90 day supply called in: 90    When will you run out of your medication: will be out today    Which Pharmacy are we sending the medication to?: [facility] [address] [facility][note 3] [phone] Shannan Irwin [phone]   [place] [address], [address] [place] [address]   Phone:  [phone]  Fax:  [phone]

## 2021-10-14 ENCOUNTER — HOSPITAL ENCOUNTER (OUTPATIENT)
Dept: CT IMAGING | Age: 56
Discharge: HOME | End: 2021-10-14
Payer: MEDICAID

## 2021-10-14 DIAGNOSIS — Z48.89 ENCOUNTER FOR POSTOPERATIVE CARE: ICD-10-CM

## 2021-10-14 PROCEDURE — 74177 CT ABD & PELVIS W/CONTRAST: CPT

## 2021-10-14 PROCEDURE — 6360000004 HC RX CONTRAST MEDICATION: Performed by: EMERGENCY MEDICINE

## 2021-10-14 RX ADMIN — IOHEXOL 50 ML: 240 INJECTION, SOLUTION INTRATHECAL; INTRAVASCULAR; INTRAVENOUS; ORAL at 08:14

## 2021-10-14 RX ADMIN — IOPAMIDOL 75 ML: 755 INJECTION, SOLUTION INTRAVENOUS at 08:14

## 2021-10-18 ENCOUNTER — OFFICE VISIT (OUTPATIENT)
Dept: SURGERY | Age: 56
End: 2021-10-18

## 2021-10-18 VITALS — WEIGHT: 216 LBS | SYSTOLIC BLOOD PRESSURE: 105 MMHG | BODY MASS INDEX: 34.34 KG/M2 | DIASTOLIC BLOOD PRESSURE: 66 MMHG

## 2021-10-18 DIAGNOSIS — Z48.89 ENCOUNTER FOR POSTOPERATIVE CARE: Primary | ICD-10-CM

## 2021-10-18 PROCEDURE — 99024 POSTOP FOLLOW-UP VISIT: CPT | Performed by: SURGERY

## 2021-10-18 NOTE — PATIENT INSTRUCTIONS
1. While the hernia would benefit from repair at some point, she wishes to avoid surgery for now. Will use interim time to lose weight, build abdominal core muscle mass in preparation for surgery in the future  2. Warning signs of an incarcerated hernia include inability to reduce the bulge, increased and constant pain, nausea, vomiting, fevers, chills and constipation. This is a surgical emergency and the patient should contact the office or present to an emergency department  3.  Return to office in 3 months to discuss progress and further options

## 2021-10-18 NOTE — PROGRESS NOTES
[address] and Laparoscopic Surgery  SUBJECTIVE:    Manuel Lau   1965   61 y.o. female presents for postoperative followup after exploratory laparotomy, cholecystectomy with cholangiogram and excision of benign duodenal mass on 4/13/2021 for upper GI bleeding and symptomatic cholelithiasis. At last office visit she'd noted an epigastric bulge under the incision and CT confirmed an incisional hernia without obstruction.  She has minimal pain and denies nausea, change in bowel habits or obstructive/incarcerated symptoms    Past Medical History:   Diagnosis Date    Anemia     Atrial fibrillation and flutter (HCC)     Atrial flutter (HCC)     CHB (complete heart block) (HCC)     Class 1 obesity without serious comorbidity with body mass index (BMI) of 33.0 to 33.9 in adult 9/6/2019    Congenital heart disease     GERD (gastroesophageal reflux disease)     Headache(784.0)     History of complete heart block     Hyperlipidemia     Hypertension     PONV (postoperative nausea and vomiting)     Sleep apnea     uses CPAP    Syncope     Systolic CHF, chronic ([address] 10/3/2018     Past Surgical History:   Procedure Laterality Date    CARDIAC DEFIBRILLATOR PLACEMENT  01/2021    [employer]tronic    COLECTOMY N/A 4/13/2021    EXPLORATORY LAPAROTOMY, RESECTION OF DUODENAL MASS, CHOLECYSTECTOMY WITH INTRAOPERATIVE CHOLANGIOGRAM performed by Nikki Kitchen MD at [address] [address] COLONOSCOPY N/A 10/27/2020    COLONOSCOPY POLYPECTOMY SNARE/COLD BIOPSY performed by Lashonda Roca MD at [place] [address]  11/29/12    dual chamber PPM, [employer]tronic    TUBAL LIGATION      UPPER GASTROINTESTINAL ENDOSCOPY N/A 10/27/2020    EGD DIAGNOSTIC ONLY performed by Lashonda Roca MD at [address] N/A 4/8/2021    EGD CONTROL HEMORRHAGE WITH APPLICATION OF 3 CLIPS TO DUODENAL MASS performed by Louie Alejandre MD at [address] [facility] GASTROINTESTINAL ENDOSCOPY N/A 4/8/2021    EGD BIOPSY DUODENAL MASS performed by Maryanne Alvarado MD at [address] [place] [address] N/A 4/8/2021    EGD SUBMUCOSAL INJECTION OF 0.6 MG OF EPINEPRINE INTO BASE OF DUODENAL MASS performed by Maryanne Alvarado MD at [facility] History     Socioeconomic History    Marital status:      Spouse name: Not on file    Number of children: 3    Years of education: Not on file    Highest education level: Not on file   Occupational History    Not on file   Tobacco Use    Smoking status: Never Smoker    Smokeless tobacco: Never Used   Vaping Use    Vaping Use: Never used   Substance and Sexual Activity    Alcohol use: No    Drug use: No    Sexual activity: Yes     Partners: Male   Other Topics Concern    Not on file   Social History Narrative    Not on file     Social Determinants of Health     Financial Resource Strain:     Difficulty of Paying Living Expenses:    Food Insecurity:     Worried About Running Out of Food in the Last Year:     Ran Out of Food in the Last Year:    Transportation Needs:     Lack of Transportation (Medical):      Lack of Transportation (Non-Medical):    Physical Activity:     Days of Exercise per Week:     Minutes of Exercise per Session:    Stress:     Feeling of Stress :    Social Connections:     Frequency of Communication with Friends and Family:     Frequency of Social Gatherings with Friends and Family:     Attends Mandaeism Services:     Active Member of Clubs or Organizations:     Attends Club or Organization Meetings:     Marital Status:    Intimate Partner Violence:     Fear of Current or Ex-Partner:     Emotionally Abused:     Physically Abused:     Sexually Abused:       Family History   Problem Relation Age of Onset    Cancer Father     Heart Disease Neg Hx     High Blood Pressure Neg Hx     High Cholesterol Neg Hx      Current Outpatient Medications   Medication Sig Dispense Refill    furosemide (LASIX) 20 MG tablet Take 2 tablets by mouth daily 180 tablet 0    carvedilol (COREG) 6.25 MG tablet Take 1 tablet by mouth 2 times daily (with meals) 60 tablet 2    sacubitril-valsartan (ENTRESTO) 24-26 MG per tablet Half tablet in the morning and full tablet in the evening and as directed 60 tablet 1    vitamin D (ERGOCALCIFEROL) 1.25 MG (83543 UT) CAPS capsule TAKE ONE CAPSULE BY MOUTH ONCE WEEKLY 12 capsule 1    spironolactone (ALDACTONE) 25 MG tablet Take 1 tablet by mouth daily 30 tablet 2    atorvastatin (LIPITOR) 40 MG tablet Take 1 tablet by mouth daily 60 tablet 2    rivaroxaban (XARELTO) 20 MG TABS tablet Take 1 tablet by mouth daily (with breakfast) 30 tablet 3    pantoprazole (PROTONIX) 40 MG tablet Take 1 tablet by mouth every morning (before breakfast) 30 tablet 3    Multiple Vitamins-Minerals (THERAPEUTIC MULTIVITAMIN-MINERALS) tablet Take 1 tablet by mouth daily       No current facility-administered medications for this visit. Allergies   Allergen Reactions    Latex      rash    Codeine      Hives      Lisinopril      cough    Nitroglycerin Hives    Sulfa Antibiotics Nausea Only    Hydralazine      headaches        Review of Systems:  Review of systems performed and negative with the exception of the above findings    OBJECTIVE:  /66   Wt 216 lb (98 kg)   BMI 34.34 kg/m²      Physical Exam:  General appearance: alert, appears stated age, cooperative and no distress  Abdomen: soft, non-distended, non-tender, wide mouthed epigastric incisional hernia easily reduced , non-tender and without overlying skin changes    Hospital Outpatient Visit on 09/10/2021   Component Date Value Ref Range Status    Vit D, 25-Hydroxy 09/10/2021 29.0* >=30 ng/mL Final    Comment: <=20 ng/mL. ........... [place] Deficient  21-29 ng/mL. ......... [place] Insufficient  >=30 ng/mL. ........ Cl[place]al Bat[place] Sufficient      Iron 09/10/2021 82  37 - 145 ug/dL Final    TIBC 09/10/2021 281  260 - 445 ug/dL Final    Iron Saturation 09/10/2021 29  15 - 50 % Final    Cholesterol, Total 09/10/2021 108  0 - 199 mg/dL Final    Triglycerides 09/10/2021 71  0 - 150 mg/dL Final    HDL 09/10/2021 55  40 - 60 mg/dL Final    LDL Calculated 09/10/2021 39  <100 mg/dL Final    VLDL Cholesterol Calculated 09/10/2021 14  Not Established mg/dL Final    Ferritin 09/10/2021 36.9  15.0 - 150.0 ng/mL Final    Pro-BNP 09/10/2021 322* 0 - 124 pg/mL Final    Comment: Methodology by NT-proBNP    An age-independent cutoff point of 300 pg/ml has a 98%  negative predictive value excluding acute heart failure. Values exceeding the age-related cutoff values (450 pg/mL if  age<50, 900 if 50-75 and 1800 if >75) has 90% sensitivity and  84% specificity for diagnosing acute HF. In patients with  renal compromise (eGFR<60) values greater than 1200pg/ml have  a diagnostic sensitivity and specificity of 89% and 72% for  acute HF.  Sodium 09/10/2021 145  136 - 145 mmol/L Final    Potassium 09/10/2021 3.6  3.5 - 5.1 mmol/L Final    Chloride 09/10/2021 109  99 - 110 mmol/L Final    CO2 09/10/2021 26  21 - 32 mmol/L Final    Anion Gap 09/10/2021 10  3 - 16 Final    Glucose 09/10/2021 105* 70 - 99 mg/dL Final    BUN 09/10/2021 10  7 - 20 mg/dL Final    CREATININE 09/10/2021 0.7  0.6 - 1.1 mg/dL Final    GFR Non- 09/10/2021 >60  >60 Final    Comment: >60 mL/min/1.73m2 EGFR, calc. for ages 25 and older using the  MDRD formula (not corrected for weight), is valid for stable  renal function.  GFR  09/10/2021 >60  >60 Final    Comment: Chronic Kidney Disease: less than 60 ml/min/1.73 sq.m. Kidney Failure: less than 15 ml/min/1.73 sq.m. Results valid for patients 18 years and older.       Calcium 09/10/2021 9.5  8.3 - 10.6 mg/dL Final    Total Protein 09/10/2021 7.2  6.4 - 8.2 g/dL Final    Albumin 09/10/2021 4.3  3.4 - 5.0 g/dL Final    Albumin/Globulin Ratio 09/10/2021 1.5  1.1 - 2.2 Final    Total Bilirubin 09/10/2021 0.8  0.0 - 1.0 mg/dL Final    Alkaline Phosphatase 09/10/2021 83  40 - 129 U/L Final    ALT 09/10/2021 13  10 - 40 U/L Final    AST 09/10/2021 23  15 - 37 U/L Final    Globulin 09/10/2021 2.9  g/dL Final    WBC 09/10/2021 4.8  4.0 - 11.0 K/uL Final    RBC 09/10/2021 4.14  4.00 - 5.20 M/uL Final    Hemoglobin 09/10/2021 12.2  12.0 - 16.0 g/dL Final    Hematocrit 09/10/2021 36.4  36.0 - 48.0 % Final    MCV 09/10/2021 87.8  80.0 - 100.0 fL Final    MCH 09/10/2021 29.4  26.0 - 34.0 pg Final    MCHC 09/10/2021 33.5  31.0 - 36.0 g/dL Final    RDW 09/10/2021 17.2* 12.4 - 15.4 % Final    Platelets 95/83/1811 235  135 - 450 K/uL Final    MPV 09/10/2021 8.1  5.0 - 10.5 fL Final       CT ABDOMEN PELVIS W IV CONTRAST Additional Contrast? Oral    Result Date: 10/14/2021  EXAMINATION: CT OF THE ABDOMEN AND PELVIS WITH CONTRAST 10/14/2021 8:07 am TECHNIQUE: CT of the abdomen and pelvis was performed with the administration of intravenous contrast. Multiplanar reformatted images are provided for review. Dose modulation, iterative reconstruction, and/or weight based adjustment of the mA/kV was utilized to reduce the radiation dose to as low as reasonably achievable. COMPARISON: None. HISTORY: ORDERING SYSTEM PROVIDED HISTORY: Encounter for postoperative care TECHNOLOGIST PROVIDED HISTORY: Additional Contrast?->Oral Reason for Exam: possible hernia Acuity: Unknown Type of Exam: Unknown FINDINGS: CARDIOVASCULAR: The heart is normal in size. There is no pericardial effusion. The aorta and branch vessels are patent and normal in caliber. LUNG BASES: There are no focal consolidations or pleural effusions. HEPATOBILIARY: The liver is normal in size. There are no focal hepatic lesions. There is no biliary ductal dilatation. The gallbladder is surgically absent. SPLEEN: Unremarkable. PANCREAS: Unremarkable ADRENAL GLANDS: Unremarkable. KIDNEYS: Kidneys are normal in size and contour and demonstrate symmetric enhancement. There is no hydronephrosis or nephrolithiasis. ABDOMINAL NODES: No adenopathy is appreciated. PELVIC ORGANS: The urinary bladder is unremarkable. Calcified fibroids noted in the uterus. PERITONEUM/MESENTERY/BOWEL: The stomach is unremarkable. There is no bowel obstruction. The appendix is normal.  Small ventral hernia noted with protrusion of a loop of the transverse colon within it. No evidence of obstruction or strangulation. There is questionable thickening of the proximal duodenum which may be due to under distension versus duodenitis. Correlation with epigastric pain or symptoms is recommended. BONES/SOFT TISSUES: There is no acute osseous or soft tissue abnormality. Small fat containing umbilical hernia. Small ventral hernia with protrusion of a loop of the transverse colon within it. No evidence of obstruction or strangulation. Questionable thickening of the proximal duodenum which may be due to underdistension versus duodenitis. Correlation with epigastric pain or symptoms is recommended. Assessment:  Reducible incisional hernia    Plan:  1. While the hernia would benefit from repair at some point, she wishes to avoid surgery for now. Will use interim time to lose weight, build abdominal core muscle mass in preparation for surgery in the future  2. Warning signs of an incarcerated hernia include inability to reduce the bulge, increased and constant pain, nausea, vomiting, fevers, chills and constipation. This is a surgical emergency and the patient should contact the office or present to an emergency department  3. Return to office in 3 months to discuss progress and further options    Flavio Copeland MD, FACS  10/18/2021  1:04 PM

## 2021-10-20 RX ORDER — RIVAROXABAN 20 MG/1
TABLET, FILM COATED ORAL
Qty: 30 TABLET | Refills: 3 | Status: SHIPPED | OUTPATIENT
Start: 2021-10-20 | End: 2021-12-10 | Stop reason: SDUPTHER

## 2021-10-20 NOTE — TELEPHONE ENCOUNTER
Received refill request for xarelto from [facility].     Last ov:2021 NPAL    Last labs:cbc 9/10/2021    Last EK2021    Last Refill: 2021 #30 with 3 refills    Next appointment: on recall list with [facility]

## 2021-10-27 ENCOUNTER — TELEPHONE (OUTPATIENT)
Dept: CARDIOLOGY CLINIC | Age: 56
End: 2021-10-27

## 2021-10-27 NOTE — TELEPHONE ENCOUNTER
I spoke with pt and she stated that her appt that was cancelled was post operative, She stated that it has almost been a year and she wants to talk to RMM. She insisted on seeing RMM.

## 2021-10-27 NOTE — TELEPHONE ENCOUNTER
Pt states she is wanting to get back in with RMM. States she had an appt on 9/7/21 that had to be canceled due to RMM being out. [facility] was too far and she wants to come to [facility] to see RMM. Please advise where pt can be added.

## 2021-10-28 NOTE — TELEPHONE ENCOUNTER
Patient no show for her Appt with [facility] 10/12/2021. She can be placed on wait list to see RMM as he has no openings this year.

## 2021-11-02 ENCOUNTER — TELEPHONE (OUTPATIENT)
Dept: CARDIOLOGY CLINIC | Age: 56
End: 2021-11-02

## 2021-11-02 NOTE — TELEPHONE ENCOUNTER
She has a 2pm dental appt today . She is asking if it is ok for her to get x-rays since she has a device .  Please call asap

## 2021-11-03 ENCOUNTER — TELEPHONE (OUTPATIENT)
Dept: CARDIOLOGY CLINIC | Age: 56
End: 2021-11-03

## 2021-11-03 NOTE — TELEPHONE ENCOUNTER
Called and relayed message below with verbal understanding and encouraged to call office with any other questions or concerns.

## 2021-11-03 NOTE — TELEPHONE ENCOUNTER
She had a root canal yesterday (on emergency basis). She needs to have more dental work next week . Does she need an ATB?

## 2021-11-23 ENCOUNTER — NURSE ONLY (OUTPATIENT)
Dept: CARDIOLOGY CLINIC | Age: 56
End: 2021-11-23
Payer: MEDICAID

## 2021-11-23 DIAGNOSIS — I47.29 PAROXYSMAL VENTRICULAR TACHYCARDIA: ICD-10-CM

## 2021-11-23 DIAGNOSIS — Z95.810 ICD (IMPLANTABLE CARDIOVERTER-DEFIBRILLATOR), BIVENTRICULAR, IN SITU: ICD-10-CM

## 2021-11-23 DIAGNOSIS — I51.9 LV DYSFUNCTION: ICD-10-CM

## 2021-11-23 DIAGNOSIS — I50.22 SYSTOLIC CHF, CHRONIC (HCC): ICD-10-CM

## 2021-11-23 DIAGNOSIS — I51.7 LVH (LEFT VENTRICULAR HYPERTROPHY): ICD-10-CM

## 2021-11-23 DIAGNOSIS — I42.0 DILATED CARDIOMYOPATHY (HCC): ICD-10-CM

## 2021-11-23 DIAGNOSIS — Q24.6 CONGENITAL HEART BLOCK: ICD-10-CM

## 2021-11-23 PROCEDURE — 93295 DEV INTERROG REMOTE 1/2/MLT: CPT | Performed by: INTERNAL MEDICINE

## 2021-11-23 PROCEDURE — 93296 REM INTERROG EVL PM/IDS: CPT | Performed by: INTERNAL MEDICINE

## 2021-11-23 PROCEDURE — 93297 REM INTERROG DEV EVAL ICPMS: CPT | Performed by: INTERNAL MEDICINE

## 2021-11-30 ENCOUNTER — TELEPHONE (OUTPATIENT)
Dept: CARDIOLOGY CLINIC | Age: 56
End: 2021-11-30

## 2021-11-30 NOTE — TELEPHONE ENCOUNTER
Called patient and [facility] for patient to return call to office for message below.

## 2021-11-30 NOTE — TELEPHONE ENCOUNTER
Pt called asking if it is ok to get a booster shot today with the history of her heart.     Pls advise thank you

## 2021-12-01 ENCOUNTER — APPOINTMENT (OUTPATIENT)
Dept: GENERAL RADIOLOGY | Age: 56
End: 2021-12-01
Payer: MEDICAID

## 2021-12-01 ENCOUNTER — TELEPHONE (OUTPATIENT)
Dept: CARDIOLOGY CLINIC | Age: 56
End: 2021-12-01

## 2021-12-01 ENCOUNTER — HOSPITAL ENCOUNTER (EMERGENCY)
Age: 56
Discharge: HOME | End: 2021-12-01
Attending: EMERGENCY MEDICINE
Payer: MEDICAID

## 2021-12-01 VITALS
WEIGHT: 219 LBS | HEIGHT: 67 IN | SYSTOLIC BLOOD PRESSURE: 132 MMHG | HEART RATE: 88 BPM | TEMPERATURE: 98.1 F | RESPIRATION RATE: 16 BRPM | BODY MASS INDEX: 34.37 KG/M2 | OXYGEN SATURATION: 98 % | DIASTOLIC BLOOD PRESSURE: 88 MMHG

## 2021-12-01 DIAGNOSIS — Z92.29 COVID-19 VACCINE SERIES COMPLETED: ICD-10-CM

## 2021-12-01 DIAGNOSIS — M79.605 LEFT LEG PAIN: ICD-10-CM

## 2021-12-01 DIAGNOSIS — R07.9 CHEST PAIN, UNSPECIFIED TYPE: Primary | ICD-10-CM

## 2021-12-01 DIAGNOSIS — E87.6 HYPOKALEMIA: ICD-10-CM

## 2021-12-01 LAB
A/G RATIO: 1.3 (ref 1.1–2.2)
ALBUMIN SERPL-MCNC: 4.3 G/DL (ref 3.4–5)
ALP BLD-CCNC: 85 U/L (ref 40–129)
ALT SERPL-CCNC: 19 U/L (ref 10–40)
ANION GAP SERPL CALCULATED.3IONS-SCNC: 10 MMOL/L (ref 3–16)
AST SERPL-CCNC: 23 U/L (ref 15–37)
BASOPHILS ABSOLUTE: 0 K/UL (ref 0–0.2)
BASOPHILS RELATIVE PERCENT: 0.7 %
BILIRUB SERPL-MCNC: 0.8 MG/DL (ref 0–1)
BUN BLDV-MCNC: 9 MG/DL (ref 7–20)
CALCIUM SERPL-MCNC: 9.4 MG/DL (ref 8.3–10.6)
CHLORIDE BLD-SCNC: 104 MMOL/L (ref 99–110)
CO2: 28 MMOL/L (ref 21–32)
CREAT SERPL-MCNC: 0.8 MG/DL (ref 0.6–1.1)
D DIMER: 215 NG/ML DDU (ref 0–229)
EKG ATRIAL RATE: 77 BPM
EKG DIAGNOSIS: NORMAL
EKG P AXIS: 119 DEGREES
EKG P-R INTERVAL: 116 MS
EKG Q-T INTERVAL: 472 MS
EKG QRS DURATION: 154 MS
EKG QTC CALCULATION (BAZETT): 534 MS
EKG R AXIS: -88 DEGREES
EKG T AXIS: 81 DEGREES
EKG VENTRICULAR RATE: 77 BPM
EOSINOPHILS ABSOLUTE: 0.2 K/UL (ref 0–0.6)
EOSINOPHILS RELATIVE PERCENT: 4.4 %
GFR AFRICAN AMERICAN: >60
GFR NON-AFRICAN AMERICAN: >60
GLUCOSE BLD-MCNC: 114 MG/DL (ref 70–99)
HCG QUALITATIVE: NEGATIVE
HCT VFR BLD CALC: 36.9 % (ref 36–48)
HEMOGLOBIN: 12.7 G/DL (ref 12–16)
LYMPHOCYTES ABSOLUTE: 0.9 K/UL (ref 1–5.1)
LYMPHOCYTES RELATIVE PERCENT: 16.2 %
MCH RBC QN AUTO: 31.1 PG (ref 26–34)
MCHC RBC AUTO-ENTMCNC: 34.5 G/DL (ref 31–36)
MCV RBC AUTO: 90 FL (ref 80–100)
MONOCYTES ABSOLUTE: 0.4 K/UL (ref 0–1.3)
MONOCYTES RELATIVE PERCENT: 6.8 %
NEUTROPHILS ABSOLUTE: 3.9 K/UL (ref 1.7–7.7)
NEUTROPHILS RELATIVE PERCENT: 71.9 %
PDW BLD-RTO: 15.5 % (ref 12.4–15.4)
PLATELET # BLD: 236 K/UL (ref 135–450)
PMV BLD AUTO: 7.5 FL (ref 5–10.5)
POTASSIUM SERPL-SCNC: 3.3 MMOL/L (ref 3.5–5.1)
PRO-BNP: 60 PG/ML (ref 0–124)
RBC # BLD: 4.1 M/UL (ref 4–5.2)
SODIUM BLD-SCNC: 142 MMOL/L (ref 136–145)
TOTAL PROTEIN: 7.7 G/DL (ref 6.4–8.2)
TROPONIN: <0.01 NG/ML
WBC # BLD: 5.4 K/UL (ref 4–11)

## 2021-12-01 PROCEDURE — 6370000000 HC RX 637 (ALT 250 FOR IP): Performed by: EMERGENCY MEDICINE

## 2021-12-01 PROCEDURE — 93005 ELECTROCARDIOGRAM TRACING: CPT | Performed by: EMERGENCY MEDICINE

## 2021-12-01 PROCEDURE — 99283 EMERGENCY DEPT VISIT LOW MDM: CPT

## 2021-12-01 PROCEDURE — 71046 X-RAY EXAM CHEST 2 VIEWS: CPT

## 2021-12-01 PROCEDURE — 80053 COMPREHEN METABOLIC PANEL: CPT

## 2021-12-01 PROCEDURE — 6370000000 HC RX 637 (ALT 250 FOR IP): Performed by: PHYSICIAN ASSISTANT

## 2021-12-01 PROCEDURE — 85379 FIBRIN DEGRADATION QUANT: CPT

## 2021-12-01 PROCEDURE — 84484 ASSAY OF TROPONIN QUANT: CPT

## 2021-12-01 PROCEDURE — 93971 EXTREMITY STUDY: CPT

## 2021-12-01 PROCEDURE — 83880 ASSAY OF NATRIURETIC PEPTIDE: CPT

## 2021-12-01 PROCEDURE — 84703 CHORIONIC GONADOTROPIN ASSAY: CPT

## 2021-12-01 PROCEDURE — 85025 COMPLETE CBC W/AUTO DIFF WBC: CPT

## 2021-12-01 PROCEDURE — 93010 ELECTROCARDIOGRAM REPORT: CPT | Performed by: INTERNAL MEDICINE

## 2021-12-01 RX ORDER — ACETAMINOPHEN 500 MG
500 TABLET ORAL ONCE
Status: COMPLETED | OUTPATIENT
Start: 2021-12-01 | End: 2021-12-01

## 2021-12-01 RX ORDER — ASPIRIN 81 MG/1
324 TABLET, CHEWABLE ORAL ONCE
Status: COMPLETED | OUTPATIENT
Start: 2021-12-01 | End: 2021-12-01

## 2021-12-01 RX ORDER — POTASSIUM CHLORIDE 750 MG/1
40 TABLET, FILM COATED, EXTENDED RELEASE ORAL ONCE
Status: COMPLETED | OUTPATIENT
Start: 2021-12-01 | End: 2021-12-01

## 2021-12-01 RX ADMIN — POTASSIUM CHLORIDE 40 MEQ: 750 TABLET, FILM COATED, EXTENDED RELEASE ORAL at 15:42

## 2021-12-01 RX ADMIN — ASPIRIN 81 MG 324 MG: 81 TABLET ORAL at 12:35

## 2021-12-01 RX ADMIN — ACETAMINOPHEN 500 MG: 500 TABLET ORAL at 12:35

## 2021-12-01 ASSESSMENT — ENCOUNTER SYMPTOMS
DIARRHEA: 0
STRIDOR: 0
COUGH: 0
WHEEZING: 0
CONSTIPATION: 0
SHORTNESS OF BREATH: 0
BACK PAIN: 0
NAUSEA: 0
ABDOMINAL PAIN: 0
VOMITING: 0
COLOR CHANGE: 0
ABDOMINAL DISTENTION: 0

## 2021-12-01 ASSESSMENT — PAIN SCALES - GENERAL
PAINLEVEL_OUTOF10: 7
PAINLEVEL_OUTOF10: 7

## 2021-12-01 NOTE — ED PROVIDER NOTES
I independently performed a history and physical on Carlos Johns. All diagnostic, treatment, and disposition decisions were made by myself in conjunction with the advanced practice provider. I have participated in the medical decision making and directed the treatment plan and disposition of the patient. For further details of [facility] emergency department encounter, please see the advanced practice provider's documentation. CHIEF COMPLAINT  Chief Complaint   Patient presents with    Chest Pain     Centeralized CP that began yesterday after getting vaccine booster; describes pain as a \"spasm. \"       Leg Pain     Pain to left calf that also began yesterday. Briefly, Carlos Johns is a 61 y.o. female  who presents to the ED complaining of getting Earline Charnley #3 (booster) vaccine yesterday around noon and felt \"not myself\" in the afternoon/evening yesterday with some LLE cramping and myalgias and \"chest cramps. \"  Worsening sx today so was told to come in by her cardiology team.  No SOB or fevers. FOCUSED PHYSICAL EXAMINATION  BP (!) 133/91   Pulse 75   Temp 98.1 °F (36.7 °C)   Resp 18   Ht 5' 6.5\" (1.689 m)   Wt 219 lb (99.3 kg)   SpO2 98%   BMI 34.82 kg/m²    Focused physical examination notable for no acute distress, well-appearing, well-nourished, normal speech and mentation without obvious facial droop, no obvious rash. No obvious cranial nerve deficits on my initial exam. RRR CTAB, abd soft NTND. L calf is ttp but not swollen compared to the R calf. The 12 lead EKG was interpreted by me as follows:  Rate: normal with a rate of 77  Rhythm: normal ventricular pacemaker  Intervals: left bundle branch block which limits further interpretation of EKG changes  Prior EKG comparison: EKG dated 5/17/21 is not significantly different    MDM:  ED course was notable for chest pains and cramping of muscles/myaglias including the L calf.   Doppler neg to the LLE, ddimer neg, VS reassuring all arguing against PE.  CXR clear. Labs reassuring except mild hypokalemia that will be repleted. Feeling better after dose of aspirin/tylenol here. During the patient's ED course, the patient was given:  Medications   potassium chloride (KLOR-CON) extended release tablet 40 mEq (has no administration in time range)   aspirin chewable tablet 324 mg (324 mg Oral Given 12/1/21 1235)   acetaminophen (TYLENOL) tablet 500 mg (500 mg Oral Given 12/1/21 1235)        CLINICAL IMPRESSION  1. Chest pain, unspecified type    2. Left leg pain    3. COVID-19 vaccine series completed    4. Hypokalemia        DISPOSITION  Whit Vanegas was discharged to home in stable condition. I have discussed the findings of today's workup with the patient and addressed the patient's questions and concerns. Important warning signs as well as new or worsening symptoms which would necessitate immediate return to the ED were discussed. The plan is to discharge from the ED at this time, and the patient is in stable condition. The patient acknowledged understanding is agreeable with this plan. Follow-up with:  Your PCP Dr. Michele Rodartes an appointment as soon as possible for a visit in 1 week  For symptom re-evaluation    [facility] Emergency Department  [address]  [address] [facility] [address] [facility]  Go to   If symptoms worsen      This chart was created using Dragon dictation software. Efforts were made by me to ensure accuracy, however some errors may be present due to limitations of this technology.             Luke Mak MD  12/01/21 [id number]

## 2021-12-01 NOTE — ED PROVIDER NOTES
[facility]        Pt Name: Jolynn Adams  MRN: [id number]  [place] 1965  Date of evaluation: 12/1/2021  Provider: Cricket Kuhn PA-C  PCP: No primary care provider on file. Note Started: 12:35 PM EST        I have seen and evaluated this patient with my supervising physician Dimitri Bauman MD.    [address] [place] [address]       Chief Complaint   Patient presents with    Chest Pain     Centeralized CP that began yesterday after getting vaccine booster; describes pain as a \"spasm. \"       Leg Pain     Pain to left calf that also began yesterday. HISTORY OF PRESENT ILLNESS   (Location, Timing/Onset, Context/Setting, Quality, Duration, Modifying Factors, Severity, Associated Signs and Symptoms)  Note limiting factors. Chief Complaint: Intermittent midsternal chest pain and left calf pain since yesterday. Jolynn Adams is a 61 y.o. female who presents to the emergency department complaining of intermittent midsternal chest pain and left calf pain since yesterday. This started after receiving her Moderna vaccine booster. She describes it as spasm. Denies any swelling. She does have an electronic cardiac pacemaker because she has a history of atrial fibrillation. She is anticoagulated on Xarelto. She denies cough, fever, chills or shortness of breath. Nursing Notes were all reviewed and agreed with or any disagreements were addressed in the HPI. REVIEW OF SYSTEMS    (2-9 systems for level 4, 10 or more for level 5)     Review of Systems   Constitutional: Negative for chills and fever. HENT: Negative. Eyes: Negative for visual disturbance. Respiratory: Negative for cough, shortness of breath, wheezing and stridor. Cardiovascular: Positive for chest pain. Negative for palpitations and leg swelling.    Gastrointestinal: Negative for abdominal distention, abdominal pain, constipation, diarrhea, nausea and vomiting. Genitourinary: Negative. Musculoskeletal: Positive for myalgias. Negative for back pain, neck pain and neck stiffness. Skin: Negative for color change, pallor, rash and wound. Neurological: Negative. Psychiatric/Behavioral: Negative for confusion. All other systems reviewed and are negative. Positives and Pertinent negatives as per HPI. Except as noted above in the ROS, all other systems were reviewed and negative.        PAST MEDICAL HISTORY     Past Medical History:   Diagnosis Date    Anemia     Atrial fibrillation and flutter (HCC)     Atrial flutter ([address]     CHB (complete heart block) (HCC)     Class 1 obesity without serious comorbidity with body mass index (BMI) of 33.0 to 33.9 in adult 9/6/2019    Congenital heart disease     GERD (gastroesophageal reflux disease)     Headache(784.0)     History of complete heart block     Hyperlipidemia     Hypertension     PONV (postoperative nausea and vomiting)     Sleep apnea     uses CPAP    Syncope     Systolic CHF, chronic ([address] 10/3/2018         SURGICAL HISTORY     Past Surgical History:   Procedure Laterality Date    CARDIAC DEFIBRILLATOR PLACEMENT  01/2021    [employer]    COLECTOMY N/A 4/13/2021    EXPLORATORY LAPAROTOMY, RESECTION OF DUODENAL MASS, CHOLECYSTECTOMY WITH INTRAOPERATIVE CHOLANGIOGRAM performed by Julia Mtz MD at [place] [address] COLONOSCOPY N/A 10/27/2020    COLONOSCOPY POLYPECTOMY SNARE/COLD BIOPSY performed by Shanique Taylor MD at [address] [place] [address]  11/29/12    dual chamber PPM, [employer]    TUBAL LIGATION      UPPER GASTROINTESTINAL ENDOSCOPY N/A 10/27/2020    EGD DIAGNOSTIC ONLY performed by Shanique Taylor MD at [place] [address] N/A 4/8/2021    EGD CONTROL HEMORRHAGE WITH APPLICATION OF 3 CLIPS TO DUODENAL MASS performed by Janneth Hanna MD at [place] [address] N/A 4/8/2021    EGD BIOPSY status: Never Smoker    Smokeless tobacco: Never Used   Vaping Use    Vaping Use: Never used   Substance Use Topics    Alcohol use: No    Drug use: No       SCREENINGS             PHYSICAL EXAM    (up to 7 for level 4, 8 or more for level 5)     ED Triage Vitals [12/01/21 1206]   BP Temp Temp src Pulse Resp SpO2 Height Weight   (!) 133/91 98.1 °F (36.7 °C) -- 75 18 98 % 5' 6.5\" (1.689 m) 219 lb (99.3 kg)       Physical Exam  Vitals and nursing note reviewed. Constitutional:       Appearance: Normal appearance. She is well-developed. She is not toxic-appearing or diaphoretic. HENT:      Head: Normocephalic and atraumatic. Right Ear: External ear normal.      Left Ear: External ear normal.      Nose: Nose normal.      Mouth/Throat:      Mouth: Mucous membranes are moist.      Pharynx: Oropharynx is clear. Eyes:      General: No scleral icterus. Right eye: No discharge. Left eye: No discharge. Extraocular Movements: Extraocular movements intact. Conjunctiva/sclera: Conjunctivae normal.      Pupils: Pupils are equal, round, and reactive to light. Cardiovascular:      Rate and Rhythm: Normal rate. Pulmonary:      Effort: Pulmonary effort is normal.      Breath sounds: Normal breath sounds. Abdominal:      General: Bowel sounds are normal.      Palpations: Abdomen is soft. Tenderness: There is no abdominal tenderness. There is no right CVA tenderness or left CVA tenderness. Musculoskeletal:         General: Normal range of motion. Cervical back: Normal range of motion. Comments: No extremity edema, posterior calf or thigh tenderness, palpable cord, discoloration. Negative homans. Skin:     General: Skin is warm and dry. Capillary Refill: Capillary refill takes less than 2 seconds. Coloration: Skin is not jaundiced or pale. Findings: No bruising, erythema, lesion or rash.    Neurological:      Mental Status: She is alert and oriented to person, place, and time. Mental status is at baseline. Psychiatric:         Mood and Affect: Mood normal.         Behavior: Behavior normal.         DIAGNOSTIC RESULTS   LABS:    Labs Reviewed   CBC WITH AUTO DIFFERENTIAL - Abnormal; Notable for the following components:       Result Value    RDW 15.5 (*)     Lymphocytes Absolute 0.9 (*)     All other components within normal limits    Narrative:     Performed at:  [facility] [address], [employer]   Phone [phone]   COMPREHENSIVE METABOLIC PANEL - Abnormal; Notable for the following components:    Potassium 3.3 (*)     Glucose 114 (*)     All other components within normal limits    Narrative:     Performed at:  [facility] [address], [employer]   Phone [phone]   TROPONIN    Narrative:     Performed at:  [facility] [address], [employer]   Phone [phone]   BRAIN NATRIURETIC PEPTIDE    Narrative:     Performed at:  [facility] [address], [employer]   Phone [phone]   D-DIMER, QUANTITATIVE    Narrative:     Performed at:  [facility] [address], [employer]   Phone [phone]   HCG, SERUM, QUALITATIVE    Narrative:     Performed at:  [facility] [address] [employer]   Phone [phone]       When ordered only abnormal lab results are displayed. All other labs were within normal range or not returned as of this dictation. EKG: When ordered, EKG's are interpreted by the Emergency Department Physician in the absence of a cardiologist.  Please see their note for interpretation of EKG.     RADIOLOGY:   Non-plain film images such as CT, Ultrasound and MRI are read by the radiologist. Plain radiographic images are visualized and preliminarily interpreted by the ED Provider with the below findings:        Interpretation per the Radiologist below, if available at the time of this note:    VL Extremity Venous Left         XR CHEST (2 VW)   Final Result   No acute cardiopulmonary disease. Cardiomegaly without overt failure. Left   No evidence of deep vein or superficial vein thrombosis involving the left   lower extremity and the right common femoral vein. PROCEDURES   Unless otherwise noted below, none     Procedures    CRITICAL CARE TIME   N/A    CONSULTS:  None      EMERGENCY DEPARTMENT COURSE and DIFFERENTIAL DIAGNOSIS/MDM:   Vitals:    Vitals:    12/01/21 1206 12/01/21 1545   BP: (!) 133/91 132/88   Pulse: 75 88   Resp: 18 16   Temp: 98.1 °F (36.7 °C)    SpO2: 98%    Weight: 219 lb (99.3 kg)    Height: 5' 6.5\" (1.689 m)        Patient was given the following medications:  Medications   aspirin chewable tablet 324 mg (324 mg Oral Given 12/1/21 1235)   acetaminophen (TYLENOL) tablet 500 mg (500 mg Oral Given 12/1/21 1235)   potassium chloride (KLOR-CON) extended release tablet 40 mEq (40 mEq Oral Given 12/1/21 1542)           This patient presents to the emergency department complaining of intermittent chest pain and left calf pain after receiving her booster yesterday. Her primary concern was blood clot. Chest x-ray is unremarkable. D-dimer is negative. Venous Doppler shows no DVT. Other blood work appears stable at this time. EKG shows paced rhythm without acute morphology changes. She denies palpitations and I have low suspicion for pacemaker failure.     My suspicion is low for ACS, PE, myocarditis, pericarditis, endocarditis, acute pulmonary edema, pleural effusion, pericardial effusion, cardiac tamponade, cardiomyopathy, CHF exacerbation, thoracic aortic dissection, esophageal rupture, other life-threatening arrhythmia, hypertensive urgency or emergency, hemothorax, pulmonary contusion, subcutaneous emphysema, flail chest, pneumo mediastinum, rib fracture, pneumonia, pneumothorax, subungual hematoma, paronychia, eponychia, felon, flexor tenosynovitis,  foreign body, tendon rupture, compartment syndrome, acute fracture, dislocation, DVT, arterial compromise or occlusion, limb ischemia, gout, septic joint, abscess, cellulitis, osteomyelitis, or other concerning pathology. Follow up with PCP for recheck and may return to ED per discharge instructions. We have addressed concerns and expectations. FINAL IMPRESSION      1. Chest pain, unspecified type    2. Left leg pain    3. COVID-19 vaccine series completed    4.  Hypokalemia          DISPOSITION/PLAN   DISPOSITION        PATIENT REFERRED TO:  Your PCP Dr. Beatrice Persaud an appointment as soon as possible for a visit in 1 week  For symptom re-evaluation    [facility] Emergency Department  [facility] [address]  [phone]  Go to   If symptoms worsen      DISCHARGE MEDICATIONS:  Discharge Medication List as of 12/1/2021  3:45 PM          DISCONTINUED MEDICATIONS:  Discharge Medication List as of 12/1/2021  3:45 PM                 (Please note that portions of this note were completed with a voice recognition program.  Efforts were made to edit the dictations but occasionally words are mis-transcribed.)    Romel Henry PA-C (electronically signed)            Romel Henry PA-C  12/01/21 8249

## 2021-12-01 NOTE — TELEPHONE ENCOUNTER
Patient called back about the message below and she stated that she is still having chest pain in the center of the chest. [facility] and she stated that she will ask NPRG what she would like the patient to do. Patient verbalized understanding. Per NPRG patient needs to go to the ER patient verbalized understanding.

## 2021-12-01 NOTE — TELEPHONE ENCOUNTER
We received remote transmission from patient's monitor at home. Transmission shows normal sensing and pacing function. Current ECG shows APBVP at 77 bpm.   AP 90.7%   99.5%  No episodes noted. OptiVol WNL. EP will review. See interrogation under cardiology tab in the [address] [place] [address] [address] field for more details.

## 2021-12-01 NOTE — TELEPHONE ENCOUNTER
She got the Kannan Almodovar booster yesterday . About 45 minutes after getting the injection she felt very weak, fatigued, had a bad headache, and chest pain. She is still not feel right today so she sent a phone transmission .  Please review transmission and call patient to discuss symptoms

## 2021-12-02 ENCOUNTER — TELEPHONE (OUTPATIENT)
Dept: CARDIOLOGY CLINIC | Age: 56
End: 2021-12-02

## 2021-12-02 NOTE — TELEPHONE ENCOUNTER
Pt states she was at [facility] yesterday and is asking to be seen sooner than her 12/10/21 appt. Please call to advise.

## 2021-12-03 ENCOUNTER — TELEPHONE (OUTPATIENT)
Dept: CARDIOLOGY CLINIC | Age: 56
End: 2021-12-03

## 2021-12-03 NOTE — TELEPHONE ENCOUNTER
Spoke to pt. She was explained the Vit D low valve of 16 in Feb 2021 and how she was treated with 50,000 units once a week and that now Vit D 1000 units will be used to keep her in the correct range. Pt verbalizes understanding and thanked office for explaining this.

## 2021-12-03 NOTE — TELEPHONE ENCOUNTER
Pt calling, states she's already on vitamin D and wants to know why she's getting another one. Its the prescription  vitamin D3 (CHOLECALCIFEROL) 25 MCG (1000 UT) TABS tablet. pls call to advise thank you.

## 2021-12-03 NOTE — TELEPHONE ENCOUNTER
I think maybe jf changed the dose? Her level is better but she need to take to maintain.  Rae Rodarte

## 2021-12-06 RX ORDER — CARVEDILOL 6.25 MG/1
TABLET ORAL
Qty: 60 TABLET | Refills: 2 | Status: SHIPPED | OUTPATIENT
Start: 2021-12-06 | End: 2021-12-10 | Stop reason: SDUPTHER

## 2021-12-10 ENCOUNTER — OFFICE VISIT (OUTPATIENT)
Dept: CARDIOLOGY CLINIC | Age: 56
End: 2021-12-10
Payer: MEDICAID

## 2021-12-10 VITALS
HEIGHT: 67 IN | HEART RATE: 74 BPM | SYSTOLIC BLOOD PRESSURE: 122 MMHG | DIASTOLIC BLOOD PRESSURE: 80 MMHG | BODY MASS INDEX: 34.69 KG/M2 | OXYGEN SATURATION: 97 % | WEIGHT: 221 LBS

## 2021-12-10 DIAGNOSIS — I48.0 PAROXYSMAL ATRIAL FIBRILLATION (HCC): ICD-10-CM

## 2021-12-10 DIAGNOSIS — Z95.810 ICD (IMPLANTABLE CARDIOVERTER-DEFIBRILLATOR), BIVENTRICULAR, IN SITU: ICD-10-CM

## 2021-12-10 DIAGNOSIS — E55.9 HYPOVITAMINOSIS D: ICD-10-CM

## 2021-12-10 DIAGNOSIS — G47.33 OSA (OBSTRUCTIVE SLEEP APNEA): ICD-10-CM

## 2021-12-10 DIAGNOSIS — D50.9 IRON DEFICIENCY ANEMIA, UNSPECIFIED IRON DEFICIENCY ANEMIA TYPE: ICD-10-CM

## 2021-12-10 DIAGNOSIS — I50.42 CHRONIC COMBINED SYSTOLIC AND DIASTOLIC HEART FAILURE (HCC): Primary | ICD-10-CM

## 2021-12-10 PROCEDURE — G8427 DOCREV CUR MEDS BY ELIG CLIN: HCPCS | Performed by: CLINICAL NURSE SPECIALIST

## 2021-12-10 PROCEDURE — 99214 OFFICE O/P EST MOD 30 MIN: CPT | Performed by: CLINICAL NURSE SPECIALIST

## 2021-12-10 PROCEDURE — 1036F TOBACCO NON-USER: CPT | Performed by: CLINICAL NURSE SPECIALIST

## 2021-12-10 PROCEDURE — 3017F COLORECTAL CA SCREEN DOC REV: CPT | Performed by: CLINICAL NURSE SPECIALIST

## 2021-12-10 PROCEDURE — G8484 FLU IMMUNIZE NO ADMIN: HCPCS | Performed by: CLINICAL NURSE SPECIALIST

## 2021-12-10 PROCEDURE — G8417 CALC BMI ABV UP PARAM F/U: HCPCS | Performed by: CLINICAL NURSE SPECIALIST

## 2021-12-10 RX ORDER — FUROSEMIDE 20 MG/1
40 TABLET ORAL DAILY
Qty: 180 TABLET | Refills: 1 | Status: SHIPPED | OUTPATIENT
Start: 2021-12-10 | End: 2022-05-10 | Stop reason: ALTCHOICE

## 2021-12-10 RX ORDER — CARVEDILOL 6.25 MG/1
6.25 TABLET ORAL 2 TIMES DAILY
Qty: 180 TABLET | Refills: 1 | Status: SHIPPED | OUTPATIENT
Start: 2021-12-10 | End: 2022-06-09 | Stop reason: SDUPTHER

## 2021-12-10 RX ORDER — SACUBITRIL AND VALSARTAN 24; 26 MG/1; MG/1
TABLET, FILM COATED ORAL
Qty: 180 TABLET | Refills: 1 | Status: SHIPPED | OUTPATIENT
Start: 2021-12-10 | End: 2022-05-10 | Stop reason: ALTCHOICE

## 2021-12-10 RX ORDER — SPIRONOLACTONE 25 MG/1
25 TABLET ORAL DAILY
Qty: 90 TABLET | Refills: 1 | Status: SHIPPED | OUTPATIENT
Start: 2021-12-10 | End: 2022-06-09 | Stop reason: SDUPTHER

## 2021-12-10 NOTE — PROGRESS NOTES
[address]  Progress Note    Primary Care Doctor:  No primary care provider on file. Chief Complaint   Patient presents with    Follow-up     [facility]    Congestive Heart Failure        History of Present Illness:  61 y.o. female with history of sHF, LVH, AF on xarelto (follows with Dr Dorian Odom), had been on flecainide, dual chamber pacemaker 2012 due to congenital heart block  LVEF had been 55% in 2015 and now 25%. No lisinopril due to cough. She is a cook at [employer]  Lip numbness to entresto 3/2020 hydralazine caused headaches  10/2020 EGD (hiatal hernia) and colonoscopy (polyp)   ICD placed 1/22/21, LHC done 1/20/21 normal cors  4/21 benign duodenal mass with resection Dr Radha Navarrete  4/7-19/2021 for symptomatic anemia, requiring surgery by Dr Radha Navarrete for overlying lipoma, large duodenum lesion requiring 3 clips    I had the pleasure of seeing Lourdes Patel in follow up for sHF. She is ambulatory and by her self today. She had chest pain after her booster moderna covid vaccine. She was seen in the ER, all negative. She has a incisional hernia which needs repair and plans to do this in January. Her optival shows normal thoracic impedence. Labs are stable. She continues to complain of muscle cramping. Past Medical History:   has a past medical history of Anemia, Atrial fibrillation and flutter ([address] Atrial flutter ([address] CHB (complete heart block) ([address] Class 1 obesity without serious comorbidity with body mass index (BMI) of 33.0 to 33.9 in adult, Congenital heart disease, GERD (gastroesophageal reflux disease), Headache(784.0), History of complete heart block, Hyperlipidemia, Hypertension, PONV (postoperative nausea and vomiting), Sleep apnea, Syncope, and Systolic CHF, chronic ([address] Surgical History:   has a past surgical history that includes Uterine fibroid surgery; Pacemaker insertion (11/29/12); Tubal ligation;  Upper gastrointestinal endoscopy (N/A, 10/27/2020); Colonoscopy (N/A, 10/27/2020); Cardiac defibrillator placement (01/2021); Upper gastrointestinal endoscopy (N/A, 4/8/2021); Upper gastrointestinal endoscopy (N/A, 4/8/2021); Upper gastrointestinal endoscopy (N/A, 4/8/2021); and colectomy (N/A, 4/13/2021). Social History:    reports that she has never smoked. She has never used smokeless tobacco. She reports that she does not drink alcohol and does not use drugs. Family History:   Family History   Problem Relation Age of Onset    Cancer Father     Heart Disease Neg Hx     High Blood Pressure Neg Hx     High Cholesterol Neg Hx        Home Medications:  Prior to Admission medications    Medication Sig Start Date End Date Taking?  Authorizing Provider   carvedilol (COREG) 6.25 MG tablet TAKE ONE TABLET BY MOUTH TWICE A DAY WITH MEALS 12/6/21  Yes CATRACHITO Bailey CNP   sacubitril-valsartan (ENTRESTO) 24-26 MG per tablet TAKE 1/2 TABLET BY MOUTH EVERY MORNING AND ONE TABLET EVERY EVENING AS DIRECTED 11/2/21  Yes CATRACHITO Diaz   XARELTO 20 MG TABS tablet TAKE ONE TABLET BY MOUTH DAILY WITH BREAKFAST 10/20/21  Yes CATRACHITO Degroot CNP   furosemide (LASIX) 20 MG tablet Take 2 tablets by mouth daily 10/11/21  Yes CATRACHITO Wiley   vitamin D (ERGOCALCIFEROL) 1.25 MG (84907 UT) CAPS capsule TAKE ONE CAPSULE BY MOUTH ONCE WEEKLY 8/2/21  Yes CATRACHITO Diaz   spironolactone (ALDACTONE) 25 MG tablet Take 1 tablet by mouth daily 6/11/21  Yes CATRACHITO Wiley   atorvastatin (LIPITOR) 40 MG tablet Take 1 tablet by mouth daily 6/11/21  Yes CATRACHITO Diaz   pantoprazole (PROTONIX) 40 MG tablet Take 1 tablet by mouth every morning (before breakfast) 4/13/21  Yes Nakul Oakley MD   Multiple Vitamins-Minerals (THERAPEUTIC MULTIVITAMIN-MINERALS) tablet Take 1 tablet by mouth daily   Yes Historical Provider, MD   vitamin D3 (CHOLECALCIFEROL) 25 MCG (1000 UT) TABS tablet Take 1 tablet by mouth daily  Patient not taking: Reported on 12/10/2021 12/1/21   Jordyn Jacques APRN - CNS        Allergies:  Latex, Codeine, Lisinopril, Nitroglycerin, Sulfa antibiotics, and Hydralazine     Review of Systems:   · Constitutional: there has been no unanticipated weight loss. There's been no change in energy level, sleep pattern, or activity level. · Eyes: No visual changes or diplopia. No scleral icterus. · ENT: No Headaches, hearing loss or vertigo. No mouth sores or sore throat. · Cardiovascular: Reviewed in HPI  · Respiratory: No cough or wheezing, no sputum production. No hematemesis. · Gastrointestinal: No abdominal pain, appetite loss, blood in stools. No change in bowel or bladder habits. · Genitourinary: No dysuria, trouble voiding, or hematuria. · Musculoskeletal:  No gait disturbance, weakness or joint complaints. · Integumentary: No rash or pruritis. · Neurological: No headache, diplopia, change in muscle strength, numbness or tingling. No change in gait, balance, coordination, mood, affect, memory, mentation, behavior. · Psychiatric: No anxiety, no depression. · Endocrine: No malaise, fatigue or temperature intolerance. No excessive thirst, fluid intake, or urination. No tremor. · Hematologic/Lymphatic: No abnormal bruising or bleeding, blood clots or swollen lymph nodes. · Allergic/Immunologic: No nasal congestion or hives.     Physical Examination:    Vitals:    12/10/21 0907   BP: 122/80   Site: Left Upper Arm   Position: Sitting   Cuff Size: Large Adult   Pulse: 74   SpO2: 97%   Weight: 221 lb (100.2 kg)   Height: 5' 6.5\" (1.689 m)        Constitutional and General Appearance: Warm and dry, no apparent distress, normal coloration  HEENT:  Normocephalic, atraumatic  Respiratory:  · Normal excursion and expansion without use of accessory muscles  · Resp Auscultation: Normal breath sounds without dullness  Cardiovascular:  · The apical impulses not displaced  · Heart tones are crisp and normal  · JVP normal cm H2O  · regular rate and rhythm  · Peripheral pulses are symmetrical and full  · There is no clubbing, cyanosis of the extremities. · no edema  · Pedal Pulses: 2+ and equal   Abdomen:  · No masses or tenderness  · Liver/Spleen: No Abnormalities Noted  Neurological/Psychiatric:  · Alert and oriented in all spheres  · Moves all extremities well  · Exhibits normal gait balance and coordination  · No abnormalities of mood, affect, memory, mentation, or behavior are noted    Lab Data:    CBC:   Lab Results   Component Value Date    WBC 5.4 12/01/2021    WBC 4.8 09/10/2021    WBC 5.3 07/06/2021    RBC 4.10 12/01/2021    RBC 4.14 09/10/2021    RBC 3.92 07/06/2021    HGB 12.7 12/01/2021    HGB 12.2 09/10/2021    HGB 11.5 07/06/2021    HCT 36.9 12/01/2021    HCT 36.4 09/10/2021    HCT 34.4 07/06/2021    MCV 90.0 12/01/2021    MCV 87.8 09/10/2021    MCV 87.8 07/06/2021    RDW 15.5 12/01/2021    RDW 17.2 09/10/2021    RDW 18.4 07/06/2021     12/01/2021     09/10/2021     07/06/2021     BMP:  Lab Results   Component Value Date     12/01/2021     09/10/2021     07/06/2021    K 3.3 12/01/2021    K 3.6 09/10/2021    K 4.3 07/06/2021    K 3.7 04/07/2021    K 3.0 01/24/2021    K 3.8 01/20/2021     12/01/2021     09/10/2021     07/06/2021    CO2 28 12/01/2021    CO2 26 09/10/2021    CO2 21 07/06/2021    PHOS 2.4 04/17/2021    PHOS 2.2 04/15/2021    PHOS 2.8 04/14/2021    BUN 9 12/01/2021    BUN 10 09/10/2021    BUN 11 07/06/2021    CREATININE 0.8 12/01/2021    CREATININE 0.7 09/10/2021    CREATININE 0.9 07/06/2021     BNP:   Lab Results   Component Value Date    PROBNP 60 12/01/2021    PROBNP 322 09/10/2021    PROBNP 199 06/04/2021     Cardiac Imaging:  Echo 2/9/2021  Summary   Left ventricular size is mildly increased. Moderately reduced global systolic function with an ejection fraction   estimated at 30-35%. Global hypokinesis noted.    Grade II diastolic dysfunction with elevated LV filling pressures. There is mild mitral regurgitation noted. Mild left atrial enlargement noted. Aortic valve appears sclerotic but opens adequately. Mild aortic regurgitation. There is mild tricuspid regurgitation with a RVSP estimation of 25 mmHg. Pacer / ICD wire is visualized in the right ventricle. The right ventricle is normal in size and function    [facility] Dr Donny Li 1/20/21  Findings:  Artery Findings/Result   LM Normal   LAD Normal   Cx Normal   RI NA   RCA Normal   LVEDP 6   LVG NA      Intervention:         None     Post Cath Dx:       Normal coronaries      Echo 2/24/2020  Summary   -Limited echocardiogram was performed to evaluate EF.   -Normal left ventricle size, mild to moderate left ventricular hypertrophy,   and moderately reduced systolic function with an estimated ejection fraction   of 30-35%. -There is moderate diffuse hypokinesis. -Grade III diastolic dysfunction . E/e\"=10.7.   -Mild mitral regurgitation.   -Mild tricuspid regurgitation.   -The left atrium is mild to moderate dilated. -Right ventricular systolic function appears mildly reduced .   -Estimated pulmonary artery systolic pressure is borderline normal at 28-33   mmHg assuming a right atrial pressure of 8 mmHg.   -Pacer / ICD wire is visualized in the right heart.     Echo 8/12/2019  Summary   -Limited echocardiogram was performed to evaluate LV ejection fraction.   -Left ventricular cavity size is mildly dilated.   -There is moderate concentric left ventricular hypertrophy.   -Overall left ventricular systolic function appears moderately reduced with   an ejection fraction on the 30-35%.    -There is moderate global diffuse hypokinesis.   -Indeterminate diastolic dysfunction due to irregular heart rate.   -Moderate mitral regurgitation.   -Aortic valve appears sclerotic but opens adequately.   -Mild to moderate tricuspid regurgitation.   -The left atrium is moderately dilated.   -Pacer / ICD wire is visualized in the right heart. Stress test 9/11/18  Enlarged LV with mild global hypokinesis. EF 51%  There is normal isotope uptake at stress and rest. There is no evidence of  myocardial ischemia or scar. ECHO 7/24/18:  Summary   -Left ventricular cavity size is mildly dilated. -There is moderate concentric left ventricular hypertrophy.   -Overall left ventricular systolic function appears severely reduced with  and ejection fraction on the 25 % range.   -There is diffuse global hypokinesis. -Grade II diastolic dysfunction with elevated LV filling pressures. E/e\"=16.2.   -Mitral annular calcification is present. -Mild-moderate mitral regurgitation.   -Aortic valve appears sclerotic but opens adequately. -Trivial aortic regurgitation.   -Trivial tricuspid regurgitation with RVSP of 26 mmHg. -Mild pulmonic regurgitation present.   -Dilated left atrium with a volume of 72 ml.   -Pacer / ICD wire is visualized in the right heart. GXT cathie 7/15/2015:   Left ventricular ejection fraction of 55%. The LV wall motion is normal.  There is normal isotope uptake at stress and rest.   There is no evidence of myocardial ischemia or scar. Assessment:    1. Chronic combined systolic and diastolic heart failure (HCC) on arni, bb and aldosterone antagonist   2. Hypovitaminosis D    3. Paroxysmal atrial fibrillation (HCC)    4. DERECK (obstructive sleep apnea)    5. ICD (implantable cardioverter-defibrillator), biventricular, in situ    6. Iron deficiency anemia, unspecified iron deficiency anemia type improved       Plan:   1. Buy a gel cap vitamin d 1000 iu  2. Ask your PCP about the lipitor and your cramps  3. Continue all current medications  4. Check echo  5. RTO in 5 months  6.   Refills completed    CATRACHITO Liu - CNS, CNS, 12/10/2021, 9:22 AM

## 2021-12-10 NOTE — TELEPHONE ENCOUNTER
Prescription refill    Last OV:06/11/2021    Last Refill:10/20/2021    Labs:12/01/2021    Future Appt:none scheduled, patient is on the recall list

## 2021-12-10 NOTE — PATIENT INSTRUCTIONS
1.  Buy a gel cap vitamin d 1000 iu  2. Ask your PCP about the lipitor and your cramps  3. Continue all current medications  4. Check echo  5. RTO in 5 months  6.   Refills completed

## 2021-12-13 ENCOUNTER — OFFICE VISIT (OUTPATIENT)
Dept: SURGERY | Age: 56
End: 2021-12-13
Payer: MEDICAID

## 2021-12-13 VITALS — SYSTOLIC BLOOD PRESSURE: 124 MMHG | WEIGHT: 221 LBS | BODY MASS INDEX: 35.14 KG/M2 | DIASTOLIC BLOOD PRESSURE: 86 MMHG

## 2021-12-13 DIAGNOSIS — K43.2 INCISIONAL HERNIA, WITHOUT OBSTRUCTION OR GANGRENE: ICD-10-CM

## 2021-12-13 PROCEDURE — 99213 OFFICE O/P EST LOW 20 MIN: CPT | Performed by: SURGERY

## 2021-12-13 PROCEDURE — G8484 FLU IMMUNIZE NO ADMIN: HCPCS | Performed by: SURGERY

## 2021-12-13 PROCEDURE — 1036F TOBACCO NON-USER: CPT | Performed by: SURGERY

## 2021-12-13 PROCEDURE — 3017F COLORECTAL CA SCREEN DOC REV: CPT | Performed by: SURGERY

## 2021-12-13 PROCEDURE — G8417 CALC BMI ABV UP PARAM F/U: HCPCS | Performed by: SURGERY

## 2021-12-13 PROCEDURE — G8427 DOCREV CUR MEDS BY ELIG CLIN: HCPCS | Performed by: SURGERY

## 2021-12-13 NOTE — PATIENT INSTRUCTIONS
1. While the hernia would benefit from repair at some point, she wishes to avoid surgery for now. Will use interim time to lose weight, build abdominal core muscle mass in preparation for surgery in the future  2. The patient has a hernia that would benefit from repair at some point. We discussed perioperative risk. Using the [facility] for Determining Associated Risks pedro (CEDAR), the patient's risk of perioperative complications is approximately 23% with increased risk associated with possible need for musculocutaneous flaps as well as weight. Weight loss could decrease her risk to 15% and even lower if flaps are not necessary  3. Warning signs of an incarcerated hernia include inability to reduce the bulge, increased and constant pain, nausea, vomiting, fevers, chills and constipation. This is a surgical emergency and the patient should contact the office or present to an emergency department  4.  Return to office in 3 months to discuss progress and further options

## 2021-12-13 NOTE — PROGRESS NOTES
[facility]  SUBJECTIVE:    Chief Complaint: incisional hernia    Asia Economy   1965   61 y.o. female presents for followup regarding an incisional hernia. Known to me after exploratory laparotomy, cholecystectomy with cholangiogram and excision of benign duodenal mass on 4/13/2021 for upper GI bleeding and symptomatic cholelithiasis. At last office visit we discussed risk factors for incisional hernia repair and discussed weight loss. Patient presents today sooner than scheduled as she is concerned that hernia is growing. Denies pain nausea change in bowel habits or obstructive or incarcerated symptoms.   No other complaints    Past Medical History:   Diagnosis Date    Anemia     Atrial fibrillation and flutter (HCC)     Atrial flutter (HCC)     CHB (complete heart block) (HCC)     Class 1 obesity without serious comorbidity with body mass index (BMI) of 33.0 to 33.9 in adult 9/6/2019    Congenital heart disease     GERD (gastroesophageal reflux disease)     Headache(784.0)     History of complete heart block     Hyperlipidemia     Hypertension     PONV (postoperative nausea and vomiting)     Sleep apnea     uses CPAP    Syncope     Systolic CHF, chronic ([address] 10/3/2018     Past Surgical History:   Procedure Laterality Date    CARDIAC DEFIBRILLATOR PLACEMENT  01/2021    Medtronic    COLECTOMY N/A 4/13/2021    EXPLORATORY LAPAROTOMY, RESECTION OF DUODENAL MASS, CHOLECYSTECTOMY WITH INTRAOPERATIVE CHOLANGIOGRAM performed by Gianni Brush MD at [facility] COLONOSCOPY N/A 10/27/2020    COLONOSCOPY POLYPECTOMY SNARE/COLD BIOPSY performed by Mabel Lopez MD at [place] [address]  11/29/12    dual chamber PPM, Medtronic    TUBAL LIGATION      UPPER GASTROINTESTINAL ENDOSCOPY N/A 10/27/2020    EGD DIAGNOSTIC ONLY performed by Mabel Lopez MD at [address] [place] [address] N/A 4/8/2021 Violence:     Fear of Current or Ex-Partner: Not on file    Emotionally Abused: Not on file    Physically Abused: Not on file    Sexually Abused: Not on file   Housing Stability:     Unable to Pay for Housing in the Last Year: Not on file    Number of Places Lived in the Last Year: Not on file    Unstable Housing in the Last Year: Not on file      Family History   Problem Relation Age of Onset    Cancer Father     Heart Disease Neg Hx     High Blood Pressure Neg Hx     High Cholesterol Neg Hx      Current Outpatient Medications   Medication Sig Dispense Refill    spironolactone (ALDACTONE) 25 MG tablet Take 1 tablet by mouth daily 90 tablet 1    sacubitril-valsartan (ENTRESTO) 24-26 MG per tablet TAKE 1/2 TABLET BY MOUTH EVERY MORNING AND ONE TABLET EVERY EVENING AS DIRECTED 180 tablet 1    furosemide (LASIX) 20 MG tablet Take 2 tablets by mouth daily 180 tablet 1    carvedilol (COREG) 6.25 MG tablet Take 1 tablet by mouth 2 times daily 180 tablet 1    rivaroxaban (XARELTO) 20 MG TABS tablet Take 1 tablet by mouth Daily with supper 90 tablet 1    vitamin D3 (CHOLECALCIFEROL) 25 MCG (1000 UT) TABS tablet Take 1 tablet by mouth daily 30 tablet 5    vitamin D (ERGOCALCIFEROL) 1.25 MG (62618 UT) CAPS capsule TAKE ONE CAPSULE BY MOUTH ONCE WEEKLY 12 capsule 1    atorvastatin (LIPITOR) 40 MG tablet Take 1 tablet by mouth daily 60 tablet 2    pantoprazole (PROTONIX) 40 MG tablet Take 1 tablet by mouth every morning (before breakfast) 30 tablet 3    Multiple Vitamins-Minerals (THERAPEUTIC MULTIVITAMIN-MINERALS) tablet Take 1 tablet by mouth daily       No current facility-administered medications for this visit.       Allergies   Allergen Reactions    Latex      rash    Codeine      Hives      Lisinopril      cough    Nitroglycerin Hives    Sulfa Antibiotics Nausea Only    Hydralazine      headaches        Review of Systems:  Review of systems performed and negative with the exception of the above findings    OBJECTIVE:  /86   Wt 221 lb (100.2 kg)   BMI 35.14 kg/m²      Physical Exam:  General appearance: alert, appears stated age, cooperative and no distress  Head: Normocephalic, without obvious abnormality, atraumatic  Lungs: clear to auscultation bilaterally  Heart: regular rate and rhythm, S1, S2 normal, no murmur, click, rub or gallop  Abdomen: soft, non-distended, non-tender, wide mouthed epigastric incisional hernia easily reduced , non-tender and without overlying skin changes       Admission on 12/01/2021, Discharged on 12/01/2021   Component Date Value Ref Range Status    Ventricular Rate 12/01/2021 77  BPM Final    Atrial Rate 12/01/2021 77  BPM Final    P-R Interval 12/01/2021 116  ms Final    QRS Duration 12/01/2021 154  ms Final    Q-T Interval 12/01/2021 472  ms Final    QTc Calculation (Bazett) 12/01/2021 534  ms Final    P Axis 12/01/2021 119  degrees Final    R Axis 12/01/2021 -88  degrees Final    T Axis 12/01/2021 81  degrees Final    Diagnosis 12/01/2021 AV sequential or dual chamber electronic pacemakerConfirmed by [facility] JUAN DAVID MART ([id number]) on 12/1/2021 2:54:31 PM   Final    WBC 12/01/2021 5.4  4.0 - 11.0 K/uL Final    RBC 12/01/2021 4.10  4.00 - 5.20 M/uL Final    Hemoglobin 12/01/2021 12.7  12.0 - 16.0 g/dL Final    Hematocrit 12/01/2021 36.9  36.0 - 48.0 % Final    MCV 12/01/2021 90.0  80.0 - 100.0 fL Final    MCH 12/01/2021 31.1  26.0 - 34.0 pg Final    MCHC 12/01/2021 34.5  31.0 - 36.0 g/dL Final    RDW 12/01/2021 15.5* 12.4 - 15.4 % Final    Platelets 16/67/4556 236  135 - 450 K/uL Final    MPV 12/01/2021 7.5  5.0 - 10.5 fL Final    Neutrophils % 12/01/2021 71.9  % Final    Lymphocytes % 12/01/2021 16.2  % Final    Monocytes % 12/01/2021 6.8  % Final    Eosinophils % 12/01/2021 4.4  % Final    Basophils % 12/01/2021 0.7  % Final    Neutrophils Absolute 12/01/2021 3.9  1.7 - 7.7 K/uL Final    Lymphocytes Absolute 12/01/2021 0.9* 1.0 - 5.1 K/uL Final  Monocytes Absolute 12/01/2021 0.4  0.0 - 1.3 K/uL Final    Eosinophils Absolute 12/01/2021 0.2  0.0 - 0.6 K/uL Final    Basophils Absolute 12/01/2021 0.0  0.0 - 0.2 K/uL Final    Sodium 12/01/2021 142  136 - 145 mmol/L Final    Potassium 12/01/2021 3.3* 3.5 - 5.1 mmol/L Final    Chloride 12/01/2021 104  99 - 110 mmol/L Final    CO2 12/01/2021 28  21 - 32 mmol/L Final    Anion Gap 12/01/2021 10  3 - 16 Final    Glucose 12/01/2021 114* 70 - 99 mg/dL Final    BUN 12/01/2021 9  7 - 20 mg/dL Final    CREATININE 12/01/2021 0.8  0.6 - 1.1 mg/dL Final    GFR Non- 12/01/2021 >60  >60 Final    Comment: >60 mL/min/1.73m2 EGFR, calc. for ages 25 and older using the  MDRD formula (not corrected for weight), is valid for stable  renal function.  GFR  12/01/2021 >60  >60 Final    Comment: Chronic Kidney Disease: less than 60 ml/min/1.73 sq.m. Kidney Failure: less than 15 ml/min/1.73 sq.m. Results valid for patients 18 years and older.  Calcium 12/01/2021 9.4  8.3 - 10.6 mg/dL Final    Total Protein 12/01/2021 7.7  6.4 - 8.2 g/dL Final    Albumin 12/01/2021 4.3  3.4 - 5.0 g/dL Final    Albumin/Globulin Ratio 12/01/2021 1.3  1.1 - 2.2 Final    Total Bilirubin 12/01/2021 0.8  0.0 - 1.0 mg/dL Final    Alkaline Phosphatase 12/01/2021 85  40 - 129 U/L Final    ALT 12/01/2021 19  10 - 40 U/L Final    AST 12/01/2021 23  15 - 37 U/L Final    Comment: Specimen hemolysis has exceeded the interference as defined by Roche. Value may be falsely increased. Suggest recollection if clinically  indicated.  Troponin 12/01/2021 <0.01  <0.01 ng/mL Final    Methodology by Troponin T    Pro-BNP 12/01/2021 60  0 - 124 pg/mL Final    Comment: Methodology by NT-proBNP    An age-independent cutoff point of 300 pg/ml has a 98%  negative predictive value excluding acute heart failure.   Values exceeding the age-related cutoff values (450 pg/mL if  age<50, 900 if 50-75 and 1800 if >75) has 90% sensitivity and  84% specificity for diagnosing acute HF. In patients with  renal compromise (eGFR<60) values greater than 1200pg/ml have  a diagnostic sensitivity and specificity of 89% and 72% for  acute HF.  D-Dimer, Quant 12/01/2021 215  0 - 229 ng/mL DDU Final    Comment: HemosIL D-Dimer is an automated latex enhanced immunoassay for  the quantitative determination of D-dimer in human citrated  plasma on IL Coagulation systems for use in conjunction with a  clinical pretest probability(PTP) assessment model to exclude  venous thromboembolism(VTE) in outpatients suspected of deep  venous thrombosis (DVT) and pulmonary embolism(PE). The  reference range for D-Dimer is <230 ng/ml DDU. Results <230 ng/ml  DDU can be used as a negative predictor in patients with low  and moderate probability of DVT/PE. D-Dimer levels are mainly  elevated in cases of DVT/PE. Other conditions may lead to a  high D-Dimer level including old age, pregnancy, peripheral  arteriopathy, DIC, coronary disease, thrombolytic treatment,  cancer, liver disease, infection, inflammation, hematoma, and  rheumatoid arthritis. Specimen interferences are created by  lipemia, bilirubin, and hemolysis.  hCG Qual 12/01/2021 Negative  Detects HCG level >10 MIU/mL Final       XR CHEST (2 VW)    Result Date: 12/1/2021  EXAMINATION: TWO XRAY VIEWS OF THE CHEST 12/1/2021 12:21 pm COMPARISON: 05/17/2021. HISTORY: ORDERING SYSTEM PROVIDED HISTORY: chest pain TECHNOLOGIST PROVIDED HISTORY: Reason for exam:->chest pain Reason for Exam: chest pain Acuity: Acute Type of Exam: Initial FINDINGS: The cardiac silhouette is enlarged and stable. Aortic vascular calcification. The lungs are clear. No infiltrate, pleural fluid or evidence of overt failure. Mild opacification in the right medial cardiophrenic angle likely a prominent epicardial fat pad. Left-sided AICD device. No acute osseous findings. No acute cardiopulmonary disease. Cardiomegaly without overt failure. VL Extremity Venous Left    Result Date: 12/1/2021  Lower Extremities DVT Study  Demographics   Patient Name       Ambreen Raygoza   Date of Study      12/01/2021         Gender              Female   Patient Number     [id number]         Date of Birth       1965   Visit Number       [id number]          Age                 61 year(s)   Accession Number   [id number]         Room Number         PATRICIA   Corporate ID       [id number]           Sonographer         Keo Babb, DAIT,                                                            [facility]   Ordering Physician Shavon Napoles    Interpreting        [facility]                     Caridad Kelly PA-C       Physician           Blaise Croft MD,                                                            [facility]  Procedure Type of Study:   Veins:Lower Extremities DVT Study, VL EXTREMITY VENOUS DUPLEX LEFT. Generic Orders:VL EXTREMITY VENOUS DUPLEX LEFT. Vascular Sonographer Report  Additional Indications:pain and swelling suspect DVT Impressions Left Impression No evidence of deep vein or superficial vein thrombosis involving the left lower extremity and the right common femoral vein. Conclusions   Summary   No evidence of deep vein or superficial vein thrombosis involving the left  lower extremity and the right common femoral vein. Signature   ------------------------------------------------------------------  Electronically signed by Blaise Croft MD, [facility] (Interpreting  physician) on 12/01/2021 at 06:43 PM  ------------------------------------------------------------------  Patient Status:ER. [address] [place] [address] - Vascular Lab. Technical Quality:Adequate visualization.  Velocities are measured in cm/s ; Diameters are measured in mm Right Lower Extremities DVT Study Measurements Right 2D Measurements +--------------+----------+---------------+----------+ ! Location      ! Visualized! Compressibility! Thrombosis! +--------------+----------+---------------+----------+ ! Common Femoral!Yes       ! Yes            ! None      ! +--------------+----------+---------------+----------+ Right Doppler Measurements +--------------+------+------+------------+ ! Location      ! Signal!Reflux! Reflux (sec)! +--------------+------+------+------------+ ! Common Femoral!Phasic! No    !            ! +--------------+------+------+------------+ Left Lower Extremities DVT Study Measurements Left 2D Measurements +------------------------+----------+---------------+----------+ ! Location                ! Visualized! Compressibility! Thrombosis! +------------------------+----------+---------------+----------+ ! Sapheno Femoral Junction! Yes       ! Yes            ! None      ! +------------------------+----------+---------------+----------+ ! GSV Thigh               ! Yes       ! Yes            ! None      ! +------------------------+----------+---------------+----------+ ! Common Femoral          !Yes       ! Yes            ! None      ! +------------------------+----------+---------------+----------+ ! Prox Femoral            !Yes       ! Yes            ! None      ! +------------------------+----------+---------------+----------+ ! Mid Femoral             !Yes       ! Yes            ! None      ! +------------------------+----------+---------------+----------+ ! Dist Femoral            !Yes       ! Yes            ! None      ! +------------------------+----------+---------------+----------+ ! Deep Femoral            !Yes       ! Yes            ! None      ! +------------------------+----------+---------------+----------+ ! Popliteal               !Yes       ! Yes            ! None      ! +------------------------+----------+---------------+----------+ ! GSV Below Knee          ! Yes       ! Yes            ! None      ! +------------------------+----------+---------------+----------+ ! Gastroc                 ! Yes       ! Yes            ! None      ! +------------------------+----------+---------------+----------+ ! PTV                     ! Yes       ! Yes            ! None      ! +------------------------+----------+---------------+----------+ ! Peroneal                !Yes       ! Yes            ! None      ! +------------------------+----------+---------------+----------+ ! GSV Calf                ! Yes       ! Yes            ! None      ! +------------------------+----------+---------------+----------+ ! SSV                     ! Yes       ! Yes            ! None      ! +------------------------+----------+---------------+----------+ Left Doppler Measurements +------------------------+------+------+------------+ ! Location                ! Signal!Reflux! Reflux (sec)! +------------------------+------+------+------------+ ! Sapheno Femoral Junction! Phasic! No    !            ! +------------------------+------+------+------------+ ! Common Femoral          !Phasic! No    !            ! +------------------------+------+------+------------+ ! Prox Femoral            !Phasic! No    !            ! +------------------------+------+------+------------+ ! Deep Femoral            !Phasic! No    !            ! +------------------------+------+------+------------+ ! Popliteal               !Phasic! No    !            ! +------------------------+------+------+------------+      ASSESSMENT:  Reducible incisional hernia     Plan:  1. While the hernia would benefit from repair at some point, she wishes to avoid surgery for now. Will use interim time to lose weight, build abdominal core muscle mass in preparation for surgery in the future  2. The patient has a hernia that would benefit from repair at some point. We discussed perioperative risk.  Using the [facility] for Determining Associated Risks pedro (CEDAR), the patient's risk of perioperative complications is approximately 23% with increased risk associated with possible need for musculocutaneous flaps as well as weight. Weight loss could decrease her risk to 15% and even lower if flaps are not necessary  3. Warning signs of an incarcerated hernia include inability to reduce the bulge, increased and constant pain, nausea, vomiting, fevers, chills and constipation. This is a surgical emergency and the patient should contact the office or present to an emergency department  4. Return to office in 3 months to discuss progress and further options    Flavio Sterling MD, FACS  12/13/2021  11:11 AM

## 2022-01-17 NOTE — PROGRESS NOTES
[address]   Electrophysiology Follow up   Date: 1/18/2022  I had the privilege of visiting Lloyd Mars in the office. CC: PAF   HPI: Lloyd Mars is a 61 y.o. female with a PMH of HTN, CHB s/p PPM. SCHF, CHD, syncope, and atrial fibrillation/flutter. She was seen in 2019 for device upgrade and left sided venogram showed occlusion of left subclavian     Hospitalized 1/20/2021 with dizziness and found to be in aflutter with runs of symptomatic VT. S/p LHC 1/20/2021 that showed normal coronaries. She had episode of VT with syncope in cath lab prior to [facility]. S/p extraction of RV pacing lead and Biv-AICD upgrade for cardiomyopathy and VT. Also loaded with amiodarone.       4/7/2021-4/19/2021 hospitalized for fatigue and found to be acutely anemic and received multiple transfusions. S/p EGD with active bleeding and s/p clips and epi injection, also a lesion was noted and she bx sent. s/p exp lap, cholecystectomy and excision of duodenal mass. She will be considered for COURTNEY occlusion as OP. Mo Urban presents to the office in follow up. Per device interrogation she has been in atrial fibrillation since 12/15/2021 and has complaints of severe fatigue and MOREIRA. She states her fatigue started a couple of days ago, her symptoms do not correlate with the onset of atrial fibrillation. Discussion of treatment options including DCCV and ablation. Assessment and plan:   -Persistent Atrial Fibrillation   86.4%AT/AF burden per device interrogation   Complaints of fatigue over the last few days   She was on Amiodarone in the past but d/t her age and side effect she is not a good candidate for this long term  We discussed treatment options including antiarrhythmics, rate control with anticoagulation, and ablation. We discussed progressive nature of atrial fibrillation. Treatment success decreases when AF becomes persistent and last more than 6 months.     Antiarrhythmic therapy, side effects, benefits and alternative discussed. Atrial fibrillation ablation procedure was discussed. We discussed the need for repeat procedure. On average patients may need more than one ablation procedure. Risks associated with ablation include but not limited to allergic reaction to the medications, pain, bleeding, infection, nerve injury, injury to diaphragm(breathing muscle), pulmonary embolus(blood clot in lungs), deep vein blood clot, pneumothorax, hemothorax, acute renal failure, cardiac perforation,  tamponade, need for emergent surgery (open heart), permanent pacemaker, pulmonary vein stenosis, left atrial to esophageal fistula, stroke, myocardial infarction and death. Difference between atrial fibrillation and atrial flutter discussed and treatment discussed. She opted to proceed with it. [id number] score: 3 (hypertension, CHF, gender) ; [id number] Vasc score and anticoagulation discussed. High risk for stroke and thromboembolism. Anticoagulation is recommended. On Xarelto 20 mg daily, previously on hold d/t severe anemia. Now s/p excision of duodenal mass      S/p DCCV 1/2021    -CHB/Cardiomyopathy   S/p extraction of RV pacing lead and Biv-AICD upgrade 1/2021   The CIED was interrogated and programmed and I supervised and reviewed all the data. All findings and changes are in device interrogation sheet and reflect my personal interpretation and changes and is scanned to Epic. 6.3 years remaining, AP 14.6% ,  99.2%   86.4%AT/AF burden, KIN turned on today      Optivol WNL    -HTN  -Controlled: 121/86  -BP goal <130/80  -Home BP monitoring encouraged, printed information provided on how to accurately measure BP at home.  -Counseled to follow a low salt diet to assure blood pressure remains controlled for cardiovascular risk factor modification.   -The patient is counseled to get regular exercise 3-5 times per week and maintain a healthy weight reduce cardiovascular risk factors. -Anemia   Hospitalized 4/7/2021   Resolved   S/p duodenal mass excision    -VT   Previously treated with Amiodarone therapy    Paroxysmal    -Chronic Combined systolic/Diastolic HF, NYHA class III   Follows with the HF team    EF per echo  30-35%   Had lip numbness with entresto,lisinopril caused a cough and headaches with hydralazine   On entresto, lasix, carvedilol and spironolactone    Patient Active Problem List    Diagnosis Date Noted    Persistent atrial fibrillation ([address]     LVH (left ventricular hypertrophy) 06/11/2014    Headache 01/16/2013    Dyspnea on exertion 12/12/2012    HTN (hypertension) 10/26/2012    Other and unspecified hyperlipidemia 10/26/2012    Congenital heart block 10/26/2012    Incisional hernia, without obstruction or gangrene 12/13/2021    Gait instability     Adequate anticoagulation on anticoagulant therapy     Duodenal mass     Weight loss counseling, encounter for     Anemia     Profound fatigue 04/07/2021    Iron deficiency anemia 02/17/2021    Exertional dyspnea 02/08/2021    Status post implantation of automatic cardioverter/defibrillator (AICD) 01/25/2021    Paroxysmal ventricular tachycardia ([address] 01/20/2021    Class 1 obesity due to excess calories with body mass index (BMI) of 31.0 to 31.9 in adult 09/06/2019    Dilated cardiomyopathy ([address] 08/28/2019    Left subclavian vein thrombosis ([address] 03/26/2019    LV dysfunction 03/18/2019    Obstructive sleep apnea (adult) (pediatric)     Hypersomnia     Systolic CHF, chronic ([address] 10/03/2018    Chest pain 09/11/2018     Diagnostic studies:   ECG 1/18/22  Vpaced/AFIB  476 QTcH, 138 QRS    Echo 2/9/2021  Summary   Left ventricular size is mildly increased. Moderately reduced global systolic function with an ejection fraction   estimated at 30-35%. Global hypokinesis noted. Grade II diastolic dysfunction with elevated LV filling pressures. There is mild mitral regurgitation noted.    Mild left atrial enlargement noted. Aortic valve appears sclerotic but opens adequately. Mild aortic regurgitation. There is mild tricuspid regurgitation with a RVSP estimation of 25 mmHg. Pacer / ICD wire is visualized in the right ventricle. The right ventricle is normal in size and function. ZAMZAM 1/22/2021   Summary   Ejection fraction is visually estimated to be 25-30 %. Mild mitral regurgitation is present. There is no evidence of mass or thrombus in the left atrium or appendage. The left atrium is dilated. Tricuspid aortic valve. Mild aortic regurgitation is present. Pacemaker / ICD lead is visualized in the right atrium. Mobile structure on   RV lead in atrium. Stress Test 9/11/2018  Summary    Enlarged LV with mild global hypokinesis. EF 51%    There is normal isotope uptake at stress and rest. There is no evidence of    myocardial ischemia or scar. I independently reviewed the cardiac diagnostic studies, ECG and relevant imaging studies. Lab Results   Component Value Date    LVEF 33 02/09/2021    LVEFMODE Echo 02/24/2020     No results found for: TSHFT4, TSH      Physical Examination:  Vitals:    01/18/22 0845   BP: 121/86   Pulse: 82   SpO2: 99%      Wt Readings from Last 3 Encounters:   01/18/22 222 lb 6.4 oz (100.9 kg)   12/13/21 221 lb (100.2 kg)   12/10/21 221 lb (100.2 kg)       · Constitutional: Oriented. No distress. · Head: Normocephalic and atraumatic. · Mouth/Throat: Oropharynx is clear and moist.   · Eyes: Conjunctivae normal. EOM are normal.   · Neck: Neck supple. No JVD present. · Cardiovascular: Normal rate, regular rhythm, S1&S2. · Pulmonary/Chest: Bilateral respiratory sounds. No rhonchi. · Abdominal: Soft. No tenderness. + obese   · Musculoskeletal: No tenderness. No edema    · Lymphadenopathy: Has no cervical adenopathy. · Neurological: Alert and oriented. Follows command, No Gross deficit   · Skin: Skin is warm, No rash noted.    · Psychiatric: Has a normal behavior     Review of System:  [x] Full ROS obtained and negative except as mentioned in HPI    Prior to Admission medications    Medication Sig Start Date End Date Taking? Authorizing Provider   spironolactone (ALDACTONE) 25 MG tablet Take 1 tablet by mouth daily 12/10/21  Yes CATRACHITO Casas   sacubitril-valsartan (ENTRESTO) 24-26 MG per tablet TAKE 1/2 TABLET BY MOUTH EVERY MORNING AND ONE TABLET EVERY EVENING AS DIRECTED 12/10/21  Yes CATRACHITO Farah   furosemide (LASIX) 20 MG tablet Take 2 tablets by mouth daily 12/10/21  Yes CATRACHITO Casas   carvedilol (COREG) 6.25 MG tablet Take 1 tablet by mouth 2 times daily 12/10/21  Yes CATRACHITO Casas   rivaroxaban (XARELTO) 20 MG TABS tablet Take 1 tablet by mouth Daily with supper 12/10/21  Yes CATRACHITO Flores CNP   vitamin D3 (CHOLECALCIFEROL) 25 MCG (1000 UT) TABS tablet Take 1 tablet by mouth daily 12/1/21  Yes CATRACHITO Farah   vitamin D (ERGOCALCIFEROL) 1.25 MG (34186 UT) CAPS capsule TAKE ONE CAPSULE BY MOUTH ONCE WEEKLY 8/2/21  Yes CATRACHITO Farah   atorvastatin (LIPITOR) 40 MG tablet Take 1 tablet by mouth daily 6/11/21  Yes CATRACHITO Keenan   pantoprazole (PROTONIX) 40 MG tablet Take 1 tablet by mouth every morning (before breakfast) 4/13/21  Yes Tracey Yeung MD   Multiple Vitamins-Minerals (THERAPEUTIC MULTIVITAMIN-MINERALS) tablet Take 1 tablet by mouth daily   Yes Historical Provider, MD       Allergies   Allergen Reactions    Latex      rash    Codeine      Hives      Lisinopril      cough    Nitroglycerin Hives    Sulfa Antibiotics Nausea Only    Hydralazine      headaches       Social History:  Reviewed. reports that she has never smoked. She has never used smokeless tobacco. She reports that she does not drink alcohol and does not use drugs. Family History:  Reviewed. Reviewed. No family history of SCD.       Relevant and available labs, and cardiovascular diagnostics reviewed. Reviewed. I independently reviewed relevant and available cardiac diagnostic tests ECG, CXR, Echo, Stress test, Device interrogation, Holter, CT scan. Complex medical condition with multiple medical problems affecting prognosis and outcome of EP interventions    All questions and concerns were addressed to the patient/family. Alternatives to my treatment were discussed. I have discussed the above stated plan and the patient verbalized understanding and agreed with the plan. Scribe attestation: This note was scribed in the presence of Claribel Harris MD by Saige Kuhn RN  Physician Attestation: I, Dr. Claribel Harris, confirm that the scribe's documentation has been prepared under my direction and personally reviewed by me in its entirety. I also confirm that the note above accurately reflects all work, treatment, procedures, and medical decision making performed by me. NOTE: This report was transcribed using voice recognition software. Every effort was made to ensure accuracy, however, inadvertent computerized transcription errors may be present.      Claribel Harris MD, MPH  [facility]   Office: [phone]  Fax: [phone]

## 2022-01-18 ENCOUNTER — OFFICE VISIT (OUTPATIENT)
Dept: CARDIOLOGY CLINIC | Age: 57
End: 2022-01-18
Payer: MEDICAID

## 2022-01-18 ENCOUNTER — NURSE ONLY (OUTPATIENT)
Dept: CARDIOLOGY CLINIC | Age: 57
End: 2022-01-18
Payer: MEDICAID

## 2022-01-18 VITALS
HEART RATE: 82 BPM | SYSTOLIC BLOOD PRESSURE: 121 MMHG | HEIGHT: 67 IN | DIASTOLIC BLOOD PRESSURE: 86 MMHG | BODY MASS INDEX: 34.91 KG/M2 | OXYGEN SATURATION: 99 % | WEIGHT: 222.4 LBS

## 2022-01-18 DIAGNOSIS — I42.0 DILATED CARDIOMYOPATHY (HCC): ICD-10-CM

## 2022-01-18 DIAGNOSIS — I50.42 CHRONIC COMBINED SYSTOLIC AND DIASTOLIC HEART FAILURE (HCC): ICD-10-CM

## 2022-01-18 DIAGNOSIS — Z95.810 STATUS POST IMPLANTATION OF AUTOMATIC CARDIOVERTER/DEFIBRILLATOR (AICD): ICD-10-CM

## 2022-01-18 DIAGNOSIS — I51.9 LV DYSFUNCTION: ICD-10-CM

## 2022-01-18 DIAGNOSIS — I47.29 PAROXYSMAL VENTRICULAR TACHYCARDIA: ICD-10-CM

## 2022-01-18 DIAGNOSIS — Q24.6 CONGENITAL HEART BLOCK: ICD-10-CM

## 2022-01-18 DIAGNOSIS — I50.22 SYSTOLIC CHF, CHRONIC (HCC): ICD-10-CM

## 2022-01-18 DIAGNOSIS — I48.19 PERSISTENT ATRIAL FIBRILLATION (HCC): ICD-10-CM

## 2022-01-18 DIAGNOSIS — I48.19 PERSISTENT ATRIAL FIBRILLATION (HCC): Primary | ICD-10-CM

## 2022-01-18 DIAGNOSIS — I10 PRIMARY HYPERTENSION: ICD-10-CM

## 2022-01-18 DIAGNOSIS — I51.7 LVH (LEFT VENTRICULAR HYPERTROPHY): ICD-10-CM

## 2022-01-18 PROCEDURE — G8427 DOCREV CUR MEDS BY ELIG CLIN: HCPCS | Performed by: INTERNAL MEDICINE

## 2022-01-18 PROCEDURE — 1036F TOBACCO NON-USER: CPT | Performed by: INTERNAL MEDICINE

## 2022-01-18 PROCEDURE — G8417 CALC BMI ABV UP PARAM F/U: HCPCS | Performed by: INTERNAL MEDICINE

## 2022-01-18 PROCEDURE — G8484 FLU IMMUNIZE NO ADMIN: HCPCS | Performed by: INTERNAL MEDICINE

## 2022-01-18 PROCEDURE — 99214 OFFICE O/P EST MOD 30 MIN: CPT | Performed by: INTERNAL MEDICINE

## 2022-01-18 PROCEDURE — 3017F COLORECTAL CA SCREEN DOC REV: CPT | Performed by: INTERNAL MEDICINE

## 2022-01-18 NOTE — LETTER
[facility]  EP Procedure Sheet    1/18/22  Deliah Heading  1965  EP Procedures  [] Pacemaker implant (single/dual) [] EP Study   [] ICD implant (single/dual) [] Atrial flutter ablation (ZAMZAM Y/N)   [] Biv implant ICD [] Tilt Table   [] Biv implant PPM [] Atrial fibrillation ablation (ZAMZAM Yes)   [] Generator Change (PPM/ICD/BiV) [] SVT ablation   [] Lead revision (RV/LA/RA) (<1 month) [] VT ablation     [] Lead extraction +/- upgrade (BiV/PPM/ICD) [] VT Ischemic/ non-ischemic   [] Loop implant/ removal [] VT RVOT   [x] Cardioversion [] VT Left sided   [] ZAMZAM [] AVN ablation   Equipment  [] Medtronic  [] WONG Mapping System   [] [address] [] [address]   [] [address] [] CryoAblation   [] Biotronik [] Laser Lead Extraction   EP Procedures Scheduling Request  # hours Requested   Scheduled  Date:   Specific Day THIS WEEK PLEASE Completed    Anesthesia  F/u Date:   CT surgery backup  COVID     Overnight stay      Location/Doctor MFF [x]RMM []MXA   []MKW [] Other     Pre-Procedure Labs / Imaging  [] PT/INR [] Type & cross   [] CBC [] Units PRBC   [] BMP/Mg [] Units FFP   [] Venogram [] Cardiac CTA for Pulmonary vein mapping     RN INITIALS: RA    Patient Instructions  Do not eat or drink after midnight the night prior to procedure  Dx: Afib  ICD-10 code: I48.19

## 2022-01-18 NOTE — LETTER
[address]  EP Procedure Sheet    1/18/22  Michelle Godinez  1965  EP Procedures  [] Pacemaker implant (single/dual) [] EP Study   [] ICD implant (single/dual) [] Atrial flutter ablation (ZAMZAM Y/N)   [] Biv implant ICD [] Tilt Table   [] Biv implant PPM [x] Atrial fibrillation ablation (ZAMZAM Yes)   [] Generator Change (PPM/ICD/BiV) [] SVT ablation   [] Lead revision (RV/LA/RA) (<1 month) [] VT ablation     [] Lead extraction +/- upgrade (BiV/PPM/ICD) [] VT Ischemic/ non-ischemic   [] Loop implant/ removal [] VT RVOT   [] Cardioversion [] VT Left sided   [] ZAMZAM [] AVN ablation   Equipment  [] VibeWrite  [] WONG Mapping System   [] [address] [x] [address]   [] [employer] [] CryoAblation   [] Biotronik [] Laser Lead Extraction   EP Procedures Scheduling Request  # hours Requested  First time Scheduled  Date:   Specific Day  Completed    Anesthesia Yes F/u Date:   CT surgery backup  COVID     Overnight stay      Location/Doctor MFF [x]RMM []MXA   []MKW [] Other     Pre-Procedure Labs / Imaging  [] PT/INR [] Type & cross   [] CBC [] Units PRBC   [] BMP/Mg [] Units FFP   [] Venogram [] Cardiac CTA for Pulmonary vein mapping     RN INITIALS: RA    Patient Instructions  Do not eat or drink after midnight the night prior to procedure  Dx:AF ICD-10 code: I48.19  Medication Instructions: Hold [x]Xarelto []Eliquis []Coumadin []pradaxa for 1 day/s prior

## 2022-01-18 NOTE — PROGRESS NOTES
Patient comes in for programming evaluation for her defibrillator. All sensing and pacing parameters are within normal range. Battery life 6.3 years  AP 14.6%.  99.2%. Currently in AF. Episode since 12/15/2021. Patient remains on Xarelto, Coreg and amiodarone. Today we turned on Atrial Therapies to the [facility] settings. Please see interrogation for more detail. Optivol is within normal range. Patient will see Dr. Anali Story today and follow up in 3 months in office or remotely.

## 2022-01-18 NOTE — PATIENT INSTRUCTIONS
You will be scheduled for a cardioversion. Our  will call you to discuss a date for your procedure. The day of your procedure you will need to check in at the Registration Desk in the [address]. PRE-PROCEDURE INSTRUCTIONS   Do not eat or drink anything after midnight the night before your procedure. Take your medications with a sip of water in the morning. Do not hold any medications   Please have a responsible adult to drive you home after your procedure. If you have any questions regarding your procedure itself or your medications, please call [phone] and ask to speak to an EP nurse.    _________________________________________________________________________    ATRIAL FIB ABLATION INFORMATION    Our  will be contacting you from [phone] to schedule your procedure. The morning of your ablation you will need to check in at the registration desk in the main lobby. PRE-PROCEDURE INSTRUCTIONS -   -You will be called with a time to arrive for you ablation.  -Do not eat or drink anything after midnight the night before your ablation.  -You may take all of your other medications with a sip of water.  -You will need to have a responsible adult to drive you home the next morning. -CVU will provide you with discharge instructions.  -You will need to hold your Xarelto for 1 day prior     You will be scheduled for a 6-8 week follow up with cardiology. This will be done prior to your discharge instructions. If you have any questions regarding the procedure itself or medications, please call [phone] and ask to speak to an EP nurse.

## 2022-01-19 PROCEDURE — 93290 INTERROG DEV EVAL ICPMS IP: CPT | Performed by: INTERNAL MEDICINE

## 2022-01-19 PROCEDURE — 93284 PRGRMG EVAL IMPLANTABLE DFB: CPT | Performed by: INTERNAL MEDICINE

## 2022-01-20 ENCOUNTER — TELEPHONE (OUTPATIENT)
Dept: CARDIOLOGY CLINIC | Age: 57
End: 2022-01-20

## 2022-01-24 ENCOUNTER — TELEPHONE (OUTPATIENT)
Dept: CARDIOLOGY CLINIC | Age: 57
End: 2022-01-24

## 2022-01-24 NOTE — TELEPHONE ENCOUNTER
Patient has not received call for scheduling for cardioversion or ablation. Can you look into this please.

## 2022-01-24 NOTE — TELEPHONE ENCOUNTER
Spoke with Shashi Casanova , scheduling in cath lab. They have her information and will call her today.

## 2022-01-24 NOTE — TELEPHONE ENCOUNTER
I reviewed interrogation, She continues in atrial fibrillation. No ventricular tachycardia. We can consider with scheduling the ablation. If she is symptomatic we can get her set up for cardioversion sooner. No need for blood work from our perspective based on device interrogation. Please see if she can go to a remote location for her echo to get in sooner.       Brennon Walker, CATRACHITO-CNP

## 2022-01-28 ENCOUNTER — HOSPITAL ENCOUNTER (OUTPATIENT)
Dept: CARDIAC CATH/INVASIVE PROCEDURES | Age: 57
Discharge: HOME | End: 2022-01-28
Attending: INTERNAL MEDICINE | Admitting: INTERNAL MEDICINE
Payer: MEDICAID

## 2022-01-28 VITALS
DIASTOLIC BLOOD PRESSURE: 80 MMHG | RESPIRATION RATE: 18 BRPM | WEIGHT: 222 LBS | OXYGEN SATURATION: 100 % | SYSTOLIC BLOOD PRESSURE: 118 MMHG | HEIGHT: 66 IN | BODY MASS INDEX: 35.68 KG/M2 | HEART RATE: 77 BPM

## 2022-01-28 LAB
EKG ATRIAL RATE: 63 BPM
EKG DIAGNOSIS: NORMAL
EKG Q-T INTERVAL: 472 MS
EKG QRS DURATION: 158 MS
EKG QTC CALCULATION (BAZETT): 534 MS
EKG R AXIS: -87 DEGREES
EKG T AXIS: 75 DEGREES
EKG VENTRICULAR RATE: 77 BPM

## 2022-01-28 PROCEDURE — 92960 CARDIOVERSION ELECTRIC EXT: CPT

## 2022-01-28 PROCEDURE — 92960 CARDIOVERSION ELECTRIC EXT: CPT | Performed by: INTERNAL MEDICINE

## 2022-01-28 PROCEDURE — 99152 MOD SED SAME PHYS/QHP 5/>YRS: CPT | Performed by: INTERNAL MEDICINE

## 2022-01-28 PROCEDURE — 93010 ELECTROCARDIOGRAM REPORT: CPT | Performed by: INTERNAL MEDICINE

## 2022-01-28 PROCEDURE — 7100000010 HC PHASE II RECOVERY - FIRST 15 MIN

## 2022-01-28 PROCEDURE — 2500000003 HC RX 250 WO HCPCS

## 2022-01-28 PROCEDURE — 93284 PRGRMG EVAL IMPLANTABLE DFB: CPT | Performed by: INTERNAL MEDICINE

## 2022-01-28 PROCEDURE — 93005 ELECTROCARDIOGRAM TRACING: CPT | Performed by: INTERNAL MEDICINE

## 2022-01-28 RX ORDER — SODIUM CHLORIDE 0.9 % (FLUSH) 0.9 %
5-40 SYRINGE (ML) INJECTION PRN
Status: DISCONTINUED | OUTPATIENT
Start: 2022-01-28 | End: 2022-01-28 | Stop reason: HOSPADM

## 2022-01-28 NOTE — PROCEDURES
[address]     Electrophysiology Procedure Note       Date of Procedure: 1/28/2022  Patient's Name: Raymond Atkinson  YOB: 1965   Medical Record Number: [id number]  Procedure Performed by: Arna Holter, MD    Procedures performed:  IV sedation. programming of Cardiac resynchronization therapy-defibrillator(CRT-D)   External Electrical cardioversion   Mallampati3  ASA 3    Indication of the procedure: Persistent atrial fibrillation     Details of procedure: The patient was brought to the cath lab area in a fasting and non-sedated state. The risks, benefits and alternatives of the procedure were discussed with the patient. The patient opted to proceed with the procedure. Written informed consent was signed and placed in the chart. A timeout protocol was completed to identify the patient and the procedure being performed. I pushed 50+40 mg Brevital. We monitored the patient's level of consciousness and vital signs/physiologic status throughout the procedure duration (see start and stop times below). Sedation:   Sedation start: 0900  Sedation stop: 0930     Patient is on chronic anticoagulation therapy. Then we used Brevital for sedation and electrical DC cardioversion was perfomred using 200J, synchronized shock which failed. A second shock at 360 J was successful. Patient was converted to sinus rhythm at 78 bpm. The patient tolerated the procedure well and there were no complications. Conclusion:   Successful external DC cardioversion of atrial fibrillation   Programming of Cardiac resynchronization therapy-defibrillator(CRT-D) . Atrial mpreference pacing was turned on. .     Plan:   The patient can be discharged if remains stable. Will continue with medical therapy.

## 2022-01-28 NOTE — H&P
[address]   Electrophysiology       CC: PAF   HPI: Jillian Ko is a 61 y.o. female with a PMH of HTN, CHB s/p PPM. SCHF, CHD, syncope, and atrial fibrillation/flutter. She was seen in 2019 for device upgrade and left sided venogram showed occlusion of left subclavian     Hospitalized 1/20/2021 with dizziness and found to be in aflutter with runs of symptomatic VT. S/p LHC 1/20/2021 that showed normal coronaries. She had episode of VT with syncope in cath lab prior to [address] [place] [address]. S/p extraction of RV pacing lead and Biv-AICD upgrade for cardiomyopathy and VT. Also loaded with amiodarone.       4/7/2021-4/19/2021 hospitalized for fatigue and found to be acutely anemic and received multiple transfusions. S/p EGD with active bleeding and s/p clips and epi injection, also a lesion was noted and she bx sent. s/p exp lap, cholecystectomy and excision of duodenal mass. She will be considered for COURTNEY occlusion as OP. Milena Velez presents to the office in follow up. Per device interrogation she has been in atrial fibrillation since 12/15/2021 and has complaints of severe fatigue and MOREIRA. She states her fatigue started a couple of days ago, her symptoms do not correlate with the onset of atrial fibrillation. Discussion of treatment options including DCCV and ablation. Assessment and plan:   -Persistent Atrial Fibrillation   86.4%AT/AF burden per device interrogation   Complaints of fatigue over the last few days   She was on Amiodarone in the past but d/t her age and side effect she is not a good candidate for this long term  We discussed treatment options including antiarrhythmics, rate control with anticoagulation, and ablation. We discussed progressive nature of atrial fibrillation. Treatment success decreases when AF becomes persistent and last more than 6 months. Antiarrhythmic therapy, side effects, benefits and alternative discussed. Atrial fibrillation ablation procedure was discussed.   We discussed the need for repeat procedure. On average patients may need more than one ablation procedure. cardioversion    S/p DCCV 1/2021    -CHB/Cardiomyopathy   S/p extraction of RV pacing lead and Biv-AICD upgrade 1/2021   The CIED was interrogated and programmed and I supervised and reviewed all the data. All findings and changes are in device interrogation sheet and reflect my personal interpretation and changes and is scanned to Epic.     6.3 years remaining, AP 14.6% ,  99.2%   86.4%AT/AF burden, KIN turned on today      Optivol WNL    -HTN  -Controlled: 121/86  -BP goal <130/80  -Home BP monitoring encouraged, printed information provided on how to accurately measure BP at home.  -Counseled to follow a low salt diet to assure blood pressure remains controlled for cardiovascular risk factor modification.   -The patient is counseled to get regular exercise 3-5 times per week and maintain a healthy weight reduce cardiovascular risk factors.      -Anemia   Hospitalized 4/7/2021   Resolved   S/p duodenal mass excision    -VT   Previously treated with Amiodarone therapy    Paroxysmal    -Chronic Combined systolic/Diastolic HF, NYHA class III   Follows with the HF team    EF per echo  30-35%   Had lip numbness with entresto,lisinopril caused a cough and headaches with hydralazine   On entresto, lasix, carvedilol and spironolactone    Plan    cardioversion     Patient Active Problem List    Diagnosis Date Noted    Persistent atrial fibrillation ([facility]     LVH (left ventricular hypertrophy) 06/11/2014    Headache 01/16/2013    Dyspnea on exertion 12/12/2012    HTN (hypertension) 10/26/2012    Other and unspecified hyperlipidemia 10/26/2012    Congenital heart block 10/26/2012    Incisional hernia, without obstruction or gangrene 12/13/2021    Gait instability     Adequate anticoagulation on anticoagulant therapy     Duodenal mass     Weight loss counseling, encounter for     Anemia     Profound fatigue 04/07/2021    Iron deficiency anemia 02/17/2021    Exertional dyspnea 02/08/2021    Status post implantation of automatic cardioverter/defibrillator (AICD) 01/25/2021    Paroxysmal ventricular tachycardia ([facility] [address] 01/20/2021    Class 1 obesity due to excess calories with body mass index (BMI) of 31.0 to 31.9 in adult 09/06/2019    Dilated cardiomyopathy ([facility] [address] 08/28/2019    [facility] vein thrombosis [facility] 03/26/2019    LV dysfunction 03/18/2019    Obstructive sleep apnea (adult) (pediatric)     Hypersomnia     Systolic CHF, chronic ([facility] [address] 10/03/2018    Chest pain 09/11/2018     Diagnostic studies:   ECG 1/18/22  Vpaced/AFIB  476 QTcH, 138 QRS    Echo 2/9/2021  Summary   Left ventricular size is mildly increased. Moderately reduced global systolic function with an ejection fraction   estimated at 30-35%. Global hypokinesis noted. Grade II diastolic dysfunction with elevated LV filling pressures. There is mild mitral regurgitation noted. Mild left atrial enlargement noted. Aortic valve appears sclerotic but opens adequately. Mild aortic regurgitation. There is mild tricuspid regurgitation with a RVSP estimation of 25 mmHg. Pacer / ICD wire is visualized in the right ventricle. The right ventricle is normal in size and function. ZAMZAM 1/22/2021   Summary   Ejection fraction is visually estimated to be 25-30 %. Mild mitral regurgitation is present. There is no evidence of mass or thrombus in the left atrium or appendage. The left atrium is dilated. Tricuspid aortic valve. Mild aortic regurgitation is present. Pacemaker / ICD lead is visualized in the right atrium. Mobile structure on   RV lead in atrium. Stress Test 9/11/2018  Summary    Enlarged LV with mild global hypokinesis. EF 51%    There is normal isotope uptake at stress and rest. There is no evidence of    myocardial ischemia or scar.        I independently reviewed the cardiac diagnostic studies, ECG and relevant imaging studies. Lab Results   Component Value Date    LVEF 33 02/09/2021    LVEFMODE Echo 02/24/2020     No results found for: TSHFT4, TSH      Physical Examination:  Vitals:    01/18/22 0845   BP: 121/86   Pulse: 82   SpO2: 99%      Wt Readings from Last 3 Encounters:   01/18/22 222 lb 6.4 oz (100.9 kg)   12/13/21 221 lb (100.2 kg)   12/10/21 221 lb (100.2 kg)       · Constitutional: Oriented. No distress. · Head: Normocephalic and atraumatic. · Mouth/Throat: Oropharynx is clear and moist.   · Eyes: Conjunctivae normal. EOM are normal.   · Neck: Neck supple. No JVD present. · Cardiovascular: Normal rate, regular rhythm, S1&S2. · Pulmonary/Chest: Bilateral respiratory sounds. No rhonchi. · Abdominal: Soft. No tenderness. + obese   · Musculoskeletal: No tenderness. No edema    · Lymphadenopathy: Has no cervical adenopathy. · Neurological: Alert and oriented. Follows command, No Gross deficit   · Skin: Skin is warm, No rash noted. · Psychiatric: Has a normal behavior     Review of System:  [x] Full ROS obtained and negative except as mentioned in HPI    Prior to Admission medications    Medication Sig Start Date End Date Taking?  Authorizing Provider   spironolactone (ALDACTONE) 25 MG tablet Take 1 tablet by mouth daily 12/10/21  Yes CATRACHITO Wallace - CNS   sacubitril-valsartan (ENTRESTO) 24-26 MG per tablet TAKE 1/2 TABLET BY MOUTH EVERY MORNING AND ONE TABLET EVERY EVENING AS DIRECTED 12/10/21  Yes CATRACHITO Meade - CNS   furosemide (LASIX) 20 MG tablet Take 2 tablets by mouth daily 12/10/21  Yes Rema Rivers APRN - CNS   carvedilol (COREG) 6.25 MG tablet Take 1 tablet by mouth 2 times daily 12/10/21  Yes Rema Rivers APRN - CNS   rivaroxaban (XARELTO) 20 MG TABS tablet Take 1 tablet by mouth Daily with supper 12/10/21  Yes CATRACHITO Ingram - CNP   vitamin D3 (CHOLECALCIFEROL) 25 MCG (1000 UT) TABS tablet Take 1 tablet by mouth daily 12/1/21  Yes Rema Medina APRN - CNS   vitamin D (ERGOCALCIFEROL) 1.25 MG (87097 UT) CAPS capsule TAKE ONE CAPSULE BY MOUTH ONCE WEEKLY 8/2/21  Yes CATRACHITO Monson - CNS   atorvastatin (LIPITOR) 40 MG tablet Take 1 tablet by mouth daily 6/11/21  Yes CATRACHITO Keenan - CNS   pantoprazole (PROTONIX) 40 MG tablet Take 1 tablet by mouth every morning (before breakfast) 4/13/21  Yes Allyssa Reid MD   Multiple Vitamins-Minerals (THERAPEUTIC MULTIVITAMIN-MINERALS) tablet Take 1 tablet by mouth daily   Yes Historical Provider, MD       Allergies   Allergen Reactions    Latex      rash    Codeine      Hives      Lisinopril      cough    Nitroglycerin Hives    Sulfa Antibiotics Nausea Only    Hydralazine      headaches       Social History:  Reviewed. reports that she has never smoked. She has never used smokeless tobacco. She reports that she does not drink alcohol and does not use drugs. Family History:  Reviewed. Reviewed. No family history of SCD. Relevant and available labs, and cardiovascular diagnostics reviewed. Reviewed.

## 2022-02-09 ENCOUNTER — TELEPHONE (OUTPATIENT)
Dept: CARDIOLOGY CLINIC | Age: 57
End: 2022-02-09

## 2022-02-09 NOTE — TELEPHONE ENCOUNTER
She needs to be on her  Anti coagulation for 90 days after her ablation.   So she will have to wait to have her surgery if she does not have it before her ablation

## 2022-02-09 NOTE — TELEPHONE ENCOUNTER
Spoke with patient regarding ZAMZAM/AF Ablation with RMM. Patient has a hernia and is scheduled for endoscopy on 3/4 to see if there is any bleeding. No surgery date for removal of hernia as of yet. Do I need to wait until that is repaired prior to scheduling ablation? And/or do procedures need to be spaced apart?

## 2022-02-15 ENCOUNTER — OFFICE VISIT (OUTPATIENT)
Dept: SURGERY | Age: 57
End: 2022-02-15
Payer: MEDICAID

## 2022-02-15 VITALS — SYSTOLIC BLOOD PRESSURE: 130 MMHG | BODY MASS INDEX: 35.67 KG/M2 | DIASTOLIC BLOOD PRESSURE: 86 MMHG | WEIGHT: 221 LBS

## 2022-02-15 DIAGNOSIS — K43.2 INCISIONAL HERNIA, WITHOUT OBSTRUCTION OR GANGRENE: Primary | ICD-10-CM

## 2022-02-15 PROCEDURE — G8427 DOCREV CUR MEDS BY ELIG CLIN: HCPCS | Performed by: SURGERY

## 2022-02-15 PROCEDURE — 3017F COLORECTAL CA SCREEN DOC REV: CPT | Performed by: SURGERY

## 2022-02-15 PROCEDURE — G8484 FLU IMMUNIZE NO ADMIN: HCPCS | Performed by: SURGERY

## 2022-02-15 PROCEDURE — G8417 CALC BMI ABV UP PARAM F/U: HCPCS | Performed by: SURGERY

## 2022-02-15 PROCEDURE — 99213 OFFICE O/P EST LOW 20 MIN: CPT | Performed by: SURGERY

## 2022-02-15 PROCEDURE — 1036F TOBACCO NON-USER: CPT | Performed by: SURGERY

## 2022-02-15 NOTE — PATIENT INSTRUCTIONS
1. While the hernia would benefit from repair at some point, she wishes to avoid surgery for now. Will use interim time to lose weight, build abdominal core muscle mass in preparation for surgery in the future  2. The patient has a hernia that would benefit from repair at some point. We discussed perioperative risk. Previously using the [employer] for Determining Associated Risks pedro (CEDAR), the patient's risk of perioperative complications was approximately 23% with increased risk associated with possible need for musculocutaneous flaps as well as weight. Weight loss could decrease her risk to 15% and even lower if flaps are not necessary  3. Warning signs of an incarcerated hernia include inability to reduce the bulge, increased and constant pain, nausea, vomiting, fevers, chills and constipation. This is a surgical emergency and the patient should contact the office or present to an emergency department  4.  Return to office after endoscopy and cardiology workup to discuss progress and further options, likely plan surgical repair

## 2022-02-15 NOTE — PROGRESS NOTES
[facility]  and Laparoscopic Surgery  SUBJECTIVE:    Chief Complaint: incisional hernia    Dre Julien   1965   61 y.o. female presents for follow-up regarding an incisional hernia. She is known to me after exploratory laparotomy, cholecystectomy with cholangiogram and excision of benign duodenal mass on 4/13/2021 for upper GI bleeding and symptomatic cholelithiasis. She has had known incisional hernia and at last office visit we had discussed optimizing risk factors. She still has a tightness feeling in the epigastrium that is present primarily when exerting herself while standing and then resting while remaining in standing position. The tightness feeling will last for less than a minute but is very uncomfortable. Not associated with nausea vomiting or bowel movements. Feels muscular and not bowel related. She has followed with gastroenterology who has her scheduled for an upper and lower endoscopy in the near future as well as cardiology to be evaluated for ablation.   She has had no obstructive symptoms related to the hernia and presents for follow-up    Past Medical History:   Diagnosis Date    Anemia     Atrial fibrillation and flutter (HCC)     Atrial flutter (HCC)     CHB (complete heart block) (HCC)     Class 1 obesity without serious comorbidity with body mass index (BMI) of 33.0 to 33.9 in adult 9/6/2019    Congenital heart disease     GERD (gastroesophageal reflux disease)     Headache(784.0)     History of complete heart block     Hyperlipidemia     Hypertension     PONV (postoperative nausea and vomiting)     Sleep apnea     uses CPAP    Syncope     Systolic CHF, chronic ([address] 10/3/2018     Past Surgical History:   Procedure Laterality Date    CARDIAC DEFIBRILLATOR PLACEMENT  01/2021    Medtronic    COLECTOMY N/A 4/13/2021    EXPLORATORY LAPAROTOMY, RESECTION OF DUODENAL MASS, CHOLECYSTECTOMY WITH INTRAOPERATIVE CHOLANGIOGRAM performed by Pro Villalobos Abel Fields MD at [facility] COLONOSCOPY N/A 10/27/2020    COLONOSCOPY POLYPECTOMY SNARE/COLD BIOPSY performed by Marry Smith MD at [place] [address]  11/29/12    dual chamber PPM, Medtronic    TUBAL LIGATION      UPPER GASTROINTESTINAL ENDOSCOPY N/A 10/27/2020    EGD DIAGNOSTIC ONLY performed by Marry Smith MD at [address] N/A 4/8/2021    EGD CONTROL HEMORRHAGE WITH APPLICATION OF 3 CLIPS TO DUODENAL MASS performed by Jeny Puga MD at [facility] ENDOSCOPY N/A 4/8/2021    EGD BIOPSY DUODENAL MASS performed by Jeny Puga MD at [address] [facility] N/A 4/8/2021    EGD SUBMUCOSAL INJECTION OF 0.6 MG OF EPINEPRINE INTO BASE OF DUODENAL MASS performed by [facility]uga MD at [facility] History     Socioeconomic History    Marital status:      Spouse name: Not on file    Number of children: 3    Years of education: Not on file    Highest education level: Not on file   Occupational History    Not on file   Tobacco Use    Smoking status: Never Smoker    Smokeless tobacco: Never Used   Vaping Use    Vaping Use: Never used   Substance and Sexual Activity    Alcohol use: No    Drug use: No    Sexual activity: Yes     Partners: Male   Other Topics Concern    Not on file   Social History Narrative    Not on file     Social Determinants of Health     Financial Resource Strain:     Difficulty of Paying Living Expenses: Not on file   Food Insecurity:     Worried About Running Out of Food in the Last Year: Not on file    Marcia of Food in the Last Year: Not on file   Transportation Needs:     Lack of Transportation (Medical): Not on file    Lack of Transportation (Non-Medical):  Not on file   Physical Activity:     Days of Exercise per Week: Not on file    Minutes of Exercise per Session: Not on file Stress:     Feeling of Stress : Not on file   Social Connections:     Frequency of Communication with Friends and Family: Not on file    Frequency of Social Gatherings with Friends and Family: Not on file    Attends Yazdanism Services: Not on file    Active Member of [address] [place] [address] or Organizations: Not on file    Attends Club or Organization Meetings: Not on file    Marital Status: Not on file   Intimate Partner Violence:     Fear of Current or Ex-Partner: Not on file    Emotionally Abused: Not on file    Physically Abused: Not on file    Sexually Abused: Not on file   Housing Stability:     Unable to Pay for Housing in the Last Year: Not on file    Number of [place] in the Last Year: Not on file    Unstable Housing in the Last Year: Not on file      Family History   Problem Relation Age of Onset    Cancer Father     Heart Disease Neg Hx     High Blood Pressure Neg Hx     High Cholesterol Neg Hx      Current Outpatient Medications   Medication Sig Dispense Refill    spironolactone (ALDACTONE) 25 MG tablet Take 1 tablet by mouth daily 90 tablet 1    sacubitril-valsartan (ENTRESTO) 24-26 MG per tablet TAKE 1/2 TABLET BY MOUTH EVERY MORNING AND ONE TABLET EVERY EVENING AS DIRECTED 180 tablet 1    furosemide (LASIX) 20 MG tablet Take 2 tablets by mouth daily 180 tablet 1    carvedilol (COREG) 6.25 MG tablet Take 1 tablet by mouth 2 times daily 180 tablet 1    rivaroxaban (XARELTO) 20 MG TABS tablet Take 1 tablet by mouth Daily with supper 90 tablet 1    vitamin D3 (CHOLECALCIFEROL) 25 MCG (1000 UT) TABS tablet Take 1 tablet by mouth daily 30 tablet 5    atorvastatin (LIPITOR) 40 MG tablet Take 1 tablet by mouth daily 60 tablet 2    pantoprazole (PROTONIX) 40 MG tablet Take 1 tablet by mouth every morning (before breakfast) 30 tablet 3    Multiple Vitamins-Minerals (THERAPEUTIC MULTIVITAMIN-MINERALS) tablet Take 1 tablet by mouth daily       No current facility-administered medications for this visit. Allergies   Allergen Reactions    Latex      rash    Codeine      Hives      Lisinopril      cough    Nitroglycerin Hives    Sulfa Antibiotics Nausea Only    Hydralazine      headaches        Review of Systems:  Review of systems performed and negative with the exception of the above findings    OBJECTIVE:  /86   Wt 221 lb (100.2 kg)   BMI 35.67 kg/m²      Physical Exam:  General appearance: alert, appears stated age, cooperative and no distress  Head: Normocephalic, without obvious abnormality, atraumatic  Lungs: clear to auscultation bilaterally  Heart: regular rate and rhythm, S1, S2 normal, no murmur, click, rub or gallop  Abdomen: Soft, nondistended, nontender, incisional hernia with normal overlying skin is easily reducible but with wide fascial defect    Admission on 01/28/2022, Discharged on 01/28/2022   Component Date Value Ref Range Status    Ventricular Rate 01/28/2022 77  BPM Final    Atrial Rate 01/28/2022 63  BPM Final    QRS Duration 01/28/2022 158  ms Final    Q-T Interval 01/28/2022 472  ms Final    QTc Calculation (Bazett) 01/28/2022 534  ms Final    R Axis 01/28/2022 -87  degrees Final    T Axis 01/28/2022 75  degrees Final    Diagnosis 01/28/2022 Electronic ventricular pacemakerConfirmed by [facility] MD, Rob Lane ([id number]) on 1/28/2022 7:09:31 PM   Final       No results found. ASSESSMENT:  Reducible incisional hernia     Plan:  1. While the hernia would benefit from repair at some point, she wishes to avoid surgery for now. Will use interim time to lose weight, build abdominal core muscle mass in preparation for surgery in the future  2. The patient has a hernia that would benefit from repair at some point. We discussed perioperative risk.  Previously using the Defend Your Head for Determining Associated Risks pedro (CEDAR), the patient's risk of perioperative complications was approximately 23% with increased risk associated with possible need for musculocutaneous flaps as well as weight. Weight loss could decrease her risk to 15% and even lower if flaps are not necessary  3. Warning signs of an incarcerated hernia include inability to reduce the bulge, increased and constant pain, nausea, vomiting, fevers, chills and constipation. This is a surgical emergency and the patient should contact the office or present to an emergency department  4. Return to office after endoscopy and cardiology workup to discuss progress and further options, likely plan surgical repair    Flavio Curry MD, FACS  2/15/2022  3:27 PM

## 2022-02-22 ENCOUNTER — NURSE ONLY (OUTPATIENT)
Dept: CARDIOLOGY CLINIC | Age: 57
End: 2022-02-22
Payer: MEDICAID

## 2022-02-22 DIAGNOSIS — I51.9 LV DYSFUNCTION: ICD-10-CM

## 2022-02-22 DIAGNOSIS — I50.22 SYSTOLIC CHF, CHRONIC (HCC): ICD-10-CM

## 2022-02-22 DIAGNOSIS — I47.29 PAROXYSMAL VENTRICULAR TACHYCARDIA: ICD-10-CM

## 2022-02-22 DIAGNOSIS — I51.7 LVH (LEFT VENTRICULAR HYPERTROPHY): ICD-10-CM

## 2022-02-22 DIAGNOSIS — I42.0 DILATED CARDIOMYOPATHY (HCC): ICD-10-CM

## 2022-02-22 DIAGNOSIS — Z95.810 ICD (IMPLANTABLE CARDIOVERTER-DEFIBRILLATOR), BIVENTRICULAR, IN SITU: ICD-10-CM

## 2022-02-22 DIAGNOSIS — Q24.6 CONGENITAL HEART BLOCK: ICD-10-CM

## 2022-02-22 PROCEDURE — 93297 REM INTERROG DEV EVAL ICPMS: CPT | Performed by: INTERNAL MEDICINE

## 2022-02-22 PROCEDURE — 93295 DEV INTERROG REMOTE 1/2/MLT: CPT | Performed by: INTERNAL MEDICINE

## 2022-02-22 PROCEDURE — 93296 REM INTERROG EVL PM/IDS: CPT | Performed by: INTERNAL MEDICINE

## 2022-02-22 NOTE — PROGRESS NOTES
Please see how she is feeling as her optival is trending up  She looks like she has been having a lot of AF  Weight shortness of breath edema

## 2022-02-22 NOTE — PROGRESS NOTES
We received remote transmission from patient's monitor at home. Transmission shows normal sensing and pacing function. EP physician will review. See interrogation under cardiology tab in the [address] [facility] [address] field for more details. Optivol slightly elevated.

## 2022-02-24 NOTE — PROGRESS NOTES
Due to patient's cardiac history, anesthesiologist ( Dr. Bianca Becerra) felt it would be safer to perform her EGD on the inpatient endo side.

## 2022-02-25 ENCOUNTER — TELEPHONE (OUTPATIENT)
Dept: CARDIOLOGY CLINIC | Age: 57
End: 2022-02-25

## 2022-02-25 NOTE — LETTER
[address]  [address], [address]  Phone Number: [phone]  Dr. Ihsan Lockwood, APRN-CNP    [address] [address]  Phone: [phone]  Fax: [phone]     Date: 22  Patient:Chelsi Lock  : 1965    To Whom It May Concern:    Discontinuation of any anticoagulant carries significant risks of morbidity, sometimes with a fatal outcome (e.g. stroke), from thromboembolic complications. Guidelines do not recommend discontinuation of anticoagulation for low risk surgical procedure, such as cataract surgery, dental procedures, and dermatologic surgeries. The risks of hemorrhage or thromboembolism versus the benefit from the operation need to be addressed by attending surgeon. Xarelto: \"If anticoagulation must be discontinued to reduce the risk of bleeding with surgical or other procedures, Inell Gully should be stopped at least 24 hours before the procedure to reduce the risk of bleeding. In deciding whether a procedure should be delayed until 24 hours after the last dose of XARELTO, the increased risk of bleeding should be weighed against the urgency of intervention. Inell Gully should be restarted after the surgical or other procedures as soon as adequate hemostasis has been established, noting that the time to onset of  therapeutic effect is short. If oral medication cannot be taken during or after surgical intervention, consider administering a parenteral anticoagulant. \"    Ariela Talavera may hold Xarelto for 2 days prior to her scheduled procedure. If more days are desired, it is up to the requesting physician to appropriately bridge the patient with an alternative medication. Please limit time off anticoagulation and resume medication as soon as possible. Please contact my office with any further questions or concerns.      Sincerely,     Mahesh Monroe, APRN-CNP

## 2022-02-25 NOTE — TELEPHONE ENCOUNTER
Called pt and informed her of message below with verbal understanding and encouraged to call office. Letter faxed to Dr. Armendariz Pro office with fax confirmation successful.

## 2022-02-25 NOTE — PROGRESS NOTES
Cardiac clearance note from [facility] CNP given to Balta, at [address], and requested pt.be contacted regarding her xarelto.

## 2022-02-25 NOTE — TELEPHONE ENCOUNTER
Procedure : EGD    Procedure Date : 03/04/2022    Surgeon : Dr. Karlos Gutiérrez : DIOGO Holt Recommendations : hold [facility] for 3 days    Fax Clearance : [phone]

## 2022-02-25 NOTE — PROGRESS NOTES
Patient ___ reached   __X___not reached-preop instructions left on voice wqzh__[phone]___________      DATE__3/4/22______ TIME__1230______ARRIVAL__1100  FEC_______      Nothing to eat or drink after midnight the night before,except for what the prep instructions call for. If you do not have the instructions or do not understand them please contact your doctors office. Follow any instructions your doctors office has given you including what medications to take the AM of your procedure and which ones to hold. You may use your inhalers. If you take a long acting insulin the mono prior please cut the dose in half and take no diabetic medications that AM.Follow specific doctors office instructions regarding blood thinners and if they want you to hold and for how long. If you are on a blood thinner and have no instructions please contact the office and ask-CHECK Burgess Loo comfortably,bring your insurance card,picture ID,and a complete list of medications, including supplements. You must have a responsible adult to stay with you during the procedure,drive you home and stay with you. [facility] phone number [phone] for any questions. OTHER INTRUCTIONS(if applicable)_________________________________________________________          COVID TESTING          _X__ Covid test to be done 3-5 days prior to scheduled surgery -patient aware they are REQUIRED to bring a copy of the negative result DOS-if they receive a positive result to notify their surgeon. If known- indicate where patient is getting covid test done________________________________________________________________________    ___ Rapid - DOS    ___ Other _____________________________________      Senia Huang POLICY(subject to change)    There is a one visitor policy at [facility] for all surgeries and endoscopies. Whether the visitor can stay or will be asked to wait in the car will depend on the current policy and if social distancing can be maintained. The policy is subject to change at any time. Please make sure the visitor has a cell phone that is on,charged and able to accept calls, as this may be the way that the staff communicates with them. Pain management is NO VISITOR policyThe patients ride is expected to remain in the car with a cell phone for communication. If the ride is leaving the hospital grounds please make sure they are back in time for pickup. Have the patient inform the staff on arrival what their rides plans are while the patient is in the facility. At the MAIN there is one visitor allowed. Please note that the visitor policy is subject to change.

## 2022-02-28 ENCOUNTER — TELEPHONE (OUTPATIENT)
Dept: CARDIOLOGY CLINIC | Age: 57
End: 2022-02-28

## 2022-02-28 NOTE — TELEPHONE ENCOUNTER
Patient stopped by the office today and said that her blood pressure has been high the last 3 days. Last reading was 145/80 today, 145/90 yesterday. Please call patient and advise.   judith

## 2022-02-28 NOTE — TELEPHONE ENCOUNTER
Called patient and relayed message per [facility] with pt asking if she could think about it and call back tomorrow.

## 2022-03-04 ENCOUNTER — HOSPITAL ENCOUNTER (OUTPATIENT)
Age: 57
Setting detail: OUTPATIENT SURGERY
Discharge: HOME | End: 2022-03-04
Attending: INTERNAL MEDICINE | Admitting: INTERNAL MEDICINE
Payer: MEDICAID

## 2022-03-04 ENCOUNTER — ANESTHESIA EVENT (OUTPATIENT)
Dept: ENDOSCOPY | Age: 57
End: 2022-03-04
Payer: MEDICAID

## 2022-03-04 ENCOUNTER — ANESTHESIA (OUTPATIENT)
Dept: ENDOSCOPY | Age: 57
End: 2022-03-04
Payer: MEDICAID

## 2022-03-04 VITALS
HEART RATE: 75 BPM | HEIGHT: 67 IN | RESPIRATION RATE: 16 BRPM | OXYGEN SATURATION: 97 % | TEMPERATURE: 97 F | BODY MASS INDEX: 34.55 KG/M2 | SYSTOLIC BLOOD PRESSURE: 139 MMHG | DIASTOLIC BLOOD PRESSURE: 94 MMHG | WEIGHT: 220.1 LBS

## 2022-03-04 VITALS
OXYGEN SATURATION: 100 % | SYSTOLIC BLOOD PRESSURE: 126 MMHG | RESPIRATION RATE: 2 BRPM | DIASTOLIC BLOOD PRESSURE: 68 MMHG

## 2022-03-04 PROCEDURE — 7100000011 HC PHASE II RECOVERY - ADDTL 15 MIN: Performed by: INTERNAL MEDICINE

## 2022-03-04 PROCEDURE — 7100000000 HC PACU RECOVERY - FIRST 15 MIN: Performed by: INTERNAL MEDICINE

## 2022-03-04 PROCEDURE — 7100000001 HC PACU RECOVERY - ADDTL 15 MIN: Performed by: INTERNAL MEDICINE

## 2022-03-04 PROCEDURE — 3700000001 HC ADD 15 MINUTES (ANESTHESIA): Performed by: INTERNAL MEDICINE

## 2022-03-04 PROCEDURE — 2580000003 HC RX 258: Performed by: INTERNAL MEDICINE

## 2022-03-04 PROCEDURE — 2500000003 HC RX 250 WO HCPCS: Performed by: NURSE ANESTHETIST, CERTIFIED REGISTERED

## 2022-03-04 PROCEDURE — 6360000002 HC RX W HCPCS: Performed by: NURSE ANESTHETIST, CERTIFIED REGISTERED

## 2022-03-04 PROCEDURE — 88305 TISSUE EXAM BY PATHOLOGIST: CPT

## 2022-03-04 PROCEDURE — 2709999900 HC NON-CHARGEABLE SUPPLY: Performed by: INTERNAL MEDICINE

## 2022-03-04 PROCEDURE — 7100000010 HC PHASE II RECOVERY - FIRST 15 MIN: Performed by: INTERNAL MEDICINE

## 2022-03-04 PROCEDURE — 3609012400 HC EGD TRANSORAL BIOPSY SINGLE/MULTIPLE: Performed by: INTERNAL MEDICINE

## 2022-03-04 PROCEDURE — 3700000000 HC ANESTHESIA ATTENDED CARE: Performed by: INTERNAL MEDICINE

## 2022-03-04 RX ORDER — LIDOCAINE HYDROCHLORIDE 20 MG/ML
INJECTION, SOLUTION EPIDURAL; INFILTRATION; INTRACAUDAL; PERINEURAL PRN
Status: DISCONTINUED | OUTPATIENT
Start: 2022-03-04 | End: 2022-03-04 | Stop reason: SDUPTHER

## 2022-03-04 RX ORDER — PROPOFOL 10 MG/ML
INJECTION, EMULSION INTRAVENOUS PRN
Status: DISCONTINUED | OUTPATIENT
Start: 2022-03-04 | End: 2022-03-04 | Stop reason: SDUPTHER

## 2022-03-04 RX ORDER — SODIUM CHLORIDE 9 MG/ML
INJECTION, SOLUTION INTRAVENOUS CONTINUOUS
Status: DISCONTINUED | OUTPATIENT
Start: 2022-03-04 | End: 2022-03-04 | Stop reason: HOSPADM

## 2022-03-04 RX ADMIN — PROPOFOL 50 MG: 10 INJECTION, EMULSION INTRAVENOUS at 13:17

## 2022-03-04 RX ADMIN — PROPOFOL 50 MG: 10 INJECTION, EMULSION INTRAVENOUS at 13:12

## 2022-03-04 RX ADMIN — SODIUM CHLORIDE: 9 INJECTION, SOLUTION INTRAVENOUS at 13:06

## 2022-03-04 RX ADMIN — PROPOFOL 50 MG: 10 INJECTION, EMULSION INTRAVENOUS at 13:23

## 2022-03-04 RX ADMIN — PROPOFOL 50 MG: 10 INJECTION, EMULSION INTRAVENOUS at 13:13

## 2022-03-04 RX ADMIN — PROPOFOL 50 MG: 10 INJECTION, EMULSION INTRAVENOUS at 13:14

## 2022-03-04 RX ADMIN — LIDOCAINE HYDROCHLORIDE 100 MG: 20 INJECTION, SOLUTION EPIDURAL; INFILTRATION; INTRACAUDAL; PERINEURAL at 13:12

## 2022-03-04 RX ADMIN — PROPOFOL 50 MG: 10 INJECTION, EMULSION INTRAVENOUS at 13:20

## 2022-03-04 RX ADMIN — PROPOFOL 50 MG: 10 INJECTION, EMULSION INTRAVENOUS at 13:25

## 2022-03-04 ASSESSMENT — PULMONARY FUNCTION TESTS
PIF_VALUE: 1
PIF_VALUE: 1
PIF_VALUE: 2
PIF_VALUE: 1

## 2022-03-04 ASSESSMENT — ENCOUNTER SYMPTOMS: SHORTNESS OF BREATH: 1

## 2022-03-04 ASSESSMENT — PAIN - FUNCTIONAL ASSESSMENT: PAIN_FUNCTIONAL_ASSESSMENT: 0-10

## 2022-03-04 NOTE — PROGRESS NOTES
Discharge instructions review with patient and pt boyfriend. All home medications have been reviewed, pt v/u. Pt discharged via wheelchair. Pt discharged with instructions and all belongings. S/O  taking stable pt home.

## 2022-03-04 NOTE — ANESTHESIA POSTPROCEDURE EVALUATION
Department of Anesthesiology  Postprocedure Note    Patient: Dre Julien  MRN: [id number]  YOB: 1965  Date of evaluation: 3/4/2022  Time:  3:19 PM     Procedure Summary     Date: 03/04/22 Room / Location: [address] [place] [address]    Anesthesia Start: 7449 Anesthesia Stop: 5085    Procedure: EGD BIOPSY (N/A Abdomen) Diagnosis: (ABDOMINAL PAIN, EPIGASTRIC R10.13)    Surgeons: Alyson Serrano MD Responsible Provider: Silva Brown MD    Anesthesia Type: MAC ASA Status: 4          Anesthesia Type: MAC    Sandra Phase I: Sandra Score: 10    Sandra Phase II: Sandra Score: 10    Last vitals: Reviewed and per EMR flowsheets. Anesthesia Post Evaluation    Patient location during evaluation: PACU  Patient participation: complete - patient participated  Level of consciousness: awake  Airway patency: patent  Nausea & Vomiting: no vomiting  Complications: no  Cardiovascular status: hemodynamically stable  Respiratory status: acceptable  Hydration status: euvolemic  There was medical reason for not using a multimodal analgesia pain management approach.

## 2022-03-04 NOTE — H&P
[address] [place] [address]   Pre-operative History and Physical    Patient: Rema Silverman  : 1965  CSN:     History Obtained From: patient and/or guardian. HISTORY OF PRESENT ILLNESS:    The patient is a 61 y.o. female with epigastric pain here for EGD.      Past Medical History:        Diagnosis Date    Anemia     Atrial fibrillation and flutter (HCC)     Atrial flutter (HCC)     CHB (complete heart block) (HCC)     Class 1 obesity without serious comorbidity with body mass index (BMI) of 33.0 to 33.9 in adult 2019    Congenital heart disease     GERD (gastroesophageal reflux disease)     Headache(784.0)     History of complete heart block     Hyperlipidemia     Hypertension     PONV (postoperative nausea and vomiting)     Sleep apnea     uses CPAP    Syncope     Systolic CHF, chronic ([address] 10/3/2018     Past Surgical History:        Procedure Laterality Date    CARDIAC DEFIBRILLATOR PLACEMENT  2021    Medtronic    CARDIOVERSION  2022    COLECTOMY N/A 2021    EXPLORATORY LAPAROTOMY, RESECTION OF DUODENAL MASS, CHOLECYSTECTOMY WITH INTRAOPERATIVE CHOLANGIOGRAM performed by Kristy Parra MD at [address] COLONOSCOPY N/A 10/27/2020    COLONOSCOPY POLYPECTOMY SNARE/COLD BIOPSY performed by Usha Gill MD at [facility] 3  12    dual chamber PPM, Medtronic    TUBAL LIGATION      UPPER GASTROINTESTINAL ENDOSCOPY N/A 10/27/2020    EGD DIAGNOSTIC ONLY performed by Usha Gill MD at [address] N/A 2021    EGD CONTROL HEMORRHAGE WITH APPLICATION OF 3 CLIPS TO DUODENAL MASS performed by Fay Guan MD at [address] N/A 2021    EGD BIOPSY DUODENAL MASS performed by Fay Guan MD at [address] N/A 2021    EGD SUBMUCOSAL INJECTION OF 0.6 MG OF EPINEPRINE INTO BASE OF DUODENAL MASS performed by Sandhya Hernandez MD at [place] [address]       Medications Prior to Admission:   No current facility-administered medications on file prior to encounter. Current Outpatient Medications on File Prior to Encounter   Medication Sig Dispense Refill    spironolactone (ALDACTONE) 25 MG tablet Take 1 tablet by mouth daily 90 tablet 1    sacubitril-valsartan (ENTRESTO) 24-26 MG per tablet TAKE 1/2 TABLET BY MOUTH EVERY MORNING AND ONE TABLET EVERY EVENING AS DIRECTED 180 tablet 1    furosemide (LASIX) 20 MG tablet Take 2 tablets by mouth daily 180 tablet 1    carvedilol (COREG) 6.25 MG tablet Take 1 tablet by mouth 2 times daily 180 tablet 1    rivaroxaban (XARELTO) 20 MG TABS tablet Take 1 tablet by mouth Daily with supper 90 tablet 1    vitamin D3 (CHOLECALCIFEROL) 25 MCG (1000 UT) TABS tablet Take 1 tablet by mouth daily 30 tablet 5    atorvastatin (LIPITOR) 40 MG tablet Take 1 tablet by mouth daily 60 tablet 2    pantoprazole (PROTONIX) 40 MG tablet Take 1 tablet by mouth every morning (before breakfast) 30 tablet 3    Multiple Vitamins-Minerals (THERAPEUTIC MULTIVITAMIN-MINERALS) tablet Take 1 tablet by mouth daily       Allergies:  Latex, Codeine, Lisinopril, Nitroglycerin, Sulfa antibiotics, and Hydralazine        Social History:   Social History     Tobacco Use    Smoking status: Never Smoker    Smokeless tobacco: Never Used   Substance Use Topics    Alcohol use: No     Family History:   Family History   Problem Relation Age of Onset    Cancer Father     Heart Disease Neg Hx     High Blood Pressure Neg Hx     High Cholesterol Neg Hx        PHYSICAL EXAM:      There were no vitals taken for this visit.  I        Heart:  RRR,  Normal S1S2    Lungs:  CTA Bilat, normal effort    Abdomen:  ND NT      ASA Grade:  ASA 3 - Patient with moderate systemic disease with functional limitations    Mallampati Class:  Class I: Soft palate, uvula, fauces, pillars visible __________  Class II: Soft palate, uvula, fauces visible  ____X_____   Class III: Soft palate, base of uvula visible  __________  Class IV: Hard palate only visible   __________      ASSESSMENT AND PLAN:    1. Patient is a 61 y.o. female here for EGD with anesthesia  2. Procedure options, risks and benefits reviewed with patient. Patient expresses understanding.      Earnestine Chin MD  [id number]  3/4/2022

## 2022-03-04 NOTE — BRIEF OP NOTE
Brief Postoperative Note - Full Note in Chart Review/Procedures tab       Patient: Nydia Franco  YOB: 1965  MRN: [id number]    Date of Procedure: 3/4/2022    Pre-Op Diagnosis: ABDOMINAL PAIN, EPIGASTRIC R10.13    Post-Op Diagnosis: Same       Procedure(s):  EGD BIOPSY    Surgeon(s):  Piyush Chavez MD    Assistant:  * No surgical staff found *    Anesthesia: Monitor Anesthesia Care    Estimated Blood Loss (mL): Minimal    Complications: None    Specimens:   ID Type Source Tests Collected by Time Destination   A :  Tissue Stomach SURGICAL PATHOLOGY Piyush Chavez MD 3/4/2022 1322        Implants:  * No implants in log *      Drains: * No LDAs found *    Findings:  1) Scarred 2nd portion duodenum from previous surgical resection    2) Small hiatal hernia    3) 6 mm polyp in gastric antrum cold biopsied    Rec:  1) Resume diet and meds  2) Follow up with Dr. Zenon Galarza and primary MD  3) Follow up with me as needed. 4) OK to resume Xarelto today.     Electronically signed by Piyush Chavez MD on 3/4/2022 at 1:26 PM

## 2022-03-04 NOTE — PROGRESS NOTES
Pt arrived from endo to PACU bay 9. Report received from endo staff. Pt arouses to voice. Pt on 4 L NC, NSR, VSS. Will continue to monitor.

## 2022-03-04 NOTE — PROGRESS NOTES
Reviewed patient's medical and surgical history in electronic record and with patient at the bedside. All questions regarding procedure answered. Scope number and equipment verified using a two person system. Family in waiting room.     Electronically signed by Klever Lopez RN on 3/4/2022 at 1:20 PM

## 2022-03-22 ENCOUNTER — TELEPHONE (OUTPATIENT)
Dept: CARDIOLOGY CLINIC | Age: 57
End: 2022-03-22

## 2022-03-22 NOTE — TELEPHONE ENCOUNTER
Patient went to the urgent care and thought she might be having an episode of vertigo,    They recommended her to see the cardiologist, and also having trouble with her neck and feeling dizzy    Possible vertigo, but the blood pressure was low and he thought it might be cardiac related    Vertigo would interfere with her cardiac medicine    She has a pacemaker and sent in a remote reading and would like someone to contact her    She is currently set up to see Lo Hankins but wondering with her symptoms if she should be going to the ER     Or can be seen sooner here in the office    Stating her neck has a lot of pressure and the circulation is cut off when she is trying to lay down and when trying to stand up she is very dizzy    Please advise #[phone]

## 2022-03-22 NOTE — PROGRESS NOTES
[facility]   Congestive Heart Failure    PrimaryCare Doctor:  No primary care provider on file. Primary Cardiologist: Kannan Perez         Chief Complaint:  dizziness    History of Present Illness:  Frank Forrest is a 61 y.o. female with PMH HTN, CHB, PPM, NICM, HFrEF, VT, BIVICD, and AF who presents today with c/o fatigue, neck pain, dizziness for the past 4days. She denies chest pain, dyspnea, palpitations, orthopnea, PND, exertional chest pressure/discomfort, early saiety, syncope. Went to urgent care yesterday, BP soft there and here and she was prescribed steroid nasal spray. Tells me she took 2 doses abx at home that helped sx. Device check yesterday per EP with no new events to cause sx. Optivol: under baseline    ER Visit: No  Recent Hospitalization: No    Baseline Weight: 220  Wt Readings from Last 3 Encounters:   03/23/22 223 lb 14.4 oz (101.6 kg)   03/04/22 220 lb 1.6 oz (99.8 kg)   02/15/22 221 lb (100.2 kg)        EF: 30-35%  Cardiac Imaging:  Echo 2/9/2021  Summary   Left ventricular size is mildly increased.   Moderately reduced global systolic function with an ejection fraction   estimated at 30-35%.   Global hypokinesis noted.   Grade II diastolic dysfunction with elevated LV filling pressures.   There is mild mitral regurgitation noted.   Mild left atrial enlargement noted.   Aortic valve appears sclerotic but opens adequately.   Mild aortic regurgitation.   There is mild tricuspid regurgitation with a RVSP estimation of 25 mmHg.      Pacer / ICD wire is visualized in the right ventricle.   The right ventricle is normal in size and function.     Device: Medtronic BiVICD with optivol, Jan 2021     Activity: at baseline  Can you walk 1-2 blocks or do a moderate amount of house/yard work? Yes      NYHA Class:  I     Sodium Restrictions: 3g  Fluid Restrictions: 48-64 oz/day  Sodium and fluid restriction compliance: good    Pt Education: The patient has received education on the following furosemide (LASIX) 20 MG tablet Take 2 tablets by mouth daily 12/10/21   Collette Belch, APRN - CNS   carvedilol (COREG) 6.25 MG tablet Take 1 tablet by mouth 2 times daily 12/10/21   Collette Belch, APRN - CNS   rivaroxaban (XARELTO) 20 MG TABS tablet Take 1 tablet by mouth Daily with supper 12/10/21   Nile Luu APRN - CNP   vitamin D3 (CHOLECALCIFEROL) 25 MCG (1000 UT) TABS tablet Take 1 tablet by mouth daily 12/1/21   CATRACHITO Vicente - CNS   atorvastatin (LIPITOR) 40 MG tablet Take 1 tablet by mouth daily 6/11/21   CATRACHITO Vicente - CNS   pantoprazole (PROTONIX) 40 MG tablet Take 1 tablet by mouth every morning (before breakfast) 4/13/21   Richa Mckinnon MD   Multiple Vitamins-Minerals (THERAPEUTIC MULTIVITAMIN-MINERALS) tablet Take 1 tablet by mouth daily    Historical Provider, MD        Allergies:  Latex, Codeine, Lisinopril, Nitroglycerin, Sulfa antibiotics, and Hydralazine     ROS:   Review of Systems   Constitutional: Positive for fatigue. HENT: Positive for postnasal drip and sinus pressure. Neck pain   Respiratory: Negative. Cardiovascular: Negative. Gastrointestinal: Negative. Genitourinary: Negative. Musculoskeletal: Negative. Skin: Negative. Neurological: Negative. Hematological: Negative. Psychiatric/Behavioral: Negative. Physical Examination:    Vitals:    03/23/22 1049 03/23/22 1055   BP: 102/70 115/80   Site: Left Upper Arm Left Upper Arm   Position: Sitting Standing   Cuff Size: Large Adult Large Adult   Pulse: 78    SpO2: 96%    Weight: 223 lb 14.4 oz (101.6 kg)    Height: 5' 6.5\" (1.689 m)            Physical Exam  Constitutional:       Appearance: Normal appearance. HENT:      Head:      Comments: Tenderness on palpation over frontal sinuses       Nose: Congestion present. Cardiovascular:      Rate and Rhythm: Normal rate and regular rhythm. Pulses: Normal pulses. Heart sounds: Normal heart sounds.    Pulmonary: Effort: Pulmonary effort is normal.      Breath sounds: Normal breath sounds. Abdominal:      Palpations: Abdomen is soft. Musculoskeletal:         General: Normal range of motion. Cervical back: Normal range of motion and neck supple. Skin:     General: Skin is warm and dry. Neurological:      Mental Status: She is alert.          Lab Data:    CBC:   Lab Results   Component Value Date    WBC 5.4 12/01/2021    WBC 4.8 09/10/2021    WBC 5.3 07/06/2021    RBC 4.10 12/01/2021    RBC 4.14 09/10/2021    RBC 3.92 07/06/2021    HGB 12.7 12/01/2021    HGB 12.2 09/10/2021    HGB 11.5 07/06/2021    HCT 36.9 12/01/2021    HCT 36.4 09/10/2021    HCT 34.4 07/06/2021    MCV 90.0 12/01/2021    MCV 87.8 09/10/2021    MCV 87.8 07/06/2021    RDW 15.5 12/01/2021    RDW 17.2 09/10/2021    RDW 18.4 07/06/2021     12/01/2021     09/10/2021     07/06/2021     BMP:  Lab Results   Component Value Date     12/01/2021     09/10/2021     07/06/2021    K 3.3 12/01/2021    K 3.6 09/10/2021    K 4.3 07/06/2021    K 3.7 04/07/2021    K 3.0 01/24/2021    K 3.8 01/20/2021     12/01/2021     09/10/2021     07/06/2021    CO2 28 12/01/2021    CO2 26 09/10/2021    CO2 21 07/06/2021    PHOS 2.4 04/17/2021    PHOS 2.2 04/15/2021    PHOS 2.8 04/14/2021    BUN 9 12/01/2021    BUN 10 09/10/2021    BUN 11 07/06/2021    CREATININE 0.8 12/01/2021    CREATININE 0.7 09/10/2021    CREATININE 0.9 07/06/2021     BNP:   Lab Results   Component Value Date    PROBNP 60 12/01/2021    PROBNP 322 09/10/2021    PROBNP 199 06/04/2021     Iron Studies:  No components found for: FE,  TIBC,  FERRITIN  Iron Deficiency Anemia:  Yes IV Iron Therapy:  No  2017 ACC/AHA HF Guidelines:   intravenous iron replacement in patients with New York Heart Association (NYHA) class II and III HF and iron deficiency (ferritin <100 ng/ml or 100-300 ng/ml if transferrin saturation <20%), to improve functional status and QoL.      Assessment/Plan:    1. Systolic CHF, chronic (HCC) - compensated, appears dry, decrease lasix labs today   2. Primary hypertension - Bp soft, decrease diuretics   3. Persistent atrial fibrillation (HCC) - rate controlled on BB, continue AC   4. Dilated cardiomyopathy ([facility] [address] - continue entresto, coreg, pierre   5. Other fatigue - decrease diuretics, check CBC, treat sinusitis   6. Dizziness - most likely 2/2 hypotension and sinus congestion       Instructions:   1. Medications: Decrease lasix to once a day until I call you about labs, add over the counter claritin/allegra/zyrtec for seasonal allergies and may also add plain mucinex  2. Labs: today   3.  Follow up: as scheduled        [facility]: [phone]      I appreciate the opportunity of cooperating in the care of this individual.    CATRACHITO Suh - CNP, CNP, 3/22/2022,3:15 PM

## 2022-03-22 NOTE — TELEPHONE ENCOUNTER
I have never seen her so really can't give any advice at this point, pacer read still pending. I would rec ED visit if she passes out.  Lucia Howard

## 2022-03-22 NOTE — TELEPHONE ENCOUNTER
Has a device interrogation been sent? She may be in atrial fib/flutter that could be causing her symptoms.

## 2022-03-22 NOTE — TELEPHONE ENCOUNTER
Spoke to the pt-gave interrogation results, and instructions, per EP.  She will keep her 3/23/22 appointment with Darrel Burger

## 2022-03-22 NOTE — TELEPHONE ENCOUNTER
No arrhythmias on device to explain her device. Original note mentions \"low BP\". Can we see what her BP has been and when her \"vertigo\" episodes are occurring, such as with standing or other activity?  If her BP is truly low, this may be contributing to her symptoms    Meme Rahman, APRN-CNP

## 2022-03-22 NOTE — TELEPHONE ENCOUNTER
We received remote transmission from patient's monitor at home. Transmission shows normal sensing and pacing function. Current ECG PPBVP st 87 bpm  AP 92.0%   99.6% Effective 99.2%  No episodes noted. OptiVol WNL. EP will review. See interrogation under cardiology tab in the [address] [place] [address] [address] field for more details. Please advise.

## 2022-03-23 ENCOUNTER — HOSPITAL ENCOUNTER (OUTPATIENT)
Age: 57
Discharge: HOME | End: 2022-03-23
Payer: MEDICAID

## 2022-03-23 ENCOUNTER — OFFICE VISIT (OUTPATIENT)
Dept: CARDIOLOGY CLINIC | Age: 57
End: 2022-03-23
Payer: MEDICAID

## 2022-03-23 ENCOUNTER — NURSE ONLY (OUTPATIENT)
Dept: CARDIOLOGY CLINIC | Age: 57
End: 2022-03-23
Payer: MEDICAID

## 2022-03-23 VITALS
SYSTOLIC BLOOD PRESSURE: 115 MMHG | DIASTOLIC BLOOD PRESSURE: 80 MMHG | HEIGHT: 67 IN | HEART RATE: 78 BPM | OXYGEN SATURATION: 96 % | WEIGHT: 223.9 LBS | BODY MASS INDEX: 35.14 KG/M2

## 2022-03-23 DIAGNOSIS — Q24.6 CONGENITAL HEART BLOCK: ICD-10-CM

## 2022-03-23 DIAGNOSIS — I47.29 PAROXYSMAL VENTRICULAR TACHYCARDIA: ICD-10-CM

## 2022-03-23 DIAGNOSIS — I51.9 LV DYSFUNCTION: ICD-10-CM

## 2022-03-23 DIAGNOSIS — I51.7 LVH (LEFT VENTRICULAR HYPERTROPHY): ICD-10-CM

## 2022-03-23 DIAGNOSIS — I48.19 PERSISTENT ATRIAL FIBRILLATION (HCC): ICD-10-CM

## 2022-03-23 DIAGNOSIS — I42.0 DILATED CARDIOMYOPATHY (HCC): ICD-10-CM

## 2022-03-23 DIAGNOSIS — R53.83 OTHER FATIGUE: ICD-10-CM

## 2022-03-23 DIAGNOSIS — I50.22 SYSTOLIC CHF, CHRONIC (HCC): ICD-10-CM

## 2022-03-23 DIAGNOSIS — I10 PRIMARY HYPERTENSION: ICD-10-CM

## 2022-03-23 DIAGNOSIS — I50.22 SYSTOLIC CHF, CHRONIC (HCC): Primary | ICD-10-CM

## 2022-03-23 DIAGNOSIS — R42 DIZZINESS: ICD-10-CM

## 2022-03-23 DIAGNOSIS — Z95.810 ICD (IMPLANTABLE CARDIOVERTER-DEFIBRILLATOR), BIVENTRICULAR, IN SITU: Primary | ICD-10-CM

## 2022-03-23 PROBLEM — R53.82 CHRONIC FATIGUE: Status: ACTIVE | Noted: 2021-04-07

## 2022-03-23 LAB
ANION GAP SERPL CALCULATED.3IONS-SCNC: 12 MMOL/L (ref 3–16)
BUN BLDV-MCNC: 8 MG/DL (ref 7–20)
CALCIUM SERPL-MCNC: 9.3 MG/DL (ref 8.3–10.6)
CHLORIDE BLD-SCNC: 104 MMOL/L (ref 99–110)
CO2: 26 MMOL/L (ref 21–32)
CREAT SERPL-MCNC: 0.8 MG/DL (ref 0.6–1.1)
GFR AFRICAN AMERICAN: >60
GFR NON-AFRICAN AMERICAN: >60
GLUCOSE BLD-MCNC: 104 MG/DL (ref 70–99)
HCT VFR BLD CALC: 35.9 % (ref 36–48)
HEMOGLOBIN: 12.2 G/DL (ref 12–16)
MCH RBC QN AUTO: 31.4 PG (ref 26–34)
MCHC RBC AUTO-ENTMCNC: 34.1 G/DL (ref 31–36)
MCV RBC AUTO: 91.9 FL (ref 80–100)
PDW BLD-RTO: 15.4 % (ref 12.4–15.4)
PLATELET # BLD: 246 K/UL (ref 135–450)
PMV BLD AUTO: 7.8 FL (ref 5–10.5)
POTASSIUM SERPL-SCNC: 3.1 MMOL/L (ref 3.5–5.1)
PRO-BNP: 85 PG/ML (ref 0–124)
RBC # BLD: 3.9 M/UL (ref 4–5.2)
SODIUM BLD-SCNC: 142 MMOL/L (ref 136–145)
TSH SERPL DL<=0.05 MIU/L-ACNC: 1.74 UIU/ML (ref 0.27–4.2)
WBC # BLD: 5.7 K/UL (ref 4–11)

## 2022-03-23 PROCEDURE — G8427 DOCREV CUR MEDS BY ELIG CLIN: HCPCS | Performed by: NURSE PRACTITIONER

## 2022-03-23 PROCEDURE — 3017F COLORECTAL CA SCREEN DOC REV: CPT | Performed by: NURSE PRACTITIONER

## 2022-03-23 PROCEDURE — 1036F TOBACCO NON-USER: CPT | Performed by: NURSE PRACTITIONER

## 2022-03-23 PROCEDURE — 83880 ASSAY OF NATRIURETIC PEPTIDE: CPT

## 2022-03-23 PROCEDURE — 99214 OFFICE O/P EST MOD 30 MIN: CPT | Performed by: NURSE PRACTITIONER

## 2022-03-23 PROCEDURE — 80048 BASIC METABOLIC PNL TOTAL CA: CPT

## 2022-03-23 PROCEDURE — 84443 ASSAY THYROID STIM HORMONE: CPT

## 2022-03-23 PROCEDURE — 85027 COMPLETE CBC AUTOMATED: CPT

## 2022-03-23 PROCEDURE — G8484 FLU IMMUNIZE NO ADMIN: HCPCS | Performed by: NURSE PRACTITIONER

## 2022-03-23 PROCEDURE — 93284 PRGRMG EVAL IMPLANTABLE DFB: CPT | Performed by: INTERNAL MEDICINE

## 2022-03-23 PROCEDURE — G8417 CALC BMI ABV UP PARAM F/U: HCPCS | Performed by: NURSE PRACTITIONER

## 2022-03-23 PROCEDURE — 36415 COLL VENOUS BLD VENIPUNCTURE: CPT

## 2022-03-23 PROCEDURE — 93290 INTERROG DEV EVAL ICPMS IP: CPT | Performed by: INTERNAL MEDICINE

## 2022-03-23 ASSESSMENT — ENCOUNTER SYMPTOMS
GASTROINTESTINAL NEGATIVE: 1
SINUS PRESSURE: 1
RESPIRATORY NEGATIVE: 1

## 2022-03-23 NOTE — PATIENT INSTRUCTIONS
Instructions:   1. Medications: Decrease lasix to once a day until I call you about labs, add over the counter claritin/allegra/zyrtec for seasonal allergies and may also add plain mucinex  2. Labs: today   3.  Follow up: as scheduled        Karen: [phone]

## 2022-03-23 NOTE — PROGRESS NOTES
Patient comes in for programming evaluation for her defibrillator. All sensing and pacing parameters are within normal range. Battery life 6.3 years  AP 93.6%.  99.5%. 1 AF episode noted on 12/15/2021 lasting >99 hours. 2 NSVT episodes noted. Last on 2/7/2022, longest 1 second. Patient remains on Xarelto and Coreg. No changes made this Session. Please see interrogation for more detail. Optivol is within normal range. Patient will see Karley Rhoades today and follow up in 3 months in office or remotely.

## 2022-03-24 ENCOUNTER — TELEPHONE (OUTPATIENT)
Dept: CARDIOLOGY CLINIC | Age: 57
End: 2022-03-24

## 2022-03-24 NOTE — TELEPHONE ENCOUNTER
----- Message from CATRACHITO Stevenson CNP sent at 3/24/2022 10:27 AM EDT -----  Labs ok except potassium is low which decreasing lasix should help. Continue lasix only once a day for now, we can repeat labs in 4 weeks.  Anjali Liu

## 2022-03-28 ENCOUNTER — OFFICE VISIT (OUTPATIENT)
Dept: SURGERY | Age: 57
End: 2022-03-28
Payer: MEDICAID

## 2022-03-28 VITALS — SYSTOLIC BLOOD PRESSURE: 118 MMHG | DIASTOLIC BLOOD PRESSURE: 74 MMHG | BODY MASS INDEX: 35.61 KG/M2 | WEIGHT: 224 LBS

## 2022-03-28 DIAGNOSIS — K43.2 INCISIONAL HERNIA, WITHOUT OBSTRUCTION OR GANGRENE: Primary | ICD-10-CM

## 2022-03-28 PROCEDURE — 99213 OFFICE O/P EST LOW 20 MIN: CPT | Performed by: SURGERY

## 2022-03-28 PROCEDURE — G8417 CALC BMI ABV UP PARAM F/U: HCPCS | Performed by: SURGERY

## 2022-03-28 PROCEDURE — 1036F TOBACCO NON-USER: CPT | Performed by: SURGERY

## 2022-03-28 PROCEDURE — G8427 DOCREV CUR MEDS BY ELIG CLIN: HCPCS | Performed by: SURGERY

## 2022-03-28 PROCEDURE — 3017F COLORECTAL CA SCREEN DOC REV: CPT | Performed by: SURGERY

## 2022-03-28 PROCEDURE — G8484 FLU IMMUNIZE NO ADMIN: HCPCS | Performed by: SURGERY

## 2022-03-28 NOTE — PROGRESS NOTES
[facility]  SUBJECTIVE:    Chief Complaint: incisional hernia    Per Garcia   1965   61 y.o. female presents for follow-up regarding an incisional hernia. She is known to me after exploratory laparotomy, cholecystectomy with cholangiogram and excision of benign duodenal mass on 4/13/2021 for upper GI bleeding and symptomatic cholelithiasis. Abdominal complaint is bloating and pressure, tightness primarily when exerting herself while standing and then resting while remaining in standing position. The tightness feeling will last for less than a minute but is very uncomfortable. This is unchanged. Not associated with nausea vomiting or bowel movements. Feels muscular and not bowel related. Recent endoscopy uneventful and unrevealing.  Cardiology followup, s/p cardioversion for atrial fibrillation, still feels chest pressure and discomfort    Past Medical History:   Diagnosis Date    Anemia     Atrial fibrillation and flutter (HCC)     Atrial flutter (HCC)     CHB (complete heart block) (HCC)     Class 1 obesity without serious comorbidity with body mass index (BMI) of 33.0 to 33.9 in adult 9/6/2019    Congenital heart disease     GERD (gastroesophageal reflux disease)     Headache(784.0)     History of complete heart block     Hyperlipidemia     Hypertension     PONV (postoperative nausea and vomiting)     Sleep apnea     uses CPAP    Syncope     Systolic CHF, chronic ([facility] [address] 10/3/2018     Past Surgical History:   Procedure Laterality Date    CARDIAC DEFIBRILLATOR PLACEMENT  01/2021    Medtronic    CARDIOVERSION  01/28/2022    CARDIOVERSION  02/22/2022    COLECTOMY N/A 4/13/2021    EXPLORATORY LAPAROTOMY, RESECTION OF DUODENAL MASS, CHOLECYSTECTOMY WITH INTRAOPERATIVE CHOLANGIOGRAM performed by Jasmin Myrick MD at [address] [address] COLONOSCOPY N/A 10/27/2020    COLONOSCOPY POLYPECTOMY SNARE/COLD BIOPSY performed by Damaris Chapman MD at [address] [facility] ENDOSCOPY    PACEMAKER INSERTION  11/29/12    dual chamber PPM, [employer]tronic    TUBAL LIGATION      UPPER GASTROINTESTINAL ENDOSCOPY N/A 10/27/2020    EGD DIAGNOSTIC ONLY performed by Polo Miller MD at [place] [address] 4/8/2021    EGD CONTROL HEMORRHAGE WITH APPLICATION OF 3 CLIPS TO DUODENAL MASS performed by Luiz Alfred MD at [place] [address] N/A 4/8/2021    EGD BIOPSY DUODENAL MASS performed by Luiz Alfred MD at [place] [address] N/A 4/8/2021    EGD SUBMUCOSAL INJECTION OF 0.6 MG OF EPINEPRINE INTO BASE OF DUODENAL MASS performed by Luiz Alfred MD at [address] [place] [address] ENDOSCOPY N/A 3/4/2022    EGD BIOPSY performed by Polo Miller MD at [facility] History     Socioeconomic History    Marital status:      Spouse name: Not on file    Number of children: 3    Years of education: Not on file    Highest education level: Not on file   Occupational History    Not on file   Tobacco Use    Smoking status: Never Smoker    Smokeless tobacco: Never Used   Vaping Use    Vaping Use: Never used   Substance and Sexual Activity    Alcohol use: No    Drug use: No    Sexual activity: Yes     Partners: Male   Other Topics Concern    Not on file   Social History Narrative    Not on file     Social Determinants of Health     Financial Resource Strain:     Difficulty of Paying Living Expenses: Not on file   Food Insecurity:     Worried About Running Out of Food in the Last Year: Not on file    Marcia of Food in the Last Year: Not on file   Transportation Needs:     Lack of Transportation (Medical): Not on file    Lack of Transportation (Non-Medical):  Not on file   Physical Activity:     Days of Exercise per Week: Not on file    Minutes of Exercise per Session: Not on file   Stress:     Feeling of Stress : Not on file   Social Connections:     Frequency of Communication with Friends and Family: Not on file    Frequency of Social Gatherings with Friends and Family: Not on file    Attends Anabaptism Services: Not on file    Active Member of Clubs or Organizations: Not on file    Attends Club or Organization Meetings: Not on file    Marital Status: Not on file   Intimate Partner Violence:     Fear of Current or Ex-Partner: Not on file    Emotionally Abused: Not on file    Physically Abused: Not on file    Sexually Abused: Not on file   Housing Stability:     Unable to Pay for Housing in the Last Year: Not on file    Number of [place] in the Last Year: Not on file    Unstable Housing in the Last Year: Not on file      Family History   Problem Relation Age of Onset    Cancer Father     Heart Disease Neg Hx     High Blood Pressure Neg Hx     High Cholesterol Neg Hx      Current Outpatient Medications   Medication Sig Dispense Refill    spironolactone (ALDACTONE) 25 MG tablet Take 1 tablet by mouth daily 90 tablet 1    sacubitril-valsartan (ENTRESTO) 24-26 MG per tablet TAKE 1/2 TABLET BY MOUTH EVERY MORNING AND ONE TABLET EVERY EVENING AS DIRECTED 180 tablet 1    furosemide (LASIX) 20 MG tablet Take 2 tablets by mouth daily 180 tablet 1    carvedilol (COREG) 6.25 MG tablet Take 1 tablet by mouth 2 times daily 180 tablet 1    rivaroxaban (XARELTO) 20 MG TABS tablet Take 1 tablet by mouth Daily with supper 90 tablet 1    vitamin D3 (CHOLECALCIFEROL) 25 MCG (1000 UT) TABS tablet Take 1 tablet by mouth daily 30 tablet 5    atorvastatin (LIPITOR) 40 MG tablet Take 1 tablet by mouth daily 60 tablet 2    pantoprazole (PROTONIX) 40 MG tablet Take 1 tablet by mouth every morning (before breakfast) 30 tablet 3    Multiple Vitamins-Minerals (THERAPEUTIC MULTIVITAMIN-MINERALS) tablet Take 1 tablet by mouth daily       No current facility-administered medications for this visit.       Allergies Allergen Reactions    Latex      rash    Codeine      Hives      Lisinopril      cough    Nitroglycerin Hives    Sulfa Antibiotics Nausea Only    Hydralazine      headaches        Review of Systems:  Review of systems performed and negative with the exception of the above findings    OBJECTIVE:  /74   Wt 224 lb (101.6 kg)   BMI 35.61 kg/m²      Physical Exam:  General appearance: alert, appears stated age, cooperative and no distress  Head: Normocephalic, without obvious abnormality, atraumatic  Lungs: clear to auscultation bilaterally  Heart: regular rate and rhythm, S1, S2 normal, no murmur, click, rub or gallop  Abdomen: Soft, nondistended, nontender, incisional hernia with normal overlying skin is easily reducible but with wide fascial defect       Hospital Outpatient Visit on 03/23/2022   Component Date Value Ref Range Status    TSH 03/23/2022 1.74  0.27 - 4.20 uIU/mL Final    WBC 03/23/2022 5.7  4.0 - 11.0 K/uL Final    RBC 03/23/2022 3.90* 4.00 - 5.20 M/uL Final    Hemoglobin 03/23/2022 12.2  12.0 - 16.0 g/dL Final    Hematocrit 03/23/2022 35.9* 36.0 - 48.0 % Final    MCV 03/23/2022 91.9  80.0 - 100.0 fL Final    MCH 03/23/2022 31.4  26.0 - 34.0 pg Final    MCHC 03/23/2022 34.1  31.0 - 36.0 g/dL Final    RDW 03/23/2022 15.4  12.4 - 15.4 % Final    Platelets 93/09/2869 246  135 - 450 K/uL Final    MPV 03/23/2022 7.8  5.0 - 10.5 fL Final    Pro-BNP 03/23/2022 85  0 - 124 pg/mL Final    Comment: Methodology by NT-proBNP    An age-independent cutoff point of 300 pg/ml has a 98%  negative predictive value excluding acute heart failure. Values exceeding the age-related cutoff values (450 pg/mL if  age<50, 900 if 50-75 and 1800 if >75) has 90% sensitivity and  84% specificity for diagnosing acute HF. In patients with  renal compromise (eGFR<60) values greater than 1200pg/ml have  a diagnostic sensitivity and specificity of 89% and 72% for  acute HF.       Sodium 03/23/2022 142  136 - 145 mmol/L Final    Potassium 03/23/2022 3.1* 3.5 - 5.1 mmol/L Final    Chloride 03/23/2022 104  99 - 110 mmol/L Final    CO2 03/23/2022 26  21 - 32 mmol/L Final    Anion Gap 03/23/2022 12  3 - 16 Final    Glucose 03/23/2022 104* 70 - 99 mg/dL Final    BUN 03/23/2022 8  7 - 20 mg/dL Final    CREATININE 03/23/2022 0.8  0.6 - 1.1 mg/dL Final    GFR Non- 03/23/2022 >60  >60 Final    Comment: >60 mL/min/1.73m2 EGFR, calc. for ages 25 and older using the  MDRD formula (not corrected for weight), is valid for stable  renal function.  GFR  03/23/2022 >60  >60 Final    Comment: Chronic Kidney Disease: less than 60 ml/min/1.73 sq.m. Kidney Failure: less than 15 ml/min/1.73 sq.m. Results valid for patients 18 years and older.  Calcium 03/23/2022 9.3  8.3 - 10.6 mg/dL Final       No results found. ASSESSMENT:  Reducible incisional hernia     Plan:  1. While the hernia would benefit from repair at some point, recommend risk optimization including weight loss and continued cardiac workup, having continued chest discomfort which will complicate hernia recovery  2. The patient has a hernia that would benefit from repair at some point. We discussed perioperative risk. Previously using the [employer] for Determining Associated Risks pedro (CEDAR), the patient's risk of perioperative complications was approximately 23% with increased risk associated with possible need for musculocutaneous flaps as well as weight. Weight loss could decrease her risk to 15% and even lower if flaps are not necessary  3. Warning signs of an incarcerated hernia include inability to reduce the bulge, increased and constant pain, nausea, vomiting, fevers, chills and constipation. This is a surgical emergency and the patient should contact the office or present to an emergency department  4. Return to office in 3 months to discuss progress and further options    Flavio Neil MD, FACS  3/28/2022  9:18 AM

## 2022-03-28 NOTE — PATIENT INSTRUCTIONS
1. While the hernia would benefit from repair at some point, recommend risk optimization including weight loss and continued cardiac workup, having continued chest discomfort which will complicate hernia recovery  2. The patient has a hernia that would benefit from repair at some point. We discussed perioperative risk. [facility] Associated [facility]isks pedro (CEDAR), the patient's risk of perioperative complications was approximately 23% with increased risk associated with possible need for musculocutaneous flaps as well as weight. Weight loss could decrease her risk to 15% and even lower if flaps are not necessary  3. Warning signs of an incarcerated hernia include inability to reduce the bulge, increased and constant pain, nausea, vomiting, fevers, chills and constipation. This is a surgical emergency and the patient should contact the office or present to an emergency department  4.  Return to office in 3 months to discuss progress and further options

## 2022-05-09 ENCOUNTER — TELEPHONE (OUTPATIENT)
Dept: CARDIOLOGY CLINIC | Age: 57
End: 2022-05-09

## 2022-05-09 DIAGNOSIS — I50.22 SYSTOLIC CHF, CHRONIC (HCC): Primary | ICD-10-CM

## 2022-05-09 NOTE — TELEPHONE ENCOUNTER
Patient called in requesting that [facility] put in orders for a full lab workup before her echo and appointment in the morning.     Patient can be reached at [phone]

## 2022-05-10 ENCOUNTER — OFFICE VISIT (OUTPATIENT)
Dept: CARDIOLOGY CLINIC | Age: 57
End: 2022-05-10
Payer: MEDICAID

## 2022-05-10 ENCOUNTER — NURSE ONLY (OUTPATIENT)
Dept: CARDIOLOGY CLINIC | Age: 57
End: 2022-05-10
Payer: MEDICAID

## 2022-05-10 ENCOUNTER — HOSPITAL ENCOUNTER (OUTPATIENT)
Age: 57
Discharge: HOME | End: 2022-05-10
Payer: MEDICAID

## 2022-05-10 ENCOUNTER — HOSPITAL ENCOUNTER (OUTPATIENT)
Dept: NON INVASIVE DIAGNOSTICS | Age: 57
Discharge: HOME | End: 2022-05-10
Payer: MEDICAID

## 2022-05-10 VITALS
HEIGHT: 67 IN | DIASTOLIC BLOOD PRESSURE: 98 MMHG | OXYGEN SATURATION: 97 % | RESPIRATION RATE: 20 BRPM | BODY MASS INDEX: 35 KG/M2 | WEIGHT: 223 LBS | HEART RATE: 86 BPM | SYSTOLIC BLOOD PRESSURE: 130 MMHG

## 2022-05-10 DIAGNOSIS — I48.19 PERSISTENT ATRIAL FIBRILLATION (HCC): ICD-10-CM

## 2022-05-10 DIAGNOSIS — Z95.810 ICD (IMPLANTABLE CARDIOVERTER-DEFIBRILLATOR), BIVENTRICULAR, IN SITU: ICD-10-CM

## 2022-05-10 DIAGNOSIS — I50.42 CHRONIC COMBINED SYSTOLIC AND DIASTOLIC HEART FAILURE (HCC): ICD-10-CM

## 2022-05-10 DIAGNOSIS — Q24.6 CONGENITAL HEART BLOCK: ICD-10-CM

## 2022-05-10 DIAGNOSIS — I42.0 DILATED CARDIOMYOPATHY (HCC): ICD-10-CM

## 2022-05-10 DIAGNOSIS — I47.29 PAROXYSMAL VENTRICULAR TACHYCARDIA: ICD-10-CM

## 2022-05-10 DIAGNOSIS — I51.9 LV DYSFUNCTION: ICD-10-CM

## 2022-05-10 DIAGNOSIS — I48.0 PAROXYSMAL ATRIAL FIBRILLATION (HCC): ICD-10-CM

## 2022-05-10 DIAGNOSIS — E55.9 HYPOVITAMINOSIS D: Primary | ICD-10-CM

## 2022-05-10 DIAGNOSIS — G47.33 OSA (OBSTRUCTIVE SLEEP APNEA): ICD-10-CM

## 2022-05-10 DIAGNOSIS — I50.22 SYSTOLIC CHF, CHRONIC (HCC): ICD-10-CM

## 2022-05-10 DIAGNOSIS — I51.7 LVH (LEFT VENTRICULAR HYPERTROPHY): ICD-10-CM

## 2022-05-10 LAB
ANION GAP SERPL CALCULATED.3IONS-SCNC: 11 MMOL/L (ref 3–16)
BUN BLDV-MCNC: 8 MG/DL (ref 7–20)
CALCIUM SERPL-MCNC: 9.5 MG/DL (ref 8.3–10.6)
CHLORIDE BLD-SCNC: 104 MMOL/L (ref 99–110)
CO2: 25 MMOL/L (ref 21–32)
CREAT SERPL-MCNC: 0.9 MG/DL (ref 0.6–1.1)
GFR AFRICAN AMERICAN: >60
GFR NON-AFRICAN AMERICAN: >60
GLUCOSE BLD-MCNC: 102 MG/DL (ref 70–99)
POTASSIUM SERPL-SCNC: 3.2 MMOL/L (ref 3.5–5.1)
PRO-BNP: 186 PG/ML (ref 0–124)
SODIUM BLD-SCNC: 140 MMOL/L (ref 136–145)

## 2022-05-10 PROCEDURE — G8427 DOCREV CUR MEDS BY ELIG CLIN: HCPCS | Performed by: CLINICAL NURSE SPECIALIST

## 2022-05-10 PROCEDURE — 6360000004 HC RX CONTRAST MEDICATION: Performed by: CLINICAL NURSE SPECIALIST

## 2022-05-10 PROCEDURE — 3017F COLORECTAL CA SCREEN DOC REV: CPT | Performed by: CLINICAL NURSE SPECIALIST

## 2022-05-10 PROCEDURE — 1036F TOBACCO NON-USER: CPT | Performed by: CLINICAL NURSE SPECIALIST

## 2022-05-10 PROCEDURE — 80048 BASIC METABOLIC PNL TOTAL CA: CPT

## 2022-05-10 PROCEDURE — 99214 OFFICE O/P EST MOD 30 MIN: CPT | Performed by: CLINICAL NURSE SPECIALIST

## 2022-05-10 PROCEDURE — 93284 PRGRMG EVAL IMPLANTABLE DFB: CPT | Performed by: INTERNAL MEDICINE

## 2022-05-10 PROCEDURE — G8417 CALC BMI ABV UP PARAM F/U: HCPCS | Performed by: CLINICAL NURSE SPECIALIST

## 2022-05-10 PROCEDURE — 83880 ASSAY OF NATRIURETIC PEPTIDE: CPT

## 2022-05-10 PROCEDURE — 93325 DOPPLER ECHO COLOR FLOW MAPG: CPT

## 2022-05-10 PROCEDURE — 36415 COLL VENOUS BLD VENIPUNCTURE: CPT

## 2022-05-10 PROCEDURE — 93290 INTERROG DEV EVAL ICPMS IP: CPT | Performed by: INTERNAL MEDICINE

## 2022-05-10 PROCEDURE — 93308 TTE F-UP OR LMTD: CPT

## 2022-05-10 RX ORDER — FLUTICASONE PROPIONATE 50 MCG
SPRAY, SUSPENSION (ML) NASAL
COMMUNITY
Start: 2022-03-21

## 2022-05-10 RX ORDER — DIAPER,BRIEF,INFANT-TODD,DISP
EACH MISCELLANEOUS
COMMUNITY
Start: 2022-04-20

## 2022-05-10 RX ORDER — AMMONIUM LACTATE 12 G/100G
LOTION TOPICAL
COMMUNITY
Start: 2022-04-20

## 2022-05-10 RX ADMIN — PERFLUTREN 1.65 MG: 6.52 INJECTION, SUSPENSION INTRAVENOUS at 10:59

## 2022-05-10 NOTE — PROGRESS NOTES
[address]  Progress Note    Primary Care Doctor:  No primary care provider on file. Chief Complaint   Patient presents with    Hypertension     Patient return to office for follow up on ECHO    Hyperlipidemia    Congestive Heart Failure        History of Present Illness:  61 y.o. female with history of sHF, LVH, AF on xarelto (follows with Dr Camelia Alvarez), had been on flecainide, dual chamber pacemaker 2012 due to congenital heart block  LVEF had been 55% in 2015 and now 25%. No lisinopril due to cough. She is a cook at [employer]  Lip numbness to entresto 3/2020 hydralazine caused headaches  10/2020 EGD (hiatal hernia) and colonoscopy (polyp)   ICD placed 1/22/21, LHC done 1/20/21 normal cors  4/21 benign duodenal mass with resection Dr Emely Little  4/7-19/2021 for symptomatic anemia, requiring surgery by Dr Emely Little for overlying lipoma, large duodenum lesion requiring 3 clips    I had the pleasure of seeing Nadeen Jaquez in follow up for systolic heart failure. She is ambulatory and by her self. She had an echo done today with preliminary 35-40ish. She had blood work done which is pending. Her optival today shows a terminated arrhythmia even on April 18th. She had an event on march 23rd (potassium low at that time, lasix decreased). She continues to have issues with a surgical incision/hernia. She is seeing surgery later this week. She denies any chest pain, palpitations or edema. Her biggest complaint is pain at hernia area and fatigue.     Past Medical History:   has a past medical history of Anemia, Atrial fibrillation and flutter ([address] Atrial flutter ([address] CHB (complete heart block) ([address] Class 1 obesity without serious comorbidity with body mass index (BMI) of 33.0 to 33.9 in adult, Congenital heart disease, GERD (gastroesophageal reflux disease), Headache(784.0), History of complete heart block, Hyperlipidemia, Hypertension, PONV (postoperative nausea and vomiting), Sleep apnea, Syncope, and Systolic CHF, chronic ([facility] [address] Surgical History:   has a past surgical history that includes Uterine fibroid surgery; Pacemaker insertion (11/29/12); Tubal ligation; Upper gastrointestinal endoscopy (N/A, 10/27/2020); Colonoscopy (N/A, 10/27/2020); Cardiac defibrillator placement (01/2021); Upper gastrointestinal endoscopy (N/A, 4/8/2021); Upper gastrointestinal endoscopy (N/A, 4/8/2021); Upper gastrointestinal endoscopy (N/A, 4/8/2021); colectomy (N/A, 4/13/2021); Cardioversion (01/28/2022); Cardioversion (02/22/2022); and Upper gastrointestinal endoscopy (N/A, 3/4/2022). Social History:    reports that she has never smoked. She has never used smokeless tobacco. She reports that she does not drink alcohol and does not use drugs. Family History:   Family History   Problem Relation Age of Onset    Cancer Father     Heart Disease Neg Hx     High Blood Pressure Neg Hx     High Cholesterol Neg Hx        Home Medications:  Prior to Admission medications    Medication Sig Start Date End Date Taking?  Authorizing Provider   ammonium lactate (LAC-HYDRIN) 12 % lotion  4/20/22  Yes Historical Provider, MD   fluticasone (FLONASE) 50 MCG/ACT nasal spray  3/21/22  Yes Historical Provider, MD   hydrocortisone 1 % cream  4/20/22  Yes Historical Provider, MD   spironolactone (ALDACTONE) 25 MG tablet Take 1 tablet by mouth daily 12/10/21  Yes CATRACHITO Vaz   sacubitril-valsartan (ENTRESTO) 24-26 MG per tablet TAKE 1/2 TABLET BY MOUTH EVERY MORNING AND ONE TABLET EVERY EVENING AS DIRECTED 12/10/21  Yes CATRACHITO Laurent   furosemide (LASIX) 20 MG tablet Take 2 tablets by mouth daily 12/10/21  Yes CATRACHITO Laurent   carvedilol (COREG) 6.25 MG tablet Take 1 tablet by mouth 2 times daily 12/10/21  Yes CATRACHITO Laurent   rivaroxaban (XARELTO) 20 MG TABS tablet Take 1 tablet by mouth Daily with supper 12/10/21  Yes CTARACHITO Elizalde - EDILSON   vitamin D3 (CHOLECALCIFEROL) 25 MCG (1000 UT) TABS tablet Take 1 tablet by mouth daily 12/1/21  Yes CATRACHITO Keenan   atorvastatin (LIPITOR) 40 MG tablet Take 1 tablet by mouth daily 6/11/21  Yes CATRACHITO Keenan - CNS   pantoprazole (PROTONIX) 40 MG tablet Take 1 tablet by mouth every morning (before breakfast) 4/13/21  Yes Prince Berry MD   Multiple Vitamins-Minerals (THERAPEUTIC MULTIVITAMIN-MINERALS) tablet Take 1 tablet by mouth daily   Yes Historical Provider, MD        Allergies:  Latex, Codeine, Lisinopril, Nitroglycerin, Sulfa antibiotics, and Hydralazine     Review of Systems:   · Constitutional: there has been no unanticipated weight loss. There's been no change in energy level, sleep pattern, or activity level. · Eyes: No visual changes or diplopia. No scleral icterus. · ENT: No Headaches, hearing loss or vertigo. No mouth sores or sore throat. · Cardiovascular: Reviewed in HPI  · Respiratory: No cough or wheezing, no sputum production. No hematemesis. · Gastrointestinal: No abdominal pain, appetite loss, blood in stools. No change in bowel or bladder habits. · Genitourinary: No dysuria, trouble voiding, or hematuria. · Musculoskeletal:  No gait disturbance, weakness or joint complaints. · Integumentary: No rash or pruritis. · Neurological: No headache, diplopia, change in muscle strength, numbness or tingling. No change in gait, balance, coordination, mood, affect, memory, mentation, behavior. · Psychiatric: No anxiety, no depression. · Endocrine: No malaise, fatigue or temperature intolerance. No excessive thirst, fluid intake, or urination. No tremor. · Hematologic/Lymphatic: No abnormal bruising or bleeding, blood clots or swollen lymph nodes. · Allergic/Immunologic: No nasal congestion or hives.     Physical Examination:    Vitals:    05/10/22 1015   BP: (!) 130/98   Site: Left Upper Arm   Position: Sitting   Cuff Size: Large Adult   Pulse: 86   Resp: 20   SpO2: 97% Weight: 223 lb (101.2 kg)   Height: 5' 6.5\" (1.689 m)        Constitutional and General Appearance: Warm and dry, no apparent distress, normal coloration  HEENT:  Normocephalic, atraumatic  Respiratory:  · Normal excursion and expansion without use of accessory muscles  · Resp Auscultation: Normal breath sounds without dullness  Cardiovascular:  · The apical impulses not displaced  · Heart tones are crisp and normal  · JVP normal cm H2O  · regular rate and rhythm  · Peripheral pulses are symmetrical and full  · There is no clubbing, cyanosis of the extremities.   · no edema  · Pedal Pulses: 2+ and equal   Abdomen: abdominal hernia  · No masses or tenderness  · Liver/Spleen: No Abnormalities Noted  Neurological/Psychiatric:  · Alert and oriented in all spheres  · Moves all extremities well  · Exhibits normal gait balance and coordination  · No abnormalities of mood, affect, memory, mentation, or behavior are noted    Lab Data:    CBC:   Lab Results   Component Value Date    WBC 5.7 03/23/2022    WBC 5.4 12/01/2021    WBC 4.8 09/10/2021    RBC 3.90 03/23/2022    RBC 4.10 12/01/2021    RBC 4.14 09/10/2021    HGB 12.2 03/23/2022    HGB 12.7 12/01/2021    HGB 12.2 09/10/2021    HCT 35.9 03/23/2022    HCT 36.9 12/01/2021    HCT 36.4 09/10/2021    MCV 91.9 03/23/2022    MCV 90.0 12/01/2021    MCV 87.8 09/10/2021    RDW 15.4 03/23/2022    RDW 15.5 12/01/2021    RDW 17.2 09/10/2021     03/23/2022     12/01/2021     09/10/2021     BMP:  Lab Results   Component Value Date     03/23/2022     12/01/2021     09/10/2021    K 3.1 03/23/2022    K 3.3 12/01/2021    K 3.6 09/10/2021    K 3.7 04/07/2021    K 3.0 01/24/2021    K 3.8 01/20/2021     03/23/2022     12/01/2021     09/10/2021    CO2 26 03/23/2022    CO2 28 12/01/2021    CO2 26 09/10/2021    PHOS 2.4 04/17/2021    PHOS 2.2 04/15/2021    PHOS 2.8 04/14/2021    BUN 8 03/23/2022    BUN 9 12/01/2021    BUN 10 09/10/2021 CREATININE 0.8 03/23/2022    CREATININE 0.8 12/01/2021    CREATININE 0.7 09/10/2021     BNP:   Lab Results   Component Value Date    PROBNP 85 03/23/2022    PROBNP 60 12/01/2021    PROBNP 322 09/10/2021     Cardiac Imaging:  Echo 5/10/2022 pending      Echo 2/9/2021  Summary   Left ventricular size is mildly increased. Moderately reduced global systolic function with an ejection fraction   estimated at 30-35%. Global hypokinesis noted. Grade II diastolic dysfunction with elevated LV filling pressures. There is mild mitral regurgitation noted. Mild left atrial enlargement noted. Aortic valve appears sclerotic but opens adequately. Mild aortic regurgitation. There is mild tricuspid regurgitation with a RVSP estimation of 25 mmHg. Pacer / ICD wire is visualized in the right ventricle. The right ventricle is normal in size and function    [facility] Dr Patrick Alfredo 1/20/21  Findings:  Artery Findings/Result   LM Normal   LAD Normal   Cx Normal   RI NA   RCA Normal   LVEDP 6   LVG NA      Intervention:         None     Post Cath Dx:       Normal coronaries      Echo 2/24/2020  Summary   -Limited echocardiogram was performed to evaluate EF.   -Normal left ventricle size, mild to moderate left ventricular hypertrophy,   and moderately reduced systolic function with an estimated ejection fraction   of 30-35%. -There is moderate diffuse hypokinesis. -Grade III diastolic dysfunction . E/e\"=10.7.   -Mild mitral regurgitation.   -Mild tricuspid regurgitation.   -The left atrium is mild to moderate dilated.    -Right ventricular systolic function appears mildly reduced .   -Estimated pulmonary artery systolic pressure is borderline normal at 28-33   mmHg assuming a right atrial pressure of 8 mmHg.   -Pacer / ICD wire is visualized in the right heart.     Echo 8/12/2019  Summary   -Limited echocardiogram was performed to evaluate LV ejection fraction.   -Left ventricular cavity size is mildly dilated.   -There is moderate concentric left ventricular hypertrophy.   -Overall left ventricular systolic function appears moderately reduced with   an ejection fraction on the 30-35%.  -There is moderate global diffuse hypokinesis.   -Indeterminate diastolic dysfunction due to irregular heart rate.   -Moderate mitral regurgitation.   -Aortic valve appears sclerotic but opens adequately.   -Mild to moderate tricuspid regurgitation.   -The left atrium is moderately dilated.   -Pacer / ICD wire is visualized in the right heart. Stress test 9/11/18  Enlarged LV with mild global hypokinesis. EF 51%  There is normal isotope uptake at stress and rest. There is no evidence of  myocardial ischemia or scar. ECHO 7/24/18:  Summary   -Left ventricular cavity size is mildly dilated. -There is moderate concentric left ventricular hypertrophy.   -Overall left ventricular systolic function appears severely reduced with  and ejection fraction on the 25 % range.   -There is diffuse global hypokinesis. -Grade II diastolic dysfunction with elevated LV filling pressures. E/e\"=16.2.   -Mitral annular calcification is present. -Mild-moderate mitral regurgitation.   -Aortic valve appears sclerotic but opens adequately. -Trivial aortic regurgitation.   -Trivial tricuspid regurgitation with RVSP of 26 mmHg. -Mild pulmonic regurgitation present.   -Dilated left atrium with a volume of 72 ml.   -Pacer / ICD wire is visualized in the right heart. GXT cathie 7/15/2015:   Left ventricular ejection fraction of 55%. The LV wall motion is normal.  There is normal isotope uptake at stress and rest.   There is no evidence of myocardial ischemia or scar. Assessment:    1. Chronic combined systolic and diastolic heart failure (HCC) on arni, bb and aldosterone antagonist   2. Hypovitaminosis D    3. Paroxysmal atrial fibrillation (HCC)    4. DERECK (obstructive sleep apnea)    5.  ICD (implantable cardioverter-defibrillator), biventricular, in situ Plan:   1. Increase entresto 49-51 mg twice a day  2. Start jardiance 10 mg once a day  3. Stop lasix   4. Continue all other medications  5. Check blood work in 2 weeks  6.   RTO in 1 month    CATRACHITO Tony - CNS, CNS, 5/10/2022, 10:25 AM

## 2022-05-10 NOTE — PROGRESS NOTES
Patient comes in for programming evaluation for her defibrillator. All sensing and pacing parameters are within normal range. Battery life 6.0 years  AP 93.8%.  99.6%. Effective 99.3%  1 NSVT episode noted on 2/7/2022 lasting 13 seconds. Patient remains on Xarelto and Coreg. No changes made this Session. Please see interrogation for more detail. Optivol is within normal range. Patient will see NPRG today and follow up in 3 months in office or remotely.

## 2022-05-10 NOTE — PATIENT INSTRUCTIONS
1.  Increase entresto 49-51 mg twice a day  2. Start jardiance 10 mg once a day  3. Stop lasix   4. Continue all other medications  5. Check blood work in 2 weeks  6.   RTO in 1 month

## 2022-05-11 ENCOUNTER — TELEPHONE (OUTPATIENT)
Dept: CARDIOLOGY CLINIC | Age: 57
End: 2022-05-11

## 2022-05-11 DIAGNOSIS — I50.22 SYSTOLIC CHF, CHRONIC (HCC): Primary | ICD-10-CM

## 2022-05-11 RX ORDER — POTASSIUM CHLORIDE 750 MG/1
10 TABLET, EXTENDED RELEASE ORAL DAILY
Qty: 90 TABLET | Refills: 1 | Status: SHIPPED | OUTPATIENT
Start: 2022-05-11

## 2022-05-11 NOTE — TELEPHONE ENCOUNTER
----- Message from CATRACHITO Wilson - CNS sent at 5/11/2022  9:01 AM EDT -----  Potassium is 3.2 lets start potassium 10 meq daily  Fluid level is up but I am hoping that the increased entresto will help this   Recheck labs in 2 weeks  thanks

## 2022-05-11 NOTE — TELEPHONE ENCOUNTER
----- Message from CATRACHITO Baze - CNS sent at 5/10/2022 12:35 PM EDT -----  Let her know echo squeeze is 35-40%

## 2022-05-12 ENCOUNTER — TELEPHONE (OUTPATIENT)
Dept: CARDIOLOGY CLINIC | Age: 57
End: 2022-05-12

## 2022-05-12 NOTE — TELEPHONE ENCOUNTER
Pt called asking if the RX for the Entresro was correct for the 49-51 MG 1 tablet BID,   Pt she was taking the Entresto 24-26 1/2 tablet am and whole tablet in the evening? Pt stated she will not start the medication until she hears from someone.     Pls advise thank you

## 2022-05-12 NOTE — TELEPHONE ENCOUNTER
Called patient and [facility] for patient to return call to office for message below.

## 2022-05-12 NOTE — TELEPHONE ENCOUNTER
Pt returned call and per NPRG she is to supposed to be on the new dose of 49/51 BID.  Told pt that it is the correct dose

## 2022-05-13 ENCOUNTER — OFFICE VISIT (OUTPATIENT)
Dept: SURGERY | Age: 57
End: 2022-05-13
Payer: MEDICAID

## 2022-05-13 VITALS — BODY MASS INDEX: 35.14 KG/M2 | WEIGHT: 221 LBS | DIASTOLIC BLOOD PRESSURE: 70 MMHG | SYSTOLIC BLOOD PRESSURE: 134 MMHG

## 2022-05-13 DIAGNOSIS — K43.2 INCISIONAL HERNIA, WITHOUT OBSTRUCTION OR GANGRENE: Primary | ICD-10-CM

## 2022-05-13 PROCEDURE — G8427 DOCREV CUR MEDS BY ELIG CLIN: HCPCS | Performed by: SURGERY

## 2022-05-13 PROCEDURE — 3017F COLORECTAL CA SCREEN DOC REV: CPT | Performed by: SURGERY

## 2022-05-13 PROCEDURE — 99213 OFFICE O/P EST LOW 20 MIN: CPT | Performed by: SURGERY

## 2022-05-13 PROCEDURE — G8417 CALC BMI ABV UP PARAM F/U: HCPCS | Performed by: SURGERY

## 2022-05-13 PROCEDURE — 1036F TOBACCO NON-USER: CPT | Performed by: SURGERY

## 2022-05-13 NOTE — PROGRESS NOTES
[facility]  SUBJECTIVE:    Chief Complaint: incisional hernia    Dominik Bobby   1965   61 y.o. female presents with several week history of sharp preet-incisional pain worse with movement. Known incisional hernia. Sarah Phillips is known to me after exploratory laparotomy, cholecystectomy with cholangiogram and excision of benign duodenal mass on 4/13/2021 for upper GI bleeding and symptomatic cholelithiasis. Abdominal complaint is bloating and pressure, tightness primarily when exerting herself while standing and then resting while remaining in standing position. The tightness feeling will last for less than a minute but is very uncomfortable. This is unchanged. Not associated with nausea vomiting or bowel movements. Recent endoscopy uneventful and unrevealing.      Past Medical History:   Diagnosis Date    Anemia     Atrial fibrillation and flutter (HCC)     Atrial flutter (HCC)     CHB (complete heart block) (HCC)     Class 1 obesity without serious comorbidity with body mass index (BMI) of 33.0 to 33.9 in adult 9/6/2019    Congenital heart disease     GERD (gastroesophageal reflux disease)     Headache(784.0)     History of complete heart block     Hyperlipidemia     Hypertension     PONV (postoperative nausea and vomiting)     Sleep apnea     uses CPAP    Syncope     Systolic CHF, chronic ([facility] [address] 10/3/2018     Past Surgical History:   Procedure Laterality Date    CARDIAC DEFIBRILLATOR PLACEMENT  01/2021    [employer]    CARDIOVERSION  01/28/2022    CARDIOVERSION  02/22/2022    COLECTOMY N/A 4/13/2021    EXPLORATORY LAPAROTOMY, RESECTION OF DUODENAL MASS, CHOLECYSTECTOMY WITH INTRAOPERATIVE CHOLANGIOGRAM performed by Demetrius Hull MD at [address] [address] COLONOSCOPY N/A 10/27/2020    COLONOSCOPY POLYPECTOMY SNARE/COLD BIOPSY performed by Blossom Blizzard, MD at [place] [address]  11/29/12    dual chamber PPM, [employer]    TUBAL LIGATION      UPPER GASTROINTESTINAL ENDOSCOPY N/A 10/27/2020    EGD DIAGNOSTIC ONLY performed by Urvashi Berry MD at [address] [place] [address] 4/8/2021    EGD CONTROL HEMORRHAGE WITH APPLICATION OF 3 CLIPS TO DUODENAL MASS performed by Breann Saeed MD at [address] [place] [address] N/A 4/8/2021    EGD BIOPSY DUODENAL MASS performed by Breann Saeed MD at [address] [place] [address] N/A 4/8/2021    EGD SUBMUCOSAL INJECTION OF 0.6 MG OF EPINEPRINE INTO BASE OF DUODENAL MASS performed by Breann Saeed MD at [address] [place] [address] ENDOSCOPY N/A 3/4/2022    EGD BIOPSY performed by Urvashi Berry MD at [facility] History     Socioeconomic History    Marital status:      Spouse name: Not on file    Number of children: 3    Years of education: Not on file    Highest education level: Not on file   Occupational History    Not on file   Tobacco Use    Smoking status: Never Smoker    Smokeless tobacco: Never Used   Vaping Use    Vaping Use: Never used   Substance and Sexual Activity    Alcohol use: No    Drug use: No    Sexual activity: Yes     Partners: Male   Other Topics Concern    Not on file   Social History Narrative    Not on file     Social Determinants of Health     Financial Resource Strain:     Difficulty of Paying Living Expenses: Not on file   Food Insecurity:     Worried About Running Out of Food in the Last Year: Not on file    Marcia of Food in the Last Year: Not on file   Transportation Needs:     Lack of Transportation (Medical): Not on file    Lack of Transportation (Non-Medical):  Not on file   Physical Activity:     Days of Exercise per Week: Not on file    Minutes of Exercise per Session: Not on file   Stress:     Feeling of Stress : Not on file   Social Connections:     Frequency of Communication with Friends and Family: Not on file    Frequency of Social Gatherings with Friends and Family: Not on file    Attends Jain Services: Not on file    Active Member of Clubs or Organizations: Not on file    Attends Club or Organization Meetings: Not on file    Marital Status: Not on file   Intimate Partner Violence:     Fear of Current or Ex-Partner: Not on file    Emotionally Abused: Not on file    Physically Abused: Not on file    Sexually Abused: Not on file   Housing Stability:     Unable to Pay for Housing in the Last Year: Not on file    Number of [place] in the Last Year: Not on file    Unstable Housing in the Last Year: Not on file      Family History   Problem Relation Age of Onset    Cancer Father     Heart Disease Neg Hx     High Blood Pressure Neg Hx     High Cholesterol Neg Hx      Current Outpatient Medications   Medication Sig Dispense Refill    potassium chloride (KLOR-CON M) 10 MEQ extended release tablet Take 1 tablet by mouth daily 90 tablet 1    ammonium lactate (LAC-HYDRIN) 12 % lotion       fluticasone (FLONASE) 50 MCG/ACT nasal spray       hydrocortisone 1 % cream       empagliflozin (JARDIANCE) 10 MG tablet Take 1 tablet by mouth daily 30 tablet 0    sacubitril-valsartan (ENTRESTO) 49-51 MG per tablet Take 1 tablet by mouth 2 times daily 60 tablet 1    spironolactone (ALDACTONE) 25 MG tablet Take 1 tablet by mouth daily 90 tablet 1    carvedilol (COREG) 6.25 MG tablet Take 1 tablet by mouth 2 times daily 180 tablet 1    rivaroxaban (XARELTO) 20 MG TABS tablet Take 1 tablet by mouth Daily with supper 90 tablet 1    vitamin D3 (CHOLECALCIFEROL) 25 MCG (1000 UT) TABS tablet Take 1 tablet by mouth daily 30 tablet 5    atorvastatin (LIPITOR) 40 MG tablet Take 1 tablet by mouth daily 60 tablet 2    pantoprazole (PROTONIX) 40 MG tablet Take 1 tablet by mouth every morning (before breakfast) 30 tablet 3    Multiple Vitamins-Minerals (THERAPEUTIC MULTIVITAMIN-MINERALS) tablet Take 1 tablet by mouth daily       No current facility-administered medications for this visit. Allergies   Allergen Reactions    Latex      rash    Codeine      Hives      Lisinopril      cough    Nitroglycerin Hives    Sulfa Antibiotics Nausea Only    Hydralazine      headaches        Review of Systems:  Review of systems performed and negative with the exception of the above findings    OBJECTIVE:  /70   Wt 221 lb (100.2 kg)   BMI 35.14 kg/m²      Physical Exam:  General appearance: alert, appears stated age, cooperative and no distress  Head: Normocephalic, without obvious abnormality, atraumatic  Lungs: clear to auscultation bilaterally  Heart: regular rate and rhythm, S1, S2 normal, no murmur, click, rub or gallop  Abdomen: soft, non-distended, hernia completely reducible    Hospital Outpatient Visit on 05/10/2022   Component Date Value Ref Range Status    Sodium 05/10/2022 140  136 - 145 mmol/L Final    Potassium 05/10/2022 3.2* 3.5 - 5.1 mmol/L Final    Chloride 05/10/2022 104  99 - 110 mmol/L Final    CO2 05/10/2022 25  21 - 32 mmol/L Final    Anion Gap 05/10/2022 11  3 - 16 Final    Glucose 05/10/2022 102* 70 - 99 mg/dL Final    BUN 05/10/2022 8  7 - 20 mg/dL Final    CREATININE 05/10/2022 0.9  0.6 - 1.1 mg/dL Final    GFR Non- 05/10/2022 >60  >60 Final    Comment: >60 mL/min/1.73m2 EGFR, calc. for ages 25 and older using the  MDRD formula (not corrected for weight), is valid for stable  renal function.  GFR  05/10/2022 >60  >60 Final    Comment: Chronic Kidney Disease: less than 60 ml/min/1.73 sq.m. Kidney Failure: less than 15 ml/min/1.73 sq.m. Results valid for patients 18 years and older.       Calcium 05/10/2022 9.5  8.3 - 10.6 mg/dL Final    Pro-BNP 05/10/2022 186* 0 - 124 pg/mL Final    Comment: Methodology by NT-proBNP    An age-independent cutoff point of 300 pg/ml has a 98%  negative predictive value excluding acute heart failure. Values exceeding the age-related cutoff values (450 pg/mL if  age<50, 900 if 50-75 and 1800 if >75) has 90% sensitivity and  84% specificity for diagnosing acute HF. In patients with  renal compromise (eGFR<60) values greater than 1200pg/ml have  a diagnostic sensitivity and specificity of 89% and 72% for  acute HF. Echo limited    Result Date: 5/10/2022  Transthoracic Echocardiography Report (TTE)  Demographics   Patient Name       Hailee Soto   Date of Study      05/10/2022         Gender              Female   Patient Number     [id number]         Date of Birth       1965   Visit Number       [id number]          Age                 61 year(s)   Accession Number   [id number]         Room Number         OP   Corporate ID       [id number]           Nika Hernandez RVT   Ordering Physician MD ADELITA Arriola, [facility]            Physician  Procedure Type of Study   TTE procedure:ECHOCARDIOGRAM LIMITED. Procedure Date Date: 05/10/2022 Start: 09:34 AM Study Location: [facility] - Echo Lab Technical Quality: Adequate visualization Additional Indications:Assess Ejection Fraction. Patient Status: Routine Contrast Medium: Definity. Amount - 2 ml Height: 66 inches Weight: 224 pounds BSA: 2.1 m2 BMI: 36.15 kg/m2 BP: 118/74 mmHg  Conclusions   Summary  This is a limited study to assess left ventricular ejection fraction. Definity was used to assist in endocardial border delineation. Moderate concentric left ventricular hypertrophy. Mildly dilated left  ventricular cavity size. Moderately reduced left ventricular systolic  function with an estimated ejection fraction of 35-40%. Global hypokinesis noted. Heavy trabeculations seen near the apex of the left ventricle. The right ventricle is mildly dilated.  Mildly reduced right ventricular  systolic function with an estimated TAPSE of 1.52 cm. The left atrium is severely dilated. Signature   ------------------------------------------------------------------  Electronically signed by Gery Jeans, MD (Interpreting  physician) on 05/10/2022 at 11:44 AM  ------------------------------------------------------------------   Findings   Left Ventricle  Moderate concentric left ventricular hypertrophy. Mildly dilated left  ventricular cavity size. Moderately reduced left ventricular systolic  function with an estimated ejection fraction of 35-40%. Global hypokinesis noted. Heavy trabeculations seen near the apex of the left ventricle. Mitral Valve  Mitral valve is structurally normal.  Leaflets open normally. No mitral regurgitation or stenosis. Left Atrium  The left atrium is severely dilated. Aortic Valve  The aortic valve is structurally normal.  Leaflets open normally. Aorta  The aortic root is normal in size. Right Ventricle  The right ventricle is mildly dilated. Mildly reduced right ventricular  systolic function with an estimated TAPSE of 1.52 cm. Pacer / ICD wire is visualized in the right ventricle. Right Atrium  The right atrium is normal in size. Pacemaker / ICD lead is visualized in the right atrium. M-Mode/2D Measurements (cm)   LV Diastolic Dimension: 1.85 cm LV Systolic Dimension: 9.16 cm  LV Septum Diastolic: 3.89 cm  LV PW Diastolic: 2.91 cm        AO Root Dimension: 3 cm                                  LA Dimension: 3.7 cm                                  LA Area: 31.8 cm2                                  LA volume/Index: 111 ml /53 ml/m2  Aorta   Aortic Root: 3 cm  Ascending Aorta: 2.9 cm      ASSESSMENT:  Reducible incisional hernia     Plan:  1. Hernia is reducible and does not need emergent repair. Symptoms are likely musculoskeletal and improve with tylenol, unable to take NSAID's.  While the hernia would benefit from repair at some point, recommend risk optimization including weight loss and continued cardiac workup, having continued chest discomfort which will complicate hernia recovery  2. The patient has a hernia that would benefit from repair at some point. We discussed perioperative risk. Previously using the [facility] for Determining Associated Risks pedro (CEDAR), the patient's risk of perioperative complications was approximately 23% with increased risk associated with possible need for musculocutaneous flaps as well as weight.  Weight loss could decrease her risk to 15% and even lower if flaps are not necessary  3. Warning signs of an incarcerated hernia include inability to reduce the bulge, increased and constant pain, nausea, vomiting, fevers, chills and constipation. This is a surgical emergency and the patient should contact the office or present to an emergency department  4. Return to office in 3 months to discuss progress and further options    Flavio Hager MD, FACS  5/13/2022  2:13 PM

## 2022-05-13 NOTE — PATIENT INSTRUCTIONS
1. Hernia is reducible and does not need emergent repair. Symptoms are likely musculoskeletal and improve with tylenol, unable to take NSAID's. While the hernia would benefit from repair at some point, recommend risk optimization including weight loss and continued cardiac workup, having continued chest discomfort which will complicate hernia recovery  2. The patient has a hernia that would benefit from repair at some point. We discussed perioperative risk. Previously using the [facility] for Determining Associated Risks pedro (CEDAR), the patient's risk of perioperative complications was approximately 23% with increased risk associated with possible need for musculocutaneous flaps as well as weight.  Weight loss could decrease her risk to 15% and even lower if flaps are not necessary  3.  Warning signs of an incarcerated hernia include inability to reduce the bulge, increased and constant pain, nausea, vomiting, fevers, chills and constipation. This is a surgical emergency and the patient should contact the office or present to an emergency department  4. Return to office in 3 months to discuss progress and further options

## 2022-05-24 ENCOUNTER — NURSE ONLY (OUTPATIENT)
Dept: CARDIOLOGY CLINIC | Age: 57
End: 2022-05-24
Payer: MEDICAID

## 2022-05-24 DIAGNOSIS — Q24.6 CONGENITAL HEART BLOCK: ICD-10-CM

## 2022-05-24 DIAGNOSIS — I42.0 DILATED CARDIOMYOPATHY (HCC): ICD-10-CM

## 2022-05-24 DIAGNOSIS — I47.29 PAROXYSMAL VENTRICULAR TACHYCARDIA: ICD-10-CM

## 2022-05-24 DIAGNOSIS — Z95.810 ICD (IMPLANTABLE CARDIOVERTER-DEFIBRILLATOR), BIVENTRICULAR, IN SITU: ICD-10-CM

## 2022-05-24 DIAGNOSIS — I50.22 SYSTOLIC CHF, CHRONIC (HCC): ICD-10-CM

## 2022-05-24 DIAGNOSIS — I51.7 LVH (LEFT VENTRICULAR HYPERTROPHY): ICD-10-CM

## 2022-05-24 DIAGNOSIS — I51.9 LV DYSFUNCTION: ICD-10-CM

## 2022-05-24 PROCEDURE — 93296 REM INTERROG EVL PM/IDS: CPT | Performed by: INTERNAL MEDICINE

## 2022-05-24 PROCEDURE — 93295 DEV INTERROG REMOTE 1/2/MLT: CPT | Performed by: INTERNAL MEDICINE

## 2022-05-24 PROCEDURE — 93297 REM INTERROG DEV EVAL ICPMS: CPT | Performed by: INTERNAL MEDICINE

## 2022-06-06 RX ORDER — EMPAGLIFLOZIN 10 MG/1
TABLET, FILM COATED ORAL
Qty: 90 TABLET | Refills: 0 | Status: SHIPPED | OUTPATIENT
Start: 2022-06-06 | End: 2022-09-07

## 2022-06-07 ENCOUNTER — TELEPHONE (OUTPATIENT)
Dept: CARDIOLOGY CLINIC | Age: 57
End: 2022-06-07
Payer: MEDICAID

## 2022-06-07 DIAGNOSIS — I50.22 SYSTOLIC CHF, CHRONIC (HCC): Primary | ICD-10-CM

## 2022-06-07 DIAGNOSIS — R35.0 URINARY FREQUENCY: ICD-10-CM

## 2022-06-07 PROCEDURE — 81003 URINALYSIS AUTO W/O SCOPE: CPT | Performed by: CLINICAL NURSE SPECIALIST

## 2022-06-07 NOTE — TELEPHONE ENCOUNTER
Pt called stating she thinks she has a urinary tract infection from the [place] and wants to know if NPRG can put a urinary lab order in Epic so when she gets her labs they can check for that? Pt would like to someone to call if this can be done.     Pls advise thank you    Allison Pozo   [phone]

## 2022-06-07 NOTE — TELEPHONE ENCOUNTER
Called patient and relayed message per Nprg with verbal understanding and  encouraged to call office with any other questions or concerns.

## 2022-06-09 ENCOUNTER — HOSPITAL ENCOUNTER (OUTPATIENT)
Age: 57
Discharge: HOME | End: 2022-06-09
Payer: MEDICAID

## 2022-06-09 ENCOUNTER — OFFICE VISIT (OUTPATIENT)
Dept: CARDIOLOGY CLINIC | Age: 57
End: 2022-06-09
Payer: MEDICAID

## 2022-06-09 ENCOUNTER — NURSE ONLY (OUTPATIENT)
Dept: CARDIOLOGY CLINIC | Age: 57
End: 2022-06-09

## 2022-06-09 VITALS
RESPIRATION RATE: 19 BRPM | HEART RATE: 87 BPM | DIASTOLIC BLOOD PRESSURE: 80 MMHG | WEIGHT: 223.1 LBS | OXYGEN SATURATION: 98 % | SYSTOLIC BLOOD PRESSURE: 112 MMHG | HEIGHT: 67 IN | BODY MASS INDEX: 35.02 KG/M2

## 2022-06-09 DIAGNOSIS — E55.9 HYPOVITAMINOSIS D: ICD-10-CM

## 2022-06-09 DIAGNOSIS — I50.42 CHRONIC COMBINED SYSTOLIC AND DIASTOLIC HEART FAILURE (HCC): ICD-10-CM

## 2022-06-09 DIAGNOSIS — I48.0 PAROXYSMAL ATRIAL FIBRILLATION (HCC): ICD-10-CM

## 2022-06-09 DIAGNOSIS — I50.42 CHRONIC COMBINED SYSTOLIC AND DIASTOLIC HEART FAILURE (HCC): Primary | ICD-10-CM

## 2022-06-09 DIAGNOSIS — Z95.810 ICD (IMPLANTABLE CARDIOVERTER-DEFIBRILLATOR), BIVENTRICULAR, IN SITU: ICD-10-CM

## 2022-06-09 DIAGNOSIS — G47.33 OSA (OBSTRUCTIVE SLEEP APNEA): ICD-10-CM

## 2022-06-09 LAB
ANION GAP SERPL CALCULATED.3IONS-SCNC: 14 MMOL/L (ref 3–16)
BACTERIA: ABNORMAL /HPF
BILIRUBIN URINE: NEGATIVE
BLOOD, URINE: NEGATIVE
BUN BLDV-MCNC: 7 MG/DL (ref 7–20)
CALCIUM SERPL-MCNC: 7.8 MG/DL (ref 8.3–10.6)
CHLORIDE BLD-SCNC: 103 MMOL/L (ref 99–110)
CLARITY: CLEAR
CO2: 26 MMOL/L (ref 21–32)
COLOR: YELLOW
CREAT SERPL-MCNC: 0.9 MG/DL (ref 0.6–1.1)
EPITHELIAL CELLS, UA: 3 /HPF (ref 0–5)
GFR AFRICAN AMERICAN: >60
GFR NON-AFRICAN AMERICAN: >60
GLUCOSE BLD-MCNC: 120 MG/DL (ref 70–99)
GLUCOSE URINE: 500 MG/DL
HYALINE CASTS: 0 /LPF (ref 0–8)
KETONES, URINE: NEGATIVE MG/DL
LEUKOCYTE ESTERASE, URINE: ABNORMAL
MICROSCOPIC EXAMINATION: YES
NITRITE, URINE: NEGATIVE
PH UA: 7 (ref 5–8)
POTASSIUM SERPL-SCNC: 3 MMOL/L (ref 3.5–5.1)
PRO-BNP: 180 PG/ML (ref 0–124)
PROTEIN UA: NEGATIVE MG/DL
RBC UA: 2 /HPF (ref 0–4)
SODIUM BLD-SCNC: 143 MMOL/L (ref 136–145)
SPECIFIC GRAVITY UA: 1.01 (ref 1–1.03)
URINE REFLEX TO CULTURE: ABNORMAL
URINE TYPE: ABNORMAL
UROBILINOGEN, URINE: 0.2 E.U./DL
WBC UA: 6 /HPF (ref 0–5)

## 2022-06-09 PROCEDURE — 99214 OFFICE O/P EST MOD 30 MIN: CPT | Performed by: CLINICAL NURSE SPECIALIST

## 2022-06-09 PROCEDURE — 36415 COLL VENOUS BLD VENIPUNCTURE: CPT

## 2022-06-09 PROCEDURE — 80048 BASIC METABOLIC PNL TOTAL CA: CPT

## 2022-06-09 PROCEDURE — 1036F TOBACCO NON-USER: CPT | Performed by: CLINICAL NURSE SPECIALIST

## 2022-06-09 PROCEDURE — G8427 DOCREV CUR MEDS BY ELIG CLIN: HCPCS | Performed by: CLINICAL NURSE SPECIALIST

## 2022-06-09 PROCEDURE — 3017F COLORECTAL CA SCREEN DOC REV: CPT | Performed by: CLINICAL NURSE SPECIALIST

## 2022-06-09 PROCEDURE — G8417 CALC BMI ABV UP PARAM F/U: HCPCS | Performed by: CLINICAL NURSE SPECIALIST

## 2022-06-09 PROCEDURE — 83880 ASSAY OF NATRIURETIC PEPTIDE: CPT

## 2022-06-09 PROCEDURE — 81001 URINALYSIS AUTO W/SCOPE: CPT

## 2022-06-09 RX ORDER — SPIRONOLACTONE 25 MG/1
25 TABLET ORAL DAILY
Qty: 90 TABLET | Refills: 1 | Status: SHIPPED | OUTPATIENT
Start: 2022-06-09 | End: 2022-06-10

## 2022-06-09 RX ORDER — CARVEDILOL 6.25 MG/1
6.25 TABLET ORAL 2 TIMES DAILY
Qty: 180 TABLET | Refills: 1 | Status: SHIPPED | OUTPATIENT
Start: 2022-06-09

## 2022-06-09 NOTE — PROGRESS NOTES
Ov NPRG-TriageHF Heart Failure Risk Status on 09-Jun-2022 is Low*   Thoracic impedance trend stable. Remote transmission received for patients CRT-D. Transmission shows normal sensing and pacing function. EP physician will review. See interrogation under the cardiology tab in the [address] [place] [address] [address] field for more details. Will continue to monitor remotely. No  arrhythmias recorded.   Pacing (% of Time Since 10-May-2022)  Total  99.3% (MVP Off)  Effective 98.8%

## 2022-06-09 NOTE — PROGRESS NOTES
[address]  Progress Note    Primary Care Doctor:  No primary care provider on file. Chief Complaint   Patient presents with    1 Month Follow-Up     Patient return to office for 1 month follow up     Hypertension    Atrial Fibrillation    Hyperlipidemia        History of Present Illness:  61 y.o. female with history of sHF, LVH, AF on xarelto (follows with Dr Julia Pelaez), had been on flecainide, dual chamber pacemaker 2012 due to congenital heart block  LVEF had been 55% in 2015 and now 25%. No lisinopril due to cough. She is a cook at [employer]  Lip numbness to entresto 3/2020 hydralazine caused headaches  10/2020 EGD (hiatal hernia) and colonoscopy (polyp)   ICD placed 1/22/21, LHC done 1/20/21 normal cors  4/21 benign duodenal mass with resection Dr Senthil Abrams  4/7-19/2021 for symptomatic anemia, requiring surgery by Dr Senthil Abrams for overlying lipoma, large duodenum lesion requiring 3 clips    I had the pleasure of seeing Kelly Sethi in follow up for systolic heart failure. She is ambulatory and by her self. She was started on jardiance and entresto increased at her last visit. She complains of burning, irritation and smell when urinating (urine negative). Weight is stable and vitals are great. She denies any chest pain, palpitations, lightheadedness or edema. Her optival today shows normal thoracic impedence and no AF. She is hoping to have her abdominal hernia fixed. Past Medical History:   has a past medical history of Anemia, Atrial fibrillation and flutter ([address] Atrial flutter ([address] CHB (complete heart block) ([address] Class 1 obesity without serious comorbidity with body mass index (BMI) of 33.0 to 33.9 in adult, Congenital heart disease, GERD (gastroesophageal reflux disease), Headache(784.0), History of complete heart block, Hyperlipidemia, Hypertension, PONV (postoperative nausea and vomiting), Sleep apnea, Syncope, and Systolic CHF, chronic ([address]   Surgical History:   has a past surgical history that includes Uterine fibroid surgery; Pacemaker insertion (11/29/12); Tubal ligation; Upper gastrointestinal endoscopy (N/A, 10/27/2020); Colonoscopy (N/A, 10/27/2020); Cardiac defibrillator placement (01/2021); Upper gastrointestinal endoscopy (N/A, 4/8/2021); Upper gastrointestinal endoscopy (N/A, 4/8/2021); Upper gastrointestinal endoscopy (N/A, 4/8/2021); colectomy (N/A, 4/13/2021); Cardioversion (01/28/2022); Cardioversion (02/22/2022); and Upper gastrointestinal endoscopy (N/A, 3/4/2022). Social History:    reports that she has never smoked. She has never used smokeless tobacco. She reports that she does not drink alcohol and does not use drugs. Family History:   Family History   Problem Relation Age of Onset    Cancer Father     Heart Disease Neg Hx     High Blood Pressure Neg Hx     High Cholesterol Neg Hx        Home Medications:  Prior to Admission medications    Medication Sig Start Date End Date Taking?  Authorizing Provider   JARDIANCE 10 MG tablet TAKE ONE TABLET BY MOUTH DAILY 6/6/22  Yes CATRACHITO Andrade   potassium chloride (KLOR-CON M) 10 MEQ extended release tablet Take 1 tablet by mouth daily 5/11/22  Yes CATRACHITO Andrade   ammonium lactate (LAC-HYDRIN) 12 % lotion  4/20/22  Yes Historical Provider, MD   fluticasone (FLONASE) 50 MCG/ACT nasal spray  3/21/22  Yes Historical Provider, MD   hydrocortisone 1 % cream  4/20/22  Yes Historical Provider, MD   sacubitril-valsartan (ENTRESTO) 49-51 MG per tablet Take 1 tablet by mouth 2 times daily 5/10/22  Yes Rema ArevaloestingCATRACHITO   spironolactone (ALDACTONE) 25 MG tablet Take 1 tablet by mouth daily 12/10/21  Yes CATRACHITO Laguna   carvedilol (COREG) 6.25 MG tablet Take 1 tablet by mouth 2 times daily 12/10/21  Yes CATRACHITO Laguna   rivaroxaban (XARELTO) 20 MG TABS tablet Take 1 tablet by mouth Daily with supper 12/10/21  Yes Morenita Pablo, APRN - CNP   vitamin D3 (CHOLECALCIFEROL) 25 MCG (1000 UT) TABS tablet Take 1 tablet by mouth daily 12/1/21  Yes CATRACHITO Keenan   atorvastatin (LIPITOR) 40 MG tablet Take 1 tablet by mouth daily 6/11/21  Yes CATRACHITO Keenan - CNS   pantoprazole (PROTONIX) 40 MG tablet Take 1 tablet by mouth every morning (before breakfast) 4/13/21  Yes Erica Houston MD   Multiple Vitamins-Minerals (THERAPEUTIC MULTIVITAMIN-MINERALS) tablet Take 1 tablet by mouth daily   Yes Historical Provider, MD        Allergies:  Latex, Codeine, Lisinopril, Nitroglycerin, Sulfa antibiotics, and Hydralazine     Review of Systems:   · Constitutional: there has been no unanticipated weight loss. There's been no change in energy level, sleep pattern, or activity level. · Eyes: No visual changes or diplopia. No scleral icterus. · ENT: No Headaches, hearing loss or vertigo. No mouth sores or sore throat. · Cardiovascular: Reviewed in HPI  · Respiratory: No cough or wheezing, no sputum production. No hematemesis. · Gastrointestinal: No abdominal pain, appetite loss, blood in stools. No change in bowel or bladder habits. · Genitourinary: No dysuria, trouble voiding, or hematuria. · Musculoskeletal:  No gait disturbance, weakness or joint complaints. · Integumentary: No rash or pruritis. · Neurological: No headache, diplopia, change in muscle strength, numbness or tingling. No change in gait, balance, coordination, mood, affect, memory, mentation, behavior. · Psychiatric: No anxiety, no depression. · Endocrine: No malaise, fatigue or temperature intolerance. No excessive thirst, fluid intake, or urination. No tremor. · Hematologic/Lymphatic: No abnormal bruising or bleeding, blood clots or swollen lymph nodes. · Allergic/Immunologic: No nasal congestion or hives.     Physical Examination:    Vitals:    06/09/22 0821   BP: 112/80   Site: Left Upper Arm   Position: Sitting   Cuff Size: Large Adult   Pulse: 87   Resp: 19 SpO2: 98%   Weight: 223 lb 1.6 oz (101.2 kg)   Height: 5' 6.5\" (1.689 m)        Constitutional and General Appearance: Warm and dry, no apparent distress, normal coloration  HEENT:  Normocephalic, atraumatic  Respiratory:  · Normal excursion and expansion without use of accessory muscles  · Resp Auscultation: Normal breath sounds without dullness  Cardiovascular:  · The apical impulses not displaced  · Heart tones are crisp and normal  · JVP normal cm H2O  · regular rate and rhythm  · Peripheral pulses are symmetrical and full  · There is no clubbing, cyanosis of the extremities.   · no edema  · Pedal Pulses: 2+ and equal   Abdomen: abdominal hernia  · No masses or tenderness  · Liver/Spleen: No Abnormalities Noted  Neurological/Psychiatric:  · Alert and oriented in all spheres  · Moves all extremities well  · Exhibits normal gait balance and coordination  · No abnormalities of mood, affect, memory, mentation, or behavior are noted    Lab Data:    CBC:   Lab Results   Component Value Date    WBC 5.7 03/23/2022    WBC 5.4 12/01/2021    WBC 4.8 09/10/2021    RBC 3.90 03/23/2022    RBC 4.10 12/01/2021    RBC 4.14 09/10/2021    HGB 12.2 03/23/2022    HGB 12.7 12/01/2021    HGB 12.2 09/10/2021    HCT 35.9 03/23/2022    HCT 36.9 12/01/2021    HCT 36.4 09/10/2021    MCV 91.9 03/23/2022    MCV 90.0 12/01/2021    MCV 87.8 09/10/2021    RDW 15.4 03/23/2022    RDW 15.5 12/01/2021    RDW 17.2 09/10/2021     03/23/2022     12/01/2021     09/10/2021     BMP:  Lab Results   Component Value Date     05/10/2022     03/23/2022     12/01/2021    K 3.2 05/10/2022    K 3.1 03/23/2022    K 3.3 12/01/2021    K 3.7 04/07/2021    K 3.0 01/24/2021    K 3.8 01/20/2021     05/10/2022     03/23/2022     12/01/2021    CO2 25 05/10/2022    CO2 26 03/23/2022    CO2 28 12/01/2021    PHOS 2.4 04/17/2021    PHOS 2.2 04/15/2021    PHOS 2.8 04/14/2021    BUN 8 05/10/2022    BUN 8 03/23/2022 BUN 9 12/01/2021    CREATININE 0.9 05/10/2022    CREATININE 0.8 03/23/2022    CREATININE 0.8 12/01/2021     BNP:   Lab Results   Component Value Date    PROBNP 186 05/10/2022    PROBNP 85 03/23/2022    PROBNP 60 12/01/2021     Cardiac Imaging:  Echo 5/10/2022   This is a limited study to assess left ventricular ejection fraction. Definity was used to assist in endocardial border delineation. Moderate concentric left ventricular hypertrophy. Mildly dilated left   ventricular cavity size. Moderately reduced left ventricular systolic   function with an estimated ejection fraction of 35-40%. Global hypokinesis noted. Heavy trabeculations seen near the apex of the left ventricle. The right ventricle is mildly dilated. Mildly reduced right ventricular   systolic function with an estimated TAPSE of 1.52 cm. The left atrium is severely dilated    Echo 2/9/2021  Summary   Left ventricular size is mildly increased. Moderately reduced global systolic function with an ejection fraction   estimated at 30-35%. Global hypokinesis noted. Grade II diastolic dysfunction with elevated LV filling pressures. There is mild mitral regurgitation noted. Mild left atrial enlargement noted. Aortic valve appears sclerotic but opens adequately. Mild aortic regurgitation. There is mild tricuspid regurgitation with a RVSP estimation of 25 mmHg. Pacer / ICD wire is visualized in the right ventricle. The right ventricle is normal in size and function    [facility] Dr Doll Cea 1/20/21  Findings:  Artery Findings/Result   LM Normal   LAD Normal   Cx Normal   RI NA   RCA Normal   LVEDP 6   LVG NA      Intervention:         None     Post Cath Dx:       Normal coronaries      Echo 2/24/2020  Summary   -Limited echocardiogram was performed to evaluate EF.   -Normal left ventricle size, mild to moderate left ventricular hypertrophy,   and moderately reduced systolic function with an estimated ejection fraction   of 30-35%. -There is moderate diffuse hypokinesis. -Grade III diastolic dysfunction . E/e\"=10.7.   -Mild mitral regurgitation.   -Mild tricuspid regurgitation.   -The left atrium is mild to moderate dilated. -Right ventricular systolic function appears mildly reduced .   -Estimated pulmonary artery systolic pressure is borderline normal at 28-33   mmHg assuming a right atrial pressure of 8 mmHg.   -Pacer / ICD wire is visualized in the right heart.     Echo 8/12/2019  Summary   -Limited echocardiogram was performed to evaluate LV ejection fraction.   -Left ventricular cavity size is mildly dilated.   -There is moderate concentric left ventricular hypertrophy.   -Overall left ventricular systolic function appears moderately reduced with   an ejection fraction on the 30-35%.  -There is moderate global diffuse hypokinesis.   -Indeterminate diastolic dysfunction due to irregular heart rate.   -Moderate mitral regurgitation.   -Aortic valve appears sclerotic but opens adequately.   -Mild to moderate tricuspid regurgitation.   -The left atrium is moderately dilated.   -Pacer / ICD wire is visualized in the right heart. Stress test 9/11/18  Enlarged LV with mild global hypokinesis. EF 51%  There is normal isotope uptake at stress and rest. There is no evidence of  myocardial ischemia or scar. ECHO 7/24/18:  Summary   -Left ventricular cavity size is mildly dilated. -There is moderate concentric left ventricular hypertrophy.   -Overall left ventricular systolic function appears severely reduced with  and ejection fraction on the 25 % range.   -There is diffuse global hypokinesis. -Grade II diastolic dysfunction with elevated LV filling pressures. E/e\"=16.2.   -Mitral annular calcification is present. -Mild-moderate mitral regurgitation.   -Aortic valve appears sclerotic but opens adequately. -Trivial aortic regurgitation.   -Trivial tricuspid regurgitation with RVSP of 26 mmHg.    -Mild pulmonic regurgitation present.   -Dilated left atrium with a volume of 72 ml.   -Pacer / ICD wire is visualized in the right heart. GXT cathie 7/15/2015:   Left ventricular ejection fraction of 55%. The LV wall motion is normal.  There is normal isotope uptake at stress and rest.   There is no evidence of myocardial ischemia or scar. Assessment:    1. Chronic combined systolic and diastolic heart failure (HCC) on arni, bb, sglt and aldosterone antagonist   2. Hypovitaminosis D    3. Paroxysmal atrial fibrillation (HCC)    4. DERECK (obstructive sleep apnea)    5. ICD (implantable cardioverter-defibrillator), biventricular, in situ        Plan:   1. Wear cpap every night  2. Referral for cardiac rehab  3. Weight loss referral  4. Refills done  5. Make appt with GYN  6.  Ok to hold jardiance   7.   RTO in 4 month    [facility], CATRACHITO - CNS, CNS, 6/9/2022, 8:40 AM

## 2022-06-09 NOTE — PATIENT INSTRUCTIONS
1.  Wear cpap every night  2. Referral for cardiac rehab  3. Weight loss referral  4. Refills done  5. Make appt with GYN  6.  Ok to hold jardiance   7.   RTO in 4 month

## 2022-06-10 ENCOUNTER — TELEPHONE (OUTPATIENT)
Dept: CARDIOLOGY CLINIC | Age: 57
End: 2022-06-10

## 2022-06-10 ENCOUNTER — OFFICE VISIT (OUTPATIENT)
Dept: SURGERY | Age: 57
End: 2022-06-10
Payer: MEDICAID

## 2022-06-10 VITALS — SYSTOLIC BLOOD PRESSURE: 120 MMHG | WEIGHT: 222 LBS | DIASTOLIC BLOOD PRESSURE: 84 MMHG | BODY MASS INDEX: 35.29 KG/M2

## 2022-06-10 DIAGNOSIS — I50.42 CHRONIC COMBINED SYSTOLIC AND DIASTOLIC HEART FAILURE (HCC): Primary | ICD-10-CM

## 2022-06-10 DIAGNOSIS — E87.6 LOW SERUM POTASSIUM: ICD-10-CM

## 2022-06-10 DIAGNOSIS — K43.2 INCISIONAL HERNIA, WITHOUT OBSTRUCTION OR GANGRENE: Primary | ICD-10-CM

## 2022-06-10 PROCEDURE — 1036F TOBACCO NON-USER: CPT | Performed by: SURGERY

## 2022-06-10 PROCEDURE — G8417 CALC BMI ABV UP PARAM F/U: HCPCS | Performed by: SURGERY

## 2022-06-10 PROCEDURE — 3017F COLORECTAL CA SCREEN DOC REV: CPT | Performed by: SURGERY

## 2022-06-10 PROCEDURE — 99213 OFFICE O/P EST LOW 20 MIN: CPT | Performed by: SURGERY

## 2022-06-10 PROCEDURE — G8427 DOCREV CUR MEDS BY ELIG CLIN: HCPCS | Performed by: SURGERY

## 2022-06-10 RX ORDER — SPIRONOLACTONE 50 MG/1
50 TABLET, FILM COATED ORAL DAILY
Qty: 90 TABLET | Refills: 0 | Status: SHIPPED | OUTPATIENT
Start: 2022-06-10 | End: 2022-09-07

## 2022-06-10 NOTE — PATIENT INSTRUCTIONS
1. Hernia is reducible and does not need emergent repair. Symptoms continue though and of unclear etiology. Will repeat CT with contrast to rule out other etiologies of pain. 2. Hernia would benefit from repair at some point, recommend risk optimization including weight loss and continued cardiac workup, having continued chest discomfort which will complicate hernia recovery. Weight loss may be difficult as pain is preventing exercise  3. We discussed perioperative risk. Previously using the [facility] for Determining Associated Risks pedro (CEDAR), the patient's risk of perioperative complications was approximately 23% with increased risk associated with possible need for musculocutaneous flaps as well as weight.  Weight loss could decrease her risk to 15% and even lower if flaps are not necessary  4.  Warning signs of an incarcerated hernia include inability to reduce the bulge, increased and constant pain, nausea, vomiting, fevers, chills and constipation. This is a surgical emergency and the patient should contact the office or present to an emergency department  5. Return to office after CT to discuss results, further options, possible plan surgery

## 2022-06-10 NOTE — PROGRESS NOTES
[facility] and Laparoscopic Surgery  SUBJECTIVE:    Chief Complaint: abdominal pain    Jorge Begun   1965   61 y.o. female presents with sharp abdominal wall pain at superior and inferior aspect of incision. Known incisional hernia and underwent with me exploratory laparotomy, cholecystectomy with cholangiogram and excision of benign duodenal mass on 4/13/2021 for upper GI bleeding and symptomatic cholelithiasis. Hernia is reducible and not tender over the mid aspect. Intermittent bloating and nausea. Recent endoscopy uneventful and unrevealing.      Past Medical History:   Diagnosis Date    Anemia     Atrial fibrillation and flutter (HCC)     Atrial flutter (HCC)     CHB (complete heart block) (HCC)     Class 1 obesity without serious comorbidity with body mass index (BMI) of 33.0 to 33.9 in adult 9/6/2019    Congenital heart disease     GERD (gastroesophageal reflux disease)     Headache(784.0)     History of complete heart block     Hyperlipidemia     Hypertension     PONV (postoperative nausea and vomiting)     Sleep apnea     uses CPAP    Syncope     Systolic CHF, chronic ([address] 10/3/2018     Past Surgical History:   Procedure Laterality Date    CARDIAC DEFIBRILLATOR PLACEMENT  01/2021    [employer]    CARDIOVERSION  01/28/2022    CARDIOVERSION  02/22/2022    COLECTOMY N/A 4/13/2021    EXPLORATORY LAPAROTOMY, RESECTION OF DUODENAL MASS, CHOLECYSTECTOMY WITH INTRAOPERATIVE CHOLANGIOGRAM performed by Adriana Recinos MD at [place] [address] COLONOSCOPY N/A 10/27/2020    COLONOSCOPY POLYPECTOMY SNARE/COLD BIOPSY performed by Jorge Alberto Gutierrez MD at [place] [address]  11/29/12    dual chamber PPM, [employer]    TUBAL LIGATION      UPPER GASTROINTESTINAL ENDOSCOPY N/A 10/27/2020    EGD DIAGNOSTIC ONLY performed by Jorge Alberto Gutierrez MD at [facility] 4/8/2021    EGD CONTROL HEMORRHAGE WITH APPLICATION OF 3 CLIPS TO DUODENAL MASS performed by Breann Saeed MD at [address] N/A 4/8/2021    EGD BIOPSY DUODENAL MASS performed by Breann Saeed MD at [address] N/A 4/8/2021    EGD SUBMUCOSAL INJECTION OF 0.6 MG OF EPINEPRINE INTO BASE OF DUODENAL MASS performed by Breann Saeed MD at [address] [facility] ENDOSCOPY N/A 3/4/2022    EGD BIOPSY performed by Karla Medrano MD at [facility] History     Socioeconomic History    Marital status:      Spouse name: Not on file    Number of children: 3    Years of education: Not on file    Highest education level: Not on file   Occupational History    Not on file   Tobacco Use    Smoking status: Never Smoker    Smokeless tobacco: Never Used   Vaping Use    Vaping Use: Never used   Substance and Sexual Activity    Alcohol use: No    Drug use: No    Sexual activity: Yes     Partners: Male   Other Topics Concern    Not on file   Social History Narrative    Not on file     Social Determinants of Health     Financial Resource Strain:     Difficulty of Paying Living Expenses: Not on file   Food Insecurity:     Worried About Running Out of Food in the Last Year: Not on file    Marcia of Food in the Last Year: Not on file   Transportation Needs:     Lack of Transportation (Medical): Not on file    Lack of Transportation (Non-Medical):  Not on file   Physical Activity:     Days of Exercise per Week: Not on file    Minutes of Exercise per Session: Not on file   Stress:     Feeling of Stress : Not on file   Social Connections:     Frequency of Communication with Friends and Family: Not on file    Frequency of Social Gatherings with Friends and Family: Not on file    Attends [employer] Services: Not on file    Active Member of Clubs or Organizations: Not on file    Attends Club or Organization Meetings: Not on file    Marital Status: Not on file   Intimate Partner Violence:     Fear of Current or Ex-Partner: Not on file    Emotionally Abused: Not on file    Physically Abused: Not on file    Sexually Abused: Not on file   Housing Stability:     Unable to Pay for Housing in the Last Year: Not on file    Number of [place] in the Last Year: Not on file    Unstable Housing in the Last Year: Not on file      Family History   Problem Relation Age of Onset    Cancer Father     Heart Disease Neg Hx     High Blood Pressure Neg Hx     High Cholesterol Neg Hx      Current Outpatient Medications   Medication Sig Dispense Refill    spironolactone (ALDACTONE) 50 MG tablet Take 1 tablet by mouth daily 90 tablet 0    sacubitril-valsartan (ENTRESTO) 49-51 MG per tablet Take 1 tablet by mouth 2 times daily 180 tablet 1    carvedilol (COREG) 6.25 MG tablet Take 1 tablet by mouth 2 times daily 180 tablet 1    JARDIANCE 10 MG tablet TAKE ONE TABLET BY MOUTH DAILY 90 tablet 0    potassium chloride (KLOR-CON M) 10 MEQ extended release tablet Take 1 tablet by mouth daily 90 tablet 1    ammonium lactate (LAC-HYDRIN) 12 % lotion       fluticasone (FLONASE) 50 MCG/ACT nasal spray       hydrocortisone 1 % cream       rivaroxaban (XARELTO) 20 MG TABS tablet Take 1 tablet by mouth Daily with supper 90 tablet 1    vitamin D3 (CHOLECALCIFEROL) 25 MCG (1000 UT) TABS tablet Take 1 tablet by mouth daily 30 tablet 5    atorvastatin (LIPITOR) 40 MG tablet Take 1 tablet by mouth daily 60 tablet 2    pantoprazole (PROTONIX) 40 MG tablet Take 1 tablet by mouth every morning (before breakfast) 30 tablet 3    Multiple Vitamins-Minerals (THERAPEUTIC MULTIVITAMIN-MINERALS) tablet Take 1 tablet by mouth daily       No current facility-administered medications for this visit.       Allergies   Allergen Reactions    Latex      rash    Codeine      Hives      Lisinopril      cough    Nitroglycerin Hives    Sulfa Antibiotics Nausea Only    Hydralazine      headaches        Review of Systems:  Review of systems performed and negative with the exception of the above findings    OBJECTIVE:  /84   Wt 222 lb (100.7 kg)   BMI 35.29 kg/m²      Physical Exam:  General appearance: alert, appears stated age, cooperative and no distress  Head: Normocephalic, without obvious abnormality, atraumatic  Lungs: clear to auscultation bilaterally  Heart: regular rate and rhythm, S1, S2 normal, no murmur, click, rub or gallop  Abdomen: soft, non-distended, non-tender to palpation, hernia is reducible and with large defect, normal overlying skin, tenderness is not demonstrated by palpation but more when she moves    Hospital Outpatient Visit on 06/09/2022   Component Date Value Ref Range Status    Sodium 06/09/2022 143  136 - 145 mmol/L Final    Potassium 06/09/2022 3.0* 3.5 - 5.1 mmol/L Final    Chloride 06/09/2022 103  99 - 110 mmol/L Final    CO2 06/09/2022 26  21 - 32 mmol/L Final    Anion Gap 06/09/2022 14  3 - 16 Final    Glucose 06/09/2022 120* 70 - 99 mg/dL Final    BUN 06/09/2022 7  7 - 20 mg/dL Final    CREATININE 06/09/2022 0.9  0.6 - 1.1 mg/dL Final    GFR Non- 06/09/2022 >60  >60 Final    Comment: >60 mL/min/1.73m2 EGFR, calc. for ages 25 and older using the  MDRD formula (not corrected for weight), is valid for stable  renal function.  GFR  06/09/2022 >60  >60 Final    Comment: Chronic Kidney Disease: less than 60 ml/min/1.73 sq.m. Kidney Failure: less than 15 ml/min/1.73 sq.m. Results valid for patients 18 years and older.  Calcium 06/09/2022 7.8* 8.3 - 10.6 mg/dL Final    Pro-BNP 06/09/2022 180* 0 - 124 pg/mL Final    Comment: Methodology by NT-proBNP    An age-independent cutoff point of 300 pg/ml has a 98%  negative predictive value excluding acute heart failure.   Values exceeding the age-related cutoff values (450 pg/mL if  age<50, 900 if 50-75 and 1800 if Final    Comment: >60 mL/min/1.73m2 EGFR, calc. for ages 25 and older using the  MDRD formula (not corrected for weight), is valid for stable  renal function.  GFR  05/10/2022 >60  >60 Final    Comment: Chronic Kidney Disease: less than 60 ml/min/1.73 sq.m. Kidney Failure: less than 15 ml/min/1.73 sq.m. Results valid for patients 18 years and older.  Calcium 05/10/2022 9.5  8.3 - 10.6 mg/dL Final    Pro-BNP 05/10/2022 186* 0 - 124 pg/mL Final    Comment: Methodology by NT-proBNP    An age-independent cutoff point of 300 pg/ml has a 98%  negative predictive value excluding acute heart failure. Values exceeding the age-related cutoff values (450 pg/mL if  age<50, 900 if 50-75 and 1800 if >75) has 90% sensitivity and  84% specificity for diagnosing acute HF. In patients with  renal compromise (eGFR<60) values greater than 1200pg/ml have  a diagnostic sensitivity and specificity of 89% and 72% for  acute HF. No results found. ASSESSMENT:  Reducible incisional hernia  Atrial fibrillation  Medical coagulopathy, on coumadin  Obesity, BMI 35.29     Plan:  1. Hernia is reducible and does not need emergent repair. Symptoms continue though and of unclear etiology. Will repeat CT with contrast to rule out other etiologies of pain. 2. Hernia would benefit from repair at some point, recommend risk optimization including weight loss and continued cardiac workup, having continued chest discomfort which will complicate hernia recovery. Weight loss may be difficult as pain is preventing exercise  3. We discussed perioperative risk. Previously using the [facility] for Determining Associated Risks pedro (CEDAR), the patient's risk of perioperative complications was approximately 23% with increased risk associated with possible need for musculocutaneous flaps as well as weight.  Weight loss could decrease her risk to 15% and even lower if flaps are not necessary  4.  Warning signs of an incarcerated hernia include inability to reduce the bulge, increased and constant pain, nausea, vomiting, fevers, chills and constipation. This is a surgical emergency and the patient should contact the office or present to an emergency department  5. Return to office after CT to discuss results, further options, possible plan surgery    Flavio Hager MD, FACS  6/10/2022  12:25 PM

## 2022-06-11 DIAGNOSIS — I50.42 CHRONIC COMBINED SYSTOLIC AND DIASTOLIC HEART FAILURE (HCC): ICD-10-CM

## 2022-06-13 RX ORDER — SACUBITRIL AND VALSARTAN 24; 26 MG/1; MG/1
TABLET, FILM COATED ORAL
Qty: 135 TABLET | OUTPATIENT
Start: 2022-06-13

## 2022-06-24 ENCOUNTER — HOSPITAL ENCOUNTER (OUTPATIENT)
Dept: CT IMAGING | Age: 57
Discharge: HOME | End: 2022-06-24
Payer: MEDICAID

## 2022-06-24 ENCOUNTER — TELEPHONE (OUTPATIENT)
Dept: CARDIOLOGY CLINIC | Age: 57
End: 2022-06-24

## 2022-06-24 DIAGNOSIS — K43.2 INCISIONAL HERNIA, WITHOUT OBSTRUCTION OR GANGRENE: ICD-10-CM

## 2022-06-24 PROCEDURE — 6360000004 HC RX CONTRAST MEDICATION: Performed by: SURGERY

## 2022-06-24 PROCEDURE — 74177 CT ABD & PELVIS W/CONTRAST: CPT

## 2022-06-24 RX ADMIN — IOPAMIDOL 50 ML: 755 INJECTION, SOLUTION INTRAVENOUS at 08:33

## 2022-06-24 RX ADMIN — DIATRIZOATE MEGLUMINE AND DIATRIZOATE SODIUM 30 ML: 600; 100 SOLUTION ORAL; RECTAL at 08:29

## 2022-06-24 NOTE — TELEPHONE ENCOUNTER
Linda Reynolds stopped by the office to let Joann Brown know she is having swelling in her ankles since taking the Jardiance. Please call to advise.   Thanks

## 2022-06-27 ENCOUNTER — NURSE ONLY (OUTPATIENT)
Dept: CARDIOLOGY CLINIC | Age: 57
End: 2022-06-27
Payer: MEDICAID

## 2022-06-27 ENCOUNTER — OFFICE VISIT (OUTPATIENT)
Dept: SURGERY | Age: 57
End: 2022-06-27
Payer: MEDICAID

## 2022-06-27 VITALS — DIASTOLIC BLOOD PRESSURE: 79 MMHG | SYSTOLIC BLOOD PRESSURE: 120 MMHG | BODY MASS INDEX: 35.29 KG/M2 | WEIGHT: 222 LBS

## 2022-06-27 DIAGNOSIS — Q24.6 CONGENITAL HEART BLOCK: ICD-10-CM

## 2022-06-27 DIAGNOSIS — Z95.810 CARDIAC RESYNCHRONIZATION THERAPY DEFIBRILLATOR (CRT-D) IN PLACE: ICD-10-CM

## 2022-06-27 DIAGNOSIS — I50.22 SYSTOLIC CHF, CHRONIC (HCC): ICD-10-CM

## 2022-06-27 DIAGNOSIS — I42.0 DILATED CARDIOMYOPATHY (HCC): ICD-10-CM

## 2022-06-27 DIAGNOSIS — K43.2 INCISIONAL HERNIA, WITHOUT OBSTRUCTION OR GANGRENE: Primary | ICD-10-CM

## 2022-06-27 PROCEDURE — 3017F COLORECTAL CA SCREEN DOC REV: CPT | Performed by: SURGERY

## 2022-06-27 PROCEDURE — G8417 CALC BMI ABV UP PARAM F/U: HCPCS | Performed by: SURGERY

## 2022-06-27 PROCEDURE — 99213 OFFICE O/P EST LOW 20 MIN: CPT | Performed by: SURGERY

## 2022-06-27 PROCEDURE — G2066 INTER DEVC REMOTE 30D: HCPCS | Performed by: NURSE PRACTITIONER

## 2022-06-27 PROCEDURE — 1036F TOBACCO NON-USER: CPT | Performed by: SURGERY

## 2022-06-27 PROCEDURE — 93297 REM INTERROG DEV EVAL ICPMS: CPT | Performed by: NURSE PRACTITIONER

## 2022-06-27 PROCEDURE — G8427 DOCREV CUR MEDS BY ELIG CLIN: HCPCS | Performed by: SURGERY

## 2022-06-27 NOTE — PROGRESS NOTES
[place] [address] and Laparoscopic Surgery  SUBJECTIVE:    Chief Complaint: incisional hernia    Vinh Cease   1965   61 y.o. female presents for followup regarding an incisional hernia. Known to me after exploratory laparotomy, cholecystectomy with cholangiogram and excision of benign duodenal mass on 4/13/2021 for upper GI bleeding and symptomatic cholelithiasis. Hernia is reducible. Intermittent bloating and nausea. Recent endoscopy uneventful and unrevealing. Still has significant pain when standing for long periods of time, bloating stretching sensation that is improved with rest.  Repeat CT shows continued incisional hernia containing nonobstructed bowel and no other etiologies that would be contributing to abdominal pain.   Medical conditions include atrial fibrillation for which she is anticoagulated with Xarelto    Past Medical History:   Diagnosis Date    Anemia     Atrial fibrillation and flutter (HCC)     Atrial flutter (HCC)     CHB (complete heart block) (HCC)     Class 1 obesity without serious comorbidity with body mass index (BMI) of 33.0 to 33.9 in adult 9/6/2019    Congenital heart disease     GERD (gastroesophageal reflux disease)     Headache(784.0)     History of complete heart block     Hyperlipidemia     Hypertension     PONV (postoperative nausea and vomiting)     Sleep apnea     uses CPAP    Syncope     Systolic CHF, chronic ([facility] [address] 10/3/2018     Past Surgical History:   Procedure Laterality Date    CARDIAC DEFIBRILLATOR PLACEMENT  01/2021    [employer]    CARDIOVERSION  01/28/2022    CARDIOVERSION  02/22/2022    COLECTOMY N/A 4/13/2021    EXPLORATORY LAPAROTOMY, RESECTION OF DUODENAL MASS, CHOLECYSTECTOMY WITH INTRAOPERATIVE CHOLANGIOGRAM performed by Vi Harris MD at [facility] COLONOSCOPY N/A 10/27/2020    COLONOSCOPY POLYPECTOMY SNARE/COLD BIOPSY performed by Sangeeta Barillas MD at [place] [address]  11/29/12    dual chamber PPM, [employer]tronic    TUBAL LIGATION      UPPER GASTROINTESTINAL ENDOSCOPY N/A 10/27/2020    EGD DIAGNOSTIC ONLY performed by Candida Hashimoto, MD at [address] 4/8/2021    EGD CONTROL HEMORRHAGE WITH APPLICATION OF 3 CLIPS TO DUODENAL MASS performed by Mook Liu MD at [address] N/A 4/8/2021    EGD BIOPSY DUODENAL MASS performed by Mook Liu MD at [address] [facility] N/A 4/8/2021    EGD SUBMUCOSAL INJECTION OF 0.6 MG OF EPINEPRINE INTO BASE OF DUODENAL MASS performed by Mook Liu MD at [address] [facility] ENDOSCOPY N/A 3/4/2022    EGD BIOPSY performed by Candida Hashimoto, MD at [facility] History     Socioeconomic History    Marital status:      Spouse name: Not on file    Number of children: 3    Years of education: Not on file    Highest education level: Not on file   Occupational History    Not on file   Tobacco Use    Smoking status: Never Smoker    Smokeless tobacco: Never Used   Vaping Use    Vaping Use: Never used   Substance and Sexual Activity    Alcohol use: No    Drug use: No    Sexual activity: Yes     Partners: Male   Other Topics Concern    Not on file   Social History Narrative    Not on file     Social Determinants of Health     Financial Resource Strain:     Difficulty of Paying Living Expenses: Not on file   Food Insecurity:     Worried About Running Out of Food in the Last Year: Not on file    Marcia of Food in the Last Year: Not on file   Transportation Needs:     Lack of Transportation (Medical): Not on file    Lack of Transportation (Non-Medical):  Not on file   Physical Activity:     Days of Exercise per Week: Not on file    Minutes of Exercise per Session: Not on file   Stress:     Feeling of Stress : Not on file   Social Connections:     Frequency of Communication with Friends and Family: Not on file    Frequency of Social Gatherings with Friends and Family: Not on file    Attends Worship Services: Not on file    Active Member of Clubs or Organizations: Not on file    Attends Club or Organization Meetings: Not on file    Marital Status: Not on file   Intimate Partner Violence:     Fear of Current or Ex-Partner: Not on file    Emotionally Abused: Not on file    Physically Abused: Not on file    Sexually Abused: Not on file   Housing Stability:     Unable to Pay for Housing in the Last Year: Not on file    Number of [place] in the Last Year: Not on file    Unstable Housing in the Last Year: Not on file      Family History   Problem Relation Age of Onset    Cancer Father     Heart Disease Neg Hx     High Blood Pressure Neg Hx     High Cholesterol Neg Hx      Current Outpatient Medications   Medication Sig Dispense Refill    spironolactone (ALDACTONE) 50 MG tablet Take 1 tablet by mouth daily 90 tablet 0    sacubitril-valsartan (ENTRESTO) 49-51 MG per tablet Take 1 tablet by mouth 2 times daily 180 tablet 1    carvedilol (COREG) 6.25 MG tablet Take 1 tablet by mouth 2 times daily 180 tablet 1    JARDIANCE 10 MG tablet TAKE ONE TABLET BY MOUTH DAILY 90 tablet 0    potassium chloride (KLOR-CON M) 10 MEQ extended release tablet Take 1 tablet by mouth daily 90 tablet 1    ammonium lactate (LAC-HYDRIN) 12 % lotion       fluticasone (FLONASE) 50 MCG/ACT nasal spray       hydrocortisone 1 % cream       rivaroxaban (XARELTO) 20 MG TABS tablet Take 1 tablet by mouth Daily with supper 90 tablet 1    vitamin D3 (CHOLECALCIFEROL) 25 MCG (1000 UT) TABS tablet Take 1 tablet by mouth daily 30 tablet 5    atorvastatin (LIPITOR) 40 MG tablet Take 1 tablet by mouth daily 60 tablet 2    pantoprazole (PROTONIX) 40 MG tablet Take 1 tablet by mouth every morning (before breakfast) 30 tablet 3    Multiple Vitamins-Minerals (THERAPEUTIC MULTIVITAMIN-MINERALS) tablet Take 1 tablet by mouth daily       No current facility-administered medications for this visit. Allergies   Allergen Reactions    Latex      rash    Codeine      Hives      Lisinopril      cough    Nitroglycerin Hives    Sulfa Antibiotics Nausea Only    Hydralazine      headaches        Review of Systems:  Review of systems performed and negative with the exception of the above findings    OBJECTIVE:  /79   Wt 222 lb (100.7 kg)   BMI 35.29 kg/m²      Physical Exam:  General appearance: alert, appears stated age, cooperative and no distress  Head: Normocephalic, without obvious abnormality, atraumatic  Lungs: clear to auscultation bilaterally  Heart: regular rate and rhythm, S1, S2 normal, no murmur, click, rub or gallop  Abdomen: soft, non-distended, non-tender to palpation, hernia is reducible and with large defect, normal overlying skin, tenderness is not demonstrated by palpation but more when she moves     Hospital Outpatient Visit on 06/09/2022   Component Date Value Ref Range Status    Sodium 06/09/2022 143  136 - 145 mmol/L Final    Potassium 06/09/2022 3.0* 3.5 - 5.1 mmol/L Final    Chloride 06/09/2022 103  99 - 110 mmol/L Final    CO2 06/09/2022 26  21 - 32 mmol/L Final    Anion Gap 06/09/2022 14  3 - 16 Final    Glucose 06/09/2022 120* 70 - 99 mg/dL Final    BUN 06/09/2022 7  7 - 20 mg/dL Final    CREATININE 06/09/2022 0.9  0.6 - 1.1 mg/dL Final    GFR Non- 06/09/2022 >60  >60 Final    Comment: >60 mL/min/1.73m2 EGFR, calc. for ages 25 and older using the  MDRD formula (not corrected for weight), is valid for stable  renal function.  GFR  06/09/2022 >60  >60 Final    Comment: Chronic Kidney Disease: less than 60 ml/min/1.73 sq.m. Kidney Failure: less than 15 ml/min/1.73 sq.m. Results valid for patients 18 years and older.       Calcium 06/09/2022 7.8* 8.3 - 10.6 mg/dL Final    Pro-BNP 06/09/2022 180* 0 - 124 pg/mL Final    Comment: Methodology by NT-proBNP    An age-independent cutoff point of 300 pg/ml has a 98%  negative predictive value excluding acute heart failure. Values exceeding the age-related cutoff values (450 pg/mL if  age<50, 900 if 50-75 and 1800 if >75) has 90% sensitivity and  84% specificity for diagnosing acute HF. In patients with  renal compromise (eGFR<60) values greater than 1200pg/ml have  a diagnostic sensitivity and specificity of 89% and 72% for  acute HF.  Color, UA 06/09/2022 Yellow  Straw/Yellow Final    Clarity, UA 06/09/2022 Clear  Clear Final    Glucose, Ur 06/09/2022 500* Negative mg/dL Final    Bilirubin Urine 06/09/2022 Negative  Negative Final    Ketones, Urine 06/09/2022 Negative  Negative mg/dL Final    Specific Gravity, UA 06/09/2022 1.010  1.005 - 1.030 Final    Blood, Urine 06/09/2022 Negative  Negative Final    pH, UA 06/09/2022 7.0  5.0 - 8.0 Final    Protein, UA 06/09/2022 Negative  Negative mg/dL Final    Urobilinogen, Urine 06/09/2022 0.2  <2.0 E.U./dL Final    Nitrite, Urine 06/09/2022 Negative  Negative Final    Leukocyte Esterase, Urine 06/09/2022 SMALL* Negative Final    Microscopic Examination 06/09/2022 YES   Final    Urine Type 06/09/2022 Cleancatch   Final    Urine Reflex to Culture 06/09/2022 Not Indicated   Final    Bacteria, UA 06/09/2022 None Seen  None Seen /HPF Final    Hyaline Casts, UA 06/09/2022 0  0 - 8 /LPF Final    WBC, UA 06/09/2022 6* 0 - 5 /HPF Final    RBC, UA 06/09/2022 2  0 - 4 /HPF Final    Epithelial Cells, UA 06/09/2022 3  0 - 5 /HPF Final    Comment: Urinalysis microscopic and digital image assisted microscopic  performed using the automated methodology ([id number] system).          CT ABDOMEN PELVIS W IV CONTRAST Additional Contrast? Oral    Result Date: 6/26/2022  EXAMINATION: CT OF THE ABDOMEN AND PELVIS WITH CONTRAST 6/24/2022 8:33 am TECHNIQUE: CT of the abdomen and pelvis was performed with the administration of intravenous contrast. Multiplanar reformatted images are provided for review. Automated exposure control, iterative reconstruction, and/or weight based adjustment of the mA/kV was utilized to reduce the radiation dose to as low as reasonably achievable. COMPARISON: 10/14/2021 HISTORY: ORDERING SYSTEM PROVIDED HISTORY: Incisional hernia, without obstruction or gangrene TECHNOLOGIST PROVIDED HISTORY: Additional Contrast?->Oral STAT Creatinine as needed:->Yes Reason for exam:->abdominal pain, known incisional hernia Reason for Exam: abdominal pain, known incisional hernia FINDINGS: Lower Chest: There is bibasilar scarring. The heart is enlarged status post 3 lead ICD. A small hiatal hernia is noted. Organs: The liver, spleen, pancreas, adrenal glands and kidneys are unremarkable. Status post cholecystectomy. GI/Bowel: There is no bowel obstruction. The appendix is within normal limits. Pelvis: The uterus is enlarged with a partially calcified uterine fibroid. A small fat containing right inguinal hernia is noted. Peritoneum/Retroperitoneum: There is no evidence of free fluid or adenopathy. There is an increasing complex ventral hernia, likely incisional, containing both fat and a loop of unobstructed transverse colon. Bones/Soft Tissues: Degenerative changes involve the thoracolumbar spine, left hip, bilateral sacroiliac joints and pubic symphysis. 1. Worsening complex ventral hernia containing fat and unobstructed loop of transverse colon. ASSESSMENT:  Reducible incisional hernia  Atrial fibrillation  Medical coagulopathy, on Xarelto  Obesity, BMI 35.29    PLAN:  1. Hernia is reducible and does not need emergent repair. Although increasing symptoms and would benefit from repair in the near future, ideally optimize risk factors preoperatively as much as possible. This is primarily with weight loss.  Previously using the AIS for Determining Associated Risks pedro (CEDAR), the patient's risk of perioperative complications is over 62% with increased risk associated with possible need for musculocutaneous flaps as well as weight. 4. Warning signs of an incarcerated hernia include inability to reduce the bulge, increased and constant pain, nausea, vomiting, fevers, chills and constipation. This is a surgical emergency and the patient should contact the office or present to an emergency department  5. Return to office in 1 month to discuss results, progress, and likely schedule surgery  6. We will need cardiac risk assessment and guidance regarding anticoagulation as patient is on Xarelto. Patient has appointment within the next few weeks with cardiology    Dionna Franco MD, FACS  6/27/2022  11:49 AM

## 2022-06-27 NOTE — PATIENT INSTRUCTIONS
1. Hernia is reducible and does not need emergent repair. Although increasing symptoms and would benefit from repair in the near future, ideally optimize risk factors preoperatively as much as possible. This is primarily with weight loss. Previously using the iubenda for Determining Associated Risks pedro (CEDAR), the patient's risk of perioperative complications is over 44% with increased risk associated with possible need for musculocutaneous flaps as well as weight. 4. Warning signs of an incarcerated hernia include inability to reduce the bulge, increased and constant pain, nausea, vomiting, fevers, chills and constipation. This is a surgical emergency and the patient should contact the office or present to an emergency department  5. Return to office in 1 month to discuss results, progress, and likely schedule surgery  6. We will need cardiac risk assessment and guidance regarding anticoagulation as patient is on Xarelto.   Patient has appointment within the next few weeks with cardiology

## 2022-07-08 DIAGNOSIS — I50.42 CHRONIC COMBINED SYSTOLIC AND DIASTOLIC HEART FAILURE (HCC): ICD-10-CM

## 2022-07-08 RX ORDER — FUROSEMIDE 20 MG/1
TABLET ORAL
Qty: 180 TABLET | Refills: 1 | OUTPATIENT
Start: 2022-07-08

## 2022-08-03 NOTE — PROGRESS NOTES
High risk for stroke and thromboembolism. Anticoagulation is recommended. On Xarelto 20 mg daily, previously on hold d/t severe anemia. Now s/p excision of duodenal mass   S/p DCCV 1/2021      Device checks with low atrial fibrillation burden. If she has recurrent A. fib, will consider ablation.    -She has history of GI bleeding and anemia   We have discussed COURTNEY occlusion in the past.  She appears to be tolerating anticoagulation at this time. If recurrent anemia/GI bleeding will consider watchman procedure. She now has large abdominal hernia and is quite symptomatic and has been scheduled for hernia surgery. -CHB/Cardiomyopathy   Biv-AICD upgrade 1/2021   Continue GDMT with entresto, aldactone, carvedilol and jardiance   The CIED was interrogated and programmed and I supervised and reviewed all the data. All findings and changes are in device interrogation sheet and reflect my personal interpretation and changes and is scanned to Epic. 5.4 years remaining, AP 88.5% ,  99.2%   WNL optivol    0.1% AT/AF burden per device interrogation today. Underlying Rhythm 2:1              2 NSVT Episodes noted- last on 7/6/22, longest 2 seconds                   -HTN  Controlled 122/82  BP goal <130/80  Home BP monitoring encouraged, printed information provided on how to accurately measure BP at home. Counseled to follow a low salt diet to assure blood pressure remains controlled for cardiovascular risk factor modification.    The patient is counseled to get regular exercise 3-5 times per week and maintain a healthy weight reduce cardiovascular risk factors.         -Anemia   Hospitalized 4/7/2021   Resolved   S/p duodenal mass excision  -Chronic Combined systolic/Diastolic HF, NYHA class III   Follows with the HF team    EF per echo  30-35%   On entresto, lasix, carvedilol and spironolactone    Patient Active Problem List    Diagnosis Date Noted    Persistent atrial fibrillation (HCC)     LVH (left ventricular elevated LV filling pressures. There is mild mitral regurgitation noted. Mild left atrial enlargement noted. Aortic valve appears sclerotic but opens adequately. Mild aortic regurgitation. There is mild tricuspid regurgitation with a RVSP estimation of 25 mmHg. Pacer / ICD wire is visualized in the right ventricle. The right ventricle is normal in size and function. ZAMZAM 1/22/2021   Summary   Ejection fraction is visually estimated to be 25-30 %. Mild mitral regurgitation is present. There is no evidence of mass or thrombus in the left atrium or appendage. The left atrium is dilated. Tricuspid aortic valve. Mild aortic regurgitation is present. Pacemaker / ICD lead is visualized in the right atrium. Mobile structure on   RV lead in atrium. Stress Test 9/11/2018  Summary    Enlarged LV with mild global hypokinesis. EF 51%    There is normal isotope uptake at stress and rest. There is no evidence of    myocardial ischemia or scar. I independently reviewed the cardiac diagnostic studies, ECG and relevant imaging studies. Lab Results   Component Value Date    LVEF 33 02/09/2021    LVEFMODE Echo 02/24/2020     Lab Results   Component Value Date    TSH 1.74 03/23/2022         Physical Examination:  Vitals:    08/09/22 0900   BP: 122/82   Pulse: 88   SpO2: 97%        Wt Readings from Last 3 Encounters:   08/09/22 218 lb 12.8 oz (99.2 kg)   08/08/22 222 lb (100.7 kg)   06/27/22 222 lb (100.7 kg)       Constitutional: Oriented. No distress. Head: Normocephalic and atraumatic. Mouth/Throat: Oropharynx is clear and moist.   Eyes: Conjunctivae normal. EOM are normal.   Neck: Neck supple. No JVD present. Cardiovascular: Normal rate, regular rhythm, S1&S2. Pulmonary/Chest: Bilateral respiratory sounds. No rhonchi. Abdominal: Soft. No tenderness. + obese + Abdominal hernia  Musculoskeletal: No tenderness. No edema    Lymphadenopathy: Has no cervical adenopathy. Hydralazine      headaches       Social History:  Reviewed. reports that she has never smoked. She has never used smokeless tobacco. She reports that she does not drink alcohol and does not use drugs. Family History:  Reviewed. Reviewed. No family history of SCD. Relevant and available labs, and cardiovascular diagnostics reviewed. Reviewed. I independently reviewed relevant and available cardiac diagnostic tests ECG, CXR, Echo, Stress test, Device interrogation, Holter, CT scan. Complex medical condition with multiple medical problems affecting prognosis and outcome of EP interventions    All questions and concerns were addressed to the patient/family. Alternatives to my treatment were discussed. I have discussed the above stated plan and the patient verbalized understanding and agreed with the plan. Scribe attestation: This note was scribed in the presence of Adrian Hazel MD by Dipesh Thakkra RN    Physician Attestation: I, Dr. Adrian Hazel, confirm that the scribe's documentation has been prepared under my direction and personally reviewed by me in its entirety. I also confirm that the note above accurately reflects all work, treatment, procedures, and medical decision making performed by me. NOTE: This report was transcribed using voice recognition software. Every effort was made to ensure accuracy, however, inadvertent computerized transcription errors may be present.      Adrian Hazel MD, MPH  [facility] ([place]   Office: [phone]  Fax: [phone]

## 2022-08-08 ENCOUNTER — HOSPITAL ENCOUNTER (OUTPATIENT)
Dept: MAMMOGRAPHY | Age: 57
Discharge: HOME | End: 2022-08-08
Payer: MEDICAID

## 2022-08-08 VITALS — BODY MASS INDEX: 34.84 KG/M2 | WEIGHT: 222 LBS | HEIGHT: 67 IN

## 2022-08-08 DIAGNOSIS — Z12.31 VISIT FOR SCREENING MAMMOGRAM: ICD-10-CM

## 2022-08-08 PROCEDURE — 77067 SCR MAMMO BI INCL CAD: CPT

## 2022-08-09 ENCOUNTER — NURSE ONLY (OUTPATIENT)
Dept: CARDIOLOGY CLINIC | Age: 57
End: 2022-08-09
Payer: MEDICAID

## 2022-08-09 ENCOUNTER — OFFICE VISIT (OUTPATIENT)
Dept: CARDIOLOGY CLINIC | Age: 57
End: 2022-08-09
Payer: MEDICAID

## 2022-08-09 VITALS
SYSTOLIC BLOOD PRESSURE: 122 MMHG | BODY MASS INDEX: 34.34 KG/M2 | HEART RATE: 88 BPM | WEIGHT: 218.8 LBS | HEIGHT: 67 IN | OXYGEN SATURATION: 97 % | DIASTOLIC BLOOD PRESSURE: 82 MMHG

## 2022-08-09 DIAGNOSIS — Z95.810 ICD (IMPLANTABLE CARDIOVERTER-DEFIBRILLATOR), BIVENTRICULAR, IN SITU: ICD-10-CM

## 2022-08-09 DIAGNOSIS — I10 PRIMARY HYPERTENSION: ICD-10-CM

## 2022-08-09 DIAGNOSIS — I47.29 PAROXYSMAL VENTRICULAR TACHYCARDIA: ICD-10-CM

## 2022-08-09 DIAGNOSIS — Q24.6 CONGENITAL HEART BLOCK: ICD-10-CM

## 2022-08-09 DIAGNOSIS — G47.33 OBSTRUCTIVE SLEEP APNEA (ADULT) (PEDIATRIC): ICD-10-CM

## 2022-08-09 DIAGNOSIS — Z95.810 STATUS POST IMPLANTATION OF AUTOMATIC CARDIOVERTER/DEFIBRILLATOR (AICD): Primary | ICD-10-CM

## 2022-08-09 DIAGNOSIS — I51.7 LVH (LEFT VENTRICULAR HYPERTROPHY): ICD-10-CM

## 2022-08-09 DIAGNOSIS — I48.19 PERSISTENT ATRIAL FIBRILLATION (HCC): ICD-10-CM

## 2022-08-09 DIAGNOSIS — I50.22 SYSTOLIC CHF, CHRONIC (HCC): ICD-10-CM

## 2022-08-09 DIAGNOSIS — I51.9 LV DYSFUNCTION: ICD-10-CM

## 2022-08-09 DIAGNOSIS — I42.0 DILATED CARDIOMYOPATHY (HCC): ICD-10-CM

## 2022-08-09 DIAGNOSIS — I48.19 PERSISTENT ATRIAL FIBRILLATION (HCC): Primary | ICD-10-CM

## 2022-08-09 PROCEDURE — G8417 CALC BMI ABV UP PARAM F/U: HCPCS | Performed by: INTERNAL MEDICINE

## 2022-08-09 PROCEDURE — 93284 PRGRMG EVAL IMPLANTABLE DFB: CPT | Performed by: INTERNAL MEDICINE

## 2022-08-09 PROCEDURE — G8427 DOCREV CUR MEDS BY ELIG CLIN: HCPCS | Performed by: INTERNAL MEDICINE

## 2022-08-09 PROCEDURE — 99215 OFFICE O/P EST HI 40 MIN: CPT | Performed by: INTERNAL MEDICINE

## 2022-08-09 PROCEDURE — 93000 ELECTROCARDIOGRAM COMPLETE: CPT | Performed by: INTERNAL MEDICINE

## 2022-08-09 PROCEDURE — 1036F TOBACCO NON-USER: CPT | Performed by: INTERNAL MEDICINE

## 2022-08-09 PROCEDURE — 3017F COLORECTAL CA SCREEN DOC REV: CPT | Performed by: INTERNAL MEDICINE

## 2022-08-09 NOTE — PROGRESS NOTES
Patient comes in for programming evaluation for her defibrillator. All sensing and pacing parameters are within normal range. Battery life 5.4 years  AP 88.6%.  99.2%. Effective 98.6%  1 AT/AF episode noted on 7/1/2022 lasting 3 minutes with a <0.1% burden. 2 NSVT episodes noted. Last on 7/6/2022, longest 2 seconds. Patient remains on Xarelto and Coreg. Today we turned ATP back on. Please see interrogation for more detail. Optivol is within normal range. Patient will see Dr. Guerline Li today and follow up in 3 months in office or remotely.

## 2022-08-09 NOTE — LETTER
[facility] CARDIOLOGY[address]  Dept: [phone]  Dept Fax: [phone]    Pauly Smith  1965 8/9/22      To Whom It May Concern:      Pauly Smith has been seen in the electrophysiology office for atrial fibrillation, and NICM s/p Biv ICD. She may proceed with Ventral Hernia repair from an electrophysiology standpoint though may be at increased anesthesia risk. She is moderate risk. Device check prior to and after surgical procedure. She is already on BB therapy. There are no apparent interventions to ameliorate the cardiac risk. This clinical assessment assumes a full anesthesia/pulmonary/internal medicine evaluation for overall risk of airway management, type and route of anesthetic, and other relevant anesthesia-specific considerations. Discussion about risks, benefits and alternative of procedure per surgical team.     Discontinuation of any anticoagulant carries significant risks of morbidity, sometimes with a fatal outcome (e.g. stroke), from thromboembolic complications. Guidelines do not recommend discontinuation of anticoagulation for low risk surgical procedure, such as cataract surgery, dental procedures, and dermatologic surgeries. The risks of hemorrhage or thromboembolism versus the benefit from the operation need to be addressed by attending surgeon. Xarelto: \"If anticoagulation must be discontinued to reduce the risk of bleeding with surgical or other procedures, Archana Kwabena should be stopped at least 24 hours before the procedure to reduce the risk of bleeding. In deciding whether a procedure should be delayed until 24 hours after the last dose of XARELTO, the increased risk of bleeding should be weighed against the urgency of intervention. Archana Kwabena should be restarted after the surgical or other procedures as soon as adequate hemostasis has been established, noting that the time to onset of  therapeutic effect is short.  If oral

## 2022-08-15 ENCOUNTER — OFFICE VISIT (OUTPATIENT)
Dept: SURGERY | Age: 57
End: 2022-08-15
Payer: MEDICAID

## 2022-08-15 VITALS — DIASTOLIC BLOOD PRESSURE: 80 MMHG | SYSTOLIC BLOOD PRESSURE: 132 MMHG | BODY MASS INDEX: 34.34 KG/M2 | WEIGHT: 216 LBS

## 2022-08-15 DIAGNOSIS — K43.2 INCISIONAL HERNIA, WITHOUT OBSTRUCTION OR GANGRENE: Primary | ICD-10-CM

## 2022-08-15 PROCEDURE — 99213 OFFICE O/P EST LOW 20 MIN: CPT | Performed by: SURGERY

## 2022-08-15 PROCEDURE — G8417 CALC BMI ABV UP PARAM F/U: HCPCS | Performed by: SURGERY

## 2022-08-15 PROCEDURE — 1036F TOBACCO NON-USER: CPT | Performed by: SURGERY

## 2022-08-15 PROCEDURE — 3017F COLORECTAL CA SCREEN DOC REV: CPT | Performed by: SURGERY

## 2022-08-15 PROCEDURE — G8427 DOCREV CUR MEDS BY ELIG CLIN: HCPCS | Performed by: SURGERY

## 2022-08-15 RX ORDER — RIVAROXABAN 20 MG/1
TABLET, FILM COATED ORAL
Qty: 90 TABLET | Refills: 1 | Status: ON HOLD
Start: 2022-08-15 | End: 2022-10-07 | Stop reason: HOSPADM

## 2022-08-15 NOTE — TELEPHONE ENCOUNTER
Received refill request for Xarelto from [employer]     Last ov:08/09/2022 RMM    Last labs:06/09/2022 BMP    Last Refill:12/10/2021 # 90 tabs w/ 1 refill    Next appointment:On recall list for 02/05/2023 w/ NPAL

## 2022-08-15 NOTE — PROGRESS NOTES
[facility]  SUBJECTIVE:    Chief Complaint: incisional hernia    Lori Adriane   1965   [place] y.o. female presents for followup regarding an incisional hernia. Known to me after exploratory laparotomy, cholecystectomy with cholangiogram and excision of benign duodenal mass on 4/13/2021 for upper GI bleeding and symptomatic cholelithiasis. Hernia is reducible. Still with intermittent bloating and nausea. Started on new cardiac medication also used for diabetic control and helping lose weight, associated with some bloating and early satiety. Recent endoscopy uneventful and unrevealing. Back pain better with chiropractor visits. Repeat CT shows continued incisional hernia containing nonobstructed bowel and no other etiologies that would be contributing to abdominal pain.   Medical conditions include atrial fibrillation for which she is anticoagulated with Xarelto     Past Medical History:   Diagnosis Date    Anemia     Atrial fibrillation and flutter (HCC)     Atrial flutter (HCC)     CHB (complete heart block) (HCC)     Class 1 obesity without serious comorbidity with body mass index (BMI) of 33.0 to 33.9 in adult 09/06/2019    Congenital heart disease     GERD (gastroesophageal reflux disease)     Headache(784.0)     History of complete heart block     Hyperlipidemia     Hypertension     PONV (postoperative nausea and vomiting)     Sleep apnea     uses CPAP    Syncope     Systolic CHF, chronic ([facility] [address] 10/03/2018     Past Surgical History:   Procedure Laterality Date    CARDIAC DEFIBRILLATOR PLACEMENT  01/2021    Medtronic    CARDIOVERSION  01/28/2022    CARDIOVERSION  02/22/2022    COLECTOMY N/A 04/13/2021    EXPLORATORY LAPAROTOMY, RESECTION OF DUODENAL MASS, CHOLECYSTECTOMY WITH INTRAOPERATIVE CHOLANGIOGRAM performed by Mario Peters MD at [facility] 167 N/A 10/27/2020    COLONOSCOPY POLYPECTOMY SNARE/COLD BIOPSY performed by Tom Sow MD at [address] [facility] ENDOSCOPY    PACEMAKER INSERTION  11/29/2012    dual chamber PPM, Medtronic    TUBAL LIGATION      UPPER GASTROINTESTINAL ENDOSCOPY N/A 10/27/2020    EGD DIAGNOSTIC ONLY performed by Ricky Gibbs MD at [address] [place] [address] N/A 04/08/2021    EGD CONTROL HEMORRHAGE WITH APPLICATION OF 3 CLIPS TO DUODENAL MASS performed by Radha Batista MD at [address] [place] [address] ENDOSCOPY N/A 04/08/2021    EGD BIOPSY DUODENAL MASS performed by Radha Batista MD at [address] [place] [address] N/A 04/08/2021    EGD SUBMUCOSAL INJECTION OF 0.6 MG OF EPINEPRINE INTO BASE OF DUODENAL MASS performed by Radha Batista MD at [address] [place] [address] ENDOSCOPY N/A 03/04/2022    EGD BIOPSY performed by Ricky Gibbs MD at [facility] 230 History     Socioeconomic History    Marital status:      Spouse name: Not on file    Number of children: 3    Years of education: Not on file    Highest education level: Not on file   Occupational History    Not on file   Tobacco Use    Smoking status: Never    Smokeless tobacco: Never   Vaping Use    Vaping Use: Never used   Substance and Sexual Activity    Alcohol use: No    Drug use: No    Sexual activity: Yes     Partners: Male   Other Topics Concern    Not on file   Social History Narrative    Not on file     Social Determinants of Health     Financial Resource Strain: Not on file   Food Insecurity: Not on file   Transportation Needs: Not on file   Physical Activity: Not on file   Stress: Not on file   Social Connections: Not on file   Intimate Partner Violence: Not on file   Housing Stability: Not on file      Family History   Problem Relation Age of Onset    Cancer Father     Heart Disease Neg Hx     High Blood Pressure Neg Hx     High Cholesterol Neg Hx      Current Outpatient Medications   Medication Sig Dispense Refill    spironolactone (ALDACTONE) 50 MG tablet Take 1 tablet by mouth daily 90 tablet 0    sacubitril-valsartan (ENTRESTO) 49-51 MG per tablet Take 1 tablet by mouth 2 times daily 180 tablet 1    carvedilol (COREG) 6.25 MG tablet Take 1 tablet by mouth 2 times daily 180 tablet 1    JARDIANCE 10 MG tablet TAKE ONE TABLET BY MOUTH DAILY 90 tablet 0    potassium chloride (KLOR-CON M) 10 MEQ extended release tablet Take 1 tablet by mouth daily 90 tablet 1    ammonium lactate (LAC-HYDRIN) 12 % lotion       fluticasone (FLONASE) 50 MCG/ACT nasal spray       hydrocortisone 1 % cream       rivaroxaban (XARELTO) 20 MG TABS tablet Take 1 tablet by mouth Daily with supper 90 tablet 1    vitamin D3 (CHOLECALCIFEROL) 25 MCG (1000 UT) TABS tablet Take 1 tablet by mouth daily 30 tablet 5    atorvastatin (LIPITOR) 40 MG tablet Take 1 tablet by mouth daily 60 tablet 2    pantoprazole (PROTONIX) 40 MG tablet Take 1 tablet by mouth every morning (before breakfast) 30 tablet 3    Multiple Vitamins-Minerals (THERAPEUTIC MULTIVITAMIN-MINERALS) tablet Take 1 tablet by mouth daily       No current facility-administered medications for this visit. Allergies   Allergen Reactions    Latex      rash    Codeine      Hives      Lisinopril      cough    Nitroglycerin Hives    Sulfa Antibiotics Nausea Only    Hydralazine      headaches        Review of Systems:  Review of systems performed and negative with the exception of the above findings    OBJECTIVE:  /80   Wt 216 lb (98 kg)   BMI 34.34 kg/m²      Physical Exam:  General appearance: alert, appears stated age, cooperative, and no distress  Head: Normocephalic, without obvious abnormality, atraumatic  Lungs: clear to auscultation bilaterally  Heart: regular rate and rhythm, S1, S2 normal, no murmur, click, rub or gallop  Abdomen: soft, non-distended, non-tender to palpation, hernia is reducible and with large defect, normal overlying skin    No visits with results within 6 Week(s) from this visit. Latest known visit with results is:   Hospital Outpatient Visit on 06/09/2022   Component Date Value Ref Range Status    Sodium 06/09/2022 143  136 - 145 mmol/L Final    Potassium 06/09/2022 3.0 (A) 3.5 - 5.1 mmol/L Final    Chloride 06/09/2022 103  99 - 110 mmol/L Final    CO2 06/09/2022 26  21 - 32 mmol/L Final    Anion Gap 06/09/2022 14  3 - 16 Final    Glucose 06/09/2022 120 (A) 70 - 99 mg/dL Final    BUN 06/09/2022 7  7 - 20 mg/dL Final    Creatinine 06/09/2022 0.9  0.6 - 1.1 mg/dL Final    GFR Non- 06/09/2022 >60  >60 Final    Comment: >60 mL/min/1.73m2 EGFR, calc. for ages 25 and older using the  MDRD formula (not corrected for weight), is valid for stable  renal function. GFR  06/09/2022 >60  >60 Final    Comment: Chronic Kidney Disease: less than 60 ml/min/1.73 sq.m. Kidney Failure: less than 15 ml/min/1.73 sq.m. Results valid for patients 18 years and older. Calcium 06/09/2022 7.8 (A) 8.3 - 10.6 mg/dL Final    Pro-BNP 06/09/2022 180 (A) 0 - 124 pg/mL Final    Comment: Methodology by NT-proBNP    An age-independent cutoff point of 300 pg/ml has a 98%  negative predictive value excluding acute heart failure. Values exceeding the age-related cutoff values (450 pg/mL if  age<50, 900 if 50-75 and 1800 if >75) has 90% sensitivity and  84% specificity for diagnosing acute HF. In patients with  renal compromise (eGFR<60) values greater than 1200pg/ml have  a diagnostic sensitivity and specificity of 89% and 72% for  acute HF.       Color, UA 06/09/2022 Yellow  Straw/Yellow Final    Clarity, UA 06/09/2022 Clear  Clear Final    Glucose, Ur 06/09/2022 500 (A) Negative mg/dL Final    Bilirubin Urine 06/09/2022 Negative  Negative Final    Ketones, Urine 06/09/2022 Negative  Negative mg/dL Final    Specific Gravity, UA 06/09/2022 1.010  1.005 - 1.030 Final    Blood, Urine 06/09/2022 Negative  Negative Final    pH, UA 06/09/2022 7.0  5.0 - 8.0 Final    Protein, UA Mammogram and/or Breast Ultrasound is indicated if clinically appropriate. ASSESSMENT:  Reducible incisional hernia  Atrial fibrillation  Medical coagulopathy, on Xarelto  Obesity, BMI 35.29     PLAN:  1. Hernia is reducible and does not need emergent repair. Ideally optimize risk factors preoperatively as much as possible. This is primarily with weight loss. Previously using the [employer] for Determining Associated Risks pedro (CEDAR), the patient's risk of perioperative complications is over 17% with increased risk associated with possible need for musculocutaneous flaps as well as weight loss. Patient has lost 6 pounds since last visit  2. Warning signs of an incarcerated hernia include inability to reduce the bulge, increased and constant pain, nausea, vomiting, fevers, chills and constipation. This is a surgical emergency and the patient should contact the office or present to an emergency department  3. Return to office in 3 month to discuss results, progress, and likely schedule surgery or sooner if symptoms worsen  4. Followed by cardiology, deemed moderate risk, may stop Xarelto 2-3 days for surgery, wants to be available during procedure, will repeat followup with cardiology also in 3 months    Flavio Monson MD, FACS  8/15/2022  9:35 AM

## 2022-08-15 NOTE — PATIENT INSTRUCTIONS
1. Hernia is reducible and does not need emergent repair. Ideally optimize risk factors preoperatively as much as possible. This is primarily with weight loss. Previously using the [facility] for Determining Associated Risks pedro (CEDAR), the patient's risk of perioperative complications is over 26% with increased risk associated with possible need for musculocutaneous flaps as well as weight loss. Patient has lost 6 pounds since last visit  2. Warning signs of an incarcerated hernia include inability to reduce the bulge, increased and constant pain, nausea, vomiting, fevers, chills and constipation. This is a surgical emergency and the patient should contact the office or present to an emergency department  3. Return to office in 3 month to discuss results, progress, and likely schedule surgery or sooner if symptoms worsen  4.  Followed by cardiology, deemed moderate risk, may stop Xarelto 2-3 days for surgery, wants to be available during procedure, will repeat followup with cardiology also in 3 months

## 2022-08-23 ENCOUNTER — HOSPITAL ENCOUNTER (OUTPATIENT)
Age: 57
Discharge: HOME | End: 2022-08-23
Payer: MEDICAID

## 2022-08-23 ENCOUNTER — NURSE ONLY (OUTPATIENT)
Dept: CARDIOLOGY CLINIC | Age: 57
End: 2022-08-23
Payer: MEDICAID

## 2022-08-23 DIAGNOSIS — I50.22 SYSTOLIC CHF, CHRONIC (HCC): Primary | ICD-10-CM

## 2022-08-23 DIAGNOSIS — Z95.810 CARDIAC RESYNCHRONIZATION THERAPY DEFIBRILLATOR (CRT-D) IN PLACE: ICD-10-CM

## 2022-08-23 DIAGNOSIS — I42.9 CARDIOMYOPATHY, UNSPECIFIED TYPE (HCC): ICD-10-CM

## 2022-08-23 LAB
ANION GAP SERPL CALCULATED.3IONS-SCNC: 18 MMOL/L (ref 3–16)
BUN BLDV-MCNC: 8 MG/DL (ref 7–20)
CALCIUM SERPL-MCNC: 9.5 MG/DL (ref 8.3–10.6)
CHLORIDE BLD-SCNC: 103 MMOL/L (ref 99–110)
CO2: 24 MMOL/L (ref 21–32)
CREAT SERPL-MCNC: 0.8 MG/DL (ref 0.6–1.1)
GFR AFRICAN AMERICAN: >60
GFR NON-AFRICAN AMERICAN: >60
GLUCOSE BLD-MCNC: 103 MG/DL (ref 70–99)
MAGNESIUM: 1.8 MG/DL (ref 1.8–2.4)
POTASSIUM SERPL-SCNC: 3.2 MMOL/L (ref 3.5–5.1)
SODIUM BLD-SCNC: 145 MMOL/L (ref 136–145)
VITAMIN D 25-HYDROXY: 22.9 NG/ML

## 2022-08-23 PROCEDURE — 93295 DEV INTERROG REMOTE 1/2/MLT: CPT | Performed by: INTERNAL MEDICINE

## 2022-08-23 PROCEDURE — 93296 REM INTERROG EVL PM/IDS: CPT | Performed by: INTERNAL MEDICINE

## 2022-08-23 PROCEDURE — 36415 COLL VENOUS BLD VENIPUNCTURE: CPT

## 2022-08-23 PROCEDURE — 93297 REM INTERROG DEV EVAL ICPMS: CPT | Performed by: CLINICAL NURSE SPECIALIST

## 2022-08-23 PROCEDURE — 80048 BASIC METABOLIC PNL TOTAL CA: CPT

## 2022-08-23 PROCEDURE — 83735 ASSAY OF MAGNESIUM: CPT

## 2022-08-23 PROCEDURE — 82306 VITAMIN D 25 HYDROXY: CPT

## 2022-08-26 RX ORDER — ERGOCALCIFEROL 1.25 MG/1
CAPSULE ORAL
Qty: 12 CAPSULE | Refills: 0 | Status: SHIPPED | OUTPATIENT
Start: 2022-08-26

## 2022-09-01 ENCOUNTER — TELEPHONE (OUTPATIENT)
Dept: SURGERY | Age: 57
End: 2022-09-01

## 2022-09-01 NOTE — TELEPHONE ENCOUNTER
Pt states she's having bad pains and spoke to GI.  GI told her to let Dr. Roxi Calero know. Pt wants to know if she should be seen?  Please advise [phone]

## 2022-09-02 NOTE — TELEPHONE ENCOUNTER
I called pt back, she is having pain when eating and constipation but is still having bm's. Instructed her to take some miralax to help the constipation. Per Dr. Navarrete Him, made an appointment for Tuesday at [phone] and instructed her to go to the ER if the pain worsens.

## 2022-09-06 ENCOUNTER — TELEPHONE (OUTPATIENT)
Dept: CARDIOLOGY CLINIC | Age: 57
End: 2022-09-06

## 2022-09-06 ENCOUNTER — APPOINTMENT (OUTPATIENT)
Dept: GENERAL RADIOLOGY | Age: 57
End: 2022-09-06
Payer: MEDICAID

## 2022-09-06 ENCOUNTER — HOSPITAL ENCOUNTER (EMERGENCY)
Age: 57
Discharge: HOME | End: 2022-09-06
Attending: EMERGENCY MEDICINE
Payer: MEDICAID

## 2022-09-06 ENCOUNTER — APPOINTMENT (OUTPATIENT)
Dept: CT IMAGING | Age: 57
End: 2022-09-06
Payer: MEDICAID

## 2022-09-06 ENCOUNTER — OFFICE VISIT (OUTPATIENT)
Dept: SURGERY | Age: 57
End: 2022-09-06
Payer: MEDICAID

## 2022-09-06 VITALS
HEART RATE: 75 BPM | TEMPERATURE: 98 F | OXYGEN SATURATION: 100 % | RESPIRATION RATE: 16 BRPM | SYSTOLIC BLOOD PRESSURE: 158 MMHG | DIASTOLIC BLOOD PRESSURE: 98 MMHG

## 2022-09-06 VITALS — BODY MASS INDEX: 34.18 KG/M2 | DIASTOLIC BLOOD PRESSURE: 80 MMHG | WEIGHT: 215 LBS | SYSTOLIC BLOOD PRESSURE: 124 MMHG

## 2022-09-06 DIAGNOSIS — K43.2 INCISIONAL HERNIA, WITHOUT OBSTRUCTION OR GANGRENE: Primary | ICD-10-CM

## 2022-09-06 DIAGNOSIS — Z95.810 ICD (IMPLANTABLE CARDIOVERTER-DEFIBRILLATOR) IN PLACE: ICD-10-CM

## 2022-09-06 LAB
A/G RATIO: 1.5 (ref 1.1–2.2)
ALBUMIN SERPL-MCNC: 4.6 G/DL (ref 3.4–5)
ALP BLD-CCNC: 75 U/L (ref 40–129)
ALT SERPL-CCNC: 8 U/L (ref 10–40)
ANION GAP SERPL CALCULATED.3IONS-SCNC: 9 MMOL/L (ref 3–16)
AST SERPL-CCNC: 17 U/L (ref 15–37)
BASOPHILS ABSOLUTE: 0.1 K/UL (ref 0–0.2)
BASOPHILS RELATIVE PERCENT: 1.2 %
BILIRUB SERPL-MCNC: 0.7 MG/DL (ref 0–1)
BUN BLDV-MCNC: 8 MG/DL (ref 7–20)
CALCIUM SERPL-MCNC: 9.7 MG/DL (ref 8.3–10.6)
CHLORIDE BLD-SCNC: 105 MMOL/L (ref 99–110)
CO2: 27 MMOL/L (ref 21–32)
CREAT SERPL-MCNC: 0.7 MG/DL (ref 0.6–1.1)
EOSINOPHILS ABSOLUTE: 0.3 K/UL (ref 0–0.6)
EOSINOPHILS RELATIVE PERCENT: 5.2 %
GFR AFRICAN AMERICAN: >60
GFR NON-AFRICAN AMERICAN: >60
GLUCOSE BLD-MCNC: 106 MG/DL (ref 70–99)
HCT VFR BLD CALC: 38.5 % (ref 36–48)
HEMOGLOBIN: 13 G/DL (ref 12–16)
LACTIC ACID, SEPSIS: 1 MMOL/L (ref 0.4–1.9)
LIPASE: 24 U/L (ref 13–60)
LYMPHOCYTES ABSOLUTE: 1.3 K/UL (ref 1–5.1)
LYMPHOCYTES RELATIVE PERCENT: 24.8 %
MCH RBC QN AUTO: 29.9 PG (ref 26–34)
MCHC RBC AUTO-ENTMCNC: 33.7 G/DL (ref 31–36)
MCV RBC AUTO: 88.7 FL (ref 80–100)
MONOCYTES ABSOLUTE: 0.4 K/UL (ref 0–1.3)
MONOCYTES RELATIVE PERCENT: 6.6 %
NEUTROPHILS ABSOLUTE: 3.3 K/UL (ref 1.7–7.7)
NEUTROPHILS RELATIVE PERCENT: 62.2 %
PDW BLD-RTO: 16.2 % (ref 12.4–15.4)
PLATELET # BLD: 268 K/UL (ref 135–450)
PMV BLD AUTO: 7.9 FL (ref 5–10.5)
POTASSIUM SERPL-SCNC: 3 MMOL/L (ref 3.5–5.1)
PRO-BNP: 418 PG/ML (ref 0–124)
RBC # BLD: 4.34 M/UL (ref 4–5.2)
SODIUM BLD-SCNC: 141 MMOL/L (ref 136–145)
TOTAL PROTEIN: 7.7 G/DL (ref 6.4–8.2)
TROPONIN: <0.01 NG/ML
TROPONIN: <0.01 NG/ML
WBC # BLD: 5.4 K/UL (ref 4–11)

## 2022-09-06 PROCEDURE — G8417 CALC BMI ABV UP PARAM F/U: HCPCS | Performed by: SURGERY

## 2022-09-06 PROCEDURE — 71045 X-RAY EXAM CHEST 1 VIEW: CPT

## 2022-09-06 PROCEDURE — 80053 COMPREHEN METABOLIC PANEL: CPT

## 2022-09-06 PROCEDURE — G8427 DOCREV CUR MEDS BY ELIG CLIN: HCPCS | Performed by: SURGERY

## 2022-09-06 PROCEDURE — 83690 ASSAY OF LIPASE: CPT

## 2022-09-06 PROCEDURE — 99285 EMERGENCY DEPT VISIT HI MDM: CPT

## 2022-09-06 PROCEDURE — 1036F TOBACCO NON-USER: CPT | Performed by: SURGERY

## 2022-09-06 PROCEDURE — 3017F COLORECTAL CA SCREEN DOC REV: CPT | Performed by: SURGERY

## 2022-09-06 PROCEDURE — 99213 OFFICE O/P EST LOW 20 MIN: CPT | Performed by: SURGERY

## 2022-09-06 PROCEDURE — 6360000002 HC RX W HCPCS: Performed by: PHYSICIAN ASSISTANT

## 2022-09-06 PROCEDURE — 83605 ASSAY OF LACTIC ACID: CPT

## 2022-09-06 PROCEDURE — 96374 THER/PROPH/DIAG INJ IV PUSH: CPT

## 2022-09-06 PROCEDURE — 83880 ASSAY OF NATRIURETIC PEPTIDE: CPT

## 2022-09-06 PROCEDURE — 85025 COMPLETE CBC W/AUTO DIFF WBC: CPT

## 2022-09-06 PROCEDURE — 84484 ASSAY OF TROPONIN QUANT: CPT

## 2022-09-06 PROCEDURE — 6370000000 HC RX 637 (ALT 250 FOR IP): Performed by: PHYSICIAN ASSISTANT

## 2022-09-06 PROCEDURE — 6360000004 HC RX CONTRAST MEDICATION: Performed by: PHYSICIAN ASSISTANT

## 2022-09-06 PROCEDURE — 93005 ELECTROCARDIOGRAM TRACING: CPT | Performed by: EMERGENCY MEDICINE

## 2022-09-06 PROCEDURE — 74177 CT ABD & PELVIS W/CONTRAST: CPT

## 2022-09-06 RX ORDER — POTASSIUM CHLORIDE 750 MG/1
40 TABLET, FILM COATED, EXTENDED RELEASE ORAL ONCE
Status: COMPLETED | OUTPATIENT
Start: 2022-09-06 | End: 2022-09-06

## 2022-09-06 RX ORDER — SODIUM CHLORIDE 9 MG/ML
INJECTION, SOLUTION INTRAVENOUS PRN
Status: CANCELLED | OUTPATIENT
Start: 2022-09-06

## 2022-09-06 RX ORDER — SODIUM CHLORIDE 0.9 % (FLUSH) 0.9 %
5-40 SYRINGE (ML) INJECTION PRN
Status: CANCELLED | OUTPATIENT
Start: 2022-09-06

## 2022-09-06 RX ORDER — FENTANYL CITRATE 50 UG/ML
50 INJECTION, SOLUTION INTRAMUSCULAR; INTRAVENOUS ONCE
Status: DISCONTINUED | OUTPATIENT
Start: 2022-09-06 | End: 2022-09-07 | Stop reason: HOSPADM

## 2022-09-06 RX ORDER — SODIUM CHLORIDE 0.9 % (FLUSH) 0.9 %
5-40 SYRINGE (ML) INJECTION EVERY 12 HOURS SCHEDULED
Status: CANCELLED | OUTPATIENT
Start: 2022-09-06

## 2022-09-06 RX ORDER — ONDANSETRON 2 MG/ML
4 INJECTION INTRAMUSCULAR; INTRAVENOUS EVERY 6 HOURS PRN
Status: DISCONTINUED | OUTPATIENT
Start: 2022-09-06 | End: 2022-09-07 | Stop reason: HOSPADM

## 2022-09-06 RX ADMIN — IOPAMIDOL 75 ML: 755 INJECTION, SOLUTION INTRAVENOUS at 20:15

## 2022-09-06 RX ADMIN — POTASSIUM CHLORIDE 40 MEQ: 750 TABLET, FILM COATED, EXTENDED RELEASE ORAL at 22:17

## 2022-09-06 RX ADMIN — ONDANSETRON 4 MG: 2 INJECTION INTRAMUSCULAR; INTRAVENOUS at 19:45

## 2022-09-06 ASSESSMENT — ENCOUNTER SYMPTOMS
RHINORRHEA: 0
ABDOMINAL PAIN: 1
VOMITING: 0
DIARRHEA: 0
NAUSEA: 0
COUGH: 0
SHORTNESS OF BREATH: 0

## 2022-09-06 NOTE — TELEPHONE ENCOUNTER
Patient calling in to let Dr Ollie Mccallum and [facility] know that she is have Hernia repair surgery on 9/27/2022. Any questions please give her a call.

## 2022-09-06 NOTE — ED PROVIDER NOTES
[facility] Emergency Department      Pt Name: Amaris De La Rosa  MRN: [id number]  [place] 1965  Date of evaluation: 9/6/2022  Provider: Lenora Robins MD  I independently performed a history and physical on Amaris De La Rosa. All diagnostic, treatment, and disposition decisions were made by myself in conjunction with the advanced practice provider. HPI: Amaris De La Rosa presented with   Chief Complaint   Patient presents with    Chest Pain     Pt brought in by [facility], coming in from Urgent Care. Pt was seen on Saturday initially for mouth pain and was given some mouth wash fluid and started having chest pain while using the mouth wash fluid. Pt went to urgent care today to be seen for chest pain and physician there did an ekg and seen elevation and summonsed for ems post EKG. Amaris De La Rosa has a past medical history of Anemia, Atrial fibrillation and flutter ([address] Atrial flutter ([address] CHB (complete heart block) ([address] Class 1 obesity without serious comorbidity with body mass index (BMI) of 33.0 to 33.9 in adult (09/06/2019), Congenital heart disease, GERD (gastroesophageal reflux disease), Headache(784.0), History of complete heart block, Hyperlipidemia, Hypertension, PONV (postoperative nausea and vomiting), Sleep apnea, Syncope, and Systolic CHF, chronic ([address] (10/03/2018). She has a past surgical history that includes Uterine fibroid surgery; Pacemaker insertion (11/29/2012); Tubal ligation; Upper gastrointestinal endoscopy (N/A, 10/27/2020); Colonoscopy (N/A, 10/27/2020); Cardiac defibrillator placement (01/2021); Upper gastrointestinal endoscopy (N/A, 04/08/2021); Upper gastrointestinal endoscopy (N/A, 04/08/2021); Upper gastrointestinal endoscopy (N/A, 04/08/2021); colectomy (N/A, 04/13/2021); Cardioversion (01/28/2022); Cardioversion (02/22/2022); and Upper gastrointestinal endoscopy (N/A, 03/04/2022). No current facility-administered medications on file prior to encounter. Current Outpatient Medications on File Prior to Encounter   Medication Sig Dispense Refill    vitamin D (ERGOCALCIFEROL) 1.25 MG (89338 UT) CAPS capsule TAKE ONE CAPSULE BY MOUTH ONCE WEEKLY 12 capsule 0    XARELTO 20 MG TABS tablet TAKE ONE TABLET BY MOUTH DAILY WITH SUPPER 90 tablet 1    spironolactone (ALDACTONE) 50 MG tablet Take 1 tablet by mouth daily 90 tablet 0    sacubitril-valsartan (ENTRESTO) 49-51 MG per tablet Take 1 tablet by mouth 2 times daily 180 tablet 1    carvedilol (COREG) 6.25 MG tablet Take 1 tablet by mouth 2 times daily 180 tablet 1    JARDIANCE 10 MG tablet TAKE ONE TABLET BY MOUTH DAILY 90 tablet 0    potassium chloride (KLOR-CON M) 10 MEQ extended release tablet Take 1 tablet by mouth daily 90 tablet 1    ammonium lactate (LAC-HYDRIN) 12 % lotion       fluticasone (FLONASE) 50 MCG/ACT nasal spray       hydrocortisone 1 % cream       vitamin D3 (CHOLECALCIFEROL) 25 MCG (1000 UT) TABS tablet Take 1 tablet by mouth daily 30 tablet 5    atorvastatin (LIPITOR) 40 MG tablet Take 1 tablet by mouth daily 60 tablet 2    pantoprazole (PROTONIX) 40 MG tablet Take 1 tablet by mouth every morning (before breakfast) 30 tablet 3    Multiple Vitamins-Minerals (THERAPEUTIC MULTIVITAMIN-MINERALS) tablet Take 1 tablet by mouth daily       PHYSICAL EXAM  Vitals: BP (!) 158/98   Pulse 75   Temp 98 °F (36.7 °C) (Oral)   Resp 16   SpO2 100%   Constitutional:  61 y.o. female alert  HENT:  Atraumatic, oral mucosa moist  Neck:  No visible JVD, supple  Chest/Lungs:  Respiratory effort normal, clear, regular  Abdomen:  Non-distended, soft, hernia noted  Back:  No gross deformity  Extremities:  Normal tone and perfusion    Medical Decision Making and Plan: Briefly, this is an 61 y. o.female who presented with concern for episodes of jolting chest discomfort with swallowing nystatin for thrush. Oral exam currently benign. Advised her to stop the medicine.   EKG finding is consistent with ventricular paced rhythm and not changed from prior. We did two sets of delayed cardiac markers, both of which are negative. Her device was interrogated and there were no abnormal events today. The patient's history and evaluation suggests a less emergent etiology for the discomfort. We do not believe the patient is experiencing symptoms from acute coronary syndrome, aortic dissection, pulmonary embolism, pneumothorax, myocarditis, Boerhaave syndrome, pericardial tamponade, acute abdomen, amongst other emergencies. Alfredo Inman was given appropriate discharge instructions. Referral to follow up provider. For further details of Yanira Lock's Emergency Department encounter, please see documentation by advanced practice provider SHARAN Smith. Labs Reviewed   CBC WITH AUTO DIFFERENTIAL - Abnormal; Notable for the following components:       Result Value    RDW 16.2 (*)     All other components within normal limits   COMPREHENSIVE METABOLIC PANEL - Abnormal; Notable for the following components:    Potassium 3.0 (*)     Glucose 106 (*)     ALT 8 (*)     All other components within normal limits   BRAIN NATRIURETIC PEPTIDE - Abnormal; Notable for the following components:    Pro- (*)     All other components within normal limits   LIPASE   TROPONIN   LACTATE, SEPSIS   TROPONIN   URINALYSIS WITH REFLEX TO CULTURE   LACTATE, SEPSIS     RADIOLOGY:   Plain x-rays were viewed by me:   CT ABDOMEN PELVIS W IV CONTRAST Additional Contrast? None   Preliminary Result   1. No acute process within the abdomen or pelvis. 2. No significant change in appearance of a ventral hernia containing a loop   of transverse colon, without evidence of incarceration or bowel obstruction. 3. Stable fibroid uterus.          XR CHEST PORTABLE   Final Result   No acute cardiopulmonary disease           EKG:  Read by me in the absence of a cardiologist shows:  AV paced rhythm, repolarization abnormalities from conduction delay, rate 75, axis leftward, overall similar to last EKG from 8/9/2022    Medications administered:  Medications   fentaNYL (SUBLIMAZE) injection 50 mcg (50 mcg IntraVENous Not Given 9/6/22 1936)   ondansetron (ZOFRAN) injection 4 mg (4 mg IntraVENous Given 9/6/22 1945)   iopamidol (ISOVUE-370) 76 % injection 75 mL (75 mLs IntraVENous Given 9/6/22 2015)   potassium chloride (KLOR-CON) extended release tablet 40 mEq (40 mEq Oral Given 9/6/22 2217)     FOLLOW UP:    Your primary care physician    Schedule an appointment as soon as possible for a visit in 2 days      [facility] Emergency Department  [address] [place] [address]  [phone]    As needed, If symptoms worsen    FINAL IMPRESSION:    1. Incisional hernia, without obstruction or gangrene    2.  ICD (implantable cardioverter-defibrillator) in place       DISPOSITION Decision To Discharge 09/06/2022 10:52:22 PM         Calista Aguilar MD  09/06/22 4808

## 2022-09-06 NOTE — PATIENT INSTRUCTIONS
1. Hernia is reducible but increasingly symptomatic. Previously using the "[employer] for Determining Associated Risks pedro (CEDAR), the patient's risk of perioperative complications is over 52% with increased risk associated with possible need for musculocutaneous flaps as well as obesity. We discussed the risks of surgery including bleeding, infection, damage to surrounding structures, hernia recurrence, chronic pain as well as anesthesia related complications. Increased risk of recurrence is associated with smoking, diabetes, obesity as well as heavy lifting over 20lbs sooner than 6 weeks after the surgery. The benefits of the procedure include repair of the hernia, prevention of future incarceration and return to normal daily activity. Alternatives discussed include close observation. Details of the procedure were discussed and all questions answered. The patient understands, agrees, and wishes to proceed   2. Warning signs of an incarcerated hernia include inability to reduce the bulge, increased and constant pain, nausea, vomiting, fevers, chills and constipation. This is a surgical emergency and the patient should contact the office or present to an emergency department  3. Will schedule for exploratory laparotomy, repair of reducible incisional hernia with mesh, possible unilateral or bilateral abdominal wall component releases with general anesthesia and as inpatient procedure  4. Will need cardiology clearance and guidance with perioperative anticoagulation, off Xarelto at least 48 hours before surgery.  Deemed moderate risk at last evaluation

## 2022-09-06 NOTE — PROGRESS NOTES
[facility] and Laparoscopic Surgery  SUBJECTIVE:    Chief Complaint: incisional hernia    Beto Lau   1965   61 y.o. female presents for followup regarding an incisional hernia. Known to me after exploratory laparotomy, cholecystectomy with cholangiogram and excision of benign duodenal mass on 4/13/2021 for upper GI bleeding and symptomatic cholelithiasis. Hernia is reducible but with increasing tenderness, dyspepsia, nausea, constipation, and intermittent bloating. Endoscopy 3/2022 was unrevealing. Back pain stable. Recent CT shows continued incisional hernia containing nonobstructed bowel and no other etiologies that would be contributing to abdominal pain.   Medical conditions include atrial fibrillation for which she is anticoagulated with Xarelto and CHF    Past Medical History:   Diagnosis Date    Anemia     Atrial fibrillation and flutter (HCC)     Atrial flutter (HCC)     CHB (complete heart block) (HCC)     Class 1 obesity without serious comorbidity with body mass index (BMI) of 33.0 to 33.9 in adult 09/06/2019    Congenital heart disease     GERD (gastroesophageal reflux disease)     Headache(784.0)     History of complete heart block     Hyperlipidemia     Hypertension     PONV (postoperative nausea and vomiting)     Sleep apnea     uses CPAP    Syncope     Systolic CHF, chronic ([facility] [address] 10/03/2018     Past Surgical History:   Procedure Laterality Date    CARDIAC DEFIBRILLATOR PLACEMENT  01/2021    Medtronic    CARDIOVERSION  01/28/2022    CARDIOVERSION  02/22/2022    COLECTOMY N/A 04/13/2021    EXPLORATORY LAPAROTOMY, RESECTION OF DUODENAL MASS, CHOLECYSTECTOMY WITH INTRAOPERATIVE CHOLANGIOGRAM performed by Penny Reyez MD at [address] [place] [address] N/A 10/27/2020    COLONOSCOPY POLYPECTOMY SNARE/COLD BIOPSY performed by Dex Silvestre MD at [address] [place] [address]  11/29/2012    dual chamber PPM, [employer]tronic    TUBAL LIGATION      UPPER GASTROINTESTINAL ENDOSCOPY N/A 10/27/2020    EGD DIAGNOSTIC ONLY performed by Rafi Oconnor MD at [address] [place] [address] N/A 04/08/2021    EGD CONTROL HEMORRHAGE WITH APPLICATION OF 3 CLIPS TO DUODENAL MASS performed by Leonard Mixon MD at [address] [place] [address] N/A 04/08/2021    EGD BIOPSY DUODENAL MASS performed by Leonard Mixon MD at [address] [place] [address] N/A 04/08/2021    EGD SUBMUCOSAL INJECTION OF 0.6 MG OF EPINEPRINE INTO BASE OF DUODENAL MASS performed by Leonard Mixon MD at [address] [place] [address] ENDOSCOPY N/A 03/04/2022    EGD BIOPSY performed by Rafi Oconnor MD at [facility] History     Socioeconomic History    Marital status:      Spouse name: Not on file    Number of children: 3    Years of education: Not on file    Highest education level: Not on file   Occupational History    Not on file   Tobacco Use    Smoking status: Never    Smokeless tobacco: Never   Vaping Use    Vaping Use: Never used   Substance and Sexual Activity    Alcohol use: No    Drug use: No    Sexual activity: Yes     Partners: Male   Other Topics Concern    Not on file   Social History Narrative    Not on file     Social Determinants of Health     Financial Resource Strain: Not on file   Food Insecurity: Not on file   Transportation Needs: Not on file   Physical Activity: Not on file   Stress: Not on file   Social Connections: Not on file   Intimate Partner Violence: Not on file   Housing Stability: Not on file      Family History   Problem Relation Age of Onset    Cancer Father     Heart Disease Neg Hx     High Blood Pressure Neg Hx     High Cholesterol Neg Hx      Current Outpatient Medications   Medication Sig Dispense Refill    vitamin D (ERGOCALCIFEROL) 1.25 MG (46303 UT) CAPS capsule TAKE ONE CAPSULE BY MOUTH ONCE WEEKLY 12 capsule 0    XARELTO 20 MG TABS tablet TAKE ONE TABLET BY MOUTH DAILY WITH SUPPER 90 tablet 1    spironolactone (ALDACTONE) 50 MG tablet Take 1 tablet by mouth daily 90 tablet 0    sacubitril-valsartan (ENTRESTO) 49-51 MG per tablet Take 1 tablet by mouth 2 times daily 180 tablet 1    carvedilol (COREG) 6.25 MG tablet Take 1 tablet by mouth 2 times daily 180 tablet 1    JARDIANCE 10 MG tablet TAKE ONE TABLET BY MOUTH DAILY 90 tablet 0    potassium chloride (KLOR-CON M) 10 MEQ extended release tablet Take 1 tablet by mouth daily 90 tablet 1    ammonium lactate (LAC-HYDRIN) 12 % lotion       fluticasone (FLONASE) 50 MCG/ACT nasal spray       hydrocortisone 1 % cream       vitamin D3 (CHOLECALCIFEROL) 25 MCG (1000 UT) TABS tablet Take 1 tablet by mouth daily 30 tablet 5    atorvastatin (LIPITOR) 40 MG tablet Take 1 tablet by mouth daily 60 tablet 2    pantoprazole (PROTONIX) 40 MG tablet Take 1 tablet by mouth every morning (before breakfast) 30 tablet 3    Multiple Vitamins-Minerals (THERAPEUTIC MULTIVITAMIN-MINERALS) tablet Take 1 tablet by mouth daily       No current facility-administered medications for this visit.       Allergies   Allergen Reactions    Latex      rash    Codeine      Hives      Lisinopril      cough    Nitroglycerin Hives    Sulfa Antibiotics Nausea Only    Hydralazine      headaches        Review of Systems:  Review of systems performed and negative with the exception of the above findings    OBJECTIVE:  /80   Wt 215 lb (97.5 kg)   BMI 34.18 kg/m²      Physical Exam:  General appearance: alert, appears stated age, cooperative, and no distress  Head: Normocephalic, without obvious abnormality, atraumatic  Lungs: clear to auscultation bilaterally  Heart: regular rate and rhythm, S1, S2 normal, no murmur, click, rub or gallop  Abdomen: soft, non-distended, mild epigastric tenderness, hernia reducible with large defect, normal overlying skin    Hospital Outpatient Visit on 08/23/2022   Component Date Value Ref Range Status Sodium 08/23/2022 145  136 - 145 mmol/L Final    Potassium 08/23/2022 3.2 (A) 3.5 - 5.1 mmol/L Final    Chloride 08/23/2022 103  99 - 110 mmol/L Final    CO2 08/23/2022 24  21 - 32 mmol/L Final    Anion Gap 08/23/2022 18 (A) 3 - 16 Final    Glucose 08/23/2022 103 (A) 70 - 99 mg/dL Final    BUN 08/23/2022 8  7 - 20 mg/dL Final    Creatinine 08/23/2022 0.8  0.6 - 1.1 mg/dL Final    GFR Non- 08/23/2022 >60  >60 Final    Comment: >60 mL/min/1.73m2 EGFR, calc. for ages 25 and older using the  MDRD formula (not corrected for weight), is valid for stable  renal function. GFR  08/23/2022 >60  >60 Final    Comment: Chronic Kidney Disease: less than 60 ml/min/1.73 sq.m. Kidney Failure: less than 15 ml/min/1.73 sq.m. Results valid for patients 18 years and older. Calcium 08/23/2022 9.5  8.3 - 10.6 mg/dL Final    Magnesium 08/23/2022 1.80  1.80 - 2.40 mg/dL Final    Vit D, 25-Hydroxy 08/23/2022 22.9 (A) >=30 ng/mL Final    Comment: <=20 ng/mL. ........... Devora Crape Deficient  21-29 ng/mL. ......... Devora Crape Insufficient  >=30 ng/mL. ........ [place] Crape Sufficient         RAMOS DIGITAL SCREEN W OR WO CAD BILATERAL    Result Date: 8/8/2022  EXAM: RAMOS DIGITAL SCREEN W OR WO CAD BILATERAL INDICATION: Screening mammography. COMPARISON: Prior mammograms most recent 2021 TECHNIQUE: Digital mammographic images were obtained in MLO and CC projections. This study was performed and interpreted using Full Field Digital Mammography and Computer Aided Detection. LIFETIME RISK: TC  8.5% LIMITATIONS: none FINDINGS: BREAST DENSITY: There are scattered areas of fibroglandular density. RIGHT BREAST: There are no suspicious masses, calcifications, or architectural distortions. LEFT BREAST: There are no suspicious masses, calcifications, or architectural distortions. Pacemaker projects over the left axilla. RIGHT BREAST: No evidence of malignancy. LEFT BREAST: No evidence of malignancy.  ACR BI-RADS Category: 2 (benign findings) RECOMMENDATIONS: Mammogram in one year. Please note that mammography is not 100% accurate in diagnosing breast cancer. A routine breast exam is recommended to correlate with mammographic findings. Any palpable abnormality must be assessed clinically. If the patient has a new palpable abnormality, then a Diagnostic Mammogram and/or Breast Ultrasound is indicated if clinically appropriate. ASSESSMENT:  Reducible incisional hernia  Atrial fibrillation  Medical coagulopathy, on Xarelto  Obesity, BMI 34.18     PLAN:  1. Hernia is reducible but increasingly symptomatic. Previously using the [place] for Determining Associated Risks pedro (CEDAR), the patient's risk of perioperative complications is over 60% with increased risk associated with possible need for musculocutaneous flaps as well as obesity. We discussed the risks of surgery including bleeding, infection, damage to surrounding structures, hernia recurrence, chronic pain as well as anesthesia related complications. Increased risk of recurrence is associated with smoking, diabetes, obesity as well as heavy lifting over 20lbs sooner than 6 weeks after the surgery. The benefits of the procedure include repair of the hernia, prevention of future incarceration and return to normal daily activity. Alternatives discussed include close observation. Details of the procedure were discussed and all questions answered. The patient understands, agrees, and wishes to proceed   2. Warning signs of an incarcerated hernia include inability to reduce the bulge, increased and constant pain, nausea, vomiting, fevers, chills and constipation. This is a surgical emergency and the patient should contact the office or present to an emergency department  3. Will schedule for exploratory laparotomy, repair of reducible incisional hernia with mesh, possible unilateral or bilateral abdominal wall component releases with general anesthesia and as inpatient procedure  4.  Will need cardiology clearance and guidance with perioperative anticoagulation, off Xarelto at least 48 hours before surgery. Deemed moderate risk at last evaluation    Flavio Mcneal MD, FACS  9/6/2022  1:48 PM

## 2022-09-06 NOTE — ED PROVIDER NOTES
[address] [facility]        Pt Name: James Pal  MRN: [id number]  [place] 1965  Date of evaluation: 9/6/2022  Provider: Minnie Harris PA-C  PCP: No primary care provider on file. Note Started: 6:36 PM EDT        I have seen and evaluated this patient with my supervising physician Katie Sequeira, *. CHIEF COMPLAINT       Chief Complaint   Patient presents with    Chest Pain     Pt brought in by [facility], coming in from Urgent Care. Pt was seen on Saturday initially for mouth pain and was given some mouth wash fluid and started having chest pain while using the mouth wash fluid. Pt went to urgent care today to be seen for chest pain and physician there did an ekg and seen elevation and summonsed for ems post EKG. HISTORY OF PRESENT ILLNESS   (Location, Timing/Onset, Context/Setting, Quality, Duration, Modifying Factors, Severity, Associated Signs and Symptoms)  Note limiting factors. Chief Complaint: Chest pain    James Pal is a 61 y.o. female who presents today for evaluation for a right-sided chest pain. The patient states that she went to urgent care on Saturday, as she was complaining of a dry mouth, and thought that this could be a side effect of the medications. The patient states that she was prescribed nystatin as she did have white spots on her tongue. The patient states that she has been swishing and spitting this since Saturday, and she states that she has been accidentally swallowing some. The patient states that when she swallows the nystatin she will notice a discomfort to the right side of her chest with radiation towards her shoulder. The patient states that this lasted for 10 minutes, and then will resolve. She states that she only has this discomfort when she is using any nystatin mouthwash.   The patient has a history of atrial flutter, history of congenital heart disease hypertension hyperlipidemia, history of complete heart block, history of CHF, has a pacemaker/defibrillator in place. Anticoagulated on Xarelto. The patient states that her pain after she will use the mouthwash she describes as a \" jolt, like when your defibrillator goes off\". Patient states that she went to the urgent care again today as she was concerned that this could be a side effect of the medication. The patient states that they did do an EKG, and sent her to the emergency room for further care and evaluation. When she arrived to the ED she states that she does not have any chest pain. She denies any shortness of breath. She is complaining of some abdominal pain, at the area of her hernia. Patient has noticed that since last night to her upper abdomen, at the area of her hernia she has noticed increasing pain. Patient states that her pain is constant, and otherwise denies any known alleviating remitting factors. She has no nausea, vomiting or diarrhea. No fever or chills. No cough or congestion. No urinary symptoms, no other complaints. Nursing Notes were all reviewed and agreed with or any disagreements were addressed in the HPI. REVIEW OF SYSTEMS    (2-9 systems for level 4, 10 or more for level 5)     Review of Systems   Constitutional:  Negative for activity change, appetite change, chills and fever. HENT:  Negative for congestion and rhinorrhea. Respiratory:  Negative for cough and shortness of breath. Cardiovascular:  Positive for chest pain. Gastrointestinal:  Positive for abdominal pain. Negative for diarrhea, nausea and vomiting. Genitourinary:  Negative for difficulty urinating, dysuria and hematuria. Positives and Pertinent negatives as per HPI. Except as noted above in the ROS, all other systems were reviewed and negative.        PAST MEDICAL HISTORY     Past Medical History:   Diagnosis Date    Anemia     Atrial fibrillation and flutter (HCC)     Atrial flutter ([facility] [address]     CHB (complete heart block) (HCC)     Class 1 obesity without serious comorbidity with body mass index (BMI) of 33.0 to 33.9 in adult 09/06/2019    Congenital heart disease     GERD (gastroesophageal reflux disease)     Headache(784.0)     History of complete heart block     Hyperlipidemia     Hypertension     PONV (postoperative nausea and vomiting)     Sleep apnea     uses CPAP    Syncope     Systolic CHF, chronic ([address] 10/03/2018         SURGICAL HISTORY     Past Surgical History:   Procedure Laterality Date    CARDIAC DEFIBRILLATOR PLACEMENT  01/2021    [employer]    CARDIOVERSION  01/28/2022    CARDIOVERSION  02/22/2022    COLECTOMY N/A 04/13/2021    EXPLORATORY LAPAROTOMY, RESECTION OF DUODENAL MASS, CHOLECYSTECTOMY WITH INTRAOPERATIVE CHOLANGIOGRAM performed by Belkys Davidson MD at [address] N/A 10/27/2020    COLONOSCOPY POLYPECTOMY SNARE/COLD BIOPSY performed by Deepali Patel MD at [address] [place] [address]  11/29/2012    dual chamber PPM, [employer]    TUBAL LIGATION      UPPER GASTROINTESTINAL ENDOSCOPY N/A 10/27/2020    EGD DIAGNOSTIC ONLY performed by Deepali Patel MD at [place] [address] N/A 04/08/2021    EGD CONTROL HEMORRHAGE WITH APPLICATION OF 3 CLIPS TO DUODENAL MASS performed by Julian Ruiz MD at [place] [address] N/A 04/08/2021    EGD BIOPSY DUODENAL MASS performed by Julian Ruiz MD at [place] [address] N/A 04/08/2021    EGD SUBMUCOSAL INJECTION OF 0.6 MG OF EPINEPRINE INTO BASE OF DUODENAL MASS performed by Julian Ruiz MD at [place] [address] N/A 03/04/2022    EGD BIOPSY performed by Deepali Patel MD at [address] [place] [address],[address]       Previous Medications    AMMONIUM LACTATE (LAC-HYDRIN) 12 % LOTION        ATORVASTATIN (LIPITOR) 40 MG TABLET    Take 1 tablet by mouth daily    CARVEDILOL (COREG) 6.25 MG TABLET    Take 1 tablet by mouth 2 times daily    FLUTICASONE (FLONASE) 50 MCG/ACT NASAL SPRAY        HYDROCORTISONE 1 % CREAM        JARDIANCE 10 MG TABLET    TAKE ONE TABLET BY MOUTH DAILY    MULTIPLE VITAMINS-MINERALS (THERAPEUTIC MULTIVITAMIN-MINERALS) TABLET    Take 1 tablet by mouth daily    PANTOPRAZOLE (PROTONIX) 40 MG TABLET    Take 1 tablet by mouth every morning (before breakfast)    POTASSIUM CHLORIDE (KLOR-CON M) 10 MEQ EXTENDED RELEASE TABLET    Take 1 tablet by mouth daily    SACUBITRIL-VALSARTAN (ENTRESTO) 49-51 MG PER TABLET    Take 1 tablet by mouth 2 times daily    SPIRONOLACTONE (ALDACTONE) 50 MG TABLET    Take 1 tablet by mouth daily    VITAMIN D (ERGOCALCIFEROL) 1.25 MG (69844 UT) CAPS CAPSULE    TAKE ONE CAPSULE BY MOUTH ONCE WEEKLY    VITAMIN D3 (CHOLECALCIFEROL) 25 MCG (1000 UT) TABS TABLET    Take 1 tablet by mouth daily    XARELTO 20 MG TABS TABLET    TAKE ONE TABLET BY MOUTH DAILY WITH SUPPER         ALLERGIES     Latex, Codeine, Lisinopril, Nitroglycerin, Sulfa antibiotics, and Hydralazine    FAMILYHISTORY       Family History   Problem Relation Age of Onset    Cancer Father     Heart Disease Neg Hx     High Blood Pressure Neg Hx     High Cholesterol Neg Hx           SOCIAL HISTORY       Social History     Tobacco Use    Smoking status: Never    Smokeless tobacco: Never   Vaping Use    Vaping Use: Never used   Substance Use Topics    Alcohol use: No    Drug use: No       SCREENINGS             PHYSICAL EXAM    (up to 7 for level 4, 8 or more for level 5)     ED Triage Vitals   BP Temp Temp src Pulse Resp SpO2 Height Weight   -- -- -- -- -- -- -- --       Physical Exam  Vitals and nursing note reviewed. Constitutional:       Appearance: She is well-developed. She is not diaphoretic. HENT:      Head: Normocephalic and atraumatic.       Right Ear: External ear normal.      Left Ear: External ear normal.      Nose: Nose normal.   Eyes: General:         Right eye: No discharge. Left eye: No discharge. Neck:      Trachea: No tracheal deviation. Cardiovascular:      Rate and Rhythm: Normal rate and regular rhythm. Pulmonary:      Effort: Pulmonary effort is normal. No respiratory distress. Breath sounds: Normal breath sounds. No wheezing or rales. Abdominal:      General: Bowel sounds are normal. There is no distension. Palpations: Abdomen is soft. Tenderness: abdominal tenderness There is no guarding or rebound. Hernia: A hernia is present. Musculoskeletal:         General: Normal range of motion. Cervical back: Normal range of motion and neck supple. Skin:     General: Skin is warm and dry. Neurological:      Mental Status: She is alert and oriented to person, place, and time. Psychiatric:         Behavior: Behavior normal.       DIAGNOSTIC RESULTS   LABS:    Labs Reviewed   CBC WITH AUTO DIFFERENTIAL - Abnormal; Notable for the following components:       Result Value    RDW 16.2 (*)     All other components within normal limits   COMPREHENSIVE METABOLIC PANEL - Abnormal; Notable for the following components:    Potassium 3.0 (*)     Glucose 106 (*)     ALT 8 (*)     All other components within normal limits   BRAIN NATRIURETIC PEPTIDE - Abnormal; Notable for the following components:    Pro- (*)     All other components within normal limits   LIPASE   TROPONIN   LACTATE, SEPSIS   TROPONIN   URINALYSIS WITH REFLEX TO CULTURE   LACTATE, SEPSIS       When ordered only abnormal lab results are displayed. All other labs were within normal range or not returned as of this dictation. EKG: When ordered, EKG's are interpreted by the Emergency Department Physician in the absence of a cardiologist.  Please see their note for interpretation of EKG.     RADIOLOGY:   Non-plain film images such as CT, Ultrasound and MRI are read by the radiologist. Plain radiographic images are visualized and preliminarily interpreted by the ED Provider with the below findings:        Interpretation per the Radiologist below, if available at the time of this note:    CT ABDOMEN PELVIS W IV CONTRAST Additional Contrast? None   Preliminary Result   1. No acute process within the abdomen or pelvis. 2. No significant change in appearance of a ventral hernia containing a loop   of transverse colon, without evidence of incarceration or bowel obstruction. 3. Stable fibroid uterus. XR CHEST PORTABLE   Final Result   No acute cardiopulmonary disease           No results found. PROCEDURES   Unless otherwise noted below, none     Procedures    CRITICAL CARE TIME       CONSULTS:  None      EMERGENCY DEPARTMENT COURSE and DIFFERENTIAL DIAGNOSIS/MDM:   Vitals:    Vitals:    09/06/22 2143 09/06/22 2158 09/06/22 2214 09/06/22 2246   BP: (!) 154/106 (!) 146/97 (!) 152/94 (!) 158/98   Pulse:  75 75 75   Resp:  14 16 16   Temp:       TempSrc:       SpO2: 98% 96% 97% 100%       Patient was given the following medications:  Medications   fentaNYL (SUBLIMAZE) injection 50 mcg (50 mcg IntraVENous Not Given 9/6/22 1936)   ondansetron (ZOFRAN) injection 4 mg (4 mg IntraVENous Given 9/6/22 1945)   iopamidol (ISOVUE-370) 76 % injection 75 mL (75 mLs IntraVENous Given 9/6/22 2015)   potassium chloride (KLOR-CON) extended release tablet 40 mEq (40 mEq Oral Given 9/6/22 2217)         Is this patient to be included in the SEP-1 Core Measure due to severe sepsis or septic shock? No   Exclusion criteria - the patient is NOT to be included for SEP-1 Core Measure due to: Infection is not suspected    Briefly, this is a 65-year-old female who presents to the emergency department today for evaluation for chest pain, and abdominal pain.   The patient has a significant cardiac history including history of hypertension hyperlipidemia, CHF, history of a complete heart block and congenital heart disease resulting in a pacemaker/defibrillator placement. Patient is followed by our cardiology group here. Patient states that she went to urgent care on Saturday and was diagnosed with presumed candidal thrush, and was given nystatin. She states that every time she uses the nystatin she will get a \"jolt\" lead she describes has her defibrillator going off to the right side of her chest radiating towards her shoulder. Patient has a hernia, and she states that this has been present for several years. She tells me that since last night she had increasing pain to the hernia. Patient denies any chest pain shortness of breath at this time. Patient initially was sent here by urgent care due to an abnormal EKG. On exam, she does have midline abdominal tenderness over the area of her hernia. EKG will be documented by my attending, please see her note for further details. CBC shows no evidence of leukocytosis or anemia. CMP does show a hypokalemia which will be replaced orally in the ED. Troponin is negative. Lactic acid normal.  CT shows no acute process. X-ray negative for    The ICD was interrogated, per [employer], patient has not had any shocks, or abnormalities today. She has remained asymptomatic here, throughout her entire ED stay in with 2 negative troponins in the atypical chest pain story, I feel that she can be managed as an outpatient. I did recommend that she follow-up with her cardiologist within the next 24 to 48 hours. She is to return to the ED for any new or worsening symptoms, patient was understanding is agreeable plan. Stable for discharge.    Results for orders placed or performed during the hospital encounter of 09/06/22   CBC with Auto Differential   Result Value Ref Range    WBC 5.4 4.0 - 11.0 K/uL    RBC 4.34 4.00 - 5.20 M/uL    Hemoglobin 13.0 12.0 - 16.0 g/dL    Hematocrit 38.5 36.0 - 48.0 %    MCV 88.7 80.0 - 100.0 fL    MCH 29.9 26.0 - 34.0 pg    MCHC 33.7 31.0 - 36.0 g/dL    RDW 16.2 (H) 12.4 - 15.4 %    Platelets 268 135 - 450 K/uL    MPV 7.9 5.0 - 10.5 fL    Neutrophils % 62.2 %    Lymphocytes % 24.8 %    Monocytes % 6.6 %    Eosinophils % 5.2 %    Basophils % 1.2 %    Neutrophils Absolute 3.3 1.7 - 7.7 K/uL    Lymphocytes Absolute 1.3 1.0 - 5.1 K/uL    Monocytes Absolute 0.4 0.0 - 1.3 K/uL    Eosinophils Absolute 0.3 0.0 - 0.6 K/uL    Basophils Absolute 0.1 0.0 - 0.2 K/uL   Comprehensive Metabolic Panel   Result Value Ref Range    Sodium 141 136 - 145 mmol/L    Potassium 3.0 (L) 3.5 - 5.1 mmol/L    Chloride 105 99 - 110 mmol/L    CO2 27 21 - 32 mmol/L    Anion Gap 9 3 - 16    Glucose 106 (H) 70 - 99 mg/dL    BUN 8 7 - 20 mg/dL    Creatinine 0.7 0.6 - 1.1 mg/dL    GFR Non-African American >60 >60    GFR African American >60 >60    Calcium 9.7 8.3 - 10.6 mg/dL    Total Protein 7.7 6.4 - 8.2 g/dL    Albumin 4.6 3.4 - 5.0 g/dL    Albumin/Globulin Ratio 1.5 1.1 - 2.2    Total Bilirubin 0.7 0.0 - 1.0 mg/dL    Alkaline Phosphatase 75 40 - 129 U/L    ALT 8 (L) 10 - 40 U/L    AST 17 15 - 37 U/L   Lipase   Result Value Ref Range    Lipase 24.0 13.0 - 60.0 U/L   Troponin   Result Value Ref Range    Troponin <0.01 <0.01 ng/mL   Brain Natriuretic Peptide   Result Value Ref Range    Pro- (H) 0 - 124 pg/mL   Lactate, Sepsis   Result Value Ref Range    Lactic Acid, Sepsis 1.0 0.4 - 1.9 mmol/L   Troponin   Result Value Ref Range    Troponin <0.01 <0.01 ng/mL   EKG 12 Lead   Result Value Ref Range    Ventricular Rate 75 BPM    Atrial Rate 75 BPM    P-R Interval 80 ms    QRS Duration 156 ms    Q-T Interval 470 ms    QTc Calculation (Bazett) 524 ms    P Axis 96 degrees    R Axis -85 degrees    T Axis 77 degrees    Diagnosis AV sequential or dual chamber electronic pacemaker        I estimate there is LOW risk for PE, ACS, pneumonia, pleural effusion, pneumothorax, myocarditis, pericarditis, cardiac tamponade, life-threatening arrhythmia, respiratory distress, acute dissectionthus I consider the discharge disposition reasonable.  Phineas Hun Andreia Templeton and I have discussed the diagnosis and risks, and we agree with discharging home to follow-up with their primary doctor. We also discussed returning to the Emergency Department immediately if new or worsening symptoms occur. We have discussed the symptoms which are most concerning (e.g., bloody sputum, fever, worsening pain or shortness of breath, vomiting) that necessitate immediate return. FINAL IMPRESSION      1. Incisional hernia, without obstruction or gangrene    2.  ICD (implantable cardioverter-defibrillator) in place          DISPOSITION/PLAN   DISPOSITION Decision To Discharge 09/06/2022 10:52:22 PM      PATIENT REFERRED TO:  Your primary care physician    Schedule an appointment as soon as possible for a visit in 2 days      [facility] Emergency Department  [address] [facility]  [phone]    As needed, If symptoms worsen    DISCHARGE MEDICATIONS:  New Prescriptions    No medications on file       DISCONTINUED MEDICATIONS:  Discontinued Medications    No medications on file              (Please note that portions of this note were completed with a voice recognition program.  Efforts were made to edit the dictations but occasionally words are mis-transcribed.)    Wes Lazo PA-C (electronically signed)            Wes Lazo PA-C  09/06/22 [id number]

## 2022-09-07 ENCOUNTER — NURSE ONLY (OUTPATIENT)
Dept: CARDIOLOGY CLINIC | Age: 57
End: 2022-09-07

## 2022-09-07 DIAGNOSIS — I50.42 CHRONIC COMBINED SYSTOLIC AND DIASTOLIC HEART FAILURE (HCC): ICD-10-CM

## 2022-09-07 LAB
EKG ATRIAL RATE: 75 BPM
EKG DIAGNOSIS: NORMAL
EKG P AXIS: 96 DEGREES
EKG P-R INTERVAL: 80 MS
EKG Q-T INTERVAL: 470 MS
EKG QRS DURATION: 156 MS
EKG QTC CALCULATION (BAZETT): 524 MS
EKG R AXIS: -85 DEGREES
EKG T AXIS: 77 DEGREES
EKG VENTRICULAR RATE: 75 BPM

## 2022-09-07 PROCEDURE — 93010 ELECTROCARDIOGRAM REPORT: CPT | Performed by: INTERNAL MEDICINE

## 2022-09-07 RX ORDER — EMPAGLIFLOZIN 10 MG/1
TABLET, FILM COATED ORAL
Qty: 90 TABLET | Refills: 0 | Status: SHIPPED | OUTPATIENT
Start: 2022-09-07 | End: 2022-09-22

## 2022-09-07 RX ORDER — SPIRONOLACTONE 50 MG/1
TABLET, FILM COATED ORAL
Qty: 90 TABLET | Refills: 0 | Status: SHIPPED | OUTPATIENT
Start: 2022-09-07

## 2022-09-07 NOTE — PROGRESS NOTES
Ina storm received 9/6/22 @ 213 from [address] [facility] for patient's CRT-D.    TECHNICAL FINDINGS  No device or lead performance issue(s) observed    DEVICE ASSESSMENT: Available daily battery/lead measurements within expected range. Lead trends stable. ARRHYTHMIA SUMMARY: Since data last cleared on 09 Aug 2022 Based on programmed zones, device detected: 1 Treated AT/AF (Paced sequences) 01 Sept 2022 lasting 4 min 1 Monitored AT/AF episodes most recent on 01 Sept 2022 lasting 1 min 1 NSVT episodes most recent on 18 Aug 2022 rate of 190 bpm - 13 beats 1 High rate NST on 17 Aug 2022 lasting 1 sec 279 bpm 8 beats See attached episode list for date, time, duration and details of detected arrhythmias. NOTE: LV lead has 0.25V safety margin > LV capture has been maintained per Effective CRT pacing. Max adaptive is 1V at 1ms. and this is current output. Last threshold     OBSERVATIONS: Device appears to be currently functioning as programmed.

## 2022-09-07 NOTE — ED NOTES
Discharge instructions given, patient acknowledged understanding, patient ambulated out of ed upon discharge      Sergio Espinoza RN  09/06/22 [id number]

## 2022-09-08 ENCOUNTER — TELEPHONE (OUTPATIENT)
Dept: CARDIOLOGY CLINIC | Age: 57
End: 2022-09-08

## 2022-09-08 NOTE — TELEPHONE ENCOUNTER
Her potassium was low in ED, stop jardiance and see how she feels before she decreases entresto dose.  Simone Valadez

## 2022-09-08 NOTE — TELEPHONE ENCOUNTER
Pt states she believes she is having side effects from Buffy Crate and increased entresto dose. States she is having discomfort in back (kidney/bladder) feeling weak and dehydrated no matter how much water she drinks. States lips are white. Please call to advise.

## 2022-09-08 NOTE — TELEPHONE ENCOUNTER
Spoke to patient no sob or swelling, she was in the ER 09/06/2022 with chest pain, now  tongue is burning and kidney and bladder pain since increase entresto dose or jardiance is the only changes

## 2022-09-12 ENCOUNTER — TELEPHONE (OUTPATIENT)
Dept: BARIATRICS/WEIGHT MGMT | Age: 57
End: 2022-09-12

## 2022-09-15 ENCOUNTER — HOSPITAL ENCOUNTER (EMERGENCY)
Age: 57
Discharge: HOME | End: 2022-09-15
Attending: EMERGENCY MEDICINE
Payer: MEDICAID

## 2022-09-15 ENCOUNTER — APPOINTMENT (OUTPATIENT)
Dept: CT IMAGING | Age: 57
End: 2022-09-15
Payer: MEDICAID

## 2022-09-15 VITALS
RESPIRATION RATE: 16 BRPM | DIASTOLIC BLOOD PRESSURE: 89 MMHG | TEMPERATURE: 98.2 F | SYSTOLIC BLOOD PRESSURE: 132 MMHG | HEART RATE: 87 BPM | OXYGEN SATURATION: 98 %

## 2022-09-15 DIAGNOSIS — K43.9 VENTRAL HERNIA WITHOUT OBSTRUCTION OR GANGRENE: Primary | ICD-10-CM

## 2022-09-15 LAB
A/G RATIO: 1.3 (ref 1.1–2.2)
ALBUMIN SERPL-MCNC: 4.5 G/DL (ref 3.4–5)
ALP BLD-CCNC: 76 U/L (ref 40–129)
ALT SERPL-CCNC: 11 U/L (ref 10–40)
ANION GAP SERPL CALCULATED.3IONS-SCNC: 13 MMOL/L (ref 3–16)
AST SERPL-CCNC: 23 U/L (ref 15–37)
BACTERIA: ABNORMAL /HPF
BASOPHILS ABSOLUTE: 0.1 K/UL (ref 0–0.2)
BASOPHILS RELATIVE PERCENT: 1 %
BILIRUB SERPL-MCNC: 0.8 MG/DL (ref 0–1)
BILIRUBIN URINE: NEGATIVE
BLOOD, URINE: NEGATIVE
BUN BLDV-MCNC: 7 MG/DL (ref 7–20)
CALCIUM SERPL-MCNC: 10.1 MG/DL (ref 8.3–10.6)
CHLORIDE BLD-SCNC: 104 MMOL/L (ref 99–110)
CLARITY: CLEAR
CO2: 25 MMOL/L (ref 21–32)
COLOR: YELLOW
CREAT SERPL-MCNC: 0.7 MG/DL (ref 0.6–1.1)
EOSINOPHILS ABSOLUTE: 0.2 K/UL (ref 0–0.6)
EOSINOPHILS RELATIVE PERCENT: 3 %
EPITHELIAL CELLS, UA: 2 /HPF (ref 0–5)
GFR AFRICAN AMERICAN: >60
GFR NON-AFRICAN AMERICAN: >60
GLUCOSE BLD-MCNC: 121 MG/DL (ref 70–99)
GLUCOSE URINE: NEGATIVE MG/DL
HCT VFR BLD CALC: 42.6 % (ref 36–48)
HEMOGLOBIN: 14.3 G/DL (ref 12–16)
HYALINE CASTS: 1 /LPF (ref 0–8)
KETONES, URINE: NEGATIVE MG/DL
LACTIC ACID, SEPSIS: 1.7 MMOL/L (ref 0.4–1.9)
LEUKOCYTE ESTERASE, URINE: ABNORMAL
LIPASE: 18 U/L (ref 13–60)
LYMPHOCYTES ABSOLUTE: 1.2 K/UL (ref 1–5.1)
LYMPHOCYTES RELATIVE PERCENT: 18.3 %
MCH RBC QN AUTO: 29.7 PG (ref 26–34)
MCHC RBC AUTO-ENTMCNC: 33.5 G/DL (ref 31–36)
MCV RBC AUTO: 88.7 FL (ref 80–100)
MICROSCOPIC EXAMINATION: YES
MONOCYTES ABSOLUTE: 0.4 K/UL (ref 0–1.3)
MONOCYTES RELATIVE PERCENT: 6.8 %
NEUTROPHILS ABSOLUTE: 4.5 K/UL (ref 1.7–7.7)
NEUTROPHILS RELATIVE PERCENT: 70.9 %
NITRITE, URINE: NEGATIVE
PDW BLD-RTO: 16.1 % (ref 12.4–15.4)
PH UA: 7.5 (ref 5–8)
PLATELET # BLD: 268 K/UL (ref 135–450)
PMV BLD AUTO: 7.8 FL (ref 5–10.5)
POTASSIUM REFLEX MAGNESIUM: 3.7 MMOL/L (ref 3.5–5.1)
PROTEIN UA: NEGATIVE MG/DL
RBC # BLD: 4.81 M/UL (ref 4–5.2)
RBC UA: 1 /HPF (ref 0–4)
SODIUM BLD-SCNC: 142 MMOL/L (ref 136–145)
SPECIFIC GRAVITY UA: 1.01 (ref 1–1.03)
TOTAL PROTEIN: 8 G/DL (ref 6.4–8.2)
URINE REFLEX TO CULTURE: ABNORMAL
URINE TYPE: ABNORMAL
UROBILINOGEN, URINE: 1 E.U./DL
WBC # BLD: 6.3 K/UL (ref 4–11)
WBC UA: 8 /HPF (ref 0–5)

## 2022-09-15 PROCEDURE — 81001 URINALYSIS AUTO W/SCOPE: CPT

## 2022-09-15 PROCEDURE — 99285 EMERGENCY DEPT VISIT HI MDM: CPT

## 2022-09-15 PROCEDURE — 36415 COLL VENOUS BLD VENIPUNCTURE: CPT

## 2022-09-15 PROCEDURE — 6360000004 HC RX CONTRAST MEDICATION: Performed by: EMERGENCY MEDICINE

## 2022-09-15 PROCEDURE — 96374 THER/PROPH/DIAG INJ IV PUSH: CPT

## 2022-09-15 PROCEDURE — 83605 ASSAY OF LACTIC ACID: CPT

## 2022-09-15 PROCEDURE — 83690 ASSAY OF LIPASE: CPT

## 2022-09-15 PROCEDURE — 80053 COMPREHEN METABOLIC PANEL: CPT

## 2022-09-15 PROCEDURE — 74177 CT ABD & PELVIS W/CONTRAST: CPT

## 2022-09-15 PROCEDURE — 6360000002 HC RX W HCPCS: Performed by: EMERGENCY MEDICINE

## 2022-09-15 PROCEDURE — 85025 COMPLETE CBC W/AUTO DIFF WBC: CPT

## 2022-09-15 RX ORDER — ONDANSETRON 2 MG/ML
4 INJECTION INTRAMUSCULAR; INTRAVENOUS EVERY 6 HOURS PRN
Status: DISCONTINUED | OUTPATIENT
Start: 2022-09-15 | End: 2022-09-15 | Stop reason: HOSPADM

## 2022-09-15 RX ORDER — FENTANYL CITRATE 50 UG/ML
25 INJECTION, SOLUTION INTRAMUSCULAR; INTRAVENOUS ONCE
Status: DISCONTINUED | OUTPATIENT
Start: 2022-09-15 | End: 2022-09-15 | Stop reason: HOSPADM

## 2022-09-15 RX ADMIN — IOPAMIDOL 75 ML: 755 INJECTION, SOLUTION INTRAVENOUS at 06:05

## 2022-09-15 RX ADMIN — DIATRIZOATE MEGLUMINE AND DIATRIZOATE SODIUM 20 ML: 600; 100 SOLUTION ORAL; RECTAL at 06:05

## 2022-09-15 RX ADMIN — ONDANSETRON 4 MG: 2 INJECTION INTRAMUSCULAR; INTRAVENOUS at 05:36

## 2022-09-15 ASSESSMENT — PAIN SCALES - GENERAL: PAINLEVEL_OUTOF10: 10

## 2022-09-15 ASSESSMENT — PAIN - FUNCTIONAL ASSESSMENT
PAIN_FUNCTIONAL_ASSESSMENT: ACTIVITIES ARE NOT PREVENTED
PAIN_FUNCTIONAL_ASSESSMENT: 0-10

## 2022-09-15 ASSESSMENT — PAIN DESCRIPTION - PAIN TYPE: TYPE: ACUTE PAIN

## 2022-09-15 ASSESSMENT — ENCOUNTER SYMPTOMS
SHORTNESS OF BREATH: 0
ABDOMINAL PAIN: 1
COLOR CHANGE: 0
PHOTOPHOBIA: 0
TROUBLE SWALLOWING: 0
NAUSEA: 1
COUGH: 0
CONSTIPATION: 1
VOMITING: 1

## 2022-09-15 ASSESSMENT — PAIN DESCRIPTION - DESCRIPTORS: DESCRIPTORS: DISCOMFORT

## 2022-09-15 ASSESSMENT — PAIN DESCRIPTION - ORIENTATION: ORIENTATION: LEFT;LOWER

## 2022-09-15 ASSESSMENT — PAIN DESCRIPTION - ONSET: ONSET: SUDDEN

## 2022-09-15 ASSESSMENT — PAIN DESCRIPTION - FREQUENCY: FREQUENCY: CONTINUOUS

## 2022-09-15 ASSESSMENT — PAIN DESCRIPTION - LOCATION: LOCATION: ABDOMEN

## 2022-09-15 NOTE — ED PROVIDER NOTES
[address] [facility]  EMERGENCY DEPARTMENTENCOUNTER      Pt Name: James Pal  MRN: [id number]  [place] 1965  Date ofevaluation: 9/15/2022  Provider: Freda Rosales MD    CHIEF COMPLAINT       Chief Complaint   Patient presents with    Hernia     Patient has large abdominal hernia, pain worsened at 2200. Patient brought in by [facility]. Patient reports taking her prescribed toradol with minimal relief. Pt report nausea, denies vomiting or diarrhea, + constipation. HISTORY OF PRESENT ILLNESS   (Location/Symptom, Timing/Onset,Context/Setting, Quality, Duration, Modifying Factors, Severity)  Note limiting factors. James Pal is a 61 y.o. female  who  has a past medical history of Anemia, Atrial fibrillation and flutter ([address] Atrial flutter ([address] CHB (complete heart block) ([address] Class 1 obesity without serious comorbidity with body mass index (BMI) of 33.0 to 33.9 in adult, Congenital heart disease, GERD (gastroesophageal reflux disease), Headache(784.0), History of complete heart block, Hyperlipidemia, Hypertension, PONV (postoperative nausea and vomiting), Sleep apnea, Syncope, and Systolic CHF, chronic ([address] who presents to the emergency department for evaluation of left lower quadrant abdominal pain. Has a history of a large ventral hernia is cleared and awaiting surgical intervention. Ports spontaneous onset of left lower quadrant pain that occurred spontaneously while watching TV around 2200. Reports that pain progressively worsened. She states that pain is mostly intensity and sharper than previous abdominal pain. Patient seen most recently on 9/16/2022. Reports nausea and vomiting. History of chronic constipation. States that the medications for symptoms. Denies fevers chest pains or shortness of breath. HPI    NursingNotes were reviewed.     REVIEW OF SYSTEMS    (2-9 systems for level 4, 10 or more for level 5)     Review of Systems Constitutional:  Negative for activity change, fatigue and fever. HENT:  Negative for congestion, mouth sores and trouble swallowing. Eyes:  Negative for photophobia and visual disturbance. Respiratory:  Negative for cough and shortness of breath. Cardiovascular:  Negative for chest pain and palpitations. Gastrointestinal:  Positive for abdominal pain, constipation, nausea and vomiting. Genitourinary:  Negative for difficulty urinating and frequency. Musculoskeletal:  Negative for gait problem and neck pain. Skin:  Negative for color change and rash. Neurological:  Negative for dizziness, light-headedness and headaches. Psychiatric/Behavioral:  Negative for confusion. The patient is not nervous/anxious. All other systems reviewed and are negative. Except as noted above the remainder of the review of systems was reviewed and negative.        PAST MEDICAL HISTORY     Past Medical History:   Diagnosis Date    Anemia     Atrial fibrillation and flutter (HCC)     Atrial flutter (HCC)     CHB (complete heart block) (HCC)     Class 1 obesity without serious comorbidity with body mass index (BMI) of 33.0 to 33.9 in adult 09/06/2019    Congenital heart disease     GERD (gastroesophageal reflux disease)     Headache(784.0)     History of complete heart block     Hyperlipidemia     Hypertension     PONV (postoperative nausea and vomiting)     Sleep apnea     uses CPAP    Syncope     Systolic CHF, chronic ([facility] [address] 10/03/2018         SURGICALHISTORY       Past Surgical History:   Procedure Laterality Date    CARDIAC DEFIBRILLATOR PLACEMENT  01/2021    Medtronic    CARDIOVERSION  01/28/2022    CARDIOVERSION  02/22/2022    COLECTOMY N/A 04/13/2021    EXPLORATORY LAPAROTOMY, RESECTION OF DUODENAL MASS, CHOLECYSTECTOMY WITH INTRAOPERATIVE CHOLANGIOGRAM performed by Gabriela Wiseman MD at [address] [place] [address] N/A 10/27/2020    COLONOSCOPY POLYPECTOMY SNARE/COLD BIOPSY performed by Arelis Pollack MD at [facility]    PACEMAKER INSERTION  11/29/2012    dual chamber PPM, [employer]tronic    TUBAL LIGATION      UPPER GASTROINTESTINAL ENDOSCOPY N/A 10/27/2020    EGD DIAGNOSTIC ONLY performed by Lilli Mann MD at [address] [place] [address] N/A 04/08/2021    EGD CONTROL HEMORRHAGE WITH APPLICATION OF 3 CLIPS TO DUODENAL MASS performed by Rafael Muro MD at [address] [place] [address] N/A 04/08/2021    EGD BIOPSY DUODENAL MASS performed by Rafael Muro MD at [address] [place] [address] N/A 04/08/2021    EGD SUBMUCOSAL INJECTION OF 0.6 MG OF EPINEPRINE INTO BASE OF DUODENAL MASS performed by Rafael Muro MD at [address] [place] [address] 03/04/2022    EGD BIOPSY performed by Lilli Mann MD at [place] [address]       Previous Medications    AMMONIUM LACTATE (LAC-HYDRIN) 12 % LOTION        ATORVASTATIN (LIPITOR) 40 MG TABLET    Take 1 tablet by mouth daily    CARVEDILOL (COREG) 6.25 MG TABLET    Take 1 tablet by mouth 2 times daily    FLUTICASONE (FLONASE) 50 MCG/ACT NASAL SPRAY        HYDROCORTISONE 1 % CREAM        JARDIANCE 10 MG TABLET    TAKE ONE TABLET BY MOUTH DAILY    MULTIPLE VITAMINS-MINERALS (THERAPEUTIC MULTIVITAMIN-MINERALS) TABLET    Take 1 tablet by mouth daily    PANTOPRAZOLE (PROTONIX) 40 MG TABLET    Take 1 tablet by mouth every morning (before breakfast)    POTASSIUM CHLORIDE (KLOR-CON M) 10 MEQ EXTENDED RELEASE TABLET    Take 1 tablet by mouth daily    SACUBITRIL-VALSARTAN (ENTRESTO) 49-51 MG PER TABLET    Take 1 tablet by mouth 2 times daily    SPIRONOLACTONE (ALDACTONE) 50 MG TABLET    TAKE ONE TABLET BY MOUTH DAILY    VITAMIN D (ERGOCALCIFEROL) 1.25 MG (03134 UT) CAPS CAPSULE    TAKE ONE CAPSULE BY MOUTH ONCE WEEKLY    VITAMIN D3 (CHOLECALCIFEROL) 25 MCG (1000 UT) TABS TABLET    Take 1 tablet by mouth daily    Juno James 20 MG TABS TABLET    TAKE ONE TABLET BY MOUTH DAILY WITH SUPPER            Latex, Morphine, Codeine, Lisinopril, Nitroglycerin, Sulfa antibiotics, and Hydralazine    FAMILY HISTORY       Family History   Problem Relation Age of Onset    Cancer Father     Heart Disease Neg Hx     High Blood Pressure Neg Hx     High Cholesterol Neg Hx           SOCIAL HISTORY       Social History     Socioeconomic History    Marital status:      Spouse name: None    Number of children: 3    Years of education: None    Highest education level: None   Tobacco Use    Smoking status: Never    Smokeless tobacco: Never   Vaping Use    Vaping Use: Never used   Substance and Sexual Activity    Alcohol use: No    Drug use: No    Sexual activity: Yes     Partners: Male       SCREENINGS    [place] Coma Scale  Eye Opening: Spontaneous  Best Verbal Response: Oriented  Best Motor Response: Obeys commands  [place] Coma Scale Score: 15        PHYSICAL EXAM    (up to 7 for level 4, 8 or more for level 5)     ED Triage Vitals [09/15/22 0427]   BP Temp Temp Source Heart Rate Resp SpO2 Height Weight   (!) 147/89 98.2 °F (36.8 °C) Oral 88 16 100 % -- --       Physical Exam  Vitals reviewed. Constitutional:       Appearance: She is well-developed. HENT:      Head: Normocephalic and atraumatic. Mouth/Throat:      Mouth: Mucous membranes are moist.   Eyes:      Extraocular Movements: Extraocular movements intact. Conjunctiva/sclera: Conjunctivae normal.      Pupils: Pupils are equal, round, and reactive to light. Neck:      Trachea: No tracheal deviation. Cardiovascular:      Rate and Rhythm: Normal rate and regular rhythm. Heart sounds: Normal heart sounds. Pulmonary:      Effort: Pulmonary effort is normal.      Breath sounds: Normal breath sounds. Abdominal:      General: There is no distension. Palpations: Abdomen is soft. Tenderness: There is abdominal tenderness. Hernia: A hernia is present. Musculoskeletal:         General: Normal range of motion. Cervical back: Normal range of motion. Skin:     General: Skin is warm and dry. Capillary Refill: Capillary refill takes less than 2 seconds. Neurological:      Mental Status: She is alert. RESULTS     EKG: All EKG's are interpreted by the Emergency Department Physician who either signs or Co-signsthis chart in the absence of a cardiologist.        RADIOLOGY:   D[place] Flurry such as CT, Ultrasound and MRI are read by the radiologist. Clark Burnette radiographic images are visualized and preliminarily interpreted by the emergency physician with the below findings:        Interpretation per the Radiologist below, if available at the time ofthis note:    CT ABDOMEN PELVIS W IV CONTRAST Additional Contrast? Oral    (Results Pending)         ED BEDSIDE ULTRASOUND:   Performed by ED Physician - none    LABS:  Labs Reviewed   CBC WITH AUTO DIFFERENTIAL   COMPREHENSIVE METABOLIC PANEL W/ REFLEX TO MG FOR LOW K   LIPASE   LACTATE, SEPSIS   LACTATE, SEPSIS   URINALYSIS WITH REFLEX TO CULTURE       All other labs were within normal range or not returned as of this dictation. EMERGENCY DEPARTMENT COURSE and DIFFERENTIAL DIAGNOSIS/MDM:   Vitals:    Vitals:    09/15/22 0427   BP: (!) 147/89   Pulse: 88   Resp: 16   Temp: 98.2 °F (36.8 °C)   TempSrc: Oral   SpO2: 100%       Patient was given thefollowing medications:  Medications - No data to display    ED COURSE & MEDICAL DECISION MAKING    Pertinent Labs & Imaging studies reviewed. (See chart for details)   -  Patient seen and evaluated in the emergency department. -  Triage and nursing notes reviewed and incorporated. -  Old chart records reviewed and incorporated.   -  Differential diagnosis includes: Differential diagnosis: Abdominal Aortic Aneurysm, Acute Coronary Syndrome, Ischemic Bowel, Bowel Obstruction (including Gastric Outlet Obstruction), PUD, GERD, Acute Cholecystitis, Pancreatitis, Hepatitis, Colitis, SMA Syndrome, Mesenteric Steal Syndrome, Splanchnic Vein Thrombosis, Appendicitis, Diverticulitis, Pyelonephritis, UTI, STD, Gonad Torsion, other     -  Work-up included:  See above  -  ED treatment included: See above  -  Results discussed with patient. Patient resents ED for evaluation of abdominal pain associate with a chronic hernia. She indicates pain is in the left lower quadrant. On examination the patient does have a small area of mobile induration concerning for possible entrapped hernia. Patient was laid flat and area was kneaded and pressure applied. Area of induration was reduced in size and there was a palpable reduction. Patient reported improvement of her symptoms although did have some discomfort while the area was palpated. labs show no emergent laboratory maladies. Patient signed of the oncoming provider pending CT scan and reevaluation. Please see their note for further ED course and vitals position. Is this patient to be included in the SEP-1 Core Measure due to severe sepsis or septic shock? No   Exclusion criteria - the patient is NOT to be included for SEP-1 Core Measure due to:  2+ SIRS criteria are not met      REASSESSMENT          CRITICAL CARE TIME   Total Critical Care time was 0 minutes, excluding separately reportable procedures. There was a high probability of clinically significant/life threatening deterioration in the patient's condition which required my urgent intervention. CONSULTS:  None    PROCEDURES:  Unless otherwise noted below, none     Procedures    FINAL IMPRESSION      1. Ventral hernia without obstruction or gangrene          DISPOSITION/PLAN   DISPOSITION        PATIENT REFERREDTO:  No follow-up provider specified.     DISCHARGEMEDICATIONS:  New Prescriptions    No medications on file          (Please note that portions of this note were completed with a voice recognition program.  Efforts were made to edit the dictations but occasionally words are mis-transcribed.)    Annika Lane MD (electronically signed)  Attending Emergency Physician         Annika Lane MD  09/17/22 1832

## 2022-09-15 NOTE — ED PROVIDER NOTES
Addendum:    39-year-old female presents with abdominal pain. Please see Dr. Dayanara Yousif's note for further details. This patient was turned over to me for further evaluation. The patient has a history of an abdominal hernia and presents with abdominal pain that started at 10:00 PM last evening. She took Toradol with minimal relief. She also reported some nausea but denies any vomiting or diarrhea. She also complained of some constipation. The patient's work-up that included laboratory results are unremarkable normal.  A urinalysis that was equivocal and will be sent for cultures and sensitivities. A CT of the patient's abdomen and pelvis with oral contrast shows no evidence of a bowel obstruction. She had evidence of a possible ileus and a hernia containing fat. There were no bowel obstruction noted. Apparently, Dr. Merline Heath was able to reduce the hernia upon presentation. The patient feels much better now. She will be discharged with referral back to her surgeon. The patient already has an appointment to get the surgery fixed near the end of this month. She is to keep that appointment and to return if worse and contact her surgeon to let him or her know that she was in the emergency department. FINAL IMPRESSION      1.  Ventral hernia without obstruction or gangrene             Tameka Jacobsen MD  09/15/22 1805

## 2022-09-15 NOTE — DISCHARGE INSTRUCTIONS
Call your surgeon and tell him you are in the emergency department. Take MiraLAX for your constipation. Return if symptoms worsen. Get your surgery this month as scheduled. Your urine will be sent for cultures and sensitivities and we will let you know if it is infected.

## 2022-09-15 NOTE — ED NOTES
Patient refused fentanyl. Reports that she doesn't want anything that strong. Patient ambulated to bathroom with steady gait. She reports that she has diarrhea coming from her \"cat\". Dr. Cristopher Farrell informed of patient statement. Patient ambulated back to bed. She is drinking the contrast without difficulty.       Anthony Valentin RN  09/15/22 9542

## 2022-09-21 ENCOUNTER — TELEPHONE (OUTPATIENT)
Dept: CARDIOLOGY CLINIC | Age: 57
End: 2022-09-21

## 2022-09-21 ENCOUNTER — TELEPHONE (OUTPATIENT)
Dept: SURGERY | Age: 57
End: 2022-09-21

## 2022-09-21 NOTE — TELEPHONE ENCOUNTER
Spoke to pt, she says she cannot eat solid foods without pain, can eat soup. She is scheduled for 9/27 for surgery. Patient wanted you to know this information and to let you know she also had a CT scan at the ED visit. Please advise.

## 2022-09-21 NOTE — TELEPHONE ENCOUNTER
PATIENT WANTED TO LET YOU KNOW SHE WAS SEEN IN THE ER FOR HER HERNIA ON Monday,9-19-22. SURGERY SCHEDULED FOR 9-27-22, BUT STILL HAVING PROBLEMS AFTER EATING.  OFFERED PATIENT APPOINTMENT, BUT STATES SHE JUST WANTED TO LET YOU KNOW ABOUT HER ER VISIT AND WOULD LIKE SOMEONE TO CALL HER [phone]

## 2022-09-21 NOTE — TELEPHONE ENCOUNTER
Pt called about shoulder pain/ stomach issues along with chest pain since that time she stopped the jardiance she went to the ER 9/15. Pt is having surgery for hernia repair Tuesday 9/27 by   Pt wants to know if she can be seen by RMM for a peace of mind with her heart issues. Please advise.  thank you

## 2022-09-22 ENCOUNTER — TELEPHONE (OUTPATIENT)
Dept: SURGERY | Age: 57
End: 2022-09-22

## 2022-09-22 NOTE — TELEPHONE ENCOUNTER
PT called inquiring if she is having surgery on Tuesday the 27th or not? She said that we called her and told her that we may not do surgery because of CT Results but she received a phone call from PAT today. So she wants to know if she is or is not having surgery.     Please Advise

## 2022-09-22 NOTE — PROGRESS NOTES
Name_______________________________________Printed:____________________  Date and time of surgery__9/27/2022___0730___________________Arrival Time:_0600  main_______________   1. The instructions given regarding when and if a patient needs to stop oral intake prior to surgery varies. Follow the specific instructions you were given                  _x__Nothing to eat or to drink after Midnight the night before.                   ____Carbo loading or ERAS instructions will be given to select patients-if you have been given those instructions -please do the following                           The evening before your surgery after dinner before midnight drink 40 ounces of gatorade. If you are diabetic use sugar free. The morning of surgery drink 40 ounces of water. This needs to be finished 3 hours prior to your surgery start time. 2. Take the following pills with a small sip of water on the morning of surgery_coreg, protonix, entresto__________________________________________________                  Do not take blood pressure medications ending in pril or sartan the mono prior to surgery or the morning of surgery_   3. Aspirin, Ibuprofen, Advil, Naproxen, Vitamin E and other Anti-inflammatory products and supplements should be stopped for 5 -7days before surgery or as directed by your physician. 4. Check with your Doctor regarding stopping Plavix, Coumadin,Eliquis, Lovenox,Effient,Pradaxa,Xarelto, Fragmin or other blood thinners and follow their instructions. 5. Do not smoke, and do not drink any alcoholic beverages 24 hours prior to surgery. This includes NA Beer. Refrain from the usage of any recreational drugs. 6. You may brush your teeth and gargle the morning of surgery. DO NOT SWALLOW WATER   7. You MUST make arrangements for a responsible adult to stay on site while you are here and take you home after your surgery. You will not be allowed to leave alone or drive yourself home.   It is strongly suggested someone stay with you the first 24 hrs. Your surgery will be cancelled if you do not have a ride home. 8. A parent/legal guardian must accompany a child scheduled for surgery and plan to stay at the hospital until the child is discharged. Please do not bring other children with you. 9. Please wear simple, loose fitting clothing to the hospital.  Jewell Robles not bring valuables (money, credit cards, checkbooks, etc.) Do not wear any makeup (including no eye makeup) or nail polish on your fingers or toes. 10. DO NOT wear any jewelry or piercings on day of surgery. All body piercing jewelry must be removed. 11. If you have ___dentures, they will be removed before going to the OR; we will provide you a container. If you wear ___contact lenses or _x__glasses, they will be removed; please bring a case for them. 12. Please see your family doctor/pediatrician for a history & physical and/or concerning medications. Bring any test results/reports from your physician's office. PCP__________________Phone___________H&P Appt. Date________             13 If you  have a Living Will and Durable Power of  for Healthcare, please bring in a copy. 15. Notify your Surgeon if you develop any illness between now and surgery  time, cough, cold, fever, sore throat, nausea, vomiting, etc.  Please notify your surgeon if you experience dizziness, shortness of breath or blurred vision between now & the time of your surgery             15. DO NOT shave your operative site 96 hours prior to surgery. For face & neck surgery, men may use an electric razor 48 hours prior to surgery. 16. Shower the night before or morning of surgery using an antibacterial soap or as you have been instructed. 17. To provide excellent care visitors will be limited to one in the room at any given time. 18.  Please bring picture ID and insurance card.              19.  Visit our web site for additional information:  [email or web address]              20.During flu season no children under the age of 15 are permitted in the hospital for the safety of all patients. 21. If you take a long acting insulin in the evening only  take half of your usual  dose the night  before your procedure              22. If you use a c-pap please bring DOS if staying overnight,             23.For your convenience [facility] has a pharmacy on site to fill your prescriptions. 24. If you use oxygen and have a portable tank please bring it  with you the DOS             25. Bring a complete list of all your medications with name and dose include any supplements. 26. Other__________________________________________   *Please call pre admission testing if you any further questions   Melissa Zelaya   [address] [place] [address]. [place]  [phone]   [address] [place] [address]       VISITOR POLICY(subject to change)    Current policy is 2 visitors per patient. No children. A mask is required. Visiting hours are 8a-8p. Overnight visitors will be at the discretion of the nurse. All above information reviewed with patient in person or by phone. Patient verbalizes understanding. All questions and concerns addressed.                                                                                                  Patient/Rep__patient__________________                                                                                                                                    PRE OP INSTRUCTIONS

## 2022-09-26 ENCOUNTER — OFFICE VISIT (OUTPATIENT)
Dept: SURGERY | Age: 57
End: 2022-09-26
Payer: MEDICAID

## 2022-09-26 ENCOUNTER — ANESTHESIA EVENT (OUTPATIENT)
Dept: OPERATING ROOM | Age: 57
DRG: 227 | End: 2022-09-26
Payer: MEDICAID

## 2022-09-26 VITALS — WEIGHT: 212 LBS | DIASTOLIC BLOOD PRESSURE: 92 MMHG | SYSTOLIC BLOOD PRESSURE: 132 MMHG | BODY MASS INDEX: 33.71 KG/M2

## 2022-09-26 DIAGNOSIS — K43.2 INCISIONAL HERNIA, WITHOUT OBSTRUCTION OR GANGRENE: Primary | ICD-10-CM

## 2022-09-26 PROCEDURE — G8417 CALC BMI ABV UP PARAM F/U: HCPCS | Performed by: SURGERY

## 2022-09-26 PROCEDURE — 3017F COLORECTAL CA SCREEN DOC REV: CPT | Performed by: SURGERY

## 2022-09-26 PROCEDURE — 99213 OFFICE O/P EST LOW 20 MIN: CPT | Performed by: SURGERY

## 2022-09-26 PROCEDURE — 1036F TOBACCO NON-USER: CPT | Performed by: SURGERY

## 2022-09-26 PROCEDURE — G8427 DOCREV CUR MEDS BY ELIG CLIN: HCPCS | Performed by: SURGERY

## 2022-09-26 NOTE — PATIENT INSTRUCTIONS
1. We discussed the risks of surgery including bleeding, infection, damage to surrounding structures, hernia recurrence, chronic pain as well as anesthesia related complications. Increased risk of recurrence is associated with smoking, diabetes, obesity as well as heavy lifting over 20lbs sooner than 6 weeks after the surgery. The benefits of the procedure include repair of the hernia, prevention of future incarceration and return to normal daily activity. Alternatives discussed include close observation. Details of the procedure were discussed and all questions answered. The patient understands, agrees, and wishes to proceed   2. Warning signs of an incarcerated hernia include inability to reduce the bulge, increased and constant pain, nausea, vomiting, fevers, chills and constipation. This is a surgical emergency and the patient should contact the office or present to an emergency department  3. Proceed with exploratory laparotomy, repair of reducible incisional hernia with mesh, possible unilateral or bilateral abdominal wall component releases with general anesthesia and as inpatient procedure  4.  Holding anticoagulation before surgery

## 2022-09-26 NOTE — PROGRESS NOTES
[facility] and [facility]  SUBJECTIVE:    Chief Complaint: incisional hernia    Jolynn Adams   1965   61 y.o. female presents for followup regarding an incisional hernia. Known to me after exploratory laparotomy, cholecystectomy with cholangiogram and excision of benign duodenal mass on 4/13/2021 for upper GI bleeding and symptomatic cholelithiasis. Hernia is reducible but with increasing tenderness, dyspepsia, nausea, constipation, and intermittent bloating. Endoscopy 3/2022 was unrevealing. Back pain stable. Recent CT shows continued incisional hernia containing nonobstructed bowel and no other etiologies that would be contributing to abdominal pain. Medical conditions include atrial fibrillation for which she is anticoagulated with Xarelto and CHF. If she takes miralax to induce a bowel movement which helps reduce nausea.  Anticoagulation held for possible surgery tomorrow    Past Medical History:   Diagnosis Date    Anemia     Atrial fibrillation and flutter (HCC)     Atrial flutter (HCC)     CHB (complete heart block) (HCC)     Class 1 obesity without serious comorbidity with body mass index (BMI) of 33.0 to 33.9 in adult 09/06/2019    Congenital heart disease     GERD (gastroesophageal reflux disease)     Headache(784.0)     History of complete heart block     Hyperlipidemia     Hypertension     PONV (postoperative nausea and vomiting)     Prolonged emergence from general anesthesia     sensitive to meds, slow to wake    Sleep apnea     uses CPAP    Syncope     Systolic CHF, chronic ([facility] [address] 10/03/2018     Past Surgical History:   Procedure Laterality Date    CARDIAC DEFIBRILLATOR PLACEMENT  01/2021    Medtronic    CARDIOVERSION  01/28/2022    CARDIOVERSION  02/22/2022    COLECTOMY N/A 04/13/2021    EXPLORATORY LAPAROTOMY, RESECTION OF DUODENAL MASS, CHOLECYSTECTOMY WITH INTRAOPERATIVE CHOLANGIOGRAM performed by Cinda Baker MD at [address] [place] [address] N/A 10/27/2020 COLONOSCOPY POLYPECTOMY SNARE/COLD BIOPSY performed by Acacia Garcia MD at [address] [facility]  11/29/2012    dual chamber PPM, Medtronic    TUBAL LIGATION      UPPER GASTROINTESTINAL ENDOSCOPY N/A 10/27/2020    EGD DIAGNOSTIC ONLY performed by Acacia Garcia MD at [address] [facility] N/A 04/08/2021    EGD CONTROL HEMORRHAGE WITH APPLICATION OF 3 CLIPS TO DUODENAL MASS performed by Rosy Quiñones MD at [address] [facility] N/A 04/08/2021    EGD BIOPSY DUODENAL MASS performed by Rosy Quiñones MD at [address] [facility] N/A 04/08/2021    EGD SUBMUCOSAL INJECTION OF 0.6 MG OF EPINEPRINE INTO BASE OF DUODENAL MASS performed by Rosy Quiñones MD at [address] [place] [address] ENDOSCOPY N/A 03/04/2022    EGD BIOPSY performed by Acacia Garcia MD at [facility] [address] History     Socioeconomic History    Marital status: Single     Spouse name: Not on file    Number of children: 3    Years of education: Not on file    Highest education level: Not on file   Occupational History    Not on file   Tobacco Use    Smoking status: Never    Smokeless tobacco: Never   Vaping Use    Vaping Use: Never used   Substance and Sexual Activity    Alcohol use: No    Drug use: No    Sexual activity: Yes     Partners: Male   Other Topics Concern    Not on file   Social History Narrative    Not on file     Social Determinants of Health     Financial Resource Strain: Not on file   Food Insecurity: Not on file   Transportation Needs: Not on file   Physical Activity: Not on file   Stress: Not on file   Social Connections: Not on file   Intimate Partner Violence: Not on file   Housing Stability: Not on file      Family History   Problem Relation Age of Onset    High Blood Pressure Mother     Cancer Father     Heart Disease Neg Hx     High Cholesterol Neg Hx Current Outpatient Medications   Medication Sig Dispense Refill    spironolactone (ALDACTONE) 50 MG tablet TAKE ONE TABLET BY MOUTH DAILY 90 tablet 0    vitamin D (ERGOCALCIFEROL) 1.25 MG (21902 UT) CAPS capsule TAKE ONE CAPSULE BY MOUTH ONCE WEEKLY 12 capsule 0    XARELTO 20 MG TABS tablet TAKE ONE TABLET BY MOUTH DAILY WITH SUPPER 90 tablet 1    sacubitril-valsartan (ENTRESTO) 49-51 MG per tablet Take 1 tablet by mouth 2 times daily 180 tablet 1    carvedilol (COREG) 6.25 MG tablet Take 1 tablet by mouth 2 times daily 180 tablet 1    potassium chloride (KLOR-CON M) 10 MEQ extended release tablet Take 1 tablet by mouth daily 90 tablet 1    ammonium lactate (LAC-HYDRIN) 12 % lotion       fluticasone (FLONASE) 50 MCG/ACT nasal spray       hydrocortisone 1 % cream       vitamin D3 (CHOLECALCIFEROL) 25 MCG (1000 UT) TABS tablet Take 1 tablet by mouth daily 30 tablet 5    atorvastatin (LIPITOR) 40 MG tablet Take 1 tablet by mouth daily 60 tablet 2    pantoprazole (PROTONIX) 40 MG tablet Take 1 tablet by mouth every morning (before breakfast) 30 tablet 3    Multiple Vitamins-Minerals (THERAPEUTIC MULTIVITAMIN-MINERALS) tablet Take 1 tablet by mouth daily       No current facility-administered medications for this visit.       Allergies   Allergen Reactions    Latex      rash    Morphine Shortness Of Breath    Codeine      Hives      Lisinopril      cough    Nitroglycerin Hives    Sulfa Antibiotics Nausea Only    Hydralazine      headaches        Review of Systems:  Review of systems performed and negative with the exception of the above findings    OBJECTIVE:  BP (!) 132/92   Wt 212 lb (96.2 kg)   BMI 33.71 kg/m²      Physical Exam:  General appearance: alert, appears stated age, cooperative, and no distress  Head: Normocephalic, without obvious abnormality, atraumatic  Lungs: clear to auscultation bilaterally  Heart: regular rate and rhythm, S1, S2 normal, no murmur, click, rub or gallop  Abdomen: soft, valid for patients 18 years and older. Calcium 09/15/2022 10.1  8.3 - 10.6 mg/dL Final    Total Protein 09/15/2022 8.0  6.4 - 8.2 g/dL Final    Albumin 09/15/2022 4.5  3.4 - 5.0 g/dL Final    Albumin/Globulin Ratio 09/15/2022 1.3  1.1 - 2.2 Final    Total Bilirubin 09/15/2022 0.8  0.0 - 1.0 mg/dL Final    Alkaline Phosphatase 09/15/2022 76  40 - 129 U/L Final    Comment: Specimen hemolysis has exceeded the interference as defined by Roche. Value may be falsely decreased. Suggest recollection if clinically  indicated. ALT 09/15/2022 11  10 - 40 U/L Final    Comment: Specimen hemolysis has exceeded the interference as defined by Roche. Result may be affected. Suggest recollection if clinically indicated. AST 09/15/2022 23  15 - 37 U/L Final    Comment: Specimen hemolysis has exceeded the interference as defined by Roche. Value may be falsely increased. Suggest recollection if clinically  indicated. Lipase 09/15/2022 18.0  13.0 - 60.0 U/L Final    Lactic Acid, Sepsis 09/15/2022 1.7  0.4 - 1.9 mmol/L Final    Color, UA 09/15/2022 Yellow  Straw/Yellow Final    Clarity, UA 09/15/2022 Clear  Clear Final    Glucose, Ur 09/15/2022 Negative  Negative mg/dL Final    Bilirubin Urine 09/15/2022 Negative  Negative Final    Ketones, Urine 09/15/2022 Negative  Negative mg/dL Final    Specific Gravity, UA 09/15/2022 1.010  1.005 - 1.030 Final    Blood, Urine 09/15/2022 Negative  Negative Final    pH, UA 09/15/2022 7.5  5.0 - 8.0 Final    Protein, UA 09/15/2022 Negative  Negative mg/dL Final    Urobilinogen, Urine 09/15/2022 1.0  <2.0 E.U./dL Final    Nitrite, Urine 09/15/2022 Negative  Negative Final    Leukocyte Esterase, Urine 09/15/2022 MODERATE (A)  Negative Final    Microscopic Examination 09/15/2022 YES   Final    Urine Type 09/15/2022 Voided   Final    Urine received in a container without preservatives.     Urine Reflex to Culture 09/15/2022 Not Indicated   Final    Bacteria, UA 09/15/2022 None Seen None Seen /HPF Final    Hyaline Casts, UA 09/15/2022 1  0 - 8 /LPF Final    WBC, UA 09/15/2022 8 (A)  0 - 5 /HPF Final    RBC, UA 09/15/2022 1  0 - 4 /HPF Final    Epithelial Cells, UA 09/15/2022 2  0 - 5 /HPF Final    Comment: Urinalysis microscopic and digital image assisted microscopic  performed using the automated methodology ([id number] system).      Admission on 09/06/2022, Discharged on 09/06/2022   Component Date Value Ref Range Status    Ventricular Rate 09/06/2022 75  BPM Final    Atrial Rate 09/06/2022 75  BPM Final    P-R Interval 09/06/2022 80  ms Final    QRS Duration 09/06/2022 156  ms Final    Q-T Interval 09/06/2022 470  ms Final    QTc Calculation (Bazett) 09/06/2022 524  ms Final    P Axis 09/06/2022 96  degrees Final    R Axis 09/06/2022 -85  degrees Final    T Axis 09/06/2022 77  degrees Final    Diagnosis 09/06/2022 AV sequential or dual chamber electronic pacemakerConfirmed by Constantine Napier MD, Zen Quach ([id number]) on 9/7/2022 5:19:21 PM   Final    WBC 09/06/2022 5.4  4.0 - 11.0 K/uL Final    RBC 09/06/2022 4.34  4.00 - 5.20 M/uL Final    Hemoglobin 09/06/2022 13.0  12.0 - 16.0 g/dL Final    Hematocrit 09/06/2022 38.5  36.0 - 48.0 % Final    MCV 09/06/2022 88.7  80.0 - 100.0 fL Final    MCH 09/06/2022 29.9  26.0 - 34.0 pg Final    MCHC 09/06/2022 33.7  31.0 - 36.0 g/dL Final    RDW 09/06/2022 16.2 (A)  12.4 - 15.4 % Final    Platelets 38/31/5858 268  135 - 450 K/uL Final    MPV 09/06/2022 7.9  5.0 - 10.5 fL Final    Neutrophils % 09/06/2022 62.2  % Final    Lymphocytes % 09/06/2022 24.8  % Final    Monocytes % 09/06/2022 6.6  % Final    Eosinophils % 09/06/2022 5.2  % Final    Basophils % 09/06/2022 1.2  % Final    Neutrophils Absolute 09/06/2022 3.3  1.7 - 7.7 K/uL Final    Lymphocytes Absolute 09/06/2022 1.3  1.0 - 5.1 K/uL Final    Monocytes Absolute 09/06/2022 0.4  0.0 - 1.3 K/uL Final    Eosinophils Absolute 09/06/2022 0.3  0.0 - 0.6 K/uL Final    Basophils Absolute 09/06/2022 0.1  0.0 - 0.2 K/uL Final Sodium 09/06/2022 141  136 - 145 mmol/L Final    Potassium 09/06/2022 3.0 (A)  3.5 - 5.1 mmol/L Final    Chloride 09/06/2022 105  99 - 110 mmol/L Final    CO2 09/06/2022 27  21 - 32 mmol/L Final    Anion Gap 09/06/2022 9  3 - 16 Final    Glucose 09/06/2022 106 (A)  70 - 99 mg/dL Final    BUN 09/06/2022 8  7 - 20 mg/dL Final    Creatinine 09/06/2022 0.7  0.6 - 1.1 mg/dL Final    GFR Non- 09/06/2022 >60  >60 Final    Comment: >60 mL/min/1.73m2 EGFR, calc. for ages 25 and older using the  MDRD formula (not corrected for weight), is valid for stable  renal function. GFR  09/06/2022 >60  >60 Final    Comment: Chronic Kidney Disease: less than 60 ml/min/1.73 sq.m. Kidney Failure: less than 15 ml/min/1.73 sq.m. Results valid for patients 18 years and older. Calcium 09/06/2022 9.7  8.3 - 10.6 mg/dL Final    Total Protein 09/06/2022 7.7  6.4 - 8.2 g/dL Final    Albumin 09/06/2022 4.6  3.4 - 5.0 g/dL Final    Albumin/Globulin Ratio 09/06/2022 1.5  1.1 - 2.2 Final    Total Bilirubin 09/06/2022 0.7  0.0 - 1.0 mg/dL Final    Alkaline Phosphatase 09/06/2022 75  40 - 129 U/L Final    ALT 09/06/2022 8 (A)  10 - 40 U/L Final    AST 09/06/2022 17  15 - 37 U/L Final    Lipase 09/06/2022 24.0  13.0 - 60.0 U/L Final    Troponin 09/06/2022 <0.01  <0.01 ng/mL Final    Methodology by Troponin T    Pro-BNP 09/06/2022 418 (A)  0 - 124 pg/mL Final    Comment: Methodology by NT-proBNP    An age-independent cutoff point of 300 pg/ml has a 98%  negative predictive value excluding acute heart failure. Values exceeding the age-related cutoff values (450 pg/mL if  age<50, 900 if 50-75 and 1800 if >75) has 90% sensitivity and  84% specificity for diagnosing acute HF. In patients with  renal compromise (eGFR<60) values greater than 1200pg/ml have  a diagnostic sensitivity and specificity of 89% and 72% for  acute HF.       Lactic Acid, Sepsis 09/06/2022 1.0  0.4 - 1.9 mmol/L Final    Troponin 09/06/2022 <0.01  <0.01 ng/mL Final    Methodology by Troponin [facility] Outpatient Visit on 08/23/2022   Component Date Value Ref Range Status    Sodium 08/23/2022 145  136 - 145 mmol/L Final    Potassium 08/23/2022 3.2 (A)  3.5 - 5.1 mmol/L Final    Chloride 08/23/2022 103  99 - 110 mmol/L Final    CO2 08/23/2022 24  21 - 32 mmol/L Final    Anion Gap 08/23/2022 18 (A)  3 - 16 Final    Glucose 08/23/2022 103 (A)  70 - 99 mg/dL Final    BUN 08/23/2022 8  7 - 20 mg/dL Final    Creatinine 08/23/2022 0.8  0.6 - 1.1 mg/dL Final    GFR Non- 08/23/2022 >60  >60 Final    Comment: >60 mL/min/1.73m2 EGFR, calc. for ages 25 and older using the  MDRD formula (not corrected for weight), is valid for stable  renal function. GFR  08/23/2022 >60  >60 Final    Comment: Chronic Kidney Disease: less than 60 ml/min/1.73 sq.m. Kidney Failure: less than 15 ml/min/1.73 sq.m. Results valid for patients 18 years and older. Calcium 08/23/2022 9.5  8.3 - 10.6 mg/dL Final    Magnesium 08/23/2022 1.80  1.80 - 2.40 mg/dL Final    Vit D, 25-Hydroxy 08/23/2022 22.9 (A)  >=30 ng/mL Final    Comment: <=20 ng/mL. ........... Trula Blew Deficient  21-29 ng/mL. ......... Trula Blew Insufficient  >=30 ng/mL. ........ Trula Blew Sufficient         CT ABDOMEN PELVIS W IV CONTRAST Additional Contrast? Oral    Result Date: 9/15/2022  EXAMINATION: CT OF THE ABDOMEN AND PELVIS WITH CONTRAST 9/15/2022 5:54 am TECHNIQUE: CT of the abdomen and pelvis was performed with the administration of intravenous contrast. Multiplanar reformatted images are provided for review. Automated exposure control, iterative reconstruction, and/or weight based adjustment of the mA/kV was utilized to reduce the radiation dose to as low as reasonably achievable.  COMPARISON: 09/06/2022 HISTORY: ORDERING SYSTEM PROVIDED HISTORY: abd pain TECHNOLOGIST PROVIDED HISTORY: Reason for exam:->abd pain Additional Contrast?->Oral Decision Support Exception - unselect if not a suspected or confirmed emergency medical condition->Emergency Medical Condition (MA) Reason for Exam: abd pain Relevant Medical/Surgical History: Hernia (Patient has large abdominal hernia, pain worsened at 2200. Patient brought in by [facility]. Patient reports taking her prescribed toradol with minimal relief. Pt report nausea, denies vomiting or diarrhea, + constipation. ) FINDINGS: Lower Chest: Lower lungs appear clear. Organs: Liver, spleen, pancreas, adrenal glands, and kidneys appear unremarkable. Gallbladder absent. GI/Bowel: No evidence of bowel obstruction. Stable appearance of supraumbilical ventral hernia containing fat and a short segment of transverse colon as well as trace amount of fluid. Nonspecific distention of the stomach and duodenum as well as loops of small bowel. Stool throughout the colon. Pelvis: Urinary bladder unremarkable. Enlarged uterus secondary to multiple fibroids. Peritoneum/Retroperitoneum: No free air, fluid, or lymphadenopathy. Bones/Soft Tissues: No acute osseous abnormality. Degenerative changes at L4-L5. No evidence of bowel obstruction. However, there is moderate distension of the stomach, duodenum, and areas of small-bowel which may be related to ileus. Stool throughout much of the colon. Stable appearance of supraumbilical ventral hernia containing fat and a short segment of transverse colon as well as trace amount of fluid. Fibroid uterus. CT ABDOMEN PELVIS W IV CONTRAST Additional Contrast? None    Result Date: 9/6/2022  EXAMINATION: CT OF THE ABDOMEN AND PELVIS WITH CONTRAST 9/6/2022 8:14 pm TECHNIQUE: CT of the abdomen and pelvis was performed with the administration of intravenous contrast. Multiplanar reformatted images are provided for review. Automated exposure control, iterative reconstruction, and/or weight based adjustment of the mA/kV was utilized to reduce the radiation dose to as low as reasonably achievable.  COMPARISON: 06/24/2022, 10/14/2021, 04/08/2021, 07/10/2020 HISTORY: ORDERING SYSTEM PROVIDED HISTORY: abd pain, hernia TECHNOLOGIST PROVIDED HISTORY: Reason for exam:->abd pain, hernia Additional Contrast?->None Decision Support Exception - unselect if not a suspected or confirmed emergency medical condition->Emergency Medical Condition (MA) Reason for Exam: abd pain, hernia FINDINGS: Lower Chest: There is chronic bibasilar parenchymal scarring. The lung bases are otherwise clear. Organs: The patient is status post cholecystectomy. The liver is unremarkable. The spleen is unremarkable. The pancreas is normal.  The adrenal glands are normal bilaterally. The bilateral kidneys are unremarkable, without evidence of inflammatory change, renal/ureteral calculus, or hydronephrosis. GI/Bowel: Evaluation of the hollow GI tract demonstrates no evidence of abnormal bowel wall thickening, dilatation, or obstruction. The appendix is normal. Pelvis: The urinary bladder is unremarkable. There are multiple uterine fibroids, some of which are calcified. The bilateral adnexa are unremarkable. There is no free pelvic fluid. No pathologic pelvic lymphadenopathy is identified. Scattered phleboliths are noted. Peritoneum/Retroperitoneum: No intraperitoneal free air or free fluid is identified. No pathologic lymphadenopathy is seen. The abdominal aorta is unremarkable. There has been no significant change in appearance of an approximate 10.1 x 3.8 cm ventral abdominal wall hernia, which contains a loop of transverse colon, though no evidence of incarceration or obstruction. No additional abdominal wall hernia is identified. Bones/Soft Tissues: There is multilevel degenerative change throughout the thoracolumbar spine. No osteolytic or osteoblastic lesion is seen. 1. No acute process within the abdomen or pelvis.  2. No significant change in appearance of a ventral hernia containing a loop of transverse colon, without evidence of incarceration or bowel obstruction. 3. Stable fibroid uterus. XR CHEST PORTABLE    Result Date: 9/6/2022  EXAMINATION: ONE XRAY VIEW OF THE CHEST 9/6/2022 3:35 pm COMPARISON: 12/01/2022 HISTORY: ORDERING SYSTEM PROVIDED HISTORY: sob TECHNOLOGIST PROVIDED HISTORY: Reason for exam:->sob Reason for Exam: sob FINDINGS: Stable ICD leads. .The cardiac size is mildly enlarged and stable. No acute infiltrates or pleural effusions are seen. Pulmonary vascularity appears normal. There is mild ectasia of the thoracic aorta. .No acute bony abnormalities. The hilar structures are normal.     No acute cardiopulmonary disease       ASSESSMENT:  Reducible incisional hernia  Atrial fibrillation  Medical coagulopathy, on Xarelto  Obesity, BMI 34.18     PLAN:  1. We discussed the risks of surgery including bleeding, infection, damage to surrounding structures, hernia recurrence, chronic pain as well as anesthesia related complications. Increased risk of recurrence is associated with smoking, diabetes, obesity as well as heavy lifting over 20lbs sooner than 6 weeks after the surgery. The benefits of the procedure include repair of the hernia, prevention of future incarceration and return to normal daily activity. Alternatives discussed include close observation. Details of the procedure were discussed and all questions answered. The patient understands, agrees, and wishes to proceed   2. Warning signs of an incarcerated hernia include inability to reduce the bulge, increased and constant pain, nausea, vomiting, fevers, chills and constipation. This is a surgical emergency and the patient should contact the office or present to an emergency department  3. Proceed with exploratory laparotomy, repair of reducible incisional hernia with mesh, possible unilateral or bilateral abdominal wall component releases with general anesthesia and as inpatient procedure  4. Holding anticoagulation before surgery    Flavio Peacock MD, FACS  9/26/2022  12:35 PM

## 2022-09-27 ENCOUNTER — HOSPITAL ENCOUNTER (INPATIENT)
Age: 57
LOS: 7 days | Discharge: HOME | DRG: 227 | End: 2022-10-04
Attending: SURGERY | Admitting: SURGERY
Payer: MEDICAID

## 2022-09-27 ENCOUNTER — NURSE ONLY (OUTPATIENT)
Dept: CARDIOLOGY CLINIC | Age: 57
End: 2022-09-27

## 2022-09-27 ENCOUNTER — ANESTHESIA (OUTPATIENT)
Dept: OPERATING ROOM | Age: 57
DRG: 227 | End: 2022-09-27
Payer: MEDICAID

## 2022-09-27 DIAGNOSIS — K43.2 INCISIONAL HERNIA, WITHOUT OBSTRUCTION OR GANGRENE: Primary | ICD-10-CM

## 2022-09-27 PROBLEM — I48.0 PAF (PAROXYSMAL ATRIAL FIBRILLATION) (HCC): Status: ACTIVE | Noted: 2022-09-27

## 2022-09-27 LAB
ABO/RH: NORMAL
ANTIBODY SCREEN: NORMAL

## 2022-09-27 PROCEDURE — 2580000003 HC RX 258: Performed by: SURGERY

## 2022-09-27 PROCEDURE — 2580000003 HC RX 258: Performed by: NURSE ANESTHETIST, CERTIFIED REGISTERED

## 2022-09-27 PROCEDURE — 2500000003 HC RX 250 WO HCPCS: Performed by: NURSE ANESTHETIST, CERTIFIED REGISTERED

## 2022-09-27 PROCEDURE — 7100000001 HC PACU RECOVERY - ADDTL 15 MIN: Performed by: SURGERY

## 2022-09-27 PROCEDURE — A4216 STERILE WATER/SALINE, 10 ML: HCPCS | Performed by: SURGERY

## 2022-09-27 PROCEDURE — 86901 BLOOD TYPING SEROLOGIC RH(D): CPT

## 2022-09-27 PROCEDURE — 6360000002 HC RX W HCPCS

## 2022-09-27 PROCEDURE — 6370000000 HC RX 637 (ALT 250 FOR IP): Performed by: NURSE PRACTITIONER

## 2022-09-27 PROCEDURE — 0DNU0ZZ RELEASE OMENTUM, OPEN APPROACH: ICD-10-PCS | Performed by: SURGERY

## 2022-09-27 PROCEDURE — 6370000000 HC RX 637 (ALT 250 FOR IP): Performed by: SURGERY

## 2022-09-27 PROCEDURE — A4217 STERILE WATER/SALINE, 500 ML: HCPCS | Performed by: SURGERY

## 2022-09-27 PROCEDURE — 2500000003 HC RX 250 WO HCPCS: Performed by: SURGERY

## 2022-09-27 PROCEDURE — 7100000000 HC PACU RECOVERY - FIRST 15 MIN: Performed by: SURGERY

## 2022-09-27 PROCEDURE — 15734 MUSCLE-SKIN GRAFT TRUNK: CPT | Performed by: SURGERY

## 2022-09-27 PROCEDURE — 2709999900 HC NON-CHARGEABLE SUPPLY: Performed by: SURGERY

## 2022-09-27 PROCEDURE — 3700000001 HC ADD 15 MINUTES (ANESTHESIA): Performed by: SURGERY

## 2022-09-27 PROCEDURE — 0KNL0ZZ RELEASE LEFT ABDOMEN MUSCLE, OPEN APPROACH: ICD-10-PCS | Performed by: SURGERY

## 2022-09-27 PROCEDURE — 3600000012 HC SURGERY LEVEL 2 ADDTL 15MIN: Performed by: SURGERY

## 2022-09-27 PROCEDURE — 86850 RBC ANTIBODY SCREEN: CPT

## 2022-09-27 PROCEDURE — 99222 1ST HOSP IP/OBS MODERATE 55: CPT | Performed by: NURSE PRACTITIONER

## 2022-09-27 PROCEDURE — C1729 CATH, DRAINAGE: HCPCS | Performed by: SURGERY

## 2022-09-27 PROCEDURE — 49568 PR IMPLANT MESH HERNIA REPAIR/DEBRIDEMENT CLOSURE: CPT | Performed by: SURGERY

## 2022-09-27 PROCEDURE — 6370000000 HC RX 637 (ALT 250 FOR IP): Performed by: ANESTHESIOLOGY

## 2022-09-27 PROCEDURE — 94150 VITAL CAPACITY TEST: CPT

## 2022-09-27 PROCEDURE — 6360000002 HC RX W HCPCS: Performed by: ANESTHESIOLOGY

## 2022-09-27 PROCEDURE — 0WUF0JZ SUPPLEMENT ABDOMINAL WALL WITH SYNTHETIC SUBSTITUTE, OPEN APPROACH: ICD-10-PCS | Performed by: SURGERY

## 2022-09-27 PROCEDURE — 86900 BLOOD TYPING SEROLOGIC ABO: CPT

## 2022-09-27 PROCEDURE — 6360000002 HC RX W HCPCS: Performed by: SURGERY

## 2022-09-27 PROCEDURE — 94760 N-INVAS EAR/PLS OXIMETRY 1: CPT

## 2022-09-27 PROCEDURE — 49560 PR REPAIR INCISIONAL HERNIA,REDUCIBLE: CPT | Performed by: SURGERY

## 2022-09-27 PROCEDURE — 6360000002 HC RX W HCPCS: Performed by: NURSE ANESTHETIST, CERTIFIED REGISTERED

## 2022-09-27 PROCEDURE — 1200000000 HC SEMI PRIVATE

## 2022-09-27 PROCEDURE — 3600000002 HC SURGERY LEVEL 2 BASE: Performed by: SURGERY

## 2022-09-27 PROCEDURE — C1781 MESH (IMPLANTABLE): HCPCS | Performed by: SURGERY

## 2022-09-27 PROCEDURE — 2700000000 HC OXYGEN THERAPY PER DAY

## 2022-09-27 PROCEDURE — 3700000000 HC ANESTHESIA ATTENDED CARE: Performed by: SURGERY

## 2022-09-27 DEVICE — MESH SURG W20XL25CM SEPRA TECHNOLOGY RECT PHASIX: Type: IMPLANTABLE DEVICE | Site: ABDOMEN | Status: FUNCTIONAL

## 2022-09-27 RX ORDER — OXYCODONE HYDROCHLORIDE 5 MG/1
10 TABLET ORAL EVERY 4 HOURS PRN
Status: DISCONTINUED | OUTPATIENT
Start: 2022-09-27 | End: 2022-10-03

## 2022-09-27 RX ORDER — DEXAMETHASONE SODIUM PHOSPHATE 4 MG/ML
INJECTION, SOLUTION INTRA-ARTICULAR; INTRALESIONAL; INTRAMUSCULAR; INTRAVENOUS; SOFT TISSUE PRN
Status: DISCONTINUED | OUTPATIENT
Start: 2022-09-27 | End: 2022-09-27 | Stop reason: SDUPTHER

## 2022-09-27 RX ORDER — ATORVASTATIN CALCIUM 40 MG/1
40 TABLET, FILM COATED ORAL NIGHTLY
Status: DISCONTINUED | OUTPATIENT
Start: 2022-09-27 | End: 2022-10-04 | Stop reason: HOSPADM

## 2022-09-27 RX ORDER — ONDANSETRON 4 MG/1
4 TABLET, ORALLY DISINTEGRATING ORAL EVERY 8 HOURS PRN
Status: DISCONTINUED | OUTPATIENT
Start: 2022-09-27 | End: 2022-10-04 | Stop reason: HOSPADM

## 2022-09-27 RX ORDER — SODIUM CHLORIDE 0.9 % (FLUSH) 0.9 %
5-40 SYRINGE (ML) INJECTION EVERY 12 HOURS SCHEDULED
Status: DISCONTINUED | OUTPATIENT
Start: 2022-09-27 | End: 2022-09-27 | Stop reason: HOSPADM

## 2022-09-27 RX ORDER — SODIUM CHLORIDE 0.9 % (FLUSH) 0.9 %
5-40 SYRINGE (ML) INJECTION EVERY 12 HOURS SCHEDULED
Status: DISCONTINUED | OUTPATIENT
Start: 2022-09-27 | End: 2022-10-04 | Stop reason: HOSPADM

## 2022-09-27 RX ORDER — HYDROMORPHONE HYDROCHLORIDE 1 MG/ML
0.5 INJECTION, SOLUTION INTRAMUSCULAR; INTRAVENOUS; SUBCUTANEOUS
Status: DISCONTINUED | OUTPATIENT
Start: 2022-09-27 | End: 2022-10-03

## 2022-09-27 RX ORDER — OXYCODONE HYDROCHLORIDE 5 MG/1
10 TABLET ORAL PRN
Status: DISCONTINUED | OUTPATIENT
Start: 2022-09-27 | End: 2022-09-27 | Stop reason: HOSPADM

## 2022-09-27 RX ORDER — FENTANYL CITRATE 50 UG/ML
25 INJECTION, SOLUTION INTRAMUSCULAR; INTRAVENOUS EVERY 5 MIN PRN
Status: DISCONTINUED | OUTPATIENT
Start: 2022-09-27 | End: 2022-09-27 | Stop reason: HOSPADM

## 2022-09-27 RX ORDER — FAMOTIDINE 20 MG/1
20 TABLET, FILM COATED ORAL 2 TIMES DAILY
Status: DISCONTINUED | OUTPATIENT
Start: 2022-09-27 | End: 2022-10-03

## 2022-09-27 RX ORDER — DIPHENHYDRAMINE HYDROCHLORIDE 50 MG/ML
25 INJECTION INTRAMUSCULAR; INTRAVENOUS EVERY 6 HOURS PRN
Status: DISCONTINUED | OUTPATIENT
Start: 2022-09-27 | End: 2022-10-04 | Stop reason: HOSPADM

## 2022-09-27 RX ORDER — ONDANSETRON 2 MG/ML
4 INJECTION INTRAMUSCULAR; INTRAVENOUS EVERY 6 HOURS PRN
Status: DISCONTINUED | OUTPATIENT
Start: 2022-09-27 | End: 2022-10-04 | Stop reason: HOSPADM

## 2022-09-27 RX ORDER — ROCURONIUM BROMIDE 10 MG/ML
INJECTION, SOLUTION INTRAVENOUS PRN
Status: DISCONTINUED | OUTPATIENT
Start: 2022-09-27 | End: 2022-09-27 | Stop reason: SDUPTHER

## 2022-09-27 RX ORDER — BISACODYL 10 MG
10 SUPPOSITORY, RECTAL RECTAL DAILY PRN
Status: DISCONTINUED | OUTPATIENT
Start: 2022-09-27 | End: 2022-10-01

## 2022-09-27 RX ORDER — MIDAZOLAM HYDROCHLORIDE 2 MG/2ML
2 INJECTION, SOLUTION INTRAMUSCULAR; INTRAVENOUS
Status: DISCONTINUED | OUTPATIENT
Start: 2022-09-27 | End: 2022-09-27 | Stop reason: HOSPADM

## 2022-09-27 RX ORDER — BUPIVACAINE HYDROCHLORIDE AND EPINEPHRINE 5; 5 MG/ML; UG/ML
INJECTION, SOLUTION PERINEURAL
Status: COMPLETED | OUTPATIENT
Start: 2022-09-27 | End: 2022-09-27

## 2022-09-27 RX ORDER — ACETAMINOPHEN 325 MG/1
650 TABLET ORAL ONCE
Status: COMPLETED | OUTPATIENT
Start: 2022-09-27 | End: 2022-09-27

## 2022-09-27 RX ORDER — DOCUSATE SODIUM 100 MG/1
100 CAPSULE, LIQUID FILLED ORAL 2 TIMES DAILY
Status: DISCONTINUED | OUTPATIENT
Start: 2022-09-27 | End: 2022-10-01

## 2022-09-27 RX ORDER — MAGNESIUM HYDROXIDE 1200 MG/15ML
LIQUID ORAL CONTINUOUS PRN
Status: COMPLETED | OUTPATIENT
Start: 2022-09-27 | End: 2022-09-27

## 2022-09-27 RX ORDER — VECURONIUM BROMIDE 1 MG/ML
INJECTION, POWDER, LYOPHILIZED, FOR SOLUTION INTRAVENOUS PRN
Status: DISCONTINUED | OUTPATIENT
Start: 2022-09-27 | End: 2022-09-27 | Stop reason: SDUPTHER

## 2022-09-27 RX ORDER — SODIUM CHLORIDE 0.9 % (FLUSH) 0.9 %
5-40 SYRINGE (ML) INJECTION PRN
Status: DISCONTINUED | OUTPATIENT
Start: 2022-09-27 | End: 2022-10-04 | Stop reason: HOSPADM

## 2022-09-27 RX ORDER — FENTANYL CITRATE 50 UG/ML
50 INJECTION, SOLUTION INTRAMUSCULAR; INTRAVENOUS EVERY 5 MIN PRN
Status: DISCONTINUED | OUTPATIENT
Start: 2022-09-27 | End: 2022-09-27 | Stop reason: HOSPADM

## 2022-09-27 RX ORDER — FENTANYL CITRATE 50 UG/ML
INJECTION, SOLUTION INTRAMUSCULAR; INTRAVENOUS PRN
Status: DISCONTINUED | OUTPATIENT
Start: 2022-09-27 | End: 2022-09-27 | Stop reason: SDUPTHER

## 2022-09-27 RX ORDER — MIDAZOLAM HYDROCHLORIDE 1 MG/ML
INJECTION INTRAMUSCULAR; INTRAVENOUS
Status: COMPLETED
Start: 2022-09-27 | End: 2022-09-27

## 2022-09-27 RX ORDER — KETOROLAC TROMETHAMINE 30 MG/ML
30 INJECTION, SOLUTION INTRAMUSCULAR; INTRAVENOUS EVERY 6 HOURS
Status: COMPLETED | OUTPATIENT
Start: 2022-09-27 | End: 2022-09-29

## 2022-09-27 RX ORDER — ENOXAPARIN SODIUM 100 MG/ML
40 INJECTION SUBCUTANEOUS EVERY 24 HOURS
Status: DISCONTINUED | OUTPATIENT
Start: 2022-09-27 | End: 2022-10-04 | Stop reason: HOSPADM

## 2022-09-27 RX ORDER — MIDAZOLAM HYDROCHLORIDE 1 MG/ML
INJECTION INTRAMUSCULAR; INTRAVENOUS PRN
Status: DISCONTINUED | OUTPATIENT
Start: 2022-09-27 | End: 2022-09-27 | Stop reason: SDUPTHER

## 2022-09-27 RX ORDER — HALOPERIDOL 5 MG/ML
1 INJECTION INTRAMUSCULAR
Status: DISCONTINUED | OUTPATIENT
Start: 2022-09-27 | End: 2022-09-27 | Stop reason: HOSPADM

## 2022-09-27 RX ORDER — HYDROMORPHONE HCL 110MG/55ML
PATIENT CONTROLLED ANALGESIA SYRINGE INTRAVENOUS PRN
Status: DISCONTINUED | OUTPATIENT
Start: 2022-09-27 | End: 2022-09-27 | Stop reason: SDUPTHER

## 2022-09-27 RX ORDER — ONDANSETRON 2 MG/ML
4 INJECTION INTRAMUSCULAR; INTRAVENOUS
Status: DISCONTINUED | OUTPATIENT
Start: 2022-09-27 | End: 2022-09-27 | Stop reason: HOSPADM

## 2022-09-27 RX ORDER — KETAMINE HCL IN NACL, ISO-OSM 100MG/10ML
SYRINGE (ML) INJECTION PRN
Status: DISCONTINUED | OUTPATIENT
Start: 2022-09-27 | End: 2022-09-27 | Stop reason: SDUPTHER

## 2022-09-27 RX ORDER — PHENYLEPHRINE HYDROCHLORIDE 10 MG/ML
INJECTION INTRAVENOUS PRN
Status: DISCONTINUED | OUTPATIENT
Start: 2022-09-27 | End: 2022-09-27 | Stop reason: SDUPTHER

## 2022-09-27 RX ORDER — SODIUM CHLORIDE, SODIUM LACTATE, POTASSIUM CHLORIDE, CALCIUM CHLORIDE 600; 310; 30; 20 MG/100ML; MG/100ML; MG/100ML; MG/100ML
INJECTION, SOLUTION INTRAVENOUS CONTINUOUS PRN
Status: DISCONTINUED | OUTPATIENT
Start: 2022-09-27 | End: 2022-09-27 | Stop reason: SDUPTHER

## 2022-09-27 RX ORDER — APREPITANT 40 MG/1
40 CAPSULE ORAL ONCE
Status: COMPLETED | OUTPATIENT
Start: 2022-09-27 | End: 2022-09-27

## 2022-09-27 RX ORDER — SODIUM CHLORIDE 9 MG/ML
INJECTION, SOLUTION INTRAVENOUS PRN
Status: DISCONTINUED | OUTPATIENT
Start: 2022-09-27 | End: 2022-09-27 | Stop reason: HOSPADM

## 2022-09-27 RX ORDER — SUCCINYLCHOLINE/SOD CL,ISO/PF 200MG/10ML
SYRINGE (ML) INTRAVENOUS PRN
Status: DISCONTINUED | OUTPATIENT
Start: 2022-09-27 | End: 2022-09-27 | Stop reason: SDUPTHER

## 2022-09-27 RX ORDER — SODIUM CHLORIDE 9 MG/ML
INJECTION, SOLUTION INTRAVENOUS CONTINUOUS
Status: DISCONTINUED | OUTPATIENT
Start: 2022-09-27 | End: 2022-09-30

## 2022-09-27 RX ORDER — SODIUM CHLORIDE 0.9 % (FLUSH) 0.9 %
5-40 SYRINGE (ML) INJECTION PRN
Status: DISCONTINUED | OUTPATIENT
Start: 2022-09-27 | End: 2022-09-27 | Stop reason: HOSPADM

## 2022-09-27 RX ORDER — MAGNESIUM SULFATE HEPTAHYDRATE 500 MG/ML
INJECTION, SOLUTION INTRAMUSCULAR; INTRAVENOUS PRN
Status: DISCONTINUED | OUTPATIENT
Start: 2022-09-27 | End: 2022-09-27 | Stop reason: SDUPTHER

## 2022-09-27 RX ORDER — ACETAMINOPHEN 325 MG/1
650 TABLET ORAL EVERY 6 HOURS
Status: DISCONTINUED | OUTPATIENT
Start: 2022-09-27 | End: 2022-10-04 | Stop reason: HOSPADM

## 2022-09-27 RX ORDER — CARVEDILOL 6.25 MG/1
6.25 TABLET ORAL 2 TIMES DAILY WITH MEALS
Status: DISCONTINUED | OUTPATIENT
Start: 2022-09-27 | End: 2022-10-04 | Stop reason: HOSPADM

## 2022-09-27 RX ORDER — OXYCODONE HYDROCHLORIDE 5 MG/1
5 TABLET ORAL PRN
Status: DISCONTINUED | OUTPATIENT
Start: 2022-09-27 | End: 2022-09-27 | Stop reason: HOSPADM

## 2022-09-27 RX ORDER — DIPHENHYDRAMINE HYDROCHLORIDE 50 MG/ML
12.5 INJECTION INTRAMUSCULAR; INTRAVENOUS
Status: DISCONTINUED | OUTPATIENT
Start: 2022-09-27 | End: 2022-09-27 | Stop reason: HOSPADM

## 2022-09-27 RX ORDER — KETOROLAC TROMETHAMINE 30 MG/ML
INJECTION, SOLUTION INTRAMUSCULAR; INTRAVENOUS PRN
Status: DISCONTINUED | OUTPATIENT
Start: 2022-09-27 | End: 2022-09-27 | Stop reason: SDUPTHER

## 2022-09-27 RX ORDER — ONDANSETRON 2 MG/ML
INJECTION INTRAMUSCULAR; INTRAVENOUS PRN
Status: DISCONTINUED | OUTPATIENT
Start: 2022-09-27 | End: 2022-09-27 | Stop reason: SDUPTHER

## 2022-09-27 RX ORDER — HYDROMORPHONE HYDROCHLORIDE 1 MG/ML
1 INJECTION, SOLUTION INTRAMUSCULAR; INTRAVENOUS; SUBCUTANEOUS
Status: DISCONTINUED | OUTPATIENT
Start: 2022-09-27 | End: 2022-10-03

## 2022-09-27 RX ORDER — OXYCODONE HYDROCHLORIDE 5 MG/1
5 TABLET ORAL EVERY 4 HOURS PRN
Status: DISCONTINUED | OUTPATIENT
Start: 2022-09-27 | End: 2022-10-03

## 2022-09-27 RX ORDER — SODIUM CHLORIDE 9 MG/ML
INJECTION, SOLUTION INTRAVENOUS PRN
Status: DISCONTINUED | OUTPATIENT
Start: 2022-09-27 | End: 2022-10-04 | Stop reason: HOSPADM

## 2022-09-27 RX ADMIN — VECURONIUM BROMIDE 2 MG: 1 INJECTION, POWDER, LYOPHILIZED, FOR SOLUTION INTRAVENOUS at 08:43

## 2022-09-27 RX ADMIN — FAMOTIDINE 20 MG: 10 INJECTION INTRAVENOUS at 21:19

## 2022-09-27 RX ADMIN — FENTANYL CITRATE 50 MCG: 50 INJECTION, SOLUTION INTRAMUSCULAR; INTRAVENOUS at 09:11

## 2022-09-27 RX ADMIN — SODIUM CHLORIDE, POTASSIUM CHLORIDE, SODIUM LACTATE AND CALCIUM CHLORIDE: 600; 310; 30; 20 INJECTION, SOLUTION INTRAVENOUS at 08:47

## 2022-09-27 RX ADMIN — CEFAZOLIN 2000 MG: 2 INJECTION, POWDER, FOR SOLUTION INTRAMUSCULAR; INTRAVENOUS at 07:20

## 2022-09-27 RX ADMIN — KETOROLAC TROMETHAMINE 30 MG: 30 INJECTION, SOLUTION INTRAMUSCULAR at 16:44

## 2022-09-27 RX ADMIN — SUGAMMADEX 200 MG: 100 INJECTION, SOLUTION INTRAVENOUS at 10:07

## 2022-09-27 RX ADMIN — Medication 200 MG: at 07:33

## 2022-09-27 RX ADMIN — ACETAMINOPHEN 650 MG: 325 TABLET ORAL at 14:46

## 2022-09-27 RX ADMIN — ACETAMINOPHEN 650 MG: 325 TABLET ORAL at 21:18

## 2022-09-27 RX ADMIN — Medication 10 MG: at 09:41

## 2022-09-27 RX ADMIN — MAGNESIUM SULFATE HEPTAHYDRATE 1 G: 500 INJECTION, SOLUTION INTRAMUSCULAR; INTRAVENOUS at 07:44

## 2022-09-27 RX ADMIN — MIDAZOLAM HYDROCHLORIDE 1 MG: 2 INJECTION, SOLUTION INTRAMUSCULAR; INTRAVENOUS at 10:55

## 2022-09-27 RX ADMIN — DOCUSATE SODIUM 100 MG: 100 CAPSULE, LIQUID FILLED ORAL at 21:18

## 2022-09-27 RX ADMIN — ONDANSETRON 4 MG: 2 INJECTION INTRAMUSCULAR; INTRAVENOUS at 09:40

## 2022-09-27 RX ADMIN — MIDAZOLAM 1 MG: 1 INJECTION INTRAMUSCULAR; INTRAVENOUS at 07:29

## 2022-09-27 RX ADMIN — HYDROMORPHONE HYDROCHLORIDE 0.5 MG: 2 INJECTION, SOLUTION INTRAMUSCULAR; INTRAVENOUS; SUBCUTANEOUS at 10:31

## 2022-09-27 RX ADMIN — DOCUSATE SODIUM 100 MG: 100 CAPSULE, LIQUID FILLED ORAL at 14:46

## 2022-09-27 RX ADMIN — ROCURONIUM BROMIDE 50 MG: 10 INJECTION, SOLUTION INTRAVENOUS at 07:43

## 2022-09-27 RX ADMIN — DEXAMETHASONE SODIUM PHOSPHATE 4 MG: 4 INJECTION, SOLUTION INTRAMUSCULAR; INTRAVENOUS at 07:34

## 2022-09-27 RX ADMIN — SODIUM CHLORIDE: 9 INJECTION, SOLUTION INTRAVENOUS at 07:29

## 2022-09-27 RX ADMIN — FENTANYL CITRATE 50 MCG: 50 INJECTION, SOLUTION INTRAMUSCULAR; INTRAVENOUS at 07:29

## 2022-09-27 RX ADMIN — KETOROLAC TROMETHAMINE 15 MG: 60 INJECTION, SOLUTION INTRAMUSCULAR at 09:40

## 2022-09-27 RX ADMIN — HYDROMORPHONE HYDROCHLORIDE 1 MG: 1 INJECTION, SOLUTION INTRAMUSCULAR; INTRAVENOUS; SUBCUTANEOUS at 18:46

## 2022-09-27 RX ADMIN — PHENYLEPHRINE HYDROCHLORIDE 100 MCG: 10 INJECTION INTRAVENOUS at 07:38

## 2022-09-27 RX ADMIN — ATORVASTATIN CALCIUM 40 MG: 40 TABLET, FILM COATED ORAL at 21:18

## 2022-09-27 RX ADMIN — HYDROMORPHONE HYDROCHLORIDE 0.5 MG: 2 INJECTION, SOLUTION INTRAMUSCULAR; INTRAVENOUS; SUBCUTANEOUS at 10:24

## 2022-09-27 RX ADMIN — Medication 20 MG: at 07:46

## 2022-09-27 RX ADMIN — VECURONIUM BROMIDE 2 MG: 1 INJECTION, POWDER, LYOPHILIZED, FOR SOLUTION INTRAVENOUS at 09:07

## 2022-09-27 RX ADMIN — KETOROLAC TROMETHAMINE 30 MG: 30 INJECTION, SOLUTION INTRAMUSCULAR at 13:21

## 2022-09-27 RX ADMIN — HYDROMORPHONE HYDROCHLORIDE 0.5 MG: 2 INJECTION, SOLUTION INTRAMUSCULAR; INTRAVENOUS; SUBCUTANEOUS at 10:15

## 2022-09-27 RX ADMIN — SODIUM CHLORIDE: 9 INJECTION, SOLUTION INTRAVENOUS at 10:53

## 2022-09-27 RX ADMIN — MIDAZOLAM HYDROCHLORIDE 1 MG: 1 INJECTION, SOLUTION INTRAMUSCULAR; INTRAVENOUS at 10:55

## 2022-09-27 RX ADMIN — CARVEDILOL 6.25 MG: 6.25 TABLET, FILM COATED ORAL at 16:04

## 2022-09-27 RX ADMIN — APREPITANT 40 MG: 40 CAPSULE ORAL at 06:39

## 2022-09-27 RX ADMIN — ACETAMINOPHEN 650 MG: 325 TABLET ORAL at 06:39

## 2022-09-27 RX ADMIN — ONDANSETRON 4 MG: 2 INJECTION INTRAMUSCULAR; INTRAVENOUS at 18:50

## 2022-09-27 RX ADMIN — FAMOTIDINE 20 MG: 10 INJECTION INTRAVENOUS at 14:46

## 2022-09-27 RX ADMIN — CEFAZOLIN 2000 MG: 2 INJECTION, POWDER, FOR SOLUTION INTRAMUSCULAR; INTRAVENOUS at 16:04

## 2022-09-27 RX ADMIN — ENOXAPARIN SODIUM 40 MG: 100 INJECTION SUBCUTANEOUS at 21:18

## 2022-09-27 ASSESSMENT — PAIN SCALES - GENERAL
PAINLEVEL_OUTOF10: 7
PAINLEVEL_OUTOF10: 7
PAINLEVEL_OUTOF10: 0

## 2022-09-27 ASSESSMENT — PAIN DESCRIPTION - PAIN TYPE: TYPE: SURGICAL PAIN

## 2022-09-27 ASSESSMENT — PAIN DESCRIPTION - LOCATION
LOCATION: ABDOMEN
LOCATION: ABDOMEN

## 2022-09-27 ASSESSMENT — PAIN - FUNCTIONAL ASSESSMENT: PAIN_FUNCTIONAL_ASSESSMENT: 0-10

## 2022-09-27 ASSESSMENT — PAIN DESCRIPTION - ORIENTATION: ORIENTATION: MID

## 2022-09-27 NOTE — PROGRESS NOTES
Patient transferred from OR to PACU, responds to voice, VSS, abdominal dressing CDI, FRED draining bloody fluid, will monitor.

## 2022-09-27 NOTE — OP NOTE
[facility]                     [facility], [address] [place] [address]                                OPERATIVE REPORT    PATIENT NAME: Tila Geronimo                     :        1965  MED REC NO:   [id number]                          ROOM:       [id number]  ACCOUNT NO:   [de-identified]                           ADMIT DATE: 2022  PROVIDER:     Anthony Talamantes MD    DATE OF PROCEDURE:  2022    PREOPERATIVE DIAGNOSIS:  Reducible incisional hernia. POSTOPERATIVE DIAGNOSIS:  Reducible incisional hernia. OPERATION PERFORMED:  Exploratory laparotomy with repair of reducible  incisional hernia, bilateral transverse abdominis component releases  with mesh placement, and lysis of adhesions. SURGEON:  Anthony Talamantes MD    ANESTHESIA:  General.    INDICATIONS:  This is a 69-year-old female who presents with a reducible wide defect incisional hernia. The hernia preoperatively was found to have  a wide enough fascial defect as to likely need component releases, which  are musculocutaneous flaps likely bilateral as well as mesh placement. The risks, benefits, and alternatives treatments of the procedure were  discussed at length. Details of the procedure were discussed. All  questions were answered and the patient understood, agreed, and wished  to proceed. OPERATIVE PROCEDURE:  The patient was brought to the operating suite and  laid in the supine position. General anesthesia was induced and well  tolerated. The patient's abdomen was prepped and draped in the usual  sterile fashion. After a proper time-out was performed verifying  operative site, the procedure, and the patient, an epigastric incision  was made with a scalpel encompassing the patient's prior scar, which  would be excised. This will be extended superiorly, slightly pass the  patient's prior incision and inferiorly below the umbilicus, as this  patient had a mild umbilical hernia as well.   The hernia sac was  carefully dissected to the fascia bilaterally and the hernia sac opened. The incision was extended superiorly to minimize the risks of lateral  injury during entry into the hernia sac and dissection was then  continued inferiorly, as there was moderate amount of adhesions with  omentum and bowel with the hernia sac. These were taken down carefully  with sharp dissection and blunt dissection and very careful cautery. All peritoneal attachments to the anterior abdominal wall had been taken  down at this point. This took approximately half an hour. The fascial  defects were then measured, as the two edges of the rectus muscle were  attempted to be brought together and there was a high tension, as  expected. Attention was turned to the patient's left side first where  the retrorectus space was opened with the cautery, which was then  extended superiorly and inferiorly along the entire length of the rectus  muscle. The retrorectus space was developed bluntly and this was  continued out laterally where the neurovascular bundles were  encountered. There was still a high tension on trying to approximate  the fascia and as expected, a musculocutaneous flap would need to be  created and the left transversus abdominis release was then began. Just  1-cm medial to the neurovascular bundles starting at the superior  aspect, the transversus abdominis was divided with the cautery. The  muscle fibers were elevated from the posterior sheath with right angle  clamp and divided with the cautery. This was extended superiorly to the  costal margin and inferiorly below the umbilicus to complete the flap. The transverse abdominis was then dissected laterally off the peritoneum  creating a 4-cm release.   The posterior and anterior sheath also did not  reach the midline without significant tension and a similar release  would need to be performed on the right side, which would be a bilateral  musculocutaneous flap critical for tension-free closure. The  retrorectus space was entered on the right side and then in a similar  fashion, the rectus muscle was palpated and cautery was used to enter  this sheath. This was extended superiorly and inferiorly along with  entire lengths of the rectus muscles. The retrorectus space was  developed bluntly and continued out laterally to the neurovascular  bundles. The transverse abdominis release was started on the superior  aspect and 1-cm medial to the neurovascular bundles incised with the  cautery. The transverse abdominis muscle belly was divided with cautery  and the right angle clamp. The underlying peritoneum was then carefully  protected. The release was continued superiorly to the cuff margin and  inferiorly below the umbilicus to get maximal release. The transverse  abdominis was dissected laterally with a blunt dissection, obtained approximate 4 cm release on the right side as well. At this point, the  posterior and anterior sheaths were tested and both looked come together  well with minimal tension and no additional release was necessary. The  entirety of the incision was measured and the space was approximately 25  cm superiorly and a 25 x 20 cm ST mesh would give excellent overlap of  mesh reinforcement. This was chosen and the patient had risk factors  for infection and this would be a good reinforcement. The corners were  trimmed slightly to allow for more flap placement of the mesh and the  mesh was then placed in the retrorectus space without any tension. 2-0  Vicryl sutures were used to secure the mesh to its surroundings but as  there was little extra space, the chance of this mesh migrating was low  and as such, no transfascial fixation was necessary, as this would  increase pain significantly.   Local anesthetic was injected in the  musculocutaneous spaces bilaterally with Marcaine and the 19 Rolan drain  was brought through the right abdomen and laid down in the retrorectus  space. Prior to placement of the mesh, the posterior sheath was  reapproximated with a running 2-0 Vicryl suture to prevent contact  between the bowel and the mesh itself. The anterior sheath, which was  the major strength component, was closed with two looped 0 PDS sutures  with minimal tension. The wound was irrigated and the skin was closed  in layers with 3-0 Vicryl and 4-0 Monocryl sutures. The wound was then  cleaned and dressed with Dermabond. The patient tolerated the procedure  well and was transferred to the PACU in stable condition. SPECIMENS:  None. DRAINS:  FRED drain as above. COMPLICATIONS:  None. ESTIMATED BLOOD LOSS:  Less than 50 mL.         Alfredito Soto MD    D: 09/27/2022 15:41:49       T: 09/27/2022 06:78:38     [id number]  Job#: [id number]     Doc#: [id number]    CC:

## 2022-09-27 NOTE — CONSULTS
[facility], operated by [facility]   Electrophysiology Nurse Practitioner  Consult Note  Date: 9/27/2022   Date of admission: 9/27/2022  5:55 AM  Reason for Admission: Incisional hernia, without obstruction or gangrene [K43.2]  Incisional hernia without obstruction or gangrene [K43.2]    Consult Requesting Physician: Jordi Hargrove MD    -Reason for Consultation: PAF, device monitoring    CC: \"Hernia Surgery\"  HISTORY OF PRESENT ILLNESS: History obtained from patient and medical record. Juany Johnson is a 61 y.o. female with a past medical history of HTN, CHB s/p PPM, sCHF, CHD, syncope, and atrial fibrillation/flutter. 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 S/p LHC 1/20/2020 that showed normal coronaries. She had episode of VT with syncope in cath lab prior to [facility]. S/p extraction of RV pacing lead and Biv-AICD upgrade for cardiomyopathy and VT. Also loaded with amiodarone. Hospitalized 4/2021 for fatigue and found to be acutely anemic and received multiple transfusions. S/p EGD with active bleeding and s/p clips and epi injection, also a lesion was noted and she bx sent. s/p exp lap, cholecystectomy and excision of duodenal mass. Presented for OP ex lap with incisional hernia repair with mesh. Hx of exploratory laparotomy, cholecystectomy with cholangiogram and excision of benign duodenal mass on 4/13/2021 for upper GI bleeding and symptomatic cholelithiasis. Admitted for monitoring by general surgery. EP is consulted to manage chronic cardiac comorbidities. Last labs 9/15/2022 CMP and CBC are stable. Patient denies any changes in cardiac symptoms or regimen since her last appt 8/2022 with Dr. Светлана Reyes. Denies CP, SOB, palpitations, swelling, or weight gain. Tolerating her medications. Admits to drowsiness at time of visit post anesthesia.  Off AC for surgery. Patient seen and examined. Clinical notes reviewed. Telemetry reviewed. Denies having chest pain, palpitations, shortness of breath, orthopnea at the time of this visit. Assessment and Plan:   CHB/Cardiomyopathy   - Biv-AICD upgrade 1/2021   - On GDMT with Entresto 49-51 mg bid, aldactone 50 mg daily, Coreg 6.25 mg bid, and jardiance   - Device interrogation has been reviewed by myself and EP physician, stable    ~ 98.5% effective Biv pacing, AP 85%, 1 recent NSVT lasting 1 second  Paroxysmal Atrial Fibrillation   -  on EGM/device interrogation   - No available telemetry   - Device interrogation shows low burden 0.8% AF  Hypertension   - Acceptably controlled  Chronic Combined HF   - Compensated   - Continue current medical therapy  Incisional Hernia   - S/p ex lap with incisional hernia repair with mesh Dr. Lori Villafana today  - If labs remain stable in am then resume aldactone and Entresto at home dose  - Resume Xarelto when safe postoperatively per general surgery  - EP will follow as needed, please call with questions    All pertinent information and plan of care discussed with Dr. Nahum Brown and Dory Joseph NP with general surgery. All questions and concerns were addressed to the patient/family. Alternatives to my treatment were discussed. I have discussed the above stated plan and the patient verbalized understanding and agreed with the plan. Discussed plan with patient and nurse.   Problem List:   Patient Active Problem List    Diagnosis Date Noted    Persistent atrial fibrillation ([facility]     LVH (left ventricular hypertrophy) 06/11/2014    Headache 01/16/2013    Dyspnea on exertion 12/12/2012    HTN (hypertension) 10/26/2012    Other and unspecified hyperlipidemia 10/26/2012    Congenital heart block 10/26/2012    Incisional hernia without obstruction or gangrene 09/27/2022    Dizziness 03/23/2022    Incisional hernia, without obstruction or gangrene 12/13/2021    Gait instability     Adequate anticoagulation on anticoagulant therapy     Duodenal mass     Weight loss counseling, encounter for     Anemia     Other fatigue 04/07/2021    Iron deficiency anemia 02/17/2021    Exertional dyspnea 02/08/2021    Status post implantation of automatic cardioverter/defibrillator (AICD) 01/25/2021    Paroxysmal ventricular tachycardia ([facility] 01/20/2021    Class 1 obesity due to excess calories with body mass index (BMI) of 31.0 to 31.9 in adult 09/06/2019    Dilated cardiomyopathy ([facility] 08/28/2019    Left subclavian vein thrombosis ([facility] 03/26/2019    LV dysfunction 03/18/2019    Obstructive sleep apnea (adult) (pediatric)     Hypersomnia     Systolic CHF, chronic ([facility] 10/03/2018    Chest pain 09/11/2018      Allergies: Allergies   Allergen Reactions    Latex      rash    Morphine Shortness Of Breath    Codeine      Hives      Lisinopril      cough    Nitroglycerin Hives    Sulfa Antibiotics Nausea Only    Hydralazine      headaches     Home Meds:  Prior to Visit Medications    Medication Sig Taking?  Authorizing Provider   spironolactone (ALDACTONE) 50 MG tablet TAKE ONE TABLET BY MOUTH DAILY  CATRACHITO Cuellar - CNP   vitamin D (ERGOCALCIFEROL) 1.25 MG (57245 UT) CAPS capsule TAKE ONE CAPSULE BY MOUTH ONCE WEEKLY  CATRACHITO Keenan - CNS   XARELTO 20 MG TABS tablet TAKE ONE TABLET BY MOUTH DAILY WITH SUPPER  CATRACHITO Burgos - CNP   sacubitril-valsartan (ENTRESTO) 49-51 MG per tablet Take 1 tablet by mouth 2 times daily  CATRACHITO Keenan - CNS   carvedilol (COREG) 6.25 MG tablet Take 1 tablet by mouth 2 times daily  CATRACHITO Keenan - CNS   potassium chloride (KLOR-CON M) 10 MEQ extended release tablet Take 1 tablet by mouth daily  CATRACHITO Keenan - CNS   ammonium lactate (LAC-HYDRIN) 12 % lotion   Historical Provider, MD   fluticasone (FLONASE) 50 MCG/ACT nasal spray   Historical Provider, MD   hydrocortisone 1 % cream   Historical Provider, MD   vitamin D3 (CHOLECALCIFEROL) 25 MCG (1000 UT) TABS tablet Take 1 tablet by mouth daily  Rema Rivers, APRN - CNS   atorvastatin (LIPITOR) 40 MG tablet Take 1 tablet by mouth daily  Rema Rivers, APRN - CNS   pantoprazole (PROTONIX) 40 MG tablet Take 1 tablet by mouth every morning (before breakfast)  Sophie Leonard MD   Multiple Vitamins-Minerals (THERAPEUTIC MULTIVITAMIN-MINERALS) tablet Take 1 tablet by mouth daily  Historical Provider, MD      Past Medical History:  Past Medical History:   Diagnosis Date    Anemia     Atrial fibrillation and flutter (HCC)     Atrial flutter (HCC)     CHB (complete heart block) (HCC)     Class 1 obesity without serious comorbidity with body mass index (BMI) of 33.0 to 33.9 in adult 09/06/2019    Congenital heart disease     GERD (gastroesophageal reflux disease)     Headache(784.0)     History of complete heart block     Hyperlipidemia     Hypertension     PONV (postoperative nausea and vomiting)     Prolonged emergence from general anesthesia     sensitive to meds, slow to wake    Sleep apnea     uses CPAP    Syncope     Systolic CHF, chronic ([facility] 10/03/2018      Past Surgical History:    has a past surgical history that includes Uterine fibroid surgery; Pacemaker insertion (11/29/2012); Tubal ligation; Upper gastrointestinal endoscopy (N/A, 10/27/2020); Colonoscopy (N/A, 10/27/2020); Cardiac defibrillator placement (01/2021); Upper gastrointestinal endoscopy (N/A, 04/08/2021); Upper gastrointestinal endoscopy (N/A, 04/08/2021); Upper gastrointestinal endoscopy (N/A, 04/08/2021); colectomy (N/A, 04/13/2021); Cardioversion (01/28/2022); Cardioversion (02/22/2022); and Upper gastrointestinal endoscopy (N/A, 03/04/2022). Social History:  Reviewed. reports that she has never smoked. She has never used smokeless tobacco. She reports that she does not drink alcohol and does not use drugs. Family History:  Reviewed.  family history includes Cancer in her father; High Blood Pressure last 72 hours. Thyroid:   Lab Results   Component Value Date/Time    TSH 1.74 03/23/2022 11:36 AM     Lipids:  Lab Results   Component Value Date/Time    CHOL 108 09/10/2021 09:32 AM    HDL 55 09/10/2021 09:32 AM    TRIG 71 09/10/2021 09:32 AM     LFTS:   Lab Results   Component Value Date/Time    ALT 11 09/15/2022 04:51 AM    AST 23 09/15/2022 04:51 AM    ALKPHOS 76 09/15/2022 04:51 AM    PROT 8.0 09/15/2022 04:51 AM    AGRATIO 1.3 09/15/2022 04:51 AM    BILITOT 0.8 09/15/2022 04:51 AM     Cardiac Enzymes:   Lab Results   Component Value Date/Time    TROPONINI <0.01 09/06/2022 10:21 PM    TROPONINI <0.01 09/06/2022 07:32 PM    TROPONINI <0.01 12/01/2021 12:48 PM     Coags:   Lab Results   Component Value Date/Time    PROTIME 14.8 04/14/2021 06:10 AM    INR 1.27 04/14/2021 06:10 AM     ECHO:  5/10/2022  Summary   This is a limited study to assess left ventricular ejection fraction. Definity was used to assist in endocardial border delineation. Moderate concentric left ventricular hypertrophy. Mildly dilated left   ventricular cavity size. Moderately reduced left ventricular systolic   function with an estimated ejection fraction of 35-40%. Global hypokinesis noted. Heavy trabeculations seen near the apex of the left ventricle. The right ventricle is mildly dilated. Mildly reduced right ventricular   systolic function with an estimated TAPSE of 1.52 cm. The left atrium is severely dilated. 2/9/2021  Summary   Left ventricular size is mildly increased. Moderately reduced global systolic function with an ejection fraction   estimated at 30-35%. Global hypokinesis noted. Grade II diastolic dysfunction with elevated LV filling pressures. There is mild mitral regurgitation noted. Mild left atrial enlargement noted. Aortic valve appears sclerotic but opens adequately. Mild aortic regurgitation. There is mild tricuspid regurgitation with a RVSP estimation of 25 mmHg.    Pacer / ICD wire is visualized in the right ventricle. The right ventricle is normal in size and function. 1/20/2021  Summary   Left ventricular size is mildly increased. Left ventricular systolic function is severely depressed with ejection   fraction estimated at 25-30%. Grade II diastolic dysfunction with elevated LV filling pressures. Moderate mitral regurgitation. The left atrium is moderate to severely dilated. Aortic valve appears sclerotic but opens adequately. Mild aortic regurgitation is present. Mild to moderate tricuspid regurgitation. Systolic pulmonary artery pressure   (SPAP) is estimated at 35 mmHg. Pacer / ICD wire is visualized in the right ventricle. Normal right ventricular size and function. Cath: 1/20/2021  Findings:  Artery Findings/Result   LM Normal   LAD Normal   Cx Normal   RI NA   RCA Normal   LVEDP 6   LVG NA      Intervention:         None    Thank you for allowing to us to participate in the care of Zoraida Jones.     CATRACHITO Ratliff-CNP  [address]   Office: [phone]

## 2022-09-27 NOTE — H&P
[facility]    I have reviewed the history and physical and examined the patient and find no relevant changes. I have reviewed with the patient and/or family the risks, benefits, and alternatives to the procedure. Dwight [address]  Denver Peck MD, FACS  9/27/2022  7:16 AM

## 2022-09-27 NOTE — PROGRESS NOTES
Patient admitted from PACU, VSS. Abdominal dressing with shadowing, PACU nurse circled drainage, drainage is still within [place]. Abdominal binder in place. Patient rates abdominal pain 6/10. FRED drain is patent draining bloody drainage. Schultz catheter noted and patent draining yellow urine. Oriented patient to room and controls, POC discussed and agreed upon mutually. Patient states she has a pacemaker since 2021. Call light within reach. Bed alarm on.  Will continue to monitor  Marshal Ley RN

## 2022-09-27 NOTE — BRIEF OP NOTE
[facility] and Laparoscopic Surgery  Brief Operative Note  Pt Name: Anne Parry  CSN: [id number]  YOB: 1965    Date of Procedure: 9/27/2022    Pre-operative Diagnosis:  Reducible incisional hernia    Post-operative Diagnosis: same     Procedure:  Exploratory laparotomy, repair of reducible incisional hernia, bilateral transverse abdominus component releases and mesh placement  ([id number] phasix ST)    Surgeon(s):  Gill Almonte MD     Surgical Assistant: Elena Ritchie    Anesthesia:  General anesthesia    Findings: Full note dictated, Dictation Job Number: [id number]    Estimated Blood Loss: less than 50  ml    Complications: None    Specimens: none  * No specimens in log *     Implants:  Implant Name Type Inv. Item Serial No.  Lot No. LRB No. Used Action   MESH SURG [id number] [employer] RECT PHASIX - [id number]  MESH SURG [id number] [employer] RECT PHASIX  BARD DAVOL-WD [id number] N/A 1 Implanted       Drains: FRED in right abdomen   Urinary Catheter 09/27/22 Schultz (Active)       Condition: stable     Disposition and Post-operative plan: PACU, med/surg gruber     Flavio [facility] MD, FACS  9/27/2022  9:48 AM

## 2022-09-27 NOTE — ANESTHESIA POSTPROCEDURE EVALUATION
Department of Anesthesiology  Postprocedure Note    Patient: Ryan Regalado  MRN: [id number]  YOB: 1965  Date of evaluation: 9/27/2022      Procedure Summary     Date: 09/27/22 Room / Location: [facility] OR [address] [place] [address]    Anesthesia Start: 2736 Anesthesia Stop: 1034    Procedure: OPEN EXPLORATORY LAPAROTOMY, REPAIR OF REDUCIBLE INCISIONAL HERNIA WITH MESH,  UNILATERAL  ABDOMINAL WALL COMPONENT RELEASE (Abdomen) Diagnosis:       Incisional hernia, without obstruction or gangrene      (K43.2  REDUCIBLE INCISIONAL HERNIA)    Surgeons: Jose Turner MD Responsible Provider: Carol Ma MD    Anesthesia Type: general, regional ASA Status: 3          Anesthesia Type: No value filed.     Sandra Phase I: Sandra Score: 8    Sandra Phase II:        Anesthesia Post Evaluation    Patient location during evaluation: PACU  Patient participation: complete - patient participated  Level of consciousness: awake  Airway patency: patent  Nausea & Vomiting: no vomiting and no nausea  Complications: no  Cardiovascular status: hemodynamically stable  Respiratory status: acceptable  Hydration status: stable  Multimodal analgesia pain management approach

## 2022-09-27 NOTE — PROGRESS NOTES
Patient c/o \"not feeling right, hard to breath, can't feel her legs, feel like I'm dying. \" Lungs clear, Spo2 99% on RA, ice chips given for dry mouth/throat, neurochecks normal.    Patient denies pain and nausea. Appears very anxious, versed given.

## 2022-09-27 NOTE — PROGRESS NOTES
Incentive Spirometry education and demonstration completed by Respiratory Therapy Yes      Response to education: Good     Teaching Time: 5 minutes    Minimum Predicted Vital Capacity - 593 mL. Patient's Actual Vital Capacity - 500 mL.  Turning over to Nursing for routine follow-up No.      Electronically signed by Cathy Feng RCP on 9/27/2022 at 5:47 PM

## 2022-09-27 NOTE — ANESTHESIA PRE PROCEDURE
Department of Anesthesiology  Preprocedure Note       Name:  Orin Chowdary   Age:  61 y.o.  :  1965                                          MRN:  [id number]         Date:  2022      Surgeon: Yenni Liang):  Tara Larose MD    Procedure: Procedure(s):  OPEN EXPLORATORY LAPAROTOMY, REPAIR OF REDUCIBLE INCISIONAL HERNIA WITH MESH, POSSIBLE UNILATERAL OR BILATERAL ABDOMINAL WALL COMPONENT RELEASE    Medications prior to admission:   Prior to Admission medications    Medication Sig Start Date End Date Taking?  Authorizing Provider   spironolactone (ALDACTONE) 50 MG tablet TAKE ONE TABLET BY MOUTH DAILY 22   CATRACHITO Gamble - CNP   vitamin D (ERGOCALCIFEROL) 1.25 MG (47895 UT) CAPS capsule TAKE ONE CAPSULE BY MOUTH ONCE WEEKLY 22   CATRACHITO Redmond   XARELTO 20 MG TABS tablet TAKE ONE TABLET BY MOUTH DAILY WITH SUPPER 8/15/22   CATRACHITO Polo - CNP   sacubitril-valsartan (ENTRESTO) 49-51 MG per tablet Take 1 tablet by mouth 2 times daily 22   Reda Downy, CATRACHITO - CNS   carvedilol (COREG) 6.25 MG tablet Take 1 tablet by mouth 2 times daily 22   Reda Downy, CATRACHITO - CNS   potassium chloride (KLOR-CON M) 10 MEQ extended release tablet Take 1 tablet by mouth daily 22   Reda Downy, CATRACHITO - CNS   ammonium lactate (LAC-HYDRIN) 12 % lotion  22   Historical Provider, MD   fluticasone Leandro Li) 50 MCG/ACT nasal spray  3/21/22   Historical Provider, MD   hydrocortisone 1 % cream  22   Historical Provider, MD   vitamin D3 (CHOLECALCIFEROL) 25 MCG (1000 UT) TABS tablet Take 1 tablet by mouth daily 21   CATRACHITO Redmond   atorvastatin (LIPITOR) 40 MG tablet Take 1 tablet by mouth daily 21   CATRACHITO Redmond   pantoprazole (PROTONIX) 40 MG tablet Take 1 tablet by mouth every morning (before breakfast) 21   Reinaldo Cochran MD   Multiple Vitamins-Minerals (THERAPEUTIC MULTIVITAMIN-MINERALS) tablet Take 1 tablet by mouth daily    Historical Provider, MD       Current medications:    No current facility-administered medications for this visit. No current outpatient medications on file. Facility-Administered Medications Ordered in Other Visits   Medication Dose Route Frequency Provider Last Rate Last Admin    midazolam PF (VERSED) injection 2 mg  2 mg IntraVENous Once PRN Shaun Muñoz MD        sodium chloride flush 0.9 % injection 5-40 mL  5-40 mL IntraVENous 2 times per day Gianni Brush MD        sodium chloride flush 0.9 % injection 5-40 mL  5-40 mL IntraVENous PRN Gianni Brush MD        0.9 % sodium chloride infusion   IntraVENous PRN Gianni Brush MD        ceFAZolin (ANCEF) 2,000 mg in dextrose 5 % 50 mL IVPB (mini-bag)  2,000 mg IntraVENous On Call to 1600 Aristides Artis MD           Allergies: Allergies   Allergen Reactions    Latex      rash    Morphine Shortness Of Breath    Codeine      Hives      Lisinopril      cough    Nitroglycerin Hives    Sulfa Antibiotics Nausea Only    Hydralazine      headaches       Problem List:    Patient Active Problem List   Diagnosis Code    HTN (hypertension) I10    Other and unspecified hyperlipidemia E78.5    Congenital heart block Q24.6    Dyspnea on exertion R06.00    Headache R51.9    LVH (left ventricular hypertrophy) I51.7    Persistent atrial fibrillation (HCC) I48.19    Chest pain O73.2    Systolic CHF, chronic (HCC) I50.22    Hypersomnia G47.10    Obstructive sleep apnea (adult) (pediatric) G47.33    LV dysfunction I51.9    Left subclavian vein thrombosis (HCC) I82. B12    Dilated cardiomyopathy (HCC) I42.0    Class 1 obesity due to excess calories with body mass index (BMI) of 31.0 to 31.9 in adult E66.09, Z68.31    Paroxysmal ventricular tachycardia (HCC) I47.2    Status post implantation of automatic cardioverter/defibrillator (AICD) Z95.810    Exertional dyspnea R06.00    Iron deficiency anemia D50.9    Other fatigue R53.83    Anemia D64.9    Gait instability R26.81    Adequate anticoagulation on anticoagulant therapy Z79.01    Duodenal mass K31.89    Weight loss counseling, encounter for Z71.3    Incisional hernia, without obstruction or gangrene K43.2    Dizziness R42    Incisional hernia without obstruction or gangrene K43.2       Past Medical History:        Diagnosis Date    Anemia     Atrial fibrillation and flutter (HCC)     Atrial flutter (HCC)     CHB (complete heart block) (HCC)     Class 1 obesity without serious comorbidity with body mass index (BMI) of 33.0 to 33.9 in adult 09/06/2019    Congenital heart disease     GERD (gastroesophageal reflux disease)     Headache(784.0)     History of complete heart block     Hyperlipidemia     Hypertension     PONV (postoperative nausea and vomiting)     Prolonged emergence from general anesthesia     sensitive to meds, slow to wake    Sleep apnea     uses CPAP    Syncope     Systolic CHF, chronic ([facility] [address] 10/03/2018       Past Surgical History:        Procedure Laterality Date    CARDIAC DEFIBRILLATOR PLACEMENT  01/2021    [employer]tronic    CARDIOVERSION  01/28/2022    CARDIOVERSION  02/22/2022    COLECTOMY N/A 04/13/2021    EXPLORATORY LAPAROTOMY, RESECTION OF DUODENAL MASS, CHOLECYSTECTOMY WITH INTRAOPERATIVE CHOLANGIOGRAM performed by Samantha Friend MD at [place] [address] COLONOSCOPY N/A 10/27/2020    COLONOSCOPY POLYPECTOMY SNARE/COLD BIOPSY performed by Marleen Martinez MD at [place] [address]  11/29/2012    dual chamber PPM, [employer]tronic    TUBAL LIGATION      UPPER GASTROINTESTINAL ENDOSCOPY N/A 10/27/2020    EGD DIAGNOSTIC ONLY performed by Marleen Martinez MD at [place] [address] N/A 04/08/2021    EGD CONTROL HEMORRHAGE WITH APPLICATION OF 3 CLIPS TO DUODENAL MASS performed by Lacey Mendez MD at [facility] N/A 04/08/2021    EGD BIOPSY DUODENAL MASS performed by Rafael Muro MD at [address] [place] [address] N/A 04/08/2021    EGD SUBMUCOSAL INJECTION OF 0.6 MG OF EPINEPRINE INTO BASE OF DUODENAL MASS performed by Rafael Muro MD at [address] [place] [address] N/A 03/04/2022    EGD BIOPSY performed by Lilli Mann MD at [place] [address]         Social History:    Social History     Tobacco Use    Smoking status: Never    Smokeless tobacco: Never   Substance Use Topics    Alcohol use: No                                Counseling given: Not Answered      Vital Signs (Current): There were no vitals filed for this visit.                                            BP Readings from Last 3 Encounters:   09/27/22 (!) 133/92   09/26/22 (!) 132/92   09/15/22 132/89       NPO Status:                                                                                 BMI:   Wt Readings from Last 3 Encounters:   09/27/22 212 lb (96.2 kg)   09/26/22 212 lb (96.2 kg)   09/06/22 215 lb (97.5 kg)     There is no height or weight on file to calculate BMI.    CBC:   Lab Results   Component Value Date/Time    WBC 6.3 09/15/2022 04:51 AM    RBC 4.81 09/15/2022 04:51 AM    HGB 14.3 09/15/2022 04:51 AM    HCT 42.6 09/15/2022 04:51 AM    MCV 88.7 09/15/2022 04:51 AM    RDW 16.1 09/15/2022 04:51 AM     09/15/2022 04:51 AM       CMP:   Lab Results   Component Value Date/Time     09/15/2022 04:51 AM    K 3.7 09/15/2022 04:51 AM     09/15/2022 04:51 AM    CO2 25 09/15/2022 04:51 AM    BUN 7 09/15/2022 04:51 AM    CREATININE 0.7 09/15/2022 04:51 AM    GFRAA >60 09/15/2022 04:51 AM    AGRATIO 1.3 09/15/2022 04:51 AM    LABGLOM >60 09/15/2022 04:51 AM    GLUCOSE 121 09/15/2022 04:51 AM    PROT 8.0 09/15/2022 04:51 AM    CALCIUM 10.1 09/15/2022 04:51 AM    BILITOT 0.8 09/15/2022 04:51 AM    ALKPHOS 76 09/15/2022 04:51 AM    AST 23 09/15/2022 04:51 AM    ALT 11 09/15/2022 04:51 AM       POC Tests:   No results for input(s): POCGLU, POCNA, POCK, POCCL, POCBUN, POCHEMO, POCHCT in the last 72 hours. Coags:   Lab Results   Component Value Date/Time    PROTIME 14.8 04/14/2021 06:10 AM    INR 1.27 04/14/2021 06:10 AM    APTT 30.2 04/14/2021 06:10 AM       HCG (If Applicable): No results found for: PREGTESTUR, PREGSERUM, HCG, HCGQUANT     ABGs: No results found for: PHART, PO2ART, TDJ6NND, DFC6JMV, BEART, L6TJUWNR     Type & Screen (If Applicable):  No results found for: LABABO, LABRH    Drug/Infectious Status (If Applicable):  No results found for: HIV, HEPCAB    COVID-19 Screening (If Applicable):   Lab Results   Component Value Date/Time    COVID19 Not Detected 02/08/2021 03:24 PM    COVID19 Not Detected 10/21/2020 10:40 AM         Anesthesia Evaluation  Patient summary reviewed and Nursing notes reviewed   history of anesthetic complications (slow to emerge): PONV. Airway: Mallampati: II  TM distance: >3 FB   Neck ROM: full  Mouth opening: > = 3 FB   Dental:          Pulmonary:   (+) sleep apnea: on CPAP and noncompliant,                             Cardiovascular:  Exercise tolerance: poor (<4 METS),   (+) hypertension: moderate, pacemaker: pacemaker, AICD and dependent, dysrhythmias: atrial fibrillation and atrial flutter, CHF: diastolic and systolic, MOREIRA:,             Echocardiogram reviewed               ROS comment: Echo 2019   -Limited echocardiogram was performed to evaluate EF.   -Normal left ventricle size, mild to moderate left ventricular hypertrophy,   and moderately reduced systolic function with an estimated ejection fraction   of 30-35%. -There is moderate diffuse hypokinesis. -Grade III diastolic dysfunction . E/e\"=10.7.   -Mild mitral regurgitation.   -Mild tricuspid regurgitation.   -The left atrium is mild to moderate dilated.    -Right ventricular systolic function appears mildly reduced .   -Estimated pulmonary artery systolic pressure is borderline normal at 28-33   mmHg assuming a right atrial pressure of 8 mmHg.   -Pacer / ICD wire is visualized in the right heart. Neuro/Psych:   (+) headaches: migraine headaches,             GI/Hepatic/Renal:   (+) GERD: well controlled,           Endo/Other:                     Abdominal:             Vascular: Other Findings:             Anesthesia Plan      general     ASA 3     (May need arterial line. Will decide after induction. Did well without it last surgery and feels her heart is doing better. Will need magnet for her AICD.)  Induction: intravenous. MIPS: Postoperative opioids intended and Prophylactic antiemetics administered. Anesthetic plan and risks discussed with patient. Use of blood products discussed with patient whom. Plan discussed with CRNA.                     Virginie Treadwell MD   9/27/2022

## 2022-09-27 NOTE — CONSULTS
HOSPITALISTS Consult note    9/27/2022 4:08 PM    Patient Information:  Adan Benjamin is a 61 y.o. female [id number]  PCP:  Arvind Lowery (Tel: [phone] )    Reason for Consult medical management    History of Present Illness:  Zoraida Jones is a 61 y.o. female who admitted to hospital with incisional hernia. She underwent open exploratory laparotomy with repair of reducible incisional hernia with mesh. Hospital service consulted for medical management patient currently denies any chest pain nausea vomiting fevers or chills is having abdominal discomfort and surgical site does have a history of systolic heart failure EF 35% and A. urszula is on Xarelto at home. Along with hypertension. She does not smoke          REVIEW OF SYSTEMS:   Constitutional: Negative for fever,chills or night sweats  ENT: Negative for rhinorrhea, epistaxis, hoarseness, sore throat. Respiratory: Negative for shortness of breath,wheezing  Cardiovascular: Negative for chest pain, palpitations   Gastrointestinal: see above  Genitourinary: Negative for polyuria, dysuria   Hematologic/Lymphatic: Negative for bleeding tendency, easy bruising  Musculoskeletal: Negative for myalgias and arthralgias  Neurologic: Negative for confusion,dysarthria. Skin: Negative for itching,rash  Psychiatric: Negative for depression,anxiety, agitation. Endocrine: Negative for polydipsia,polyuria,heat /cold intolerance.     Past Medical History:   has a past medical history of Anemia, Atrial fibrillation and flutter ([address] Atrial flutter ([address] CHB (complete heart block) ([address] Class 1 obesity without serious comorbidity with body mass index (BMI) of 33.0 to 33.9 in adult, Congenital heart disease, GERD (gastroesophageal reflux disease), Headache(784.0), History of complete heart block, Hyperlipidemia, Hypertension, PONV (postoperative nausea and vomiting), Prolonged emergence from general anesthesia, Sleep apnea, Syncope, and Systolic CHF, chronic ([facility] [address] Past Surgical History:   has a past surgical history that includes Uterine fibroid surgery; Pacemaker insertion (11/29/2012); Tubal ligation; Upper gastrointestinal endoscopy (N/A, 10/27/2020); Colonoscopy (N/A, 10/27/2020); Cardiac defibrillator placement (01/2021); Upper gastrointestinal endoscopy (N/A, 04/08/2021); Upper gastrointestinal endoscopy (N/A, 04/08/2021); Upper gastrointestinal endoscopy (N/A, 04/08/2021); colectomy (N/A, 04/13/2021); Cardioversion (01/28/2022); Cardioversion (02/22/2022); and Upper gastrointestinal endoscopy (N/A, 03/04/2022). Medications:  No current facility-administered medications on file prior to encounter.      Current Outpatient Medications on File Prior to Encounter   Medication Sig Dispense Refill    spironolactone (ALDACTONE) 50 MG tablet TAKE ONE TABLET BY MOUTH DAILY 90 tablet 0    vitamin D (ERGOCALCIFEROL) 1.25 MG (96772 UT) CAPS capsule TAKE ONE CAPSULE BY MOUTH ONCE WEEKLY 12 capsule 0    XARELTO 20 MG TABS tablet TAKE ONE TABLET BY MOUTH DAILY WITH SUPPER 90 tablet 1    sacubitril-valsartan (ENTRESTO) 49-51 MG per tablet Take 1 tablet by mouth 2 times daily 180 tablet 1    carvedilol (COREG) 6.25 MG tablet Take 1 tablet by mouth 2 times daily 180 tablet 1    potassium chloride (KLOR-CON M) 10 MEQ extended release tablet Take 1 tablet by mouth daily 90 tablet 1    ammonium lactate (LAC-HYDRIN) 12 % lotion       fluticasone (FLONASE) 50 MCG/ACT nasal spray       hydrocortisone 1 % cream       vitamin D3 (CHOLECALCIFEROL) 25 MCG (1000 UT) TABS tablet Take 1 tablet by mouth daily 30 tablet 5    atorvastatin (LIPITOR) 40 MG tablet Take 1 tablet by mouth daily 60 tablet 2    pantoprazole (PROTONIX) 40 MG tablet Take 1 tablet by mouth every morning (before breakfast) 30 tablet 3    Multiple Vitamins-Minerals (THERAPEUTIC MULTIVITAMIN-MINERALS) tablet Take 1 tablet by mouth daily         Allergies: Allergies   Allergen Reactions    Latex      rash    Morphine Shortness Of Breath    Codeine      Hives      Lisinopril      cough    Nitroglycerin Hives    Sulfa Antibiotics Nausea Only    Hydralazine      headaches        Social History:  Patient Lives at home   reports that she has never smoked. She has never used smokeless tobacco. She reports that she does not drink alcohol and does not use drugs. Family History:  family history includes Cancer in her father; High Blood Pressure in her mother. ,     Physical Exam:  BP (!) 151/88   Pulse 77   Temp 98 °F (36.7 °C) (Oral)   Resp 16   Ht 5' 6\" (1.676 m)   Wt 212 lb (96.2 kg)   SpO2 99%   BMI 34.22 kg/m²     General appearance:  Appears comfortable. AAOx3  HEENT: atraumatic, Pupils equal, muscous membranes moist, no masses appreciated  Cardiovascular: Regular rate and rhythm no murmurs appreciated  Respiratory: CTAB no wheezing  Gastrointestinal: tender to palpation abdominal binder in place  EXT: no edema  Neurology: no gross focal deficts  Psychiatry: Appropriate affect.  Not agitated  Skin: Warm, dry, no rashes appreciated    Labs:  CBC:   Lab Results   Component Value Date/Time    WBC 6.3 09/15/2022 04:51 AM    RBC 4.81 09/15/2022 04:51 AM    HGB 14.3 09/15/2022 04:51 AM    HCT 42.6 09/15/2022 04:51 AM    MCV 88.7 09/15/2022 04:51 AM    MCH 29.7 09/15/2022 04:51 AM    MCHC 33.5 09/15/2022 04:51 AM    RDW 16.1 09/15/2022 04:51 AM     09/15/2022 04:51 AM    MPV 7.8 09/15/2022 04:51 AM     BMP:    Lab Results   Component Value Date/Time     09/15/2022 04:51 AM    K 3.7 09/15/2022 04:51 AM     09/15/2022 04:51 AM    CO2 25 09/15/2022 04:51 AM    BUN 7 09/15/2022 04:51 AM    CREATININE 0.7 09/15/2022 04:51 AM    CALCIUM 10.1 09/15/2022 04:51 AM    GFRAA >60 09/15/2022 04:51 AM    LABGLOM >60 09/15/2022 04:51 AM    GLUCOSE 121 09/15/2022 04:51 AM     No orders to display       Recent imaging reviewed    Problem List  Principal Problem:    Incisional hernia without obstruction or gangrene  Active Problems:    PAF (paroxysmal atrial fibrillation) ([address]    AICD (automatic cardioverter/defibrillator) present  Resolved Problems:    * No resolved hospital problems. *        Assessment/Plan:   Hernia s/p ex lap and repair per surgery  - iv fluids for now    Chronic systolic chf ef 29%  - hold meds for now, monitor for volume overload stop fluids once tolerating po intake    PAF: home meds hold ac until ok with surgery to resume      Please note that some part of this chart was generated using Dragon dictation software. Although every effort was made to ensure the accuracy of this automated transcription, some errors in transcription may have occurred inadvertently. If you may need any clarification, please do not hesitate to contact me through [facility].        Sonido Nuñez MD    9/27/2022 4:08 PM

## 2022-09-27 NOTE — PROGRESS NOTES
Patient asleep, wakes easily and falls back to sleep, VSS, abdomen soft, rounded, dressing CDI, phase I discharge criteria met, will transfer to 4T when a bed becomes available. Family at the bedside.

## 2022-09-27 NOTE — PROGRESS NOTES
[employer] transmission received 9/27/22 from [address] PAC[facility] for patient's CRT-D.    TECHNICAL FINDINGS  No device or lead performance issue(s) observed    DEVICE ASSESSMENT: Available daily battery/lead measurements within expected range. Lead trends stable. Device appears to be currently functioning as programmed. Last Session: 06-Sep-2022 Remaining Longevity: 5.1 years (27-Sep-2022)   Mode: DDDR     Event Summary/Observations: Based on programmed zones, device detected/treated: 1 Ventricular Sensing Episode. Recent stored episode 23-Sep-2022. 1 VT-NS. Recent stored episode 22-Sep-2022. 16 Treated AT/AF Episodes . Recent stored episode 26-Sep-2022. 4 Monitored AT/AF Episodes . Recent stored episode 20-Sep-2022. Atrial events appear to be falling in blanking/refractory at times Unable to maintain LV Capture Management Safety Margin on 27-Sep-2022 LV Lead programmed to [email or web address]. Capture Threshold is [email or web address].

## 2022-09-28 LAB
ANION GAP SERPL CALCULATED.3IONS-SCNC: 12 MMOL/L (ref 3–16)
BASOPHILS ABSOLUTE: 0 K/UL (ref 0–0.2)
BASOPHILS RELATIVE PERCENT: 0.3 %
BUN BLDV-MCNC: 11 MG/DL (ref 7–20)
CALCIUM SERPL-MCNC: 8.1 MG/DL (ref 8.3–10.6)
CHLORIDE BLD-SCNC: 107 MMOL/L (ref 99–110)
CO2: 21 MMOL/L (ref 21–32)
CREAT SERPL-MCNC: 0.9 MG/DL (ref 0.6–1.1)
EOSINOPHILS ABSOLUTE: 0 K/UL (ref 0–0.6)
EOSINOPHILS RELATIVE PERCENT: 0 %
GFR AFRICAN AMERICAN: >60
GFR NON-AFRICAN AMERICAN: >60
GLUCOSE BLD-MCNC: 127 MG/DL (ref 70–99)
HCT VFR BLD CALC: 33.8 % (ref 36–48)
HEMOGLOBIN: 11.2 G/DL (ref 12–16)
LYMPHOCYTES ABSOLUTE: 0.6 K/UL (ref 1–5.1)
LYMPHOCYTES RELATIVE PERCENT: 3.7 %
MAGNESIUM: 1.6 MG/DL (ref 1.8–2.4)
MCH RBC QN AUTO: 29.7 PG (ref 26–34)
MCHC RBC AUTO-ENTMCNC: 33.2 G/DL (ref 31–36)
MCV RBC AUTO: 89.4 FL (ref 80–100)
MONOCYTES ABSOLUTE: 1 K/UL (ref 0–1.3)
MONOCYTES RELATIVE PERCENT: 6.7 %
NEUTROPHILS ABSOLUTE: 13.5 K/UL (ref 1.7–7.7)
NEUTROPHILS RELATIVE PERCENT: 89.3 %
PDW BLD-RTO: 16.4 % (ref 12.4–15.4)
PHOSPHORUS: 2.6 MG/DL (ref 2.5–4.9)
PLATELET # BLD: 234 K/UL (ref 135–450)
PMV BLD AUTO: 7.8 FL (ref 5–10.5)
POTASSIUM SERPL-SCNC: 3.3 MMOL/L (ref 3.5–5.1)
RBC # BLD: 3.78 M/UL (ref 4–5.2)
SODIUM BLD-SCNC: 140 MMOL/L (ref 136–145)
WBC # BLD: 15.1 K/UL (ref 4–11)

## 2022-09-28 PROCEDURE — 2580000003 HC RX 258: Performed by: SURGERY

## 2022-09-28 PROCEDURE — APPNB30 APP NON BILLABLE TIME 0-30 MINS: Performed by: NURSE PRACTITIONER

## 2022-09-28 PROCEDURE — 6370000000 HC RX 637 (ALT 250 FOR IP): Performed by: SURGERY

## 2022-09-28 PROCEDURE — 97530 THERAPEUTIC ACTIVITIES: CPT

## 2022-09-28 PROCEDURE — 6370000000 HC RX 637 (ALT 250 FOR IP): Performed by: NURSE PRACTITIONER

## 2022-09-28 PROCEDURE — 6370000000 HC RX 637 (ALT 250 FOR IP): Performed by: INTERNAL MEDICINE

## 2022-09-28 PROCEDURE — 80048 BASIC METABOLIC PNL TOTAL CA: CPT

## 2022-09-28 PROCEDURE — 97165 OT EVAL LOW COMPLEX 30 MIN: CPT

## 2022-09-28 PROCEDURE — A4216 STERILE WATER/SALINE, 10 ML: HCPCS | Performed by: SURGERY

## 2022-09-28 PROCEDURE — 2500000003 HC RX 250 WO HCPCS: Performed by: SURGERY

## 2022-09-28 PROCEDURE — 84100 ASSAY OF PHOSPHORUS: CPT

## 2022-09-28 PROCEDURE — 99024 POSTOP FOLLOW-UP VISIT: CPT | Performed by: SURGERY

## 2022-09-28 PROCEDURE — 83735 ASSAY OF MAGNESIUM: CPT

## 2022-09-28 PROCEDURE — 6360000002 HC RX W HCPCS: Performed by: SURGERY

## 2022-09-28 PROCEDURE — 85025 COMPLETE CBC W/AUTO DIFF WBC: CPT

## 2022-09-28 PROCEDURE — APPSS15 APP SPLIT SHARED TIME 0-15 MINUTES: Performed by: NURSE PRACTITIONER

## 2022-09-28 PROCEDURE — 1200000000 HC SEMI PRIVATE

## 2022-09-28 RX ORDER — POTASSIUM CHLORIDE 7.45 MG/ML
10 INJECTION INTRAVENOUS
Status: COMPLETED | OUTPATIENT
Start: 2022-09-28 | End: 2022-09-28

## 2022-09-28 RX ORDER — MAGNESIUM SULFATE 1 G/100ML
1000 INJECTION INTRAVENOUS ONCE
Status: COMPLETED | OUTPATIENT
Start: 2022-09-28 | End: 2022-09-28

## 2022-09-28 RX ORDER — SPIRONOLACTONE 25 MG/1
50 TABLET ORAL DAILY
Status: DISCONTINUED | OUTPATIENT
Start: 2022-09-28 | End: 2022-10-04 | Stop reason: HOSPADM

## 2022-09-28 RX ORDER — POTASSIUM CHLORIDE 7.45 MG/ML
10 INJECTION INTRAVENOUS PRN
Status: DISCONTINUED | OUTPATIENT
Start: 2022-09-28 | End: 2022-10-04 | Stop reason: HOSPADM

## 2022-09-28 RX ORDER — MAGNESIUM SULFATE 1 G/100ML
1000 INJECTION INTRAVENOUS PRN
Status: DISCONTINUED | OUTPATIENT
Start: 2022-09-28 | End: 2022-10-04 | Stop reason: HOSPADM

## 2022-09-28 RX ORDER — POLYETHYLENE GLYCOL 3350 17 G/17G
17 POWDER, FOR SOLUTION ORAL DAILY
Status: DISCONTINUED | OUTPATIENT
Start: 2022-09-28 | End: 2022-10-01

## 2022-09-28 RX ORDER — POTASSIUM CHLORIDE 20 MEQ/1
40 TABLET, EXTENDED RELEASE ORAL PRN
Status: DISCONTINUED | OUTPATIENT
Start: 2022-09-28 | End: 2022-10-04 | Stop reason: HOSPADM

## 2022-09-28 RX ADMIN — CEFAZOLIN 2000 MG: 2 INJECTION, POWDER, FOR SOLUTION INTRAMUSCULAR; INTRAVENOUS at 14:34

## 2022-09-28 RX ADMIN — POTASSIUM CHLORIDE 10 MEQ: 7.46 INJECTION, SOLUTION INTRAVENOUS at 10:56

## 2022-09-28 RX ADMIN — ENOXAPARIN SODIUM 40 MG: 100 INJECTION SUBCUTANEOUS at 21:36

## 2022-09-28 RX ADMIN — CEFAZOLIN 2000 MG: 2 INJECTION, POWDER, FOR SOLUTION INTRAMUSCULAR; INTRAVENOUS at 07:55

## 2022-09-28 RX ADMIN — POTASSIUM CHLORIDE 10 MEQ: 7.46 INJECTION, SOLUTION INTRAVENOUS at 12:22

## 2022-09-28 RX ADMIN — CARVEDILOL 6.25 MG: 6.25 TABLET, FILM COATED ORAL at 09:20

## 2022-09-28 RX ADMIN — KETOROLAC TROMETHAMINE 30 MG: 30 INJECTION, SOLUTION INTRAMUSCULAR at 00:29

## 2022-09-28 RX ADMIN — ACETAMINOPHEN 650 MG: 325 TABLET ORAL at 21:35

## 2022-09-28 RX ADMIN — KETOROLAC TROMETHAMINE 30 MG: 30 INJECTION, SOLUTION INTRAMUSCULAR at 11:24

## 2022-09-28 RX ADMIN — BENZOCAINE AND MENTHOL 1 LOZENGE: 15; 3.6 LOZENGE ORAL at 18:41

## 2022-09-28 RX ADMIN — FAMOTIDINE 20 MG: 20 TABLET ORAL at 21:36

## 2022-09-28 RX ADMIN — DOCUSATE SODIUM 100 MG: 100 CAPSULE, LIQUID FILLED ORAL at 21:36

## 2022-09-28 RX ADMIN — CEFAZOLIN 2000 MG: 2 INJECTION, POWDER, FOR SOLUTION INTRAMUSCULAR; INTRAVENOUS at 00:33

## 2022-09-28 RX ADMIN — KETOROLAC TROMETHAMINE 30 MG: 30 INJECTION, SOLUTION INTRAMUSCULAR at 17:07

## 2022-09-28 RX ADMIN — FAMOTIDINE 20 MG: 10 INJECTION INTRAVENOUS at 09:17

## 2022-09-28 RX ADMIN — ACETAMINOPHEN 650 MG: 325 TABLET ORAL at 09:18

## 2022-09-28 RX ADMIN — SODIUM CHLORIDE: 9 INJECTION, SOLUTION INTRAVENOUS at 13:48

## 2022-09-28 RX ADMIN — DOCUSATE SODIUM 100 MG: 100 CAPSULE, LIQUID FILLED ORAL at 09:19

## 2022-09-28 RX ADMIN — ACETAMINOPHEN 650 MG: 325 TABLET ORAL at 14:29

## 2022-09-28 RX ADMIN — POLYETHYLENE GLYCOL 3350 17 G: 17 POWDER, FOR SOLUTION ORAL at 11:29

## 2022-09-28 RX ADMIN — SPIRONOLACTONE 50 MG: 25 TABLET ORAL at 10:08

## 2022-09-28 RX ADMIN — MAGNESIUM SULFATE HEPTAHYDRATE 1000 MG: 1 INJECTION, SOLUTION INTRAVENOUS at 09:13

## 2022-09-28 RX ADMIN — ATORVASTATIN CALCIUM 40 MG: 40 TABLET, FILM COATED ORAL at 21:36

## 2022-09-28 RX ADMIN — SACUBITRIL AND VALSARTAN 1 TABLET: 49; 51 TABLET, FILM COATED ORAL at 22:15

## 2022-09-28 RX ADMIN — KETOROLAC TROMETHAMINE 30 MG: 30 INJECTION, SOLUTION INTRAMUSCULAR at 05:23

## 2022-09-28 RX ADMIN — ACETAMINOPHEN 650 MG: 325 TABLET ORAL at 04:05

## 2022-09-28 ASSESSMENT — PAIN SCALES - GENERAL
PAINLEVEL_OUTOF10: 4
PAINLEVEL_OUTOF10: 5
PAINLEVEL_OUTOF10: 5
PAINLEVEL_OUTOF10: 7
PAINLEVEL_OUTOF10: 4
PAINLEVEL_OUTOF10: 6
PAINLEVEL_OUTOF10: 2
PAINLEVEL_OUTOF10: 0
PAINLEVEL_OUTOF10: 5

## 2022-09-28 ASSESSMENT — PAIN DESCRIPTION - ORIENTATION
ORIENTATION: MID
ORIENTATION: MID

## 2022-09-28 ASSESSMENT — PAIN DESCRIPTION - LOCATION
LOCATION: ABDOMEN
LOCATION: ABDOMEN

## 2022-09-28 ASSESSMENT — PAIN DESCRIPTION - PAIN TYPE: TYPE: SURGICAL PAIN

## 2022-09-28 NOTE — PROGRESS NOTES
[facility] and Laparoscopic [facility]        Progress Note    Patient Name: Taran Silva  MRN: [id number]  YOB: 1965  Date of Evaluation: 2022    Subjective:  No acute events overnight  Pain controlled  Reports nausea and vomiting following OR yesterday, none since--feeling better this morning  Up to chair at this time    Post-Operative Day #1      Vital Signs:  Patient Vitals for the past 24 hrs:   BP Temp Temp src Pulse Resp SpO2 Height Weight   22 1215 113/75 -- -- 75 16 -- -- --   22 1045 (!) 90/57 -- -- 74 16 94 % -- --   22 0920 105/69 -- -- 75 -- -- -- --   22 0800 90/66 -- -- 76 18 95 % -- --   22 0415 -- -- -- -- -- -- -- 212 lb (96.2 kg)   22 0400 108/72 98.6 °F (37 °C) Oral 75 16 96 % -- --   22 0000 134/78 98.5 °F (36.9 °C) Oral 77 18 97 % -- --   22 2115 (!) 145/55 98.5 °F (36.9 °C) Oral 77 16 98 % -- --   22 (!) 145/85 98.5 °F (36.9 °C) Oral 75 16 99 % -- --   22 1746 -- -- -- 80 16 99 % -- --   22 1730 (!) 141/85 97.5 °F (36.4 °C) Oral 75 16 100 % -- --   22 1449 (!) 151/88 98 °F (36.7 °C) Oral 77 16 99 % -- --   22 1435 -- -- -- -- -- -- 5' 6\" (1.676 m) 212 lb (96.2 kg)   22 1419 (!) 145/88 98.1 °F (36.7 °C) Oral 77 18 98 % -- --      TEMPERATURE HISTORY 24H: Temp (24hrs), Av.2 °F (36.8 °C), Min:97.5 °F (36.4 °C), Max:98.6 °F (37 °C)    BLOOD PRESSURE HISTORY: Systolic (46JAZ), HLV:865 , Min:90 , LHZ:265    Diastolic (88DFR), CEJ:89, Min:55, Max:103      Intake/Output:  I/O last 3 completed shifts:   In: 1800 [I.V.:1800]  Out: 1135 [Urine:950; Drains:40; Blood:145]  I/O this shift:  In: -   Out: 150 [Urine:150]  Drain/tube Output:  Closed/Suction Drain Right RUQ Bulb-Output (ml): 40 ml    Physical Exam:  General: awake, alert, oriented to  person, place, time  Lungs: unlabored respirations  Abdomen: soft, non-distended, incisional tenderness only  Drain: sanguinous FRED mg Oral Daily    polyethylene glycol  17 g Oral Daily    sodium chloride flush  5-40 mL IntraVENous 2 times per day    famotidine  20 mg Oral BID    Or    famotidine (PEPCID) injection  20 mg IntraVENous BID    enoxaparin  40 mg SubCUTAneous Q24H    ceFAZolin  2,000 mg IntraVENous Q8H    docusate sodium  100 mg Oral BID    acetaminophen  650 mg Oral Q6H    ketorolac  30 mg IntraVENous Q6H    carvedilol  6.25 mg Oral BID WC    atorvastatin  40 mg Oral Nightly     Continuous Infusions:   sodium chloride      sodium chloride 125 mL/hr at 09/28/22 1348     PRN Meds:.potassium chloride **OR** potassium alternative oral replacement **OR** potassium chloride, magnesium sulfate, sodium chloride flush, sodium chloride, ondansetron **OR** ondansetron, bisacodyl, oxyCODONE **OR** oxyCODONE, diphenhydrAMINE, HYDROmorphone **OR** HYDROmorphone      Assessment:  OR Date 9/27/2022, exploratory laparotomy with repair of reducible incisional hernia, bilateral transverse abdominis component releases with mesh placement, and lysis of adhesions  Hypertension  CHF  Paroxysmal atrial fibrillation, Xarelto currently held      Plan:  1. Pain controlled, had nausea/vomiting following OR yesterday--none this morning, vitals/labs stable; continued supportive care, start bowel regimen, remove Schultz catheter, continue FRED drain care/monitoring  2. Start clear liquid diet as tolerated; monitor bowel function  3. IV hydration, decrease rate, stop when PO intake is adequate; monitor and correct electrolytes  4. Activity as tolerated, ambulate TID, up to chair for all meals--PT/OT evaluation and treatment  5. Pulmonary toilet, incentive spirometry--wean oxygen  6. PRN analgesics and antiemetics--minimizing narcotics as tolerated, transition to PO  7. DVT prophylaxis with  Lovenox and SCD's; hold oral anticoagulation  8. Management of medical comorbid etiologies per consulting services  9.  Disposition: Anticipate discharge home in the next 24-48 hours    EDUCATION:  Educated patient on plan of care and disease process--all questions answered. Plans discussed with patient and nursing. Reviewed and discussed with Dr. Jose Capps. Signed:  CATRACHITO Orozco CNP  9/28/2022 2:18 PM    I have personally performed a face to face diagnostic evaluation on this patient. I have interviewed and examined the patient and I agree with the assessment above. Time was spent reviewing patient chart (including but not limited to notes, labs, imaging and other testing), interviewing and counseling patient and present family members, performing physical exam, documentation of my findings and subsequent follow up of ordered medication and testing, placing referrals and communication with patient care providers, coordinating future care as well as documentation in the EHR. This encompassed more than 50% of the total encounter time. In summary, my findings and plan are the following:   Ms. Yung Marmolejo is a 61 y.o. female who presents with   OR Date 9/27/2022, exploratory laparotomy with repair of reducible incisional hernia, bilateral transverse abdominis component releases with mesh placement, and lysis of adhesions  Hypertension  CHF  Paroxysmal atrial fibrillation, Xarelto currently held    Plan:  1. Monitor bowel function, no flatus or stool yet, start bowel regimen  2. No nausea, advance to clear liquids  3. IVF, monitor and replace electrolytes as needed  4. Pain control, transition to PO and minimize narcotics  5. Activity as tolerated, pulmonary toilet, incentive spirometry, wean oxygen  6. Holding PO anticoagulation, on prophylactic lovenox  7. Good urine output, remove bryant  8. Appreciate hospitalist/cardiology support  9. Discharge planning, anticipate 1-3 days if stabilizes    Flavio Capps MD, FACS  9/28/2022  2:37 PM

## 2022-09-28 NOTE — PROGRESS NOTES
HOSPITALISTS PROGRESS NOTE    9/28/2022 9:10 AM        Name: Anne Parry . Admitted: 9/27/2022  Primary Care Provider: Nirmal Hooks (Tel: [phone])      Brief Course: This 61 y.o. female with PMHx of hypertension, A. fib on Xarelto and HFrEF (35%)  admitted for incisional hernia repair; s/p  exploratory laparotomy with repair of reducible incisional hernia, bilateral transverse abdominis component releases with mesh placement and lysis of adhesions. Hospital service consulted for medical management. Interval history:   Pt seen and examined today   Overnight events noted and interval ancillary notes reviewed. On 1 L nasal cannula satting around 95%; wean as tolerated keep sats above 92%  Afebrile overnight, WBCs trended up likely reactive in setting of surgery  Low potassium and mag this morning; replacement ordered  Pt endorsed nausea but denied any fevers, chills, vomiting, shortness of breath, chest pain, palpitations      Assessment & Plan:     Incisional hernia repair; s/p  exploratory laparotomy with repair of reducible incisional hernia, bilateral transverse abdominis component releases with mesh placement and lysis of adhesions. General surgery following; started on clear diet  Continue IV fluids, as needed antiemetics, pain meds. Monitor electrolytes closely     HFrEF (EF of 35%)  S/p  Biv-AICD upgrade 1/2021  On Entresto 49-51 mg bid, aldactone 50 mg daily, Coreg 6.25 mg bid, and jardiance at home  Continue Coreg. Plan to restart on Entresto and Aldactone tomorrow if BP and labs allows     PAF: On Coreg and Xarelto. Hold anticoagulation until cleared by general surgery    Hypertension: On Coreg.   Monitor BP closely       DVT PPX: Xarelto; currently on hold  Code:Full Code    Disposition: Once acute medical issues have resolved    Current Medications  potassium chloride (KLOR-CON M) extended release tablet 40 mEq, PRN   Or  potassium bicarb-citric acid (EFFER-K) effervescent tablet 40 mEq, PRN   Or  potassium chloride 10 mEq/100 mL IVPB (Peripheral Line), PRN  magnesium sulfate 1000 mg in dextrose 5% 100 mL IVPB, Once  sacubitril-valsartan (ENTRESTO) 49-51 MG per tablet 1 tablet, BID  spironolactone (ALDACTONE) tablet 50 mg, Daily  potassium chloride 10 mEq/100 mL IVPB (Peripheral Line), Q1H  sodium chloride flush 0.9 % injection 5-40 mL, 2 times per day  sodium chloride flush 0.9 % injection 5-40 mL, PRN  0.9 % sodium chloride infusion, PRN  0.9 % sodium chloride infusion, Continuous  ondansetron (ZOFRAN-ODT) disintegrating tablet 4 mg, Q8H PRN   Or  ondansetron (ZOFRAN) injection 4 mg, Q6H PRN  famotidine (PEPCID) tablet 20 mg, BID   Or  famotidine (PEPCID) 20 mg in sodium chloride (PF) 10 mL injection, BID  enoxaparin (LOVENOX) injection 40 mg, Q24H  ceFAZolin (ANCEF) 2,000 mg in dextrose 5 % 50 mL IVPB (mini-bag), Q8H  bisacodyl (DULCOLAX) suppository 10 mg, Daily PRN  docusate sodium (COLACE) capsule 100 mg, BID  oxyCODONE (ROXICODONE) immediate release tablet 5 mg, Q4H PRN   Or  oxyCODONE (ROXICODONE) immediate release tablet 10 mg, Q4H PRN  acetaminophen (TYLENOL) tablet 650 mg, Q6H  ketorolac (TORADOL) injection 30 mg, Q6H  diphenhydrAMINE (BENADRYL) injection 25 mg, Q6H PRN  HYDROmorphone HCl PF (DILAUDID) injection 0.5 mg, Q2H PRN   Or  HYDROmorphone HCl PF (DILAUDID) injection 1 mg, Q2H PRN  carvedilol (COREG) tablet 6.25 mg, BID WC  atorvastatin (LIPITOR) tablet 40 mg, Nightly        Objective:  BP 90/66   Pulse 76   Temp 98.6 °F (37 °C) (Oral)   Resp 18   Ht 5' 6\" (1.676 m)   Wt 212 lb (96.2 kg)   SpO2 95%   BMI 34.22 kg/m²     Intake/Output Summary (Last 24 hours) at 9/28/2022 0910  Last data filed at 9/28/2022 0415  Gross per 24 hour   Intake 800 ml   Output 1135 ml   Net -335 ml      Wt Readings from Last 3 Encounters:   09/28/22 212 lb (96.2 kg)   09/26/22 212 lb (96.2 kg)   09/06/22 215 lb (97.5 kg)       Physical Examination:   General appearance:  No apparent distress, appears stated age and cooperative. HEENT: Normocephalic, sclera clear., PERRLA. Trachea midline, no adenopathy. Cardiovascular: Regular rate and rhythm, normal S1, S2. No murmur. Respiratory:Clear to auscultation bilaterally, no wheeze or crackles. GI: Abdomen soft, no tenderness, not distended, normal bowel sounds  Musculoskeletal: No cyanosis in digits. No BLE edema present  Neurology: CN 2-12 grossly intact. No speech or motor deficits  Psych: Not agitated, appropriate affect  Skin: Warm, dry, normal turgor    Labs and Tests:  CBC:   Recent Labs     09/28/22  0520   WBC 15.1*   HGB 11.2*        BMP:    Recent Labs     09/28/22  0520      K 3.3*      CO2 21   BUN 11   CREATININE 0.9   GLUCOSE 127*     Hepatic: No results for input(s): AST, ALT, ALB, BILITOT, ALKPHOS in the last 72 hours. No orders to display       Problem List  Principal Problem:    Incisional hernia without obstruction or gangrene  Active Problems:    PAF (paroxysmal atrial fibrillation) ([facility] [address]    AICD (automatic cardioverter/defibrillator) present  Resolved Problems:    * No resolved hospital problems.  Gera Templeton MD   9/28/2022 9:10 AM

## 2022-09-28 NOTE — PROGRESS NOTES
[facility] Department   Phone: [phone]    Occupational Therapy    [x] Initial Evaluation            [] Daily Treatment Note         [] Discharge Summary      Patient: Julia Cisneros   : 1965   MRN: [id number]   Date of Service:  2022    Admitting Diagnosis:  Incisional hernia without obstruction or gangrene  Current Admission Summary: OPEN EXPLORATORY LAPAROTOMY, REPAIR OF REDUCIBLE INCISIONAL HERNIA WITH MESH,  UNILATERAL  ABDOMINAL WALL COMPONENT RELEASE  Past Medical History:  has a past medical history of Anemia, Atrial fibrillation and flutter ([address] Atrial flutter ([address] CHB (complete heart block) ([address] Class 1 obesity without serious comorbidity with body mass index (BMI) of 33.0 to 33.9 in adult, Congenital heart disease, GERD (gastroesophageal reflux disease), Headache(784.0), History of complete heart block, Hyperlipidemia, Hypertension, PONV (postoperative nausea and vomiting), Prolonged emergence from general anesthesia, Sleep apnea, Syncope, and Systolic CHF, chronic ([address] Past Surgical History:  has a past surgical history that includes Uterine fibroid surgery; Pacemaker insertion (2012); Tubal ligation; Upper gastrointestinal endoscopy (N/A, 10/27/2020); Colonoscopy (N/A, 10/27/2020); Cardiac defibrillator placement (2021); Upper gastrointestinal endoscopy (N/A, 2021); Upper gastrointestinal endoscopy (N/A, 2021); Upper gastrointestinal endoscopy (N/A, 2021); colectomy (N/A, 2021); Cardioversion (2022); Cardioversion (2022); and Upper gastrointestinal endoscopy (N/A, 2022). Discharge Recommendations: Julia Cisneros scored a 20/24 on the AM-PAC ADL Inpatient form. Current research shows that an AM-PAC score of 18 or greater is typically associated with a discharge to the patient's home setting.  Based on the patient's AM-PAC score, and their current ADL deficits, it is recommended that the patient have 2-3 sessions per week of Occupational Therapy at d/c to increase the patient's independence. At this time, this patient demonstrates the endurance and safety to discharge home with [facility] [address] (home vs OP services) and a follow up treatment frequency of 2-3x/wk. Please see assessment section for further patient specific details. If patient discharges prior to next session this note will serve as a discharge summary. Please see below for the latest assessment towards goals. HOME HEALTH CARE: LEVEL 1 STANDARD    - Initial home health evaluation to occur within 24-48 hours, in patient home   - Therapy to evaluate with goal of regaining prior level of functioning   - Therapy to evaluate if patient has [address] needs for personal care      DME Required For Discharge: tub transfer bench, grab bars - toilet, grab bars - shower    Precautions/Restrictions: low fall risk  Weight Bearing Restrictions: no restrictions  [] Right Upper Extremity  [] Left Upper Extremity [] Right Lower Extremity  [] Left Lower Extremity     Required Braces/Orthotics: Other: abdominal binder   [] Right  [] Left  Positional Restrictions:no positional restrictions    Pre-Admission Information   Lives With: significant other    Type of Home: apartment  Home Layout: two level, 18 stairs to 2nd level with L HR  Home Access:  1 step to enter without rails   Bathroom Layout: tub/shower unit  Bathroom Equipment: Comment: no equipment in shower  Toilet Height: elevated height  Home Equipment: rolling walker  Transfer Assistance: requires assistance  Ambulation Assistance:modified independent with use of RW  ADL Assistance: requires assistance with bathing, requires assistance with dressing, . Comment: LB ADL assistance  IADL Assistance: requires assistance with all homemaking tasks  Active :        [] Yes  [x] No  Hand Dominance: [x] Left  [] Right  Current Employment: retired.   Occupation: cook at a school for 20 years  Hobbies:   Recent Falls: patient reports 0 falls in the last 6 months    Examination   Vision:   Vision Gross Assessment: Impaired and Vision Corrective Device: wears glasses for reading  [facility]  Observation:   General Observation:  abdominal incision and abdominal binder  Posture:   Head/neck forward; shoulders rolled  Sensation:   reports numbness and tingling in (B) LE  Tone:   Normotonic  Coordination Testing:   WFL    ROM:   (B) Shoulder AROM WFL  Strength:   (B) UE strength grossly -4    Decision Making: low complexity  Clinical Presentation: stable      Subjective  General: Patient upright in recliner upon arrival and agreeable to OT evaluation. Patient pleasant and cooperative with the activities asked of her. Patient complained of being sore in her incision site, but stated she was feeling better then she did yesterday. Patient lethargic throughout the session and stated she felt \"hungover\". Pain: 2/10. Location: abdominal incision site  Pain Interventions: pain medication in place prior to arrival and repositioned   Activities of Daily Living  Basic Activities of Daily Living  General Comments: patient politely declined performing ADLs as she reported that she had already completed them this am.   Instrumental Activities of Daily Living  No IADL completed on this date. Functional Mobility  Bed Mobility  Bed mobility not completed on this date.   Comments: patient upright in recliner upon arrival and wanted to sit in recliner following conclusion of the evaluation  Transfers  Sit to stand transfer:stand by assistance, Comment patient performed sit to stand transfer from the recliner  Stand to sit transfer: stand by assistance, Comment patient performed stand to sit transfer onto the recliner  Toilet transfer: stand by assistance, Comment RW used for toilet transfer  Comments: RW used for all transfers to help maintain balance and provide UE support; verbal cues required to ensure appropriate hand placement  Functional Mobility:  Functional Mobility: .  stand by assistance, contact guard assistance, Comment assist levels progressed from contact guard assistance to stand by assistance during functional mobility  Functional Mobility Activity: to/from bathroom, ~16 feet, increased time required due to patient's pacing  Functional Mobility Device Use: rolling walker  Comments: decreased step length, slow radha, guarding of incision site  OT to defer steps negotiation to PT   Other Therapeutic Interventions  Functional Outcomes  AM-PAC Inpatient Daily Activity Raw Score: 20    Cognition  WFL  Orientation:    alert and oriented x 4  Command Following:   [facility]  Education  Barriers To Learning: none  Patient Education: patient educated on goals, OT role and benefits, plan of care, discharge recommendations  Learning Assessment:  patient verbalizes and demonstrates understanding  Assessment  Activity Tolerance: Patient responded well to evaluation. Patient pleasant and fully participated in the activities asked of her. Patient's activity tolerance impacted on this date by decreased endurance and pain. Impairments Requiring Therapeutic Intervention: decreased functional mobility, decreased ADL status, decreased strength, decreased endurance, increased pain  Prognosis: good  Clinical Assessment: Patient presenting slightly below functional baseline with above deficits associated with her diagnosis s/p open exploratory laparotomy, repair of reducible incisional hernia with mesh, unilateral abdominal wall component release. Patient's limitations include decreased strength, decreased endurance, decreased mobility, and increased pain. Patient modified independent with mobility and required some assistance with ADL performance prior to admission and now she requires assistance with both.  Patient would benefit from [address] [place] [address] services upon discharge to ensure safety around the home and help her return to her PLOF. Patient to continue acute OT services to increased safety and independence with task performance in order to reach maximum functional potential.   Safety Interventions: patient left in chair, call light within reach, and nurse notified    Plan  Frequency: 3-5 x/per week  Current Treatment Recommendations: strengthening, functional mobility training, endurance training, patient/caregiver education, and equipment evaluation/education    Goals  Patient Goals: to return home   Short Term Goals:  Time Frame: upon discharge  Patient will complete toileting at Independent   Patient will complete functional mobility at modified independent   Patient will perform a tub transfer with the use of a tub transfer bench Mod-I     Therapy Session Time     Individual Group Co-treatment   Time In 0215      Time Out 0253      Minutes 38           Timed Code Treatment Minutes: 23 minutes  Total Treatment Minutes: 38 minutes     JOHN Fritz/OT     Electronically Signed By: Germaine Story OT  OT/R provided direct supervision and guidance during this evaluation session. I have read and agree with the above documentation supporting this OT student's interventions. Germaine Story OTR/L  [id number].

## 2022-09-28 NOTE — CARE COORDINATION
Discharge Planning Assessment  Risk of Readmission Score: 11%    RN discharge planner met with patient to discuss reason for admission, current living situation, and potential needs at the time of discharge. Demographics/Insurance verified Yes    Current type of dwelling: second floor apartment with 18 steps to enter. Patient from ECF/ confirmed with: N/A    Living arrangements: Boyfriend    Level of function/Support: Independent    PCP: [facility]    Last Visit to PCP: last year    DME: N/A    Active with any community resources/agencies/skilled home care:    - patient has used [facility] in the past.    Medication compliance issues: The patient states she is compliant with her medications. Financial issues that could impact healthcare: None    Tentative discharge plan:  The current discharge plan is for the patient to return home, possible need for [facility], discharge plan [place]    Transportation at the time of discharge: OSMANI Lagunas RN    [facility]  Phone: [phone]

## 2022-09-28 NOTE — PROGRESS NOTES
Incentive Spirometry education and demonstration completed by Respiratory Therapy No and patient sleeping      Response to education:  not done      Teaching Time: 0 minutes    Minimum Predicted Vital Capacity - 593 mL. Patient's Actual Vital Capacity - 0 mL.  Turning over to Nursing for routine follow-up No.    Comments: n/a      Electronically signed by Tramaine Sykes RCP on 9/27/2022 at 8:42 PM

## 2022-09-29 LAB
ANION GAP SERPL CALCULATED.3IONS-SCNC: 11 MMOL/L (ref 3–16)
BACTERIA: ABNORMAL /HPF
BASOPHILS ABSOLUTE: 0 K/UL (ref 0–0.2)
BASOPHILS RELATIVE PERCENT: 0.3 %
BILIRUBIN URINE: NEGATIVE
BLOOD, URINE: ABNORMAL
BUN BLDV-MCNC: 15 MG/DL (ref 7–20)
CALCIUM SERPL-MCNC: 7.6 MG/DL (ref 8.3–10.6)
CHLORIDE BLD-SCNC: 108 MMOL/L (ref 99–110)
CLARITY: ABNORMAL
CO2: 20 MMOL/L (ref 21–32)
COLOR: YELLOW
CREAT SERPL-MCNC: 1.1 MG/DL (ref 0.6–1.1)
EOSINOPHILS ABSOLUTE: 0.1 K/UL (ref 0–0.6)
EOSINOPHILS RELATIVE PERCENT: 1.2 %
EPITHELIAL CELLS, UA: 3 /HPF (ref 0–5)
GFR AFRICAN AMERICAN: >60
GFR NON-AFRICAN AMERICAN: 51
GLUCOSE BLD-MCNC: 92 MG/DL (ref 70–99)
GLUCOSE URINE: NEGATIVE MG/DL
HCT VFR BLD CALC: 30.1 % (ref 36–48)
HEMOGLOBIN: 10 G/DL (ref 12–16)
HYALINE CASTS: 1 /LPF (ref 0–8)
KETONES, URINE: ABNORMAL MG/DL
LEUKOCYTE ESTERASE, URINE: ABNORMAL
LYMPHOCYTES ABSOLUTE: 1.1 K/UL (ref 1–5.1)
LYMPHOCYTES RELATIVE PERCENT: 8.5 %
MAGNESIUM: 1.8 MG/DL (ref 1.8–2.4)
MCH RBC QN AUTO: 29.8 PG (ref 26–34)
MCHC RBC AUTO-ENTMCNC: 33.2 G/DL (ref 31–36)
MCV RBC AUTO: 89.8 FL (ref 80–100)
MICROSCOPIC EXAMINATION: YES
MONOCYTES ABSOLUTE: 0.8 K/UL (ref 0–1.3)
MONOCYTES RELATIVE PERCENT: 6.7 %
NEUTROPHILS ABSOLUTE: 10.3 K/UL (ref 1.7–7.7)
NEUTROPHILS RELATIVE PERCENT: 83.3 %
NITRITE, URINE: NEGATIVE
PDW BLD-RTO: 16.6 % (ref 12.4–15.4)
PH UA: 5.5 (ref 5–8)
PHOSPHORUS: 1.9 MG/DL (ref 2.5–4.9)
PLATELET # BLD: 197 K/UL (ref 135–450)
PMV BLD AUTO: 7.6 FL (ref 5–10.5)
POTASSIUM SERPL-SCNC: 3.3 MMOL/L (ref 3.5–5.1)
PROTEIN UA: 30 MG/DL
RBC # BLD: 3.35 M/UL (ref 4–5.2)
RBC UA: 4 /HPF (ref 0–4)
SODIUM BLD-SCNC: 139 MMOL/L (ref 136–145)
SPECIFIC GRAVITY UA: 1.02 (ref 1–1.03)
URINE REFLEX TO CULTURE: YES
URINE TYPE: ABNORMAL
UROBILINOGEN, URINE: 0.2 E.U./DL
WBC # BLD: 12.4 K/UL (ref 4–11)
WBC UA: 16 /HPF (ref 0–5)

## 2022-09-29 PROCEDURE — 87081 CULTURE SCREEN ONLY: CPT

## 2022-09-29 PROCEDURE — 97530 THERAPEUTIC ACTIVITIES: CPT

## 2022-09-29 PROCEDURE — 97116 GAIT TRAINING THERAPY: CPT

## 2022-09-29 PROCEDURE — 87086 URINE CULTURE/COLONY COUNT: CPT

## 2022-09-29 PROCEDURE — 97535 SELF CARE MNGMENT TRAINING: CPT

## 2022-09-29 PROCEDURE — 83735 ASSAY OF MAGNESIUM: CPT

## 2022-09-29 PROCEDURE — 6360000002 HC RX W HCPCS: Performed by: SURGERY

## 2022-09-29 PROCEDURE — 80048 BASIC METABOLIC PNL TOTAL CA: CPT

## 2022-09-29 PROCEDURE — 6370000000 HC RX 637 (ALT 250 FOR IP): Performed by: SURGERY

## 2022-09-29 PROCEDURE — 1200000000 HC SEMI PRIVATE

## 2022-09-29 PROCEDURE — 51798 US URINE CAPACITY MEASURE: CPT

## 2022-09-29 PROCEDURE — 81001 URINALYSIS AUTO W/SCOPE: CPT

## 2022-09-29 PROCEDURE — 85025 COMPLETE CBC W/AUTO DIFF WBC: CPT

## 2022-09-29 PROCEDURE — 2580000003 HC RX 258: Performed by: SURGERY

## 2022-09-29 PROCEDURE — 6370000000 HC RX 637 (ALT 250 FOR IP): Performed by: NURSE PRACTITIONER

## 2022-09-29 PROCEDURE — 51701 INSERT BLADDER CATHETER: CPT

## 2022-09-29 PROCEDURE — 94760 N-INVAS EAR/PLS OXIMETRY 1: CPT

## 2022-09-29 PROCEDURE — 84100 ASSAY OF PHOSPHORUS: CPT

## 2022-09-29 PROCEDURE — 99024 POSTOP FOLLOW-UP VISIT: CPT | Performed by: SURGERY

## 2022-09-29 PROCEDURE — 97161 PT EVAL LOW COMPLEX 20 MIN: CPT

## 2022-09-29 RX ADMIN — KETOROLAC TROMETHAMINE 30 MG: 30 INJECTION, SOLUTION INTRAMUSCULAR at 00:11

## 2022-09-29 RX ADMIN — ACETAMINOPHEN 650 MG: 325 TABLET ORAL at 15:00

## 2022-09-29 RX ADMIN — CEFAZOLIN 2000 MG: 2 INJECTION, POWDER, FOR SOLUTION INTRAMUSCULAR; INTRAVENOUS at 15:01

## 2022-09-29 RX ADMIN — POLYETHYLENE GLYCOL 3350 17 G: 17 POWDER, FOR SOLUTION ORAL at 15:00

## 2022-09-29 RX ADMIN — POTASSIUM & SODIUM PHOSPHATES POWDER PACK 280-160-250 MG 250 MG: 280-160-250 PACK at 10:57

## 2022-09-29 RX ADMIN — CEFAZOLIN 2000 MG: 2 INJECTION, POWDER, FOR SOLUTION INTRAMUSCULAR; INTRAVENOUS at 23:35

## 2022-09-29 RX ADMIN — CEFAZOLIN 2000 MG: 2 INJECTION, POWDER, FOR SOLUTION INTRAMUSCULAR; INTRAVENOUS at 05:57

## 2022-09-29 RX ADMIN — CEFAZOLIN 2000 MG: 2 INJECTION, POWDER, FOR SOLUTION INTRAMUSCULAR; INTRAVENOUS at 00:11

## 2022-09-29 RX ADMIN — OXYCODONE 10 MG: 5 TABLET ORAL at 10:35

## 2022-09-29 RX ADMIN — ACETAMINOPHEN 650 MG: 325 TABLET ORAL at 21:16

## 2022-09-29 RX ADMIN — KETOROLAC TROMETHAMINE 30 MG: 30 INJECTION, SOLUTION INTRAMUSCULAR at 05:54

## 2022-09-29 RX ADMIN — CARVEDILOL 6.25 MG: 6.25 TABLET, FILM COATED ORAL at 16:34

## 2022-09-29 RX ADMIN — CARVEDILOL 6.25 MG: 6.25 TABLET, FILM COATED ORAL at 10:37

## 2022-09-29 RX ADMIN — SACUBITRIL AND VALSARTAN 1 TABLET: 49; 51 TABLET, FILM COATED ORAL at 23:25

## 2022-09-29 RX ADMIN — ATORVASTATIN CALCIUM 40 MG: 40 TABLET, FILM COATED ORAL at 21:15

## 2022-09-29 RX ADMIN — ACETAMINOPHEN 650 MG: 325 TABLET ORAL at 03:57

## 2022-09-29 RX ADMIN — FAMOTIDINE 20 MG: 20 TABLET ORAL at 21:15

## 2022-09-29 RX ADMIN — DOCUSATE SODIUM 100 MG: 100 CAPSULE, LIQUID FILLED ORAL at 21:15

## 2022-09-29 RX ADMIN — ACETAMINOPHEN 650 MG: 325 TABLET ORAL at 10:59

## 2022-09-29 RX ADMIN — SACUBITRIL AND VALSARTAN 1 TABLET: 49; 51 TABLET, FILM COATED ORAL at 10:59

## 2022-09-29 RX ADMIN — SPIRONOLACTONE 50 MG: 25 TABLET ORAL at 10:37

## 2022-09-29 RX ADMIN — DOCUSATE SODIUM 100 MG: 100 CAPSULE, LIQUID FILLED ORAL at 10:37

## 2022-09-29 RX ADMIN — ENOXAPARIN SODIUM 40 MG: 100 INJECTION SUBCUTANEOUS at 21:15

## 2022-09-29 RX ADMIN — FAMOTIDINE 20 MG: 20 TABLET ORAL at 10:37

## 2022-09-29 ASSESSMENT — PAIN DESCRIPTION - PAIN TYPE: TYPE: SURGICAL PAIN

## 2022-09-29 ASSESSMENT — PAIN SCALES - GENERAL
PAINLEVEL_OUTOF10: 4
PAINLEVEL_OUTOF10: 3
PAINLEVEL_OUTOF10: 0
PAINLEVEL_OUTOF10: 4
PAINLEVEL_OUTOF10: 3
PAINLEVEL_OUTOF10: 6
PAINLEVEL_OUTOF10: 0

## 2022-09-29 ASSESSMENT — PAIN DESCRIPTION - LOCATION
LOCATION: ABDOMEN

## 2022-09-29 ASSESSMENT — PAIN DESCRIPTION - ORIENTATION
ORIENTATION: MID
ORIENTATION: MID

## 2022-09-29 ASSESSMENT — PAIN DESCRIPTION - FREQUENCY: FREQUENCY: CONTINUOUS

## 2022-09-29 ASSESSMENT — PAIN DESCRIPTION - ONSET: ONSET: ON-GOING

## 2022-09-29 ASSESSMENT — PAIN DESCRIPTION - DESCRIPTORS
DESCRIPTORS: ACHING
DESCRIPTORS: ACHING

## 2022-09-29 NOTE — PROGRESS NOTES
[facility] Department   Phone: [phone]    Physical Therapy    [x] Initial Evaluation            [] Daily Treatment Note         [] Discharge Summary      Patient: Rosina Mendez   : 1965   MRN: [id number]   Date of Service:  2022  Admitting Diagnosis: Incisional hernia without obstruction or gangrene  Current Admission Summary: OPEN EXPLORATORY LAPAROTOMY, REPAIR OF REDUCIBLE INCISIONAL HERNIA WITH MESH,  UNILATERAL  ABDOMINAL WALL COMPONENT RELEASE  Past Medical History:  has a past medical history of Anemia, Atrial fibrillation and flutter ([address] Atrial flutter ([address] CHB (complete heart block) ([address] Class 1 obesity without serious comorbidity with body mass index (BMI) of 33.0 to 33.9 in adult, Congenital heart disease, GERD (gastroesophageal reflux disease), Headache(784.0), History of complete heart block, Hyperlipidemia, Hypertension, PONV (postoperative nausea and vomiting), Prolonged emergence from general anesthesia, Sleep apnea, Syncope, and Systolic CHF, chronic ([address] Past Surgical History:  has a past surgical history that includes Uterine fibroid surgery; Pacemaker insertion (2012); Tubal ligation; Upper gastrointestinal endoscopy (N/A, 10/27/2020); Colonoscopy (N/A, 10/27/2020); Cardiac defibrillator placement (2021); Upper gastrointestinal endoscopy (N/A, 2021); Upper gastrointestinal endoscopy (N/A, 2021); Upper gastrointestinal endoscopy (N/A, 2021); colectomy (N/A, 2021); Cardioversion (2022); Cardioversion (2022); and Upper gastrointestinal endoscopy (N/A, 2022). Discharge Recommendations: Rosina Mendez scored a 18/24 on the AM-PAC short mobility form. Current research shows that an AM-PAC score of 18 or greater is typically associated with a discharge to the patient's home setting.  Based on the patient's AM-PAC score and their current functional mobility deficits, it is recommended that the patient have 2-3 sessions per week of Physical Therapy at d/c to increase the patient's independence. At this time, this patient demonstrates the endurance and safety to discharge home with home services and a follow up treatment frequency of 2-3x/wk. Please see assessment section for further patient specific details. HOME HEALTH CARE: LEVEL 1 STANDARD    - Initial home health evaluation to occur within 24-48 hours, in patient home   - Therapy to evaluate with goal of regaining prior level of functioning   - Therapy to evaluate if patient has [address] needs for personal care    If patient discharges prior to next session this note will serve as a discharge summary. Please see below for the latest assessment towards goals. DME Required For Discharge: no DME required at discharge  Precautions/Restrictions: low fall risk  Weight Bearing Restrictions: no restrictions  Required Braces/Orthotics: abdominal binder  Positional Restrictions:no positional restrictions    Pre-Admission Information   Lives With: significant other                Type of Home: apartment  Home Layout: two level, 18 stairs to 2nd level with L HR  Home Access:  1 step to enter without rails   Bathroom Layout: tub/shower unit  Bathroom Equipment: Comment: no equipment in shower  Toilet Height: elevated height  Home Equipment: rolling walker  Transfer Assistance: requires assistance  Ambulation Assistance:modified independent with use of RW  ADL Assistance: requires assistance with bathing, requires assistance with dressing, . Comment: LB ADL assistance  IADL Assistance: requires assistance with all homemaking tasks  Active :        [] Yes                 [x] No  Hand Dominance: [x] Left                 [] Right  Current Employment: retired.   Occupation: cook at a school for 20 years  Hobbies:   Recent Falls: patient reports 0 falls in the last 6 months  States fiance and other family member typically assist her on steps to enter home. Examination   Vision:   Vision Gross Assessment: Impaired and Vision Corrective Device: wears glasses for reading  Hearing:   [facility]  Observation:   Abdominal binder on, FRED drain  Sensation:   WFL  ROM:   (B) LE AROM WFL  Strength:   (B) LE strength grossly WFL  Decision Making: low complexity  Clinical Presentation: stable      Subjective  General: Working to [employer] upon arrival. Patient reports she feels like she is getting an UTI (alerted MD). Reported nausea throughout session. Agreeable to therapy. Pain: 3/10.   Location: R side  Pain Interventions: pain medication in place prior to arrival and repositioned        Functional Mobility  Bed Mobility  Sit to Supine: stand by assistance  Comments: increased time  Transfers  Sit to stand transfer: stand by assistance  Stand to sit transfer: stand by assistance  Comments: from transport chair x 3, from shower chair, from toilet  Ambulation  Surface:level surface  Assistive Device: rolling walker  Assistance: stand by assistance  Distance: 10' + 5+' 18'  Gait Mechanics: flexed posture, decreased radha, wide SHANNON  Comments:    Stair Mobility  Number of Steps: 4  Step Height: 6 inch  Hand Rails: (R) ascending handrail  Assistance: stand by assistance  Comments: performs steps laterally for BUE support on stes  Balance  Static Sitting Balance: fair (+): maintains balance at SBA/supervision without use of UE support  Dynamic Sitting Balance: fair (+): maintains balance at SBA/supervision without use of UE support  Static Standing Balance: fair (-): maintains balance at SBA with use of UE support  Dynamic Standing Balance: fair (-): maintains balance at SBA with use of UE support  Comments:    Other Therapeutic Interventions    Functional Outcomes  AM-PAC Inpatient Mobility Raw Score : 18              Cognition  WFL  Orientation:    alert and oriented x 4  Command Following:   [facility]    Education  Barriers To Learning: none  Patient Education: patient educated on goals, PT role and benefits, general safety, functional mobility training, transfer training, discharge recommendations, incisional bracing with pillow  Learning Assessment:  patient verbalizes and demonstrates understanding    Assessment  Activity Tolerance: Increased time to complete tasks due to pain  Impairments Requiring Therapeutic Intervention: decreased functional mobility, decreased ROM, decreased strength, decreased endurance, decreased balance  Prognosis: good  Clinical Assessment: Patient not at baseline function and would benefit from skilled PT to address above deficits and facilitate return to baseline function. Currently needing SBA for mobility with use of RW.    Safety Interventions: patient left in bed, bed alarm in place, call light within reach, patient at risk for falls, and nurse notified    Plan  Frequency: 3-5 x/per week  Current Treatment Recommendations: strengthening, balance training, functional mobility training, transfer training, gait training, stair training, endurance training, home exercise program, and safety education    Goals  Patient Goals: to go home   Short Term Goals:  Time Frame: discharge  Patient will complete bed mobility at modified independent   Patient will complete transfers at [facility]   Patient will ambulate 100 ft with use of rolling walker at modified independent  Patient will ascend/descend 4 stairs with (R) ascending handrail at supervision  Patient will complete car transfer at supervision    Therapy Session Time      Individual Group Co-treatment   Time In [phone]   Time Out 1140   1128   Minutes 12   31     Timed Code Treatment Minutes:  28 Minutes  Total Treatment Minutes:  43       Electronically Signed By: Lynn Yu PT    Thanks, Lynn Yu PT, DPT [id number]

## 2022-09-29 NOTE — FLOWSHEET NOTE
Pt restless. A & O x4. C/o surgical site pain that is being managed with prn medications. All LDA's remain C/D/I. See below vitals. Bed remains in lowest and locked position with alarm in place. Call light, bedside table, and all personal belongings are within reach. Will continue to closely monitor.         09/29/22 1015   Vital Signs   Temp 97.9 °F (36.6 °C)   Temp Source Oral   Heart Rate 75   Heart Rate Source Monitor   Resp 18   /72   BP Location Right upper arm   BP Method Automatic   MAP (Calculated) 84   Patient Position Sitting   Pain Assessment   Pain Assessment 0-10   Pain Level 6   Pain Location Abdomen   Pain Orientation Mid   Pain Descriptors Aching   Functional Pain Assessment Activities are not prevented   Pain Type Surgical pain   Pain Frequency Continuous   Pain Onset On-going   Response to Pain Intervention None (pain unchanged or no improvement)   Oxygen Therapy   SpO2 98 %   Pulse Oximeter Device Mode Intermittent   Pulse Oximeter Device Location Finger   O2 Device None (Room air)

## 2022-09-29 NOTE — PROGRESS NOTES
Pt has not voided during these past 8 hrs. 147 ml noted via bladder scan. Pt does not have the urgency to urinate @ this time. Pt is aware that a urine sample is needed. Will continue to closely monitor.

## 2022-09-29 NOTE — PROGRESS NOTES
HOSPITALISTS PROGRESS NOTE    9/29/2022 8:33 AM        Name: Marta Davis . Admitted: 9/27/2022  Primary Care Provider: Adriana Lanier (Tel: [phone])      Brief Course: This 61 y.o. female with PMHx of hypertension, A. fib on Xarelto and HFrEF (35%)  admitted for incisional hernia repair; s/p  exploratory laparotomy with repair of reducible incisional hernia, bilateral transverse abdominis component releases with mesh placement and lysis of adhesions. Hospital service consulted for medical management. Interval history:   Pt seen and examined today. Overnight events noted, interval ancillary notes and labs reviewed. on room air satting well. Afebrile overnight, WBCs trended down to 12.4 K  Low Phos and potassium this morning; replacement ordered  Hemoglobin down to 10; likely dilutional  Reported nausea and incisional pain but denied any fever, chills, vomiting, chest pain or shortness of breath  Tolerating clears. Passing flatus but no bowel movement yet      Assessment & Plan:     Incisional hernia repair; s/p  exploratory laparotomy with repair of reducible incisional hernia, bilateral transverse abdominis component releases with mesh placement and lysis of adhesions. General surgery following; started on clear diet  Continue IV fluids, as needed antiemetics, pain meds. Monitor electrolytes closely     HFrEF (EF of 35%)  S/p  Biv-AICD upgrade 1/2021  On Entresto 49-51 mg bid, aldactone 50 mg daily, Coreg 6.25 mg bid, and jardiance at home  Continue Coreg. Plan to restart on Entresto and Aldactone tomorrow if BP and labs allows     PAF: On Coreg and Xarelto. Hold anticoagulation until cleared by general surgery    Hypertension: On Coreg.   Monitor BP closely       DVT PPX: Xarelto; currently on hold  Code:Full Code    Disposition: Once acute medical issues have resolved    Current Medications  potassium & sodium phosphates (PHOS-NAK) 280-160-250 MG packet 250 mg, Once  potassium chloride (KLOR-CON M) extended release tablet 40 mEq, PRN   Or  potassium bicarb-citric acid (EFFER-K) effervescent tablet 40 mEq, PRN   Or  potassium chloride 10 mEq/100 mL IVPB (Peripheral Line), PRN  sacubitril-valsartan (ENTRESTO) 49-51 MG per tablet 1 tablet, BID  spironolactone (ALDACTONE) tablet 50 mg, Daily  magnesium sulfate 1000 mg in dextrose 5% 100 mL IVPB, PRN  polyethylene glycol (GLYCOLAX) packet 17 g, Daily  benzocaine-menthol (CEPACOL SORE THROAT) lozenge 1 lozenge, Q2H PRN  sodium chloride flush 0.9 % injection 5-40 mL, 2 times per day  sodium chloride flush 0.9 % injection 5-40 mL, PRN  0.9 % sodium chloride infusion, PRN  0.9 % sodium chloride infusion, Continuous  ondansetron (ZOFRAN-ODT) disintegrating tablet 4 mg, Q8H PRN   Or  ondansetron (ZOFRAN) injection 4 mg, Q6H PRN  famotidine (PEPCID) tablet 20 mg, BID   Or  famotidine (PEPCID) 20 mg in sodium chloride (PF) 10 mL injection, BID  enoxaparin (LOVENOX) injection 40 mg, Q24H  ceFAZolin (ANCEF) 2,000 mg in dextrose 5 % 50 mL IVPB (mini-bag), Q8H  bisacodyl (DULCOLAX) suppository 10 mg, Daily PRN  docusate sodium (COLACE) capsule 100 mg, BID  oxyCODONE (ROXICODONE) immediate release tablet 5 mg, Q4H PRN   Or  oxyCODONE (ROXICODONE) immediate release tablet 10 mg, Q4H PRN  acetaminophen (TYLENOL) tablet 650 mg, Q6H  diphenhydrAMINE (BENADRYL) injection 25 mg, Q6H PRN  HYDROmorphone HCl PF (DILAUDID) injection 0.5 mg, Q2H PRN   Or  HYDROmorphone HCl PF (DILAUDID) injection 1 mg, Q2H PRN  carvedilol (COREG) tablet 6.25 mg, BID WC  atorvastatin (LIPITOR) tablet 40 mg, Nightly      Objective:  /76   Pulse 75   Temp 97.8 °F (36.6 °C) (Oral)   Resp 18   Ht 5' 6\" (1.676 m)   Wt 212 lb (96.2 kg)   SpO2 97%   BMI 34.22 kg/m²     Intake/Output Summary (Last 24 hours) at 9/29/2022 0833  Last data filed at 9/29/2022 0200  Gross per 24 hour   Intake --   Output 625 ml Net -625 ml        Wt Readings from Last 3 Encounters:   09/29/22 212 lb (96.2 kg)   09/26/22 212 lb (96.2 kg)   09/06/22 215 lb (97.5 kg)       Physical Examination:   General appearance:  No apparent distress, appears stated age and cooperative. HEENT: Normocephalic, sclera clear., PERRLA. Trachea midline, no adenopathy. Cardiovascular: Regular rate and rhythm, normal S1, S2. No murmur. Respiratory:Clear to auscultation bilaterally, no wheeze or crackles. GI: Abdomen soft, no tenderness, not distended, normal bowel sounds  Musculoskeletal: No cyanosis in digits. No BLE edema present  Neurology: CN 2-12 grossly intact. No speech or motor deficits  Psych: Not agitated, appropriate affect  Skin: Warm, dry, normal turgor    Labs and Tests:  CBC:   Recent Labs     09/28/22  0520 09/29/22  0514   WBC 15.1* 12.4*   HGB 11.2* 10.0*    197       BMP:    Recent Labs     09/28/22  0520 09/29/22  0513    139   K 3.3* 3.3*    108   CO2 21 20*   BUN 11 15   CREATININE 0.9 1.1   GLUCOSE 127* 92       Hepatic: No results for input(s): AST, ALT, ALB, BILITOT, ALKPHOS in the last 72 hours. No orders to display       Problem List  Principal Problem:    Incisional hernia without obstruction or gangrene  Active Problems:    PAF (paroxysmal atrial fibrillation) ([address]    AICD (automatic cardioverter/defibrillator) present  Resolved Problems:    * No resolved hospital problems.  Ra Emerson MD   9/29/2022 8:33 AM

## 2022-09-29 NOTE — PROGRESS NOTES
[facility]        Progress Note    Patient Name: Miko Almonte  MRN: [id number]  YOB: 1965  Date of Evaluation: 2022    Subjective:  No acute events overnight  Pain controlled  Mild nausea but improving, tolerating clear liquids, not hungry  Ambulating, in chair currently, off oxygen  Passing flatus but no stool  Urinary retention requiring straight cath, voiding on her own now    Post-Operative Day #2    Vital Signs:  Patient Vitals for the past 24 hrs:   BP Temp Temp src Pulse Resp SpO2 Weight   22 0545 119/76 97.8 °F (36.6 °C) Oral 75 18 97 % 212 lb (96.2 kg)   22 0015 122/77 97.9 °F (36.6 °C) Oral 75 16 90 % --   22 2130 116/67 98.6 °F (37 °C) Oral 75 16 93 % --   22 1700 115/79 97.6 °F (36.4 °C) Oral 75 16 93 % --   22 1215 113/75 -- -- 75 16 -- --   22 1045 (!) 90/57 -- -- 74 16 94 % --      TEMPERATURE HISTORY 24H: Temp (24hrs), Av °F (36.7 °C), Min:97.6 °F (36.4 °C), Max:98.6 °F (37 °C)    BLOOD PRESSURE HISTORY: Systolic (40KBS), EZW:102 , Min:90 , QVL:500    Diastolic (08EPS), KTL:88, Min:57, Max:79      Intake/Output:  I/O last 3 completed shifts:  In: -   Out: 1065 [Urine:1000; Drains:65]  I/O this shift:  In: -   Out: 400 [Urine:400]  Drain/tube Output:  Closed/Suction Drain Right RUQ Bulb-Output (ml): 10 ml    Physical Exam:  General: awake, alert, oriented to  person, place, time  Abdomen: soft, non-distended, appropriate incisional tenderness, incision clean dry and intact, FRED serosangineous with small output    Labs:  CBC:    Recent Labs     22  0520 22  0514   WBC 15.1* 12.4*   HGB 11.2* 10.0*   HCT 33.8* 30.1*    197     BMP:    Recent Labs     22  0520 22  0513    139   K 3.3* 3.3*    108   CO2 21 20*   BUN 11 15   CREATININE 0.9 1.1   GLUCOSE 127* 92     Hepatic:  No results for input(s): AST, ALT, ALB, BILITOT, ALKPHOS in the last 72 hours.   Amylase: Lab Results   Component Value Date/Time    AMYLASE 62 07/06/2021 02:31 PM     Lipase:    Lab Results   Component Value Date/Time    LIPASE 18.0 09/15/2022 04:51 AM    LIPASE 24.0 09/06/2022 07:32 PM    LIPASE 33.0 07/06/2021 02:31 PM      Mag:    Lab Results   Component Value Date/Time    MG 1.80 09/29/2022 05:13 AM    MG 1.60 09/28/2022 05:20 AM     Phos:     Lab Results   Component Value Date/Time    PHOS 1.9 09/29/2022 05:13 AM    PHOS 2.6 09/28/2022 05:20 AM      Coags:   Lab Results   Component Value Date/Time    PROTIME 14.8 04/14/2021 06:10 AM    INR 1.27 04/14/2021 06:10 AM    APTT 30.2 04/14/2021 06:10 AM       Cultures:  Anaerobic culture  No results found for: LABANAE  Fungus stain  No results found for requested labs within last 30 days. Gram stain  No results found for requested labs within last 30 days. Organism  No results found for: [employer]  Surgical culture  No results found for: CXSURG  Blood culture 1  No results found for requested labs within last 30 days. Blood culture 2  No results found for requested labs within last 30 days. Fecal occult  No results found for requested labs within last 30 days. GI bacterial pathogens by PCR  No results found for requested labs within last 30 days. C. difficile  No results found for requested labs within last 30 days. Urine culture  No results found for: LABURIN    Pathology:  No relevant pathology     Imaging:  I have personally reviewed the following films:    No results found.     Scheduled Meds:   potassium & sodium phosphates  1 packet Oral Once    sacubitril-valsartan  1 tablet Oral BID    spironolactone  50 mg Oral Daily    polyethylene glycol  17 g Oral Daily    sodium chloride flush  5-40 mL IntraVENous 2 times per day    famotidine  20 mg Oral BID    Or    famotidine (PEPCID) injection  20 mg IntraVENous BID    enoxaparin  40 mg SubCUTAneous Q24H    ceFAZolin  2,000 mg IntraVENous Q8H    docusate sodium  100 mg Oral BID acetaminophen  650 mg Oral Q6H    carvedilol  6.25 mg Oral BID WC    atorvastatin  40 mg Oral Nightly     Continuous Infusions:   sodium chloride      sodium chloride 50 mL/hr at 09/28/22 1436     PRN Meds:.potassium chloride **OR** potassium alternative oral replacement **OR** potassium chloride, magnesium sulfate, benzocaine-menthol, sodium chloride flush, sodium chloride, ondansetron **OR** ondansetron, bisacodyl, oxyCODONE **OR** oxyCODONE, diphenhydrAMINE, HYDROmorphone **OR** HYDROmorphone      Assessment:  Ms. Naseem Greer is a 61 y.o. female who presents with   OR Date 9/27/2022, exploratory laparotomy with repair of reducible incisional hernia, bilateral transverse abdominis component releases with mesh placement, and lysis of adhesions  Hypertension  CHF  Paroxysmal atrial fibrillation, Xarelto currently held    Plan:  1. Monitor bowel function, passing flatus but no stool, continue bowel regimen  2. Monitor mild nausea, hold at clear liquids  3. IVF, monitor and replace electrolytes as needed  4. Pain control, transition to PO and minimize narcotics  5. Activity as tolerated, pulmonary toilet, incentive spirometry, oxygen weaned off  6. Holding PO anticoagulation, on prophylactic lovenox  7. Good urine output, urinary retention overnight but voiding spontaneously now  8. Appreciate hospitalist/cardiology support  9. Discharge planning, anticipate 1-3 days if stabilizes    Flavio Rutledge MD, FACS  9/29/2022  9:31 AM

## 2022-09-29 NOTE — FLOWSHEET NOTE
Pt ambulated in king with this RN. Urine specimen and MRSA culture collected and sent down. Pt denies pain @ this time. See below vitals. Pt currently resting in chair with bedside table, call light and all personal belongings within reach. Will continue to closely monitor.        09/29/22 1615   Vital Signs   Temp 97.2 °F (36.2 °C)   Temp Source Oral   Heart Rate 81   Heart Rate Source Monitor   Resp 18   /79   BP Location Right upper arm   BP Method Automatic   MAP (Calculated) 92   Pain Assessment   Pain Assessment None - Denies Pain   Pain Level 0   Oxygen Therapy   SpO2 96 %   O2 Device None (Room air)

## 2022-09-29 NOTE — PROGRESS NOTES
Shift assessment complete, A&O X4, VSS. C/O of SOB, O2 on 97% on 1L. IV infusing at 50mL/hr, lungs sound clear, no signs of fluids overload. Patient asking for a chest X-ray, thinking, she is having pneumonia. The nurse encourage the patient to deep breath, and uses IS. Mid line incision clean, dry, and intact. Toradol for pain, Pt refused dilaudid or Oxy. Ambulated in the king way over night, tolerated well. Unable to void since bryant was D/C, unable to bladder scan due to dressing in place. Straight cath order obtained, 450ml output after the straight cath. BS active, no BM yet, denies passing gas. Fall precautions in place, call light in reach, bed in lowest position. The care plan and education has been reviewed and mutually agreed upon with the patient.

## 2022-09-29 NOTE — PLAN OF CARE
Problem: Chronic Conditions and Co-morbidities  Goal: Patient's chronic conditions and co-morbidity symptoms are monitored and maintained or improved  9/29/2022 1753 by Zuleika Arellano RN  Outcome: Progressing  9/29/2022 0713 by Ghazal Berry RN  Outcome: Progressing     Problem: Discharge Planning  Goal: Discharge to home or other facility with appropriate resources  9/29/2022 1753 by Zuleika Arellano RN  Outcome: Progressing  9/29/2022 0713 by Ghazal Berry RN  Outcome: Progressing     Problem: Pain  Goal: Verbalizes/displays adequate comfort level or baseline comfort level  9/29/2022 1753 by Zuleika Arellano RN  Outcome: Progressing  9/29/2022 0713 by Ghazal Berry RN  Outcome: Progressing

## 2022-09-29 NOTE — PROGRESS NOTES
[facility] [facility]   Phone: [phone]    Occupational Therapy    [] Initial Evaluation            [x] Daily Treatment Note         [] Discharge Summary      Patient: Gertrudis Watters   : 1965   MRN: [id number]   Date of Service:  2022    Admitting Diagnosis:  Incisional hernia without obstruction or gangrene  Current Admission Summary: OPEN EXPLORATORY LAPAROTOMY, REPAIR OF REDUCIBLE INCISIONAL HERNIA WITH MESH,  UNILATERAL  ABDOMINAL WALL COMPONENT RELEASE  Past Medical History:  has a past medical history of Anemia, Atrial fibrillation and flutter ([address] Atrial flutter ([address] CHB (complete heart block) ([address] Class 1 obesity without serious comorbidity with body mass index (BMI) of 33.0 to 33.9 in adult, Congenital heart disease, GERD (gastroesophageal reflux disease), Headache(784.0), History of complete heart block, Hyperlipidemia, Hypertension, PONV (postoperative nausea and vomiting), Prolonged emergence from general anesthesia, Sleep apnea, Syncope, and Systolic CHF, chronic ([address] Past Surgical History:  has a past surgical history that includes Uterine fibroid surgery; Pacemaker insertion (2012); Tubal ligation; Upper gastrointestinal endoscopy (N/A, 10/27/2020); Colonoscopy (N/A, 10/27/2020); Cardiac defibrillator placement (2021); Upper gastrointestinal endoscopy (N/A, 2021); Upper gastrointestinal endoscopy (N/A, 2021); Upper gastrointestinal endoscopy (N/A, 2021); colectomy (N/A, 2021); Cardioversion (2022); Cardioversion (2022); and Upper gastrointestinal endoscopy (N/A, 2022). Discharge Recommendations: Gertrudis Watters scored a 19/24 on the AM-PAC ADL Inpatient form. Current research shows that an AM-PAC score of 18 or greater is typically associated with a discharge to the patient's home setting.  Based on the patient's AM-PAC score, and their current ADL deficits, it is recommended that the patient have 2-3 sessions per week of Occupational Therapy at d/c to increase the patient's independence. At this time, this patient demonstrates the endurance and safety to discharge home with [facility] [address] (home vs OP services) and a follow up treatment frequency of 2-3x/wk. Please see assessment section for further patient specific details. If patient discharges prior to next session this note will serve as a discharge summary. Please see below for the latest assessment towards goals. HOME HEALTH CARE: LEVEL 1 STANDARD    - Initial home health evaluation to occur within 24-48 hours, in patient home   - Therapy to evaluate with goal of regaining prior level of functioning   - Therapy to evaluate if patient has [id number] needs for personal care      DME Required For Discharge: tub transfer bench, grab bars - toilet, grab bars - shower    Precautions/Restrictions: low fall risk  Weight Bearing Restrictions: no restrictions  [] Right Upper Extremity  [] Left Upper Extremity [] Right Lower Extremity  [] Left Lower Extremity     Required Braces/Orthotics: Other: abdominal binder   [] Right  [] Left  Positional Restrictions:no positional restrictions    Pre-Admission Information   Lives With: significant other    Type of Home: apartment  Home Layout: two level, 18 stairs to 2nd level with L HR  Home Access:  1 step to enter without rails   Bathroom Layout: tub/shower unit  Bathroom Equipment: Comment: no equipment in shower  Toilet Height: elevated height  Home Equipment: rolling walker  Transfer Assistance: requires assistance  Ambulation Assistance:modified independent with use of RW  ADL Assistance: requires assistance with bathing, requires assistance with dressing, . Comment: LB ADL assistance  IADL Assistance: requires assistance with all homemaking tasks  Active :        [] Yes  [x] No  Hand Dominance: [x] Left  [] Right  Current Employment: retired.   Occupation: cook at a school for 20 years  Hobbies:   Recent Falls: patient reports 0 falls in the last 6 months        Subjective: Pt seated in recliner upon entry. Pt with pain of 8 and RN notified and administered pain meds. Pt agreeable to therapy session. Extended time due to pain, pt very motivated. General: Pt sit to stand SBA, pt ambulated ~4 ft to EOB with rw SBA. Pt stand to sit SBA. RN changed pt's surgical dressing while pt with rest break. Pt supine to sit Mod A for assist with trunk with use of bed rail. Pt sit EOB SBA. Pt sit to stand SBA and ambulated SBA with rw to transport chair. Pt stand to sit SBA. Pt wheeled to ADL suite. Pt sit to stand SBA and entered ADL suite. Pt performed tub transfer SBA with TTB. Pt then ambulated out of suite SBA and stand to sit SBA in transport chair. Pt in therapy gym completed 4 stairs up/down SBA with 1 handrail. Pt back at room ambulated ~18ft to bathroom toilet. Pt toilet transfer SBA and toileted SBA. Pt washed hands at sink and then ambulated to EOB ~12 ft. Pt stand to sit SBA and sit to supine SBA. Call light in reach and bed alarm on. Pain: 8/10. Location: abdomen  Pain Interventions: RN notified of patient request for pain medication and repositioned   Activities of Daily Living  Basic Activities of Daily Living  Grooming: stand by assistance  Toileting: stand by assistance. Instrumental Activities of Daily Living  No IADL completed on this date. Functional Mobility  Bed Mobility  Supine to Sit: moderate assistance  Sit to Supine: stand by assistance    Transfers  Sit to stand transfer:stand by assistance  Stand to sit transfer: stand by assistance  Toilet transfer: stand by assistance  Tub transfer: stand by assistance  Tub transfer equipment: tub transfer bench    Functional Mobility:  Sitting Balance: stand by assistance. Standing Balance: stand by assistance.     Functional Mobility: .  stand by assistance  Functional Mobility Activity: to/from bathroom, EOB to door and ambulation in ADL suite and therapy gym  Functional Mobility Device Use: rolling walker  Comments: decreased step length, slow radha, guarding of incision site    Other Therapeutic Interventions  Functional Outcomes  AM-PAC Inpatient Daily Activity Raw Score: 19    Cognition  WFL  Orientation:    alert and oriented x 4  Command Following:   [facility]  Education  Barriers To Learning: none  Patient Education: patient educated on goals, OT role and benefits, plan of care, transfer training, discharge recommendations  Learning Assessment:  patient verbalizes and demonstrates understanding  Assessment  Activity Tolerance: Patient tolerated therapy well considering pt with pain of 8  Impairments Requiring Therapeutic Intervention: decreased functional mobility, decreased ADL status, decreased strength, decreased endurance, increased pain  Prognosis: good  Clinical Assessment: Patient presenting slightly below functional baseline with above deficits associated with her diagnosis s/p open exploratory laparotomy, repair of reducible incisional hernia with mesh, unilateral abdominal wall component release. Patient's limitations include decreased strength, decreased endurance, decreased mobility, and increased pain. Patient modified independent with mobility and required some assistance with ADL performance prior to admission. Pt SBA for all functional transfers/mobility/toileting this date. Patient would benefit from [address] [place] [address] services upon discharge to ensure safety around the home and help her return to her PLOF.  Patient to continue acute OT services to increased safety and independence with task performance in order to reach maximum functional potential.   Safety Interventions: patient left in bed, bed alarm in place, and call light within reach    Plan  Frequency: 3-5 x/per week  Current Treatment Recommendations: strengthening, functional mobility training, endurance training, patient/caregiver education, and equipment evaluation/education    Goals  Patient Goals: to return home   Short Term Goals:  Time Frame: upon discharge  Patient will complete toileting at Independent   Patient will complete functional mobility at modified independent   Patient will perform a tub transfer with the use of a tub transfer bench Mod-I   No goal met 9/29 - continue all goals    Therapy Session Time     Individual Group Co-treatment   Time In 1031  1052   Time Out 1052  1128   Minutes 21  36        Timed Code Treatment Minutes: 57  minutes  Total Treatment Minutes: 57 minutes       Electronically Signed By: LISANDRO Fontenot/L [id number]   I have reviewed and agree with the above treatment note.  Cullen Correia OTR/L [id number]

## 2022-09-30 LAB
ANION GAP SERPL CALCULATED.3IONS-SCNC: 10 MMOL/L (ref 3–16)
BASOPHILS ABSOLUTE: 0.1 K/UL (ref 0–0.2)
BASOPHILS RELATIVE PERCENT: 0.6 %
BUN BLDV-MCNC: 11 MG/DL (ref 7–20)
CALCIUM SERPL-MCNC: 7.9 MG/DL (ref 8.3–10.6)
CHLORIDE BLD-SCNC: 107 MMOL/L (ref 99–110)
CO2: 18 MMOL/L (ref 21–32)
CREAT SERPL-MCNC: 0.9 MG/DL (ref 0.6–1.1)
EOSINOPHILS ABSOLUTE: 0.4 K/UL (ref 0–0.6)
EOSINOPHILS RELATIVE PERCENT: 3 %
FERRITIN: 334.6 NG/ML (ref 15–150)
GFR AFRICAN AMERICAN: >60
GFR NON-AFRICAN AMERICAN: >60
GLUCOSE BLD-MCNC: 84 MG/DL (ref 70–99)
HCT VFR BLD CALC: 28.9 % (ref 36–48)
HEMOGLOBIN: 9.6 G/DL (ref 12–16)
IRON SATURATION: 15 % (ref 15–50)
IRON: 27 UG/DL (ref 37–145)
LYMPHOCYTES ABSOLUTE: 1.1 K/UL (ref 1–5.1)
LYMPHOCYTES RELATIVE PERCENT: 9.1 %
MAGNESIUM: 2.1 MG/DL (ref 1.8–2.4)
MCH RBC QN AUTO: 29.8 PG (ref 26–34)
MCHC RBC AUTO-ENTMCNC: 33.2 G/DL (ref 31–36)
MCV RBC AUTO: 90 FL (ref 80–100)
MONOCYTES ABSOLUTE: 0.9 K/UL (ref 0–1.3)
MONOCYTES RELATIVE PERCENT: 7.9 %
NEUTROPHILS ABSOLUTE: 9.3 K/UL (ref 1.7–7.7)
NEUTROPHILS RELATIVE PERCENT: 79.4 %
PDW BLD-RTO: 16.9 % (ref 12.4–15.4)
PHOSPHORUS: 2 MG/DL (ref 2.5–4.9)
PLATELET # BLD: 199 K/UL (ref 135–450)
PMV BLD AUTO: 8.2 FL (ref 5–10.5)
POTASSIUM SERPL-SCNC: 3.4 MMOL/L (ref 3.5–5.1)
RBC # BLD: 3.21 M/UL (ref 4–5.2)
SODIUM BLD-SCNC: 135 MMOL/L (ref 136–145)
TOTAL IRON BINDING CAPACITY: 185 UG/DL (ref 260–445)
URINE CULTURE, ROUTINE: NORMAL
WBC # BLD: 11.7 K/UL (ref 4–11)

## 2022-09-30 PROCEDURE — 99024 POSTOP FOLLOW-UP VISIT: CPT | Performed by: SURGERY

## 2022-09-30 PROCEDURE — 2500000003 HC RX 250 WO HCPCS: Performed by: SURGERY

## 2022-09-30 PROCEDURE — 6370000000 HC RX 637 (ALT 250 FOR IP): Performed by: NURSE PRACTITIONER

## 2022-09-30 PROCEDURE — 80048 BASIC METABOLIC PNL TOTAL CA: CPT

## 2022-09-30 PROCEDURE — 97530 THERAPEUTIC ACTIVITIES: CPT

## 2022-09-30 PROCEDURE — 97116 GAIT TRAINING THERAPY: CPT

## 2022-09-30 PROCEDURE — 83735 ASSAY OF MAGNESIUM: CPT

## 2022-09-30 PROCEDURE — 83550 IRON BINDING TEST: CPT

## 2022-09-30 PROCEDURE — 84100 ASSAY OF PHOSPHORUS: CPT

## 2022-09-30 PROCEDURE — 6370000000 HC RX 637 (ALT 250 FOR IP): Performed by: INTERNAL MEDICINE

## 2022-09-30 PROCEDURE — 6370000000 HC RX 637 (ALT 250 FOR IP): Performed by: SURGERY

## 2022-09-30 PROCEDURE — 6360000002 HC RX W HCPCS: Performed by: SURGERY

## 2022-09-30 PROCEDURE — 82728 ASSAY OF FERRITIN: CPT

## 2022-09-30 PROCEDURE — 1200000000 HC SEMI PRIVATE

## 2022-09-30 PROCEDURE — 2580000003 HC RX 258: Performed by: NURSE PRACTITIONER

## 2022-09-30 PROCEDURE — 2580000003 HC RX 258: Performed by: SURGERY

## 2022-09-30 PROCEDURE — 83540 ASSAY OF IRON: CPT

## 2022-09-30 PROCEDURE — 36415 COLL VENOUS BLD VENIPUNCTURE: CPT

## 2022-09-30 PROCEDURE — 85025 COMPLETE CBC W/AUTO DIFF WBC: CPT

## 2022-09-30 RX ADMIN — DOCUSATE SODIUM 100 MG: 100 CAPSULE, LIQUID FILLED ORAL at 21:50

## 2022-09-30 RX ADMIN — CARVEDILOL 6.25 MG: 6.25 TABLET, FILM COATED ORAL at 08:34

## 2022-09-30 RX ADMIN — Medication 10 ML: at 21:50

## 2022-09-30 RX ADMIN — BENZOCAINE AND MENTHOL 1 LOZENGE: 15; 3.6 LOZENGE ORAL at 06:39

## 2022-09-30 RX ADMIN — ATORVASTATIN CALCIUM 40 MG: 40 TABLET, FILM COATED ORAL at 21:50

## 2022-09-30 RX ADMIN — OXYCODONE 5 MG: 5 TABLET ORAL at 08:34

## 2022-09-30 RX ADMIN — POLYETHYLENE GLYCOL 3350 17 G: 17 POWDER, FOR SOLUTION ORAL at 08:35

## 2022-09-30 RX ADMIN — ACETAMINOPHEN 650 MG: 325 TABLET ORAL at 08:34

## 2022-09-30 RX ADMIN — FAMOTIDINE 20 MG: 20 TABLET ORAL at 08:34

## 2022-09-30 RX ADMIN — BENZOCAINE AND MENTHOL 1 LOZENGE: 15; 3.6 LOZENGE ORAL at 22:22

## 2022-09-30 RX ADMIN — SACUBITRIL AND VALSARTAN 1 TABLET: 49; 51 TABLET, FILM COATED ORAL at 08:34

## 2022-09-30 RX ADMIN — CEFAZOLIN 2000 MG: 2 INJECTION, POWDER, FOR SOLUTION INTRAMUSCULAR; INTRAVENOUS at 15:27

## 2022-09-30 RX ADMIN — ACETAMINOPHEN 650 MG: 325 TABLET ORAL at 15:27

## 2022-09-30 RX ADMIN — CARVEDILOL 6.25 MG: 6.25 TABLET, FILM COATED ORAL at 15:27

## 2022-09-30 RX ADMIN — SPIRONOLACTONE 50 MG: 25 TABLET ORAL at 08:34

## 2022-09-30 RX ADMIN — SACUBITRIL AND VALSARTAN 1 TABLET: 49; 51 TABLET, FILM COATED ORAL at 21:53

## 2022-09-30 RX ADMIN — FAMOTIDINE 20 MG: 20 TABLET ORAL at 21:50

## 2022-09-30 RX ADMIN — OXYCODONE 5 MG: 5 TABLET ORAL at 00:25

## 2022-09-30 RX ADMIN — SODIUM CHLORIDE: 9 INJECTION, SOLUTION INTRAVENOUS at 06:40

## 2022-09-30 RX ADMIN — POTASSIUM PHOSPHATE, MONOBASIC POTASSIUM PHOSPHATE, DIBASIC 20 MMOL: 224; 236 INJECTION, SOLUTION, CONCENTRATE INTRAVENOUS at 11:20

## 2022-09-30 RX ADMIN — ACETAMINOPHEN 650 MG: 325 TABLET ORAL at 21:50

## 2022-09-30 RX ADMIN — CEFAZOLIN 2000 MG: 2 INJECTION, POWDER, FOR SOLUTION INTRAMUSCULAR; INTRAVENOUS at 22:02

## 2022-09-30 RX ADMIN — ENOXAPARIN SODIUM 40 MG: 100 INJECTION SUBCUTANEOUS at 21:50

## 2022-09-30 RX ADMIN — CEFAZOLIN 2000 MG: 2 INJECTION, POWDER, FOR SOLUTION INTRAMUSCULAR; INTRAVENOUS at 06:39

## 2022-09-30 RX ADMIN — DOCUSATE SODIUM 100 MG: 100 CAPSULE, LIQUID FILLED ORAL at 08:34

## 2022-09-30 RX ADMIN — ACETAMINOPHEN 650 MG: 325 TABLET ORAL at 02:51

## 2022-09-30 ASSESSMENT — PAIN DESCRIPTION - PAIN TYPE: TYPE: SURGICAL PAIN

## 2022-09-30 ASSESSMENT — PAIN DESCRIPTION - LOCATION
LOCATION: ABDOMEN

## 2022-09-30 ASSESSMENT — PAIN SCALES - GENERAL
PAINLEVEL_OUTOF10: 3
PAINLEVEL_OUTOF10: 5
PAINLEVEL_OUTOF10: 2

## 2022-09-30 ASSESSMENT — PAIN DESCRIPTION - DESCRIPTORS
DESCRIPTORS: ACHING

## 2022-09-30 ASSESSMENT — PAIN - FUNCTIONAL ASSESSMENT: PAIN_FUNCTIONAL_ASSESSMENT: ACTIVITIES ARE NOT PREVENTED

## 2022-09-30 NOTE — PROGRESS NOTES
[facility]        Progress Note    Patient Name: Mj Hardy  MRN: [id number]  YOB: 1965  Date of Evaluation: 2022    Subjective:  No acute events overnight  Pain controlled  Nausea resolved, tolerating clear liquids  Ambulating, in chair currently, off oxygen  Passing flatus but no stool    Post-Operative Day #3    Vital Signs:  Patient Vitals for the past 24 hrs:   BP Temp Temp src Pulse Resp SpO2 Weight   22 0815 112/71 97.9 °F (36.6 °C) Oral 76 16 93 % --   22 0546 -- -- -- -- -- -- 210 lb 8 oz (95.5 kg)   22 0445 113/71 97.7 °F (36.5 °C) Oral 80 16 98 % --   22 0025 122/68 98.4 °F (36.9 °C) Oral 76 16 -- --   22 124/78 97.8 °F (36.6 °C) Oral 86 18 -- --   22 1716 -- -- -- -- -- 95 % --   22 1615 118/79 97.2 °F (36.2 °C) Oral 81 18 96 % --   22 1515 111/74 97.2 °F (36.2 °C) Oral 80 18 97 % --      TEMPERATURE HISTORY 24H: Temp (24hrs), Av.7 °F (36.5 °C), Min:97.2 °F (36.2 °C), Max:98.4 °F (36.9 °C)    BLOOD PRESSURE HISTORY: Systolic (31FEJ), OXZ:993 , Min:108 , WII:372    Diastolic (31QNH), QNA:39, Min:68, Max:79      Intake/Output:  I/O last 3 completed shifts: In: 3374 [I.V.:3786; IV Piggyback:456]  Out: 1462.5 [Urine:1425; Drains:37.5]  No intake/output data recorded.   Drain/tube Output:  Closed/Suction Drain Right RUQ Bulb-Output (ml): 5 ml    Physical Exam:  General: awake, alert, oriented to  person, place, time  Abdomen: soft, non-distended, appropriate incisional tenderness, incision clean dry and intact, FRED serosangineous with small output    Labs:  CBC:    Recent Labs     22  0520 22  0514 22  0516   WBC 15.1* 12.4* 11.7*   HGB 11.2* 10.0* 9.6*   HCT 33.8* 30.1* 28.9*    197 199     BMP:    Recent Labs     22  0520 22  0513 22  0516    139 135*   K 3.3* 3.3* 3.4*    108 107   CO2 21 20* 18*   BUN 11 15 11   CREATININE 0.9 1.1 0.9   GLUCOSE 127* 92 84     Hepatic:  No results for input(s): AST, ALT, ALB, BILITOT, ALKPHOS in the last 72 hours. Amylase:    Lab Results   Component Value Date/Time    AMYLASE 62 07/06/2021 02:31 PM     Lipase:    Lab Results   Component Value Date/Time    LIPASE 18.0 09/15/2022 04:51 AM    LIPASE 24.0 09/06/2022 07:32 PM    LIPASE 33.0 07/06/2021 02:31 PM      Mag:    Lab Results   Component Value Date/Time    MG 2.10 09/30/2022 05:16 AM    MG 1.80 09/29/2022 05:13 AM     Phos:     Lab Results   Component Value Date/Time    PHOS 2.0 09/30/2022 05:16 AM    PHOS 1.9 09/29/2022 05:13 AM      Coags:   Lab Results   Component Value Date/Time    PROTIME 14.8 04/14/2021 06:10 AM    INR 1.27 04/14/2021 06:10 AM    APTT 30.2 04/14/2021 06:10 AM       Cultures:  Anaerobic culture  No results found for: LABANAE  Fungus stain  No results found for requested labs within last 30 days. Gram stain  No results found for requested labs within last 30 days. Organism  No results found for: [facility]  Surgical culture  No results found for: CXSURG  Blood culture 1  No results found for requested labs within last 30 days. Blood culture 2  No results found for requested labs within last 30 days. Fecal occult  No results found for requested labs within last 30 days. GI bacterial pathogens by PCR  No results found for requested labs within last 30 days. C. difficile  No results found for requested labs within last 30 days. Urine culture  No results found for: LABURIN    Pathology:  No relevant pathology     Imaging:  I have personally reviewed the following films:    No results found.     Scheduled Meds:   potassium phosphate IVPB  20 mmol IntraVENous Once    sacubitril-valsartan  1 tablet Oral BID    spironolactone  50 mg Oral Daily    polyethylene glycol  17 g Oral Daily    sodium chloride flush  5-40 mL IntraVENous 2 times per day    famotidine  20 mg Oral BID    Or    famotidine (PEPCID) injection  20 mg IntraVENous BID    enoxaparin  40 mg SubCUTAneous Q24H    ceFAZolin  2,000 mg IntraVENous Q8H    docusate sodium  100 mg Oral BID    acetaminophen  650 mg Oral Q6H    carvedilol  6.25 mg Oral BID WC    atorvastatin  40 mg Oral Nightly     Continuous Infusions:   sodium chloride      sodium chloride 50 mL/hr at 09/30/22 0640     PRN Meds:.potassium chloride **OR** potassium alternative oral replacement **OR** potassium chloride, magnesium sulfate, benzocaine-menthol, sodium chloride flush, sodium chloride, ondansetron **OR** ondansetron, bisacodyl, oxyCODONE **OR** oxyCODONE, diphenhydrAMINE, HYDROmorphone **OR** HYDROmorphone      Assessment:  Ms. Wenceslao Candelaria is a 61 y.o. female who presents with   OR Date 9/27/2022, exploratory laparotomy with repair of reducible incisional hernia, bilateral transverse abdominis component releases with mesh placement, and lysis of adhesions  Hypertension  CHF  Paroxysmal atrial fibrillation, Xarelto currently held    Plan:  1. Monitor bowel function, passing flatus but no stool, continue bowel regimen  2. Nausea resolved, tolerating clear liquids, advance to full liquids  3. Monitor and replace electrolytes as needed, stop IVF  4. Pain control, transition to PO and minimize narcotics  5. Activity as tolerated, pulmonary toilet, incentive spirometry  6. Holding PO anticoagulation, on prophylactic lovenox  7. Appreciate hospitalist/cardiology support  8. Discharge planning, anticipate 2-3 days if stabilizes    Flavio Barragan MD, FACS  9/30/2022  10:34 AM

## 2022-09-30 NOTE — PROGRESS NOTES
[facility]   Phone: [phone]    Physical Therapy    [] Initial Evaluation            [x] Daily Treatment Note         [] Discharge Summary      Patient: Jayleen Alvarez   : 1965   MRN: [id number]   Date of Service:  2022  Admitting Diagnosis: Incisional hernia without obstruction or gangrene  Current Admission Summary: OPEN EXPLORATORY LAPAROTOMY, REPAIR OF REDUCIBLE INCISIONAL HERNIA WITH MESH,  UNILATERAL  ABDOMINAL WALL COMPONENT RELEASE  Past Medical History:  has a past medical history of Anemia, Atrial fibrillation and flutter ([address] Atrial flutter ([address] CHB (complete heart block) ([address] Class 1 obesity without serious comorbidity with body mass index (BMI) of 33.0 to 33.9 in adult, Congenital heart disease, GERD (gastroesophageal reflux disease), Headache(784.0), History of complete heart block, Hyperlipidemia, Hypertension, PONV (postoperative nausea and vomiting), Prolonged emergence from general anesthesia, Sleep apnea, Syncope, and Systolic CHF, chronic ([address] Past Surgical History:  has a past surgical history that includes Uterine fibroid surgery; Pacemaker insertion (2012); Tubal ligation; Upper gastrointestinal endoscopy (N/A, 10/27/2020); Colonoscopy (N/A, 10/27/2020); Cardiac defibrillator placement (2021); Upper gastrointestinal endoscopy (N/A, 2021); Upper gastrointestinal endoscopy (N/A, 2021); Upper gastrointestinal endoscopy (N/A, 2021); colectomy (N/A, 2021); Cardioversion (2022); Cardioversion (2022); and Upper gastrointestinal endoscopy (N/A, 2022). Discharge Recommendations: Jayleen Alvarez scored a 18/24 on the AM-PAC short mobility form. Current research shows that an AM-PAC score of 18 or greater is typically associated with a discharge to the patient's home setting.  Based on the patient's AM-PAC score and their current functional mobility deficits, it is recommended that the patient have 2-3 sessions per week of Physical Therapy at d/c to increase the patient's independence. At this time, this patient demonstrates the endurance and safety to discharge home with home services and a follow up treatment frequency of 2-3x/wk. Please see assessment section for further patient specific details. HOME HEALTH CARE: LEVEL 1 STANDARD    - Initial home health evaluation to occur within 24-48 hours, in patient home   - Therapy to evaluate with goal of regaining prior level of functioning   - Therapy to evaluate if patient has [address] needs for personal care    If patient discharges prior to next session this note will serve as a discharge summary. Please see below for the latest assessment towards goals. DME Required For Discharge: no DME required at discharge  Precautions/Restrictions: low fall risk  Weight Bearing Restrictions: no restrictions  Required Braces/Orthotics: abdominal binder  Positional Restrictions:no positional restrictions    Pre-Admission Information   Lives With: significant other                Type of Home: apartment  Home Layout: two level, 18 stairs to 2nd level with L HR  Home Access:  1 step to enter without rails   Bathroom Layout: tub/shower unit  Bathroom Equipment: Comment: no equipment in shower  Toilet Height: elevated height  Home Equipment: rolling walker  Transfer Assistance: requires assistance  Ambulation Assistance:modified independent with use of RW  ADL Assistance: requires assistance with bathing, requires assistance with dressing, . Comment: LB ADL assistance  IADL Assistance: requires assistance with all homemaking tasks  Active :        [] Yes                 [x] No  Hand Dominance: [x] Left                 [] Right  Current Employment: retired.   Occupation: cook at a school for 20 years  Hobbies:   Recent Falls: patient reports 0 falls in the last 6 months  States fiance and other family member typically assist her on steps to enter home. Examination   Vision:   Vision Gross Assessment: Impaired and Vision Corrective Device: wears glasses for reading  Hearing:   [facility]  Observation:   Abdominal binder on, FRED drain  Sensation:   WFL  ROM:   (B) LE AROM WFL  Strength:   (B) LE strength grossly WFL  Decision Making: low complexity  Clinical Presentation: stable      Subjective  General: Pt seated in recliner chair on arrival, fiance present. Pt wanting to walk. Reports abdominal pain but agreeable to pT. Pain: 3/10. Location: abdomen  Pain Interventions: patient denies pain interventions       Functional Mobility  Bed Mobility  Bed mobility not completed on this date. Comments:   Transfers  Sit to stand transfer: supervision  Stand to sit transfer: supervision  Comments: to/from recliner chair  Ambulation  Surface:level surface  Assistive Device: rolling walker  Assistance: stand by assistance  Distance: 300 ft  Gait Mechanics: flexed posture, decreased radha, wide SHANNON  Comments:    Stair Mobility  Number of Steps: 12  Step Height: 6 inch  Hand Rails: (R) ascending handrail  Assistance: stand by assistance  Comments: performs steps laterally for BUE support on railing  Balance  Static Sitting Balance: fair (+): maintains balance at SBA/supervision without use of UE support  Dynamic Sitting Balance: fair (+): maintains balance at SBA/supervision without use of UE support  Static Standing Balance: fair (-): maintains balance at SBA with use of UE support  Dynamic Standing Balance: fair (-): maintains balance at SBA with use of UE support  Comments:    Other Therapeutic Interventions  At end of session while removing gait belt, the bulb the drain came off. Bulb fell to floor but tubing did not. ZAYRA Sosa notified.      Functional Outcomes                 Cognition  WFL  Orientation:    alert and oriented x 4  Command Following:   [facility]    Education  Barriers To Learning: none  Patient Education: patient educated on goals, PT role and benefits, general safety, functional mobility training, transfer training, discharge recommendations, incisional bracing with pillow  Learning Assessment:  patient verbalizes and demonstrates understanding    Assessment  Activity Tolerance: Increased time to complete tasks due to pain  Impairments Requiring Therapeutic Intervention: decreased functional mobility, decreased ROM, decreased strength, decreased endurance, decreased balance  Prognosis: good  Clinical Assessment: Pt continues to progress toward functional goals, requiring decreased assist for transfers and was able to ambulate increased distance and complete more stairs this date. Patient not at baseline function and would benefit from skilled PT to address above deficits and facilitate return to baseline function. Currently needing SBA/supervision for mobility with use of RW.    Safety Interventions: patient left in bed, bed alarm in place, call light within reach, patient at risk for falls, and nurse notified    Plan  Frequency: 3-5 x/per week  Current Treatment Recommendations: strengthening, balance training, functional mobility training, transfer training, gait training, stair training, endurance training, home exercise program, and safety education    Goals  Patient Goals: to go home   Short Term Goals:  Time Frame: discharge  Patient will complete bed mobility at modified independent   Patient will complete transfers at [facility]   Patient will ambulate 100 ft with use of rolling walker at modified independent  Patient will ascend/descend 4 stairs with (R) ascending handrail at supervision  Patient will complete car transfer at supervision    Therapy Session Time      Individual Group Co-treatment   Time In  930       Time Out  955       Minutes  25         Timed Code Treatment Minutes:  25  Total Treatment Minutes:  25       Electronically Signed By: Cruz Anne, [address] [facility], [address] [place] [address]

## 2022-09-30 NOTE — PROGRESS NOTES
AM assessments completed. VSS. Morning meds given, well tolerated. Alert and oriented x4. The care plan and education has been reviewed and mutually agreed upon with the patient. Patient remains free from falls or accidental injury. Room free from clutter. All fall precautions in place. Bed in lowest position with wheels locked and alarm on, call light and belongings within reach, and door open.

## 2022-09-30 NOTE — PROGRESS NOTES
HOSPITALISTS PROGRESS NOTE    9/30/2022 9:04 AM        Name: Zoraida Jones . Admitted: 9/27/2022  Primary Care Provider: Arvind Lowery (Tel: [phone])      Brief Course: This 61 y.o. female with PMHx of hypertension, A. fib on Xarelto and HFrEF (35%)  admitted for incisional hernia repair; s/p  exploratory laparotomy with repair of reducible incisional hernia, bilateral transverse abdominis component releases with mesh placement and lysis of adhesions. Hospital service consulted for medical management. Interval history:   Pt seen and examined today. Overnight events noted, interval ancillary notes and labs reviewed. Hemodynamically stable, afebrile overnight, on room air satting well  Reported feeling better today, nausea resolved. Tolerating clear liquids. Advance to full liquids. DC fluids  Passing flatus but no bowel movement yet. Low potassium this morning; replacement ordered  Hemoglobin down to 9.6; iron studies ordered        Assessment & Plan:     Incisional hernia repair; s/p  exploratory laparotomy with repair of reducible incisional hernia, bilateral transverse abdominis component releases with mesh placement and lysis of adhesions. General surgery following; started on clear diet  Continue IV fluids, as needed antiemetics, pain meds. Monitor electrolytes closely    UTI: Urine suggestive of UTI. Urine culture in progress. Continue antibiotics awaiting culture and sensitivity    Anemia likely secondary to blood loss during surgery  Hemoglobin down to 9.6. Iron studies ordered and monitor CBC closely     HFrEF (EF of 35%)  S/p  Biv-AICD upgrade 1/2021  On Entresto 49-51 mg bid, aldactone 50 mg daily, Coreg 6.25 mg bid, and jardiance at home  Continue Coreg. Plan to restart on Entresto and Aldactone tomorrow if BP and labs allows     PAF: On Coreg and Xarelto.   Hold anticoagulation until cleared by general surgery    Hypertension: On Coreg.   Monitor BP closely       DVT PPX: Xarelto; currently on hold  Code:Full Code    Disposition: Once acute medical issues have resolved    Current Medications  potassium chloride (KLOR-CON M) extended release tablet 40 mEq, PRN   Or  potassium bicarb-citric acid (EFFER-K) effervescent tablet 40 mEq, PRN   Or  potassium chloride 10 mEq/100 mL IVPB (Peripheral Line), PRN  sacubitril-valsartan (ENTRESTO) 49-51 MG per tablet 1 tablet, BID  spironolactone (ALDACTONE) tablet 50 mg, Daily  magnesium sulfate 1000 mg in dextrose 5% 100 mL IVPB, PRN  polyethylene glycol (GLYCOLAX) packet 17 g, Daily  benzocaine-menthol (CEPACOL SORE THROAT) lozenge 1 lozenge, Q2H PRN  sodium chloride flush 0.9 % injection 5-40 mL, 2 times per day  sodium chloride flush 0.9 % injection 5-40 mL, PRN  0.9 % sodium chloride infusion, PRN  0.9 % sodium chloride infusion, Continuous  ondansetron (ZOFRAN-ODT) disintegrating tablet 4 mg, Q8H PRN   Or  ondansetron (ZOFRAN) injection 4 mg, Q6H PRN  famotidine (PEPCID) tablet 20 mg, BID   Or  famotidine (PEPCID) 20 mg in sodium chloride (PF) 10 mL injection, BID  enoxaparin (LOVENOX) injection 40 mg, Q24H  ceFAZolin (ANCEF) 2,000 mg in dextrose 5 % 50 mL IVPB (mini-bag), Q8H  bisacodyl (DULCOLAX) suppository 10 mg, Daily PRN  docusate sodium (COLACE) capsule 100 mg, BID  oxyCODONE (ROXICODONE) immediate release tablet 5 mg, Q4H PRN   Or  oxyCODONE (ROXICODONE) immediate release tablet 10 mg, Q4H PRN  acetaminophen (TYLENOL) tablet 650 mg, Q6H  diphenhydrAMINE (BENADRYL) injection 25 mg, Q6H PRN  HYDROmorphone HCl PF (DILAUDID) injection 0.5 mg, Q2H PRN   Or  HYDROmorphone HCl PF (DILAUDID) injection 1 mg, Q2H PRN  carvedilol (COREG) tablet 6.25 mg, BID WC  atorvastatin (LIPITOR) tablet 40 mg, Nightly      Objective:  /71   Pulse 76   Temp 97.9 °F (36.6 °C) (Oral)   Resp 16   Ht 5' 6\" (1.676 m)   Wt 210 lb 8 oz (95.5 kg)   SpO2 93%   BMI 33.98 kg/m² Intake/Output Summary (Last 24 hours) at 9/30/2022 0904  Last data filed at 9/30/2022 8816  Gross per 24 hour   Intake 4242.02 ml   Output 1012.5 ml   Net 3229.52 ml        Wt Readings from Last 3 Encounters:   09/30/22 210 lb 8 oz (95.5 kg)   09/26/22 212 lb (96.2 kg)   09/06/22 215 lb (97.5 kg)       Physical Examination:   General appearance:  No apparent distress, appears stated age and cooperative. HEENT: Normocephalic, sclera clear., PERRLA. Trachea midline, no adenopathy. Cardiovascular: Regular rate and rhythm, normal S1, S2. No murmur. Respiratory:Clear to auscultation bilaterally, no wheeze or crackles. GI: Abdomen soft, no tenderness, not distended, normal bowel sounds  Musculoskeletal: No cyanosis in digits. No BLE edema present  Neurology: CN 2-12 grossly intact. No speech or motor deficits  Psych: Not agitated, appropriate affect  Skin: Warm, dry, normal turgor    Labs and Tests:  CBC:   Recent Labs     09/28/22  0520 09/29/22  0514 09/30/22  0516   WBC 15.1* 12.4* 11.7*   HGB 11.2* 10.0* 9.6*    197 199       BMP:    Recent Labs     09/28/22  0520 09/29/22  0513 09/30/22  0516    139 135*   K 3.3* 3.3* 3.4*    108 107   CO2 21 20* 18*   BUN 11 15 11   CREATININE 0.9 1.1 0.9   GLUCOSE 127* 92 84       Hepatic: No results for input(s): AST, ALT, ALB, BILITOT, ALKPHOS in the last 72 hours. No orders to display       Problem List  Principal Problem:    Incisional hernia without obstruction or gangrene  Active Problems:    PAF (paroxysmal atrial fibrillation) ([address]    AICD (automatic cardioverter/defibrillator) present  Resolved Problems:    * No resolved hospital problems.  Sundar Salmeron MD   9/30/2022 9:04 AM

## 2022-10-01 ENCOUNTER — APPOINTMENT (OUTPATIENT)
Dept: GENERAL RADIOLOGY | Age: 57
DRG: 227 | End: 2022-10-01
Attending: SURGERY
Payer: MEDICAID

## 2022-10-01 ENCOUNTER — APPOINTMENT (OUTPATIENT)
Dept: CT IMAGING | Age: 57
DRG: 227 | End: 2022-10-01
Attending: SURGERY
Payer: MEDICAID

## 2022-10-01 LAB
ANION GAP SERPL CALCULATED.3IONS-SCNC: 11 MMOL/L (ref 3–16)
BASOPHILS ABSOLUTE: 0 K/UL (ref 0–0.2)
BASOPHILS ABSOLUTE: 0 K/UL (ref 0–0.2)
BASOPHILS RELATIVE PERCENT: 0.4 %
BASOPHILS RELATIVE PERCENT: 0.5 %
BUN BLDV-MCNC: 8 MG/DL (ref 7–20)
CALCIUM SERPL-MCNC: 8.6 MG/DL (ref 8.3–10.6)
CHLORIDE BLD-SCNC: 108 MMOL/L (ref 99–110)
CO2: 21 MMOL/L (ref 21–32)
CREAT SERPL-MCNC: 0.8 MG/DL (ref 0.6–1.1)
EOSINOPHILS ABSOLUTE: 0.3 K/UL (ref 0–0.6)
EOSINOPHILS ABSOLUTE: 0.5 K/UL (ref 0–0.6)
EOSINOPHILS RELATIVE PERCENT: 4 %
EOSINOPHILS RELATIVE PERCENT: 5.1 %
GFR AFRICAN AMERICAN: >60
GFR NON-AFRICAN AMERICAN: >60
GLUCOSE BLD-MCNC: 93 MG/DL (ref 70–99)
HCT VFR BLD CALC: 27.6 % (ref 36–48)
HCT VFR BLD CALC: 31 % (ref 36–48)
HEMOGLOBIN: 10.3 G/DL (ref 12–16)
HEMOGLOBIN: 9.1 G/DL (ref 12–16)
LYMPHOCYTES ABSOLUTE: 0.8 K/UL (ref 1–5.1)
LYMPHOCYTES ABSOLUTE: 1 K/UL (ref 1–5.1)
LYMPHOCYTES RELATIVE PERCENT: 10.2 %
LYMPHOCYTES RELATIVE PERCENT: 9.7 %
MAGNESIUM: 1.8 MG/DL (ref 1.8–2.4)
MAGNESIUM: 1.9 MG/DL (ref 1.8–2.4)
MCH RBC QN AUTO: 29.8 PG (ref 26–34)
MCH RBC QN AUTO: 29.9 PG (ref 26–34)
MCHC RBC AUTO-ENTMCNC: 33 G/DL (ref 31–36)
MCHC RBC AUTO-ENTMCNC: 33.3 G/DL (ref 31–36)
MCV RBC AUTO: 89.7 FL (ref 80–100)
MCV RBC AUTO: 90.2 FL (ref 80–100)
MONOCYTES ABSOLUTE: 0.8 K/UL (ref 0–1.3)
MONOCYTES ABSOLUTE: 0.9 K/UL (ref 0–1.3)
MONOCYTES RELATIVE PERCENT: 8.8 %
MONOCYTES RELATIVE PERCENT: 9.5 %
MRSA CULTURE ONLY: NORMAL
NEUTROPHILS ABSOLUTE: 6.1 K/UL (ref 1.7–7.7)
NEUTROPHILS ABSOLUTE: 7.5 K/UL (ref 1.7–7.7)
NEUTROPHILS RELATIVE PERCENT: 75.5 %
NEUTROPHILS RELATIVE PERCENT: 76.3 %
PDW BLD-RTO: 16.8 % (ref 12.4–15.4)
PDW BLD-RTO: 17.1 % (ref 12.4–15.4)
PHOSPHORUS: 2.2 MG/DL (ref 2.5–4.9)
PLATELET # BLD: 236 K/UL (ref 135–450)
PLATELET # BLD: 237 K/UL (ref 135–450)
PMV BLD AUTO: 7.5 FL (ref 5–10.5)
PMV BLD AUTO: 7.7 FL (ref 5–10.5)
POTASSIUM SERPL-SCNC: 3.2 MMOL/L (ref 3.5–5.1)
POTASSIUM SERPL-SCNC: 3.3 MMOL/L (ref 3.5–5.1)
PRO-BNP: 553 PG/ML (ref 0–124)
RBC # BLD: 3.06 M/UL (ref 4–5.2)
RBC # BLD: 3.46 M/UL (ref 4–5.2)
SODIUM BLD-SCNC: 140 MMOL/L (ref 136–145)
TROPONIN: <0.01 NG/ML
WBC # BLD: 8 K/UL (ref 4–11)
WBC # BLD: 9.9 K/UL (ref 4–11)

## 2022-10-01 PROCEDURE — A4216 STERILE WATER/SALINE, 10 ML: HCPCS | Performed by: SURGERY

## 2022-10-01 PROCEDURE — 36415 COLL VENOUS BLD VENIPUNCTURE: CPT

## 2022-10-01 PROCEDURE — 74177 CT ABD & PELVIS W/CONTRAST: CPT

## 2022-10-01 PROCEDURE — 84484 ASSAY OF TROPONIN QUANT: CPT

## 2022-10-01 PROCEDURE — 94760 N-INVAS EAR/PLS OXIMETRY 1: CPT

## 2022-10-01 PROCEDURE — 36410 VNPNXR 3YR/> PHY/QHP DX/THER: CPT

## 2022-10-01 PROCEDURE — 84132 ASSAY OF SERUM POTASSIUM: CPT

## 2022-10-01 PROCEDURE — 99024 POSTOP FOLLOW-UP VISIT: CPT | Performed by: SURGERY

## 2022-10-01 PROCEDURE — 2500000003 HC RX 250 WO HCPCS: Performed by: INTERNAL MEDICINE

## 2022-10-01 PROCEDURE — 6360000004 HC RX CONTRAST MEDICATION: Performed by: SURGERY

## 2022-10-01 PROCEDURE — 85025 COMPLETE CBC W/AUTO DIFF WBC: CPT

## 2022-10-01 PROCEDURE — 6360000002 HC RX W HCPCS: Performed by: SURGERY

## 2022-10-01 PROCEDURE — 36569 INSJ PICC 5 YR+ W/O IMAGING: CPT

## 2022-10-01 PROCEDURE — 83880 ASSAY OF NATRIURETIC PEPTIDE: CPT

## 2022-10-01 PROCEDURE — 74019 RADEX ABDOMEN 2 VIEWS: CPT

## 2022-10-01 PROCEDURE — 6370000000 HC RX 637 (ALT 250 FOR IP): Performed by: NURSE PRACTITIONER

## 2022-10-01 PROCEDURE — 1200000000 HC SEMI PRIVATE

## 2022-10-01 PROCEDURE — 2500000003 HC RX 250 WO HCPCS: Performed by: SURGERY

## 2022-10-01 PROCEDURE — 2580000003 HC RX 258: Performed by: SURGERY

## 2022-10-01 PROCEDURE — 93005 ELECTROCARDIOGRAM TRACING: CPT | Performed by: INTERNAL MEDICINE

## 2022-10-01 PROCEDURE — 84100 ASSAY OF PHOSPHORUS: CPT

## 2022-10-01 PROCEDURE — 80048 BASIC METABOLIC PNL TOTAL CA: CPT

## 2022-10-01 PROCEDURE — 71045 X-RAY EXAM CHEST 1 VIEW: CPT

## 2022-10-01 PROCEDURE — C1751 CATH, INF, PER/CENT/MIDLINE: HCPCS

## 2022-10-01 PROCEDURE — 83735 ASSAY OF MAGNESIUM: CPT

## 2022-10-01 PROCEDURE — 6370000000 HC RX 637 (ALT 250 FOR IP): Performed by: SURGERY

## 2022-10-01 RX ORDER — METOPROLOL TARTRATE 5 MG/5ML
5 INJECTION INTRAVENOUS EVERY 12 HOURS
Status: DISCONTINUED | OUTPATIENT
Start: 2022-10-01 | End: 2022-10-01

## 2022-10-01 RX ORDER — METOPROLOL TARTRATE 5 MG/5ML
5 INJECTION INTRAVENOUS EVERY 12 HOURS
Status: DISCONTINUED | OUTPATIENT
Start: 2022-10-01 | End: 2022-10-02

## 2022-10-01 RX ORDER — KETOROLAC TROMETHAMINE 30 MG/ML
30 INJECTION, SOLUTION INTRAMUSCULAR; INTRAVENOUS EVERY 6 HOURS
Status: ACTIVE | OUTPATIENT
Start: 2022-10-01 | End: 2022-10-03

## 2022-10-01 RX ORDER — BISACODYL 10 MG
10 SUPPOSITORY, RECTAL RECTAL ONCE
Status: DISCONTINUED | OUTPATIENT
Start: 2022-10-01 | End: 2022-10-04 | Stop reason: HOSPADM

## 2022-10-01 RX ORDER — LIDOCAINE HYDROCHLORIDE 10 MG/ML
5 INJECTION, SOLUTION EPIDURAL; INFILTRATION; INTRACAUDAL; PERINEURAL ONCE
Status: COMPLETED | OUTPATIENT
Start: 2022-10-01 | End: 2022-10-01

## 2022-10-01 RX ORDER — BISACODYL 10 MG
10 SUPPOSITORY, RECTAL RECTAL DAILY PRN
Status: DISCONTINUED | OUTPATIENT
Start: 2022-10-01 | End: 2022-10-04 | Stop reason: HOSPADM

## 2022-10-01 RX ORDER — SODIUM CHLORIDE 0.9 % (FLUSH) 0.9 %
5-40 SYRINGE (ML) INJECTION PRN
Status: DISCONTINUED | OUTPATIENT
Start: 2022-10-01 | End: 2022-10-04 | Stop reason: HOSPADM

## 2022-10-01 RX ORDER — SODIUM CHLORIDE 9 MG/ML
25 INJECTION, SOLUTION INTRAVENOUS PRN
Status: DISCONTINUED | OUTPATIENT
Start: 2022-10-01 | End: 2022-10-04 | Stop reason: HOSPADM

## 2022-10-01 RX ORDER — SODIUM CHLORIDE 9 MG/ML
INJECTION, SOLUTION INTRAVENOUS CONTINUOUS
Status: DISCONTINUED | OUTPATIENT
Start: 2022-10-01 | End: 2022-10-04

## 2022-10-01 RX ORDER — SODIUM CHLORIDE 0.9 % (FLUSH) 0.9 %
5-40 SYRINGE (ML) INJECTION EVERY 12 HOURS SCHEDULED
Status: DISCONTINUED | OUTPATIENT
Start: 2022-10-01 | End: 2022-10-04 | Stop reason: HOSPADM

## 2022-10-01 RX ORDER — METOPROLOL TARTRATE 5 MG/5ML
5 INJECTION INTRAVENOUS EVERY 8 HOURS
Status: DISCONTINUED | OUTPATIENT
Start: 2022-10-01 | End: 2022-10-01

## 2022-10-01 RX ORDER — DIAZEPAM 5 MG/ML
5 INJECTION, SOLUTION INTRAMUSCULAR; INTRAVENOUS EVERY 8 HOURS PRN
Status: DISCONTINUED | OUTPATIENT
Start: 2022-10-01 | End: 2022-10-04 | Stop reason: HOSPADM

## 2022-10-01 RX ADMIN — IOPAMIDOL 75 ML: 755 INJECTION, SOLUTION INTRAVENOUS at 19:53

## 2022-10-01 RX ADMIN — SACUBITRIL AND VALSARTAN 1 TABLET: 49; 51 TABLET, FILM COATED ORAL at 20:42

## 2022-10-01 RX ADMIN — FAMOTIDINE 20 MG: 10 INJECTION INTRAVENOUS at 20:40

## 2022-10-01 RX ADMIN — KETOROLAC TROMETHAMINE 30 MG: 30 INJECTION, SOLUTION INTRAMUSCULAR at 12:04

## 2022-10-01 RX ADMIN — SODIUM CHLORIDE: 9 INJECTION, SOLUTION INTRAVENOUS at 21:00

## 2022-10-01 RX ADMIN — ONDANSETRON 4 MG: 2 INJECTION INTRAMUSCULAR; INTRAVENOUS at 20:41

## 2022-10-01 RX ADMIN — DIATRIZOATE MEGLUMINE AND DIATRIZOATE SODIUM 20 ML: 660; 100 LIQUID ORAL; RECTAL at 18:34

## 2022-10-01 RX ADMIN — SODIUM CHLORIDE: 9 INJECTION, SOLUTION INTRAVENOUS at 12:11

## 2022-10-01 RX ADMIN — PIPERACILLIN AND TAZOBACTAM 3375 MG: 3; .375 INJECTION, POWDER, FOR SOLUTION INTRAVENOUS at 21:02

## 2022-10-01 RX ADMIN — KETOROLAC TROMETHAMINE 30 MG: 30 INJECTION, SOLUTION INTRAMUSCULAR at 17:58

## 2022-10-01 RX ADMIN — ACETAMINOPHEN 650 MG: 325 TABLET ORAL at 20:40

## 2022-10-01 RX ADMIN — Medication 10 ML: at 12:05

## 2022-10-01 RX ADMIN — ONDANSETRON 4 MG: 2 INJECTION INTRAMUSCULAR; INTRAVENOUS at 05:37

## 2022-10-01 RX ADMIN — ATORVASTATIN CALCIUM 40 MG: 40 TABLET, FILM COATED ORAL at 20:40

## 2022-10-01 RX ADMIN — METOPROLOL TARTRATE 5 MG: 5 INJECTION, SOLUTION INTRAVENOUS at 19:31

## 2022-10-01 RX ADMIN — ENOXAPARIN SODIUM 40 MG: 100 INJECTION SUBCUTANEOUS at 20:42

## 2022-10-01 RX ADMIN — METOPROLOL TARTRATE 5 MG: 5 INJECTION, SOLUTION INTRAVENOUS at 13:48

## 2022-10-01 RX ADMIN — Medication 10 ML: at 12:30

## 2022-10-01 RX ADMIN — LIDOCAINE HYDROCHLORIDE 5 ML: 10 INJECTION, SOLUTION EPIDURAL; INFILTRATION; INTRACAUDAL; PERINEURAL at 12:06

## 2022-10-01 RX ADMIN — POTASSIUM BICARBONATE 20 MEQ: 782 TABLET, EFFERVESCENT ORAL at 18:19

## 2022-10-01 RX ADMIN — CEFAZOLIN 2000 MG: 2 INJECTION, POWDER, FOR SOLUTION INTRAMUSCULAR; INTRAVENOUS at 12:15

## 2022-10-01 ASSESSMENT — PAIN SCALES - GENERAL
PAINLEVEL_OUTOF10: 10
PAINLEVEL_OUTOF10: 8
PAINLEVEL_OUTOF10: 7
PAINLEVEL_OUTOF10: 7
PAINLEVEL_OUTOF10: 10
PAINLEVEL_OUTOF10: 10

## 2022-10-01 ASSESSMENT — PAIN DESCRIPTION - DESCRIPTORS: DESCRIPTORS: ACHING

## 2022-10-01 ASSESSMENT — PAIN DESCRIPTION - ONSET: ONSET: ON-GOING

## 2022-10-01 ASSESSMENT — PAIN - FUNCTIONAL ASSESSMENT: PAIN_FUNCTIONAL_ASSESSMENT: ACTIVITIES ARE NOT PREVENTED

## 2022-10-01 ASSESSMENT — PAIN DESCRIPTION - LOCATION
LOCATION: ABDOMEN

## 2022-10-01 ASSESSMENT — PAIN DESCRIPTION - ORIENTATION: ORIENTATION: MID

## 2022-10-01 ASSESSMENT — PAIN DESCRIPTION - PAIN TYPE: TYPE: SURGICAL PAIN

## 2022-10-01 ASSESSMENT — PAIN DESCRIPTION - FREQUENCY: FREQUENCY: CONTINUOUS

## 2022-10-01 NOTE — PROGRESS NOTES
Shift assessment completed. VSS. Meds given as per MAR. Abd dressing CDI. FRED drain in place. Call light within reach. Will continue to monitor.

## 2022-10-01 NOTE — PROGRESS NOTES
HOSPITALISTS PROGRESS NOTE    10/1/2022 9:57 AM        Name: Gertrudis Watters . Admitted: 9/27/2022  Primary Care Provider: Kleber Castellanos (Tel: [phone])      Brief Course: This 61 y.o. female with PMHx of hypertension, A. fib on Xarelto and HFrEF (35%)  admitted for incisional hernia repair; s/p  exploratory laparotomy with repair of reducible incisional hernia, bilateral transverse abdominis component releases with mesh placement and lysis of adhesions. Hospital service consulted for medical management. Interval history:   Pt seen and examined today. Overnight events noted, interval ancillary notes and labs reviewed. Hemodynamically stable, afebrile, WBC WNL, urine culture NGTD  Low potassium this morning; replacement ordered  Patient reported that she is feeling miserable unable to tolerate anything down; reported 6-8 watery bowel movements. Keep NPO. Abdominal x-ray, NG tube and GI PCR ordered          Assessment & Plan:     Incisional hernia repair; s/p  exploratory laparotomy with repair of reducible incisional hernia, bilateral transverse abdominis component releases with mesh placement and lysis of adhesions. General surgery following; reporting persistent nausea and vomiting. Keep n.p.o.  NG tube 100  Continue IV fluids, as needed antiemetics, pain meds. Monitor electrolytes closely    UTI: Urine suggestive of UTI. Urine culture in progress. Continue antibiotics awaiting culture and sensitivity    Anemia likely secondary to blood loss during surgery  Hemoglobin down to 9.6. Iron studies suggestive of anemia of chronic disease     HFrEF (EF of 35%):S/p  Biv-AICD upgrade 1/2021  On Entresto 49-51 mg bid, aldactone 50 mg daily, Coreg 6.25 mg bid, and jardiance at home  Continue Coreg.   Plan to restart on Entresto and Aldactone tomorrow if BP and labs allows     PAF:  /device interrogation  Cardiology consulted; device interrogation shows low burden 0.8% AF  On Coreg and Xarelto. Hold anticoagulation until cleared by general surgery    Hypertension: On Coreg.   Monitor BP closely       DVT PPX: Xarelto; currently on hold  Code:Full Code    Disposition: Once acute medical issues have resolved    Current Medications  ketorolac (TORADOL) injection 30 mg, Q6H  potassium chloride (KLOR-CON M) extended release tablet 40 mEq, PRN   Or  potassium bicarb-citric acid (EFFER-K) effervescent tablet 40 mEq, PRN   Or  potassium chloride 10 mEq/100 mL IVPB (Peripheral Line), PRN  sacubitril-valsartan (ENTRESTO) 49-51 MG per tablet 1 tablet, BID  spironolactone (ALDACTONE) tablet 50 mg, Daily  magnesium sulfate 1000 mg in dextrose 5% 100 mL IVPB, PRN  polyethylene glycol (GLYCOLAX) packet 17 g, Daily  benzocaine-menthol (CEPACOL SORE THROAT) lozenge 1 lozenge, Q2H PRN  sodium chloride flush 0.9 % injection 5-40 mL, 2 times per day  sodium chloride flush 0.9 % injection 5-40 mL, PRN  0.9 % sodium chloride infusion, PRN  ondansetron (ZOFRAN-ODT) disintegrating tablet 4 mg, Q8H PRN   Or  ondansetron (ZOFRAN) injection 4 mg, Q6H PRN  famotidine (PEPCID) tablet 20 mg, BID   Or  famotidine (PEPCID) 20 mg in sodium chloride (PF) 10 mL injection, BID  enoxaparin (LOVENOX) injection 40 mg, Q24H  ceFAZolin (ANCEF) 2,000 mg in dextrose 5 % 50 mL IVPB (mini-bag), Q8H  bisacodyl (DULCOLAX) suppository 10 mg, Daily PRN  docusate sodium (COLACE) capsule 100 mg, BID  oxyCODONE (ROXICODONE) immediate release tablet 5 mg, Q4H PRN   Or  oxyCODONE (ROXICODONE) immediate release tablet 10 mg, Q4H PRN  acetaminophen (TYLENOL) tablet 650 mg, Q6H  diphenhydrAMINE (BENADRYL) injection 25 mg, Q6H PRN  HYDROmorphone HCl PF (DILAUDID) injection 0.5 mg, Q2H PRN   Or  HYDROmorphone HCl PF (DILAUDID) injection 1 mg, Q2H PRN  carvedilol (COREG) tablet 6.25 mg, BID WC  atorvastatin (LIPITOR) tablet 40 mg, Nightly      Objective:  /84   Pulse 75   Temp 98 °F (36.7 °C) (Oral)   Resp 16   Ht 5' 6\" (1.676 m)   Wt 223 lb 1.7 oz (101.2 kg)   SpO2 97%   BMI 36.01 kg/m²     Intake/Output Summary (Last 24 hours) at 10/1/2022 0957  Last data filed at 9/30/2022 1811  Gross per 24 hour   Intake --   Output 900 ml   Net -900 ml        Wt Readings from Last 3 Encounters:   10/01/22 223 lb 1.7 oz (101.2 kg)   09/26/22 212 lb (96.2 kg)   09/06/22 215 lb (97.5 kg)       Physical Examination:   General appearance:  No apparent distress, appears stated age and cooperative. HEENT: Normocephalic, sclera clear., PERRLA. Trachea midline, no adenopathy. Cardiovascular: Regular rate and rhythm, normal S1, S2. No murmur. Respiratory:Clear to auscultation bilaterally, no wheeze or crackles. GI: Abdomen soft, no tenderness, not distended, normal bowel sounds  Musculoskeletal: No cyanosis in digits. No BLE edema present  Neurology: CN 2-12 grossly intact. No speech or motor deficits  Psych: Not agitated, appropriate affect  Skin: Warm, dry, normal turgor    Labs and Tests:  CBC:   Recent Labs     09/29/22  0514 09/30/22  0516 10/01/22  0408   WBC 12.4* 11.7* 9.9   HGB 10.0* 9.6* 10.3*    199 236       BMP:    Recent Labs     09/29/22  0513 09/30/22  0516 10/01/22  0408    135* 140   K 3.3* 3.4* 3.2*    107 108   CO2 20* 18* 21   BUN 15 11 8   CREATININE 1.1 0.9 0.8   GLUCOSE 92 84 93       Hepatic: No results for input(s): AST, ALT, ALB, BILITOT, ALKPHOS in the last 72 hours. XR ABDOMEN (2 VIEWS)   Final Result   Nonspecific bowel gas pattern. Left basilar atelectasis or airspace disease. Problem List  Principal Problem:    Incisional hernia without obstruction or gangrene  Active Problems:    PAF (paroxysmal atrial fibrillation) (HCC)    AICD (automatic cardioverter/defibrillator) present  Resolved Problems:    * No resolved hospital problems.  Kalli Noland MD   10/1/2022 9:57 AM

## 2022-10-01 NOTE — PROGRESS NOTES
Every time this nurse infuses 20-30ml of liquid in NG tube, pt had severe dry heaves. Does not matter if it is oral contrast, water, or effervescent potassium. Pt states this is what happens at home too. Anytime something touches her stomach she gets sick. Zofran has not been effective.

## 2022-10-01 NOTE — PROGRESS NOTES
Arrived to place MIDLINE at 1130. Timeout and patient ID done with Rona RN at 1135. L brachial vein accessed without issue, and 3fr SL MIDline placed in LUE , tip confirmation with blood return. Handoff given to [facility], [place], operated by [facility] RN , bed in lowest position call light in hand of patient.

## 2022-10-01 NOTE — PROGRESS NOTES
[facility] 82 [facility]ar[facility]        Progress Note    Patient Name: Danielle Espinosa  MRN: [id number]  YOB: 1965  Date of Evaluation: 10/1/2022    Subjective: This morning acutely complained of sharp right abdominal pain, spasm  Nausea, better with NG  Difficulty ambulating  Passing loose stool    Post-Operative Day #4    Vital Signs:  Patient Vitals for the past 24 hrs:   BP Temp Temp src Pulse Resp SpO2 Weight   10/01/22 1345 121/77 -- -- 79 -- 97 % --   10/01/22 1215 (!) 160/80 98.6 °F (37 °C) Axillary 83 16 95 % --   10/01/22 0802 135/84 98 °F (36.7 °C) Oral 75 16 97 % --   10/01/22 0516 130/75 98 °F (36.7 °C) Oral 80 16 98 % 223 lb 1.7 oz (101.2 kg)   10/01/22 0030 118/67 98.4 °F (36.9 °C) Oral 76 16 94 % --   22 2145 118/67 98.4 °F (36.9 °C) Oral 75 16 92 % --      TEMPERATURE HISTORY 24H: Temp (24hrs), Av.3 °F (36.8 °C), Min:98 °F (36.7 °C), Max:98.6 °F (37 °C)    BLOOD PRESSURE HISTORY: Systolic (57SAU), CELINE:948 , Min:112 , JUM:206    Diastolic (27FZC), ZIJ:89, Min:67, Max:84      Intake/Output:  I/O last 3 completed shifts: In: 456 [IV Piggyback:456]  Out: 1287.5 [PDOPW:1119; Drains:12.5]  No intake/output data recorded.   Drain/tube Output:  Closed/Suction Drain Right RUQ Bulb-Output (ml): 5 ml    Physical Exam:  General: awake, alert, oriented to  person, place, time  Cardiac: regular rate and rhythm   Pulmonary: clear to auscultation bilaterally   Abdomen: soft, non-distended, appropriate incisional tenderness, incision clean dry and intact, FRED serosangineous with minimal output    Labs:  CBC:    Recent Labs     22  0514 22  0516 10/01/22  0408   WBC 12.4* 11.7* 9.9   HGB 10.0* 9.6* 10.3*   HCT 30.1* 28.9* 31.0*    199 236     BMP:    Recent Labs     22  0513 22  0516 10/01/22  0408    135* 140   K 3.3* 3.4* 3.2*    107 108   CO2 20* 18* 21   BUN 15 11 8   CREATININE 1.1 0.9 0.8   GLUCOSE 92 84 93     Hepatic:  No results for input(s): AST, ALT, ALB, BILITOT, ALKPHOS in the last 72 hours. Amylase:    Lab Results   Component Value Date/Time    AMYLASE 62 07/06/2021 02:31 PM     Lipase:    Lab Results   Component Value Date/Time    LIPASE 18.0 09/15/2022 04:51 AM    LIPASE 24.0 09/06/2022 07:32 PM    LIPASE 33.0 07/06/2021 02:31 PM      Mag:    Lab Results   Component Value Date/Time    MG 1.90 10/01/2022 04:08 AM    MG 2.10 09/30/2022 05:16 AM     Phos:     Lab Results   Component Value Date/Time    PHOS 2.2 10/01/2022 04:08 AM    PHOS 2.0 09/30/2022 05:16 AM      Coags:   Lab Results   Component Value Date/Time    PROTIME 14.8 04/14/2021 06:10 AM    INR 1.27 04/14/2021 06:10 AM    APTT 30.2 04/14/2021 06:10 AM       Cultures:  Anaerobic culture  No results found for: LABANAE  Fungus stain  No results found for requested labs within last 30 days. Gram stain  No results found for requested labs within last 30 days. Organism  No results found for: [facility]  Surgical culture  No results found for: CXSURG  Blood culture 1  No results found for requested labs within last 30 days. Blood culture 2  No results found for requested labs within last 30 days. Fecal occult  No results found for requested labs within last 30 days. GI bacterial pathogens by PCR  No results found for requested labs within last 30 days. C. difficile  No results found for requested labs within last 30 days. Urine culture  Lab Results   Component Value Date/Time    LABURIN No growth at 18 to 36 hours 09/29/2022 04:45 PM       Pathology:  No relevant pathology     Imaging:  I have personally reviewed the following films:    No results found.     Scheduled Meds:   ketorolac  30 mg IntraVENous Q6H    bisacodyl  10 mg Rectal Once    sodium chloride flush  5-40 mL IntraVENous 2 times per day    metoprolol  5 mg IntraVENous Q12H    sacubitril-valsartan  1 tablet Oral BID    spironolactone  50 mg Oral Daily    polyethylene glycol  17 g Oral Daily sodium chloride flush  5-40 mL IntraVENous 2 times per day    famotidine  20 mg Oral BID    Or    famotidine (PEPCID) injection  20 mg IntraVENous BID    enoxaparin  40 mg SubCUTAneous Q24H    ceFAZolin  2,000 mg IntraVENous Q8H    docusate sodium  100 mg Oral BID    acetaminophen  650 mg Oral Q6H    [Held by provider] carvedilol  6.25 mg Oral BID WC    atorvastatin  40 mg Oral Nightly     Continuous Infusions:   sodium chloride 125 mL/hr at 10/01/22 1211    sodium chloride      sodium chloride       PRN Meds:.bisacodyl, sodium chloride flush, sodium chloride, potassium chloride **OR** potassium alternative oral replacement **OR** potassium chloride, magnesium sulfate, benzocaine-menthol, sodium chloride flush, sodium chloride, ondansetron **OR** ondansetron, oxyCODONE **OR** oxyCODONE, diphenhydrAMINE, HYDROmorphone **OR** HYDROmorphone      Assessment:  Ms. Wenceslao Candelaria is a 61 y.o. female who presents with   OR Date 9/27/2022, exploratory laparotomy with repair of reducible incisional hernia, bilateral transverse abdominis component releases with mesh placement, and lysis of adhesions  Hypertension  CHF  Paroxysmal atrial fibrillation, Xarelto currently held  UTI    Plan:  1. Monitor bowel function, passing loose stool, stop bowel regimen  2. Monitor nausea, NG in place, bowel rest  3. Pain control, restart toradol, add valium for muscle spasms  4. CT abdomen/pelvis to rule out complication, if no seroma will remove FRED drain  5. Activity as tolerated, pulmonary toilet, incentive spirometry  6. Holding PO anticoagulation, on prophylactic lovenox  7. Antibiotics, adjust from surgical prophylaxis to UTI coverage  8. Monitor and replace electrolytes as needed  9. Appreciate hospitalist/cardiology support    July Barragan MD, FACS  10/1/2022  4:27 PM

## 2022-10-01 NOTE — PROGRESS NOTES
Transport has not arrived. Took pt to CT via bed. Was able to get 50% of ordered contrast in pt, followed by water. Pt kept dry heaving with the slightest amount of fluid entering the stomach via NG tube.

## 2022-10-02 LAB
ANION GAP SERPL CALCULATED.3IONS-SCNC: 11 MMOL/L (ref 3–16)
ANISOCYTOSIS: ABNORMAL
APTT: 50.3 SEC (ref 23–34.3)
BASOPHILS ABSOLUTE: 0.1 K/UL (ref 0–0.2)
BASOPHILS RELATIVE PERCENT: 1 %
BUN BLDV-MCNC: 8 MG/DL (ref 7–20)
CALCIUM SERPL-MCNC: 8.3 MG/DL (ref 8.3–10.6)
CHLORIDE BLD-SCNC: 109 MMOL/L (ref 99–110)
CO2: 22 MMOL/L (ref 21–32)
CREAT SERPL-MCNC: 0.9 MG/DL (ref 0.6–1.1)
EKG ATRIAL RATE: 82 BPM
EKG DIAGNOSIS: NORMAL
EKG P AXIS: 11 DEGREES
EKG P-R INTERVAL: 126 MS
EKG Q-T INTERVAL: 438 MS
EKG QRS DURATION: 154 MS
EKG QTC CALCULATION (BAZETT): 511 MS
EKG R AXIS: -71 DEGREES
EKG T AXIS: 75 DEGREES
EKG VENTRICULAR RATE: 82 BPM
EOSINOPHILS ABSOLUTE: 0.5 K/UL (ref 0–0.6)
EOSINOPHILS RELATIVE PERCENT: 6 %
GFR AFRICAN AMERICAN: >60
GFR NON-AFRICAN AMERICAN: >60
GLUCOSE BLD-MCNC: 93 MG/DL (ref 70–99)
HCT VFR BLD CALC: 28.1 % (ref 36–48)
HEMOGLOBIN: 9.4 G/DL (ref 12–16)
INR BLD: 2.07 (ref 0.87–1.14)
LACTIC ACID: 0.7 MMOL/L (ref 0.4–2)
LYMPHOCYTES ABSOLUTE: 1.1 K/UL (ref 1–5.1)
LYMPHOCYTES RELATIVE PERCENT: 14 %
MAGNESIUM: 1.9 MG/DL (ref 1.8–2.4)
MCH RBC QN AUTO: 29.8 PG (ref 26–34)
MCHC RBC AUTO-ENTMCNC: 33.3 G/DL (ref 31–36)
MCV RBC AUTO: 89.3 FL (ref 80–100)
MONOCYTES ABSOLUTE: 0.8 K/UL (ref 0–1.3)
MONOCYTES RELATIVE PERCENT: 11 %
NEUTROPHILS ABSOLUTE: 5.2 K/UL (ref 1.7–7.7)
NEUTROPHILS RELATIVE PERCENT: 68 %
PDW BLD-RTO: 16.7 % (ref 12.4–15.4)
PHOSPHORUS: 2.6 MG/DL (ref 2.5–4.9)
PLATELET # BLD: 257 K/UL (ref 135–450)
PLATELET SLIDE REVIEW: ADEQUATE
PMV BLD AUTO: 7.5 FL (ref 5–10.5)
POLYCHROMASIA: ABNORMAL
POTASSIUM SERPL-SCNC: 3.4 MMOL/L (ref 3.5–5.1)
PROTHROMBIN TIME: 23.3 SEC (ref 11.7–14.5)
RBC # BLD: 3.14 M/UL (ref 4–5.2)
SLIDE REVIEW: ABNORMAL
SODIUM BLD-SCNC: 142 MMOL/L (ref 136–145)
WBC # BLD: 7.6 K/UL (ref 4–11)

## 2022-10-02 PROCEDURE — 6360000002 HC RX W HCPCS: Performed by: SURGERY

## 2022-10-02 PROCEDURE — 80048 BASIC METABOLIC PNL TOTAL CA: CPT

## 2022-10-02 PROCEDURE — 99024 POSTOP FOLLOW-UP VISIT: CPT | Performed by: SURGERY

## 2022-10-02 PROCEDURE — 6370000000 HC RX 637 (ALT 250 FOR IP): Performed by: NURSE PRACTITIONER

## 2022-10-02 PROCEDURE — 1200000000 HC SEMI PRIVATE

## 2022-10-02 PROCEDURE — 2580000003 HC RX 258: Performed by: SURGERY

## 2022-10-02 PROCEDURE — 84100 ASSAY OF PHOSPHORUS: CPT

## 2022-10-02 PROCEDURE — 2500000003 HC RX 250 WO HCPCS: Performed by: SURGERY

## 2022-10-02 PROCEDURE — 94760 N-INVAS EAR/PLS OXIMETRY 1: CPT

## 2022-10-02 PROCEDURE — 93010 ELECTROCARDIOGRAM REPORT: CPT | Performed by: INTERNAL MEDICINE

## 2022-10-02 PROCEDURE — 83605 ASSAY OF LACTIC ACID: CPT

## 2022-10-02 PROCEDURE — A4216 STERILE WATER/SALINE, 10 ML: HCPCS | Performed by: SURGERY

## 2022-10-02 PROCEDURE — 85610 PROTHROMBIN TIME: CPT

## 2022-10-02 PROCEDURE — 2580000003 HC RX 258: Performed by: INTERNAL MEDICINE

## 2022-10-02 PROCEDURE — 85025 COMPLETE CBC W/AUTO DIFF WBC: CPT

## 2022-10-02 PROCEDURE — 83735 ASSAY OF MAGNESIUM: CPT

## 2022-10-02 PROCEDURE — 85730 THROMBOPLASTIN TIME PARTIAL: CPT

## 2022-10-02 PROCEDURE — 2500000003 HC RX 250 WO HCPCS: Performed by: INTERNAL MEDICINE

## 2022-10-02 PROCEDURE — 6370000000 HC RX 637 (ALT 250 FOR IP): Performed by: SURGERY

## 2022-10-02 RX ADMIN — KETOROLAC TROMETHAMINE 30 MG: 30 INJECTION, SOLUTION INTRAMUSCULAR at 18:06

## 2022-10-02 RX ADMIN — SACUBITRIL AND VALSARTAN 1 TABLET: 49; 51 TABLET, FILM COATED ORAL at 22:07

## 2022-10-02 RX ADMIN — KETOROLAC TROMETHAMINE 30 MG: 30 INJECTION, SOLUTION INTRAMUSCULAR at 05:02

## 2022-10-02 RX ADMIN — Medication 10 ML: at 08:20

## 2022-10-02 RX ADMIN — KETOROLAC TROMETHAMINE 30 MG: 30 INJECTION, SOLUTION INTRAMUSCULAR at 00:03

## 2022-10-02 RX ADMIN — PIPERACILLIN AND TAZOBACTAM 3375 MG: 3; .375 INJECTION, POWDER, FOR SOLUTION INTRAVENOUS at 04:18

## 2022-10-02 RX ADMIN — METOPROLOL TARTRATE 5 MG: 5 INJECTION, SOLUTION INTRAVENOUS at 05:02

## 2022-10-02 RX ADMIN — ACETAMINOPHEN 650 MG: 325 TABLET ORAL at 22:07

## 2022-10-02 RX ADMIN — POTASSIUM CHLORIDE 40 MEQ: 1500 TABLET, EXTENDED RELEASE ORAL at 15:42

## 2022-10-02 RX ADMIN — KETOROLAC TROMETHAMINE 30 MG: 30 INJECTION, SOLUTION INTRAMUSCULAR at 13:16

## 2022-10-02 RX ADMIN — FAMOTIDINE 20 MG: 10 INJECTION INTRAVENOUS at 08:18

## 2022-10-02 RX ADMIN — ATORVASTATIN CALCIUM 40 MG: 40 TABLET, FILM COATED ORAL at 22:07

## 2022-10-02 RX ADMIN — PIPERACILLIN AND TAZOBACTAM 3375 MG: 3; .375 INJECTION, POWDER, FOR SOLUTION INTRAVENOUS at 22:05

## 2022-10-02 RX ADMIN — SODIUM CHLORIDE: 9 INJECTION, SOLUTION INTRAVENOUS at 18:05

## 2022-10-02 RX ADMIN — ENOXAPARIN SODIUM 40 MG: 100 INJECTION SUBCUTANEOUS at 22:08

## 2022-10-02 RX ADMIN — PIPERACILLIN AND TAZOBACTAM 3375 MG: 3; .375 INJECTION, POWDER, FOR SOLUTION INTRAVENOUS at 13:19

## 2022-10-02 RX ADMIN — ACETAMINOPHEN 650 MG: 325 TABLET ORAL at 15:42

## 2022-10-02 ASSESSMENT — PAIN DESCRIPTION - DESCRIPTORS
DESCRIPTORS: ACHING
DESCRIPTORS: ACHING

## 2022-10-02 ASSESSMENT — PAIN SCALES - GENERAL
PAINLEVEL_OUTOF10: 2
PAINLEVEL_OUTOF10: 2
PAINLEVEL_OUTOF10: 3

## 2022-10-02 ASSESSMENT — PAIN DESCRIPTION - LOCATION
LOCATION: ABDOMEN
LOCATION: ABDOMEN

## 2022-10-02 ASSESSMENT — PAIN DESCRIPTION - ORIENTATION: ORIENTATION: RIGHT

## 2022-10-02 NOTE — PROGRESS NOTES
HOSPITALISTS PROGRESS NOTE    10/2/2022 9:52 AM        Name: Vicente Daley . Admitted: 9/27/2022  Primary Care Provider: Desirae Tavera (Tel: [phone])      Brief Course: This 61 y.o. female with PMHx of hypertension, A. fib on Xarelto and HFrEF (35%)  admitted for incisional hernia repair; s/p  exploratory laparotomy with repair of reducible incisional hernia, bilateral transverse abdominis component releases with mesh placement and lysis of adhesions. Hospital service consulted for medical management. Interval history:   Pt seen and examined today. Overnight events noted, interval ancillary notes and labs reviewed. Afebrile overnight, WBC WNL, blood and urine culture negative to date  Low potassium this morning; replacement ordered. No nausea since NG tube placement. Passing gas and stool. Abdominal pain improved       Assessment & Plan:     Incisional hernia repair; s/p  exploratory laparotomy with repair of reducible incisional hernia, bilateral transverse abdominis component releases with mesh placement and lysis of adhesions. General surgery following; plan to remove NG; trial of clear liquids  Continue IV fluids, as needed antiemetics, pain meds. Monitor electrolytes closely    UTI: Urine suggestive of UTI. Urine culture NGTD    Anemia likely secondary to blood loss during surgery  Hemoglobin down to 9.6. Iron studies suggestive of anemia of chronic disease     HFrEF (EF of 35%):S/p  Biv-AICD upgrade 1/2021  On Entresto 49-51 mg bid, aldactone 50 mg daily, Coreg 6.25 mg bid, and jardiance at home  Continue Coreg. Plan to restart on Entresto and Aldactone tomorrow if BP and labs allows     PAF:  /device interrogation  Cardiology consulted; device interrogation shows low burden 0.8% AF  On Coreg and Xarelto. Hold anticoagulation until cleared by general surgery    Hypertension: On Coreg.   Monitor BP closely       DVT PPX: Xarelto; currently on hold  Code:Full Code    Disposition: Once acute medical issues have resolved    Current Medications  ketorolac (TORADOL) injection 30 mg, Q6H  bisacodyl (DULCOLAX) suppository 10 mg, Daily PRN  bisacodyl (DULCOLAX) suppository 10 mg, Once  0.9 % sodium chloride infusion, Continuous  sodium chloride flush 0.9 % injection 5-40 mL, 2 times per day  sodium chloride flush 0.9 % injection 5-40 mL, PRN  0.9 % sodium chloride infusion, PRN  diazePAM (VALIUM) injection 5 mg, Q8H PRN  diatrizoate meglumine-sodium (GASTROGRAFIN) 66-10 % solution 20 mL, ONCE PRN  metoprolol (LOPRESSOR) injection 5 mg, Q12H  piperacillin-tazobactam (ZOSYN) 3,375 mg in dextrose 5 % 50 mL IVPB extended infusion (mini-bag), Q8H  potassium chloride (KLOR-CON M) extended release tablet 40 mEq, PRN   Or  potassium bicarb-citric acid (EFFER-K) effervescent tablet 40 mEq, PRN   Or  potassium chloride 10 mEq/100 mL IVPB (Peripheral Line), PRN  sacubitril-valsartan (ENTRESTO) 49-51 MG per tablet 1 tablet, BID  spironolactone (ALDACTONE) tablet 50 mg, Daily  magnesium sulfate 1000 mg in dextrose 5% 100 mL IVPB, PRN  benzocaine-menthol (CEPACOL SORE THROAT) lozenge 1 lozenge, Q2H PRN  sodium chloride flush 0.9 % injection 5-40 mL, 2 times per day  sodium chloride flush 0.9 % injection 5-40 mL, PRN  0.9 % sodium chloride infusion, PRN  ondansetron (ZOFRAN-ODT) disintegrating tablet 4 mg, Q8H PRN   Or  ondansetron (ZOFRAN) injection 4 mg, Q6H PRN  famotidine (PEPCID) tablet 20 mg, BID   Or  famotidine (PEPCID) 20 mg in sodium chloride (PF) 10 mL injection, BID  enoxaparin (LOVENOX) injection 40 mg, Q24H  oxyCODONE (ROXICODONE) immediate release tablet 5 mg, Q4H PRN   Or  oxyCODONE (ROXICODONE) immediate release tablet 10 mg, Q4H PRN  acetaminophen (TYLENOL) tablet 650 mg, Q6H  diphenhydrAMINE (BENADRYL) injection 25 mg, Q6H PRN  HYDROmorphone HCl PF (DILAUDID) injection 0.5 mg, Q2H PRN   Or  HYDROmorphone HCl PF (DILAUDID) injection 1 mg, Q2H PRN  [Held by provider] carvedilol (COREG) tablet 6.25 mg, BID WC  atorvastatin (LIPITOR) tablet 40 mg, Nightly      Objective:  BP (!) 156/90   Pulse 76   Temp 97.7 °F (36.5 °C) (Oral)   Resp 16   Ht 5' 6\" (1.676 m)   Wt 230 lb 6.1 oz (104.5 kg)   SpO2 97%   BMI 37.18 kg/m²     Intake/Output Summary (Last 24 hours) at 10/2/2022 5921  Last data filed at 10/2/2022 7793  Gross per 24 hour   Intake 10 ml   Output 1400 ml   Net -1390 ml        Wt Readings from Last 3 Encounters:   10/02/22 230 lb 6.1 oz (104.5 kg)   09/26/22 212 lb (96.2 kg)   09/06/22 215 lb (97.5 kg)       Physical Examination:   General appearance:  No apparent distress, appears stated age and cooperative. HEENT: Normocephalic, sclera clear., PERRLA. Trachea midline, no adenopathy. Cardiovascular: Regular rate and rhythm, normal S1, S2. No murmur. Respiratory:Clear to auscultation bilaterally, no wheeze or crackles. GI: Abdomen soft, no tenderness, not distended, normal bowel sounds  Musculoskeletal: No cyanosis in digits. No BLE edema present  Neurology: CN 2-12 grossly intact. No speech or motor deficits  Psych: Not agitated, appropriate affect  Skin: Warm, dry, normal turgor    Labs and Tests:  CBC:   Recent Labs     10/01/22  0408 10/01/22  1740 10/02/22  0430   WBC 9.9 8.0 7.6   HGB 10.3* 9.1* 9.4*    237 257       BMP:    Recent Labs     09/30/22  0516 10/01/22  0408 10/01/22  1740 10/02/22  0430   * 140  --  142   K 3.4* 3.2* 3.3* 3.4*    108  --  109   CO2 18* 21  --  22   BUN 11 8  --  8   CREATININE 0.9 0.8  --  0.9   GLUCOSE 84 93  --  93       Hepatic: No results for input(s): AST, ALT, ALB, BILITOT, ALKPHOS in the last 72 hours. CT ABDOMEN PELVIS W IV CONTRAST Additional Contrast? Oral (water soluble contrast through NG)   Preliminary Result   1. Postsurgical changes from interval hernia repair.   There is elongated   fluid along the anterior abdominal wall at the hernia repair site with   multiple foci of gas extending along the anterior abdominal wall and into the   subcutaneous soft tissues predominant within the right upper quadrant. No   definite free intraperitoneal air is identified. Inflammatory changes are   noted in the omentum and mesentery along the anterior abdomen some which may   be postsurgical in etiology. There are associated reactive inflammatory   changes involving the transverse colon at this level as well. Surgical drain   remains in place. 2. There is a questionable small recurrent fat containing hernia along the   midline abdomen with surrounding inflammatory changes versus an area of fat   necrosis. .   3. Trace hyperdense free fluid in the pelvis some of which is proteinaceous   or hemorrhagic in etiology. This may be postoperative blood products or this   could be postoperative as well. 4. There is mild swirling of the mesentery within the right mid abdomen of   uncertain etiology without associated bowel obstruction or volvulus. An   underlying internal hernia cannot be entirely excluded given recent surgery. 5. Bibasilar pneumonia. 6. Fibroid uterus. XR CHEST PORTABLE   Final Result   1. NG tube extends below the left hemidiaphragm, into the upper abdomen, tip   overlying the expected level of the distal stomach or proximal duodenum,   right upper quadrant. Consider pulling back NG tube 4-5 cm to ensure tip is   at the stomach region. 2.  Vascular engorgement and cephalization is demonstrated with bilateral   peribronchial cuffing and perivascular haziness. 3. Calcific atherosclerosis aorta. 4. Cardiomegaly. 5. Prominent soft tissue density upper right paramediastinal region is   stable, typical of brachiocephalic vessel tortuosity/ectasia or occasionally   thyroid goiter. RECOMMENDATION:   Consider pulling back NG tube 4-5 cm to ensure tip is at the stomach region.          XR ABDOMEN (2 VIEWS)   Final Result Nonspecific bowel gas pattern. Left basilar atelectasis or airspace disease. Problem List  Principal Problem:    Incisional hernia without obstruction or gangrene  Active Problems:    PAF (paroxysmal atrial fibrillation) (HCC)    AICD (automatic cardioverter/defibrillator) present  Resolved Problems:    * No resolved hospital problems.  Gera Templeton MD   10/2/2022 9:52 AM

## 2022-10-02 NOTE — PROGRESS NOTES
Shift assessment completed. VSS. Meds given as per MAR. IVF on flow. NGT to LWIS. Abd dressing CDI. Call light within reach. Will continue to monitor.

## 2022-10-02 NOTE — PROGRESS NOTES
Pt was tearful, angry this morning, she states she does not know anything what is going on, wanted to see MD, also want to go home and NG taken out. Pt was updated and educated on recent POC, unable to administer any med through NG, pt states it makes her gag when medication is placed through her NG, NG to LIWS, some potassium noted on the tubing, tried to dislogde, removed most of it, NG placed to LIWS. MD called and updated about the situation. Pt was ambulated in hallways, sitting up in chair, patient apologized for being rude and states she feels slightly better at this time. Patient resting comfortably in chair with side rails up X 2, call lights and belongings within reach. All needs met at this time. Will continue to monitor.

## 2022-10-02 NOTE — PROGRESS NOTES
[facility]        Progress Note    Patient Name: Rosina Mendez  MRN: [id number]  YOB: 1965  Date of Evaluation: 10/2/2022    Subjective:  No acute events overnight  Abdominal pain much improved  No nausea with NG  Passing stool    Post-Operative Day #5    Vital Signs:  Patient Vitals for the past 24 hrs:   BP Temp Temp src Pulse Resp SpO2 Weight   10/02/22 1152 (!) 153/80 98.5 °F (36.9 °C) Oral 75 16 97 % --   10/02/22 0817 (!) 156/90 97.7 °F (36.5 °C) Oral 76 16 97 % --   10/02/22 0442 (!) 160/91 98.1 °F (36.7 °C) Oral 75 16 96 % --   10/02/22 0300 -- -- -- -- -- -- 230 lb 6.1 oz (104.5 kg)   10/02/22 0000 138/75 98.1 °F (36.7 °C) Oral 74 16 97 % --   10/01/22 2015 (!) 159/84 98.1 °F (36.7 °C) Oral 75 16 93 % --   10/01/22 1930 (!) 153/72 99.1 °F (37.3 °C) Axillary 72 16 94 % --   10/01/22 1731 (!) 145/80 98.4 °F (36.9 °C) Axillary 81 17 93 % --   10/01/22 1630 (!) 145/77 98.8 °F (37.1 °C) Axillary 79 16 95 % --   10/01/22 1345 121/77 -- -- 79 -- 97 % --      TEMPERATURE HISTORY 24H: Temp (24hrs), Av.4 °F (36.9 °C), Min:97.7 °F (36.5 °C), Max:99.1 °F (37.3 °C)    BLOOD PRESSURE HISTORY: Systolic (88FWZ), NEM:810 , Min:121 , SMU:069    Diastolic (96ERP), MLI:24, Min:72, Max:91      Intake/Output:  I/O last 3 completed shifts:  In: -   Out: 900 [Urine:800; Emesis/NG output:100]  I/O this shift:  In: 10 [I.V.:10]  Out: 500 [Emesis/NG output:500]  Drain/tube Output:  [REMOVED] Closed/Suction Drain Right RUQ Bulb-Output (ml): 0 ml    Physical Exam:  General: awake, alert, oriented to  person, place, time  Cardiac: regular rate and rhythm   Pulmonary: clear to auscultation bilaterally anteriorly  Abdomen: soft, non-distended, appropriate incisional tenderness, incision clean dry and intact, FRED serosangineous with minimal output    Labs:  CBC:    Recent Labs     10/01/22  0408 10/01/22  1740 10/02/22  0430   WBC 9.9 8.0 7.6   HGB 10.3* 9.1* 9.4*   HCT 31.0* ABDOMEN AND PELVIS WITH CONTRAST 10/1/2022 7:49 pm       TECHNIQUE:   CT of the abdomen and pelvis was performed with the administration of   intravenous contrast. Multiplanar reformatted images are provided for review. Automated  exposure control, iterative reconstruction, and/or weight based   adjustment of the mA/kV was utilized to reduce the radiation dose to as low   as reasonably achievable. COMPARISON:   09/15/2022, 09/06/2022       HISTORY:   ORDERING SYSTEM PROVIDED HISTORY: abdominal pain s/p complex hernia repair   TECHNOLOGIST PROVIDED HISTORY:   Additional Contrast?->Oral   Reason for exam:->abdominal pain s/p complex hernia repair   Reason for Exam: Fatigue (Pt brought in by [facility] from home for SOB, fatigue,   left sided neck pain in the cervical spine, stroke scale neg, VSS per EMS   upon arrival, Pt A and O x 4. Working out a [employer] earlier this   morning, went home, symptoms started 1 hour ago when Pt was watching   football, states he had symptoms like this a month ago and was discharged   home. Also states that he stood up suddenly and got dizzy like he was going   to fall at home, denies falling at home.)       FINDINGS:   Lower Chest: Bibasilar consolidation new from the prior exam.       Organs: The liver, spleen, pancreas and adrenal glands are without focal   abnormality. The kidneys enhance symmetrically. No renal calculus. No   hydronephrosis or perinephric stranding. No biliary duct dilation. GI/Bowel: Enteric tube tip is noted within the proximal duodenum. Duodenal   diverticulum. Mild wall thickening of the transverse colon in the mid   abdomen with surrounding pericolonic stranding. There is also swirling of   the mesentery within the right mid abdomen new from the prior exam.  No other   dilated loops of bowel or bowel wall thickening. No free intraperitoneal air   is noted. Pelvis: Fibroid uterus. No bladder wall thickening.   No pathologically enlarged adenopathy. Trace free fluid in the pelvis slightly hyperdense in   attenuation. Peritoneum/Retroperitoneum: The aorta is normal in caliber. The celiac axis,   SMA and SG are patent. The portal venous system is patent. There is   omental and mesenteric stranding predominately within the right upper   quadrant and mid abdomen. Postsurgical changes from interval hernia repair   along the anterior abdominal wall. There are multiple foci of gas noted   within the anterior abdominal wall extending into the subcutaneous soft   tissues of the right upper quadrant. Elongated collection of fluid is noted   along the anterior abdominal wall without drainable component. There are   findings suspicious for recurrent fat containing hernia with surrounding   inflammatory changes (image 91). Bones/Soft Tissues: Subcutaneous edema with multiple foci of gas are   identified. Fluid is noted along the anterior abdominal wall along the   midline incision as well. There is skin thickening along the anterior   abdominal wall at the level of midline incision. No acute osseous   abnormality. Impression   1. Postsurgical changes from interval hernia repair. There is elongated   fluid along the anterior abdominal wall at the hernia repair site with   multiple foci of gas extending along the anterior abdominal wall and into the   subcutaneous soft tissues predominant within the right upper quadrant. No   definite free intraperitoneal air is identified. Inflammatory changes are   noted in the omentum and mesentery along the anterior abdomen some which may   be postsurgical in etiology. There are associated reactive inflammatory   changes involving the transverse colon at this level as well. Surgical drain   remains in place. 2. There is a questionable small recurrent fat containing hernia along the   midline abdomen with surrounding inflammatory changes versus an area of fat   necrosis. Ju Taylor    3. Trace hyperdense free fluid in the pelvis some of which is proteinaceous   or hemorrhagic in etiology. This may be postoperative blood products or this   could be postoperative as well. 4. There is mild swirling of the mesentery within the right mid abdomen of   uncertain etiology without associated bowel obstruction or volvulus. An   underlying internal hernia cannot be entirely excluded given recent surgery. 5. Bibasilar pneumonia. 6. Fibroid uterus.        Scheduled Meds:   ketorolac  30 mg IntraVENous Q6H    bisacodyl  10 mg Rectal Once    sodium chloride flush  5-40 mL IntraVENous 2 times per day    metoprolol  5 mg IntraVENous Q12H    piperacillin-tazobactam  3,375 mg IntraVENous Q8H    sacubitril-valsartan  1 tablet Oral BID    spironolactone  50 mg Oral Daily    sodium chloride flush  5-40 mL IntraVENous 2 times per day    famotidine  20 mg Oral BID    Or    famotidine (PEPCID) injection  20 mg IntraVENous BID    enoxaparin  40 mg SubCUTAneous Q24H    acetaminophen  650 mg Oral Q6H    [Held by provider] carvedilol  6.25 mg Oral BID WC    atorvastatin  40 mg Oral Nightly     Continuous Infusions:   sodium chloride 125 mL/hr at 10/01/22 2100    sodium chloride      sodium chloride       PRN Meds:.bisacodyl, sodium chloride flush, sodium chloride, diazePAM, diatrizoate meglumine-sodium, potassium chloride **OR** potassium alternative oral replacement **OR** potassium chloride, magnesium sulfate, benzocaine-menthol, sodium chloride flush, sodium chloride, ondansetron **OR** ondansetron, oxyCODONE **OR** oxyCODONE, diphenhydrAMINE, HYDROmorphone **OR** HYDROmorphone      Assessment:  Ms. Bobby Stern is a 61 y.o. female who presents with   OR Date 9/27/2022, exploratory laparotomy with repair of reducible incisional hernia, bilateral transverse abdominis component releases with mesh placement, and lysis of adhesions  Hypertension  CHF  Paroxysmal atrial fibrillation, Xarelto currently held  UTI  Pneumonia    Plan:  1. Monitor bowel function, passing stool  2. Remove NG, clear liquids  3. Pain control, Toradol for another day for non-narcotic pain control, valium for muscle spasms  4. Activity as tolerated, pulmonary toilet, incentive spirometry  5. Holding PO anticoagulation, on prophylactic lovenox  6. Antibiotics, coverage for UTI and pneumonia  7. Monitor and replace electrolytes as needed  8. Appreciate hospitalist/cardiology support    Kirk Coronel MD, FACS  10/2/2022  12:36 PM

## 2022-10-02 NOTE — PROGRESS NOTES
Pulled out NGT 4cm back as per radiological recommendation. [email or web address] now.

## 2022-10-02 NOTE — PROGRESS NOTES
NG tube d/c'd as per order. Pt denies nausea and vomiting, pain minimal 2/10. Controlled with scheduled pain meds. Ambulating in room, hallways, up in chair, well tolerated. Pt had 2 fluffy brown BM, voiding adequately. On clear liquid diet, well tolerating. Patient states to feel a lot better and appreciative of care. Will continue to monitor.

## 2022-10-02 NOTE — PROGRESS NOTES
Pt been up to bathroom with diarrhea several times this AM.  Pt reports severe nausea, dry heaves. Zofran already given at , pt reports not effective. Spoke to Dr. Rob Chavis by phone to advise. Ketorolac, Xray and suppository ordered. Administered Ketorolac, pt reports it helped. Pt states does not want any narcotics if she can possible help it. VSS.

## 2022-10-02 NOTE — PLAN OF CARE
Reviewed CT   Air along abdominal wall possibly from bulb not being to suction yesterday for prolonged period of time. No seroma identified and will remove FRED drain  Although mention of recurrent hernia, this is not clinically possible with underlying mesh widely overlapping the hernia repair. Visualized tissue is redundant hernia sac not excised  Possible inflammatory tissue around hernia sac and colon will be covered with zosyn started earlier today  Clinically unlikely to have volvulus, NG with minimal output and is passing stool, no sign of obstruction on imaging    Flavio Alexander MD, FACS  10/1/2022  9:10 PM      CT ABDOMEN PELVIS W IV CONTRAST Additional Contrast? Oral (water soluble contrast through NG) [[id number]]    Collected: 10/01/22 2052    Updated: 10/01/22 2106    Impression:     1. Postsurgical changes from interval hernia repair. There is elongated   fluid along the anterior abdominal wall at the hernia repair site with   multiple foci of gas extending along the anterior abdominal wall and into the   subcutaneous soft tissues predominant within the right upper quadrant. No   definite free intraperitoneal air is identified. Inflammatory changes are   noted in the omentum and mesentery along the anterior abdomen some which may   be postsurgical in etiology. There are associated reactive inflammatory   changes involving the transverse colon at this level as well. Surgical drain   remains in place. 2. There is a questionable small recurrent fat containing hernia along the   midline abdomen with surrounding inflammatory changes versus an area of fat   necrosis. .   3. Trace hyperdense free fluid in the pelvis some which is proteinaceous or   hemorrhagic in etiology. This may may be postoperative blood products or   this could be postoperative as well. 4. There is mild swirling of the mesentery within the right mid abdomen of   uncertain etiology without associated bowel obstruction or volvulus. An   underlying internal hernia cannot be entirely excluded given recent surgery. 5. Bibasilar pneumonia. 6. Fibroid uterus.

## 2022-10-02 NOTE — PLAN OF CARE
Problem: Chronic Conditions and Co-morbidities  Goal: Patient's chronic conditions and co-morbidity symptoms are monitored and maintained or improved  10/2/2022 0732 by Bhargav Giron RN  Outcome: Progressing  10/1/2022 2320 by Lavell Saavedra RN  Outcome: Progressing     Problem: Discharge Planning  Goal: Discharge to home or other facility with appropriate resources  10/1/2022 2320 by Lavell Saavedra RN  Outcome: Progressing     Problem: Pain  Goal: Verbalizes/displays adequate comfort level or baseline comfort level  10/2/2022 0732 by Bhargav Giron RN  Outcome: Progressing  10/1/2022 2320 by Lavell Saavedra RN  Outcome: Progressing     Problem: Safety - Adult  Goal: Free from fall injury  Outcome: Progressing

## 2022-10-03 LAB
ANION GAP SERPL CALCULATED.3IONS-SCNC: 11 MMOL/L (ref 3–16)
ANISOCYTOSIS: ABNORMAL
BASOPHILS ABSOLUTE: 0 K/UL (ref 0–0.2)
BASOPHILS RELATIVE PERCENT: 0 %
BUN BLDV-MCNC: 9 MG/DL (ref 7–20)
CALCIUM SERPL-MCNC: 8.2 MG/DL (ref 8.3–10.6)
CHLORIDE BLD-SCNC: 111 MMOL/L (ref 99–110)
CO2: 22 MMOL/L (ref 21–32)
CREAT SERPL-MCNC: 0.8 MG/DL (ref 0.6–1.1)
EOSINOPHILS ABSOLUTE: 0.3 K/UL (ref 0–0.6)
EOSINOPHILS RELATIVE PERCENT: 4 %
GFR AFRICAN AMERICAN: >60
GFR NON-AFRICAN AMERICAN: >60
GLUCOSE BLD-MCNC: 90 MG/DL (ref 70–99)
HCT VFR BLD CALC: 24.7 % (ref 36–48)
HEMOGLOBIN: 8.3 G/DL (ref 12–16)
LYMPHOCYTES ABSOLUTE: 0.9 K/UL (ref 1–5.1)
LYMPHOCYTES RELATIVE PERCENT: 12 %
MCH RBC QN AUTO: 30.1 PG (ref 26–34)
MCHC RBC AUTO-ENTMCNC: 33.8 G/DL (ref 31–36)
MCV RBC AUTO: 89.1 FL (ref 80–100)
MONOCYTES ABSOLUTE: 0.8 K/UL (ref 0–1.3)
MONOCYTES RELATIVE PERCENT: 11 %
NEUTROPHILS ABSOLUTE: 5.4 K/UL (ref 1.7–7.7)
NEUTROPHILS RELATIVE PERCENT: 73 %
PDW BLD-RTO: 16.8 % (ref 12.4–15.4)
PLATELET # BLD: 259 K/UL (ref 135–450)
PLATELET SLIDE REVIEW: ADEQUATE
PMV BLD AUTO: 7.4 FL (ref 5–10.5)
POTASSIUM SERPL-SCNC: 3.5 MMOL/L (ref 3.5–5.1)
RBC # BLD: 2.77 M/UL (ref 4–5.2)
SLIDE REVIEW: ABNORMAL
SODIUM BLD-SCNC: 144 MMOL/L (ref 136–145)
WBC # BLD: 7.4 K/UL (ref 4–11)

## 2022-10-03 PROCEDURE — 6360000002 HC RX W HCPCS: Performed by: SURGERY

## 2022-10-03 PROCEDURE — 2580000003 HC RX 258: Performed by: SURGERY

## 2022-10-03 PROCEDURE — 85025 COMPLETE CBC W/AUTO DIFF WBC: CPT

## 2022-10-03 PROCEDURE — 6370000000 HC RX 637 (ALT 250 FOR IP): Performed by: SURGERY

## 2022-10-03 PROCEDURE — 80048 BASIC METABOLIC PNL TOTAL CA: CPT

## 2022-10-03 PROCEDURE — 99024 POSTOP FOLLOW-UP VISIT: CPT | Performed by: SURGERY

## 2022-10-03 PROCEDURE — 6370000000 HC RX 637 (ALT 250 FOR IP): Performed by: NURSE PRACTITIONER

## 2022-10-03 PROCEDURE — 1200000000 HC SEMI PRIVATE

## 2022-10-03 RX ORDER — PANTOPRAZOLE SODIUM 40 MG/1
40 TABLET, DELAYED RELEASE ORAL
Status: DISCONTINUED | OUTPATIENT
Start: 2022-10-03 | End: 2022-10-04 | Stop reason: HOSPADM

## 2022-10-03 RX ORDER — PANTOPRAZOLE SODIUM 40 MG/1
40 TABLET, DELAYED RELEASE ORAL
Status: DISCONTINUED | OUTPATIENT
Start: 2022-10-04 | End: 2022-10-03

## 2022-10-03 RX ORDER — OXYCODONE HYDROCHLORIDE 5 MG/1
5 TABLET ORAL EVERY 6 HOURS PRN
Status: DISCONTINUED | OUTPATIENT
Start: 2022-10-03 | End: 2022-10-04 | Stop reason: HOSPADM

## 2022-10-03 RX ORDER — OXYCODONE HYDROCHLORIDE 5 MG/1
10 TABLET ORAL EVERY 6 HOURS PRN
Status: DISCONTINUED | OUTPATIENT
Start: 2022-10-03 | End: 2022-10-04 | Stop reason: HOSPADM

## 2022-10-03 RX ADMIN — CARVEDILOL 6.25 MG: 6.25 TABLET, FILM COATED ORAL at 08:03

## 2022-10-03 RX ADMIN — SACUBITRIL AND VALSARTAN 1 TABLET: 49; 51 TABLET, FILM COATED ORAL at 08:09

## 2022-10-03 RX ADMIN — SACUBITRIL AND VALSARTAN 1 TABLET: 49; 51 TABLET, FILM COATED ORAL at 20:18

## 2022-10-03 RX ADMIN — ENOXAPARIN SODIUM 40 MG: 100 INJECTION SUBCUTANEOUS at 20:18

## 2022-10-03 RX ADMIN — PIPERACILLIN AND TAZOBACTAM 3375 MG: 3; .375 INJECTION, POWDER, FOR SOLUTION INTRAVENOUS at 05:17

## 2022-10-03 RX ADMIN — ACETAMINOPHEN 650 MG: 325 TABLET ORAL at 20:18

## 2022-10-03 RX ADMIN — PIPERACILLIN AND TAZOBACTAM 3375 MG: 3; .375 INJECTION, POWDER, FOR SOLUTION INTRAVENOUS at 20:21

## 2022-10-03 RX ADMIN — CARVEDILOL 6.25 MG: 6.25 TABLET, FILM COATED ORAL at 17:27

## 2022-10-03 RX ADMIN — ACETAMINOPHEN 650 MG: 325 TABLET ORAL at 05:13

## 2022-10-03 RX ADMIN — PIPERACILLIN AND TAZOBACTAM 3375 MG: 3; .375 INJECTION, POWDER, FOR SOLUTION INTRAVENOUS at 13:14

## 2022-10-03 RX ADMIN — KETOROLAC TROMETHAMINE 30 MG: 30 INJECTION, SOLUTION INTRAMUSCULAR at 01:30

## 2022-10-03 RX ADMIN — SPIRONOLACTONE 50 MG: 25 TABLET ORAL at 08:03

## 2022-10-03 RX ADMIN — ACETAMINOPHEN 650 MG: 325 TABLET ORAL at 15:54

## 2022-10-03 RX ADMIN — ATORVASTATIN CALCIUM 40 MG: 40 TABLET, FILM COATED ORAL at 20:18

## 2022-10-03 RX ADMIN — HYDROMORPHONE HYDROCHLORIDE 1 MG: 1 INJECTION, SOLUTION INTRAMUSCULAR; INTRAVENOUS; SUBCUTANEOUS at 10:04

## 2022-10-03 RX ADMIN — Medication 10 ML: at 20:20

## 2022-10-03 RX ADMIN — ACETAMINOPHEN 650 MG: 325 TABLET ORAL at 08:03

## 2022-10-03 ASSESSMENT — PAIN DESCRIPTION - LOCATION: LOCATION: ABDOMEN

## 2022-10-03 ASSESSMENT — PAIN DESCRIPTION - ORIENTATION: ORIENTATION: RIGHT

## 2022-10-03 ASSESSMENT — PAIN DESCRIPTION - DESCRIPTORS: DESCRIPTORS: ACHING

## 2022-10-03 ASSESSMENT — PAIN SCALES - GENERAL: PAINLEVEL_OUTOF10: 5

## 2022-10-03 NOTE — PROGRESS NOTES
Nutrition Note    RECOMMENDATIONS  PO Diet: full liquids, advance to low fiber as tolerated  ONS: Ensure HP BID     NUTRITION ASSESSMENT   Pt is nutritionally compromised AEB NPO/clear liquid diet for at least 5 days during admission. Pt tolerating clear liquids with +BM noted 10/1. MD has advanced to full liquids. Wt is stable. Pt with increased nutrient needs to promote healing; will add Ensure HP. Will monitor on-going progress & tolerance of full liquids. Nutrition Related Findings: LBM 10/1; nonpitting edema of extremities; low H/H.  NS @ 75ml/hr  Wounds: Surgical Incision  Nutrition Education:  Education not indicated   Nutrition Goals: PO intake 50% or greater     MALNUTRITION ASSESSMENT   Acute Illness  Malnutrition Status: At risk for malnutrition (Comment)  Findings of the 6 clinical characteristics of malnutrition:  Energy Intake:  50% or less of estimated energy requirements for 5 or more days  Weight Loss:  No significant weight loss     Body Fat Loss:  No significant body fat loss     Muscle Mass Loss:  No significant muscle mass loss    Fluid Accumulation:  No significant fluid accumulation     Strength:  Not Performed      NUTRITION DIAGNOSIS   Inadequate oral intake related to inadequate protein-energy intake as evidenced by NPO or clear liquid status due to medical condition      CURRENT NUTRITION THERAPIES  ADULT DIET; Full Liquid     PO Intake: Unable to assess   PO Supplement Intake:None Ordered    ANTHROPOMETRICS  Current Height: 5' 6\" (167.6 cm)  Current Weight: 222 lb 4 oz (100.8 kg)    Ideal Body Weight (IBW): 130 lbs  (59 kg)      BMI: 35.8      COMPARATIVE STANDARDS  Energy (kcal):  1515- 1818     Protein (g):  71-118g       Fluid (mL/day):  1 ml/kcal    The patient will be monitored per nutrition standards of care. Consult dietitian if additional nutrition interventions are needed prior to RD reassessment.      Elda Leija RD, LD    Contact: [phone]

## 2022-10-03 NOTE — PROGRESS NOTES
Physical Therapy  Vicente Daley    Attempted to see pt for PT treatment. Pt declined therapy at this time stating she just returned from walking in hallway with nursing. Will follow up with as schedule permits.  Thanks, Rebecca Lux, DPT [id number]

## 2022-10-03 NOTE — PROGRESS NOTES
Patient states abdominal pain well controlled, vitals stable, abdominal dressing CDI, abdominal binder in place and adjusted, assisted to position of comfort, patient encouraged to call with needs, call light in reach, items within reach, will continue to monitor

## 2022-10-03 NOTE — PROGRESS NOTES
Shift assessment complete, VSS, A&O x4, meds given per MAR. Fall precautions in place and call light within reach. Abdominal binder in place. The care plan and education has been reviewed and mutually agreed upon with the patient.

## 2022-10-03 NOTE — PROGRESS NOTES
[facility]        Progress Note    Patient Name: Molly Quintana  MRN: [id number]  YOB: 1965  Date of Evaluation: 10/3/2022    Subjective:  No acute events overnight  Pain continues to improve  No nausea with clear liquids  Passing stool    Post-Operative Day #6    Vital Signs:  Patient Vitals for the past 24 hrs:   BP Temp Temp src Pulse Resp SpO2 Weight   10/03/22 0800 (!) 148/88 97.7 °F (36.5 °C) Oral 74 17 -- --   10/03/22 0701 -- -- -- -- -- -- 222 lb 4 oz (100.8 kg)   10/03/22 0519 (!) 162/90 97.5 °F (36.4 °C) Oral 75 16 95 % --   10/02/22 2153 (!) 169/92 98.1 °F (36.7 °C) Oral 73 16 94 % --   10/02/22 1750 131/83 98.3 °F (36.8 °C) Oral 75 16 96 % --   10/02/22 1152 (!) 153/80 98.5 °F (36.9 °C) Oral 75 16 97 % --      TEMPERATURE HISTORY 24H: Temp (24hrs), Av °F (36.7 °C), Min:97.5 °F (36.4 °C), Max:98.5 °F (36.9 °C)    BLOOD PRESSURE HISTORY: Systolic (20OPN), OVL:312 , Min:131 , KSJ:681    Diastolic (26JHP), DMS:22, Min:75, Max:92      Intake/Output:  I/O last 3 completed shifts: In: 12 [P.O.:660; I.V.:10]  Out: 1300 [Urine:800; Emesis/NG output:500]  No intake/output data recorded.   Drain/tube Output:  [REMOVED] Closed/Suction Drain Right RUQ Bulb-Output (ml): 0 ml    Physical Exam:  General: awake, alert, oriented to  person, place, time  Cardiac: regular rate and rhythm   Pulmonary: clear to auscultation bilaterally anteriorly  Abdomen: soft, non-distended, appropriate incisional tenderness, incision clean dry and intact    Labs:  CBC:    Recent Labs     10/01/22  1740 10/02/22  0430 10/03/22  0500   WBC 8.0 7.6 7.4   HGB 9.1* 9.4* 8.3*   HCT 27.6* 28.1* 24.7*    257 259     BMP:    Recent Labs     10/01/22  0408 10/01/22  1740 10/02/22  0430 10/03/22  0500     --  142 144   K 3.2* 3.3* 3.4* 3.5     --  109 111*   CO2   --     BUN 8  --  8 9   CREATININE 0.8  --  0.9 0.8   GLUCOSE 93  --  93 90     Hepatic:  No results for input(s): AST, ALT, ALB, BILITOT, ALKPHOS in the last 72 hours. Amylase:    Lab Results   Component Value Date/Time    AMYLASE 62 07/06/2021 02:31 PM     Lipase:    Lab Results   Component Value Date/Time    LIPASE 18.0 09/15/2022 04:51 AM    LIPASE 24.0 09/06/2022 07:32 PM    LIPASE 33.0 07/06/2021 02:31 PM      Mag:    Lab Results   Component Value Date/Time    MG 1.90 10/02/2022 04:30 AM    MG 1.80 10/01/2022 05:40 PM     Phos:     Lab Results   Component Value Date/Time    PHOS 2.6 10/02/2022 04:30 AM    PHOS 2.2 10/01/2022 04:08 AM      Coags:   Lab Results   Component Value Date/Time    PROTIME 23.3 10/02/2022 04:30 AM    INR 2.07 10/02/2022 04:30 AM    APTT 50.3 10/02/2022 04:30 AM       Cultures:  Anaerobic culture  No results found for: LABANAE  Fungus stain  No results found for requested labs within last 30 days. Gram stain  No results found for requested labs within last 30 days. Organism  No results found for: [facility]  Surgical culture  No results found for: CXSURG  Blood culture 1  No results found for requested labs within last 30 days. Blood culture 2  No results found for requested labs within last 30 days. Fecal occult  No results found for requested labs within last 30 days. GI bacterial pathogens by PCR  No results found for requested labs within last 30 days. C. difficile  No results found for requested labs within last 30 days. Urine culture  Lab Results   Component Value Date/Time    LABURIN No growth at 18 to 36 hours 09/29/2022 04:45 PM       Pathology:  No relevant pathology     Imaging:  I have personally reviewed the following films:    EXAMINATION:   CT OF THE ABDOMEN AND PELVIS WITH CONTRAST 10/1/2022 7:49 pm       TECHNIQUE:   CT of the abdomen and pelvis was performed with the administration of   intravenous contrast. Multiplanar reformatted images are provided for review.    Automated  exposure control, iterative reconstruction, and/or weight based adjustment of the mA/kV was utilized to reduce the radiation dose to as low   as reasonably achievable. COMPARISON:   09/15/2022, 09/06/2022       HISTORY:   ORDERING SYSTEM PROVIDED HISTORY: abdominal pain s/p complex hernia repair   TECHNOLOGIST PROVIDED HISTORY:   Additional Contrast?->Oral   Reason for exam:->abdominal pain s/p complex hernia repair   Reason for Exam: Fatigue (Pt brought in by [facility] from home for SOB, fatigue,   left sided neck pain in the cervical spine, stroke scale neg, VSS per EMS   upon arrival, Pt A and O x 4. Working out a [employer] fitness earlier this   morning, went home, symptoms started 1 hour ago when Pt was watching   football, states he had symptoms like this a month ago and was discharged   home. Also states that he stood up suddenly and got dizzy like he was going   to fall at home, denies falling at home.)       FINDINGS:   Lower Chest: Bibasilar consolidation new from the prior exam.       Organs: The liver, spleen, pancreas and adrenal glands are without focal   abnormality. The kidneys enhance symmetrically. No renal calculus. No   hydronephrosis or perinephric stranding. No biliary duct dilation. GI/Bowel: Enteric tube tip is noted within the proximal duodenum. Duodenal   diverticulum. Mild wall thickening of the transverse colon in the mid   abdomen with surrounding pericolonic stranding. There is also swirling of   the mesentery within the right mid abdomen new from the prior exam.  No other   dilated loops of bowel or bowel wall thickening. No free intraperitoneal air   is noted. Pelvis: Fibroid uterus. No bladder wall thickening. No pathologically   enlarged adenopathy. Trace free fluid in the pelvis slightly hyperdense in   attenuation. Peritoneum/Retroperitoneum: The aorta is normal in caliber. The celiac axis,   SMA and SG are patent. The portal venous system is patent.   There is   omental and mesenteric stranding predominately within the right upper   quadrant and mid abdomen. Postsurgical changes from interval hernia repair   along the anterior abdominal wall. There are multiple foci of gas noted   within the anterior abdominal wall extending into the subcutaneous soft   tissues of the right upper quadrant. Elongated collection of fluid is noted   along the anterior abdominal wall without drainable component. There are   findings suspicious for recurrent fat containing hernia with surrounding   inflammatory changes (image 91). Bones/Soft Tissues: Subcutaneous edema with multiple foci of gas are   identified. Fluid is noted along the anterior abdominal wall along the   midline incision as well. There is skin thickening along the anterior   abdominal wall at the level of midline incision. No acute osseous   abnormality. Impression   1. Postsurgical changes from interval hernia repair. There is elongated   fluid along the anterior abdominal wall at the hernia repair site with   multiple foci of gas extending along the anterior abdominal wall and into the   subcutaneous soft tissues predominant within the right upper quadrant. No   definite free intraperitoneal air is identified. Inflammatory changes are   noted in the omentum and mesentery along the anterior abdomen some which may   be postsurgical in etiology. There are associated reactive inflammatory   changes involving the transverse colon at this level as well. Surgical drain   remains in place. 2. There is a questionable small recurrent fat containing hernia along the   midline abdomen with surrounding inflammatory changes versus an area of fat   necrosis. .   3. Trace hyperdense free fluid in the pelvis some of which is proteinaceous   or hemorrhagic in etiology. This may be postoperative blood products or this   could be postoperative as well.    4. There is mild swirling of the mesentery within the right mid abdomen of   uncertain etiology without associated bowel obstruction or volvulus. An   underlying internal hernia cannot be entirely excluded given recent surgery. 5. Bibasilar pneumonia. 6. Fibroid uterus. Scheduled Meds:   ketorolac  30 mg IntraVENous Q6H    bisacodyl  10 mg Rectal Once    sodium chloride flush  5-40 mL IntraVENous 2 times per day    piperacillin-tazobactam  3,375 mg IntraVENous Q8H    sacubitril-valsartan  1 tablet Oral BID    spironolactone  50 mg Oral Daily    sodium chloride flush  5-40 mL IntraVENous 2 times per day    famotidine  20 mg Oral BID    Or    famotidine (PEPCID) injection  20 mg IntraVENous BID    enoxaparin  40 mg SubCUTAneous Q24H    acetaminophen  650 mg Oral Q6H    carvedilol  6.25 mg Oral BID WC    atorvastatin  40 mg Oral Nightly     Continuous Infusions:   sodium chloride 75 mL/hr at 10/02/22 1805    sodium chloride      sodium chloride       PRN Meds:.bisacodyl, sodium chloride flush, sodium chloride, diazePAM, diatrizoate meglumine-sodium, potassium chloride **OR** potassium alternative oral replacement **OR** potassium chloride, magnesium sulfate, benzocaine-menthol, sodium chloride flush, sodium chloride, ondansetron **OR** ondansetron, oxyCODONE **OR** oxyCODONE, diphenhydrAMINE, HYDROmorphone **OR** HYDROmorphone      Assessment:  Ms. Zuri Mcgrath is a 61 y.o. female who presents with   OR Date 9/27/2022, exploratory laparotomy with repair of reducible incisional hernia, bilateral transverse abdominis component releases with mesh placement, and lysis of adhesions  Hypertension  CHF  Paroxysmal atrial fibrillation, Xarelto currently held  UTI  Pneumonia    Plan:  1. Monitor bowel function, passing stool  2. Tolerating clear liquids, advance to fulls, switch to protonix  3. Pain control, minimize narcotics, valium for muscle spasms  4. Activity as tolerated, pulmonary toilet, incentive spirometry  5. Holding PO anticoagulation, on prophylactic lovenox  6. Antibiotics, coverage for UTI and pneumonia  7. Monitor and replace electrolytes as needed  8. Appreciate hospitalist/cardiology support  9. Discharge planning, anticipate 1-2 days    Flavio Durham MD, FACS  10/3/2022  9:14 AM

## 2022-10-03 NOTE — PROGRESS NOTES
HOSPITALISTS PROGRESS NOTE    10/3/2022 8:07 AM        Name: Juany Johnson . Admitted: 9/27/2022  Primary Care Provider: Jone Morfin (Tel: [phone])      Brief Course: This 61 y.o. female with PMHx of hypertension, A. fib on Xarelto and HFrEF (35%)  admitted for incisional hernia repair; s/p  exploratory laparotomy with repair of reducible incisional hernia, bilateral transverse abdominis component releases with mesh placement and lysis of adhesions. Hospital service consulted for medical management. Interval history:   Pt seen and examined today. Overnight events noted, interval ancillary notes and labs reviewed. Hemodynamically stable, afebrile, WBC WNL, cultures NGTD  Hemoglobin down to 8.3; no signs of GI or any overt bleed. Monitor H&H closely  Reported heartburn and left-sided abdominal pain; denied any nausea vomiting. Tolerating clear liquids; plan to advance to full liquid diet. Passing gas and having bowel movements        Assessment & Plan:     Incisional hernia repair; s/p  exploratory laparotomy with repair of reducible incisional hernia, bilateral transverse abdominis component releases with mesh placement and lysis of adhesions. General surgery following; tolerated clear liquid; advance to full liquids  Continue IV fluids, as needed antiemetics, pain meds. Monitor electrolytes closely    UTI: Urine suggestive of UTI. Urine culture NGTD    Anemia likely secondary to blood loss during surgery  Hemoglobin down to 9.6. Iron studies suggestive of anemia of chronic disease     HFrEF (EF of 35%):S/p  Biv-AICD upgrade 1/2021  On Entresto 49-51 mg bid, aldactone 50 mg daily, Coreg 6.25 mg bid, and jardiance at home  Continue Coreg.   Plan to restart on Entresto and Aldactone tomorrow if BP and labs allows     PAF:  /device interrogation  Cardiology consulted; device interrogation shows low burden 0.8% AF  On Coreg and Xarelto. Hold anticoagulation until cleared by general surgery    Hypertension: On Coreg.   Monitor BP closely       DVT PPX: Xarelto; currently on hold  Code:Full Code    Disposition: Once acute medical issues have resolved    Current Medications  ketorolac (TORADOL) injection 30 mg, Q6H  bisacodyl (DULCOLAX) suppository 10 mg, Daily PRN  bisacodyl (DULCOLAX) suppository 10 mg, Once  0.9 % sodium chloride infusion, Continuous  sodium chloride flush 0.9 % injection 5-40 mL, 2 times per day  sodium chloride flush 0.9 % injection 5-40 mL, PRN  0.9 % sodium chloride infusion, PRN  diazePAM (VALIUM) injection 5 mg, Q8H PRN  diatrizoate meglumine-sodium (GASTROGRAFIN) 66-10 % solution 20 mL, ONCE PRN  piperacillin-tazobactam (ZOSYN) 3,375 mg in dextrose 5 % 50 mL IVPB extended infusion (mini-bag), Q8H  potassium chloride (KLOR-CON M) extended release tablet 40 mEq, PRN   Or  potassium bicarb-citric acid (EFFER-K) effervescent tablet 40 mEq, PRN   Or  potassium chloride 10 mEq/100 mL IVPB (Peripheral Line), PRN  sacubitril-valsartan (ENTRESTO) 49-51 MG per tablet 1 tablet, BID  spironolactone (ALDACTONE) tablet 50 mg, Daily  magnesium sulfate 1000 mg in dextrose 5% 100 mL IVPB, PRN  benzocaine-menthol (CEPACOL SORE THROAT) lozenge 1 lozenge, Q2H PRN  sodium chloride flush 0.9 % injection 5-40 mL, 2 times per day  sodium chloride flush 0.9 % injection 5-40 mL, PRN  0.9 % sodium chloride infusion, PRN  ondansetron (ZOFRAN-ODT) disintegrating tablet 4 mg, Q8H PRN   Or  ondansetron (ZOFRAN) injection 4 mg, Q6H PRN  famotidine (PEPCID) tablet 20 mg, BID   Or  famotidine (PEPCID) 20 mg in sodium chloride (PF) 10 mL injection, BID  enoxaparin (LOVENOX) injection 40 mg, Q24H  oxyCODONE (ROXICODONE) immediate release tablet 5 mg, Q4H PRN   Or  oxyCODONE (ROXICODONE) immediate release tablet 10 mg, Q4H PRN  acetaminophen (TYLENOL) tablet 650 mg, Q6H  diphenhydrAMINE (BENADRYL) injection 25 mg, Q6H PRN  HYDROmorphone HCl PF (DILAUDID) injection 0.5 mg, Q2H PRN   Or  HYDROmorphone HCl PF (DILAUDID) injection 1 mg, Q2H PRN  carvedilol (COREG) tablet 6.25 mg, BID WC  atorvastatin (LIPITOR) tablet 40 mg, Nightly      Objective:  BP (!) 148/88   Pulse 74   Temp 97.7 °F (36.5 °C) (Oral)   Resp 17   Ht 5' 6\" (1.676 m)   Wt 222 lb 4 oz (100.8 kg)   SpO2 95%   BMI 35.87 kg/m²     Intake/Output Summary (Last 24 hours) at 10/3/2022 1021  Last data filed at 10/2/2022 2205  Gross per 24 hour   Intake 550 ml   Output 500 ml   Net 50 ml        Wt Readings from Last 3 Encounters:   10/03/22 222 lb 4 oz (100.8 kg)   09/26/22 212 lb (96.2 kg)   09/06/22 215 lb (97.5 kg)       Physical Examination:   General appearance:  No apparent distress, appears stated age and cooperative. HEENT: Normocephalic, sclera clear., PERRLA. Trachea midline, no adenopathy. Cardiovascular: Regular rate and rhythm, normal S1, S2. No murmur. Respiratory:Clear to auscultation bilaterally, no wheeze or crackles. GI: Abdomen soft, no tenderness, not distended, normal bowel sounds  Musculoskeletal: No cyanosis in digits. No BLE edema present  Neurology: CN 2-12 grossly intact. No speech or motor deficits  Psych: Not agitated, appropriate affect  Skin: Warm, dry, normal turgor    Labs and Tests:  CBC:   Recent Labs     10/01/22  1740 10/02/22  0430 10/03/22  0500   WBC 8.0 7.6 7.4   HGB 9.1* 9.4* 8.3*    257 259       BMP:    Recent Labs     10/01/22  0408 10/01/22  1740 10/02/22  0430 10/03/22  0500     --  142 144   K 3.2* 3.3* 3.4* 3.5     --  109 111*   CO2 21  --  22 22   BUN 8  --  8 9   CREATININE 0.8  --  0.9 0.8   GLUCOSE 93  --  93 90       Hepatic: No results for input(s): AST, ALT, ALB, BILITOT, ALKPHOS in the last 72 hours. CT ABDOMEN PELVIS W IV CONTRAST Additional Contrast? Oral (water soluble contrast through NG)   Preliminary Result   1. Postsurgical changes from interval hernia repair.   There is elongated   fluid along the anterior abdominal wall at the hernia repair site with   multiple foci of gas extending along the anterior abdominal wall and into the   subcutaneous soft tissues predominant within the right upper quadrant. No   definite free intraperitoneal air is identified. Inflammatory changes are   noted in the omentum and mesentery along the anterior abdomen some which may   be postsurgical in etiology. There are associated reactive inflammatory   changes involving the transverse colon at this level as well. Surgical drain   remains in place. 2. There is a questionable small recurrent fat containing hernia along the   midline abdomen with surrounding inflammatory changes versus an area of fat   necrosis. .   3. Trace hyperdense free fluid in the pelvis some of which is proteinaceous   or hemorrhagic in etiology. This may be postoperative blood products or this   could be postoperative as well. 4. There is mild swirling of the mesentery within the right mid abdomen of   uncertain etiology without associated bowel obstruction or volvulus. An   underlying internal hernia cannot be entirely excluded given recent surgery. 5. Bibasilar pneumonia. 6. Fibroid uterus. XR CHEST PORTABLE   Final Result   1. NG tube extends below the left hemidiaphragm, into the upper abdomen, tip   overlying the expected level of the distal stomach or proximal duodenum,   right upper quadrant. Consider pulling back NG tube 4-5 cm to ensure tip is   at the stomach region. 2.  Vascular engorgement and cephalization is demonstrated with bilateral   peribronchial cuffing and perivascular haziness. 3. Calcific atherosclerosis aorta. 4. Cardiomegaly. 5. Prominent soft tissue density upper right paramediastinal region is   stable, typical of brachiocephalic vessel tortuosity/ectasia or occasionally   thyroid goiter.       RECOMMENDATION:   Consider pulling back NG tube 4-5 cm to ensure tip is at the stomach region. XR ABDOMEN (2 VIEWS)   Final Result   Nonspecific bowel gas pattern. Left basilar atelectasis or airspace disease. Problem List  Principal Problem:    Incisional hernia without obstruction or gangrene  Active Problems:    PAF (paroxysmal atrial fibrillation) (HCC)    AICD (automatic cardioverter/defibrillator) present  Resolved Problems:    * No resolved hospital problems.  Raul Glass MD   10/3/2022 8:07 AM

## 2022-10-04 ENCOUNTER — TELEPHONE (OUTPATIENT)
Dept: OTHER | Facility: CLINIC | Age: 57
End: 2022-10-04

## 2022-10-04 ENCOUNTER — APPOINTMENT (OUTPATIENT)
Dept: GENERAL RADIOLOGY | Age: 57
DRG: 661 | End: 2022-10-04
Payer: MEDICAID

## 2022-10-04 ENCOUNTER — APPOINTMENT (OUTPATIENT)
Dept: CT IMAGING | Age: 57
DRG: 661 | End: 2022-10-04
Payer: MEDICAID

## 2022-10-04 ENCOUNTER — HOSPITAL ENCOUNTER (INPATIENT)
Age: 57
LOS: 2 days | Discharge: HOME HEALTH | DRG: 661 | End: 2022-10-07
Attending: SURGERY | Admitting: SURGERY
Payer: MEDICAID

## 2022-10-04 ENCOUNTER — TELEPHONE (OUTPATIENT)
Dept: SURGERY | Age: 57
End: 2022-10-04

## 2022-10-04 VITALS
WEIGHT: 216.5 LBS | BODY MASS INDEX: 34.79 KG/M2 | RESPIRATION RATE: 18 BRPM | HEART RATE: 87 BPM | HEIGHT: 66 IN | DIASTOLIC BLOOD PRESSURE: 88 MMHG | TEMPERATURE: 97.4 F | SYSTOLIC BLOOD PRESSURE: 147 MMHG | OXYGEN SATURATION: 97 %

## 2022-10-04 DIAGNOSIS — S30.1XXA HEMATOMA OF RECTUS SHEATH, INITIAL ENCOUNTER: ICD-10-CM

## 2022-10-04 DIAGNOSIS — D64.9 ANEMIA, UNSPECIFIED TYPE: Primary | ICD-10-CM

## 2022-10-04 LAB
A/G RATIO: 1.1 (ref 1.1–2.2)
ALBUMIN SERPL-MCNC: 3.3 G/DL (ref 3.4–5)
ALP BLD-CCNC: 72 U/L (ref 40–129)
ALT SERPL-CCNC: 7 U/L (ref 10–40)
ANION GAP SERPL CALCULATED.3IONS-SCNC: 10 MMOL/L (ref 3–16)
ANION GAP SERPL CALCULATED.3IONS-SCNC: 10 MMOL/L (ref 3–16)
ANISOCYTOSIS: ABNORMAL
AST SERPL-CCNC: 31 U/L (ref 15–37)
BANDED NEUTROPHILS RELATIVE PERCENT: 2 % (ref 0–7)
BASOPHILS ABSOLUTE: 0 K/UL (ref 0–0.2)
BASOPHILS ABSOLUTE: 0 K/UL (ref 0–0.2)
BASOPHILS RELATIVE PERCENT: 0 %
BASOPHILS RELATIVE PERCENT: 0 %
BILIRUB SERPL-MCNC: 0.6 MG/DL (ref 0–1)
BUN BLDV-MCNC: 6 MG/DL (ref 7–20)
BUN BLDV-MCNC: 7 MG/DL (ref 7–20)
CALCIUM SERPL-MCNC: 8.5 MG/DL (ref 8.3–10.6)
CALCIUM SERPL-MCNC: 8.6 MG/DL (ref 8.3–10.6)
CHLORIDE BLD-SCNC: 106 MMOL/L (ref 99–110)
CHLORIDE BLD-SCNC: 106 MMOL/L (ref 99–110)
CO2: 22 MMOL/L (ref 21–32)
CO2: 22 MMOL/L (ref 21–32)
CREAT SERPL-MCNC: 0.7 MG/DL (ref 0.6–1.1)
CREAT SERPL-MCNC: 0.7 MG/DL (ref 0.6–1.1)
EOSINOPHILS ABSOLUTE: 0.5 K/UL (ref 0–0.6)
EOSINOPHILS ABSOLUTE: 0.5 K/UL (ref 0–0.6)
EOSINOPHILS RELATIVE PERCENT: 4 %
EOSINOPHILS RELATIVE PERCENT: 6 %
GFR AFRICAN AMERICAN: >60
GFR AFRICAN AMERICAN: >60
GFR NON-AFRICAN AMERICAN: >60
GFR NON-AFRICAN AMERICAN: >60
GLUCOSE BLD-MCNC: 105 MG/DL (ref 70–99)
GLUCOSE BLD-MCNC: 117 MG/DL (ref 70–99)
HCT VFR BLD CALC: 22.4 % (ref 36–48)
HCT VFR BLD CALC: 25.4 % (ref 36–48)
HEMOGLOBIN: 7.4 G/DL (ref 12–16)
HEMOGLOBIN: 8.4 G/DL (ref 12–16)
LIPASE: 29 U/L (ref 13–60)
LYMPHOCYTES ABSOLUTE: 0.5 K/UL (ref 1–5.1)
LYMPHOCYTES ABSOLUTE: 0.7 K/UL (ref 1–5.1)
LYMPHOCYTES RELATIVE PERCENT: 6 %
LYMPHOCYTES RELATIVE PERCENT: 6 %
MACROCYTES: ABNORMAL
MCH RBC QN AUTO: 29.7 PG (ref 26–34)
MCH RBC QN AUTO: 29.9 PG (ref 26–34)
MCHC RBC AUTO-ENTMCNC: 33.1 G/DL (ref 31–36)
MCHC RBC AUTO-ENTMCNC: 33.1 G/DL (ref 31–36)
MCV RBC AUTO: 89.7 FL (ref 80–100)
MCV RBC AUTO: 90.2 FL (ref 80–100)
METAMYELOCYTES RELATIVE PERCENT: 1 %
MONOCYTES ABSOLUTE: 0.5 K/UL (ref 0–1.3)
MONOCYTES ABSOLUTE: 0.8 K/UL (ref 0–1.3)
MONOCYTES RELATIVE PERCENT: 4 %
MONOCYTES RELATIVE PERCENT: 9 %
MYELOCYTE PERCENT: 1 %
NEUTROPHILS ABSOLUTE: 7.2 K/UL (ref 1.7–7.7)
NEUTROPHILS ABSOLUTE: 9.7 K/UL (ref 1.7–7.7)
NEUTROPHILS RELATIVE PERCENT: 77 %
NEUTROPHILS RELATIVE PERCENT: 84 %
NUCLEATED RED BLOOD CELLS: 2 /100 WBC
PDW BLD-RTO: 16.5 % (ref 12.4–15.4)
PDW BLD-RTO: 16.6 % (ref 12.4–15.4)
PLATELET # BLD: 320 K/UL (ref 135–450)
PLATELET # BLD: 329 K/UL (ref 135–450)
PLATELET SLIDE REVIEW: ADEQUATE
PMV BLD AUTO: 7.4 FL (ref 5–10.5)
PMV BLD AUTO: 7.4 FL (ref 5–10.5)
POLYCHROMASIA: ABNORMAL
POLYCHROMASIA: ABNORMAL
POTASSIUM SERPL-SCNC: 3.2 MMOL/L (ref 3.5–5.1)
POTASSIUM SERPL-SCNC: 3.5 MMOL/L (ref 3.5–5.1)
RBC # BLD: 2.48 M/UL (ref 4–5.2)
RBC # BLD: 2.83 M/UL (ref 4–5.2)
SLIDE REVIEW: ABNORMAL
SODIUM BLD-SCNC: 138 MMOL/L (ref 136–145)
SODIUM BLD-SCNC: 138 MMOL/L (ref 136–145)
TOTAL PROTEIN: 6.4 G/DL (ref 6.4–8.2)
WBC # BLD: 11.3 K/UL (ref 4–11)
WBC # BLD: 9.1 K/UL (ref 4–11)

## 2022-10-04 PROCEDURE — 96375 TX/PRO/DX INJ NEW DRUG ADDON: CPT

## 2022-10-04 PROCEDURE — 36415 COLL VENOUS BLD VENIPUNCTURE: CPT

## 2022-10-04 PROCEDURE — P9016 RBC LEUKOCYTES REDUCED: HCPCS

## 2022-10-04 PROCEDURE — 99285 EMERGENCY DEPT VISIT HI MDM: CPT

## 2022-10-04 PROCEDURE — 6360000004 HC RX CONTRAST MEDICATION: Performed by: PHYSICIAN ASSISTANT

## 2022-10-04 PROCEDURE — 6370000000 HC RX 637 (ALT 250 FOR IP): Performed by: NURSE PRACTITIONER

## 2022-10-04 PROCEDURE — 86923 COMPATIBILITY TEST ELECTRIC: CPT

## 2022-10-04 PROCEDURE — 6370000000 HC RX 637 (ALT 250 FOR IP): Performed by: SURGERY

## 2022-10-04 PROCEDURE — 86901 BLOOD TYPING SEROLOGIC RH(D): CPT

## 2022-10-04 PROCEDURE — 85730 THROMBOPLASTIN TIME PARTIAL: CPT

## 2022-10-04 PROCEDURE — 99024 POSTOP FOLLOW-UP VISIT: CPT | Performed by: SURGERY

## 2022-10-04 PROCEDURE — 85610 PROTHROMBIN TIME: CPT

## 2022-10-04 PROCEDURE — 2580000003 HC RX 258: Performed by: SURGERY

## 2022-10-04 PROCEDURE — 85025 COMPLETE CBC W/AUTO DIFF WBC: CPT

## 2022-10-04 PROCEDURE — 2580000003 HC RX 258: Performed by: PHYSICIAN ASSISTANT

## 2022-10-04 PROCEDURE — 83690 ASSAY OF LIPASE: CPT

## 2022-10-04 PROCEDURE — 74177 CT ABD & PELVIS W/CONTRAST: CPT

## 2022-10-04 PROCEDURE — APPSS30 APP SPLIT SHARED TIME 16-30 MINUTES: Performed by: NURSE PRACTITIONER

## 2022-10-04 PROCEDURE — 6360000002 HC RX W HCPCS: Performed by: PHYSICIAN ASSISTANT

## 2022-10-04 PROCEDURE — 6360000002 HC RX W HCPCS: Performed by: SURGERY

## 2022-10-04 PROCEDURE — 96374 THER/PROPH/DIAG INJ IV PUSH: CPT

## 2022-10-04 PROCEDURE — 86900 BLOOD TYPING SEROLOGIC ABO: CPT

## 2022-10-04 PROCEDURE — 80053 COMPREHEN METABOLIC PANEL: CPT

## 2022-10-04 PROCEDURE — P9017 PLASMA 1 DONOR FRZ W/IN 8 HR: HCPCS

## 2022-10-04 PROCEDURE — 86850 RBC ANTIBODY SCREEN: CPT

## 2022-10-04 PROCEDURE — 71045 X-RAY EXAM CHEST 1 VIEW: CPT

## 2022-10-04 PROCEDURE — APPNB30 APP NON BILLABLE TIME 0-30 MINS: Performed by: NURSE PRACTITIONER

## 2022-10-04 RX ORDER — OXYCODONE HYDROCHLORIDE 5 MG/1
5 TABLET ORAL EVERY 6 HOURS PRN
Qty: 12 TABLET | Refills: 0 | Status: SHIPPED | OUTPATIENT
Start: 2022-10-04 | End: 2022-10-11

## 2022-10-04 RX ORDER — 0.9 % SODIUM CHLORIDE 0.9 %
1000 INTRAVENOUS SOLUTION INTRAVENOUS ONCE
Status: COMPLETED | OUTPATIENT
Start: 2022-10-04 | End: 2022-10-05

## 2022-10-04 RX ORDER — HYDROMORPHONE HYDROCHLORIDE 1 MG/ML
1 INJECTION, SOLUTION INTRAMUSCULAR; INTRAVENOUS; SUBCUTANEOUS ONCE
Status: COMPLETED | OUTPATIENT
Start: 2022-10-04 | End: 2022-10-04

## 2022-10-04 RX ORDER — ONDANSETRON 2 MG/ML
4 INJECTION INTRAMUSCULAR; INTRAVENOUS EVERY 6 HOURS PRN
Status: DISCONTINUED | OUTPATIENT
Start: 2022-10-04 | End: 2022-10-05 | Stop reason: SDUPTHER

## 2022-10-04 RX ORDER — FENTANYL CITRATE 50 UG/ML
50 INJECTION, SOLUTION INTRAMUSCULAR; INTRAVENOUS ONCE
Status: DISCONTINUED | OUTPATIENT
Start: 2022-10-04 | End: 2022-10-07 | Stop reason: HOSPADM

## 2022-10-04 RX ADMIN — Medication 10 ML: at 09:44

## 2022-10-04 RX ADMIN — SODIUM CHLORIDE 25 ML: 9 INJECTION, SOLUTION INTRAVENOUS at 13:04

## 2022-10-04 RX ADMIN — PIPERACILLIN AND TAZOBACTAM 3375 MG: 3; .375 INJECTION, POWDER, FOR SOLUTION INTRAVENOUS at 13:06

## 2022-10-04 RX ADMIN — PIPERACILLIN AND TAZOBACTAM 3375 MG: 3; .375 INJECTION, POWDER, FOR SOLUTION INTRAVENOUS at 05:24

## 2022-10-04 RX ADMIN — CARVEDILOL 6.25 MG: 6.25 TABLET, FILM COATED ORAL at 08:11

## 2022-10-04 RX ADMIN — PANTOPRAZOLE SODIUM 40 MG: 40 TABLET, DELAYED RELEASE ORAL at 05:20

## 2022-10-04 RX ADMIN — IOPAMIDOL 75 ML: 755 INJECTION, SOLUTION INTRAVENOUS at 22:22

## 2022-10-04 RX ADMIN — HYDROMORPHONE HYDROCHLORIDE 1 MG: 1 INJECTION, SOLUTION INTRAMUSCULAR; INTRAVENOUS; SUBCUTANEOUS at 23:04

## 2022-10-04 RX ADMIN — ACETAMINOPHEN 650 MG: 325 TABLET ORAL at 09:42

## 2022-10-04 RX ADMIN — SPIRONOLACTONE 50 MG: 25 TABLET ORAL at 09:43

## 2022-10-04 RX ADMIN — ACETAMINOPHEN 650 MG: 325 TABLET ORAL at 05:19

## 2022-10-04 RX ADMIN — ONDANSETRON 4 MG: 2 INJECTION INTRAMUSCULAR; INTRAVENOUS at 23:03

## 2022-10-04 RX ADMIN — SACUBITRIL AND VALSARTAN 1 TABLET: 49; 51 TABLET, FILM COATED ORAL at 09:43

## 2022-10-04 RX ADMIN — SODIUM CHLORIDE 1000 ML: 9 INJECTION, SOLUTION INTRAVENOUS at 23:03

## 2022-10-04 ASSESSMENT — PAIN SCALES - GENERAL
PAINLEVEL_OUTOF10: 10
PAINLEVEL_OUTOF10: 3
PAINLEVEL_OUTOF10: 10
PAINLEVEL_OUTOF10: 4

## 2022-10-04 ASSESSMENT — PAIN DESCRIPTION - LOCATION
LOCATION: ABDOMEN

## 2022-10-04 ASSESSMENT — PAIN DESCRIPTION - ORIENTATION
ORIENTATION: RIGHT;LOWER
ORIENTATION: RIGHT
ORIENTATION: RIGHT

## 2022-10-04 ASSESSMENT — PAIN - FUNCTIONAL ASSESSMENT
PAIN_FUNCTIONAL_ASSESSMENT: 0-10
PAIN_FUNCTIONAL_ASSESSMENT: ACTIVITIES ARE NOT PREVENTED

## 2022-10-04 ASSESSMENT — PAIN DESCRIPTION - DESCRIPTORS
DESCRIPTORS: ACHING
DESCRIPTORS: ACHING

## 2022-10-04 ASSESSMENT — PAIN DESCRIPTION - FREQUENCY: FREQUENCY: CONTINUOUS

## 2022-10-04 ASSESSMENT — PAIN DESCRIPTION - ONSET: ONSET: ON-GOING

## 2022-10-04 ASSESSMENT — PAIN DESCRIPTION - PAIN TYPE: TYPE: ACUTE PAIN;SURGICAL PAIN

## 2022-10-04 NOTE — PROGRESS NOTES
No new events overnight. Patient reports pain is minimal, describes it as \"acid reflux\". VSS. Denies any additional needs at this time.  Demarcus Hudson RN

## 2022-10-04 NOTE — PLAN OF CARE
Problem: Cardiovascular - Adult  Goal: Maintains optimal cardiac output and hemodynamic stability  Outcome: Progressing  Goal: Absence of cardiac dysrhythmias or at baseline  Outcome: Progressing     Problem: Skin/Tissue Integrity - Adult  Goal: Skin integrity remains intact  Outcome: Progressing  Goal: Incisions, wounds, or drain sites healing without S/S of infection  Outcome: Progressing     Problem: Gastrointestinal - Adult  Goal: Minimal or absence of nausea and vomiting  Outcome: Progressing  Goal: Maintains or returns to baseline bowel function  Outcome: Progressing  Goal: Maintains adequate nutritional intake  Outcome: Progressing

## 2022-10-04 NOTE — DISCHARGE SUMMARY
[facility]        Discharge Summary    Patient Name: Ashkan Mensah  MRN: [id number]  YOB: 1965  PCP: Medina Mohr  Admission Date: 9/27/2022  Discharge Date: 10/4/2022  Disposition: home  Admitting Diagnosis: Incisional hernia, without obstruction or gangrene [K43.2]  Incisional hernia without obstruction or gangrene [K43.2]  Discharge Diagnosis:   Patient Active Problem List   Diagnosis    HTN (hypertension)    Other and unspecified hyperlipidemia    Congenital heart block    Dyspnea on exertion    Headache    LVH (left ventricular hypertrophy)    Persistent atrial fibrillation (HCC)    Chest pain    Systolic CHF, chronic (HCC)    Hypersomnia    Obstructive sleep apnea (adult) (pediatric)    LV dysfunction    Left subclavian vein thrombosis (HCC)    Dilated cardiomyopathy (HCC)    Class 1 obesity due to excess calories with body mass index (BMI) of 31.0 to 31.9 in adult    Paroxysmal ventricular tachycardia    AICD (automatic cardioverter/defibrillator) present    Exertional dyspnea    Iron deficiency anemia    Other fatigue    Anemia    Gait instability    Adequate anticoagulation on anticoagulant therapy    Duodenal mass    Weight loss counseling, encounter for    Incisional hernia, without obstruction or gangrene    Dizziness    Incisional hernia without obstruction or gangrene    PAF (paroxysmal atrial fibrillation) ([facility] [address]      Consultants: IP CONSULT TO HOSPITALIST  IP CONSULT TO CARDIOLOGY    Procedures/Diagnostic Test(s):  CT ABDOMEN PELVIS W IV CONTRAST Additional Contrast? Oral (water soluble contrast through NG)   Final Result   1. Postsurgical changes from interval hernia repair. There is elongated   fluid along the anterior abdominal wall at the hernia repair site with   multiple foci of gas extending along the anterior abdominal wall and into the   subcutaneous soft tissues predominant within the right upper quadrant.   No   definite free intraperitoneal air is identified. Inflammatory changes are   noted in the omentum and mesentery along the anterior abdomen some which may   be postsurgical in etiology. There are associated reactive inflammatory   changes involving the transverse colon at this level as well. Surgical drain   remains in place. 2. There is a questionable small recurrent fat containing hernia along the   midline abdomen with surrounding inflammatory changes versus an area of fat   necrosis. .   3. Trace hyperdense free fluid in the pelvis some of which is proteinaceous   or hemorrhagic in etiology. This may be postoperative blood products or this   could be postoperative as well. 4. There is mild swirling of the mesentery within the right mid abdomen of   uncertain etiology without associated bowel obstruction or volvulus. An   underlying internal hernia cannot be entirely excluded given recent surgery. 5. Bibasilar pneumonia. 6. Fibroid uterus. XR CHEST PORTABLE   Final Result   1. NG tube extends below the left hemidiaphragm, into the upper abdomen, tip   overlying the expected level of the distal stomach or proximal duodenum,   right upper quadrant. Consider pulling back NG tube 4-5 cm to ensure tip is   at the stomach region. 2.  Vascular engorgement and cephalization is demonstrated with bilateral   peribronchial cuffing and perivascular haziness. 3. Calcific atherosclerosis aorta. 4. Cardiomegaly. 5. Prominent soft tissue density upper right paramediastinal region is   stable, typical of brachiocephalic vessel tortuosity/ectasia or occasionally   thyroid goiter. RECOMMENDATION:   Consider pulling back NG tube 4-5 cm to ensure tip is at the stomach region. XR ABDOMEN (2 VIEWS)   Final Result   Nonspecific bowel gas pattern. Left basilar atelectasis or airspace disease.              Discharge Condition: good    HOSPITAL COURSE: Romero Sanders originally presented to the hospital on 9/27/2022 5:55 AM with a reducible wide defect incisional hernia. The hernia preoperatively was found to have a wide enough fascial defect as to likely need component releases, which are musculocutaneous flaps likely bilateral as well as mesh placement. The patient was taken to the operating room on 9/27/2022 and underwent exploratory laparotomy with repair of reducible incisional hernia, bilateral transverse abdominis component releases with mesh placement, and lysis of adhesions. Cardiology and hospitalist were consulted for management of medical comorbidities including urinary tract infection, pneumonia, anemia, CHF, CHB/cardiomyopathy, paroxysmal atrial fibrillation, and hypertension. Hospital course was uneventful. At time of discharge, Ceferino Maldonado was tolerating a regular diet, had active bowel sounds, ambulating on her own accord and had adequate analgesia on oral pain medications, and had no signs of symptoms of complications. PHYSICAL EXAMINATION:  Discharge Vitals:  height is 5' 6\" (1.676 m) and weight is 216 lb 8 oz (98.2 kg). Her oral temperature is 97.4 °F (36.3 °C). Her blood pressure is 147/88 (abnormal) and her pulse is 87. Her respiration is 18 and oxygen saturation is 97%.    General appearance - alert, well appearing, and in no distress  Chest - clear to ausculation  Heart - normal rate and regular rhythm  Abdomen - soft, non-distended, incisional tenderness only, bowel sounds present  Neurological - motor and sensory grossly normal bilaterally  Musculoskeletal - full range of motion without pain  Extremities - peripheral pulses normal, no pedal edema, no clubbing or cyanosis  Incision: healing well, no drainage, no erythema, well approximated--dressing changed    LABS:  Recent Labs     10/01/22  1740 10/02/22  0430 10/03/22  0500   WBC 8.0 7.6 7.4   HGB 9.1* 9.4* 8.3*   HCT 27.6* 28.1* 24.7*    257 259   NA  --  142 144   K 3.3* 3.4* 3.5   CL  --  109 111*   CO2  --  22 22   BUN  --  8 9 CREATININE  --  0.9 0.8       DISCHARGE INSTRUCTIONS:  1. Discharge medications:      Medication List        START taking these medications      oxyCODONE 5 MG immediate release tablet  Commonly known as: Roxicodone  Take 1 tablet by mouth every 6 hours as needed for Pain for up to 7 days. Take lowest dose possible to manage pain; may stop taking sooner than 7 days if pain is able to be controlled with non-narcotic analgesics and therapies. CONTINUE taking these medications      ammonium lactate 12 % lotion  Commonly known as: LAC-HYDRIN     atorvastatin 40 MG tablet  Commonly known as: LIPITOR  Take 1 tablet by mouth daily     carvedilol 6.25 MG tablet  Commonly known as: COREG  Take 1 tablet by mouth 2 times daily     fluticasone 50 MCG/ACT nasal spray  Commonly known as: FLONASE     hydrocortisone 1 % cream     pantoprazole 40 MG tablet  Commonly known as: PROTONIX  Take 1 tablet by mouth every morning (before breakfast)     potassium chloride 10 MEQ extended release tablet  Commonly known as: KLOR-CON M  Take 1 tablet by mouth daily     sacubitril-valsartan 49-51 MG per tablet  Commonly known as: ENTRESTO  Take 1 tablet by mouth 2 times daily     spironolactone 50 MG tablet  Commonly known as: ALDACTONE  TAKE ONE TABLET BY MOUTH DAILY     therapeutic multivitamin-minerals tablet     vitamin D 1.25 MG (43674 UT) Caps capsule  Commonly known as: ERGOCALCIFEROL  TAKE ONE CAPSULE BY MOUTH ONCE WEEKLY     vitamin D3 25 MCG (1000 UT) Tabs tablet  Commonly known as: CHOLECALCIFEROL  Take 1 tablet by mouth daily     Xarelto 20 MG Tabs tablet  Generic drug: rivaroxaban  TAKE ONE TABLET BY MOUTH DAILY WITH SUPPER               Where to Get Your Medications        You can get these medications from any pharmacy    Bring a paper prescription for each of these medications  oxyCODONE 5 MG immediate release tablet       2. Diet as tolerated.   3. Activity: activity as tolerated, no driving while on analgesics, and no heavy lifting for 6 weeks. 4. Wound care: keep incisions clean and dry  5. Follow-up: recommend follow up with PCP in 2-4 weeks. 6. Okay to shower, don't submerge incisions under water. 7. No driving until off pain medication and able to move torso freely without any pain. 8. Return to hospital, or contact Dr. Sofi Meeks office ([phone]) if any signs of infection are seen. 9. The patient is not currently smoking. Recommend maintaining a smoke-free lifestyle. 10. Return to Clinic: in 2 weeks. 11. Reviewed discharge instructions, restrictions, and reasons to call the office. EDUCATION:  Educated patient on plan of care and disease process--all questions answered. Plans discussed with patient and nursing. Reviewed and discussed with Dr. Chi Powers. Time spent for discharge: 30 minutes, including plan of care, patient education, and care coordination. Signed:  CATRACHITO Dick - CNP  10/4/2022 2:43 PM    Monserrat Powers MD, FACS  10/4/2022  3:51 PM

## 2022-10-04 NOTE — PROGRESS NOTES
Physical Therapy     Pt reports walking halls with nsg and family and within the room on her own. Denies any further PT services and has equipment needed for discharge. Will dc from caseload.    Thanks, Jam Noriega, DPT [id number]

## 2022-10-04 NOTE — DISCHARGE INSTRUCTIONS
[facility]    Discharge Instructions      Activity  - activity as tolerated, no driving while on analgesics, and no heavy lifting for 6 weeks; it is OK to be up walking around--walking up and down stairs is also OK. Do what is comfortable: stop and rest if you feel tired. Diet  - regular diet  - Drink plenty of fluids     Pain  - You may use narcotic pain medication as prescribed for breakthrough pain  - You can use OTC Tylenol or Ibuprofen for 1-2 weeks (may need to adjust the dose as directed on the bottle if enteric coated ibuprofen chosen)  - Avoid taking narcotic pain medication on an empty stomach which may result in nausea, if pain medication is causing nausea try decreasing the dose  - Narcotic pain medication is not necessary, please contact the office if pain is not controlled  - Narcotic pain medication will not be refilled on weekends and after hours  - Please contact the office Monday-Friday 8a-4p if a refill is requested    Nausea  - Avoid taking narcotic pain medication on an empty stomach which may result in nausea  - If pain medication is causing nausea you may try decreasing the dose  - Nausea can be common for 24 hours after surgery--if nausea uncontrolled or worsening, contact the office or go to the ED    Constipation  - Pain medication can be constipating  - Often, it helps to take a stool softener with the goal of at least one soft bowel movement daily with minimal straining  - You may also need to increase water and fiber intake--may supplement with Benefiber and increasing your activity will also be helpful  - If a stool softener is needed, the following OTC medications are recommend: Colace 100 mg by mouth twice daily, Miralax 17 gm by mouth 1-3 times daily with glass of water, dulcolax suppositories, and Fleets enemas    Wound Care  - keep wound clean and dry  - If incisional bleeding is noted, hold firm pressure for 15-20 minutes.  If remains uncontrolled, either contact the office or present to nearest ED  - It is common to have bruising or mild redness around the wounds within the first few days after surgery. If it worsens, or starts draining, do not hesitate to contact the office  - May shower but no tub baths or swimming pools--do not submerge incisions under water    Cautions  - Watch for signs of infection, such as: fever over 100.5, excessive warmth or redness around incisions, bloody or cloudy drainage from incisions  - Watch for signs of complication: uncontrolled pain, nausea, bloating, sudden lightheadedness  - Serious problems can arise after surgery that need urgent or immediate care  - Do not hesitate to contact the office with any questions    Follow-up with your surgeon in  2 weeks. Call [phone] to schedule follow-up visit.

## 2022-10-04 NOTE — PROGRESS NOTES
Patient is awake ; up in chair. Shift assessment completed. Neuro WNL. Denies any pain at this time. AM meds administered per [facility] by student nurse and instructor. The care plan and education has been reviewed and mutually agreed upon with the patient. Standard safety measures in place. Abd binder remains in place. 4:13 PM  IV removed prior dc. The care plan and education has been resolved and completed. Discharge instructions and medications reviewed with patient. Patient voices understanding and denies further concerns at this time. Patient discharged home w/ [facility] per order with all personal belongings. The patient is transported in a wheelchair to private transportation home.

## 2022-10-04 NOTE — TELEPHONE ENCOUNTER
Marlys from [facility] called Inquiring if Dr Rekha Alexander is going to follow PT for Home health care.   Please Advise PT is being Discharged today from hospital  Pavillion Session from [facility] [phone]

## 2022-10-04 NOTE — PROGRESS NOTES
Shift assessment complete. VSS. Scheduled medications given. Fall precautions in place. Call light within reach. Pt. denies further needs at this time.

## 2022-10-04 NOTE — PROGRESS NOTES
HOSPITALISTS PROGRESS NOTE    10/4/2022 9:26 AM        Name: Juany Johnson . Admitted: 9/27/2022  Primary Care Provider: Jone Morfin (Tel: [phone])      Brief Course: This 61 y.o. female with PMHx of hypertension, A. fib on Xarelto and HFrEF (35%)  admitted for incisional hernia repair; s/p  exploratory laparotomy with repair of reducible incisional hernia, bilateral transverse abdominis component releases with mesh placement and lysis of adhesions. Hospital service consulted for medical management. Interval history:   Pt seen and examined today. Overnight events noted, interval ancillary notes and labs reviewed. Hemodynamically stable. On room air satting well  Tolerating diet. Denied any nausea vomiting or abdominal pain  Plan for the patient to be discharged home today      Assessment & Plan:     Incisional hernia repair; s/p  exploratory laparotomy with repair of reducible incisional hernia, bilateral transverse abdominis component releases with mesh placement and lysis of adhesions. General surgery following; advance to full diet  Continue IV fluids, as needed antiemetics, pain meds. Monitor electrolytes closely    UTI: Urine suggestive of UTI. Urine culture NGTD    Anemia likely secondary to blood loss during surgery  Hemoglobin down to 9.6. Iron studies suggestive of anemia of chronic disease     HFrEF (EF of 35%):S/p  Biv-AICD upgrade 1/2021  On Entresto 49-51 mg bid, aldactone 50 mg daily, Coreg 6.25 mg bid, and jardiance at home  Continue Coreg. Plan to restart on Entresto and Aldactone tomorrow if BP and labs allows     PAF:  /device interrogation  Cardiology consulted; device interrogation shows low burden 0.8% AF  On Coreg and Xarelto. Hold anticoagulation until cleared by general surgery    Hypertension: On Coreg.   Monitor BP closely       DVT PPX: Xarelto  Code:Full Code    Disposition: Once acute medical issues have resolved    Current Medications  oxyCODONE (ROXICODONE) immediate release tablet 5 mg, Q6H PRN   Or  oxyCODONE (ROXICODONE) immediate release tablet 10 mg, Q6H PRN  pantoprazole (PROTONIX) tablet 40 mg, QAM AC  bisacodyl (DULCOLAX) suppository 10 mg, Daily PRN  bisacodyl (DULCOLAX) suppository 10 mg, Once  0.9 % sodium chloride infusion, Continuous  sodium chloride flush 0.9 % injection 5-40 mL, 2 times per day  sodium chloride flush 0.9 % injection 5-40 mL, PRN  0.9 % sodium chloride infusion, PRN  diazePAM (VALIUM) injection 5 mg, Q8H PRN  diatrizoate meglumine-sodium (GASTROGRAFIN) 66-10 % solution 20 mL, ONCE PRN  piperacillin-tazobactam (ZOSYN) 3,375 mg in dextrose 5 % 50 mL IVPB extended infusion (mini-bag), Q8H  potassium chloride (KLOR-CON M) extended release tablet 40 mEq, PRN   Or  potassium bicarb-citric acid (EFFER-K) effervescent tablet 40 mEq, PRN   Or  potassium chloride 10 mEq/100 mL IVPB (Peripheral Line), PRN  sacubitril-valsartan (ENTRESTO) 49-51 MG per tablet 1 tablet, BID  spironolactone (ALDACTONE) tablet 50 mg, Daily  magnesium sulfate 1000 mg in dextrose 5% 100 mL IVPB, PRN  benzocaine-menthol (CEPACOL SORE THROAT) lozenge 1 lozenge, Q2H PRN  sodium chloride flush 0.9 % injection 5-40 mL, 2 times per day  sodium chloride flush 0.9 % injection 5-40 mL, PRN  0.9 % sodium chloride infusion, PRN  ondansetron (ZOFRAN-ODT) disintegrating tablet 4 mg, Q8H PRN   Or  ondansetron (ZOFRAN) injection 4 mg, Q6H PRN  enoxaparin (LOVENOX) injection 40 mg, Q24H  acetaminophen (TYLENOL) tablet 650 mg, Q6H  diphenhydrAMINE (BENADRYL) injection 25 mg, Q6H PRN  carvedilol (COREG) tablet 6.25 mg, BID WC  atorvastatin (LIPITOR) tablet 40 mg, Nightly      Objective:  BP (!) 151/88   Pulse 83   Temp 97 °F (36.1 °C) (Oral)   Resp 19   Ht 5' 6\" (1.676 m)   Wt 222 lb 4 oz (100.8 kg)   SpO2 96%   BMI 35.87 kg/m²   No intake or output data in the 24 hours ending 10/04/22 0926     Wt Readings from Last 3 Encounters:   10/03/22 222 lb 4 oz (100.8 kg)   09/26/22 212 lb (96.2 kg)   09/06/22 215 lb (97.5 kg)       Physical Examination:   General appearance:  No apparent distress, appears stated age and cooperative. HEENT: Normocephalic, sclera clear., PERRLA. Trachea midline, no adenopathy. Cardiovascular: Regular rate and rhythm, normal S1, S2. No murmur. Respiratory:Clear to auscultation bilaterally, no wheeze or crackles. GI: Abdomen soft, no tenderness, not distended, normal bowel sounds  Musculoskeletal: No cyanosis in digits. No BLE edema present  Neurology: CN 2-12 grossly intact. No speech or motor deficits  Psych: Not agitated, appropriate affect  Skin: Warm, dry, normal turgor    Labs and Tests:  CBC:   Recent Labs     10/01/22  1740 10/02/22  0430 10/03/22  0500   WBC 8.0 7.6 7.4   HGB 9.1* 9.4* 8.3*    257 259       BMP:    Recent Labs     10/01/22  1740 10/02/22  0430 10/03/22  0500   NA  --  142 144   K 3.3* 3.4* 3.5   CL  --  109 111*   CO2  --  22 22   BUN  --  8 9   CREATININE  --  0.9 0.8   GLUCOSE  --  93 90       Hepatic: No results for input(s): AST, ALT, ALB, BILITOT, ALKPHOS in the last 72 hours. CT ABDOMEN PELVIS W IV CONTRAST Additional Contrast? Oral (water soluble contrast through NG)   Final Result   1. Postsurgical changes from interval hernia repair. There is elongated   fluid along the anterior abdominal wall at the hernia repair site with   multiple foci of gas extending along the anterior abdominal wall and into the   subcutaneous soft tissues predominant within the right upper quadrant. No   definite free intraperitoneal air is identified. Inflammatory changes are   noted in the omentum and mesentery along the anterior abdomen some which may   be postsurgical in etiology. There are associated reactive inflammatory   changes involving the transverse colon at this level as well. Surgical drain   remains in place.    2. There is a questionable small recurrent fat containing hernia along the   midline abdomen with surrounding inflammatory changes versus an area of fat   necrosis. .   3. Trace hyperdense free fluid in the pelvis some of which is proteinaceous   or hemorrhagic in etiology. This may be postoperative blood products or this   could be postoperative as well. 4. There is mild swirling of the mesentery within the right mid abdomen of   uncertain etiology without associated bowel obstruction or volvulus. An   underlying internal hernia cannot be entirely excluded given recent surgery. 5. Bibasilar pneumonia. 6. Fibroid uterus. XR CHEST PORTABLE   Final Result   1. NG tube extends below the left hemidiaphragm, into the upper abdomen, tip   overlying the expected level of the distal stomach or proximal duodenum,   right upper quadrant. Consider pulling back NG tube 4-5 cm to ensure tip is   at the stomach region. 2.  Vascular engorgement and cephalization is demonstrated with bilateral   peribronchial cuffing and perivascular haziness. 3. Calcific atherosclerosis aorta. 4. Cardiomegaly. 5. Prominent soft tissue density upper right paramediastinal region is   stable, typical of brachiocephalic vessel tortuosity/ectasia or occasionally   thyroid goiter. RECOMMENDATION:   Consider pulling back NG tube 4-5 cm to ensure tip is at the stomach region. XR ABDOMEN (2 VIEWS)   Final Result   Nonspecific bowel gas pattern. Left basilar atelectasis or airspace disease. Problem List  Principal Problem:    Incisional hernia without obstruction or gangrene  Active Problems:    PAF (paroxysmal atrial fibrillation) (HCC)    AICD (automatic cardioverter/defibrillator) present  Resolved Problems:    * No resolved hospital problems.  Sara Perez MD   10/4/2022 9:26 AM

## 2022-10-04 NOTE — CARE COORDINATION
Discharge note:      CM/SW has been notified of discharge. Patient noted to have the following needs at discharge. CM/SW has coordinated the following services: [address]  Phone: [phone]  Fax: [phone]       Discharge Destination: Home   Transportation: Family         All CM/SW needs met, will sign off.      Electronically signed by MIKE Turner on 10/4/2022 at 3:43 PM

## 2022-10-05 PROBLEM — G89.18 POSTOPERATIVE PAIN: Status: ACTIVE | Noted: 2022-10-05

## 2022-10-05 LAB
A/G RATIO: 1 (ref 1.1–2.2)
ABO/RH: NORMAL
ALBUMIN SERPL-MCNC: 2.9 G/DL (ref 3.4–5)
ALP BLD-CCNC: 98 U/L (ref 40–129)
ALT SERPL-CCNC: 10 U/L (ref 10–40)
ANION GAP SERPL CALCULATED.3IONS-SCNC: 11 MMOL/L (ref 3–16)
ANISOCYTOSIS: ABNORMAL
ANTIBODY SCREEN: NORMAL
APTT: 31.4 SEC (ref 23–34.3)
APTT: 51.2 SEC (ref 23–34.3)
AST SERPL-CCNC: 34 U/L (ref 15–37)
BASOPHILS ABSOLUTE: 0 K/UL (ref 0–0.2)
BASOPHILS RELATIVE PERCENT: 0 %
BILIRUB SERPL-MCNC: 0.5 MG/DL (ref 0–1)
BUN BLDV-MCNC: 5 MG/DL (ref 7–20)
CALCIUM SERPL-MCNC: 8.2 MG/DL (ref 8.3–10.6)
CHLORIDE BLD-SCNC: 111 MMOL/L (ref 99–110)
CO2: 20 MMOL/L (ref 21–32)
CREAT SERPL-MCNC: 0.7 MG/DL (ref 0.6–1.1)
EOSINOPHILS ABSOLUTE: 0.3 K/UL (ref 0–0.6)
EOSINOPHILS RELATIVE PERCENT: 3 %
GFR AFRICAN AMERICAN: >60
GFR NON-AFRICAN AMERICAN: >60
GLUCOSE BLD-MCNC: 93 MG/DL (ref 70–99)
HCT VFR BLD CALC: 20.4 % (ref 36–48)
HCT VFR BLD CALC: 20.4 % (ref 36–48)
HCT VFR BLD CALC: 21.8 % (ref 36–48)
HEMOGLOBIN: 6.8 G/DL (ref 12–16)
HEMOGLOBIN: 7 G/DL (ref 12–16)
HEMOGLOBIN: 7.3 G/DL (ref 12–16)
INR BLD: 1.99 (ref 0.87–1.14)
INR BLD: 2.24 (ref 0.87–1.14)
LACTIC ACID: 0.9 MMOL/L (ref 0.4–2)
LYMPHOCYTES ABSOLUTE: 1.7 K/UL (ref 1–5.1)
LYMPHOCYTES RELATIVE PERCENT: 19 %
MACROCYTES: ABNORMAL
MAGNESIUM: 1.6 MG/DL (ref 1.8–2.4)
MCH RBC QN AUTO: 30.7 PG (ref 26–34)
MCHC RBC AUTO-ENTMCNC: 34.2 G/DL (ref 31–36)
MCV RBC AUTO: 89.8 FL (ref 80–100)
MONOCYTES ABSOLUTE: 0.5 K/UL (ref 0–1.3)
MONOCYTES RELATIVE PERCENT: 6 %
NEUTROPHILS ABSOLUTE: 6.6 K/UL (ref 1.7–7.7)
NEUTROPHILS RELATIVE PERCENT: 72 %
PDW BLD-RTO: 16.5 % (ref 12.4–15.4)
PHOSPHORUS: 3.1 MG/DL (ref 2.5–4.9)
PLATELET # BLD: 290 K/UL (ref 135–450)
PMV BLD AUTO: 7.4 FL (ref 5–10.5)
POLYCHROMASIA: ABNORMAL
POTASSIUM SERPL-SCNC: 3.2 MMOL/L (ref 3.5–5.1)
PREALBUMIN: 10.6 MG/DL (ref 20–40)
PROTHROMBIN TIME: 22.6 SEC (ref 11.7–14.5)
PROTHROMBIN TIME: 24.9 SEC (ref 11.7–14.5)
RBC # BLD: 2.27 M/UL (ref 4–5.2)
SODIUM BLD-SCNC: 142 MMOL/L (ref 136–145)
TOTAL PROTEIN: 5.9 G/DL (ref 6.4–8.2)
TRANSFERRIN: 132 MG/DL (ref 200–360)
WBC # BLD: 9.1 K/UL (ref 4–11)

## 2022-10-05 PROCEDURE — APPNB30 APP NON BILLABLE TIME 0-30 MINS: Performed by: NURSE PRACTITIONER

## 2022-10-05 PROCEDURE — 6370000000 HC RX 637 (ALT 250 FOR IP): Performed by: INTERNAL MEDICINE

## 2022-10-05 PROCEDURE — 85730 THROMBOPLASTIN TIME PARTIAL: CPT

## 2022-10-05 PROCEDURE — 85014 HEMATOCRIT: CPT

## 2022-10-05 PROCEDURE — 6360000002 HC RX W HCPCS: Performed by: SURGERY

## 2022-10-05 PROCEDURE — 83605 ASSAY OF LACTIC ACID: CPT

## 2022-10-05 PROCEDURE — 83735 ASSAY OF MAGNESIUM: CPT

## 2022-10-05 PROCEDURE — 6370000000 HC RX 637 (ALT 250 FOR IP): Performed by: SURGERY

## 2022-10-05 PROCEDURE — 36415 COLL VENOUS BLD VENIPUNCTURE: CPT

## 2022-10-05 PROCEDURE — 84466 ASSAY OF TRANSFERRIN: CPT

## 2022-10-05 PROCEDURE — 85018 HEMOGLOBIN: CPT

## 2022-10-05 PROCEDURE — 2580000003 HC RX 258: Performed by: PHYSICIAN ASSISTANT

## 2022-10-05 PROCEDURE — 6360000002 HC RX W HCPCS: Performed by: NURSE PRACTITIONER

## 2022-10-05 PROCEDURE — 85025 COMPLETE CBC W/AUTO DIFF WBC: CPT

## 2022-10-05 PROCEDURE — APPSS60 APP SPLIT SHARED TIME 46-60 MINUTES: Performed by: NURSE PRACTITIONER

## 2022-10-05 PROCEDURE — 85610 PROTHROMBIN TIME: CPT

## 2022-10-05 PROCEDURE — 1200000000 HC SEMI PRIVATE

## 2022-10-05 PROCEDURE — 99024 POSTOP FOLLOW-UP VISIT: CPT | Performed by: SURGERY

## 2022-10-05 PROCEDURE — 84134 ASSAY OF PREALBUMIN: CPT

## 2022-10-05 PROCEDURE — 2580000003 HC RX 258: Performed by: SURGERY

## 2022-10-05 PROCEDURE — 80053 COMPREHEN METABOLIC PANEL: CPT

## 2022-10-05 PROCEDURE — 84100 ASSAY OF PHOSPHORUS: CPT

## 2022-10-05 RX ORDER — ONDANSETRON 4 MG/1
4 TABLET, ORALLY DISINTEGRATING ORAL EVERY 8 HOURS PRN
Status: DISCONTINUED | OUTPATIENT
Start: 2022-10-05 | End: 2022-10-07 | Stop reason: HOSPADM

## 2022-10-05 RX ORDER — MAGNESIUM SULFATE IN WATER 40 MG/ML
2000 INJECTION, SOLUTION INTRAVENOUS ONCE
Status: COMPLETED | OUTPATIENT
Start: 2022-10-05 | End: 2022-10-05

## 2022-10-05 RX ORDER — MORPHINE SULFATE 4 MG/ML
4 INJECTION, SOLUTION INTRAMUSCULAR; INTRAVENOUS
Status: DISCONTINUED | OUTPATIENT
Start: 2022-10-05 | End: 2022-10-05

## 2022-10-05 RX ORDER — SODIUM CHLORIDE 9 MG/ML
INJECTION, SOLUTION INTRAVENOUS PRN
Status: DISCONTINUED | OUTPATIENT
Start: 2022-10-05 | End: 2022-10-07 | Stop reason: HOSPADM

## 2022-10-05 RX ORDER — SODIUM CHLORIDE 0.9 % (FLUSH) 0.9 %
5-40 SYRINGE (ML) INJECTION PRN
Status: DISCONTINUED | OUTPATIENT
Start: 2022-10-05 | End: 2022-10-07 | Stop reason: HOSPADM

## 2022-10-05 RX ORDER — MORPHINE SULFATE 2 MG/ML
2 INJECTION, SOLUTION INTRAMUSCULAR; INTRAVENOUS
Status: DISCONTINUED | OUTPATIENT
Start: 2022-10-05 | End: 2022-10-05

## 2022-10-05 RX ORDER — PANTOPRAZOLE SODIUM 40 MG/1
40 TABLET, DELAYED RELEASE ORAL
Status: DISCONTINUED | OUTPATIENT
Start: 2022-10-05 | End: 2022-10-07 | Stop reason: HOSPADM

## 2022-10-05 RX ORDER — OXYCODONE HYDROCHLORIDE 5 MG/1
5 TABLET ORAL EVERY 4 HOURS PRN
Status: DISCONTINUED | OUTPATIENT
Start: 2022-10-05 | End: 2022-10-07 | Stop reason: HOSPADM

## 2022-10-05 RX ORDER — SODIUM CHLORIDE 0.9 % (FLUSH) 0.9 %
5-40 SYRINGE (ML) INJECTION EVERY 12 HOURS SCHEDULED
Status: DISCONTINUED | OUTPATIENT
Start: 2022-10-05 | End: 2022-10-07 | Stop reason: HOSPADM

## 2022-10-05 RX ORDER — OXYCODONE HYDROCHLORIDE 5 MG/1
10 TABLET ORAL EVERY 4 HOURS PRN
Status: DISCONTINUED | OUTPATIENT
Start: 2022-10-05 | End: 2022-10-07 | Stop reason: HOSPADM

## 2022-10-05 RX ORDER — ACETAMINOPHEN 325 MG/1
650 TABLET ORAL EVERY 6 HOURS
Status: DISCONTINUED | OUTPATIENT
Start: 2022-10-05 | End: 2022-10-07 | Stop reason: HOSPADM

## 2022-10-05 RX ORDER — SODIUM CHLORIDE 9 MG/ML
25 INJECTION, SOLUTION INTRAVENOUS PRN
Status: DISCONTINUED | OUTPATIENT
Start: 2022-10-05 | End: 2022-10-07 | Stop reason: HOSPADM

## 2022-10-05 RX ORDER — LIDOCAINE HYDROCHLORIDE 10 MG/ML
5 INJECTION, SOLUTION EPIDURAL; INFILTRATION; INTRACAUDAL; PERINEURAL ONCE
Status: DISCONTINUED | OUTPATIENT
Start: 2022-10-05 | End: 2022-10-07 | Stop reason: HOSPADM

## 2022-10-05 RX ORDER — CARVEDILOL 6.25 MG/1
6.25 TABLET ORAL 2 TIMES DAILY WITH MEALS
Status: DISCONTINUED | OUTPATIENT
Start: 2022-10-05 | End: 2022-10-07 | Stop reason: HOSPADM

## 2022-10-05 RX ORDER — SODIUM CHLORIDE 9 MG/ML
INJECTION, SOLUTION INTRAVENOUS CONTINUOUS
Status: DISCONTINUED | OUTPATIENT
Start: 2022-10-05 | End: 2022-10-06

## 2022-10-05 RX ORDER — POTASSIUM CHLORIDE 7.45 MG/ML
10 INJECTION INTRAVENOUS
Status: COMPLETED | OUTPATIENT
Start: 2022-10-05 | End: 2022-10-05

## 2022-10-05 RX ORDER — ONDANSETRON 2 MG/ML
4 INJECTION INTRAMUSCULAR; INTRAVENOUS EVERY 6 HOURS PRN
Status: DISCONTINUED | OUTPATIENT
Start: 2022-10-05 | End: 2022-10-07 | Stop reason: HOSPADM

## 2022-10-05 RX ADMIN — PIPERACILLIN AND TAZOBACTAM 3375 MG: 3; .375 INJECTION, POWDER, FOR SOLUTION INTRAVENOUS at 02:43

## 2022-10-05 RX ADMIN — CARVEDILOL 6.25 MG: 6.25 TABLET, FILM COATED ORAL at 17:42

## 2022-10-05 RX ADMIN — ACETAMINOPHEN 650 MG: 325 TABLET ORAL at 08:00

## 2022-10-05 RX ADMIN — POTASSIUM CHLORIDE 10 MEQ: 7.46 INJECTION, SOLUTION INTRAVENOUS at 13:57

## 2022-10-05 RX ADMIN — PIPERACILLIN AND TAZOBACTAM 3375 MG: 3; .375 INJECTION, POWDER, FOR SOLUTION INTRAVENOUS at 09:08

## 2022-10-05 RX ADMIN — PANTOPRAZOLE SODIUM 40 MG: 40 TABLET, DELAYED RELEASE ORAL at 08:00

## 2022-10-05 RX ADMIN — MAGNESIUM SULFATE HEPTAHYDRATE 2000 MG: 40 INJECTION, SOLUTION INTRAVENOUS at 09:52

## 2022-10-05 RX ADMIN — OXYCODONE 10 MG: 5 TABLET ORAL at 08:12

## 2022-10-05 RX ADMIN — ACETAMINOPHEN 650 MG: 325 TABLET ORAL at 14:54

## 2022-10-05 RX ADMIN — Medication 10 ML: at 20:46

## 2022-10-05 RX ADMIN — PIPERACILLIN AND TAZOBACTAM 3375 MG: 3; .375 INJECTION, POWDER, FOR SOLUTION INTRAVENOUS at 17:39

## 2022-10-05 RX ADMIN — SODIUM CHLORIDE 25 ML/HR: 9 INJECTION, SOLUTION INTRAVENOUS at 02:42

## 2022-10-05 RX ADMIN — POTASSIUM CHLORIDE 10 MEQ: 7.46 INJECTION, SOLUTION INTRAVENOUS at 12:54

## 2022-10-05 RX ADMIN — SODIUM CHLORIDE, PRESERVATIVE FREE 10 ML: 5 INJECTION INTRAVENOUS at 02:37

## 2022-10-05 RX ADMIN — Medication 10 ML: at 08:00

## 2022-10-05 RX ADMIN — POTASSIUM CHLORIDE 10 MEQ: 7.46 INJECTION, SOLUTION INTRAVENOUS at 15:30

## 2022-10-05 RX ADMIN — ACETAMINOPHEN 650 MG: 325 TABLET ORAL at 02:39

## 2022-10-05 RX ADMIN — SODIUM CHLORIDE: 9 INJECTION, SOLUTION INTRAVENOUS at 02:38

## 2022-10-05 RX ADMIN — POTASSIUM CHLORIDE 10 MEQ: 7.46 INJECTION, SOLUTION INTRAVENOUS at 12:01

## 2022-10-05 RX ADMIN — ACETAMINOPHEN 650 MG: 325 TABLET ORAL at 20:46

## 2022-10-05 RX ADMIN — Medication 10 ML: at 22:11

## 2022-10-05 ASSESSMENT — ENCOUNTER SYMPTOMS
VOMITING: 0
ABDOMINAL PAIN: 1
RHINORRHEA: 0
NAUSEA: 0
DIARRHEA: 0
SHORTNESS OF BREATH: 0
COUGH: 0

## 2022-10-05 ASSESSMENT — PAIN DESCRIPTION - LOCATION
LOCATION: ABDOMEN;GROIN
LOCATION: ABDOMEN
LOCATION: SHOULDER

## 2022-10-05 ASSESSMENT — PAIN SCALES - GENERAL
PAINLEVEL_OUTOF10: 4
PAINLEVEL_OUTOF10: 3
PAINLEVEL_OUTOF10: 8
PAINLEVEL_OUTOF10: 5

## 2022-10-05 ASSESSMENT — PAIN DESCRIPTION - FREQUENCY
FREQUENCY: CONTINUOUS
FREQUENCY: CONTINUOUS

## 2022-10-05 ASSESSMENT — PAIN DESCRIPTION - ORIENTATION
ORIENTATION: RIGHT

## 2022-10-05 ASSESSMENT — PAIN DESCRIPTION - ONSET: ONSET: ON-GOING

## 2022-10-05 ASSESSMENT — PAIN - FUNCTIONAL ASSESSMENT
PAIN_FUNCTIONAL_ASSESSMENT: ACTIVITIES ARE NOT PREVENTED

## 2022-10-05 ASSESSMENT — PAIN DESCRIPTION - PAIN TYPE: TYPE: SURGICAL PAIN;ACUTE PAIN

## 2022-10-05 ASSESSMENT — PAIN DESCRIPTION - DESCRIPTORS
DESCRIPTORS: SHARP
DESCRIPTORS: SHARP

## 2022-10-05 NOTE — ACP (ADVANCE CARE PLANNING)
Advanced Care Planning Note. Purpose of Encounter: Advanced care planning in light of hospitalization  Parties In Attendance: Patient,    Decisional Capacity: Yes  Subjective: Patient  understand that this conversation is to address long term care goal  Objective: Patient was admitted to hospital with rectus sheath hematoma  Goals of Care Determination: Patient would pursue CPR and Intubation if required.  Would consider blood prodcuts if needed, would consider further procedures if needed  Code Status: full code  Time spent on Advanced care Plannin minutes  Advanced Care Planning Documents: documented patient's wishes, would like Sister Duana Habermann to make medical decisions if unable to make decisions    Pasquale Nelson MD  10/5/2022 1:40 PM

## 2022-10-05 NOTE — TELEPHONE ENCOUNTER
Writer contacted SHARAN Lan to inform of 30 day readmission risk. ED provider informed writer of readmission.     Call Back: If you need to call back to inform of disposition you can contact me at [phone]

## 2022-10-05 NOTE — H&P
[facility]        History and Physical    Patient Name: Lino Aguila  MRN: [id number]  [place]: 1965  Admission Date: 10/4/2022  9:41 PM   Date of Evaluation: 10/5/2022  Primary Care Physician: Merced Ugarte  Chief Complaint: Abdominal pain    SUBJECTIVE    History of Present Illness:  Ms. Ritu Mendiola is a 61 y.o. female who presented to the ED yesterday evening just a few hours after being discharged from the hospital following recovery from exploratory laparotomy with repair of reducible incisional hernia, bilateral transverse abdominis component releases with mesh placement, and lysis of adhesions. She reports feeling well at discharge, but while walking up stairs at home, she felt a \"pop\" in her right lower quadrant, with associated severe, sharp, constant pain that did not improved with previously prescribed narcotic analgesic. No aggravating factors noted. She denies fevers, chills, nausea, vomiting, diarrhea, or issues with incision including drainage or erythema. She takes Xarelto but has not had since before surgery. Last BM was yesterday, continues to pass flatus. Complains of new onset SOB this morning, feels like \"something is cut off\", denies dysphagia, not requiring supplemental oxygen, no increased WOB or acute distress, and reports during time of exam that the feeling of SOB is easing up.         Past Medical History:      Diagnosis Date    Anemia     Atrial fibrillation and flutter (HCC)     Atrial flutter (HCC)     CHB (complete heart block) (HCC)     Class 1 obesity without serious comorbidity with body mass index (BMI) of 33.0 to 33.9 in adult 09/06/2019    Congenital heart disease     GERD (gastroesophageal reflux disease)     Headache(784.0)     History of complete heart block     Hyperlipidemia     Hypertension     PONV (postoperative nausea and vomiting)     Prolonged emergence from general anesthesia     sensitive to meds, slow to wake    Sleep apnea     uses CPAP    Syncope     Systolic CHF, chronic ([facility] [address] 10/03/2018       Past Surgical History:      Procedure Laterality Date    CARDIAC DEFIBRILLATOR PLACEMENT  01/2021    Medtronic    CARDIOVERSION  01/28/2022    CARDIOVERSION  02/22/2022    COLECTOMY N/A 04/13/2021    EXPLORATORY LAPAROTOMY, RESECTION OF DUODENAL MASS, CHOLECYSTECTOMY WITH INTRAOPERATIVE CHOLANGIOGRAM performed by Cuco Lobato MD at [address] [facility] N/A 10/27/2020    COLONOSCOPY POLYPECTOMY SNARE/COLD BIOPSY performed by Lucas Esteban MD at [facility]  11/29/2012    dual chamber PPM, Medtronic    TUBAL LIGATION      UPPER GASTROINTESTINAL ENDOSCOPY N/A 10/27/2020    EGD DIAGNOSTIC ONLY performed by Lucas Esteban MD at [address] [place] [address] N/A 04/08/2021    EGD CONTROL HEMORRHAGE WITH APPLICATION OF 3 CLIPS TO DUODENAL MASS performed by Orville Epley, MD at [facility] N/A 04/08/2021    EGD BIOPSY DUODENAL MASS performed by Orville Epley, MD at [address] [place] [address] N/A 04/08/2021    EGD SUBMUCOSAL INJECTION OF 0.6 MG OF EPINEPRINE INTO BASE OF DUODENAL MASS performed by Orville Epley, MD at [facility] N/A 03/04/2022    EGD BIOPSY performed by Lucas Esteban MD at [address] [address] N/A 9/27/2022    OPEN EXPLORATORY LAPAROTOMY, REPAIR OF REDUCIBLE INCISIONAL HERNIA WITH MESH,  UNILATERAL  ABDOMINAL WALL COMPONENT RELEASE performed by Cuco Lobato MD at [facility]       Scheduled Meds:   sodium chloride flush  5-40 mL IntraVENous 2 times per day    pantoprazole  40 mg Oral QAM AC    piperacillin-tazobactam  3,375 mg IntraVENous Q8H    acetaminophen  650 mg Oral Q6H    fentanNYL  50 mcg IntraVENous Once     Continuous Infusions:   sodium chloride 25 mL/hr (10/05/22 0242) sodium chloride 125 mL/hr at 10/05/22 0238     PRN Meds:.sodium chloride flush, sodium chloride, ondansetron **OR** ondansetron, oxyCODONE **OR** oxyCODONE    Prior to Admission medications    Medication Sig Start Date End Date Taking? Authorizing Provider   oxyCODONE (ROXICODONE) 5 MG immediate release tablet Take 1 tablet by mouth every 6 hours as needed for Pain for up to 7 days. Take lowest dose possible to manage pain; may stop taking sooner than 7 days if pain is able to be controlled with non-narcotic analgesics and therapies.  10/4/22 10/11/22  CATRACHITO Gutiérrez CNP   spironolactone (ALDACTONE) 50 MG tablet TAKE ONE TABLET BY MOUTH DAILY 9/7/22   CATRACHITO Douglas CNP   vitamin D (ERGOCALCIFEROL) 1.25 MG (38861 UT) CAPS capsule TAKE ONE CAPSULE BY MOUTH ONCE WEEKLY 8/26/22   CATRACHITO Grace   XARELTO 20 MG TABS tablet TAKE ONE TABLET BY MOUTH DAILY WITH SUPPER 8/15/22   CATRACHITO Castro CNP   sacubitril-valsartan (ENTRESTO) 49-51 MG per tablet Take 1 tablet by mouth 2 times daily 6/9/22   CATRACHITO Thurston   carvedilol (COREG) 6.25 MG tablet Take 1 tablet by mouth 2 times daily 6/9/22   CATRACHITO Thurston   potassium chloride (KLOR-CON M) 10 MEQ extended release tablet Take 1 tablet by mouth daily 5/11/22   CATRACHITO Thurston   ammonium lactate (LAC-HYDRIN) 12 % lotion  4/20/22   Historical Provider, MD   fluticasone Veneda Ishan) 50 MCG/ACT nasal spray  3/21/22   Historical Provider, MD   hydrocortisone 1 % cream  4/20/22   Historical Provider, MD   vitamin D3 (CHOLECALCIFEROL) 25 MCG (1000 UT) TABS tablet Take 1 tablet by mouth daily 12/1/21   CATRACHITO Grace   atorvastatin (LIPITOR) 40 MG tablet Take 1 tablet by mouth daily 6/11/21   CATRACHITO Grace   pantoprazole (PROTONIX) 40 MG tablet Take 1 tablet by mouth every morning (before breakfast) 4/13/21   Danny Cabrera MD   Multiple Vitamins-Minerals (THERAPEUTIC MULTIVITAMIN-MINERALS) tablet Take 1 tablet by mouth daily    Historical Provider, MD        Allergies:  Latex, Morphine, Codeine, Lisinopril, Nitroglycerin, Sulfa antibiotics, and Hydralazine    Social History:  Social History     Socioeconomic History    Marital status: Single     Spouse name: None    Number of children: 3    Years of education: None    Highest education level: None   Tobacco Use    Smoking status: Never    Smokeless tobacco: Never   Vaping Use    Vaping Use: Never used   Substance and Sexual Activity    Alcohol use: No    Drug use: No    Sexual activity: Yes     Partners: Male       Family History:    Family History   Problem Relation Age of Onset    High Blood Pressure Mother     Cancer Father     Heart Disease Neg Hx     High Cholesterol Neg Hx        Review of Systems:  CONSTITUTIONAL:  negative  HEENT:  negative  RESPIRATORY: negative except for SOB  CARDIOVASCULAR:  negative  GASTROINTESTINAL:  negative except for abdominal pain  GENITOURINARY:  negative  HEMATOLOGIC/LYMPHATIC:  negative  NEUROLOGICAL:  negative  SKIN:  negative      OBJECTIVE    Vital Signs:  Patient Vitals for the past 24 hrs:   BP Temp Temp src Pulse Resp SpO2 Weight   10/05/22 0800 (!) 159/90 97.8 °F (36.6 °C) Oral 87 18 98 % --   10/05/22 0519 -- -- -- -- -- -- 221 lb 9 oz (100.5 kg)   10/05/22 0349 126/81 98 °F (36.7 °C) Oral 92 18 93 % --   10/05/22 0130 138/81 97.3 °F (36.3 °C) Axillary 97 20 93 % --   10/05/22 0115 135/81 -- -- 95 20 93 % --   10/05/22 0100 138/78 -- -- 93 21 98 % --   10/05/22 0045 (!) 146/83 -- -- 93 24 96 % --   10/05/22 0030 (!) 166/82 -- -- (!) 102 17 -- --   10/05/22 0015 (!) 141/70 -- -- 95 21 94 % --   10/05/22 0000 (!) 141/72 -- -- 93 20 92 % --   10/04/22 2345 138/76 -- -- 94 24 96 % --   10/04/22 2315 (!) 145/76 -- -- 92 22 97 % --   10/04/22 2300 (!) 164/82 -- -- (!) 102 24 97 % --   10/04/22 2142 (!) 155/91 (!) 100.9 °F (38.3 °C) Oral 92 18 97 % 216 lb (98 kg)      TEMPERATURE HISTORY 24H: Temp (24hrs), Av.3 °F (36.8 °C), Min:97.3 °F (36.3 °C), Max:100.9 °F (38.3 °C)    BLOOD PRESSURE HISTORY: Systolic (97LQB), SMS:291 , Min:126 , ZBB:731    Diastolic (23BJM), PZO:05, Min:70, Max:98    Admission Weight: 216 lb (98 kg)       Intake/Output Summary (Last 24 hours) at 10/5/2022 0858  Last data filed at 10/5/2022 0800  Gross per 24 hour   Intake 300 ml   Output --   Net 300 ml     Drain/tube Output:         Physical Exam:  CONSTITUTIONAL:  alert, no apparent distress and moderately obese  NEUROLOGIC:  Mental Status Exam:  Level of Alertness:   awake  Orientation:   person, place, time  EYES:  sclera clear  ENT:  normocepalic, without obvious abnormality  NECK:  supple, symmetrical, trachea midline  LUNGS:  clear to auscultation  CARDIOVASCULAR:  regular rate and rhythm and no murmur noted  ABDOMEN: soft, non-distended, tenderness noted in the right lower quadrant and along midline incision only, voluntary guarding absent, no masses palpated, normal bowel sounds, and hernia absent  Extremities: no edema  Skin/Wound: healing well, no drainage, no erythema, ecchymosis noted, hematoma present, well approximated--dressing changed    Labs:  CBC:    Recent Labs     10/04/22  1441 10/04/22  2223 10/05/22  0509   WBC 9.1 11.3* 9.1   HGB 8.4* 7.4* 7.0*   HCT 25.4* 22.4* 20.4*    329 290     BMP:    Recent Labs     10/04/22  1441 10/04/22  2223 10/05/22  0509    138 142   K 3.2* 3.5 3.2*    106 111*   CO2 22 22 20*   BUN 6* 7 5*   CREATININE 0.7 0.7 0.7   GLUCOSE 105* 117* 93     Hepatic:    Recent Labs     10/04/22  2223 10/05/22  0509   AST 31 34   ALT 7* 10   BILITOT 0.6 0.5   ALKPHOS 72 98     Amylase:    Lab Results   Component Value Date/Time    AMYLASE 62 2021 02:31 PM     Lipase:    Lab Results   Component Value Date/Time    LIPASE 29.0 10/04/2022 10:23 PM    LIPASE 18.0 09/15/2022 04:51 AM    LIPASE 24.0 2022 07:32 PM      Mag:    Lab Results   Component Value Date/Time    MG 1.60 10/05/2022 05:09 AM    MG 1.90 10/02/2022 04:30 AM     Phos:     Lab Results   Component Value Date/Time    PHOS 3.1 10/05/2022 05:09 AM    PHOS 2.6 10/02/2022 04:30 AM      Coags:   Lab Results   Component Value Date/Time    PROTIME 22.6 10/05/2022 05:09 AM    INR 1.99 10/05/2022 05:09 AM    APTT 31.4 10/05/2022 05:09 AM       Cultures:  Anaerobic culture  No results found for: LABANAE  Fungus stain  No results found for requested labs within last 30 days. Gram stain  No results found for requested labs within last 30 days. Organism  No results found for: [facility]  Surgical culture  No results found for: CXSURG  Blood culture 1  No results found for requested labs within last 30 days. Blood culture 2  No results found for requested labs within last 30 days. Fecal occult  No results found for requested labs within last 30 days. GI bacterial pathogens by PCR  No results found for requested labs within last 30 days. C. difficile  No results found for requested labs within last 30 days. Urine culture  Lab Results   Component Value Date/Time    LABURIN No growth at 18 to 36 hours 09/29/2022 04:45 PM       Imaging:  I have personally reviewed the following films:    CT ABDOMEN PELVIS W IV CONTRAST Additional Contrast? None    Result Date: 10/4/2022  EXAMINATION: CT OF THE ABDOMEN AND PELVIS WITH CONTRAST 10/4/2022 10:10 pm TECHNIQUE: CT of the abdomen and pelvis was performed with the administration of intravenous contrast. Multiplanar reformatted images are provided for review. Automated exposure control, iterative reconstruction, and/or weight based adjustment of the mA/kV was utilized to reduce the radiation dose to as low as reasonably achievable.  COMPARISON: 10/01/2022 HISTORY: ORDERING SYSTEM PROVIDED HISTORY: post op, ANNI summers TECHNOLOGIST PROVIDED HISTORY: Reason for exam:->post op, ANNI summers Additional Contrast?->None Decision Support Exception - unselect if not a suspected or confirmed emergency medical condition->Emergency Medical Condition (MA) Reason for Exam: Post-op Problem (Pt had hernia repair surgery with  on 9/27, was discharged today, pt was walking up her stairs when she felt a \"pop\" on her right side and could not put pressure on her right leg. FINDINGS: Lower Chest: No pericardial effusion. Implanted cardiac devices are noted. Distal esophagus appears normal in caliber. Patchy subsegmental atelectasis. Organs: No enhancing mass identified in the liver or spleen. No adrenal mass. No pancreatic mass. No peripancreatic acute inflammatory process. Cholecystectomy clips are identified. No enhancing renal mass is identified. No obstructive hydroureteronephrosis is identified. GI/Bowel: Focal wall thickening involving the gastro duodenal junction, though no definite ulceration is identified. No ileus or obstruction is identified. Pelvis: Within the pelvis, there is a lobulated uterus with multiple uterine fibroids. Small volume of free fluid seen in the pelvis. No bulky pelvic lymphadenopathy is identified. Peritoneum/Retroperitoneum: No abdominal aortic aneurysm. No dissection. No retroperitoneal or mesenteric bulky lymphadenopathy. 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 There is a large rectus sheath hematoma, which extends across the midline, with a small amount of soft tissue air noted. The surgical drain seen in the rectus sheath on the previous exam has been removed. There is a thin area layering hyperdensity seen on and around axial image 102 in the right lateral aspect of the hematoma concerning for possible contrast extravasation. Mixed attenuation blood products are noted, with some liquefied appearing blood products as well as more hyperdense clotted blood. Bones/Soft Tissues:  There is a midline soft tissue hematoma with hyperdense clotted blood products noted. No definite active bleeding seen within the hematoma. Subcutaneous edematous changes are identified as well as a small amount of soft tissue gas. No acute osseous abnormality is identified. 1. There is a moderate-sized rectus sheath hematoma, with mixed attenuation of blood products, as well as an area of thin linear enhancement layering laterally on the right, on and around axial image 102, concerning for possible active extravasation. This was discussed with Barbaar Gutierrez, at 11:26 on 10/04/2022. 2. There is a central hematoma seen along the midline, new from the previous examination, with no definite active extravasation. 3. Focal omental stranding seen in the anterior peritoneal cavity in the upper abdomen, adjacent to the sigmoid colon, likely related to postoperative change or omental infarct. No intraperitoneal abscess is identified. 4. Focal thickening noted in the region of the gastric pylorus which could be related to mild gastritis. 5. The surgical drain traversing through the rectus sheath on the previous exam and into the peritoneal cavity has been removed. 6. Other similar findings as described above. XR CHEST PORTABLE    Result Date: 10/5/2022  EXAMINATION: ONE XRAY VIEW OF THE CHEST 10/4/2022 10:05 pm COMPARISON: None. HISTORY: ORDERING SYSTEM PROVIDED HISTORY: post op TECHNOLOGIST PROVIDED HISTORY: Reason for exam:->post op Reason for Exam: post op, sob FINDINGS: Implanted cardiac device noted overlying the left chest.  Atherosclerosis identified within the thoracic aorta. Cardiac size and mediastinal structures appear stable. Improved aeration seen in the lung bases bilaterally compared to the previous examination. Minimal residual basilar atelectasis on the right. No acute osseous abnormality is identified. No overt edema. Mild vascular congestion centrally.      Mild central vascular congestion, with improved aeration at the lung bases with minimal residual right basilar atelectasis. Scheduled Meds:   sodium chloride flush  5-40 mL IntraVENous 2 times per day    pantoprazole  40 mg Oral QAM AC    piperacillin-tazobactam  3,375 mg IntraVENous Q8H    acetaminophen  650 mg Oral Q6H    fentanNYL  50 mcg IntraVENous Once     Continuous Infusions:   sodium chloride 25 mL/hr (10/05/22 0242)    sodium chloride 125 mL/hr at 10/05/22 0238     PRN Meds:.sodium chloride flush, sodium chloride, ondansetron **OR** ondansetron, oxyCODONE **OR** oxyCODONE      ASSESSMENT:  61 y.o. female admitted with   1. Anemia, unspecified type    2. Hematoma of rectus sheath, initial encounter        Rectus sheath hematoma  OR Date 9/27/2022, exploratory laparotomy with repair of reducible incisional hernia, bilateral transverse abdominis component releases with mesh placement, and lysis of adhesions  Hypertension  CHF  Paroxysmal atrial fibrillation, Xarelto currently held  Recent UTI and pneumonia      PLAN:  1. No plans for surgical intervention or drainage of hematoma, continued supportive care, monitor hemoglobin Q8 hours--transfuse if less than 7 (would also give FFP if transfusion is required given elevated INR)  2. Advance to regular diet as tolerated; monitor bowel function  3. IV hydration, decrease rate--stop when PO intake is adequate; monitor and correct electrolytes  4. Antibiotics  5. Activity as tolerated, ambulate TID, up to chair for meals  6. Pulmonary toilet, incentive spirometry  7. PRN analgesics and antiemetics--minimizing narcotics as tolerated, transition to PO  8. DVT prophylaxis with SCD's; hold Xarelto  9. Management of medical comorbid etiologies per consulting services  10. Disposition: Anticipate discharge home in the next 24-48 hours    EDUCATION:  Educated patient on plan of care and disease process--all questions answered. The patient is not currently smoking. Recommend maintaining a smoke-free lifestyle.     Plans discussed with patient and nursing. Reviewed and discussed with Dr. Rekha Alexander. Signed:  Donis Britton, APRN - CNP  10/5/2022 8:58 AM    I have personally performed a face to face diagnostic evaluation on this patient. I have interviewed and examined the patient and I agree with the assessment above. Time was spent reviewing patient chart (including but not limited to notes, labs, imaging and other testing), interviewing and counseling patient and present family members, performing physical exam, documentation of my findings and subsequent follow up of ordered medication and testing, placing referrals and communication with patient care providers, coordinating future care as well as documentation in the EHR. This encompassed more than 50% of the total encounter time. In summary, my findings and plan are the following:   Ms. Lalitha Carter is a 61 y.o. female who presents with   Assessment:  Rectus sheath hematoma  OR Date 9/27/2022, exploratory laparotomy with repair of reducible incisional hernia, bilateral transverse abdominis component releases with mesh placement, and lysis of adhesions  Hypertension  CHF  Paroxysmal atrial fibrillation, Xarelto currently held  Recent UTI and pneumonia  Acute blood loss anemia    Plan:  1. Trend hemoglobin, if hemoglobin drops below 7 will transfuse packed red blood cells as well as FFP as INR is elevated as well. Does not need surgical intervention, labs every 8 hours for 24 hours  2. Restart diet  3. IV fluids at low rate to prevent delusional anemia, monitor replace electrolytes as needed  4. Follow bowel function  5. Antibiotics minimize risk of mesh infection  6. Activity as tolerated  7. Hold p.o. anticoagulation, hold chemical DVT prophylaxis as well and use SCDs until hemoglobin stabilizes secondary to bleeding risk  8. Appreciate hospitalist support regarding medical comorbidities  9.  I have discussed with the patient the rationale for blood component transfusion; its benefits in treating or preventing fatigue, organ damage, or death; and its risk which includes mild transfusion reactions, rare risk of blood borne infection, or more serious but rare reactions. I have discussed the alternatives to transfusion, including the risk and consequences of not receiving transfusion. The patient had an opportunity to ask questions and had agreed to proceed with transfusion of blood components. This plan was discussed at length with the patient. She was understanding and in agreement with the plan    Flavio Cleary MD, FACS  10/5/2022  5:06 PM

## 2022-10-05 NOTE — ED PROVIDER NOTES
[facility]        Pt Name: Will Huerta  MRN: [id number]  [place] 1965  Date of evaluation: 10/4/2022  Provider: Helen Finney PA-C  PCP: Natalya RUIZ  Note Started: 1:15 AM EDT       LOUIS. I have evaluated this patient. My supervising physician was available for consultation. CHIEF COMPLAINT       Chief Complaint   Patient presents with    Post-op Problem     Pt had hernia repair surgery with  on 9/27, was discharged today, pt was walking up her stairs when she felt a \"pop\" on her right side and could not put pressure on her right leg. HISTORY OF PRESENT ILLNESS   (Location, Timing/Onset, Context/Setting, Quality, Duration, Modifying Factors, Severity, Associated Signs and Symptoms)  Note limiting factors. Chief Complaint: Postoperative complication    Will Huerta is a 61 y.o. female who presents the emergency department today for evaluation for a postoperative complication. The patient was admitted on 9/27/2022, and she did undergo an open exploratory laparotomy repair of reducible incisional hernia with mesh. Patient states that she tolerated the procedure well, there were no complications. The patient states that she was actually discharged this evening from the hospital.  The patient states that she was walking up the steps at home, she states that she was walking to at a time as was instructed. Patient states that when she got to the top she felt a \"pop\" to her right lower abdomen, and she states that she had pain across her abdomen. She states that her pain is sharp, constant is currently rated as a 10/10. The patient denies any nausea, vomiting or diarrhea. Patient has not had any fever or chills. No cough or congestion. She denies any dysuria or hematuria. Patient denies any abdominal distention.   Patient otherwise has no other complaints    Nursing Notes were all reviewed and agreed with or any disagreements were addressed in the HPI. REVIEW OF SYSTEMS    (2-9 systems for level 4, 10 or more for level 5)     Review of Systems   Constitutional:  Negative for activity change, appetite change, chills and fever. HENT:  Negative for congestion and rhinorrhea. Respiratory:  Negative for cough and shortness of breath. Cardiovascular:  Negative for chest pain. Gastrointestinal:  Positive for abdominal pain. Negative for diarrhea, nausea and vomiting. Genitourinary:  Negative for difficulty urinating, dysuria and hematuria. Positives and Pertinent negatives as per HPI. Except as noted above in the ROS, all other systems were reviewed and negative.        PAST MEDICAL HISTORY     Past Medical History:   Diagnosis Date    Anemia     Atrial fibrillation and flutter (HCC)     Atrial flutter (HCC)     CHB (complete heart block) (HCC)     Class 1 obesity without serious comorbidity with body mass index (BMI) of 33.0 to 33.9 in adult 09/06/2019    Congenital heart disease     GERD (gastroesophageal reflux disease)     Headache(784.0)     History of complete heart block     Hyperlipidemia     Hypertension     PONV (postoperative nausea and vomiting)     Prolonged emergence from general anesthesia     sensitive to meds, slow to wake    Sleep apnea     uses CPAP    Syncope     Systolic CHF, chronic ([facility] [address] 10/03/2018         SURGICAL HISTORY     Past Surgical History:   Procedure Laterality Date    CARDIAC DEFIBRILLATOR PLACEMENT  01/2021    [employer]    CARDIOVERSION  01/28/2022    CARDIOVERSION  02/22/2022    COLECTOMY N/A 04/13/2021    EXPLORATORY LAPAROTOMY, RESECTION OF DUODENAL MASS, CHOLECYSTECTOMY WITH INTRAOPERATIVE CHOLANGIOGRAM performed by Isaias Rothman MD at [place] [address] N/A 10/27/2020    COLONOSCOPY POLYPECTOMY SNARE/COLD BIOPSY performed by Sally Adams MD at [address] [place] [address]  11/29/2012    dual chamber PPM, [employer]    TUBAL LIGATION      UPPER GASTROINTESTINAL ENDOSCOPY N/A 10/27/2020    EGD DIAGNOSTIC ONLY performed by Jaison Amaya MD at [address] [place] [address] N/A 04/08/2021    EGD CONTROL HEMORRHAGE WITH APPLICATION OF 3 CLIPS TO DUODENAL MASS performed by Jacki Aguila MD at [address] [place] [address] N/A 04/08/2021    EGD BIOPSY DUODENAL MASS performed by Jacki Aguila MD at [address] [place] [address] N/A 04/08/2021    EGD SUBMUCOSAL INJECTION OF 0.6 MG OF EPINEPRINE INTO BASE OF DUODENAL MASS performed by Jacki Aguila MD at [address] [place] [address] N/A 03/04/2022    EGD BIOPSY performed by Jaison Amaya MD at [address] N/A 9/27/2022    OPEN EXPLORATORY LAPAROTOMY, REPAIR OF REDUCIBLE INCISIONAL HERNIA WITH MESH,  UNILATERAL  ABDOMINAL WALL COMPONENT RELEASE performed by Amy Nogueira MD at [address] [facility]       Previous Medications    AMMONIUM LACTATE (LAC-HYDRIN) 12 % LOTION        ATORVASTATIN (LIPITOR) 40 MG TABLET    Take 1 tablet by mouth daily    CARVEDILOL (COREG) 6.25 MG TABLET    Take 1 tablet by mouth 2 times daily    FLUTICASONE (FLONASE) 50 MCG/ACT NASAL SPRAY        HYDROCORTISONE 1 % CREAM        MULTIPLE VITAMINS-MINERALS (THERAPEUTIC MULTIVITAMIN-MINERALS) TABLET    Take 1 tablet by mouth daily    OXYCODONE (ROXICODONE) 5 MG IMMEDIATE RELEASE TABLET    Take 1 tablet by mouth every 6 hours as needed for Pain for up to 7 days. Take lowest dose possible to manage pain; may stop taking sooner than 7 days if pain is able to be controlled with non-narcotic analgesics and therapies.     PANTOPRAZOLE (PROTONIX) 40 MG TABLET    Take 1 tablet by mouth every morning (before breakfast)    POTASSIUM CHLORIDE (KLOR-CON M) 10 MEQ EXTENDED RELEASE TABLET    Take 1 tablet by mouth daily    SACUBITRIL-VALSARTAN (ENTRESTO) 49-51 MG PER TABLET Take 1 tablet by mouth 2 times daily    SPIRONOLACTONE (ALDACTONE) 50 MG TABLET    TAKE ONE TABLET BY MOUTH DAILY    VITAMIN D (ERGOCALCIFEROL) 1.25 MG (29891 UT) CAPS CAPSULE    TAKE ONE CAPSULE BY MOUTH ONCE WEEKLY    VITAMIN D3 (CHOLECALCIFEROL) 25 MCG (1000 UT) TABS TABLET    Take 1 tablet by mouth daily    XARELTO 20 MG TABS TABLET    TAKE ONE TABLET BY MOUTH DAILY WITH SUPPER         ALLERGIES     Latex, Morphine, Codeine, Lisinopril, Nitroglycerin, Sulfa antibiotics, and Hydralazine    FAMILYHISTORY       Family History   Problem Relation Age of Onset    High Blood Pressure Mother     Cancer Father     Heart Disease Neg Hx     High Cholesterol Neg Hx           SOCIAL HISTORY       Social History     Tobacco Use    Smoking status: Never    Smokeless tobacco: Never   Vaping Use    Vaping Use: Never used   Substance Use Topics    Alcohol use: No    Drug use: No       SCREENINGS             PHYSICAL EXAM    (up to 7 for level 4, 8 or more for level 5)     ED Triage Vitals [10/04/22 2142]   BP Temp Temp Source Heart Rate Resp SpO2 Height Weight   (!) 155/91 (!) 100.9 °F (38.3 °C) Oral 92 18 97 % -- 216 lb (98 kg)       Physical Exam  Vitals and nursing note reviewed. Constitutional:       Appearance: She is well-developed. She is not diaphoretic. HENT:      Head: Normocephalic and atraumatic. Right Ear: External ear normal.      Left Ear: External ear normal.      Nose: Nose normal.   Eyes:      General:         Right eye: No discharge. Left eye: No discharge. Neck:      Trachea: No tracheal deviation. Cardiovascular:      Rate and Rhythm: Normal rate and regular rhythm. Heart sounds: No murmur heard. Pulmonary:      Effort: Pulmonary effort is normal. No respiratory distress. Breath sounds: Normal breath sounds. No wheezing or rales. Abdominal:      General: Bowel sounds are normal. There is distension. Palpations: Abdomen is soft. Tenderness:  There is abdominal tenderness. There is no guarding or rebound. Comments: Diffuse abdominal tenderness, with mild distention, soft. Musculoskeletal:         General: Normal range of motion. Cervical back: Normal range of motion and neck supple. Skin:     General: Skin is warm and dry. Neurological:      Mental Status: She is alert and oriented to person, place, and time. Psychiatric:         Behavior: Behavior normal.       DIAGNOSTIC RESULTS   LABS:    Labs Reviewed   CBC WITH AUTO DIFFERENTIAL - Abnormal; Notable for the following components:       Result Value    WBC 11.3 (*)     RBC 2.48 (*)     Hemoglobin 7.4 (*)     Hematocrit 22.4 (*)     RDW 16.6 (*)     Neutrophils Absolute 9.7 (*)     Lymphocytes Absolute 0.7 (*)     Macrocytes Occasional (*)     Polychromasia Occasional (*)     All other components within normal limits   COMPREHENSIVE METABOLIC PANEL - Abnormal; Notable for the following components:    Glucose 117 (*)     Albumin 3.3 (*)     ALT 7 (*)     All other components within normal limits   PROTIME-INR - Abnormal; Notable for the following components:    Protime 24.9 (*)     INR 2.24 (*)     All other components within normal limits   APTT - Abnormal; Notable for the following components:    aPTT 51.2 (*)     All other components within normal limits   LIPASE   URINALYSIS WITH REFLEX TO CULTURE   TYPE AND SCREEN       When ordered only abnormal lab results are displayed. All other labs were within normal range or not returned as of this dictation. EKG: When ordered, EKG's are interpreted by the Emergency Department Physician in the absence of a cardiologist.  Please see their note for interpretation of EKG.     RADIOLOGY:   Non-plain film images such as CT, Ultrasound and MRI are read by the radiologist. Plain radiographic images are visualized and preliminarily interpreted by the ED Provider with the below findings:        Interpretation per the Radiologist below, if available at the time of this note:    XR CHEST PORTABLE   Final Result   Mild central vascular congestion, with improved aeration at the lung bases   with minimal residual right basilar atelectasis. CT ABDOMEN PELVIS W IV CONTRAST Additional Contrast? None   Final Result   1. There is a moderate-sized rectus sheath hematoma, with mixed attenuation   of blood products, as well as an area of thin linear enhancement layering   laterally on the right, on and around axial image 102, concerning for   possible active extravasation. This was discussed with Juan Diaz at   11:26 on 10/04/2022.   2. There is a central hematoma seen along the midline, new from the previous   examination, with no definite active extravasation. 3. Focal omental stranding seen in the anterior peritoneal cavity in the   upper abdomen, adjacent to the sigmoid colon, likely related to postoperative   change or omental infarct. No intraperitoneal abscess is identified. 4. Focal thickening noted in the region of the gastric pylorus which could be   related to mild gastritis. 5. The surgical drain traversing through the rectus sheath on the previous   exam and into the peritoneal cavity has been removed. 6. Other similar findings as described above. CT ABDOMEN PELVIS W IV CONTRAST Additional Contrast? None    Result Date: 10/4/2022  EXAMINATION: CT OF THE ABDOMEN AND PELVIS WITH CONTRAST 10/4/2022 10:10 pm TECHNIQUE: CT of the abdomen and pelvis was performed with the administration of intravenous contrast. Multiplanar reformatted images are provided for review. Automated exposure control, iterative reconstruction, and/or weight based adjustment of the mA/kV was utilized to reduce the radiation dose to as low as reasonably achievable.  COMPARISON: 10/01/2022 HISTORY: ORDERING SYSTEM PROVIDED HISTORY: post op, ANNI summers TECHNOLOGIST PROVIDED HISTORY: Reason for exam:->post op, RLQ melina Additional Contrast?->None Decision Support Exception - unselect if not a suspected or confirmed emergency medical condition->Emergency Medical Condition (MA) Reason for Exam: Post-op Problem (Pt had hernia repair surgery with  on 9/27, was discharged today, pt was walking up her stairs when she felt a \"pop\" on her right side and could not put pressure on her right leg. FINDINGS: Lower Chest: No pericardial effusion. Implanted cardiac devices are noted. Distal esophagus appears normal in caliber. Patchy subsegmental atelectasis. Organs: No enhancing mass identified in the liver or spleen. No adrenal mass. No pancreatic mass. No peripancreatic acute inflammatory process. Cholecystectomy clips are identified. No enhancing renal mass is identified. No obstructive hydroureteronephrosis is identified. GI/Bowel: Focal wall thickening involving the gastro duodenal junction, though no definite ulceration is identified. No ileus or obstruction is identified. Pelvis: Within the pelvis, there is a lobulated uterus with multiple uterine fibroids. Small volume of free fluid seen in the pelvis. No bulky pelvic lymphadenopathy is identified. Peritoneum/Retroperitoneum: No abdominal aortic aneurysm. No dissection. No retroperitoneal or mesenteric bulky lymphadenopathy. 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 There is a large rectus sheath hematoma, which extends across the midline, with a small amount of soft tissue air noted. The surgical drain seen in the rectus sheath on the previous exam has been removed. There is a thin area layering hyperdensity seen on and around axial image 102 in the right lateral aspect of the hematoma concerning for possible contrast extravasation. Mixed attenuation blood products are noted, with some liquefied appearing blood products as well as more hyperdense clotted blood. Bones/Soft Tissues:  There is a midline soft tissue hematoma with hyperdense clotted blood products noted. No definite active bleeding seen within the hematoma. Subcutaneous edematous changes are identified as well as a small amount of soft tissue gas. No acute osseous abnormality is identified. 1. There is a moderate-sized rectus sheath hematoma, with mixed attenuation of blood products, as well as an area of thin linear enhancement layering laterally on the right, on and around axial image 102, concerning for possible active extravasation. This was discussed with Dana Zamudio, at 11:26 on 10/04/2022. 2. There is a central hematoma seen along the midline, new from the previous examination, with no definite active extravasation. 3. Focal omental stranding seen in the anterior peritoneal cavity in the upper abdomen, adjacent to the sigmoid colon, likely related to postoperative change or omental infarct. No intraperitoneal abscess is identified. 4. Focal thickening noted in the region of the gastric pylorus which could be related to mild gastritis. 5. The surgical drain traversing through the rectus sheath on the previous exam and into the peritoneal cavity has been removed. 6. Other similar findings as described above. CT ABDOMEN PELVIS W IV CONTRAST Additional Contrast? Oral (water soluble contrast through NG)    Result Date: 10/4/2022  EXAMINATION: CT OF THE ABDOMEN AND PELVIS WITH CONTRAST 10/1/2022 7:49 pm TECHNIQUE: CT of the abdomen and pelvis was performed with the administration of intravenous contrast. Multiplanar reformatted images are provided for review. Automated  exposure control, iterative reconstruction, and/or weight based adjustment of the mA/kV was utilized to reduce the radiation dose to as low as reasonably achievable.  COMPARISON: 09/15/2022, 09/06/2022 HISTORY: ORDERING SYSTEM PROVIDED HISTORY: abdominal pain s/p complex hernia repair TECHNOLOGIST PROVIDED HISTORY: Additional Contrast?->Oral Reason for exam:->abdominal pain s/p complex hernia repair Reason for Exam: Fatigue (Pt brought in by  EMS from home for SOB, fatigue, left sided neck pain in the cervical spine, stroke scale neg, VSS per EMS upon arrival, Pt A and O x 4. Working out a [employer] earlier this morning, went home, symptoms started 1 hour ago when Pt was watching football, states he had symptoms like this a month ago and was discharged home. Also states that he stood up suddenly and got dizzy like he was going to fall at home, denies falling at home.) FINDINGS: Lower Chest: Bibasilar consolidation new from the prior exam. Organs: The liver, spleen, pancreas and adrenal glands are without focal abnormality. The kidneys enhance symmetrically. No renal calculus. No hydronephrosis or perinephric stranding. No biliary duct dilation. GI/Bowel: Enteric tube tip is noted within the proximal duodenum. Duodenal diverticulum. Mild wall thickening of the transverse colon in the mid abdomen with surrounding pericolonic stranding. There is also swirling of the mesentery within the right mid abdomen new from the prior exam.  No other dilated loops of bowel or bowel wall thickening. No free intraperitoneal air is noted. Pelvis: Fibroid uterus. No bladder wall thickening. No pathologically enlarged adenopathy. Trace free fluid in the pelvis slightly hyperdense in attenuation. Peritoneum/Retroperitoneum: The aorta is normal in caliber. The celiac axis, SMA and SG are patent. The portal venous system is patent. There is omental and mesenteric stranding predominately within the right upper quadrant and mid abdomen. Postsurgical changes from interval hernia repair along the anterior abdominal wall. There are multiple foci of gas noted within the anterior abdominal wall extending into the subcutaneous soft tissues of the right upper quadrant. Elongated collection of fluid is noted along the anterior abdominal wall without drainable component.   There are findings suspicious for recurrent fat containing hernia with surrounding inflammatory changes (image 91). Bones/Soft Tissues: Subcutaneous edema with multiple foci of gas are identified. Fluid is noted along the anterior abdominal wall along the midline incision as well. There is skin thickening along the anterior abdominal wall at the level of midline incision. No acute osseous abnormality. 1. Postsurgical changes from interval hernia repair. There is elongated fluid along the anterior abdominal wall at the hernia repair site with multiple foci of gas extending along the anterior abdominal wall and into the subcutaneous soft tissues predominant within the right upper quadrant. No definite free intraperitoneal air is identified. Inflammatory changes are noted in the omentum and mesentery along the anterior abdomen some which may be postsurgical in etiology. There are associated reactive inflammatory changes involving the transverse colon at this level as well. Surgical drain remains in place. 2. There is a questionable small recurrent fat containing hernia along the midline abdomen with surrounding inflammatory changes versus an area of fat necrosis. . 3. Trace hyperdense free fluid in the pelvis some of which is proteinaceous or hemorrhagic in etiology. This may be postoperative blood products or this could be postoperative as well. 4. There is mild swirling of the mesentery within the right mid abdomen of uncertain etiology without associated bowel obstruction or volvulus. An underlying internal hernia cannot be entirely excluded given recent surgery. 5. Bibasilar pneumonia. 6. Fibroid uterus. XR CHEST PORTABLE    Result Date: 10/5/2022  EXAMINATION: ONE XRAY VIEW OF THE CHEST 10/4/2022 10:05 pm COMPARISON: None.  HISTORY: ORDERING SYSTEM PROVIDED HISTORY: post op TECHNOLOGIST PROVIDED HISTORY: Reason for exam:->post op Reason for Exam: post op, sob FINDINGS: Implanted cardiac device noted overlying the left chest.  Atherosclerosis identified within the thoracic aorta. Cardiac size and mediastinal structures appear stable. Improved aeration seen in the lung bases bilaterally compared to the previous examination. Minimal residual basilar atelectasis on the right. No acute osseous abnormality is identified. No overt edema. Mild vascular congestion centrally. Mild central vascular congestion, with improved aeration at the lung bases with minimal residual right basilar atelectasis. XR CHEST PORTABLE    Result Date: 10/1/2022  EXAMINATION: ONE XRAY VIEW OF THE CHEST 10/1/2022 4:18 pm COMPARISON: Chest 09/06/2022 HISTORY: ORDERING SYSTEM PROVIDED HISTORY: Confirm NG placement FINDINGS: The cardiac silhouette is enlarged. Calcifications involving the aorta reflect atherosclerosis. The mediastinal and hilar silhouettes appear unremarkable. Scattered hazy opacities across the bilateral lower lungs partially obscuring the right left hemidiaphragms. Blunting right left lateral costophrenic sulci. Vascular engorgement and cephalization is demonstrated with bilateral peribronchial cuffing and perivascular haziness. Prominent soft tissue density upper right paramediastinal region is stable, typical of brachiocephalic vessel tortuosity/ectasia or occasionally thyroid goiter. No pneumothorax is seen. No acute osseous abnormality is identified. Stable left-sided AICD. NG tube extends below the left hemidiaphragm, into the upper abdomen, tip overlying the expected level of the distal stomach or proximal duodenum, right upper quadrant. 1. NG tube extends below the left hemidiaphragm, into the upper abdomen, tip overlying the expected level of the distal stomach or proximal duodenum, right upper quadrant. Consider pulling back NG tube 4-5 cm to ensure tip is at the stomach region. 2.  Vascular engorgement and cephalization is demonstrated with bilateral peribronchial cuffing and perivascular haziness.  3. Calcific atherosclerosis aorta. 4. Cardiomegaly. 5. Prominent soft tissue density upper right paramediastinal region is stable, typical of brachiocephalic vessel tortuosity/ectasia or occasionally thyroid goiter. RECOMMENDATION: Consider pulling back NG tube 4-5 cm to ensure tip is at the stomach region. XR ABDOMEN (2 VIEWS)    Result Date: 10/1/2022  EXAMINATION: TWO XRAY VIEWS OF THE ABDOMEN 10/1/2022 8:59 am COMPARISON: CT abdomen pelvis dated 09/15/2022 HISTORY: ORDERING SYSTEM PROVIDED HISTORY: abdominal pain TECHNOLOGIST PROVIDED HISTORY: Reason for exam:->abdominal pain Reason for Exam: abdominal pain FINDINGS: Flat and upright views of the abdomen were obtained. AICD is present. Heterogeneous retrocardiac opacity is seen. Relative lucency along the medial aspect of the right hemidiaphragm is not significantly changed as compared to  from CT dated 09/15/2022. Right upper quadrant clips. Air is seen within loops of bowel throughout the abdomen in a nonspecific pattern. A few nonspecific air-fluid levels are seen on the upright view. Pelvic phleboliths and uterine calcifications within the pelvis. Nonspecific bowel gas pattern. Left basilar atelectasis or airspace disease.            PROCEDURES   Unless otherwise noted below, none     Procedures    CRITICAL CARE TIME       CONSULTS:  IP CONSULT TO GENERAL SURGERY      EMERGENCY DEPARTMENT COURSE and DIFFERENTIAL DIAGNOSIS/MDM:   Vitals:    Vitals:    10/05/22 0015 10/05/22 0030 10/05/22 0045 10/05/22 0100   BP: (!) 141/70 (!) 166/82 (!) 146/83 138/78   Pulse: 95 (!) 102 93 93   Resp: 21 17 24 21   Temp:       TempSrc:       SpO2: 94%  96% 98%   Weight:           Patient was given the following medications:  Medications   fentaNYL (SUBLIMAZE) injection 50 mcg (50 mcg IntraVENous Not Given 10/4/22 2303)   ondansetron (ZOFRAN) injection 4 mg (4 mg IntraVENous Given 10/4/22 2303)   0.9 % sodium chloride bolus (0 mLs IntraVENous Stopped 10/5/22 0104)   iopamidol (ISOVUE-370) 76 % injection 75 mL (75 mLs IntraVENous Given 10/4/22 2222)   HYDROmorphone HCl PF (DILAUDID) injection 1 mg (1 mg IntraVENous Given 10/4/22 2304)         Is this patient to be included in the SEP-1 Core Measure due to severe sepsis or septic shock? No   Exclusion criteria - the patient is NOT to be included for SEP-1 Core Measure due to: Infection is not suspected    Briefly, this is a 59-year-old female who presents to the emergency department today for evaluation for a postoperative complication. Patient was admitted on 9 9/27/2022, did have a laparotomy, and ventral hernia repair. Patient was also discharged in the hospital this afternoon. She does have a history of atrial fibrillation, although reports that she has been did not take her Xarelto today. Patient states that she was walking up the steps, and she felt a \"pop\" to her right abdomen, and had increasing pain. On exam she does have some mild abdominal distention. Patient tells me that has been this way since the surgery. She does have diffuse abdominal tenderness no rebound or guarding    CBC shows a leukocytosis of 11.3. Hemoglobin is 7.4, dropped from 8.4 at 4:30 PM today. .  CMP is unremarkable. Lipase is normal    CT obtained which shows a moderate size rectus sheath hematoma with mixed attenuation blood products as well as an area of the thin linear enhancement layering laterally on the right concerning for possible active extravasation. Central hematoma along the midline new from previous exam.    Abdominal binder will be placed. I did discuss the case with Dr. Kimberly Choudhary of general surgery, will admit the patient for further evaluation. Patient is otherwise hemodynamically stable. Patient is updated, agreeable plan stable for admission. FINAL IMPRESSION      1. Anemia, unspecified type    2.  Hematoma of rectus sheath, initial encounter          DISPOSITION/PLAN   DISPOSITION Admitted 10/05/2022 12:26:12 AM      PATIENT REFERRED TO:  No follow-up provider specified.     DISCHARGE MEDICATIONS:  New Prescriptions    No medications on file       DISCONTINUED MEDICATIONS:  Discontinued Medications    No medications on file              (Please note that portions of this note were completed with a voice recognition program.  Efforts were made to edit the dictations but occasionally words are mis-transcribed.)    Titus Barron PA-C (electronically signed)            Titus Barron PA-C  10/05/22 0119

## 2022-10-05 NOTE — ED TRIAGE NOTES
Pt had hernia repair surgery with  on 9/27, was discharged today, pt was walking up her stairs when she felt a \"pop\" on her right side and could not put pressure on her right leg.

## 2022-10-05 NOTE — ED NOTES
Pt report given to Ava IVERSON on 4T, Pt transported to 4471 in stable condition on portable monitor, all questions answered during handoff.      Dominick Washington RN  10/05/22 [id number]

## 2022-10-05 NOTE — CARE COORDINATION
Discharge Planning Note:    Chart reviewed and it appears that patient has minimal needs for discharge at this time. Discussed with patient and requested that case management be notified if discharge needs are identified.     - Current discharge plan is for the patient to return home. - Patient is ACTIVE with [facility] and will resume upon discharge. - Will need updated [facility] orders. Case management will continue to follow progress and update discharge plan as needed.       Risk of Readmission Score: 22%    KO TijerinaN RN    [facility]  Phone: [phone]

## 2022-10-05 NOTE — PLAN OF CARE
Problem: Discharge Planning  Goal: Discharge to home or other facility with appropriate resources  10/5/2022 0824 by Brice Kirk RN  Outcome: Progressing  Flowsheets (Taken 10/5/2022 0800)  Discharge to home or other facility with appropriate resources:   Identify barriers to discharge with patient and caregiver   Refer to discharge planning if patient needs post-hospital services based on physician order or complex needs related to functional status, cognitive ability or social support system  10/5/2022 0434 by Kymberly Lauren RN  Outcome: Progressing     Problem: Pain  Goal: Verbalizes/displays adequate comfort level or baseline comfort level  10/5/2022 0824 by Brice Kirk RN  Outcome: Progressing  Flowsheets (Taken 10/5/2022 0800)  Verbalizes/displays adequate comfort level or baseline comfort level:   Encourage patient to monitor pain and request assistance   Assess pain using appropriate pain scale   Administer analgesics based on type and severity of pain and evaluate response  10/5/2022 0434 by Kymberly Lauren RN  Outcome: Progressing     Problem: ABCDS Injury Assessment  Goal: Absence of physical injury  10/5/2022 0824 by Brice Kirk RN  Outcome: Progressing  10/5/2022 0434 by Kymberly Lauren RN  Outcome: Progressing

## 2022-10-05 NOTE — PROGRESS NOTES
Pt abdominal binder removed and incision site is clean, dry, and intact. The incision site does look as if it is protruding and skin around it looks tight/taunt. Pt reports pain of 3 out of 10. WCTM.

## 2022-10-05 NOTE — PROGRESS NOTES
Patient is alert and oriented. Shift assessment completed. Neuro WNL. Reports pain 3/10 to right groin at this time. AM meds administered per MAR. The care plan and education has been reviewed and mutually agreed upon with the patient. Standard safety measures in place. 10:32 AM  Patient complains of shoulder pain 5/10; states feels SOB but sat @ 100% room air; MD notified via [employer] ([employer]).

## 2022-10-05 NOTE — CONSULTS
HOSPITALISTS HISTORY AND PHYSICAL    10/5/2022 1:36 PM    Patient Information:  Jacob Carr is a 61 y.o. female [id number]  PCP:  Samira Gardner (Tel: [phone] )    Chief complaint:    Chief Complaint   Patient presents with    Post-op Problem     Pt had hernia repair surgery with  on 9/27, was discharged today, pt was walking up her stairs when she felt a \"pop\" on her right side and could not put pressure on her right leg. History of Present Illness:  Sultana Arana is a 61 y.o. female who admitted recently to hospital and discharged yesterday for an hernia repair patient went home because felt a pop and severe right-sided pressure and pain. Patient denies any fever chills nausea vomiting sick contacts. Patient was found to have a history of hematoma with anemia. Patient is on Xarelto for A. fib      REVIEW OF SYSTEMS:   Constitutional: Negative for fever,chills or night sweats  ENT: Negative for rhinorrhea, epistaxis, hoarseness, sore throat. Respiratory: Negative for shortness of breath,wheezing  Cardiovascular: Negative for chest pain, palpitations   Gastrointestinal: Negative for nausea, vomiting, diarrhea  Genitourinary: Negative for polyuria, dysuria   Hematologic/Lymphatic: Negative for bleeding tendency, easy bruising  Musculoskeletal: Negative for myalgias and arthralgias  Neurologic: Negative for confusion,dysarthria. Skin: Negative for itching,rash  Psychiatric: Negative for depression,anxiety, agitation. Endocrine: Negative for polydipsia,polyuria,heat /cold intolerance.     Past Medical History:   has a past medical history of Anemia, Atrial fibrillation and flutter ([address] Atrial flutter ([address] CHB (complete heart block) ([facility] [address] Class 1 obesity without serious comorbidity with body mass index (BMI) of 33.0 to 33.9 in adult, Congenital heart disease, GERD (gastroesophageal reflux disease), Headache(784.0), History of complete heart block, Hyperlipidemia, Hypertension, PONV (postoperative nausea and vomiting), Prolonged emergence from general anesthesia, Sleep apnea, Syncope, and Systolic CHF, chronic ([facility] [address] Past Surgical History:   has a past surgical history that includes Uterine fibroid surgery; Pacemaker insertion (11/29/2012); Tubal ligation; Upper gastrointestinal endoscopy (N/A, 10/27/2020); Colonoscopy (N/A, 10/27/2020); Cardiac defibrillator placement (01/2021); Upper gastrointestinal endoscopy (N/A, 04/08/2021); Upper gastrointestinal endoscopy (N/A, 04/08/2021); Upper gastrointestinal endoscopy (N/A, 04/08/2021); colectomy (N/A, 04/13/2021); Cardioversion (01/28/2022); Cardioversion (02/22/2022); Upper gastrointestinal endoscopy (N/A, 03/04/2022); and ventral hernia repair (N/A, 9/27/2022). Medications:  No current facility-administered medications on file prior to encounter. Current Outpatient Medications on File Prior to Encounter   Medication Sig Dispense Refill    oxyCODONE (ROXICODONE) 5 MG immediate release tablet Take 1 tablet by mouth every 6 hours as needed for Pain for up to 7 days. Take lowest dose possible to manage pain; may stop taking sooner than 7 days if pain is able to be controlled with non-narcotic analgesics and therapies.  12 tablet 0    spironolactone (ALDACTONE) 50 MG tablet TAKE ONE TABLET BY MOUTH DAILY 90 tablet 0    vitamin D (ERGOCALCIFEROL) 1.25 MG (58702 UT) CAPS capsule TAKE ONE CAPSULE BY MOUTH ONCE WEEKLY 12 capsule 0    XARELTO 20 MG TABS tablet TAKE ONE TABLET BY MOUTH DAILY WITH SUPPER 90 tablet 1    sacubitril-valsartan (ENTRESTO) 49-51 MG per tablet Take 1 tablet by mouth 2 times daily 180 tablet 1    carvedilol (COREG) 6.25 MG tablet Take 1 tablet by mouth 2 times daily 180 tablet 1    potassium chloride (KLOR-CON M) 10 MEQ extended release tablet Take 1 tablet by mouth daily 90 tablet 1    ammonium lactate (LAC-HYDRIN) 12 % lotion       fluticasone (FLONASE) 50 MCG/ACT nasal spray       hydrocortisone 1 % cream       vitamin D3 (CHOLECALCIFEROL) 25 MCG (1000 UT) TABS tablet Take 1 tablet by mouth daily 30 tablet 5    atorvastatin (LIPITOR) 40 MG tablet Take 1 tablet by mouth daily 60 tablet 2    pantoprazole (PROTONIX) 40 MG tablet Take 1 tablet by mouth every morning (before breakfast) 30 tablet 3    Multiple Vitamins-Minerals (THERAPEUTIC MULTIVITAMIN-MINERALS) tablet Take 1 tablet by mouth daily         Allergies: Allergies   Allergen Reactions    Latex      rash    Morphine Shortness Of Breath    Codeine      Hives      Lisinopril      cough    Nitroglycerin Hives    Sulfa Antibiotics Nausea Only    Hydralazine      headaches        Social History:  Patient Lives at home   reports that she has never smoked. She has never used smokeless tobacco. She reports that she does not drink alcohol and does not use drugs. Family History:  family history includes Cancer in her father; High Blood Pressure in her mother. ,     Physical Exam:  BP (!) 168/89   Pulse 85   Temp 98.2 °F (36.8 °C) (Oral)   Resp 18   Ht 5' 6\" (1.676 m)   Wt 221 lb 9 oz (100.5 kg)   SpO2 100%   BMI 35.76 kg/m²     General appearance:  Appears comfortable. AAOx3  HEENT: atraumatic, Pupils equal, muscous membranes moist, no masses appreciated  Cardiovascular: Regular rate and rhythm no murmurs appreciated  Respiratory: CTAB no wheezing  Gastrointestinal: Aadbominal binder in place   EXT: no edema  Neurology: no gross focal deficts  Psychiatry: Appropriate affect.  Not agitated  Skin: Warm, dry, no rashes appreciated    Labs:  CBC:   Lab Results   Component Value Date/Time    WBC 9.1 10/05/2022 05:09 AM    RBC 2.27 10/05/2022 05:09 AM    HGB 7.3 10/05/2022 11:06 AM    HCT 21.8 10/05/2022 11:06 AM    MCV 89.8 10/05/2022 05:09 AM    MCH 30.7 10/05/2022 05:09 AM    MCHC 34.2 10/05/2022 05:09 AM    RDW 16.5 10/05/2022 05:09 AM     10/05/2022 05:09 AM    MPV 7.4 10/05/2022 05:09 AM     BMP:    Lab Results   Component Value Date/Time     10/05/2022 05:09 AM    K 3.2 10/05/2022 05:09 AM    K 3.7 09/15/2022 04:51 AM     10/05/2022 05:09 AM    CO2 20 10/05/2022 05:09 AM    BUN 5 10/05/2022 05:09 AM    CREATININE 0.7 10/05/2022 05:09 AM    CALCIUM 8.2 10/05/2022 05:09 AM    GFRAA >60 10/05/2022 05:09 AM    LABGLOM >60 10/05/2022 05:09 AM    GLUCOSE 93 10/05/2022 05:09 AM     XR CHEST PORTABLE   Final Result   Mild central vascular congestion, with improved aeration at the lung bases   with minimal residual right basilar atelectasis. CT ABDOMEN PELVIS W IV CONTRAST Additional Contrast? None   Final Result   1. There is a moderate-sized rectus sheath hematoma, with mixed attenuation   of blood products, as well as an area of thin linear enhancement layering   laterally on the right, on and around axial image 102, concerning for   possible active extravasation. This was discussed with Kalin Charles, at   11:26 on 10/04/2022.   2. There is a central hematoma seen along the midline, new from the previous   examination, with no definite active extravasation. 3. Focal omental stranding seen in the anterior peritoneal cavity in the   upper abdomen, adjacent to the sigmoid colon, likely related to postoperative   change or omental infarct. No intraperitoneal abscess is identified. 4. Focal thickening noted in the region of the gastric pylorus which could be   related to mild gastritis. 5. The surgical drain traversing through the rectus sheath on the previous   exam and into the peritoneal cavity has been removed. 6. Other similar findings as described above. Recent imaging reviewed    Problem List  Principal Problem:    Postoperative pain  Resolved Problems:    * No resolved hospital problems.  *        Assessment/Plan:   Acute blood loss anemia secondary to rectus sheath hematoma in the setting of anticoagulation  - monitor h/h transfuse to keep>7  - hold xarelto  - surgery on board      Chronic systolic chf: ef 02%  - hold meds if no hypontesion will reusme in am    PAF: home meds hold ac    Hypokalemia: replace and recheck  Hypomagnesemia: replace and recheck      DVT prophylaxis scds  Code status full code          Please note that some part of this chart was generated using Dragon dictation software. Although every effort was made to ensure the accuracy of this automated transcription, some errors in transcription may have occurred inadvertently. If you may need any clarification, please do not hesitate to contact me through [facility].        Carolyn Vera MD    10/5/2022 1:36 PM

## 2022-10-06 LAB
ANION GAP SERPL CALCULATED.3IONS-SCNC: 12 MMOL/L (ref 3–16)
ANISOCYTOSIS: ABNORMAL
APTT: 32.7 SEC (ref 23–34.3)
APTT: 36.8 SEC (ref 23–34.3)
BANDED NEUTROPHILS RELATIVE PERCENT: 5 % (ref 0–7)
BASOPHILS ABSOLUTE: 0 K/UL (ref 0–0.2)
BASOPHILS RELATIVE PERCENT: 0 %
BLOOD BANK DISPENSE STATUS: NORMAL
BLOOD BANK DISPENSE STATUS: NORMAL
BLOOD BANK PRODUCT CODE: NORMAL
BLOOD BANK PRODUCT CODE: NORMAL
BPU ID: NORMAL
BPU ID: NORMAL
BUN BLDV-MCNC: 5 MG/DL (ref 7–20)
CALCIUM SERPL-MCNC: 8.5 MG/DL (ref 8.3–10.6)
CHLORIDE BLD-SCNC: 110 MMOL/L (ref 99–110)
CO2: 17 MMOL/L (ref 21–32)
CREAT SERPL-MCNC: 0.7 MG/DL (ref 0.6–1.1)
DESCRIPTION BLOOD BANK: NORMAL
DESCRIPTION BLOOD BANK: NORMAL
EOSINOPHILS ABSOLUTE: 0.6 K/UL (ref 0–0.6)
EOSINOPHILS RELATIVE PERCENT: 6 %
GFR AFRICAN AMERICAN: >60
GFR NON-AFRICAN AMERICAN: >60
GLUCOSE BLD-MCNC: 90 MG/DL (ref 70–99)
HCT VFR BLD CALC: 22.7 % (ref 36–48)
HCT VFR BLD CALC: 23.2 % (ref 36–48)
HCT VFR BLD CALC: 23.3 % (ref 36–48)
HCT VFR BLD CALC: 23.4 % (ref 36–48)
HEMOGLOBIN: 7.6 G/DL (ref 12–16)
HEMOGLOBIN: 7.7 G/DL (ref 12–16)
HEMOGLOBIN: 7.7 G/DL (ref 12–16)
HEMOGLOBIN: 7.9 G/DL (ref 12–16)
INR BLD: 1.52 (ref 0.87–1.14)
LYMPHOCYTES ABSOLUTE: 0.9 K/UL (ref 1–5.1)
LYMPHOCYTES RELATIVE PERCENT: 9 %
MACROCYTES: ABNORMAL
MAGNESIUM: 2.3 MG/DL (ref 1.8–2.4)
MCH RBC QN AUTO: 30.4 PG (ref 26–34)
MCHC RBC AUTO-ENTMCNC: 33.9 G/DL (ref 31–36)
MCV RBC AUTO: 89.8 FL (ref 80–100)
METAMYELOCYTES RELATIVE PERCENT: 2 %
MONOCYTES ABSOLUTE: 0.7 K/UL (ref 0–1.3)
MONOCYTES RELATIVE PERCENT: 7 %
NEUTROPHILS ABSOLUTE: 7.6 K/UL (ref 1.7–7.7)
NEUTROPHILS RELATIVE PERCENT: 71 %
PDW BLD-RTO: 16.5 % (ref 12.4–15.4)
PHOSPHORUS: 2.1 MG/DL (ref 2.5–4.9)
PLATELET # BLD: 318 K/UL (ref 135–450)
PMV BLD AUTO: 7.3 FL (ref 5–10.5)
POLYCHROMASIA: ABNORMAL
POTASSIUM REFLEX MAGNESIUM: 3.9 MMOL/L (ref 3.5–5.1)
PROTHROMBIN TIME: 18.3 SEC (ref 11.7–14.5)
RBC # BLD: 2.53 M/UL (ref 4–5.2)
SODIUM BLD-SCNC: 139 MMOL/L (ref 136–145)
WBC # BLD: 9.7 K/UL (ref 4–11)

## 2022-10-06 PROCEDURE — 36415 COLL VENOUS BLD VENIPUNCTURE: CPT

## 2022-10-06 PROCEDURE — APPNB30 APP NON BILLABLE TIME 0-30 MINS: Performed by: NURSE PRACTITIONER

## 2022-10-06 PROCEDURE — 36430 TRANSFUSION BLD/BLD COMPNT: CPT

## 2022-10-06 PROCEDURE — 85018 HEMOGLOBIN: CPT

## 2022-10-06 PROCEDURE — C1751 CATH, INF, PER/CENT/MIDLINE: HCPCS

## 2022-10-06 PROCEDURE — 1200000000 HC SEMI PRIVATE

## 2022-10-06 PROCEDURE — 85025 COMPLETE CBC W/AUTO DIFF WBC: CPT

## 2022-10-06 PROCEDURE — 6360000002 HC RX W HCPCS: Performed by: SURGERY

## 2022-10-06 PROCEDURE — 84100 ASSAY OF PHOSPHORUS: CPT

## 2022-10-06 PROCEDURE — 6370000000 HC RX 637 (ALT 250 FOR IP): Performed by: SURGERY

## 2022-10-06 PROCEDURE — APPSS15 APP SPLIT SHARED TIME 0-15 MINUTES: Performed by: NURSE PRACTITIONER

## 2022-10-06 PROCEDURE — A4216 STERILE WATER/SALINE, 10 ML: HCPCS

## 2022-10-06 PROCEDURE — 6370000000 HC RX 637 (ALT 250 FOR IP): Performed by: INTERNAL MEDICINE

## 2022-10-06 PROCEDURE — 85014 HEMATOCRIT: CPT

## 2022-10-06 PROCEDURE — 2580000003 HC RX 258

## 2022-10-06 PROCEDURE — 2580000003 HC RX 258: Performed by: SURGERY

## 2022-10-06 PROCEDURE — 94760 N-INVAS EAR/PLS OXIMETRY 1: CPT

## 2022-10-06 PROCEDURE — 6370000000 HC RX 637 (ALT 250 FOR IP): Performed by: NURSE PRACTITIONER

## 2022-10-06 PROCEDURE — 85730 THROMBOPLASTIN TIME PARTIAL: CPT

## 2022-10-06 PROCEDURE — 99024 POSTOP FOLLOW-UP VISIT: CPT | Performed by: SURGERY

## 2022-10-06 PROCEDURE — 36569 INSJ PICC 5 YR+ W/O IMAGING: CPT

## 2022-10-06 PROCEDURE — 85610 PROTHROMBIN TIME: CPT

## 2022-10-06 PROCEDURE — 83735 ASSAY OF MAGNESIUM: CPT

## 2022-10-06 PROCEDURE — 80048 BASIC METABOLIC PNL TOTAL CA: CPT

## 2022-10-06 PROCEDURE — 02HV33Z INSERTION OF INFUSION DEVICE INTO SUPERIOR VENA CAVA, PERCUTANEOUS APPROACH: ICD-10-PCS | Performed by: SURGERY

## 2022-10-06 RX ORDER — SODIUM CHLORIDE 9 MG/ML
INJECTION INTRAVENOUS
Status: COMPLETED
Start: 2022-10-06 | End: 2022-10-06

## 2022-10-06 RX ADMIN — OXYCODONE 5 MG: 5 TABLET ORAL at 06:03

## 2022-10-06 RX ADMIN — CARVEDILOL 6.25 MG: 6.25 TABLET, FILM COATED ORAL at 08:30

## 2022-10-06 RX ADMIN — POTASSIUM & SODIUM PHOSPHATES POWDER PACK 280-160-250 MG 500 MG: 280-160-250 PACK at 14:45

## 2022-10-06 RX ADMIN — PANTOPRAZOLE SODIUM 40 MG: 40 TABLET, DELAYED RELEASE ORAL at 06:03

## 2022-10-06 RX ADMIN — PIPERACILLIN AND TAZOBACTAM 3375 MG: 3; .375 INJECTION, POWDER, FOR SOLUTION INTRAVENOUS at 01:08

## 2022-10-06 RX ADMIN — PIPERACILLIN AND TAZOBACTAM 3375 MG: 3; .375 INJECTION, POWDER, FOR SOLUTION INTRAVENOUS at 11:41

## 2022-10-06 RX ADMIN — ACETAMINOPHEN 650 MG: 325 TABLET ORAL at 01:09

## 2022-10-06 RX ADMIN — Medication 10 ML: at 08:30

## 2022-10-06 RX ADMIN — ACETAMINOPHEN 650 MG: 325 TABLET ORAL at 21:14

## 2022-10-06 RX ADMIN — PIPERACILLIN AND TAZOBACTAM 3375 MG: 3; .375 INJECTION, POWDER, FOR SOLUTION INTRAVENOUS at 16:56

## 2022-10-06 RX ADMIN — CARVEDILOL 6.25 MG: 6.25 TABLET, FILM COATED ORAL at 16:54

## 2022-10-06 RX ADMIN — ACETAMINOPHEN 650 MG: 325 TABLET ORAL at 14:45

## 2022-10-06 RX ADMIN — SODIUM CHLORIDE: 9 INJECTION, SOLUTION INTRAMUSCULAR; INTRAVENOUS; SUBCUTANEOUS at 16:54

## 2022-10-06 RX ADMIN — OXYCODONE 10 MG: 5 TABLET ORAL at 11:52

## 2022-10-06 RX ADMIN — ACETAMINOPHEN 650 MG: 325 TABLET ORAL at 08:30

## 2022-10-06 ASSESSMENT — PAIN DESCRIPTION - LOCATION
LOCATION: ABDOMEN

## 2022-10-06 ASSESSMENT — PAIN DESCRIPTION - DESCRIPTORS
DESCRIPTORS: ACHING;TENDER
DESCRIPTORS: ACHING;CRAMPING
DESCRIPTORS: ACHING;CRAMPING
DESCRIPTORS: ACHING;DISCOMFORT
DESCRIPTORS: ACHING;TENDER

## 2022-10-06 ASSESSMENT — PAIN DESCRIPTION - FREQUENCY: FREQUENCY: CONTINUOUS

## 2022-10-06 ASSESSMENT — PAIN - FUNCTIONAL ASSESSMENT
PAIN_FUNCTIONAL_ASSESSMENT: ACTIVITIES ARE NOT PREVENTED

## 2022-10-06 ASSESSMENT — PAIN SCALES - GENERAL
PAINLEVEL_OUTOF10: 3
PAINLEVEL_OUTOF10: 3
PAINLEVEL_OUTOF10: 5
PAINLEVEL_OUTOF10: 9
PAINLEVEL_OUTOF10: 8

## 2022-10-06 ASSESSMENT — PAIN DESCRIPTION - ONSET: ONSET: ON-GOING

## 2022-10-06 ASSESSMENT — PAIN DESCRIPTION - ORIENTATION
ORIENTATION: MID;RIGHT
ORIENTATION: RIGHT
ORIENTATION: RIGHT;MID
ORIENTATION: RIGHT;MID

## 2022-10-06 ASSESSMENT — PAIN DESCRIPTION - PAIN TYPE: TYPE: SURGICAL PAIN;ACUTE PAIN

## 2022-10-06 NOTE — PROGRESS NOTES
Received call from [facility]. States she is finishing up a line placement at another facility and will be coming to [facility] next to place PICC in this patient.

## 2022-10-06 NOTE — PROGRESS NOTES
Patient is alert and oriented. PRBC transfusing at this time. Pt is tolerating well. Shift assessment completed. Neuro WNL. Reports pain 5/10 to abd at this time. Surgical incision to abd CDI; no drainage or redness; CONSTANTINO- dressing in place. AM meds administered per MAR. The care plan and education has been reviewed and mutually agreed upon with the patient. Standard safety measures in place. 9:11 AM  Plasma transfusion started at this time.

## 2022-10-06 NOTE — PROGRESS NOTES
Physician Progress Note      Rogelio Cheng  CSN #:                  [id number]  :                       1965  ADMIT DATE:       10/4/2022 9:41 PM  [address] DATE:  RESPONDING  PROVIDER #:        Senthil Valente MD          QUERY TEXT:    Patient admitted with acute blood loss anemia secondary to rectus sheath   hematoma and is on chronic anticoagulation. If possible, please document in   the progress notes and discharge summary if you are evaluating and/or treating   any of the following: The medical record reflects the following:  Risk Factors: s/p exploratory lap, hernia repair and lysis of adhesions    Clinical Indicators: Hgb (10/4) 7, (10/5) 7.3, (10/6) 6.8  IM note 10/5, Acute blood loss anemia secondary to rectus sheath hematoma in   the setting of anticoagulation    Treatment: Hold Xarelto, serial labs, PRBCs, supportive care  Options provided:  -- Rectus sheath hematoma associated with Xarelto  -- Bleeding unrelated to anticoagulation  -- Other - I will add my own diagnosis  -- Disagree - Not applicable / Not valid  -- Disagree - Clinically unable to determine / Unknown  -- Refer to Clinical Documentation Reviewer    PROVIDER RESPONSE TEXT:    This patient has rectus sheath hematoma associated with Xarelto. Query created by:  Krystal Baker on 10/6/2022 10:09 AM      Electronically signed by:  Senthil Valente MD 10/6/2022 12:20 PM

## 2022-10-06 NOTE — PROGRESS NOTES
2100: This RN spoke with pts primary RN Dada Lovell). Per Ethel Hernandez, Hospitalist aware of critical H/H and orders to transfuse already obtained. However order for FFP okay to place per Gen Surgery. ---------------------------  2045: Received call from [facility] Surgery (SEBASTIAN Coronel) r/t pt H/H 6.8. Received orders from MD to transfuse pt with 1 unit of PRB and FFP and okay to proceed with previously ordered morning labs . MD advised that this RN will relay those orders to pt assigned and primary RN Ethel Hernandez.

## 2022-10-06 NOTE — PROGRESS NOTES
2207: Call placed to DIT to inform of PICC order. PICC form completed and placed on chart. Nurse to obtain blood consent and place on chart. 2323: Call placed to DIT to obtain ETA. Representative states that Stella Bolton has acknowledged order. Patric Garrett call this RN to touch base and provide ETA for PICC placement as pt requiring blood products.

## 2022-10-06 NOTE — PROGRESS NOTES
[facility] PROGRESS NOTE    10/6/2022 9:29 AM        Name: Tata Huang . Admitted: 10/4/2022  Primary Care Provider: Newton Hester (Tel: [phone])        Subjective: In bed hemoglobin did drop 6.8 is getting hemoglobin transfusion today denies any nausea vomiting chest pain    Reviewed interval ancillary notes    Current Medications  sodium chloride flush 0.9 % injection 5-40 mL, 2 times per day  sodium chloride flush 0.9 % injection 5-40 mL, PRN  0.9 % sodium chloride infusion, PRN  ondansetron (ZOFRAN-ODT) disintegrating tablet 4 mg, Q8H PRN   Or  ondansetron (ZOFRAN) injection 4 mg, Q6H PRN  pantoprazole (PROTONIX) tablet 40 mg, QAM AC  piperacillin-tazobactam (ZOSYN) 3,375 mg in dextrose 5 % 50 mL IVPB extended infusion (mini-bag), Q8H  0.9 % sodium chloride infusion, Continuous  oxyCODONE (ROXICODONE) immediate release tablet 5 mg, Q4H PRN   Or  oxyCODONE (ROXICODONE) immediate release tablet 10 mg, Q4H PRN  acetaminophen (TYLENOL) tablet 650 mg, Q6H  carvedilol (COREG) tablet 6.25 mg, BID WC  0.9 % sodium chloride infusion, PRN  lidocaine PF 1 % injection 5 mL, Once  sodium chloride flush 0.9 % injection 5-40 mL, 2 times per day  sodium chloride flush 0.9 % injection 5-40 mL, PRN  0.9 % sodium chloride infusion, PRN  0.9 % sodium chloride infusion, PRN  fentaNYL (SUBLIMAZE) injection 50 mcg, Once        Objective:  /68   Pulse 78   Temp 98.1 °F (36.7 °C) (Oral)   Resp 18   Ht 5' 6\" (1.676 m)   Wt 219 lb (99.3 kg)   SpO2 95%   BMI 35.35 kg/m²     Intake/Output Summary (Last 24 hours) at 10/6/2022 0929  Last data filed at 10/6/2022 0815  Gross per 24 hour   Intake 2269.18 ml   Output 700 ml   Net 1569.18 ml      Wt Readings from Last 3 Encounters:   10/06/22 219 lb (99.3 kg)   10/04/22 216 lb 8 oz (98.2 kg)   09/26/22 212 lb (96.2 kg)       General appearance:  Appears comfortable. AAOx3  HEENT: atraumatic, Pupils equal, muscous membranes moist, no masses appreciated  Cardiovascular: Regular rate and rhythm no murmurs appreciated  Respiratory: CTAB no wheezing  Gastrointestinal:right side slightly tender BS+  EXT: no edema  Neurology: no gross focal deficts  Psychiatry: Appropriate affect. Not agitated  Skin: Warm, dry, no rashes appreciated    Labs and Tests:  CBC:   Recent Labs     10/04/22  1441 10/04/22  2223 10/05/22  0509 10/05/22  1106 10/05/22  1842   WBC 9.1 11.3* 9.1  --   --    HGB 8.4* 7.4* 7.0* 7.3* 6.8*    329 290  --   --      BMP:    Recent Labs     10/04/22  1441 10/04/22  2223 10/05/22  0509    138 142   K 3.2* 3.5 3.2*    106 111*   CO2 22 22 20*   BUN 6* 7 5*   CREATININE 0.7 0.7 0.7   GLUCOSE 105* 117* 93     Hepatic:   Recent Labs     10/04/22  2223 10/05/22  0509   AST 31 34   ALT 7* 10   BILITOT 0.6 0.5   ALKPHOS 72 98     XR CHEST PORTABLE   Final Result   Mild central vascular congestion, with improved aeration at the lung bases   with minimal residual right basilar atelectasis. CT ABDOMEN PELVIS W IV CONTRAST Additional Contrast? None   Final Result   1. There is a moderate-sized rectus sheath hematoma, with mixed attenuation   of blood products, as well as an area of thin linear enhancement layering   laterally on the right, on and around axial image 102, concerning for   possible active extravasation. This was discussed with Domi Child, at   11:26 on 10/04/2022.   2. There is a central hematoma seen along the midline, new from the previous   examination, with no definite active extravasation. 3. Focal omental stranding seen in the anterior peritoneal cavity in the   upper abdomen, adjacent to the sigmoid colon, likely related to postoperative   change or omental infarct. No intraperitoneal abscess is identified. 4. Focal thickening noted in the region of the gastric pylorus which could be   related to mild gastritis.    5. The surgical drain traversing through the rectus sheath on the previous   exam and into the peritoneal cavity has been removed. 6. Other similar findings as described above. Recent imaging reviewed    Problem List  Principal Problem:    Postoperative pain  Resolved Problems:    * No resolved hospital problems.  *        Assessment/Plan:   Acute blood loss anemia secondary to rectus sheath hematoma in the setting of anticoagulation, s/p 1 unit  - monitor h/h transfuse to keep>7, repeat after transfusion  - hold xarelto  - surgery on board        Chronic systolic chf: ef 36%  -hold meds bp trendign lower     PAF: home meds hold ac     Hypokalemia: will fu repeat labs  Hypomagnesemia: will fu repeat labs     DVT prophylaxis scds  Code status full code         Cheyanne Kee MD   10/6/2022 9:29 AM

## 2022-10-06 NOTE — PROGRESS NOTES
Received critical hgb 6.8. Notified hospitalist and attending provider. Awaiting order. Patient may long term I.V access.  Hospitalist made aware

## 2022-10-07 VITALS
HEART RATE: 77 BPM | WEIGHT: 221 LBS | OXYGEN SATURATION: 98 % | RESPIRATION RATE: 16 BRPM | BODY MASS INDEX: 35.52 KG/M2 | SYSTOLIC BLOOD PRESSURE: 136 MMHG | TEMPERATURE: 98.8 F | DIASTOLIC BLOOD PRESSURE: 85 MMHG | HEIGHT: 66 IN

## 2022-10-07 LAB
ANION GAP SERPL CALCULATED.3IONS-SCNC: 10 MMOL/L (ref 3–16)
BACTERIA: ABNORMAL /HPF
BANDED NEUTROPHILS RELATIVE PERCENT: 1 % (ref 0–7)
BASOPHILS ABSOLUTE: 0 K/UL (ref 0–0.2)
BASOPHILS RELATIVE PERCENT: 0 %
BILIRUBIN URINE: NEGATIVE
BLOOD, URINE: NEGATIVE
BUN BLDV-MCNC: 6 MG/DL (ref 7–20)
CALCIUM SERPL-MCNC: 8.5 MG/DL (ref 8.3–10.6)
CHLORIDE BLD-SCNC: 111 MMOL/L (ref 99–110)
CLARITY: CLEAR
CO2: 22 MMOL/L (ref 21–32)
COLOR: YELLOW
CREAT SERPL-MCNC: 0.6 MG/DL (ref 0.6–1.1)
EOSINOPHILS ABSOLUTE: 0.4 K/UL (ref 0–0.6)
EOSINOPHILS RELATIVE PERCENT: 4 %
EPITHELIAL CELLS, UA: 6 /HPF (ref 0–5)
GFR AFRICAN AMERICAN: >60
GFR NON-AFRICAN AMERICAN: >60
GLUCOSE BLD-MCNC: 110 MG/DL (ref 70–99)
GLUCOSE URINE: NEGATIVE MG/DL
HCT VFR BLD CALC: 23.1 % (ref 36–48)
HCT VFR BLD CALC: 24.8 % (ref 36–48)
HEMOGLOBIN: 7.5 G/DL (ref 12–16)
HEMOGLOBIN: 8.1 G/DL (ref 12–16)
HYALINE CASTS: 0 /LPF (ref 0–8)
HYPOCHROMIA: ABNORMAL
INR BLD: 1.28 (ref 0.87–1.14)
KETONES, URINE: NEGATIVE MG/DL
LEUKOCYTE ESTERASE, URINE: ABNORMAL
LYMPHOCYTES ABSOLUTE: 1 K/UL (ref 1–5.1)
LYMPHOCYTES RELATIVE PERCENT: 10 %
MACROCYTES: ABNORMAL
MCH RBC QN AUTO: 29.8 PG (ref 26–34)
MCHC RBC AUTO-ENTMCNC: 32.5 G/DL (ref 31–36)
MCV RBC AUTO: 91.8 FL (ref 80–100)
MICROSCOPIC EXAMINATION: YES
MONOCYTES ABSOLUTE: 0.4 K/UL (ref 0–1.3)
MONOCYTES RELATIVE PERCENT: 4 %
NEUTROPHILS ABSOLUTE: 7.9 K/UL (ref 1.7–7.7)
NEUTROPHILS RELATIVE PERCENT: 81 %
NITRITE, URINE: NEGATIVE
OVALOCYTES: ABNORMAL
PDW BLD-RTO: 17.3 % (ref 12.4–15.4)
PH UA: 6.5 (ref 5–8)
PLATELET # BLD: 249 K/UL (ref 135–450)
PLATELET SLIDE REVIEW: ADEQUATE
PMV BLD AUTO: 7.1 FL (ref 5–10.5)
POLYCHROMASIA: ABNORMAL
POTASSIUM REFLEX MAGNESIUM: 4 MMOL/L (ref 3.5–5.1)
PROTEIN UA: ABNORMAL MG/DL
PROTHROMBIN TIME: 15.9 SEC (ref 11.7–14.5)
RBC # BLD: 2.52 M/UL (ref 4–5.2)
RBC UA: 3 /HPF (ref 0–4)
SLIDE REVIEW: ABNORMAL
SODIUM BLD-SCNC: 143 MMOL/L (ref 136–145)
SPECIFIC GRAVITY UA: 1.02 (ref 1–1.03)
TARGET CELLS: ABNORMAL
URINE REFLEX TO CULTURE: YES
URINE TYPE: ABNORMAL
UROBILINOGEN, URINE: 0.2 E.U./DL
WBC # BLD: 9.6 K/UL (ref 4–11)
WBC UA: 11 /HPF (ref 0–5)

## 2022-10-07 PROCEDURE — 85025 COMPLETE CBC W/AUTO DIFF WBC: CPT

## 2022-10-07 PROCEDURE — 80048 BASIC METABOLIC PNL TOTAL CA: CPT

## 2022-10-07 PROCEDURE — 99024 POSTOP FOLLOW-UP VISIT: CPT | Performed by: SURGERY

## 2022-10-07 PROCEDURE — 6360000002 HC RX W HCPCS: Performed by: SURGERY

## 2022-10-07 PROCEDURE — APPNB30 APP NON BILLABLE TIME 0-30 MINS: Performed by: NURSE PRACTITIONER

## 2022-10-07 PROCEDURE — 87086 URINE CULTURE/COLONY COUNT: CPT

## 2022-10-07 PROCEDURE — 36415 COLL VENOUS BLD VENIPUNCTURE: CPT

## 2022-10-07 PROCEDURE — APPSS30 APP SPLIT SHARED TIME 16-30 MINUTES: Performed by: NURSE PRACTITIONER

## 2022-10-07 PROCEDURE — 85018 HEMOGLOBIN: CPT

## 2022-10-07 PROCEDURE — 6370000000 HC RX 637 (ALT 250 FOR IP): Performed by: INTERNAL MEDICINE

## 2022-10-07 PROCEDURE — 81001 URINALYSIS AUTO W/SCOPE: CPT

## 2022-10-07 PROCEDURE — 2580000003 HC RX 258: Performed by: PHYSICIAN ASSISTANT

## 2022-10-07 PROCEDURE — 85610 PROTHROMBIN TIME: CPT

## 2022-10-07 PROCEDURE — 6370000000 HC RX 637 (ALT 250 FOR IP): Performed by: SURGERY

## 2022-10-07 PROCEDURE — 85014 HEMATOCRIT: CPT

## 2022-10-07 PROCEDURE — 2580000003 HC RX 258: Performed by: SURGERY

## 2022-10-07 RX ADMIN — PIPERACILLIN AND TAZOBACTAM 3375 MG: 3; .375 INJECTION, POWDER, FOR SOLUTION INTRAVENOUS at 09:50

## 2022-10-07 RX ADMIN — ACETAMINOPHEN 650 MG: 325 TABLET ORAL at 08:50

## 2022-10-07 RX ADMIN — CARVEDILOL 6.25 MG: 6.25 TABLET, FILM COATED ORAL at 08:50

## 2022-10-07 RX ADMIN — OXYCODONE 5 MG: 5 TABLET ORAL at 02:12

## 2022-10-07 RX ADMIN — PANTOPRAZOLE SODIUM 40 MG: 40 TABLET, DELAYED RELEASE ORAL at 06:36

## 2022-10-07 RX ADMIN — ACETAMINOPHEN 650 MG: 325 TABLET ORAL at 02:01

## 2022-10-07 RX ADMIN — ACETAMINOPHEN 650 MG: 325 TABLET ORAL at 13:40

## 2022-10-07 RX ADMIN — Medication 10 ML: at 08:52

## 2022-10-07 RX ADMIN — PIPERACILLIN AND TAZOBACTAM 3375 MG: 3; .375 INJECTION, POWDER, FOR SOLUTION INTRAVENOUS at 02:02

## 2022-10-07 ASSESSMENT — PAIN SCALES - GENERAL
PAINLEVEL_OUTOF10: 0
PAINLEVEL_OUTOF10: 5
PAINLEVEL_OUTOF10: 5

## 2022-10-07 ASSESSMENT — PAIN DESCRIPTION - ORIENTATION: ORIENTATION: MID;RIGHT

## 2022-10-07 ASSESSMENT — PAIN DESCRIPTION - LOCATION: LOCATION: ABDOMEN

## 2022-10-07 ASSESSMENT — PAIN DESCRIPTION - DESCRIPTORS: DESCRIPTORS: ACHING;TENDER

## 2022-10-07 NOTE — PLAN OF CARE
Problem: Discharge Planning  Goal: Discharge to home or other facility with appropriate resources  Outcome: Progressing     Problem: Pain  Goal: Verbalizes/displays adequate comfort level or baseline comfort level  Outcome: Progressing     Problem: ABCDS Injury Assessment  Goal: Absence of physical injury  Outcome: Progressing     Problem: Chronic Conditions and Co-morbidities  Goal: Patient's chronic conditions and co-morbidity symptoms are monitored and maintained or improved  Outcome: Progressing     Problem: Respiratory - Adult  Goal: Achieves optimal ventilation and oxygenation  Outcome: Progressing     Problem: Cardiovascular - Adult  Goal: Maintains optimal cardiac output and hemodynamic stability  Outcome: Progressing  Goal: Absence of cardiac dysrhythmias or at baseline  Outcome: Progressing     Problem: Skin/Tissue Integrity - Adult  Goal: Skin integrity remains intact  Outcome: Progressing  Goal: Incisions, wounds, or drain sites healing without S/S of infection  Outcome: Progressing     Problem: Gastrointestinal - Adult  Goal: Minimal or absence of nausea and vomiting  Outcome: Progressing  Goal: Maintains or returns to baseline bowel function  Outcome: Progressing  Goal: Maintains adequate nutritional intake  Outcome: Progressing

## 2022-10-07 NOTE — DISCHARGE SUMMARY
[facility]        Discharge Summary    Patient Name: Will Huerta  MRN: [id number]  YOB: 1965  PCP: Leslie Olivia  Admission Date: 10/4/2022  Discharge Date: 10/7/2022  Disposition: home  Admitting Diagnosis: Postoperative pain [G89.18]  Hematoma of rectus sheath, initial encounter [S30.1XXA]  Anemia, unspecified type [D64.9]  Discharge Diagnosis:   Patient Active Problem List   Diagnosis    HTN (hypertension)    Other and unspecified hyperlipidemia    Congenital heart block    Dyspnea on exertion    Headache    LVH (left ventricular hypertrophy)    Persistent atrial fibrillation (HCC)    Chest pain    Systolic CHF, chronic (HCC)    Hypersomnia    Obstructive sleep apnea (adult) (pediatric)    LV dysfunction    Left subclavian vein thrombosis (HCC)    Dilated cardiomyopathy ([facility]    Class 1 obesity due to excess calories with body mass index (BMI) of 31.0 to 31.9 in adult    Paroxysmal ventricular tachycardia    AICD (automatic cardioverter/defibrillator) present    Exertional dyspnea    Iron deficiency anemia    Other fatigue    Anemia    Gait instability    Adequate anticoagulation on anticoagulant therapy    Duodenal mass    Weight loss counseling, encounter for    Incisional hernia, without obstruction or gangrene    Dizziness    Incisional hernia without obstruction or gangrene    PAF (paroxysmal atrial fibrillation) ([facility]    Postoperative pain      Consultants: IP CONSULT TO GENERAL SURGERY  IP CONSULT TO HOSPITALIST    Procedures/Diagnostic Test(s):  XR CHEST PORTABLE   Final Result   Mild central vascular congestion, with improved aeration at the lung bases   with minimal residual right basilar atelectasis. CT ABDOMEN PELVIS W IV CONTRAST Additional Contrast? None   Final Result   1.  There is a moderate-sized rectus sheath hematoma, with mixed attenuation   of blood products, as well as an area of thin linear enhancement layering laterally on the right, on and around axial image 102, concerning for   possible active extravasation. This was discussed with Shirley Smith, at   11:26 on 10/04/2022.   2. There is a central hematoma seen along the midline, new from the previous   examination, with no definite active extravasation. 3. Focal omental stranding seen in the anterior peritoneal cavity in the   upper abdomen, adjacent to the sigmoid colon, likely related to postoperative   change or omental infarct. No intraperitoneal abscess is identified. 4. Focal thickening noted in the region of the gastric pylorus which could be   related to mild gastritis. 5. The surgical drain traversing through the rectus sheath on the previous   exam and into the peritoneal cavity has been removed. 6. Other similar findings as described above. Discharge Condition: good    HOSPITAL COURSE: Areli Munoz originally presented to the hospital on 10/4/2022  9:41 PM just a few hours after being discharged from the hospital following recovery from exploratory laparotomy with repair of reducible incisional hernia, bilateral transverse abdominis component releases with mesh placement, and lysis of adhesions. She had been feeling well at discharge, but while walking up stairs at home, she felt a \"pop\" in her right lower quadrant, with associated severe, sharp, constant pain that did not improved with previously prescribed narcotic analgesic. She takes Xarelto but had not had since before surgery. CT imaging showed a moderate-sized rectus sheath hematoma, with mixed attenuation of blood products, as well as an area of thin linear enhancement layering laterally on the right concerning for possible active extravasation as well as a central hematoma seen along the midline, with no definite active extravasation. No drainage or surgical intervention required.   Associated acute blood loss anemia for which she was transfused, with stable hemoglobin at discharge. Hospitalist was consulted for management of medical comorbidities including acute blood loss anemia, CHF, paroxysmal atrial fibrillation, and hypertension. Hospital course was uneventful. At time of discharge, Jordan Vargas was tolerating a regular diet, had active bowel sounds, ambulating on her own accord and had adequate analgesia on oral pain medications, and had no signs of symptoms of complications. PHYSICAL EXAMINATION:  Discharge Vitals:  height is 5' 6\" (1.676 m) and weight is 221 lb (100.2 kg). Her oral temperature is 98.1 °F (36.7 °C). Her blood pressure is 149/81 (abnormal) and her pulse is 82. Her respiration is 18 and oxygen saturation is 98%. General appearance - alert, well appearing, and in no distress  Chest - clear to ausculation  Heart - normal rate and regular rhythm  Abdomen - soft, non-distended, incisional and right lower quadrant tenderness only, bowel sounds present  Neurological - motor and sensory grossly normal bilaterally  Musculoskeletal - full range of motion without pain  Extremities - peripheral pulses normal, no pedal edema, no clubbing or cyanosis  Incision: healing well, no drainage, no erythema, well approximated, hematoma present--dressing is clean/dry/intact    LABS:  Recent Labs     10/05/22  0509 10/05/22  1106 10/06/22  1105 10/06/22  2126 10/07/22  0508   WBC 9.1  --  9.7  --  9.6   HGB 7.0*   < > 7.7*  7.7*  7.6* 7.9* 7.5*   HCT 20.4*   < > 22.7*  23.3*  23.2* 23.4* 23.1*     --  318  --  249     --  139  --  143   K 3.2*  --  3.9  --  4.0   *  --  110  --  111*   CO2 20*  --  17*  --  22   BUN 5*  --  5*  --  6*   CREATININE 0.7  --  0.7  --  0.6    < > = values in this interval not displayed. DISCHARGE INSTRUCTIONS:  1.  Discharge medications:      Medication List        CONTINUE taking these medications      ammonium lactate 12 % lotion  Commonly known as: LAC-HYDRIN     atorvastatin 40 MG tablet  Commonly known as: LIPITOR  Take 1 tablet by mouth daily     carvedilol 6.25 MG tablet  Commonly known as: COREG  Take 1 tablet by mouth 2 times daily     fluticasone 50 MCG/ACT nasal spray  Commonly known as: FLONASE     hydrocortisone 1 % cream     oxyCODONE 5 MG immediate release tablet  Commonly known as: Roxicodone  Take 1 tablet by mouth every 6 hours as needed for Pain for up to 7 days. Take lowest dose possible to manage pain; may stop taking sooner than 7 days if pain is able to be controlled with non-narcotic analgesics and therapies. pantoprazole 40 MG tablet  Commonly known as: PROTONIX  Take 1 tablet by mouth every morning (before breakfast)     potassium chloride 10 MEQ extended release tablet  Commonly known as: KLOR-CON M  Take 1 tablet by mouth daily     sacubitril-valsartan 49-51 MG per tablet  Commonly known as: ENTRESTO  Take 1 tablet by mouth 2 times daily     spironolactone 50 MG tablet  Commonly known as: ALDACTONE  TAKE ONE TABLET BY MOUTH DAILY     therapeutic multivitamin-minerals tablet     vitamin D 1.25 MG (56932 UT) Caps capsule  Commonly known as: ERGOCALCIFEROL  TAKE ONE CAPSULE BY MOUTH ONCE WEEKLY     vitamin D3 25 MCG (1000 UT) Tabs tablet  Commonly known as: CHOLECALCIFEROL  Take 1 tablet by mouth daily            STOP taking these medications      Xarelto 20 MG Tabs tablet  Generic drug: rivaroxaban            2. Diet as tolerated. 3. Activity: activity as tolerated, no driving while on analgesics, and no heavy lifting for 6 weeks. 4. Wound care: keep incisions clean and dry  5. Follow-up: recommend follow up with PCP in 2-4 weeks. 6. Okay to shower, don't submerge incisions under water. 7. No driving until off pain medication and able to move torso freely without any pain. 8. Return to hospital, or contact Dr. Natanael Loera office ([phone]) if any signs of infection are seen. 9. The patient is not currently smoking. Recommend maintaining a smoke-free lifestyle.   10. Return to Clinic: in 7 days.  11. Reviewed discharge instructions, restrictions, and reasons to call the office. EDUCATION:  Educated patient on plan of care and disease process--all questions answered. Plans discussed with patient and nursing. Reviewed and discussed with Dr. Chi Powers. Time spent for discharge: 30 minutes, including plan of care, patient education, and care coordination. Signed:  CATRACHITO Dick - CNP  10/7/2022 10:22 AM    Monserrat Powers MD, FACS  10/7/2022  9:10 PM

## 2022-10-07 NOTE — PLAN OF CARE
Patient luanne regular diet. Abd pain 3-6. Denies nausea. No vomiting. Soft BM yesterday evening x 1. Pain well controlled. Up to BR per self without issues. Hat in toilet. Waiting to catch urine for U/A. Patient aware. Ice on abd. Compression boots on overnight. Cont to monitor.

## 2022-10-07 NOTE — DISCHARGE INSTR - COC
Continuity of Care Form    Patient Name: Cyrus James   :  1965  MRN:  [id number]    Admit date:  10/4/2022  Discharge date:  10/07/22    Code Status Order: Full Code   Advance Directives:     Admitting Physician:  Lele Priest MD  PCP: Samira Gardner    Discharging Nurse: [facility] Unit/Room#: 4CL-0966/9714-58  Discharging Unit Phone Number: [phone]    Emergency Contact:   Extended Emergency Contact Information  Primary Emergency Contact: Yg Ochoa Phone: [phone]  Mobile Phone: [phone]  Relation: Other  Preferred language: English   needed?  No  Secondary Emergency Contact: Beau [address] Phone: [phone]  Mobile Phone: [phone]  Relation: Brother/Sister    Past Surgical History:  Past Surgical History:   Procedure Laterality Date    CARDIAC DEFIBRILLATOR PLACEMENT  2021    Medtronic    CARDIOVERSION  2022    CARDIOVERSION  2022    COLECTOMY N/A 2021    EXPLORATORY LAPAROTOMY, RESECTION OF DUODENAL MASS, CHOLECYSTECTOMY WITH INTRAOPERATIVE CHOLANGIOGRAM performed by Lele Priest MD at [address] [place] [address] N/A 10/27/2020    COLONOSCOPY POLYPECTOMY SNARE/COLD BIOPSY performed by Beverly Zapien MD at [address] [place] [address]  2012    dual chamber PPM, [employer]    TUBAL LIGATION      UPPER GASTROINTESTINAL ENDOSCOPY N/A 10/27/2020    EGD DIAGNOSTIC ONLY performed by Beverly Zapien MD at [address] [place] [address] 2021    EGD CONTROL HEMORRHAGE WITH APPLICATION OF 3 CLIPS TO DUODENAL MASS performed by Benny Santiago MD at [address] [place] [address] 2021    EGD BIOPSY DUODENAL MASS performed by Benny Santiago MD at [address] [place] [address] N/A 2021    EGD SUBMUCOSAL INJECTION OF 0.6 MG OF EPINEPRINE INTO BASE OF DUODENAL MASS performed by Benny Santiago MD at [facility]    UPPER GASTROINTESTINAL ENDOSCOPY N/A 03/04/2022    EGD BIOPSY performed by Jitendra Briggs MD at [address] [place] N/A 9/27/2022    OPEN EXPLORATORY LAPAROTOMY, REPAIR OF REDUCIBLE INCISIONAL HERNIA WITH MESH,  UNILATERAL  ABDOMINAL WALL COMPONENT RELEASE performed by Saundra Lombardi MD at [address]       Immunization History:   Immunization History   Administered Date(s) Administered    COVID-19, MODERNA BLUE border, Primary or Immunocompromised, (age 12y+), IM, 100 mcg/0.5mL 03/13/2021, 05/08/2021       Active Problems:  Patient Active Problem List   Diagnosis Code    HTN (hypertension) I10    Other and unspecified hyperlipidemia E78.5    Congenital heart block Q24.6    Dyspnea on exertion R06.09    Headache R51.9    LVH (left ventricular hypertrophy) I51.7    Persistent atrial fibrillation (HCC) I48.19    Chest pain B84.8    Systolic CHF, chronic (HCC) I50.22    Hypersomnia G47.10    Obstructive sleep apnea (adult) (pediatric) G47.33    LV dysfunction I51.9    Left subclavian vein thrombosis ([address] I82. B12    Dilated cardiomyopathy (HCC) I42.0    Class 1 obesity due to excess calories with body mass index (BMI) of 31.0 to 31.9 in adult E66.09, Z68.31    Paroxysmal ventricular tachycardia I47.29    AICD (automatic cardioverter/defibrillator) present Z95.810    Exertional dyspnea R06.09    Iron deficiency anemia D50.9    Other fatigue R53.83    Anemia D64.9    Gait instability R26.81    Adequate anticoagulation on anticoagulant therapy Z79.01    Duodenal mass K31.89    Weight loss counseling, encounter for Z71.3    Incisional hernia, without obstruction or gangrene K43.2    Dizziness R42    Incisional hernia without obstruction or gangrene K43.2    PAF (paroxysmal atrial fibrillation) (HCC) I48.0    Postoperative pain G89.18       Isolation/Infection:   Isolation            No Isolation          Patient Infection Status       Infection Onset Added Last Indicated Last Indicated By Review Planned Expiration Resolved Resolved By    None active    Resolved    C-diff Rule Out 10/01/22 10/01/22 10/01/22 GI Bacterial Pathogens By PCR (Ordered)   10/03/22 John Paul Huff RN    No order for cdiff    COVID-19 (Rule Out) 02/08/21 02/08/21 02/08/21 COVID-19 (Ordered)   02/09/21 Rule-Out Test Resulted    COVID-19 (Rule Out) 06/27/20 06/27/20 06/27/20 COVID-19 (Ordered)   06/28/20 Rule-Out Test Resulted            Nurse Assessment:  Last Vital Signs: BP (!) 149/81   Pulse 82   Temp 98.1 °F (36.7 °C) (Oral)   Resp 18   Ht 5' 6\" (1.676 m)   Wt 221 lb (100.2 kg)   SpO2 98%   BMI 35.67 kg/m²     Last documented pain score (0-10 scale): Pain Level: 0  Last Weight:   Wt Readings from Last 1 Encounters:   10/07/22 221 lb (100.2 kg)     Mental Status:  oriented, alert, coherent, logical, thought processes intact, and able to concentrate and follow conversation    IV Access:  - None    Nursing Mobility/ADLs:  Walking   Independent  Transfer  Independent  Bathing  Independent  Dressing  Independent  Toileting  Independent  Feeding  Independent  Med [address]  Assisted  Med Delivery   whole    Wound Care Documentation and Therapy:  Incision 09/27/22 Abdomen Mid (Active)   Dressing Status Clean;Dry; Intact 10/07/22 0837   Dressing/Treatment Dry dressing;Xeroform 10/07/22 0837   Closure Other (Comment) 10/07/22 0837   Margins Other (Comment) 10/07/22 0837   Incision Assessment Other (Comment) 10/07/22 0837   Drainage Amount None 10/07/22 0837   Odor None 10/07/22 0837   Sarah-incision Assessment Intact 10/07/22 0837   Number of days: 10        Elimination:  Continence:    Bowel: Yes  Bladder: Yes  Urinary Catheter: None   Colostomy/Ileostomy/Ileal Conduit: No       Date of Last BM: ***    Intake/Output Summary (Last 24 hours) at 10/7/2022 1117  Last data filed at 10/7/2022 0737  Gross per 24 hour   Intake 1499.4 ml   Output 150 ml   Net 1349.4 ml     I/O last 3 completed shifts: In: 2319.8 [P.O.:360; I.V.:1046.6; Blood:785.4; IV Piggyback:127.8]  Out: -     Safety Concerns:     None    Impairments/Disabilities:      None    Nutrition Therapy:  Current Nutrition Therapy:   - Oral Diet:  General    Routes of Feeding: Oral  Liquids: Thin Liquids  Daily Fluid Restriction: no  Last Modified Barium Swallow with Video (Video Swallowing Test): not done    Treatments at the Time of Hospital Discharge:   Respiratory Treatments: ***  Oxygen Therapy:  is not on home oxygen therapy. Ventilator:    - No ventilator support    Rehab Therapies: {THERAPEUTIC INTERVENTION:5067870257}  Weight Bearing Status/Restrictions: No weight bearing restrictions  Other Medical Equipment (for information only, NOT a DME order):  None  Other Treatments: ***    Patient's personal belongings (please select all that are sent with patient):  None    RN SIGNATURE:  Electronically signed by Raad Hancock RN on 10/7/22 at 11:38 AM EDT    CASE MANAGEMENT/SOCIAL WORK SECTION    Inpatient Status Date: 10/05/2022    Readmission Risk Assessment Score:  Readmission Risk              Risk of Unplanned Readmission:  19           Discharging to Facility/ Agency   Name: [facility]  Address: [address] [place] [address]  Phone: [phone]  Fax:      / signature: Electronically signed by Ibeth Jay RN on 10/7/22 at 11:18 AM EDT    PHYSICIAN SECTION    Prognosis: {Prognosis:9227385239}    Condition at Discharge: [facility] Patient Condition:[id number]}    Rehab Potential (if transferring to Rehab): {Prognosis:2991819959}    Recommended Labs or Other Treatments After Discharge: ***    Physician Certification: I certify the above information and transfer of Jayleen Alvarez  is necessary for the continuing treatment of the diagnosis listed and that she requires {Admit to Appropriate Level of Care:05826} for {GREATER/LESS:677425972} 30 days.      Update Admission H&P: {CHP DME Changes in [facility]:233659561}    PHYSICIAN SIGNATURE:  {Esignature:[id number]}

## 2022-10-07 NOTE — PLAN OF CARE
Problem: Discharge Planning  Goal: Discharge to home or other facility with appropriate resources  10/7/2022 1457 by Dorothy Ascencio RN  Outcome: Completed  10/7/2022 1052 by Dorothy Ascencio RN  Outcome: Progressing     Problem: Pain  Goal: Verbalizes/displays adequate comfort level or baseline comfort level  10/7/2022 1457 by Dorothy Ascencio RN  Outcome: Completed  10/7/2022 1052 by Dorothy Ascencio RN  Outcome: Progressing     Problem: ABCDS Injury Assessment  Goal: Absence of physical injury  10/7/2022 1457 by Dorothy Ascencio RN  Outcome: Completed  10/7/2022 1052 by Dorothy Ascencio RN  Outcome: Progressing     Problem: Chronic Conditions and Co-morbidities  Goal: Patient's chronic conditions and co-morbidity symptoms are monitored and maintained or improved  10/7/2022 1457 by Dorothy Ascencio RN  Outcome: Completed  10/7/2022 1052 by Dorothy Ascencio RN  Outcome: Progressing     Problem: Respiratory - Adult  Goal: Achieves optimal ventilation and oxygenation  10/7/2022 1457 by Dorothy Ascencio RN  Outcome: Completed  10/7/2022 1052 by Dorothy Ascencio RN  Outcome: Progressing     Problem: Cardiovascular - Adult  Goal: Maintains optimal cardiac output and hemodynamic stability  10/7/2022 1457 by Dorothy Ascencio RN  Outcome: Completed  10/7/2022 1052 by Dorothy Ascencio RN  Outcome: Progressing  Goal: Absence of cardiac dysrhythmias or at baseline  10/7/2022 1457 by Dorothy Ascencio RN  Outcome: Completed  10/7/2022 1052 by Dorothy Ascencio RN  Outcome: Progressing     Problem: Skin/Tissue Integrity - Adult  Goal: Skin integrity remains intact  10/7/2022 1457 by Dorothy Ascencio RN  Outcome: Completed  10/7/2022 1052 by Dorothy Ascencio RN  Outcome: Progressing  Goal: Incisions, wounds, or drain sites healing without S/S of infection  10/7/2022 1457 by Dorothy Ascencio RN  Outcome: Completed  10/7/2022 1052 by Dorothy Ascencio RN  Outcome: Progressing     Problem: Gastrointestinal - Adult  Goal: Minimal or absence of nausea and vomiting  10/7/2022 1457 by Aristides Henao RN  Outcome: Completed  10/7/2022 1052 by Aristides Henao RN  Outcome: Progressing  Goal: Maintains or returns to baseline bowel function  10/7/2022 1457 by Aristides Henao RN  Outcome: Completed  10/7/2022 1052 by Aristides Henao RN  Outcome: Progressing  Goal: Maintains adequate nutritional intake  10/7/2022 1457 by Aristides Henao RN  Outcome: Completed  10/7/2022 1052 by Aristides Henao RN  Outcome: Progressing     Problem: Safety - Adult  Goal: Free from fall injury  Outcome: Completed

## 2022-10-07 NOTE — PROGRESS NOTES
Shift assessment completed, pt A&Ox4, in chair, /81, denies any pain at this time. Pt complaint of feeling fullness to abd and SOB with exertion. Scheduled med administered per [facility], POC and education reviewed with pt, dressing to abd CDI. All needs met at this time, call light in reach, will continue to monitor. 1330: Discharge paper given with instructions and answered all questions. 1525: Pt discharged via W/C, family at side.

## 2022-10-07 NOTE — PROGRESS NOTES
Hospitalist Progress Note      PCP: Nuria Napier [address] [place] [address]    Date of Admission: 10/4/2022    Chief Complaint: Abdominal pain    Hospital Course: Presented with postoperative rectus sheath hematoma. Xarelto on hold. Required transfusion but currently stable hemoglobin. Feels better than before    Subjective: Controlled abdominal pain. No chest pain, no shortness of breath, no nausea, no vomiting. Medications:  Reviewed    Infusion Medications    sodium chloride Stopped (10/06/22 1135)    sodium chloride      sodium chloride      sodium chloride       Scheduled Medications    sodium chloride flush  5-40 mL IntraVENous 2 times per day    pantoprazole  40 mg Oral QAM AC    piperacillin-tazobactam  3,375 mg IntraVENous Q8H    acetaminophen  650 mg Oral Q6H    carvedilol  6.25 mg Oral BID WC    lidocaine 1 % injection  5 mL IntraDERmal Once    sodium chloride flush  5-40 mL IntraVENous 2 times per day    fentanNYL  50 mcg IntraVENous Once     PRN Meds: sodium chloride flush, sodium chloride, ondansetron **OR** ondansetron, oxyCODONE **OR** oxyCODONE, sodium chloride, sodium chloride flush, sodium chloride, sodium chloride      Intake/Output Summary (Last 24 hours) at 10/7/2022 1039  Last data filed at 10/7/2022 0737  Gross per 24 hour   Intake 1499.4 ml   Output 150 ml   Net 1349.4 ml       Physical Exam Performed:    BP (!) 149/81   Pulse 82   Temp 98.1 °F (36.7 °C) (Oral)   Resp 18   Ht 5' 6\" (1.676 m)   Wt 221 lb (100.2 kg)   SpO2 98%   BMI 35.67 kg/m²     General appearance: No apparent distress, appears stated age and cooperative. HEENT: Pupils equal, round, and reactive to light. Conjunctivae/corneas clear. Neck: Supple, with full range of motion. No jugular venous distention. Trachea midline. Respiratory:  Normal respiratory effort. Clear to auscultation, bilaterally without Rales/Wheezes/Rhonchi.   Cardiovascular: Regular rate and rhythm with normal S1/S2 without murmurs, rubs or gallops. Abdomen: Soft, appropriate generalized tenderness with no visible drainage or bleeding. No rebound tenderness  Musculoskeletal: No clubbing, cyanosis or edema bilaterally. Full range of motion without deformity. Skin: Skin color, texture, turgor normal.  No rashes or lesions. Neurologic:  Neurovascularly intact without any focal sensory/motor deficits. Cranial nerves: II-XII intact, grossly non-focal.  Psychiatric: Alert and oriented, thought content appropriate, normal insight  Capillary Refill: Brisk, 3 seconds, normal   Peripheral Pulses: +2 palpable, equal bilaterally       Labs:   Recent Labs     10/05/22  0509 10/05/22  1106 10/06/22  1105 10/06/22  2126 10/07/22  0508   WBC 9.1  --  9.7  --  9.6   HGB 7.0*   < > 7.7*  7.7*  7.6* 7.9* 7.5*   HCT 20.4*   < > 22.7*  23.3*  23.2* 23.4* 23.1*     --  318  --  249    < > = values in this interval not displayed. Recent Labs     10/05/22  0509 10/06/22  1105 10/07/22  0508    139 143   K 3.2* 3.9 4.0   * 110 111*   CO2 20* 17* 22   BUN 5* 5* 6*   CREATININE 0.7 0.7 0.6   CALCIUM 8.2* 8.5 8.5   PHOS 3.1 2.1*  --      Recent Labs     10/04/22  2223 10/05/22  0509   AST 31 34   ALT 7* 10   BILITOT 0.6 0.5   ALKPHOS 72 98     Recent Labs     10/05/22  0509 10/06/22  1105 10/07/22  0508   INR 1.99* 1.52* 1.28*     No results for input(s): Normajean Hull in the last 72 hours. Urinalysis:      Lab Results   Component Value Date/Time    NITRU Negative 10/07/2022 07:30 AM    WBCUA 11 10/07/2022 07:30 AM    BACTERIA None Seen 10/07/2022 07:30 AM    RBCUA 3 10/07/2022 07:30 AM    BLOODU Negative 10/07/2022 07:30 AM    SPECGRAV 1.025 10/07/2022 07:30 AM    GLUCOSEU Negative 10/07/2022 07:30 AM       Radiology:  XR CHEST PORTABLE   Final Result   Mild central vascular congestion, with improved aeration at the lung bases   with minimal residual right basilar atelectasis.          CT ABDOMEN PELVIS W IV CONTRAST Additional Contrast? None   Final Result   1. There is a moderate-sized rectus sheath hematoma, with mixed attenuation   of blood products, as well as an area of thin linear enhancement layering   laterally on the right, on and around axial image 102, concerning for   possible active extravasation. This was discussed with Domi Child, at   11:26 on 10/04/2022.   2. There is a central hematoma seen along the midline, new from the previous   examination, with no definite active extravasation. 3. Focal omental stranding seen in the anterior peritoneal cavity in the   upper abdomen, adjacent to the sigmoid colon, likely related to postoperative   change or omental infarct. No intraperitoneal abscess is identified. 4. Focal thickening noted in the region of the gastric pylorus which could be   related to mild gastritis. 5. The surgical drain traversing through the rectus sheath on the previous   exam and into the peritoneal cavity has been removed. 6. Other similar findings as described above. Assessment/Plan:    Active Hospital Problems    Diagnosis     Postoperative pain [G89.18]      Priority: Medium     PLAN:    Anticoagulant use    [id number] Score for Atrial Fibrillation Stroke Risk   Risk   Factors  Component Value   C CHF Yes 1   H HTN Yes 1   A2 Age >= 75 No,  (58 y.o.) 0   D DM No 0   S2 Prior Stroke/TIA No 0   V Vascular Disease No 0   A Age 74-69 No,  (58 y.o.) 0   Sc Sex female 1    [id number]  Score  3   Score last updated 10/7/22 50:79 AM EDT    Click here for a link to the UpToDate guideline \"Atrial Fibrillation: Anticoagulation therapy to prevent embolization    Disclaimer: Risk Score calculation is dependent on accuracy of patient problem list and past encounter diagnosis. Patient has a [id number] score of 3 and HASBLED score of 0. Bleeding was provoked. I believe long-term Xarelto is still needed. I recommend to hold Xarelto 10 more days before considering to resume.   Discussed with patient. Anemia of acute blood loss  Caused by rectus sheath hematoma, postop. Stabilized. No need for additional intervention. Paroxysmal atrial fibrillation  Controlled heart rate. Xarelto as above. Discussed with the patient. Cleared for discharge. DVT Prophylaxis: Discussed Xarelto as above  Diet: ADULT DIET;  Regular  Code Status: Full Code  PT/OT Eval Status: No need    Dispo -discharge home anytime from medical standpoint           Sobeida Alves MD

## 2022-10-08 ENCOUNTER — APPOINTMENT (OUTPATIENT)
Dept: GENERAL RADIOLOGY | Age: 57
End: 2022-10-08
Payer: MEDICAID

## 2022-10-08 ENCOUNTER — HOSPITAL ENCOUNTER (EMERGENCY)
Age: 57
Discharge: HOME | End: 2022-10-08
Attending: STUDENT IN AN ORGANIZED HEALTH CARE EDUCATION/TRAINING PROGRAM
Payer: MEDICAID

## 2022-10-08 VITALS
BODY MASS INDEX: 35.53 KG/M2 | OXYGEN SATURATION: 99 % | HEART RATE: 75 BPM | TEMPERATURE: 99.3 F | DIASTOLIC BLOOD PRESSURE: 88 MMHG | SYSTOLIC BLOOD PRESSURE: 158 MMHG | HEIGHT: 67 IN | RESPIRATION RATE: 24 BRPM | WEIGHT: 226.4 LBS

## 2022-10-08 DIAGNOSIS — R60.9 PERIPHERAL EDEMA: Primary | ICD-10-CM

## 2022-10-08 DIAGNOSIS — D64.9 CHRONIC ANEMIA: ICD-10-CM

## 2022-10-08 LAB
A/G RATIO: 1 (ref 1.1–2.2)
ALBUMIN SERPL-MCNC: 3.5 G/DL (ref 3.4–5)
ALP BLD-CCNC: 78 U/L (ref 40–129)
ALT SERPL-CCNC: 11 U/L (ref 10–40)
ANION GAP SERPL CALCULATED.3IONS-SCNC: 13 MMOL/L (ref 3–16)
ANISOCYTOSIS: ABNORMAL
AST SERPL-CCNC: 28 U/L (ref 15–37)
ATYPICAL LYMPHOCYTE RELATIVE PERCENT: 1 % (ref 0–6)
BASOPHILS ABSOLUTE: 0.1 K/UL (ref 0–0.2)
BASOPHILS RELATIVE PERCENT: 1 %
BILIRUB SERPL-MCNC: 0.7 MG/DL (ref 0–1)
BUN BLDV-MCNC: 5 MG/DL (ref 7–20)
CALCIUM SERPL-MCNC: 9.2 MG/DL (ref 8.3–10.6)
CHLORIDE BLD-SCNC: 106 MMOL/L (ref 99–110)
CO2: 21 MMOL/L (ref 21–32)
CREAT SERPL-MCNC: 0.6 MG/DL (ref 0.6–1.1)
EOSINOPHILS ABSOLUTE: 0.4 K/UL (ref 0–0.6)
EOSINOPHILS RELATIVE PERCENT: 3 %
GFR AFRICAN AMERICAN: >60
GFR NON-AFRICAN AMERICAN: >60
GLUCOSE BLD-MCNC: 105 MG/DL (ref 70–99)
HCT VFR BLD CALC: 25.3 % (ref 36–48)
HEMOGLOBIN: 8.2 G/DL (ref 12–16)
LYMPHOCYTES ABSOLUTE: 1.3 K/UL (ref 1–5.1)
LYMPHOCYTES RELATIVE PERCENT: 9 %
MCH RBC QN AUTO: 29.7 PG (ref 26–34)
MCHC RBC AUTO-ENTMCNC: 32.4 G/DL (ref 31–36)
MCV RBC AUTO: 91.4 FL (ref 80–100)
MONOCYTES ABSOLUTE: 0.6 K/UL (ref 0–1.3)
MONOCYTES RELATIVE PERCENT: 5 %
MYELOCYTE PERCENT: 2 %
NEUTROPHILS ABSOLUTE: 10.2 K/UL (ref 1.7–7.7)
NEUTROPHILS RELATIVE PERCENT: 79 %
PDW BLD-RTO: 17 % (ref 12.4–15.4)
PLATELET # BLD: 471 K/UL (ref 135–450)
PLATELET SLIDE REVIEW: ABNORMAL
PMV BLD AUTO: 6.8 FL (ref 5–10.5)
POLYCHROMASIA: ABNORMAL
POTASSIUM REFLEX MAGNESIUM: 3.8 MMOL/L (ref 3.5–5.1)
PRO-BNP: 1191 PG/ML (ref 0–124)
RBC # BLD: 2.77 M/UL (ref 4–5.2)
SLIDE REVIEW: ABNORMAL
SODIUM BLD-SCNC: 140 MMOL/L (ref 136–145)
TOTAL PROTEIN: 7 G/DL (ref 6.4–8.2)
TROPONIN: <0.01 NG/ML
URINE CULTURE, ROUTINE: NORMAL
WBC # BLD: 12.6 K/UL (ref 4–11)

## 2022-10-08 PROCEDURE — 83880 ASSAY OF NATRIURETIC PEPTIDE: CPT

## 2022-10-08 PROCEDURE — 93005 ELECTROCARDIOGRAM TRACING: CPT | Performed by: PHYSICIAN ASSISTANT

## 2022-10-08 PROCEDURE — 84484 ASSAY OF TROPONIN QUANT: CPT

## 2022-10-08 PROCEDURE — 80053 COMPREHEN METABOLIC PANEL: CPT

## 2022-10-08 PROCEDURE — 99285 EMERGENCY DEPT VISIT HI MDM: CPT

## 2022-10-08 PROCEDURE — 71045 X-RAY EXAM CHEST 1 VIEW: CPT

## 2022-10-08 PROCEDURE — 85025 COMPLETE CBC W/AUTO DIFF WBC: CPT

## 2022-10-08 RX ORDER — FUROSEMIDE 20 MG/1
20 TABLET ORAL DAILY
Qty: 5 TABLET | Refills: 0 | Status: SHIPPED | OUTPATIENT
Start: 2022-10-08 | End: 2022-10-12 | Stop reason: SDUPTHER

## 2022-10-08 ASSESSMENT — ENCOUNTER SYMPTOMS
COLOR CHANGE: 0
COUGH: 0
BACK PAIN: 0
NAUSEA: 0
DIARRHEA: 0
CONSTIPATION: 0
SHORTNESS OF BREATH: 1
CHEST TIGHTNESS: 0
VOMITING: 0
ABDOMINAL PAIN: 0

## 2022-10-08 ASSESSMENT — PAIN - FUNCTIONAL ASSESSMENT: PAIN_FUNCTIONAL_ASSESSMENT: NONE - DENIES PAIN

## 2022-10-08 NOTE — ED PROVIDER NOTES
In addition to the advanced practice provider, I personally saw Marta Davis and performed a substantive portion of the visit including all aspects of the medical decision making. Medical Decision Making  Pt here for ankle edema per home nurse  CHF hx, on aldactone   4 lb weight gain since yesterday     Pt recently admitted for hernia repair, had rectus hematoma  Xarelto was held, has not restarted it since her surgery  Got plasma and blood transfusion     On exam pt is resting comfortably  Cardiac RRR, no murmur  Lungs clear bilaterally, no increased work of breathing  Mild pitting edema to both ankles      EKG  The Ekg interpreted by me in the absence of a cardiologist shows. AV pacemaker rhythm, rate of 80. No specific ST or T wave abnormality. No significant change from prior 10-1-2022. SEP-1  Is this patient to be included in the SEP-1 Core Measure due to severe sepsis or septic shock? No   Exclusion criteria - the patient is NOT to be included for SEP-1 Core Measure due to: Infection is not suspected    Screenings     Cali Coma Scale  Eye Opening: Spontaneous  Best Verbal Response: Oriented  Best Motor Response: Obeys commands  Cali Coma Scale Score: 15        XR CHEST PORTABLE   Final Result   Borderline cardiomegaly without evidence of CHF. Stable mild right basal opacity which could reflect atelectasis or pneumonia.            Labs Reviewed   CBC WITH AUTO DIFFERENTIAL - Abnormal; Notable for the following components:       Result Value    WBC 12.6 (*)     RBC 2.77 (*)     Hemoglobin 8.2 (*)     Hematocrit 25.3 (*)     RDW 17.0 (*)     Platelets 231 (*)     Neutrophils Absolute 10.2 (*)     Myelocyte Percent 2 (*)     Anisocytosis 1+ (*)     Polychromasia 1+ (*)     All other components within normal limits   COMPREHENSIVE METABOLIC PANEL W/ REFLEX TO MG FOR LOW K - Abnormal; Notable for the following components:    Glucose 105 (*)     BUN 5 (*)     Albumin/Globulin Ratio 1.0 (*) All other components within normal limits   BRAIN NATRIURETIC PEPTIDE - Abnormal; Notable for the following components:    Pro-BNP 1,191 (*)     All other components within normal limits   TROPONIN   URINALYSIS WITH REFLEX TO CULTURE     Medications - No data to display    Patient presents for evaluation of peripheral edema and weight gain. On my exam she appears comfortable on room air. She does have mild symmetric pitting edema to both ankles. Does not appear to be in any distress or hypoxic on room air. She is anemic at baseline, hemoglobin is actually up trending appropriately from her recent admission. Low suspicion for rebleeding into her abdomen. She is holding blood thinners until her cardiology evaluation on Wednesday. At this point we will give dose of IV Lasix and give her short course of oral Lasix at home to help with peripheral edema. She is to return for any chest pain or trouble breathing, otherwise stable for cardiology follow-up on Wednesday. Patient Referrals:  Tony Paul  [phone]    Schedule an appointment as soon as possible for a visit   For a Re-check in  2-3    days. CATRACHITO Moeller - [facility]  [address] [address]  [phone]    Schedule an appointment as soon as possible for a visit   For a Re-check in   3-5   days. Discharge Medications:  Discharge Medication List as of 10/8/2022  4:38 PM        START taking these medications    Details   furosemide (LASIX) 20 MG tablet Take 1 tablet by mouth daily for 5 doses, Disp-5 tablet, R-0Normal             FINAL IMPRESSION  1. Peripheral edema    2. Chronic anemia        Blood pressure (!) 158/88, pulse 75, temperature 99.3 °F (37.4 °C), resp. rate 24, height 5' 6.5\" (1.689 m), weight 226 lb 6.4 oz (102.7 kg), SpO2 99 %, not currently breastfeeding.      For further details of [facility] emergency department encounter, please see documentation by advanced practice provider Jasen Aleman MD  10/08/22 1332

## 2022-10-08 NOTE — ED PROVIDER NOTES
[facility]        Pt Name: Sheela Enrique  MRN: [id number]  [place] 1965  Date of evaluation: 10/8/2022  Provider: SHARAN Galaviz  PCP: Ivania RUIZ  Note Started: 3:11 PM EDT        I have seen and evaluated this patient with my supervising physician Robby Shelton MD.    [address] [place] [address]       Chief Complaint   Patient presents with    Leg Swelling     Pt has hx of CHF. States that she has visiting nurse who was concerned for her swollen ankles, her HTN, and that she had gain 4lb overnight. HISTORY OF PRESENT ILLNESS   (Location, Timing/Onset, Context/Setting, Quality, Duration, Modifying Factors, Severity, Associated Signs and Symptoms)  Note limiting factors. Chief Complaint: Pedal Edema     Sheela Enrique is a 61 y.o. female with past medical history of anemia, atrial fibrillation, hyperlipidemia, hypertension, CHF and sleep apnea who presents to the ED with complaint of leg swelling. Patient states he was recently had surgery for abdominal hernia. Patient states she was discharged home and had a complication where she developed a hematoma to her abdominal wall. States she was admitted again and then just discharged yesterday. She states during her admission she did receive plasma and blood transfusion. She states her abdomen is still sore but states much improved. She states she had a home health nurse that came out today and noticed that she had a 4 pound weight gain overnight with some swelling to her legs so recommend  ED for further evaluation and treatment. She has not been on blood thinners since before surgery. She denies any chest pain but states she does feel little short of breath especially with exertion. Denies any pleuritic pain, orthopnea, calf tenderness, fever/chills, cough or hemoptysis. Denies rashes or lesions.   Denies abdominal pain, nausea/vomiting, urinary symptoms or changes in bowel movements. He states she is on a water pill at home with spironolactone. She states she has been taking as prescribed. She denies any missed or skipped dosages. Nursing Notes were all reviewed and agreed with or any disagreements were addressed in the HPI. REVIEW OF SYSTEMS    (2-9 systems for level 4, 10 or more for level 5)     Review of Systems   Constitutional:  Negative for activity change, appetite change, chills, diaphoresis, fatigue and fever. Respiratory:  Positive for shortness of breath. Negative for cough and chest tightness. Cardiovascular:  Positive for leg swelling. Negative for chest pain and palpitations. Gastrointestinal:  Negative for abdominal pain, constipation, diarrhea, nausea and vomiting. Genitourinary:  Negative for decreased urine volume, difficulty urinating, dysuria, flank pain, frequency, hematuria and urgency. Musculoskeletal:  Negative for arthralgias, back pain, myalgias, neck pain and neck stiffness. Skin:  Negative for color change, pallor, rash and wound. Neurological:  Negative for dizziness, weakness, light-headedness, numbness and headaches. Positives and Pertinent negatives as per HPI. Except as noted above in the ROS, all other systems were reviewed and negative.        PAST MEDICAL HISTORY     Past Medical History:   Diagnosis Date    Anemia     Atrial fibrillation and flutter (HCC)     Atrial flutter (HCC)     CHB (complete heart block) (HCC)     Class 1 obesity without serious comorbidity with body mass index (BMI) of 33.0 to 33.9 in adult 09/06/2019    Congenital heart disease     GERD (gastroesophageal reflux disease)     Headache(784.0)     History of complete heart block     Hyperlipidemia     Hypertension     PONV (postoperative nausea and vomiting)     Prolonged emergence from general anesthesia     sensitive to meds, slow to wake    Sleep apnea     uses CPAP    Syncope     Syst[facility], [facility] ([facility] 10/03/2018         SURGICAL HISTORY     Past Surgical History:   Procedure Laterality Date    CARDIAC DEFIBRILLATOR PLACEMENT  01/2021    [employer]    CARDIOVERSION  01/28/2022    CARDIOVERSION  02/22/2022    COLECTOMY N/A 04/13/2021    EXPLORATORY LAPAROTOMY, RESECTION OF DUODENAL MASS, CHOLECYSTECTOMY WITH INTRAOPERATIVE CHOLANGIOGRAM performed by Cuco Lobato MD at [place] [address] N/A 10/27/2020    COLONOSCOPY POLYPECTOMY SNARE/COLD BIOPSY performed by Lucas Esteban MD at [address] [place] [address]  11/29/2012    dual chamber PPM, [employer]    TUBAL LIGATION      UPPER GASTROINTESTINAL ENDOSCOPY N/A 10/27/2020    EGD DIAGNOSTIC ONLY performed by Lucas Esteban MD at [address] [place] [address] N/A 04/08/2021    EGD CONTROL HEMORRHAGE WITH APPLICATION OF 3 CLIPS TO DUODENAL MASS performed by Orville Epley, MD at [address] [place] [address] N/A 04/08/2021    EGD BIOPSY DUODENAL MASS performed by Orville Epley, MD at [address] [place] [address] N/A 04/08/2021    EGD SUBMUCOSAL INJECTION OF 0.6 MG OF EPINEPRINE INTO BASE OF DUODENAL MASS performed by Orville Epley, MD at [facility] N/A 03/04/2022    EGD BIOPSY performed by Lucas Esteban MD at [address] [address] [place] N/A 9/27/2022    OPEN EXPLORATORY LAPAROTOMY, REPAIR OF REDUCIBLE INCISIONAL HERNIA WITH MESH,  UNILATERAL  ABDOMINAL WALL COMPONENT RELEASE performed by Cuco Lobato MD at [address]       Previous Medications    AMMONIUM LACTATE (LAC-HYDRIN) 12 % LOTION        ATORVASTATIN (LIPITOR) 40 MG TABLET    Take 1 tablet by mouth daily    CARVEDILOL (COREG) 6.25 MG TABLET    Take 1 tablet by mouth 2 times daily    FLUTICASONE (FLONASE) 50 MCG/ACT NASAL SPRAY        HYDROCORTISONE 1 % CREAM        MULTIPLE VITAMINS-MINERALS (THERAPEUTIC MULTIVITAMIN-MINERALS) TABLET    Take 1 tablet by mouth daily    OXYCODONE (ROXICODONE) 5 MG IMMEDIATE RELEASE TABLET    Take 1 tablet by mouth every 6 hours as needed for Pain for up to 7 days. Take lowest dose possible to manage pain; may stop taking sooner than 7 days if pain is able to be controlled with non-narcotic analgesics and therapies. PANTOPRAZOLE (PROTONIX) 40 MG TABLET    Take 1 tablet by mouth every morning (before breakfast)    POTASSIUM CHLORIDE (KLOR-CON M) 10 MEQ EXTENDED RELEASE TABLET    Take 1 tablet by mouth daily    SACUBITRIL-VALSARTAN (ENTRESTO) 49-51 MG PER TABLET    Take 1 tablet by mouth 2 times daily    SPIRONOLACTONE (ALDACTONE) 50 MG TABLET    TAKE ONE TABLET BY MOUTH DAILY    VITAMIN D (ERGOCALCIFEROL) 1.25 MG (19374 UT) CAPS CAPSULE    TAKE ONE CAPSULE BY MOUTH ONCE WEEKLY    VITAMIN D3 (CHOLECALCIFEROL) 25 MCG (1000 UT) TABS TABLET    Take 1 tablet by mouth daily         ALLERGIES     Latex, Morphine, Codeine, Lisinopril, Nitroglycerin, Sulfa antibiotics, and Hydralazine    FAMILYHISTORY       Family History   Problem Relation Age of Onset    High Blood Pressure Mother     Cancer Father     Heart Disease Neg Hx     High Cholesterol Neg Hx           SOCIAL HISTORY       Social History     Tobacco Use    Smoking status: Never    Smokeless tobacco: Never   Vaping Use    Vaping Use: Never used   Substance Use Topics    Alcohol use: No    Drug use: No       SCREENINGS    [place] Coma Scale  Eye Opening: Spontaneous  Best Verbal Response: Oriented  Best Motor Response: Obeys commands  [place] Coma Scale Score: 15        PHYSICAL EXAM    (up to 7 for level 4, 8 or more for level 5)     ED Triage Vitals [10/08/22 1436]   BP Temp Temp src Heart Rate Resp SpO2 Height Weight   (!) 165/93 99.3 °F (37.4 °C) -- 85 14 100 % 5' 6.5\" (1.689 m) 226 lb 6.4 oz (102.7 kg)       Physical Exam  Constitutional:       General: She is not in acute distress. Appearance: Normal appearance. She is well-developed.  She is not ill-appearing, toxic-appearing or diaphoretic. HENT:      Head: Normocephalic and atraumatic. Right Ear: External ear normal.      Left Ear: External ear normal.   Eyes:      General:         Right eye: No discharge. Left eye: No discharge. Conjunctiva/sclera: Conjunctivae normal.   Cardiovascular:      Rate and Rhythm: Normal rate and regular rhythm. Pulses: Normal pulses. Heart sounds: Normal heart sounds. No murmur heard. No friction rub. No gallop. Comments: 2+ radial pulses bilaterally. 2+ pitting edema to the bilateral lower extremities. There is no calf tenderness. No JVD. Pulmonary:      Effort: Pulmonary effort is normal. No respiratory distress. Breath sounds: Normal breath sounds. No stridor. No wheezing, rhonchi or rales. Chest:      Chest wall: No tenderness. Abdominal:      General: Abdomen is flat. Bowel sounds are normal. There is no distension. Palpations: Abdomen is soft. There is no mass. Tenderness: There is abdominal tenderness. There is no right CVA tenderness, left CVA tenderness, guarding or rebound. Hernia: No hernia is present. Comments: Mild tenderness palpation throughout the abdomen. Musculoskeletal:         General: Normal range of motion. Cervical back: Normal range of motion and neck supple. No rigidity or tenderness. Lymphadenopathy:      Cervical: No cervical adenopathy. Skin:     General: Skin is warm and dry. Coloration: Skin is not pale. Findings: No erythema or rash. Neurological:      Mental Status: She is alert and oriented to person, place, and time.    Psychiatric:         Behavior: Behavior normal.       DIAGNOSTIC RESULTS   LABS:    Labs Reviewed   CBC WITH AUTO DIFFERENTIAL - Abnormal; Notable for the following components:       Result Value    WBC 12.6 (*)     RBC 2.77 (*)     Hemoglobin 8.2 (*)     Hematocrit 25.3 (*)     RDW 17.0 (*)     Platelets 552 (*)     Neutrophils Absolute 10.2 (*)     Myelocyte Percent 2 (*)     Anisocytosis 1+ (*)     Polychromasia 1+ (*)     All other components within normal limits   COMPREHENSIVE METABOLIC PANEL W/ REFLEX TO MG FOR LOW K - Abnormal; Notable for the following components:    Glucose 105 (*)     BUN 5 (*)     Albumin/Globulin Ratio 1.0 (*)     All other components within normal limits   BRAIN NATRIURETIC PEPTIDE - Abnormal; Notable for the following components:    Pro-BNP 1,191 (*)     All other components within normal limits   TROPONIN   URINALYSIS WITH REFLEX TO CULTURE       When ordered only abnormal lab results are displayed. All other labs were within normal range or not returned as of this dictation. EKG: When ordered, EKG's are interpreted by the Emergency Department Physician in the absence of a cardiologist.  Please see their note for interpretation of EKG. RADIOLOGY:   Non-plain film images such as CT, Ultrasound and MRI are read by the radiologist. Plain radiographic images are visualized and preliminarily interpreted by the ED Provider with the below findings:        Interpretation per the Radiologist below, if available at the time of this note:    XR CHEST PORTABLE   Final Result   Borderline cardiomegaly without evidence of CHF. Stable mild right basal opacity which could reflect atelectasis or pneumonia. CT ABDOMEN PELVIS W IV CONTRAST Additional Contrast? None    Result Date: 10/4/2022  EXAMINATION: CT OF THE ABDOMEN AND PELVIS WITH CONTRAST 10/4/2022 10:10 pm TECHNIQUE: CT of the abdomen and pelvis was performed with the administration of intravenous contrast. Multiplanar reformatted images are provided for review. Automated exposure control, iterative reconstruction, and/or weight based adjustment of the mA/kV was utilized to reduce the radiation dose to as low as reasonably achievable.  COMPARISON: 10/01/2022 HISTORY: ORDERING SYSTEM PROVIDED HISTORY: post op, ANNI summers TECHNOLOGIST PROVIDED HISTORY: Reason for exam:->post op, RLQ pian Additional Contrast?->None Decision Support Exception - unselect if not a suspected or confirmed emergency medical condition->Emergency Medical Condition (MA) Reason for Exam: Post-op Problem (Pt had hernia repair surgery with  on 9/27, was discharged today, pt was walking up her stairs when she felt a \"pop\" on her right side and could not put pressure on her right leg. FINDINGS: Lower Chest: No pericardial effusion. Implanted cardiac devices are noted. Distal esophagus appears normal in caliber. Patchy subsegmental atelectasis. Organs: No enhancing mass identified in the liver or spleen. No adrenal mass. No pancreatic mass. No peripancreatic acute inflammatory process. Cholecystectomy clips are identified. No enhancing renal mass is identified. No obstructive hydroureteronephrosis is identified. GI/Bowel: Focal wall thickening involving the gastro duodenal junction, though no definite ulceration is identified. No ileus or obstruction is identified. Pelvis: Within the pelvis, there is a lobulated uterus with multiple uterine fibroids. Small volume of free fluid seen in the pelvis. No bulky pelvic lymphadenopathy is identified. Peritoneum/Retroperitoneum: No abdominal aortic aneurysm. No dissection. No retroperitoneal or mesenteric bulky lymphadenopathy. 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 There is a large rectus sheath hematoma, which extends across the midline, with a small amount of soft tissue air noted. The surgical drain seen in the rectus sheath on the previous exam has been removed. There is a thin area layering hyperdensity seen on and around axial image 102 in the right lateral aspect of the hematoma concerning for possible contrast extravasation.   Mixed attenuation blood products are noted, with some liquefied appearing blood products as well as more hyperdense clotted blood. Bones/Soft Tissues: There is a midline soft tissue hematoma with hyperdense clotted blood products noted. No definite active bleeding seen within the hematoma. Subcutaneous edematous changes are identified as well as a small amount of soft tissue gas. No acute osseous abnormality is identified. 1. There is a moderate-sized rectus sheath hematoma, with mixed attenuation of blood products, as well as an area of thin linear enhancement layering laterally on the right, on and around axial image 102, concerning for possible active extravasation. This was discussed with Hector Bateman, at 11:26 on 10/04/2022. 2. There is a central hematoma seen along the midline, new from the previous examination, with no definite active extravasation. 3. Focal omental stranding seen in the anterior peritoneal cavity in the upper abdomen, adjacent to the sigmoid colon, likely related to postoperative change or omental infarct. No intraperitoneal abscess is identified. 4. Focal thickening noted in the region of the gastric pylorus which could be related to mild gastritis. 5. The surgical drain traversing through the rectus sheath on the previous exam and into the peritoneal cavity has been removed. 6. Other similar findings as described above. CT ABDOMEN PELVIS W IV CONTRAST Additional Contrast? Oral (water soluble contrast through NG)    Result Date: 10/4/2022  EXAMINATION: CT OF THE ABDOMEN AND PELVIS WITH CONTRAST 10/1/2022 7:49 pm TECHNIQUE: CT of the abdomen and pelvis was performed with the administration of intravenous contrast. Multiplanar reformatted images are provided for review. Automated  exposure control, iterative reconstruction, and/or weight based adjustment of the mA/kV was utilized to reduce the radiation dose to as low as reasonably achievable.  COMPARISON: 09/15/2022, 09/06/2022 HISTORY: ORDERING SYSTEM PROVIDED HISTORY: abdominal pain s/p complex hernia repair TECHNOLOGIST PROVIDED HISTORY: Additional Contrast?->Oral Reason for exam:->abdominal pain s/p complex hernia repair Reason for Exam: Fatigue (Pt brought in by [facility] from home for SOB, fatigue, left sided neck pain in the cervical spine, stroke scale neg, VSS per EMS upon arrival, Pt A and O x 4. Working out a [employer] fitness earlier this morning, went home, symptoms started 1 hour ago when Pt was watching football, states he had symptoms like this a month ago and was discharged home. Also states that he stood up suddenly and got dizzy like he was going to fall at home, denies falling at home.) FINDINGS: Lower Chest: Bibasilar consolidation new from the prior exam. Organs: The liver, spleen, pancreas and adrenal glands are without focal abnormality. The kidneys enhance symmetrically. No renal calculus. No hydronephrosis or perinephric stranding. No biliary duct dilation. GI/Bowel: Enteric tube tip is noted within the proximal duodenum. Duodenal diverticulum. Mild wall thickening of the transverse colon in the mid abdomen with surrounding pericolonic stranding. There is also swirling of the mesentery within the right mid abdomen new from the prior exam.  No other dilated loops of bowel or bowel wall thickening. No free intraperitoneal air is noted. Pelvis: Fibroid uterus. No bladder wall thickening. No pathologically enlarged adenopathy. Trace free fluid in the pelvis slightly hyperdense in attenuation. Peritoneum/Retroperitoneum: The aorta is normal in caliber. The celiac axis, SMA and SG are patent. The portal venous system is patent. There is omental and mesenteric stranding predominately within the right upper quadrant and mid abdomen. Postsurgical changes from interval hernia repair along the anterior abdominal wall. There are multiple foci of gas noted within the anterior abdominal wall extending into the subcutaneous soft tissues of the right upper quadrant.   Elongated collection of fluid is noted along the anterior abdominal wall without drainable component. There are findings suspicious for recurrent fat containing hernia with surrounding inflammatory changes (image 91). Bones/Soft Tissues: Subcutaneous edema with multiple foci of gas are identified. Fluid is noted along the anterior abdominal wall along the midline incision as well. There is skin thickening along the anterior abdominal wall at the level of midline incision. No acute osseous abnormality. 1. Postsurgical changes from interval hernia repair. There is elongated fluid along the anterior abdominal wall at the hernia repair site with multiple foci of gas extending along the anterior abdominal wall and into the subcutaneous soft tissues predominant within the right upper quadrant. No definite free intraperitoneal air is identified. Inflammatory changes are noted in the omentum and mesentery along the anterior abdomen some which may be postsurgical in etiology. There are associated reactive inflammatory changes involving the transverse colon at this level as well. Surgical drain remains in place. 2. There is a questionable small recurrent fat containing hernia along the midline abdomen with surrounding inflammatory changes versus an area of fat necrosis. . 3. Trace hyperdense free fluid in the pelvis some of which is proteinaceous or hemorrhagic in etiology. This may be postoperative blood products or this could be postoperative as well. 4. There is mild swirling of the mesentery within the right mid abdomen of uncertain etiology without associated bowel obstruction or volvulus. An underlying internal hernia cannot be entirely excluded given recent surgery. 5. Bibasilar pneumonia. 6. Fibroid uterus. XR CHEST PORTABLE    Result Date: 10/5/2022  EXAMINATION: ONE XRAY VIEW OF THE CHEST 10/4/2022 10:05 pm COMPARISON: None.  HISTORY: ORDERING SYSTEM PROVIDED HISTORY: post op TECHNOLOGIST PROVIDED HISTORY: Reason for exam:->post op Reason for Exam: post op, sob FINDINGS: Implanted cardiac device noted overlying the left chest.  Atherosclerosis identified within the thoracic aorta. Cardiac size and mediastinal structures appear stable. Improved aeration seen in the lung bases bilaterally compared to the previous examination. Minimal residual basilar atelectasis on the right. No acute osseous abnormality is identified. No overt edema. Mild vascular congestion centrally. Mild central vascular congestion, with improved aeration at the lung bases with minimal residual right basilar atelectasis. XR CHEST PORTABLE    Result Date: 10/1/2022  EXAMINATION: ONE XRAY VIEW OF THE CHEST 10/1/2022 4:18 pm COMPARISON: Chest 09/06/2022 HISTORY: ORDERING SYSTEM PROVIDED HISTORY: Confirm NG placement FINDINGS: The cardiac silhouette is enlarged. Calcifications involving the aorta reflect atherosclerosis. The mediastinal and hilar silhouettes appear unremarkable. Scattered hazy opacities across the bilateral lower lungs partially obscuring the right left hemidiaphragms. Blunting right left lateral costophrenic sulci. Vascular engorgement and cephalization is demonstrated with bilateral peribronchial cuffing and perivascular haziness. Prominent soft tissue density upper right paramediastinal region is stable, typical of brachiocephalic vessel tortuosity/ectasia or occasionally thyroid goiter. No pneumothorax is seen. No acute osseous abnormality is identified. Stable left-sided AICD. NG tube extends below the left hemidiaphragm, into the upper abdomen, tip overlying the expected level of the distal stomach or proximal duodenum, right upper quadrant. 1. NG tube extends below the left hemidiaphragm, into the upper abdomen, tip overlying the expected level of the distal stomach or proximal duodenum, right upper quadrant. Consider pulling back NG tube 4-5 cm to ensure tip is at the stomach region.  2.  Vascular engorgement and cephalization is demonstrated with bilateral peribronchial cuffing and perivascular haziness. 3. Calcific atherosclerosis aorta. 4. Cardiomegaly. 5. Prominent soft tissue density upper right paramediastinal region is stable, typical of brachiocephalic vessel tortuosity/ectasia or occasionally thyroid goiter. RECOMMENDATION: Consider pulling back NG tube 4-5 cm to ensure tip is at the stomach region. PROCEDURES   Unless otherwise noted below, none     Procedures    CRITICAL CARE TIME       CONSULTS:  None      EMERGENCY DEPARTMENT COURSE and DIFFERENTIAL DIAGNOSIS/MDM:   Vitals:    Vitals:    10/08/22 1436 10/08/22 1630   BP: (!) 165/93 (!) 158/88   Pulse: 85 75   Resp: 14 24   Temp: 99.3 °F (37.4 °C)    SpO2: 100% 99%   Weight: 226 lb 6.4 oz (102.7 kg)    Height: 5' 6.5\" (1.689 m)        Patient was given the following medications:  Medications - No data to display      Is this patient to be included in the SEP-1 Core Measure due to severe sepsis or septic shock? No   Exclusion criteria - the patient is NOT to be included for SEP-1 Core Measure due to: Infection is not suspected    Patient is a 50-year-old female who presents to the ED with complaint of leg swelling. Patient was recently discharged from the hospital and had 4 pound weight gain with associated leg swelling. She is afebrile stable vital signs. Not tachycardic, tachypneic or hypoxic. No significant respiratory distress noted. She may have some slight fluid overload given weight gain and leg swelling. CBC shows white count 12.6 with hemoglobin 8.2 and platelets of 654. CMP relatively unremarkable. Troponin normal.  BNP 1100. Chest x-ray showed cardiomegaly without overt evidence of CHF. EKG interpreted by attending. Patient appears to have some slight fluid overload at this time but is not hypoxic. Not believe patient requires admission for diuresis. We will add a few day prescription of Lasix for home.   Follow-up with cardiologist.  Return to ED for new or worsening symptoms. FINAL IMPRESSION      1. Peripheral edema    2. Chronic anemia          DISPOSITION/PLAN   DISPOSITION Decision To Discharge 10/08/2022 04:30:10 PM      PATIENT REFERRED TO:  Inocencia Jr Delgados  [phone]    Schedule an appointment as soon as possible for a visit   For a Re-check in  2-3    days. Indra Pablo APRN - CNS  [address]  [place] [address]  [phone]    Schedule an appointment as soon as possible for a visit   For a Re-check in   3-5   days.     DISCHARGE MEDICATIONS:  New Prescriptions    FUROSEMIDE (LASIX) 20 MG TABLET    Take 1 tablet by mouth daily for 5 doses       DISCONTINUED MEDICATIONS:  Discontinued Medications    No medications on file              (Please note that portions of this note were completed with a voice recognition program.  Efforts were made to edit the dictations but occasionally words are mis-transcribed.)    SHARAN Camargo (electronically signed)           SHARAN Uriostegui  10/08/22 3567

## 2022-10-10 ENCOUNTER — NURSE ONLY (OUTPATIENT)
Dept: CARDIOLOGY CLINIC | Age: 57
End: 2022-10-10

## 2022-10-10 ENCOUNTER — TELEPHONE (OUTPATIENT)
Dept: CARDIOLOGY CLINIC | Age: 57
End: 2022-10-10

## 2022-10-10 LAB
EKG ATRIAL RATE: 80 BPM
EKG DIAGNOSIS: NORMAL
EKG P AXIS: 90 DEGREES
EKG P-R INTERVAL: 98 MS
EKG Q-T INTERVAL: 446 MS
EKG QRS DURATION: 148 MS
EKG QTC CALCULATION (BAZETT): 514 MS
EKG R AXIS: -84 DEGREES
EKG T AXIS: 74 DEGREES
EKG VENTRICULAR RATE: 80 BPM

## 2022-10-10 PROCEDURE — 93010 ELECTROCARDIOGRAM REPORT: CPT | Performed by: INTERNAL MEDICINE

## 2022-10-10 NOTE — TELEPHONE ENCOUNTER
Please call and schedule her an appt with me on Thursday or Friday as she was in the ER for edema  thanks

## 2022-10-10 NOTE — PROGRESS NOTES
Received [employer] via [employer]. S/p recent ER 10/8/22 visit with complaints of HF symptoms after a hospital admission for hernia repair. Was given instructions for additional Lasix. Has a clinic visit with KAYLANP in 2 days. Remote transmission received from patient's CRT-D home monitor. Transmission shows normal sensing and pacing function. 0.5% AT/ AF burden (coreg, Xarelto on hold s/p procedure). AP 77.2%, CRT 99.1%, Effective 99.0%, VSRp 0.5%    Possible Optivol fluid accumulation 10/5/22 --- ongoing. Currently elevated around 65, slightly above threshold, with correlating TI trend below reference line. TriageHF Heart Failure Risk Status on 07-Oct-2022 is Medium*    NP will review. See interrogation under cardiology tab in the [address] [place] [address] [address] field for more details. Will continue to monitor remotely.

## 2022-10-12 ENCOUNTER — OFFICE VISIT (OUTPATIENT)
Dept: CARDIOLOGY CLINIC | Age: 57
End: 2022-10-12
Payer: MEDICAID

## 2022-10-12 ENCOUNTER — HOSPITAL ENCOUNTER (OUTPATIENT)
Age: 57
Discharge: HOME | End: 2022-10-12
Payer: MEDICAID

## 2022-10-12 ENCOUNTER — TELEPHONE (OUTPATIENT)
Dept: SURGERY | Age: 57
End: 2022-10-12

## 2022-10-12 ENCOUNTER — NURSE ONLY (OUTPATIENT)
Dept: CARDIOLOGY CLINIC | Age: 57
End: 2022-10-12

## 2022-10-12 ENCOUNTER — TELEPHONE (OUTPATIENT)
Dept: CARDIOLOGY CLINIC | Age: 57
End: 2022-10-12

## 2022-10-12 VITALS
BODY MASS INDEX: 31.02 KG/M2 | SYSTOLIC BLOOD PRESSURE: 138 MMHG | WEIGHT: 193 LBS | HEIGHT: 66 IN | DIASTOLIC BLOOD PRESSURE: 90 MMHG | HEART RATE: 90 BPM | OXYGEN SATURATION: 95 %

## 2022-10-12 DIAGNOSIS — I50.42 CHRONIC COMBINED SYSTOLIC AND DIASTOLIC HEART FAILURE (HCC): ICD-10-CM

## 2022-10-12 DIAGNOSIS — I50.42 CHRONIC COMBINED SYSTOLIC AND DIASTOLIC HEART FAILURE (HCC): Primary | ICD-10-CM

## 2022-10-12 DIAGNOSIS — E55.9 HYPOVITAMINOSIS D: ICD-10-CM

## 2022-10-12 DIAGNOSIS — I48.0 PAROXYSMAL ATRIAL FIBRILLATION (HCC): ICD-10-CM

## 2022-10-12 DIAGNOSIS — Z95.810 ICD (IMPLANTABLE CARDIOVERTER-DEFIBRILLATOR), BIVENTRICULAR, IN SITU: ICD-10-CM

## 2022-10-12 DIAGNOSIS — G47.33 OSA (OBSTRUCTIVE SLEEP APNEA): ICD-10-CM

## 2022-10-12 LAB
ANION GAP SERPL CALCULATED.3IONS-SCNC: 14 MMOL/L (ref 3–16)
BUN BLDV-MCNC: 4 MG/DL (ref 7–20)
CALCIUM SERPL-MCNC: 9.3 MG/DL (ref 8.3–10.6)
CHLORIDE BLD-SCNC: 105 MMOL/L (ref 99–110)
CO2: 23 MMOL/L (ref 21–32)
CREAT SERPL-MCNC: 0.8 MG/DL (ref 0.6–1.1)
GFR AFRICAN AMERICAN: >60
GFR NON-AFRICAN AMERICAN: >60
GLUCOSE BLD-MCNC: 97 MG/DL (ref 70–99)
HCT VFR BLD CALC: 27.3 % (ref 36–48)
HEMOGLOBIN: 9.3 G/DL (ref 12–16)
MCH RBC QN AUTO: 31.3 PG (ref 26–34)
MCHC RBC AUTO-ENTMCNC: 33.9 G/DL (ref 31–36)
MCV RBC AUTO: 92.5 FL (ref 80–100)
PDW BLD-RTO: 17.4 % (ref 12.4–15.4)
PLATELET # BLD: 540 K/UL (ref 135–450)
PMV BLD AUTO: 6.6 FL (ref 5–10.5)
POTASSIUM SERPL-SCNC: 3.4 MMOL/L (ref 3.5–5.1)
PRO-BNP: 596 PG/ML (ref 0–124)
RBC # BLD: 2.96 M/UL (ref 4–5.2)
SODIUM BLD-SCNC: 142 MMOL/L (ref 136–145)
WBC # BLD: 8 K/UL (ref 4–11)

## 2022-10-12 PROCEDURE — 1036F TOBACCO NON-USER: CPT | Performed by: CLINICAL NURSE SPECIALIST

## 2022-10-12 PROCEDURE — 1111F DSCHRG MED/CURRENT MED MERGE: CPT | Performed by: CLINICAL NURSE SPECIALIST

## 2022-10-12 PROCEDURE — 85027 COMPLETE CBC AUTOMATED: CPT

## 2022-10-12 PROCEDURE — 3017F COLORECTAL CA SCREEN DOC REV: CPT | Performed by: CLINICAL NURSE SPECIALIST

## 2022-10-12 PROCEDURE — G8484 FLU IMMUNIZE NO ADMIN: HCPCS | Performed by: CLINICAL NURSE SPECIALIST

## 2022-10-12 PROCEDURE — G8427 DOCREV CUR MEDS BY ELIG CLIN: HCPCS | Performed by: CLINICAL NURSE SPECIALIST

## 2022-10-12 PROCEDURE — 83880 ASSAY OF NATRIURETIC PEPTIDE: CPT

## 2022-10-12 PROCEDURE — 36415 COLL VENOUS BLD VENIPUNCTURE: CPT

## 2022-10-12 PROCEDURE — G8417 CALC BMI ABV UP PARAM F/U: HCPCS | Performed by: CLINICAL NURSE SPECIALIST

## 2022-10-12 PROCEDURE — 99214 OFFICE O/P EST MOD 30 MIN: CPT | Performed by: CLINICAL NURSE SPECIALIST

## 2022-10-12 PROCEDURE — 80048 BASIC METABOLIC PNL TOTAL CA: CPT

## 2022-10-12 RX ORDER — FUROSEMIDE 20 MG/1
40 TABLET ORAL DAILY
Qty: 60 TABLET | Refills: 1 | Status: SHIPPED | OUTPATIENT
Start: 2022-10-12 | End: 2022-11-11

## 2022-10-12 NOTE — PROGRESS NOTES
[facility]  Progress Note    Primary Care Doctor:  Samira Gardner    Chief Complaint   Patient presents with    Shortness of Breath    Follow-up    Congestive Heart Failure        History of Present Illness:  61 y.o. female with history of sHF, LVH, AF on xarelto (follows with Dr Rheba Essex), had been on flecainide, dual chamber pacemaker 2012 due to congenital heart block  LVEF had been 55% in 2015 and now 25%. No lisinopril due to cough. She is a cook at [employer]  Lip numbness to entresto 3/2020 hydralazine caused headaches  10/2020 EGD (hiatal hernia) and colonoscopy (polyp)   ICD placed 1/22/21, LHC done 1/20/21 normal cors  4/21 benign duodenal mass with resection Dr Shaq Pink  4/7-19/2021 for symptomatic anemia, requiring surgery by Dr Shaq Pink for overlying lipoma, large duodenum lesion requiring 3 clips    I had the pleasure of seeing Cyrus James in follow up for systolic heart failure. She is ambulatory and by her self. She had her hernia repair done 9/27/2022. Her weight has gone from 226->216. Her optival is elevated and recently in the ER for edema and started on lasix 20 mg daily for past 4 days. Her pain and tightness in abdomen is the biggest issue. She see Dr Shaq Pink on Friday. No chest pain, palpitations, lightheadedness. She does have some edema in lower ankles. HgB was low, transfused in the hospital.    Past Medical History:   has a past medical history of Anemia, Atrial fibrillation and flutter ([address] Atrial flutter ([address] CHB (complete heart block) ([address] Class 1 obesity without serious comorbidity with body mass index (BMI) of 33.0 to 33.9 in adult, Congenital heart disease, GERD (gastroesophageal reflux disease), Headache(784.0), History of complete heart block, Hyperlipidemia, Hypertension, PONV (postoperative nausea and vomiting), Prolonged emergence from general anesthesia, Sleep apnea, Syncope, and Systolic CHF, chronic ([address]   Surgical History:   has a past surgical history that includes Uterine fibroid surgery; Pacemaker insertion (11/29/2012); Tubal ligation; Upper gastrointestinal endoscopy (N/A, 10/27/2020); Colonoscopy (N/A, 10/27/2020); Cardiac defibrillator placement (01/2021); Upper gastrointestinal endoscopy (N/A, 04/08/2021); Upper gastrointestinal endoscopy (N/A, 04/08/2021); Upper gastrointestinal endoscopy (N/A, 04/08/2021); colectomy (N/A, 04/13/2021); Cardioversion (01/28/2022); Cardioversion (02/22/2022); Upper gastrointestinal endoscopy (N/A, 03/04/2022); and ventral hernia repair (N/A, 9/27/2022). Social History:    reports that she has never smoked. She has never used smokeless tobacco. She reports that she does not drink alcohol and does not use drugs. Family History:   Family History   Problem Relation Age of Onset    High Blood Pressure Mother     Cancer Father     Heart Disease Neg Hx     High Cholesterol Neg Hx        Home Medications:  Prior to Admission medications    Medication Sig Start Date End Date Taking?  Authorizing Provider   furosemide (LASIX) 20 MG tablet Take 1 tablet by mouth daily for 5 doses 10/8/22 10/13/22 Yes SHARAN Rice   spironolactone (ALDACTONE) 50 MG tablet TAKE ONE TABLET BY MOUTH DAILY 9/7/22  Yes CATRACHITO Oliva - CNP   vitamin D (ERGOCALCIFEROL) 1.25 MG (48648 UT) CAPS capsule TAKE ONE CAPSULE BY MOUTH ONCE WEEKLY 8/26/22  Yes CATRACHITO Mendoza   sacubitril-valsartan (ENTRESTO) 49-51 MG per tablet Take 1 tablet by mouth 2 times daily 6/9/22  Yes CATRACHITO Mendoza   carvedilol (COREG) 6.25 MG tablet Take 1 tablet by mouth 2 times daily 6/9/22  Yes CATRACHITO Gracia   potassium chloride (KLOR-CON M) 10 MEQ extended release tablet Take 1 tablet by mouth daily 5/11/22  Yes CATRACHITO Mendoza   ammonium lactate (LAC-HYDRIN) 12 % lotion  4/20/22  Yes Historical Provider, MD   fluticasone Rosacoretta Milian) 50 MCG/ACT nasal spray  3/21/22  Yes Historical Provider, MD Appearance: Warm and dry, no apparent distress, normal coloration  HEENT:  Normocephalic, atraumatic  Respiratory:  Normal excursion and expansion without use of accessory muscles  Resp Auscultation: Normal breath sounds without dullness  Cardiovascular: The apical impulses not displaced  Heart tones are crisp and normal  JVP normal cm H2O  regular rate and rhythm  Peripheral pulses are symmetrical and full  There is no clubbing, cyanosis of the extremities.   Bilateral ankle edema only  Pedal Pulses: 2+ and equal   Abdomen:  No masses or tenderness  Liver/Spleen: No Abnormalities Noted  Neurological/Psychiatric:  Alert and oriented in all spheres  Moves all extremities well  Exhibits normal gait balance and coordination  No abnormalities of mood, affect, memory, mentation, or behavior are noted    Lab Data:    CBC:   Lab Results   Component Value Date/Time    WBC 12.6 10/08/2022 03:39 PM    WBC 9.6 10/07/2022 05:08 AM    WBC 9.7 10/06/2022 11:05 AM    RBC 2.77 10/08/2022 03:39 PM    RBC 2.52 10/07/2022 05:08 AM    RBC 2.53 10/06/2022 11:05 AM    HGB 8.2 10/08/2022 03:39 PM    HGB 8.1 10/07/2022 01:08 PM    HGB 7.5 10/07/2022 05:08 AM    HCT 25.3 10/08/2022 03:39 PM    HCT 24.8 10/07/2022 01:08 PM    HCT 23.1 10/07/2022 05:08 AM    MCV 91.4 10/08/2022 03:39 PM    MCV 91.8 10/07/2022 05:08 AM    MCV 89.8 10/06/2022 11:05 AM    RDW 17.0 10/08/2022 03:39 PM    RDW 17.3 10/07/2022 05:08 AM    RDW 16.5 10/06/2022 11:05 AM     10/08/2022 03:39 PM     10/07/2022 05:08 AM     10/06/2022 11:05 AM     BMP:  Lab Results   Component Value Date/Time     10/08/2022 03:39 PM     10/07/2022 05:08 AM     10/06/2022 11:05 AM    K 3.8 10/08/2022 03:39 PM    K 4.0 10/07/2022 05:08 AM    K 3.9 10/06/2022 11:05 AM     10/08/2022 03:39 PM     10/07/2022 05:08 AM     10/06/2022 11:05 AM    CO2 21 10/08/2022 03:39 PM    CO2 22 10/07/2022 05:08 AM    CO2 17 10/06/2022 11:05 AM    PHOS 2.1 10/06/2022 11:05 AM    PHOS 3.1 10/05/2022 05:09 AM    PHOS 2.6 10/02/2022 04:30 AM    BUN 5 10/08/2022 03:39 PM    BUN 6 10/07/2022 05:08 AM    BUN 5 10/06/2022 11:05 AM    CREATININE 0.6 10/08/2022 03:39 PM    CREATININE 0.6 10/07/2022 05:08 AM    CREATININE 0.7 10/06/2022 11:05 AM     BNP:   Lab Results   Component Value Date/Time    PROBNP 1,191 10/08/2022 03:39 PM    PROBNP 553 10/01/2022 04:08 AM    PROBNP 418 09/06/2022 07:32 PM     Cardiac Imaging:  Echo 5/10/2022   This is a limited study to assess left ventricular ejection fraction. Definity was used to assist in endocardial border delineation. Moderate concentric left ventricular hypertrophy. Mildly dilated left   ventricular cavity size. Moderately reduced left ventricular systolic   function with an estimated ejection fraction of 35-40%. Global hypokinesis noted. Heavy trabeculations seen near the apex of the left ventricle. The right ventricle is mildly dilated. Mildly reduced right ventricular   systolic function with an estimated TAPSE of 1.52 cm. The left atrium is severely dilated    Echo 2/9/2021  Summary   Left ventricular size is mildly increased. Moderately reduced global systolic function with an ejection fraction   estimated at 30-35%. Global hypokinesis noted. Grade II diastolic dysfunction with elevated LV filling pressures. There is mild mitral regurgitation noted. Mild left atrial enlargement noted. Aortic valve appears sclerotic but opens adequately. Mild aortic regurgitation. There is mild tricuspid regurgitation with a RVSP estimation of 25 mmHg. Pacer / ICD wire is visualized in the right ventricle.    The right ventricle is normal in size and function    [facility] Dr Stephanie Rebollar 1/20/21  Findings:  Artery Findings/Result   LM Normal   LAD Normal   Cx Normal   RI NA   RCA Normal   LVEDP 6   LVG NA      Intervention:         None     Post Cath Dx:       Normal coronaries      Echo 2/24/2020  Summary   -Limited echocardiogram was performed to evaluate EF.   -Normal left ventricle size, mild to moderate left ventricular hypertrophy,   and moderately reduced systolic function with an estimated ejection fraction   of 30-35%. -There is moderate diffuse hypokinesis. -Grade III diastolic dysfunction . E/e\"=10.7.   -Mild mitral regurgitation.   -Mild tricuspid regurgitation.   -The left atrium is mild to moderate dilated. -Right ventricular systolic function appears mildly reduced .   -Estimated pulmonary artery systolic pressure is borderline normal at 28-33   mmHg assuming a right atrial pressure of 8 mmHg.   -Pacer / ICD wire is visualized in the right heart. Echo 8/12/2019  Summary   -Limited echocardiogram was performed to evaluate LV ejection fraction.   -Left ventricular cavity size is mildly dilated. -There is moderate concentric left ventricular hypertrophy.   -Overall left ventricular systolic function appears moderately reduced with   an ejection fraction on the 30-35%. -There is moderate global diffuse hypokinesis.   -Indeterminate diastolic dysfunction due to irregular heart rate. -Moderate mitral regurgitation.   -Aortic valve appears sclerotic but opens adequately. -Mild to moderate tricuspid regurgitation.   -The left atrium is moderately dilated. -Pacer / ICD wire is visualized in the right heart. Stress test 9/11/18  Enlarged LV with mild global hypokinesis. EF 51%  There is normal isotope uptake at stress and rest. There is no evidence of  myocardial ischemia or scar. ECHO 7/24/18:  Summary   -Left ventricular cavity size is mildly dilated. -There is moderate concentric left ventricular hypertrophy.   -Overall left ventricular systolic function appears severely reduced with  and ejection fraction on the 25 % range.   -There is diffuse global hypokinesis. -Grade II diastolic dysfunction with elevated LV filling pressures. E/e\"=16.2.   -Mitral annular calcification is present. -Mild-moderate mitral regurgitation.   -Aortic valve appears sclerotic but opens adequately. -Trivial aortic regurgitation.   -Trivial tricuspid regurgitation with RVSP of 26 mmHg. -Mild pulmonic regurgitation present.   -Dilated left atrium with a volume of 72 ml.   -Pacer / ICD wire is visualized in the right heart. GXT cathie 7/15/2015:   Left ventricular ejection fraction of 55%. The LV wall motion is normal.  There is normal isotope uptake at stress and rest.   There is no evidence of myocardial ischemia or scar. Assessment:    1. Chronic combined systolic and diastolic heart failure (HCC) on arni, bb, sglt and aldosterone antagonist   2. Hypovitaminosis D    3. Paroxysmal atrial fibrillation (HCC)    4. DERECK (obstructive sleep apnea)    5.  ICD (implantable cardioverter-defibrillator), biventricular, in situ        Plan:   Increase lasix to 40 mg daily for 1 week and then go to 20 mg daily  Send a remote optival next week as well  Check blood work today and next week  Continue all other medications  Monitor BP and let me know if >120 next week  RTO in 2 months    CATRACHITO Loen - CNS, CNS, 10/12/2022, 9:06 AM

## 2022-10-12 NOTE — PROGRESS NOTES
[employer] transmission received 10/12/22 from [employer] for patient's CRT-D. Transmission shows normal sensing and pacing function. NSVT and 0.4% AT/ AF burden noted (coreg, Xarelto on hold s/p procedure). AP 87.0%, CRT 99.3%, Effective 99.1%, VSRp 0.2%    Possible Optivol fluid accumulation 10/5/22 --- ongoing. Currently elevated around 80, slightly above threshold, with correlating TI trend below reference line. TriageF Heart Failure Risk Status on 12-Oct-2022 is High*    NP will review. See interrogation under cardiology tab in the [address] [facility] [address] field for more details. Will continue to monitor remotely.

## 2022-10-12 NOTE — PATIENT INSTRUCTIONS
Increase lasix to 40 mg daily for 1 week and then go to 20 mg daily  Send a remote optival next week as well  Check blood work today and next week  Continue all other medications  Monitor BP and let me know if >120 next week  RTO in 2 months

## 2022-10-12 NOTE — TELEPHONE ENCOUNTER
----- Message from CATRACHITO Nichols - CNS sent at 10/12/2022  4:40 PM EDT -----  Let her know that her Hgb is better 9.3 and fluid level improved on labs but not back to her baseline  Continue what we discussed in [address] today  thanks

## 2022-10-14 ENCOUNTER — OFFICE VISIT (OUTPATIENT)
Dept: SURGERY | Age: 57
End: 2022-10-14

## 2022-10-14 VITALS — WEIGHT: 214 LBS | DIASTOLIC BLOOD PRESSURE: 80 MMHG | SYSTOLIC BLOOD PRESSURE: 132 MMHG | BODY MASS INDEX: 34.54 KG/M2

## 2022-10-14 DIAGNOSIS — Z09 SURGERY FOLLOW-UP: Primary | ICD-10-CM

## 2022-10-14 PROCEDURE — 99024 POSTOP FOLLOW-UP VISIT: CPT | Performed by: SURGERY

## 2022-10-14 NOTE — PROGRESS NOTES
[facility]  SUBJECTIVE:    [facility]   1965   61 y.o. female presents for routine postoperative followup after OR Date 9/27/2022, exploratory laparotomy with repair of reducible incisional hernia, bilateral transverse abdominis component releases with mesh placement, and lysis of adhesions. Shortly after discharge from the surgery she was re-admitted after coughing and feeling severe increase in pain. Found to have rectus sheath hematoma and observed. Presents today with well controlled pain. Tolerating diet. Mild constipation.  Ambulating with improvement    Past Medical History:   Diagnosis Date    Anemia     Atrial fibrillation and flutter (HCC)     Atrial flutter (HCC)     CHB (complete heart block) (HCC)     Class 1 obesity without serious comorbidity with body mass index (BMI) of 33.0 to 33.9 in adult 09/06/2019    Congenital heart disease     Difficult intravenous access 10/06/2022    SEE NOTE    GERD (gastroesophageal reflux disease)     Headache(784.0)     History of complete heart block     Hyperlipidemia     Hypertension     PONV (postoperative nausea and vomiting)     Prolonged emergence from general anesthesia     sensitive to meds, slow to wake    Sleep apnea     uses CPAP    Syncope     Systolic CHF, chronic ([facility] [address] 10/03/2018     Past Surgical History:   Procedure Laterality Date    CARDIAC DEFIBRILLATOR PLACEMENT  01/2021    Medtronic    CARDIOVERSION  01/28/2022    CARDIOVERSION  02/22/2022    COLECTOMY N/A 04/13/2021    EXPLORATORY LAPAROTOMY, RESECTION OF DUODENAL MASS, CHOLECYSTECTOMY WITH INTRAOPERATIVE CHOLANGIOGRAM performed by Deanna Macdonald MD at [place] [address] N/A 10/27/2020    COLONOSCOPY POLYPECTOMY SNARE/COLD BIOPSY performed by Uli Hair MD at [address] [place] [address]  11/29/2012    dual chamber PPM, Medtronic    TUBAL LIGATION      UPPER GASTROINTESTINAL ENDOSCOPY N/A 10/27/2020    EGD DIAGNOSTIC ONLY performed by America Hilario MD at [facility] N/A 04/08/2021    EGD CONTROL HEMORRHAGE WITH APPLICATION OF 3 CLIPS TO DUODENAL MASS performed by Marlene Mosley MD at [address] [facility] ENDOSCOPY N/A 04/08/2021    EGD BIOPSY DUODENAL MASS performed by Marlene Mosley MD at [facility] N/A 04/08/2021    EGD SUBMUCOSAL INJECTION OF 0.6 MG OF EPINEPRINE INTO BASE OF DUODENAL MASS performed by Marlene Mosley MD at [address] [facility] N/A 03/04/2022    EGD BIOPSY performed by America Hilario MD at [address] [address] [place] N/A 9/27/2022    OPEN EXPLORATORY LAPAROTOMY, REPAIR OF REDUCIBLE INCISIONAL HERNIA WITH MESH,  UNILATERAL  ABDOMINAL WALL COMPONENT RELEASE performed by Noe Kwong MD at [address]     Socioeconomic History    Marital status: Single     Spouse name: Not on file    Number of children: 3    Years of education: Not on file    Highest education level: Not on file   Occupational History    Not on file   Tobacco Use    Smoking status: Never    Smokeless tobacco: Never   Vaping Use    Vaping Use: Never used   Substance and Sexual Activity    Alcohol use: No    Drug use: No    Sexual activity: Yes     Partners: Male   Other Topics Concern    Not on file   Social History Narrative    Not on file     Social Determinants of Health     Financial Resource Strain: Not on file   Food Insecurity: Not on file   Transportation Needs: Not on file   Physical Activity: Not on file   Stress: Not on file   Social Connections: Not on file   Intimate Partner Violence: Not on file   Housing Stability: Not on file      Family History   Problem Relation Age of Onset    High Blood Pressure Mother     Cancer Father     Heart Disease Neg Hx     High Cholesterol Neg Hx      Current Outpatient Medications   Medication Sig Dispense Refill    furosemide (LASIX) 20 MG tablet Take 2 tablets by mouth daily 60 tablet 1    spironolactone (ALDACTONE) 50 MG tablet TAKE ONE TABLET BY MOUTH DAILY 90 tablet 0    vitamin D (ERGOCALCIFEROL) 1.25 MG (84616 UT) CAPS capsule TAKE ONE CAPSULE BY MOUTH ONCE WEEKLY 12 capsule 0    sacubitril-valsartan (ENTRESTO) 49-51 MG per tablet Take 1 tablet by mouth 2 times daily 180 tablet 1    carvedilol (COREG) 6.25 MG tablet Take 1 tablet by mouth 2 times daily 180 tablet 1    potassium chloride (KLOR-CON M) 10 MEQ extended release tablet Take 1 tablet by mouth daily 90 tablet 1    ammonium lactate (LAC-HYDRIN) 12 % lotion       fluticasone (FLONASE) 50 MCG/ACT nasal spray       hydrocortisone 1 % cream       atorvastatin (LIPITOR) 40 MG tablet Take 1 tablet by mouth daily 60 tablet 2    pantoprazole (PROTONIX) 40 MG tablet Take 1 tablet by mouth every morning (before breakfast) 30 tablet 3    Multiple Vitamins-Minerals (THERAPEUTIC MULTIVITAMIN-MINERALS) tablet Take 1 tablet by mouth daily       No current facility-administered medications for this visit.       Allergies   Allergen Reactions    Latex      rash    Morphine Shortness Of Breath    Codeine      Hives      Lisinopril      cough    Nitroglycerin Hives    Sulfa Antibiotics Nausea Only    Hydralazine      headaches        Review of Systems:  Review of systems performed and negative with the exception of the above findings    OBJECTIVE:  /80   Wt 214 lb (97.1 kg)   BMI 34.54 kg/m²      Physical Exam:  General appearance: alert, appears stated age, cooperative, and no distress  Head: Normocephalic, without obvious abnormality, atraumatic  Lungs: clear to auscultation bilaterally  Heart: regular rate and rhythm, S1, S2 normal, no murmur, click, rub or gallop  Abdomen: soft, non-distended, appropriate incisional tenderness, incision clean dry and intact, bulge in midline does not reduce and is consistent with benign hernia sac and redundant tissue, not recurrence    Hospital Outpatient Visit on 10/12/2022   Component Date Value Ref Range Status    WBC 10/12/2022 8.0  4.0 - 11.0 K/uL Final    RBC 10/12/2022 2.96 (A)  4.00 - 5.20 M/uL Final    Hemoglobin 10/12/2022 9.3 (A)  12.0 - 16.0 g/dL Final    Hematocrit 10/12/2022 27.3 (A)  36.0 - 48.0 % Final    MCV 10/12/2022 92.5  80.0 - 100.0 fL Final    MCH 10/12/2022 31.3  26.0 - 34.0 pg Final    MCHC 10/12/2022 33.9  31.0 - 36.0 g/dL Final    RDW 10/12/2022 17.4 (A)  12.4 - 15.4 % Final    Platelets 01/12/6596 540 (A)  135 - 450 K/uL Final    MPV 10/12/2022 6.6  5.0 - 10.5 fL Final    Sodium 10/12/2022 142  136 - 145 mmol/L Final    Potassium 10/12/2022 3.4 (A)  3.5 - 5.1 mmol/L Final    Chloride 10/12/2022 105  99 - 110 mmol/L Final    CO2 10/12/2022 23  21 - 32 mmol/L Final    Anion Gap 10/12/2022 14  3 - 16 Final    Glucose 10/12/2022 97  70 - 99 mg/dL Final    BUN 10/12/2022 4 (A)  7 - 20 mg/dL Final    Creatinine 10/12/2022 0.8  0.6 - 1.1 mg/dL Final    GFR Non- 10/12/2022 >60  >60 Final    Comment: >60 mL/min/1.73m2 EGFR, calc. for ages 25 and older using the  MDRD formula (not corrected for weight), is valid for stable  renal function. GFR  10/12/2022 >60  >60 Final    Comment: Chronic Kidney Disease: less than 60 ml/min/1.73 sq.m. Kidney Failure: less than 15 ml/min/1.73 sq.m. Results valid for patients 18 years and older. Calcium 10/12/2022 9.3  8.3 - 10.6 mg/dL Final    Pro-BNP 10/12/2022 596 (A)  0 - 124 pg/mL Final    Comment: Methodology by NT-proBNP    An age-independent cutoff point of 300 pg/ml has a 98%  negative predictive value excluding acute heart failure. Values exceeding the age-related cutoff values (450 pg/mL if  age<50, 900 if 50-75 and 1800 if >75) has 90% sensitivity and  84% specificity for diagnosing acute HF.  In patients with  renal compromise (eGFR<60) values greater than 1200pg/ml have  a diagnostic sensitivity and specificity of 89% and 72% for  acute HF.      Admission on 10/08/2022, Discharged on 10/08/2022   Component Date Value Ref Range Status    Ventricular Rate 10/08/2022 80  BPM Final    Atrial Rate 10/08/2022 80  BPM Final    P-R Interval 10/08/2022 98  ms Final    QRS Duration 10/08/2022 148  ms Final    Q-T Interval 10/08/2022 446  ms Final    QTc Calculation (Bazett) 10/08/2022 514  ms Final    P Axis 10/08/2022 90  degrees Final    R Axis 10/08/2022 -84  degrees Final    T Axis 10/08/2022 74  degrees Final    Diagnosis 10/08/2022 AV sequential or dual chamber electronic pacemakerConfirmed by Aneumed ([id number]) on 10/10/2022 11:01:26 AM   Final    WBC 10/08/2022 12.6 (A)  4.0 - 11.0 K/uL Final    RBC 10/08/2022 2.77 (A)  4.00 - 5.20 M/uL Final    Hemoglobin 10/08/2022 8.2 (A)  12.0 - 16.0 g/dL Final    Hematocrit 10/08/2022 25.3 (A)  36.0 - 48.0 % Final    MCV 10/08/2022 91.4  80.0 - 100.0 fL Final    MCH 10/08/2022 29.7  26.0 - 34.0 pg Final    MCHC 10/08/2022 32.4  31.0 - 36.0 g/dL Final    RDW 10/08/2022 17.0 (A)  12.4 - 15.4 % Final    Platelets 35/33/6331 471 (A)  135 - 450 K/uL Final    MPV 10/08/2022 6.8  5.0 - 10.5 fL Final    PLATELET SLIDE REVIEW 10/08/2022 Increased   Final    SLIDE REVIEW 10/08/2022 see below   Final    Slide review agrees with reported results    Neutrophils % 10/08/2022 79.0  % Final    Lymphocytes % 10/08/2022 9.0  % Final    Monocytes % 10/08/2022 5.0  % Final    Eosinophils % 10/08/2022 3.0  % Final    Basophils % 10/08/2022 1.0  % Final    Neutrophils Absolute 10/08/2022 10.2 (A)  1.7 - 7.7 K/uL Final    Lymphocytes Absolute 10/08/2022 1.3  1.0 - 5.1 K/uL Final    Monocytes Absolute 10/08/2022 0.6  0.0 - 1.3 K/uL Final    Eosinophils Absolute 10/08/2022 0.4  0.0 - 0.6 K/uL Final    Basophils Absolute 10/08/2022 0.1  0.0 - 0.2 K/uL Final    Atypical Lymphocytes Relative 10/08/2022 1  0 - 6 % Final    Myelocyte Percent 10/08/2022 2 (A)  % Final    Anisocytosis 10/08/2022 1+ (A) Final    Polychromasia 10/08/2022 1+ (A)   Final    Sodium 10/08/2022 140  136 - 145 mmol/L Final    Potassium reflex Magnesium 10/08/2022 3.8  3.5 - 5.1 mmol/L Final    Chloride 10/08/2022 106  99 - 110 mmol/L Final    CO2 10/08/2022 21  21 - 32 mmol/L Final    Anion Gap 10/08/2022 13  3 - 16 Final    Glucose 10/08/2022 105 (A)  70 - 99 mg/dL Final    BUN 10/08/2022 5 (A)  7 - 20 mg/dL Final    Creatinine 10/08/2022 0.6  0.6 - 1.1 mg/dL Final    GFR Non- 10/08/2022 >60  >60 Final    Comment: >60 mL/min/1.73m2 EGFR, calc. for ages 25 and older using the  MDRD formula (not corrected for weight), is valid for stable  renal function. GFR  10/08/2022 >60  >60 Final    Comment: Chronic Kidney Disease: less than 60 ml/min/1.73 sq.m. Kidney Failure: less than 15 ml/min/1.73 sq.m. Results valid for patients 18 years and older. Calcium 10/08/2022 9.2  8.3 - 10.6 mg/dL Final    Total Protein 10/08/2022 7.0  6.4 - 8.2 g/dL Final    Albumin 10/08/2022 3.5  3.4 - 5.0 g/dL Final    Albumin/Globulin Ratio 10/08/2022 1.0 (A)  1.1 - 2.2 Final    Total Bilirubin 10/08/2022 0.7  0.0 - 1.0 mg/dL Final    Alkaline Phosphatase 10/08/2022 78  40 - 129 U/L Final    ALT 10/08/2022 11  10 - 40 U/L Final    AST 10/08/2022 28  15 - 37 U/L Final    Comment: Specimen hemolysis has exceeded the interference as defined by Roche. Value may be falsely increased. Suggest recollection if clinically  indicated. Troponin 10/08/2022 <0.01  <0.01 ng/mL Final    Methodology by Troponin T    Pro-BNP 10/08/2022 1,191 (A)  0 - 124 pg/mL Final    Comment: Methodology by NT-proBNP    An age-independent cutoff point of 300 pg/ml has a 98%  negative predictive value excluding acute heart failure. Values exceeding the age-related cutoff values (450 pg/mL if  age<50, 900 if 50-75 and 1800 if >75) has 90% sensitivity and  84% specificity for diagnosing acute HF.  In patients with  renal compromise (eGFR<60) values greater than 1200pg/ml have  a diagnostic sensitivity and specificity of 89% and 72% for  acute HF. No results displayed because visit has over 200 results. No results displayed because visit has over 200 results. Admission on 09/15/2022, Discharged on 09/15/2022   Component Date Value Ref Range Status    WBC 09/15/2022 6.3  4.0 - 11.0 K/uL Final    RBC 09/15/2022 4.81  4.00 - 5.20 M/uL Final    Hemoglobin 09/15/2022 14.3  12.0 - 16.0 g/dL Final    Hematocrit 09/15/2022 42.6  36.0 - 48.0 % Final    MCV 09/15/2022 88.7  80.0 - 100.0 fL Final    MCH 09/15/2022 29.7  26.0 - 34.0 pg Final    MCHC 09/15/2022 33.5  31.0 - 36.0 g/dL Final    RDW 09/15/2022 16.1 (A)  12.4 - 15.4 % Final    Platelets 28/43/8730 268  135 - 450 K/uL Final    MPV 09/15/2022 7.8  5.0 - 10.5 fL Final    Neutrophils % 09/15/2022 70.9  % Final    Lymphocytes % 09/15/2022 18.3  % Final    Monocytes % 09/15/2022 6.8  % Final    Eosinophils % 09/15/2022 3.0  % Final    Basophils % 09/15/2022 1.0  % Final    Neutrophils Absolute 09/15/2022 4.5  1.7 - 7.7 K/uL Final    Lymphocytes Absolute 09/15/2022 1.2  1.0 - 5.1 K/uL Final    Monocytes Absolute 09/15/2022 0.4  0.0 - 1.3 K/uL Final    Eosinophils Absolute 09/15/2022 0.2  0.0 - 0.6 K/uL Final    Basophils Absolute 09/15/2022 0.1  0.0 - 0.2 K/uL Final    Sodium 09/15/2022 142  136 - 145 mmol/L Final    Potassium reflex Magnesium 09/15/2022 3.7  3.5 - 5.1 mmol/L Final    Chloride 09/15/2022 104  99 - 110 mmol/L Final    CO2 09/15/2022 25  21 - 32 mmol/L Final    Anion Gap 09/15/2022 13  3 - 16 Final    Glucose 09/15/2022 121 (A)  70 - 99 mg/dL Final    BUN 09/15/2022 7  7 - 20 mg/dL Final    Creatinine 09/15/2022 0.7  0.6 - 1.1 mg/dL Final    GFR Non- 09/15/2022 >60  >60 Final    Comment: >60 mL/min/1.73m2 EGFR, calc. for ages 25 and older using the  MDRD formula (not corrected for weight), is valid for stable  renal function.       GFR  09/15/2022 >60 >60 Final    Comment: Chronic Kidney Disease: less than 60 ml/min/1.73 sq.m. Kidney Failure: less than 15 ml/min/1.73 sq.m. Results valid for patients 18 years and older. Calcium 09/15/2022 10.1  8.3 - 10.6 mg/dL Final    Total Protein 09/15/2022 8.0  6.4 - 8.2 g/dL Final    Albumin 09/15/2022 4.5  3.4 - 5.0 g/dL Final    Albumin/Globulin Ratio 09/15/2022 1.3  1.1 - 2.2 Final    Total Bilirubin 09/15/2022 0.8  0.0 - 1.0 mg/dL Final    Alkaline Phosphatase 09/15/2022 76  40 - 129 U/L Final    Comment: Specimen hemolysis has exceeded the interference as defined by Roche. Value may be falsely decreased. Suggest recollection if clinically  indicated. ALT 09/15/2022 11  10 - 40 U/L Final    Comment: Specimen hemolysis has exceeded the interference as defined by Roche. Result may be affected. Suggest recollection if clinically indicated. AST 09/15/2022 23  15 - 37 U/L Final    Comment: Specimen hemolysis has exceeded the interference as defined by Roche. Value may be falsely increased. Suggest recollection if clinically  indicated.       Lipase 09/15/2022 18.0  13.0 - 60.0 U/L Final    Lactic Acid, Sepsis 09/15/2022 1.7  0.4 - 1.9 mmol/L Final    Color, UA 09/15/2022 Yellow  Straw/Yellow Final    Clarity, UA 09/15/2022 Clear  Clear Final    Glucose, Ur 09/15/2022 Negative  Negative mg/dL Final    Bilirubin Urine 09/15/2022 Negative  Negative Final    Ketones, Urine 09/15/2022 Negative  Negative mg/dL Final    Specific Gravity, UA 09/15/2022 1.010  1.005 - 1.030 Final    Blood, Urine 09/15/2022 Negative  Negative Final    pH, UA 09/15/2022 7.5  5.0 - 8.0 Final    Protein, UA 09/15/2022 Negative  Negative mg/dL Final    Urobilinogen, Urine 09/15/2022 1.0  <2.0 E.U./dL Final    Nitrite, Urine 09/15/2022 Negative  Negative Final    Leukocyte Esterase, Urine 09/15/2022 MODERATE (A)  Negative Final    Microscopic Examination 09/15/2022 YES   Final    Urine Type 09/15/2022 Voided   Final    Urine received in a container without preservatives. Urine Reflex to Culture 09/15/2022 Not Indicated   Final    Bacteria, UA 09/15/2022 None Seen  None Seen /HPF Final    Hyaline Casts, UA 09/15/2022 1  0 - 8 /LPF Final    WBC, UA 09/15/2022 8 (A)  0 - 5 /HPF Final    RBC, UA 09/15/2022 1  0 - 4 /HPF Final    Epithelial Cells, UA 09/15/2022 2  0 - 5 /HPF Final    Comment: Urinalysis microscopic and digital image assisted microscopic  performed using the automated methodology ([id number] system).      Admission on 09/06/2022, Discharged on 09/06/2022   Component Date Value Ref Range Status    Ventricular Rate 09/06/2022 75  BPM Final    Atrial Rate 09/06/2022 75  BPM Final    P-R Interval 09/06/2022 80  ms Final    QRS Duration 09/06/2022 156  ms Final    Q-T Interval 09/06/2022 470  ms Final    QTc Calculation (Bazett) 09/06/2022 524  ms Final    P Axis 09/06/2022 96  degrees Final    R Axis 09/06/2022 -85  degrees Final    T Axis 09/06/2022 77  degrees Final    Diagnosis 09/06/2022 AV sequential or dual chamber electronic pacemakerConfirmed by Apple Leo MD, Greg Ganser ([id number]) on 9/7/2022 5:19:21 PM   Final    WBC 09/06/2022 5.4  4.0 - 11.0 K/uL Final    RBC 09/06/2022 4.34  4.00 - 5.20 M/uL Final    Hemoglobin 09/06/2022 13.0  12.0 - 16.0 g/dL Final    Hematocrit 09/06/2022 38.5  36.0 - 48.0 % Final    MCV 09/06/2022 88.7  80.0 - 100.0 fL Final    MCH 09/06/2022 29.9  26.0 - 34.0 pg Final    MCHC 09/06/2022 33.7  31.0 - 36.0 g/dL Final    RDW 09/06/2022 16.2 (A)  12.4 - 15.4 % Final    Platelets 22/73/1344 268  135 - 450 K/uL Final    MPV 09/06/2022 7.9  5.0 - 10.5 fL Final    Neutrophils % 09/06/2022 62.2  % Final    Lymphocytes % 09/06/2022 24.8  % Final    Monocytes % 09/06/2022 6.6  % Final    Eosinophils % 09/06/2022 5.2  % Final    Basophils % 09/06/2022 1.2  % Final    Neutrophils Absolute 09/06/2022 3.3  1.7 - 7.7 K/uL Final    Lymphocytes Absolute 09/06/2022 1.3  1.0 - 5.1 K/uL Final    Monocytes Absolute 09/06/2022 0.4 0.0 - 1.3 K/uL Final    Eosinophils Absolute 09/06/2022 0.3  0.0 - 0.6 K/uL Final    Basophils Absolute 09/06/2022 0.1  0.0 - 0.2 K/uL Final    Sodium 09/06/2022 141  136 - 145 mmol/L Final    Potassium 09/06/2022 3.0 (A)  3.5 - 5.1 mmol/L Final    Chloride 09/06/2022 105  99 - 110 mmol/L Final    CO2 09/06/2022 27  21 - 32 mmol/L Final    Anion Gap 09/06/2022 9  3 - 16 Final    Glucose 09/06/2022 106 (A)  70 - 99 mg/dL Final    BUN 09/06/2022 8  7 - 20 mg/dL Final    Creatinine 09/06/2022 0.7  0.6 - 1.1 mg/dL Final    GFR Non- 09/06/2022 >60  >60 Final    Comment: >60 mL/min/1.73m2 EGFR, calc. for ages 25 and older using the  MDRD formula (not corrected for weight), is valid for stable  renal function. GFR  09/06/2022 >60  >60 Final    Comment: Chronic Kidney Disease: less than 60 ml/min/1.73 sq.m. Kidney Failure: less than 15 ml/min/1.73 sq.m. Results valid for patients 18 years and older. Calcium 09/06/2022 9.7  8.3 - 10.6 mg/dL Final    Total Protein 09/06/2022 7.7  6.4 - 8.2 g/dL Final    Albumin 09/06/2022 4.6  3.4 - 5.0 g/dL Final    Albumin/Globulin Ratio 09/06/2022 1.5  1.1 - 2.2 Final    Total Bilirubin 09/06/2022 0.7  0.0 - 1.0 mg/dL Final    Alkaline Phosphatase 09/06/2022 75  40 - 129 U/L Final    ALT 09/06/2022 8 (A)  10 - 40 U/L Final    AST 09/06/2022 17  15 - 37 U/L Final    Lipase 09/06/2022 24.0  13.0 - 60.0 U/L Final    Troponin 09/06/2022 <0.01  <0.01 ng/mL Final    Methodology by Troponin T    Pro-BNP 09/06/2022 418 (A)  0 - 124 pg/mL Final    Comment: Methodology by NT-proBNP    An age-independent cutoff point of 300 pg/ml has a 98%  negative predictive value excluding acute heart failure. Values exceeding the age-related cutoff values (450 pg/mL if  age<50, 900 if 50-75 and 1800 if >75) has 90% sensitivity and  84% specificity for diagnosing acute HF.  In patients with  renal compromise (eGFR<60) values greater than 1200pg/ml have  a diagnostic sensitivity and specificity of 89% and 72% for  acute HF. Lactic Acid, Sepsis 09/06/2022 1.0  0.4 - 1.9 mmol/L Final    Troponin 09/06/2022 <0.01  <0.01 ng/mL Final    Methodology by Troponin T       CT ABDOMEN PELVIS W IV CONTRAST Additional Contrast? None    Result Date: 10/4/2022  EXAMINATION: CT OF THE ABDOMEN AND PELVIS WITH CONTRAST 10/4/2022 10:10 pm TECHNIQUE: CT of the abdomen and pelvis was performed with the administration of intravenous contrast. Multiplanar reformatted images are provided for review. Automated exposure control, iterative reconstruction, and/or weight based adjustment of the mA/kV was utilized to reduce the radiation dose to as low as reasonably achievable. COMPARISON: 10/01/2022 HISTORY: ORDERING SYSTEM PROVIDED HISTORY: post op, RLQ pimariah TECHNOLOGIST PROVIDED HISTORY: Reason for exam:->post op, RLQ pian Additional Contrast?->None Decision Support Exception - unselect if not a suspected or confirmed emergency medical condition->Emergency Medical Condition (MA) Reason for Exam: Post-op Problem (Pt had hernia repair surgery with  on 9/27, was discharged today, pt was walking up her stairs when she felt a \"pop\" on her right side and could not put pressure on her right leg. FINDINGS: Lower Chest: No pericardial effusion. Implanted cardiac devices are noted. Distal esophagus appears normal in caliber. Patchy subsegmental atelectasis. Organs: No enhancing mass identified in the liver or spleen. No adrenal mass. No pancreatic mass. No peripancreatic acute inflammatory process. Cholecystectomy clips are identified. No enhancing renal mass is identified. No obstructive hydroureteronephrosis is identified. GI/Bowel: Focal wall thickening involving the gastro duodenal junction, though no definite ulceration is identified. No ileus or obstruction is identified. Pelvis: Within the pelvis, there is a lobulated uterus with multiple uterine fibroids.   Small volume of free fluid seen in the pelvis. No bulky pelvic lymphadenopathy is identified. Peritoneum/Retroperitoneum: No abdominal aortic aneurysm. No dissection. No retroperitoneal or mesenteric bulky lymphadenopathy. 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 There is a large rectus sheath hematoma, which extends across the midline, with a small amount of soft tissue air noted. The surgical drain seen in the rectus sheath on the previous exam has been removed. There is a thin area layering hyperdensity seen on and around axial image 102 in the right lateral aspect of the hematoma concerning for possible contrast extravasation. Mixed attenuation blood products are noted, with some liquefied appearing blood products as well as more hyperdense clotted blood. Bones/Soft Tissues: There is a midline soft tissue hematoma with hyperdense clotted blood products noted. No definite active bleeding seen within the hematoma. Subcutaneous edematous changes are identified as well as a small amount of soft tissue gas. No acute osseous abnormality is identified. 1. There is a moderate-sized rectus sheath hematoma, with mixed attenuation of blood products, as well as an area of thin linear enhancement layering laterally on the right, on and around axial image 102, concerning for possible active extravasation. This was discussed with Domi Child, at 11:26 on 10/04/2022. 2. There is a central hematoma seen along the midline, new from the previous examination, with no definite active extravasation. 3. Focal omental stranding seen in the anterior peritoneal cavity in the upper abdomen, adjacent to the sigmoid colon, likely related to postoperative change or omental infarct. No intraperitoneal abscess is identified. 4. Focal thickening noted in the region of the gastric pylorus which could be related to mild gastritis.  5. The surgical drain traversing through the rectus sheath on the previous exam and into the peritoneal cavity has been removed. 6. Other similar findings as described above. CT ABDOMEN PELVIS W IV CONTRAST Additional Contrast? Oral (water soluble contrast through NG)    Result Date: 10/4/2022  EXAMINATION: CT OF THE ABDOMEN AND PELVIS WITH CONTRAST 10/1/2022 7:49 pm TECHNIQUE: CT of the abdomen and pelvis was performed with the administration of intravenous contrast. Multiplanar reformatted images are provided for review. Automated  exposure control, iterative reconstruction, and/or weight based adjustment of the mA/kV was utilized to reduce the radiation dose to as low as reasonably achievable. COMPARISON: 09/15/2022, 09/06/2022 HISTORY: ORDERING SYSTEM PROVIDED HISTORY: abdominal pain s/p complex hernia repair TECHNOLOGIST PROVIDED HISTORY: Additional Contrast?->Oral Reason for exam:->abdominal pain s/p complex hernia repair Reason for Exam: Fatigue (Pt brought in by [facility] from home for SOB, fatigue, left sided neck pain in the cervical spine, stroke scale neg, VSS per EMS upon arrival, Pt A and O x 4. Working out a [employer] earlier this morning, went home, symptoms started 1 hour ago when Pt was watching football, states he had symptoms like this a month ago and was discharged home. Also states that he stood up suddenly and got dizzy like he was going to fall at home, denies falling at home.) FINDINGS: Lower Chest: Bibasilar consolidation new from the prior exam. Organs: The liver, spleen, pancreas and adrenal glands are without focal abnormality. The kidneys enhance symmetrically. No renal calculus. No hydronephrosis or perinephric stranding. No biliary duct dilation. GI/Bowel: Enteric tube tip is noted within the proximal duodenum. Duodenal diverticulum. Mild wall thickening of the transverse colon in the mid abdomen with surrounding pericolonic stranding.   There is also swirling of the mesentery within the right mid abdomen new from the prior exam.  No other dilated loops of bowel or bowel wall thickening. No free intraperitoneal air is noted. Pelvis: Fibroid uterus. No bladder wall thickening. No pathologically enlarged adenopathy. Trace free fluid in the pelvis slightly hyperdense in attenuation. Peritoneum/Retroperitoneum: The aorta is normal in caliber. The celiac axis, SMA and SG are patent. The portal venous system is patent. There is omental and mesenteric stranding predominately within the right upper quadrant and mid abdomen. Postsurgical changes from interval hernia repair along the anterior abdominal wall. There are multiple foci of gas noted within the anterior abdominal wall extending into the subcutaneous soft tissues of the right upper quadrant. Elongated collection of fluid is noted along the anterior abdominal wall without drainable component. There are findings suspicious for recurrent fat containing hernia with surrounding inflammatory changes (image 91). Bones/Soft Tissues: Subcutaneous edema with multiple foci of gas are identified. Fluid is noted along the anterior abdominal wall along the midline incision as well. There is skin thickening along the anterior abdominal wall at the level of midline incision. No acute osseous abnormality. 1. Postsurgical changes from interval hernia repair. There is elongated fluid along the anterior abdominal wall at the hernia repair site with multiple foci of gas extending along the anterior abdominal wall and into the subcutaneous soft tissues predominant within the right upper quadrant. No definite free intraperitoneal air is identified. Inflammatory changes are noted in the omentum and mesentery along the anterior abdomen some which may be postsurgical in etiology. There are associated reactive inflammatory changes involving the transverse colon at this level as well. Surgical drain remains in place.  2. There is a questionable small recurrent fat containing hernia along the midline abdomen with surrounding inflammatory changes versus an area of fat necrosis. . 3. Trace hyperdense free fluid in the pelvis some of which is proteinaceous or hemorrhagic in etiology. This may be postoperative blood products or this could be postoperative as well. 4. There is mild swirling of the mesentery within the right mid abdomen of uncertain etiology without associated bowel obstruction or volvulus. An underlying internal hernia cannot be entirely excluded given recent surgery. 5. Bibasilar pneumonia. 6. Fibroid uterus. CT ABDOMEN PELVIS W IV CONTRAST Additional Contrast? Oral    Result Date: 9/15/2022  EXAMINATION: CT OF THE ABDOMEN AND PELVIS WITH CONTRAST 9/15/2022 5:54 am TECHNIQUE: CT of the abdomen and pelvis was performed with the administration of intravenous contrast. Multiplanar reformatted images are provided for review. Automated exposure control, iterative reconstruction, and/or weight based adjustment of the mA/kV was utilized to reduce the radiation dose to as low as reasonably achievable. COMPARISON: 09/06/2022 HISTORY: ORDERING SYSTEM PROVIDED HISTORY: abd pain TECHNOLOGIST PROVIDED HISTORY: Reason for exam:->abd pain Additional Contrast?->Oral Decision Support Exception - unselect if not a suspected or confirmed emergency medical condition->Emergency Medical Condition (MA) Reason for Exam: abd pain Relevant Medical/Surgical History: Hernia (Patient has large abdominal hernia, pain worsened at 2200. Patient brought in by [facility]. Patient reports taking her prescribed toradol with minimal relief. Pt report nausea, denies vomiting or diarrhea, + constipation. ) FINDINGS: Lower Chest: Lower lungs appear clear. Organs: Liver, spleen, pancreas, adrenal glands, and kidneys appear unremarkable. Gallbladder absent. GI/Bowel: No evidence of bowel obstruction.   Stable appearance of supraumbilical ventral hernia containing fat and a short segment of transverse colon as well as trace amount of fluid. Nonspecific distention of the stomach and duodenum as well as loops of small bowel. Stool throughout the colon. Pelvis: Urinary bladder unremarkable. Enlarged uterus secondary to multiple fibroids. Peritoneum/Retroperitoneum: No free air, fluid, or lymphadenopathy. Bones/Soft Tissues: No acute osseous abnormality. Degenerative changes at L4-L5. No evidence of bowel obstruction. However, there is moderate distension of the stomach, duodenum, and areas of small-bowel which may be related to ileus. Stool throughout much of the colon. Stable appearance of supraumbilical ventral hernia containing fat and a short segment of transverse colon as well as trace amount of fluid. Fibroid uterus. XR CHEST PORTABLE    Result Date: 10/8/2022  EXAMINATION: ONE XRAY VIEW OF THE CHEST 10/8/2022 11:47 am COMPARISON: 10/01/2022 and 10/04/2022. HISTORY: ORDERING SYSTEM PROVIDED HISTORY: pedal edema - CHF? TECHNOLOGIST PROVIDED HISTORY: Reason for exam:->pedal edema - CHF? Reason for Exam: pedal edema, CHF FINDINGS: Mild right basal opacity is unchanged. There is no new airspace consolidation or effusion. There is borderline cardiomegaly. There is no vascular congestion or interstitial prominence. Borderline cardiomegaly without evidence of CHF. Stable mild right basal opacity which could reflect atelectasis or pneumonia. XR CHEST PORTABLE    Result Date: 10/5/2022  EXAMINATION: ONE XRAY VIEW OF THE CHEST 10/4/2022 10:05 pm COMPARISON: None. HISTORY: ORDERING SYSTEM PROVIDED HISTORY: post op TECHNOLOGIST PROVIDED HISTORY: Reason for exam:->post op Reason for Exam: post op, sob FINDINGS: Implanted cardiac device noted overlying the left chest.  Atherosclerosis identified within the thoracic aorta. Cardiac size and mediastinal structures appear stable.   Improved aeration seen in the lung bases bilaterally compared to the previous examination. Minimal residual basilar atelectasis on the right. No acute osseous abnormality is identified. No overt edema. Mild vascular congestion centrally. Mild central vascular congestion, with improved aeration at the lung bases with minimal residual right basilar atelectasis. XR CHEST PORTABLE    Result Date: 10/1/2022  EXAMINATION: ONE XRAY VIEW OF THE CHEST 10/1/2022 4:18 pm COMPARISON: Chest 09/06/2022 HISTORY: ORDERING SYSTEM PROVIDED HISTORY: Confirm NG placement FINDINGS: The cardiac silhouette is enlarged. Calcifications involving the aorta reflect atherosclerosis. The mediastinal and hilar silhouettes appear unremarkable. Scattered hazy opacities across the bilateral lower lungs partially obscuring the right left hemidiaphragms. Blunting right left lateral costophrenic sulci. Vascular engorgement and cephalization is demonstrated with bilateral peribronchial cuffing and perivascular haziness. Prominent soft tissue density upper right paramediastinal region is stable, typical of brachiocephalic vessel tortuosity/ectasia or occasionally thyroid goiter. No pneumothorax is seen. No acute osseous abnormality is identified. Stable left-sided AICD. NG tube extends below the left hemidiaphragm, into the upper abdomen, tip overlying the expected level of the distal stomach or proximal duodenum, right upper quadrant. 1. NG tube extends below the left hemidiaphragm, into the upper abdomen, tip overlying the expected level of the distal stomach or proximal duodenum, right upper quadrant. Consider pulling back NG tube 4-5 cm to ensure tip is at the stomach region. 2.  Vascular engorgement and cephalization is demonstrated with bilateral peribronchial cuffing and perivascular haziness. 3. Calcific atherosclerosis aorta. 4. Cardiomegaly.  5. Prominent soft tissue density upper right paramediastinal region is stable, typical of brachiocephalic vessel tortuosity/ectasia or occasionally thyroid goiter. RECOMMENDATION: Consider pulling back NG tube 4-5 cm to ensure tip is at the stomach region. XR ABDOMEN (2 VIEWS)    Result Date: 10/1/2022  EXAMINATION: TWO XRAY VIEWS OF THE ABDOMEN 10/1/2022 8:59 am COMPARISON: CT abdomen pelvis dated 09/15/2022 HISTORY: ORDERING SYSTEM PROVIDED HISTORY: abdominal pain TECHNOLOGIST PROVIDED HISTORY: Reason for exam:->abdominal pain Reason for Exam: abdominal pain FINDINGS: Flat and upright views of the abdomen were obtained. AICD is present. Heterogeneous retrocardiac opacity is seen. Relative lucency along the medial aspect of the right hemidiaphragm is not significantly changed as compared to  from CT dated 09/15/2022. Right upper quadrant clips. Air is seen within loops of bowel throughout the abdomen in a nonspecific pattern. A few nonspecific air-fluid levels are seen on the upright view. Pelvic phleboliths and uterine calcifications within the pelvis. Nonspecific bowel gas pattern. Left basilar atelectasis or airspace disease. Assessment:  OR Date 9/27/2022, exploratory laparotomy with repair of reducible incisional hernia, bilateral transverse abdominis component releases with mesh placement, and lysis of adhesions    Plan:  No heavy lifting over 20lbs for 6 weeks total postop  Diet and activity as tolerated otherwise  May restart anticoagulation   Laxatives as needed to maintain soft bowel movements per day   Follow with general surgery office in 6 months     Flavio Mohamud MD, FACS  10/14/2022  1:50 PM

## 2022-10-19 ENCOUNTER — HOSPITAL ENCOUNTER (EMERGENCY)
Age: 57
Discharge: HOME | End: 2022-10-19
Payer: MEDICAID

## 2022-10-19 ENCOUNTER — TELEPHONE (OUTPATIENT)
Dept: SURGERY | Age: 57
End: 2022-10-19

## 2022-10-19 ENCOUNTER — APPOINTMENT (OUTPATIENT)
Dept: CT IMAGING | Age: 57
End: 2022-10-19
Payer: MEDICAID

## 2022-10-19 VITALS
DIASTOLIC BLOOD PRESSURE: 78 MMHG | WEIGHT: 207 LBS | HEART RATE: 87 BPM | RESPIRATION RATE: 16 BRPM | HEIGHT: 66 IN | TEMPERATURE: 98.4 F | OXYGEN SATURATION: 99 % | SYSTOLIC BLOOD PRESSURE: 139 MMHG | BODY MASS INDEX: 33.27 KG/M2

## 2022-10-19 DIAGNOSIS — S30.1XXA HEMATOMA OF RECTUS SHEATH, INITIAL ENCOUNTER: Primary | ICD-10-CM

## 2022-10-19 LAB
A/G RATIO: 1.2 (ref 1.1–2.2)
ALBUMIN SERPL-MCNC: 4.4 G/DL (ref 3.4–5)
ALP BLD-CCNC: 80 U/L (ref 40–129)
ALT SERPL-CCNC: 16 U/L (ref 10–40)
ANION GAP SERPL CALCULATED.3IONS-SCNC: 11 MMOL/L (ref 3–16)
AST SERPL-CCNC: 19 U/L (ref 15–37)
BASOPHILS ABSOLUTE: 0.1 K/UL (ref 0–0.2)
BASOPHILS RELATIVE PERCENT: 1.1 %
BILIRUB SERPL-MCNC: 0.9 MG/DL (ref 0–1)
BUN BLDV-MCNC: 6 MG/DL (ref 7–20)
CALCIUM SERPL-MCNC: 9.6 MG/DL (ref 8.3–10.6)
CHLORIDE BLD-SCNC: 103 MMOL/L (ref 99–110)
CO2: 26 MMOL/L (ref 21–32)
CREAT SERPL-MCNC: 0.7 MG/DL (ref 0.6–1.1)
EOSINOPHILS ABSOLUTE: 0.4 K/UL (ref 0–0.6)
EOSINOPHILS RELATIVE PERCENT: 6.6 %
GFR SERPL CREATININE-BSD FRML MDRD: >60 ML/MIN/{1.73_M2}
GLUCOSE BLD-MCNC: 106 MG/DL (ref 70–99)
HCT VFR BLD CALC: 31.3 % (ref 36–48)
HEMOGLOBIN: 10.4 G/DL (ref 12–16)
INR BLD: 1.51 (ref 0.87–1.14)
LYMPHOCYTES ABSOLUTE: 1 K/UL (ref 1–5.1)
LYMPHOCYTES RELATIVE PERCENT: 16.5 %
MAGNESIUM: 1.8 MG/DL (ref 1.8–2.4)
MCH RBC QN AUTO: 30.1 PG (ref 26–34)
MCHC RBC AUTO-ENTMCNC: 33.1 G/DL (ref 31–36)
MCV RBC AUTO: 91 FL (ref 80–100)
MONOCYTES ABSOLUTE: 0.4 K/UL (ref 0–1.3)
MONOCYTES RELATIVE PERCENT: 7 %
NEUTROPHILS ABSOLUTE: 4.3 K/UL (ref 1.7–7.7)
NEUTROPHILS RELATIVE PERCENT: 68.8 %
PDW BLD-RTO: 17.5 % (ref 12.4–15.4)
PLATELET # BLD: 443 K/UL (ref 135–450)
PMV BLD AUTO: 7 FL (ref 5–10.5)
POTASSIUM REFLEX MAGNESIUM: 3.1 MMOL/L (ref 3.5–5.1)
PROTHROMBIN TIME: 18.2 SEC (ref 11.7–14.5)
RBC # BLD: 3.44 M/UL (ref 4–5.2)
SODIUM BLD-SCNC: 140 MMOL/L (ref 136–145)
TOTAL PROTEIN: 8 G/DL (ref 6.4–8.2)
WBC # BLD: 6.3 K/UL (ref 4–11)

## 2022-10-19 PROCEDURE — 74177 CT ABD & PELVIS W/CONTRAST: CPT

## 2022-10-19 PROCEDURE — 83735 ASSAY OF MAGNESIUM: CPT

## 2022-10-19 PROCEDURE — 85025 COMPLETE CBC W/AUTO DIFF WBC: CPT

## 2022-10-19 PROCEDURE — 6370000000 HC RX 637 (ALT 250 FOR IP): Performed by: PHYSICIAN ASSISTANT

## 2022-10-19 PROCEDURE — 85610 PROTHROMBIN TIME: CPT

## 2022-10-19 PROCEDURE — 80053 COMPREHEN METABOLIC PANEL: CPT

## 2022-10-19 PROCEDURE — 6360000004 HC RX CONTRAST MEDICATION: Performed by: PHYSICIAN ASSISTANT

## 2022-10-19 PROCEDURE — 99285 EMERGENCY DEPT VISIT HI MDM: CPT

## 2022-10-19 RX ORDER — POTASSIUM CHLORIDE 20 MEQ/1
40 TABLET, EXTENDED RELEASE ORAL ONCE
Status: COMPLETED | OUTPATIENT
Start: 2022-10-19 | End: 2022-10-19

## 2022-10-19 RX ADMIN — IOPAMIDOL 75 ML: 755 INJECTION, SOLUTION INTRAVENOUS at 18:37

## 2022-10-19 RX ADMIN — POTASSIUM CHLORIDE 40 MEQ: 1500 TABLET, EXTENDED RELEASE ORAL at 19:27

## 2022-10-19 ASSESSMENT — PAIN - FUNCTIONAL ASSESSMENT: PAIN_FUNCTIONAL_ASSESSMENT: NONE - DENIES PAIN

## 2022-10-19 NOTE — TELEPHONE ENCOUNTER
09/27/2022     OPEN EXPLORATORY LAPAROTOMY, REPAIR OF REDUCIBLE INCISIONAL HERNIA WITH MESH,  UNILATERAL  ABDOMINAL WALL COMPONENT RELEASE        PT bloody discharge @ incision site , also pt can not touch the site because of how sore it is.  Very sore to the touch  Please Advise  [phone]

## 2022-10-19 NOTE — TELEPHONE ENCOUNTER
Pt states home health nurse is concerned about internal bleeding. The home nurse advised her to go to the ER. Pt is going to the ER now.

## 2022-10-19 NOTE — ED PROVIDER NOTES
[facility]        Pt Name: Anne Parry  MRN: [id number]  [place] 1965  Date of evaluation: 10/19/2022  Provider: Maki Crow PA-C  PCP: Mya RUIZ  Note Started: 5:40 PM EDT       LOUIS. I have evaluated this patient. My supervising physician was available for consultation. CHIEF COMPLAINT       Chief Complaint   Patient presents with    Post-op Problem     Pt states surgery on 9/27/22, home nurse told her to come in to have site checked for bleeding       HISTORY OF PRESENT ILLNESS   (Location, Timing/Onset, Context/Setting, Quality, Duration, Modifying Factors, Severity, Associated Signs and Symptoms)  Note limiting factors. Chief Complaint: Bleeding from incision site    Anne Parry is a 61 y.o. female who presents complaining of bleeding from her incision site since yesterday. Patient had an ex lap with incisional hernia repair on 9/27. Afterwards she had a rectus sheath hematoma which she was admitted and observed for period. Patient states she started on Xarelto back this week, on for A. fib. She noticed some mild bleeding from her incisional site yesterday. Home health told patient she should come to the ER to be checked for internal bleeding. Patient states the swelling around her incisional site is actually smaller than it was, does have increasing pain. Pain is mild, constant, pressure, worse with palpation, relieved by rest, nonradiating. Denies wound opening, fever, vomiting, constipation, diarrhea, dizziness, syncope. Nursing Notes were all reviewed and agreed with or any disagreements were addressed in the HPI. REVIEW OF SYSTEMS    (2-9 systems for level 4, 10 or more for level 5)     Review of Systems   All other systems reviewed and are negative. Positives and Pertinent negatives as per HPI. Except as noted above in the ROS, all other systems were reviewed and negative. PAST MEDICAL HISTORY     Past Medical History:   Diagnosis Date    Anemia     Atrial fibrillation and flutter (HCC)     Atrial flutter (HCC)     CHB (complete heart block) (HCC)     Class 1 obesity without serious comorbidity with body mass index (BMI) of 33.0 to 33.9 in adult 09/06/2019    Congenital heart disease     Difficult intravenous access 10/06/2022    SEE NOTE    GERD (gastroesophageal reflux disease)     Headache(784.0)     History of complete heart block     Hyperlipidemia     Hypertension     PONV (postoperative nausea and vomiting)     Prolonged emergence from general anesthesia     sensitive to meds, slow to wake    Sleep apnea     uses CPAP    Syncope     Systolic CHF, chronic ([facility] [address] 10/03/2018         SURGICAL HISTORY     Past Surgical History:   Procedure Laterality Date    CARDIAC DEFIBRILLATOR PLACEMENT  01/2021    [employer]    CARDIOVERSION  01/28/2022    CARDIOVERSION  02/22/2022    COLECTOMY N/A 04/13/2021    EXPLORATORY LAPAROTOMY, RESECTION OF DUODENAL MASS, CHOLECYSTECTOMY WITH INTRAOPERATIVE CHOLANGIOGRAM performed by Isiah Romero MD at [address] [place] [address], [address] N/A 10/27/2020    COLONOSCOPY POLYPECTOMY SNARE/COLD BIOPSY performed by Pricila Tony MD at [address] [place] [address]  11/29/2012    dual chamber PPM, [employer]    TUBAL LIGATION      UPPER GASTROINTESTINAL ENDOSCOPY N/A 10/27/2020    EGD DIAGNOSTIC ONLY performed by Pricila Tony MD at [address] [place] [address] N/A 04/08/2021    EGD CONTROL HEMORRHAGE WITH APPLICATION OF 3 CLIPS TO DUODENAL MASS performed by Corinne Perry MD at [facility] [place] N/A 04/08/2021    EGD BIOPSY DUODENAL MASS performed by Corinne Perry MD at [facility] [place] N/A 04/08/2021    EGD SUBMUCOSAL INJECTION OF 0.6 MG OF EPINEPRINE INTO BASE OF DUODENAL MASS performed by Corinne Perry MD at [address] [place] [address] GASTROINTESTINAL ENDOSCOPY N/A 03/04/2022    EGD BIOPSY performed by Armando Page MD at [address] N/A 9/27/2022    OPEN EXPLORATORY LAPAROTOMY, REPAIR OF REDUCIBLE INCISIONAL HERNIA WITH MESH,  UNILATERAL  ABDOMINAL WALL COMPONENT RELEASE performed by Kiya Connor MD at [address] [facility]       Previous Medications    AMMONIUM LACTATE (LAC-HYDRIN) 12 % LOTION        ATORVASTATIN (LIPITOR) 40 MG TABLET    Take 1 tablet by mouth daily    CARVEDILOL (COREG) 6.25 MG TABLET    Take 1 tablet by mouth 2 times daily    FLUTICASONE (FLONASE) 50 MCG/ACT NASAL SPRAY        FUROSEMIDE (LASIX) 20 MG TABLET    Take 2 tablets by mouth daily    HYDROCORTISONE 1 % CREAM        MULTIPLE VITAMINS-MINERALS (THERAPEUTIC MULTIVITAMIN-MINERALS) TABLET    Take 1 tablet by mouth daily    PANTOPRAZOLE (PROTONIX) 40 MG TABLET    Take 1 tablet by mouth every morning (before breakfast)    POTASSIUM CHLORIDE (KLOR-CON M) 10 MEQ EXTENDED RELEASE TABLET    Take 1 tablet by mouth daily    SACUBITRIL-VALSARTAN (ENTRESTO) 49-51 MG PER TABLET    Take 1 tablet by mouth 2 times daily    SPIRONOLACTONE (ALDACTONE) 50 MG TABLET    TAKE ONE TABLET BY MOUTH DAILY    VITAMIN D (ERGOCALCIFEROL) 1.25 MG (56249 UT) CAPS CAPSULE    TAKE ONE CAPSULE BY MOUTH ONCE WEEKLY         ALLERGIES     Latex, Morphine, Codeine, Lisinopril, Nitroglycerin, Sulfa antibiotics, and Hydralazine    FAMILYHISTORY       Family History   Problem Relation Age of Onset    High Blood Pressure Mother     Cancer Father     Heart Disease Neg Hx     High Cholesterol Neg Hx           SOCIAL HISTORY       Social History     Tobacco Use    Smoking status: Never    Smokeless tobacco: Never   Vaping Use    Vaping Use: Never used   Substance Use Topics    Alcohol use: No    Drug use: No       SCREENINGS             PHYSICAL EXAM    (up to 7 for level 4, 8 or more for level 5)     ED Triage Vitals [10/19/22 1609]   BP Temp Temp Source Heart Rate Resp SpO2 Height Weight   (!) 145/91 98.4 °F (36.9 °C) Oral 87 16 100 % 5' 6\" (1.676 m) 207 lb (93.9 kg)       Physical Exam  Vitals and nursing note reviewed. Constitutional:       General: She is not in acute distress. Appearance: She is not ill-appearing or toxic-appearing. HENT:      Head: Normocephalic and atraumatic. Right Ear: External ear normal.      Left Ear: External ear normal.      Nose: Nose normal.      Mouth/Throat:      Mouth: Mucous membranes are moist.      Pharynx: Oropharynx is clear. Eyes:      Extraocular Movements: Extraocular movements intact. Conjunctiva/sclera: Conjunctivae normal.      Pupils: Pupils are equal, round, and reactive to light. Cardiovascular:      Rate and Rhythm: Normal rate and regular rhythm. Pulses: Normal pulses. Heart sounds: Normal heart sounds. Pulmonary:      Effort: Pulmonary effort is normal. No respiratory distress. Breath sounds: Normal breath sounds. Abdominal:      General: Abdomen is flat. Bowel sounds are normal. There is no distension. Palpations: Abdomen is soft. Tenderness: There is no abdominal tenderness. There is no guarding or rebound. Comments: Midline incision without active bleeding. No erythema or dehiscence. Abdomen with soft fluid collection around incisional site, pt states smaller. Musculoskeletal:         General: Normal range of motion. Cervical back: Normal range of motion and neck supple. Skin:     General: Skin is warm and dry. Capillary Refill: Capillary refill takes less than 2 seconds. Neurological:      Mental Status: She is alert and oriented to person, place, and time.    Psychiatric:         Mood and Affect: Mood normal.         Behavior: Behavior normal.       DIAGNOSTIC RESULTS   LABS:    Labs Reviewed   CBC WITH AUTO DIFFERENTIAL - Abnormal; Notable for the following components:       Result Value    RBC 3.44 (*) Hemoglobin 10.4 (*)     Hematocrit 31.3 (*)     RDW 17.5 (*)     All other components within normal limits   COMPREHENSIVE METABOLIC PANEL W/ REFLEX TO MG FOR LOW K - Abnormal; Notable for the following components:    Potassium reflex Magnesium 3.1 (*)     Glucose 106 (*)     BUN 6 (*)     All other components within normal limits   PROTIME-INR - Abnormal; Notable for the following components:    Protime 18.2 (*)     INR 1.51 (*)     All other components within normal limits   MAGNESIUM       When ordered only abnormal lab results are displayed. All other labs were within normal range or not returned as of this dictation. EKG: When ordered, EKG's are interpreted by the Emergency Department Physician in the absence of a cardiologist.  Please see their note for interpretation of EKG. RADIOLOGY:   Non-plain film images such as CT, Ultrasound and MRI are read by the radiologist. Plain radiographic images are visualized and preliminarily interpreted by the ED Provider with the below findings:        Interpretation per the Radiologist below, if available at the time of this note:    CT ABDOMEN PELVIS W IV CONTRAST Additional Contrast? None   Final Result   1. Mild increasing size of midline large, mixed attenuation deep soft tissue   abdominopelvic hematoma. 2. The more superficial component measures similarly with decreased   attenuation keeping with some contraction of hematoma. Another deeper   component anterior right pelvis also measures slightly smaller. 3. Minimal bibasilar subsegmental atelectasis. 4. Small hiatal hernia. 5. Cardiomegaly. 6. Leiomyomatous appearance of the uterus. Results were called by Dr. Rafael Dickinson to [place] [address] on   10/19/2022 at 19:06. No results found.         PROCEDURES   Unless otherwise noted below, none     Procedures    CRITICAL CARE TIME       CONSULTS:  IP CONSULT TO GENERAL SURGERY      EMERGENCY DEPARTMENT COURSE and DIFFERENTIAL DIAGNOSIS/MDM:   Vitals:    Vitals:    10/19/22 1609 10/19/22 1900 10/19/22 1915   BP: (!) 145/91 139/80 139/78   Pulse: 87     Resp: 16     Temp: 98.4 °F (36.9 °C)     TempSrc: Oral     SpO2: 100% 99% 99%   Weight: 207 lb (93.9 kg)     Height: 5' 6\" (1.676 m)         Patient was given the following medications:  Medications   potassium chloride (KLOR-CON M) extended release tablet 40 mEq (has no administration in time range)   iopamidol (ISOVUE-370) 76 % injection 75 mL (75 mLs IntraVENous Given 10/19/22 1837)         Is this patient to be included in the SEP-1 Core Measure due to severe sepsis or septic shock? No   Exclusion criteria - the patient is NOT to be included for SEP-1 Core Measure due to: Infection is not suspected    1920 - Case discussed with Dr. Juli Berg, general surgery,, [facility] films were reviewed by Dr. Gena Cotter and case discussed with him. Patient has increasing H/H compared to prior, no hypotension or tachycardia. They recommend hold Xarelto, follow-up in office tomorrow or the next day. Patient made aware of plan, agrees. Patient indicated understanding of stopping Xarelto. Instructed return for any new or worsening symptoms. FINAL IMPRESSION      1. Hematoma of rectus sheath, initial encounter          DISPOSITION/PLAN   DISPOSITION Decision To Discharge 10/19/2022 07:25:37 PM      PATIENT REFERRED TO:  Samantha Joe MD  [address]  [phone]    In 1 day  Stop taking Xarelto. Follow-up with your surgeon tomorrow. Return for any new or worsening symptoms    DISCHARGE MEDICATIONS:  New Prescriptions    No medications on file       DISCONTINUED MEDICATIONS:  Discontinued Medications    No medications on file              (Please note that portions of this note were completed with a voice recognition program.  Efforts were made to edit the dictations but occasionally words are mis-transcribed. )    Felicitas Phillips PA-C (electronically signed) William Salinas PA-C  10/19/22 1928

## 2022-10-20 ENCOUNTER — TELEPHONE (OUTPATIENT)
Dept: OTHER | Facility: CLINIC | Age: 57
End: 2022-10-20

## 2022-10-20 NOTE — TELEPHONE ENCOUNTER
RN access attempted to contact patient regarding need for ED follow-up appointment. Attempt was successful. Patient able to contact PCP to schedule. No further help needed at this time.

## 2022-10-21 ENCOUNTER — TELEPHONE (OUTPATIENT)
Dept: CARDIOLOGY CLINIC | Age: 57
End: 2022-10-21

## 2022-10-21 ENCOUNTER — TELEPHONE (OUTPATIENT)
Dept: SURGERY | Age: 57
End: 2022-10-21

## 2022-10-21 ENCOUNTER — OFFICE VISIT (OUTPATIENT)
Dept: SURGERY | Age: 57
End: 2022-10-21

## 2022-10-21 VITALS — BODY MASS INDEX: 33.57 KG/M2 | SYSTOLIC BLOOD PRESSURE: 122 MMHG | DIASTOLIC BLOOD PRESSURE: 74 MMHG | WEIGHT: 208 LBS

## 2022-10-21 DIAGNOSIS — Z09 SURGERY FOLLOW-UP: Primary | ICD-10-CM

## 2022-10-21 PROCEDURE — 99024 POSTOP FOLLOW-UP VISIT: CPT | Performed by: SURGERY

## 2022-10-21 NOTE — PATIENT INSTRUCTIONS
No heavy lifting over 20lbs for 6 weeks total postop  Diet and activity as tolerated otherwise  May restart anticoagulation   Laxatives as needed to maintain soft bowel movements per day   Follow with general surgery office in 2 weeks

## 2022-10-21 NOTE — PROGRESS NOTES
[facility]  SUBJECTIVE:    Anne Parry   1965   61 y.o. female presents for routine postoperative followup after OR Date 9/27/2022, exploratory laparotomy with repair of reducible incisional hernia, bilateral transverse abdominis component releases with mesh placement, and lysis of adhesions. Shortly after discharge from the surgery she was re-admitted after coughing and feeling severe increase in pain. Found to have rectus sheath hematoma and observed. Earlier this week was concerned for possible recurrent bleed, returned to ED but with normal hemoglobin and CT showing stable osiris hematoma, she was able to be discharged. Ambulating with improvement. Tolerating diet.  Bowels normal.     Past Medical History:   Diagnosis Date    Anemia     Atrial fibrillation and flutter (HCC)     Atrial flutter (HCC)     CHB (complete heart block) (HCC)     Class 1 obesity without serious comorbidity with body mass index (BMI) of 33.0 to 33.9 in adult 09/06/2019    Congenital heart disease     Difficult intravenous access 10/06/2022    SEE NOTE    GERD (gastroesophageal reflux disease)     Headache(784.0)     History of complete heart block     Hyperlipidemia     Hypertension     PONV (postoperative nausea and vomiting)     Prolonged emergence from general anesthesia     sensitive to meds, slow to wake    Sleep apnea     uses CPAP    Syncope     Systolic CHF, chronic ([address] 10/03/2018     Past Surgical History:   Procedure Laterality Date    CARDIAC DEFIBRILLATOR PLACEMENT  01/2021    Medtronic    CARDIOVERSION  01/28/2022    CARDIOVERSION  02/22/2022    COLECTOMY N/A 04/13/2021    EXPLORATORY LAPAROTOMY, RESECTION OF DUODENAL MASS, CHOLECYSTECTOMY WITH INTRAOPERATIVE CHOLANGIOGRAM performed by Gill Almonte MD at [place] [address] N/A 10/27/2020    COLONOSCOPY POLYPECTOMY SNARE/COLD BIOPSY performed by Lisset Jameson MD at [address] [place] [address] 11/29/2012    dual chamber PPM, Medtronic    TUBAL LIGATION      UPPER GASTROINTESTINAL ENDOSCOPY N/A 10/27/2020    EGD DIAGNOSTIC ONLY performed by Nakul Lovell MD at [facility] N/A 04/08/2021    EGD CONTROL HEMORRHAGE WITH APPLICATION OF 3 CLIPS TO DUODENAL MASS performed by Hardie Severin, MD at [address] [facility] N/A 04/08/2021    EGD BIOPSY DUODENAL MASS performed by Hardie Severin, MD at [facility] N/A 04/08/2021    EGD SUBMUCOSAL INJECTION OF 0.6 MG OF EPINEPRINE INTO BASE OF DUODENAL MASS performed by Hardie Severin, MD at [address] [facility] ENDOSCOPY N/A 03/04/2022    EGD BIOPSY performed by Nakul Lovell MD at [facility] [address] [place] N/A 9/27/2022    OPEN EXPLORATORY LAPAROTOMY, REPAIR OF REDUCIBLE INCISIONAL HERNIA WITH MESH,  UNILATERAL  ABDOMINAL WALL COMPONENT RELEASE performed by Leeroy Montes MD at [address] [place] [place] [address]     Socioeconomic History    Marital status: Single     Spouse name: Not on file    Number of children: 3    Years of education: Not on file    Highest education level: Not on file   Occupational History    Not on file   Tobacco Use    Smoking status: Never    Smokeless tobacco: Never   Vaping Use    Vaping Use: Never used   Substance and Sexual Activity    Alcohol use: No    Drug use: No    Sexual activity: Yes     Partners: Male   Other Topics Concern    Not on file   Social History Narrative    Not on file     Social Determinants of Health     Financial Resource Strain: Not on file   Food Insecurity: Not on file   Transportation Needs: Not on file   Physical Activity: Not on file   Stress: Not on file   Social Connections: Not on file   Intimate Partner Violence: Not on file   Housing Stability: Not on file      Family History   Problem Relation Age of Onset    High Blood Pressure Mother     Cancer Father     Heart Disease Neg Hx     High Cholesterol Neg Hx      Current Outpatient Medications   Medication Sig Dispense Refill    furosemide (LASIX) 20 MG tablet Take 2 tablets by mouth daily 60 tablet 1    spironolactone (ALDACTONE) 50 MG tablet TAKE ONE TABLET BY MOUTH DAILY 90 tablet 0    vitamin D (ERGOCALCIFEROL) 1.25 MG (52833 UT) CAPS capsule TAKE ONE CAPSULE BY MOUTH ONCE WEEKLY 12 capsule 0    sacubitril-valsartan (ENTRESTO) 49-51 MG per tablet Take 1 tablet by mouth 2 times daily 180 tablet 1    carvedilol (COREG) 6.25 MG tablet Take 1 tablet by mouth 2 times daily 180 tablet 1    potassium chloride (KLOR-CON M) 10 MEQ extended release tablet Take 1 tablet by mouth daily 90 tablet 1    ammonium lactate (LAC-HYDRIN) 12 % lotion       fluticasone (FLONASE) 50 MCG/ACT nasal spray       hydrocortisone 1 % cream       atorvastatin (LIPITOR) 40 MG tablet Take 1 tablet by mouth daily 60 tablet 2    pantoprazole (PROTONIX) 40 MG tablet Take 1 tablet by mouth every morning (before breakfast) 30 tablet 3    Multiple Vitamins-Minerals (THERAPEUTIC MULTIVITAMIN-MINERALS) tablet Take 1 tablet by mouth daily       No current facility-administered medications for this visit.       Allergies   Allergen Reactions    Latex      rash    Morphine Shortness Of Breath    Codeine      Hives      Lisinopril      cough    Nitroglycerin Hives    Sulfa Antibiotics Nausea Only    Hydralazine      headaches        Review of Systems:  Review of systems performed and negative with the exception of the above findings    OBJECTIVE:  /74   Wt 208 lb (94.3 kg)   BMI 33.57 kg/m²      Physical Exam:  General appearance: alert, appears stated age, cooperative, and no distress  Head: Normocephalic, without obvious abnormality, atraumatic  Lungs: clear to auscultation bilaterally  Heart: regular rate and rhythm, S1, S2 normal, no murmur, click, rub or gallop  Abdomen: soft, mild distension but stable, palpable midline nodules consistent with retained hernia sac, seroma and hematoma, minimal drainage from lower aspect of incision without signs of cellulitis    Admission on 10/19/2022, Discharged on 10/19/2022   Component Date Value Ref Range Status    WBC 10/19/2022 6.3  4.0 - 11.0 K/uL Final    RBC 10/19/2022 3.44 (A)  4.00 - 5.20 M/uL Final    Hemoglobin 10/19/2022 10.4 (A)  12.0 - 16.0 g/dL Final    Hematocrit 10/19/2022 31.3 (A)  36.0 - 48.0 % Final    MCV 10/19/2022 91.0  80.0 - 100.0 fL Final    MCH 10/19/2022 30.1  26.0 - 34.0 pg Final    MCHC 10/19/2022 33.1  31.0 - 36.0 g/dL Final    RDW 10/19/2022 17.5 (A)  12.4 - 15.4 % Final    Platelets 28/79/9141 443  135 - 450 K/uL Final    MPV 10/19/2022 7.0  5.0 - 10.5 fL Final    Neutrophils % 10/19/2022 68.8  % Final    Lymphocytes % 10/19/2022 16.5  % Final    Monocytes % 10/19/2022 7.0  % Final    Eosinophils % 10/19/2022 6.6  % Final    Basophils % 10/19/2022 1.1  % Final    Neutrophils Absolute 10/19/2022 4.3  1.7 - 7.7 K/uL Final    Lymphocytes Absolute 10/19/2022 1.0  1.0 - 5.1 K/uL Final    Monocytes Absolute 10/19/2022 0.4  0.0 - 1.3 K/uL Final    Eosinophils Absolute 10/19/2022 0.4  0.0 - 0.6 K/uL Final    Basophils Absolute 10/19/2022 0.1  0.0 - 0.2 K/uL Final    Sodium 10/19/2022 140  136 - 145 mmol/L Final    Potassium reflex Magnesium 10/19/2022 3.1 (A)  3.5 - 5.1 mmol/L Final    Chloride 10/19/2022 103  99 - 110 mmol/L Final    CO2 10/19/2022 26  21 - 32 mmol/L Final    Anion Gap 10/19/2022 11  3 - 16 Final    Glucose 10/19/2022 106 (A)  70 - 99 mg/dL Final    BUN 10/19/2022 6 (A)  7 - 20 mg/dL Final    Creatinine 10/19/2022 0.7  0.6 - 1.1 mg/dL Final    Est, Glom Filt Rate 10/19/2022 >60  >60 Final    Comment: Pediatric calculator link  Marco.at. org/professionals/kdoqi/gfr_calculatorped  Effective Oct 3, 2022  These results are not intended for use in patients  <25years of age.   eGFR results are calculated without  a race factor using the 2021 CKD-EPI equation. Careful  clinical correlation is recommended, particularly when  comparing to results calculated using previous equations. The CKD-EPI equation is less accurate in patients with  extremes of muscle mass, extra-renal metabolism of  creatinine, excessive creatinine ingestion, or following  therapy that affects renal tubular secretion. Calcium 10/19/2022 9.6  8.3 - 10.6 mg/dL Final    Total Protein 10/19/2022 8.0  6.4 - 8.2 g/dL Final    Albumin 10/19/2022 4.4  3.4 - 5.0 g/dL Final    Albumin/Globulin Ratio 10/19/2022 1.2  1.1 - 2.2 Final    Total Bilirubin 10/19/2022 0.9  0.0 - 1.0 mg/dL Final    Alkaline Phosphatase 10/19/2022 80  40 - 129 U/L Final    ALT 10/19/2022 16  10 - 40 U/L Final    AST 10/19/2022 19  15 - 37 U/L Final    Protime 10/19/2022 18.2 (A)  11.7 - 14.5 sec Final    Comment: Effective 5-25-22 09:00am EST  Please note reference ranges have  changed for PT and INR Testing. INR 10/19/2022 1.51 (A)  0.87 - 1.14 Final    Comment: Effective 5/25/22 at 09:00am EST    Normal: 0.87 - 1.14  Therapeutic: 2.0 - 3.0  Pros.  Valve: 2.5 - 3.5  AMI: 2.0 - 3.0      Magnesium 10/19/2022 1.80  1.80 - 2.40 mg/dL Final   Hospital Outpatient Visit on 10/12/2022   Component Date Value Ref Range Status    WBC 10/12/2022 8.0  4.0 - 11.0 K/uL Final    RBC 10/12/2022 2.96 (A)  4.00 - 5.20 M/uL Final    Hemoglobin 10/12/2022 9.3 (A)  12.0 - 16.0 g/dL Final    Hematocrit 10/12/2022 27.3 (A)  36.0 - 48.0 % Final    MCV 10/12/2022 92.5  80.0 - 100.0 fL Final    MCH 10/12/2022 31.3  26.0 - 34.0 pg Final    MCHC 10/12/2022 33.9  31.0 - 36.0 g/dL Final    RDW 10/12/2022 17.4 (A)  12.4 - 15.4 % Final    Platelets 83/03/7976 540 (A)  135 - 450 K/uL Final    MPV 10/12/2022 6.6  5.0 - 10.5 fL Final    Sodium 10/12/2022 142  136 - 145 mmol/L Final    Potassium 10/12/2022 3.4 (A)  3.5 - 5.1 mmol/L Final    Chloride 10/12/2022 105  99 - 110 mmol/L Final    CO2 10/12/2022 23  21 - 32 mmol/L Final    Anion Gap 10/12/2022 14  3 - 16 Final    Glucose 10/12/2022 97  70 - 99 mg/dL Final    BUN 10/12/2022 4 (A)  7 - 20 mg/dL Final    Creatinine 10/12/2022 0.8  0.6 - 1.1 mg/dL Final    GFR Non- 10/12/2022 >60  >60 Final    Comment: >60 mL/min/1.73m2 EGFR, calc. for ages 25 and older using the  MDRD formula (not corrected for weight), is valid for stable  renal function. GFR  10/12/2022 >60  >60 Final    Comment: Chronic Kidney Disease: less than 60 ml/min/1.73 sq.m. Kidney Failure: less than 15 ml/min/1.73 sq.m. Results valid for patients 18 years and older. Calcium 10/12/2022 9.3  8.3 - 10.6 mg/dL Final    Pro-BNP 10/12/2022 596 (A)  0 - 124 pg/mL Final    Comment: Methodology by NT-proBNP    An age-independent cutoff point of 300 pg/ml has a 98%  negative predictive value excluding acute heart failure. Values exceeding the age-related cutoff values (450 pg/mL if  age<50, 900 if 50-75 and 1800 if >75) has 90% sensitivity and  84% specificity for diagnosing acute HF. In patients with  renal compromise (eGFR<60) values greater than 1200pg/ml have  a diagnostic sensitivity and specificity of 89% and 72% for  acute HF.      Admission on 10/08/2022, Discharged on 10/08/2022   Component Date Value Ref Range Status    Ventricular Rate 10/08/2022 80  BPM Final    Atrial Rate 10/08/2022 80  BPM Final    P-R Interval 10/08/2022 98  ms Final    QRS Duration 10/08/2022 148  ms Final    Q-T Interval 10/08/2022 446  ms Final    QTc Calculation (Bazett) 10/08/2022 514  ms Final    P Axis 10/08/2022 90  degrees Final    R Axis 10/08/2022 -84  degrees Final    T Axis 10/08/2022 74  degrees Final    Diagnosis 10/08/2022 AV sequential or dual chamber electronic pacemakerConfirmed by Shiv Jane ([id number]) on 10/10/2022 11:01:26 AM   Final    WBC 10/08/2022 12.6 (A)  4.0 - 11.0 K/uL Final    RBC 10/08/2022 2.77 (A)  4.00 - 5.20 M/uL Final    Hemoglobin 10/08/2022 8.2 (A)  12.0 - 16.0 g/dL Final Hematocrit 10/08/2022 25.3 (A)  36.0 - 48.0 % Final    MCV 10/08/2022 91.4  80.0 - 100.0 fL Final    MCH 10/08/2022 29.7  26.0 - 34.0 pg Final    MCHC 10/08/2022 32.4  31.0 - 36.0 g/dL Final    RDW 10/08/2022 17.0 (A)  12.4 - 15.4 % Final    Platelets 23/58/8739 471 (A)  135 - 450 K/uL Final    MPV 10/08/2022 6.8  5.0 - 10.5 fL Final    PLATELET SLIDE REVIEW 10/08/2022 Increased   Final    SLIDE REVIEW 10/08/2022 see below   Final    Slide review agrees with reported results    Neutrophils % 10/08/2022 79.0  % Final    Lymphocytes % 10/08/2022 9.0  % Final    Monocytes % 10/08/2022 5.0  % Final    Eosinophils % 10/08/2022 3.0  % Final    Basophils % 10/08/2022 1.0  % Final    Neutrophils Absolute 10/08/2022 10.2 (A)  1.7 - 7.7 K/uL Final    Lymphocytes Absolute 10/08/2022 1.3  1.0 - 5.1 K/uL Final    Monocytes Absolute 10/08/2022 0.6  0.0 - 1.3 K/uL Final    Eosinophils Absolute 10/08/2022 0.4  0.0 - 0.6 K/uL Final    Basophils Absolute 10/08/2022 0.1  0.0 - 0.2 K/uL Final    Atypical Lymphocytes Relative 10/08/2022 1  0 - 6 % Final    Myelocyte Percent 10/08/2022 2 (A)  % Final    Anisocytosis 10/08/2022 1+ (A)   Final    Polychromasia 10/08/2022 1+ (A)   Final    Sodium 10/08/2022 140  136 - 145 mmol/L Final    Potassium reflex Magnesium 10/08/2022 3.8  3.5 - 5.1 mmol/L Final    Chloride 10/08/2022 106  99 - 110 mmol/L Final    CO2 10/08/2022 21  21 - 32 mmol/L Final    Anion Gap 10/08/2022 13  3 - 16 Final    Glucose 10/08/2022 105 (A)  70 - 99 mg/dL Final    BUN 10/08/2022 5 (A)  7 - 20 mg/dL Final    Creatinine 10/08/2022 0.6  0.6 - 1.1 mg/dL Final    GFR Non- 10/08/2022 >60  >60 Final    Comment: >60 mL/min/1.73m2 EGFR, calc. for ages 25 and older using the  MDRD formula (not corrected for weight), is valid for stable  renal function. GFR  10/08/2022 >60  >60 Final    Comment: Chronic Kidney Disease: less than 60 ml/min/1.73 sq.m.           Kidney Failure: less than 15 ml/min/1.73 sq.m. Results valid for patients 18 years and older. Calcium 10/08/2022 9.2  8.3 - 10.6 mg/dL Final    Total Protein 10/08/2022 7.0  6.4 - 8.2 g/dL Final    Albumin 10/08/2022 3.5  3.4 - 5.0 g/dL Final    Albumin/Globulin Ratio 10/08/2022 1.0 (A)  1.1 - 2.2 Final    Total Bilirubin 10/08/2022 0.7  0.0 - 1.0 mg/dL Final    Alkaline Phosphatase 10/08/2022 78  40 - 129 U/L Final    ALT 10/08/2022 11  10 - 40 U/L Final    AST 10/08/2022 28  15 - 37 U/L Final    Comment: Specimen hemolysis has exceeded the interference as defined by Roche. Value may be falsely increased. Suggest recollection if clinically  indicated. Troponin 10/08/2022 <0.01  <0.01 ng/mL Final    Methodology by Troponin T    Pro-BNP 10/08/2022 1,191 (A)  0 - 124 pg/mL Final    Comment: Methodology by NT-proBNP    An age-independent cutoff point of 300 pg/ml has a 98%  negative predictive value excluding acute heart failure. Values exceeding the age-related cutoff values (450 pg/mL if  age<50, 900 if 50-75 and 1800 if >75) has 90% sensitivity and  84% specificity for diagnosing acute HF. In patients with  renal compromise (eGFR<60) values greater than 1200pg/ml have  a diagnostic sensitivity and specificity of 89% and 72% for  acute HF. No results displayed because visit has over 200 results. No results displayed because visit has over 200 results.       Admission on 09/15/2022, Discharged on 09/15/2022   Component Date Value Ref Range Status    WBC 09/15/2022 6.3  4.0 - 11.0 K/uL Final    RBC 09/15/2022 4.81  4.00 - 5.20 M/uL Final    Hemoglobin 09/15/2022 14.3  12.0 - 16.0 g/dL Final    Hematocrit 09/15/2022 42.6  36.0 - 48.0 % Final    MCV 09/15/2022 88.7  80.0 - 100.0 fL Final    MCH 09/15/2022 29.7  26.0 - 34.0 pg Final    MCHC 09/15/2022 33.5  31.0 - 36.0 g/dL Final    RDW 09/15/2022 16.1 (A)  12.4 - 15.4 % Final    Platelets 07/91/5901 268  135 - 450 K/uL Final    MPV 09/15/2022 7.8  5.0 - 10.5 fL Final Neutrophils % 09/15/2022 70.9  % Final    Lymphocytes % 09/15/2022 18.3  % Final    Monocytes % 09/15/2022 6.8  % Final    Eosinophils % 09/15/2022 3.0  % Final    Basophils % 09/15/2022 1.0  % Final    Neutrophils Absolute 09/15/2022 4.5  1.7 - 7.7 K/uL Final    Lymphocytes Absolute 09/15/2022 1.2  1.0 - 5.1 K/uL Final    Monocytes Absolute 09/15/2022 0.4  0.0 - 1.3 K/uL Final    Eosinophils Absolute 09/15/2022 0.2  0.0 - 0.6 K/uL Final    Basophils Absolute 09/15/2022 0.1  0.0 - 0.2 K/uL Final    Sodium 09/15/2022 142  136 - 145 mmol/L Final    Potassium reflex Magnesium 09/15/2022 3.7  3.5 - 5.1 mmol/L Final    Chloride 09/15/2022 104  99 - 110 mmol/L Final    CO2 09/15/2022 25  21 - 32 mmol/L Final    Anion Gap 09/15/2022 13  3 - 16 Final    Glucose 09/15/2022 121 (A)  70 - 99 mg/dL Final    BUN 09/15/2022 7  7 - 20 mg/dL Final    Creatinine 09/15/2022 0.7  0.6 - 1.1 mg/dL Final    GFR Non- 09/15/2022 >60  >60 Final    Comment: >60 mL/min/1.73m2 EGFR, calc. for ages 25 and older using the  MDRD formula (not corrected for weight), is valid for stable  renal function. GFR  09/15/2022 >60  >60 Final    Comment: Chronic Kidney Disease: less than 60 ml/min/1.73 sq.m. Kidney Failure: less than 15 ml/min/1.73 sq.m. Results valid for patients 18 years and older. Calcium 09/15/2022 10.1  8.3 - 10.6 mg/dL Final    Total Protein 09/15/2022 8.0  6.4 - 8.2 g/dL Final    Albumin 09/15/2022 4.5  3.4 - 5.0 g/dL Final    Albumin/Globulin Ratio 09/15/2022 1.3  1.1 - 2.2 Final    Total Bilirubin 09/15/2022 0.8  0.0 - 1.0 mg/dL Final    Alkaline Phosphatase 09/15/2022 76  40 - 129 U/L Final    Comment: Specimen hemolysis has exceeded the interference as defined by Roche. Value may be falsely decreased. Suggest recollection if clinically  indicated. ALT 09/15/2022 11  10 - 40 U/L Final    Comment: Specimen hemolysis has exceeded the interference as defined by Roche.   Result may be affected. Suggest recollection if clinically indicated. AST 09/15/2022 23  15 - 37 U/L Final    Comment: Specimen hemolysis has exceeded the interference as defined by Roche. Value may be falsely increased. Suggest recollection if clinically  indicated. Lipase 09/15/2022 18.0  13.0 - 60.0 U/L Final    Lactic Acid, Sepsis 09/15/2022 1.7  0.4 - 1.9 mmol/L Final    Color, UA 09/15/2022 Yellow  Straw/Yellow Final    Clarity, UA 09/15/2022 Clear  Clear Final    Glucose, Ur 09/15/2022 Negative  Negative mg/dL Final    Bilirubin Urine 09/15/2022 Negative  Negative Final    Ketones, Urine 09/15/2022 Negative  Negative mg/dL Final    Specific Gravity, UA 09/15/2022 1.010  1.005 - 1.030 Final    Blood, Urine 09/15/2022 Negative  Negative Final    pH, UA 09/15/2022 7.5  5.0 - 8.0 Final    Protein, UA 09/15/2022 Negative  Negative mg/dL Final    Urobilinogen, Urine 09/15/2022 1.0  <2.0 E.U./dL Final    Nitrite, Urine 09/15/2022 Negative  Negative Final    Leukocyte Esterase, Urine 09/15/2022 MODERATE (A)  Negative Final    Microscopic Examination 09/15/2022 YES   Final    Urine Type 09/15/2022 Voided   Final    Urine received in a container without preservatives. Urine Reflex to Culture 09/15/2022 Not Indicated   Final    Bacteria, UA 09/15/2022 None Seen  None Seen /HPF Final    Hyaline Casts, UA 09/15/2022 1  0 - 8 /LPF Final    WBC, UA 09/15/2022 8 (A)  0 - 5 /HPF Final    RBC, UA 09/15/2022 1  0 - 4 /HPF Final    Epithelial Cells, UA 09/15/2022 2  0 - 5 /HPF Final    Comment: Urinalysis microscopic and digital image assisted microscopic  performed using the automated methodology ([employer] system).          CT ABDOMEN PELVIS W IV CONTRAST Additional Contrast? None    Result Date: 10/19/2022  EXAMINATION: CT OF THE ABDOMEN AND PELVIS WITH CONTRAST 10/19/2022 6:24 pm TECHNIQUE: CT of the abdomen and pelvis was performed with the administration of intravenous contrast. Multiplanar reformatted images are provided for review. Automated exposure control, iterative reconstruction, and/or weight based adjustment of the mA/kV was utilized to reduce the radiation dose to as low as reasonably achievable. COMPARISON: CT abdomen and pelvis 10/01/2022 and 10/04/2022 HISTORY: ORDERING SYSTEM PROVIDED HISTORY: abdominal incision bleeding/pain Decision Support Exception - unselect if not a suspected or confirmed emergency medical condition->Emergency Medical Condition (MA) Reason for Exam: Abdominal incision bleeding/pain. Post-op Problem (Pt states surgery on 9/27/22, home nurse told her to come in to have site checked for bleeding). FINDINGS: Lower Chest: Minimal bibasilar subsegmental atelectasis. Small hiatal hernia. Cardiomegaly. Organs: Normal attenuation pattern throughout the liver. No discrete hepatic lesion. Mild central intrahepatic bile duct dilatation is seen. Common bile duct measures 5 mm. The gallbladder is absent status post cholecystectomy. The kidneys, spleen, adrenal glands and pancreas appear unremarkable. GI/Bowel: Small hiatal hernia. The stomach and small bowel appear unremarkable. No diffuse or focal small bowel wall thickening or inflammatory changes evident. No obstruction is seen. The appendix is visualized right lower quadrant, unremarkable in appearance. The colon appears unremarkable. Pelvis: Poly lobular configuration of uterine body and fundus with popcorn type calcification evident with mass upper left fundus 3.2 x 5.4 cm and noncalcified 5.5 cm mass mid and lower uterine segment right, almost certainly reflecting fibroids. Adnexal regions and urinary bladder appear unremarkable. No ascites or pneumoperitoneum. Peritoneum/Retroperitoneum: Unremarkable appearance of the aorta. No aneurysm. Unremarkable appearance of the IVC. No adenopathy or fluid. No pneumoperitoneum.  Bones/Soft Tissues: A very large ventral abdominal hematoma at the muscular fascia deep to the rectus abdominus musculature is 7 x 20 x 20 cm, compared to my measurement on prior study of 4.8 x 15 x 17 cm. Additional component of the ventral abdominal wall hematoma extends inferior deep to the right rectus abdominus muscle anterior pelvis measuring 3 x 5 x 4 cm (axial image 137), on prior study is 2.7 x 5 x 5 cm my measurement. A more superficial component in the subcuticular fat measuring 5 x 16 x 5.5 cm. Right quadriceps muscle belly fatty lesion 3.4 x 5.4 cm image 198 typical of intramuscular lipoma (benign finding requiring no additional evaluation or follow-up). No acute superficial soft tissue or osseous structure abnormality evident. 1. Mild increasing size of midline large, mixed attenuation deep soft tissue abdominopelvic hematoma. 2. The more superficial component measures similarly with decreased attenuation keeping with some contraction of hematoma. Another deeper component anterior right pelvis also measures slightly smaller. 3. Minimal bibasilar subsegmental atelectasis. 4. Small hiatal hernia. 5. Cardiomegaly. 6. Leiomyomatous appearance of the uterus. Results were called by Dr. Hosea Rose to [place] [id number] on 10/19/2022 at 19:06. CT ABDOMEN PELVIS W IV CONTRAST Additional Contrast? None    Result Date: 10/4/2022  EXAMINATION: CT OF THE ABDOMEN AND PELVIS WITH CONTRAST 10/4/2022 10:10 pm TECHNIQUE: CT of the abdomen and pelvis was performed with the administration of intravenous contrast. Multiplanar reformatted images are provided for review. Automated exposure control, iterative reconstruction, and/or weight based adjustment of the mA/kV was utilized to reduce the radiation dose to as low as reasonably achievable.  COMPARISON: 10/01/2022 HISTORY: ORDERING SYSTEM PROVIDED HISTORY: post op, RLQ pian TECHNOLOGIST PROVIDED HISTORY: Reason for exam:->post op, RLQ pian Additional Contrast?->None Decision Support Exception - unselect if not a suspected or confirmed emergency medical condition->Emergency Medical Condition (MA) Reason for Exam: Post-op Problem (Pt had hernia repair surgery with  on 9/27, was discharged today, pt was walking up her stairs when she felt a \"pop\" on her right side and could not put pressure on her right leg. FINDINGS: Lower Chest: No pericardial effusion. Implanted cardiac devices are noted. Distal esophagus appears normal in caliber. Patchy subsegmental atelectasis. Organs: No enhancing mass identified in the liver or spleen. No adrenal mass. No pancreatic mass. No peripancreatic acute inflammatory process. Cholecystectomy clips are identified. No enhancing renal mass is identified. No obstructive hydroureteronephrosis is identified. GI/Bowel: Focal wall thickening involving the gastro duodenal junction, though no definite ulceration is identified. No ileus or obstruction is identified. Pelvis: Within the pelvis, there is a lobulated uterus with multiple uterine fibroids. Small volume of free fluid seen in the pelvis. No bulky pelvic lymphadenopathy is identified. Peritoneum/Retroperitoneum: No abdominal aortic aneurysm. No dissection. No retroperitoneal or mesenteric bulky lymphadenopathy. 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 There is a large rectus sheath hematoma, which extends across the midline, with a small amount of soft tissue air noted. The surgical drain seen in the rectus sheath on the previous exam has been removed. There is a thin area layering hyperdensity seen on and around axial image 102 in the right lateral aspect of the hematoma concerning for possible contrast extravasation. Mixed attenuation blood products are noted, with some liquefied appearing blood products as well as more hyperdense clotted blood. Bones/Soft Tissues: There is a midline soft tissue hematoma with hyperdense clotted blood products noted.   No definite active bleeding seen within the hematoma. Subcutaneous edematous changes are identified as well as a small amount of soft tissue gas. No acute osseous abnormality is identified. 1. There is a moderate-sized rectus sheath hematoma, with mixed attenuation of blood products, as well as an area of thin linear enhancement layering laterally on the right, on and around axial image 102, concerning for possible active extravasation. This was discussed with Edgardo Yarbrough, at 11:26 on 10/04/2022. 2. There is a central hematoma seen along the midline, new from the previous examination, with no definite active extravasation. 3. Focal omental stranding seen in the anterior peritoneal cavity in the upper abdomen, adjacent to the sigmoid colon, likely related to postoperative change or omental infarct. No intraperitoneal abscess is identified. 4. Focal thickening noted in the region of the gastric pylorus which could be related to mild gastritis. 5. The surgical drain traversing through the rectus sheath on the previous exam and into the peritoneal cavity has been removed. 6. Other similar findings as described above. CT ABDOMEN PELVIS W IV CONTRAST Additional Contrast? Oral (water soluble contrast through NG)    Result Date: 10/4/2022  EXAMINATION: CT OF THE ABDOMEN AND PELVIS WITH CONTRAST 10/1/2022 7:49 pm TECHNIQUE: CT of the abdomen and pelvis was performed with the administration of intravenous contrast. Multiplanar reformatted images are provided for review. Automated  exposure control, iterative reconstruction, and/or weight based adjustment of the mA/kV was utilized to reduce the radiation dose to as low as reasonably achievable.  COMPARISON: 09/15/2022, 09/06/2022 HISTORY: ORDERING SYSTEM PROVIDED HISTORY: abdominal pain s/p complex hernia repair TECHNOLOGIST PROVIDED HISTORY: Additional Contrast?->Oral Reason for exam:->abdominal pain s/p complex hernia repair Reason for Exam: Fatigue (Pt brought in by [facility] from home for SOB, fatigue, left sided neck pain in the cervical spine, stroke scale neg, VSS per EMS upon arrival, Pt A and O x 4. Working out a [employer] earlier this morning, went home, symptoms started 1 hour ago when Pt was watching football, states he had symptoms like this a month ago and was discharged home. Also states that he stood up suddenly and got dizzy like he was going to fall at home, denies falling at home.) FINDINGS: Lower Chest: Bibasilar consolidation new from the prior exam. Organs: The liver, spleen, pancreas and adrenal glands are without focal abnormality. The kidneys enhance symmetrically. No renal calculus. No hydronephrosis or perinephric stranding. No biliary duct dilation. GI/Bowel: Enteric tube tip is noted within the proximal duodenum. Duodenal diverticulum. Mild wall thickening of the transverse colon in the mid abdomen with surrounding pericolonic stranding. There is also swirling of the mesentery within the right mid abdomen new from the prior exam.  No other dilated loops of bowel or bowel wall thickening. No free intraperitoneal air is noted. Pelvis: Fibroid uterus. No bladder wall thickening. No pathologically enlarged adenopathy. Trace free fluid in the pelvis slightly hyperdense in attenuation. Peritoneum/Retroperitoneum: The aorta is normal in caliber. The celiac axis, SMA and SG are patent. The portal venous system is patent. There is omental and mesenteric stranding predominately within the right upper quadrant and mid abdomen. Postsurgical changes from interval hernia repair along the anterior abdominal wall. There are multiple foci of gas noted within the anterior abdominal wall extending into the subcutaneous soft tissues of the right upper quadrant. Elongated collection of fluid is noted along the anterior abdominal wall without drainable component.   There are findings suspicious for recurrent fat containing hernia with surrounding inflammatory changes (image 91). Bones/Soft Tissues: Subcutaneous edema with multiple foci of gas are identified. Fluid is noted along the anterior abdominal wall along the midline incision as well. There is skin thickening along the anterior abdominal wall at the level of midline incision. No acute osseous abnormality. 1. Postsurgical changes from interval hernia repair. There is elongated fluid along the anterior abdominal wall at the hernia repair site with multiple foci of gas extending along the anterior abdominal wall and into the subcutaneous soft tissues predominant within the right upper quadrant. No definite free intraperitoneal air is identified. Inflammatory changes are noted in the omentum and mesentery along the anterior abdomen some which may be postsurgical in etiology. There are associated reactive inflammatory changes involving the transverse colon at this level as well. Surgical drain remains in place. 2. There is a questionable small recurrent fat containing hernia along the midline abdomen with surrounding inflammatory changes versus an area of fat necrosis. . 3. Trace hyperdense free fluid in the pelvis some of which is proteinaceous or hemorrhagic in etiology. This may be postoperative blood products or this could be postoperative as well. 4. There is mild swirling of the mesentery within the right mid abdomen of uncertain etiology without associated bowel obstruction or volvulus. An underlying internal hernia cannot be entirely excluded given recent surgery. 5. Bibasilar pneumonia. 6. Fibroid uterus. XR CHEST PORTABLE    Result Date: 10/8/2022  EXAMINATION: ONE XRAY VIEW OF THE CHEST 10/8/2022 11:47 am COMPARISON: 10/01/2022 and 10/04/2022. HISTORY: ORDERING SYSTEM PROVIDED HISTORY: pedal edema - CHF? TECHNOLOGIST PROVIDED HISTORY: Reason for exam:->pedal edema - CHF? Reason for Exam: pedal edema, CHF FINDINGS: Mild right basal opacity is unchanged.   There is no new airspace consolidation or effusion. There is borderline cardiomegaly. There is no vascular congestion or interstitial prominence. Borderline cardiomegaly without evidence of CHF. Stable mild right basal opacity which could reflect atelectasis or pneumonia. XR CHEST PORTABLE    Result Date: 10/5/2022  EXAMINATION: ONE XRAY VIEW OF THE CHEST 10/4/2022 10:05 pm COMPARISON: None. HISTORY: ORDERING SYSTEM PROVIDED HISTORY: post op TECHNOLOGIST PROVIDED HISTORY: Reason for exam:->post op Reason for Exam: post op, sob FINDINGS: Implanted cardiac device noted overlying the left chest.  Atherosclerosis identified within the thoracic aorta. Cardiac size and mediastinal structures appear stable. Improved aeration seen in the lung bases bilaterally compared to the previous examination. Minimal residual basilar atelectasis on the right. No acute osseous abnormality is identified. No overt edema. Mild vascular congestion centrally. Mild central vascular congestion, with improved aeration at the lung bases with minimal residual right basilar atelectasis. XR CHEST PORTABLE    Result Date: 10/1/2022  EXAMINATION: ONE XRAY VIEW OF THE CHEST 10/1/2022 4:18 pm COMPARISON: Chest 09/06/2022 HISTORY: ORDERING SYSTEM PROVIDED HISTORY: Confirm NG placement FINDINGS: The cardiac silhouette is enlarged. Calcifications involving the aorta reflect atherosclerosis. The mediastinal and hilar silhouettes appear unremarkable. Scattered hazy opacities across the bilateral lower lungs partially obscuring the right left hemidiaphragms. Blunting right left lateral costophrenic sulci. Vascular engorgement and cephalization is demonstrated with bilateral peribronchial cuffing and perivascular haziness. Prominent soft tissue density upper right paramediastinal region is stable, typical of brachiocephalic vessel tortuosity/ectasia or occasionally thyroid goiter. No pneumothorax is seen. No acute osseous abnormality is identified.   Stable left-sided AICD. NG tube extends below the left hemidiaphragm, into the upper abdomen, tip overlying the expected level of the distal stomach or proximal duodenum, right upper quadrant. 1. NG tube extends below the left hemidiaphragm, into the upper abdomen, tip overlying the expected level of the distal stomach or proximal duodenum, right upper quadrant. Consider pulling back NG tube 4-5 cm to ensure tip is at the stomach region. 2.  Vascular engorgement and cephalization is demonstrated with bilateral peribronchial cuffing and perivascular haziness. 3. Calcific atherosclerosis aorta. 4. Cardiomegaly. 5. Prominent soft tissue density upper right paramediastinal region is stable, typical of brachiocephalic vessel tortuosity/ectasia or occasionally thyroid goiter. RECOMMENDATION: Consider pulling back NG tube 4-5 cm to ensure tip is at the stomach region. XR ABDOMEN (2 VIEWS)    Result Date: 10/1/2022  EXAMINATION: TWO XRAY VIEWS OF THE ABDOMEN 10/1/2022 8:59 am COMPARISON: CT abdomen pelvis dated 09/15/2022 HISTORY: ORDERING SYSTEM PROVIDED HISTORY: abdominal pain TECHNOLOGIST PROVIDED HISTORY: Reason for exam:->abdominal pain Reason for Exam: abdominal pain FINDINGS: Flat and upright views of the abdomen were obtained. AICD is present. Heterogeneous retrocardiac opacity is seen. Relative lucency along the medial aspect of the right hemidiaphragm is not significantly changed as compared to  from CT dated 09/15/2022. Right upper quadrant clips. Air is seen within loops of bowel throughout the abdomen in a nonspecific pattern. A few nonspecific air-fluid levels are seen on the upright view. Pelvic phleboliths and uterine calcifications within the pelvis. Nonspecific bowel gas pattern. Left basilar atelectasis or airspace disease.      Assessment:  OR Date 9/27/2022, exploratory laparotomy with repair of reducible incisional hernia, bilateral transverse abdominis component releases with mesh placement, and lysis of adhesions     Plan:  No heavy lifting over 20lbs for 6 weeks total postop  Diet and activity as tolerated otherwise  May restart anticoagulation   Laxatives as needed to maintain soft bowel movements per day   Follow with general surgery office in 2 weeks    Flavio White MD, FACS  10/21/2022  2:00 PM

## 2022-10-21 NOTE — TELEPHONE ENCOUNTER
Pt states she had hernia sx and now has a hematoma and  Dr Zandra Rutledge has her off xarelto and wanting to know how long she can safely remain off. Please place letter in epic and call pt to advise.

## 2022-10-21 NOTE — TELEPHONE ENCOUNTER
Notified pt she can restart the Eliquis if she is comfortable doing so. Pt stated she is going to stay off of the Eliquis through the weekend and will call us Monday.

## 2022-11-04 ENCOUNTER — OFFICE VISIT (OUTPATIENT)
Dept: SURGERY | Age: 57
End: 2022-11-04

## 2022-11-04 ENCOUNTER — TELEPHONE (OUTPATIENT)
Dept: CARDIOLOGY CLINIC | Age: 57
End: 2022-11-04

## 2022-11-04 ENCOUNTER — HOSPITAL ENCOUNTER (OUTPATIENT)
Age: 57
Discharge: HOME | End: 2022-11-04
Payer: MEDICAID

## 2022-11-04 VITALS — DIASTOLIC BLOOD PRESSURE: 84 MMHG | BODY MASS INDEX: 33.09 KG/M2 | SYSTOLIC BLOOD PRESSURE: 122 MMHG | WEIGHT: 205 LBS

## 2022-11-04 DIAGNOSIS — I50.42 CHRONIC COMBINED SYSTOLIC AND DIASTOLIC HEART FAILURE (HCC): ICD-10-CM

## 2022-11-04 DIAGNOSIS — Z09 SURGERY FOLLOW-UP: Primary | ICD-10-CM

## 2022-11-04 LAB
ANION GAP SERPL CALCULATED.3IONS-SCNC: 12 MMOL/L (ref 3–16)
BUN BLDV-MCNC: 6 MG/DL (ref 7–20)
CALCIUM SERPL-MCNC: 9.3 MG/DL (ref 8.3–10.6)
CHLORIDE BLD-SCNC: 105 MMOL/L (ref 99–110)
CO2: 27 MMOL/L (ref 21–32)
CREAT SERPL-MCNC: 0.8 MG/DL (ref 0.6–1.1)
GFR SERPL CREATININE-BSD FRML MDRD: >60 ML/MIN/{1.73_M2}
GLUCOSE BLD-MCNC: 99 MG/DL (ref 70–99)
HCT VFR BLD CALC: 32.3 % (ref 36–48)
HEMOGLOBIN: 10.7 G/DL (ref 12–16)
MCH RBC QN AUTO: 30.6 PG (ref 26–34)
MCHC RBC AUTO-ENTMCNC: 33.2 G/DL (ref 31–36)
MCV RBC AUTO: 92.2 FL (ref 80–100)
PDW BLD-RTO: 17.7 % (ref 12.4–15.4)
PLATELET # BLD: 270 K/UL (ref 135–450)
PMV BLD AUTO: 7.5 FL (ref 5–10.5)
POTASSIUM SERPL-SCNC: 3.5 MMOL/L (ref 3.5–5.1)
PRO-BNP: 195 PG/ML (ref 0–124)
RBC # BLD: 3.5 M/UL (ref 4–5.2)
SODIUM BLD-SCNC: 144 MMOL/L (ref 136–145)
WBC # BLD: 5.7 K/UL (ref 4–11)

## 2022-11-04 PROCEDURE — 36415 COLL VENOUS BLD VENIPUNCTURE: CPT

## 2022-11-04 PROCEDURE — 80048 BASIC METABOLIC PNL TOTAL CA: CPT

## 2022-11-04 PROCEDURE — 99024 POSTOP FOLLOW-UP VISIT: CPT | Performed by: SURGERY

## 2022-11-04 PROCEDURE — 83880 ASSAY OF NATRIURETIC PEPTIDE: CPT

## 2022-11-04 PROCEDURE — 85027 COMPLETE CBC AUTOMATED: CPT

## 2022-11-04 NOTE — TELEPHONE ENCOUNTER
----- Message from CATRACHITO Allen - CNS sent at 11/4/2022  4:07 PM EDT -----  Labs are great   Continue current medications

## 2022-11-04 NOTE — PROGRESS NOTES
TUBAL LIGATION      UPPER GASTROINTESTINAL ENDOSCOPY N/A 10/27/2020    EGD DIAGNOSTIC ONLY performed by Jitendra Briggs MD at [address] [place] [address] N/A 04/08/2021    EGD CONTROL HEMORRHAGE WITH APPLICATION OF 3 CLIPS TO DUODENAL MASS performed by Carey Soto MD at [address] [place] [address] N/A 04/08/2021    EGD BIOPSY DUODENAL MASS performed by Carey Soto MD at [address] [place] [address] N/A 04/08/2021    EGD SUBMUCOSAL INJECTION OF 0.6 MG OF EPINEPRINE INTO BASE OF DUODENAL MASS performed by Carey Soto MD at [address] [place] [address] N/A 03/04/2022    EGD BIOPSY performed by Jitendra Briggs MD at [address] [place] N/A 9/27/2022    OPEN EXPLORATORY LAPAROTOMY, REPAIR OF REDUCIBLE INCISIONAL HERNIA WITH MESH,  UNILATERAL  ABDOMINAL WALL COMPONENT RELEASE performed by Saundra Lombardi MD at [address] History     Socioeconomic History    Marital status: Single     Spouse name: Not on file    Number of children: 3    Years of education: Not on file    Highest education level: Not on file   Occupational History    Not on file   Tobacco Use    Smoking status: Never    Smokeless tobacco: Never   Vaping Use    Vaping Use: Never used   Substance and Sexual Activity    Alcohol use: No    Drug use: No    Sexual activity: Yes     Partners: Male   Other Topics Concern    Not on file   Social History Narrative    Not on file     Social Determinants of Health     Financial Resource Strain: Not on file   Food Insecurity: Not on file   Transportation Needs: Not on file   Physical Activity: Not on file   Stress: Not on file   Social Connections: Not on file   Intimate Partner Violence: Not on file   Housing Stability: Not on file      Family History   Problem Relation Age of Onset    High Blood Pressure Mother     Cancer Father     Heart Disease Neg Hx     High Cholesterol Neg Hx      Current Outpatient Medications   Medication Sig Dispense Refill    CEPHALEXIN PO Take by mouth      GUAIFENESIN PO Take by mouth      furosemide (LASIX) 20 MG tablet Take 2 tablets by mouth daily 60 tablet 1    spironolactone (ALDACTONE) 50 MG tablet TAKE ONE TABLET BY MOUTH DAILY 90 tablet 0    vitamin D (ERGOCALCIFEROL) 1.25 MG (28029 UT) CAPS capsule TAKE ONE CAPSULE BY MOUTH ONCE WEEKLY 12 capsule 0    sacubitril-valsartan (ENTRESTO) 49-51 MG per tablet Take 1 tablet by mouth 2 times daily 180 tablet 1    carvedilol (COREG) 6.25 MG tablet Take 1 tablet by mouth 2 times daily 180 tablet 1    potassium chloride (KLOR-CON M) 10 MEQ extended release tablet Take 1 tablet by mouth daily 90 tablet 1    ammonium lactate (LAC-HYDRIN) 12 % lotion       fluticasone (FLONASE) 50 MCG/ACT nasal spray       hydrocortisone 1 % cream       atorvastatin (LIPITOR) 40 MG tablet Take 1 tablet by mouth daily 60 tablet 2    pantoprazole (PROTONIX) 40 MG tablet Take 1 tablet by mouth every morning (before breakfast) 30 tablet 3    Multiple Vitamins-Minerals (THERAPEUTIC MULTIVITAMIN-MINERALS) tablet Take 1 tablet by mouth daily       No current facility-administered medications for this visit.       Allergies   Allergen Reactions    Latex      rash    Morphine Shortness Of Breath    Codeine      Hives      Lisinopril      cough    Nitroglycerin Hives    Sulfa Antibiotics Nausea Only    Hydralazine      headaches        Review of Systems:  Review of systems performed and negative with the exception of the above findings    OBJECTIVE:  /84   Wt 205 lb (93 kg)   BMI 33.09 kg/m²      Physical Exam:  General appearance: alert, appears stated age, cooperative, and no distress  Abdomen: soft, non-distended, non-tender, incision well healed, palpable midline nodules consistent with retained hernia sac and hematoma but no cellulitis or signs of recurrence    Admission on 10/19/2022, Discharged on 10/19/2022   Component Date Value Ref Range Status    WBC 10/19/2022 6.3  4.0 - 11.0 K/uL Final    RBC 10/19/2022 3.44 (A)  4.00 - 5.20 M/uL Final    Hemoglobin 10/19/2022 10.4 (A)  12.0 - 16.0 g/dL Final    Hematocrit 10/19/2022 31.3 (A)  36.0 - 48.0 % Final    MCV 10/19/2022 91.0  80.0 - 100.0 fL Final    MCH 10/19/2022 30.1  26.0 - 34.0 pg Final    MCHC 10/19/2022 33.1  31.0 - 36.0 g/dL Final    RDW 10/19/2022 17.5 (A)  12.4 - 15.4 % Final    Platelets 13/12/2795 443  135 - 450 K/uL Final    MPV 10/19/2022 7.0  5.0 - 10.5 fL Final    Neutrophils % 10/19/2022 68.8  % Final    Lymphocytes % 10/19/2022 16.5  % Final    Monocytes % 10/19/2022 7.0  % Final    Eosinophils % 10/19/2022 6.6  % Final    Basophils % 10/19/2022 1.1  % Final    Neutrophils Absolute 10/19/2022 4.3  1.7 - 7.7 K/uL Final    Lymphocytes Absolute 10/19/2022 1.0  1.0 - 5.1 K/uL Final    Monocytes Absolute 10/19/2022 0.4  0.0 - 1.3 K/uL Final    Eosinophils Absolute 10/19/2022 0.4  0.0 - 0.6 K/uL Final    Basophils Absolute 10/19/2022 0.1  0.0 - 0.2 K/uL Final    Sodium 10/19/2022 140  136 - 145 mmol/L Final    Potassium reflex Magnesium 10/19/2022 3.1 (A)  3.5 - 5.1 mmol/L Final    Chloride 10/19/2022 103  99 - 110 mmol/L Final    CO2 10/19/2022 26  21 - 32 mmol/L Final    Anion Gap 10/19/2022 11  3 - 16 Final    Glucose 10/19/2022 106 (A)  70 - 99 mg/dL Final    BUN 10/19/2022 6 (A)  7 - 20 mg/dL Final    Creatinine 10/19/2022 0.7  0.6 - 1.1 mg/dL Final    Est, Glom Filt Rate 10/19/2022 >60  >60 Final    Comment: Pediatric calculator link  [employer].at. org/professionals/kdoqi/gfr_calculatorped  Effective Oct 3, 2022  These results are not intended for use in patients  <25years of age. eGFR results are calculated without  a race factor using the 2021 CKD-EPI equation. Careful  clinical correlation is recommended, particularly when  comparing to results calculated using previous equations.   The CKD-EPI equation is less accurate in patients with  extremes of muscle mass, extra-renal metabolism of  creatinine, excessive creatinine ingestion, or following  therapy that affects renal tubular secretion. Calcium 10/19/2022 9.6  8.3 - 10.6 mg/dL Final    Total Protein 10/19/2022 8.0  6.4 - 8.2 g/dL Final    Albumin 10/19/2022 4.4  3.4 - 5.0 g/dL Final    Albumin/Globulin Ratio 10/19/2022 1.2  1.1 - 2.2 Final    Total Bilirubin 10/19/2022 0.9  0.0 - 1.0 mg/dL Final    Alkaline Phosphatase 10/19/2022 80  40 - 129 U/L Final    ALT 10/19/2022 16  10 - 40 U/L Final    AST 10/19/2022 19  15 - 37 U/L Final    Protime 10/19/2022 18.2 (A)  11.7 - 14.5 sec Final    Comment: Effective 5-25-22 09:00am EST  Please note reference ranges have  changed for PT and INR Testing. INR 10/19/2022 1.51 (A)  0.87 - 1.14 Final    Comment: Effective 5/25/22 at 09:00am EST    Normal: 0.87 - 1.14  Therapeutic: 2.0 - 3.0  Pros.  Valve: 2.5 - 3.5  AMI: 2.0 - 3.0      Magnesium 10/19/2022 1.80  1.80 - 2.40 mg/dL Final   Hospital Outpatient Visit on 10/12/2022   Component Date Value Ref Range Status    WBC 10/12/2022 8.0  4.0 - 11.0 K/uL Final    RBC 10/12/2022 2.96 (A)  4.00 - 5.20 M/uL Final    Hemoglobin 10/12/2022 9.3 (A)  12.0 - 16.0 g/dL Final    Hematocrit 10/12/2022 27.3 (A)  36.0 - 48.0 % Final    MCV 10/12/2022 92.5  80.0 - 100.0 fL Final    MCH 10/12/2022 31.3  26.0 - 34.0 pg Final    MCHC 10/12/2022 33.9  31.0 - 36.0 g/dL Final    RDW 10/12/2022 17.4 (A)  12.4 - 15.4 % Final    Platelets 13/89/8669 540 (A)  135 - 450 K/uL Final    MPV 10/12/2022 6.6  5.0 - 10.5 fL Final    Sodium 10/12/2022 142  136 - 145 mmol/L Final    Potassium 10/12/2022 3.4 (A)  3.5 - 5.1 mmol/L Final    Chloride 10/12/2022 105  99 - 110 mmol/L Final    CO2 10/12/2022 23  21 - 32 mmol/L Final    Anion Gap 10/12/2022 14  3 - 16 Final    Glucose 10/12/2022 97  70 - 99 mg/dL Final    BUN 10/12/2022 4 (A)  7 - 20 mg/dL Final    Creatinine 10/12/2022 0.8  0.6 - 1.1 mg/dL Final GFR Non- 10/12/2022 >60  >60 Final    Comment: >60 mL/min/1.73m2 EGFR, calc. for ages 25 and older using the  MDRD formula (not corrected for weight), is valid for stable  renal function. GFR  10/12/2022 >60  >60 Final    Comment: Chronic Kidney Disease: less than 60 ml/min/1.73 sq.m. Kidney Failure: less than 15 ml/min/1.73 sq.m. Results valid for patients 18 years and older. Calcium 10/12/2022 9.3  8.3 - 10.6 mg/dL Final    Pro-BNP 10/12/2022 596 (A)  0 - 124 pg/mL Final    Comment: Methodology by NT-proBNP    An age-independent cutoff point of 300 pg/ml has a 98%  negative predictive value excluding acute heart failure. Values exceeding the age-related cutoff values (450 pg/mL if  age<50, 900 if 50-75 and 1800 if >75) has 90% sensitivity and  84% specificity for diagnosing acute HF. In patients with  renal compromise (eGFR<60) values greater than 1200pg/ml have  a diagnostic sensitivity and specificity of 89% and 72% for  acute HF.      Admission on 10/08/2022, Discharged on 10/08/2022   Component Date Value Ref Range Status    Ventricular Rate 10/08/2022 80  BPM Final    Atrial Rate 10/08/2022 80  BPM Final    P-R Interval 10/08/2022 98  ms Final    QRS Duration 10/08/2022 148  ms Final    Q-T Interval 10/08/2022 446  ms Final    QTc Calculation (Bazett) 10/08/2022 514  ms Final    P Axis 10/08/2022 90  degrees Final    R Axis 10/08/2022 -84  degrees Final    T Axis 10/08/2022 74  degrees Final    Diagnosis 10/08/2022 AV sequential or dual chamber electronic pacemakerConfirmed by Halima Sterling ([id number]) on 10/10/2022 11:01:26 AM   Final    WBC 10/08/2022 12.6 (A)  4.0 - 11.0 K/uL Final    RBC 10/08/2022 2.77 (A)  4.00 - 5.20 M/uL Final    Hemoglobin 10/08/2022 8.2 (A)  12.0 - 16.0 g/dL Final    Hematocrit 10/08/2022 25.3 (A)  36.0 - 48.0 % Final    MCV 10/08/2022 91.4  80.0 - 100.0 fL Final    MCH 10/08/2022 29.7  26.0 - 34.0 pg Final    MCHC 10/08/2022 32.4 31.0 - 36.0 g/dL Final    RDW 10/08/2022 17.0 (A)  12.4 - 15.4 % Final    Platelets 56/26/4349 471 (A)  135 - 450 K/uL Final    MPV 10/08/2022 6.8  5.0 - 10.5 fL Final    PLATELET SLIDE REVIEW 10/08/2022 Increased   Final    SLIDE REVIEW 10/08/2022 see below   Final    Slide review agrees with reported results    Neutrophils % 10/08/2022 79.0  % Final    Lymphocytes % 10/08/2022 9.0  % Final    Monocytes % 10/08/2022 5.0  % Final    Eosinophils % 10/08/2022 3.0  % Final    Basophils % 10/08/2022 1.0  % Final    Neutrophils Absolute 10/08/2022 10.2 (A)  1.7 - 7.7 K/uL Final    Lymphocytes Absolute 10/08/2022 1.3  1.0 - 5.1 K/uL Final    Monocytes Absolute 10/08/2022 0.6  0.0 - 1.3 K/uL Final    Eosinophils Absolute 10/08/2022 0.4  0.0 - 0.6 K/uL Final    Basophils Absolute 10/08/2022 0.1  0.0 - 0.2 K/uL Final    Atypical Lymphocytes Relative 10/08/2022 1  0 - 6 % Final    Myelocyte Percent 10/08/2022 2 (A)  % Final    Anisocytosis 10/08/2022 1+ (A)   Final    Polychromasia 10/08/2022 1+ (A)   Final    Sodium 10/08/2022 140  136 - 145 mmol/L Final    Potassium reflex Magnesium 10/08/2022 3.8  3.5 - 5.1 mmol/L Final    Chloride 10/08/2022 106  99 - 110 mmol/L Final    CO2 10/08/2022 21  21 - 32 mmol/L Final    Anion Gap 10/08/2022 13  3 - 16 Final    Glucose 10/08/2022 105 (A)  70 - 99 mg/dL Final    BUN 10/08/2022 5 (A)  7 - 20 mg/dL Final    Creatinine 10/08/2022 0.6  0.6 - 1.1 mg/dL Final    GFR Non- 10/08/2022 >60  >60 Final    Comment: >60 mL/min/1.73m2 EGFR, calc. for ages 25 and older using the  MDRD formula (not corrected for weight), is valid for stable  renal function. GFR  10/08/2022 >60  >60 Final    Comment: Chronic Kidney Disease: less than 60 ml/min/1.73 sq.m. Kidney Failure: less than 15 ml/min/1.73 sq.m. Results valid for patients 18 years and older.       Calcium 10/08/2022 9.2  8.3 - 10.6 mg/dL Final    Total Protein 10/08/2022 7.0  6.4 - 8.2 g/dL Final Albumin 10/08/2022 3.5  3.4 - 5.0 g/dL Final    Albumin/Globulin Ratio 10/08/2022 1.0 (A)  1.1 - 2.2 Final    Total Bilirubin 10/08/2022 0.7  0.0 - 1.0 mg/dL Final    Alkaline Phosphatase 10/08/2022 78  40 - 129 U/L Final    ALT 10/08/2022 11  10 - 40 U/L Final    AST 10/08/2022 28  15 - 37 U/L Final    Comment: Specimen hemolysis has exceeded the interference as defined by Roche. Value may be falsely increased. Suggest recollection if clinically  indicated. Troponin 10/08/2022 <0.01  <0.01 ng/mL Final    Methodology by Troponin T    Pro-BNP 10/08/2022 1,191 (A)  0 - 124 pg/mL Final    Comment: Methodology by NT-proBNP    An age-independent cutoff point of 300 pg/ml has a 98%  negative predictive value excluding acute heart failure. Values exceeding the age-related cutoff values (450 pg/mL if  age<50, 900 if 50-75 and 1800 if >75) has 90% sensitivity and  84% specificity for diagnosing acute HF. In patients with  renal compromise (eGFR<60) values greater than 1200pg/ml have  a diagnostic sensitivity and specificity of 89% and 72% for  acute HF. No results displayed because visit has over 200 results. No results displayed because visit has over 200 results. CT ABDOMEN PELVIS W IV CONTRAST Additional Contrast? None    Result Date: 10/19/2022  EXAMINATION: CT OF THE ABDOMEN AND PELVIS WITH CONTRAST 10/19/2022 6:24 pm TECHNIQUE: CT of the abdomen and pelvis was performed with the administration of intravenous contrast. Multiplanar reformatted images are provided for review. Automated exposure control, iterative reconstruction, and/or weight based adjustment of the mA/kV was utilized to reduce the radiation dose to as low as reasonably achievable.  COMPARISON: CT abdomen and pelvis 10/01/2022 and 10/04/2022 HISTORY: ORDERING SYSTEM PROVIDED HISTORY: abdominal incision bleeding/pain Decision Support Exception - unselect if not a suspected or confirmed emergency medical condition->Emergency Medical Condition (MA) Reason for Exam: Abdominal incision bleeding/pain. Post-op Problem (Pt states surgery on 9/27/22, home nurse told her to come in to have site checked for bleeding). FINDINGS: Lower Chest: Minimal bibasilar subsegmental atelectasis. Small hiatal hernia. Cardiomegaly. Organs: Normal attenuation pattern throughout the liver. No discrete hepatic lesion. Mild central intrahepatic bile duct dilatation is seen. Common bile duct measures 5 mm. The gallbladder is absent status post cholecystectomy. The kidneys, spleen, adrenal glands and pancreas appear unremarkable. GI/Bowel: Small hiatal hernia. The stomach and small bowel appear unremarkable. No diffuse or focal small bowel wall thickening or inflammatory changes evident. No obstruction is seen. The appendix is visualized right lower quadrant, unremarkable in appearance. The colon appears unremarkable. Pelvis: Poly lobular configuration of uterine body and fundus with popcorn type calcification evident with mass upper left fundus 3.2 x 5.4 cm and noncalcified 5.5 cm mass mid and lower uterine segment right, almost certainly reflecting fibroids. Adnexal regions and urinary bladder appear unremarkable. No ascites or pneumoperitoneum. Peritoneum/Retroperitoneum: Unremarkable appearance of the aorta. No aneurysm. Unremarkable appearance of the IVC. No adenopathy or fluid. No pneumoperitoneum. Bones/Soft Tissues: A very large ventral abdominal hematoma at the muscular fascia deep to the rectus abdominus musculature is 7 x 20 x 20 cm, compared to my measurement on prior study of 4.8 x 15 x 17 cm. Additional component of the ventral abdominal wall hematoma extends inferior deep to the right rectus abdominus muscle anterior pelvis measuring 3 x 5 x 4 cm (axial image 137), on prior study is 2.7 x 5 x 5 cm my measurement. A more superficial component in the subcuticular fat measuring 5 x 16 x 5.5 cm.  Right quadriceps muscle belly fatty lesion 3.4 x 5.4 cm image 198 typical of intramuscular lipoma (benign finding requiring no additional evaluation or follow-up). No acute superficial soft tissue or osseous structure abnormality evident. 1. Mild increasing size of midline large, mixed attenuation deep soft tissue abdominopelvic hematoma. 2. The more superficial component measures similarly with decreased attenuation keeping with some contraction of hematoma. Another deeper component anterior right pelvis also measures slightly smaller. 3. Minimal bibasilar subsegmental atelectasis. 4. Small hiatal hernia. 5. Cardiomegaly. 6. Leiomyomatous appearance of the uterus. Results were called by Dr. Fredy Haas to [place] [address] on 10/19/2022 at 19:06. XR CHEST PORTABLE    Result Date: 10/8/2022  EXAMINATION: ONE XRAY VIEW OF THE CHEST 10/8/2022 11:47 am COMPARISON: 10/01/2022 and 10/04/2022. HISTORY: ORDERING SYSTEM PROVIDED HISTORY: pedal edema - CHF? TECHNOLOGIST PROVIDED HISTORY: Reason for exam:->pedal edema - CHF? Reason for Exam: pedal edema, CHF FINDINGS: Mild right basal opacity is unchanged. There is no new airspace consolidation or effusion. There is borderline cardiomegaly. There is no vascular congestion or interstitial prominence. Borderline cardiomegaly without evidence of CHF. Stable mild right basal opacity which could reflect atelectasis or pneumonia. Assessment:  OR Date 9/27/2022, exploratory laparotomy with repair of reducible incisional hernia, bilateral transverse abdominis component releases with mesh placement, and lysis of adhesions      Plan:  Increase activity as tolerated with caution  No further wound care necessary, does not need antibiotics  Diet as tolerated, encourage protein intake, nutrition critical for wound healing  Follow with general surgery office in 3 months for routine follow up or sooner if needed    [address] Vito Barragan MD, FACS  11/4/2022  12:26 PM

## 2022-11-04 NOTE — TELEPHONE ENCOUNTER
Tried to reach patient [facility] about her normal lab results. Asked patient to call with any questions or concerns.

## 2022-11-04 NOTE — PATIENT INSTRUCTIONS
Increase activity as tolerated with caution  No further wound care necessary, does not need antibiotics  Diet as tolerated, encourage protein intake, nutrition critical for wound healing  Follow with general surgery office in 3 months for routine follow up or sooner if needed

## 2022-11-09 ENCOUNTER — TELEPHONE (OUTPATIENT)
Dept: SURGERY | Age: 57
End: 2022-11-09

## 2022-11-10 RX ORDER — POTASSIUM CHLORIDE 750 MG/1
TABLET, EXTENDED RELEASE ORAL
Qty: 90 TABLET | Refills: 1 | Status: SHIPPED | OUTPATIENT
Start: 2022-11-10 | End: 2022-12-20

## 2022-11-10 NOTE — TELEPHONE ENCOUNTER
Prescription refill    Last OV:10/12/2022    Last Refill:05/11/2022    Labs:11/04/2022    Future Appt: 12/27/2022

## 2022-11-16 RX ORDER — ERGOCALCIFEROL 1.25 MG/1
CAPSULE ORAL
Qty: 12 CAPSULE | Refills: 0 | OUTPATIENT
Start: 2022-11-16

## 2022-11-16 RX ORDER — MELATONIN
1000 DAILY
Qty: 90 TABLET | Refills: 1 | Status: SHIPPED | OUTPATIENT
Start: 2022-11-16

## 2022-11-16 NOTE — TELEPHONE ENCOUNTER
Prescription refill    Last OV:10/12/2022    Last Refill:08/26/2022    Labs:11/04/2022    Future Appt: 12/27/2022

## 2022-11-16 NOTE — TELEPHONE ENCOUNTER
Let her know that her vitamin d level is better and she can take vitamin d 1000 iu daily   I sent script to pharmacy

## 2022-11-29 ENCOUNTER — NURSE ONLY (OUTPATIENT)
Dept: CARDIOLOGY CLINIC | Age: 57
End: 2022-11-29
Payer: MEDICAID

## 2022-11-29 DIAGNOSIS — I50.22 SYSTOLIC CHF, CHRONIC (HCC): ICD-10-CM

## 2022-11-29 DIAGNOSIS — I42.0 DILATED CARDIOMYOPATHY (HCC): ICD-10-CM

## 2022-11-29 DIAGNOSIS — Z95.810 AICD (AUTOMATIC CARDIOVERTER/DEFIBRILLATOR) PRESENT: Primary | ICD-10-CM

## 2022-11-29 PROCEDURE — 93295 DEV INTERROG REMOTE 1/2/MLT: CPT | Performed by: INTERNAL MEDICINE

## 2022-11-29 PROCEDURE — 93297 REM INTERROG DEV EVAL ICPMS: CPT | Performed by: CLINICAL NURSE SPECIALIST

## 2022-11-29 PROCEDURE — 93296 REM INTERROG EVL PM/IDS: CPT | Performed by: INTERNAL MEDICINE

## 2022-11-29 NOTE — PROGRESS NOTES
Remote transmission received from patient's CRT-D home monitor. Transmission shows normal sensing and pacing function. NSVT noted (Coreg). <0.1% AT/ AF burden, longest 9 minutes with 0% unsuccessfully pace terminated (coreg, \"may restart anticoagulation\" per Dr. Motta on 10/21/22)    AP 86.1%, CRT 99.2%, Effective 98.9%, VSRp 0.3%    Optivol is within normal range. TriageHF Heart Failure Risk Status on 29-Nov-2022 is High    EP/ NP will review. See interrogation under cardiology tab in the [address] [place] [address] [address] field for more details. Will continue to monitor remotely.     (End of 91-day monitoring period 11/29/22)

## 2022-12-12 ENCOUNTER — OFFICE VISIT (OUTPATIENT)
Dept: SURGERY | Age: 57
End: 2022-12-12

## 2022-12-12 VITALS — SYSTOLIC BLOOD PRESSURE: 130 MMHG | WEIGHT: 205 LBS | BODY MASS INDEX: 33.09 KG/M2 | DIASTOLIC BLOOD PRESSURE: 80 MMHG

## 2022-12-12 DIAGNOSIS — Z09 SURGERY FOLLOW-UP: Primary | ICD-10-CM

## 2022-12-12 PROCEDURE — 99024 POSTOP FOLLOW-UP VISIT: CPT | Performed by: SURGERY

## 2022-12-12 RX ORDER — FUROSEMIDE 20 MG/1
TABLET ORAL
Qty: 60 TABLET | Refills: 0 | Status: SHIPPED | OUTPATIENT
Start: 2022-12-12 | End: 2022-12-27

## 2022-12-12 NOTE — PATIENT INSTRUCTIONS
No restrictions with activity.  Monitor for discomfort  Diet as tolerated, encourage protein intake for wound healing  Monitor midline lump that is likely benign retained hernia sac or absorbing hematoma, neither need intervention in near future if ever  Follow with general surgery office in 3 months for routine followup or sooner if needed

## 2022-12-12 NOTE — PROGRESS NOTES
[address] and Laparoscopic Surgery  SUBJECTIVE:    Sofi Damon   1965   61 y.o. female presents for routine postoperative followup after OR Date 9/27/2022, exploratory laparotomy with repair of reducible incisional hernia, bilateral transverse abdominis component releases with mesh placement, and lysis of adhesions. Shortly after discharge from the surgery she was re-admitted after coughing and feeling severe increase in pain. Found to have rectus sheath hematoma and observed. Prior drainage from incision has stopped. Tolerating diet. Bowels normalized. Ambulating with improvement.  Feels well and without major complaints    Past Medical History:   Diagnosis Date    Anemia     Atrial fibrillation and flutter (HCC)     Atrial flutter (HCC)     CHB (complete heart block) (HCC)     Class 1 obesity without serious comorbidity with body mass index (BMI) of 33.0 to 33.9 in adult 09/06/2019    Congenital heart disease     Difficult intravenous access 10/06/2022    SEE NOTE    GERD (gastroesophageal reflux disease)     Headache(784.0)     History of complete heart block     Hyperlipidemia     Hypertension     PONV (postoperative nausea and vomiting)     Prolonged emergence from general anesthesia     sensitive to meds, slow to wake    Sleep apnea     uses CPAP    Syncope     Systolic CHF, chronic ([facility] [address] 10/03/2018     Past Surgical History:   Procedure Laterality Date    CARDIAC DEFIBRILLATOR PLACEMENT  01/2021    Medtronic    CARDIOVERSION  01/28/2022    CARDIOVERSION  02/22/2022    COLECTOMY N/A 04/13/2021    EXPLORATORY LAPAROTOMY, RESECTION OF DUODENAL MASS, CHOLECYSTECTOMY WITH INTRAOPERATIVE CHOLANGIOGRAM performed by Noe Kwong MD at [place] [address] N/A 10/27/2020    COLONOSCOPY POLYPECTOMY SNARE/COLD BIOPSY performed by America Hilario MD at [address] [place] [address]  11/29/2012    dual chamber PPM, Medtronic    TUBAL LIGATION      UPPER GASTROINTESTINAL ENDOSCOPY N/A 10/27/2020    EGD DIAGNOSTIC ONLY performed by Radha Pan MD at [address] [place] [address] N/A 04/08/2021    EGD CONTROL HEMORRHAGE WITH APPLICATION OF 3 CLIPS TO DUODENAL MASS performed by Amanda Malin MD at [address] [place] [address] N/A 04/08/2021    EGD BIOPSY DUODENAL MASS performed by Amanda Malin MD at [address] [place] [address] N/A 04/08/2021    EGD SUBMUCOSAL INJECTION OF 0.6 MG OF EPINEPRINE INTO BASE OF DUODENAL MASS performed by Amanda Malin MD at [address] [place] [address] N/A 03/04/2022    EGD BIOPSY performed by Radha Pan MD at [address] [address] [place] N/A 9/27/2022    OPEN EXPLORATORY LAPAROTOMY, REPAIR OF REDUCIBLE INCISIONAL HERNIA WITH MESH,  UNILATERAL  ABDOMINAL WALL COMPONENT RELEASE performed by Abdirahman Charles MD at [address] [facility]     Socioeconomic History    Marital status: Single     Spouse name: Not on file    Number of children: 3    Years of education: Not on file    Highest education level: Not on file   Occupational History    Not on file   Tobacco Use    Smoking status: Never    Smokeless tobacco: Never   Vaping Use    Vaping Use: Never used   Substance and Sexual Activity    Alcohol use: No    Drug use: No    Sexual activity: Yes     Partners: Male   Other Topics Concern    Not on file   Social History Narrative    Not on file     Social Determinants of Health     Financial Resource Strain: Not on file   Food Insecurity: Not on file   Transportation Needs: Not on file   Physical Activity: Not on file   Stress: Not on file   Social Connections: Not on file   Intimate Partner Violence: Not on file   Housing Stability: Not on file      Family History   Problem Relation Age of Onset    High Blood Pressure Mother     Cancer Father     Heart Disease Neg Hx     High Cholesterol Neg Hx Current Outpatient Medications   Medication Sig Dispense Refill    vitamin D3 (CHOLECALCIFEROL) 25 MCG (1000 UT) TABS tablet Take 1 tablet by mouth daily 90 tablet 1    potassium chloride (KLOR-CON M) 10 MEQ extended release tablet TAKE ONE TABLET BY MOUTH DAILY 90 tablet 1    CEPHALEXIN PO Take by mouth      GUAIFENESIN PO Take by mouth      spironolactone (ALDACTONE) 50 MG tablet TAKE ONE TABLET BY MOUTH DAILY 90 tablet 0    vitamin D (ERGOCALCIFEROL) 1.25 MG (39151 UT) CAPS capsule TAKE ONE CAPSULE BY MOUTH ONCE WEEKLY 12 capsule 0    sacubitril-valsartan (ENTRESTO) 49-51 MG per tablet Take 1 tablet by mouth 2 times daily 180 tablet 1    carvedilol (COREG) 6.25 MG tablet Take 1 tablet by mouth 2 times daily 180 tablet 1    ammonium lactate (LAC-HYDRIN) 12 % lotion       fluticasone (FLONASE) 50 MCG/ACT nasal spray       hydrocortisone 1 % cream       atorvastatin (LIPITOR) 40 MG tablet Take 1 tablet by mouth daily 60 tablet 2    pantoprazole (PROTONIX) 40 MG tablet Take 1 tablet by mouth every morning (before breakfast) 30 tablet 3    Multiple Vitamins-Minerals (THERAPEUTIC MULTIVITAMIN-MINERALS) tablet Take 1 tablet by mouth daily      furosemide (LASIX) 20 MG tablet Take 2 tablets by mouth daily 60 tablet 1     No current facility-administered medications for this visit.       Allergies   Allergen Reactions    Latex      rash    Morphine Shortness Of Breath    Codeine      Hives      Lisinopril      cough    Nitroglycerin Hives    Sulfa Antibiotics Nausea Only    Hydralazine      headaches        Review of Systems:  Review of systems performed and negative with the exception of the above findings    OBJECTIVE:  /80   Wt 205 lb (93 kg)   BMI 33.09 kg/m²      Physical Exam:  General appearance: alert, appears stated age, cooperative, and no distress  Head: Normocephalic, without obvious abnormality, atraumatic  Lungs: clear to auscultation bilaterally  Heart: regular rate and rhythm, S1, S2 normal, no murmur, click, rub or gallop  Abdomen: soft, non-distended, non-tender, midline incision well healed, incision healing well, palpable hardness under incision is likely retained hernia sac, hematoma but no signs of infection or recurrence    Hospital Outpatient Visit on 11/04/2022   Component Date Value Ref Range Status    Sodium 11/04/2022 144  136 - 145 mmol/L Final    Potassium 11/04/2022 3.5  3.5 - 5.1 mmol/L Final    Chloride 11/04/2022 105  99 - 110 mmol/L Final    CO2 11/04/2022 27  21 - 32 mmol/L Final    Anion Gap 11/04/2022 12  3 - 16 Final    Glucose 11/04/2022 99  70 - 99 mg/dL Final    BUN 11/04/2022 6 (A)  7 - 20 mg/dL Final    Creatinine 11/04/2022 0.8  0.6 - 1.1 mg/dL Final    Est, Glom Filt Rate 11/04/2022 >60  >60 Final    Comment: Pediatric calculator link  Marco.at. org/professionals/kdoqi/gfr_calculatorped  Effective Oct 3, 2022  These results are not intended for use in patients  <25years of age. eGFR results are calculated without  a race factor using the 2021 CKD-EPI equation. Careful  clinical correlation is recommended, particularly when  comparing to results calculated using previous equations. The CKD-EPI equation is less accurate in patients with  extremes of muscle mass, extra-renal metabolism of  creatinine, excessive creatinine ingestion, or following  therapy that affects renal tubular secretion.       Calcium 11/04/2022 9.3  8.3 - 10.6 mg/dL Final    WBC 11/04/2022 5.7  4.0 - 11.0 K/uL Final    RBC 11/04/2022 3.50 (A)  4.00 - 5.20 M/uL Final    Hemoglobin 11/04/2022 10.7 (A)  12.0 - 16.0 g/dL Final    Hematocrit 11/04/2022 32.3 (A)  36.0 - 48.0 % Final    MCV 11/04/2022 92.2  80.0 - 100.0 fL Final    MCH 11/04/2022 30.6  26.0 - 34.0 pg Final    MCHC 11/04/2022 33.2  31.0 - 36.0 g/dL Final    RDW 11/04/2022 17.7 (A)  12.4 - 15.4 % Final    Platelets 42/07/9284 270  135 - 450 K/uL Final    MPV 11/04/2022 7.5  5.0 - 10.5 fL Final    Pro-BNP 11/04/2022 195 (A)  0 - 124 pg/mL Final    Comment: Methodology by NT-proBNP    An age-independent cutoff point of 300 pg/ml has a 98%  negative predictive value excluding acute heart failure. Values exceeding the age-related cutoff values (450 pg/mL if  age<50, 900 if 50-75 and 1800 if >75) has 90% sensitivity and  84% specificity for diagnosing acute HF. In patients with  renal compromise (eGFR<60) values greater than 1200pg/ml have  a diagnostic sensitivity and specificity of 89% and 72% for  acute HF. Assessment:  OR Date 9/27/2022, exploratory laparotomy with repair of reducible incisional hernia, bilateral transverse abdominis component releases with mesh placement, and lysis of adhesions      Plan:  No restrictions with activity. Monitor for discomfort  Diet as tolerated, encourage protein intake for wound healing  Monitor midline lump that is likely benign retained hernia sac or absorbing hematoma, neither need intervention in near future if ever  Follow with general surgery office in 3 months for routine followup or sooner if needed    Flavio Rutledge MD, FACS  12/12/2022  11:31 AM

## 2022-12-12 NOTE — TELEPHONE ENCOUNTER
Prescription refill    Last OV:10/12/2022    Last Refill:10/12/2022    Labs:11/04/2022    Future Appt: 12/27/2022

## 2022-12-16 ENCOUNTER — TELEPHONE (OUTPATIENT)
Dept: CARDIOLOGY CLINIC | Age: 57
End: 2022-12-16

## 2022-12-16 DIAGNOSIS — I50.42 CHRONIC COMBINED SYSTOLIC AND DIASTOLIC HEART FAILURE (HCC): Primary | ICD-10-CM

## 2022-12-16 NOTE — TELEPHONE ENCOUNTER
Pt called in stating that she thinks he water pill is messing with her kidneys. Pt states that her back has been hurting and she has been gong to the bathroom more often than normal.    Pt is requesting that lab orders be put in along with a complete Urinalysis.  Pt states she will come In Monday morning to have labs drawn      Please call pt once they have been put in  Pt an be reached at [phone]

## 2022-12-19 ENCOUNTER — TELEPHONE (OUTPATIENT)
Dept: CARDIOLOGY CLINIC | Age: 57
End: 2022-12-19

## 2022-12-19 ENCOUNTER — HOSPITAL ENCOUNTER (OUTPATIENT)
Age: 57
Discharge: HOME | End: 2022-12-19
Payer: MEDICAID

## 2022-12-19 DIAGNOSIS — I50.42 CHRONIC COMBINED SYSTOLIC AND DIASTOLIC HEART FAILURE (HCC): ICD-10-CM

## 2022-12-19 LAB
ANION GAP SERPL CALCULATED.3IONS-SCNC: 12 MMOL/L (ref 3–16)
BILIRUBIN URINE: NEGATIVE
BLOOD, URINE: NEGATIVE
BUN BLDV-MCNC: 8 MG/DL (ref 7–20)
CALCIUM SERPL-MCNC: 9.4 MG/DL (ref 8.3–10.6)
CHLORIDE BLD-SCNC: 103 MMOL/L (ref 99–110)
CLARITY: CLEAR
CO2: 26 MMOL/L (ref 21–32)
COLOR: YELLOW
CREAT SERPL-MCNC: 0.8 MG/DL (ref 0.6–1.1)
GFR SERPL CREATININE-BSD FRML MDRD: >60 ML/MIN/{1.73_M2}
GLUCOSE BLD-MCNC: 105 MG/DL (ref 70–99)
GLUCOSE URINE: NEGATIVE MG/DL
KETONES, URINE: NEGATIVE MG/DL
LEUKOCYTE ESTERASE, URINE: NEGATIVE
MICROSCOPIC EXAMINATION: NORMAL
NITRITE, URINE: NEGATIVE
PH UA: 7 (ref 5–8)
POTASSIUM SERPL-SCNC: 3 MMOL/L (ref 3.5–5.1)
PRO-BNP: 421 PG/ML (ref 0–124)
PROTEIN UA: NEGATIVE MG/DL
SODIUM BLD-SCNC: 141 MMOL/L (ref 136–145)
SPECIFIC GRAVITY UA: 1.01 (ref 1–1.03)
URINE REFLEX TO CULTURE: NORMAL
URINE TYPE: NORMAL
UROBILINOGEN, URINE: 0.2 E.U./DL

## 2022-12-19 PROCEDURE — 81003 URINALYSIS AUTO W/O SCOPE: CPT

## 2022-12-19 PROCEDURE — 36415 COLL VENOUS BLD VENIPUNCTURE: CPT

## 2022-12-19 PROCEDURE — 80048 BASIC METABOLIC PNL TOTAL CA: CPT

## 2022-12-19 PROCEDURE — 83880 ASSAY OF NATRIURETIC PEPTIDE: CPT

## 2022-12-19 NOTE — TELEPHONE ENCOUNTER
----- Message from CATRACHITO Rausch - CNP sent at 12/19/2022  3:43 PM EST -----  Urinalysis nl, still waiting for other lab results.  Sue Kelly

## 2022-12-20 ENCOUNTER — TELEPHONE (OUTPATIENT)
Dept: CARDIOLOGY CLINIC | Age: 57
End: 2022-12-20

## 2022-12-20 DIAGNOSIS — I50.42 CHRONIC COMBINED SYSTOLIC AND DIASTOLIC HEART FAILURE (HCC): Primary | ICD-10-CM

## 2022-12-20 RX ORDER — POTASSIUM CHLORIDE 20 MEQ/1
20 TABLET, EXTENDED RELEASE ORAL DAILY
Qty: 90 TABLET | Refills: 1 | Status: SHIPPED | OUTPATIENT
Start: 2022-12-20

## 2022-12-27 ENCOUNTER — OFFICE VISIT (OUTPATIENT)
Dept: CARDIOLOGY CLINIC | Age: 57
End: 2022-12-27
Payer: MEDICAID

## 2022-12-27 VITALS
OXYGEN SATURATION: 98 % | DIASTOLIC BLOOD PRESSURE: 78 MMHG | HEART RATE: 79 BPM | SYSTOLIC BLOOD PRESSURE: 122 MMHG | BODY MASS INDEX: 32.47 KG/M2 | WEIGHT: 202 LBS | HEIGHT: 66 IN

## 2022-12-27 DIAGNOSIS — E55.9 HYPOVITAMINOSIS D: ICD-10-CM

## 2022-12-27 DIAGNOSIS — Z95.810 ICD (IMPLANTABLE CARDIOVERTER-DEFIBRILLATOR), BIVENTRICULAR, IN SITU: ICD-10-CM

## 2022-12-27 DIAGNOSIS — G47.33 OSA (OBSTRUCTIVE SLEEP APNEA): ICD-10-CM

## 2022-12-27 DIAGNOSIS — I48.0 PAROXYSMAL ATRIAL FIBRILLATION (HCC): ICD-10-CM

## 2022-12-27 DIAGNOSIS — I50.42 CHRONIC COMBINED SYSTOLIC AND DIASTOLIC HEART FAILURE (HCC): Primary | ICD-10-CM

## 2022-12-27 PROCEDURE — 3078F DIAST BP <80 MM HG: CPT | Performed by: CLINICAL NURSE SPECIALIST

## 2022-12-27 PROCEDURE — 3074F SYST BP LT 130 MM HG: CPT | Performed by: CLINICAL NURSE SPECIALIST

## 2022-12-27 PROCEDURE — 1036F TOBACCO NON-USER: CPT | Performed by: CLINICAL NURSE SPECIALIST

## 2022-12-27 PROCEDURE — G8427 DOCREV CUR MEDS BY ELIG CLIN: HCPCS | Performed by: CLINICAL NURSE SPECIALIST

## 2022-12-27 PROCEDURE — G8417 CALC BMI ABV UP PARAM F/U: HCPCS | Performed by: CLINICAL NURSE SPECIALIST

## 2022-12-27 PROCEDURE — 99214 OFFICE O/P EST MOD 30 MIN: CPT | Performed by: CLINICAL NURSE SPECIALIST

## 2022-12-27 PROCEDURE — G8484 FLU IMMUNIZE NO ADMIN: HCPCS | Performed by: CLINICAL NURSE SPECIALIST

## 2022-12-27 PROCEDURE — 3017F COLORECTAL CA SCREEN DOC REV: CPT | Performed by: CLINICAL NURSE SPECIALIST

## 2022-12-27 RX ORDER — FUROSEMIDE 20 MG/1
TABLET ORAL
Qty: 60 TABLET | Refills: 0
Start: 2022-12-27

## 2022-12-27 RX ORDER — M-VIT,TX,IRON,MINS/CALC/FOLIC 27MG-0.4MG
1 TABLET ORAL DAILY
Qty: 90 TABLET | Refills: 1 | Status: SHIPPED | OUTPATIENT
Start: 2022-12-27

## 2022-12-27 NOTE — PATIENT INSTRUCTIONS
Refilled vitamin   Increase entresto to  mg twice a day  Change lasix to 20 mg every other day  Check blood in 2 weeks  RTO in 3 months with an echo  Send a remote optival same day you have blood work

## 2022-12-27 NOTE — PROGRESS NOTES
[address]  Progress Note    Primary Care Doctor:  Samira Gardner    Chief Complaint   Patient presents with    Congestive Heart Failure        History of Present Illness:  61 y.o. female with history of sHF, LVH, AF on xarelto (follows with Dr Malik Topete), had been on flecainide, dual chamber pacemaker 2012 due to congenital heart block  LVEF had been 55% in 2015 and now 25%. No lisinopril due to cough. She is a cook at [employer]  Lip numbness to entresto 3/2020 hydralazine caused headaches  10/2020 EGD (hiatal hernia) and colonoscopy (polyp)   ICD placed 1/22/21, LHC done 1/20/21 normal cors  4/21 benign duodenal mass with resection Dr Elizabeth Alvares  4/7-19/2021 for symptomatic anemia, requiring surgery by Dr Elizabeth Alvares for overlying lipoma, large duodenum lesion requiring 3 clips  Hernia repair 9/27/2022    I had the pleasure of seeing Mj Hardy in follow up for systolic heart failure. She is ambulatory and by her self. She feels good, no chest pain, palpitations, lightheadedness or shortness of breath. Her optival shows normal thoracic impedence. Her weight is down. Recent labs with low potassium and dose increased. Last echo done in may 2022. BP is good    Past Medical History:   has a past medical history of Anemia, Atrial fibrillation and flutter ([address] Atrial flutter ([address] CHB (complete heart block) ([address] Class 1 obesity without serious comorbidity with body mass index (BMI) of 33.0 to 33.9 in adult, Congenital heart disease, Difficult intravenous access, GERD (gastroesophageal reflux disease), Headache(784.0), History of complete heart block, Hyperlipidemia, Hypertension, PONV (postoperative nausea and vomiting), Prolonged emergence from general anesthesia, Sleep apnea, Syncope, and Systolic CHF, chronic ([address] Surgical History:   has a past surgical history that includes Uterine fibroid surgery; Pacemaker insertion (11/29/2012); Tubal ligation;  Upper gastrointestinal endoscopy (N/A, 10/27/2020); Colonoscopy (N/A, 10/27/2020); Cardiac defibrillator placement (01/2021); Upper gastrointestinal endoscopy (N/A, 04/08/2021); Upper gastrointestinal endoscopy (N/A, 04/08/2021); Upper gastrointestinal endoscopy (N/A, 04/08/2021); colectomy (N/A, 04/13/2021); Cardioversion (01/28/2022); Cardioversion (02/22/2022); Upper gastrointestinal endoscopy (N/A, 03/04/2022); and ventral hernia repair (N/A, 9/27/2022). Social History:    reports that she has never smoked. She has never used smokeless tobacco. She reports that she does not drink alcohol and does not use drugs. Family History:   Family History   Problem Relation Age of Onset    High Blood Pressure Mother     Cancer Father     Heart Disease Neg Hx     High Cholesterol Neg Hx        Home Medications:  Prior to Admission medications    Medication Sig Start Date End Date Taking?  Authorizing Provider   potassium chloride (KLOR-CON M) 20 MEQ extended release tablet Take 1 tablet by mouth daily 12/20/22  Yes CATRACHITO Evans CNP   furosemide (LASIX) 20 MG tablet TAKE TWO TABLETS BY MOUTH DAILY 12/12/22  Yes CATRACHITO Lane   vitamin D3 (CHOLECALCIFEROL) 25 MCG (1000 UT) TABS tablet Take 1 tablet by mouth daily 11/16/22  Yes CATRACHITO Lane   spironolactone (ALDACTONE) 50 MG tablet TAKE ONE TABLET BY MOUTH DAILY 9/7/22  Yes CATRACHITO Evans CNP   sacubitril-valsartan (ENTRESTO) 49-51 MG per tablet Take 1 tablet by mouth 2 times daily 6/9/22  Yes CATRACHITO Salter   carvedilol (COREG) 6.25 MG tablet Take 1 tablet by mouth 2 times daily 6/9/22  Yes CATRACHITO Lane   ammonium lactate (LAC-HYDRIN) 12 % lotion  4/20/22  Yes Historical Provider, MD   fluticasone (FLONASE) 50 MCG/ACT nasal spray  3/21/22  Yes Historical Provider, MD   atorvastatin (LIPITOR) 40 MG tablet Take 1 tablet by mouth daily 6/11/21  Yes Rema Champion, APRN - CNS   pantoprazole (PROTONIX) 40 MG tablet Take 1 tablet by mouth every morning (before breakfast) 4/13/21  Yes Fernanda Devries MD   Multiple Vitamins-Minerals (THERAPEUTIC MULTIVITAMIN-MINERALS) tablet Take 1 tablet by mouth daily   Yes Historical Provider, MD   GUAIFENESIN PO Take by mouth    Historical Provider, MD   hydrocortisone 1 % cream  4/20/22   Historical Provider, MD        Allergies:  Latex, Morphine, Codeine, Lisinopril, Nitroglycerin, Sulfa antibiotics, and Hydralazine     Review of Systems:   Constitutional: there has been no unanticipated weight loss. There's been no change in energy level, sleep pattern, or activity level. Eyes: No visual changes or diplopia. No scleral icterus. ENT: No Headaches, hearing loss or vertigo. No mouth sores or sore throat. Cardiovascular: Reviewed in HPI  Respiratory: No cough or wheezing, no sputum production. No hematemesis. Gastrointestinal: No abdominal pain, appetite loss, blood in stools. No change in bowel or bladder habits. Genitourinary: No dysuria, trouble voiding, or hematuria. Musculoskeletal:  No gait disturbance, weakness or joint complaints. Integumentary: No rash or pruritis. Neurological: No headache, diplopia, change in muscle strength, numbness or tingling. No change in gait, balance, coordination, mood, affect, memory, mentation, behavior. Psychiatric: No anxiety, no depression. Endocrine: No malaise, fatigue or temperature intolerance. No excessive thirst, fluid intake, or urination. No tremor. Hematologic/Lymphatic: No abnormal bruising or bleeding, blood clots or swollen lymph nodes. Allergic/Immunologic: No nasal congestion or hives.     Physical Examination:    Vitals:    12/27/22 0929   BP: 108/70   Pulse: 79   SpO2: 98%   Weight: 202 lb (91.6 kg)   Height: 5' 6\" (1.676 m)        Constitutional and General Appearance: Warm and dry, no apparent distress, normal coloration  HEENT:  Normocephalic, atraumatic  Respiratory:  Normal excursion and expansion without use of accessory muscles  Resp Auscultation: Normal breath sounds without dullness  Cardiovascular: The apical impulses not displaced  Heart tones are crisp and normal  JVP normal cm H2O  regular rate and rhythm  Peripheral pulses are symmetrical and full  There is no clubbing, cyanosis of the extremities.   No ankle edema  Pedal Pulses: 2+ and equal   Abdomen:  No masses or tenderness  Liver/Spleen: No Abnormalities Noted  Neurological/Psychiatric:  Alert and oriented in all spheres  Moves all extremities well  Exhibits normal gait balance and coordination  No abnormalities of mood, affect, memory, mentation, or behavior are noted    Lab Data:    CBC:   Lab Results   Component Value Date/Time    WBC 5.7 11/04/2022 10:39 AM    WBC 6.3 10/19/2022 05:46 PM    WBC 8.0 10/12/2022 09:45 AM    RBC 3.50 11/04/2022 10:39 AM    RBC 3.44 10/19/2022 05:46 PM    RBC 2.96 10/12/2022 09:45 AM    HGB 10.7 11/04/2022 10:39 AM    HGB 10.4 10/19/2022 05:46 PM    HGB 9.3 10/12/2022 09:45 AM    HCT 32.3 11/04/2022 10:39 AM    HCT 31.3 10/19/2022 05:46 PM    HCT 27.3 10/12/2022 09:45 AM    MCV 92.2 11/04/2022 10:39 AM    MCV 91.0 10/19/2022 05:46 PM    MCV 92.5 10/12/2022 09:45 AM    RDW 17.7 11/04/2022 10:39 AM    RDW 17.5 10/19/2022 05:46 PM    RDW 17.4 10/12/2022 09:45 AM     11/04/2022 10:39 AM     10/19/2022 05:46 PM     10/12/2022 09:45 AM     BMP:  Lab Results   Component Value Date/Time     12/19/2022 10:03 AM     11/04/2022 10:39 AM     10/19/2022 05:46 PM    K 3.0 12/19/2022 10:03 AM    K 3.5 11/04/2022 10:39 AM    K 3.1 10/19/2022 05:46 PM    K 3.4 10/12/2022 09:45 AM    K 3.8 10/08/2022 03:39 PM    K 4.0 10/07/2022 05:08 AM     12/19/2022 10:03 AM     11/04/2022 10:39 AM     10/19/2022 05:46 PM    CO2 26 12/19/2022 10:03 AM    CO2 27 11/04/2022 10:39 AM    CO2 26 10/19/2022 05:46 PM    PHOS 2.1 10/06/2022 11:05 AM    PHOS 3.1 10/05/2022 05:09 AM    PHOS 2.6 10/02/2022 04:30 AM    BUN 8 12/19/2022 10:03 AM    BUN 6 11/04/2022 10:39 AM    BUN 6 10/19/2022 05:46 PM    CREATININE 0.8 12/19/2022 10:03 AM    CREATININE 0.8 11/04/2022 10:39 AM    CREATININE 0.7 10/19/2022 05:46 PM     BNP:   Lab Results   Component Value Date/Time    PROBNP 421 12/19/2022 10:03 AM    PROBNP 195 11/04/2022 10:39 AM    PROBNP 596 10/12/2022 09:45 AM     Cardiac Imaging:  Echo 5/10/2022   This is a limited study to assess left ventricular ejection fraction. Definity was used to assist in endocardial border delineation. Moderate concentric left ventricular hypertrophy. Mildly dilated left   ventricular cavity size. Moderately reduced left ventricular systolic   function with an estimated ejection fraction of 35-40%. Global hypokinesis noted. Heavy trabeculations seen near the apex of the left ventricle. The right ventricle is mildly dilated. Mildly reduced right ventricular   systolic function with an estimated TAPSE of 1.52 cm. The left atrium is severely dilated    Echo 2/9/2021  Summary   Left ventricular size is mildly increased. Moderately reduced global systolic function with an ejection fraction   estimated at 30-35%. Global hypokinesis noted. Grade II diastolic dysfunction with elevated LV filling pressures. There is mild mitral regurgitation noted. Mild left atrial enlargement noted. Aortic valve appears sclerotic but opens adequately. Mild aortic regurgitation. There is mild tricuspid regurgitation with a RVSP estimation of 25 mmHg. Pacer / ICD wire is visualized in the right ventricle.    The right ventricle is normal in size and function    [facility] Dr Garry Patten 1/20/21  Findings:  Artery Findings/Result   LM Normal   LAD Normal   Cx Normal   RI NA   RCA Normal   LVEDP 6   LVG NA      Intervention:         None     Post Cath Dx:       Normal coronaries      Echo 2/24/2020  Summary   -Limited echocardiogram was performed to evaluate EF.   -Normal left ventricle size, mild to moderate left ventricular hypertrophy,   and moderately reduced systolic function with an estimated ejection fraction   of 30-35%. -There is moderate diffuse hypokinesis. -Grade III diastolic dysfunction . E/e\"=10.7.   -Mild mitral regurgitation.   -Mild tricuspid regurgitation.   -The left atrium is mild to moderate dilated. -Right ventricular systolic function appears mildly reduced .   -Estimated pulmonary artery systolic pressure is borderline normal at 28-33   mmHg assuming a right atrial pressure of 8 mmHg.   -Pacer / ICD wire is visualized in the right heart. Echo 8/12/2019  Summary   -Limited echocardiogram was performed to evaluate LV ejection fraction.   -Left ventricular cavity size is mildly dilated. -There is moderate concentric left ventricular hypertrophy.   -Overall left ventricular systolic function appears moderately reduced with   an ejection fraction on the 30-35%. -There is moderate global diffuse hypokinesis.   -Indeterminate diastolic dysfunction due to irregular heart rate. -Moderate mitral regurgitation.   -Aortic valve appears sclerotic but opens adequately. -Mild to moderate tricuspid regurgitation.   -The left atrium is moderately dilated. -Pacer / ICD wire is visualized in the right heart. Stress test 9/11/18  Enlarged LV with mild global hypokinesis. EF 51%  There is normal isotope uptake at stress and rest. There is no evidence of  myocardial ischemia or scar. ECHO 7/24/18:  Summary   -Left ventricular cavity size is mildly dilated. -There is moderate concentric left ventricular hypertrophy.   -Overall left ventricular systolic function appears severely reduced with  and ejection fraction on the 25 % range.   -There is diffuse global hypokinesis. -Grade II diastolic dysfunction with elevated LV filling pressures. E/e\"=16.2.   -Mitral annular calcification is present. -Mild-moderate mitral regurgitation.   -Aortic valve appears sclerotic but opens adequately. -Trivial aortic regurgitation.   -Trivial tricuspid regurgitation with RVSP of 26 mmHg. -Mild pulmonic regurgitation present.   -Dilated left atrium with a volume of 72 ml.   -Pacer / ICD wire is visualized in the right heart. GXT cathie 7/15/2015:   Left ventricular ejection fraction of 55%. The LV wall motion is normal.  There is normal isotope uptake at stress and rest.   There is no evidence of myocardial ischemia or scar. Assessment:    1. Chronic combined systolic and diastolic heart failure (HCC) on arni, bb and aldosterone antagonist   2. Hypovitaminosis D    3. Paroxysmal atrial fibrillation (HCC)    4. DERECK (obstructive sleep apnea)    5.  ICD (implantable cardioverter-defibrillator), biventricular, in situ        Plan:   Refilled vitamin   Increase entresto to  mg twice a day  Change lasix to 20 mg every other day  Check blood in 2 weeks  RTO in 3 months with an echo  Send a remote optival same day you have blood work    Lew Berry stopped in sept 2022 due patient felt she had side effects (kidney and bladder pain)    CATRACHITO Ramsey - CNS, CNS, 12/27/2022, 9:42 AM

## 2022-12-28 ENCOUNTER — TELEPHONE (OUTPATIENT)
Dept: CARDIOLOGY CLINIC | Age: 57
End: 2022-12-28

## 2022-12-28 NOTE — TELEPHONE ENCOUNTER
Pt calling requesting to be put on her old multivitamin instead of te one she was prescribed yesterday 12/27  Please advise   Thank you

## 2022-12-28 NOTE — TELEPHONE ENCOUNTER
Called pt and relayed message per [facility] with verbal understanding per pt.

## 2023-01-01 NOTE — TELEPHONE ENCOUNTER
Called pt and relayed message per [facility] with verbal understanding . 9

## 2023-01-03 DIAGNOSIS — I50.42 CHRONIC COMBINED SYSTOLIC AND DIASTOLIC HEART FAILURE (HCC): ICD-10-CM

## 2023-01-05 RX ORDER — SACUBITRIL AND VALSARTAN 49; 51 MG/1; MG/1
TABLET, FILM COATED ORAL
Qty: 180 TABLET | Refills: 1 | OUTPATIENT
Start: 2023-01-05

## 2023-01-16 DIAGNOSIS — I50.42 CHRONIC COMBINED SYSTOLIC AND DIASTOLIC HEART FAILURE (HCC): ICD-10-CM

## 2023-01-16 RX ORDER — FUROSEMIDE 20 MG/1
TABLET ORAL
Qty: 60 TABLET | Refills: 1 | Status: SHIPPED | OUTPATIENT
Start: 2023-01-16 | End: 2023-03-16

## 2023-01-20 PROBLEM — I47.20 PAROXYSMAL VENTRICULAR TACHYCARDIA: Status: RESOLVED | Noted: 2021-01-20 | Resolved: 2023-01-20

## 2023-01-20 PROBLEM — G89.18 POSTOPERATIVE PAIN: Status: RESOLVED | Noted: 2022-10-05 | Resolved: 2023-01-20

## 2023-01-20 PROBLEM — I47.29 PAROXYSMAL VENTRICULAR TACHYCARDIA (HCC): Status: RESOLVED | Noted: 2021-01-20 | Resolved: 2023-01-20

## 2023-01-23 ENCOUNTER — NURSE ONLY (OUTPATIENT)
Dept: CARDIOLOGY CLINIC | Age: 58
End: 2023-01-23
Payer: MEDICAID

## 2023-01-23 DIAGNOSIS — I50.22 SYSTOLIC CHF, CHRONIC (HCC): ICD-10-CM

## 2023-01-23 DIAGNOSIS — I42.0 DILATED CARDIOMYOPATHY (HCC): ICD-10-CM

## 2023-01-23 DIAGNOSIS — Z95.810 AICD (AUTOMATIC CARDIOVERTER/DEFIBRILLATOR) PRESENT: Primary | ICD-10-CM

## 2023-01-23 PROCEDURE — G2066 INTER DEVC REMOTE 30D: HCPCS | Performed by: CLINICAL NURSE SPECIALIST

## 2023-01-23 PROCEDURE — 93297 REM INTERROG DEV EVAL ICPMS: CPT | Performed by: CLINICAL NURSE SPECIALIST

## 2023-01-23 NOTE — PROGRESS NOTES
Remote transmission received from patient's CRT-D home monitor. Transmission shows normal sensing and pacing function. NSVT noted (Coreg). <0.1% AT/ AF burden, longest 4 minutes with 0% unsuccessfully pace terminated (coreg, \"may restart anticoagulation\" per Dr. Yann Salomon on 10/21/22)    AP 90.1%, CRT 98.9%, Effective 98.3%, VSRp 0.5%    Optivol is within normal range. TriageHF Heart Failure Risk Status on 22-Jan-2023 is Low    NP will review. See interrogation under cardiology tab in the [address] [place] [address] [address] field for more details. Will continue to monitor remotely. (End of 31-day monitoring period 1/23/23).

## 2023-01-27 DIAGNOSIS — R19.7 BLOODY DIARRHEA: Primary | ICD-10-CM

## 2023-01-30 ENCOUNTER — HOSPITAL ENCOUNTER (OUTPATIENT)
Age: 58
Discharge: HOME | End: 2023-01-30
Payer: MEDICAID

## 2023-01-30 DIAGNOSIS — E55.9 HYPOVITAMINOSIS D: ICD-10-CM

## 2023-01-30 DIAGNOSIS — I50.42 CHRONIC COMBINED SYSTOLIC AND DIASTOLIC HEART FAILURE (HCC): ICD-10-CM

## 2023-01-30 LAB
A/G RATIO: 1.5 (ref 1.1–2.2)
ALBUMIN SERPL-MCNC: 4.3 G/DL (ref 3.4–5)
ALP BLD-CCNC: 68 U/L (ref 40–129)
ALT SERPL-CCNC: 15 U/L (ref 10–40)
ANION GAP SERPL CALCULATED.3IONS-SCNC: 11 MMOL/L (ref 3–16)
AST SERPL-CCNC: 19 U/L (ref 15–37)
BASOPHILS ABSOLUTE: 0 K/UL (ref 0–0.2)
BASOPHILS RELATIVE PERCENT: 0.9 %
BILIRUB SERPL-MCNC: 0.6 MG/DL (ref 0–1)
BUN BLDV-MCNC: 4 MG/DL (ref 7–20)
CALCIUM SERPL-MCNC: 9.5 MG/DL (ref 8.3–10.6)
CHLORIDE BLD-SCNC: 104 MMOL/L (ref 99–110)
CO2: 26 MMOL/L (ref 21–32)
CREAT SERPL-MCNC: 0.8 MG/DL (ref 0.6–1.1)
EOSINOPHILS ABSOLUTE: 0.1 K/UL (ref 0–0.6)
EOSINOPHILS RELATIVE PERCENT: 3.5 %
GFR SERPL CREATININE-BSD FRML MDRD: >60 ML/MIN/{1.73_M2}
GLUCOSE BLD-MCNC: 91 MG/DL (ref 70–99)
HCT VFR BLD CALC: 37.6 % (ref 36–48)
HEMOGLOBIN: 12.4 G/DL (ref 12–16)
LYMPHOCYTES ABSOLUTE: 1.3 K/UL (ref 1–5.1)
LYMPHOCYTES RELATIVE PERCENT: 31.4 %
MCH RBC QN AUTO: 29.6 PG (ref 26–34)
MCHC RBC AUTO-ENTMCNC: 33 G/DL (ref 31–36)
MCV RBC AUTO: 89.7 FL (ref 80–100)
MONOCYTES ABSOLUTE: 0.4 K/UL (ref 0–1.3)
MONOCYTES RELATIVE PERCENT: 8.7 %
NEUTROPHILS ABSOLUTE: 2.3 K/UL (ref 1.7–7.7)
NEUTROPHILS RELATIVE PERCENT: 55.5 %
PDW BLD-RTO: 16.4 % (ref 12.4–15.4)
PLATELET # BLD: 231 K/UL (ref 135–450)
PMV BLD AUTO: 8.2 FL (ref 5–10.5)
POTASSIUM SERPL-SCNC: 3.4 MMOL/L (ref 3.5–5.1)
PRO-BNP: 166 PG/ML (ref 0–124)
RBC # BLD: 4.19 M/UL (ref 4–5.2)
SODIUM BLD-SCNC: 141 MMOL/L (ref 136–145)
TOTAL PROTEIN: 7.1 G/DL (ref 6.4–8.2)
VITAMIN D 25-HYDROXY: 34 NG/ML
WBC # BLD: 4.1 K/UL (ref 4–11)

## 2023-01-30 PROCEDURE — 83880 ASSAY OF NATRIURETIC PEPTIDE: CPT

## 2023-01-30 PROCEDURE — 36415 COLL VENOUS BLD VENIPUNCTURE: CPT

## 2023-01-30 PROCEDURE — 80053 COMPREHEN METABOLIC PANEL: CPT

## 2023-01-30 PROCEDURE — 85025 COMPLETE CBC W/AUTO DIFF WBC: CPT

## 2023-01-30 PROCEDURE — 82306 VITAMIN D 25 HYDROXY: CPT

## 2023-01-31 ENCOUNTER — TELEPHONE (OUTPATIENT)
Dept: CARDIOLOGY CLINIC | Age: 58
End: 2023-01-31

## 2023-01-31 NOTE — TELEPHONE ENCOUNTER
----- Message from CATRACHITO Babcock sent at 1/31/2023 10:00 AM EST -----  Blood work is great  Continue current medications  thanks

## 2023-02-03 ENCOUNTER — HOSPITAL ENCOUNTER (OUTPATIENT)
Age: 58
Discharge: HOME | End: 2023-02-03
Payer: MEDICAID

## 2023-02-03 PROCEDURE — 87506 IADNA-DNA/RNA PROBE TQ 6-11: CPT

## 2023-02-03 PROCEDURE — 87328 CRYPTOSPORIDIUM AG IA: CPT

## 2023-02-03 PROCEDURE — 87336 ENTAMOEB HIST DISPR AG IA: CPT

## 2023-02-04 LAB
CRYPTOSPORIDIUM ANTIGEN STOOL: NORMAL
E HISTOLYTICA ANTIGEN STOOL: NORMAL
GIARDIA ANTIGEN STOOL: NORMAL

## 2023-02-05 LAB — GI BACTERIAL PATHOGENS BY PCR: NORMAL

## 2023-02-14 RX ORDER — RIVAROXABAN 20 MG/1
TABLET, FILM COATED ORAL
Qty: 90 TABLET | OUTPATIENT
Start: 2023-02-14

## 2023-02-16 PROBLEM — R07.9 CHEST PAIN: Status: RESOLVED | Noted: 2018-09-11 | Resolved: 2023-02-16

## 2023-02-16 PROBLEM — R06.09 EXERTIONAL DYSPNEA: Status: RESOLVED | Noted: 2021-02-08 | Resolved: 2023-02-16

## 2023-02-16 NOTE — PROGRESS NOTES
[facility]   Electrophysiology  SELVIN Hankins  Attending EP: Dr. Juliane Hoffman    Date: 2/23/2023  I had the privilege of visiting Ruthy Hernándezr in the office. Chief Complaint:   Chief Complaint   Patient presents with    Atrial Fibrillation     History of Present Illness: History obtained from patient and medical record. Ruthy Officer is 61 y.o. female with a past medical history of HTN, CHF, AF, CHB s/p PPM, and VT s/p RV lead extraction with upgrade to Biv PPM    -Interval history: Today, Ruthy Officer is being seen for routine follow up. She is doing pretty well. Her device shows normal function. She has brief episodes of PAT/AF on her device. She feels heart racing and palpitations during the episodes. Her BP is mildly elevated today, 130/90. She states it runs slightly lower at home. She has hernia surgery a few months ago. Pt reports she is very active and walks most days. No exertional symptoms. Denies having chest pain, palpitations, shortness of breath, orthopnea/PND, cough, or dizziness at the time of this visit. With regard to medication therapy the patient has been compliant with prescribed regimen. They have tolerated therapy to date. Allergies: Allergies   Allergen Reactions    Latex      rash    Morphine Shortness Of Breath    Codeine      Hives      Lisinopril      cough    Nitroglycerin Hives    Sulfa Antibiotics Nausea Only    Hydralazine      headaches     Home Medications:  Prior to Visit Medications    Medication Sig Taking?  Authorizing Provider   furosemide (LASIX) 20 MG tablet TAKE TWO TABLETS BY MOUTH DAILY Yes CATRACHITO Petty   Multiple Vitamins-Minerals (THERAPEUTIC MULTIVITAMIN-MINERALS) tablet Take 1 tablet by mouth daily Yes CATRACHITO Petty - CNS   sacubitril-valsartan (ENTRESTO)  MG per tablet Take 1 tablet by mouth 2 times daily Yes CATRACHITO Petty   potassium chloride (KLOR-CON M) 20 MEQ extended release tablet Take 1 tablet by mouth daily Yes CATRACHITO Flores CNP   vitamin D3 (CHOLECALCIFEROL) 25 MCG (1000 UT) TABS tablet Take 1 tablet by mouth daily Yes CATRACHITO Mackenzie   spironolactone (ALDACTONE) 50 MG tablet TAKE ONE TABLET BY MOUTH DAILY Yes CATRACHITO Flores CNP   carvedilol (COREG) 6.25 MG tablet Take 1 tablet by mouth 2 times daily Yes Thad Prader, APRN - CNS   ammonium lactate (LAC-HYDRIN) 12 % lotion  Yes Historical Provider, MD   fluticasone (FLONASE) 50 MCG/ACT nasal spray  Yes Historical Provider, MD   hydrocortisone 1 % cream  Yes Historical Provider, MD   atorvastatin (LIPITOR) 40 MG tablet Take 1 tablet by mouth daily Yes Thad Prader, APRN - CNS   pantoprazole (PROTONIX) 40 MG tablet Take 1 tablet by mouth every morning (before breakfast) Yes Fernanda Devries MD      Past Medical History:  Past Medical History:   Diagnosis Date    Anemia     Atrial fibrillation and flutter (HCC)     Atrial flutter (HCC)     CHB (complete heart block) (HCC)     Class 1 obesity without serious comorbidity with body mass index (BMI) of 33.0 to 33.9 in adult 09/06/2019    Congenital heart disease     Difficult intravenous access 10/06/2022    SEE NOTE    GERD (gastroesophageal reflux disease)     Headache(784.0)     History of complete heart block     Hyperlipidemia     Hypertension     PONV (postoperative nausea and vomiting)     Prolonged emergence from general anesthesia     sensitive to meds, slow to wake    Sleep apnea     uses CPAP    Syncope     [facility]     Past Surgical History:    has a past surgical history that includes Uterine fibroid surgery; Pacemaker insertion (11/29/2012); Tubal ligation; Upper gastrointestinal endoscopy (N/A, 10/27/2020); Colonoscopy (N/A, 10/27/2020); Cardiac defibrillator placement (01/2021); Upper gastrointestinal endoscopy (N/A, 04/08/2021); Upper gastrointestinal endoscopy (N/A, 04/08/2021);  Upper gastrointestinal endoscopy (N/A, 04/08/2021); colectomy (N/A, 04/13/2021); Cardioversion (01/28/2022); Cardioversion (02/22/2022); Upper gastrointestinal endoscopy (N/A, 03/04/2022); and ventral hernia repair (N/A, 9/27/2022). Social History:  Personally Reviewed. reports that she has never smoked. She has never used smokeless tobacco. She reports that she does not drink alcohol and does not use drugs. Family History:  Personally Reviewed. family history includes Cancer in her father; High Blood Pressure in her mother. Review of Systems:  Constitutional: Negative for fever, night sweats, chills, weight changes, or weakness  Skin: Negative for rash, dry skin, pruritus, bruising, bleeding, blood clots, or changes in skin pigment  HEENT: Negative for vision changes, ringing in the ears, sore throat, dysphagia, or swollen lymph nodes  Respiratory: Reviewed in HPI  Cardiovascular: Reviewed in HPI  Gastrointestinal: Negative for abdominal pain, N/V/D, constipation, or black/tarry stools  Genito-Urinary: Negative for dysuria, incontinence, urgency, or hematuria  Musculoskeletal: Negative for joint swelling, muscle pain, or injuries  Neurological/Psych: Negative for confusion, seizures, dizziness, headaches, balance issues or TIA-like symptoms. No anxiety, depression, or insomnia    Physical Examination:  Vitals:    02/23/23 1418   BP: (!) 130/90   Pulse: 94   SpO2: 96%      Wt Readings from Last 3 Encounters:   02/23/23 198 lb 9.6 oz (90.1 kg)   02/20/23 201 lb (91.2 kg)   12/27/22 202 lb (91.6 kg)     Constitutional: Cooperative and in no apparent distress, and appears well nourished  Skin: Warm and pink; no pallor, cyanosis, bruising, or clubbing  HEENT: Symmetric and normocephalic. PERRL, EOM intact. Conjunctiva pink with clear sclera. Mucus membranes pink and moist. Teeth intact. Thyroid smooth without nodules or goiter  Respiratory: Respirations symmetric and unlabored.  Lungs clear to auscultation bilaterally, no wheezing, rhonchi, or crackles  Cardiovascular:  Regular rate and rhythm. S1/S2 present without murmurs, rubs, or gallops. Peripheral pulses 2+, capillary refill < 3 seconds. No elevation of JVP. No peripheral edema  Gastrointestinal: Abdomen soft and round. Bowel sounds normoactive in all quadrants without tenderness or masses. Musculoskeletal: Bilateral upper and lower extremity strength 5/5 with full ROM. Neurological/Psych: Awake and orientated to person, place and time. Calm affect, appropriate mood. Pertinent labs, diagnostic, device, and imaging results reviewed as a part of this visit    LABS    CBC:   Lab Results   Component Value Date    WBC 4.1 2023    HGB 12.4 2023    HCT 37.6 2023    MCV 89.7 2023     2023     BMP:   Lab Results   Component Value Date    CREATININE 0.8 2023    BUN 4 (L) 2023     2023    K 3.4 (L) 2023     2023    CO2 26 2023     Estimated Creatinine Clearance: 87 mL/min (based on SCr of 0.8 mg/dL). Thyroid:   Lab Results   Component Value Date    TSH 1.74 2022     Lipid Panel:   Lab Results   Component Value Date/Time    CHOL 108 09/10/2021 09:32 AM    HDL 55 09/10/2021 09:32 AM    TRIG 71 09/10/2021 09:32 AM     LFTs:  Lab Results   Component Value Date    ALT 15 2023    AST 19 2023    ALKPHOS 68 2023    BILITOT 0.6 2023     Coags:   Lab Results   Component Value Date    PROTIME 18.2 (H) 10/19/2022    INR 1.51 (H) 10/19/2022    APTT 32.7 10/06/2022       EC2023 (Personally Interpreted)  -AV paced. Rate 94, , QTc 473    Echo:    Definity was used to assist in endocardial border delineation. Moderate concentric left ventricular hypertrophy. Mildly dilated left   ventricular cavity size. Moderately reduced left ventricular systolic   function with an estimated ejection fraction of 35-40%. Global hypokinesis noted.    Heavy trabeculations seen near the apex of the left ventricle. The right ventricle is mildly dilated. Mildly reduced right ventricular   systolic function with an estimated TAPSE of 1.52 cm. The left atrium is severely dilated. GXT: 2018  Enlarged LV with mild global hypokinesis. EF 51%    There is normal isotope uptake at stress and rest. There is no evidence of    myocardial ischemia or scar. Cath: 1/21  LM Normal  LAD Normal  Cx Normal  RI NA  RCA Normal  LVEDP 6  LVG NA     Intervention:         None     Post Cath Dx:       Normal coronaries    Assessment:    1. CHB  - S/p Dual chamber Medtronic PPM with upgrade to Biv AICD (1/21)  - I reviewed device interrogation today. Device functioning normally.   ~ A-paced 89% V-paced 98.5%  ~ 4 years left on battery life expectancy  - Discussed with patient  - Follow up with device clinic as scheduled    2. Hx of VT, NSVT   - S/p Biv AICD   - Brief NSVT episodes   - Continue BB (increase dose    3. Persistent Atrial Fibrillation, PAT  - Currently in NSR   ~ Brief episodes of PAT/AF on her device    - Continue coreg; increase to 12.5 mg BID for better BP/rhythm control  - [id number] score:3 (CHF, HTN, Gender); [id number] Vasc score and anticoagulation discussed. High risk for stroke and thromboembolism. Anticoagulation is recommended. Risk of bleeding was discussed.  ~ On Xarelto 20 mg QD (CrCl >50 ml/min)    - Afib risk factors including age, HTN, obesity, inactivity and DERECK were discussed with patient. Risk factor modification recommended      - Will continue to monitor her AT/AF burden on her device. If it worsens, ablation can be considered in the future    4. Chronic systolic heart failure (NYHA Class II)  - Appears compensated   ~ EF 35-40% per echo  - Continue with coreg 12.5 mg BID, entresto  mg BID, lasix 40 mg , spironolactone 50 mg QD  - Aggressive medical therapy with risk factor modification  - Discussed with patient importance of monitoring weight, low sodium diet and fluid restriction    5. HTN  - Slightly uncontrolled: Goal <130/80  - Continue current medications. Increase coreg dose to 12.5 mg BID  - Encouraged to monitor and log BP readings at home, then bring log to next visit  - Discussed importance of low sodium diet, weight control and exercise    6. Hx of GIB   - Stable  - No recurrence    Plan:  Increase coreg to 12.5 mg in the morning. If tolerating well and blood pressure stable, increase evening dose to 12.5 mg as well  Device checks every 3 months    F/U: Follow-up with CHF as scheduled and EP in 6 months  -Follow up with device clinic as scheduled  -Call Kylah [address] at [phone] with any questions    Diet & Exercise: The patient is counseled to follow a low salt diet to assure blood pressure remains controlled for cardiovascular risk factor modification  The patient is counseled to avoid excess caffeine, and energy drinks as this may exacerbated ectopy and arrhythmia  The patient is counseled to lose weight to control cardiovascular risk factors  Exercise program discussed: To improve overall cardiovascular health, the patient is instructed to increase cardiovascular related activities with a goal of 150 min/week of moderate level activity or 10,000 steps per day. Encouraged to perform as much activity as tolerated    I have addressed the patient's cardiac risk factors and adjusted pharmacologic treatment as needed. In addition, I have reinforced the need for patient directed risk factor modification. I independently reviewed the device check interrogation and ECG    All questions and concerns were addressed with the patient. Alternatives to treatment were discussed.      Thank you for allowing to us to participate in the care of Ajith Foote    Time  30 minutes spent preparing to see patient including reviewing patient history/prior tests/prior consults, performing a medical exam, counseling and educating patient/family/caregiver, ordering medications/tests/procedures, referring and communicating with PCPs and other pertinent consultants, documenting information in the EMR, independently interpreting results and communicating to family and coordination of patient care.     CATRACHITO Borjas-EDILSON MATUTERhode Island Hospitalmarty    Office: [phone]

## 2023-02-20 ENCOUNTER — OFFICE VISIT (OUTPATIENT)
Dept: SURGERY | Age: 58
End: 2023-02-20
Payer: MEDICAID

## 2023-02-20 VITALS — WEIGHT: 201 LBS | BODY MASS INDEX: 32.44 KG/M2 | DIASTOLIC BLOOD PRESSURE: 86 MMHG | SYSTOLIC BLOOD PRESSURE: 118 MMHG

## 2023-02-20 DIAGNOSIS — Z09 SURGERY FOLLOW-UP: Primary | ICD-10-CM

## 2023-02-20 PROCEDURE — G8417 CALC BMI ABV UP PARAM F/U: HCPCS | Performed by: SURGERY

## 2023-02-20 PROCEDURE — 99213 OFFICE O/P EST LOW 20 MIN: CPT | Performed by: SURGERY

## 2023-02-20 PROCEDURE — 3074F SYST BP LT 130 MM HG: CPT | Performed by: SURGERY

## 2023-02-20 PROCEDURE — 1036F TOBACCO NON-USER: CPT | Performed by: SURGERY

## 2023-02-20 PROCEDURE — 3017F COLORECTAL CA SCREEN DOC REV: CPT | Performed by: SURGERY

## 2023-02-20 PROCEDURE — G8427 DOCREV CUR MEDS BY ELIG CLIN: HCPCS | Performed by: SURGERY

## 2023-02-20 PROCEDURE — G8484 FLU IMMUNIZE NO ADMIN: HCPCS | Performed by: SURGERY

## 2023-02-20 PROCEDURE — 3079F DIAST BP 80-89 MM HG: CPT | Performed by: SURGERY

## 2023-02-20 NOTE — PROGRESS NOTES
[facility] and Laparoscopic [facility]  SUBJECTIVE:    Chief Complaint: abdominal pain    Mj Hardy   1965   61 y.o. female presents for follow-up after OR Date 9/27/2022, exploratory laparotomy with repair of reducible incisional hernia, bilateral transverse abdominis component releases with mesh placement, and lysis of adhesions. Had postoperative hematoma that was observed and improving. Midline lump has much improved. Over the last several weeks patient has had right lower quadrant pain and a possible lump that is causing concern.   Otherwise she feels well tolerated diet bowels normal and ambulating with some improvement other than the right lower quadrant pain    Past Medical History:   Diagnosis Date    Anemia     Atrial fibrillation and flutter (HCC)     Atrial flutter (HCC)     CHB (complete heart block) (HCC)     Class 1 obesity without serious comorbidity with body mass index (BMI) of 33.0 to 33.9 in adult 09/06/2019    Congenital heart disease     Difficult intravenous access 10/06/2022    SEE NOTE    GERD (gastroesophageal reflux disease)     Headache(784.0)     History of complete heart block     Hyperlipidemia     Hypertension     PONV (postoperative nausea and vomiting)     Prolonged emergence from general anesthesia     sensitive to meds, slow to wake    Sleep apnea     uses CPAP    Syncope     Systolic CHF, chronic ([facility] 10/03/2018     Past Surgical History:   Procedure Laterality Date    CARDIAC DEFIBRILLATOR PLACEMENT  01/2021    Medtronic    CARDIOVERSION  01/28/2022    CARDIOVERSION  02/22/2022    COLECTOMY N/A 04/13/2021    EXPLORATORY LAPAROTOMY, RESECTION OF DUODENAL MASS, CHOLECYSTECTOMY WITH INTRAOPERATIVE CHOLANGIOGRAM performed by Carlos Solis MD at [place] [address] N/A 10/27/2020    COLONOSCOPY POLYPECTOMY SNARE/COLD BIOPSY performed by Donis Moser MD at [address] [place] [address]  11/29/2012    dual chamber PPM, Medtronic TUBAL LIGATION      UPPER GASTROINTESTINAL ENDOSCOPY N/A 10/27/2020    EGD DIAGNOSTIC ONLY performed by Nathalie Gay MD at [address] [place] [address] N/A 04/08/2021    EGD CONTROL HEMORRHAGE WITH APPLICATION OF 3 CLIPS TO DUODENAL MASS performed by Korey Rutherford MD at [address] [facility] N/A 04/08/2021    EGD BIOPSY DUODENAL MASS performed by Korey Rutherford MD at [address] [place] [address] N/A 04/08/2021    EGD SUBMUCOSAL INJECTION OF 0.6 MG OF EPINEPRINE INTO BASE OF DUODENAL MASS performed by Korey Rutherford MD at [address] [facility] N/A 03/04/2022    EGD BIOPSY performed by Nathalie Gay MD at [address] [place] N/A 9/27/2022    OPEN EXPLORATORY LAPAROTOMY, REPAIR OF REDUCIBLE INCISIONAL HERNIA WITH MESH,  UNILATERAL  ABDOMINAL WALL COMPONENT RELEASE performed by Reina Christensen MD at [address] History     Socioeconomic History    Marital status: Single     Spouse name: Not on file    Number of children: 3    Years of education: Not on file    Highest education level: Not on file   Occupational History    Not on file   Tobacco Use    Smoking status: Never    Smokeless tobacco: Never   Vaping Use    Vaping Use: Never used   Substance and Sexual Activity    Alcohol use: No    Drug use: No    Sexual activity: Yes     Partners: Male   Other Topics Concern    Not on file   Social History Narrative    Not on file     Social Determinants of Health     Financial Resource Strain: Not on file   Food Insecurity: Not on file   Transportation Needs: Not on file   Physical Activity: Not on file   Stress: Not on file   Social Connections: Not on file   Intimate Partner Violence: Not on file   Housing Stability: Not on file      Family History   Problem Relation Age of Onset    High Blood Pressure Mother     Cancer Father     Heart Disease Neg Hx     High Cholesterol Neg Hx      Current Outpatient Medications   Medication Sig Dispense Refill    furosemide (LASIX) 20 MG tablet TAKE TWO TABLETS BY MOUTH DAILY 60 tablet 1    Multiple Vitamins-Minerals (THERAPEUTIC MULTIVITAMIN-MINERALS) tablet Take 1 tablet by mouth daily 90 tablet 1    sacubitril-valsartan (ENTRESTO)  MG per tablet Take 1 tablet by mouth 2 times daily 60 tablet 3    potassium chloride (KLOR-CON M) 20 MEQ extended release tablet Take 1 tablet by mouth daily 90 tablet 1    vitamin D3 (CHOLECALCIFEROL) 25 MCG (1000 UT) TABS tablet Take 1 tablet by mouth daily 90 tablet 1    GUAIFENESIN PO Take by mouth      spironolactone (ALDACTONE) 50 MG tablet TAKE ONE TABLET BY MOUTH DAILY 90 tablet 0    carvedilol (COREG) 6.25 MG tablet Take 1 tablet by mouth 2 times daily 180 tablet 1    ammonium lactate (LAC-HYDRIN) 12 % lotion       fluticasone (FLONASE) 50 MCG/ACT nasal spray       hydrocortisone 1 % cream       atorvastatin (LIPITOR) 40 MG tablet Take 1 tablet by mouth daily 60 tablet 2    pantoprazole (PROTONIX) 40 MG tablet Take 1 tablet by mouth every morning (before breakfast) 30 tablet 3     No current facility-administered medications for this visit.       Allergies   Allergen Reactions    Latex      rash    Morphine Shortness Of Breath    Codeine      Hives      Lisinopril      cough    Nitroglycerin Hives    Sulfa Antibiotics Nausea Only    Hydralazine      headaches        Review of Systems:  Review of systems performed and negative with the exception of the above findings    OBJECTIVE:  /86   Wt 201 lb (91.2 kg)   BMI 32.44 kg/m²      Physical Exam:  General appearance: alert, appears stated age, cooperative, and no distress  Head: Normocephalic, without obvious abnormality, atraumatic  Lungs: clear to auscultation bilaterally  Heart: regular rate and rhythm, S1, S2 normal, no murmur, click, rub or gallop  Abdomen: Soft, nondistended, nontender, right lower quadrant without palpable lump or etiology that would explain right lower quadrant pain, midline also appears to be improving     Hospital Outpatient Visit on 02/03/2023   Component Date Value Ref Range Status    GI Bacterial Pathogens By PCR 02/03/2023    Final                    Value:No Shigella spp/EIEC DNA detected  No Shiga toxin-producing gene(s) detected  No Campylobacter spp. (jejuni and coli)DNA detected  No Salmonella spp. DNA detected  No Vibrio vulnificus/parahaemolyticus/cholerae DNA detected  No Plesiomonas shigelloides DNA detected  No Enterotoxigenic E. coli (ETEC) DNA detected  No Yersinia enterocolitica DNA detected  Normal Range:  None detected      Cryptosporidium Ag 02/03/2023    Final                    Value:Negative  Normal Range: Negative  This stool specimen has been tested for the above parasites  by enzyme immunoassay. A preserved specimen is held for one  week after the results are reported. If clinically indicated,  a comprehensive microscopic examination can be performed by  calling the Microbiology Lab. E Histolytica Ag 02/03/2023    Final                    Value:Negative  Normal Range: Negative  This stool specimen has been tested for the above parasites  by enzyme immunoassay. A preserved specimen is held for one  week after the results are reported. If clinically indicated,  a comprehensive microscopic examination can be performed by  calling the Microbiology Lab. Giardia Ag, Stl 02/03/2023    Final                    Value:Negative  Normal Range: Negative  This stool specimen has been tested for the above parasites  by enzyme immunoassay. A preserved specimen is held for one  week after the results are reported. If clinically indicated,  a comprehensive microscopic examination can be performed by  calling the Microbiology Lab.      Hospital Outpatient Visit on 01/30/2023   Component Date Value Ref Range Status    Vit D, 25-Hydroxy 01/30/2023 34.0  >=30 ng/mL Final    Comment: <=20 ng/mL. ........... Anupama Santiago Deficient  21-29 ng/mL. ......... Anupama Santiago Insufficient  >=30 ng/mL. ........ Anupama Santiago Sufficient      Pro-BNP 01/30/2023 166 (A)  0 - 124 pg/mL Final    Comment: Methodology by NT-proBNP    An age-independent cutoff point of 300 pg/ml has a 98%  negative predictive value excluding acute heart failure. Values exceeding the age-related cutoff values (450 pg/mL if  age<50, 900 if 50-75 and 1800 if >75) has 90% sensitivity and  84% specificity for diagnosing acute HF. In patients with  renal compromise (eGFR<60) values greater than 1200pg/ml have  a diagnostic sensitivity and specificity of 89% and 72% for  acute HF.       WBC 01/30/2023 4.1  4.0 - 11.0 K/uL Final    RBC 01/30/2023 4.19  4.00 - 5.20 M/uL Final    Hemoglobin 01/30/2023 12.4  12.0 - 16.0 g/dL Final    Hematocrit 01/30/2023 37.6  36.0 - 48.0 % Final    MCV 01/30/2023 89.7  80.0 - 100.0 fL Final    MCH 01/30/2023 29.6  26.0 - 34.0 pg Final    MCHC 01/30/2023 33.0  31.0 - 36.0 g/dL Final    RDW 01/30/2023 16.4 (A)  12.4 - 15.4 % Final    Platelets 40/21/4227 231  135 - 450 K/uL Final    MPV 01/30/2023 8.2  5.0 - 10.5 fL Final    Neutrophils % 01/30/2023 55.5  % Final    Lymphocytes % 01/30/2023 31.4  % Final    Monocytes % 01/30/2023 8.7  % Final    Eosinophils % 01/30/2023 3.5  % Final    Basophils % 01/30/2023 0.9  % Final    Neutrophils Absolute 01/30/2023 2.3  1.7 - 7.7 K/uL Final    Lymphocytes Absolute 01/30/2023 1.3  1.0 - 5.1 K/uL Final    Monocytes Absolute 01/30/2023 0.4  0.0 - 1.3 K/uL Final    Eosinophils Absolute 01/30/2023 0.1  0.0 - 0.6 K/uL Final    Basophils Absolute 01/30/2023 0.0  0.0 - 0.2 K/uL Final    Sodium 01/30/2023 141  136 - 145 mmol/L Final    Potassium 01/30/2023 3.4 (A)  3.5 - 5.1 mmol/L Final    Chloride 01/30/2023 104  99 - 110 mmol/L Final    CO2 01/30/2023 26  21 - 32 mmol/L Final    Anion Gap 01/30/2023 11  3 - 16 Final    Glucose 01/30/2023 91  70 - 99 mg/dL Final    BUN 01/30/2023 4 (A)  7 - 20 mg/dL Final Creatinine 01/30/2023 0.8  0.6 - 1.1 mg/dL Final    Est, Glom Filt Rate 01/30/2023 >60  >60 Final    Comment: Pediatric calculator link  Mar[employer].at. org/professionals/kdoqi/gfr_calculatorped  Effective Oct 3, 2022  These results are not intended for use in patients  <25years of age. eGFR results are calculated without  a race factor using the 2021 CKD-EPI equation. Careful  clinical correlation is recommended, particularly when  comparing to results calculated using previous equations. The CKD-EPI equation is less accurate in patients with  extremes of muscle mass, extra-renal metabolism of  creatinine, excessive creatinine ingestion, or following  therapy that affects renal tubular secretion. Calcium 01/30/2023 9.5  8.3 - 10.6 mg/dL Final    Total Protein 01/30/2023 7.1  6.4 - 8.2 g/dL Final    Albumin 01/30/2023 4.3  3.4 - 5.0 g/dL Final    Albumin/Globulin Ratio 01/30/2023 1.5  1.1 - 2.2 Final    Total Bilirubin 01/30/2023 0.6  0.0 - 1.0 mg/dL Final    Alkaline Phosphatase 01/30/2023 68  40 - 129 U/L Final    ALT 01/30/2023 15  10 - 40 U/L Final    AST 01/30/2023 19  15 - 37 U/L Final       No results found. ASSESSMENT:  OR Date 9/27/2022, exploratory laparotomy with repair of reducible incisional hernia, bilateral transverse abdominis component releases with mesh placement, and lysis of adhesions      PLAN:  We will order CT of abdomen pelvis to follow hernia repair, and evaluate for etiologies of right lower quadrant pain. In the interim does not have lifting restrictions. Return to office in approximately 2 weeks after CT to discuss results progress and further options    Flavio White MD, FACS  2/20/2023  11:44 AM

## 2023-02-20 NOTE — PATIENT INSTRUCTIONS
We will order CT of abdomen pelvis to follow hernia repair, and evaluate for etiologies of right lower quadrant pain. In the interim does not have lifting restrictions.   Return to office in approximately 2 weeks after CT to discuss results progress and further options

## 2023-02-22 NOTE — PROGRESS NOTES
Patient comes in for programming evaluation for her defibrillator. theeventwalltronic UNCA9OD [place] MRI Quad CRT-D  All sensing and pacing parameters are within normal range. Battery life 4.4 years  AP 89.7%.  99.0%. Effective 98.5%  7 AT/AF episodes noted with a <0.1% burden. Last on 2/23/2023, longest 27 minutes. 3 NSVT episodes noted. Last on 1/19/2023 longest 2 seconds. Patient remains on Xarelto and Coreg. No changes made this Session. Please see interrogation for more detail. Optivol is within normal range. Patient will see NPSR today and follow up in 3 months in office or remotely.

## 2023-02-23 ENCOUNTER — OFFICE VISIT (OUTPATIENT)
Dept: CARDIOLOGY CLINIC | Age: 58
End: 2023-02-23
Payer: MEDICAID

## 2023-02-23 ENCOUNTER — NURSE ONLY (OUTPATIENT)
Dept: CARDIOLOGY CLINIC | Age: 58
End: 2023-02-23

## 2023-02-23 VITALS
OXYGEN SATURATION: 96 % | SYSTOLIC BLOOD PRESSURE: 130 MMHG | HEART RATE: 94 BPM | BODY MASS INDEX: 31.17 KG/M2 | DIASTOLIC BLOOD PRESSURE: 90 MMHG | WEIGHT: 198.6 LBS | HEIGHT: 67 IN

## 2023-02-23 DIAGNOSIS — I51.7 LVH (LEFT VENTRICULAR HYPERTROPHY): ICD-10-CM

## 2023-02-23 DIAGNOSIS — Z95.810 AICD (AUTOMATIC CARDIOVERTER/DEFIBRILLATOR) PRESENT: ICD-10-CM

## 2023-02-23 DIAGNOSIS — I50.42 CHRONIC COMBINED SYSTOLIC AND DIASTOLIC HEART FAILURE (HCC): ICD-10-CM

## 2023-02-23 DIAGNOSIS — I48.0 PAF (PAROXYSMAL ATRIAL FIBRILLATION) (HCC): Primary | ICD-10-CM

## 2023-02-23 DIAGNOSIS — I10 PRIMARY HYPERTENSION: ICD-10-CM

## 2023-02-23 DIAGNOSIS — I42.0 DILATED CARDIOMYOPATHY (HCC): ICD-10-CM

## 2023-02-23 DIAGNOSIS — I50.22 SYSTOLIC CHF, CHRONIC (HCC): ICD-10-CM

## 2023-02-23 DIAGNOSIS — I47.29 PAROXYSMAL VENTRICULAR TACHYCARDIA: ICD-10-CM

## 2023-02-23 DIAGNOSIS — E66.09 CLASS 1 OBESITY DUE TO EXCESS CALORIES WITH BODY MASS INDEX (BMI) OF 31.0 TO 31.9 IN ADULT, UNSPECIFIED WHETHER SERIOUS COMORBIDITY PRESENT: ICD-10-CM

## 2023-02-23 DIAGNOSIS — I48.19 PERSISTENT ATRIAL FIBRILLATION (HCC): ICD-10-CM

## 2023-02-23 DIAGNOSIS — Z95.810 ICD (IMPLANTABLE CARDIOVERTER-DEFIBRILLATOR), BIVENTRICULAR, IN SITU: ICD-10-CM

## 2023-02-23 DIAGNOSIS — I51.9 LV DYSFUNCTION: ICD-10-CM

## 2023-02-23 DIAGNOSIS — Q24.6 CONGENITAL HEART BLOCK: ICD-10-CM

## 2023-02-23 PROBLEM — R53.83 OTHER FATIGUE: Status: RESOLVED | Noted: 2021-04-07 | Resolved: 2023-02-23

## 2023-02-23 PROBLEM — K43.2 INCISIONAL HERNIA, WITHOUT OBSTRUCTION OR GANGRENE: Status: RESOLVED | Noted: 2021-12-13 | Resolved: 2023-02-23

## 2023-02-23 PROCEDURE — 3080F DIAST BP >= 90 MM HG: CPT | Performed by: NURSE PRACTITIONER

## 2023-02-23 PROCEDURE — 3075F SYST BP GE 130 - 139MM HG: CPT | Performed by: NURSE PRACTITIONER

## 2023-02-23 PROCEDURE — G8484 FLU IMMUNIZE NO ADMIN: HCPCS | Performed by: NURSE PRACTITIONER

## 2023-02-23 PROCEDURE — G8427 DOCREV CUR MEDS BY ELIG CLIN: HCPCS | Performed by: NURSE PRACTITIONER

## 2023-02-23 PROCEDURE — 1036F TOBACCO NON-USER: CPT | Performed by: NURSE PRACTITIONER

## 2023-02-23 PROCEDURE — G8417 CALC BMI ABV UP PARAM F/U: HCPCS | Performed by: NURSE PRACTITIONER

## 2023-02-23 PROCEDURE — 3017F COLORECTAL CA SCREEN DOC REV: CPT | Performed by: NURSE PRACTITIONER

## 2023-02-23 PROCEDURE — 99214 OFFICE O/P EST MOD 30 MIN: CPT | Performed by: NURSE PRACTITIONER

## 2023-02-23 RX ORDER — PANTOPRAZOLE SODIUM 40 MG/1
40 TABLET, DELAYED RELEASE ORAL
Qty: 90 TABLET | Refills: 3 | Status: SHIPPED | OUTPATIENT
Start: 2023-02-23

## 2023-02-23 RX ORDER — CARVEDILOL 12.5 MG/1
12.5 TABLET ORAL 2 TIMES DAILY
Qty: 180 TABLET | Refills: 1 | Status: SHIPPED | OUTPATIENT
Start: 2023-02-23

## 2023-02-23 NOTE — PATIENT INSTRUCTIONS
Increase coreg to 12.5 mg in the morning. If tolerating well and blood pressure stable, increase evening dose to 12.5 mg as well  Device checks every 3 months

## 2023-03-07 ENCOUNTER — NURSE ONLY (OUTPATIENT)
Dept: CARDIOLOGY CLINIC | Age: 58
End: 2023-03-07
Payer: MEDICAID

## 2023-03-07 DIAGNOSIS — I50.22 SYSTOLIC CHF, CHRONIC (HCC): ICD-10-CM

## 2023-03-07 DIAGNOSIS — Z95.810 AICD (AUTOMATIC CARDIOVERTER/DEFIBRILLATOR) PRESENT: Primary | ICD-10-CM

## 2023-03-07 DIAGNOSIS — I42.0 DILATED CARDIOMYOPATHY (HCC): ICD-10-CM

## 2023-03-07 PROCEDURE — 93295 DEV INTERROG REMOTE 1/2/MLT: CPT | Performed by: INTERNAL MEDICINE

## 2023-03-07 PROCEDURE — 93296 REM INTERROG EVL PM/IDS: CPT | Performed by: INTERNAL MEDICINE

## 2023-03-07 NOTE — PROGRESS NOTES
Remote transmission received from patient's CRT-D home monitor. Transmission shows normal sensing and pacing function. <0.1% AT/ AF burden, longest 2 minutes with 0% pace terminated (coreg, Xarelto). AP 90.1%, CRT 98.9%, Effective 98.3%, VSRp 0.5%    Optivol is within normal range. TriageHF Heart Failure Risk Status on 07-Mar-2023 is Low    EP/ NP will review. See interrogation under cardiology tab in the [address] [place] [address] [address] field for more details. Will continue to monitor remotely. (End of 91-day monitoring period 3/7/23).

## 2023-03-09 ENCOUNTER — HOSPITAL ENCOUNTER (OUTPATIENT)
Dept: CT IMAGING | Age: 58
Discharge: HOME | End: 2023-03-09
Payer: MEDICAID

## 2023-03-09 DIAGNOSIS — Z09 SURGERY FOLLOW-UP: ICD-10-CM

## 2023-03-09 LAB
CREAT SERPL-MCNC: 0.8 MG/DL (ref 0.6–1.1)
GFR SERPL CREATININE-BSD FRML MDRD: >60 ML/MIN/{1.73_M2}

## 2023-03-09 PROCEDURE — 74177 CT ABD & PELVIS W/CONTRAST: CPT

## 2023-03-09 PROCEDURE — 6360000004 HC RX CONTRAST MEDICATION: Performed by: SURGERY

## 2023-03-09 PROCEDURE — 36415 COLL VENOUS BLD VENIPUNCTURE: CPT

## 2023-03-09 PROCEDURE — 82565 ASSAY OF CREATININE: CPT

## 2023-03-09 RX ADMIN — DIATRIZOATE MEGLUMINE AND DIATRIZOATE SODIUM 20 ML: 660; 100 LIQUID ORAL; RECTAL at 07:43

## 2023-03-09 RX ADMIN — IOPAMIDOL 75 ML: 755 INJECTION, SOLUTION INTRAVENOUS at 07:43

## 2023-03-13 ENCOUNTER — OFFICE VISIT (OUTPATIENT)
Dept: SURGERY | Age: 58
End: 2023-03-13
Payer: MEDICAID

## 2023-03-13 ENCOUNTER — TELEPHONE (OUTPATIENT)
Dept: SURGERY | Age: 58
End: 2023-03-13

## 2023-03-13 VITALS — DIASTOLIC BLOOD PRESSURE: 64 MMHG | WEIGHT: 201 LBS | SYSTOLIC BLOOD PRESSURE: 110 MMHG | BODY MASS INDEX: 31.96 KG/M2

## 2023-03-13 DIAGNOSIS — M62.81 MUSCLE WEAKNESS: Primary | ICD-10-CM

## 2023-03-13 DIAGNOSIS — K43.2 INCISIONAL HERNIA WITHOUT OBSTRUCTION OR GANGRENE: Primary | ICD-10-CM

## 2023-03-13 PROCEDURE — G8417 CALC BMI ABV UP PARAM F/U: HCPCS | Performed by: SURGERY

## 2023-03-13 PROCEDURE — 3017F COLORECTAL CA SCREEN DOC REV: CPT | Performed by: SURGERY

## 2023-03-13 PROCEDURE — 99213 OFFICE O/P EST LOW 20 MIN: CPT | Performed by: SURGERY

## 2023-03-13 PROCEDURE — 1036F TOBACCO NON-USER: CPT | Performed by: SURGERY

## 2023-03-13 PROCEDURE — G8427 DOCREV CUR MEDS BY ELIG CLIN: HCPCS | Performed by: SURGERY

## 2023-03-13 PROCEDURE — G8484 FLU IMMUNIZE NO ADMIN: HCPCS | Performed by: SURGERY

## 2023-03-13 PROCEDURE — 3074F SYST BP LT 130 MM HG: CPT | Performed by: SURGERY

## 2023-03-13 PROCEDURE — 3078F DIAST BP <80 MM HG: CPT | Performed by: SURGERY

## 2023-03-13 NOTE — PROGRESS NOTES
[address] and Laparoscopic Surgery  SUBJECTIVE:    Anne Parry   1965   61 y.o. female presents for routine postoperative followup after OR Date 9/27/2022, exploratory laparotomy with repair of reducible incisional hernia, bilateral transverse abdominis component releases with mesh placement, and lysis of adhesions. Postoperative hematoma was observed and improving. Complained of some right-sided abdominal pain which is also slowly improving. In the interim she has had a CT which shows no acute abnormality. Midline fat necrosis is improving. Tolerated diet and bowels normalized.   No major complaints     Past Medical History:   Diagnosis Date    Anemia     Atrial fibrillation and flutter (HCC)     Atrial flutter (HCC)     CHB (complete heart block) (HCC)     Class 1 obesity without serious comorbidity with body mass index (BMI) of 33.0 to 33.9 in adult 09/06/2019    Congenital heart disease     Difficult intravenous access 10/06/2022    SEE NOTE    GERD (gastroesophageal reflux disease)     Headache(784.0)     History of complete heart block     Hyperlipidemia     Hypertension     PONV (postoperative nausea and vomiting)     Prolonged emergence from general anesthesia     sensitive to meds, slow to wake    Sleep apnea     uses CPAP    Syncope     Systolic CHF, chronic ([facility] [address] 10/03/2018     Past Surgical History:   Procedure Laterality Date    CARDIAC DEFIBRILLATOR PLACEMENT  01/2021    Medtronic    CARDIOVERSION  01/28/2022    CARDIOVERSION  02/22/2022    COLECTOMY N/A 04/13/2021    EXPLORATORY LAPAROTOMY, RESECTION OF DUODENAL MASS, CHOLECYSTECTOMY WITH INTRAOPERATIVE CHOLANGIOGRAM performed by Gill Almonte MD at [place] [address] N/A 10/27/2020    COLONOSCOPY POLYPECTOMY SNARE/COLD BIOPSY performed by Rohini Christianson MD at [address] [place] [address]  11/29/2012    dual chamber PPM, Medtronic    TUBAL LIGATION      UPPER GASTROINTESTINAL ENDOSCOPY N/A 10/27/2020    EGD DIAGNOSTIC ONLY performed by Kyree Cannon MD at [address] [place] [address] N/A 04/08/2021    EGD CONTROL HEMORRHAGE WITH APPLICATION OF 3 CLIPS TO DUODENAL MASS performed by Scott Velazquez MD at [address] [place] [address] N/A 04/08/2021    EGD BIOPSY DUODENAL MASS performed by Scott Velazquez MD at [address] [place] [address] N/A 04/08/2021    EGD SUBMUCOSAL INJECTION OF 0.6 MG OF EPINEPRINE INTO BASE OF DUODENAL MASS performed by Scott Velazquez MD at [address] [place] [address] N/A 03/04/2022    EGD BIOPSY performed by Kyree Cannon MD at [address] [place] N/A 9/27/2022    OPEN EXPLORATORY LAPAROTOMY, REPAIR OF REDUCIBLE INCISIONAL HERNIA WITH MESH,  UNILATERAL  ABDOMINAL WALL COMPONENT RELEASE performed by Jordi Hargrove MD at [address] [facility]     Socioeconomic History    Marital status: Single     Spouse name: Not on file    Number of children: 3    Years of education: Not on file    Highest education level: Not on file   Occupational History    Not on file   Tobacco Use    Smoking status: Never    Smokeless tobacco: Never   Vaping Use    Vaping Use: Never used   Substance and Sexual Activity    Alcohol use: No    Drug use: No    Sexual activity: Yes     Partners: Male   Other Topics Concern    Not on file   Social History Narrative    Not on file     Social Determinants of Health     Financial Resource Strain: Not on file   Food Insecurity: Not on file   Transportation Needs: Not on file   Physical Activity: Not on file   Stress: Not on file   Social Connections: Not on file   Intimate Partner Violence: Not on file   Housing Stability: Not on file      Family History   Problem Relation Age of Onset    High Blood Pressure Mother     Cancer Father     Heart Disease Neg Hx     High Cholesterol Neg Hx      Current Outpatient Medications   Medication Sig Dispense Refill    carvedilol (COREG) 12.5 MG tablet Take 1 tablet by mouth 2 times daily 180 tablet 1    pantoprazole (PROTONIX) 40 MG tablet Take 1 tablet by mouth every morning (before breakfast) 90 tablet 3    rivaroxaban (XARELTO) 20 MG TABS tablet Take 1 tablet by mouth daily (with breakfast) 90 tablet 1    furosemide (LASIX) 20 MG tablet TAKE TWO TABLETS BY MOUTH DAILY 60 tablet 1    Multiple Vitamins-Minerals (THERAPEUTIC MULTIVITAMIN-MINERALS) tablet Take 1 tablet by mouth daily 90 tablet 1    sacubitril-valsartan (ENTRESTO)  MG per tablet Take 1 tablet by mouth 2 times daily 60 tablet 3    potassium chloride (KLOR-CON M) 20 MEQ extended release tablet Take 1 tablet by mouth daily 90 tablet 1    vitamin D3 (CHOLECALCIFEROL) 25 MCG (1000 UT) TABS tablet Take 1 tablet by mouth daily 90 tablet 1    spironolactone (ALDACTONE) 50 MG tablet TAKE ONE TABLET BY MOUTH DAILY 90 tablet 0    ammonium lactate (LAC-HYDRIN) 12 % lotion       fluticasone (FLONASE) 50 MCG/ACT nasal spray       hydrocortisone 1 % cream       atorvastatin (LIPITOR) 40 MG tablet Take 1 tablet by mouth daily 60 tablet 2     No current facility-administered medications for this visit.       Allergies   Allergen Reactions    Latex      rash    Morphine Shortness Of Breath    Codeine      Hives      Lisinopril      cough    Nitroglycerin Hives    Sulfa Antibiotics Nausea Only    Hydralazine      headaches        Review of Systems:  Review of systems performed and negative with the exception of the above findings    OBJECTIVE:  /64   Wt 201 lb (91.2 kg)   BMI 31.96 kg/m²      Physical Exam:  General appearance: alert, appears stated age, cooperative, and no distress  Head: Normocephalic, without obvious abnormality, atraumatic  Lungs: clear to auscultation bilaterally  Heart: regular rate and rhythm, S1, S2 normal, no murmur, click, rub or gallop  Abdomen: Soft, nondistended, nontender, right subtle abdominal bulge possible muscular contraction, no hernia mentioned on CT and no reducible bulge    Hospital Outpatient Visit on 03/09/2023   Component Date Value Ref Range Status    Creatinine 03/09/2023 0.8  0.6 - 1.1 mg/dL Final    Est, Wolfgang Filt Rate 03/09/2023 >60  >60 Final    Comment: Pediatric calculator link  [email or web address]. org/professionals/kdoqi/gfr_calculatorped  Effective Oct 3, 2022  These results are not intended for use in patients  <25years of age. eGFR results are calculated without  a race factor using the 2021 CKD-EPI equation. Careful  clinical correlation is recommended, particularly when  comparing to results calculated using previous equations. The CKD-EPI equation is less accurate in patients with  extremes of muscle mass, extra-renal metabolism of  creatinine, excessive creatinine ingestion, or following  therapy that affects renal tubular secretion. Hospital Outpatient Visit on 02/03/2023   Component Date Value Ref Range Status    GI Bacterial Pathogens By PCR 02/03/2023    Final                    Value:No Shigella spp/EIEC DNA detected  No Shiga toxin-producing gene(s) detected  No Campylobacter spp. (jejuni and coli)DNA detected  No Salmonella spp. DNA detected  No Vibrio vulnificus/parahaemolyticus/cholerae DNA detected  No Plesiomonas shigelloides DNA detected  No Enterotoxigenic E. coli (ETEC) DNA detected  No Yersinia enterocolitica DNA detected  Normal Range:  None detected      Cryptosporidium Ag 02/03/2023    Final                    Value:Negative  Normal Range: Negative  This stool specimen has been tested for the above parasites  by enzyme immunoassay. A preserved specimen is held for one  week after the results are reported. If clinically indicated,  a comprehensive microscopic examination can be performed by  calling the Microbiology Lab.       E Histolytica Ag 02/03/2023    Final                    Value:Negative  Normal Range: Negative  This stool specimen has been tested for the above parasites  by enzyme immunoassay. A preserved specimen is held for one  week after the results are reported. If clinically indicated,  a comprehensive microscopic examination can be performed by  calling the Microbiology Lab. Giardia Ag, Stl 02/03/2023    Final                    Value:Negative  Normal Range: Negative  This stool specimen has been tested for the above parasites  by enzyme immunoassay. A preserved specimen is held for one  week after the results are reported. If clinically indicated,  a comprehensive microscopic examination can be performed by  calling the Microbiology Lab. CT ABDOMEN PELVIS W IV CONTRAST Additional Contrast? Oral    Result Date: 3/9/2023  EXAMINATION: CT OF THE ABDOMEN AND PELVIS WITH CONTRAST 3/9/2023 7:43 am TECHNIQUE: CT of the abdomen and pelvis was performed with the administration of intravenous contrast. Multiplanar reformatted images are provided for review. Automated exposure control, iterative reconstruction, and/or weight based adjustment of the mA/kV was utilized to reduce the radiation dose to as low as reasonably achievable. COMPARISON: 10/19/2022 HISTORY: ORDERING SYSTEM PROVIDED HISTORY: Surgery follow-up TECHNOLOGIST PROVIDED HISTORY: Additional Contrast?->Oral STAT Creatinine as needed:->Yes Reason for exam:->history of complex incisional hernia, right abdominal pain Reason for Exam: history of complex incisional hernia, right abdominal pain, surgery follow-up. FINDINGS: Lower Chest: There is no consolidation or effusion. Organs: The liver, pancreas, spleen, kidneys and adrenals are unremarkable. GI/Bowel: There is abnormal circumferential wall thickening involving the proximal duodenum. The remainder the bowel is unremarkable. There is no dilatation or obstruction. The appendix is normal. Pelvis: The uterus is enlarged, containing numerous intramural, submucosal and subserosal fibroids.   The bladder is grossly unremarkable. Peritoneum/Retroperitoneum: There is no free air, free fluid or intraperitoneal inflammatory change.  There is no adenopathy. Bones/Soft Tissues: There is no acute fracture or aggressive osseous lesion. There is an indurated round 2.9 x 4.2 cm focus of fat necrosis within the region of the prior ventral midline surgical incision.     1. Moderate duodenitis. 2. Enlarged fibroid uterus. 3. Small area of inflamed incisional fat necrosis.     Assessment:  OR Date 9/27/2022, exploratory laparotomy with repair of reducible incisional hernia, bilateral transverse abdominis component releases with mesh placement, and lysis of adhesions      Plan:  Activity as tolerated  May use Tylenol for pain control, if uncontrolled may consider use Neurontin  We will refer to physical therapy for assistant with abdominal core strengthening  Follow with gastroenterology as scheduled for duodenitis  Follow up with general surgery office as needed    Flavio Coronel MD, FACS  3/13/2023  10:10 AM

## 2023-03-13 NOTE — TELEPHONE ENCOUNTER
NEED ORDER FOR PHYSICAL THERAPY TO INCLUDE MUSCLE PAIN AND /OR MUSCLE STRENGTHENING SO INSURANCE WILL PAY.   [phone]

## 2023-03-13 NOTE — PATIENT INSTRUCTIONS
Activity as tolerated  May use Tylenol for pain control, if uncontrolled may consider use Neurontin  We will refer to physical therapy for assistant with abdominal core strengthening  Follow with gastroenterology as scheduled for duodenitis  Follow up with general surgery office as needed

## 2023-03-15 ENCOUNTER — TELEPHONE (OUTPATIENT)
Dept: CARDIOLOGY CLINIC | Age: 58
End: 2023-03-15

## 2023-03-15 DIAGNOSIS — I50.42 CHRONIC COMBINED SYSTOLIC AND DIASTOLIC HEART FAILURE (HCC): ICD-10-CM

## 2023-03-15 NOTE — TELEPHONE ENCOUNTER
Spoke to patient she is in the process of getting a new PCP and doesn't see them until April. Can she have 30 days?   Please advise

## 2023-03-15 NOTE — TELEPHONE ENCOUNTER
Medication Refill    Medication needing refilled:  Multiple Vitamins-Minerals THERAPEUTIC    Dosage of the medication:    How are you taking this medication (QD, BID, TID, QID, PRN):  Take 1 tablet by mouth daily    30 or 90 day supply called in:  90    When will you run out of your medication:    Which Pharmacy are we sending the medication to?:    [facility] [id number] - [id number], [address] [place] [address] [phone] - f [phone]

## 2023-03-16 RX ORDER — FUROSEMIDE 20 MG/1
TABLET ORAL
Qty: 60 TABLET | Refills: 1 | Status: SHIPPED | OUTPATIENT
Start: 2023-03-16 | End: 2023-05-12

## 2023-03-16 RX ORDER — M-VIT,TX,IRON,MINS/CALC/FOLIC 27MG-0.4MG
1 TABLET ORAL DAILY
Qty: 30 TABLET | Refills: 0 | Status: SHIPPED | OUTPATIENT
Start: 2023-03-16

## 2023-03-27 ENCOUNTER — OFFICE VISIT (OUTPATIENT)
Dept: CARDIOLOGY CLINIC | Age: 58
End: 2023-03-27
Payer: MEDICAID

## 2023-03-27 ENCOUNTER — HOSPITAL ENCOUNTER (OUTPATIENT)
Dept: NON INVASIVE DIAGNOSTICS | Age: 58
Discharge: HOME | End: 2023-03-27
Payer: MEDICAID

## 2023-03-27 ENCOUNTER — HOSPITAL ENCOUNTER (OUTPATIENT)
Age: 58
Discharge: HOME | End: 2023-03-27
Payer: MEDICAID

## 2023-03-27 VITALS
OXYGEN SATURATION: 99 % | WEIGHT: 200 LBS | BODY MASS INDEX: 31.39 KG/M2 | HEART RATE: 77 BPM | SYSTOLIC BLOOD PRESSURE: 130 MMHG | DIASTOLIC BLOOD PRESSURE: 84 MMHG | HEIGHT: 67 IN

## 2023-03-27 DIAGNOSIS — I50.42 CHRONIC COMBINED SYSTOLIC AND DIASTOLIC HEART FAILURE (HCC): ICD-10-CM

## 2023-03-27 DIAGNOSIS — Z95.810 ICD (IMPLANTABLE CARDIOVERTER-DEFIBRILLATOR), BIVENTRICULAR, IN SITU: ICD-10-CM

## 2023-03-27 DIAGNOSIS — I50.42 CHRONIC COMBINED SYSTOLIC AND DIASTOLIC HEART FAILURE (HCC): Primary | ICD-10-CM

## 2023-03-27 DIAGNOSIS — E55.9 HYPOVITAMINOSIS D: ICD-10-CM

## 2023-03-27 DIAGNOSIS — G47.33 OSA (OBSTRUCTIVE SLEEP APNEA): ICD-10-CM

## 2023-03-27 DIAGNOSIS — I48.0 PAROXYSMAL ATRIAL FIBRILLATION (HCC): ICD-10-CM

## 2023-03-27 LAB
ANION GAP SERPL CALCULATED.3IONS-SCNC: 14 MMOL/L (ref 3–16)
BUN SERPL-MCNC: 8 MG/DL (ref 7–20)
CALCIUM SERPL-MCNC: 9.6 MG/DL (ref 8.3–10.6)
CHLORIDE SERPL-SCNC: 103 MMOL/L (ref 99–110)
CO2 SERPL-SCNC: 24 MMOL/L (ref 21–32)
CREAT SERPL-MCNC: 0.8 MG/DL (ref 0.6–1.1)
GFR SERPLBLD CREATININE-BSD FMLA CKD-EPI: >60 ML/MIN/{1.73_M2}
GLUCOSE SERPL-MCNC: 101 MG/DL (ref 70–99)
LV EF: 43 %
LVEF MODALITY: NORMAL
NT-PROBNP SERPL-MCNC: 169 PG/ML (ref 0–124)
POTASSIUM SERPL-SCNC: 3.6 MMOL/L (ref 3.5–5.1)
SODIUM SERPL-SCNC: 141 MMOL/L (ref 136–145)

## 2023-03-27 PROCEDURE — 36415 COLL VENOUS BLD VENIPUNCTURE: CPT

## 2023-03-27 PROCEDURE — G8417 CALC BMI ABV UP PARAM F/U: HCPCS | Performed by: CLINICAL NURSE SPECIALIST

## 2023-03-27 PROCEDURE — 80048 BASIC METABOLIC PNL TOTAL CA: CPT

## 2023-03-27 PROCEDURE — G8484 FLU IMMUNIZE NO ADMIN: HCPCS | Performed by: CLINICAL NURSE SPECIALIST

## 2023-03-27 PROCEDURE — 3075F SYST BP GE 130 - 139MM HG: CPT | Performed by: CLINICAL NURSE SPECIALIST

## 2023-03-27 PROCEDURE — 99214 OFFICE O/P EST MOD 30 MIN: CPT | Performed by: CLINICAL NURSE SPECIALIST

## 2023-03-27 PROCEDURE — 3017F COLORECTAL CA SCREEN DOC REV: CPT | Performed by: CLINICAL NURSE SPECIALIST

## 2023-03-27 PROCEDURE — 83880 ASSAY OF NATRIURETIC PEPTIDE: CPT

## 2023-03-27 PROCEDURE — G8427 DOCREV CUR MEDS BY ELIG CLIN: HCPCS | Performed by: CLINICAL NURSE SPECIALIST

## 2023-03-27 PROCEDURE — 3079F DIAST BP 80-89 MM HG: CPT | Performed by: CLINICAL NURSE SPECIALIST

## 2023-03-27 PROCEDURE — 93306 TTE W/DOPPLER COMPLETE: CPT

## 2023-03-27 PROCEDURE — 1036F TOBACCO NON-USER: CPT | Performed by: CLINICAL NURSE SPECIALIST

## 2023-03-27 NOTE — PROGRESS NOTES
[address]  Progress Note    Primary Care Doctor:  Samira Gardner    Chief Complaint   Patient presents with    Follow-up     Pt reports having headaches often. History of Present Illness:  61 y.o. female with history of sHF, LVH, AF on xarelto (follows with Dr Kole Hou), had been on flecainide, dual chamber pacemaker 2012 due to congenital heart block  LVEF had been 55% in 2015 and now 25%. No lisinopril due to cough. She is a cook at [employer]  Lip numbness to entresto 3/2020 hydralazine caused headaches  10/2020 EGD (hiatal hernia) and colonoscopy (polyp)   ICD placed 1/22/21, LHC done 1/20/21 normal cors  4/21 benign duodenal mass with resection Dr Aries Louis  4/7-19/2021 for symptomatic anemia, requiring surgery by Dr Aries Louis for overlying lipoma, large duodenum lesion requiring 3 clips  Hernia repair 9/27/2022    I had the pleasure of seeing Smitha Robison in follow up for systolic heart failure. She is ambulatory and by her self. She had an echo done today which preliminary 40-45%. She feels good, some headaches (not sure sinus issue or weather). She has not taken her medications yet today. She denies any chest pain, palpitations, lightheadedness, shortness of breath or edema. She had been on jardiance in the past but stopped as she is felt it made her back hurt. Her optival is normal.  No chest pain, palpitations, lightheadedness, edema or shortness of breath.     Past Medical History:   has a past medical history of Anemia, Atrial fibrillation and flutter ([address] Atrial flutter ([address] CHB (complete heart block) ([address] Class 1 obesity without serious comorbidity with body mass index (BMI) of 33.0 to 33.9 in adult, Congenital heart disease, Difficult intravenous access, GERD (gastroesophageal reflux disease), Headache(784.0), History of complete heart block, Hyperlipidemia, Hypertension, PONV (postoperative nausea and vomiting), Prolonged emergence from general Continue current medications.  Her mood is stable.

## 2023-03-27 NOTE — PATIENT INSTRUCTIONS
Start jardiance 10 mg once a day  Continue all other medications  Blood work pending  RTO in 5 months  Blood work again 1 month

## 2023-03-28 ENCOUNTER — TELEPHONE (OUTPATIENT)
Dept: CARDIOLOGY CLINIC | Age: 58
End: 2023-03-28

## 2023-03-28 NOTE — TELEPHONE ENCOUNTER
----- Message from CATRACHITO Martínez CNS sent at 3/27/2023  2:41 PM EDT -----  Blood work is great from today  Continue current medication plan  thanks

## 2023-04-02 NOTE — PROGRESS NOTES
Remote transmission received from patient's CRT-D home monitor. Transmission shows normal sensing and pacing function. NSVT noted (Coreg). AP 90.1%, CRT 99.4%, Effective 98.8%, VSRp 0.3%    Optivol is within normal range. TriageHF Heart Failure Risk Status on 23-Aug-2022 is Low*    EP/ NP will review. See interrogation under cardiology tab in the [address] [place] [address] [address] field for more details. Will continue to monitor remotely. (End of 91-day monitoring period 8/23/22). Non-compliant or not receiving treatment

## 2023-04-03 ENCOUNTER — TELEPHONE (OUTPATIENT)
Dept: SURGERY | Age: 58
End: 2023-04-03

## 2023-04-03 ENCOUNTER — HOSPITAL ENCOUNTER (OUTPATIENT)
Dept: PHYSICAL THERAPY | Age: 58
Setting detail: THERAPIES SERIES
Discharge: HOME | End: 2023-04-03
Payer: MEDICAID

## 2023-04-03 PROCEDURE — 97162 PT EVAL MOD COMPLEX 30 MIN: CPT

## 2023-04-03 NOTE — TELEPHONE ENCOUNTER
[facility] BELIEVES PATIENT HAS PELVIC FLOOR WEAKNESS ALSO AND WOULD LIKE TO KNOW IF YOU WOULD PUT IN AN ORDER FOR PELVIC FLOOR EVALUATION.    [phone]

## 2023-04-03 NOTE — FLOWSHEET NOTE
[facility] - Outpatient Physical Therapy  Phone: [phone]   Fax: [phone]    Physical Therapy Daily Treatment Note    Date:  2023     Patient Name:  Lino Aguila    :  1965  MRN: [id number]  Medical Diagnosis:  Incisional hernia without obstruction or gangrene [K43.2]  Treatment Diagnosis: abdominal weakness, pelvic floor weakness, hip weakness, limited ROM in lumb spine, poor body mechanics   Insurance/Certification information:  PT Insurance Information: [address] [place])  Physician Information:  Doug Xavier MD    Plan of care signed (Y/N): []  Yes [x]  No     Date of Patient follow up with Physician:      Progress Report: []  Yes  [x]  No     Date Range for reporting period:  Beginnin/3/2023  Ending:     Progress report due (10 Rx/or 30 days whichever is less): visit #10 or 1/3/90      Recertification due (POC duration/ or 90 days whichever is less): visit #10 or 7/3/23      Visit # Insurance Allowable Auth required? Date Range    + ? ? [x]  Yes  []  No 4/3/23-? Units approved Units used Date Range   ? ? ?        Latex Allergy:  [x]NO      []YES  Preferred Language for Healthcare:   [x]English       []other:    Functional Scale:       Date assessed:  PFDI-20: 154.17                                                                                4/3/23      Pain level:  4/10 pelvis and low back centrally     SUBJECTIVE:  See eval    OBJECTIVE: See eval      RESTRICTIONS/PRECAUTIONS: CHF    Exercises/Interventions:     Therapeutic Exercises (13502) Resistance / level Sets/sec Reps Notes   PPT  1 x10    PPT with marches  1 x10                                                     Therapeutic Activities (18184)                                   Gait (42661)                                   Neuromuscular Re-ed (28130)                                                 Manual Intervention ([phone])

## 2023-04-03 NOTE — PLAN OF CARE
good body mechanics with sitting, bending, and lifting   [] Reduced ability or tolerance with driving and/or computer work   []Reduced ability to perform lifting, reaching, carrying tasks   []Reduced ability to concentrate   []Reduced ability to sleep    []Reduced ability to tolerate any impact through    [x]Reduced ability to ambulate prolonged functional periods/distances   []other:    Participation Restrictions   []Reduced participation in self care activities   [x]Reduced participation in home management activities   []Reduced participation in work activities   [x]Reduced participation in social activities. []Reduced participation in sport/recreational activities.     Classification/Subgrouping:   [x]signs/symptoms consistent with deconditioning due to multiple sx and hernia    Prognosis/Rehab Potential:      []Excellent   []Good    [x]Fair   []Poor    Tolerance of evaluation/treatment:    []Excellent   []Good    [x]Fair   []Poor    Physical Therapy Evaluation Complexity Justification  [x] A history of present problem with:  [] no personal factors and/or comorbidities that impact the plan of care;  [x]1-2 personal factors and/or comorbidities that impact the plan of care  []3 personal factors and/or comorbidities that impact the plan of care  [x] An examination of body systems using standardized tests and measures addressing any of the following: body structures and functions (impairments), activity limitations, and/or participation restrictions;:  [] a total of 1-2 or more elements   [x] a total of 3 or more elements   [] a total of 4 or more elements   [x] A clinical presentation with:  [x] stable and/or uncomplicated characteristics   [] evolving clinical presentation with changing characteristics  [] unstable and unpredictable characteristics;   [x] Clinical decision making of [] low, [x] moderate, [] high complexity using standardized patient assessment instrument and/or measurable assessment of functional

## 2023-04-04 DIAGNOSIS — N81.89 WEAKNESS OF PELVIC FLOOR: Primary | ICD-10-CM

## 2023-04-20 ENCOUNTER — HOSPITAL ENCOUNTER (OUTPATIENT)
Dept: PHYSICAL THERAPY | Age: 58
Setting detail: THERAPIES SERIES
Discharge: HOME | End: 2023-04-20
Payer: MEDICAID

## 2023-04-20 PROCEDURE — 97140 MANUAL THERAPY 1/> REGIONS: CPT

## 2023-04-20 PROCEDURE — 97530 THERAPEUTIC ACTIVITIES: CPT

## 2023-04-20 PROCEDURE — 97110 THERAPEUTIC EXERCISES: CPT

## 2023-04-20 NOTE — FLOWSHEET NOTE
[facility] - Outpatient Physical Therapy  Phone: [phone]   Fax: [phone]    Physical Therapy Daily Treatment Note    Date:  2023     Patient Name:  Phani Copeland    :  1965  MRN: [id number]  Medical Diagnosis:  Incisional hernia without obstruction or gangrene [K43.2]  Treatment Diagnosis: abdominal weakness, pelvic floor weakness, hip weakness, limited ROM in lumb spine, poor body mechanics   Insurance/Certification information:  PT Insurance Information: [address] [place] needed)  Physician Information:  Tamika Spears MD    Plan of care signed (Y/N): [x]  Yes []  No     Date of Patient follow up with Physician:      Progress Report: []  Yes  [x]  No     Date Range for reporting period:  Beginnin/3/2023  Ending:     Progress report due (10 Rx/or 30 days whichever is less): visit #10 or 01      Recertification due (POC duration/ or 90 days whichever is less): visit #10 or 7/3/23      Visit # Insurance Allowable Auth required? Date Range   2 40 units [x]  Yes  []  No 4/3/23-23       Units approved Units used Date Range   40 3 4/3/23-23       Latex Allergy:  [x]NO      []YES  Preferred Language for Healthcare:   [x]English       []other:    Functional Scale:       Date assessed:  PFDI-20: 154.17                                                                                4/3/23      Pain level:  7/10 pelvis and low back centrally     SUBJECTIVE:  feeling okay but pain is high. Her back has been hurting since the hernia was removed. The 2 exercises made her back hurt more and required tylenol and HP to decrease it.  Wants to have pelvic floor checked    OBJECTIVE:   Pelvic Floor: 23  Observation  Skin condition good  Scarring  No  Perineal descent WNL  External clock  no pain  Contraction voluntary  Relaxation voluntary     Internal Assessment  Introitus no pain  Vaginal vault pressure  Internal clock pain at 4:00-8:00  Prolapse Test

## 2023-04-25 ENCOUNTER — HOSPITAL ENCOUNTER (OUTPATIENT)
Dept: PHYSICAL THERAPY | Age: 58
Setting detail: THERAPIES SERIES
Discharge: HOME | End: 2023-04-25
Payer: MEDICAID

## 2023-04-25 PROCEDURE — 97112 NEUROMUSCULAR REEDUCATION: CPT

## 2023-04-25 PROCEDURE — 97530 THERAPEUTIC ACTIVITIES: CPT

## 2023-04-25 PROCEDURE — 97110 THERAPEUTIC EXERCISES: CPT

## 2023-04-25 NOTE — FLOWSHEET NOTE
[facility] - Outpatient Physical Therapy  Phone: [phone]   Fax: [phone]    Physical Therapy Daily Treatment Note    Date:  2023     Patient Name:  Kelton Almendarez    :  1965  MRN: [id number]  Medical Diagnosis:  Incisional hernia without obstruction or gangrene [K43.2]  Treatment Diagnosis: abdominal weakness, pelvic floor weakness, hip weakness, limited ROM in lumb spine, poor body mechanics   Insurance/Certification information:  PT Insurance Information: [address] Jadiel House needed)  Physician Information:  Nia Pacheco MD    Plan of care signed (Y/N): [x]  Yes []  No     Date of Patient follow up with Physician:      Progress Report: []  Yes  [x]  No     Date Range for reporting period:  Beginnin/3/2023  Ending:     Progress report due (10 Rx/or 30 days whichever is less): visit #10 or 3/7/36      Recertification due (POC duration/ or 90 days whichever is less): visit #10 or 7/3/23      Visit # Insurance Allowable Auth required? Date Range   3 40 units [x]  Yes  []  No 4/3/23-23       Units approved Units used Date Range   40 3 4/3/23-23       Latex Allergy:  [x]NO      []YES  Preferred Language for Healthcare:   [x]English       []other:    Functional Scale:       Date assessed:  PFDI-20: 154.17                                                                                4/3/23      Pain level:  0/10 pelvis and low back centrally     SUBJECTIVE: No pain now, just soreness had bad pain, spasms in pelvis and LB after last visit. If the spasms come and she doesn't bend over, the pain goes down her leg and in the front of her stomach , then she can't walk for a week. Has been doing the stretching at home. Used ice initially then heat on her stomach and LB to decrease the pain. If she wears the back brace, then she can do anything and doesn't have the spasms. Felt like a larry in her back after therapy.   Bowels are the same, ate granola

## 2023-04-27 ENCOUNTER — TELEPHONE (OUTPATIENT)
Dept: CARDIOLOGY CLINIC | Age: 58
End: 2023-04-27

## 2023-05-02 ENCOUNTER — TELEPHONE (OUTPATIENT)
Dept: CARDIOLOGY CLINIC | Age: 58
End: 2023-05-02

## 2023-05-02 NOTE — TELEPHONE ENCOUNTER
We received remote transmission from patient's monitor at home. Transmission shows normal sensing and pacing function. Upper track rate is 130 bpm  Current ECG shows PPBVP at 83 bpm  No episodes noted. EP will review. See interrogation under cardiology tab in the [address] [facility] [address] field for more details. Please advise.  Thanks

## 2023-05-02 NOTE — TELEPHONE ENCOUNTER
Pt states that after having physical therapy her heart began to race and was sob. This lasted just short of 24 hours. Pt is sending manual transmission. Pt would like to be seen. Please review and call pt to advise.

## 2023-05-03 NOTE — TELEPHONE ENCOUNTER
Called and spoke to the patient and gave her message below and she verbalized understanding and also made here a follow up on 5/12/23 at 1130pm

## 2023-05-08 RX ORDER — POTASSIUM CHLORIDE 750 MG/1
TABLET, EXTENDED RELEASE ORAL
Qty: 90 TABLET | Refills: 0 | Status: SHIPPED | OUTPATIENT
Start: 2023-05-08 | End: 2023-05-12

## 2023-05-11 DIAGNOSIS — I50.42 CHRONIC COMBINED SYSTOLIC AND DIASTOLIC HEART FAILURE (HCC): ICD-10-CM

## 2023-05-12 ENCOUNTER — OFFICE VISIT (OUTPATIENT)
Dept: CARDIOLOGY CLINIC | Age: 58
End: 2023-05-12

## 2023-05-12 VITALS
HEIGHT: 67 IN | SYSTOLIC BLOOD PRESSURE: 110 MMHG | HEART RATE: 80 BPM | WEIGHT: 194.4 LBS | OXYGEN SATURATION: 99 % | BODY MASS INDEX: 30.51 KG/M2 | DIASTOLIC BLOOD PRESSURE: 72 MMHG

## 2023-05-12 DIAGNOSIS — I50.42 CHRONIC COMBINED SYSTOLIC AND DIASTOLIC HEART FAILURE (HCC): ICD-10-CM

## 2023-05-12 DIAGNOSIS — I50.22 SYSTOLIC CHF, CHRONIC (HCC): ICD-10-CM

## 2023-05-12 DIAGNOSIS — I48.0 PAF (PAROXYSMAL ATRIAL FIBRILLATION) (HCC): Primary | ICD-10-CM

## 2023-05-12 DIAGNOSIS — G47.33 OBSTRUCTIVE SLEEP APNEA (ADULT) (PEDIATRIC): ICD-10-CM

## 2023-05-12 DIAGNOSIS — E66.09 CLASS 1 OBESITY DUE TO EXCESS CALORIES WITH BODY MASS INDEX (BMI) OF 31.0 TO 31.9 IN ADULT, UNSPECIFIED WHETHER SERIOUS COMORBIDITY PRESENT: ICD-10-CM

## 2023-05-12 DIAGNOSIS — I42.0 DILATED CARDIOMYOPATHY (HCC): ICD-10-CM

## 2023-05-12 DIAGNOSIS — I10 PRIMARY HYPERTENSION: ICD-10-CM

## 2023-05-12 DIAGNOSIS — I51.7 LVH (LEFT VENTRICULAR HYPERTROPHY): ICD-10-CM

## 2023-05-12 PROBLEM — R42 DIZZINESS: Status: RESOLVED | Noted: 2022-03-23 | Resolved: 2023-05-12

## 2023-05-12 RX ORDER — MELATONIN
1000 DAILY
Qty: 90 TABLET | Refills: 3 | Status: SHIPPED | OUTPATIENT
Start: 2023-05-12

## 2023-05-12 RX ORDER — POTASSIUM CHLORIDE 20 MEQ/1
20 TABLET, EXTENDED RELEASE ORAL DAILY
Qty: 90 TABLET | Refills: 3 | Status: SHIPPED | OUTPATIENT
Start: 2023-05-12

## 2023-05-12 RX ORDER — M-VIT,TX,IRON,MINS/CALC/FOLIC 27MG-0.4MG
1 TABLET ORAL DAILY
Qty: 90 TABLET | Refills: 3 | Status: SHIPPED | OUTPATIENT
Start: 2023-05-12

## 2023-05-12 RX ORDER — FUROSEMIDE 20 MG/1
TABLET ORAL
Qty: 60 TABLET | Refills: 1 | Status: SHIPPED | OUTPATIENT
Start: 2023-05-12

## 2023-05-23 ENCOUNTER — OFFICE VISIT (OUTPATIENT)
Dept: CARDIOLOGY CLINIC | Age: 58
End: 2023-05-23
Payer: MEDICAID

## 2023-05-23 ENCOUNTER — NURSE ONLY (OUTPATIENT)
Dept: CARDIOLOGY CLINIC | Age: 58
End: 2023-05-23

## 2023-05-23 VITALS
WEIGHT: 197 LBS | HEIGHT: 66 IN | BODY MASS INDEX: 31.66 KG/M2 | HEART RATE: 78 BPM | OXYGEN SATURATION: 98 % | DIASTOLIC BLOOD PRESSURE: 80 MMHG | SYSTOLIC BLOOD PRESSURE: 134 MMHG

## 2023-05-23 DIAGNOSIS — E55.9 HYPOVITAMINOSIS D: ICD-10-CM

## 2023-05-23 DIAGNOSIS — G47.33 OBSTRUCTIVE SLEEP APNEA (ADULT) (PEDIATRIC): ICD-10-CM

## 2023-05-23 DIAGNOSIS — I42.0 DILATED CARDIOMYOPATHY (HCC): Primary | ICD-10-CM

## 2023-05-23 DIAGNOSIS — Z95.810 ICD (IMPLANTABLE CARDIOVERTER-DEFIBRILLATOR) IN PLACE: ICD-10-CM

## 2023-05-23 DIAGNOSIS — I48.0 PAF (PAROXYSMAL ATRIAL FIBRILLATION) (HCC): ICD-10-CM

## 2023-05-23 DIAGNOSIS — I50.22 SYSTOLIC CHF, CHRONIC (HCC): ICD-10-CM

## 2023-05-23 DIAGNOSIS — I50.42 CHRONIC COMBINED SYSTOLIC AND DIASTOLIC HEART FAILURE (HCC): Primary | ICD-10-CM

## 2023-05-23 PROCEDURE — 99214 OFFICE O/P EST MOD 30 MIN: CPT | Performed by: CLINICAL NURSE SPECIALIST

## 2023-05-23 PROCEDURE — 3079F DIAST BP 80-89 MM HG: CPT | Performed by: CLINICAL NURSE SPECIALIST

## 2023-05-23 PROCEDURE — G8417 CALC BMI ABV UP PARAM F/U: HCPCS | Performed by: CLINICAL NURSE SPECIALIST

## 2023-05-23 PROCEDURE — 3017F COLORECTAL CA SCREEN DOC REV: CPT | Performed by: CLINICAL NURSE SPECIALIST

## 2023-05-23 PROCEDURE — 3075F SYST BP GE 130 - 139MM HG: CPT | Performed by: CLINICAL NURSE SPECIALIST

## 2023-05-23 PROCEDURE — G8427 DOCREV CUR MEDS BY ELIG CLIN: HCPCS | Performed by: CLINICAL NURSE SPECIALIST

## 2023-05-23 PROCEDURE — 1036F TOBACCO NON-USER: CPT | Performed by: CLINICAL NURSE SPECIALIST

## 2023-05-23 RX ORDER — CARVEDILOL 12.5 MG/1
TABLET ORAL
Qty: 270 TABLET | Refills: 0 | Status: SHIPPED | OUTPATIENT
Start: 2023-05-23

## 2023-05-23 NOTE — PATIENT INSTRUCTIONS
Increase coreg to 25 mg in am and 12.5 mg in the evening  Check thyroid and magnesium  Continue all other medications

## 2023-05-23 NOTE — PROGRESS NOTES
Remote transmission received from patient's CRT-D home monitor. Transmission shows normal sensing and pacing function. No arrhythmias/ or events. AP 87.6%, CRT 98.4%, Effective 97.4%, VSRp 0.4%    Optivol is within normal range. TriageHF Heart Failure Risk Status on 23-May-2023 is Low    NP will review. See interrogation under cardiology tab in the [address] [place] [address] [address] field for more details. Will continue to monitor remotely.     (End of 31-day monitoring period 5/23/23)

## 2023-05-23 NOTE — PROGRESS NOTES
[facility] Surgical History:   has a past surgical history that includes Uterine fibroid surgery; Pacemaker insertion (11/29/2012); Tubal ligation; Upper gastrointestinal endoscopy (N/A, 10/27/2020); Colonoscopy (N/A, 10/27/2020); Cardiac defibrillator placement (01/2021); Upper gastrointestinal endoscopy (N/A, 04/08/2021); Upper gastrointestinal endoscopy (N/A, 04/08/2021); Upper gastrointestinal endoscopy (N/A, 04/08/2021); colectomy (N/A, 04/13/2021); Cardioversion (01/28/2022); Cardioversion (02/22/2022); Upper gastrointestinal endoscopy (N/A, 03/04/2022); and ventral hernia repair (N/A, 9/27/2022). Social History:    reports that she has never smoked. She has never used smokeless tobacco. She reports that she does not drink alcohol and does not use drugs. Family History:   Family History   Problem Relation Age of Onset    High Blood Pressure Mother     Cancer Father     Heart Disease Neg Hx     High Cholesterol Neg Hx        Home Medications:  Prior to Admission medications    Medication Sig Start Date End Date Taking?  Authorizing Provider   furosemide (LASIX) 20 MG tablet TAKE TWO TABLETS BY MOUTH DAILY 5/12/23  Yes CATRACHITO Avina   vitamin D3 (CHOLECALCIFEROL) 25 MCG (1000 UT) TABS tablet Take 1 tablet by mouth daily 5/12/23  Yes CATRACHITO Brush CNP   potassium chloride (KLOR-CON M) 20 MEQ extended release tablet Take 1 tablet by mouth daily 5/12/23  Yes CATRACHITO Brush CNP   Multiple Vitamins-Minerals (THERAPEUTIC MULTIVITAMIN-MINERALS) tablet Take 1 tablet by mouth daily 5/12/23  Yes CATRACHITO Brush CNP   carvedilol (COREG) 12.5 MG tablet Take 1 tablet by mouth 2 times daily 2/23/23  Yes CATRACHITO Brush CNP   pantoprazole (PROTONIX) 40 MG tablet Take 1 tablet by mouth every morning (before breakfast) 2/23/23  Yes CATRACHITO Brush CNP   rivaroxaban (XARELTO) 20 MG TABS tablet Take 1 tablet by mouth daily (with breakfast) 2/23/23  Yes Jake Rosas,

## 2023-06-06 DIAGNOSIS — I50.42 CHRONIC COMBINED SYSTOLIC AND DIASTOLIC HEART FAILURE (HCC): ICD-10-CM

## 2023-06-06 RX ORDER — SACUBITRIL AND VALSARTAN 49; 51 MG/1; MG/1
TABLET, FILM COATED ORAL
Qty: 180 TABLET | Refills: 1 | Status: SHIPPED | OUTPATIENT
Start: 2023-06-06 | End: 2023-06-15 | Stop reason: SDUPTHER

## 2023-06-08 DIAGNOSIS — I50.42 CHRONIC COMBINED SYSTOLIC AND DIASTOLIC HEART FAILURE (HCC): ICD-10-CM

## 2023-06-09 RX ORDER — MULTIVIT-MIN/IRON FUM/FOLIC AC 19 MG-400
TABLET ORAL
Qty: 90 TABLET | Refills: 3 | Status: SHIPPED | OUTPATIENT
Start: 2023-06-09

## 2023-06-15 DIAGNOSIS — I50.42 CHRONIC COMBINED SYSTOLIC AND DIASTOLIC HEART FAILURE (HCC): ICD-10-CM

## 2023-06-15 RX ORDER — SACUBITRIL AND VALSARTAN 49; 51 MG/1; MG/1
1 TABLET, FILM COATED ORAL 2 TIMES DAILY
Qty: 180 TABLET | Refills: 1 | Status: SHIPPED | OUTPATIENT
Start: 2023-06-15

## 2023-06-15 RX ORDER — SPIRONOLACTONE 25 MG/1
TABLET ORAL
Qty: 90 TABLET | Refills: 1 | OUTPATIENT
Start: 2023-06-15

## 2023-06-22 DIAGNOSIS — I50.42 CHRONIC COMBINED SYSTOLIC AND DIASTOLIC HEART FAILURE (HCC): ICD-10-CM

## 2023-06-22 NOTE — TELEPHONE ENCOUNTER
Last OV:05/23/2023   Aprn Giesting  Last Labs:-  Next OV: 08/28/2023  Aprn Yonathan Elmore  Last Refill: Spironolactone-09/07/2022  Tino Mclaughlin

## 2023-06-23 RX ORDER — SPIRONOLACTONE 25 MG/1
TABLET ORAL
Qty: 90 TABLET | Refills: 1 | Status: SHIPPED | OUTPATIENT
Start: 2023-06-23

## 2023-06-27 ENCOUNTER — NURSE ONLY (OUTPATIENT)
Dept: CARDIOLOGY CLINIC | Age: 58
End: 2023-06-27
Payer: MEDICAID

## 2023-06-27 DIAGNOSIS — Z95.810 AICD (AUTOMATIC CARDIOVERTER/DEFIBRILLATOR) PRESENT: Primary | ICD-10-CM

## 2023-06-27 DIAGNOSIS — I50.22 SYSTOLIC CHF, CHRONIC (HCC): ICD-10-CM

## 2023-06-27 DIAGNOSIS — I42.0 DILATED CARDIOMYOPATHY (HCC): ICD-10-CM

## 2023-06-27 PROCEDURE — 93296 REM INTERROG EVL PM/IDS: CPT | Performed by: INTERNAL MEDICINE

## 2023-06-27 PROCEDURE — 93297 REM INTERROG DEV EVAL ICPMS: CPT | Performed by: CLINICAL NURSE SPECIALIST

## 2023-06-27 PROCEDURE — 93295 DEV INTERROG REMOTE 1/2/MLT: CPT | Performed by: INTERNAL MEDICINE

## 2023-07-03 ENCOUNTER — TELEPHONE (OUTPATIENT)
Dept: CARDIOLOGY CLINIC | Age: 58
End: 2023-07-03

## 2023-07-03 NOTE — TELEPHONE ENCOUNTER
We received remote transmission from patient's monitor at home. Transmission shows normal sensing and pacing function. Current ECG PPBVP at 88 bpm  No episodes noted. EP will review. See interrogation under cardiology tab in the [address] field for more details. Please advise.

## 2023-07-03 NOTE — TELEPHONE ENCOUNTER
I piter kelley [facility]. Pt still wanted to be seen. She was scheduled with NPSR on Thursday.

## 2023-07-03 NOTE — TELEPHONE ENCOUNTER
Pt has an appt 8/23/23, but states she is having episodes of afib and she has to sit down. Asking to be seen sooner.  Please call to advise

## 2023-07-05 NOTE — PROGRESS NOTES
program discussed: To improve overall cardiovascular health, the patient is instructed to increase cardiovascular related activities with a goal of 150 min/week of moderate level activity or 10,000 steps per day. Encouraged to perform as much activity as tolerated    I have addressed the patient's cardiac risk factors and adjusted pharmacologic treatment as needed. In addition, I have reinforced the need for patient directed risk factor modification. I independently reviewed the device check interrogation and ECG    All questions and concerns were addressed with the patient. Alternatives to treatment were discussed. Thank you for allowing to us to participate in the care of Linda Espinokrysta    Time  30 minutes spent preparing to see patient including reviewing patient history/prior tests/prior consults, performing a medical exam, counseling and educating patient/family/caregiver, ordering medications/tests/procedures, referring and communicating with PCPs and other pertinent consultants, documenting information in the EMR, independently interpreting results and communicating to family and coordination of patient care.     CATRACHITO Fulton-CNP  [facility]   Office: [phone]

## 2023-07-06 ENCOUNTER — NURSE ONLY (OUTPATIENT)
Dept: CARDIOLOGY CLINIC | Age: 58
End: 2023-07-06
Payer: MEDICAID

## 2023-07-06 ENCOUNTER — HOSPITAL ENCOUNTER (OUTPATIENT)
Age: 58
Discharge: HOME | End: 2023-07-06
Payer: MEDICAID

## 2023-07-06 ENCOUNTER — OFFICE VISIT (OUTPATIENT)
Dept: CARDIOLOGY CLINIC | Age: 58
End: 2023-07-06
Payer: MEDICAID

## 2023-07-06 VITALS
BODY MASS INDEX: 31.39 KG/M2 | HEART RATE: 85 BPM | OXYGEN SATURATION: 98 % | WEIGHT: 200 LBS | DIASTOLIC BLOOD PRESSURE: 90 MMHG | SYSTOLIC BLOOD PRESSURE: 130 MMHG | HEIGHT: 67 IN

## 2023-07-06 DIAGNOSIS — Z95.810 AICD (AUTOMATIC CARDIOVERTER/DEFIBRILLATOR) PRESENT: Primary | ICD-10-CM

## 2023-07-06 DIAGNOSIS — Z95.810 ICD (IMPLANTABLE CARDIOVERTER-DEFIBRILLATOR), BIVENTRICULAR, IN SITU: ICD-10-CM

## 2023-07-06 DIAGNOSIS — I51.7 LVH (LEFT VENTRICULAR HYPERTROPHY): ICD-10-CM

## 2023-07-06 DIAGNOSIS — I10 PRIMARY HYPERTENSION: ICD-10-CM

## 2023-07-06 DIAGNOSIS — Q24.6 CONGENITAL HEART BLOCK: ICD-10-CM

## 2023-07-06 DIAGNOSIS — I50.42 CHRONIC COMBINED SYSTOLIC AND DIASTOLIC HEART FAILURE (HCC): ICD-10-CM

## 2023-07-06 DIAGNOSIS — F41.9 ANXIETY: ICD-10-CM

## 2023-07-06 DIAGNOSIS — I50.22 SYSTOLIC CHF, CHRONIC (HCC): ICD-10-CM

## 2023-07-06 DIAGNOSIS — I48.0 PAF (PAROXYSMAL ATRIAL FIBRILLATION) (HCC): ICD-10-CM

## 2023-07-06 DIAGNOSIS — I42.0 DILATED CARDIOMYOPATHY (HCC): ICD-10-CM

## 2023-07-06 DIAGNOSIS — I47.29 PAROXYSMAL VENTRICULAR TACHYCARDIA (HCC): ICD-10-CM

## 2023-07-06 DIAGNOSIS — I51.9 LV DYSFUNCTION: ICD-10-CM

## 2023-07-06 DIAGNOSIS — I48.19 PERSISTENT ATRIAL FIBRILLATION (HCC): ICD-10-CM

## 2023-07-06 DIAGNOSIS — G47.33 OBSTRUCTIVE SLEEP APNEA (ADULT) (PEDIATRIC): ICD-10-CM

## 2023-07-06 LAB
MAGNESIUM SERPL-MCNC: 1.5 MG/DL (ref 1.8–2.4)
TSH SERPL DL<=0.005 MIU/L-ACNC: 0.74 UIU/ML (ref 0.27–4.2)

## 2023-07-06 PROCEDURE — G8427 DOCREV CUR MEDS BY ELIG CLIN: HCPCS | Performed by: NURSE PRACTITIONER

## 2023-07-06 PROCEDURE — G8417 CALC BMI ABV UP PARAM F/U: HCPCS | Performed by: NURSE PRACTITIONER

## 2023-07-06 PROCEDURE — 83735 ASSAY OF MAGNESIUM: CPT

## 2023-07-06 PROCEDURE — 3075F SYST BP GE 130 - 139MM HG: CPT | Performed by: NURSE PRACTITIONER

## 2023-07-06 PROCEDURE — 36415 COLL VENOUS BLD VENIPUNCTURE: CPT

## 2023-07-06 PROCEDURE — 1036F TOBACCO NON-USER: CPT | Performed by: NURSE PRACTITIONER

## 2023-07-06 PROCEDURE — 3017F COLORECTAL CA SCREEN DOC REV: CPT | Performed by: NURSE PRACTITIONER

## 2023-07-06 PROCEDURE — 3080F DIAST BP >= 90 MM HG: CPT | Performed by: NURSE PRACTITIONER

## 2023-07-06 PROCEDURE — 99214 OFFICE O/P EST MOD 30 MIN: CPT | Performed by: NURSE PRACTITIONER

## 2023-07-06 PROCEDURE — 84443 ASSAY THYROID STIM HORMONE: CPT

## 2023-07-06 PROCEDURE — 93284 PRGRMG EVAL IMPLANTABLE DFB: CPT | Performed by: INTERNAL MEDICINE

## 2023-07-06 NOTE — PROGRESS NOTES
Patient comes in for programming evaluation for her defibrillator. Medtronic [id number] MRI Quad CRT-D-1/22/2021  Hx: HTN, CHF, AF, CHB s/p PPM, and VT    All sensing and pacing parameters are within normal range. Battery life 4.0 years  AP 87.7%.  98.2%. Effective 97.0%  Underlying 2:1 HB  7 AT/AF episodes noted with a <0.1% burden. Last on 6/17/2023, longest 9 minutes. 2 NSVT episodes noted. Last on 5/23/2023 longest 1 second. Patient remains on Xarelto and Coreg. Changed ALL pathways per Medtronic FCA all pathways  B >AX  Please see interrogation for more detail. Optivol is within normal range. Patient will see NPSR today and follow up in 3 months in office or remotely.

## 2023-07-07 ENCOUNTER — TELEPHONE (OUTPATIENT)
Dept: CARDIOLOGY CLINIC | Age: 58
End: 2023-07-07

## 2023-07-07 RX ORDER — MAGNESIUM OXIDE 400 MG/1
400 TABLET ORAL DAILY
Qty: 30 TABLET | Refills: 1 | Status: SHIPPED | OUTPATIENT
Start: 2023-07-07

## 2023-07-07 NOTE — TELEPHONE ENCOUNTER
----- Message from CATRACHITO Rojas CNP sent at 7/7/2023  9:28 AM EDT -----  Thyroid is ok, mag is low, I sent script for mg ox once a day. Felipe Javier to patient she verbalized understanding.

## 2023-07-17 ENCOUNTER — OFFICE VISIT (OUTPATIENT)
Dept: SURGERY | Age: 58
End: 2023-07-17
Payer: MEDICAID

## 2023-07-17 VITALS — BODY MASS INDEX: 31.64 KG/M2 | WEIGHT: 199 LBS | DIASTOLIC BLOOD PRESSURE: 86 MMHG | SYSTOLIC BLOOD PRESSURE: 136 MMHG

## 2023-07-17 DIAGNOSIS — K43.2 INCISIONAL HERNIA WITHOUT OBSTRUCTION OR GANGRENE: Primary | ICD-10-CM

## 2023-07-17 PROCEDURE — G8427 DOCREV CUR MEDS BY ELIG CLIN: HCPCS | Performed by: SURGERY

## 2023-07-17 PROCEDURE — 1036F TOBACCO NON-USER: CPT | Performed by: SURGERY

## 2023-07-17 PROCEDURE — 99213 OFFICE O/P EST LOW 20 MIN: CPT | Performed by: SURGERY

## 2023-07-17 PROCEDURE — 3075F SYST BP GE 130 - 139MM HG: CPT | Performed by: SURGERY

## 2023-07-17 PROCEDURE — G8417 CALC BMI ABV UP PARAM F/U: HCPCS | Performed by: SURGERY

## 2023-07-17 PROCEDURE — 3017F COLORECTAL CA SCREEN DOC REV: CPT | Performed by: SURGERY

## 2023-07-17 PROCEDURE — 3079F DIAST BP 80-89 MM HG: CPT | Performed by: SURGERY

## 2023-07-17 NOTE — PATIENT INSTRUCTIONS
We will order CT of abdomen pelvis to better define the right lower quadrant bulge.   Possible recurrent hernia versus relative eventration of abdominal contents secondary to relative weakness of the abdominal wall related to component release  Diet and activity as tolerated  Continue follow-up with cardiology regarding pacemaker issues  Return to office in approximately 1 month to discuss results progress and further options

## 2023-07-24 ENCOUNTER — HOSPITAL ENCOUNTER (OUTPATIENT)
Dept: CT IMAGING | Age: 58
Discharge: HOME | End: 2023-07-24
Attending: SURGERY
Payer: MEDICAID

## 2023-07-24 DIAGNOSIS — K43.2 INCISIONAL HERNIA WITHOUT OBSTRUCTION OR GANGRENE: ICD-10-CM

## 2023-07-24 LAB
BUN SERPL-MCNC: 13 MG/DL (ref 7–20)
CREAT SERPL-MCNC: 0.8 MG/DL (ref 0.6–1.1)
GFR SERPLBLD CREATININE-BSD FMLA CKD-EPI: >60 ML/MIN/{1.73_M2}

## 2023-07-24 PROCEDURE — C1751 CATH, INF, PER/CENT/MIDLINE: HCPCS

## 2023-07-24 PROCEDURE — 74177 CT ABD & PELVIS W/CONTRAST: CPT

## 2023-07-24 PROCEDURE — 6360000004 HC RX CONTRAST MEDICATION: Performed by: SURGERY

## 2023-07-24 PROCEDURE — 36415 COLL VENOUS BLD VENIPUNCTURE: CPT

## 2023-07-24 PROCEDURE — 82565 ASSAY OF CREATININE: CPT

## 2023-07-24 PROCEDURE — 84520 ASSAY OF UREA NITROGEN: CPT

## 2023-07-24 PROCEDURE — 36569 INSJ PICC 5 YR+ W/O IMAGING: CPT

## 2023-07-24 RX ADMIN — DIATRIZOATE MEGLUMINE AND DIATRIZOATE SODIUM 20 ML: 600; 100 SOLUTION ORAL; RECTAL at 09:24

## 2023-07-24 RX ADMIN — IOPAMIDOL 75 ML: 755 INJECTION, SOLUTION INTRAVENOUS at 09:24

## 2023-07-24 NOTE — CONSULTS
Patient tolerated 1/2 popsicle and now eating ice chips.      Sherwin Ayers RN  11/13/18 1516 TRIMMABLE POWER MIDLINE PROCEDURE NOTE  Chart reviewed for allergies, diagnosis, labs, known contraindications, reason for line placement and planned length of treatment. Insertion procedure discussed with patient/family member. Informed consent not required for Midline placement. Three patient identifiers - Patient name,   and MRN -  completed &  confirmed verbally. Hat, mask and eye shield donned. Midline site scrubbed with Chloraprep  One-Step applicator  for 30 seconds x 1. Hand Hygiene  performed with 3% Chlorhexidine surgical scrub x1 min prior to  Sterile gloves, sterile gown being donned. Patient draped using maximal sterile barrier technique ( head to toe ). Midline site scrubbed a 2nd time with Chloraprep One-Step applicator x 30 sec. Vein located by [employer] and site marked with sterile pen. 1% Lidocaine 5 ml injected intradermal pre-insertion. Midline inserted. Positive brisk blood return obtained. Valve applied to lumen. Midline flushed with 10 mls  0.9% Sterile Sodium Chloride. Midline flushes easily with no resistance. Skin prep applied to site. Catheter secured with non-sutured locking device per hospital protocol. Bio-patch/CHG impregnated sterile tegaderm dressing applied. Alcohol Swab Caps applied to valve. Sterile field maintained during procedure. Positioning wire accounted for post procedure. Pt. Response to procedure, tolerated well. Appearance of site: Clean dry and intact without bleeding or edema. All edges of Tegaderm occlusive. Site marked with date and initials of RN placing line. Top 2 side rails in up position, call button in reach, RN notified of all of the above. A Trimmable Power Midline   13 CM placed in the  RAÚL  Brachial  vein. 0 cm showing from insertion site.

## 2023-07-26 DIAGNOSIS — I50.42 CHRONIC COMBINED SYSTOLIC AND DIASTOLIC HEART FAILURE (HCC): ICD-10-CM

## 2023-07-26 RX ORDER — FUROSEMIDE 20 MG/1
40 TABLET ORAL DAILY
Qty: 60 TABLET | Refills: 1 | Status: SHIPPED | OUTPATIENT
Start: 2023-07-26 | End: 2023-08-28

## 2023-07-28 ENCOUNTER — OFFICE VISIT (OUTPATIENT)
Dept: SURGERY | Age: 58
End: 2023-07-28
Payer: MEDICAID

## 2023-07-28 VITALS — DIASTOLIC BLOOD PRESSURE: 86 MMHG | BODY MASS INDEX: 31.64 KG/M2 | SYSTOLIC BLOOD PRESSURE: 120 MMHG | WEIGHT: 199 LBS

## 2023-07-28 DIAGNOSIS — K43.2 INCISIONAL HERNIA WITHOUT OBSTRUCTION OR GANGRENE: Primary | ICD-10-CM

## 2023-07-28 PROCEDURE — G8417 CALC BMI ABV UP PARAM F/U: HCPCS | Performed by: SURGERY

## 2023-07-28 PROCEDURE — 3079F DIAST BP 80-89 MM HG: CPT | Performed by: SURGERY

## 2023-07-28 PROCEDURE — G8427 DOCREV CUR MEDS BY ELIG CLIN: HCPCS | Performed by: SURGERY

## 2023-07-28 PROCEDURE — 3017F COLORECTAL CA SCREEN DOC REV: CPT | Performed by: SURGERY

## 2023-07-28 PROCEDURE — 99213 OFFICE O/P EST LOW 20 MIN: CPT | Performed by: SURGERY

## 2023-07-28 PROCEDURE — 1036F TOBACCO NON-USER: CPT | Performed by: SURGERY

## 2023-07-28 PROCEDURE — 3074F SYST BP LT 130 MM HG: CPT | Performed by: SURGERY

## 2023-07-28 NOTE — PROGRESS NOTES
[facility] Laparoscopic [facility]  SUBJECTIVE:    Chief Complaint: incisional hernia    Rabia Patel   1965   60 y.o. female presents for followup regarding right lower quadrant pain and a bulge. Known to me after OR Date 9/27/2022, exploratory laparotomy with repair of reducible incisional hernia, bilateral transverse abdominis component releases with mesh placement, and lysis of adhesions. Recently noted a right lower quadrant bulge and postprandial discomfort. Pain and fullness is improving, tolerating diet and passing stools normally.  Interim CT does confirm incisional hernia in right lower quadrant lateral to the mesh containing but not obstructing bowel    Past Medical History:   Diagnosis Date    Anemia     Atrial fibrillation and flutter (HCC)     Atrial flutter (HCC)     CHB (complete heart block) (HCC)     Class 1 obesity without serious comorbidity with body mass index (BMI) of 33.0 to 33.9 in adult 09/06/2019    Congenital heart disease     Difficult intravenous access 10/06/2022    SEE NOTE    GERD (gastroesophageal reflux disease)     Headache(784.0)     History of complete heart block     Hyperlipidemia     Hypertension     PONV (postoperative nausea and vomiting)     Prolonged emergence from general anesthesia     sensitive to meds, slow to wake    Sleep apnea     uses CPAP    Syncope     Systolic CHF, chronic ([address]) 10/03/2018     Past Surgical History:   Procedure Laterality Date    CARDIAC DEFIBRILLATOR PLACEMENT  01/2021    Medtronic    CARDIOVERSION  01/28/2022    CARDIOVERSION  02/22/2022    COLECTOMY N/A 04/13/2021    EXPLORATORY LAPAROTOMY, RESECTION OF DUODENAL MASS, CHOLECYSTECTOMY WITH INTRAOPERATIVE CHOLANGIOGRAM performed by Trina Kirk MD at [address] [place] [address] N/A 10/27/2020    COLONOSCOPY POLYPECTOMY SNARE/COLD BIOPSY performed by Solitario Ferrara MD at [facility]  11/29/2012    dual chamber PPM, Medtronic

## 2023-07-31 RX ORDER — RIVAROXABAN 20 MG/1
TABLET, FILM COATED ORAL
Qty: 90 TABLET | Refills: 3 | Status: SHIPPED | OUTPATIENT
Start: 2023-07-31

## 2023-07-31 NOTE — TELEPHONE ENCOUNTER
Last OV : 7/6/23 npsr    Last Labs : 3/27/23 bmp     Last Refill : 2/23/23 90w1     Next OV : 8/23/23 rmm

## 2023-08-04 ENCOUNTER — APPOINTMENT (OUTPATIENT)
Dept: GENERAL RADIOLOGY | Age: 58
End: 2023-08-04
Payer: MEDICAID

## 2023-08-04 ENCOUNTER — TELEPHONE (OUTPATIENT)
Dept: CARDIOLOGY CLINIC | Age: 58
End: 2023-08-04

## 2023-08-04 ENCOUNTER — HOSPITAL ENCOUNTER (EMERGENCY)
Age: 58
Discharge: HOME | End: 2023-08-04
Attending: EMERGENCY MEDICINE
Payer: MEDICAID

## 2023-08-04 VITALS
OXYGEN SATURATION: 97 % | BODY MASS INDEX: 31.48 KG/M2 | HEART RATE: 87 BPM | SYSTOLIC BLOOD PRESSURE: 140 MMHG | TEMPERATURE: 98.9 F | WEIGHT: 198 LBS | DIASTOLIC BLOOD PRESSURE: 80 MMHG | RESPIRATION RATE: 18 BRPM

## 2023-08-04 DIAGNOSIS — E87.6 HYPOKALEMIA: ICD-10-CM

## 2023-08-04 DIAGNOSIS — E83.42 HYPOMAGNESEMIA: ICD-10-CM

## 2023-08-04 DIAGNOSIS — U07.1 COVID-19: Primary | ICD-10-CM

## 2023-08-04 LAB
ALBUMIN SERPL-MCNC: 4.3 G/DL (ref 3.4–5)
ALBUMIN/GLOB SERPL: 1.7 {RATIO} (ref 1.1–2.2)
ALP SERPL-CCNC: 61 U/L (ref 40–129)
ALT SERPL-CCNC: 24 U/L (ref 10–40)
ANION GAP SERPL CALCULATED.3IONS-SCNC: 10 MMOL/L (ref 3–16)
AST SERPL-CCNC: 23 U/L (ref 15–37)
BASOPHILS # BLD: 0 K/UL (ref 0–0.2)
BASOPHILS NFR BLD: 0.7 %
BILIRUB SERPL-MCNC: 0.6 MG/DL (ref 0–1)
BILIRUB UR QL STRIP.AUTO: NEGATIVE
BUN SERPL-MCNC: 9 MG/DL (ref 7–20)
CALCIUM SERPL-MCNC: 9.3 MG/DL (ref 8.3–10.6)
CHLORIDE SERPL-SCNC: 105 MMOL/L (ref 99–110)
CLARITY UR: CLEAR
CO2 SERPL-SCNC: 24 MMOL/L (ref 21–32)
COLOR UR: YELLOW
CREAT SERPL-MCNC: 1 MG/DL (ref 0.6–1.1)
DEPRECATED RDW RBC AUTO: 14.4 % (ref 12.4–15.4)
EOSINOPHIL # BLD: 0 K/UL (ref 0–0.6)
EOSINOPHIL NFR BLD: 0.3 %
GFR SERPLBLD CREATININE-BSD FMLA CKD-EPI: >60 ML/MIN/{1.73_M2}
GLUCOSE SERPL-MCNC: 103 MG/DL (ref 70–99)
GLUCOSE UR STRIP.AUTO-MCNC: NEGATIVE MG/DL
HCT VFR BLD AUTO: 33.3 % (ref 36–48)
HGB BLD-MCNC: 11.2 G/DL (ref 12–16)
HGB UR QL STRIP.AUTO: NEGATIVE
KETONES UR STRIP.AUTO-MCNC: NEGATIVE MG/DL
LACTATE BLDV-SCNC: 1.4 MMOL/L (ref 0.4–1.9)
LEUKOCYTE ESTERASE UR QL STRIP.AUTO: NEGATIVE
LYMPHOCYTES # BLD: 0.5 K/UL (ref 1–5.1)
LYMPHOCYTES NFR BLD: 8.6 %
MAGNESIUM SERPL-MCNC: 1.7 MG/DL (ref 1.8–2.4)
MCH RBC QN AUTO: 31.2 PG (ref 26–34)
MCHC RBC AUTO-ENTMCNC: 33.6 G/DL (ref 31–36)
MCV RBC AUTO: 92.9 FL (ref 80–100)
MONOCYTES # BLD: 0.9 K/UL (ref 0–1.3)
MONOCYTES NFR BLD: 15.1 %
NEUTROPHILS # BLD: 4.7 K/UL (ref 1.7–7.7)
NEUTROPHILS NFR BLD: 75.3 %
NITRITE UR QL STRIP.AUTO: NEGATIVE
NT-PROBNP SERPL-MCNC: 431 PG/ML (ref 0–124)
PH UR STRIP.AUTO: 7.5 [PH] (ref 5–8)
PLATELET # BLD AUTO: 181 K/UL (ref 135–450)
PMV BLD AUTO: 7.8 FL (ref 5–10.5)
POTASSIUM SERPL-SCNC: 3.2 MMOL/L (ref 3.5–5.1)
PROT SERPL-MCNC: 6.9 G/DL (ref 6.4–8.2)
PROT UR STRIP.AUTO-MCNC: NEGATIVE MG/DL
RBC # BLD AUTO: 3.59 M/UL (ref 4–5.2)
SODIUM SERPL-SCNC: 139 MMOL/L (ref 136–145)
SP GR UR STRIP.AUTO: 1.01 (ref 1–1.03)
TROPONIN, HIGH SENSITIVITY: 7 NG/L (ref 0–14)
UA COMPLETE W REFLEX CULTURE PNL UR: NORMAL
UA DIPSTICK W REFLEX MICRO PNL UR: NORMAL
URN SPEC COLLECT METH UR: NORMAL
UROBILINOGEN UR STRIP-ACNC: 1 E.U./DL
WBC # BLD AUTO: 6.2 K/UL (ref 4–11)

## 2023-08-04 PROCEDURE — 80053 COMPREHEN METABOLIC PANEL: CPT

## 2023-08-04 PROCEDURE — 83880 ASSAY OF NATRIURETIC PEPTIDE: CPT

## 2023-08-04 PROCEDURE — 83735 ASSAY OF MAGNESIUM: CPT

## 2023-08-04 PROCEDURE — 99285 EMERGENCY DEPT VISIT HI MDM: CPT

## 2023-08-04 PROCEDURE — 81003 URINALYSIS AUTO W/O SCOPE: CPT

## 2023-08-04 PROCEDURE — 85025 COMPLETE CBC W/AUTO DIFF WBC: CPT

## 2023-08-04 PROCEDURE — 6370000000 HC RX 637 (ALT 250 FOR IP): Performed by: EMERGENCY MEDICINE

## 2023-08-04 PROCEDURE — 6360000002 HC RX W HCPCS: Performed by: EMERGENCY MEDICINE

## 2023-08-04 PROCEDURE — 93005 ELECTROCARDIOGRAM TRACING: CPT | Performed by: EMERGENCY MEDICINE

## 2023-08-04 PROCEDURE — 96374 THER/PROPH/DIAG INJ IV PUSH: CPT

## 2023-08-04 PROCEDURE — 87040 BLOOD CULTURE FOR BACTERIA: CPT

## 2023-08-04 PROCEDURE — 71045 X-RAY EXAM CHEST 1 VIEW: CPT

## 2023-08-04 PROCEDURE — 84484 ASSAY OF TROPONIN QUANT: CPT

## 2023-08-04 PROCEDURE — 36415 COLL VENOUS BLD VENIPUNCTURE: CPT

## 2023-08-04 PROCEDURE — 83605 ASSAY OF LACTIC ACID: CPT

## 2023-08-04 RX ORDER — ACETAMINOPHEN 500 MG
1000 TABLET ORAL ONCE
Status: COMPLETED | OUTPATIENT
Start: 2023-08-04 | End: 2023-08-04

## 2023-08-04 RX ORDER — MAGNESIUM OXIDE 400 MG/1
400 TABLET ORAL DAILY
Qty: 5 TABLET | Refills: 0 | Status: SHIPPED | OUTPATIENT
Start: 2023-08-04 | End: 2023-08-09

## 2023-08-04 RX ORDER — KETOROLAC TROMETHAMINE 30 MG/ML
15 INJECTION, SOLUTION INTRAMUSCULAR; INTRAVENOUS ONCE
Status: COMPLETED | OUTPATIENT
Start: 2023-08-04 | End: 2023-08-04

## 2023-08-04 RX ORDER — POTASSIUM CHLORIDE 20 MEQ/1
40 TABLET, EXTENDED RELEASE ORAL 2 TIMES DAILY
Status: DISCONTINUED | OUTPATIENT
Start: 2023-08-04 | End: 2023-08-04 | Stop reason: HOSPADM

## 2023-08-04 RX ORDER — POTASSIUM CHLORIDE 20 MEQ/1
20 TABLET, EXTENDED RELEASE ORAL 2 TIMES DAILY
Qty: 10 TABLET | Refills: 0 | Status: SHIPPED | OUTPATIENT
Start: 2023-08-04 | End: 2023-08-09

## 2023-08-04 RX ORDER — LANOLIN ALCOHOL/MO/W.PET/CERES
400 CREAM (GRAM) TOPICAL DAILY
Status: DISCONTINUED | OUTPATIENT
Start: 2023-08-04 | End: 2023-08-04 | Stop reason: HOSPADM

## 2023-08-04 RX ORDER — BENZONATATE 100 MG/1
100 CAPSULE ORAL 3 TIMES DAILY PRN
Qty: 21 CAPSULE | Refills: 0 | Status: SHIPPED | OUTPATIENT
Start: 2023-08-04 | End: 2023-08-11

## 2023-08-04 RX ORDER — ONDANSETRON 4 MG/1
4 TABLET, ORALLY DISINTEGRATING ORAL 3 TIMES DAILY PRN
Qty: 15 TABLET | Refills: 0 | Status: SHIPPED | OUTPATIENT
Start: 2023-08-04 | End: 2023-08-09

## 2023-08-04 RX ADMIN — Medication 400 MG: at 18:53

## 2023-08-04 RX ADMIN — ACETAMINOPHEN 1000 MG: 500 TABLET ORAL at 16:08

## 2023-08-04 RX ADMIN — KETOROLAC TROMETHAMINE 15 MG: 30 INJECTION, SOLUTION INTRAMUSCULAR at 14:31

## 2023-08-04 ASSESSMENT — PAIN - FUNCTIONAL ASSESSMENT: PAIN_FUNCTIONAL_ASSESSMENT: 0-10

## 2023-08-04 ASSESSMENT — PAIN SCALES - GENERAL: PAINLEVEL_OUTOF10: 10

## 2023-08-04 ASSESSMENT — PAIN DESCRIPTION - LOCATION: LOCATION: RIB CAGE

## 2023-08-04 NOTE — TELEPHONE ENCOUNTER
Pt states she is in the ER and has COVID. They are wanting her to stop blood thinners and start paxlovid. Pt asking for cardiology recommendation. Please call as soon as possible to advise.

## 2023-08-04 NOTE — DISCHARGE INSTRUCTIONS
Please do not take Xarelto while on Paxlovid. You may take 81 mg aspirin daily while holding the Xarelto.

## 2023-08-04 NOTE — TELEPHONE ENCOUNTER
Pt notified and verbalized understanding. She would like to know if Rahul Fountain is ok to take with Paxlovid ?

## 2023-08-05 LAB
BACTERIA BLD CULT ORG #2: NORMAL
BACTERIA BLD CULT: NORMAL

## 2023-08-07 LAB
EKG ATRIAL RATE: 84 BPM
EKG DIAGNOSIS: NORMAL
EKG P AXIS: 27 DEGREES
EKG P-R INTERVAL: 184 MS
EKG Q-T INTERVAL: 434 MS
EKG QRS DURATION: 148 MS
EKG QTC CALCULATION (BAZETT): 512 MS
EKG R AXIS: -82 DEGREES
EKG T AXIS: 78 DEGREES
EKG VENTRICULAR RATE: 84 BPM

## 2023-08-07 PROCEDURE — 93010 ELECTROCARDIOGRAM REPORT: CPT | Performed by: INTERNAL MEDICINE

## 2023-08-08 LAB
BACTERIA BLD CULT ORG #2: NORMAL
BACTERIA BLD CULT: NORMAL

## 2023-08-20 DIAGNOSIS — I50.42 CHRONIC COMBINED SYSTOLIC AND DIASTOLIC HEART FAILURE (HCC): ICD-10-CM

## 2023-08-22 NOTE — PROGRESS NOTES
CATRACHITO Fulton CNP   potassium chloride (KLOR-CON M) 20 MEQ extended release tablet Take 1 tablet by mouth daily 5/12/23  Yes CATRACHITO Fulton CNP   pantoprazole (PROTONIX) 40 MG tablet Take 1 tablet by mouth every morning (before breakfast) 2/23/23  Yes CATRACHITO Fulton CNP   ammonium lactate (LAC-HYDRIN) 12 % lotion  4/20/22  Yes Historical Provider, MD   fluticasone Danette Evgeny) 50 MCG/ACT nasal spray as needed 3/21/22  Yes Historical Provider, MD   hydrocortisone 1 % cream  4/20/22  Yes Historical Provider, MD   atorvastatin (LIPITOR) 40 MG tablet Take 1 tablet by mouth daily 6/11/21  Yes CATRACHITO Lane   magnesium oxide (MAG-OX) 400 MG tablet Take 1 tablet by mouth daily  Patient not taking: Reported on 8/23/2023 7/7/23   CATRACHITO Jerome CNP       Allergies   Allergen Reactions    Latex      rash    Morphine Shortness Of Breath    Codeine      Hives      Jardiance [Empagliflozin] Itching     Yeast Infection    Lisinopril      cough    Nitroglycerin Hives    Sulfa Antibiotics Nausea Only    Hydralazine      headaches       Social History:  Reviewed. reports that she has never smoked. She has never used smokeless tobacco. She reports that she does not drink alcohol and does not use drugs. Family History:  Reviewed. Reviewed. No family history of SCD. Relevant and available labs, and cardiovascular diagnostics reviewed. Reviewed. I independently reviewed relevant and available cardiac diagnostic tests ECG, CXR, Echo, Stress test, Device interrogation, Holter, CT scan. Complex medical condition with multiple medical problems affecting prognosis and outcome of EP interventions    All questions and concerns were addressed to the patient/family. Alternatives to my treatment were discussed. I have discussed the above stated plan and the patient verbalized understanding and agreed with the plan. Scribe attestation:  This note was scribed in the presence of [address]

## 2023-08-23 ENCOUNTER — HOSPITAL ENCOUNTER (OUTPATIENT)
Age: 58
Discharge: HOME | End: 2023-08-23
Payer: MEDICAID

## 2023-08-23 ENCOUNTER — OFFICE VISIT (OUTPATIENT)
Dept: CARDIOLOGY CLINIC | Age: 58
End: 2023-08-23
Payer: MEDICAID

## 2023-08-23 ENCOUNTER — NURSE ONLY (OUTPATIENT)
Dept: CARDIOLOGY CLINIC | Age: 58
End: 2023-08-23

## 2023-08-23 VITALS
OXYGEN SATURATION: 98 % | WEIGHT: 200 LBS | HEIGHT: 67 IN | BODY MASS INDEX: 31.39 KG/M2 | HEART RATE: 79 BPM | SYSTOLIC BLOOD PRESSURE: 120 MMHG | DIASTOLIC BLOOD PRESSURE: 84 MMHG

## 2023-08-23 DIAGNOSIS — Z95.810 AICD (AUTOMATIC CARDIOVERTER/DEFIBRILLATOR) PRESENT: ICD-10-CM

## 2023-08-23 DIAGNOSIS — I48.0 PAF (PAROXYSMAL ATRIAL FIBRILLATION) (HCC): Primary | ICD-10-CM

## 2023-08-23 DIAGNOSIS — I50.22 SYSTOLIC CHF, CHRONIC (HCC): ICD-10-CM

## 2023-08-23 DIAGNOSIS — Q24.6 CONGENITAL HEART BLOCK: ICD-10-CM

## 2023-08-23 DIAGNOSIS — I51.9 LV DYSFUNCTION: ICD-10-CM

## 2023-08-23 DIAGNOSIS — Z95.810 AICD (AUTOMATIC CARDIOVERTER/DEFIBRILLATOR) PRESENT: Primary | ICD-10-CM

## 2023-08-23 DIAGNOSIS — I48.0 PAF (PAROXYSMAL ATRIAL FIBRILLATION) (HCC): ICD-10-CM

## 2023-08-23 DIAGNOSIS — I42.0 DILATED CARDIOMYOPATHY (HCC): ICD-10-CM

## 2023-08-23 LAB
ANION GAP SERPL CALCULATED.3IONS-SCNC: 11 MMOL/L (ref 3–16)
BUN SERPL-MCNC: 10 MG/DL (ref 7–20)
CALCIUM SERPL-MCNC: 9.8 MG/DL (ref 8.3–10.6)
CHLORIDE SERPL-SCNC: 105 MMOL/L (ref 99–110)
CO2 SERPL-SCNC: 26 MMOL/L (ref 21–32)
CREAT SERPL-MCNC: 0.9 MG/DL (ref 0.6–1.1)
GFR SERPLBLD CREATININE-BSD FMLA CKD-EPI: >60 ML/MIN/{1.73_M2}
GLUCOSE SERPL-MCNC: 91 MG/DL (ref 70–99)
MAGNESIUM SERPL-MCNC: 1.8 MG/DL (ref 1.8–2.4)
NT-PROBNP SERPL-MCNC: 263 PG/ML (ref 0–124)
POTASSIUM SERPL-SCNC: 3.9 MMOL/L (ref 3.5–5.1)
SODIUM SERPL-SCNC: 142 MMOL/L (ref 136–145)

## 2023-08-23 PROCEDURE — G8427 DOCREV CUR MEDS BY ELIG CLIN: HCPCS | Performed by: INTERNAL MEDICINE

## 2023-08-23 PROCEDURE — 83735 ASSAY OF MAGNESIUM: CPT

## 2023-08-23 PROCEDURE — 1036F TOBACCO NON-USER: CPT | Performed by: INTERNAL MEDICINE

## 2023-08-23 PROCEDURE — 83880 ASSAY OF NATRIURETIC PEPTIDE: CPT

## 2023-08-23 PROCEDURE — 36415 COLL VENOUS BLD VENIPUNCTURE: CPT

## 2023-08-23 PROCEDURE — 3079F DIAST BP 80-89 MM HG: CPT | Performed by: INTERNAL MEDICINE

## 2023-08-23 PROCEDURE — 99214 OFFICE O/P EST MOD 30 MIN: CPT | Performed by: INTERNAL MEDICINE

## 2023-08-23 PROCEDURE — 3017F COLORECTAL CA SCREEN DOC REV: CPT | Performed by: INTERNAL MEDICINE

## 2023-08-23 PROCEDURE — 3074F SYST BP LT 130 MM HG: CPT | Performed by: INTERNAL MEDICINE

## 2023-08-23 PROCEDURE — 93000 ELECTROCARDIOGRAM COMPLETE: CPT | Performed by: INTERNAL MEDICINE

## 2023-08-23 PROCEDURE — G8417 CALC BMI ABV UP PARAM F/U: HCPCS | Performed by: INTERNAL MEDICINE

## 2023-08-23 PROCEDURE — 80048 BASIC METABOLIC PNL TOTAL CA: CPT

## 2023-08-23 RX ORDER — POTASSIUM CHLORIDE 20 MEQ/1
40 TABLET, EXTENDED RELEASE ORAL DAILY
Qty: 180 TABLET | Refills: 3 | Status: SHIPPED | OUTPATIENT
Start: 2023-08-23

## 2023-08-25 RX ORDER — CARVEDILOL 25 MG/1
TABLET ORAL
Qty: 180 TABLET | Refills: 1 | Status: SHIPPED | OUTPATIENT
Start: 2023-08-25

## 2023-08-28 ENCOUNTER — HOSPITAL ENCOUNTER (OUTPATIENT)
Age: 58
Discharge: HOME | End: 2023-08-28
Payer: MEDICAID

## 2023-08-28 ENCOUNTER — OFFICE VISIT (OUTPATIENT)
Dept: CARDIOLOGY CLINIC | Age: 58
End: 2023-08-28
Payer: MEDICAID

## 2023-08-28 VITALS
SYSTOLIC BLOOD PRESSURE: 125 MMHG | WEIGHT: 202.4 LBS | DIASTOLIC BLOOD PRESSURE: 80 MMHG | OXYGEN SATURATION: 100 % | HEIGHT: 67 IN | HEART RATE: 86 BPM | BODY MASS INDEX: 31.77 KG/M2

## 2023-08-28 DIAGNOSIS — I48.0 PAF (PAROXYSMAL ATRIAL FIBRILLATION) (HCC): ICD-10-CM

## 2023-08-28 DIAGNOSIS — G47.33 OBSTRUCTIVE SLEEP APNEA (ADULT) (PEDIATRIC): ICD-10-CM

## 2023-08-28 DIAGNOSIS — I50.42 CHRONIC COMBINED SYSTOLIC AND DIASTOLIC HEART FAILURE (HCC): Primary | ICD-10-CM

## 2023-08-28 DIAGNOSIS — Z95.810 ICD (IMPLANTABLE CARDIOVERTER-DEFIBRILLATOR), BIVENTRICULAR, IN SITU: ICD-10-CM

## 2023-08-28 DIAGNOSIS — D50.9 IRON DEFICIENCY ANEMIA, UNSPECIFIED IRON DEFICIENCY ANEMIA TYPE: ICD-10-CM

## 2023-08-28 DIAGNOSIS — E55.9 HYPOVITAMINOSIS D: ICD-10-CM

## 2023-08-28 LAB
FERRITIN SERPL IA-MCNC: 186.7 NG/ML (ref 15–150)
IRON SATN MFR SERPL: 34 % (ref 15–50)
IRON SERPL-MCNC: 76 UG/DL (ref 37–145)
TIBC SERPL-MCNC: 226 UG/DL (ref 260–445)

## 2023-08-28 PROCEDURE — 83550 IRON BINDING TEST: CPT

## 2023-08-28 PROCEDURE — 3074F SYST BP LT 130 MM HG: CPT | Performed by: CLINICAL NURSE SPECIALIST

## 2023-08-28 PROCEDURE — 3079F DIAST BP 80-89 MM HG: CPT | Performed by: CLINICAL NURSE SPECIALIST

## 2023-08-28 PROCEDURE — 36415 COLL VENOUS BLD VENIPUNCTURE: CPT

## 2023-08-28 PROCEDURE — 3017F COLORECTAL CA SCREEN DOC REV: CPT | Performed by: CLINICAL NURSE SPECIALIST

## 2023-08-28 PROCEDURE — G8417 CALC BMI ABV UP PARAM F/U: HCPCS | Performed by: CLINICAL NURSE SPECIALIST

## 2023-08-28 PROCEDURE — 83540 ASSAY OF IRON: CPT

## 2023-08-28 PROCEDURE — G8427 DOCREV CUR MEDS BY ELIG CLIN: HCPCS | Performed by: CLINICAL NURSE SPECIALIST

## 2023-08-28 PROCEDURE — 82728 ASSAY OF FERRITIN: CPT

## 2023-08-28 PROCEDURE — 1036F TOBACCO NON-USER: CPT | Performed by: CLINICAL NURSE SPECIALIST

## 2023-08-28 PROCEDURE — 99214 OFFICE O/P EST MOD 30 MIN: CPT | Performed by: CLINICAL NURSE SPECIALIST

## 2023-08-28 RX ORDER — FUROSEMIDE 20 MG/1
TABLET ORAL
Qty: 60 TABLET | Refills: 1
Start: 2023-08-28

## 2023-08-28 NOTE — PROGRESS NOTES
fraction. Definity was used to assist in endocardial border delineation. Moderate concentric left ventricular hypertrophy. Mildly dilated left   ventricular cavity size. Moderately reduced left ventricular systolic   function with an estimated ejection fraction of 35-40%. Global hypokinesis noted. Heavy trabeculations seen near the apex of the left ventricle. The right ventricle is mildly dilated. Mildly reduced right ventricular   systolic function with an estimated TAPSE of 1.52 cm. The left atrium is severely dilated    Echo 2/9/2021  Summary   Left ventricular size is mildly increased. Moderately reduced global systolic function with an ejection fraction   estimated at 30-35%. Global hypokinesis noted. Grade II diastolic dysfunction with elevated LV filling pressures. There is mild mitral regurgitation noted. Mild left atrial enlargement noted. Aortic valve appears sclerotic but opens adequately. Mild aortic regurgitation. There is mild tricuspid regurgitation with a RVSP estimation of 25 mmHg. Pacer / ICD wire is visualized in the right ventricle. The right ventricle is normal in size and function    [facility] Dr Rafi Brennan 1/20/21  Findings:  Artery Findings/Result   LM Normal   LAD Normal   Cx Normal   RI NA   RCA Normal   LVEDP 6   LVG NA      Intervention:         None     Post Cath Dx:       Normal coronaries      Echo 2/24/2020  Summary   -Limited echocardiogram was performed to evaluate EF.   -Normal left ventricle size, mild to moderate left ventricular hypertrophy,   and moderately reduced systolic function with an estimated ejection fraction   of 30-35%. -There is moderate diffuse hypokinesis. -Grade III diastolic dysfunction . E/e\"=10.7.   -Mild mitral regurgitation.   -Mild tricuspid regurgitation.   -The left atrium is mild to moderate dilated.    -Right ventricular systolic function appears mildly reduced .   -Estimated pulmonary artery systolic pressure is borderline

## 2023-08-28 NOTE — PATIENT INSTRUCTIONS
Check iron studies  Continue all other medications  May consider increasing entresto after I see blood work  RTO 5 months

## 2023-08-29 ENCOUNTER — TELEPHONE (OUTPATIENT)
Dept: CARDIOLOGY CLINIC | Age: 58
End: 2023-08-29

## 2023-08-29 NOTE — TELEPHONE ENCOUNTER
----- Message from CATRACHITO Arambula - CNS sent at 8/29/2023  8:43 AM EDT -----  Let her know that her iron studies are normal    Spoke to patient she verbalized understanding.

## 2023-09-18 PROCEDURE — 93297 REM INTERROG DEV EVAL ICPMS: CPT | Performed by: CLINICAL NURSE SPECIALIST

## 2023-09-18 PROCEDURE — G2066 INTER DEVC REMOTE 30D: HCPCS | Performed by: CLINICAL NURSE SPECIALIST

## 2023-09-26 ENCOUNTER — HOSPITAL ENCOUNTER (OUTPATIENT)
Dept: MAMMOGRAPHY | Age: 58
Discharge: HOME | End: 2023-09-26
Payer: MEDICAID

## 2023-09-26 VITALS — HEIGHT: 67 IN | BODY MASS INDEX: 31.71 KG/M2 | WEIGHT: 202 LBS

## 2023-09-26 DIAGNOSIS — Z12.31 VISIT FOR SCREENING MAMMOGRAM: ICD-10-CM

## 2023-09-26 DIAGNOSIS — I50.42 CHRONIC COMBINED SYSTOLIC AND DIASTOLIC HEART FAILURE (HCC): ICD-10-CM

## 2023-09-26 PROCEDURE — 77067 SCR MAMMO BI INCL CAD: CPT

## 2023-09-27 RX ORDER — FUROSEMIDE 20 MG/1
TABLET ORAL
Qty: 60 TABLET | Refills: 1 | Status: SHIPPED | OUTPATIENT
Start: 2023-09-27

## 2023-10-06 PROCEDURE — 93296 REM INTERROG EVL PM/IDS: CPT | Performed by: INTERNAL MEDICINE

## 2023-10-06 PROCEDURE — 93295 DEV INTERROG REMOTE 1/2/MLT: CPT | Performed by: INTERNAL MEDICINE

## 2023-10-16 NOTE — TELEPHONE ENCOUNTER
----- Message from CATRACHITO Tello CNP sent at 2/5/2021 10:38 AM EST -----  Please let patient know her blood counts are improved.  No change in [address] [place] [address],[address], APRN-CNP In an effort to ensure that our patients [employer], a Team Member has reviewed your chart and identified an opportunity to provide the best care possible. An attempt was made to discuss or schedule overdue Preventive or Disease Management screening.     The Outcome was Contact was made, care gap was discussed - see further documentation. Care Gaps include Diabetes.    Contacted patient and advised that fasting labs have been ordered. Offered to schedule lab appointment here in [place] he will call to schedule fasting labs in [place].

## 2023-10-18 NOTE — PROGRESS NOTES
Patient reached ____ yes  __X___ no    instructions left _X___ yes   phone number ___[phone]_____                                ____ no-office notified        10/27/23______  Time __0810_____  Arrival ___0640  hosp-endo___    Nothing to eat or drink after midnight-follow your doctors prep instructions-this may include taking a second dose of your prep after midnight  Responsible adult 25 or older to stay on site while you are here-drive you home-stay with you after  Follow any instructions your doctors office has given you  Bring a complete list of all your medications and supplements including name,dose,how often taken the day of your procedure  If you normally take the following medications in the morning please do so the AM of your procedure with a small sip of water       Heart,blood pressure,seizure,thyroid or breathing medications-use your inhalers-bring any rescue inhalers with you DOS       DO NOT take blood pressure medications ending in \"katharina\" or \"pril\" the AM of procedure or evening prior  Dr Jatin Mac patients are not to take any medications the AM of surgery  Take half or your normal dose of any long acting insulins the night before your procedure-do not take any diabetic medications the AM of procedure  Follow your doctors instructions regarding stopping or taking  any blood thinners-if you do not have instructions-call them- INSTRUCTIONS TO [address]  Any questions call your doctor  Other ______________________________________________________________    Rene Beagle POLICY(subject to change)             The current policy is 2 visitors per patient. There are no children allowed. Mask at discretion of facility. Visiting hours are 8a-8p. Overnight visitors will be at the discretion of the nurse. All policies are subject to change.

## 2023-10-19 PROCEDURE — G2066 INTER DEVC REMOTE 30D: HCPCS | Performed by: CLINICAL NURSE SPECIALIST

## 2023-10-19 PROCEDURE — 93297 REM INTERROG DEV EVAL ICPMS: CPT | Performed by: CLINICAL NURSE SPECIALIST

## 2023-10-27 ENCOUNTER — HOSPITAL ENCOUNTER (OUTPATIENT)
Age: 58
Setting detail: OUTPATIENT SURGERY
Discharge: HOME | End: 2023-10-27
Attending: INTERNAL MEDICINE | Admitting: INTERNAL MEDICINE
Payer: MEDICAID

## 2023-10-27 ENCOUNTER — ANESTHESIA (OUTPATIENT)
Dept: ENDOSCOPY | Age: 58
End: 2023-10-27
Payer: MEDICAID

## 2023-10-27 ENCOUNTER — ANESTHESIA EVENT (OUTPATIENT)
Dept: ENDOSCOPY | Age: 58
End: 2023-10-27
Payer: MEDICAID

## 2023-10-27 VITALS
SYSTOLIC BLOOD PRESSURE: 132 MMHG | BODY MASS INDEX: 31.55 KG/M2 | DIASTOLIC BLOOD PRESSURE: 92 MMHG | TEMPERATURE: 97.3 F | OXYGEN SATURATION: 100 % | HEART RATE: 78 BPM | WEIGHT: 201 LBS | HEIGHT: 67 IN | RESPIRATION RATE: 16 BRPM

## 2023-10-27 DIAGNOSIS — R19.7 DIARRHEA, UNSPECIFIED TYPE: ICD-10-CM

## 2023-10-27 PROCEDURE — 2580000003 HC RX 258: Performed by: ANESTHESIOLOGY

## 2023-10-27 PROCEDURE — 3700000001 HC ADD 15 MINUTES (ANESTHESIA): Performed by: INTERNAL MEDICINE

## 2023-10-27 PROCEDURE — 3700000000 HC ANESTHESIA ATTENDED CARE: Performed by: INTERNAL MEDICINE

## 2023-10-27 PROCEDURE — 7100000011 HC PHASE II RECOVERY - ADDTL 15 MIN: Performed by: INTERNAL MEDICINE

## 2023-10-27 PROCEDURE — 2500000003 HC RX 250 WO HCPCS: Performed by: NURSE ANESTHETIST, CERTIFIED REGISTERED

## 2023-10-27 PROCEDURE — 7100000010 HC PHASE II RECOVERY - FIRST 15 MIN: Performed by: INTERNAL MEDICINE

## 2023-10-27 PROCEDURE — 6360000002 HC RX W HCPCS: Performed by: NURSE ANESTHETIST, CERTIFIED REGISTERED

## 2023-10-27 PROCEDURE — 3609010600 HC COLONOSCOPY POLYPECTOMY SNARE/COLD BIOPSY: Performed by: INTERNAL MEDICINE

## 2023-10-27 PROCEDURE — 2709999900 HC NON-CHARGEABLE SUPPLY: Performed by: INTERNAL MEDICINE

## 2023-10-27 PROCEDURE — 6360000002 HC RX W HCPCS: Performed by: ANESTHESIOLOGY

## 2023-10-27 PROCEDURE — 3609010300 HC COLONOSCOPY W/BIOPSY SINGLE/MULTIPLE: Performed by: INTERNAL MEDICINE

## 2023-10-27 RX ORDER — LIDOCAINE HYDROCHLORIDE 20 MG/ML
INJECTION, SOLUTION EPIDURAL; INFILTRATION; INTRACAUDAL; PERINEURAL PRN
Status: DISCONTINUED | OUTPATIENT
Start: 2023-10-27 | End: 2023-10-27 | Stop reason: SDUPTHER

## 2023-10-27 RX ORDER — PROPOFOL 10 MG/ML
INJECTION, EMULSION INTRAVENOUS PRN
Status: DISCONTINUED | OUTPATIENT
Start: 2023-10-27 | End: 2023-10-27 | Stop reason: SDUPTHER

## 2023-10-27 RX ORDER — ONDANSETRON 2 MG/ML
4 INJECTION INTRAMUSCULAR; INTRAVENOUS ONCE
Status: COMPLETED | OUTPATIENT
Start: 2023-10-27 | End: 2023-10-27

## 2023-10-27 RX ORDER — SODIUM CHLORIDE 9 MG/ML
INJECTION, SOLUTION INTRAVENOUS CONTINUOUS
Status: DISCONTINUED | OUTPATIENT
Start: 2023-10-27 | End: 2023-10-27 | Stop reason: HOSPADM

## 2023-10-27 RX ADMIN — PROPOFOL 100 MG: 10 INJECTION, EMULSION INTRAVENOUS at 08:03

## 2023-10-27 RX ADMIN — PROPOFOL 140 MCG/KG/MIN: 10 INJECTION, EMULSION INTRAVENOUS at 08:05

## 2023-10-27 RX ADMIN — SODIUM CHLORIDE: 9 INJECTION, SOLUTION INTRAVENOUS at 07:06

## 2023-10-27 RX ADMIN — ONDANSETRON 4 MG: 2 INJECTION INTRAMUSCULAR; INTRAVENOUS at 07:06

## 2023-10-27 RX ADMIN — LIDOCAINE HYDROCHLORIDE 50 MG: 20 INJECTION, SOLUTION EPIDURAL; INFILTRATION; INTRACAUDAL; PERINEURAL at 08:03

## 2023-10-27 RX ADMIN — PROPOFOL 30 MG: 10 INJECTION, EMULSION INTRAVENOUS at 08:06

## 2023-10-27 RX ADMIN — PROPOFOL 120 MCG/KG/MIN: 10 INJECTION, EMULSION INTRAVENOUS at 08:04

## 2023-10-27 ASSESSMENT — PAIN DESCRIPTION - ORIENTATION
ORIENTATION: RIGHT
ORIENTATION: LEFT
ORIENTATION: LEFT

## 2023-10-27 ASSESSMENT — PAIN DESCRIPTION - FREQUENCY
FREQUENCY: CONTINUOUS

## 2023-10-27 ASSESSMENT — PAIN DESCRIPTION - DESCRIPTORS
DESCRIPTORS: CRAMPING

## 2023-10-27 ASSESSMENT — PAIN DESCRIPTION - LOCATION
LOCATION: ABDOMEN

## 2023-10-27 ASSESSMENT — ENCOUNTER SYMPTOMS: SHORTNESS OF BREATH: 1

## 2023-10-27 ASSESSMENT — PAIN DESCRIPTION - PAIN TYPE
TYPE: ACUTE PAIN

## 2023-10-27 ASSESSMENT — PAIN - FUNCTIONAL ASSESSMENT: PAIN_FUNCTIONAL_ASSESSMENT: NONE - DENIES PAIN

## 2023-10-27 ASSESSMENT — PAIN SCALES - GENERAL
PAINLEVEL_OUTOF10: 5
PAINLEVEL_OUTOF10: 8
PAINLEVEL_OUTOF10: 5

## 2023-10-27 NOTE — PROGRESS NOTES
Dr. Juan Padilla at bedside. Patient complaining of abdominal pain, 8 out of 10. Patient repositioned to right side and encouraged to expel flatus.

## 2023-10-27 NOTE — PROGRESS NOTES
Patient up to bathroom. Patient stated she expelled flatus and rates pain 3 out of 10 of mid to right side of abdomen on pain scale. Patient states she has had abdominal like this prior to procedure.

## 2023-10-27 NOTE — DISCHARGE INSTRUCTIONS
COLONSCOPY DISCHARGE INSTRUCTIONS    You may experience some lightheadedness for the next several hours. Plan on quiet relaxation for the rest of today. Nap for four hours following procedure if possible. A responsible adult needs to stay with you today. Eat bland food and avoid anything greasy or spicy initially-progress to your normal diet gradually. Diet restrictions as instructed. You may resume home medications as instructed. It is not unusual to experience some mild cramping or gas pains, and you may not have a bowel movement for several days. If you had a polyp removed, avoid strenuous activity for 48 hours. Avoid the use of aspirin or related compounds for one week, unless otherwise instructed by your physician. You may notice a small amount of blood in your next few bowel movements, but if a large amount passes, call your physician. If you have any of the following problems, notify your physician or return to the hospital emergency room : fever, chills, excessive bleeding, excessive vomiting, difficulty swallowing, uncontrolled pain, increased abdominal distention, shortness of breath or any other problems. Call your doctor at [phone] if you have any concerns. If you had any biopsies or polyps call for results in 5-7 business days. Follow up colonoscopy in 5 years. Follow up office visit in 6 months. See your physician's report for details about your procedure and recommendations. ANESTHESIA DISCHARGE INSTRUCTIONS    Wear your seatbelt home. You are under the influence of drugs-do not drink alcohol, drive ,operate machinery,or make any important decisions or sign any legal documentsfor 24 hours. You may resume normal activities tomorrow. A responsible adult needs to be with you for 24 hours. You may experience lightheadedness,dizziness,or sleepiness following surgery. Rest at home today- increase activity as tolerated.  It is recommended to take a four hour

## 2023-10-27 NOTE — ANESTHESIA POSTPROCEDURE EVALUATION
Department of Anesthesiology  Postprocedure Note    Patient: Dusty Hawkins  MRN: [id number]  YOB: 1965  Date of evaluation: 10/27/2023      Procedure Summary     Date: 10/27/23 Room / Location: [address] [place] [address]    Anesthesia Start: 0471 Anesthesia Stop: 0832    Procedures:       COLONOSCOPY WITH BIOPSY      COLONOSCOPY POLYPECTOMY SNARE/COLD BIOPSY Diagnosis:       Diarrhea, unspecified type      (Diarrhea, unspecified type [R19.7])    Surgeons: Nya King MD Responsible Provider: Rosangela Barton MD    Anesthesia Type: MAC ASA Status: 4          Anesthesia Type: MAC    Sandra Phase I: Sandra Score: 10    Sandra Phase II: Sandra Score: 8      Anesthesia Post Evaluation    Patient location during evaluation: PACU  Patient participation: complete - patient participated  Level of consciousness: awake  Airway patency: patent  Nausea & Vomiting: no vomiting and no nausea  Complications: no  Cardiovascular status: hemodynamically stable  Respiratory status: acceptable  Hydration status: stable  Multimodal analgesia pain management approach  Pain management: adequate

## 2023-10-27 NOTE — BRIEF OP NOTE
Brief Postoperative Note - Full Note in Chart Review/Procedures tab       Patient: Ana Huffman  YOB: 1965  MRN: [id number]    Date of Procedure: 10/27/2023    Pre-Op Diagnosis Codes:     * Diarrhea, unspecified type [R19.7]    Post-Op Diagnosis: Same       Procedure(s):  COLONOSCOPY WITH BIOPSY  COLONOSCOPY POLYPECTOMY SNARE/COLD BIOPSY    Surgeon(s):  Lilly Pearson MD    Assistant:  * No surgical staff found *    Anesthesia: Monitor Anesthesia Care    Estimated Blood Loss (mL): Minimal    Complications: None    Specimens:   ID Type Source Tests Collected by Time Destination   A : random colon r/o microscopic colitis Tissue Colon SURGICAL PATHOLOGY Lilly Pearson MD 10/27/2023 0813    B : cecum polyp x2 Tissue Colon SURGICAL PATHOLOGY Lilly Pearson MD 10/27/2023 0815    C : transverse colon polyp x1 Tissue Colon SURGICAL PATHOLOGY Lilly Pearson MD 10/27/2023 7711        Implants:  * No implants in log *      Drains: * No LDAs found *    Findings:   Benign neoplasm of cecum - D12.0  Benign neoplasm of transverse colon - D12.3  Ascending colon lipoma  R/o microscopic colitis    Rec:  Resume diet  Continue present treatment. Await pathology results. Consider trial of Xifaxan if biopsies (-).   Colonoscopy in 5 years  Followup in six months  Followup with referring physician as previously instucted      Electronically signed by Lilly Pearson MD on 10/27/2023 at 8:36 AM

## 2023-11-03 ENCOUNTER — TELEPHONE (OUTPATIENT)
Dept: CARDIOLOGY CLINIC | Age: 58
End: 2023-11-03

## 2023-11-03 DIAGNOSIS — I50.42 CHRONIC COMBINED SYSTOLIC AND DIASTOLIC HEART FAILURE (HCC): ICD-10-CM

## 2023-11-03 RX ORDER — SACUBITRIL AND VALSARTAN 49; 51 MG/1; MG/1
1 TABLET, FILM COATED ORAL 2 TIMES DAILY
Qty: 180 TABLET | Refills: 1 | Status: SHIPPED | OUTPATIENT
Start: 2023-11-03

## 2023-11-08 ENCOUNTER — OFFICE VISIT (OUTPATIENT)
Dept: FAMILY MEDICINE CLINIC | Age: 58
End: 2023-11-08

## 2023-11-08 VITALS
TEMPERATURE: 97.7 F | HEIGHT: 67 IN | HEART RATE: 85 BPM | SYSTOLIC BLOOD PRESSURE: 132 MMHG | BODY MASS INDEX: 31.67 KG/M2 | DIASTOLIC BLOOD PRESSURE: 80 MMHG | WEIGHT: 201.8 LBS | OXYGEN SATURATION: 98 % | RESPIRATION RATE: 16 BRPM

## 2023-11-08 DIAGNOSIS — Z23 NEED FOR VACCINATION: ICD-10-CM

## 2023-11-08 DIAGNOSIS — E66.09 CLASS 1 OBESITY DUE TO EXCESS CALORIES WITH SERIOUS COMORBIDITY AND BODY MASS INDEX (BMI) OF 32.0 TO 32.9 IN ADULT: ICD-10-CM

## 2023-11-08 DIAGNOSIS — L85.3 DRY SKIN: ICD-10-CM

## 2023-11-08 DIAGNOSIS — Z00.00 ANNUAL PHYSICAL EXAM: Primary | ICD-10-CM

## 2023-11-08 DIAGNOSIS — L40.9 PSORIASIS: ICD-10-CM

## 2023-11-08 DIAGNOSIS — M54.32 SCIATICA OF LEFT SIDE: ICD-10-CM

## 2023-11-08 RX ORDER — METHYLPREDNISOLONE 4 MG/1
TABLET ORAL
Qty: 1 KIT | Refills: 0 | Status: SHIPPED | OUTPATIENT
Start: 2023-11-08 | End: 2023-11-14

## 2023-11-08 RX ORDER — TRIAMCINOLONE ACETONIDE 1 MG/G
OINTMENT TOPICAL 2 TIMES DAILY
Qty: 80 G | Refills: 3 | Status: SHIPPED | OUTPATIENT
Start: 2023-11-08 | End: 2023-11-15

## 2023-11-08 RX ORDER — AMMONIUM LACTATE 12 G/100G
CREAM TOPICAL
Qty: 385 G | Refills: 4 | Status: SHIPPED | OUTPATIENT
Start: 2023-11-08 | End: 2023-12-08

## 2023-11-08 RX ORDER — CYCLOBENZAPRINE HCL 5 MG
5 TABLET ORAL 3 TIMES DAILY PRN
Qty: 30 TABLET | Refills: 0 | Status: SHIPPED | OUTPATIENT
Start: 2023-11-08 | End: 2023-11-18

## 2023-11-08 SDOH — ECONOMIC STABILITY: FOOD INSECURITY: WITHIN THE PAST 12 MONTHS, THE FOOD YOU BOUGHT JUST DIDN'T LAST AND YOU DIDN'T HAVE MONEY TO GET MORE.: NEVER TRUE

## 2023-11-08 SDOH — ECONOMIC STABILITY: INCOME INSECURITY: HOW HARD IS IT FOR YOU TO PAY FOR THE VERY BASICS LIKE FOOD, HOUSING, MEDICAL CARE, AND HEATING?: SOMEWHAT HARD

## 2023-11-08 SDOH — ECONOMIC STABILITY: HOUSING INSECURITY
IN THE LAST 12 MONTHS, WAS THERE A TIME WHEN YOU DID NOT HAVE A STEADY PLACE TO SLEEP OR SLEPT IN A SHELTER (INCLUDING NOW)?: NO

## 2023-11-08 SDOH — ECONOMIC STABILITY: FOOD INSECURITY: WITHIN THE PAST 12 MONTHS, YOU WORRIED THAT YOUR FOOD WOULD RUN OUT BEFORE YOU GOT MONEY TO BUY MORE.: NEVER TRUE

## 2023-11-08 ASSESSMENT — ENCOUNTER SYMPTOMS
BACK PAIN: 1
SHORTNESS OF BREATH: 0
SORE THROAT: 0
CONSTIPATION: 0
WHEEZING: 0
EYES NEGATIVE: 1
BLOOD IN STOOL: 0
NAUSEA: 0
SINUS PAIN: 0
CHEST TIGHTNESS: 0
COUGH: 0
SINUS PRESSURE: 0
VOMITING: 0
DIARRHEA: 0
ABDOMINAL PAIN: 0

## 2023-11-08 ASSESSMENT — PATIENT HEALTH QUESTIONNAIRE - PHQ9
SUM OF ALL RESPONSES TO PHQ QUESTIONS 1-9: 1
2. FEELING DOWN, DEPRESSED OR HOPELESS: 1
1. LITTLE INTEREST OR PLEASURE IN DOING THINGS: 0
SUM OF ALL RESPONSES TO PHQ QUESTIONS 1-9: 1
SUM OF ALL RESPONSES TO PHQ9 QUESTIONS 1 & 2: 1
SUM OF ALL RESPONSES TO PHQ QUESTIONS 1-9: 1
SUM OF ALL RESPONSES TO PHQ QUESTIONS 1-9: 1

## 2023-11-08 NOTE — PROGRESS NOTES
Dispense: 30 tablet; Refill: 0  - methylPREDNISolone (MEDROL DOSEPACK) 4 MG tablet; Take by mouth. Dispense: 1 kit; Refill: 0    6. Need for vaccination  Given today  - Influenza, FLUCELVAX, (age 10 mo+), IM, Preservative Free, 0.5 mL    Return in about 6 months (around 5/8/2024), or if symptoms worsen or fail to improve.

## 2023-11-13 NOTE — PROGRESS NOTES
recurrent VT. Biv-AICD upgrade 1/2021 from dual chamber PPM    Interrogation today shows:3 years remaining, AP 87.3% ,  96.6%,  < 0.1% % AT/AF burden per device interrogation today  - Optivol  WNL        -Paroxysmal Atrial Fibrillation / AT               She has had Afib on 8/21/2023 for 21 minutes. burden on interrogation,  <0.1 ,longest 8 minutes              S/p DCCV 1/28/2022              Continue coreg 25 mg BID  (  increased on 07/06/2023 for c/o Increased palpitations. )               She was on Amiodarone in the past but d/t her age and side effect she is not a good candidate for this long term              High risk [id number] score: 3 (hypertension, CHF, gender) ; [id number] Vasc score and anticoagulation discussed. High risk for stroke and thromboembolism. Anticoagulation is recommended. ~ Xarelto 20 mg daily, previously on hold d/t severe anemia. Now s/p excision of duodenal mass  ~ CrC: 97  ml/min based on creatinine  0.9  08/23/23               S/p DCCV 1/2021     We have discussed ablation. Currently Afib burden is < 0.1% and mostly AT   If increasing Afib burden, will consider ablation.                  - She has history of GI bleeding and anemia              We have discussed COURTNEY occlusion in the past.  She appears to be tolerating anticoagulation at this time. If recurrent anemia/GI bleeding will consider watchman procedure.                   - Non-ischemic cardiomyopathy, chronic combined systolic/Diastolic HF, NYHA class III  EF improved by Biv pacing.                Follows with the HF team               Echo 03/27/2023  LVEF 40-45%               Echo 04/05/2021 LVEF 35-40%               EF per echo 02/2021   30-35%              On entresto, lasix, carvedilol and spironolactone     No Jardiance due to allergy               - HTN  Controlled    BP goal <130/80  - continue coreg,      Consider ablation with recurrence of PAF ,PAT   F/u one year     Relevant available labs,

## 2023-11-15 ENCOUNTER — OFFICE VISIT (OUTPATIENT)
Dept: CARDIOLOGY CLINIC | Age: 58
End: 2023-11-15
Payer: MEDICAID

## 2023-11-15 ENCOUNTER — NURSE ONLY (OUTPATIENT)
Dept: CARDIOLOGY CLINIC | Age: 58
End: 2023-11-15
Payer: MEDICAID

## 2023-11-15 VITALS
WEIGHT: 201.2 LBS | SYSTOLIC BLOOD PRESSURE: 130 MMHG | HEIGHT: 66 IN | OXYGEN SATURATION: 99 % | HEART RATE: 98 BPM | DIASTOLIC BLOOD PRESSURE: 78 MMHG | BODY MASS INDEX: 32.33 KG/M2

## 2023-11-15 DIAGNOSIS — I10 PRIMARY HYPERTENSION: ICD-10-CM

## 2023-11-15 DIAGNOSIS — Z95.810 AICD (AUTOMATIC CARDIOVERTER/DEFIBRILLATOR) PRESENT: Primary | ICD-10-CM

## 2023-11-15 DIAGNOSIS — I42.0 DILATED CARDIOMYOPATHY (HCC): ICD-10-CM

## 2023-11-15 DIAGNOSIS — I47.20 VT (VENTRICULAR TACHYCARDIA) (HCC): ICD-10-CM

## 2023-11-15 DIAGNOSIS — I48.0 PAF (PAROXYSMAL ATRIAL FIBRILLATION) (HCC): Primary | ICD-10-CM

## 2023-11-15 PROCEDURE — 3017F COLORECTAL CA SCREEN DOC REV: CPT | Performed by: INTERNAL MEDICINE

## 2023-11-15 PROCEDURE — 99214 OFFICE O/P EST MOD 30 MIN: CPT | Performed by: INTERNAL MEDICINE

## 2023-11-15 PROCEDURE — 1036F TOBACCO NON-USER: CPT | Performed by: INTERNAL MEDICINE

## 2023-11-15 PROCEDURE — G8482 FLU IMMUNIZE ORDER/ADMIN: HCPCS | Performed by: INTERNAL MEDICINE

## 2023-11-15 PROCEDURE — 93000 ELECTROCARDIOGRAM COMPLETE: CPT | Performed by: INTERNAL MEDICINE

## 2023-11-15 PROCEDURE — 3078F DIAST BP <80 MM HG: CPT | Performed by: INTERNAL MEDICINE

## 2023-11-15 PROCEDURE — G8427 DOCREV CUR MEDS BY ELIG CLIN: HCPCS | Performed by: INTERNAL MEDICINE

## 2023-11-15 PROCEDURE — G8417 CALC BMI ABV UP PARAM F/U: HCPCS | Performed by: INTERNAL MEDICINE

## 2023-11-15 PROCEDURE — 93284 PRGRMG EVAL IMPLANTABLE DFB: CPT | Performed by: INTERNAL MEDICINE

## 2023-11-15 PROCEDURE — 3075F SYST BP GE 130 - 139MM HG: CPT | Performed by: INTERNAL MEDICINE

## 2023-11-16 ENCOUNTER — TELEPHONE (OUTPATIENT)
Dept: FAMILY MEDICINE CLINIC | Age: 58
End: 2023-11-16

## 2023-11-16 DIAGNOSIS — L85.3 DRY SKIN: ICD-10-CM

## 2023-11-16 RX ORDER — AMMONIUM LACTATE 12 G/100G
CREAM TOPICAL
Qty: 385 G | Refills: 4 | Status: CANCELLED | OUTPATIENT
Start: 2023-11-16 | End: 2023-12-16

## 2023-11-16 NOTE — TELEPHONE ENCOUNTER
Please call pharmacy and give new instruction for medication:    Apply PRN to arms and legs, max daily use: one time

## 2023-11-16 NOTE — TELEPHONE ENCOUNTER
[facility] is needing clarification on directions on     ammonium lactate (LAC-HYDRIN) 12 % cream [[id number]]   Needs to have more specific directions

## 2023-11-19 PROCEDURE — 93297 REM INTERROG DEV EVAL ICPMS: CPT | Performed by: CLINICAL NURSE SPECIALIST

## 2023-11-19 PROCEDURE — G2066 INTER DEVC REMOTE 30D: HCPCS | Performed by: CLINICAL NURSE SPECIALIST

## 2023-11-28 NOTE — ANESTHESIA PRE PROCEDURE
Procedure Instructions  ________________________________________________________________    Preparing for Your Procedure  If you have an implanted cardiac device (pacemaker, defibrillator) please notify our office.   Purchase a new toothbrush.   If you are going home on pain medications, buy laxatives or stool softeners as pain medications can cause constipation.  Stop use of all tobacco products (smoking, chewing, vaping, etc.) as soon as possible.  If you are unable to stop, do your best to decrease the use as much as possible before surgery.  Nicotine patches may stay in place. If you are having a heart procedure they must be removed at or before midnight prior to your procedure.    Exercise and be as active as you can. Being active may help shorten your hospital stay, decrease your chance of pneumonia and help you feel better.  Do breathing exercises at least 2 times a day: Take 10 slow, deep breaths, then cough hard 3 times. After surgery, you may be given other instructions about deep breathing.  If you are going home on the day of your procedure:  Plan for a responsible adult to drive you home (Taxi/Uber not acceptable). If you do not have someone to drive you home, your procedure may be canceled.   It is recommended that someone stay with you for the first 24 hours.  If the patient is under 18 years old, a parent or authorized adult needs to stay at the hospital until the child is discharged from the procedure area or admitted to a hospital bed.  Contact your insurance company to determine coverage. Insurance may require prior authorization. We can help with this, but it is your responsibility. Charges for this procedure may include fees for the surgeon and their assistant, facility, anesthesia, and other services such as lab, radiology, pathology, etc.  You may be called to make payment arrangements.  If you have forms that need to be completed (such as disability, work release or FMLA), drop these off  Department of Anesthesiology  Preprocedure Note       Name:  Cass Minor   Age:  61 y.o.  :  1965                                          MRN:  [id number]         Date:  3/4/2022      Surgeon: Cass Brown):  Qasim Girard MD    Procedure: Procedure(s):  EGD DIAGNOSTIC ONLY    Medications prior to admission:   Prior to Admission medications    Medication Sig Start Date End Date Taking? Authorizing Provider   spironolactone (ALDACTONE) 25 MG tablet Take 1 tablet by mouth daily 12/10/21   [facility], APRN - CNS   sacubitril-valsartan (ENTRESTO) 24-26 MG per tablet TAKE 1/2 TABLET BY MOUTH EVERY MORNING AND ONE TABLET EVERY EVENING AS DIRECTED 12/10/21   [facility], APRN - CNS   furosemide (LASIX) 20 MG tablet Take 2 tablets by mouth daily 12/10/21   [facility], APRN - CNS   carvedilol (COREG) 6.25 MG tablet Take 1 tablet by mouth 2 times daily 12/10/21   [facility], APRN - CNS   rivaroxaban (XARELTO) 20 MG TABS tablet Take 1 tablet by mouth Daily with supper 12/10/21   Gurinder Reusing, APRN - CNP   vitamin D3 (CHOLECALCIFEROL) 25 MCG (1000 UT) TABS tablet Take 1 tablet by mouth daily 21   [facility], APRN - CNS   atorvastatin (LIPITOR) 40 MG tablet Take 1 tablet by mouth daily 21   [facility], APRN - CNS   pantoprazole (PROTONIX) 40 MG tablet Take 1 tablet by mouth every morning (before breakfast) 21   Denisse Borrego MD   Multiple Vitamins-Minerals (THERAPEUTIC MULTIVITAMIN-MINERALS) tablet Take 1 tablet by mouth daily    Historical Provider, MD       Current medications:    No current facility-administered medications for this encounter. Allergies:     Allergies   Allergen Reactions    Latex      rash    Codeine      Hives      Lisinopril      cough    Nitroglycerin Hives    Sulfa Antibiotics Nausea Only    Hydralazine      headaches       Problem List:    Patient Active Problem List   Diagnosis Code    HTN (hypertension) I10    Other and unspecified hyperlipidemia E78.5    Congenital heart block Q24.6    Dyspnea on exertion R06.00    Headache R51.9    LVH (left ventricular hypertrophy) I51.7    Persistent atrial fibrillation (HCC) I48.19    Chest pain Y44.1    Systolic CHF, chronic (HCC) I50.22    Hypersomnia G47.10    Obstructive sleep apnea (adult) (pediatric) G47.33    LV dysfunction I51.9    Left subclavian vein thrombosis (HCC) I82. B12    Dilated cardiomyopathy (HCC) I42.0    Class 1 obesity due to excess calories with body mass index (BMI) of 31.0 to 31.9 in adult E66.09, Z68.31    Paroxysmal ventricular tachycardia (HCC) I47.2    Status post implantation of automatic cardioverter/defibrillator (AICD) Z95.810    Exertional dyspnea R06.00    Iron deficiency anemia D50.9    Profound fatigue R53.83    Anemia D64.9    Gait instability R26.81    Adequate anticoagulation on anticoagulant therapy Z79.01    Duodenal mass K31.89    Weight loss counseling, encounter for Z71.3    Incisional hernia, without obstruction or gangrene K43.2       Past Medical History:        Diagnosis Date    Anemia     Atrial fibrillation and flutter (HCC)     Atrial flutter (HCC)     CHB (complete heart block) (HCC)     Class 1 obesity without serious comorbidity with body mass index (BMI) of 33.0 to 33.9 in adult 9/6/2019    Congenital heart disease     GERD (gastroesophageal reflux disease)     Headache(784.0)     History of complete heart block     Hyperlipidemia     Hypertension     PONV (postoperative nausea and vomiting)     Sleep apnea     uses CPAP    Syncope     Systolic CHF, chronic ([facility] [address] 10/3/2018       Past Surgical History:        Procedure Laterality Date    CARDIAC DEFIBRILLATOR PLACEMENT  01/2021    [employer]    CARDIOVERSION  01/28/2022    COLECTOMY N/A 4/13/2021    EXPLORATORY LAPAROTOMY, RESECTION OF DUODENAL MASS, CHOLECYSTECTOMY WITH INTRAOPERATIVE CHOLANGIOGRAM performed by Eric Pool MD at [address] [place] [address] at our office before the procedure date. A Release of Information form may need to be completed to allow us to share information with the insurance company or your employer.    NOTE:  Follow these instructions to help avoid delays or cancellation of your procedure. ________________________________________________________________    Prepare Your Home  To avoid a fall, remove any rugs or clutter and clear all walkways.   Place a night light in hallways or bathrooms.  Go grocery shopping and fill your pantry with healthy foods.   You may feel tired after your procedure, prepare several meals ahead of time to be kept in the freezer.       Assistance at Home  After your surgery you may need help with normal everyday activities while your body heals.  You might need transportation to and from appointments.      THE WEEK BEFORE PROCEDURE  Call our office at [phone] if you have:  Signs of illness, such as fever, sore throat or a cold.  Any skin problems, rashes, bug bites or open sores in area of procedure.  An exposure to COVID-19 or develop COVID-19 symptoms.    Expect a phone call from a hospital nurse to review your health history, medications and procedure plans.  For patient and community safety, facilities may have restrictions on persons accompanying patients in the building.  Please confirm current restrictions during your call with the hospital nurse.      2 DAYS BEFORE PROCEDURE  Begin using the new toothbrush. Brush teeth and tongue at least 2 times a day.  Do not shave near the surgical area until after the procedure.  You may be given specific DIET INSTRUCTIONS.  Follow these closely.      DAY BEFORE PROCEDURE  Review and follow your specific DIET INSTRUCTIONS.  DO NOT use tobacco or alcohol.  DO NOT use recreational or illegal drugs.  Remove nail polish from fingers and toes. Nails should be clean. Remove artificial nails.   Remove all jewelry and body piercings.  Review and follow your specific bathing  COLONOSCOPY N/A 10/27/2020    COLONOSCOPY POLYPECTOMY SNARE/COLD BIOPSY performed by Jocelyn Warren MD at [place] [address]  11/29/12    dual chamber PPM, [employer]    TUBAL LIGATION      UPPER GASTROINTESTINAL ENDOSCOPY N/A 10/27/2020    EGD DIAGNOSTIC ONLY performed by Jocelyn Warren MD at [place] [address] 4/8/2021    EGD CONTROL HEMORRHAGE WITH APPLICATION OF 3 CLIPS TO DUODENAL MASS performed by Juan José Baez MD at [place] [address] N/A 4/8/2021    EGD BIOPSY DUODENAL MASS performed by Juan José Baez MD at [place] [address] N/A 4/8/2021    EGD SUBMUCOSAL INJECTION OF 0.6 MG OF EPINEPRINE INTO BASE OF DUODENAL MASS performed by Juan José Baez MD at [place] [address]         Social History:    Social History     Tobacco Use    Smoking status: Never Smoker    Smokeless tobacco: Never Used   Substance Use Topics    Alcohol use: No                                Counseling given: Not Answered      Vital Signs (Current): There were no vitals filed for this visit.                                            BP Readings from Last 3 Encounters:   02/15/22 130/86   01/28/22 118/80   01/18/22 121/86       NPO Status:                                                                                 BMI:   Wt Readings from Last 3 Encounters:   02/15/22 221 lb (100.2 kg)   01/28/22 222 lb (100.7 kg)   01/18/22 222 lb 6.4 oz (100.9 kg)     There is no height or weight on file to calculate BMI.    CBC:   Lab Results   Component Value Date    WBC 5.4 12/01/2021    RBC 4.10 12/01/2021    HGB 12.7 12/01/2021    HCT 36.9 12/01/2021    MCV 90.0 12/01/2021    RDW 15.5 12/01/2021     12/01/2021       CMP:   Lab Results   Component Value Date     12/01/2021    K 3.3 12/01/2021    K 3.7 04/07/2021     12/01/2021    CO2 28 12/01/2021    BUN 9 instructions.   Put on clean clothing or pajamas after bathing.  Sleep with clean bedsheets and don't allow pets to sleep in your bed.    MORNING OF YOUR PROCEDURE  Brush teeth and tongue.  Wear loose, comfortable clothing.  DO NOT wear any makeup.  DO NOT apply lotions, powders or hair products.  DO NOT eat anything, including chewing gum, mints or candy.    Bring these items with you:  Items you rely on such as hearing aids, glasses, cane or walker.  Any inhalers you use.  CPAP/BiPAP machine, if you use one.  Insurance cards and ID.  A copy of your Advance Directive, if you have one.    Do not bring these items with you:  DO NOT bring medications, unless you are told differently  DO NOT bring valuables (jewelry, cash, etc.)  DO NOT wear contact lenses. Wear glasses if needed.     ________________________________________________________________    Things to Know After Surgery    Go to an Emergency Room if you have:  trouble breathing or chest pain    Contact our office if you:  have redness, swelling or unusual pain in one of your legs  are unable to keep fluids down. Nausea can be related to anesthesia or pain medicines.  have a fever of 101 degrees or higher.  have pain not adequately controlled with medication.   have bleeding or drainage from your incision that is increasing, or has a bad odor.  are unable to empty your bladder for more than 12 hours, or if your bladder feels uncomfortable prior to this.    DO NOT make important decisions for at least 24 hours after your procedure as you will likely still have anesthesia in your system.    DO NOT drive, operate heavy machinery, or drink alcohol for at least 24 hours after your procedure.    DO NOT drive while taking narcotics or other sedating medications.  Avoid smoking and other nicotine products.  These have been shown to interfere with the healing process.  A sore throat can result from the breathing tube used to administer general anesthesia. This usually  12/01/2021    CREATININE 0.8 12/01/2021    GFRAA >60 12/01/2021    AGRATIO 1.3 12/01/2021    LABGLOM >60 12/01/2021    GLUCOSE 114 12/01/2021    PROT 7.7 12/01/2021    CALCIUM 9.4 12/01/2021    BILITOT 0.8 12/01/2021    ALKPHOS 85 12/01/2021    AST 23 12/01/2021    ALT 19 12/01/2021       POC Tests: No results for input(s): POCGLU, POCNA, POCK, POCCL, POCBUN, POCHEMO, POCHCT in the last 72 hours. Coags:   Lab Results   Component Value Date    PROTIME 14.8 04/14/2021    INR 1.27 04/14/2021    APTT 30.2 04/14/2021       HCG (If Applicable): No results found for: PREGTESTUR, PREGSERUM, HCG, HCGQUANT     ABGs: No results found for: PHART, PO2ART, IGJ7CUS, ION8WDN, BEART, D0BNBQBM     Type & Screen (If Applicable):  No results found for: LABABO, LABRH    Drug/Infectious Status (If Applicable):  No results found for: HIV, HEPCAB    COVID-19 Screening (If Applicable):   Lab Results   Component Value Date    COVID19 Not Detected 02/08/2021    COVID19 Not Detected 10/21/2020           Anesthesia Evaluation  Patient summary reviewed and Nursing notes reviewed   history of anesthetic complications: PONV.   Airway: Mallampati: II  TM distance: >3 FB   Neck ROM: full  Mouth opening: > = 3 FB Dental: normal exam         Pulmonary: breath sounds clear to auscultation  (+) shortness of breath: chronic and no interval change,  sleep apnea:                             Cardiovascular:    (+) hypertension:, dysrhythmias (recent [facility]): ventricular tachycardia, atrial fibrillation and atrial flutter, CHF:,         Rhythm: regular  Rate: normal                 ROS comment: Transthoracic Echocardiography Report (TTE)      Demographics      Patient Name       Aline MATUTE      Date of Study      02/09/2021         Gender              Female      Patient Number     [id number]         Date of Birth       1965      Visit Number       [id number]          Age                 63 year(s)      Accession Number   [id number]         Room lasts 1-2 days. Cold liquids, ice chips, and throat lozenges help relieve the soreness.  If you received a nerve block during your procedure, it usually wears off within 12 to 24 hours.  Follow the specific instructions on your discharge paperwork from the hospital on how to manage any dressings or drains.   You will get instructions about activity restrictions. Expect that you may have to limit your activities; our goal is to have you return to normal activities as soon as possible. This will be discussed at your first post-operative visit.  Discuss with your provider how long you may be off work or have work restrictions.    Pain  You will get instructions and/or prescriptions to help manage your pain.    It is important to take your pain medications as prescribed during the first 48 hours to prevent your pain from getting out of control.   Set a realistic expectation about your pain.  You may not have complete relief immediately after your procedure.    Pain medications can cause constipation. Take a stool softener (e.g., Colace or Senokot-S) daily while using prescription pain medication. If you do not have a bowel movement within 3 days after your procedure, take MiraLAX (or generic polyethylene glycol): 1 dose in the morning and 1 dose at bedtime until bowel movements are back to normal. When you stop your pain medication, you should be able to stop your stool softener.   If you have not had a bowel movement within 2 to 3 days, and you have not had surgery in the rectal area, you may try an over the counter Dulcolax suppository or Fleets enema.    To avoid constipation drink 6 to 8 glasses of water a day unless you are on a fluid restriction.      Medications and Refills  Always take prescribed medication as directed on the bottle or by your doctor.    Do not exceed 4000 mg of acetaminophen (Tylenol) per day.    If you are allowed to take ibuprofen (Motrin), do not exceed 3200 mg per day.   Remember your pain  Number         [id number]      Corporate ID       [id number]           Sonographer         Patrice Bhandari RVT      Ordering Physician Genie Rice Interpreting        Jania Mayer MD                 Physician           DO SHERYL, [facility] [place]     Procedure     Type of Study      TTE procedure:ECHOCARDIOGRAM COMPLETE 2D W DOPPLER W COLOR. Procedure Date  Date: 02/09/2021 Start: 10:27 AM     Study Location: [facility] Lab  Technical Quality: Adequate visualization     Additional Indications:Recent AICD Plant.     Patient Status: Routine     Height: 67 inches Weight: 176 pounds BSA: 1.92 m2 BMI: 27.57 kg/m2     BP: 160/95 mmHg      Conclusions      Summary   Left ventricular size is mildly increased. Moderately reduced global systolic function with an ejection fraction   estimated at 30-35%. Global hypokinesis noted. Grade II diastolic dysfunction with elevated LV filling pressures. There is mild mitral regurgitation noted. Mild left atrial enlargement noted. Aortic valve appears sclerotic but opens adequately. Mild aortic regurgitation. There is mild tricuspid regurgitation with a RVSP estimation of 25 mmHg. Pacer / ICD wire is visualized in the right ventricle. The right ventricle is normal in size and function.       Signature      ------------------------------------------------------------------   Electronically signed by Milad Acosta, [facility] [place]      Per recent cardiology note:  HPI: Rosa Holley is a 61 y.o. female with a PMH of HTN, CHB s/p PPM. SCHF, CHD, syncope, and atrial fibrillation/flutter.     She was seen in 2019 for device upgrade and left sided venogram showed occlusion of left subclavian     Hospitalized 1/20/2021 with dizziness and found to be in aflutter with runs of symptomatic VT.  S/p [facility] 1/20/2021 that showed normal coronaries.  She had episode of VT with syncope in prescription may contain acetaminophen or ibuprofen.   Prescription refill requests are handled during normal business hours, Monday thru Friday 8 am - 4:30 pm. Allow one full business day to respond to refill requests.    **Refill requests made after hours or on weekends, will not be   addressed until the following business day.**  Some narcotic prescriptions cannot be called directly into the pharmacy. Not all pharmacies accept electronic refills. Please take this into consideration when contacting our office. You may need to  a hand-written prescription from one of our offices, or  allow for U[employer] Postal Service mail delivery of the written prescription.     Dressing/Incision Care  Keep your dressing(s) clean and dry.  Do not apply any creams, lotions or ointments to your incision until fully healed unless you are specifically instructed to use.   Your incision may be closed with either sutures, steri-strips (butterfly tape), Dermabond (surgical glue) or staples.  Dermabond: Do not peel off, this will fall off on its own.  Steri-strips: Should fall off in 7-10 days.  If after 10 days they have not fallen off, you may gently peel them off.    Staples or visible sutures:  Need to be removed in our office typically 10-14 days after your procedure.   Internal sutures: Do not need to be removed.  They will dissolve on their own.      cath lab prior to [facility]. S/p extraction of RV pacing lead and Biv-AICD upgrade for cardiomyopathy and VT. Also loaded with amiodarone.          Neuro/Psych:      (-) seizures, TIA and CVA           GI/Hepatic/Renal:   (+) GERD (no symptoms today): well controlled,           Endo/Other:    (+) blood dyscrasia: anticoagulation therapy:., .                 Abdominal:             Vascular:     - DVT and PE. Other Findings:           Anesthesia Plan      MAC     ASA 4       Induction: intravenous. Anesthetic plan and risks discussed with patient. Plan discussed with CRNA.                   Raina Cowan MD   3/4/2022

## 2023-12-05 ENCOUNTER — APPOINTMENT (OUTPATIENT)
Dept: GENERAL RADIOLOGY | Age: 58
End: 2023-12-05
Payer: MEDICAID

## 2023-12-05 ENCOUNTER — HOSPITAL ENCOUNTER (OUTPATIENT)
Age: 58
Setting detail: OBSERVATION
Discharge: HOME | End: 2023-12-06
Attending: INTERNAL MEDICINE | Admitting: INTERNAL MEDICINE
Payer: MEDICAID

## 2023-12-05 DIAGNOSIS — R07.9 CHEST PAIN, UNSPECIFIED TYPE: Primary | ICD-10-CM

## 2023-12-05 LAB
ALBUMIN SERPL-MCNC: 3.9 G/DL (ref 3.4–5)
ALBUMIN/GLOB SERPL: 1.3 {RATIO} (ref 1.1–2.2)
ALP SERPL-CCNC: 63 U/L (ref 40–129)
ALT SERPL-CCNC: 25 U/L (ref 10–40)
ANION GAP SERPL CALCULATED.3IONS-SCNC: 7 MMOL/L (ref 3–16)
APTT BLD: 34.8 SEC (ref 22.7–35.9)
AST SERPL-CCNC: 28 U/L (ref 15–37)
BASOPHILS # BLD: 0 K/UL (ref 0–0.2)
BASOPHILS NFR BLD: 1 %
BILIRUB SERPL-MCNC: 0.5 MG/DL (ref 0–1)
BUN SERPL-MCNC: 7 MG/DL (ref 7–20)
CALCIUM SERPL-MCNC: 8.7 MG/DL (ref 8.3–10.6)
CHLORIDE SERPL-SCNC: 105 MMOL/L (ref 99–110)
CO2 SERPL-SCNC: 25 MMOL/L (ref 21–32)
CREAT SERPL-MCNC: 0.8 MG/DL (ref 0.6–1.1)
DEPRECATED RDW RBC AUTO: 14.9 % (ref 12.4–15.4)
EKG ATRIAL RATE: 79 BPM
EKG DIAGNOSIS: NORMAL
EKG P AXIS: 87 DEGREES
EKG P-R INTERVAL: 190 MS
EKG Q-T INTERVAL: 452 MS
EKG QRS DURATION: 152 MS
EKG QTC CALCULATION (BAZETT): 518 MS
EKG R AXIS: -80 DEGREES
EKG T AXIS: 86 DEGREES
EKG VENTRICULAR RATE: 79 BPM
EOSINOPHIL # BLD: 0.3 K/UL (ref 0–0.6)
EOSINOPHIL NFR BLD: 6 %
FLUAV RNA RESP QL NAA+PROBE: NOT DETECTED
FLUBV RNA RESP QL NAA+PROBE: NOT DETECTED
GFR SERPLBLD CREATININE-BSD FMLA CKD-EPI: >60 ML/MIN/{1.73_M2}
GLUCOSE SERPL-MCNC: 107 MG/DL (ref 70–99)
HCT VFR BLD AUTO: 35.6 % (ref 36–48)
HGB BLD-MCNC: 11.8 G/DL (ref 12–16)
INR PPP: 1.33 (ref 0.84–1.16)
LYMPHOCYTES # BLD: 1.6 K/UL (ref 1–5.1)
LYMPHOCYTES NFR BLD: 36.2 %
MCH RBC QN AUTO: 31.2 PG (ref 26–34)
MCHC RBC AUTO-ENTMCNC: 33.1 G/DL (ref 31–36)
MCV RBC AUTO: 94.3 FL (ref 80–100)
MONOCYTES # BLD: 0.3 K/UL (ref 0–1.3)
MONOCYTES NFR BLD: 6.1 %
NEUTROPHILS # BLD: 2.3 K/UL (ref 1.7–7.7)
NEUTROPHILS NFR BLD: 50.7 %
NT-PROBNP SERPL-MCNC: 276 PG/ML (ref 0–124)
PLATELET # BLD AUTO: 238 K/UL (ref 135–450)
PMV BLD AUTO: 7.7 FL (ref 5–10.5)
POTASSIUM SERPL-SCNC: 3.7 MMOL/L (ref 3.5–5.1)
PROT SERPL-MCNC: 6.8 G/DL (ref 6.4–8.2)
PROTHROMBIN TIME: 16.4 SEC (ref 11.5–14.8)
RBC # BLD AUTO: 3.77 M/UL (ref 4–5.2)
SARS-COV-2 RNA RESP QL NAA+PROBE: NOT DETECTED
SODIUM SERPL-SCNC: 137 MMOL/L (ref 136–145)
TROPONIN, HIGH SENSITIVITY: 6 NG/L (ref 0–14)
TROPONIN, HIGH SENSITIVITY: <6 NG/L (ref 0–14)
WBC # BLD AUTO: 4.5 K/UL (ref 4–11)

## 2023-12-05 PROCEDURE — G0378 HOSPITAL OBSERVATION PER HR: HCPCS

## 2023-12-05 PROCEDURE — 80053 COMPREHEN METABOLIC PANEL: CPT

## 2023-12-05 PROCEDURE — 93005 ELECTROCARDIOGRAM TRACING: CPT | Performed by: INTERNAL MEDICINE

## 2023-12-05 PROCEDURE — 85610 PROTHROMBIN TIME: CPT

## 2023-12-05 PROCEDURE — 6370000000 HC RX 637 (ALT 250 FOR IP): Performed by: PHYSICIAN ASSISTANT

## 2023-12-05 PROCEDURE — 85730 THROMBOPLASTIN TIME PARTIAL: CPT

## 2023-12-05 PROCEDURE — 71045 X-RAY EXAM CHEST 1 VIEW: CPT

## 2023-12-05 PROCEDURE — 87636 SARSCOV2 & INF A&B AMP PRB: CPT

## 2023-12-05 PROCEDURE — 99214 OFFICE O/P EST MOD 30 MIN: CPT | Performed by: INTERNAL MEDICINE

## 2023-12-05 PROCEDURE — 84484 ASSAY OF TROPONIN QUANT: CPT

## 2023-12-05 PROCEDURE — 36415 COLL VENOUS BLD VENIPUNCTURE: CPT

## 2023-12-05 PROCEDURE — 99285 EMERGENCY DEPT VISIT HI MDM: CPT

## 2023-12-05 PROCEDURE — 85025 COMPLETE CBC W/AUTO DIFF WBC: CPT

## 2023-12-05 PROCEDURE — 93010 ELECTROCARDIOGRAM REPORT: CPT | Performed by: INTERNAL MEDICINE

## 2023-12-05 PROCEDURE — 2580000003 HC RX 258: Performed by: INTERNAL MEDICINE

## 2023-12-05 PROCEDURE — 83880 ASSAY OF NATRIURETIC PEPTIDE: CPT

## 2023-12-05 PROCEDURE — 6370000000 HC RX 637 (ALT 250 FOR IP): Performed by: INTERNAL MEDICINE

## 2023-12-05 RX ORDER — ONDANSETRON 4 MG/1
4 TABLET, ORALLY DISINTEGRATING ORAL EVERY 8 HOURS PRN
Status: DISCONTINUED | OUTPATIENT
Start: 2023-12-05 | End: 2023-12-06 | Stop reason: HOSPADM

## 2023-12-05 RX ORDER — MAGNESIUM SULFATE IN WATER 40 MG/ML
2000 INJECTION, SOLUTION INTRAVENOUS PRN
Status: DISCONTINUED | OUTPATIENT
Start: 2023-12-05 | End: 2023-12-06 | Stop reason: HOSPADM

## 2023-12-05 RX ORDER — SODIUM CHLORIDE 0.9 % (FLUSH) 0.9 %
5-40 SYRINGE (ML) INJECTION PRN
Status: DISCONTINUED | OUTPATIENT
Start: 2023-12-05 | End: 2023-12-06 | Stop reason: HOSPADM

## 2023-12-05 RX ORDER — SPIRONOLACTONE 25 MG/1
25 TABLET ORAL DAILY
Status: DISCONTINUED | OUTPATIENT
Start: 2023-12-06 | End: 2023-12-06 | Stop reason: HOSPADM

## 2023-12-05 RX ORDER — CARVEDILOL 25 MG/1
25 TABLET ORAL 2 TIMES DAILY
Status: DISCONTINUED | OUTPATIENT
Start: 2023-12-05 | End: 2023-12-06 | Stop reason: HOSPADM

## 2023-12-05 RX ORDER — ACETAMINOPHEN 325 MG/1
650 TABLET ORAL EVERY 6 HOURS PRN
Status: DISCONTINUED | OUTPATIENT
Start: 2023-12-05 | End: 2023-12-06 | Stop reason: HOSPADM

## 2023-12-05 RX ORDER — POTASSIUM CHLORIDE 20 MEQ/1
40 TABLET, EXTENDED RELEASE ORAL PRN
Status: DISCONTINUED | OUTPATIENT
Start: 2023-12-05 | End: 2023-12-06 | Stop reason: HOSPADM

## 2023-12-05 RX ORDER — ACETAMINOPHEN 650 MG/1
650 SUPPOSITORY RECTAL EVERY 6 HOURS PRN
Status: DISCONTINUED | OUTPATIENT
Start: 2023-12-05 | End: 2023-12-06 | Stop reason: HOSPADM

## 2023-12-05 RX ORDER — POLYETHYLENE GLYCOL 3350 17 G/17G
17 POWDER, FOR SOLUTION ORAL DAILY PRN
Status: DISCONTINUED | OUTPATIENT
Start: 2023-12-05 | End: 2023-12-06 | Stop reason: HOSPADM

## 2023-12-05 RX ORDER — FUROSEMIDE 40 MG/1
20 TABLET ORAL WEEKLY
Status: DISCONTINUED | OUTPATIENT
Start: 2023-12-06 | End: 2023-12-06 | Stop reason: HOSPADM

## 2023-12-05 RX ORDER — ONDANSETRON 2 MG/ML
4 INJECTION INTRAMUSCULAR; INTRAVENOUS EVERY 6 HOURS PRN
Status: DISCONTINUED | OUTPATIENT
Start: 2023-12-05 | End: 2023-12-05

## 2023-12-05 RX ORDER — PANTOPRAZOLE SODIUM 40 MG/1
40 TABLET, DELAYED RELEASE ORAL
Status: DISCONTINUED | OUTPATIENT
Start: 2023-12-06 | End: 2023-12-06 | Stop reason: HOSPADM

## 2023-12-05 RX ORDER — SODIUM CHLORIDE 9 MG/ML
INJECTION, SOLUTION INTRAVENOUS PRN
Status: DISCONTINUED | OUTPATIENT
Start: 2023-12-05 | End: 2023-12-06 | Stop reason: HOSPADM

## 2023-12-05 RX ORDER — ASPIRIN 81 MG/1
324 TABLET, CHEWABLE ORAL ONCE
Status: COMPLETED | OUTPATIENT
Start: 2023-12-05 | End: 2023-12-05

## 2023-12-05 RX ORDER — ATORVASTATIN CALCIUM 80 MG/1
40 TABLET, FILM COATED ORAL NIGHTLY
Status: DISCONTINUED | OUTPATIENT
Start: 2023-12-05 | End: 2023-12-06 | Stop reason: HOSPADM

## 2023-12-05 RX ORDER — SODIUM CHLORIDE 0.9 % (FLUSH) 0.9 %
5-40 SYRINGE (ML) INJECTION EVERY 12 HOURS SCHEDULED
Status: DISCONTINUED | OUTPATIENT
Start: 2023-12-05 | End: 2023-12-06 | Stop reason: HOSPADM

## 2023-12-05 RX ORDER — POTASSIUM CHLORIDE 7.45 MG/ML
10 INJECTION INTRAVENOUS PRN
Status: DISCONTINUED | OUTPATIENT
Start: 2023-12-05 | End: 2023-12-06 | Stop reason: HOSPADM

## 2023-12-05 RX ADMIN — SODIUM CHLORIDE, PRESERVATIVE FREE 10 ML: 5 INJECTION INTRAVENOUS at 20:17

## 2023-12-05 RX ADMIN — CARVEDILOL 25 MG: 25 TABLET, FILM COATED ORAL at 20:16

## 2023-12-05 RX ADMIN — RIVAROXABAN 20 MG: 20 TABLET, FILM COATED ORAL at 17:32

## 2023-12-05 RX ADMIN — ATORVASTATIN CALCIUM 40 MG: 80 TABLET, FILM COATED ORAL at 20:16

## 2023-12-05 RX ADMIN — ASPIRIN 324 MG: 81 TABLET, CHEWABLE ORAL at 09:03

## 2023-12-05 RX ADMIN — SACUBITRIL AND VALSARTAN 1 TABLET: 49; 51 TABLET, FILM COATED ORAL at 20:15

## 2023-12-05 ASSESSMENT — PAIN - FUNCTIONAL ASSESSMENT: PAIN_FUNCTIONAL_ASSESSMENT: 0-10

## 2023-12-05 ASSESSMENT — ENCOUNTER SYMPTOMS
NAUSEA: 0
DIARRHEA: 0
VOMITING: 0
COUGH: 0
RHINORRHEA: 0
ABDOMINAL PAIN: 0
SHORTNESS OF BREATH: 0
WHEEZING: 0

## 2023-12-05 ASSESSMENT — PAIN SCALES - GENERAL
PAINLEVEL_OUTOF10: 0
PAINLEVEL_OUTOF10: 0
PAINLEVEL_OUTOF10: 7

## 2023-12-05 ASSESSMENT — HEART SCORE: ECG: 1

## 2023-12-05 NOTE — ED PROVIDER NOTES
I did not perform history or physical on Hannah Dugan. This patient was seen independently by an LOUIS. I did review the EKG, which is documented below. EKG  The Ekg interpreted by me in the absence of a cardiologist shows. normal pacemaker rhythm with a rate of 79  No specific ST-T wave changes appreciated. No evidence of acute ischemia.    No significant change from prior EKG dated 8/23/2023         Bhavani Lovelace MD  12/05/23 [id number]

## 2023-12-05 NOTE — ED NOTES
ED TO INPATIENT SBAR HANDOFF    Patient Name: Ruma Mendez   :  1965  60 y.o. MRN:  [id number]  Preferred Name  Theresa Daniels  ED Room #:  ED-0024/24  Family/Caregiver Present no   Restraints no   Sitter no   Sepsis Risk Score Sepsis Risk Score: 0.58    Situation  Code Status: Full Code No additional code details. Allergies: Latex, Morphine, Codeine, Jardiance [empagliflozin], Lisinopril, Nitroglycerin, Sulfa antibiotics, and Hydralazine  Weight: Patient Vitals for the past 96 hrs (Last 3 readings):   Weight   23 0842 89.8 kg (198 lb)     Arrived from: home  Chief Complaint:   Chief Complaint   Patient presents with    Chest Pain     Patient states chest pain that started yesterday and went away, states felt like heart burn. Patient states pain was worse and radiating to her shoulder when she woke up this morning. States has hx of CHF, and has pacemaker/defibrillator. Hospital Problem/Diagnosis:  Principal Problem:    Chest pain  Resolved Problems:    * No resolved hospital problems. *    Imaging:   XR CHEST PORTABLE   Final Result   No acute process.       Stable cardiomegaly           Abnormal labs:   Abnormal Labs Reviewed   CBC WITH AUTO DIFFERENTIAL - Abnormal; Notable for the following components:       Result Value    RBC 3.77 (*)     Hemoglobin 11.8 (*)     Hematocrit 35.6 (*)     All other components within normal limits   COMPREHENSIVE METABOLIC PANEL - Abnormal; Notable for the following components:    Glucose 107 (*)     All other components within normal limits   PROTIME-INR - Abnormal; Notable for the following components:    Protime 16.4 (*)     INR 1.33 (*)     All other components within normal limits   BRAIN NATRIURETIC PEPTIDE - Abnormal; Notable for the following components:    Pro- (*)     All other components within normal limits     Critical values: no     Abnormal Assessment Findings: chest pain    Background  History:   Past Medical History:   Diagnosis Date    Anemia

## 2023-12-05 NOTE — ACP (ADVANCE CARE PLANNING)
Advanced Care Planning Note. Purpose of Encounter: Advanced care planning in light of hospitalization  Parties In Attendance: Patient,    Decisional Capacity: Yes  Subjective: Patient  understand that this conversation is to address long term care goal  Objective:Hospital with chest pain history of congestive heart failure and A-fib  Goals of Care Determination: Patient would pursue CPR and Intubation if required.   Would consider left heart cath if required  Code Status: full code  Time spent on Advanced care Plannin minutes  Advanced Care Planning Documents: documented patient's wishes, would like Sister Ralph Perez to make medical decisions if unable to make decisions    Jus Fatima MD  2023 2:02 PM

## 2023-12-05 NOTE — ED PROVIDER NOTES
Jose Alfredo Bruno        Pt Name: Hannah Dugan  MRN: [id number]  [address] 1965  Date of evaluation: 12/5/2023  Provider: Manuel Stewart PA-C  PCP: CATRACHITO Cerna CNP  Note Started: 8:55 AM EST 12/5/23      LOUIS. I have evaluated this patient. CHIEF COMPLAINT       Chief Complaint   Patient presents with    Chest Pain     Patient states chest pain that started yesterday and went away, states felt like heart burn. Patient states pain was worse and radiating to her shoulder when she woke up this morning. States has hx of CHF, and has pacemaker/defibrillator. HISTORY OF PRESENT ILLNESS: 1 or more Elements     History From: patient   Limitations to history : None    Hannah Dugan is a 60 y.o. female who presents for evaluation of chest pain. Patient states that she had intermittently yesterday and stated that she thought it felt like heartburn and did not think much of it, however, states that she woke up this morning with worsening midsternal pain radiating into her left shoulder. Patient does have history of CHF, atrial fibrillation and is anticoagulated on Xarelto. She also has pacemaker/defibrillator. She sees DrsTim Schofield and Abigail Martinez. No shortness of breath. No diaphoresis. No dizziness/lightheadedness, weakness, visual disturbances or syncope. No abdominal pain nausea or vomiting. She has no other complaints or concerns at this time. Nursing Notes were all reviewed and agreed with or any disagreements were addressed in the HPI. REVIEW OF SYSTEMS :      Review of Systems   Constitutional:  Negative for appetite change, chills and fever. HENT:  Negative for congestion and rhinorrhea. Respiratory:  Negative for cough, shortness of breath and wheezing. Cardiovascular:  Positive for chest pain. Gastrointestinal:  Negative for abdominal pain, diarrhea, nausea and vomiting.    Genitourinary:  Negative

## 2023-12-05 NOTE — PROGRESS NOTES
Pharmacy Home Medication Reconciliation Note    A medication reconciliation has been completed for Manoj Vu 1965    Pharmacy: [address] [place] [address], [address] [place] [address] provided by: patient    The patient's home medication list is as follows: No current facility-administered medications on file prior to encounter. Current Outpatient Medications on File Prior to Encounter   Medication Sig Dispense Refill    ammonium lactate (LAC-HYDRIN) 12 % cream Apply topically as needed. 385 g 4    sacubitril-valsartan (ENTRESTO) 49-51 MG per tablet Take 1 tablet by mouth 2 times daily 180 tablet 1    furosemide (LASIX) 20 MG tablet TAKE 2 TABLETS BY MOUTH DAILY (Patient taking differently: Take 1 tablet by mouth once a week Wednesdays. ) 60 tablet 1    carvedilol (COREG) 25 MG tablet TAKE ONE TABLET BY MOUTH TWICE A DAY (Patient taking differently: Take 1 tablet by mouth 2 times daily TAKE ONE TABLET BY MOUTH TWICE A DAY) 180 tablet 1    Magnesium Citrate 200 MG TABS Take 200 mg by mouth daily (Patient taking differently: Take 200 mg by mouth nightly) 90 tablet 3    potassium chloride (KLOR-CON M) 20 MEQ extended release tablet Take 2 tablets by mouth daily 180 tablet 3    XARELTO 20 MG TABS tablet TAKE ONE TABLET BY MOUTH DAILY WITH SUPPER (Patient taking differently: Take 1 tablet by mouth Daily with supper) 90 tablet 3    spironolactone (ALDACTONE) 25 MG tablet TAKE ONE TABLET BY MOUTH DAILY (Patient taking differently: Take 1 tablet by mouth daily) 90 tablet 1    Multiple Vitamins-Minerals (MULTIVITAMIN) TABS TAKE ONE TABLET BY MOUTH DAILY (Patient taking differently: Take 1 tablet by mouth daily) 90 tablet 3    vitamin D3 (CHOLECALCIFEROL) 25 MCG (1000 UT) TABS tablet Take 1 tablet by mouth daily 90 tablet 3    pantoprazole (PROTONIX) 40 MG tablet Take 1 tablet by mouth every morning (before breakfast) 90 tablet 3    fluticasone (FLONASE) 50 MCG/ACT nasal spray 2 sprays by Each Nostril route

## 2023-12-05 NOTE — CONSULTS
[facility]  H+P  Consult  OP Visit  FU Visit   CC/HPI   CC New patient visit for cp. HPI 60 y.o., female  presents with CP. CP started yesterday, substernal, radiating to right shoulder, nonexertional, no sob, felt burning like reflux. Now resolved. Enzymes negative. EKG paced. Hx of  NICM, CHB s/p PPM.     Cardiac Hx PPM   EKG PPM   Troponin Lab Results   Component Value Date    TROPHS 6 12/05/2023    TROPHS <6 12/05/2023    TROPHS 7 08/04/2023      HISTORY/ALLERGY/ROS   MEDHx  has a past medical history of Anemia, Atrial fibrillation and flutter ([address]), Atrial flutter ([address]), CHB (complete heart block) ([address]), Class 1 obesity without serious comorbidity with body mass index (BMI) of 33.0 to 33.9 in adult, Congenital heart disease, Difficult intravenous access, GERD (gastroesophageal reflux disease), Headache(784.0), History of complete heart block, Hyperlipidemia, Hypertension, Migraine, PONV (postoperative nausea and vomiting), Prolonged emergence from general anesthesia, Sleep apnea, Syncope, and Systolic CHF, chronic ([address]). SURGHx  has a past surgical history that includes Uterine fibroid surgery; Pacemaker insertion (11/29/2012); Tubal ligation; Upper gastrointestinal endoscopy (N/A, 10/27/2020); Colonoscopy (N/A, 10/27/2020); Cardiac defibrillator placement (01/2021); Upper gastrointestinal endoscopy (N/A, 04/08/2021); Upper gastrointestinal endoscopy (N/A, 04/08/2021); Upper gastrointestinal endoscopy (N/A, 04/08/2021); colectomy (N/A, 04/13/2021); Cardioversion (01/28/2022); Cardioversion (02/22/2022); Upper gastrointestinal endoscopy (N/A, 03/04/2022); ventral hernia repair (N/A, 9/27/2022); Colonoscopy (N/A, 10/27/2023); and Colonoscopy (10/27/2023). SOCHx  reports that she has never smoked. She has never used smokeless tobacco. She reports that she does not drink alcohol and does not use drugs. FAMHx family history includes Cancer in her father; High Blood Pressure in her mother.

## 2023-12-05 NOTE — H&P
HOSPITALISTS HISTORY AND PHYSICAL    12/5/2023 2:00 PM    Patient Information:  Cherry Vigil is a 60 y.o. female [id number]  PCP:  CATRACHITO Scott CNP (Tel: [phone] )    Chief complaint:    Chief Complaint   Patient presents with    Chest Pain     Patient states chest pain that started yesterday and went away, states felt like heart burn. Patient states pain was worse and radiating to her shoulder when she woke up this morning. States has hx of CHF, and has pacemaker/defibrillator. History of Present Illness:  Cherry Vigil is a 60 y.o. female w presents with 2-day history of on and off chest tightness associate with some nausea and shortness of breath radiates to the right shoulder. Does not get worse with exertion. Has a history of congestive heart failure and A-fib on Xarelto. Patient had a left heart cath in 21 which was negative. Does not smoke or drink  REVIEW OF SYSTEMS:   Constitutional: Negative for fever,chills or night sweats  ENT: Negative for rhinorrhea, epistaxis, hoarseness, sore throat. Gastrointestinal: Negative for nausea, vomiting, diarrhea  Genitourinary: Negative for polyuria, dysuria   Hematologic/Lymphatic: Negative for bleeding tendency, easy bruising  Musculoskeletal: Negative for myalgias and arthralgias  Neurologic: Negative for confusion,dysarthria. Skin: Negative for itching,rash  Psychiatric: Negative for depression,anxiety, agitation. Endocrine: Negative for polydipsia,polyuria,heat /cold intolerance.     Past Medical History:   has a past medical history of Anemia, Atrial fibrillation and flutter ([address]), Atrial flutter ([address]), CHB (complete heart block) ([address]), Class 1 obesity without serious comorbidity with body mass index (BMI) of 33.0 to 33.9 in adult, Congenital heart disease, Difficult intravenous access, GERD (gastroesophageal reflux disease), Headache(784.0),

## 2023-12-06 VITALS
WEIGHT: 201.4 LBS | HEART RATE: 77 BPM | HEIGHT: 66 IN | OXYGEN SATURATION: 98 % | DIASTOLIC BLOOD PRESSURE: 88 MMHG | BODY MASS INDEX: 32.37 KG/M2 | TEMPERATURE: 97.5 F | RESPIRATION RATE: 18 BRPM | SYSTOLIC BLOOD PRESSURE: 114 MMHG

## 2023-12-06 LAB
ANION GAP SERPL CALCULATED.3IONS-SCNC: 11 MMOL/L (ref 3–16)
BASOPHILS # BLD: 0 K/UL (ref 0–0.2)
BASOPHILS NFR BLD: 0.9 %
BUN SERPL-MCNC: 8 MG/DL (ref 7–20)
CALCIUM SERPL-MCNC: 9.4 MG/DL (ref 8.3–10.6)
CHLORIDE SERPL-SCNC: 106 MMOL/L (ref 99–110)
CO2 SERPL-SCNC: 21 MMOL/L (ref 21–32)
CREAT SERPL-MCNC: 0.8 MG/DL (ref 0.6–1.1)
DEPRECATED RDW RBC AUTO: 15 % (ref 12.4–15.4)
EOSINOPHIL # BLD: 0.3 K/UL (ref 0–0.6)
EOSINOPHIL NFR BLD: 5.7 %
GFR SERPLBLD CREATININE-BSD FMLA CKD-EPI: >60 ML/MIN/{1.73_M2}
GLUCOSE SERPL-MCNC: 107 MG/DL (ref 70–99)
HCT VFR BLD AUTO: 36.5 % (ref 36–48)
HGB BLD-MCNC: 12.3 G/DL (ref 12–16)
LYMPHOCYTES # BLD: 1.7 K/UL (ref 1–5.1)
LYMPHOCYTES NFR BLD: 35 %
MCH RBC QN AUTO: 31.8 PG (ref 26–34)
MCHC RBC AUTO-ENTMCNC: 33.6 G/DL (ref 31–36)
MCV RBC AUTO: 94.6 FL (ref 80–100)
MONOCYTES # BLD: 0.3 K/UL (ref 0–1.3)
MONOCYTES NFR BLD: 6.9 %
NEUTROPHILS # BLD: 2.5 K/UL (ref 1.7–7.7)
NEUTROPHILS NFR BLD: 51.5 %
PLATELET # BLD AUTO: 252 K/UL (ref 135–450)
PMV BLD AUTO: 8 FL (ref 5–10.5)
POTASSIUM SERPL-SCNC: 3.6 MMOL/L (ref 3.5–5.1)
RBC # BLD AUTO: 3.86 M/UL (ref 4–5.2)
SODIUM SERPL-SCNC: 138 MMOL/L (ref 136–145)
WBC # BLD AUTO: 4.9 K/UL (ref 4–11)

## 2023-12-06 PROCEDURE — 6370000000 HC RX 637 (ALT 250 FOR IP): Performed by: INTERNAL MEDICINE

## 2023-12-06 PROCEDURE — 85025 COMPLETE CBC W/AUTO DIFF WBC: CPT

## 2023-12-06 PROCEDURE — 36415 COLL VENOUS BLD VENIPUNCTURE: CPT

## 2023-12-06 PROCEDURE — 2580000003 HC RX 258: Performed by: INTERNAL MEDICINE

## 2023-12-06 PROCEDURE — 80048 BASIC METABOLIC PNL TOTAL CA: CPT

## 2023-12-06 PROCEDURE — G0378 HOSPITAL OBSERVATION PER HR: HCPCS

## 2023-12-06 RX ADMIN — PANTOPRAZOLE SODIUM 40 MG: 40 TABLET, DELAYED RELEASE ORAL at 05:27

## 2023-12-06 RX ADMIN — SODIUM CHLORIDE, PRESERVATIVE FREE 10 ML: 5 INJECTION INTRAVENOUS at 08:01

## 2023-12-06 RX ADMIN — SPIRONOLACTONE 25 MG: 25 TABLET ORAL at 08:01

## 2023-12-06 RX ADMIN — FUROSEMIDE 20 MG: 40 TABLET ORAL at 08:01

## 2023-12-06 RX ADMIN — CARVEDILOL 25 MG: 25 TABLET, FILM COATED ORAL at 08:01

## 2023-12-06 RX ADMIN — SACUBITRIL AND VALSARTAN 1 TABLET: 49; 51 TABLET, FILM COATED ORAL at 08:01

## 2023-12-06 ASSESSMENT — PAIN SCALES - GENERAL
PAINLEVEL_OUTOF10: 0

## 2023-12-06 NOTE — PROGRESS NOTES
Data- discharge order received, pt verbalized agreement to discharge, disposition to previous residence, no needs for HHC/DME. Action- discharge instructions prepared and given to pt, pt verbalized understanding. No medication changes made. Discharge instruction summary: Diet- cardiac, Activity- as tolerated, Primary Care Physician as follows: Karan Thomas, [facility] [phone] f/u appointment u=in 1 week, go to all your scheduled appointment, immunizations reviewed. 1. WEIGHT: Admit Weight - Scale: 89.8 kg (198 lb) (12/05/23 0842)        Today  Weight - Scale: 91.4 kg (201 lb 6.4 oz) (12/06/23 8522)       2. O2 SAT.: SpO2: 98 % (12/06/23 2741)    Response- Pt belongings gathered, IV removed. Disposition is home (no HHC/DME needs), transported with significant other, taken to lobby via w/c w/ RN, no complications.

## 2023-12-20 PROCEDURE — 93297 REM INTERROG DEV EVAL ICPMS: CPT | Performed by: CLINICAL NURSE SPECIALIST

## 2023-12-20 PROCEDURE — G2066 INTER DEVC REMOTE 30D: HCPCS | Performed by: CLINICAL NURSE SPECIALIST

## 2023-12-21 DIAGNOSIS — I50.42 CHRONIC COMBINED SYSTOLIC AND DIASTOLIC HEART FAILURE (HCC): ICD-10-CM

## 2023-12-22 RX ORDER — SPIRONOLACTONE 25 MG/1
25 TABLET ORAL DAILY
Qty: 90 TABLET | Refills: 0 | Status: SHIPPED | OUTPATIENT
Start: 2023-12-22

## 2024-01-05 PROCEDURE — 93295 DEV INTERROG REMOTE 1/2/MLT: CPT | Performed by: INTERNAL MEDICINE

## 2024-01-05 PROCEDURE — 93296 REM INTERROG EVL PM/IDS: CPT | Performed by: INTERNAL MEDICINE

## 2024-01-20 PROCEDURE — 93297 REM INTERROG DEV EVAL ICPMS: CPT | Performed by: CLINICAL NURSE SPECIALIST

## 2024-01-21 ENCOUNTER — APPOINTMENT (OUTPATIENT)
Dept: CT IMAGING | Age: 59
DRG: 254 | End: 2024-01-21
Payer: MEDICAID

## 2024-01-21 ENCOUNTER — HOSPITAL ENCOUNTER (INPATIENT)
Age: 59
LOS: 2 days | Discharge: HOME | DRG: 254 | End: 2024-01-23
Attending: EMERGENCY MEDICINE | Admitting: INTERNAL MEDICINE
Payer: MEDICAID

## 2024-01-21 DIAGNOSIS — Z79.01 ANTICOAGULATED: ICD-10-CM

## 2024-01-21 DIAGNOSIS — I50.42 CHRONIC COMBINED SYSTOLIC AND DIASTOLIC HEART FAILURE (HCC): ICD-10-CM

## 2024-01-21 DIAGNOSIS — R20.2 PARESTHESIA OF RIGHT FOOT: ICD-10-CM

## 2024-01-21 DIAGNOSIS — R19.5 DARK STOOLS: ICD-10-CM

## 2024-01-21 DIAGNOSIS — R10.9 RIGHT SIDED ABDOMINAL PAIN: Primary | ICD-10-CM

## 2024-01-21 DIAGNOSIS — R10.31 RIGHT LOWER QUADRANT ABDOMINAL PAIN: ICD-10-CM

## 2024-01-21 PROBLEM — R19.7 DIARRHEA: Status: ACTIVE | Noted: 2024-01-21

## 2024-01-21 PROBLEM — D25.9 FIBROID UTERUS: Status: ACTIVE | Noted: 2024-01-21

## 2024-01-21 PROBLEM — K92.1 MELENA: Status: ACTIVE | Noted: 2024-01-21

## 2024-01-21 PROBLEM — R33.9 URINARY RETENTION: Status: ACTIVE | Noted: 2024-01-21

## 2024-01-21 LAB
ABO + RH BLD: NORMAL
ALBUMIN SERPL-MCNC: 4.4 G/DL (ref 3.4–5)
ALBUMIN/GLOB SERPL: 1.5 {RATIO} (ref 1.1–2.2)
ALP SERPL-CCNC: 72 U/L (ref 40–129)
ALT SERPL-CCNC: 19 U/L (ref 10–40)
ANION GAP SERPL CALCULATED.3IONS-SCNC: 10 MMOL/L (ref 3–16)
APTT BLD: 33.4 SEC (ref 22.7–35.9)
AST SERPL-CCNC: 29 U/L (ref 15–37)
BASOPHILS # BLD: 0 K/UL (ref 0–0.2)
BASOPHILS NFR BLD: 0.2 %
BILIRUB SERPL-MCNC: 0.5 MG/DL (ref 0–1)
BILIRUB UR QL STRIP.AUTO: NEGATIVE
BLD GP AB SCN SERPL QL: NORMAL
BUN SERPL-MCNC: 10 MG/DL (ref 7–20)
CALCIUM SERPL-MCNC: 9.8 MG/DL (ref 8.3–10.6)
CHLORIDE SERPL-SCNC: 106 MMOL/L (ref 99–110)
CLARITY UR: CLEAR
CO2 SERPL-SCNC: 22 MMOL/L (ref 21–32)
COLOR UR: YELLOW
CREAT SERPL-MCNC: 0.9 MG/DL (ref 0.6–1.1)
DEPRECATED RDW RBC AUTO: 15.1 % (ref 12.4–15.4)
EOSINOPHIL # BLD: 0.1 K/UL (ref 0–0.6)
EOSINOPHIL NFR BLD: 2.3 %
GFR SERPLBLD CREATININE-BSD FMLA CKD-EPI: >60 ML/MIN/{1.73_M2}
GLUCOSE SERPL-MCNC: 95 MG/DL (ref 70–99)
GLUCOSE UR STRIP.AUTO-MCNC: NEGATIVE MG/DL
HCT VFR BLD AUTO: 36.4 % (ref 36–48)
HEMOCCULT STL QL: NORMAL
HGB BLD-MCNC: 12.3 G/DL (ref 12–16)
HGB UR QL STRIP.AUTO: NEGATIVE
INR PPP: 1.37 (ref 0.84–1.16)
KETONES UR STRIP.AUTO-MCNC: NEGATIVE MG/DL
LEUKOCYTE ESTERASE UR QL STRIP.AUTO: NEGATIVE
LIPASE SERPL-CCNC: 20 U/L (ref 13–60)
LYMPHOCYTES # BLD: 1.3 K/UL (ref 1–5.1)
LYMPHOCYTES NFR BLD: 20.4 %
MCH RBC QN AUTO: 31.7 PG (ref 26–34)
MCHC RBC AUTO-ENTMCNC: 33.8 G/DL (ref 31–36)
MCV RBC AUTO: 93.8 FL (ref 80–100)
MONOCYTES # BLD: 0.4 K/UL (ref 0–1.3)
MONOCYTES NFR BLD: 6.7 %
NEUTROPHILS # BLD: 4.4 K/UL (ref 1.7–7.7)
NEUTROPHILS NFR BLD: 70.4 %
NITRITE UR QL STRIP.AUTO: NEGATIVE
PH UR STRIP.AUTO: 7.5 [PH] (ref 5–8)
PLATELET # BLD AUTO: 248 K/UL (ref 135–450)
PMV BLD AUTO: 7.6 FL (ref 5–10.5)
POTASSIUM SERPL-SCNC: 4.1 MMOL/L (ref 3.5–5.1)
PROT SERPL-MCNC: 7.3 G/DL (ref 6.4–8.2)
PROT UR STRIP.AUTO-MCNC: NEGATIVE MG/DL
PROTHROMBIN TIME: 16.8 SEC (ref 11.5–14.8)
RBC # BLD AUTO: 3.88 M/UL (ref 4–5.2)
SODIUM SERPL-SCNC: 138 MMOL/L (ref 136–145)
SP GR UR STRIP.AUTO: <=1.005 (ref 1–1.03)
TROPONIN, HIGH SENSITIVITY: 7 NG/L (ref 0–14)
UA COMPLETE W REFLEX CULTURE PNL UR: NORMAL
UA DIPSTICK W REFLEX MICRO PNL UR: NORMAL
URN SPEC COLLECT METH UR: NORMAL
UROBILINOGEN UR STRIP-ACNC: 0.2 E.U./DL
WBC # BLD AUTO: 6.3 K/UL (ref 4–11)

## 2024-01-21 PROCEDURE — 84484 ASSAY OF TROPONIN QUANT: CPT

## 2024-01-21 PROCEDURE — G0378 HOSPITAL OBSERVATION PER HR: HCPCS

## 2024-01-21 PROCEDURE — 85610 PROTHROMBIN TIME: CPT

## 2024-01-21 PROCEDURE — 80053 COMPREHEN METABOLIC PANEL: CPT

## 2024-01-21 PROCEDURE — 83690 ASSAY OF LIPASE: CPT

## 2024-01-21 PROCEDURE — C9113 INJ PANTOPRAZOLE SODIUM, VIA: HCPCS | Performed by: PHYSICIAN ASSISTANT

## 2024-01-21 PROCEDURE — 1200000000 HC SEMI PRIVATE

## 2024-01-21 PROCEDURE — 6360000004 HC RX CONTRAST MEDICATION: Performed by: PHYSICIAN ASSISTANT

## 2024-01-21 PROCEDURE — 85730 THROMBOPLASTIN TIME PARTIAL: CPT

## 2024-01-21 PROCEDURE — 81003 URINALYSIS AUTO W/O SCOPE: CPT

## 2024-01-21 PROCEDURE — 93005 ELECTROCARDIOGRAM TRACING: CPT | Performed by: PHYSICIAN ASSISTANT

## 2024-01-21 PROCEDURE — 96375 TX/PRO/DX INJ NEW DRUG ADDON: CPT

## 2024-01-21 PROCEDURE — 96374 THER/PROPH/DIAG INJ IV PUSH: CPT

## 2024-01-21 PROCEDURE — 82270 OCCULT BLOOD FECES: CPT

## 2024-01-21 PROCEDURE — 86900 BLOOD TYPING SEROLOGIC ABO: CPT

## 2024-01-21 PROCEDURE — 99285 EMERGENCY DEPT VISIT HI MDM: CPT

## 2024-01-21 PROCEDURE — 6370000000 HC RX 637 (ALT 250 FOR IP): Performed by: INTERNAL MEDICINE

## 2024-01-21 PROCEDURE — 85025 COMPLETE CBC W/AUTO DIFF WBC: CPT

## 2024-01-21 PROCEDURE — 86901 BLOOD TYPING SEROLOGIC RH(D): CPT

## 2024-01-21 PROCEDURE — 86850 RBC ANTIBODY SCREEN: CPT

## 2024-01-21 PROCEDURE — 70450 CT HEAD/BRAIN W/O DYE: CPT

## 2024-01-21 PROCEDURE — 74174 CTA ABD&PLVS W/CONTRAST: CPT

## 2024-01-21 PROCEDURE — 36415 COLL VENOUS BLD VENIPUNCTURE: CPT

## 2024-01-21 PROCEDURE — 6360000002 HC RX W HCPCS: Performed by: PHYSICIAN ASSISTANT

## 2024-01-21 PROCEDURE — 2580000003 HC RX 258: Performed by: INTERNAL MEDICINE

## 2024-01-21 RX ORDER — ONDANSETRON 2 MG/ML
4 INJECTION INTRAMUSCULAR; INTRAVENOUS ONCE
Status: COMPLETED | OUTPATIENT
Start: 2024-01-21 | End: 2024-01-21

## 2024-01-21 RX ORDER — SPIRONOLACTONE 25 MG/1
25 TABLET ORAL DAILY
Status: DISCONTINUED | OUTPATIENT
Start: 2024-01-21 | End: 2024-01-21

## 2024-01-21 RX ORDER — POLYETHYLENE GLYCOL 3350 17 G/17G
17 POWDER, FOR SOLUTION ORAL DAILY PRN
Status: DISCONTINUED | OUTPATIENT
Start: 2024-01-21 | End: 2024-01-22

## 2024-01-21 RX ORDER — SODIUM CHLORIDE 9 MG/ML
INJECTION, SOLUTION INTRAVENOUS CONTINUOUS
Status: DISCONTINUED | OUTPATIENT
Start: 2024-01-21 | End: 2024-01-22

## 2024-01-21 RX ORDER — ATORVASTATIN CALCIUM 40 MG/1
40 TABLET, FILM COATED ORAL NIGHTLY
Status: DISCONTINUED | OUTPATIENT
Start: 2024-01-21 | End: 2024-01-23 | Stop reason: HOSPADM

## 2024-01-21 RX ORDER — SPIRONOLACTONE 25 MG/1
25 TABLET ORAL NIGHTLY
Status: DISCONTINUED | OUTPATIENT
Start: 2024-01-21 | End: 2024-01-23 | Stop reason: HOSPADM

## 2024-01-21 RX ORDER — FLUTICASONE PROPIONATE 50 MCG
2 SPRAY, SUSPENSION (ML) NASAL DAILY PRN
Status: DISCONTINUED | OUTPATIENT
Start: 2024-01-21 | End: 2024-01-23 | Stop reason: HOSPADM

## 2024-01-21 RX ORDER — HYDROMORPHONE HYDROCHLORIDE 1 MG/ML
1 INJECTION, SOLUTION INTRAMUSCULAR; INTRAVENOUS; SUBCUTANEOUS ONCE
Status: COMPLETED | OUTPATIENT
Start: 2024-01-21 | End: 2024-01-21

## 2024-01-21 RX ORDER — SODIUM CHLORIDE 0.9 % (FLUSH) 0.9 %
5-40 SYRINGE (ML) INJECTION EVERY 12 HOURS SCHEDULED
Status: DISCONTINUED | OUTPATIENT
Start: 2024-01-21 | End: 2024-01-23 | Stop reason: HOSPADM

## 2024-01-21 RX ORDER — SODIUM CHLORIDE 0.9 % (FLUSH) 0.9 %
5-40 SYRINGE (ML) INJECTION PRN
Status: DISCONTINUED | OUTPATIENT
Start: 2024-01-21 | End: 2024-01-23 | Stop reason: HOSPADM

## 2024-01-21 RX ORDER — CARVEDILOL 25 MG/1
25 TABLET ORAL 2 TIMES DAILY WITH MEALS
Status: DISCONTINUED | OUTPATIENT
Start: 2024-01-21 | End: 2024-01-23 | Stop reason: HOSPADM

## 2024-01-21 RX ORDER — ONDANSETRON 2 MG/ML
4 INJECTION INTRAMUSCULAR; INTRAVENOUS EVERY 6 HOURS PRN
Status: DISCONTINUED | OUTPATIENT
Start: 2024-01-21 | End: 2024-01-23 | Stop reason: HOSPADM

## 2024-01-21 RX ORDER — VITAMIN B COMPLEX
1000 TABLET ORAL DAILY
Status: DISCONTINUED | OUTPATIENT
Start: 2024-01-22 | End: 2024-01-23 | Stop reason: HOSPADM

## 2024-01-21 RX ORDER — SODIUM CHLORIDE 9 MG/ML
INJECTION, SOLUTION INTRAVENOUS PRN
Status: DISCONTINUED | OUTPATIENT
Start: 2024-01-21 | End: 2024-01-23 | Stop reason: HOSPADM

## 2024-01-21 RX ORDER — ACETAMINOPHEN 325 MG/1
650 TABLET ORAL EVERY 6 HOURS PRN
Status: DISCONTINUED | OUTPATIENT
Start: 2024-01-21 | End: 2024-01-23 | Stop reason: HOSPADM

## 2024-01-21 RX ORDER — LANOLIN ALCOHOL/MO/W.PET/CERES
200 CREAM (GRAM) TOPICAL NIGHTLY
Status: DISCONTINUED | OUTPATIENT
Start: 2024-01-21 | End: 2024-01-23 | Stop reason: HOSPADM

## 2024-01-21 RX ORDER — HYDROMORPHONE HYDROCHLORIDE 1 MG/ML
1 INJECTION, SOLUTION INTRAMUSCULAR; INTRAVENOUS; SUBCUTANEOUS EVERY 4 HOURS PRN
Status: DISCONTINUED | OUTPATIENT
Start: 2024-01-21 | End: 2024-01-23 | Stop reason: HOSPADM

## 2024-01-21 RX ORDER — PANTOPRAZOLE SODIUM 40 MG/10ML
40 INJECTION, POWDER, LYOPHILIZED, FOR SOLUTION INTRAVENOUS ONCE
Status: COMPLETED | OUTPATIENT
Start: 2024-01-21 | End: 2024-01-21

## 2024-01-21 RX ORDER — ONDANSETRON 4 MG/1
4 TABLET, ORALLY DISINTEGRATING ORAL EVERY 8 HOURS PRN
Status: DISCONTINUED | OUTPATIENT
Start: 2024-01-21 | End: 2024-01-23 | Stop reason: HOSPADM

## 2024-01-21 RX ORDER — PANTOPRAZOLE SODIUM 40 MG/10ML
40 INJECTION, POWDER, LYOPHILIZED, FOR SOLUTION INTRAVENOUS 2 TIMES DAILY
Status: DISCONTINUED | OUTPATIENT
Start: 2024-01-21 | End: 2024-01-23 | Stop reason: HOSPADM

## 2024-01-21 RX ORDER — ACETAMINOPHEN 650 MG/1
650 SUPPOSITORY RECTAL EVERY 6 HOURS PRN
Status: DISCONTINUED | OUTPATIENT
Start: 2024-01-21 | End: 2024-01-23 | Stop reason: HOSPADM

## 2024-01-21 RX ORDER — POTASSIUM CHLORIDE 7.45 MG/ML
10 INJECTION INTRAVENOUS PRN
Status: DISCONTINUED | OUTPATIENT
Start: 2024-01-21 | End: 2024-01-23 | Stop reason: HOSPADM

## 2024-01-21 RX ORDER — POTASSIUM CHLORIDE 20 MEQ/1
40 TABLET, EXTENDED RELEASE ORAL DAILY
Status: DISCONTINUED | OUTPATIENT
Start: 2024-01-22 | End: 2024-01-23 | Stop reason: HOSPADM

## 2024-01-21 RX ORDER — POTASSIUM CHLORIDE 20 MEQ/1
40 TABLET, EXTENDED RELEASE ORAL PRN
Status: DISCONTINUED | OUTPATIENT
Start: 2024-01-21 | End: 2024-01-23 | Stop reason: HOSPADM

## 2024-01-21 RX ORDER — MAGNESIUM SULFATE IN WATER 40 MG/ML
2000 INJECTION, SOLUTION INTRAVENOUS PRN
Status: DISCONTINUED | OUTPATIENT
Start: 2024-01-21 | End: 2024-01-23 | Stop reason: HOSPADM

## 2024-01-21 RX ADMIN — SPIRONOLACTONE 25 MG: 25 TABLET ORAL at 20:55

## 2024-01-21 RX ADMIN — HYDROMORPHONE HYDROCHLORIDE 1 MG: 1 INJECTION, SOLUTION INTRAMUSCULAR; INTRAVENOUS; SUBCUTANEOUS at 11:16

## 2024-01-21 RX ADMIN — PANTOPRAZOLE SODIUM 40 MG: 40 INJECTION, POWDER, FOR SOLUTION INTRAVENOUS at 11:16

## 2024-01-21 RX ADMIN — ONDANSETRON 4 MG: 2 INJECTION INTRAMUSCULAR; INTRAVENOUS at 11:16

## 2024-01-21 RX ADMIN — IOPAMIDOL 75 ML: 755 INJECTION, SOLUTION INTRAVENOUS at 12:13

## 2024-01-21 RX ADMIN — CARVEDILOL 25 MG: 25 TABLET, FILM COATED ORAL at 17:55

## 2024-01-21 RX ADMIN — SODIUM CHLORIDE: 9 INJECTION, SOLUTION INTRAVENOUS at 17:54

## 2024-01-21 RX ADMIN — SACUBITRIL AND VALSARTAN 1 TABLET: 49; 51 TABLET, FILM COATED ORAL at 20:55

## 2024-01-21 ASSESSMENT — PAIN DESCRIPTION - LOCATION
LOCATION: ABDOMEN

## 2024-01-21 ASSESSMENT — LIFESTYLE VARIABLES
HOW OFTEN DO YOU HAVE A DRINK CONTAINING ALCOHOL: NEVER
HOW MANY STANDARD DRINKS CONTAINING ALCOHOL DO YOU HAVE ON A TYPICAL DAY: PATIENT DOES NOT DRINK

## 2024-01-21 ASSESSMENT — PAIN SCALES - GENERAL
PAINLEVEL_OUTOF10: 6
PAINLEVEL_OUTOF10: 9
PAINLEVEL_OUTOF10: 9
PAINLEVEL_OUTOF10: 3
PAINLEVEL_OUTOF10: 5

## 2024-01-21 ASSESSMENT — PAIN DESCRIPTION - PAIN TYPE: TYPE: ACUTE PAIN

## 2024-01-21 ASSESSMENT — PAIN - FUNCTIONAL ASSESSMENT: PAIN_FUNCTIONAL_ASSESSMENT: 0-10

## 2024-01-21 NOTE — ED NOTES
Patient educated on all orders, including any medications.  Patient educated on chief complaint/symptoms. Patient encouraged to ask questions regarding care, medications or treatment plan.  Patient aware of how to reach staff with questions/concerns.

## 2024-01-21 NOTE — H&P
HOSPITALISTS HISTORY AND PHYSICAL    1/21/2024 3:26 PM    Patient Information:  LUH LOCK is a 58 y.o. female [id number]  PCP:  Evelin Yousif APRN - CNP (Tel: [phone] )    Chief complaint:    Chief Complaint   Patient presents with    Abdominal Pain     Pt arrived via [facility] w c/o Abd pain and blood in stool that started today in the morning (Per pt 1st BM was black).Pt w Hx hernia. Pt on Xorelto        History of Present Illness:  Luh Lock is a 58 y.o. female with history of Afib with heart block on Xarelto, chronic systolic CHF s/p ICD, DERECK, dilated CMP, obesity came to ER with complaints of abdominal pain.  Patient states she has R sided abdominal pain for past 2 weeks.  Associated with melena and diarrhea.  Has followed with Dr Phillips and Dr Coronel as outpatient.  Has nausea with vomiting.  ? Dysphagia.  No CP, SOB, HA or fevers.  Noted to have distended bladder on CT in ED.  No dysuria, hematuria or frequency.  Otherwise complete ROS is negative unless listed above.    REVIEW OF SYSTEMS:   Pertinent positives as noted in HPI.  All other systems were reviewed and are negative.    Past Medical History:   has a past medical history of Anemia, Atrial fibrillation and flutter (HCC), Atrial flutter (HCC), CHB (complete heart block) (HCC), Class 1 obesity without serious comorbidity with body mass index (BMI) of 33.0 to 33.9 in adult, Congenital heart disease, Difficult intravenous access, GERD (gastroesophageal reflux disease), Headache(784.0), History of complete heart block, Hyperlipidemia, Hypertension, Migraine, Obstructive sleep apnea (adult) (pediatric), PONV (postoperative nausea and vomiting), Prolonged emergence from general anesthesia, Sleep apnea, Syncope, and Systolic CHF, chronic (HCC).     Past Surgical History:   has a past surgical history that includes Uterine fibroid surgery;

## 2024-01-21 NOTE — ED PROVIDER NOTES
[facility] EMERGENCY DEPARTMENT  EMERGENCY DEPARTMENT ENCOUNTER        Pt Name: Chelsi Lock  MRN: [id number]  Birthdate 1965  Date of evaluation: 1/21/2024  Provider: Bimal Badillo PA-C  PCP: Evelin Yousif APRN - CNP  Note Started: 11:07 AM EST 1/21/24       I have seen and evaluated this patient with my supervising physician Stephan Kramer MD.      CHIEF COMPLAINT       Chief Complaint   Patient presents with    Abdominal Pain     Pt arrived via [facility] w c/o Abd pain and blood in stool that started today in the morning (Per pt 1st BM was black).Pt w Hx hernia. Pt on Xorelto       HISTORY OF PRESENT ILLNESS: 1 or more Elements     History from : Patient    Limitations to history : None    Chelsi Lock is a 58 y.o. female who presents to the emergency department complaining of right-sided abdominal pain and black stool starting this morning.  She denies any red or maroon-colored stool.  The black stool has lightened up to a more of a brown stool.  She still has the right-sided abdominal pain.  She is anticoagulated on Xarelto, last dose taken yesterday.  She rates her pain to be a 9 out of 10 on pain scale.  She denies vomiting but does associate this with nausea without fever or chills.    Nursing Notes were all reviewed and agreed with or any disagreements were addressed in the HPI.    REVIEW OF SYSTEMS :      Review of Systems   Constitutional:  Negative for chills and fever.   HENT: Negative.     Eyes:  Negative for visual disturbance.   Respiratory:  Negative for shortness of breath.    Cardiovascular:  Negative for chest pain.   Gastrointestinal:  Positive for abdominal pain and nausea. Negative for constipation, diarrhea, rectal pain and vomiting. Blood in stool: black stool.  Genitourinary: Negative.    Musculoskeletal:  Negative for back pain, neck pain and neck stiffness.   Skin:  Negative for color change, pallor, rash and wound.   Neurological: Negative.   drift  Motor Arm, Right (5b): No drift  Motor Leg, Left (6a): No drift  Motor Leg, Right (6b): No drift  Limb Ataxia (7): Absent  Sensory (8): (!) Mild to Moderate (subjective right foot numbness)  Best Language (9): No aphasia  Dysarthria (10): Normal  Extinction and Inattention (11): No abnormality  Total: 1    Cali Coma Scale  Eye Opening: Spontaneous  Best Verbal Response: Oriented  Best Motor Response: Obeys commands  Cali Coma Scale Score: 15                CIWA Assessment  BP: (!) 150/72  Pulse: 82           PHYSICAL EXAM  1 or more Elements     ED Triage Vitals [01/21/24 1038]   BP Temp Temp Source Pulse Respirations SpO2 Height Weight - Scale   (!) 144/92 98.1 °F (36.7 °C) Oral 77 20 100 % 1.676 m (5' 6\") 91.2 kg (201 lb)       Physical Exam  Vitals and nursing note reviewed.   Constitutional:       Appearance: Normal appearance. She is well-developed. She is not toxic-appearing or diaphoretic.   HENT:      Head: Normocephalic and atraumatic.      Right Ear: External ear normal.      Left Ear: External ear normal.      Nose: Nose normal.      Mouth/Throat:      Mouth: Mucous membranes are moist.      Pharynx: Oropharynx is clear.   Eyes:      General: No scleral icterus.        Right eye: No discharge.         Left eye: No discharge.      Extraocular Movements: Extraocular movements intact.      Conjunctiva/sclera: Conjunctivae normal.      Pupils: Pupils are equal, round, and reactive to light.   Cardiovascular:      Rate and Rhythm: Normal rate.   Pulmonary:      Effort: Pulmonary effort is normal.      Breath sounds: Normal breath sounds.   Abdominal:      General: Bowel sounds are normal. There is no abdominal bruit.      Palpations: Abdomen is soft. There is no pulsatile mass.      Tenderness: There is abdominal tenderness in the right upper quadrant and right lower quadrant. There is no right CVA tenderness, left CVA tenderness, guarding or rebound. Negative signs include Welsh's sign,  general anesthesia, Sleep apnea, Syncope, and Systolic CHF, chronic (HCC) (10/03/2018).     EMERGENCY DEPARTMENT COURSE and DIFFERENTIAL DIAGNOSIS/MDM:   Vitals:    Vitals:    01/21/24 1038 01/21/24 1129 01/21/24 1219 01/21/24 1302   BP: (!) 144/92 (!) 158/86 134/78 (!) 150/72   Pulse: 77 75 78 82   Resp: 20      Temp: 98.1 °F (36.7 °C)      TempSrc: Oral      SpO2: 100% 100% 99% 96%   Weight: 91.2 kg (201 lb)      Height: 1.676 m (5' 6\")          Patient was given the following medications:  Medications   HYDROmorphone HCl PF (DILAUDID) injection 1 mg (1 mg IntraVENous Given 1/21/24 1116)   ondansetron (ZOFRAN) injection 4 mg (4 mg IntraVENous Given 1/21/24 1116)   pantoprazole (PROTONIX) injection 40 mg (40 mg IntraVENous Given 1/21/24 1116)   iopamidol (ISOVUE-370) 76 % injection 75 mL (75 mLs IntraVENous Given 1/21/24 1213)             Is this patient to be included in the SEP-1 Core Measure due to severe sepsis or septic shock?   No   Exclusion criteria - the patient is NOT to be included for SEP-1 Core Measure due to:  Infection is not suspected    CONSULTS: (Who and What was discussed)  IP CONSULT TO HOSPITALIST  Discussion with Other Profesionals : Admitting Team my attending paged hospitalist for admission. See his note. and Consultant my attending spoke with  stroke team physician at 1335. Since her symptoms are subjective in the right foot without disabling symptoms otherwise, she is not TNK candidate and does not need to be under protocol \"stroke alert\". They discussed CT head without contrast since patient already receiving contrast bolus for the abdomen. See his note for details regarding this discussion.     Social Determinants : None    Records Reviewed : Outpatient Notes cardiology visit 11/16/23    CC/HPI Summary, DDx, ED Course, and Reassessment: This patient presents to the emergency department complaining of right-sided abdominal pain and dark stools.  She is anticoagulated on Xarelto.  Her

## 2024-01-21 NOTE — ED NOTES
Attempted to take pt off the supplemental O2. Pt dropped to 88% on RA. Pt placed on 3 lpm via NC. O2 97%

## 2024-01-21 NOTE — ACP (ADVANCE CARE PLANNING)
Advanced Care Planning Note.    Purpose of Encounter: Advanced care planning in light of melena  Parties In Attendance: Patient  Decisional Capacity: Yes  Subjective: Patient with abdominal pain and melena  Objective: Cr 0.9  Goals of Care Determination: Patient wants full support (CPR, vent, surgery, HD, trach, PEG)  Plan:  IV Protonix, Serial labs, stool studies, GI/Surgery/Urology/Gyn consults  Code Status: Full code   Time spent on Advanced care Plannin minutes  Advanced Care Planning Documents: Completed advanced directives on chart, sister is the POA.    Wali Greco MD  2024 3:26 PM

## 2024-01-21 NOTE — ED NOTES
ED TO INPATIENT SBAR HANDOFF    Patient Name: Chelsi Lock   :  1965  58 y.o.   MRN:  [id number]  Preferred Name  Chelsi  ED Room #:  ED-0009/09  Family/Caregiver Present no   Restraints no   Sitter no   Sepsis Risk Score Sepsis Risk Score: 0.55    Situation  Code Status: Full .    Allergies: Latex, Morphine, Codeine, Jardiance [empagliflozin], Lisinopril, Nitroglycerin, Sulfa antibiotics, and Hydralazine  Weight: Patient Vitals for the past 96 hrs (Last 3 readings):   Weight   24 1038 91.2 kg (201 lb)     Arrived from: home  Chief Complaint:   Chief Complaint   Patient presents with    Abdominal Pain     Pt arrived via [facility] w c/o Abd pain and blood in stool that started today in the morning (Per pt 1st BM was black).Pt w [facility]nia. [facility]  Principal Problem:    Melena  Active Problems:    HTN (hypertension)    Congenital heart block    PAF (paroxysmal atrial fibrillation) (HCC)    Systolic CHF, chronic (HCC)    Obstructive sleep apnea (adult) (pediatric)    Left subclavian vein thrombosis (HCC)    Dilated cardiomyopathy (HCC)    VT (ventricular tachycardia) (HCC)    AICD (automatic cardioverter/defibrillator) present    Abdominal pain    Diarrhea    Fibroid uterus    Urinary retention  Resolved Problems:    * No resolved hospital problems. *    Imaging:   CT HEAD WO CONTRAST   Final Result   No acute intracranial abnormality.         CTA ABDOMEN PELVIS W WO CONTRAST   Final Result   1. No arterial blush or layering of contrast on delayed phases to suggest   acute arterial GI bleed.   2. Fibroid uterus.           Abnormal labs:   Abnormal Labs Reviewed   CBC WITH AUTO DIFFERENTIAL - Abnormal; Notable for the following components:       Result Value    RBC 3.88 (*)     All other components within normal limits   PROTIME-INR - Abnormal; Notable for the following components:    Protime 16.8 (*)     INR 1.37 (*)     All other components within normal limits

## 2024-01-21 NOTE — ED PROVIDER NOTES
This patient was seen by the Mid-Level Provider. I have seen and examined the patient, agree with the workup, evaluation, management and diagnosis. Care plan has been discussed. My assessment reveals a 58-year-old female presents with abdominal pain.  This is a 58-year-old female presents with abdominal pain and some black stools.  She points to her right lower quadrant area.  The patient states that she noticed the blood today.  The abdominal pain started earlier as well.  She is on Xarelto for atrial fibrillation.          Radiology results:    CTA ABDOMEN PELVIS W WO CONTRAST   Final Result   1. No arterial blush or layering of contrast on delayed phases to suggest   acute arterial GI bleed.   2. Fibroid uterus.         CT HEAD WO CONTRAST    (Results Pending)         LABS:    Labs Reviewed   CBC WITH AUTO DIFFERENTIAL - Abnormal; Notable for the following components:       Result Value    RBC 3.88 (*)     All other components within normal limits   PROTIME-INR - Abnormal; Notable for the following components:    Protime 16.8 (*)     INR 1.37 (*)     All other components within normal limits   COMPREHENSIVE METABOLIC PANEL   LIPASE   APTT   URINALYSIS WITH REFLEX TO CULTURE   BLOOD OCCULT STOOL DIAGNOSTIC    Narrative:     ORDER#: [id number]                          ORDERED BY: MASON HERNADEZ  SOURCE: Stool rectum                       COLLECTED:  01/21/24 11:11  ANTIBIOTICS AT [facility]ATE[facility].:                      RECEIVED :  01/21/24 11:32   TROPONIN   TYPE AND SCREEN           EKG:    Paced rhythm at a rate of 84 beats a minute with no acute ST elevations or depressions or pathologic Q waves.    Exam:    Well-nourished female in no acute distress.  After my initial examination the patient started to complain of some right foot numbness.  On examination, she could feel my touch and had no focal motor or sensory deficits.  Abdomen did show some tenderness in the right lower quadrant.  Please see midlevel's note for  rectal exam.      Medical decision makin-year-old female who presents with multiple complaints.  Initially the patient presented with some abdominal pain and some black stools.  However, her workup does not show any significant bleeding which would be a concern considering the Xarelto for her atrial fibrillation.  However, during the patient's workup she started complaining of sudden onset of right foot numbness.  She had no focal motor or sensory deficits when examined.  However, we did consult the  stroke team who recommended a CT of the head and admission for MRI.  The patient is currently in stable condition with no significant disabilities.  She will be admitted for further care and workup as needed.    Is this patient to be included in the SEP-1 Core Measure due to severe sepsis or septic shock?   No   Exclusion criteria - the patient is NOT to be included for SEP-1 Core Measure due to:  Infection is not suspected      FINAL IMPRESSION:    1. Right sided abdominal pain    2. Dark stools    3. Anticoagulated    4. Paresthesia of right foot           Stephan Kramer MD  [phone]

## 2024-01-21 NOTE — ED NOTES
Patient oriented to room and ED throughput process.  Safety measures with ED bed locked in lowest position and call light in reach.

## 2024-01-21 NOTE — ED NOTES
Pt specifically requesting dilaudid for pain, despite morphine allergy. She states that she tolerates dilaudid without allergic reaction.     Bimal Badillo PA-C  01/21/24 1104

## 2024-01-22 ENCOUNTER — APPOINTMENT (OUTPATIENT)
Dept: ULTRASOUND IMAGING | Age: 59
DRG: 254 | End: 2024-01-22
Payer: MEDICAID

## 2024-01-22 ENCOUNTER — ANESTHESIA EVENT (OUTPATIENT)
Dept: ENDOSCOPY | Age: 59
DRG: 254 | End: 2024-01-22
Payer: MEDICAID

## 2024-01-22 ENCOUNTER — ANESTHESIA (OUTPATIENT)
Dept: ENDOSCOPY | Age: 59
DRG: 254 | End: 2024-01-22
Payer: MEDICAID

## 2024-01-22 LAB
ALBUMIN SERPL-MCNC: 4.1 G/DL (ref 3.4–5)
ALBUMIN/GLOB SERPL: 1.5 {RATIO} (ref 1.1–2.2)
ALP SERPL-CCNC: 63 U/L (ref 40–129)
ALT SERPL-CCNC: 19 U/L (ref 10–40)
ANION GAP SERPL CALCULATED.3IONS-SCNC: 11 MMOL/L (ref 3–16)
APTT BLD: 22.3 SEC (ref 22.7–35.9)
AST SERPL-CCNC: 32 U/L (ref 15–37)
BASOPHILS # BLD: 0.1 K/UL (ref 0–0.2)
BASOPHILS NFR BLD: 1 %
BILIRUB SERPL-MCNC: 0.8 MG/DL (ref 0–1)
BUN SERPL-MCNC: 7 MG/DL (ref 7–20)
CALCIUM SERPL-MCNC: 9.3 MG/DL (ref 8.3–10.6)
CHLORIDE SERPL-SCNC: 106 MMOL/L (ref 99–110)
CO2 SERPL-SCNC: 22 MMOL/L (ref 21–32)
CREAT SERPL-MCNC: 0.8 MG/DL (ref 0.6–1.1)
DEPRECATED RDW RBC AUTO: 15.3 % (ref 12.4–15.4)
EKG ATRIAL RATE: 84 BPM
EKG DIAGNOSIS: NORMAL
EKG P-R INTERVAL: 134 MS
EKG Q-T INTERVAL: 454 MS
EKG QRS DURATION: 148 MS
EKG QTC CALCULATION (BAZETT): 536 MS
EKG R AXIS: -81 DEGREES
EKG T AXIS: 84 DEGREES
EKG VENTRICULAR RATE: 84 BPM
EOSINOPHIL # BLD: 0.3 K/UL (ref 0–0.6)
EOSINOPHIL NFR BLD: 4.5 %
GFR SERPLBLD CREATININE-BSD FMLA CKD-EPI: >60 ML/MIN/{1.73_M2}
GLUCOSE SERPL-MCNC: 101 MG/DL (ref 70–99)
HCT VFR BLD AUTO: 32.6 % (ref 36–48)
HCT VFR BLD AUTO: 35.1 % (ref 36–48)
HGB BLD-MCNC: 11.1 G/DL (ref 12–16)
HGB BLD-MCNC: 11.8 G/DL (ref 12–16)
INR PPP: 1.1 (ref 0.84–1.16)
LACTATE BLDV-SCNC: 0.7 MMOL/L (ref 0.4–2)
LIPASE SERPL-CCNC: 18 U/L (ref 13–60)
LYMPHOCYTES # BLD: 1.4 K/UL (ref 1–5.1)
LYMPHOCYTES NFR BLD: 25 %
MAGNESIUM SERPL-MCNC: 1.5 MG/DL (ref 1.8–2.4)
MCH RBC QN AUTO: 31.6 PG (ref 26–34)
MCHC RBC AUTO-ENTMCNC: 33.6 G/DL (ref 31–36)
MCV RBC AUTO: 93.9 FL (ref 80–100)
MONOCYTES # BLD: 0.5 K/UL (ref 0–1.3)
MONOCYTES NFR BLD: 8.2 %
NEUTROPHILS # BLD: 3.5 K/UL (ref 1.7–7.7)
NEUTROPHILS NFR BLD: 61.3 %
PHOSPHATE SERPL-MCNC: 3.4 MG/DL (ref 2.5–4.9)
PLATELET # BLD AUTO: 231 K/UL (ref 135–450)
PLATELET BLD QL SMEAR: ADEQUATE
PMV BLD AUTO: 7.9 FL (ref 5–10.5)
POTASSIUM SERPL-SCNC: 4.2 MMOL/L (ref 3.5–5.1)
PREALB SERPL-MCNC: 21 MG/DL (ref 20–40)
PROT SERPL-MCNC: 6.9 G/DL (ref 6.4–8.2)
PROTHROMBIN TIME: 14.2 SEC (ref 11.5–14.8)
RBC # BLD AUTO: 3.74 M/UL (ref 4–5.2)
RBC MORPH BLD: NORMAL
SLIDE REVIEW: ABNORMAL
SODIUM SERPL-SCNC: 139 MMOL/L (ref 136–145)
TRANSFERRIN SERPL-MCNC: 194 MG/DL (ref 200–360)
WBC # BLD AUTO: 5.6 K/UL (ref 4–11)

## 2024-01-22 PROCEDURE — 80053 COMPREHEN METABOLIC PANEL: CPT

## 2024-01-22 PROCEDURE — 51798 US URINE CAPACITY MEASURE: CPT

## 2024-01-22 PROCEDURE — 84100 ASSAY OF PHOSPHORUS: CPT

## 2024-01-22 PROCEDURE — 0DJ08ZZ INSPECTION OF UPPER INTESTINAL TRACT, VIA NATURAL OR ARTIFICIAL OPENING ENDOSCOPIC: ICD-10-PCS | Performed by: INTERNAL MEDICINE

## 2024-01-22 PROCEDURE — 83735 ASSAY OF MAGNESIUM: CPT

## 2024-01-22 PROCEDURE — G0378 HOSPITAL OBSERVATION PER HR: HCPCS

## 2024-01-22 PROCEDURE — 6370000000 HC RX 637 (ALT 250 FOR IP): Performed by: INTERNAL MEDICINE

## 2024-01-22 PROCEDURE — 96365 THER/PROPH/DIAG IV INF INIT: CPT

## 2024-01-22 PROCEDURE — 2580000003 HC RX 258: Performed by: INTERNAL MEDICINE

## 2024-01-22 PROCEDURE — 84134 ASSAY OF PREALBUMIN: CPT

## 2024-01-22 PROCEDURE — 76856 US EXAM PELVIC COMPLETE: CPT

## 2024-01-22 PROCEDURE — 2500000003 HC RX 250 WO HCPCS: Performed by: NURSE ANESTHETIST, CERTIFIED REGISTERED

## 2024-01-22 PROCEDURE — 85025 COMPLETE CBC W/AUTO DIFF WBC: CPT

## 2024-01-22 PROCEDURE — 84466 ASSAY OF TRANSFERRIN: CPT

## 2024-01-22 PROCEDURE — 3609017100 HC EGD: Performed by: INTERNAL MEDICINE

## 2024-01-22 PROCEDURE — 85610 PROTHROMBIN TIME: CPT

## 2024-01-22 PROCEDURE — 2580000003 HC RX 258: Performed by: NURSE ANESTHETIST, CERTIFIED REGISTERED

## 2024-01-22 PROCEDURE — 36415 COLL VENOUS BLD VENIPUNCTURE: CPT

## 2024-01-22 PROCEDURE — 3700000000 HC ANESTHESIA ATTENDED CARE: Performed by: INTERNAL MEDICINE

## 2024-01-22 PROCEDURE — 83690 ASSAY OF LIPASE: CPT

## 2024-01-22 PROCEDURE — 6360000002 HC RX W HCPCS: Performed by: NURSE ANESTHETIST, CERTIFIED REGISTERED

## 2024-01-22 PROCEDURE — 99222 1ST HOSP IP/OBS MODERATE 55: CPT | Performed by: SURGERY

## 2024-01-22 PROCEDURE — 2709999900 HC NON-CHARGEABLE SUPPLY: Performed by: INTERNAL MEDICINE

## 2024-01-22 PROCEDURE — 85018 HEMOGLOBIN: CPT

## 2024-01-22 PROCEDURE — 83605 ASSAY OF LACTIC ACID: CPT

## 2024-01-22 PROCEDURE — 3700000001 HC ADD 15 MINUTES (ANESTHESIA): Performed by: INTERNAL MEDICINE

## 2024-01-22 PROCEDURE — 1200000000 HC SEMI PRIVATE

## 2024-01-22 PROCEDURE — 7100000001 HC PACU RECOVERY - ADDTL 15 MIN: Performed by: INTERNAL MEDICINE

## 2024-01-22 PROCEDURE — 85014 HEMATOCRIT: CPT

## 2024-01-22 PROCEDURE — 6360000002 HC RX W HCPCS: Performed by: INTERNAL MEDICINE

## 2024-01-22 PROCEDURE — 6360000002 HC RX W HCPCS: Performed by: NURSE PRACTITIONER

## 2024-01-22 PROCEDURE — 85730 THROMBOPLASTIN TIME PARTIAL: CPT

## 2024-01-22 PROCEDURE — C9113 INJ PANTOPRAZOLE SODIUM, VIA: HCPCS | Performed by: INTERNAL MEDICINE

## 2024-01-22 PROCEDURE — 7100000000 HC PACU RECOVERY - FIRST 15 MIN: Performed by: INTERNAL MEDICINE

## 2024-01-22 PROCEDURE — 93010 ELECTROCARDIOGRAM REPORT: CPT | Performed by: INTERNAL MEDICINE

## 2024-01-22 RX ORDER — PROPOFOL 10 MG/ML
INJECTION, EMULSION INTRAVENOUS PRN
Status: DISCONTINUED | OUTPATIENT
Start: 2024-01-22 | End: 2024-01-22 | Stop reason: SDUPTHER

## 2024-01-22 RX ORDER — FUROSEMIDE 20 MG/1
20 TABLET ORAL DAILY
Status: DISCONTINUED | OUTPATIENT
Start: 2024-01-23 | End: 2024-01-23 | Stop reason: HOSPADM

## 2024-01-22 RX ORDER — LIDOCAINE HYDROCHLORIDE 20 MG/ML
INJECTION, SOLUTION EPIDURAL; INFILTRATION; INTRACAUDAL; PERINEURAL PRN
Status: DISCONTINUED | OUTPATIENT
Start: 2024-01-22 | End: 2024-01-22 | Stop reason: SDUPTHER

## 2024-01-22 RX ORDER — SODIUM CHLORIDE 9 MG/ML
INJECTION, SOLUTION INTRAVENOUS CONTINUOUS PRN
Status: DISCONTINUED | OUTPATIENT
Start: 2024-01-22 | End: 2024-01-22 | Stop reason: SDUPTHER

## 2024-01-22 RX ORDER — MAGNESIUM SULFATE IN WATER 40 MG/ML
2000 INJECTION, SOLUTION INTRAVENOUS ONCE
Status: COMPLETED | OUTPATIENT
Start: 2024-01-22 | End: 2024-01-22

## 2024-01-22 RX ORDER — PROPOFOL 10 MG/ML
INJECTION, EMULSION INTRAVENOUS CONTINUOUS PRN
Status: DISCONTINUED | OUTPATIENT
Start: 2024-01-22 | End: 2024-01-22 | Stop reason: SDUPTHER

## 2024-01-22 RX ADMIN — SODIUM CHLORIDE: 9 INJECTION, SOLUTION INTRAVENOUS at 14:58

## 2024-01-22 RX ADMIN — ACETAMINOPHEN 650 MG: 325 TABLET ORAL at 17:21

## 2024-01-22 RX ADMIN — PROPOFOL 50 MG: 10 INJECTION, EMULSION INTRAVENOUS at 15:01

## 2024-01-22 RX ADMIN — SACUBITRIL AND VALSARTAN 1 TABLET: 49; 51 TABLET, FILM COATED ORAL at 22:22

## 2024-01-22 RX ADMIN — SACUBITRIL AND VALSARTAN 1 TABLET: 49; 51 TABLET, FILM COATED ORAL at 08:26

## 2024-01-22 RX ADMIN — PANTOPRAZOLE SODIUM 40 MG: 40 INJECTION, POWDER, FOR SOLUTION INTRAVENOUS at 08:26

## 2024-01-22 RX ADMIN — ONDANSETRON 4 MG: 2 INJECTION INTRAMUSCULAR; INTRAVENOUS at 04:53

## 2024-01-22 RX ADMIN — CARVEDILOL 25 MG: 25 TABLET, FILM COATED ORAL at 17:22

## 2024-01-22 RX ADMIN — SPIRONOLACTONE 25 MG: 25 TABLET ORAL at 22:22

## 2024-01-22 RX ADMIN — SODIUM CHLORIDE, PRESERVATIVE FREE 10 ML: 5 INJECTION INTRAVENOUS at 22:23

## 2024-01-22 RX ADMIN — ACETAMINOPHEN 650 MG: 325 TABLET ORAL at 05:09

## 2024-01-22 RX ADMIN — SODIUM CHLORIDE: 9 INJECTION, SOLUTION INTRAVENOUS at 05:10

## 2024-01-22 RX ADMIN — POTASSIUM CHLORIDE 40 MEQ: 1500 TABLET, EXTENDED RELEASE ORAL at 08:25

## 2024-01-22 RX ADMIN — SODIUM CHLORIDE, PRESERVATIVE FREE 10 ML: 5 INJECTION INTRAVENOUS at 08:30

## 2024-01-22 RX ADMIN — Medication 1000 UNITS: at 08:25

## 2024-01-22 RX ADMIN — PROPOFOL 150 MCG/KG/MIN: 10 INJECTION, EMULSION INTRAVENOUS at 15:01

## 2024-01-22 RX ADMIN — LIDOCAINE HYDROCHLORIDE 100 MG: 20 INJECTION, SOLUTION EPIDURAL; INFILTRATION; INTRACAUDAL; PERINEURAL at 15:01

## 2024-01-22 RX ADMIN — CARVEDILOL 25 MG: 25 TABLET, FILM COATED ORAL at 08:26

## 2024-01-22 RX ADMIN — MAGNESIUM SULFATE HEPTAHYDRATE 2000 MG: 40 INJECTION, SOLUTION INTRAVENOUS at 08:34

## 2024-01-22 RX ADMIN — SODIUM CHLORIDE: 9 INJECTION, SOLUTION INTRAVENOUS at 16:08

## 2024-01-22 ASSESSMENT — PAIN SCALES - GENERAL
PAINLEVEL_OUTOF10: 3
PAINLEVEL_OUTOF10: 5
PAINLEVEL_OUTOF10: 5
PAINLEVEL_OUTOF10: 6
PAINLEVEL_OUTOF10: 0

## 2024-01-22 ASSESSMENT — PAIN DESCRIPTION - LOCATION
LOCATION: HEAD
LOCATION: ABDOMEN
LOCATION: ABDOMEN

## 2024-01-22 ASSESSMENT — ENCOUNTER SYMPTOMS: SHORTNESS OF BREATH: 1

## 2024-01-22 ASSESSMENT — PAIN DESCRIPTION - ONSET: ONSET: ON-GOING

## 2024-01-22 ASSESSMENT — PAIN DESCRIPTION - DESCRIPTORS
DESCRIPTORS: ACHING
DESCRIPTORS: ACHING;CRAMPING;DISCOMFORT

## 2024-01-22 ASSESSMENT — PAIN DESCRIPTION - ORIENTATION
ORIENTATION: MID;POSTERIOR
ORIENTATION: RIGHT

## 2024-01-22 ASSESSMENT — PAIN DESCRIPTION - FREQUENCY: FREQUENCY: CONTINUOUS

## 2024-01-22 ASSESSMENT — PAIN - FUNCTIONAL ASSESSMENT: PAIN_FUNCTIONAL_ASSESSMENT: 0-10

## 2024-01-22 ASSESSMENT — PAIN DESCRIPTION - PAIN TYPE: TYPE: ACUTE PAIN

## 2024-01-22 NOTE — CARE COORDINATION
Discharge Planning:     (CM) reviewed the patient's chart to assess needs. Patient's Readmission Risk Score is 8%. Patient's medical insurance is [facility] Medicaid. Patient's PCP is Dr. Evelin Yousif. No needs anticipated, at this time. CM team to follow. Staff to inform CM if additional discharge needs arise.        Sonia MCKEON, VERONICA, [facility]  Social Work -   [phone]    Electronically signed by MIKE Rosario on 1/22/2024 at 9:09 AM

## 2024-01-22 NOTE — CONSULTS
Department of Gynecology  Consult Note      Reason for Consult:  Abdominal pain  Requesting Physician:  Angus    CHIEF COMPLAINT:   RLQ pain and bloody stools    History obtained from patient    HISTORY OF PRESENT ILLNESS:     The patient is a 58 y.o. female with significant past medical history of atrial fibrillation, congestive heart failure, fibroids who presents with 2 week history of RLQ pain, diarrhea and bloody stools.  Patient currently denies any GYN c/o.  Had previous uterine artery embolization for fibroids in her 30's and stopped having periods soon after that.  Denies any bleeding or spotting or discharge.  Last pap with PCP was 2 years ago.  Paps always normal in past.    Past Medical History:        Diagnosis Date    Anemia     Atrial fibrillation and flutter (HCC)     Atrial flutter (HCC)     CHB (complete heart block) (HCC)     Class 1 obesity without serious comorbidity with body mass index (BMI) of 33.0 to 33.9 in adult 09/06/2019    Congenital heart disease     Difficult intravenous access 10/06/2022    SEE NOTE    GERD (gastroesophageal reflux disease)     Headache(784.0)     History of complete heart block     Hyperlipidemia     Hypertension     Migraine     Obstructive sleep apnea (adult) (pediatric)     PONV (postoperative nausea and vomiting)     Prolonged emergence from general anesthesia     sensitive to meds, slow to wake    Sleep apnea     uses CPAP    Syncope     Systolic CHF, chronic (HCC) 10/03/2018     Past Surgical History:        Procedure Laterality Date    CARDIAC DEFIBRILLATOR PLACEMENT  01/2021    [employer]    CARDIOVERSION  01/28/2022    CARDIOVERSION  02/22/2022    COLECTOMY N/A 04/13/2021    EXPLORATORY LAPAROTOMY, RESECTION OF DUODENAL MASS, CHOLECYSTECTOMY WITH INTRAOPERATIVE CHOLANGIOGRAM performed by Flavio Coronel MD at [facility] OR    COLONOSCOPY N/A 10/27/2020    COLONOSCOPY POLYPECTOMY SNARE/COLD BIOPSY performed by Fred Phillips MD at [facility] ASC ENDOSCOPY     iterative reconstruction, and/or weight based adjustment of the  mA/kV was utilized to reduce the radiation dose to as low as reasonably  achievable.     COMPARISON:  CT abdomen pelvis 07/24/2023     HISTORY:  ORDERING SYSTEM PROVIDED HISTORY: rectal bleeding. abd pain  TECHNOLOGIST PROVIDED HISTORY:  Reason for exam:->rectal bleeding. abd pain  Additional Contrast?->1  Reason for Exam: rectal bleeding. abd pain     FINDINGS:     CTA ABDOMEN:     The stomach is under distended.  The small and large bowel are normal in  caliber without wall thickening or inflammation.  The appendix is normal.  There is a dense focus in the cecum which is present on the noncontrast  phase.  Similarly, there is a dense focus layering within the fundus of the  stomach which is unchanged on all phases.  There is no arterial phase blush  of intraluminal contrast nor is there layering of contrast on delayed phases  to suggest acute arterial hemorrhage.     There are subsegmental atelectatic changes at the lung bases without pleural  effusion.  There is a 3 lead cardiac device with its leads terminating in the  right atrium, right ventricle, and coronary sinus.     The liver is normal in size with smooth contours.  No biliary ductal  dilatation is present and the gallbladder is surgically absent.  The spleen  is normal in size.  The adrenal glands are normal.  The pancreas is normal in  size without dilatation of the main pancreatic duct.  The kidneys are normal  in size with symmetric enhancement.  There is no hydronephrosis, contour  deforming mass, or perinephric fat stranding.     The abdominal aorta is patent and normal in caliber.  The origins of the  celiac, SMA, renal arteries, and SG are all patent.        CTA PELVIS:     The bladder is distended without wall thickening.  The uterus is enlarged and  lobulated with a large fundal, partially calcified fibroid.  There is no  adnexal mass.     The common, internal, and external iliac

## 2024-01-22 NOTE — PROGRESS NOTES
Teaching / education initiated regarding perioperative experience, expectations, and pain management during stay. Patient verbalized understanding.

## 2024-01-22 NOTE — PROGRESS NOTES
Hospitalist Progress Note      Name:  Chelsi Lock /Age/Sex: 1965  (58 y.o. female)   MRN & CSN:  [id number] & [id number] Encounter Date/Time: 2024 8:23 AM EST   Location:  [address] [place], [place] [address]/4458-01 PCP: Evelin Yousif, APRN - CNP     Attending:Zi Dennis MD       Hospital Day: 2    Subjective:   Chief Complaint:   Chief Complaint   Patient presents with    Abdominal Pain     Pt arrived via [facility] w c/o Abd pain and blood in stool that started today in the morning (Per pt 1st BM was black).Pt w Hx hernia. Pt on Xorelto     Chelsi Lock is a 58 y.o. female with a past medical history of Afib with heart block on Xarelto, chronic systolic CHF s/p ICD, DERECK, dilated CMP, obesity came to ER with complaints of abdominal pain.  Patient states she has R sided abdominal pain for past 2 weeks.  Associated with melena and diarrhea.  Has followed with Dr Phillips and Dr Coronel as outpatient.      Interval History:  Today, resting in bed after her EGD.  Complains of sore throat post procedure.  Denies N/V/D.  Abd pain is better.  She feels puffy from the IVF. Denies SOB or CP.     Independently reviewed interval ancillary notes from gastroenterology, urology, and general surgery.     Assessment and Recommendations   Problem List  Principal Problem:    Melena  Active Problems:    HTN (hypertension)    Congenital heart block    PAF (paroxysmal atrial fibrillation) (HCC)    Systolic CHF, chronic (HCC)    Obstructive sleep apnea (adult) (pediatric)    Left subclavian vein thrombosis (HCC)    Dilated cardiomyopathy (HCC)    VT (ventricular tachycardia) (HCC)    AICD (automatic cardioverter/defibrillator) present    Abdominal pain    Diarrhea    Fibroid uterus    Urinary retention  Resolved Problems:    * No resolved hospital problems. *     Assessment and Plan:    Melena   - Reports melena at time of admission   - Hold Xarelto, likely can resume tomorrow    - Continue Protonix IV bid   - advance diet post EGD    -

## 2024-01-22 NOTE — ANESTHESIA PRE PROCEDURE
Department of Anesthesiology  Preprocedure Note       Name:  Chelsi Lock   Age:  58 y.o.  :  1965                                          MRN:  [id number]         Date:  2024      Surgeon: Surgeon(s):  Emile Pinon MD    Procedure: Procedure(s):  EGD DIAGNOSTIC ONLY    Medications prior to admission:   Prior to Admission medications    Medication Sig Start Date End Date Taking? Authorizing Provider   spironolactone (ALDACTONE) 25 MG tablet TAKE 1 TABLET BY MOUTH DAILY 23   Rema Rivers APRN - CNS   sacubitril-valsartan (ENTRESTO) 49-51 MG per tablet Take 1 tablet by mouth 2 times daily 11/3/23   Rema Rivers APRN - CNS   furosemide (LASIX) 20 MG tablet TAKE 2 TABLETS BY MOUTH DAILY  Patient taking differently: Take 1 tablet by mouth once a week . 23   Venice Plascencia APRN - CNP   carvedilol (COREG) 25 MG tablet TAKE ONE TABLET BY MOUTH TWICE A DAY  Patient taking differently: Take 1 tablet by mouth 2 times daily TAKE ONE TABLET BY MOUTH TWICE A DAY 23   Fred Granados APRN - CNP   Magnesium Citrate 200 MG TABS Take 200 mg by mouth daily  Patient taking differently: Take 200 mg by mouth nightly 23   Franck Gillis MD   potassium chloride (KLOR-CON M) 20 MEQ extended release tablet Take 2 tablets by mouth daily 23   Franck Gillis MD   XARELTO 20 MG TABS tablet TAKE ONE TABLET BY MOUTH DAILY WITH SUPPER  Patient taking differently: Take 1 tablet by mouth Daily with supper 23   Fred Granados APRN - CNP   Multiple Vitamins-Minerals (MULTIVITAMIN) TABS TAKE ONE TABLET BY MOUTH DAILY  Patient taking differently: Take 1 tablet by mouth daily 23   Rema Rivers APRN - CNS   vitamin D3 (CHOLECALCIFEROL) 25 MCG (1000 UT) TABS tablet Take 1 tablet by mouth daily 23   Fred Granados APRN - CNP   pantoprazole (PROTONIX) 40 MG tablet Take 1 tablet by mouth every morning (before breakfast) 23   Fred Granados APRN - CNP

## 2024-01-22 NOTE — PROCEDURES
Egd on chart, some angulation antrum/duodenum likely from old surgery but overlying mucosa normal, no fresh or old blood.  Rec  resume home diet  if hbg drops or sign active gi bleed please recall and can repeat colonoscopy, otherwise stools now brown and just had colonoscopy end october 2023 for diarrhea with dr carver  f/u stool studies  benefiber 1 tbsp daily as outpt and f/u dr carver for intermittent loose stools  rlq pain already had surg and gyn eval

## 2024-01-22 NOTE — PROGRESS NOTES
Pt transported back to inpatient room; pt able to ambulate with stand by assist back to bed. Pt family member in room, updated on procedure; bedside report given to RN, v/u. Pt returned with all belongings.

## 2024-01-22 NOTE — ANESTHESIA POSTPROCEDURE EVALUATION
Department of Anesthesiology  Postprocedure Note    Patient: Chelsi Lock  MRN: [id number]  YOB: 1965  Date of evaluation: 1/22/2024    Procedure Summary       Date: 01/22/24 Room / Location: [place] [address] / [facility]    Anesthesia Start: 1458 Anesthesia Stop: 1527    Procedure: EGD DIAGNOSTIC ONLY (Abdomen) Diagnosis:       Melena      (Melena [K92.1])    Surgeons: Emile Pinon MD Responsible Provider: Radha Hinkle MD    Anesthesia Type: MAC ASA Status: 3            Anesthesia Type: No value filed.    Sandra Phase I: Sandra Score: 5    Sandra Phase II:      Anesthesia Post Evaluation    Patient location during evaluation: PACU  Patient participation: complete - patient participated  Level of consciousness: awake  Airway patency: patent  Nausea & Vomiting: no vomiting  Cardiovascular status: hemodynamically stable  Respiratory status: acceptable  Hydration status: euvolemic  There was medical reason for not using a multimodal analgesia pain management approach.Pain management: adequate    No notable events documented.

## 2024-01-22 NOTE — PROGRESS NOTES
Pt arrived from endo to PACU bay 2. Reported received from endo staff. Pt asleep upon arrival to room; spontaneous respirations noted, pt arrives with 2L oxygen via nasal cannula, vitals as charted.

## 2024-01-22 NOTE — CONSULTS
Urology Consult Note  [facility]     Patient: Chelsi Lock MRN: [id number]  Room/Bed: [facility]-4458/4458-01   YOB: 1965  Age/Sex: 58 y.o.female  Admission Date: 1/21/2024     Date of Service:  1/22/2024    Consulting Provider: CATRACHITO Arellano CNP  Admitting/Requesting Physician: Wali Greco MD  Primary Care Physician: Evelin Yousif, APRN - CNP    Reason for Consult: Right Lower Quadrant Pain, Urinary Retention    ASSESSMENT/PLAN     59 yo female admitted with right lower abdominal pain associated with blood in her stool. Describes pain as non radiating and 10/10 in severity. Reports she has had a recent colonoscopy. Urology has been consulted for urinary retention and abdominal pain. Her CT 01/21/2023 shows a sl full bladder.  Personally interpreted CT scan, and there are no ureteral stones or hydronephrosis noted. PVR this morning is 0cc's. She feels like voiding at baseline. UA shows no microheme or infection.     Recommendations:  There does not appear to be a  source to explain the patient's abdominal pain. General surgery, GI and OBGYN have been consulted. Will follow peripherally, call with any questions.     All the patients questions were answered. She understands the plan as listed above.    HISTORY     Chief Complaint:   Chief Complaint   Patient presents with    Abdominal Pain     Pt arrived via [facility] w c/o Abd pain and blood in stool that started today in the morning (Per pt 1st BM was black).Pt w Hx hernia. Pt on Xorelto       History of Present Illness: Chelsi Lock is a 58 y.o. female with sl full bladder on CT and right lower quadrat pain. Onset of symptoms was days ago with improving course since that time. Symptoms are aggravated by unknown at this time. Symptoms improved with pain medication. Associated symptoms include melena. Patient also reports severe 10/10 pain starting yesterday. She has tried the following treatments: dilaudid    Past Medical  condition->Emergency Medical Condition (MA) Reason for Exam: abdominal pain FINDINGS: BRAIN/VENTRICLES: There is no acute intracranial hemorrhage, mass effect or midline shift.  No abnormal extra-axial fluid collection.  The gray-white differentiation is maintained without evidence of an acute infarct.  There is no evidence of hydrocephalus. ORBITS: The visualized portion of the orbits demonstrate no acute abnormality. SINUSES: The visualized paranasal sinuses and mastoid air cells demonstrate no acute abnormality. SOFT TISSUES/SKULL:  No acute abnormality of the visualized skull or soft tissues.     No acute intracranial abnormality.     CTA ABDOMEN PELVIS W WO CONTRAST    Result Date: 1/21/2024  EXAMINATION: CTA OF THE ABDOMEN AND PELVIS WITH AND WITHOUT CONTRAST 1/21/2024 11:49 am: TECHNIQUE: CTA of the abdomen and pelvis was performed without and with the administration of intravenous contrast. Multiplanar reformatted images are provided for review.  MIP images are provided for review. Automated exposure control, iterative reconstruction, and/or weight based adjustment of the mA/kV was utilized to reduce the radiation dose to as low as reasonably achievable. COMPARISON: CT abdomen pelvis 07/24/2023 HISTORY: ORDERING SYSTEM PROVIDED HISTORY: rectal bleeding. abd pain TECHNOLOGIST PROVIDED HISTORY: Reason for exam:->rectal bleeding. abd pain Additional Contrast?->1 Reason for Exam: rectal bleeding. abd pain FINDINGS: CTA ABDOMEN: The stomach is under distended.  The small and large bowel are normal in caliber without wall thickening or inflammation.  The appendix is normal. There is a dense focus in the cecum which is present on the noncontrast phase.  Similarly, there is a dense focus layering within the fundus of the stomach which is unchanged on all phases.  There is no arterial phase blush of intraluminal contrast nor is there layering of contrast on delayed phases to suggest acute arterial hemorrhage. There

## 2024-01-22 NOTE — CONSULTS
Gastroenterology Consult Note        Patient: Chelsi Lock  : 1965  Acct#:      Date:  2024    Subjective:       History of Present Illness  Patient is a 58 y.o.  female admitted with Melena [K92.1]  Dark stools [R19.5]  Right sided abdominal pain [R10.9]  Anticoagulated [Z79.01]  Paresthesia of right foot [R20.2] who is seen in consult for melena.   History of CHF, complete heart block s/p AICD and pacemaker, A-fib/flutter on Xarelto (last took this Saturday night).  She had GI bleeding from a duodenal lipoma in .  This was resected and she underwent cholecystectomy at that time as well.  She had abdominal pain later related to an incisional hernia and underwent repair of this 10/2022.    Patient has had intermittent diarrhea and underwent colonoscopy for this 2023 which was unrevealing including normal random colon biopsies.  Typically has a solid stool daily and every now and then she will have diarrhea for a day.  Does not take any laxatives or antidiarrheal meds.    Last week she started having some right sided/right lower quadrant pain.  This pain worsened a day ago and was severe and sharp.  She also had diarrhea yesterday that was very dark to black initially and then was brown.  No red blood with bowel movements.  Had nausea but no vomiting.  Came to the ER for this.    Does not take Pepto-Bismol.  Takes Protonix daily.  No NSAIDs.      Past Medical History:   Diagnosis Date    Anemia     Atrial fibrillation and flutter (HCC)     Atrial flutter (HCC)     CHB (complete heart block) ([facility])     Class 1 obesity without serious comorbidity with body mass index (BMI) of 33.0 to 33.9 in adult 2019    Congenital heart disease     Difficult intravenous access 10/06/2022    SEE NOTE    GERD (gastroesophageal reflux disease)     Headache(784.0)     History of complete heart block     Hyperlipidemia     Hypertension     Migraine     Obstructive sleep apnea (adult)

## 2024-01-22 NOTE — CONSULTS
[facility]     Consult                                                     Patient Name: Chelsi Lock  MRN: [id number]  YOB: 1965  Admission date: 1/21/2024 10:34 AM   Date of evaluation: 1/22/2024  Primary Care Physician: Evelin Yousif, CATRACHITO - CNP  Reason for consult: Abdominal pain and melena  History of Present Illness:    Ms. Lock is a 58 y.o. female who presents with multiple complaints. Intermittent diarrhea for several months, colonoscopy 10/2023 which was normal. Although chart review with other providers mentions she just noted the right sided abdominal pain last week, she notes with me this is the same pain present and unchanged for months. Additionally she notes black stool which has normalized. Nausea but no emesis. Denies fevers, chills, chest pain, dyspnea, dysuria, jaundice, or other complaints. Imaging shows fibroids for which gynecology consulted and recommend outpatient followup, urology consulted for urinary retention and have no acute recommendations, surgery consulted as patient was to see me in office around this time to discuss further her abdominal pain. The patient has right lower quadrant abdominal pain that is chronic and present for at least 6 months, unchanged. Complicated surgical history including uterine fibroidectomy, tubal ligation. Underwent exploratory laparotomy, cholecystectomy with cholangiogram, excision of benign duodenal mass for upper GI bleeding 4/13/21. Developed large incisional hernia and underwent exploratory laparotomy, lysis of adhesions, repair of incisional hernia, bilateral transverse abdominus musculofascial component releases and mesh placement 9/27/22. Last office visit 7/28/23 she was evaluated for a recently noted right lower quadrant bulge with intact overlying muscle layer but lateral to the mesh and medial to the divided muscle flap creating an incisional hernia. No signs of obstruction then or  °F (36.7 °C) Oral 72 16 98 % --   24 1529 (!) 148/77 -- -- 81 -- 97 % --   24 1401 136/83 -- -- 86 -- 97 % --   24 1302 (!) 150/72 -- -- 82 -- 96 % --   24 1219 134/78 -- -- 78 -- 99 % --      TEMPERATURE HISTORY 24H: Temp (24hrs), Av.8 °F (36.6 °C), Min:97.5 °F (36.4 °C), Max:98.2 °F (36.8 °C)    BLOOD PRESSURE HISTORY: Systolic (36hrs), Av , Min:116 , Max:158    Diastolic (36hrs), Av, Min:72, Max:94    Admission Weight - Scale: 91.2 kg (201 lb)       Intake/Output Summary (Last 24 hours) at 2024 1131  Last data filed at 2024 0942  Gross per 24 hour   Intake 60 ml   Output 500 ml   Net -440 ml     Drain/tube Output:         Physical Exam:  Constitutional:  well-developed, well-nourished,   Neurologic: awake, Orientation:  Oriented to person, place, time, follows commands, clear speech, cranial nerves grossly intact  Psychiatric: normal affect, no hallucinations  Eyes:  sclera clear, no visible discharge  Head, ears, nose, mouth, throat: normocephalic, without obvious abnormality, supple, symmetrical, trachea midline, no JVD, mucous membranes moist  Cardiac: regular rate and rhythm   Pulmonary: clear to auscultation bilaterally   GI: soft, non-distended, non-tender, right lower quadrant with difficult to appreciate incisional hernia and only mildly tender, stable compared to last exam 6 months ago  Lymph: no palpable lymphadenopathy  Skin: no bruising or bleeding, normal skin color, texture, turgor, and no redness, warmth, or swelling    Labs:    CBC:    Recent Labs     24  1111 24  0016 24  0716   WBC 6.3  --  5.6   HGB 12.3 11.1* 11.8*   HCT 36.4 32.6* 35.1*     --  231     BMP:    Recent Labs     24  1111 24  0716    139   K 4.1 4.2    106   CO2 22 22   BUN 10 7   CREATININE 0.9 0.8   GLUCOSE 95 101*     Hepatic:   Recent Labs     24  1111 24  0716   AST 29 32   ALT 19 19   BILITOT 0.5 0.8   ALKPHOS 72 63  large fundal, partially calcified fibroid.  There is no adnexal mass. The common, internal, and external iliac arteries are patent and normal in caliber. There is no free air or free fluid.  No lymphadenopathy is present. There is no acute osseous abnormality.  There are degenerative changes of the spine.     1. No arterial blush or layering of contrast on delayed phases to suggest acute arterial GI bleed. 2. Fibroid uterus.       Cultures:  Relevant cultures documented under results section     Assessment:  Patient Active Problem List   Diagnosis    HTN (hypertension)    Other and unspecified hyperlipidemia    Congenital heart block    LVH (left ventricular hypertrophy)    Systolic CHF, chronic ([facility])    Hypersomnia    Obstructive sleep apnea (adult) (pediatric)    LV dysfunction    Left subclavian vein thrombosis ([facility])    Dilated cardiomyopathy ([facility])    Class 1 obesity due to excess calories with body mass index (BMI) of 31.0 to 31.9 in adult    VT (ventricular tachycardia) ([facility])    AICD (automatic cardioverter/defibrillator) present    Iron deficiency anemia    Anemia    Adequate anticoagulation on anticoagulant therapy    Weight loss counseling, encounter for    Incisional hernia without obstruction or gangrene    PAF (paroxysmal atrial fibrillation) ([facility])    Chest pain    Melena    Abdominal pain    Diarrhea    Fibroid uterus    Urinary retention     Melena  Incisional hernia    Plan:  1. Incisional hernia is stable and complex. Would not recommend repair in the acute setting and possibly not at all. Given complexity with incisional hernia after component release, have recommended second opinion at tertiary center with Dr. Mcclellan at [facility] prior to considering repair. Contact information provided. She is likely to observe and this is reasonable   2. Diet per GI  3. Melena workup per GI, hernia is not causing acute symptoms prompting admission  4. Defer management of remainder of medical

## 2024-01-22 NOTE — PROGRESS NOTES
Reviewed patient's medical and surgical history in electronic record and with patient at the bedside. All questions regarding procedure answered.   Scope verified using 2 person system.    Electronically signed by Katlyn Farah RN on 1/22/2024 at 2:52 PM

## 2024-01-22 NOTE — PROGRESS NOTES
9:37 AM  Pt educated on need for stool sample - pt verbalized her understanding of education. Pt passing gas but not stooling at this time. Q6 H&H's stable. Pt reports right sided abdominal cramping 5/10 with intermittent nausea without vomiting. Pt states she is comfortable at this time, as tylenol PRN is not available and she prefers to avoid iv dilaudid unless \"absolutely needed\". Morning BP elevated, scheduled blood pressure medications given as ordered. Pt stable and denied needs when writer left room.

## 2024-01-22 NOTE — PLAN OF CARE
[facility]        Assessment & Plan of Care    History of Present Illness: Ms. Lock is a 58 y.o. female who presented to the ED yesterday for right-sided abdominal pain that started yesterday morning, 9/10    ***sharp?, ***constant, ***radiates  ***better, ***worse    -f/c  +n/v-  ***d/c  ***urinary symptoms  ***cp/sob  ***similar symptoms    Associated melena, but became more brown colored throughout the day    Medical history includes hypertension, hyperlipidemia, CHF, atrial fibrillation (takes Xarelto, last dose was ***), GERD, DERECK, anemia.    Prior abdominal surgery includes tubal ligation  4/13/2021--exploratory laparotomy, cholecystectomy with cholangiogram and excision of benign duodenal mass for upper GI bleeding and symptomatic cholelithiasis   9/27/2022--exploratory laparotomy, repair of reducible incisional hernia, bilateral transverse abdominus component releases and mesh placement  ([id number] phasix ST)   ***    Physical Exam:  CONSTITUTIONAL:  {ALERTNESS:589547570}, {LEVEL OF DISTRESS:247053994} and {OBESITY:707648467}  NEUROLOGIC:  Mental Status Exam:  Level of Alertness:   {ALERTNESS:654890912::\"awake\"}  Orientation:   {ORIENTATION:711497987::\"person\",\"place\",\"time\"}  EYES:  {SCLERA:340842114}  ENT:  normocepalic, without obvious abnormality  NECK:  supple, symmetrical, trachea midline  LUNGS:  {AUSCULTATION:211083119}  CARDIOVASCULAR:  {RATE AND RHYTHM:688678662} and {MURMUR:917786076}  ABDOMEN: {CONSISTENCY:639783976}, {DISTENTION:393464662}, {TENDERNESS:531952611}, {VOLUNTARY/INVOLUNTARY:281471531} guarding {PRESENT/ABSENT:336829364::\"absent\"}, {PALPATION:940290169}, {BOWEL SOUNDS:171439456},  {SCARS:945746821}, and {HERNIA:094318758}  Extremities:  ***  SKIN:  {SKIN:317925561}    Assessment:  ***    Plan:  ***. ***  ***. *** diet as tolerated; monitor bowel function  ***. IV hydration; monitor and correct electrolytes  ***. Antibiotics  ***. Activity as  tolerated, ambulate TID, up to chair for all meals  ***. Pulmonary toilet, incentive spirometry  ***. PRN analgesics and antiemetics--minimizing narcotics as tolerated  ***. DVT prophylaxis with {plan; dvt prophylaxis:68646::\"SCD's\"}  ***. Management of medical comorbid etiologies per primary team and consulting services  ***. Disposition:    EDUCATION:  Educated patient on plan of care and disease process--all questions answered.    Plans discussed with patient and nursing.  Reviewed and discussed with  ***--full consult to follow.  Will review and discuss with  ***--full consult to follow.      Signed:  Cece Mays, CATRACHITO - CNP  1/22/2024 8:48 AM

## 2024-01-23 VITALS
WEIGHT: 201.72 LBS | RESPIRATION RATE: 16 BRPM | DIASTOLIC BLOOD PRESSURE: 74 MMHG | HEART RATE: 76 BPM | SYSTOLIC BLOOD PRESSURE: 111 MMHG | HEIGHT: 66 IN | TEMPERATURE: 97.9 F | OXYGEN SATURATION: 99 % | BODY MASS INDEX: 32.42 KG/M2

## 2024-01-23 LAB
ANION GAP SERPL CALCULATED.3IONS-SCNC: 10 MMOL/L (ref 3–16)
BASOPHILS # BLD: 0 K/UL (ref 0–0.2)
BASOPHILS NFR BLD: 0.5 %
BUN SERPL-MCNC: 10 MG/DL (ref 7–20)
CALCIUM SERPL-MCNC: 9.1 MG/DL (ref 8.3–10.6)
CHLORIDE SERPL-SCNC: 108 MMOL/L (ref 99–110)
CO2 SERPL-SCNC: 24 MMOL/L (ref 21–32)
CREAT SERPL-MCNC: 0.8 MG/DL (ref 0.6–1.1)
DEPRECATED RDW RBC AUTO: 14.9 % (ref 12.4–15.4)
EOSINOPHIL # BLD: 0.2 K/UL (ref 0–0.6)
EOSINOPHIL NFR BLD: 4 %
GFR SERPLBLD CREATININE-BSD FMLA CKD-EPI: >60 ML/MIN/{1.73_M2}
GLUCOSE SERPL-MCNC: 109 MG/DL (ref 70–99)
HCT VFR BLD AUTO: 33.3 % (ref 36–48)
HGB BLD-MCNC: 11.4 G/DL (ref 12–16)
LYMPHOCYTES # BLD: 1 K/UL (ref 1–5.1)
LYMPHOCYTES NFR BLD: 19.3 %
MCH RBC QN AUTO: 31.8 PG (ref 26–34)
MCHC RBC AUTO-ENTMCNC: 34.1 G/DL (ref 31–36)
MCV RBC AUTO: 93.3 FL (ref 80–100)
MONOCYTES # BLD: 0.3 K/UL (ref 0–1.3)
MONOCYTES NFR BLD: 6.1 %
NEUTROPHILS # BLD: 3.5 K/UL (ref 1.7–7.7)
NEUTROPHILS NFR BLD: 70.1 %
PLATELET # BLD AUTO: 221 K/UL (ref 135–450)
PMV BLD AUTO: 7.4 FL (ref 5–10.5)
POTASSIUM SERPL-SCNC: 4.1 MMOL/L (ref 3.5–5.1)
RBC # BLD AUTO: 3.57 M/UL (ref 4–5.2)
SODIUM SERPL-SCNC: 142 MMOL/L (ref 136–145)
WBC # BLD AUTO: 5 K/UL (ref 4–11)

## 2024-01-23 PROCEDURE — 36415 COLL VENOUS BLD VENIPUNCTURE: CPT

## 2024-01-23 PROCEDURE — C9113 INJ PANTOPRAZOLE SODIUM, VIA: HCPCS | Performed by: INTERNAL MEDICINE

## 2024-01-23 PROCEDURE — 80048 BASIC METABOLIC PNL TOTAL CA: CPT

## 2024-01-23 PROCEDURE — 85025 COMPLETE CBC W/AUTO DIFF WBC: CPT

## 2024-01-23 PROCEDURE — 96376 TX/PRO/DX INJ SAME DRUG ADON: CPT

## 2024-01-23 PROCEDURE — 6370000000 HC RX 637 (ALT 250 FOR IP): Performed by: NURSE PRACTITIONER

## 2024-01-23 PROCEDURE — 2580000003 HC RX 258: Performed by: INTERNAL MEDICINE

## 2024-01-23 PROCEDURE — 6360000002 HC RX W HCPCS: Performed by: INTERNAL MEDICINE

## 2024-01-23 PROCEDURE — G0378 HOSPITAL OBSERVATION PER HR: HCPCS

## 2024-01-23 PROCEDURE — 6370000000 HC RX 637 (ALT 250 FOR IP): Performed by: INTERNAL MEDICINE

## 2024-01-23 RX ORDER — FUROSEMIDE 20 MG/1
20 TABLET ORAL WEEKLY
COMMUNITY
Start: 2024-01-23

## 2024-01-23 RX ORDER — TRAMADOL HYDROCHLORIDE 50 MG/1
50 TABLET ORAL EVERY 8 HOURS PRN
Qty: 9 TABLET | Refills: 0 | Status: SHIPPED | OUTPATIENT
Start: 2024-01-23 | End: 2024-01-26

## 2024-01-23 RX ORDER — WHEAT DEXTRIN 3 G/3.8 G
POWDER (GRAM) ORAL
Qty: 1 EACH | Refills: 0 | Status: SHIPPED | OUTPATIENT
Start: 2024-01-23

## 2024-01-23 RX ADMIN — SACUBITRIL AND VALSARTAN 1 TABLET: 49; 51 TABLET, FILM COATED ORAL at 08:45

## 2024-01-23 RX ADMIN — SODIUM CHLORIDE, PRESERVATIVE FREE 10 ML: 5 INJECTION INTRAVENOUS at 08:46

## 2024-01-23 RX ADMIN — POTASSIUM CHLORIDE 40 MEQ: 1500 TABLET, EXTENDED RELEASE ORAL at 08:45

## 2024-01-23 RX ADMIN — ACETAMINOPHEN 650 MG: 325 TABLET ORAL at 04:11

## 2024-01-23 RX ADMIN — Medication 1000 UNITS: at 08:45

## 2024-01-23 RX ADMIN — CARVEDILOL 25 MG: 25 TABLET, FILM COATED ORAL at 08:45

## 2024-01-23 RX ADMIN — FUROSEMIDE 20 MG: 20 TABLET ORAL at 08:44

## 2024-01-23 RX ADMIN — PANTOPRAZOLE SODIUM 40 MG: 40 INJECTION, POWDER, FOR SOLUTION INTRAVENOUS at 06:57

## 2024-01-23 ASSESSMENT — PAIN SCALES - GENERAL: PAINLEVEL_OUTOF10: 5

## 2024-01-23 ASSESSMENT — PAIN DESCRIPTION - LOCATION: LOCATION: ABDOMEN

## 2024-01-23 NOTE — DISCHARGE SUMMARY
[facility]    Patient Demographics    Patient. Chelsi Lock  Date of Birth. 1965  MRN. [id number]     Primary care provider. Evelin Yousif, APRN - CNP  (Tel: [phone])    Admit date: 1/21/2024    Discharge date (blank if same as Note Date):   Note Date: 1/23/2024     Reason for Hospitalization.   Chief Complaint   Patient presents with    Abdominal Pain     Pt arrived via [facility] w c/o Abd pain and blood in stool that started today in the morning (Per pt 1st BM was black).Pt w Hx hernia. Pt on Xorelto         Significant Findings.   Principal Problem:    Melena  Active Problems:    HTN (hypertension)    Congenital heart block    PAF (paroxysmal atrial fibrillation) (HCC)    Systolic CHF, chronic (HCC)    Obstructive sleep apnea (adult) (pediatric)    Left subclavian vein thrombosis (HCC)    Dilated cardiomyopathy (HCC)    VT (ventricular tachycardia) (HCC)    AICD (automatic cardioverter/defibrillator) present    Abdominal pain    Diarrhea    Fibroid uterus    Urinary retention  Resolved Problems:    * No resolved hospital problems. *       Problems and results from this hospitalization that need follow up.  Possible IBS. GI follow up recommended.  Complex incisional hernia. Recommended patient obtain 2nd opinion with Dr Mcclellan at , provided contact information.  Uterine fibroids stable. Recommend outpatient GYN follow-up.    Significant test results and incidental findings.  CTA abd/pelvis 1/21/2024:  IMPRESSION:  1. No arterial blush or layering of contrast on delayed phases to suggest acute arterial GI bleed.  2. Fibroid uterus.     CT head 1/21/2024:  IMPRESSION:  No acute intracranial abnormality.    Pelvic US 1/22/2024:  IMPRESSION:  1. Nonvisualization of the endometrial stripe and bilateral ovaries.  Heterogeneous mass in the uterus with nonvisualization of the  abdominal pain, nausea, tolerating diet. Reviewed DC plans, follow up and medications. Expresses understanding. Questions answered.      Consults.  IP CONSULT TO HOSPITALIST  IP CONSULT TO GENERAL SURGERY  IP CONSULT TO GI  IP CONSULT TO GENERAL SURGERY  IP CONSULT TO UROLOGY  IP CONSULT TO OB GYN    Physical examination on discharge day.   /74   Pulse 76   Temp 97.9 °F (36.6 °C) (Oral)   Resp 16   Ht 1.676 m (5' 6\")   Wt 91.5 kg (201 lb 11.5 oz)   SpO2 99%   BMI 32.56 kg/m²   General appearance.  Alert. Looks comfortable.  HEENT. Sclera clear. Moist mucus membranes.  Cardiovascular. Regular rate and rhythm, normal S1, S2. No murmur.   Respiratory. Not using accessory muscles.Clear to auscultation bilaterally, no wheeze.  Gastrointestinal. Abdomen soft, non-tender, not distended, normal bowel sounds  Neurology. Facial symmetry. No speech deficits. Moving all extremities equally.  Extremities. No edema in lower extremities.  Skin. Warm, dry, normal turgor    Condition at time of discharge stable    Medication instructions provided to patient at discharge.     Medication List        START taking these medications      Benefiber Powd  Take 1 tablespoon daily     traMADol 50 MG tablet  Commonly known as: Ultram  Take 1 tablet by mouth every 8 hours as needed for Pain for up to 3 days. Intended supply: 3 days. Take lowest dose possible to manage pain Max Daily Amount: 150 mg            CHANGE how you take these medications      atorvastatin 40 MG tablet  Commonly known as: LIPITOR  Take 1 tablet by mouth daily  What changed: when to take this     carvedilol 25 MG tablet  Commonly known as: COREG  TAKE ONE TABLET BY MOUTH TWICE A DAY  What changed:   how much to take  how to take this  when to take this     Magnesium Citrate 200 MG Tabs  Take 200 mg by mouth daily  What changed: when to take this     Xarelto 20 MG Tabs tablet  Generic drug: rivaroxaban  TAKE ONE TABLET BY MOUTH DAILY WITH SUPPER  What

## 2024-01-23 NOTE — PROGRESS NOTES
Ultrasound results reviewed with the patient. Fibroid unchanged on imaging from as far back as 2020 in [facility]. No gynecologic intervention during this admission. Previous complex ventral hernia repair previously. Can follow-up in the office for further gynecologic issues.

## 2024-01-23 NOTE — PROGRESS NOTES
-2100: Patient went down to have pelvic ultrasound  -2223: PM assessment complete, vitals stable, medications given per MAR, no complaints of pain or nausea, patient independent in the room.

## 2024-01-23 NOTE — PROGRESS NOTES
Gastroenterology Progress Note      Chelsi Lock is a 58 y.o. female patient.  1. Right sided abdominal pain    2. Dark stools    3. Anticoagulated    4. Paresthesia of right foot    5. Chronic combined systolic and diastolic heart failure (HCC)        SUBJECTIVE:  Had some mild RLQ pain earlier this am but none since. No further diarrhea.         Physical    VITALS:  /74   Pulse 76   Temp 97.9 °F (36.6 °C) (Oral)   Resp 16   Ht 1.676 m (5' 6\")   Wt 91.5 kg (201 lb 11.5 oz)   SpO2 99%   BMI 32.56 kg/m²   TEMPERATURE:  Current - Temp: 97.9 °F (36.6 °C); Max - Temp  Av.4 °F (36.3 °C)  Min: 96.8 °F (36 °C)  Max: 97.9 °F (36.6 °C)    NAD  RRR   Lungs CTA Bilaterally, normal effort  Abdomen soft, ND, NT, no HSM, Bowel sounds normal       Data    Data Review:    Recent Labs     24  1111 24  0016 24  0716 24  0433   WBC 6.3  --  5.6 5.0   HGB 12.3 11.1* 11.8* 11.4*   HCT 36.4 32.6* 35.1* 33.3*   MCV 93.8  --  93.9 93.3     --  231 221     Recent Labs     24  1111 24  0716 24  0433    139 142   K 4.1 4.2 4.1    106 108   CO2    PHOS  --  3.4  --    BUN 10 7 10   CREATININE 0.9 0.8 0.8     Recent Labs     24  1111 24  0716   AST 29 32   ALT 19 19   BILITOT 0.5 0.8   ALKPHOS 72 63     Recent Labs     24  1111 24  0716   LIPASE 20.0 18.0     Recent Labs     24  1111 24  0813   PROTIME 16.8* 14.2   INR 1.37* 1.10     No results for input(s): \"PTT\" in the last 72 hours.           ASSESSMENT / PLAN      Right lower quadrant pain, diarrhea-some pain for about a week but worse since .  Did have diarrhea  (has diarrhea intermittently).  Stool studies ordered but then no diarrhea to send.  CTA abdomen pelvis unrevealing.  Just had colonoscopy 10/2023 for diarrhea which was unrevealing including random biopsies which were negative.  Consider IBS.  -Benefiber   - f/u with Dr Phillips in the  office     Black stool -patient reports diarrhea was initially black (not true melena) and is now brown.  No elevation of BUN to creatinine.  EGD was unrevealing. Hgb stable.     Will sign off. Please call if questions.    Discussed with Dr. Zi Jeronimo, [facility]    I have personally performed a face to face diagnostic evaluation on this patient.  I have interviewed and examined the patient and I agree with the findings and recommended plan of care.  In summary, my findings and plan are the following: I saw pt prior to dc home today. No black stools, abd pain improved, tolerated solid diet last night. Abd soft/much less tender. Egd unrevealing, hbg stable, no gi bleeding. Stools neg, agree benefiber and f/u dr carver for intemrittent loose stools.   Pt dc'd home today.       Nico Pinon MD  [facility]

## 2024-01-23 NOTE — ADDENDUM NOTE
Addendum  created 01/23/24 1449 by Freeman Sam MD    Attestation recorded in Intraprocedure, Flowsheet accepted, Intraprocedure Attestations filed, Intraprocedure Staff edited

## 2024-01-23 NOTE — DISCHARGE INSTRUCTIONS
Contact Dr Mcclellan at [facility] for second opinion prior to considering hernia repair.

## 2024-01-26 ENCOUNTER — TELEPHONE (OUTPATIENT)
Dept: FAMILY MEDICINE CLINIC | Age: 59
End: 2024-01-26

## 2024-01-26 ENCOUNTER — HOSPITAL ENCOUNTER (OUTPATIENT)
Age: 59
Discharge: HOME | End: 2024-01-26
Payer: MEDICAID

## 2024-01-26 ENCOUNTER — OFFICE VISIT (OUTPATIENT)
Dept: CARDIOLOGY CLINIC | Age: 59
End: 2024-01-26
Payer: MEDICAID

## 2024-01-26 VITALS
OXYGEN SATURATION: 98 % | SYSTOLIC BLOOD PRESSURE: 122 MMHG | HEIGHT: 66 IN | WEIGHT: 204 LBS | DIASTOLIC BLOOD PRESSURE: 70 MMHG | HEART RATE: 83 BPM | BODY MASS INDEX: 32.78 KG/M2

## 2024-01-26 DIAGNOSIS — G47.33 OBSTRUCTIVE SLEEP APNEA (ADULT) (PEDIATRIC): ICD-10-CM

## 2024-01-26 DIAGNOSIS — E55.9 HYPOVITAMINOSIS D: ICD-10-CM

## 2024-01-26 DIAGNOSIS — D50.9 IRON DEFICIENCY ANEMIA, UNSPECIFIED IRON DEFICIENCY ANEMIA TYPE: ICD-10-CM

## 2024-01-26 DIAGNOSIS — I48.0 PAF (PAROXYSMAL ATRIAL FIBRILLATION) (HCC): ICD-10-CM

## 2024-01-26 DIAGNOSIS — I50.42 CHRONIC COMBINED SYSTOLIC AND DIASTOLIC HEART FAILURE (HCC): Primary | ICD-10-CM

## 2024-01-26 DIAGNOSIS — Z95.810 ICD (IMPLANTABLE CARDIOVERTER-DEFIBRILLATOR), BIVENTRICULAR, IN SITU: ICD-10-CM

## 2024-01-26 LAB
FERRITIN SERPL IA-MCNC: 158.1 NG/ML (ref 15–150)
IRON SATN MFR SERPL: 27 % (ref 15–50)
IRON SERPL-MCNC: 80 UG/DL (ref 37–145)
TIBC SERPL-MCNC: 297 UG/DL (ref 260–445)

## 2024-01-26 PROCEDURE — 3017F COLORECTAL CA SCREEN DOC REV: CPT | Performed by: CLINICAL NURSE SPECIALIST

## 2024-01-26 PROCEDURE — 83540 ASSAY OF IRON: CPT

## 2024-01-26 PROCEDURE — 3078F DIAST BP <80 MM HG: CPT | Performed by: CLINICAL NURSE SPECIALIST

## 2024-01-26 PROCEDURE — 82728 ASSAY OF FERRITIN: CPT

## 2024-01-26 PROCEDURE — 99214 OFFICE O/P EST MOD 30 MIN: CPT | Performed by: CLINICAL NURSE SPECIALIST

## 2024-01-26 PROCEDURE — 3074F SYST BP LT 130 MM HG: CPT | Performed by: CLINICAL NURSE SPECIALIST

## 2024-01-26 PROCEDURE — 83550 IRON BINDING TEST: CPT

## 2024-01-26 PROCEDURE — G8417 CALC BMI ABV UP PARAM F/U: HCPCS | Performed by: CLINICAL NURSE SPECIALIST

## 2024-01-26 PROCEDURE — 1036F TOBACCO NON-USER: CPT | Performed by: CLINICAL NURSE SPECIALIST

## 2024-01-26 PROCEDURE — G8427 DOCREV CUR MEDS BY ELIG CLIN: HCPCS | Performed by: CLINICAL NURSE SPECIALIST

## 2024-01-26 PROCEDURE — 1111F DSCHRG MED/CURRENT MED MERGE: CPT | Performed by: CLINICAL NURSE SPECIALIST

## 2024-01-26 PROCEDURE — 36415 COLL VENOUS BLD VENIPUNCTURE: CPT

## 2024-01-26 PROCEDURE — G8482 FLU IMMUNIZE ORDER/ADMIN: HCPCS | Performed by: CLINICAL NURSE SPECIALIST

## 2024-01-26 NOTE — PROGRESS NOTES
appears severely reduced with  and ejection fraction on the 25 % range.   -There is diffuse global hypokinesis.   -Grade II diastolic dysfunction with elevated LV filling pressures.   E/e\"=16.2.   -Mitral annular calcification is present.   -Mild-moderate mitral regurgitation.   -Aortic valve appears sclerotic but opens adequately.   -Trivial aortic regurgitation.   -Trivial tricuspid regurgitation with RVSP of 26 mmHg.   -Mild pulmonic regurgitation present.   -Dilated left atrium with a volume of 72 ml.   -Pacer / ICD wire is visualized in the right heart.    GXT cathie 7/15/2015:   Left ventricular ejection fraction of 55%.  The LV wall motion is normal.  There is normal isotope uptake at stress and rest.   There is no evidence of myocardial ischemia or scar.      Assessment:    1. Chronic combined systolic and diastolic heart failure (HCC) on arni, bb and aldosterone antagonist; no sglt2 due to yeast infection    2. Hypovitaminosis D    3. Paroxysmal atrial fibrillation (HCC) on xarelto   4. DERECK (obstructive sleep apnea) cpap every night   5. ICD (implantable cardioverter-defibrillator), biventricular, in situ    6.    Iron deficiency anemia    Plan:   Check iron studies today  An appointment earlier for new primary  Continue all current medications  RTO in 6 months    NYHA II      CATRACHITO MCNAMARA - CNS, CNS, 1/26/2024, 9:22 AM

## 2024-01-26 NOTE — PATIENT INSTRUCTIONS
Check iron studies today  An appointment earlier for new primary  Continue all current medications  RTO in 6 months

## 2024-01-26 NOTE — TELEPHONE ENCOUNTER
Care Transitions Initial Follow Up Call    Outreach made within 2 business days of discharge: No    Patient: Chelsi Lock Patient : 1965   MRN: [id number]  Reason for Admission: There are no discharge diagnoses documented for the most recent discharge.  Discharge Date: 24       Spoke with: patient currently admitted to hospital     Discharge department/facility: [facility]    Scheduled appointment with PCP within 7-14 days    Follow Up  Future Appointments   Date Time Provider Department Center   2024  9:00 AM Evelin Yousif, APRN - CNP [facility] [place] - [facility]   2024  9:30 AM Rema Rivers APRN - CNS [facility]   2024 10:00 AM SCHEDULE, [place] DEVICE CHECK [facility]   2024 10:00 AM Franck Gillis MD [facility]       Carol Rojo RN

## 2024-01-30 ENCOUNTER — TELEPHONE (OUTPATIENT)
Dept: CARDIOLOGY CLINIC | Age: 59
End: 2024-01-30

## 2024-01-31 ENCOUNTER — TELEPHONE (OUTPATIENT)
Dept: FAMILY MEDICINE CLINIC | Age: 59
End: 2024-01-31

## 2024-01-31 NOTE — TELEPHONE ENCOUNTER
Gastro health summary scanned into encounter    Reason for appointment:   Diarrhea, pt states it was dark in color. Pt has not had any diarrhea since being out of ER. Pt has been taking Benefiber 1 teaspoon once a day

## 2024-03-19 DIAGNOSIS — I50.42 CHRONIC COMBINED SYSTOLIC AND DIASTOLIC HEART FAILURE (HCC): ICD-10-CM

## 2024-03-19 RX ORDER — SPIRONOLACTONE 25 MG/1
25 TABLET ORAL DAILY
Qty: 90 TABLET | Refills: 0 | Status: SHIPPED | OUTPATIENT
Start: 2024-03-19

## 2024-03-22 ENCOUNTER — HOSPITAL ENCOUNTER (OUTPATIENT)
Age: 59
Setting detail: OBSERVATION
Discharge: HOME | End: 2024-03-23
Attending: EMERGENCY MEDICINE | Admitting: HOSPITALIST
Payer: MEDICAID

## 2024-03-22 ENCOUNTER — APPOINTMENT (OUTPATIENT)
Dept: GENERAL RADIOLOGY | Age: 59
End: 2024-03-22
Payer: MEDICAID

## 2024-03-22 DIAGNOSIS — R07.9 CHEST PAIN, UNSPECIFIED TYPE: Primary | ICD-10-CM

## 2024-03-22 DIAGNOSIS — E83.42 HYPOMAGNESEMIA: ICD-10-CM

## 2024-03-22 LAB
ANION GAP SERPL CALCULATED.3IONS-SCNC: 12 MMOL/L (ref 3–16)
APTT BLD: 33.6 SEC (ref 22.7–35.9)
BACTERIA URNS QL MICRO: ABNORMAL /HPF
BASOPHILS # BLD: 0.1 K/UL (ref 0–0.2)
BASOPHILS NFR BLD: 1.2 %
BILIRUB UR QL STRIP.AUTO: NEGATIVE
BUN SERPL-MCNC: 6 MG/DL (ref 7–20)
CALCIUM SERPL-MCNC: 9.3 MG/DL (ref 8.3–10.6)
CHLORIDE SERPL-SCNC: 103 MMOL/L (ref 99–110)
CLARITY UR: CLEAR
CO2 SERPL-SCNC: 25 MMOL/L (ref 21–32)
COLOR UR: YELLOW
CREAT SERPL-MCNC: 0.8 MG/DL (ref 0.6–1.1)
DEPRECATED RDW RBC AUTO: 14.9 % (ref 12.4–15.4)
EKG ATRIAL RATE: 74 BPM
EKG DIAGNOSIS: NORMAL
EKG P-R INTERVAL: 130 MS
EKG Q-T INTERVAL: 478 MS
EKG QRS DURATION: 154 MS
EKG QTC CALCULATION (BAZETT): 530 MS
EKG R AXIS: -86 DEGREES
EKG T AXIS: 77 DEGREES
EKG VENTRICULAR RATE: 74 BPM
EOSINOPHIL # BLD: 0.2 K/UL (ref 0–0.6)
EOSINOPHIL NFR BLD: 4 %
EPI CELLS #/AREA URNS AUTO: 3 /HPF (ref 0–5)
GFR SERPLBLD CREATININE-BSD FMLA CKD-EPI: >60 ML/MIN/{1.73_M2}
GLUCOSE SERPL-MCNC: 114 MG/DL (ref 70–99)
GLUCOSE UR STRIP.AUTO-MCNC: NEGATIVE MG/DL
HCT VFR BLD AUTO: 36.3 % (ref 36–48)
HGB BLD-MCNC: 12.2 G/DL (ref 12–16)
HGB UR QL STRIP.AUTO: NEGATIVE
HYALINE CASTS #/AREA URNS AUTO: 0 /LPF (ref 0–8)
INR PPP: 1.33 (ref 0.84–1.16)
KETONES UR STRIP.AUTO-MCNC: NEGATIVE MG/DL
LEUKOCYTE ESTERASE UR QL STRIP.AUTO: ABNORMAL
LYMPHOCYTES # BLD: 1.3 K/UL (ref 1–5.1)
LYMPHOCYTES NFR BLD: 25.1 %
MAGNESIUM SERPL-MCNC: 1.4 MG/DL (ref 1.8–2.4)
MCH RBC QN AUTO: 31.2 PG (ref 26–34)
MCHC RBC AUTO-ENTMCNC: 33.8 G/DL (ref 31–36)
MCV RBC AUTO: 92.4 FL (ref 80–100)
MONOCYTES # BLD: 0.4 K/UL (ref 0–1.3)
MONOCYTES NFR BLD: 7.3 %
NEUTROPHILS # BLD: 3.3 K/UL (ref 1.7–7.7)
NEUTROPHILS NFR BLD: 62.4 %
NITRITE UR QL STRIP.AUTO: NEGATIVE
NT-PROBNP SERPL-MCNC: 293 PG/ML (ref 0–124)
PH UR STRIP.AUTO: 7 [PH] (ref 5–8)
PLATELET # BLD AUTO: 279 K/UL (ref 135–450)
PMV BLD AUTO: 7.7 FL (ref 5–10.5)
POTASSIUM SERPL-SCNC: 3.9 MMOL/L (ref 3.5–5.1)
PROT UR STRIP.AUTO-MCNC: NEGATIVE MG/DL
PROTHROMBIN TIME: 16.5 SEC (ref 11.5–14.8)
RBC # BLD AUTO: 3.93 M/UL (ref 4–5.2)
RBC CLUMPS #/AREA URNS AUTO: 2 /HPF (ref 0–4)
SODIUM SERPL-SCNC: 140 MMOL/L (ref 136–145)
SP GR UR STRIP.AUTO: 1.01 (ref 1–1.03)
TROPONIN, HIGH SENSITIVITY: <6 NG/L (ref 0–14)
TROPONIN, HIGH SENSITIVITY: <6 NG/L (ref 0–14)
UA DIPSTICK W REFLEX MICRO PNL UR: YES
URN SPEC COLLECT METH UR: ABNORMAL
UROBILINOGEN UR STRIP-ACNC: 1 E.U./DL
WBC # BLD AUTO: 5.3 K/UL (ref 4–11)
WBC #/AREA URNS AUTO: 7 /HPF (ref 0–5)

## 2024-03-22 PROCEDURE — 83880 ASSAY OF NATRIURETIC PEPTIDE: CPT

## 2024-03-22 PROCEDURE — 85730 THROMBOPLASTIN TIME PARTIAL: CPT

## 2024-03-22 PROCEDURE — 36415 COLL VENOUS BLD VENIPUNCTURE: CPT

## 2024-03-22 PROCEDURE — 99223 1ST HOSP IP/OBS HIGH 75: CPT | Performed by: INTERNAL MEDICINE

## 2024-03-22 PROCEDURE — 85610 PROTHROMBIN TIME: CPT

## 2024-03-22 PROCEDURE — G0378 HOSPITAL OBSERVATION PER HR: HCPCS

## 2024-03-22 PROCEDURE — 93005 ELECTROCARDIOGRAM TRACING: CPT | Performed by: EMERGENCY MEDICINE

## 2024-03-22 PROCEDURE — 85025 COMPLETE CBC W/AUTO DIFF WBC: CPT

## 2024-03-22 PROCEDURE — 71045 X-RAY EXAM CHEST 1 VIEW: CPT

## 2024-03-22 PROCEDURE — 2580000003 HC RX 258: Performed by: HOSPITALIST

## 2024-03-22 PROCEDURE — 81001 URINALYSIS AUTO W/SCOPE: CPT

## 2024-03-22 PROCEDURE — 6370000000 HC RX 637 (ALT 250 FOR IP): Performed by: EMERGENCY MEDICINE

## 2024-03-22 PROCEDURE — 84484 ASSAY OF TROPONIN QUANT: CPT

## 2024-03-22 PROCEDURE — 93010 ELECTROCARDIOGRAM REPORT: CPT | Performed by: INTERNAL MEDICINE

## 2024-03-22 PROCEDURE — 6370000000 HC RX 637 (ALT 250 FOR IP): Performed by: HOSPITALIST

## 2024-03-22 PROCEDURE — 87086 URINE CULTURE/COLONY COUNT: CPT

## 2024-03-22 PROCEDURE — 83735 ASSAY OF MAGNESIUM: CPT

## 2024-03-22 PROCEDURE — 99285 EMERGENCY DEPT VISIT HI MDM: CPT

## 2024-03-22 PROCEDURE — 80048 BASIC METABOLIC PNL TOTAL CA: CPT

## 2024-03-22 RX ORDER — LANOLIN ALCOHOL/MO/W.PET/CERES
400 CREAM (GRAM) TOPICAL ONCE
Status: COMPLETED | OUTPATIENT
Start: 2024-03-22 | End: 2024-03-22

## 2024-03-22 RX ORDER — CARVEDILOL 25 MG/1
25 TABLET ORAL 2 TIMES DAILY WITH MEALS
Status: DISCONTINUED | OUTPATIENT
Start: 2024-03-22 | End: 2024-03-23 | Stop reason: HOSPADM

## 2024-03-22 RX ORDER — ACETAMINOPHEN 500 MG
1000 TABLET ORAL ONCE
Status: COMPLETED | OUTPATIENT
Start: 2024-03-22 | End: 2024-03-22

## 2024-03-22 RX ORDER — HYDROMORPHONE HYDROCHLORIDE 1 MG/ML
0.25 INJECTION, SOLUTION INTRAMUSCULAR; INTRAVENOUS; SUBCUTANEOUS
Status: DISCONTINUED | OUTPATIENT
Start: 2024-03-22 | End: 2024-03-23 | Stop reason: HOSPADM

## 2024-03-22 RX ORDER — POLYETHYLENE GLYCOL 3350 17 G/17G
17 POWDER, FOR SOLUTION ORAL DAILY PRN
Status: DISCONTINUED | OUTPATIENT
Start: 2024-03-22 | End: 2024-03-23 | Stop reason: HOSPADM

## 2024-03-22 RX ORDER — HYDROMORPHONE HYDROCHLORIDE 1 MG/ML
0.5 INJECTION, SOLUTION INTRAMUSCULAR; INTRAVENOUS; SUBCUTANEOUS
Status: DISCONTINUED | OUTPATIENT
Start: 2024-03-22 | End: 2024-03-23 | Stop reason: HOSPADM

## 2024-03-22 RX ORDER — MORPHINE SULFATE 2 MG/ML
2 INJECTION, SOLUTION INTRAMUSCULAR; INTRAVENOUS EVERY 4 HOURS PRN
Status: DISCONTINUED | OUTPATIENT
Start: 2024-03-22 | End: 2024-03-22

## 2024-03-22 RX ORDER — SODIUM CHLORIDE 9 MG/ML
INJECTION, SOLUTION INTRAVENOUS PRN
Status: DISCONTINUED | OUTPATIENT
Start: 2024-03-22 | End: 2024-03-23 | Stop reason: HOSPADM

## 2024-03-22 RX ORDER — MAGNESIUM SULFATE IN WATER 40 MG/ML
2000 INJECTION, SOLUTION INTRAVENOUS PRN
Status: DISCONTINUED | OUTPATIENT
Start: 2024-03-22 | End: 2024-03-23 | Stop reason: HOSPADM

## 2024-03-22 RX ORDER — NITROGLYCERIN 0.4 MG/1
0.4 TABLET SUBLINGUAL EVERY 5 MIN PRN
Status: DISCONTINUED | OUTPATIENT
Start: 2024-03-22 | End: 2024-03-23 | Stop reason: HOSPADM

## 2024-03-22 RX ORDER — ONDANSETRON 2 MG/ML
4 INJECTION INTRAMUSCULAR; INTRAVENOUS EVERY 6 HOURS PRN
Status: DISCONTINUED | OUTPATIENT
Start: 2024-03-22 | End: 2024-03-22

## 2024-03-22 RX ORDER — ACETAMINOPHEN 325 MG/1
650 TABLET ORAL EVERY 6 HOURS PRN
Status: DISCONTINUED | OUTPATIENT
Start: 2024-03-22 | End: 2024-03-23 | Stop reason: HOSPADM

## 2024-03-22 RX ORDER — FLUTICASONE PROPIONATE 50 MCG
2 SPRAY, SUSPENSION (ML) NASAL DAILY PRN
Status: DISCONTINUED | OUTPATIENT
Start: 2024-03-22 | End: 2024-03-23 | Stop reason: HOSPADM

## 2024-03-22 RX ORDER — AMMONIUM LACTATE 12 G/100G
1 CREAM TOPICAL PRN
COMMUNITY
Start: 2024-03-21

## 2024-03-22 RX ORDER — ONDANSETRON 4 MG/1
4 TABLET, ORALLY DISINTEGRATING ORAL EVERY 8 HOURS PRN
Status: DISCONTINUED | OUTPATIENT
Start: 2024-03-22 | End: 2024-03-23 | Stop reason: HOSPADM

## 2024-03-22 RX ORDER — SODIUM CHLORIDE 0.9 % (FLUSH) 0.9 %
5-40 SYRINGE (ML) INJECTION EVERY 12 HOURS SCHEDULED
Status: DISCONTINUED | OUTPATIENT
Start: 2024-03-22 | End: 2024-03-23 | Stop reason: HOSPADM

## 2024-03-22 RX ORDER — ACETAMINOPHEN 650 MG/1
650 SUPPOSITORY RECTAL EVERY 6 HOURS PRN
Status: DISCONTINUED | OUTPATIENT
Start: 2024-03-22 | End: 2024-03-23 | Stop reason: HOSPADM

## 2024-03-22 RX ORDER — POTASSIUM CHLORIDE 7.45 MG/ML
10 INJECTION INTRAVENOUS PRN
Status: DISCONTINUED | OUTPATIENT
Start: 2024-03-22 | End: 2024-03-23 | Stop reason: HOSPADM

## 2024-03-22 RX ORDER — SODIUM CHLORIDE 0.9 % (FLUSH) 0.9 %
5-40 SYRINGE (ML) INJECTION PRN
Status: DISCONTINUED | OUTPATIENT
Start: 2024-03-22 | End: 2024-03-23 | Stop reason: HOSPADM

## 2024-03-22 RX ORDER — POTASSIUM CHLORIDE 20 MEQ/1
40 TABLET, EXTENDED RELEASE ORAL PRN
Status: DISCONTINUED | OUTPATIENT
Start: 2024-03-22 | End: 2024-03-23 | Stop reason: HOSPADM

## 2024-03-22 RX ORDER — PANTOPRAZOLE SODIUM 40 MG/1
40 TABLET, DELAYED RELEASE ORAL
Status: DISCONTINUED | OUTPATIENT
Start: 2024-03-23 | End: 2024-03-23 | Stop reason: HOSPADM

## 2024-03-22 RX ORDER — SPIRONOLACTONE 25 MG/1
25 TABLET ORAL DAILY
Status: DISCONTINUED | OUTPATIENT
Start: 2024-03-22 | End: 2024-03-23 | Stop reason: HOSPADM

## 2024-03-22 RX ORDER — ATORVASTATIN CALCIUM 40 MG/1
40 TABLET, FILM COATED ORAL NIGHTLY
Status: DISCONTINUED | OUTPATIENT
Start: 2024-03-22 | End: 2024-03-23 | Stop reason: HOSPADM

## 2024-03-22 RX ADMIN — Medication 400 MG: at 09:39

## 2024-03-22 RX ADMIN — ACETAMINOPHEN 650 MG: 325 TABLET ORAL at 20:35

## 2024-03-22 RX ADMIN — SODIUM CHLORIDE, PRESERVATIVE FREE 10 ML: 5 INJECTION INTRAVENOUS at 20:35

## 2024-03-22 RX ADMIN — CARVEDILOL 25 MG: 25 TABLET, FILM COATED ORAL at 16:58

## 2024-03-22 RX ADMIN — ATORVASTATIN CALCIUM 40 MG: 40 TABLET, FILM COATED ORAL at 20:35

## 2024-03-22 RX ADMIN — SACUBITRIL AND VALSARTAN 1 TABLET: 49; 51 TABLET, FILM COATED ORAL at 20:35

## 2024-03-22 RX ADMIN — ACETAMINOPHEN 1000 MG: 500 TABLET ORAL at 07:47

## 2024-03-22 RX ADMIN — SPIRONOLACTONE 25 MG: 25 TABLET ORAL at 16:58

## 2024-03-22 RX ADMIN — RIVAROXABAN 20 MG: 20 TABLET, FILM COATED ORAL at 16:58

## 2024-03-22 ASSESSMENT — ENCOUNTER SYMPTOMS
VOMITING: 0
EYE PAIN: 0
NAUSEA: 0
RHINORRHEA: 0
COUGH: 0
CONSTIPATION: 0
DIARRHEA: 0
BACK PAIN: 0
SHORTNESS OF BREATH: 0
ABDOMINAL PAIN: 0

## 2024-03-22 ASSESSMENT — LIFESTYLE VARIABLES
HOW MANY STANDARD DRINKS CONTAINING ALCOHOL DO YOU HAVE ON A TYPICAL DAY: PATIENT DOES NOT DRINK
HOW OFTEN DO YOU HAVE A DRINK CONTAINING ALCOHOL: NEVER

## 2024-03-22 ASSESSMENT — HEART SCORE: ECG: NORMAL

## 2024-03-22 ASSESSMENT — PAIN - FUNCTIONAL ASSESSMENT: PAIN_FUNCTIONAL_ASSESSMENT: 0-10

## 2024-03-22 ASSESSMENT — PAIN DESCRIPTION - LOCATION: LOCATION: BACK

## 2024-03-22 ASSESSMENT — PAIN SCALES - GENERAL
PAINLEVEL_OUTOF10: 7
PAINLEVEL_OUTOF10: 4
PAINLEVEL_OUTOF10: 7
PAINLEVEL_OUTOF10: 0

## 2024-03-22 ASSESSMENT — PAIN DESCRIPTION - DESCRIPTORS: DESCRIPTORS: ACHING

## 2024-03-22 NOTE — ED NOTES
ED TO INPATIENT SBAR HANDOFF    Patient Name: Chelsi Lock   :  1965  58 y.o.   MRN:  [id number]  Preferred Name  Chelsi  ED Room #:  ED-0028/28  Family/Caregiver Present no   Restraints no   Sitter no   Sepsis Risk Score Sepsis Risk Score: 0.55    Situation  Code Status: Prior is Full code.     Allergies: Latex, Morphine, Codeine, Jardiance [empagliflozin], Lisinopril, Nitroglycerin, Sulfa antibiotics, and Hydralazine  Weight: Patient Vitals for the past 96 hrs (Last 3 readings):   Weight   24 0730 93 kg (205 lb)     Arrived from: home  Chief Complaint:   Chief Complaint   Patient presents with    Chest Pain     Patient states mid sternal cp that began around 0300, denies radiation. Patient states has previous cardiac hx.      Hospital Problem/Diagnosis:  Active Problems:    * No active hospital problems. *  Resolved Problems:    * No resolved hospital problems. *    Imaging:   XR CHEST PORTABLE   Final Result   No acute process.           Abnormal labs:   Abnormal Labs Reviewed   CBC WITH AUTO DIFFERENTIAL - Abnormal; Notable for the following components:       Result Value    RBC 3.93 (*)     All other components within normal limits   BASIC METABOLIC PANEL - Abnormal; Notable for the following components:    Glucose 114 (*)     BUN 6 (*)     All other components within normal limits   MAGNESIUM - Abnormal; Notable for the following components:    Magnesium 1.40 (*)     All other components within normal limits   PROTIME-INR - Abnormal; Notable for the following components:    Protime 16.5 (*)     INR 1.33 (*)     All other components within normal limits   BRAIN NATRIURETIC PEPTIDE - Abnormal; Notable for the following components:    Pro- (*)     All other components within normal limits     Critical values: no     Abnormal Assessment Findings:      Background  History:   Past Medical History:   Diagnosis Date    Anemia     Anxiety     Atrial fibrillation and flutter (HCC)     Atrial

## 2024-03-22 NOTE — CONSULTS
[facility] [place]    3/22/2024    Chelsi Lock (:  1965) is a 58 y.o. female    Referring Provider: Evelin Yousif APRN - CNP    HISTORY:  58F hx NICM (LVEF 40-45% 3/23), BiV ICD,  A Fib/AT (no longer on NOAC)  in place presenting for chest discomfort (trop <6 x2). She reports over the last week feeling terrible. She describes heart racing, followed by inability to catch her breath and chest pressure. She has had this before, relates it similar to previous VT. After the heart racing stops things get better. She feels better while flat. She feels she is urinating more frequently and that it burns when she urinates. No fevers or chills. Overall feels tired and fatigued but no symptoms otherwise. No new sx or concerns.     REVIEW OF SYSTEMS:  A complete review of systems was reviewed and is negative except as noted in the history of present illness.    Prior to Visit Medications    Medication Sig Taking? Authorizing Provider   spironolactone (ALDACTONE) 25 MG tablet TAKE 1 TABLET BY MOUTH DAILY  Rema Rivers APRN - CNS   furosemide (LASIX) 20 MG tablet Take 1 tablet by mouth once a week .  Alicia Ivory APRN - CNP   Wheat Dextrin (BENEFIBER) POWD Take 1 tablespoon daily  Alicia Ivory APRN - CNP   sacubitril-valsartan (ENTRESTO) 49-51 MG per tablet Take 1 tablet by mouth 2 times daily  Rema Rivers APRN - CNS   carvedilol (COREG) 25 MG tablet TAKE ONE TABLET BY MOUTH TWICE A DAY  Patient taking differently: Take 1 tablet by mouth 2 times daily TAKE ONE TABLET BY MOUTH TWICE A DAY  Fred Granados APRN - CNP   Magnesium Citrate 200 MG TABS Take 200 mg by mouth daily  Patient taking differently: Take 200 mg by mouth nightly  Franck Gillis MD   potassium chloride (KLOR-CON M) 20 MEQ extended release tablet Take 2 tablets by mouth daily  Franck Gillis MD   XARELTO 20 MG TABS tablet TAKE ONE TABLET BY MOUTH DAILY WITH SUPPER  Patient taking differently: Take 1 tablet by

## 2024-03-22 NOTE — CONSULTS
1/23/24   Alicia Ivory APRN - CNP   sacubitril-valsartan (ENTRESTO) 49-51 MG per tablet Take 1 tablet by mouth 2 times daily 11/3/23   Rema Rivers APRN - CNS   carvedilol (COREG) 25 MG tablet TAKE ONE TABLET BY MOUTH TWICE A DAY  Patient taking differently: Take 1 tablet by mouth 2 times daily TAKE ONE TABLET BY MOUTH TWICE A DAY 8/25/23   Fred Granados APRN - CNP   Magnesium Citrate 200 MG TABS Take 200 mg by mouth daily  Patient taking differently: Take 200 mg by mouth nightly 8/23/23   Franck Gillis MD   potassium chloride (KLOR-CON M) 20 MEQ extended release tablet Take 2 tablets by mouth daily 8/23/23   Franck Gillis MD   XARELTO 20 MG TABS tablet TAKE ONE TABLET BY MOUTH DAILY WITH SUPPER  Patient taking differently: Take 1 tablet by mouth Daily with supper 7/31/23   Fred Granados APRN - CNP   Multiple Vitamins-Minerals (MULTIVITAMIN) TABS TAKE ONE TABLET BY MOUTH DAILY  Patient taking differently: Take 1 tablet by mouth daily 6/9/23   Rema Rivers APRN - CNS   vitamin D3 (CHOLECALCIFEROL) 25 MCG (1000 UT) TABS tablet Take 1 tablet by mouth daily 5/12/23   Fred Granados APRN - CNP   pantoprazole (PROTONIX) 40 MG tablet Take 1 tablet by mouth every morning (before breakfast) 2/23/23   Fred Granados APRN - CNP   fluticasone (FLONASE) 50 MCG/ACT nasal spray 2 sprays by Each Nostril route daily as needed for Allergies 3/21/22   Kell Cooper MD   hydrocortisone 1 % cream Apply topically as needed  Patient not taking: Reported on 3/22/2024 4/20/22   Kell Cooper MD   atorvastatin (LIPITOR) 40 MG tablet Take 1 tablet by mouth daily  Patient taking differently: Take 1 tablet by mouth nightly 6/11/21   Rema Rivers APRN - CNS       Physical Examination:  Vitals:    03/22/24 1526   BP: (!) 148/98   Pulse: 70   Resp: 14   Temp: 98 °F (36.7 °C)   SpO2: 100%      No intake/output data recorded.   Wt Readings from Last 3 Encounters:   03/22/24 93 kg (205 lb)    24 92.5 kg (204 lb)   24 91.5 kg (201 lb 11.5 oz)     Temp  Av °F (36.7 °C)  Min: 97.9 °F (36.6 °C)  Max: 98 °F (36.7 °C)  Pulse  Av.5  Min: 70  Max: 86  BP  Min: 138/88  Max: 163/94  SpO2  Av.2 %  Min: 96 %  Max: 100 %  No intake or output data in the 24 hours ending 24 1655    Constitutional: Oriented. No distress.   Cardiovascular: Normal rate, regular rhythm, S1&S2.    Pulmonary/Chest: Bilateral respiratory sounds. No rhonchi.    Abdominal: Soft. No tenderness   Musculoskeletal: No edema    Neurological: Alert and oriented. Follows command    Active Hospital Problems    Diagnosis Date Noted    Chest pain [R07.9] 2023       Past Medical History:   Diagnosis Date    Anemia     Anxiety     Atrial fibrillation and flutter (HCC)     Atrial flutter (HCC)     CHB (complete heart block) (HCC)     Chronic combined systolic and diastolic heart failure (HCC)     Class 1 obesity without serious comorbidity with body mass index (BMI) of 33.0 to 33.9 in adult 2019    Congenital heart disease     Difficult intravenous access 10/06/2022    SEE NOTE    Dilated cardiomyopathy (HCC)     GERD (gastroesophageal reflux disease)     Headache(784.0)     Hyperlipidemia     Hypertension     Hypovitaminosis     ICD (implantable cardioverter-defibrillator), biventricular, in situ     LV dysfunction     Migraine     Obstructive sleep apnea (adult) (pediatric)     Paresthesia of right foot     PONV (postoperative nausea and vomiting)     Prolonged emergence from general anesthesia     sensitive to meds, slow to wake    Sciatica of left side     Sleep apnea     uses CPAP    Syncope         Past Surgical History:   Procedure Laterality Date    CARDIAC DEFIBRILLATOR PLACEMENT  2021    [employer]    CARDIOVERSION  2022    CARDIOVERSION  2022    COLECTOMY N/A 2021    EXPLORATORY LAPAROTOMY, RESECTION OF DUODENAL MASS, CHOLECYSTECTOMY WITH INTRAOPERATIVE CHOLANGIOGRAM performed by

## 2024-03-22 NOTE — H&P
HOSPITALISTS HISTORY AND PHYSICAL    3/22/2024 12:33 PM    Patient Information:  LUH LOCK is a 58 y.o. female [id number]  PCP:  Evelin Yousif APRN - CNP (Tel: [phone] )    Chief complaint:    Chief Complaint   Patient presents with    Chest Pain     Patient states mid sternal cp that began around 0300, denies radiation. Patient states has previous cardiac hx.         History of Present Illness:  Luh Lock is a 58 y.o. female who presented with cp sob. Midsternal around 3 a,m  nothing that makes it better or worse   No radiation. Rates it 4/.10. improved with nitor     REVIEW OF SYSTEMS:   Constitutional: Negative for fever,chills or night sweats  ENT: Negative for rhinorrhea, epistaxis, hoarseness, sore throat.  Respiratory: Negative for shortness of breath,wheezing  Cardiovascular: Negative for chest pain, palpitations   Gastrointestinal: Negative for nausea, vomiting, diarrhea  Genitourinary: Negative for polyuria, dysuria   Hematologic/Lymphatic: Negative for bleeding tendency, easy bruising  Musculoskeletal: Negative for myalgias and arthralgias  Neurologic: Negative for confusion,dysarthria.  Skin: Negative for itching,rash, good capillary refill.   Psychiatric: Negative for depression,anxiety, agitation.  Endocrine: Negative for polydipsia,polyuria,heat /cold intolerance.    Past Medical History:   has a past medical history of Anemia, Anxiety, Atrial fibrillation and flutter (HCC), Atrial flutter (HCC), CHB (complete heart block) (HCC), Chronic combined systolic and diastolic heart failure (HCC), Class 1 obesity without serious comorbidity with body mass index (BMI) of 33.0 to 33.9 in adult, Congenital heart disease, Difficult intravenous access, Dilated cardiomyopathy (HCC), GERD (gastroesophageal reflux disease), Headache(784.0),

## 2024-03-22 NOTE — ED NOTES
[employer] called, states patient had episode of non sustained Vtach on 3/20, official report to be sent to ER.

## 2024-03-22 NOTE — PROGRESS NOTES
Patient seen in ED, room 28.  Admission completed with the following exceptions:  4 Eyes Assessment, Immunizations, Vaccines, Rights and Responsibilities, Orientation to room, Plan of Care, Education/Learning Assessment and Education Plan, white board, height and weight, pain assessment and head to toe assessment.  Patient is alert and oriented X 4.  Patient lives in a house  and is being admitted for Chest Pain.  Home Medications as well as Outside Sources have been verbally reviewed with patient and updated if appropriate.  Medication reconciliation is now Completed.  All questions answered.     Patient reports she feel like she has a UTI and would like to have her urine checked.  Perfect Serve sent to Dr. Greco.

## 2024-03-22 NOTE — ED PROVIDER NOTES
[facility] EMERGENCY DEPARTMENT  EMERGENCY DEPARTMENT ENCOUNTER        Pt Name: Chelsi Lock  MRN: [id number]  Birthdate 1965  Date of evaluation: 3/22/2024  Provider: Cheryle Núñez DO  PCP: Evelin Yousif APRN - CNP  Note Started: 7:37 AM EDT 3/22/24    CHIEF COMPLAINT      Chest Pain    HISTORY OF PRESENT ILLNESS: 1 or more Elements     Chief Complaint   Patient presents with    Chest Pain     Patient states mid sternal cp that began around 0300, denies radiation. Patient states has previous cardiac hx.      History from : Patient  Limitations to history : None    Chelsi Lock is a 58 y.o. female who presents to the emergency department secondary to concern for chest pain.  Reports that it started around 3 AM this morning.  She localized the pain to the substernal region described as pressure and does not radiate.  She denies any previous myocardial infarction or heart stents placed.  She does, however have a pacemaker and defibrillator.    Past medical history noted below. Aside from what is stated above denies any other symptoms or modifying factors.   reports that she has never smoked. She has never used smokeless tobacco. She reports that she does not drink alcohol and does not use drugs.  Nursing Notes were all reviewed and agreed with or any disagreements addressed in HPI/MDM.  REVIEW OF SYSTEMS :    Review of Systems   Constitutional:  Negative for chills and fever.   HENT:  Negative for congestion and rhinorrhea.    Eyes:  Negative for pain and redness.   Respiratory:  Negative for cough and shortness of breath.    Cardiovascular:  Positive for chest pain and palpitations. Negative for leg swelling.   Gastrointestinal:  Negative for abdominal pain, constipation, diarrhea, nausea and vomiting.   Genitourinary:  Negative for frequency and urgency.   Musculoskeletal:  Negative for back pain and neck pain.   Skin:  Negative for rash.   Neurological:  Negative for facial asymmetry

## 2024-03-23 VITALS
OXYGEN SATURATION: 96 % | BODY MASS INDEX: 32.56 KG/M2 | SYSTOLIC BLOOD PRESSURE: 127 MMHG | TEMPERATURE: 97.4 F | HEART RATE: 77 BPM | DIASTOLIC BLOOD PRESSURE: 87 MMHG | RESPIRATION RATE: 16 BRPM | WEIGHT: 202.6 LBS | HEIGHT: 66 IN

## 2024-03-23 LAB
ANION GAP SERPL CALCULATED.3IONS-SCNC: 13 MMOL/L (ref 3–16)
BASOPHILS # BLD: 0.1 K/UL (ref 0–0.2)
BASOPHILS NFR BLD: 1.1 %
BUN SERPL-MCNC: 9 MG/DL (ref 7–20)
CALCIUM SERPL-MCNC: 9.1 MG/DL (ref 8.3–10.6)
CHLORIDE SERPL-SCNC: 105 MMOL/L (ref 99–110)
CO2 SERPL-SCNC: 23 MMOL/L (ref 21–32)
CREAT SERPL-MCNC: 0.8 MG/DL (ref 0.6–1.1)
DEPRECATED RDW RBC AUTO: 14.9 % (ref 12.4–15.4)
EOSINOPHIL # BLD: 0.2 K/UL (ref 0–0.6)
EOSINOPHIL NFR BLD: 3.1 %
GFR SERPLBLD CREATININE-BSD FMLA CKD-EPI: >60 ML/MIN/{1.73_M2}
GLUCOSE SERPL-MCNC: 103 MG/DL (ref 70–99)
HCT VFR BLD AUTO: 37.2 % (ref 36–48)
HGB BLD-MCNC: 12.7 G/DL (ref 12–16)
LYMPHOCYTES # BLD: 1.5 K/UL (ref 1–5.1)
LYMPHOCYTES NFR BLD: 28.3 %
MCH RBC QN AUTO: 31.5 PG (ref 26–34)
MCHC RBC AUTO-ENTMCNC: 34.2 G/DL (ref 31–36)
MCV RBC AUTO: 92.1 FL (ref 80–100)
MONOCYTES # BLD: 0.4 K/UL (ref 0–1.3)
MONOCYTES NFR BLD: 7 %
NEUTROPHILS # BLD: 3.2 K/UL (ref 1.7–7.7)
NEUTROPHILS NFR BLD: 60.5 %
PLATELET # BLD AUTO: 258 K/UL (ref 135–450)
PMV BLD AUTO: 7.3 FL (ref 5–10.5)
POTASSIUM SERPL-SCNC: 4.1 MMOL/L (ref 3.5–5.1)
RBC # BLD AUTO: 4.04 M/UL (ref 4–5.2)
SODIUM SERPL-SCNC: 141 MMOL/L (ref 136–145)
WBC # BLD AUTO: 5.3 K/UL (ref 4–11)

## 2024-03-23 PROCEDURE — 85025 COMPLETE CBC W/AUTO DIFF WBC: CPT

## 2024-03-23 PROCEDURE — 6360000002 HC RX W HCPCS: Performed by: HOSPITALIST

## 2024-03-23 PROCEDURE — 99231 SBSQ HOSP IP/OBS SF/LOW 25: CPT | Performed by: INTERNAL MEDICINE

## 2024-03-23 PROCEDURE — 6370000000 HC RX 637 (ALT 250 FOR IP): Performed by: HOSPITALIST

## 2024-03-23 PROCEDURE — 96365 THER/PROPH/DIAG IV INF INIT: CPT

## 2024-03-23 PROCEDURE — 80048 BASIC METABOLIC PNL TOTAL CA: CPT

## 2024-03-23 PROCEDURE — 2580000003 HC RX 258: Performed by: HOSPITALIST

## 2024-03-23 PROCEDURE — 36415 COLL VENOUS BLD VENIPUNCTURE: CPT

## 2024-03-23 PROCEDURE — G0378 HOSPITAL OBSERVATION PER HR: HCPCS

## 2024-03-23 RX ORDER — VITS A,C,E/LUTEIN/MINERALS 300MCG-200
200 TABLET ORAL 2 TIMES DAILY
Qty: 60 TABLET | Refills: 0 | Status: SHIPPED | OUTPATIENT
Start: 2024-03-23

## 2024-03-23 RX ORDER — CIPROFLOXACIN 250 MG/1
250 TABLET, FILM COATED ORAL 2 TIMES DAILY
Qty: 8 TABLET | Refills: 0 | Status: SHIPPED | OUTPATIENT
Start: 2024-03-23 | End: 2024-03-27 | Stop reason: ALTCHOICE

## 2024-03-23 RX ADMIN — CARVEDILOL 25 MG: 25 TABLET, FILM COATED ORAL at 09:08

## 2024-03-23 RX ADMIN — CEFTRIAXONE SODIUM 1000 MG: 1 INJECTION, POWDER, FOR SOLUTION INTRAMUSCULAR; INTRAVENOUS at 12:41

## 2024-03-23 RX ADMIN — SACUBITRIL AND VALSARTAN 1 TABLET: 49; 51 TABLET, FILM COATED ORAL at 09:08

## 2024-03-23 RX ADMIN — PANTOPRAZOLE SODIUM 40 MG: 40 TABLET, DELAYED RELEASE ORAL at 05:46

## 2024-03-23 RX ADMIN — SODIUM CHLORIDE, PRESERVATIVE FREE 10 ML: 5 INJECTION INTRAVENOUS at 09:09

## 2024-03-23 RX ADMIN — SPIRONOLACTONE 25 MG: 25 TABLET ORAL at 09:08

## 2024-03-23 ASSESSMENT — PAIN SCALES - GENERAL: PAINLEVEL_OUTOF10: 0

## 2024-03-23 NOTE — PROGRESS NOTES
Patients head to toe assessment completed. Vital signs WNL. , call light within reach. Scheduled medications given per MAR. Patient complained of back pain. Rates 7/10. PRN Tylenol given. Patient up in a chair.

## 2024-03-23 NOTE — FLOWSHEET NOTE
Discharge instructions given including f/u visits, medications, and reason to call physician.  Questions asked and answered.  Verbalized understanding.

## 2024-03-23 NOTE — CARE COORDINATION
Discharge Planning Note:    Chart reviewed and it appears that patient has minimal needs for discharge at this time. Observation    Primary Care Physician is Evelin Yousif APRN - CNP    Primary insurance is [facility]     Please notify case management if any discharge needs are identified.      Case management will continue to follow progress and update discharge plan as needed.     Electronically signed by MIKE Pereyra on 3/23/2024 at 11:37 AM

## 2024-03-23 NOTE — PLAN OF CARE
Problem: ABCDS Injury Assessment  Goal: Absence of physical injury  Outcome: Adequate for Discharge     Problem: Discharge Planning  Goal: Discharge to home or other facility with appropriate resources  Outcome: Adequate for Discharge     Problem: Pain  Goal: Verbalizes/displays adequate comfort level or baseline comfort level  Outcome: Adequate for Discharge     Problem: Cardiovascular - Adult  Goal: Maintains optimal cardiac output and hemodynamic stability  Outcome: Adequate for Discharge  Goal: Absence of cardiac dysrhythmias or at baseline  Outcome: Adequate for Discharge     Problem: Infection - Adult  Goal: Absence of infection at discharge  Outcome: Adequate for Discharge  Goal: Absence of infection during hospitalization  Outcome: Adequate for Discharge     Problem: Metabolic/Fluid and Electrolytes - Adult  Goal: Electrolytes maintained within normal limits  Outcome: Adequate for Discharge

## 2024-03-23 NOTE — PROGRESS NOTES
[facility]   Progress Note  Cardiology    Chief Complaint   Patient presents with    Chest Pain     Patient states mid sternal cp that began around 0300, denies radiation. Patient states has previous cardiac hx.      HPI: Pt states she is doing well today with no further CP.  She was seen by EP and interventional cardiologists yesterday and was not felt to be having cardia related symptoms. No further w/u is planned although she is considering SVT ablation as an OP.     Medications/Labs all Reviewed    Recent Labs     03/23/24  0556   WBC 5.3   HGB 12.7   HCT 37.2   MCV 92.1        Recent Labs     03/23/24  0556   CREATININE 0.8   BUN 9      K 4.1      CO2 23     Recent Labs     03/22/24  0756   INR 1.33*   PROTIME 16.5*        MEDICATIONS:    cefTRIAXone (ROCEPHIN) IV  1,000 mg IntraVENous Q24H    atorvastatin  40 mg Oral Nightly    carvedilol  25 mg Oral BID with meals    Magnesium Citrate  200 mg Oral Nightly    pantoprazole  40 mg Oral QAM AC    sacubitril-valsartan  1 tablet Oral BID    spironolactone  25 mg Oral Daily    rivaroxaban  20 mg Oral Dinner    sodium chloride flush  5-40 mL IntraVENous 2 times per day      sodium chloride         Physical Examination:    /87   Pulse 77   Temp 97.4 °F (36.3 °C) (Oral)   Resp 16   Ht 1.676 m (5' 5.98\")   Wt 91.9 kg (202 lb 9.6 oz)   SpO2 96%   BMI 32.72 kg/m²     Respiratory:  Resp Assessment: Normal respiratory effort  Resp Auscultation: Clear to auscultation bilaterally   Cardiovascular:  Auscultation: regular rate and rhythm, normal S1S2, no murmur, rub or gallop  Palpation:  Nl PMI  JVP:  normal  Extremities: No Edema  Abdomen:  Soft, non-tender  Normal bowel sounds  Extremities:   No Cyanosis or Clubbing  Neurological/Psychiatric:  Oriented to time, place, and person  Non-anxious  Skin Warm and dry    Assessment:    Principal Problem:    Chest pain- resolved.       Cardiology will sign off; she sees us as OP.

## 2024-03-23 NOTE — PLAN OF CARE
Problem: ABCDS Injury Assessment  Goal: Absence of physical injury  Outcome: Progressing     Problem: Discharge Planning  Goal: Discharge to home or other facility with appropriate resources  Outcome: Progressing     Problem: Pain  Goal: Verbalizes/displays adequate comfort level or baseline comfort level  Outcome: Progressing     Problem: Cardiovascular - Adult  Goal: Maintains optimal cardiac output and hemodynamic stability  Outcome: Progressing  Goal: Absence of cardiac dysrhythmias or at baseline  Outcome: Progressing     Problem: Infection - Adult  Goal: Absence of infection at discharge  Outcome: Progressing  Goal: Absence of infection during hospitalization  Outcome: Progressing     Problem: Metabolic/Fluid and Electrolytes - Adult  Goal: Electrolytes maintained within normal limits  Outcome: Progressing

## 2024-03-24 LAB — BACTERIA UR CULT: NORMAL

## 2024-03-25 ENCOUNTER — TELEPHONE (OUTPATIENT)
Dept: FAMILY MEDICINE CLINIC | Age: 59
End: 2024-03-25

## 2024-03-25 NOTE — TELEPHONE ENCOUNTER
Care Transitions Initial Follow Up Call    Outreach made within 2 business days of discharge: Yes    Patient: Chelsi Lock Patient : 1965   MRN: [id number]  Reason for Admission: There are no discharge diagnoses documented for the most recent discharge.  Discharge Date: 3/23/24       Spoke with: Patient    Discharge department/facility: [facility] Interactive Patient Contact:  Was patient able to fill all prescriptions: Yes  Was patient instructed to bring all medications to the follow-up visit: Yes  Is patient taking all medications as directed in the discharge summary? Yes  Does patient understand their discharge instructions: Yes  Does patient have questions or concerns that need addressed prior to 7-14 day follow up office visit: no    Scheduled appointment with PCP within 7-14 days    Follow Up  Future Appointments   Date Time Provider Department Center   3/27/2024 11:30 AM Rema Rivers APRN - CNS [facility]io [facility]   2024  9:00 AM Evelin Yousif APRN - CNP [facility]  [place] [facility]   2024  9:30 AM Rema Rivers APRN - CNS [facility]   2024 10:00 AM SCHEDULE, GLYNN DEVICE CHECK FF Cardio [facility]   2024 10:00 AM Franck Gillis MD FF Cardio [facility]       Francisca Bose LPN

## 2024-03-27 ENCOUNTER — OFFICE VISIT (OUTPATIENT)
Dept: CARDIOLOGY CLINIC | Age: 59
End: 2024-03-27
Payer: MEDICAID

## 2024-03-27 VITALS
SYSTOLIC BLOOD PRESSURE: 138 MMHG | HEIGHT: 66 IN | BODY MASS INDEX: 32.95 KG/M2 | HEART RATE: 75 BPM | DIASTOLIC BLOOD PRESSURE: 84 MMHG | WEIGHT: 205 LBS | OXYGEN SATURATION: 98 %

## 2024-03-27 DIAGNOSIS — G47.33 OBSTRUCTIVE SLEEP APNEA (ADULT) (PEDIATRIC): ICD-10-CM

## 2024-03-27 DIAGNOSIS — I48.0 PAF (PAROXYSMAL ATRIAL FIBRILLATION) (HCC): ICD-10-CM

## 2024-03-27 DIAGNOSIS — I50.42 CHRONIC COMBINED SYSTOLIC AND DIASTOLIC HEART FAILURE (HCC): Primary | ICD-10-CM

## 2024-03-27 DIAGNOSIS — Z95.810 ICD (IMPLANTABLE CARDIOVERTER-DEFIBRILLATOR), BIVENTRICULAR, IN SITU: ICD-10-CM

## 2024-03-27 DIAGNOSIS — E55.9 HYPOVITAMINOSIS D: ICD-10-CM

## 2024-03-27 DIAGNOSIS — E78.5 HYPERLIPIDEMIA, UNSPECIFIED HYPERLIPIDEMIA TYPE: ICD-10-CM

## 2024-03-27 PROCEDURE — 99215 OFFICE O/P EST HI 40 MIN: CPT | Performed by: CLINICAL NURSE SPECIALIST

## 2024-03-27 PROCEDURE — G8417 CALC BMI ABV UP PARAM F/U: HCPCS | Performed by: CLINICAL NURSE SPECIALIST

## 2024-03-27 PROCEDURE — 1036F TOBACCO NON-USER: CPT | Performed by: CLINICAL NURSE SPECIALIST

## 2024-03-27 PROCEDURE — G8427 DOCREV CUR MEDS BY ELIG CLIN: HCPCS | Performed by: CLINICAL NURSE SPECIALIST

## 2024-03-27 PROCEDURE — 3017F COLORECTAL CA SCREEN DOC REV: CPT | Performed by: CLINICAL NURSE SPECIALIST

## 2024-03-27 PROCEDURE — 3075F SYST BP GE 130 - 139MM HG: CPT | Performed by: CLINICAL NURSE SPECIALIST

## 2024-03-27 PROCEDURE — G8482 FLU IMMUNIZE ORDER/ADMIN: HCPCS | Performed by: CLINICAL NURSE SPECIALIST

## 2024-03-27 PROCEDURE — 3079F DIAST BP 80-89 MM HG: CPT | Performed by: CLINICAL NURSE SPECIALIST

## 2024-03-27 NOTE — PROGRESS NOTES
[facility]  Progress Note    Primary Care Doctor:  Evelin Yousif, APRN - CNP    Chief Complaint   Patient presents with    Follow-Up from Hospital    Congestive Heart Failure    Fatigue     Weak, tired no energy    Shortness of Breath    Edema     Feels full of fluid    Palpitations     A week ago before being admitted        History of Present Illness:  58 y.o. female with history of sHF, LVH, AF on xarelto (follows with Dr Deleon), had been on flecainide, dual chamber pacemaker 2012 due to congenital heart block  LVEF had been 55% in 2015 and now 25%.  No lisinopril due to cough.  She is a cook at 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  She is ambulatory and by her self.  She was in the hospital over the weekend for chest pain which was ruled out as troponin is normal.  No medications were changed.  She continues to have some palpitations and is to see EP regarding ablation.  Her optival today shows normal thoracic impedence.  She continues with diarrhea and following with GI who feels this is IBS with D.  She is going to  to see another specialist.  No chest pain, palpitations, edema, shortness of breath or lightheadedness.      Past Medical History:   has a past medical history of Anemia, Anxiety, Atrial fibrillation and flutter (HCC), Atrial flutter (HCC), CHB (complete heart block) (HCC), Chronic combined systolic and diastolic heart failure (HCC), Class 1 obesity without serious comorbidity with body mass index (BMI) of 33.0 to

## 2024-03-27 NOTE — PATIENT INSTRUCTIONS
Follow up with UC regarding second opinion for diarrhea  Continue all current medications  Schedule a limited echo  Keep August 2 nd   Hold lipitor for now and let me know how the diarrhea goes

## 2024-03-28 ENCOUNTER — HOSPITAL ENCOUNTER (OUTPATIENT)
Age: 59
Discharge: HOME | End: 2024-03-28
Payer: MEDICAID

## 2024-03-28 PROCEDURE — 82653 EL-1 FECAL QUANTITATIVE: CPT

## 2024-03-31 LAB — ELASTASE PANC STL-MCNT: 754 UG/G

## 2024-04-05 ENCOUNTER — TELEPHONE (OUTPATIENT)
Dept: CARDIOLOGY CLINIC | Age: 59
End: 2024-04-05

## 2024-04-05 PROBLEM — I51.9 LV DYSFUNCTION: Status: RESOLVED | Noted: 2019-03-18 | Resolved: 2024-04-05

## 2024-04-05 PROBLEM — R19.7 DIARRHEA: Status: RESOLVED | Noted: 2024-01-21 | Resolved: 2024-04-05

## 2024-04-05 PROBLEM — R07.9 CHEST PAIN: Status: RESOLVED | Noted: 2023-12-05 | Resolved: 2024-04-05

## 2024-04-05 NOTE — TELEPHONE ENCOUNTER
Called and spoke to PT to get a clear understanding of her disability situation.  PT states that disability sent paperwork to office to retreive specific information that needed to be filled by  either [employer] or [employer].    I spoke to [employer] and he stated that from EP standpoint there is no medical reason that she can't work. I relayed this message to PT. PT will f/u with PCP and upcoming appt w/ [employer].

## 2024-04-05 NOTE — PROGRESS NOTES
concerns were addressed with the patient. Alternatives to treatment were discussed.     Thank you for allowing to us to participate in the care of [facility]   Office: [phone]

## 2024-04-05 NOTE — TELEPHONE ENCOUNTER
Pt states her disability is being terminated 04/30/24 and she does not understand why.    She has her paperwork and would like to talk to someone about it.    She has appointment with [employer] on 4/25 to discuss, but would like a call to go over it also.    Please advise.

## 2024-04-10 ENCOUNTER — OFFICE VISIT (OUTPATIENT)
Dept: SURGERY | Age: 59
End: 2024-04-10
Payer: MEDICAID

## 2024-04-10 VITALS — SYSTOLIC BLOOD PRESSURE: 126 MMHG | DIASTOLIC BLOOD PRESSURE: 76 MMHG | WEIGHT: 205 LBS | BODY MASS INDEX: 33.09 KG/M2

## 2024-04-10 DIAGNOSIS — K43.2 INCISIONAL HERNIA WITHOUT OBSTRUCTION OR GANGRENE: Primary | ICD-10-CM

## 2024-04-10 PROCEDURE — 3078F DIAST BP <80 MM HG: CPT | Performed by: SURGERY

## 2024-04-10 PROCEDURE — 3074F SYST BP LT 130 MM HG: CPT | Performed by: SURGERY

## 2024-04-10 PROCEDURE — 3017F COLORECTAL CA SCREEN DOC REV: CPT | Performed by: SURGERY

## 2024-04-10 PROCEDURE — 99213 OFFICE O/P EST LOW 20 MIN: CPT | Performed by: SURGERY

## 2024-04-10 PROCEDURE — 1036F TOBACCO NON-USER: CPT | Performed by: SURGERY

## 2024-04-10 PROCEDURE — G8417 CALC BMI ABV UP PARAM F/U: HCPCS | Performed by: SURGERY

## 2024-04-10 PROCEDURE — G8427 DOCREV CUR MEDS BY ELIG CLIN: HCPCS | Performed by: SURGERY

## 2024-04-10 NOTE — PATIENT INSTRUCTIONS
1. Incisional hernia is stable and complex. Would not recommend repair in the acute setting and possibly not at all. Multiple symptoms, some do not correlate with hernia symptoms. Given complexity with incisional hernia after component release, have recommended second opinion at tertiary center with Dr. Mcclellan at [facility] prior to considering repair or hernia center at [facility]. Contact information provided  2. Diet per GI  3. Will follow peripherally, return to general surgery office here as needed

## 2024-04-10 NOTE — PROGRESS NOTES
Unstable Housing in the Last Year: No      Family History   Problem Relation Age of Onset    High Blood Pressure Mother     Cancer Father     Heart Failure Sister     No Known Problems Sister     No Known Problems Brother     Heart Disease Neg Hx     High Cholesterol Neg Hx      Current Outpatient Medications   Medication Sig Dispense Refill    Magnesium Oxide -Mg Supplement (MAG-OXIDE) 200 MG TABS Take 200 mg by mouth in the morning and at bedtime 60 tablet 0    ammonium lactate (AMLACTIN) 12 % cream Apply 1 g topically as needed for Dry Skin (elbows)      spironolactone (ALDACTONE) 25 MG tablet TAKE 1 TABLET BY MOUTH DAILY 90 tablet 0    furosemide (LASIX) 20 MG tablet Take 1 tablet by mouth once a week Wednesdays.      Wheat Dextrin (BENEFIBER) POWD Take 1 tablespoon daily 1 each 0    sacubitril-valsartan (ENTRESTO) 49-51 MG per tablet Take 1 tablet by mouth 2 times daily 180 tablet 1    carvedilol (COREG) 25 MG tablet TAKE ONE TABLET BY MOUTH TWICE A DAY (Patient taking differently: Take 1 tablet by mouth 2 times daily TAKE ONE TABLET BY MOUTH TWICE A DAY) 180 tablet 1    potassium chloride (KLOR-CON M) 20 MEQ extended release tablet Take 2 tablets by mouth daily 180 tablet 3    XARELTO 20 MG TABS tablet TAKE ONE TABLET BY MOUTH DAILY WITH SUPPER (Patient taking differently: Take 1 tablet by mouth Daily with supper) 90 tablet 3    Multiple Vitamins-Minerals (MULTIVITAMIN) TABS TAKE ONE TABLET BY MOUTH DAILY (Patient taking differently: Take 1 tablet by mouth daily) 90 tablet 3    vitamin D3 (CHOLECALCIFEROL) 25 MCG (1000 UT) TABS tablet Take 1 tablet by mouth daily 90 tablet 3    pantoprazole (PROTONIX) 40 MG tablet Take 1 tablet by mouth every morning (before breakfast) 90 tablet 3    fluticasone (FLONASE) 50 MCG/ACT nasal spray 2 sprays by Each Nostril route daily as needed for Allergies      atorvastatin (LIPITOR) 40 MG tablet Take 1 tablet by mouth daily (Patient taking differently: Take 1 tablet by mouth

## 2024-04-11 ENCOUNTER — OFFICE VISIT (OUTPATIENT)
Dept: FAMILY MEDICINE CLINIC | Age: 59
End: 2024-04-11
Payer: MEDICAID

## 2024-04-11 VITALS
DIASTOLIC BLOOD PRESSURE: 76 MMHG | HEART RATE: 89 BPM | SYSTOLIC BLOOD PRESSURE: 124 MMHG | OXYGEN SATURATION: 99 % | WEIGHT: 207 LBS | RESPIRATION RATE: 16 BRPM | HEIGHT: 66 IN | BODY MASS INDEX: 33.27 KG/M2

## 2024-04-11 DIAGNOSIS — M54.32 SCIATICA OF LEFT SIDE: Primary | ICD-10-CM

## 2024-04-11 DIAGNOSIS — I48.0 PAF (PAROXYSMAL ATRIAL FIBRILLATION) (HCC): ICD-10-CM

## 2024-04-11 DIAGNOSIS — Z23 NEED FOR VACCINATION: ICD-10-CM

## 2024-04-11 DIAGNOSIS — K43.2 INCISIONAL HERNIA WITHOUT OBSTRUCTION OR GANGRENE: ICD-10-CM

## 2024-04-11 PROCEDURE — 90677 PCV20 VACCINE IM: CPT | Performed by: NURSE PRACTITIONER

## 2024-04-11 PROCEDURE — G8427 DOCREV CUR MEDS BY ELIG CLIN: HCPCS | Performed by: NURSE PRACTITIONER

## 2024-04-11 PROCEDURE — 3074F SYST BP LT 130 MM HG: CPT | Performed by: NURSE PRACTITIONER

## 2024-04-11 PROCEDURE — G8417 CALC BMI ABV UP PARAM F/U: HCPCS | Performed by: NURSE PRACTITIONER

## 2024-04-11 PROCEDURE — 90471 IMMUNIZATION ADMIN: CPT | Performed by: NURSE PRACTITIONER

## 2024-04-11 PROCEDURE — 99214 OFFICE O/P EST MOD 30 MIN: CPT | Performed by: NURSE PRACTITIONER

## 2024-04-11 PROCEDURE — 3078F DIAST BP <80 MM HG: CPT | Performed by: NURSE PRACTITIONER

## 2024-04-11 PROCEDURE — 1036F TOBACCO NON-USER: CPT | Performed by: NURSE PRACTITIONER

## 2024-04-11 PROCEDURE — 3017F COLORECTAL CA SCREEN DOC REV: CPT | Performed by: NURSE PRACTITIONER

## 2024-04-11 RX ORDER — RIFAXIMIN 550 MG/1
550 TABLET ORAL 3 TIMES DAILY
COMMUNITY
Start: 2024-03-27

## 2024-04-11 ASSESSMENT — PATIENT HEALTH QUESTIONNAIRE - PHQ9
5. POOR APPETITE OR OVEREATING: SEVERAL DAYS
SUM OF ALL RESPONSES TO PHQ9 QUESTIONS 1 & 2: 6
SUM OF ALL RESPONSES TO PHQ QUESTIONS 1-9: 15
1. LITTLE INTEREST OR PLEASURE IN DOING THINGS: NEARLY EVERY DAY
8. MOVING OR SPEAKING SO SLOWLY THAT OTHER PEOPLE COULD HAVE NOTICED. OR THE OPPOSITE, BEING SO FIGETY OR RESTLESS THAT YOU HAVE BEEN MOVING AROUND A LOT MORE THAN USUAL: NOT AT ALL
2. FEELING DOWN, DEPRESSED OR HOPELESS: NEARLY EVERY DAY
7. TROUBLE CONCENTRATING ON THINGS, SUCH AS READING THE NEWSPAPER OR WATCHING TELEVISION: SEVERAL DAYS
SUM OF ALL RESPONSES TO PHQ QUESTIONS 1-9: 15
SUM OF ALL RESPONSES TO PHQ QUESTIONS 1-9: 15
6. FEELING BAD ABOUT YOURSELF - OR THAT YOU ARE A FAILURE OR HAVE LET YOURSELF OR YOUR FAMILY DOWN: NEARLY EVERY DAY
4. FEELING TIRED OR HAVING LITTLE ENERGY: SEVERAL DAYS
SUM OF ALL RESPONSES TO PHQ QUESTIONS 1-9: 15
10. IF YOU CHECKED OFF ANY PROBLEMS, HOW DIFFICULT HAVE THESE PROBLEMS MADE IT FOR YOU TO DO YOUR WORK, TAKE CARE OF THINGS AT HOME, OR GET ALONG WITH OTHER PEOPLE: SOMEWHAT DIFFICULT
3. TROUBLE FALLING OR STAYING ASLEEP: NEARLY EVERY DAY
9. THOUGHTS THAT YOU WOULD BE BETTER OFF DEAD, OR OF HURTING YOURSELF: NOT AT ALL

## 2024-04-11 ASSESSMENT — ENCOUNTER SYMPTOMS
SHORTNESS OF BREATH: 0
COUGH: 0
CHEST TIGHTNESS: 0
GASTROINTESTINAL NEGATIVE: 1
BACK PAIN: 1
WHEEZING: 0

## 2024-04-11 NOTE — PATIENT INSTRUCTIONS
[facility] with [facility] [phone]  Pick the closest location for your address

## 2024-04-11 NOTE — PROGRESS NOTES
2024     Chelsi Lock (:  1965) is a 58 y.o. female, here for evaluation of the following medical concerns:    Chief Complaint   Patient presents with    Follow-Up from Hospital     3/22/24-3/23/24, cheat pains     Patient arrives today for follow up of chest pain and an ED visit on 3/22-3/23.  Her chest pain symptoms have since resolved.  She is under an increased amount of stress at this time due to being told her disability is being reversed.  She currently has disability for heart issues and back issues.  She had to see a  who told her she was able to return to work.  She saw a disability provider who says she can go back to work.  She does not feel she can return to work with her health the way it is.      She has A-fib and is currently managed with Xarelto by Dr. Franck Gillis.  Dr. Gillis has recommended having an ablation but she is afraid of this procedure and unsure she wants to have this done.  She denies chest pain, shortness of breath, dizziness, diaphoresis, palpitations or edema.  She is compliant with her Xarelto.      She currently does not have a spine specialist.  I will give her a referral to the [facility] today.  She will need to call for an appointment.  She is still having low back pain with radiation.  She has not had surgery on her back.  Has chronic sciatica on the left with radiation into her buttock and down her left leg.  She has done physical therapy for her back but was unable to continue due to PT putting her into A-fib.      She did see Dr. Coronel (general surgery) yesterday for hernia, Dr. Coronel asked her to see another provider due to the complexity of the hernia. She will also be seeing Dr. NELLIE Phillips tomorrow.  Her diarrhea was resolved.  Has an appointment with her therapist in a few weeks.  She is overwhelmed with finances and with the restrictions with her back and heart, she is fearful she will not be able to find a job after her

## 2024-04-12 ENCOUNTER — TELEPHONE (OUTPATIENT)
Dept: FAMILY MEDICINE CLINIC | Age: 59
End: 2024-04-12

## 2024-04-12 ENCOUNTER — TELEPHONE (OUTPATIENT)
Dept: SURGERY | Age: 59
End: 2024-04-12

## 2024-04-12 NOTE — TELEPHONE ENCOUNTER
Pt states that she was told to call back and let us know where she is going for a second opinion and she chose . [facility]/ Dr. Joao Santos on [address] in [place].   If you have questions for pt or need addt'l info please call her.

## 2024-04-23 ENCOUNTER — OFFICE VISIT (OUTPATIENT)
Dept: ORTHOPEDIC SURGERY | Age: 59
End: 2024-04-23
Payer: MEDICAID

## 2024-04-23 VITALS — WEIGHT: 205 LBS | HEIGHT: 66 IN | BODY MASS INDEX: 32.95 KG/M2

## 2024-04-23 DIAGNOSIS — M51.36 DDD (DEGENERATIVE DISC DISEASE), LUMBAR: ICD-10-CM

## 2024-04-23 DIAGNOSIS — Z95.810 HISTORY OF AUTOMATIC INTERNAL CARDIAC DEFIBRILLATOR (AICD): ICD-10-CM

## 2024-04-23 DIAGNOSIS — M54.50 LOW BACK PAIN, UNSPECIFIED BACK PAIN LATERALITY, UNSPECIFIED CHRONICITY, UNSPECIFIED WHETHER SCIATICA PRESENT: Primary | ICD-10-CM

## 2024-04-23 DIAGNOSIS — M47.816 LUMBAR FACET ARTHROPATHY: ICD-10-CM

## 2024-04-23 DIAGNOSIS — M47.816 LUMBAR SPONDYLOSIS: ICD-10-CM

## 2024-04-23 DIAGNOSIS — Z79.01 CHRONIC ANTICOAGULATION: ICD-10-CM

## 2024-04-23 DIAGNOSIS — M48.061 DEGENERATIVE LUMBAR SPINAL STENOSIS: ICD-10-CM

## 2024-04-23 DIAGNOSIS — K43.2 INCISIONAL HERNIA WITHOUT OBSTRUCTION OR GANGRENE: ICD-10-CM

## 2024-04-23 PROCEDURE — 1036F TOBACCO NON-USER: CPT | Performed by: INTERNAL MEDICINE

## 2024-04-23 PROCEDURE — 99204 OFFICE O/P NEW MOD 45 MIN: CPT | Performed by: INTERNAL MEDICINE

## 2024-04-23 PROCEDURE — G8427 DOCREV CUR MEDS BY ELIG CLIN: HCPCS | Performed by: INTERNAL MEDICINE

## 2024-04-23 PROCEDURE — G8417 CALC BMI ABV UP PARAM F/U: HCPCS | Performed by: INTERNAL MEDICINE

## 2024-04-23 PROCEDURE — 3017F COLORECTAL CA SCREEN DOC REV: CPT | Performed by: INTERNAL MEDICINE

## 2024-04-23 RX ORDER — TIZANIDINE 4 MG/1
4 TABLET ORAL 3 TIMES DAILY PRN
Qty: 30 TABLET | Refills: 1 | Status: SHIPPED | OUTPATIENT
Start: 2024-04-23

## 2024-04-23 RX ORDER — BUTALBITAL, ACETAMINOPHEN AND CAFFEINE 300; 40; 50 MG/1; MG/1; MG/1
1 CAPSULE ORAL EVERY 6 HOURS PRN
COMMUNITY
Start: 2022-08-23

## 2024-04-23 RX ORDER — METHYLPREDNISOLONE 4 MG/1
TABLET ORAL
Qty: 1 KIT | Refills: 0 | Status: SHIPPED | OUTPATIENT
Start: 2024-04-23

## 2024-04-23 NOTE — PROGRESS NOTES
cardiac defibrillator (AICD)     Incisional hernia without obstruction or gangrene     Low back pain, unspecified back pain laterality, unspecified chronicity, unspecified whether sciatica present Yes              Plan:        Pacemaker safe MRI lumbar spine evaluate severity of discopathy/ stenosis suspected clinically  Consider Her a candidate for spine intervention injection  Activity modification, lumbar spine precautions  lumbar spinestabilization home exercise program  Medical pain management: Medrol, muscle relaxant: Zanaflex.,  Tylenol as needed           The nature of the finding, probable diagnosis and likely treatment was thoroughly discussed with the patient. The options, risks, complications, alternative treatment as well as some of the differential diagnosis was discussed. The patient was thoroughly informed and all questions were answered. the patient indicated understanding and satisfaction with the discussion.      Orders:        Orders Placed This Encounter   Procedures    XR LUMBAR SPINE (2-3 VIEWS)     Standing Status:   Future     Number of Occurrences:   1     Standing Expiration Date:   5/22/2024     Order Specific Question:   Reason for exam:     Answer:   pain    MRI LUMBAR SPINE WO CONTRAST     AICD/ Pacemaker     Standing Status:   Future     Standing Expiration Date:   4/23/2025     Scheduling Instructions:      abran Bryant will call to schedule, [phone].      Marlen will obtain auth and fwd to your facility      Pt advised to f/u in clinic 1-2 days after MRI for results.     Order Specific Question:   Reason for exam:     Answer:   R/O HNP/ Stenosis     Order Specific Question:   What is the sedation requirement?     Answer:   None           Disclaimer:    \"This note was dictated with voice recognition software. Though review and correction are routine, we apologize for any errors.\"

## 2024-04-25 ENCOUNTER — TELEPHONE (OUTPATIENT)
Dept: FAMILY MEDICINE CLINIC | Age: 59
End: 2024-04-25

## 2024-04-25 ENCOUNTER — OFFICE VISIT (OUTPATIENT)
Dept: CARDIOLOGY CLINIC | Age: 59
End: 2024-04-25
Payer: MEDICAID

## 2024-04-25 ENCOUNTER — NURSE ONLY (OUTPATIENT)
Dept: CARDIOLOGY CLINIC | Age: 59
End: 2024-04-25
Payer: MEDICAID

## 2024-04-25 VITALS
SYSTOLIC BLOOD PRESSURE: 120 MMHG | BODY MASS INDEX: 32.5 KG/M2 | OXYGEN SATURATION: 98 % | DIASTOLIC BLOOD PRESSURE: 72 MMHG | WEIGHT: 202.2 LBS | HEIGHT: 66 IN | HEART RATE: 79 BPM

## 2024-04-25 DIAGNOSIS — I47.20 VT (VENTRICULAR TACHYCARDIA) (HCC): ICD-10-CM

## 2024-04-25 DIAGNOSIS — Q24.6 CONGENITAL HEART BLOCK: ICD-10-CM

## 2024-04-25 DIAGNOSIS — I10 PRIMARY HYPERTENSION: ICD-10-CM

## 2024-04-25 DIAGNOSIS — Z95.810 AICD (AUTOMATIC CARDIOVERTER/DEFIBRILLATOR) PRESENT: ICD-10-CM

## 2024-04-25 DIAGNOSIS — I48.0 PAF (PAROXYSMAL ATRIAL FIBRILLATION) (HCC): ICD-10-CM

## 2024-04-25 DIAGNOSIS — I51.7 LVH (LEFT VENTRICULAR HYPERTROPHY): ICD-10-CM

## 2024-04-25 DIAGNOSIS — Z95.810 AICD (AUTOMATIC CARDIOVERTER/DEFIBRILLATOR) PRESENT: Primary | ICD-10-CM

## 2024-04-25 DIAGNOSIS — G47.33 OBSTRUCTIVE SLEEP APNEA (ADULT) (PEDIATRIC): ICD-10-CM

## 2024-04-25 DIAGNOSIS — I48.0 PAF (PAROXYSMAL ATRIAL FIBRILLATION) (HCC): Primary | ICD-10-CM

## 2024-04-25 DIAGNOSIS — I42.0 DILATED CARDIOMYOPATHY (HCC): ICD-10-CM

## 2024-04-25 PROCEDURE — 99214 OFFICE O/P EST MOD 30 MIN: CPT | Performed by: NURSE PRACTITIONER

## 2024-04-25 PROCEDURE — G8427 DOCREV CUR MEDS BY ELIG CLIN: HCPCS | Performed by: NURSE PRACTITIONER

## 2024-04-25 PROCEDURE — 93000 ELECTROCARDIOGRAM COMPLETE: CPT | Performed by: NURSE PRACTITIONER

## 2024-04-25 PROCEDURE — 3078F DIAST BP <80 MM HG: CPT | Performed by: NURSE PRACTITIONER

## 2024-04-25 PROCEDURE — 1036F TOBACCO NON-USER: CPT | Performed by: NURSE PRACTITIONER

## 2024-04-25 PROCEDURE — 3017F COLORECTAL CA SCREEN DOC REV: CPT | Performed by: NURSE PRACTITIONER

## 2024-04-25 PROCEDURE — 3074F SYST BP LT 130 MM HG: CPT | Performed by: NURSE PRACTITIONER

## 2024-04-25 PROCEDURE — G8417 CALC BMI ABV UP PARAM F/U: HCPCS | Performed by: NURSE PRACTITIONER

## 2024-04-25 NOTE — TELEPHONE ENCOUNTER
According to patient Evelin Yousif,CNP was sending letter to disability  Patient is calling with the fax number  Disability Fax- [phone]

## 2024-04-26 PROCEDURE — 93284 PRGRMG EVAL IMPLANTABLE DFB: CPT | Performed by: INTERNAL MEDICINE

## 2024-04-29 ENCOUNTER — TELEPHONE (OUTPATIENT)
Dept: ORTHOPEDIC SURGERY | Age: 59
End: 2024-04-29

## 2024-04-29 ENCOUNTER — TELEPHONE (OUTPATIENT)
Dept: FAMILY MEDICINE CLINIC | Age: 59
End: 2024-04-29

## 2024-04-29 NOTE — TELEPHONE ENCOUNTER
Patient called and needs to have a     Screening for TB test     And will need a form filled out     Please call and advise

## 2024-04-29 NOTE — TELEPHONE ENCOUNTER
General Question     Subject: patient is calling she stated she is just ck the statues of her Mri for her Lumbar she stated she has a device in her chest and she just want to know if she can get that Mri done. She just need a call back.   Patient Chelsi Lock   Contact Number: [phone]

## 2024-04-30 NOTE — TELEPHONE ENCOUNTER
I spoke with patient. She spoke with [employer] and they are waiting on [employer] to get back with them in order to do the MRI.

## 2024-05-01 DIAGNOSIS — Z11.1 SCREENING FOR TUBERCULOSIS: ICD-10-CM

## 2024-05-01 DIAGNOSIS — Z23 NEED FOR MMRV (MEASLES-MUMPS-RUBELLA-VARICELLA) VACCINE/PROQUAD VACCINATION: ICD-10-CM

## 2024-05-01 LAB — RUBV IGG SERPL IA-ACNC: 284.1 IU/ML

## 2024-05-02 LAB
MUV IGG SER QL IA: NORMAL
VZV IGG SER QL IA: NORMAL

## 2024-05-03 LAB
MEV IGG SER IA-ACNC: >300 AU/ML
MEV IGM SER IA-ACNC: 0.26 AU (ref 0–0.79)

## 2024-05-05 LAB
GAMMA INTERFERON BACKGROUND BLD IA-ACNC: 0.02 IU/ML
MITOGEN IGNF BCKGRD COR BLD-ACNC: 9.14 IU/ML
QUANTI TB GOLD PLUS: NEGATIVE
QUANTI TB1 MINUS NIL: 0 IU/ML (ref 0–0.34)
QUANTI TB2 MINUS NIL: 0 IU/ML (ref 0–0.34)

## 2024-05-14 ENCOUNTER — TELEPHONE (OUTPATIENT)
Dept: FAMILY MEDICINE CLINIC | Age: 59
End: 2024-05-14

## 2024-05-14 NOTE — TELEPHONE ENCOUNTER
S/W pt, MRI has been denied due to lack of conservative tx.  Pt had tried chiropractic care in the past, but has not gone back since her hernia surgery.  Pt has not been able to do any of her HEP/stretches because she states that it is too difficult to even move most of the time at home, and has to have help getting out of bed.  Pt took the steroid pack prescribed on 4/23/24, but had no relief from this, not even temp relief.  Pt states that she really wants to have this MRI, as her pain is not getting any better and she is feeling \"desperate.\"    We can do a P2P, deadline for this is 5/15/24.  Please advise.

## 2024-05-14 NOTE — TELEPHONE ENCOUNTER
S/W pt to notify and advise that she call [facility] to schedule.  Pt v/u

## 2024-05-15 ENCOUNTER — TELEPHONE (OUTPATIENT)
Dept: CARDIOLOGY CLINIC | Age: 59
End: 2024-05-15

## 2024-05-15 NOTE — TELEPHONE ENCOUNTER
Zoey would like to know if one of the Pt leads was explanted. Zoey needs to know before she put the Pt on the schedule. Zoey has also fax a clearance to the office.  Please call are fax the info.  Thank you

## 2024-05-15 NOTE — TELEPHONE ENCOUNTER
Spoke with Zoey. Reports she spoke [employer] needed to clarify that lead [id number] was extracted 1/22/21.     Reviewed procedure note and confirmed this lead was removed.

## 2024-05-28 ENCOUNTER — HOSPITAL ENCOUNTER (OUTPATIENT)
Dept: MRI IMAGING | Age: 59
Discharge: HOME | End: 2024-05-28
Attending: INTERNAL MEDICINE
Payer: MEDICAID

## 2024-05-28 DIAGNOSIS — M54.50 LOW BACK PAIN, UNSPECIFIED BACK PAIN LATERALITY, UNSPECIFIED CHRONICITY, UNSPECIFIED WHETHER SCIATICA PRESENT: ICD-10-CM

## 2024-05-28 PROCEDURE — 72148 MRI LUMBAR SPINE W/O DYE: CPT

## 2024-05-30 ENCOUNTER — TELEPHONE (OUTPATIENT)
Dept: ORTHOPEDIC SURGERY | Age: 59
End: 2024-05-30

## 2024-05-30 NOTE — TELEPHONE ENCOUNTER
Test Results     Type of Test: MRI  Date of Test: 05/28  Location of Test:   Patient Contact Number: [phone]      Pt ASKING IF TR HAVE ARRIVED WITH DR RUIZ, YET? PLEASE ADVISE @ THE # ABOVE.

## 2024-05-31 NOTE — TELEPHONE ENCOUNTER
S/W pt to advise that we have MRI TR and that I can sched a f/u appt for her.  Pt was upset, bc she wanted to be seen this week, and does not want to come in for TR due to difficulty taking off work.  Pt agreed to a telephone visit, as she cannot do a VV, since she does not have MyChart.  Appt confirmed for 6/5/24 at 1:50 pm. Pt v/u.

## 2024-05-31 NOTE — TELEPHONE ENCOUNTER
Test Results      Type of Test: MRI  Date of Test: 05/28  Location of Test:   Patient Contact Number: [phone]      Pt STATES SHE WAS TOLD TO CALL OFFICE 2 DAYS AFTER MRI TO RCV RESULTS     PLEASE CALL WITH RESULTS.

## 2024-06-05 ENCOUNTER — SCHEDULED TELEPHONE ENCOUNTER (OUTPATIENT)
Dept: ORTHOPEDIC SURGERY | Age: 59
End: 2024-06-05
Payer: MEDICAID

## 2024-06-05 DIAGNOSIS — M48.062 SPINAL STENOSIS OF LUMBAR REGION WITH NEUROGENIC CLAUDICATION: ICD-10-CM

## 2024-06-05 DIAGNOSIS — Z87.19 HISTORY OF ABDOMINAL HERNIA: ICD-10-CM

## 2024-06-05 DIAGNOSIS — M51.26 HERNIATION OF INTERVERTEBRAL DISC BETWEEN L4 AND L5: Primary | ICD-10-CM

## 2024-06-05 DIAGNOSIS — M47.816 LUMBAR SPONDYLOSIS: ICD-10-CM

## 2024-06-05 DIAGNOSIS — Z79.01 CHRONIC ANTICOAGULATION: ICD-10-CM

## 2024-06-05 DIAGNOSIS — Z95.810 HISTORY OF AUTOMATIC INTERNAL CARDIAC DEFIBRILLATOR (AICD): ICD-10-CM

## 2024-06-05 PROCEDURE — 99443 PR PHYS/QHP TELEPHONE EVALUATION 21-30 MIN: CPT | Performed by: INTERNAL MEDICINE

## 2024-06-05 NOTE — TELEPHONE ENCOUNTER
Pt calling had an episode last night with her heart, and had some dizziness. Pt thinks her water pill is too strong. She is coming in today to have device checked, however she needs to know what to do about the water pill?  Pls call to advise thank you Admission

## 2024-06-05 NOTE — PROGRESS NOTES
Chief Complaint:   Chief Complaint   Patient presents with    Lower Back Pain     Lumbar - MRI results today. Medrol with 20% relief for 12 hours.          History of Present Illness:       Patient is a 58 y.o. female returns follow up for the above complaint. The patient was last seen approximately 6 weeksago. The symptoms s of back pain.  However, the patient is having episodic giving way of the left lower extremity typically preceded by pain.  Fortunately she has not sustained any falls.    She has since started chiropractic care in the interim on her on account.    The patient has had further testing for the problem.      Poor response to trial of Medrol.  MRI completed in the interim    Back:Left leg pain 75: 5. Pain in back is aching in quality.    Pain levels:5.     The patient denies new onset or progressive weakness of the lower extremities.  The patient denies new onset bowel or bladder dysfunction.      Documentation:  I communicated with the patient and/or health care decision maker about :   Diagnosis Orders   1. Herniation of intervertebral disc between L4 and L5  Ambulatory epidural steroid injection      2. Spinal stenosis of lumbar region with neurogenic claudication  Ambulatory epidural steroid injection      3. Lumbar spondylosis        4. History of automatic internal cardiac defibrillator (AICD)        5. Chronic anticoagulation        6. History of abdominal hernia              Total Time: minutes: 11-20 minutes    Chelsi Lock was evaluated through a synchronous (real-time) audio encounter. Patient identification was verified at the start of the visit. She (or guardian if applicable) is aware that this is a billable service, which includes applicable co-pays. This visit was conducted with the patient's (and/or legal guardian's) verbal consent. She has not had a related appointment within my department in the past 7 days or scheduled within the next 24 hours.   The patient was located at

## 2024-06-10 ENCOUNTER — TELEPHONE (OUTPATIENT)
Dept: CARDIOLOGY CLINIC | Age: 59
End: 2024-06-10

## 2024-06-10 NOTE — TELEPHONE ENCOUNTER
CARDIAC CLEARANCE     What type of procedure are you having?  Lumbar Epidural Steroid Injection    Which physician is performing your procedure?  Dr. Alexis Rosen    When is your procedure scheduled for?  Pending     Where are you having this procedure?  [facility]    Are you taking Blood Thinners?  Yes   If so what? (Name/dose/frequesncy)  Xarelto 20mg     Does the surgeon want you to stop your blood thinner?  If so for how long?  Yes - 2 days prior    Phone Number and Contact Name for Physicians office:  Melanie  [phone]    Fax number to send information:  [phone]

## 2024-06-16 DIAGNOSIS — I50.42 CHRONIC COMBINED SYSTOLIC AND DIASTOLIC HEART FAILURE (HCC): ICD-10-CM

## 2024-06-17 RX ORDER — SPIRONOLACTONE 25 MG/1
25 TABLET ORAL DAILY
Qty: 90 TABLET | Refills: 0 | Status: SHIPPED | OUTPATIENT
Start: 2024-06-17

## 2024-07-07 ENCOUNTER — HOSPITAL ENCOUNTER (EMERGENCY)
Age: 59
Discharge: HOME | End: 2024-07-07
Payer: MEDICAID

## 2024-07-07 ENCOUNTER — APPOINTMENT (OUTPATIENT)
Dept: CT IMAGING | Age: 59
End: 2024-07-07
Payer: MEDICAID

## 2024-07-07 VITALS
DIASTOLIC BLOOD PRESSURE: 97 MMHG | SYSTOLIC BLOOD PRESSURE: 147 MMHG | BODY MASS INDEX: 31 KG/M2 | RESPIRATION RATE: 18 BRPM | WEIGHT: 195 LBS | OXYGEN SATURATION: 97 % | HEART RATE: 76 BPM | TEMPERATURE: 98.3 F

## 2024-07-07 DIAGNOSIS — D25.9 UTERINE LEIOMYOMA, UNSPECIFIED LOCATION: ICD-10-CM

## 2024-07-07 DIAGNOSIS — K43.9 SPIGELIAN HERNIA: ICD-10-CM

## 2024-07-07 DIAGNOSIS — R35.0 URINARY FREQUENCY: Primary | ICD-10-CM

## 2024-07-07 LAB
ALBUMIN SERPL-MCNC: 4.6 G/DL (ref 3.4–5)
ALBUMIN/GLOB SERPL: 1.6 {RATIO} (ref 1.1–2.2)
ALP SERPL-CCNC: 71 U/L (ref 40–129)
ALT SERPL-CCNC: 6 U/L (ref 10–40)
ANION GAP SERPL CALCULATED.3IONS-SCNC: 13 MMOL/L (ref 3–16)
AST SERPL-CCNC: 17 U/L (ref 15–37)
BACTERIA URNS QL MICRO: ABNORMAL /HPF
BASOPHILS # BLD: 0 K/UL (ref 0–0.2)
BASOPHILS NFR BLD: 0.9 %
BILIRUB SERPL-MCNC: 1 MG/DL (ref 0–1)
BILIRUB UR QL STRIP.AUTO: ABNORMAL
BUN SERPL-MCNC: 8 MG/DL (ref 7–20)
CALCIUM SERPL-MCNC: 9.4 MG/DL (ref 8.3–10.6)
CHLORIDE SERPL-SCNC: 104 MMOL/L (ref 99–110)
CLARITY UR: CLEAR
CO2 SERPL-SCNC: 23 MMOL/L (ref 21–32)
COLOR UR: ABNORMAL
CREAT SERPL-MCNC: 1 MG/DL (ref 0.6–1.2)
DEPRECATED RDW RBC AUTO: 14.8 % (ref 12.4–15.4)
EOSINOPHIL # BLD: 0.2 K/UL (ref 0–0.6)
EOSINOPHIL NFR BLD: 5.9 %
EPI CELLS #/AREA URNS AUTO: 6 /HPF (ref 0–5)
GFR SERPLBLD CREATININE-BSD FMLA CKD-EPI: 64 ML/MIN/{1.73_M2}
GLUCOSE SERPL-MCNC: 107 MG/DL (ref 70–99)
GLUCOSE UR STRIP.AUTO-MCNC: NEGATIVE MG/DL
HCG UR QL: NEGATIVE
HCT VFR BLD AUTO: 37.2 % (ref 36–48)
HGB BLD-MCNC: 12.3 G/DL (ref 12–16)
HGB UR QL STRIP.AUTO: NEGATIVE
HYALINE CASTS #/AREA URNS AUTO: 2 /LPF (ref 0–8)
KETONES UR STRIP.AUTO-MCNC: ABNORMAL MG/DL
LEUKOCYTE ESTERASE UR QL STRIP.AUTO: ABNORMAL
LYMPHOCYTES # BLD: 1.2 K/UL (ref 1–5.1)
LYMPHOCYTES NFR BLD: 28.1 %
MCH RBC QN AUTO: 30.8 PG (ref 26–34)
MCHC RBC AUTO-ENTMCNC: 33 G/DL (ref 31–36)
MCV RBC AUTO: 93.3 FL (ref 80–100)
MONOCYTES # BLD: 0.3 K/UL (ref 0–1.3)
MONOCYTES NFR BLD: 8.1 %
NEUTROPHILS # BLD: 2.3 K/UL (ref 1.7–7.7)
NEUTROPHILS NFR BLD: 57 %
NITRITE UR QL STRIP.AUTO: NEGATIVE
PH UR STRIP.AUTO: 7 [PH] (ref 5–8)
PLATELET # BLD AUTO: 247 K/UL (ref 135–450)
PMV BLD AUTO: 7.6 FL (ref 5–10.5)
POTASSIUM SERPL-SCNC: 4.3 MMOL/L (ref 3.5–5.1)
PROT SERPL-MCNC: 7.5 G/DL (ref 6.4–8.2)
PROT UR STRIP.AUTO-MCNC: ABNORMAL MG/DL
RBC # BLD AUTO: 3.99 M/UL (ref 4–5.2)
RBC CLUMPS #/AREA URNS AUTO: 5 /HPF (ref 0–4)
SODIUM SERPL-SCNC: 140 MMOL/L (ref 136–145)
SP GR UR STRIP.AUTO: 1.02 (ref 1–1.03)
UA COMPLETE W REFLEX CULTURE PNL UR: ABNORMAL
UA DIPSTICK W REFLEX MICRO PNL UR: YES
URN SPEC COLLECT METH UR: ABNORMAL
UROBILINOGEN UR STRIP-ACNC: 1 E.U./DL
WBC # BLD AUTO: 4.1 K/UL (ref 4–11)
WBC #/AREA URNS AUTO: 2 /HPF (ref 0–5)

## 2024-07-07 PROCEDURE — 85025 COMPLETE CBC W/AUTO DIFF WBC: CPT

## 2024-07-07 PROCEDURE — 99285 EMERGENCY DEPT VISIT HI MDM: CPT

## 2024-07-07 PROCEDURE — 81001 URINALYSIS AUTO W/SCOPE: CPT

## 2024-07-07 PROCEDURE — 80053 COMPREHEN METABOLIC PANEL: CPT

## 2024-07-07 PROCEDURE — 84703 CHORIONIC GONADOTROPIN ASSAY: CPT

## 2024-07-07 PROCEDURE — 6360000004 HC RX CONTRAST MEDICATION: Performed by: PHYSICIAN ASSISTANT

## 2024-07-07 PROCEDURE — 74177 CT ABD & PELVIS W/CONTRAST: CPT

## 2024-07-07 RX ORDER — PHENAZOPYRIDINE HYDROCHLORIDE 200 MG/1
200 TABLET, FILM COATED ORAL 3 TIMES DAILY PRN
Qty: 6 TABLET | Refills: 0 | Status: SHIPPED | OUTPATIENT
Start: 2024-07-07 | End: 2024-07-10

## 2024-07-07 RX ORDER — CEFUROXIME AXETIL 250 MG/1
250 TABLET ORAL 2 TIMES DAILY
Qty: 14 TABLET | Refills: 0 | Status: SHIPPED | OUTPATIENT
Start: 2024-07-07 | End: 2024-07-14

## 2024-07-07 RX ORDER — FLUCONAZOLE 150 MG/1
150 TABLET ORAL ONCE
Qty: 1 TABLET | Refills: 0 | Status: SHIPPED | OUTPATIENT
Start: 2024-07-07 | End: 2024-07-07

## 2024-07-07 RX ADMIN — IOPAMIDOL 75 ML: 755 INJECTION, SOLUTION INTRAVENOUS at 12:25

## 2024-07-07 ASSESSMENT — PAIN DESCRIPTION - LOCATION: LOCATION: ABDOMEN

## 2024-07-07 ASSESSMENT — PAIN SCALES - GENERAL: PAINLEVEL_OUTOF10: 8

## 2024-07-07 ASSESSMENT — PAIN - FUNCTIONAL ASSESSMENT: PAIN_FUNCTIONAL_ASSESSMENT: 0-10

## 2024-07-07 ASSESSMENT — PAIN DESCRIPTION - ORIENTATION: ORIENTATION: LOWER

## 2024-07-07 NOTE — ED PROVIDER NOTES
[facility] EMERGENCY DEPARTMENT  EMERGENCY DEPARTMENT ENCOUNTER        Pt Name: Chelsi Lock  MRN: [id number]  Birthdate 1965  Date of evaluation: 7/7/2024  Provider: SHARAN Lebron  PCP: Evelin Yousif APRN - CNP  Note Started: 11:54 AM EDT 7/7/24      LOUIS. I have evaluated this patient.        CHIEF COMPLAINT       Chief Complaint   Patient presents with    Urinary Frequency    Abdominal Pain     Pt c/o low pelvic pain with urinary frequency and blood noted. C/o nausea       HISTORY OF PRESENT ILLNESS: 1 or more Elements     History From: Patient  Limitations to history : None    Chelsi Lock is a 59 y.o. female with past medical history of atrial fibrillation, CHF, GERD, hyperlipidemia, hypertension and document anticoagulation on Xarelto who presents ED with complaint of abdominal pain.  She reports lower abdominal/pelvic pain that radiates into her back.  She states been ongoing for the past couple days.  Worsened today.  She reports increased urinary frequency and urgency.  Noticed some hematuria today.  She reports nausea but denies vomiting.  Denies fever or chills.  Denies any vaginal discharge.  Denies changes in bowel movements.  She reports UTI couple months ago.  States this feels similar although slightly worse.  She has not had sexual course in over 13 years and denies any concern for sexually transmitted infection.  Reports past surgical history of exploratory laparotomy with resection of duodenal mass and cholecystectomy.  Also reports ventral hernia repair.  Rates her pain as an 8/10.  Became concerned and came to the ED for further evaluation/treatment.    Nursing Notes were all reviewed and agreed with or any disagreements were addressed in the HPI.    REVIEW OF SYSTEMS :      Review of Systems   Constitutional:  Negative for activity change, appetite change, chills, diaphoresis, fatigue and fever.   Respiratory: Negative.  Negative for cough, chest tightness

## 2024-07-12 ENCOUNTER — OFFICE VISIT (OUTPATIENT)
Dept: FAMILY MEDICINE CLINIC | Age: 59
End: 2024-07-12
Payer: MEDICAID

## 2024-07-12 VITALS
DIASTOLIC BLOOD PRESSURE: 82 MMHG | RESPIRATION RATE: 16 BRPM | SYSTOLIC BLOOD PRESSURE: 130 MMHG | WEIGHT: 202 LBS | HEART RATE: 76 BPM | HEIGHT: 67 IN | BODY MASS INDEX: 31.71 KG/M2 | OXYGEN SATURATION: 100 %

## 2024-07-12 DIAGNOSIS — R39.9 UTI SYMPTOMS: Primary | ICD-10-CM

## 2024-07-12 DIAGNOSIS — N39.0 FREQUENT UTI: ICD-10-CM

## 2024-07-12 LAB
BILIRUBIN, POC: NORMAL
BLOOD URINE, POC: NORMAL
CLARITY, POC: CLEAR
COLOR, POC: YELLOW
GLUCOSE URINE, POC: NORMAL
KETONES, POC: NORMAL
LEUKOCYTE EST, POC: NORMAL
NITRITE, POC: NORMAL
PH, POC: 6
PROTEIN, POC: NORMAL
SPECIFIC GRAVITY, POC: <=1.005
UROBILINOGEN, POC: 0.2

## 2024-07-12 PROCEDURE — 1036F TOBACCO NON-USER: CPT | Performed by: NURSE PRACTITIONER

## 2024-07-12 PROCEDURE — 3075F SYST BP GE 130 - 139MM HG: CPT | Performed by: NURSE PRACTITIONER

## 2024-07-12 PROCEDURE — 99214 OFFICE O/P EST MOD 30 MIN: CPT | Performed by: NURSE PRACTITIONER

## 2024-07-12 PROCEDURE — G8427 DOCREV CUR MEDS BY ELIG CLIN: HCPCS | Performed by: NURSE PRACTITIONER

## 2024-07-12 PROCEDURE — 3017F COLORECTAL CA SCREEN DOC REV: CPT | Performed by: NURSE PRACTITIONER

## 2024-07-12 PROCEDURE — 3079F DIAST BP 80-89 MM HG: CPT | Performed by: NURSE PRACTITIONER

## 2024-07-12 PROCEDURE — G8417 CALC BMI ABV UP PARAM F/U: HCPCS | Performed by: NURSE PRACTITIONER

## 2024-07-12 PROCEDURE — 81002 URINALYSIS NONAUTO W/O SCOPE: CPT | Performed by: NURSE PRACTITIONER

## 2024-07-12 RX ORDER — FLUCONAZOLE 150 MG/1
150 TABLET ORAL ONCE
COMMUNITY
Start: 2024-07-07

## 2024-07-12 RX ORDER — DICYCLOMINE HCL 20 MG
20 TABLET ORAL EVERY 6 HOURS
COMMUNITY
Start: 2024-07-09

## 2024-07-12 ASSESSMENT — ENCOUNTER SYMPTOMS
RECTAL PAIN: 0
ABDOMINAL PAIN: 0
WHEEZING: 0
SHORTNESS OF BREATH: 0
VOMITING: 0
DIARRHEA: 0
ANAL BLEEDING: 0
BLOOD IN STOOL: 0
GASTROINTESTINAL NEGATIVE: 1
CHEST TIGHTNESS: 0
COUGH: 0
ABDOMINAL DISTENTION: 0
NAUSEA: 0
CONSTIPATION: 0

## 2024-07-12 ASSESSMENT — PATIENT HEALTH QUESTIONNAIRE - PHQ9
2. FEELING DOWN, DEPRESSED OR HOPELESS: NOT AT ALL
SUM OF ALL RESPONSES TO PHQ QUESTIONS 1-9: 0
1. LITTLE INTEREST OR PLEASURE IN DOING THINGS: NOT AT ALL
SUM OF ALL RESPONSES TO PHQ9 QUESTIONS 1 & 2: 0

## 2024-07-12 NOTE — PROGRESS NOTES
2024     Chelsi Lock (:  1965) is a 59 y.o. female, here for evaluation of the following medical concerns:    Chief Complaint   Patient presents with    Follow-up     ED follow up 2024 started and      Patient was recently in ED and diagnosed with UTI.  She is currently taking Ceftin 250mg BID for one week.  Has two days left of antibiotics.  She was having back pain and urgency and frequency, these have all subsided.      She has not had her labs drawn recently and will need to do this soon.    Review of Systems   Constitutional:  Negative for chills, diaphoresis, fatigue and fever.   Respiratory:  Negative for cough, chest tightness, shortness of breath and wheezing.    Cardiovascular:  Negative for chest pain and palpitations.   Gastrointestinal: Negative.  Negative for abdominal distention, abdominal pain, anal bleeding, blood in stool, constipation, diarrhea, nausea, rectal pain and vomiting.   Genitourinary: Negative.  Negative for dysuria, frequency, menstrual problem, urgency and vaginal pain.        Urine has a foul odor   Neurological:  Negative for dizziness, weakness and headaches.       Prior to Visit Medications    Medication Sig Taking? Authorizing Provider   fluconazole (DIFLUCAN) 150 MG tablet Take 1 tablet by mouth once Yes ProviderKell MD   dicyclomine (BENTYL) 20 MG tablet Take 1 tablet by mouth every 6 hours Yes Provider, MD Kell   spironolactone (ALDACTONE) 25 MG tablet TAKE 1 TABLET BY MOUTH DAILY Yes Rema Rivers APRN - CNS   butalbital-APAP-caffeine (FIORICET) -40 MG CAPS per capsule Take 1 capsule by mouth every 6 hours as needed Yes Provider, MD Kell   empagliflozin (JARDIANCE) 10 MG tablet Take 1 tablet by mouth daily Yes Provider, MD Kell   tiZANidine (ZANAFLEX) 4 MG tablet Take 1 tablet by mouth 3 times daily as needed (Muscle tightness/spasm) Yes Yoel Rivera MD   XIFAXAN 550 MG tablet Take 1 tablet by

## 2024-07-13 ENCOUNTER — HOSPITAL ENCOUNTER (OUTPATIENT)
Age: 59
Setting detail: SPECIMEN
Discharge: HOME | End: 2024-07-13
Payer: MEDICAID

## 2024-07-13 PROCEDURE — 82653 EL-1 FECAL QUANTITATIVE: CPT

## 2024-07-14 LAB — BACTERIA UR CULT: NORMAL

## 2024-07-15 ENCOUNTER — TELEPHONE (OUTPATIENT)
Dept: CARDIOLOGY CLINIC | Age: 59
End: 2024-07-15

## 2024-07-15 NOTE — TELEPHONE ENCOUNTER
Pt would like to know why she needs to come in tomorrow.  Scheduled an appointment for 7/16/24 @ 3:45pm with Dr. Juárez.  Patient is not sure she can get a ride for tomorrow..  Please call patient and advise.  judith

## 2024-07-15 NOTE — TELEPHONE ENCOUNTER
----- Message from Nancy Chamorro RN sent at 7/15/2024  9:43 AM EDT -----  FYI- patient back in Afib for the past few weeks. I sent RG a HF transmission so she may follow up with you on it. Next EP appt not until Nov.

## 2024-07-15 NOTE — TELEPHONE ENCOUNTER
Pt called back states 1:30 will work better for her ride.  I updated appointment to 1:30.    Pt also stated she is having pain in her stomach above her navel, but no symptoms of her afib.    I advised pt to talk to M when she comes in tomorrow and let him know all of her symptoms.

## 2024-07-16 ENCOUNTER — NURSE ONLY (OUTPATIENT)
Dept: CARDIOLOGY CLINIC | Age: 59
End: 2024-07-16
Payer: MEDICAID

## 2024-07-16 ENCOUNTER — OFFICE VISIT (OUTPATIENT)
Dept: CARDIOLOGY CLINIC | Age: 59
End: 2024-07-16
Payer: MEDICAID

## 2024-07-16 ENCOUNTER — TELEPHONE (OUTPATIENT)
Dept: CARDIOLOGY CLINIC | Age: 59
End: 2024-07-16

## 2024-07-16 VITALS
HEART RATE: 79 BPM | BODY MASS INDEX: 31.36 KG/M2 | HEIGHT: 67 IN | WEIGHT: 199.8 LBS | SYSTOLIC BLOOD PRESSURE: 118 MMHG | DIASTOLIC BLOOD PRESSURE: 80 MMHG | OXYGEN SATURATION: 99 %

## 2024-07-16 DIAGNOSIS — G47.33 OBSTRUCTIVE SLEEP APNEA (ADULT) (PEDIATRIC): ICD-10-CM

## 2024-07-16 DIAGNOSIS — I48.19 PERSISTENT ATRIAL FIBRILLATION (HCC): ICD-10-CM

## 2024-07-16 DIAGNOSIS — I47.20 VT (VENTRICULAR TACHYCARDIA) (HCC): ICD-10-CM

## 2024-07-16 DIAGNOSIS — I48.0 PAF (PAROXYSMAL ATRIAL FIBRILLATION) (HCC): ICD-10-CM

## 2024-07-16 DIAGNOSIS — Z95.810 AICD (AUTOMATIC CARDIOVERTER/DEFIBRILLATOR) PRESENT: Primary | ICD-10-CM

## 2024-07-16 DIAGNOSIS — I10 PRIMARY HYPERTENSION: ICD-10-CM

## 2024-07-16 DIAGNOSIS — I50.22 SYSTOLIC CHF, CHRONIC (HCC): ICD-10-CM

## 2024-07-16 DIAGNOSIS — I42.0 DILATED CARDIOMYOPATHY (HCC): ICD-10-CM

## 2024-07-16 DIAGNOSIS — E66.09 CLASS 1 OBESITY DUE TO EXCESS CALORIES WITH BODY MASS INDEX (BMI) OF 31.0 TO 31.9 IN ADULT, UNSPECIFIED WHETHER SERIOUS COMORBIDITY PRESENT: ICD-10-CM

## 2024-07-16 DIAGNOSIS — Q24.6 CONGENITAL HEART BLOCK: ICD-10-CM

## 2024-07-16 DIAGNOSIS — I48.0 PAF (PAROXYSMAL ATRIAL FIBRILLATION) (HCC): Primary | ICD-10-CM

## 2024-07-16 PROCEDURE — 99214 OFFICE O/P EST MOD 30 MIN: CPT | Performed by: INTERNAL MEDICINE

## 2024-07-16 PROCEDURE — 1036F TOBACCO NON-USER: CPT | Performed by: INTERNAL MEDICINE

## 2024-07-16 PROCEDURE — G8417 CALC BMI ABV UP PARAM F/U: HCPCS | Performed by: INTERNAL MEDICINE

## 2024-07-16 PROCEDURE — 93284 PRGRMG EVAL IMPLANTABLE DFB: CPT | Performed by: INTERNAL MEDICINE

## 2024-07-16 PROCEDURE — 93000 ELECTROCARDIOGRAM COMPLETE: CPT | Performed by: INTERNAL MEDICINE

## 2024-07-16 PROCEDURE — G8427 DOCREV CUR MEDS BY ELIG CLIN: HCPCS | Performed by: INTERNAL MEDICINE

## 2024-07-16 PROCEDURE — 3017F COLORECTAL CA SCREEN DOC REV: CPT | Performed by: INTERNAL MEDICINE

## 2024-07-16 PROCEDURE — 3074F SYST BP LT 130 MM HG: CPT | Performed by: INTERNAL MEDICINE

## 2024-07-16 PROCEDURE — 3079F DIAST BP 80-89 MM HG: CPT | Performed by: INTERNAL MEDICINE

## 2024-07-16 NOTE — PATIENT INSTRUCTIONS
You are scheduled for a cardioversion.    Our  will call you to discuss a date for your procedure.    The day of your procedure you will need to check in at the Registration Desk in the Main Lobby.    PRE-PROCEDURE INSTRUCTIONS   Do not eat or drink anything after midnight the night before your procedure.   Take all your medications with a sip of water in the morning.   Do not hold your Xarelto    Please have a responsible adult to drive you home after your procedure.    If you have any questions regarding your procedure itself or your medications, please call [phone] and ask to speak to an EP nurse.

## 2024-07-16 NOTE — PROGRESS NOTES
Nausea And Vomiting       Social History:  Reviewed.  reports that she has never smoked. She has never used smokeless tobacco. She reports that she does not drink alcohol and does not use drugs.     Family History:  Reviewed. Reviewed. No family history of SCD.      Outside medical records via Care everywhere reviewed.      All questions and concerns were addressed to the patient/family. Alternatives to my treatment were discussed. I have discussed the above stated plan and the patient verbalized understanding and agreed with the plan.    Scribe attestation: This note was scribed in the presence of Franck Herrera MD by Jaimee Sylvester RN    Physician Attestation: I, Dr. Franck herrera, confirm that the scribe's documentation has been prepared under my direction and personally reviewed by me in its entirety.  I also confirm that the note above accurately reflects all work, treatment, procedures, and medical decision making performed by me.      NOTE: This report was transcribed using voice recognition software. Every effort was made to ensure accuracy, however, inadvertent computerized transcription errors may be present.     Franck Herrera MD, MPH  [facility]   Office: [phone]  Fax: [phone]

## 2024-07-16 NOTE — TELEPHONE ENCOUNTER
Spoke with the pt and got her scheduled. She was trying to get this in Aug even though requested by RN for next avail. She decided to do this week. We went over instructions below and she verbalized understanding.     Procedure - cardioversion  Date: 7/19/24  Arrival time: 6:45 am   Procedure time: 7:30 am      Our  will call you to discuss a date for your procedure.     The day of your procedure you will need to check in at the Registration Desk in the Main Lobby.     PRE-PROCEDURE INSTRUCTIONS              Do not eat or drink anything after midnight the night before your procedure.              Take all your medications with a sip of water in the morning.              Do not hold your Xarelto               Please have a responsible adult to drive you home after your procedure.     If you have any questions regarding your procedure itself or your medications, please call [phone] and ask to speak to an EP nurse.      Qgenda updated / updated epic / emailed cath lab

## 2024-07-17 ENCOUNTER — TELEPHONE (OUTPATIENT)
Dept: FAMILY MEDICINE CLINIC | Age: 59
End: 2024-07-17

## 2024-07-17 LAB — ELASTASE PANC STL-MCNT: 722 UG/G

## 2024-07-18 ENCOUNTER — TELEPHONE (OUTPATIENT)
Dept: CARDIOLOGY CLINIC | Age: 59
End: 2024-07-18

## 2024-07-18 NOTE — TELEPHONE ENCOUNTER
Noted and I saw she was back in AF  
Pt wanted to let NPRG know she is having a CV tomorrow 7/19/24 with RMM. Any questions please talya/  
No

## 2024-07-19 ENCOUNTER — TELEPHONE (OUTPATIENT)
Dept: FAMILY MEDICINE CLINIC | Age: 59
End: 2024-07-19

## 2024-07-19 ENCOUNTER — HOSPITAL ENCOUNTER (OUTPATIENT)
Age: 59
End: 2024-07-19
Attending: INTERNAL MEDICINE
Payer: MEDICAID

## 2024-07-19 VITALS
SYSTOLIC BLOOD PRESSURE: 146 MMHG | RESPIRATION RATE: 17 BRPM | BODY MASS INDEX: 31.98 KG/M2 | OXYGEN SATURATION: 100 % | DIASTOLIC BLOOD PRESSURE: 104 MMHG | HEART RATE: 83 BPM | TEMPERATURE: 98.1 F | HEIGHT: 66 IN | WEIGHT: 199 LBS

## 2024-07-19 DIAGNOSIS — I48.19 PERSISTENT ATRIAL FIBRILLATION (HCC): ICD-10-CM

## 2024-07-19 LAB
ECHO BSA: 2.05 M2
EKG ATRIAL RATE: 78 BPM
EKG ATRIAL RATE: 80 BPM
EKG DIAGNOSIS: NORMAL
EKG DIAGNOSIS: NORMAL
EKG P AXIS: 109 DEGREES
EKG P-R INTERVAL: 120 MS
EKG Q-T INTERVAL: 440 MS
EKG Q-T INTERVAL: 442 MS
EKG QRS DURATION: 148 MS
EKG QRS DURATION: 150 MS
EKG QTC CALCULATION (BAZETT): 500 MS
EKG QTC CALCULATION (BAZETT): 507 MS
EKG R AXIS: -85 DEGREES
EKG R AXIS: -87 DEGREES
EKG T AXIS: 78 DEGREES
EKG T AXIS: 79 DEGREES
EKG VENTRICULAR RATE: 77 BPM
EKG VENTRICULAR RATE: 80 BPM

## 2024-07-19 PROCEDURE — 93005 ELECTROCARDIOGRAM TRACING: CPT

## 2024-07-19 PROCEDURE — 7100000010 HC PHASE II RECOVERY - FIRST 15 MIN: Performed by: INTERNAL MEDICINE

## 2024-07-19 PROCEDURE — 7100000011 HC PHASE II RECOVERY - ADDTL 15 MIN: Performed by: INTERNAL MEDICINE

## 2024-07-19 PROCEDURE — 92960 CARDIOVERSION ELECTRIC EXT: CPT | Performed by: INTERNAL MEDICINE

## 2024-07-19 PROCEDURE — 92960 CARDIOVERSION ELECTRIC EXT: CPT

## 2024-07-19 PROCEDURE — 2500000003 HC RX 250 WO HCPCS: Performed by: INTERNAL MEDICINE

## 2024-07-19 RX ORDER — AMMONIUM LACTATE 12 G/100G
CREAM TOPICAL
Qty: 385 G | OUTPATIENT
Start: 2024-07-19

## 2024-07-19 RX ORDER — AMMONIUM LACTATE 12 G/100G
140 CREAM TOPICAL PRN
Qty: 1 EACH | Refills: 5 | Status: SHIPPED | OUTPATIENT
Start: 2024-07-19

## 2024-07-19 RX ADMIN — METHOHEXITAL SODIUM 60 MG: 500 INJECTION, POWDER, LYOPHILIZED, FOR SOLUTION INTRAMUSCULAR; INTRAVENOUS; RECTAL at 07:44

## 2024-07-19 NOTE — TELEPHONE ENCOUNTER
Medication and Quantity requested:     ammonium lactate (AMLACTIN) 12 % cream [[id number]]     Last Visit    7-    Pharmacy and phone number updated in EPIC:    [facility] - [address] [place], [place]

## 2024-07-19 NOTE — DISCHARGE INSTRUCTIONS
CARDIOVERSION DISCHARGE INSTRUCTIONS    No driving for 24 hours. We strongly recommend that a responsible adult stay with you for the next 24 hours.    Continue xarelto.    Hydrocortisone 1% cream to reddened areas as needed.

## 2024-07-19 NOTE — TELEPHONE ENCOUNTER
Received refill request for XARELTO 20 MG TABS tablet  from [facility] pharmacy.     Last OV: 7/26/24    Next OV: 8/2/24    Last Labs: 7/7/24    Last Filled: 7/31/23

## 2024-07-19 NOTE — TELEPHONE ENCOUNTER
Medication:   Requested Prescriptions     Pending Prescriptions Disp Refills    ammonium lactate (AMLACTIN) 12 % cream       Sig: Apply 1 Bottle topically as needed for Dry Skin (elbows)        Last Filled:  3/21/24    Patient Phone Number: [phone] (home)     Last appt: 7/12/2024   Next appt: 7/19/2024    Last OARRS:       10/4/2022    11:28 AM   RX Monitoring   Periodic Controlled Substance Monitoring No signs of potential drug abuse or diversion identified.

## 2024-07-19 NOTE — TELEPHONE ENCOUNTER
DENZELI...    Patient called to report she was released from the hospital today; CARDIOVERSION - Atrial Fibrillation.

## 2024-07-19 NOTE — H&P
[facility]   Electrophysiology Follow up   Date: 7/16/2024  I had the privilege of visiting Chelsi Lock in the office.       CC: NSVT    HPI: Chelsi Lock is a 59 y.o. female with a PMH of HTN, CHB s/p PPM. HFrEF, syncope, and atrial fibrillation/flutter.     C 1/20/2021 that showed normal coronaries.  She had episode of VT in cath lab prior to [facility].      Has left subclavian occlusion.    2021: Extraction of RV pacing lead and Biv-AICD upgrade for cardiomyopathy and VT. Also loaded with amiodarone.       4/7/2021-4/19/2021 hospitalized for fatigue and found to be acutely anemic and received multiple transfusions.  S/p EGD with active bleeding and s/p clips and epi injection, also a lesion was noted and she bx sent.      S/p exp lap, cholecystectomy and excision of duodenal mass.  Discussed COURTNEY occlusion before.      Found to have persistent Afib in 1/2022 and had DCCV 1/28/2022. Ablation was discussed.      Seen urgently 7/6/2023 for c/o palpitations. She had brief episodes o VT  on her interrogation and her coreg was increased to 25 mg BID     Interrogation 7/15/2024 showed persistent atrial fibrillation      Chelsi presents to the office in follow up. 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 Denies any symptoms with her afib. Patient denies lightheadedness, dizziness, chest pain, palpitations, orthopnea, edema, presyncope or syncope.     Assessment and plan:     -Persistent Atrial Fibrillation    41.3% AT/AF burden per device interrogation today.  Persistent since 6/12/2024   Patient has been in Afib since June.               History of S/p DCCV 1/28/2022                Continue coreg 25 mg BID                She was on Amiodarone in the past but d/t her age and side effect she is not a good candidate for this long term              High risk [id number] score: 3 (hypertension, CHF, gender) ; [id number] Vasc score and anticoagulation discussed. High risk for stroke and thromboembolism.  Documentation and Exam:   I have personally completed a history, physical exam & review of systems for this patient (see notes).    Mallampati Airway Assessment:  Class II     Prior History of Anesthesia Complications:   None    ASA Classification:  Class 3 - A patient with severe systemic disease that limits activity but is not incapacitating    Sedation/ Anesthesia Plan:   Intravenous sedation    Medications Planned:   Brevital intravenously     Patient is an appropriate candidate for plan of sedation:   Yes    Electronically signed by Franck Gillis MD on 7/19/2024 at 7:31 AM

## 2024-07-23 RX ORDER — MULTIVIT-MIN/IRON/FOLIC ACID/K 18-600-40
1 CAPSULE ORAL DAILY
Qty: 90 TABLET | Refills: 3 | Status: SHIPPED | OUTPATIENT
Start: 2024-07-23

## 2024-07-23 NOTE — TELEPHONE ENCOUNTER
[facility]    Last ov: 07/16/2023 RMM    Last Refill: 05/12/2023    Next appointment: 11/19/2024 RMM

## 2024-07-30 ENCOUNTER — HOSPITAL ENCOUNTER (EMERGENCY)
Age: 59
Discharge: HOME | End: 2024-07-30
Attending: EMERGENCY MEDICINE
Payer: MEDICAID

## 2024-07-30 ENCOUNTER — APPOINTMENT (OUTPATIENT)
Dept: GENERAL RADIOLOGY | Age: 59
End: 2024-07-30
Payer: MEDICAID

## 2024-07-30 VITALS
SYSTOLIC BLOOD PRESSURE: 135 MMHG | HEIGHT: 66 IN | TEMPERATURE: 97.5 F | BODY MASS INDEX: 31.82 KG/M2 | RESPIRATION RATE: 21 BRPM | DIASTOLIC BLOOD PRESSURE: 77 MMHG | WEIGHT: 198 LBS | OXYGEN SATURATION: 97 % | HEART RATE: 75 BPM

## 2024-07-30 DIAGNOSIS — E83.42 HYPOMAGNESEMIA: ICD-10-CM

## 2024-07-30 DIAGNOSIS — R00.2 PALPITATIONS: Primary | ICD-10-CM

## 2024-07-30 DIAGNOSIS — E87.6 HYPOKALEMIA: ICD-10-CM

## 2024-07-30 PROBLEM — I50.20 HFREF (HEART FAILURE WITH REDUCED EJECTION FRACTION) (HCC): Status: ACTIVE | Noted: 2024-07-30

## 2024-07-30 LAB
ANION GAP SERPL CALCULATED.3IONS-SCNC: 14 MMOL/L (ref 3–16)
BASOPHILS # BLD: 0 K/UL (ref 0–0.2)
BASOPHILS NFR BLD: 0.9 %
BUN SERPL-MCNC: 12 MG/DL (ref 7–20)
CALCIUM SERPL-MCNC: 9.4 MG/DL (ref 8.3–10.6)
CHLORIDE SERPL-SCNC: 105 MMOL/L (ref 99–110)
CO2 SERPL-SCNC: 20 MMOL/L (ref 21–32)
CREAT SERPL-MCNC: 1 MG/DL (ref 0.6–1.2)
DEPRECATED RDW RBC AUTO: 15.2 % (ref 12.4–15.4)
EOSINOPHIL # BLD: 0.2 K/UL (ref 0–0.6)
EOSINOPHIL NFR BLD: 4.1 %
GFR SERPLBLD CREATININE-BSD FMLA CKD-EPI: 64 ML/MIN/{1.73_M2}
GLUCOSE SERPL-MCNC: 95 MG/DL (ref 70–99)
HCT VFR BLD AUTO: 35.4 % (ref 36–48)
HGB BLD-MCNC: 11.7 G/DL (ref 12–16)
LYMPHOCYTES # BLD: 1.1 K/UL (ref 1–5.1)
LYMPHOCYTES NFR BLD: 25 %
MAGNESIUM SERPL-MCNC: 1.6 MG/DL (ref 1.8–2.4)
MCH RBC QN AUTO: 31 PG (ref 26–34)
MCHC RBC AUTO-ENTMCNC: 33 G/DL (ref 31–36)
MCV RBC AUTO: 94 FL (ref 80–100)
MONOCYTES # BLD: 0.4 K/UL (ref 0–1.3)
MONOCYTES NFR BLD: 8.4 %
NEUTROPHILS # BLD: 2.6 K/UL (ref 1.7–7.7)
NEUTROPHILS NFR BLD: 61.6 %
PLATELET # BLD AUTO: 208 K/UL (ref 135–450)
PMV BLD AUTO: 7.7 FL (ref 5–10.5)
POTASSIUM SERPL-SCNC: 3.2 MMOL/L (ref 3.5–5.1)
RBC # BLD AUTO: 3.77 M/UL (ref 4–5.2)
SODIUM SERPL-SCNC: 139 MMOL/L (ref 136–145)
TROPONIN, HIGH SENSITIVITY: <6 NG/L (ref 0–14)
TROPONIN, HIGH SENSITIVITY: <6 NG/L (ref 0–14)
WBC # BLD AUTO: 4.2 K/UL (ref 4–11)

## 2024-07-30 PROCEDURE — 84484 ASSAY OF TROPONIN QUANT: CPT

## 2024-07-30 PROCEDURE — 6370000000 HC RX 637 (ALT 250 FOR IP): Performed by: EMERGENCY MEDICINE

## 2024-07-30 PROCEDURE — 71045 X-RAY EXAM CHEST 1 VIEW: CPT

## 2024-07-30 PROCEDURE — 85025 COMPLETE CBC W/AUTO DIFF WBC: CPT

## 2024-07-30 PROCEDURE — 80048 BASIC METABOLIC PNL TOTAL CA: CPT

## 2024-07-30 PROCEDURE — 96365 THER/PROPH/DIAG IV INF INIT: CPT

## 2024-07-30 PROCEDURE — 99285 EMERGENCY DEPT VISIT HI MDM: CPT

## 2024-07-30 PROCEDURE — 6360000002 HC RX W HCPCS: Performed by: EMERGENCY MEDICINE

## 2024-07-30 PROCEDURE — 93005 ELECTROCARDIOGRAM TRACING: CPT | Performed by: EMERGENCY MEDICINE

## 2024-07-30 PROCEDURE — 83735 ASSAY OF MAGNESIUM: CPT

## 2024-07-30 PROCEDURE — 99222 1ST HOSP IP/OBS MODERATE 55: CPT | Performed by: INTERNAL MEDICINE

## 2024-07-30 RX ORDER — VITS A,C,E/LUTEIN/MINERALS 300MCG-200
200 TABLET ORAL 2 TIMES DAILY
Qty: 60 TABLET | Refills: 0 | Status: SHIPPED | OUTPATIENT
Start: 2024-07-30

## 2024-07-30 RX ORDER — MAGNESIUM SULFATE IN WATER 40 MG/ML
2000 INJECTION, SOLUTION INTRAVENOUS ONCE
Status: COMPLETED | OUTPATIENT
Start: 2024-07-30 | End: 2024-07-30

## 2024-07-30 RX ORDER — POTASSIUM CHLORIDE 20 MEQ/1
40 TABLET, EXTENDED RELEASE ORAL ONCE
Status: COMPLETED | OUTPATIENT
Start: 2024-07-30 | End: 2024-07-30

## 2024-07-30 RX ORDER — POTASSIUM CHLORIDE 20 MEQ/1
40 TABLET, EXTENDED RELEASE ORAL DAILY
Qty: 60 TABLET | Refills: 0 | Status: SHIPPED | OUTPATIENT
Start: 2024-07-30 | End: 2024-08-29

## 2024-07-30 RX ADMIN — MAGNESIUM SULFATE HEPTAHYDRATE 2000 MG: 40 INJECTION, SOLUTION INTRAVENOUS at 15:04

## 2024-07-30 RX ADMIN — POTASSIUM CHLORIDE 40 MEQ: 1500 TABLET, EXTENDED RELEASE ORAL at 15:01

## 2024-07-30 ASSESSMENT — PAIN - FUNCTIONAL ASSESSMENT: PAIN_FUNCTIONAL_ASSESSMENT: NONE - DENIES PAIN

## 2024-07-30 ASSESSMENT — PAIN DESCRIPTION - LOCATION: LOCATION: CHEST

## 2024-07-30 ASSESSMENT — PAIN DESCRIPTION - DESCRIPTORS: DESCRIPTORS: CRAMPING

## 2024-07-30 ASSESSMENT — PAIN DESCRIPTION - ORIENTATION: ORIENTATION: RIGHT

## 2024-07-30 ASSESSMENT — PAIN SCALES - GENERAL: PAINLEVEL_OUTOF10: 0

## 2024-07-30 NOTE — CONSULTS
COLONOSCOPY POLYPECTOMY SNARE/COLD BIOPSY performed by Fred Phillips MD at [facility] ENDOSCOPY    COLONOSCOPY N/A 10/27/2023    COLONOSCOPY WITH BIOPSY performed by Fred Phillips MD at [facility] ENDOSCOPY    COLONOSCOPY  10/27/2023    COLONOSCOPY POLYPECTOMY SNARE/COLD BIOPSY performed by Fred Phillips MD at [facility] ENDOSCOPY    PACEMAKER INSERTION  11/29/2012    dual chamber PPM, [employer]tr[employer]    TUBAL LIGATION      UPPER GASTROINTESTINAL ENDOSCOPY N/A 10/27/2020    EGD DIAGNOSTIC ONLY performed by Fred Phillips MD at [facility] ENDOSCOPY    UPPER GASTROINTESTINAL ENDOSCOPY N/A 04/08/2021    EGD CONTROL HEMORRHAGE WITH APPLICATION OF 3 CLIPS TO DUODENAL MASS performed by Zoey Mena MD at [facility] ENDOSCOPY    UPPER GASTROINTESTINAL ENDOSCOPY N/A 04/08/2021    EGD BIOPSY DUODENAL MASS performed by Zoey Mena MD at [facility] ENDOSCOPY    UPPER GASTROINTESTINAL ENDOSCOPY N/A 04/08/2021    EGD SUBMUCOSAL INJECTION OF 0.6 MG OF EPINEPRINE INTO BASE OF DUODENAL MASS performed by Zoey Mena MD at [facility] ENDOSCOPY    UPPER GASTROINTESTINAL ENDOSCOPY N/A 03/04/2022    EGD BIOPSY performed by Fred Phillips MD at [facility] ENDOSCOPY    UPPER GASTROINTESTINAL ENDOSCOPY N/A 1/22/2024    EGD DIAGNOSTIC ONLY performed by Emile Pinon MD at [facility] ENDOSCOPY    UTERINE FIBROID SURGERY      VENTRAL HERNIA REPAIR N/A 9/27/2022    OPEN EXPLORATORY LAPAROTOMY, REPAIR OF REDUCIBLE INCISIONAL HERNIA WITH MESH,  UNILATERAL  ABDOMINAL WALL COMPONENT RELEASE performed by Flavio Coronel MD at [facility] OR       Allergies   Allergen Reactions    Latex Hives     rash    Codeine Other (See Comments) and Hives     Hives    Morphine Shortness Of Breath    Entresto [Sacubitril-Valsartan]      Lips and tongue swelling    Furosemide Other (See Comments)    Jardiance [Empagliflozin] Itching     Yeast Infection    Nitroglycerin Hives    Other      CT dyes; recently noticed that after receiving CT dye notices vaginal

## 2024-07-31 LAB
EKG ATRIAL RATE: 75 BPM
EKG DIAGNOSIS: NORMAL
EKG P AXIS: 107 DEGREES
EKG P-R INTERVAL: 124 MS
EKG Q-T INTERVAL: 468 MS
EKG QRS DURATION: 154 MS
EKG QTC CALCULATION (BAZETT): 522 MS
EKG R AXIS: 269 DEGREES
EKG T AXIS: 69 DEGREES
EKG VENTRICULAR RATE: 75 BPM

## 2024-07-31 PROCEDURE — 93010 ELECTROCARDIOGRAM REPORT: CPT | Performed by: INTERNAL MEDICINE

## 2024-07-31 NOTE — ED PROVIDER NOTES
EMERGENCY DEPARTMENT PROVIDER NOTE    Patient Identification  Pt Name: Chelsi Lock  MRN: [id number]  Birthdate 1965  Date of evaluation: 7/30/2024  Provider: Gomez Robbins DO  PCP: Evelin Yousif, APRN - CNP    Chief Complaint  Palpitations (Patient in by [facility] from work, [facility] had cp yesterday and today around 1130 had palpitations. States has hx of heart failure and takes Lasix every Wed, has afib/pacemaker and defib, on Xarelto.)      HPI  (History provided by patient)  This is a 59 y.o. female with pertinent past medical history of hypertension, PAF, CHF, AICD placement who was brought in by EMS transportation for palpitations.  Patient reports sensation of her heartbeat racing and fluttering since waking up this morning about 4 hours prior to arrival.  Patient reports some aching pain in her right chest and shoulder yesterday which resolved before going to bed last night.  She denies any fevers, chills, shortness of breath.  She reports adherence to her Xarelto.       I have reviewed the following nursing documentation:  Allergies: Latex, Codeine, Morphine, Entresto [sacubitril-valsartan], Furosemide, Jardiance [empagliflozin], Nitroglycerin, Other, Hydralazine, Lisinopril, Metronidazole, Pantoprazole, Sulfa antibiotics, and Valsartan    Past medical history:   Past Medical History:   Diagnosis Date    Anemia     Anxiety     Atrial fibrillation and flutter (HCC)     Atrial flutter (HCC)     CHB (complete heart block) (HCC)     Chronic combined systolic and diastolic heart failure (HCC)     Class 1 obesity without serious comorbidity with body mass index (BMI) of 33.0 to 33.9 in adult 09/06/2019    Congenital heart disease     Difficult intravenous access 10/06/2022    SEE NOTE    Dilated cardiomyopathy (HCC)     GERD (gastroesophageal reflux disease)     Headache(784.0)     Hyperlipidemia     Hypertension     Hypovitaminosis     ICD (implantable cardioverter-defibrillator), biventricular, in

## 2024-08-15 ENCOUNTER — APPOINTMENT (OUTPATIENT)
Dept: GENERAL RADIOLOGY | Age: 59
End: 2024-08-15

## 2024-08-15 ENCOUNTER — HOSPITAL ENCOUNTER (EMERGENCY)
Age: 59
Discharge: HOME | End: 2024-08-15
Attending: EMERGENCY MEDICINE

## 2024-08-15 VITALS
TEMPERATURE: 98.7 F | OXYGEN SATURATION: 99 % | SYSTOLIC BLOOD PRESSURE: 126 MMHG | HEIGHT: 67 IN | WEIGHT: 199 LBS | DIASTOLIC BLOOD PRESSURE: 87 MMHG | HEART RATE: 89 BPM | RESPIRATION RATE: 16 BRPM | BODY MASS INDEX: 31.23 KG/M2

## 2024-08-15 DIAGNOSIS — J20.9 ACUTE BRONCHITIS, UNSPECIFIED ORGANISM: ICD-10-CM

## 2024-08-15 DIAGNOSIS — U07.1 COVID-19: Primary | ICD-10-CM

## 2024-08-15 LAB
FLUAV RNA RESP QL NAA+PROBE: NOT DETECTED
FLUBV RNA RESP QL NAA+PROBE: NOT DETECTED
SARS-COV-2 RNA RESP QL NAA+PROBE: DETECTED

## 2024-08-15 PROCEDURE — 87636 SARSCOV2 & INF A&B AMP PRB: CPT

## 2024-08-15 PROCEDURE — 71045 X-RAY EXAM CHEST 1 VIEW: CPT

## 2024-08-15 PROCEDURE — 99285 EMERGENCY DEPT VISIT HI MDM: CPT

## 2024-08-15 PROCEDURE — 93005 ELECTROCARDIOGRAM TRACING: CPT | Performed by: EMERGENCY MEDICINE

## 2024-08-15 RX ORDER — BENZONATATE 100 MG/1
100 CAPSULE ORAL 2 TIMES DAILY PRN
Qty: 20 CAPSULE | Refills: 0 | Status: SHIPPED | OUTPATIENT
Start: 2024-08-15 | End: 2024-08-22

## 2024-08-15 ASSESSMENT — PAIN SCALES - GENERAL: PAINLEVEL_OUTOF10: 10

## 2024-08-15 ASSESSMENT — PAIN - FUNCTIONAL ASSESSMENT: PAIN_FUNCTIONAL_ASSESSMENT: 0-10

## 2024-08-16 ENCOUNTER — TELEPHONE (OUTPATIENT)
Dept: FAMILY MEDICINE CLINIC | Age: 59
End: 2024-08-16

## 2024-08-16 LAB
EKG ATRIAL RATE: 76 BPM
EKG DIAGNOSIS: NORMAL
EKG P AXIS: 42 DEGREES
EKG P-R INTERVAL: 126 MS
EKG Q-T INTERVAL: 438 MS
EKG QRS DURATION: 146 MS
EKG QTC CALCULATION (BAZETT): 492 MS
EKG R AXIS: -81 DEGREES
EKG T AXIS: 87 DEGREES
EKG VENTRICULAR RATE: 76 BPM

## 2024-08-16 PROCEDURE — 93010 ELECTROCARDIOGRAM REPORT: CPT | Performed by: INTERNAL MEDICINE

## 2024-08-16 NOTE — ED PROVIDER NOTES
[facility] Emergency Department [facility]    I, Zev Coronel MD, am the primary clinician of record.    CHIEF COMPLAINT  Chief Complaint   Patient presents with    Shortness of Breath     SOB and CP since yesterday, cough, reports a coworker was Covid +        HISTORY OF PRESENT ILLNESS  Chelsi Lock is a 59 y.o. female  who presents to the ED complaining of cough, congestion of the nose and chest and bodyaches over the past 24 hours or so.  The patient has had no measured fevers.  She does feel short of breath when she has coughing fits though.  She does have a significant cardiac history including anticoagulation with a history of A-fib and a defibrillator.  She denies any chest pain except when coughing.    No other complaints, modifying factors or associated symptoms.     I have reviewed the following from the nursing documentation.    Past Medical History:   Diagnosis Date    Anemia     Anxiety     Atrial fibrillation and flutter (HCC)     Atrial flutter (HCC)     CHB (complete heart block) (HCC)     Chronic combined systolic and diastolic heart failure (HCC)     Class 1 obesity without serious comorbidity with body mass index (BMI) of 33.0 to 33.9 in adult 09/06/2019    Congenital heart disease     Difficult intravenous access 10/06/2022    SEE NOTE    Dilated cardiomyopathy (HCC)     GERD (gastroesophageal reflux disease)     Headache(784.0)     Hyperlipidemia     Hypertension     Hypovitaminosis     ICD (implantable cardioverter-defibrillator), biventricular, in situ     LV dysfunction     Migraine     Obstructive sleep apnea (adult) (pediatric)     Paresthesia of right foot     PONV (postoperative nausea and vomiting)     Prolonged emergence from general anesthesia     sensitive to meds, slow to wake    Sciatica of left side     Sleep apnea     uses CPAP    Syncope      Past Surgical History:   Procedure Laterality Date    CARDIAC DEFIBRILLATOR PLACEMENT  01/2021    Medtronic    CARDIOVERSION   01/28/2022    CARDIOVERSION  02/22/2022    COLECTOMY N/A 04/13/2021    EXPLORATORY LAPAROTOMY, RESECTION OF DUODENAL MASS, CHOLECYSTECTOMY WITH INTRAOPERATIVE CHOLANGIOGRAM performed by Flavio Coronel MD at [facility] OR    COLONOSCOPY N/A 10/27/2020    COLONOSCOPY POLYPECTOMY SNARE/COLD BIOPSY performed by Fred Phillips MD at [facility] ENDOSCOPY    COLONOSCOPY N/A 10/27/2023    COLONOSCOPY WITH BIOPSY performed by Fred Phillips MD at [facility] ENDOSCOPY    COLONOSCOPY  10/27/2023    COLONOSCOPY POLYPECTOMY SNARE/COLD BIOPSY performed by Fred Phillips MD at [facility] ENDOSCOPY    PACEMAKER INSERTION  11/29/2012    dual chamber PPM, [employer]    TUBAL LIGATION      UPPER GASTROINTESTINAL ENDOSCOPY N/A 10/27/2020    EGD DIAGNOSTIC ONLY performed by Fred Phillips MD at [facility] ENDOSCOPY    UPPER GASTROINTESTINAL ENDOSCOPY N/A 04/08/2021    EGD CONTROL HEMORRHAGE WITH APPLICATION OF 3 CLIPS TO DUODENAL MASS performed by Zoey Mena MD at [facility] ENDOSCOPY    UPPER GASTROINTESTINAL ENDOSCOPY N/A 04/08/2021    EGD BIOPSY DUODENAL MASS performed by Zoey Mena MD at [facility] ENDOSCOPY    UPPER GASTROINTESTINAL ENDOSCOPY N/A 04/08/2021    EGD SUBMUCOSAL INJECTION OF 0.6 MG OF EPINEPRINE INTO BASE OF DUODENAL MASS performed by Zoey Mena MD at [facility] ENDOSCOPY    UPPER GASTROINTESTINAL ENDOSCOPY N/A 03/04/2022    EGD BIOPSY performed by Fred Phillips MD at [facility] ENDOSCOPY    UPPER GASTROINTESTINAL ENDOSCOPY N/A 1/22/2024    EGD DIAGNOSTIC ONLY performed by Emile Pinon MD at [facility] ENDOSCOPY    UTERINE FIBROID SURGERY      VENTRAL HERNIA REPAIR N/A 9/27/2022    OPEN EXPLORATORY LAPAROTOMY, REPAIR OF REDUCIBLE INCISIONAL HERNIA WITH MESH,  UNILATERAL  ABDOMINAL WALL COMPONENT RELEASE performed by Flavio Coronel MD at [facility] OR     Family History   Problem Relation Age of Onset    High Blood Pressure Mother     Cancer Father     Heart Failure Sister     No Known Problems Sister     No Known

## 2024-08-16 NOTE — TELEPHONE ENCOUNTER
Called and spoke to patient, she does not have health insurance. Recommend drinking plenty of fluids, rest as much as possible, tylenol or motrin as needed for body aches, pain or fever.  May also use other the counter cold and flu medications such as NyQuil or DayQuil as needed.  If using OTC cough and cold medication avoid tylenol.  We reviewed her past medical history and reassured patient that a few days of cold medication will likely be safe to help with symptoms.  She can not afford to pay for paxlovid or Molnupirivir.

## 2024-08-16 NOTE — TELEPHONE ENCOUNTER
nirmatrelvir/ritonavir 300/100 (PAXLOVID) 20 x 150 MG & 10 x 100MG TBPK -   Pt called and said above medication is not covered by her insurance and wanted to see if martina could call in an alternative        [facility] [id number] - [place], [place] - [address] [phone] - F [phone]

## 2024-08-16 NOTE — DISCHARGE INSTRUCTIONS
DO NOT TAKE YOUR XARELTO, FUROSEMIDE (LASIX), OR ENTRESTO UNTIL 2-3 DAYS AFTER COMPLETION OF YOUR PAXLOVID.  Then resume as usual.

## 2024-09-14 DIAGNOSIS — I50.42 CHRONIC COMBINED SYSTOLIC AND DIASTOLIC HEART FAILURE (HCC): ICD-10-CM

## 2024-09-24 RX ORDER — SPIRONOLACTONE 25 MG/1
25 TABLET ORAL DAILY
Qty: 90 TABLET | Refills: 0 | Status: SHIPPED | OUTPATIENT
Start: 2024-09-24

## 2024-10-01 ENCOUNTER — OFFICE VISIT (OUTPATIENT)
Dept: FAMILY MEDICINE CLINIC | Age: 59
End: 2024-10-01
Payer: COMMERCIAL

## 2024-10-01 VITALS
DIASTOLIC BLOOD PRESSURE: 76 MMHG | RESPIRATION RATE: 16 BRPM | WEIGHT: 200 LBS | OXYGEN SATURATION: 99 % | BODY MASS INDEX: 31.39 KG/M2 | HEART RATE: 70 BPM | SYSTOLIC BLOOD PRESSURE: 130 MMHG | HEIGHT: 67 IN

## 2024-10-01 DIAGNOSIS — M79.671 RIGHT FOOT PAIN: ICD-10-CM

## 2024-10-01 DIAGNOSIS — R39.9 UTI SYMPTOMS: Primary | ICD-10-CM

## 2024-10-01 DIAGNOSIS — Z23 NEED FOR VACCINATION: ICD-10-CM

## 2024-10-01 LAB
BILIRUBIN, POC: NORMAL
BLOOD URINE, POC: NORMAL
CLARITY, POC: CLEAR
COLOR, POC: YELLOW
GLUCOSE URINE, POC: NORMAL MG/DL
KETONES, POC: NORMAL MG/DL
LEUKOCYTE EST, POC: NORMAL
NITRITE, POC: NORMAL
PH, POC: 6.5
PROTEIN, POC: NORMAL MG/DL
SPECIFIC GRAVITY, POC: 1.01
UROBILINOGEN, POC: 0.2 MG/DL

## 2024-10-01 PROCEDURE — 99214 OFFICE O/P EST MOD 30 MIN: CPT | Performed by: NURSE PRACTITIONER

## 2024-10-01 PROCEDURE — 81002 URINALYSIS NONAUTO W/O SCOPE: CPT | Performed by: NURSE PRACTITIONER

## 2024-10-01 PROCEDURE — 90471 IMMUNIZATION ADMIN: CPT | Performed by: NURSE PRACTITIONER

## 2024-10-01 PROCEDURE — 1036F TOBACCO NON-USER: CPT | Performed by: NURSE PRACTITIONER

## 2024-10-01 PROCEDURE — 3075F SYST BP GE 130 - 139MM HG: CPT | Performed by: NURSE PRACTITIONER

## 2024-10-01 PROCEDURE — 3078F DIAST BP <80 MM HG: CPT | Performed by: NURSE PRACTITIONER

## 2024-10-01 PROCEDURE — G8417 CALC BMI ABV UP PARAM F/U: HCPCS | Performed by: NURSE PRACTITIONER

## 2024-10-01 PROCEDURE — G8484 FLU IMMUNIZE NO ADMIN: HCPCS | Performed by: NURSE PRACTITIONER

## 2024-10-01 PROCEDURE — G8427 DOCREV CUR MEDS BY ELIG CLIN: HCPCS | Performed by: NURSE PRACTITIONER

## 2024-10-01 PROCEDURE — 3017F COLORECTAL CA SCREEN DOC REV: CPT | Performed by: NURSE PRACTITIONER

## 2024-10-01 PROCEDURE — 90661 CCIIV3 VAC ABX FR 0.5 ML IM: CPT | Performed by: NURSE PRACTITIONER

## 2024-10-01 ASSESSMENT — PATIENT HEALTH QUESTIONNAIRE - PHQ9
1. LITTLE INTEREST OR PLEASURE IN DOING THINGS: NOT AT ALL
SUM OF ALL RESPONSES TO PHQ QUESTIONS 1-9: 0
SUM OF ALL RESPONSES TO PHQ9 QUESTIONS 1 & 2: 0
SUM OF ALL RESPONSES TO PHQ QUESTIONS 1-9: 0

## 2024-10-01 ASSESSMENT — ENCOUNTER SYMPTOMS
SHORTNESS OF BREATH: 0
COUGH: 0
CHEST TIGHTNESS: 0
WHEEZING: 0
GASTROINTESTINAL NEGATIVE: 1

## 2024-10-01 NOTE — PATIENT INSTRUCTIONS
Call  Urology to schedule [phone]   Call to schedule with your gynecologist also    [facility] Hours [facility]  No Appointment Needed  Monday - Friday 5:00pm-9:00pm  Saturday 9:00am - 1:00pm  [address], [address] [place] [address]

## 2024-10-01 NOTE — PROGRESS NOTES
10/1/2024     Chelsi Lock (:  1965) is a 59 y.o. female, here for evaluation of the following medical concerns:    Chief Complaint   Patient presents with    Urinary Tract Infection     Frequent urination, odor, x3 weeks    Foot Pain     Right foot pain, x2 weeks      HPI  UTI symptoms: onset three weeks ago.  Burning is intermittent.  Some frequency, no urgency.  She has seen an urologist in the past for this but not given any type of diagnosis.      Foot pain:  started two weeks ago, pain is located on the top of her foot and is constant after standing for long periods.  Denies injury.      Review of Systems   Constitutional:  Negative for chills, diaphoresis, fatigue and fever.   Respiratory:  Negative for cough, chest tightness, shortness of breath and wheezing.    Cardiovascular:  Negative for chest pain and palpitations.   Gastrointestinal: Negative.    Genitourinary:  Positive for dysuria and frequency. Negative for difficulty urinating, flank pain, hematuria, pelvic pain, urgency and vaginal discharge.   Musculoskeletal:         Right foot pain   Neurological:  Negative for dizziness, weakness and headaches.     Prior to Visit Medications    Medication Sig Taking? Authorizing Provider   rivaroxaban (XARELTO) 20 MG TABS tablet Take 1 tablet by mouth Daily with supper  Fred Granados APRN - CNP   spironolactone (ALDACTONE) 25 MG tablet TAKE 1 TABLET BY MOUTH DAILY  Rema Rivers APRN - CNS   potassium chloride (KLOR-CON M) 20 MEQ extended release tablet Take 2 tablets by mouth daily  Gomez Robbins DO   Magnesium Oxide -Mg Supplement (MAG-OXIDE) 200 MG TABS Take 200 mg by mouth in the morning and at bedtime  Gomez Robbins DO   D3-1000 25 MCG (1000 UT) TABS tablet TAKE ONE TABLET BY MOUTH DAILY  Fred Granados APRN - CNP   ammonium lactate (AMLACTIN) 12 % cream Apply 1 Bottle topically as needed for Dry Skin (elbows)  Evelin Yousif APRN - CNP   fluconazole (DIFLUCAN) 150 MG tablet

## 2024-10-03 LAB — BACTERIA UR CULT: NORMAL

## 2024-10-05 ENCOUNTER — HOSPITAL ENCOUNTER (OUTPATIENT)
Dept: MAMMOGRAPHY | Age: 59
Discharge: HOME | End: 2024-10-05
Payer: COMMERCIAL

## 2024-10-05 VITALS — HEIGHT: 67 IN | WEIGHT: 200 LBS | BODY MASS INDEX: 31.39 KG/M2

## 2024-10-05 DIAGNOSIS — Z12.31 VISIT FOR SCREENING MAMMOGRAM: ICD-10-CM

## 2024-10-05 PROCEDURE — 77063 BREAST TOMOSYNTHESIS BI: CPT

## 2024-10-11 ENCOUNTER — OFFICE VISIT (OUTPATIENT)
Dept: ORTHOPEDIC SURGERY | Age: 59
End: 2024-10-11
Payer: COMMERCIAL

## 2024-10-11 VITALS — RESPIRATION RATE: 12 BRPM | WEIGHT: 200 LBS | BODY MASS INDEX: 31.39 KG/M2 | HEIGHT: 67 IN

## 2024-10-11 DIAGNOSIS — M19.071 ARTHRITIS OF RIGHT MIDFOOT: ICD-10-CM

## 2024-10-11 DIAGNOSIS — M79.671 RIGHT FOOT PAIN: Primary | ICD-10-CM

## 2024-10-11 PROCEDURE — G8427 DOCREV CUR MEDS BY ELIG CLIN: HCPCS | Performed by: PHYSICIAN ASSISTANT

## 2024-10-11 PROCEDURE — 1036F TOBACCO NON-USER: CPT | Performed by: PHYSICIAN ASSISTANT

## 2024-10-11 PROCEDURE — 99213 OFFICE O/P EST LOW 20 MIN: CPT | Performed by: PHYSICIAN ASSISTANT

## 2024-10-11 PROCEDURE — 3017F COLORECTAL CA SCREEN DOC REV: CPT | Performed by: PHYSICIAN ASSISTANT

## 2024-10-11 PROCEDURE — G8484 FLU IMMUNIZE NO ADMIN: HCPCS | Performed by: PHYSICIAN ASSISTANT

## 2024-10-11 PROCEDURE — G8417 CALC BMI ABV UP PARAM F/U: HCPCS | Performed by: PHYSICIAN ASSISTANT

## 2024-10-11 RX ORDER — METHYLPREDNISOLONE 4 MG
TABLET, DOSE PACK ORAL
Qty: 1 KIT | Refills: 0 | Status: SHIPPED | OUTPATIENT
Start: 2024-10-11

## 2024-10-11 NOTE — PROGRESS NOTES
Subjective:      Patient ID: Chelsi Lock is a 59 y.o. female who presents to the [facility] for chief complaint of right foot pain.    HPI:   She states she has been experiencing pain in the right foot for about 1 month.  No history of injury.  Symptoms are progressively worsening.  She has tried ice, rest, Tylenol, Aspercreme as well as different shoes without relief.    Pain Scale at times as severe as 10/10 VAS.  Location of pain dorsal aspect of the right midfoot.  Pain is worse with weightbearing.   Pain improves with elevation.   She is on anticoagulation therapy, cannot take NSAIDs.    Review of Systems:  I have reviewed the clinically relevant past medical history, medications, allergies, family history, social history, and 13 point Review of Systems from the patient's recent history form & documented any details relevant to today's presenting complaints in the history above. The patient's self-reported past medical history, medications, allergies, family history, social history, and Review of Systems form from today and has been scanned into the chart under the \"Media\" tab.    Constitutional: denies fever, chills, weight loss.  MSK: denies pain in other joints, muscle aches.  Neurological: denies numbness, tingling, weakness.       Past Medical History:   Diagnosis Date    Anemia     Anxiety     Atrial fibrillation and flutter (HCC)     Atrial flutter (HCC)     CHB (complete heart block) (HCC)     Chronic combined systolic and diastolic heart failure (HCC)     Class 1 obesity without serious comorbidity with body mass index (BMI) of 33.0 to 33.9 in adult 09/06/2019    Congenital heart disease     Difficult intravenous access 10/06/2022    SEE NOTE    Dilated cardiomyopathy (HCC)     GERD (gastroesophageal reflux disease)     Headache(784.0)     Hyperlipidemia     Hypertension     Hypovitaminosis     ICD (implantable cardioverter-defibrillator), biventricular, in situ

## 2024-10-16 ENCOUNTER — TELEPHONE (OUTPATIENT)
Dept: FAMILY MEDICINE CLINIC | Age: 59
End: 2024-10-16

## 2024-10-16 NOTE — TELEPHONE ENCOUNTER
Pt said she has been having a cough for 4 days with yellow brown mucus. Pt said she took a at home covid test and it was neg. Pt wanted medication called in to help. Try to offer pt appt today with available providers but pt didn't want to be come in for an appt.     Please send meds to below [facility] [phone] - [facility] [id number] [place] [place] [phone] - F [phone]

## 2024-10-16 NOTE — TELEPHONE ENCOUNTER
Please let patient know Sonia is out of the office this week.  I recommend an appointment with available provider this week for evaluation of symptoms.  Otherwise she can take over-the-counter Delsym.

## 2024-10-23 ENCOUNTER — OFFICE VISIT (OUTPATIENT)
Dept: CARDIOLOGY CLINIC | Age: 59
End: 2024-10-23
Payer: COMMERCIAL

## 2024-10-23 VITALS
WEIGHT: 196 LBS | BODY MASS INDEX: 30.76 KG/M2 | DIASTOLIC BLOOD PRESSURE: 88 MMHG | HEIGHT: 67 IN | SYSTOLIC BLOOD PRESSURE: 140 MMHG | HEART RATE: 80 BPM | OXYGEN SATURATION: 99 %

## 2024-10-23 DIAGNOSIS — Z95.810 ICD (IMPLANTABLE CARDIOVERTER-DEFIBRILLATOR), BIVENTRICULAR, IN SITU: ICD-10-CM

## 2024-10-23 DIAGNOSIS — G47.33 OSA (OBSTRUCTIVE SLEEP APNEA): ICD-10-CM

## 2024-10-23 DIAGNOSIS — D50.9 IRON DEFICIENCY ANEMIA, UNSPECIFIED IRON DEFICIENCY ANEMIA TYPE: ICD-10-CM

## 2024-10-23 DIAGNOSIS — I48.0 PAF (PAROXYSMAL ATRIAL FIBRILLATION) (HCC): ICD-10-CM

## 2024-10-23 DIAGNOSIS — I50.22 CHRONIC SYSTOLIC CONGESTIVE HEART FAILURE (HCC): Primary | ICD-10-CM

## 2024-10-23 DIAGNOSIS — E55.9 HYPOVITAMINOSIS D: ICD-10-CM

## 2024-10-23 PROCEDURE — G8417 CALC BMI ABV UP PARAM F/U: HCPCS | Performed by: CLINICAL NURSE SPECIALIST

## 2024-10-23 PROCEDURE — 1036F TOBACCO NON-USER: CPT | Performed by: CLINICAL NURSE SPECIALIST

## 2024-10-23 PROCEDURE — 3079F DIAST BP 80-89 MM HG: CPT | Performed by: CLINICAL NURSE SPECIALIST

## 2024-10-23 PROCEDURE — 99214 OFFICE O/P EST MOD 30 MIN: CPT | Performed by: CLINICAL NURSE SPECIALIST

## 2024-10-23 PROCEDURE — 3017F COLORECTAL CA SCREEN DOC REV: CPT | Performed by: CLINICAL NURSE SPECIALIST

## 2024-10-23 PROCEDURE — G8484 FLU IMMUNIZE NO ADMIN: HCPCS | Performed by: CLINICAL NURSE SPECIALIST

## 2024-10-23 PROCEDURE — G8427 DOCREV CUR MEDS BY ELIG CLIN: HCPCS | Performed by: CLINICAL NURSE SPECIALIST

## 2024-10-23 PROCEDURE — 3077F SYST BP >= 140 MM HG: CPT | Performed by: CLINICAL NURSE SPECIALIST

## 2024-10-23 NOTE — PATIENT INSTRUCTIONS
Increase entresto to  mg twice a day  Blood work in 2 weeks  Continue all other medications  RTO in 5 months with an echo

## 2024-10-23 NOTE — PROGRESS NOTES
[facility]  Progress Note    Primary Care Doctor:  Evelin Yousif, APRN - CNP    Chief Complaint   Patient presents with    6 Month Follow-Up    Congestive Heart Failure    Fatigue     weak        History of Present Illness:  59 y.o. female with history of sHF, LVH, AF on xarelto (follows with Dr Deleon), had been on flecainide, dual chamber pacemaker 2012 due to congenital heart block  LVEF had been 55% in 2015 and now 25%.  No lisinopril due to cough.  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  She is ambulatory and by her self.  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  She complains of being fatigued and weak at the end of the day.  She is taking all her medications but BP still elevated.  She lost insurance and did not get echo done and was last seen in 1/2024, no recent blood work.  No chest pain, palpitations, edema, shortness of breath or lightheadedness.  Optival shows normal thoracic impedence and no arrhythmias    Past Medical History:   has a past medical history of Anemia, Anxiety, Atrial fibrillation and flutter (HCC), Atrial flutter (HCC), CHB (complete heart block) (HCC), Chronic combined systolic and diastolic heart failure (HCC), Class 1 obesity without serious comorbidity with body mass index (BMI) of 33.0 to 33.9 in adult, Congenital heart disease, Difficult intravenous access, Dilated cardiomyopathy (HCC), GERD (gastroesophageal reflux disease), Headache(784.0), Hyperlipidemia, Hypertension, Hypovitaminosis, ICD (implantable cardioverter-defibrillator), biventricular, in situ, LV dysfunction, Migraine, Obstructive sleep

## 2024-10-25 ENCOUNTER — APPOINTMENT (OUTPATIENT)
Dept: ULTRASOUND IMAGING | Age: 59
End: 2024-10-25
Payer: COMMERCIAL

## 2024-10-25 ENCOUNTER — HOSPITAL ENCOUNTER (OUTPATIENT)
Dept: MAMMOGRAPHY | Age: 59
Discharge: HOME | End: 2024-10-25
Payer: COMMERCIAL

## 2024-10-25 DIAGNOSIS — R93.89 ABNORMAL FINDING ON IMAGING: ICD-10-CM

## 2024-10-25 PROCEDURE — G0279 TOMOSYNTHESIS, MAMMO: HCPCS

## 2024-11-01 ENCOUNTER — OFFICE VISIT (OUTPATIENT)
Dept: FAMILY MEDICINE CLINIC | Age: 59
End: 2024-11-01
Payer: COMMERCIAL

## 2024-11-01 ENCOUNTER — TELEPHONE (OUTPATIENT)
Dept: CARDIOLOGY CLINIC | Age: 59
End: 2024-11-01

## 2024-11-01 VITALS
RESPIRATION RATE: 12 BRPM | OXYGEN SATURATION: 99 % | WEIGHT: 194 LBS | DIASTOLIC BLOOD PRESSURE: 86 MMHG | SYSTOLIC BLOOD PRESSURE: 134 MMHG | HEIGHT: 67 IN | BODY MASS INDEX: 30.45 KG/M2 | HEART RATE: 81 BPM

## 2024-11-01 DIAGNOSIS — D25.9 UTERINE LEIOMYOMA, UNSPECIFIED LOCATION: ICD-10-CM

## 2024-11-01 DIAGNOSIS — Z01.419 WELL WOMAN EXAM WITH ROUTINE GYNECOLOGICAL EXAM: Primary | ICD-10-CM

## 2024-11-01 DIAGNOSIS — N89.8 VAGINAL ODOR: ICD-10-CM

## 2024-11-01 PROCEDURE — 3075F SYST BP GE 130 - 139MM HG: CPT | Performed by: NURSE PRACTITIONER

## 2024-11-01 PROCEDURE — 99396 PREV VISIT EST AGE 40-64: CPT | Performed by: NURSE PRACTITIONER

## 2024-11-01 PROCEDURE — 3079F DIAST BP 80-89 MM HG: CPT | Performed by: NURSE PRACTITIONER

## 2024-11-01 PROCEDURE — G8484 FLU IMMUNIZE NO ADMIN: HCPCS | Performed by: NURSE PRACTITIONER

## 2024-11-01 ASSESSMENT — PATIENT HEALTH QUESTIONNAIRE - PHQ9
SUM OF ALL RESPONSES TO PHQ QUESTIONS 1-9: 1
2. FEELING DOWN, DEPRESSED OR HOPELESS: SEVERAL DAYS
1. LITTLE INTEREST OR PLEASURE IN DOING THINGS: NOT AT ALL
SUM OF ALL RESPONSES TO PHQ QUESTIONS 1-9: 1
SUM OF ALL RESPONSES TO PHQ9 QUESTIONS 1 & 2: 1
SUM OF ALL RESPONSES TO PHQ QUESTIONS 1-9: 1
SUM OF ALL RESPONSES TO PHQ QUESTIONS 1-9: 1

## 2024-11-01 NOTE — TELEPHONE ENCOUNTER
Submitted PA for ENTRESTO  Via [facility] Key: [id number]  STATUS: PENDING.    Follow up done daily; if no decision with in three days we will refax.  If another three days goes by with no decision will call the insurance for status.

## 2024-11-01 NOTE — PROGRESS NOTES
Chelsi Lock  YOB: 1965     Chief Complaint:   Chelsi Lock is a 59 y.o. female who presents for routine gynecologic exam.    HPI  She is having vaginal odor for some time.  Has not been sexually active for 16 years.  She says she has fibroids.  She has had a fibroid embolization in the past.    - Last PAP: 16 years ago,  normal  - Hx abnormal PAP: no  - LMP:  10 years ago  - Self-breast exams: yes  - Exercise: no  - Daily Multivitamin: yes  - Calcium/vitamin D supplement: yes    11/1/2024  Allergies   Allergen Reactions    Latex Hives     rash    Codeine Other (See Comments) and Hives     Hives    Morphine Shortness Of Breath    Entresto [Sacubitril-Valsartan]      Lips and tongue swelling    Furosemide Other (See Comments)    Jardiance [Empagliflozin] Itching     Yeast Infection    Nitroglycerin Hives    Other      CT dyes; recently noticed that after receiving CT dye notices vaginal irritation and discomfort.     Hydralazine      headaches    Lisinopril Nausea And Vomiting     cough    Metronidazole Nausea And Vomiting    Pantoprazole Palpitations    Sulfa Antibiotics Nausea Only, Other (See Comments) and Nausea And Vomiting    Valsartan Nausea And Vomiting     Current Outpatient Medications   Medication Sig Dispense Refill    sacubitril-valsartan (ENTRESTO)  MG per tablet Take 1 tablet by mouth 2 times daily 60 tablet 3    spironolactone (ALDACTONE) 25 MG tablet TAKE 1 TABLET BY MOUTH DAILY 90 tablet 0    Magnesium Oxide -Mg Supplement (MAG-OXIDE) 200 MG TABS Take 200 mg by mouth in the morning and at bedtime 60 tablet 0    D3-1000 25 MCG (1000 UT) TABS tablet TAKE ONE TABLET BY MOUTH DAILY 90 tablet 3    rivaroxaban (XARELTO) 20 MG TABS tablet Take 1 tablet by mouth Daily with supper 90 tablet 1    ammonium lactate (AMLACTIN) 12 % cream Apply 1 Bottle topically as needed for Dry Skin (elbows) 1 each 5    butalbital-APAP-caffeine (FIORICET) -40 MG CAPS per capsule Take 1 capsule

## 2024-11-01 NOTE — TELEPHONE ENCOUNTER
Chelsi Lock (Ortiz: [id number])  PA Case ID #: [id number]  Rx #: [id number]    Can you complete this Prior Authorization patient has tried to  the new dose and was told it needs a PA.

## 2024-11-02 LAB
CANDIDA DNA VAG QL NAA+PROBE: NORMAL
G VAGINALIS DNA SPEC QL NAA+PROBE: NORMAL
T VAGINALIS DNA VAG QL NAA+PROBE: NORMAL

## 2024-11-03 DIAGNOSIS — I50.42 CHRONIC COMBINED SYSTOLIC AND DIASTOLIC HEART FAILURE (HCC): ICD-10-CM

## 2024-11-04 RX ORDER — SACUBITRIL AND VALSARTAN 49; 51 MG/1; MG/1
1 TABLET, FILM COATED ORAL 2 TIMES DAILY
Qty: 180 TABLET | Refills: 1 | Status: SHIPPED | OUTPATIENT
Start: 2024-11-04

## 2024-11-04 NOTE — TELEPHONE ENCOUNTER
Pt states she has not had entresto for 6 days and states she feels the difference. She has a heaviness in her chest. States her insurance has denied increased dose. Please call pt to advise.

## 2024-11-04 NOTE — TELEPHONE ENCOUNTER
Prior Authorization Team  Chelsi Lock ([place]: [id number])  PA Case ID #: [id number]  Rx #: [id number]  Pending response  they need more information, Can you check on Prior Authorization?    NPRG- Tried to reach patient LMOM for her to return our call. Should she continue the entresto 49-51 mg since we are waiting on PA response? Or should she take entresto 49-51 mg 2 tablets BID?  Please advise

## 2024-11-05 ENCOUNTER — TELEPHONE (OUTPATIENT)
Dept: CARDIOLOGY CLINIC | Age: 59
End: 2024-11-05

## 2024-11-05 NOTE — TELEPHONE ENCOUNTER
Tung Joshi states nprg needs to fill out IVAN for the Entresto on the same site as that has not been done. If dose needs to be change from previous dose a new prior authorization will be needed. Please contact pt with any questions.

## 2024-11-05 NOTE — TELEPHONE ENCOUNTER
This team does not recognize this workflow.  You have to send PA requests VIA telephone encounter, or .  If you have an questions don't hesitate to reach out.  The encounter cannot be attached to a prescription refill, or request.  That makes it show up in our inbox as an RX REQUEST.  That is not monitored and must be resent.

## 2024-11-06 NOTE — TELEPHONE ENCOUNTER
Called [facility] to find out the status, per Maria Elena this is to be processed under Joshi.     Submitted PA for entresto  Via [facility] Key: [id number]  STATUS: PENDING.    Follow up done daily; if no decision with in three days we will refax.  If another three days goes by with no decision will call the insurance for status.

## 2024-11-07 NOTE — TELEPHONE ENCOUNTER
Can you process a new prior authorization for the entresto  mg for systolic chf and cardiomyopathy and  Hypertension and attach last NPRG chart note titration up from entresto 49-51 mg to  mg

## 2024-11-14 NOTE — PROGRESS NOTES
mouth in the morning and at bedtime 7/30/24   Gomez Robbins DO   D3-1000 25 MCG (1000 UT) TABS tablet TAKE ONE TABLET BY MOUTH DAILY 7/23/24   Fred Granados APRN - CNP   rivaroxaban (XARELTO) 20 MG TABS tablet Take 1 tablet by mouth Daily with supper 7/19/24   Fred Granados APRN - CNP   ammonium lactate (AMLACTIN) 12 % cream Apply 1 Bottle topically as needed for Dry Skin (elbows) 7/19/24   Evelin Yousif APRN - CNP   butalbital-APAP-caffeine (FIORICET) -40 MG CAPS per capsule Take 1 capsule by mouth every 6 hours as needed 8/23/22   Provider, MD Kell   tiZANidine (ZANAFLEX) 4 MG tablet Take 1 tablet by mouth 3 times daily as needed (Muscle tightness/spasm) 4/23/24   Yoel Rivera MD   furosemide (LASIX) 20 MG tablet Take 1 tablet by mouth once a week Wednesdays. 1/23/24   Alicia Ivory APRN - CNP   Wheat Dextrin (BENEFIBER) POWD Take 1 tablespoon daily 1/23/24   Alicia Ivory APRN - CNP   carvedilol (COREG) 25 MG tablet TAKE ONE TABLET BY MOUTH TWICE A DAY  Patient taking differently: Take 1 tablet by mouth 2 times daily TAKE ONE TABLET BY MOUTH TWICE A DAY 8/25/23   Fred Granados APRN - CNP   pantoprazole (PROTONIX) 40 MG tablet Take 1 tablet by mouth every morning (before breakfast) 2/23/23   Fred Granados APRN - CNP   fluticasone (FLONASE) 50 MCG/ACT nasal spray 2 sprays by Each Nostril route daily as needed for Allergies 3/21/22   Provider, MD Kell   atorvastatin (LIPITOR) 40 MG tablet Take 1 tablet by mouth daily  Patient taking differently: Take 1 tablet by mouth nightly 6/11/21   Rema Rivers APRN - CNS     Past Medical History:   Diagnosis Date    Anemia     Anxiety     Atrial fibrillation and flutter (HCC)     Atrial flutter (HCC)     CHB (complete heart block) (HCC)     Chronic combined systolic and diastolic heart failure (HCC)     Class 1 obesity without serious comorbidity with body mass index (BMI) of 33.0 to 33.9 in adult 09/06/2019 
performed by Flavio Coronel MD at [facility] OR     Allergies   Allergen Reactions    Latex Hives     rash    Codeine Other (See Comments) and Hives     Hives    Morphine Shortness Of Breath    Entresto [Sacubitril-Valsartan]      Lips and tongue swelling    Furosemide Other (See Comments)    Jardiance [Empagliflozin] Itching     Yeast Infection    Nitroglycerin Hives    Other      CT dyes; recently noticed that after receiving CT dye notices vaginal irritation and discomfort.     Hydralazine      headaches    Lisinopril Nausea And Vomiting     cough    Metronidazole Nausea And Vomiting    Pantoprazole Palpitations    Sulfa Antibiotics Nausea Only, Other (See Comments) and Nausea And Vomiting    Valsartan Nausea And Vomiting       Social History:  Reviewed.  reports that she has never smoked. She has never used smokeless tobacco. She reports that she does not drink alcohol and does not use drugs.     Family History:  Reviewed. Reviewed. No family history of SCD.      Outside medical records via Care everywhere reviewed.      All questions and concerns were addressed to the patient/family. Alternatives to my treatment were discussed. I have discussed the above stated plan and the patient verbalized understanding and agreed with the plan.    Scribe attestation: This note was scribed in the presence of Franck Herrera MD by Aline Vargas RN.    Physician Attestation: I, Dr. Franck herrera, confirm that the scribe's documentation has been prepared under my direction and personally reviewed by me in its entirety.  I also confirm that the note above accurately reflects all work, treatment, procedures, and medical decision making performed by me.      NOTE: This report was transcribed using voice recognition software. Every effort was made to ensure accuracy, however, inadvertent computerized transcription errors may be present.     Franck Herrera MD, MPH  [facility]   Office: [phone]  Fax: [phone]

## 2024-11-16 ENCOUNTER — APPOINTMENT (OUTPATIENT)
Dept: GENERAL RADIOLOGY | Age: 59
End: 2024-11-16
Payer: COMMERCIAL

## 2024-11-16 ENCOUNTER — HOSPITAL ENCOUNTER (OUTPATIENT)
Age: 59
Setting detail: OBSERVATION
Discharge: HOME | End: 2024-11-17
Attending: INTERNAL MEDICINE | Admitting: INTERNAL MEDICINE
Payer: COMMERCIAL

## 2024-11-16 DIAGNOSIS — R05.1 ACUTE COUGH: ICD-10-CM

## 2024-11-16 DIAGNOSIS — E83.42 HYPOMAGNESEMIA: ICD-10-CM

## 2024-11-16 DIAGNOSIS — E87.6 HYPOKALEMIA: ICD-10-CM

## 2024-11-16 DIAGNOSIS — R07.9 CHEST PAIN, UNSPECIFIED TYPE: Primary | ICD-10-CM

## 2024-11-16 LAB
ALBUMIN SERPL-MCNC: 4.2 G/DL (ref 3.4–5)
ALBUMIN/GLOB SERPL: 1.4 {RATIO} (ref 1.1–2.2)
ALP SERPL-CCNC: 72 U/L (ref 40–129)
ALT SERPL-CCNC: 10 U/L (ref 10–40)
ANION GAP SERPL CALCULATED.3IONS-SCNC: 12 MMOL/L (ref 3–16)
AST SERPL-CCNC: 16 U/L (ref 15–37)
BASOPHILS # BLD: 0 K/UL (ref 0–0.2)
BASOPHILS NFR BLD: 0.5 %
BILIRUB SERPL-MCNC: 0.8 MG/DL (ref 0–1)
BUN SERPL-MCNC: 7 MG/DL (ref 7–20)
CALCIUM SERPL-MCNC: 9 MG/DL (ref 8.3–10.6)
CHLORIDE SERPL-SCNC: 105 MMOL/L (ref 99–110)
CO2 SERPL-SCNC: 25 MMOL/L (ref 21–32)
CREAT SERPL-MCNC: 0.8 MG/DL (ref 0.6–1.2)
DEPRECATED RDW RBC AUTO: 14.8 % (ref 12.4–15.4)
EOSINOPHIL # BLD: 0.3 K/UL (ref 0–0.6)
EOSINOPHIL NFR BLD: 5.8 %
FLUAV RNA RESP QL NAA+PROBE: NOT DETECTED
FLUBV RNA RESP QL NAA+PROBE: NOT DETECTED
GFR SERPLBLD CREATININE-BSD FMLA CKD-EPI: 84 ML/MIN/{1.73_M2}
GLUCOSE SERPL-MCNC: 96 MG/DL (ref 70–99)
HCT VFR BLD AUTO: 31.7 % (ref 36–48)
HGB BLD-MCNC: 10.8 G/DL (ref 12–16)
LACTATE BLDV-SCNC: 0.7 MMOL/L (ref 0.4–1.9)
LIPASE SERPL-CCNC: 20 U/L (ref 13–60)
LYMPHOCYTES # BLD: 1.1 K/UL (ref 1–5.1)
LYMPHOCYTES NFR BLD: 18.5 %
MAGNESIUM SERPL-MCNC: 1.37 MG/DL (ref 1.8–2.4)
MAGNESIUM SERPL-MCNC: 2.86 MG/DL (ref 1.8–2.4)
MCH RBC QN AUTO: 31.4 PG (ref 26–34)
MCHC RBC AUTO-ENTMCNC: 34 G/DL (ref 31–36)
MCV RBC AUTO: 92.4 FL (ref 80–100)
MONOCYTES # BLD: 0.7 K/UL (ref 0–1.3)
MONOCYTES NFR BLD: 11.6 %
NEUTROPHILS # BLD: 3.7 K/UL (ref 1.7–7.7)
NEUTROPHILS NFR BLD: 63.6 %
PLATELET # BLD AUTO: 217 K/UL (ref 135–450)
PMV BLD AUTO: 7.9 FL (ref 5–10.5)
POTASSIUM SERPL-SCNC: 3.1 MMOL/L (ref 3.5–5.1)
POTASSIUM SERPL-SCNC: 3.3 MMOL/L (ref 3.5–5.1)
PROT SERPL-MCNC: 7.2 G/DL (ref 6.4–8.2)
RBC # BLD AUTO: 3.44 M/UL (ref 4–5.2)
SARS-COV-2 RNA RESP QL NAA+PROBE: NOT DETECTED
SODIUM SERPL-SCNC: 142 MMOL/L (ref 136–145)
TROPONIN, HIGH SENSITIVITY: 8 NG/L (ref 0–14)
TROPONIN, HIGH SENSITIVITY: 9 NG/L (ref 0–14)
WBC # BLD AUTO: 5.9 K/UL (ref 4–11)

## 2024-11-16 PROCEDURE — 96365 THER/PROPH/DIAG IV INF INIT: CPT

## 2024-11-16 PROCEDURE — 84484 ASSAY OF TROPONIN QUANT: CPT

## 2024-11-16 PROCEDURE — 96366 THER/PROPH/DIAG IV INF ADDON: CPT

## 2024-11-16 PROCEDURE — 99285 EMERGENCY DEPT VISIT HI MDM: CPT

## 2024-11-16 PROCEDURE — 6360000002 HC RX W HCPCS: Performed by: INTERNAL MEDICINE

## 2024-11-16 PROCEDURE — 80053 COMPREHEN METABOLIC PANEL: CPT

## 2024-11-16 PROCEDURE — 83690 ASSAY OF LIPASE: CPT

## 2024-11-16 PROCEDURE — 71045 X-RAY EXAM CHEST 1 VIEW: CPT

## 2024-11-16 PROCEDURE — 83735 ASSAY OF MAGNESIUM: CPT

## 2024-11-16 PROCEDURE — G0378 HOSPITAL OBSERVATION PER HR: HCPCS

## 2024-11-16 PROCEDURE — 96374 THER/PROPH/DIAG INJ IV PUSH: CPT

## 2024-11-16 PROCEDURE — 83605 ASSAY OF LACTIC ACID: CPT

## 2024-11-16 PROCEDURE — 87636 SARSCOV2 & INF A&B AMP PRB: CPT

## 2024-11-16 PROCEDURE — 93005 ELECTROCARDIOGRAM TRACING: CPT | Performed by: INTERNAL MEDICINE

## 2024-11-16 PROCEDURE — 6370000000 HC RX 637 (ALT 250 FOR IP): Performed by: INTERNAL MEDICINE

## 2024-11-16 PROCEDURE — 6370000000 HC RX 637 (ALT 250 FOR IP): Performed by: PHYSICIAN ASSISTANT

## 2024-11-16 PROCEDURE — 85025 COMPLETE CBC W/AUTO DIFF WBC: CPT

## 2024-11-16 PROCEDURE — 6370000000 HC RX 637 (ALT 250 FOR IP): Performed by: NURSE PRACTITIONER

## 2024-11-16 PROCEDURE — 2580000003 HC RX 258: Performed by: INTERNAL MEDICINE

## 2024-11-16 PROCEDURE — 84132 ASSAY OF SERUM POTASSIUM: CPT

## 2024-11-16 PROCEDURE — 6360000002 HC RX W HCPCS: Performed by: PHYSICIAN ASSISTANT

## 2024-11-16 PROCEDURE — 36415 COLL VENOUS BLD VENIPUNCTURE: CPT

## 2024-11-16 RX ORDER — PANTOPRAZOLE SODIUM 40 MG/1
40 TABLET, DELAYED RELEASE ORAL
Status: DISCONTINUED | OUTPATIENT
Start: 2024-11-17 | End: 2024-11-17 | Stop reason: HOSPADM

## 2024-11-16 RX ORDER — MAGNESIUM SULFATE IN WATER 40 MG/ML
2000 INJECTION, SOLUTION INTRAVENOUS ONCE
Status: COMPLETED | OUTPATIENT
Start: 2024-11-16 | End: 2024-11-16

## 2024-11-16 RX ORDER — ACETAMINOPHEN 325 MG/1
650 TABLET ORAL EVERY 6 HOURS PRN
Status: DISCONTINUED | OUTPATIENT
Start: 2024-11-16 | End: 2024-11-17 | Stop reason: HOSPADM

## 2024-11-16 RX ORDER — POLYETHYLENE GLYCOL 3350 17 G/17G
17 POWDER, FOR SOLUTION ORAL DAILY PRN
Status: DISCONTINUED | OUTPATIENT
Start: 2024-11-16 | End: 2024-11-17 | Stop reason: HOSPADM

## 2024-11-16 RX ORDER — SODIUM CHLORIDE 9 MG/ML
INJECTION, SOLUTION INTRAVENOUS PRN
Status: DISCONTINUED | OUTPATIENT
Start: 2024-11-16 | End: 2024-11-17 | Stop reason: HOSPADM

## 2024-11-16 RX ORDER — SODIUM CHLORIDE 0.9 % (FLUSH) 0.9 %
5-40 SYRINGE (ML) INJECTION EVERY 12 HOURS SCHEDULED
Status: DISCONTINUED | OUTPATIENT
Start: 2024-11-16 | End: 2024-11-17 | Stop reason: HOSPADM

## 2024-11-16 RX ORDER — BUTALBITAL, ACETAMINOPHEN AND CAFFEINE 300; 40; 50 MG/1; MG/1; MG/1
1 CAPSULE ORAL EVERY 6 HOURS PRN
Status: DISCONTINUED | OUTPATIENT
Start: 2024-11-16 | End: 2024-11-16

## 2024-11-16 RX ORDER — SODIUM CHLORIDE 0.9 % (FLUSH) 0.9 %
5-40 SYRINGE (ML) INJECTION PRN
Status: DISCONTINUED | OUTPATIENT
Start: 2024-11-16 | End: 2024-11-17 | Stop reason: HOSPADM

## 2024-11-16 RX ORDER — POTASSIUM CHLORIDE 1500 MG/1
40 TABLET, EXTENDED RELEASE ORAL DAILY
Status: DISCONTINUED | OUTPATIENT
Start: 2024-11-16 | End: 2024-11-17 | Stop reason: HOSPADM

## 2024-11-16 RX ORDER — SPIRONOLACTONE 25 MG/1
25 TABLET ORAL DAILY
Status: DISCONTINUED | OUTPATIENT
Start: 2024-11-16 | End: 2024-11-17 | Stop reason: HOSPADM

## 2024-11-16 RX ORDER — ATORVASTATIN CALCIUM 40 MG/1
40 TABLET, FILM COATED ORAL NIGHTLY
Status: DISCONTINUED | OUTPATIENT
Start: 2024-11-16 | End: 2024-11-16

## 2024-11-16 RX ORDER — POTASSIUM CHLORIDE 1500 MG/1
40 TABLET, EXTENDED RELEASE ORAL ONCE
Status: COMPLETED | OUTPATIENT
Start: 2024-11-16 | End: 2024-11-16

## 2024-11-16 RX ORDER — CARVEDILOL 25 MG/1
25 TABLET ORAL 2 TIMES DAILY WITH MEALS
Status: DISCONTINUED | OUTPATIENT
Start: 2024-11-16 | End: 2024-11-17 | Stop reason: HOSPADM

## 2024-11-16 RX ORDER — GUAIFENESIN/DEXTROMETHORPHAN 100-10MG/5
10 SYRUP ORAL EVERY 6 HOURS PRN
Status: DISCONTINUED | OUTPATIENT
Start: 2024-11-16 | End: 2024-11-17 | Stop reason: HOSPADM

## 2024-11-16 RX ORDER — FLUTICASONE PROPIONATE 50 MCG
2 SPRAY, SUSPENSION (ML) NASAL DAILY PRN
Status: DISCONTINUED | OUTPATIENT
Start: 2024-11-16 | End: 2024-11-17 | Stop reason: HOSPADM

## 2024-11-16 RX ORDER — ACETAMINOPHEN 650 MG/1
650 SUPPOSITORY RECTAL EVERY 6 HOURS PRN
Status: DISCONTINUED | OUTPATIENT
Start: 2024-11-16 | End: 2024-11-17 | Stop reason: HOSPADM

## 2024-11-16 RX ORDER — ACETAMINOPHEN 500 MG
500 TABLET ORAL EVERY 6 HOURS PRN
COMMUNITY

## 2024-11-16 RX ADMIN — RIVAROXABAN 20 MG: 20 TABLET, FILM COATED ORAL at 21:14

## 2024-11-16 RX ADMIN — GUAIFENESIN AND DEXTROMETHORPHAN 10 ML: 100; 10 SYRUP ORAL at 19:50

## 2024-11-16 RX ADMIN — MAGNESIUM SULFATE HEPTAHYDRATE 2000 MG: 40 INJECTION, SOLUTION INTRAVENOUS at 16:29

## 2024-11-16 RX ADMIN — POTASSIUM CHLORIDE 40 MEQ: 1500 TABLET, EXTENDED RELEASE ORAL at 21:14

## 2024-11-16 RX ADMIN — SODIUM CHLORIDE, PRESERVATIVE FREE 10 ML: 5 INJECTION INTRAVENOUS at 21:14

## 2024-11-16 RX ADMIN — POTASSIUM CHLORIDE 40 MEQ: 1500 TABLET, EXTENDED RELEASE ORAL at 16:26

## 2024-11-16 RX ADMIN — PHENOL 1 SPRAY: 1.4 SPRAY ORAL at 21:27

## 2024-11-16 RX ADMIN — MAGNESIUM SULFATE HEPTAHYDRATE 2000 MG: 40 INJECTION, SOLUTION INTRAVENOUS at 18:34

## 2024-11-16 RX ADMIN — CARVEDILOL 25 MG: 25 TABLET, FILM COATED ORAL at 21:14

## 2024-11-16 ASSESSMENT — PAIN DESCRIPTION - PAIN TYPE
TYPE: ACUTE PAIN
TYPE: ACUTE PAIN

## 2024-11-16 ASSESSMENT — PAIN DESCRIPTION - DESCRIPTORS
DESCRIPTORS: SORE
DESCRIPTORS: SORE;TIGHTNESS

## 2024-11-16 ASSESSMENT — PAIN DESCRIPTION - ORIENTATION
ORIENTATION: RIGHT;MID
ORIENTATION: RIGHT;LEFT;UPPER

## 2024-11-16 ASSESSMENT — PAIN SCALES - GENERAL
PAINLEVEL_OUTOF10: 7
PAINLEVEL_OUTOF10: 8
PAINLEVEL_OUTOF10: 8

## 2024-11-16 ASSESSMENT — HEART SCORE: ECG: NON-SPECIFC REPOLARIZATION DISTURBANCE/LBTB/PM

## 2024-11-16 ASSESSMENT — PAIN DESCRIPTION - LOCATION
LOCATION: ABDOMEN
LOCATION: ABDOMEN

## 2024-11-16 NOTE — ED PROVIDER NOTES
[facility]  EMERGENCY DEPARTMENT ENCOUNTER        Pt Name: Chelsi Lock  MRN: [id number]  Birthdate 1965  Date of evaluation: 11/16/2024  Provider: SHARAN Henley  PCP: Evelin Yousif APRN - CNP  Note Started: 5:35 PM EST 11/16/24      LOUIS. I have evaluated this patient.        CHIEF COMPLAINT       Chief Complaint   Patient presents with    Pacemaker Problem     Pt brought to the hospital due to having a cough for the past 2 days and Pt states that last night she was shocked by her AICD and today she was shocked again.       HISTORY OF PRESENT ILLNESS: 1 or more Elements     History From: Patient  Limitations to history : None    Chelsi Lock is a 59 y.o. female who presents to the emergency department with complaint of 2-day history of cough as well as intermittent chest pain.  Patient has long cardiac history to include heart failure, atrial fibrillation on Xarelto, LVH and dual-chamber pacemaker placed 2012 due to congenital heart block.  During the night patient felt like her pacemaker shocked her.  This shocklike sensation lasted for a couple seconds, and she had some residual chest pain for a few seconds following that.  The event occurred again this morning.  Patient denies any associated shortness of breath, but does report she has had this cough for the last 2 days.  She has been taking Coricidin and thin Mucinex for her symptoms.  Patient denies fever.  No abdominal pain, nausea, vomiting or diarrhea.  No other acute complaints.    Nursing Notes were all reviewed and agreed with or any disagreements were addressed in the HPI.    REVIEW OF SYSTEMS :      Review of Systems   Constitutional:  Negative for chills, diaphoresis and fever.   HENT:  Negative for congestion, rhinorrhea and sore throat.    Eyes:  Negative for discharge, redness and itching.   Respiratory:  Positive for cough. Negative for shortness of breath and stridor.    Cardiovascular:  Positive for chest pain. Negative for

## 2024-11-16 NOTE — H&P
HOSPITALISTS HISTORY AND PHYSICAL    11/16/2024 6:19 PM    Patient Information:  LUH LOCK is a 59 y.o. female [id number]  PCP:  Evelin Yousif APRN - CNP (Tel: [phone] )    Chief complaint:    Chief Complaint   Patient presents with    Pacemaker Problem     Pt brought to the hospital due to having a cough for the past 2 days and Pt states that last night she was shocked by her AICD and today she was shocked again.       History of Present Illness:  Luh Lock is a 59 y.o. female who has been having a cough productive of brownish sputum for the last couple days along with runny nose and sore throat son had been sick with viral symptoms patient states today after coughing had squeezing spasming left-sided chest pain does not worse with exertion or better with rest no increased shortness of breath with this no nausea vomiting diaphoresis felt like her pacemaker was going off pacemaker was interrogated in the ED which was negative troponins have been negative.    REVIEW OF SYSTEMS:   Constitutional: Negative for fever,chills or night sweats  ENT: Negative for rhinorrhea, epistaxis, hoarseness, sore throat.  Gastrointestinal: Negative for nausea, vomiting, diarrhea  Genitourinary: Negative for polyuria, dysuria   Hematologic/Lymphatic: Negative for bleeding tendency, easy bruising  Musculoskeletal: Negative for myalgias and arthralgias  Neurologic: Negative for confusion,dysarthria.  Skin: Negative for itching,rash  Psychiatric: Negative for depression,anxiety, agitation.  Endocrine: Negative for polydipsia,polyuria,heat /cold intolerance.    Past Medical History:   has a past medical history of Anemia, Anxiety, Atrial fibrillation and flutter (HCC), Atrial flutter (HCC), CHB (complete heart block) (HCC), Chronic combined systolic and diastolic heart failure (HCC), Class 1 obesity without serious comorbidity with

## 2024-11-16 NOTE — PROGRESS NOTES
Pharmacy Home Medication Reconciliation Note    A medication reconciliation has been completed for Chelis Lock 1965    Pharmacy: [facility] [id number] [place], [place]  Information provided by: patient    The patient's home medication list is as follows:  No current facility-administered medications on file prior to encounter.     Current Outpatient Medications on File Prior to Encounter   Medication Sig Dispense Refill    acetaminophen (TYLENOL) 500 MG tablet Take 1 tablet by mouth every 6 hours as needed for Pain      ENTRESTO 49-51 MG per tablet TAKE 1 TABLET BY MOUTH TWICE A  tablet 1    spironolactone (ALDACTONE) 25 MG tablet TAKE 1 TABLET BY MOUTH DAILY 90 tablet 0    potassium chloride (KLOR-CON M) 20 MEQ extended release tablet Take 2 tablets by mouth daily 60 tablet 0    Magnesium Oxide -Mg Supplement (MAG-OXIDE) 200 MG TABS Take 200 mg by mouth in the morning and at bedtime (Patient taking differently: Take 200 mg by mouth 2 times daily) 60 tablet 0    D3-1000 25 MCG (1000 UT) TABS tablet TAKE ONE TABLET BY MOUTH DAILY (Patient taking differently: Take 1 tablet by mouth daily) 90 tablet 3    rivaroxaban (XARELTO) 20 MG TABS tablet Take 1 tablet by mouth Daily with supper 90 tablet 1    ammonium lactate (AMLACTIN) 12 % cream Apply 1 Bottle topically as needed for Dry Skin (elbows) 1 each 5    tiZANidine (ZANAFLEX) 4 MG tablet Take 1 tablet by mouth 3 times daily as needed (Muscle tightness/spasm) 30 tablet 1    furosemide (LASIX) 20 MG tablet Take 1 tablet by mouth once a week Wednesdays.      Wheat Dextrin (BENEFIBER) POWD Take 1 tablespoon daily 1 each 0    carvedilol (COREG) 25 MG tablet TAKE ONE TABLET BY MOUTH TWICE A DAY (Patient taking differently: Take 1 tablet by mouth 2 times daily TAKE ONE TABLET BY MOUTH TWICE A DAY) 180 tablet 1    pantoprazole (PROTONIX) 40 MG tablet Take 1 tablet by mouth every morning (before breakfast) 90 tablet 3    fluticasone (FLONASE) 50

## 2024-11-16 NOTE — ED TRIAGE NOTES
Patient oriented to room and ED throughput process.  Safety measures with ED bed locked in lowest position and call light in reach.  Patient educated on all orders, including any medications.  Patient educated on chief complaint/symptoms. Patient encouraged to ask questions regarding care, medications or treatment plan.  Patient aware of how to reach staff with questions/concerns.'

## 2024-11-16 NOTE — ED PROVIDER NOTES
The Ekg interpreted by me in the absence of a cardiologist shows.  AV dual pacemaker rhythm with a rate of 87.  Prolonged QTc.  No specific ST or T wave abnormality.  No significant change from prior 8-.    I only performed EKG interpretation and was not otherwise involved in the care of this patient.       Sancho Fitzgerald MD  11/16/24 [id number]

## 2024-11-16 NOTE — ACP (ADVANCE CARE PLANNING)
Advanced Care Planning Note.    Purpose of Encounter: Advanced care planning in light of hospitalization  Parties In Attendance: Patient,    Decisional Capacity: Yes  Subjective: Patient  understand that this conversation is to address long term care goal  Objective: Patient admitted to our hospital with chest pain history of pacemaker/icd  Goals of Care Determination: Patient would pursue CPR and Intubation if required.  Code Status: full code  Time spent on Advanced care Plannin minutes  Advanced Care Planning Documents: documented patient's wishes, would like Sister Laila to make medical decisions if unable to make decisions    Minoo Arriaga MD  2024 6:21 PM

## 2024-11-17 VITALS
WEIGHT: 195 LBS | DIASTOLIC BLOOD PRESSURE: 87 MMHG | TEMPERATURE: 97.7 F | SYSTOLIC BLOOD PRESSURE: 125 MMHG | HEIGHT: 67 IN | HEART RATE: 75 BPM | BODY MASS INDEX: 30.61 KG/M2 | OXYGEN SATURATION: 97 % | RESPIRATION RATE: 15 BRPM

## 2024-11-17 PROBLEM — R05.1 ACUTE COUGH: Status: ACTIVE | Noted: 2024-11-17

## 2024-11-17 PROBLEM — E87.6 HYPOKALEMIA: Status: ACTIVE | Noted: 2024-11-17

## 2024-11-17 LAB
ANION GAP SERPL CALCULATED.3IONS-SCNC: 7 MMOL/L (ref 3–16)
BACTERIA URNS QL MICRO: NORMAL /HPF
BASOPHILS # BLD: 0 K/UL (ref 0–0.2)
BASOPHILS NFR BLD: 0.7 %
BILIRUB UR QL STRIP.AUTO: NEGATIVE
BUN SERPL-MCNC: 8 MG/DL (ref 7–20)
CALCIUM SERPL-MCNC: 9.4 MG/DL (ref 8.3–10.6)
CHLORIDE SERPL-SCNC: 105 MMOL/L (ref 99–110)
CLARITY UR: ABNORMAL
CO2 SERPL-SCNC: 25 MMOL/L (ref 21–32)
COLOR UR: YELLOW
CREAT SERPL-MCNC: 0.8 MG/DL (ref 0.6–1.2)
DEPRECATED RDW RBC AUTO: 15.1 % (ref 12.4–15.4)
EKG ATRIAL RATE: 87 BPM
EKG DIAGNOSIS: NORMAL
EKG P-R INTERVAL: 124 MS
EKG Q-T INTERVAL: 462 MS
EKG QRS DURATION: 150 MS
EKG QTC CALCULATION (BAZETT): 555 MS
EKG R AXIS: -78 DEGREES
EKG T AXIS: 83 DEGREES
EKG VENTRICULAR RATE: 87 BPM
EOSINOPHIL # BLD: 0.3 K/UL (ref 0–0.6)
EOSINOPHIL NFR BLD: 5.5 %
EPI CELLS #/AREA URNS AUTO: 0 /HPF (ref 0–5)
GFR SERPLBLD CREATININE-BSD FMLA CKD-EPI: 84 ML/MIN/{1.73_M2}
GLUCOSE SERPL-MCNC: 101 MG/DL (ref 70–99)
GLUCOSE UR STRIP.AUTO-MCNC: NEGATIVE MG/DL
HCT VFR BLD AUTO: 31.9 % (ref 36–48)
HGB BLD-MCNC: 10.8 G/DL (ref 12–16)
HGB UR QL STRIP.AUTO: NEGATIVE
HYALINE CASTS #/AREA URNS AUTO: 0 /LPF (ref 0–8)
KETONES UR STRIP.AUTO-MCNC: NEGATIVE MG/DL
LEUKOCYTE ESTERASE UR QL STRIP.AUTO: NEGATIVE
LYMPHOCYTES # BLD: 0.7 K/UL (ref 1–5.1)
LYMPHOCYTES NFR BLD: 13.3 %
MCH RBC QN AUTO: 30.8 PG (ref 26–34)
MCHC RBC AUTO-ENTMCNC: 33.7 G/DL (ref 31–36)
MCV RBC AUTO: 91.4 FL (ref 80–100)
MONOCYTES # BLD: 0.5 K/UL (ref 0–1.3)
MONOCYTES NFR BLD: 9.7 %
NEUTROPHILS # BLD: 3.7 K/UL (ref 1.7–7.7)
NEUTROPHILS NFR BLD: 70.8 %
NITRITE UR QL STRIP.AUTO: NEGATIVE
PH UR STRIP.AUTO: 7.5 [PH] (ref 5–8)
PLATELET # BLD AUTO: 230 K/UL (ref 135–450)
PMV BLD AUTO: 8.6 FL (ref 5–10.5)
POTASSIUM SERPL-SCNC: 4.6 MMOL/L (ref 3.5–5.1)
PROT UR STRIP.AUTO-MCNC: NEGATIVE MG/DL
RBC # BLD AUTO: 3.49 M/UL (ref 4–5.2)
RBC CLUMPS #/AREA URNS AUTO: 1 /HPF (ref 0–4)
REASON FOR REJECTION: NORMAL
REJECTED TEST: NORMAL
SODIUM SERPL-SCNC: 137 MMOL/L (ref 136–145)
SP GR UR STRIP.AUTO: 1.01 (ref 1–1.03)
UA COMPLETE W REFLEX CULTURE PNL UR: ABNORMAL
UA DIPSTICK W REFLEX MICRO PNL UR: YES
URN SPEC COLLECT METH UR: ABNORMAL
UROBILINOGEN UR STRIP-ACNC: 1 E.U./DL
WBC # BLD AUTO: 5.3 K/UL (ref 4–11)
WBC #/AREA URNS AUTO: 0 /HPF (ref 0–5)

## 2024-11-17 PROCEDURE — 85025 COMPLETE CBC W/AUTO DIFF WBC: CPT

## 2024-11-17 PROCEDURE — 36415 COLL VENOUS BLD VENIPUNCTURE: CPT

## 2024-11-17 PROCEDURE — 2580000003 HC RX 258: Performed by: INTERNAL MEDICINE

## 2024-11-17 PROCEDURE — G0378 HOSPITAL OBSERVATION PER HR: HCPCS

## 2024-11-17 PROCEDURE — 80048 BASIC METABOLIC PNL TOTAL CA: CPT

## 2024-11-17 PROCEDURE — 93010 ELECTROCARDIOGRAM REPORT: CPT | Performed by: INTERNAL MEDICINE

## 2024-11-17 PROCEDURE — 81001 URINALYSIS AUTO W/SCOPE: CPT

## 2024-11-17 PROCEDURE — 6370000000 HC RX 637 (ALT 250 FOR IP): Performed by: NURSE PRACTITIONER

## 2024-11-17 PROCEDURE — 6370000000 HC RX 637 (ALT 250 FOR IP): Performed by: INTERNAL MEDICINE

## 2024-11-17 RX ORDER — VITS A,C,E/LUTEIN/MINERALS 300MCG-200
400 TABLET ORAL 2 TIMES DAILY
Qty: 60 TABLET | Refills: 0 | Status: SHIPPED | OUTPATIENT
Start: 2024-11-17

## 2024-11-17 RX ORDER — GUAIFENESIN/DEXTROMETHORPHAN 100-10MG/5
10 SYRUP ORAL EVERY 6 HOURS PRN
Qty: 120 ML | Refills: 0 | Status: SHIPPED | OUTPATIENT
Start: 2024-11-17 | End: 2024-11-27

## 2024-11-17 RX ADMIN — SPIRONOLACTONE 25 MG: 25 TABLET ORAL at 07:50

## 2024-11-17 RX ADMIN — PANTOPRAZOLE SODIUM 40 MG: 40 TABLET, DELAYED RELEASE ORAL at 06:12

## 2024-11-17 RX ADMIN — SODIUM CHLORIDE, PRESERVATIVE FREE 10 ML: 5 INJECTION INTRAVENOUS at 07:51

## 2024-11-17 RX ADMIN — ACETAMINOPHEN 650 MG: 325 TABLET ORAL at 01:14

## 2024-11-17 RX ADMIN — CARVEDILOL 25 MG: 25 TABLET, FILM COATED ORAL at 07:50

## 2024-11-17 RX ADMIN — GUAIFENESIN AND DEXTROMETHORPHAN 10 ML: 100; 10 SYRUP ORAL at 03:42

## 2024-11-17 RX ADMIN — POTASSIUM CHLORIDE 40 MEQ: 1500 TABLET, EXTENDED RELEASE ORAL at 07:50

## 2024-11-17 ASSESSMENT — PAIN DESCRIPTION - DESCRIPTORS
DESCRIPTORS: SORE
DESCRIPTORS: SORE

## 2024-11-17 ASSESSMENT — PAIN DESCRIPTION - LOCATION
LOCATION: ABDOMEN
LOCATION: ABDOMEN;BACK

## 2024-11-17 ASSESSMENT — PAIN SCALES - GENERAL
PAINLEVEL_OUTOF10: 5
PAINLEVEL_OUTOF10: 4
PAINLEVEL_OUTOF10: 3

## 2024-11-17 ASSESSMENT — PAIN DESCRIPTION - PAIN TYPE
TYPE: ACUTE PAIN
TYPE: ACUTE PAIN

## 2024-11-17 ASSESSMENT — PAIN DESCRIPTION - ORIENTATION
ORIENTATION: MID;RIGHT
ORIENTATION: RIGHT;MID

## 2024-11-17 NOTE — PLAN OF CARE
Problem: Chronic Conditions and Co-morbidities  Goal: Patient's chronic conditions and co-morbidity symptoms are monitored and maintained or improved  11/17/2024 0921 by Alin Garcia RN  Outcome: Progressing  11/17/2024 0151 by Te Drew RN  Outcome: Progressing     Problem: Discharge Planning  Goal: Discharge to home or other facility with appropriate resources  11/17/2024 0921 by Alin Garcia RN  Outcome: Progressing  11/17/2024 0151 by Te Drew RN  Outcome: Progressing     Problem: Pain  Goal: Verbalizes/displays adequate comfort level or baseline comfort level  11/17/2024 0921 by Alin Garcia RN  Outcome: Progressing  11/17/2024 0151 by Te Drew RN  Outcome: Progressing     Problem: ABCDS Injury Assessment  Goal: Absence of physical injury  11/17/2024 0921 by Alin Garcia RN  Outcome: Progressing  11/17/2024 0151 by Te Drew RN  Outcome: Progressing     Problem: Safety - Adult  Goal: Free from fall injury  11/17/2024 0921 by Alin Garcia RN  Outcome: Progressing  11/17/2024 0151 by Te Drew RN  Outcome: Progressing

## 2024-11-17 NOTE — PROGRESS NOTES
Data- discharge order received, pt verbalized agreement to discharge, disposition to previous residence, no needs for HHC/DME.     Action- discharge instructions prepared and given to pt, pt verbalized understanding. Medication information packet given r/t NEW and/or CHANGED prescriptions emphasizing name/purpose/side effects, pt verbalized understanding. Discharge instruction summary: Diet- cardiac, Activity- as tolerated, Primary Care Physician as follows: Evelin Yousif, APRN - [phone] f/u appointment in 1 week, prescription medications filled at [facility] on [facility]. CHF Education reviewed. Pt/ Family has had a total of 60 minutes CHF education this admission encounter.     1. WEIGHT: Admit Weight - Scale: 88.5 kg (195 lb) (11/16/24 1315)        Today  Weight - Scale: 88.5 kg (195 lb) (11/16/24 1315)       2. O2 SAT.: SpO2: 97 % (11/17/24 1115)    Response- Pt belongings gathered, IV removed. Disposition is home (no HHC/DME needs), transported with family, taken to lobby via w/c w/ RN, no complications.

## 2024-11-17 NOTE — PLAN OF CARE
Problem: Chronic Conditions and Co-morbidities  Goal: Patient's chronic conditions and co-morbidity symptoms are monitored and maintained or improved  11/17/2024 1255 by Alin Garcia RN  Outcome: Completed  11/17/2024 0921 by Alin Garcia RN  Outcome: Progressing  11/17/2024 0151 by Te Drew RN  Outcome: Progressing     Problem: Discharge Planning  Goal: Discharge to home or other facility with appropriate resources  11/17/2024 1255 by Alin Garcia RN  Outcome: Completed  11/17/2024 0921 by Alin Garcia RN  Outcome: Progressing  11/17/2024 0151 by Te Drew RN  Outcome: Progressing     Problem: Pain  Goal: Verbalizes/displays adequate comfort level or baseline comfort level  11/17/2024 1255 by Alin Garcia RN  Outcome: Completed  11/17/2024 0921 by Alin Garcia RN  Outcome: Progressing  11/17/2024 0151 by Te Drew RN  Outcome: Progressing     Problem: ABCDS Injury Assessment  Goal: Absence of physical injury  11/17/2024 1255 by Alin Garcia RN  Outcome: Completed  11/17/2024 0921 by Alin Garcia RN  Outcome: Progressing  11/17/2024 0151 by Te Drew RN  Outcome: Progressing     Problem: Safety - Adult  Goal: Free from fall injury  11/17/2024 1255 by Alin Garcia RN  Outcome: Completed  11/17/2024 0921 by Alin Garcia RN  Outcome: Progressing  11/17/2024 0151 by Te Drew RN  Outcome: Progressing

## 2024-11-17 NOTE — PLAN OF CARE
Problem: Chronic Conditions and Co-morbidities  Goal: Patient's chronic conditions and co-morbidity symptoms are monitored and maintained or improved  Outcome: Progressing     Problem: Discharge Planning  Goal: Discharge to home or other facility with appropriate resources  Outcome: Progressing     Problem: Pain  Goal: Verbalizes/displays adequate comfort level or baseline comfort level  Outcome: Progressing     Problem: ABCDS Injury Assessment  Goal: Absence of physical injury  Outcome: Progressing     Problem: Safety - Adult  Goal: Free from fall injury  Outcome: Progressing

## 2024-11-17 NOTE — CONSULTS
[facility]   Cardiology Consultation   Date: 11/17/2024  Reason for Consultation: Chest pain  Consult Requesting Physician: Minoo Arriaga MD     Chief Complaint   Patient presents with    Pacemaker Problem     Pt brought to the hospital due to having a cough for the past 2 days and Pt states that last night she was shocked by her AICD and today she was shocked again.     HPI: Chelsi Lock is a 59 y.o. female with history of hypertension, complete heart block and atrial tachycardia status post BiV ICD 2021, HFrEF, syncope, atrial fibrillation flutter, was having productive cough for the last few days and sore throat.  She started having left-sided chest pain after repeated episodes of coughing.  He has some point tenderness in that area.  Troponins have been negative.    Chelsi Lock is a 59 y.o. female who has been having a cough productive of brownish sputum for the last couple days along with runny nose and sore throat son had been sick with viral symptoms patient states today after coughing had squeezing spasming left-sided chest pain does not worse with exertion or better with rest no increased shortness of breath with this no nausea vomiting diaphoresis felt like her pacemaker was going off pacemaker was interrogated in the ED which was negative troponins have been negative.     She follows with Dr. Gillis for BiV ICD       Past Medical History:   Diagnosis Date    Anemia     Anxiety     Atrial fibrillation and flutter (HCC)     Atrial flutter (HCC)     CHB (complete heart block) (HCC)     Chronic combined systolic and diastolic heart failure (HCC)     Class 1 obesity without serious comorbidity with body mass index (BMI) of 33.0 to 33.9 in adult 09/06/2019    Congenital heart disease     Difficult intravenous access 10/06/2022    SEE NOTE    Dilated cardiomyopathy (HCC)     GERD (gastroesophageal reflux disease)     Headache(784.0)     Hyperlipidemia     Hypertension     Hypovitaminosis

## 2024-11-17 NOTE — DISCHARGE SUMMARY
Refills: 2           Current Discharge Medication List        STOP taking these medications       sacubitril-valsartan (ENTRESTO)  MG per tablet Comments:   Reason for Stopping:         butalbital-APAP-caffeine (FIORICET) -40 MG CAPS per capsule Comments:   Reason for Stopping:               Labs: For convenience and continuity at follow-up the following most recent labs are provided:    Lab Results   Component Value Date/Time    WBC 5.3 11/17/2024 05:26 AM    HGB 10.8 11/17/2024 05:26 AM    HCT 31.9 11/17/2024 05:26 AM    MCV 91.4 11/17/2024 05:26 AM     11/17/2024 05:26 AM     11/17/2024 07:50 AM    K 4.6 11/17/2024 07:50 AM     11/17/2024 07:50 AM    CO2 25 11/17/2024 07:50 AM    BUN 8 11/17/2024 07:50 AM    CREATININE 0.8 11/17/2024 07:50 AM    CALCIUM 9.4 11/17/2024 07:50 AM    PHOS 3.4 01/22/2024 07:16 AM    ALKPHOS 72 11/16/2024 02:13 PM    ALT 10 11/16/2024 02:13 PM    AST 16 11/16/2024 02:13 PM    BILITOT 0.8 11/16/2024 02:13 PM    BILIDIR <0.2 06/27/2020 08:57 AM    TRIG 71 09/10/2021 09:32 AM     Lab Results   Component Value Date    INR 1.33 (H) 03/22/2024    INR 1.10 01/22/2024    INR 1.37 (H) 01/21/2024       Radiology:  XR CHEST PORTABLE    Result Date: 11/16/2024  EXAMINATION: ONE XRAY VIEW OF THE CHEST 11/16/2024 2:22 pm COMPARISON: 08/15/2024 HISTORY: ORDERING SYSTEM PROVIDED HISTORY: chest pain TECHNOLOGIST PROVIDED HISTORY: Reason for exam:->chest pain Reason for Exam: Pacemaker Problem (Pt brought to the hospital due to having a cough for the past 2 days FINDINGS: The lungs appear clear.  There is mild cardiomegaly.  AICD biventricular pacer leads are in good position.  Bony thorax is unremarkable.     1. No acute cardiopulmonary process. 2. Mild cardiomegaly.     [facility] MARIA M DIGITAL DIAGNOSTIC UNILATERAL RIGHT    Result Date: 10/25/2024  [facility] MARIA M DIGITAL DIAGNOSTIC UNILATERAL RIGHT ON 10/25/2024 HISTORY: Additional imaging of the right breast was obtained secondary

## 2024-11-19 ENCOUNTER — TELEPHONE (OUTPATIENT)
Dept: FAMILY MEDICINE CLINIC | Age: 59
End: 2024-11-19

## 2024-11-19 ENCOUNTER — NURSE ONLY (OUTPATIENT)
Dept: CARDIOLOGY CLINIC | Age: 59
End: 2024-11-19

## 2024-11-19 ENCOUNTER — OFFICE VISIT (OUTPATIENT)
Dept: CARDIOLOGY CLINIC | Age: 59
End: 2024-11-19

## 2024-11-19 VITALS
BODY MASS INDEX: 31.08 KG/M2 | HEIGHT: 67 IN | HEART RATE: 77 BPM | OXYGEN SATURATION: 97 % | WEIGHT: 198 LBS | DIASTOLIC BLOOD PRESSURE: 74 MMHG | SYSTOLIC BLOOD PRESSURE: 124 MMHG

## 2024-11-19 DIAGNOSIS — I48.0 PAF (PAROXYSMAL ATRIAL FIBRILLATION) (HCC): ICD-10-CM

## 2024-11-19 DIAGNOSIS — I51.7 LVH (LEFT VENTRICULAR HYPERTROPHY): ICD-10-CM

## 2024-11-19 DIAGNOSIS — E66.09 CLASS 1 OBESITY DUE TO EXCESS CALORIES WITH BODY MASS INDEX (BMI) OF 31.0 TO 31.9 IN ADULT, UNSPECIFIED WHETHER SERIOUS COMORBIDITY PRESENT: ICD-10-CM

## 2024-11-19 DIAGNOSIS — E66.811 CLASS 1 OBESITY DUE TO EXCESS CALORIES WITH BODY MASS INDEX (BMI) OF 31.0 TO 31.9 IN ADULT, UNSPECIFIED WHETHER SERIOUS COMORBIDITY PRESENT: ICD-10-CM

## 2024-11-19 DIAGNOSIS — I10 PRIMARY HYPERTENSION: Primary | ICD-10-CM

## 2024-11-19 DIAGNOSIS — Z95.810 AICD (AUTOMATIC CARDIOVERTER/DEFIBRILLATOR) PRESENT: Primary | ICD-10-CM

## 2024-11-19 DIAGNOSIS — I44.2 CHB (COMPLETE HEART BLOCK) (HCC): ICD-10-CM

## 2024-11-19 DIAGNOSIS — R05.1 ACUTE COUGH: Primary | ICD-10-CM

## 2024-11-19 DIAGNOSIS — I47.20 VT (VENTRICULAR TACHYCARDIA) (HCC): ICD-10-CM

## 2024-11-19 DIAGNOSIS — I50.22 SYSTOLIC CHF, CHRONIC (HCC): ICD-10-CM

## 2024-11-19 DIAGNOSIS — G47.33 OBSTRUCTIVE SLEEP APNEA (ADULT) (PEDIATRIC): ICD-10-CM

## 2024-11-19 RX ORDER — AZITHROMYCIN 250 MG/1
TABLET, FILM COATED ORAL
Qty: 6 TABLET | Refills: 0 | Status: SHIPPED | OUTPATIENT
Start: 2024-11-19 | End: 2024-11-29

## 2024-11-19 NOTE — TELEPHONE ENCOUNTER
Script sent in for Zpak.  Let patient know that she will only take medication for five days but the medication stays in her system and working for a full 10 days.

## 2024-11-19 NOTE — TELEPHONE ENCOUNTER
Please call patient, find out if she is running a fever, feeling short of breath or having other symptoms?

## 2024-11-19 NOTE — TELEPHONE ENCOUNTER
Pt called and said that she was only having SOB in the beginning of this week, her oxygen is at 99 and 97, and she is not having a fever or other symptoms. Pt asked if she needed to come in for an appt and martina only had tomorrow, and didn't know if that was too long of a wait?    Pt wants an antibiotic called in to help

## 2024-11-19 NOTE — TELEPHONE ENCOUNTER
Patient called and was     ed on sat for coughing , covid test neg. only given cough syrup     She needs to see if an antibiotic can be called in as well they didn't give her one in the ED.     She said it is deep in her chest and needs something more then cough syrup.     Pharm is [facility] on [address].     Please call and advise

## 2024-11-26 ENCOUNTER — TELEPHONE (OUTPATIENT)
Dept: FAMILY MEDICINE CLINIC | Age: 59
End: 2024-11-26

## 2024-11-26 DIAGNOSIS — B37.31 YEAST INFECTION OF THE VAGINA: Primary | ICD-10-CM

## 2024-11-26 RX ORDER — FLUCONAZOLE 150 MG/1
150 TABLET ORAL ONCE
Qty: 1 TABLET | Refills: 0 | Status: SHIPPED | OUTPATIENT
Start: 2024-11-26 | End: 2024-11-26

## 2024-11-26 NOTE — TELEPHONE ENCOUNTER
Patient called and said since starting the   azithromycin (ZITHROMAX) 250 MG tablet [[id number]]     She now has a yeast infection and would like to have something called in for the yeast infection.       [facility]       ALSO patient wanted to make sure that she doesn't need to repeat her mammogram sooner then a year since her 10/25/2024 mammogram .   She stated she received a letter from [facility] about contacting her provider about needing to schedule . Patient was a little confused on why they would be sending a letter and wants to make sure nothing is wrong. I read her the result notes from Evelin from the 10/25/2024 message.       Please call and advise

## 2024-11-26 NOTE — TELEPHONE ENCOUNTER
Patient is not due for mammogram until 2025/October  Will send in script for Diflucan for yeast infection

## 2024-12-04 ENCOUNTER — TELEPHONE (OUTPATIENT)
Dept: FAMILY MEDICINE CLINIC | Age: 59
End: 2024-12-04

## 2024-12-21 DIAGNOSIS — I50.42 CHRONIC COMBINED SYSTOLIC AND DIASTOLIC HEART FAILURE (HCC): ICD-10-CM

## 2024-12-23 RX ORDER — SPIRONOLACTONE 25 MG/1
25 TABLET ORAL DAILY
Qty: 90 TABLET | Refills: 0 | Status: SHIPPED | OUTPATIENT
Start: 2024-12-23

## 2025-01-06 ENCOUNTER — APPOINTMENT (OUTPATIENT)
Dept: GENERAL RADIOLOGY | Age: 60
End: 2025-01-06
Payer: COMMERCIAL

## 2025-01-06 ENCOUNTER — APPOINTMENT (OUTPATIENT)
Dept: CT IMAGING | Age: 60
End: 2025-01-06
Payer: COMMERCIAL

## 2025-01-06 ENCOUNTER — HOSPITAL ENCOUNTER (EMERGENCY)
Age: 60
Discharge: HOME | End: 2025-01-06
Payer: COMMERCIAL

## 2025-01-06 VITALS
OXYGEN SATURATION: 100 % | TEMPERATURE: 98.1 F | HEART RATE: 78 BPM | DIASTOLIC BLOOD PRESSURE: 92 MMHG | HEIGHT: 66 IN | RESPIRATION RATE: 13 BRPM | SYSTOLIC BLOOD PRESSURE: 132 MMHG | WEIGHT: 198 LBS | BODY MASS INDEX: 31.82 KG/M2

## 2025-01-06 DIAGNOSIS — R11.2 NAUSEA AND VOMITING, UNSPECIFIED VOMITING TYPE: ICD-10-CM

## 2025-01-06 DIAGNOSIS — R10.10 UPPER ABDOMINAL PAIN: Primary | ICD-10-CM

## 2025-01-06 LAB
ALBUMIN SERPL-MCNC: 4.8 G/DL (ref 3.4–5)
ALBUMIN/GLOB SERPL: 1.8 {RATIO} (ref 1.1–2.2)
ALP SERPL-CCNC: 77 U/L (ref 40–129)
ALT SERPL-CCNC: 10 U/L (ref 10–40)
ANION GAP SERPL CALCULATED.3IONS-SCNC: 13 MMOL/L (ref 3–16)
AST SERPL-CCNC: 22 U/L (ref 15–37)
BACTERIA URNS QL MICRO: ABNORMAL /HPF
BASOPHILS # BLD: 0 K/UL (ref 0–0.2)
BASOPHILS NFR BLD: 0.3 %
BILIRUB SERPL-MCNC: 1.1 MG/DL (ref 0–1)
BILIRUB UR QL STRIP.AUTO: NEGATIVE
BUN SERPL-MCNC: 12 MG/DL (ref 7–20)
CALCIUM SERPL-MCNC: 9.6 MG/DL (ref 8.3–10.6)
CHLORIDE SERPL-SCNC: 105 MMOL/L (ref 99–110)
CLARITY UR: CLEAR
CO2 SERPL-SCNC: 22 MMOL/L (ref 21–32)
COLOR UR: YELLOW
CREAT SERPL-MCNC: 0.9 MG/DL (ref 0.6–1.2)
DEPRECATED RDW RBC AUTO: 15.4 % (ref 12.4–15.4)
EOSINOPHIL # BLD: 0 K/UL (ref 0–0.6)
EOSINOPHIL NFR BLD: 0.7 %
EPI CELLS #/AREA URNS AUTO: 3 /HPF (ref 0–5)
GFR SERPLBLD CREATININE-BSD FMLA CKD-EPI: 73 ML/MIN/{1.73_M2}
GLUCOSE SERPL-MCNC: 105 MG/DL (ref 70–99)
GLUCOSE UR STRIP.AUTO-MCNC: NEGATIVE MG/DL
HCT VFR BLD AUTO: 37.1 % (ref 36–48)
HGB BLD-MCNC: 12.9 G/DL (ref 12–16)
HGB UR QL STRIP.AUTO: NEGATIVE
HYALINE CASTS #/AREA URNS AUTO: 0 /LPF (ref 0–8)
KETONES UR STRIP.AUTO-MCNC: ABNORMAL MG/DL
LEUKOCYTE ESTERASE UR QL STRIP.AUTO: NEGATIVE
LIPASE SERPL-CCNC: 14 U/L (ref 13–60)
LYMPHOCYTES # BLD: 0.3 K/UL (ref 1–5.1)
LYMPHOCYTES NFR BLD: 5.3 %
MCH RBC QN AUTO: 31.1 PG (ref 26–34)
MCHC RBC AUTO-ENTMCNC: 34.8 G/DL (ref 31–36)
MCV RBC AUTO: 89.4 FL (ref 80–100)
MONOCYTES # BLD: 0.3 K/UL (ref 0–1.3)
MONOCYTES NFR BLD: 6.2 %
MUCUS: PRESENT
NEUTROPHILS # BLD: 4.5 K/UL (ref 1.7–7.7)
NEUTROPHILS NFR BLD: 87.5 %
NITRITE UR QL STRIP.AUTO: NEGATIVE
PH UR STRIP.AUTO: 8 [PH] (ref 5–8)
PLATELET # BLD AUTO: 211 K/UL (ref 135–450)
PMV BLD AUTO: 7.4 FL (ref 5–10.5)
POTASSIUM SERPL-SCNC: 3.6 MMOL/L (ref 3.5–5.1)
PROT SERPL-MCNC: 7.5 G/DL (ref 6.4–8.2)
PROT UR STRIP.AUTO-MCNC: 30 MG/DL
RBC # BLD AUTO: 4.15 M/UL (ref 4–5.2)
RBC #/AREA URNS HPF: ABNORMAL /HPF (ref 0–4)
SODIUM SERPL-SCNC: 140 MMOL/L (ref 136–145)
SP GR UR STRIP.AUTO: >=1.03 (ref 1–1.03)
TROPONIN, HIGH SENSITIVITY: <6 NG/L (ref 0–14)
TROPONIN, HIGH SENSITIVITY: <6 NG/L (ref 0–14)
UA COMPLETE W REFLEX CULTURE PNL UR: ABNORMAL
UA DIPSTICK W REFLEX MICRO PNL UR: YES
URN SPEC COLLECT METH UR: ABNORMAL
UROBILINOGEN UR STRIP-ACNC: 1 E.U./DL
WBC # BLD AUTO: 5.1 K/UL (ref 4–11)
WBC #/AREA URNS AUTO: 3 /HPF (ref 0–5)

## 2025-01-06 PROCEDURE — 71045 X-RAY EXAM CHEST 1 VIEW: CPT

## 2025-01-06 PROCEDURE — 6360000002 HC RX W HCPCS: Performed by: PHYSICIAN ASSISTANT

## 2025-01-06 PROCEDURE — 6360000004 HC RX CONTRAST MEDICATION: Performed by: PHYSICIAN ASSISTANT

## 2025-01-06 PROCEDURE — 81001 URINALYSIS AUTO W/SCOPE: CPT

## 2025-01-06 PROCEDURE — 96374 THER/PROPH/DIAG INJ IV PUSH: CPT

## 2025-01-06 PROCEDURE — 80053 COMPREHEN METABOLIC PANEL: CPT

## 2025-01-06 PROCEDURE — 84484 ASSAY OF TROPONIN QUANT: CPT

## 2025-01-06 PROCEDURE — 74177 CT ABD & PELVIS W/CONTRAST: CPT

## 2025-01-06 PROCEDURE — 85025 COMPLETE CBC W/AUTO DIFF WBC: CPT

## 2025-01-06 PROCEDURE — 83690 ASSAY OF LIPASE: CPT

## 2025-01-06 PROCEDURE — 96375 TX/PRO/DX INJ NEW DRUG ADDON: CPT

## 2025-01-06 PROCEDURE — 2580000003 HC RX 258: Performed by: PHYSICIAN ASSISTANT

## 2025-01-06 PROCEDURE — 2500000003 HC RX 250 WO HCPCS: Performed by: PHYSICIAN ASSISTANT

## 2025-01-06 PROCEDURE — 93005 ELECTROCARDIOGRAM TRACING: CPT | Performed by: INTERNAL MEDICINE

## 2025-01-06 PROCEDURE — 99285 EMERGENCY DEPT VISIT HI MDM: CPT

## 2025-01-06 RX ORDER — DICYCLOMINE HYDROCHLORIDE 10 MG/1
10 CAPSULE ORAL
Qty: 40 CAPSULE | Refills: 0 | Status: SHIPPED | OUTPATIENT
Start: 2025-01-06 | End: 2025-01-16

## 2025-01-06 RX ORDER — DIPHENHYDRAMINE HYDROCHLORIDE 50 MG/ML
50 INJECTION INTRAMUSCULAR; INTRAVENOUS ONCE
Status: DISCONTINUED | OUTPATIENT
Start: 2025-01-06 | End: 2025-01-06 | Stop reason: HOSPADM

## 2025-01-06 RX ORDER — HYDROMORPHONE HYDROCHLORIDE 1 MG/ML
0.5 INJECTION, SOLUTION INTRAMUSCULAR; INTRAVENOUS; SUBCUTANEOUS ONCE
Status: COMPLETED | OUTPATIENT
Start: 2025-01-06 | End: 2025-01-06

## 2025-01-06 RX ORDER — METOCLOPRAMIDE 10 MG/1
10 TABLET ORAL 4 TIMES DAILY
Qty: 20 TABLET | Refills: 0 | Status: SHIPPED | OUTPATIENT
Start: 2025-01-06

## 2025-01-06 RX ORDER — METOCLOPRAMIDE HYDROCHLORIDE 5 MG/ML
10 INJECTION INTRAMUSCULAR; INTRAVENOUS ONCE
Status: DISCONTINUED | OUTPATIENT
Start: 2025-01-06 | End: 2025-01-06 | Stop reason: HOSPADM

## 2025-01-06 RX ORDER — IOPAMIDOL 755 MG/ML
75 INJECTION, SOLUTION INTRAVASCULAR
Status: COMPLETED | OUTPATIENT
Start: 2025-01-06 | End: 2025-01-06

## 2025-01-06 RX ORDER — ONDANSETRON 4 MG/1
4 TABLET, ORALLY DISINTEGRATING ORAL 3 TIMES DAILY PRN
Qty: 21 TABLET | Refills: 0 | Status: SHIPPED | OUTPATIENT
Start: 2025-01-06

## 2025-01-06 RX ADMIN — HYDROMORPHONE HYDROCHLORIDE 0.5 MG: 1 INJECTION, SOLUTION INTRAMUSCULAR; INTRAVENOUS; SUBCUTANEOUS at 08:48

## 2025-01-06 RX ADMIN — SODIUM CHLORIDE, PRESERVATIVE FREE 20 MG: 5 INJECTION INTRAVENOUS at 08:51

## 2025-01-06 RX ADMIN — IOPAMIDOL 75 ML: 755 INJECTION, SOLUTION INTRAVENOUS at 09:38

## 2025-01-06 ASSESSMENT — ENCOUNTER SYMPTOMS
DIARRHEA: 0
BACK PAIN: 0
CHEST TIGHTNESS: 0
VOMITING: 1
COLOR CHANGE: 0
NAUSEA: 1
BLOOD IN STOOL: 0
ANAL BLEEDING: 0
ABDOMINAL DISTENTION: 0
ABDOMINAL PAIN: 1
CONSTIPATION: 0
RESPIRATORY NEGATIVE: 1
SHORTNESS OF BREATH: 0
COUGH: 0
RECTAL PAIN: 0

## 2025-01-06 ASSESSMENT — PAIN SCALES - GENERAL
PAINLEVEL_OUTOF10: 7
PAINLEVEL_OUTOF10: 6
PAINLEVEL_OUTOF10: 4

## 2025-01-06 ASSESSMENT — PAIN - FUNCTIONAL ASSESSMENT: PAIN_FUNCTIONAL_ASSESSMENT: 0-10

## 2025-01-06 NOTE — ED PROVIDER NOTES
EKG NOTE:      AV dual paced rhythm at a rate of 87 beats a minute with no acute ST elevations or depressions or pathologic Q waves.      I was not involved with the care of this patient.        Stephan Kramer MD  01/06/25 3003    
4 times daily (before meals and nightly) for 10 days    METOCLOPRAMIDE (REGLAN) 10 MG TABLET    Take 1 tablet by mouth 4 times daily WARNING:  May cause drowsiness.  May impair ability to operate vehicles or machinery.  Do not use in combination with alcohol.    ONDANSETRON (ZOFRAN-ODT) 4 MG DISINTEGRATING TABLET    Take 1 tablet by mouth 3 times daily as needed for Nausea or Vomiting       DISCONTINUED MEDICATIONS:  Discontinued Medications    No medications on file              (Please note that portions of this note were completed with a voice recognition program.  Efforts were made to edit the dictations but occasionally words are mis-transcribed.)    SHARAN Lebron (electronically signed)       Liam Green PA  01/06/25 8813

## 2025-01-07 ENCOUNTER — TELEPHONE (OUTPATIENT)
Dept: CARDIOLOGY CLINIC | Age: 60
End: 2025-01-07

## 2025-01-07 ENCOUNTER — HOSPITAL ENCOUNTER (EMERGENCY)
Age: 60
Discharge: HOME | End: 2025-01-07
Attending: INTERNAL MEDICINE
Payer: COMMERCIAL

## 2025-01-07 ENCOUNTER — APPOINTMENT (OUTPATIENT)
Dept: GENERAL RADIOLOGY | Age: 60
End: 2025-01-07
Payer: COMMERCIAL

## 2025-01-07 VITALS
DIASTOLIC BLOOD PRESSURE: 85 MMHG | HEIGHT: 66 IN | TEMPERATURE: 96.6 F | BODY MASS INDEX: 31.82 KG/M2 | RESPIRATION RATE: 12 BRPM | SYSTOLIC BLOOD PRESSURE: 136 MMHG | WEIGHT: 198 LBS | HEART RATE: 75 BPM | OXYGEN SATURATION: 99 %

## 2025-01-07 DIAGNOSIS — E83.42 HYPOMAGNESEMIA: ICD-10-CM

## 2025-01-07 DIAGNOSIS — I95.1 ORTHOSTATIC HYPOTENSION: ICD-10-CM

## 2025-01-07 DIAGNOSIS — R11.2 NAUSEA VOMITING AND DIARRHEA: ICD-10-CM

## 2025-01-07 DIAGNOSIS — E86.0 DEHYDRATION: ICD-10-CM

## 2025-01-07 DIAGNOSIS — R19.7 NAUSEA VOMITING AND DIARRHEA: ICD-10-CM

## 2025-01-07 DIAGNOSIS — R55 NEAR SYNCOPE: Primary | ICD-10-CM

## 2025-01-07 DIAGNOSIS — R79.89 ELEVATED SERUM CREATININE: ICD-10-CM

## 2025-01-07 LAB
ALBUMIN SERPL-MCNC: 4.2 G/DL (ref 3.4–5)
ALBUMIN/GLOB SERPL: 1.6 {RATIO} (ref 1.1–2.2)
ALP SERPL-CCNC: 67 U/L (ref 40–129)
ALT SERPL-CCNC: 9 U/L (ref 10–40)
ANION GAP SERPL CALCULATED.3IONS-SCNC: 15 MMOL/L (ref 3–16)
AST SERPL-CCNC: 25 U/L (ref 15–37)
BACTERIA URNS QL MICRO: ABNORMAL /HPF
BASOPHILS # BLD: 0 K/UL (ref 0–0.2)
BASOPHILS NFR BLD: 0.5 %
BILIRUB SERPL-MCNC: 0.8 MG/DL (ref 0–1)
BILIRUB UR QL STRIP.AUTO: NEGATIVE
BUN SERPL-MCNC: 14 MG/DL (ref 7–20)
CALCIUM SERPL-MCNC: 9.4 MG/DL (ref 8.3–10.6)
CHLORIDE SERPL-SCNC: 104 MMOL/L (ref 99–110)
CLARITY UR: CLEAR
CO2 SERPL-SCNC: 21 MMOL/L (ref 21–32)
COLOR UR: YELLOW
CREAT SERPL-MCNC: 1.4 MG/DL (ref 0.6–1.2)
DEPRECATED RDW RBC AUTO: 15.7 % (ref 12.4–15.4)
EKG ATRIAL RATE: 75 BPM
EKG ATRIAL RATE: 87 BPM
EKG DIAGNOSIS: NORMAL
EKG DIAGNOSIS: NORMAL
EKG P AXIS: 83 DEGREES
EKG P-R INTERVAL: 122 MS
EKG P-R INTERVAL: 126 MS
EKG Q-T INTERVAL: 430 MS
EKG Q-T INTERVAL: 464 MS
EKG QRS DURATION: 142 MS
EKG QRS DURATION: 148 MS
EKG QTC CALCULATION (BAZETT): 517 MS
EKG QTC CALCULATION (BAZETT): 518 MS
EKG R AXIS: -85 DEGREES
EKG R AXIS: -88 DEGREES
EKG T AXIS: 85 DEGREES
EKG T AXIS: 86 DEGREES
EKG VENTRICULAR RATE: 75 BPM
EKG VENTRICULAR RATE: 87 BPM
EOSINOPHIL # BLD: 0.2 K/UL (ref 0–0.6)
EOSINOPHIL NFR BLD: 3.6 %
EPI CELLS #/AREA URNS AUTO: 7 /HPF (ref 0–5)
GFR SERPLBLD CREATININE-BSD FMLA CKD-EPI: 43 ML/MIN/{1.73_M2}
GLUCOSE SERPL-MCNC: 83 MG/DL (ref 70–99)
GLUCOSE UR STRIP.AUTO-MCNC: NEGATIVE MG/DL
HCT VFR BLD AUTO: 35.3 % (ref 36–48)
HGB BLD-MCNC: 11.7 G/DL (ref 12–16)
HGB UR QL STRIP.AUTO: NEGATIVE
HYALINE CASTS #/AREA URNS AUTO: 0 /LPF (ref 0–8)
KETONES UR STRIP.AUTO-MCNC: NEGATIVE MG/DL
LACTATE BLDV-SCNC: 1.9 MMOL/L (ref 0.4–1.9)
LEUKOCYTE ESTERASE UR QL STRIP.AUTO: ABNORMAL
LYMPHOCYTES # BLD: 0.8 K/UL (ref 1–5.1)
LYMPHOCYTES NFR BLD: 15.8 %
MAGNESIUM SERPL-MCNC: 1.6 MG/DL (ref 1.8–2.4)
MCH RBC QN AUTO: 30.8 PG (ref 26–34)
MCHC RBC AUTO-ENTMCNC: 33.2 G/DL (ref 31–36)
MCV RBC AUTO: 92.9 FL (ref 80–100)
MONOCYTES # BLD: 0.6 K/UL (ref 0–1.3)
MONOCYTES NFR BLD: 11.4 %
NEUTROPHILS # BLD: 3.4 K/UL (ref 1.7–7.7)
NEUTROPHILS NFR BLD: 68.7 %
NITRITE UR QL STRIP.AUTO: NEGATIVE
PH UR STRIP.AUTO: 7 [PH] (ref 5–8)
PLATELET # BLD AUTO: 191 K/UL (ref 135–450)
PMV BLD AUTO: 7.5 FL (ref 5–10.5)
POTASSIUM SERPL-SCNC: 3.9 MMOL/L (ref 3.5–5.1)
PROT SERPL-MCNC: 6.9 G/DL (ref 6.4–8.2)
PROT UR STRIP.AUTO-MCNC: NEGATIVE MG/DL
RBC # BLD AUTO: 3.8 M/UL (ref 4–5.2)
RBC CLUMPS #/AREA URNS AUTO: 3 /HPF (ref 0–4)
SODIUM SERPL-SCNC: 140 MMOL/L (ref 136–145)
SP GR UR STRIP.AUTO: 1.01 (ref 1–1.03)
TROPONIN, HIGH SENSITIVITY: 9 NG/L (ref 0–14)
UA COMPLETE W REFLEX CULTURE PNL UR: YES
UA DIPSTICK W REFLEX MICRO PNL UR: YES
URN SPEC COLLECT METH UR: ABNORMAL
UROBILINOGEN UR STRIP-ACNC: 2 E.U./DL
WBC # BLD AUTO: 5 K/UL (ref 4–11)
WBC #/AREA URNS AUTO: 10 /HPF (ref 0–5)

## 2025-01-07 PROCEDURE — 93005 ELECTROCARDIOGRAM TRACING: CPT | Performed by: PHYSICIAN ASSISTANT

## 2025-01-07 PROCEDURE — 96365 THER/PROPH/DIAG IV INF INIT: CPT

## 2025-01-07 PROCEDURE — 81001 URINALYSIS AUTO W/SCOPE: CPT

## 2025-01-07 PROCEDURE — 99285 EMERGENCY DEPT VISIT HI MDM: CPT

## 2025-01-07 PROCEDURE — 71045 X-RAY EXAM CHEST 1 VIEW: CPT

## 2025-01-07 PROCEDURE — 87086 URINE CULTURE/COLONY COUNT: CPT

## 2025-01-07 PROCEDURE — 6360000002 HC RX W HCPCS: Performed by: PHYSICIAN ASSISTANT

## 2025-01-07 PROCEDURE — 93010 ELECTROCARDIOGRAM REPORT: CPT | Performed by: INTERNAL MEDICINE

## 2025-01-07 PROCEDURE — 80053 COMPREHEN METABOLIC PANEL: CPT

## 2025-01-07 PROCEDURE — 83605 ASSAY OF LACTIC ACID: CPT

## 2025-01-07 PROCEDURE — 85025 COMPLETE CBC W/AUTO DIFF WBC: CPT

## 2025-01-07 PROCEDURE — 96361 HYDRATE IV INFUSION ADD-ON: CPT

## 2025-01-07 PROCEDURE — 83735 ASSAY OF MAGNESIUM: CPT

## 2025-01-07 PROCEDURE — 84484 ASSAY OF TROPONIN QUANT: CPT

## 2025-01-07 PROCEDURE — 2580000003 HC RX 258: Performed by: PHYSICIAN ASSISTANT

## 2025-01-07 RX ORDER — MAGNESIUM SULFATE IN WATER 40 MG/ML
2000 INJECTION, SOLUTION INTRAVENOUS ONCE
Status: COMPLETED | OUTPATIENT
Start: 2025-01-07 | End: 2025-01-07

## 2025-01-07 RX ORDER — 0.9 % SODIUM CHLORIDE 0.9 %
1000 INTRAVENOUS SOLUTION INTRAVENOUS ONCE
Status: COMPLETED | OUTPATIENT
Start: 2025-01-07 | End: 2025-01-07

## 2025-01-07 RX ADMIN — SODIUM CHLORIDE 1000 ML: 9 INJECTION, SOLUTION INTRAVENOUS at 14:37

## 2025-01-07 RX ADMIN — MAGNESIUM SULFATE HEPTAHYDRATE 2000 MG: 40 INJECTION, SOLUTION INTRAVENOUS at 16:12

## 2025-01-07 ASSESSMENT — PAIN - FUNCTIONAL ASSESSMENT: PAIN_FUNCTIONAL_ASSESSMENT: NONE - DENIES PAIN

## 2025-01-07 NOTE — TELEPHONE ENCOUNTER
Spoke with patient,  she is at work and unable to send a transmission.  She feels weak, dizzy and like she is going to black out. This has been going on for 30 minutes.  Earlier this morning she felt like she was in a.fib.    Unable to get vitals.

## 2025-01-07 NOTE — ED PROVIDER NOTES
soon as possible for a visit in 3 days  For re-check    [facility] Emergency Department  [address] [address]  [phone]    As needed      Discharge Medications:  Discharge Medication List as of 1/7/2025  5:46 PM          FINAL IMPRESSION  1. Near syncope    2. Nausea vomiting and diarrhea    3. Dehydration    4. Elevated serum creatinine    5. Hypomagnesemia    6. Orthostatic hypotension        Blood pressure 136/85, pulse 75, temperature (!) 96.6 °F (35.9 °C), temperature source Oral, resp. rate 12, height 1.676 m (5' 6\"), weight 89.8 kg (198 lb), SpO2 99%, not currently breastfeeding.     I appreciate the [facility]'s collaboration on this case.    For further details of Chelsi GIOVANI Lock's emergency department encounter, please see documentation by advanced practice provider, Jovani Castillo.        Keon Weston DO  01/07/25 6335    
       Left eye: No discharge.   Cardiovascular:      Rate and Rhythm: Normal rate and regular rhythm.      Heart sounds: No murmur heard.     No friction rub. No gallop.   Pulmonary:      Effort: Pulmonary effort is normal. No respiratory distress.      Breath sounds: No stridor. No wheezing, rhonchi or rales.   Abdominal:      General: Bowel sounds are normal. There is no distension.      Palpations: Abdomen is soft. There is no mass.      Tenderness: There is no abdominal tenderness. There is no guarding or rebound.      Hernia: No hernia is present.   Musculoskeletal:         General: No swelling. Normal range of motion.      Cervical back: Normal range of motion.   Skin:     General: Skin is warm and dry.      Findings: No erythema or rash.   Neurological:      Mental Status: She is alert and oriented to person, place, and time.      Cranial Nerves: No cranial nerve deficit.   Psychiatric:         Behavior: Behavior normal.             DIAGNOSTIC RESULTS   LABS:    Labs Reviewed   CBC WITH AUTO DIFFERENTIAL - Abnormal; Notable for the following components:       Result Value    RBC 3.80 (*)     Hemoglobin 11.7 (*)     Hematocrit 35.3 (*)     RDW 15.7 (*)     Lymphocytes Absolute 0.8 (*)     All other components within normal limits   COMPREHENSIVE METABOLIC PANEL W/ REFLEX TO MG FOR LOW K - Abnormal; Notable for the following components:    Creatinine 1.4 (*)     Est, Glom Filt Rate 43 (*)     ALT 9 (*)     All other components within normal limits   MAGNESIUM - Abnormal; Notable for the following components:    Magnesium 1.60 (*)     All other components within normal limits   URINALYSIS WITH REFLEX TO CULTURE - Abnormal; Notable for the following components:    Urobilinogen, Urine 2.0 (*)     Leukocyte Esterase, Urine MODERATE (*)     All other components within normal limits   MICROSCOPIC URINALYSIS - Abnormal; Notable for the following components:    WBC, UA 10 (*)     Epithelial Cells, UA 7 (*)     All

## 2025-01-07 NOTE — TELEPHONE ENCOUNTER
The patient states that she is wanting to know  if she is in AFIB. She was in the ED yesterday for vomiting, abdominal pain and they told her she was in AFIB. She is at work and feeling very lightheaded.  Please advise.

## 2025-01-08 LAB — BACTERIA UR CULT: NORMAL

## 2025-01-09 ENCOUNTER — TELEPHONE (OUTPATIENT)
Dept: CARDIOLOGY CLINIC | Age: 60
End: 2025-01-09

## 2025-01-09 NOTE — TELEPHONE ENCOUNTER
Pt says she was in the hospital day before yesterday 1/7 and her kidney levels were elevated. She says she is weak, losing weight, and urinating frequently \"all day long.\" She will be here tomorrow to see NPRJ and wanted to ask NPRG if she wanted her to get labs to check kidney levels.     Please call to discuss. [phone]

## 2025-01-09 NOTE — TELEPHONE ENCOUNTER
Spoke to patient she would like a appointment with NPRG for next week. Scheduled patient on tuesday

## 2025-01-10 ENCOUNTER — HOSPITAL ENCOUNTER (OUTPATIENT)
Age: 60
Discharge: HOME | End: 2025-01-10

## 2025-01-10 ENCOUNTER — NURSE ONLY (OUTPATIENT)
Dept: CARDIOLOGY CLINIC | Age: 60
End: 2025-01-10

## 2025-01-10 ENCOUNTER — OFFICE VISIT (OUTPATIENT)
Dept: FAMILY MEDICINE CLINIC | Age: 60
End: 2025-01-10

## 2025-01-10 ENCOUNTER — OFFICE VISIT (OUTPATIENT)
Dept: CARDIOLOGY CLINIC | Age: 60
End: 2025-01-10
Payer: COMMERCIAL

## 2025-01-10 VITALS
OXYGEN SATURATION: 99 % | WEIGHT: 173 LBS | BODY MASS INDEX: 27.8 KG/M2 | DIASTOLIC BLOOD PRESSURE: 70 MMHG | HEART RATE: 90 BPM | SYSTOLIC BLOOD PRESSURE: 122 MMHG | HEIGHT: 66 IN

## 2025-01-10 VITALS
RESPIRATION RATE: 14 BRPM | BODY MASS INDEX: 30.37 KG/M2 | SYSTOLIC BLOOD PRESSURE: 116 MMHG | WEIGHT: 189 LBS | HEIGHT: 66 IN | DIASTOLIC BLOOD PRESSURE: 70 MMHG | HEART RATE: 79 BPM | OXYGEN SATURATION: 99 %

## 2025-01-10 DIAGNOSIS — I50.42 CHRONIC COMBINED SYSTOLIC AND DIASTOLIC HEART FAILURE (HCC): ICD-10-CM

## 2025-01-10 DIAGNOSIS — I48.0 PAF (PAROXYSMAL ATRIAL FIBRILLATION) (HCC): Primary | ICD-10-CM

## 2025-01-10 DIAGNOSIS — R42 DIZZINESS: ICD-10-CM

## 2025-01-10 DIAGNOSIS — Q24.6 CONGENITAL HEART BLOCK: ICD-10-CM

## 2025-01-10 DIAGNOSIS — R53.1 WEAKNESS: ICD-10-CM

## 2025-01-10 DIAGNOSIS — I47.20 VT (VENTRICULAR TACHYCARDIA) (HCC): ICD-10-CM

## 2025-01-10 DIAGNOSIS — Z95.810 AICD (AUTOMATIC CARDIOVERTER/DEFIBRILLATOR) PRESENT: ICD-10-CM

## 2025-01-10 DIAGNOSIS — E86.0 DEHYDRATION: Primary | ICD-10-CM

## 2025-01-10 DIAGNOSIS — I51.7 LVH (LEFT VENTRICULAR HYPERTROPHY): ICD-10-CM

## 2025-01-10 DIAGNOSIS — I48.0 PAF (PAROXYSMAL ATRIAL FIBRILLATION) (HCC): ICD-10-CM

## 2025-01-10 DIAGNOSIS — R06.02 SOB (SHORTNESS OF BREATH): ICD-10-CM

## 2025-01-10 DIAGNOSIS — Z95.810 ICD (IMPLANTABLE CARDIOVERTER-DEFIBRILLATOR), BIVENTRICULAR, IN SITU: Primary | ICD-10-CM

## 2025-01-10 DIAGNOSIS — I44.2 CHB (COMPLETE HEART BLOCK) (HCC): ICD-10-CM

## 2025-01-10 DIAGNOSIS — R11.2 NAUSEA AND VOMITING, UNSPECIFIED VOMITING TYPE: ICD-10-CM

## 2025-01-10 LAB
ALBUMIN SERPL-MCNC: 4.3 G/DL (ref 3.4–5)
ANION GAP SERPL CALCULATED.3IONS-SCNC: 9 MMOL/L (ref 3–16)
BUN SERPL-MCNC: 5 MG/DL (ref 7–20)
CALCIUM SERPL-MCNC: 9.6 MG/DL (ref 8.3–10.6)
CHLORIDE SERPL-SCNC: 107 MMOL/L (ref 99–110)
CO2 SERPL-SCNC: 25 MMOL/L (ref 21–32)
CREAT SERPL-MCNC: 0.9 MG/DL (ref 0.6–1.2)
DEPRECATED RDW RBC AUTO: 15.3 % (ref 12.4–15.4)
EST. AVERAGE GLUCOSE BLD GHB EST-MCNC: 114 MG/DL
GFR SERPLBLD CREATININE-BSD FMLA CKD-EPI: 73 ML/MIN/{1.73_M2}
GLUCOSE SERPL-MCNC: 92 MG/DL (ref 70–99)
HBA1C MFR BLD: 5.6 %
HCT VFR BLD AUTO: 36.1 % (ref 36–48)
HGB BLD-MCNC: 12 G/DL (ref 12–16)
MCH RBC QN AUTO: 30.7 PG (ref 26–34)
MCHC RBC AUTO-ENTMCNC: 33.1 G/DL (ref 31–36)
MCV RBC AUTO: 92.5 FL (ref 80–100)
PHOSPHATE SERPL-MCNC: 2.4 MG/DL (ref 2.5–4.9)
PLATELET # BLD AUTO: 225 K/UL (ref 135–450)
PMV BLD AUTO: 7.9 FL (ref 5–10.5)
POTASSIUM SERPL-SCNC: 4.5 MMOL/L (ref 3.5–5.1)
RBC # BLD AUTO: 3.91 M/UL (ref 4–5.2)
SODIUM SERPL-SCNC: 141 MMOL/L (ref 136–145)
WBC # BLD AUTO: 3.3 K/UL (ref 4–11)

## 2025-01-10 PROCEDURE — 93000 ELECTROCARDIOGRAM COMPLETE: CPT

## 2025-01-10 PROCEDURE — 3017F COLORECTAL CA SCREEN DOC REV: CPT

## 2025-01-10 PROCEDURE — G8427 DOCREV CUR MEDS BY ELIG CLIN: HCPCS

## 2025-01-10 PROCEDURE — 3078F DIAST BP <80 MM HG: CPT

## 2025-01-10 PROCEDURE — G8417 CALC BMI ABV UP PARAM F/U: HCPCS

## 2025-01-10 PROCEDURE — 3074F SYST BP LT 130 MM HG: CPT

## 2025-01-10 PROCEDURE — 1036F TOBACCO NON-USER: CPT

## 2025-01-10 PROCEDURE — 99214 OFFICE O/P EST MOD 30 MIN: CPT

## 2025-01-10 ASSESSMENT — ENCOUNTER SYMPTOMS
VOMITING: 1
COUGH: 0
CONSTIPATION: 0
CHEST TIGHTNESS: 0
ANAL BLEEDING: 0
RECTAL PAIN: 0
BLOOD IN STOOL: 0
SHORTNESS OF BREATH: 0
NAUSEA: 1
ABDOMINAL PAIN: 0
ABDOMINAL DISTENTION: 0
DIARRHEA: 1
WHEEZING: 0

## 2025-01-10 ASSESSMENT — PATIENT HEALTH QUESTIONNAIRE - PHQ9
SUM OF ALL RESPONSES TO PHQ QUESTIONS 1-9: 2
SUM OF ALL RESPONSES TO PHQ9 QUESTIONS 1 & 2: 2
SUM OF ALL RESPONSES TO PHQ QUESTIONS 1-9: 2
SUM OF ALL RESPONSES TO PHQ QUESTIONS 1-9: 2
1. LITTLE INTEREST OR PLEASURE IN DOING THINGS: SEVERAL DAYS
2. FEELING DOWN, DEPRESSED OR HOPELESS: SEVERAL DAYS
SUM OF ALL RESPONSES TO PHQ QUESTIONS 1-9: 2

## 2025-01-10 NOTE — PROGRESS NOTES
as needed (Muscle tightness/spasm) 4/23/24  Yes Yoel Rivera MD   furosemide (LASIX) 20 MG tablet Take 1 tablet by mouth once a week Wednesdays. 1/23/24  Yes Alicia Ivory APRN - CNP   Wheat Dextrin (BENEFIBER) POWD Take 1 tablespoon daily 1/23/24  Yes Alicia Ivory APRN - CNP   carvedilol (COREG) 25 MG tablet TAKE ONE TABLET BY MOUTH TWICE A DAY  Patient taking differently: Take 1 tablet by mouth 2 times daily TAKE ONE TABLET BY MOUTH TWICE A DAY 8/25/23  Yes Fred Granados APRN - CNP   pantoprazole (PROTONIX) 40 MG tablet Take 1 tablet by mouth every morning (before breakfast) 2/23/23  Yes Fred Granados APRN - CNP   fluticasone (FLONASE) 50 MCG/ACT nasal spray 2 sprays by Each Nostril route daily as needed for Allergies 3/21/22  Yes Provider, MD Kell   atorvastatin (LIPITOR) 40 MG tablet Take 1 tablet by mouth daily  Patient not taking: Reported on 1/10/2025 6/11/21   Rema Rivers, APRN - CNS     Social History:   reports that she has never smoked. She has never used smokeless tobacco. She reports that she does not drink alcohol and does not use drugs.   Family History:Reviewed. Denies family history of sudden cardiac death, arrhythmia, premature CAD  family history includes Cancer (age of onset: 70) in her father; Heart Failure in her sister; High Blood Pressure in her mother; No Known Problems in her brother and sister.     Review of System:  Full ROS obtained and negative except as mentioned in HPI  Physical Examination:  /70 (Site: Right Upper Arm, Position: Sitting, Cuff Size: Medium Adult)   Pulse 90   Ht 1.676 m (5' 6\")   Wt 78.5 kg (173 lb)   SpO2 99%   BMI 27.92 kg/m²      Constitutional: Oriented. No distress.   Head: Normocephalic and atraumatic.   Mouth/Throat: Oropharynx is clear and moist.   Eyes: Conjunctivae normal. EOM are normal.   Neck: Neck supple. No rigidity. No JVD present.    Cardiovascular: Normal rate, regular rhythm, S1&S2.

## 2025-01-10 NOTE — PROGRESS NOTES
1/10/2025     Chelsi Lock (:  1965) is a 59 y.o. female, here for evaluation of the following medical concerns:    Chief Complaint   Patient presents with    Follow-up     ED follow up, dizziness     ED Follow for dehydration due to nausea and vomiting.  Was given fluids and Mg due to mg level being low.  She is drinking a good amount of water and pedialyte.  Keeping down foods.  Following a bland diet.  Still feeling drained.  When she gets up she is feeling weak.  Denies fevers.      Review of Systems   Constitutional:  Negative for chills, diaphoresis, fatigue and fever.   Respiratory:  Negative for cough, chest tightness, shortness of breath and wheezing.    Cardiovascular:  Negative for chest pain and palpitations.   Gastrointestinal:  Positive for diarrhea, nausea and vomiting. Negative for abdominal distention, abdominal pain, anal bleeding, blood in stool, constipation and rectal pain.   Genitourinary: Negative.    Neurological:  Positive for dizziness. Negative for weakness and headaches.     Prior to Visit Medications    Medication Sig Taking? Authorizing Provider   ondansetron (ZOFRAN-ODT) 4 MG disintegrating tablet Take 1 tablet by mouth 3 times daily as needed for Nausea or Vomiting Yes Liam Green PA   metoclopramide (REGLAN) 10 MG tablet Take 1 tablet by mouth 4 times daily WARNING:  May cause drowsiness.  May impair ability to operate vehicles or machinery.  Do not use in combination with alcohol. Yes Liam Green PA   dicyclomine (BENTYL) 10 MG capsule Take 1 capsule by mouth 4 times daily (before meals and nightly) for 10 days Yes Liam Green PA   spironolactone (ALDACTONE) 25 MG tablet TAKE 1 TABLET BY MOUTH DAILY Yes Rema Rivers, APRN - CNS   Magnesium Oxide -Mg Supplement (MAG-OXIDE) 200 MG TABS Take 400 mg by mouth in the morning and at bedtime Yes Minoo Arriaga MD   acetaminophen (TYLENOL) 500 MG tablet Take 1 tablet by mouth every 6 hours as needed

## 2025-01-20 ENCOUNTER — TELEPHONE (OUTPATIENT)
Dept: CARDIOLOGY CLINIC | Age: 60
End: 2025-01-20

## 2025-01-20 NOTE — TELEPHONE ENCOUNTER
Hold Xarelto 2 days prior to procedure. Resume as soon as possible following procedure per discretion of performing physician

## 2025-01-20 NOTE — TELEPHONE ENCOUNTER
Pt called stating they are scheduled for EGD and would like to know when the can stop their XARELTO.    CARDIAC CLEARANCE   What type of procedure are you having?  EGD w/ MAC  Which physician is performing your procedure?  Dr Phillips  When is your procedure scheduled for?  2/7/2025  Where are you having this procedure?  [facility]  Are you taking Blood Thinners?  Yes   If so what? (Name/dose/frequesncy)   XARELTO  Does the surgeon want you to stop your blood thinner?  Yes     Pls advise the pt, it is ok to leave message on VM if they do not answer the phone.

## 2025-01-24 ENCOUNTER — TELEPHONE (OUTPATIENT)
Dept: FAMILY MEDICINE CLINIC | Age: 60
End: 2025-01-24

## 2025-01-24 NOTE — TELEPHONE ENCOUNTER
Pt called and wants to know if she is due for her flu shot because 4 people around her has the flu. Pt just wanted a callback

## 2025-01-24 NOTE — TELEPHONE ENCOUNTER
Called pt no answer ,left message to call the office.    Please inform pt she had a flu shot 10/01/2024  she is due 10/01/2025..

## 2025-01-28 NOTE — PROGRESS NOTES
Patient reached __x__ yes  _____ no         MY Chart message sent  _____  VM instructions left ____ yes   phone number ________                                ____ no-office notified          Date __2/7/25_______  Time __0900_____  Arrival 0730___/per office___    Nothing to eat or drink after midnight-follow your doctors prep instructions-this may include taking a second dose of your prep after midnight  Responsible adult 18 or older to stay on site while you are here-drive you home-stay with you after  Follow any instructions your doctors office has given you  Bring a complete list of all your medications and supplements including name,dose,how often taken the day of your procedure  If you normally take the following medications in the morning please do so the AM of your procedure with a small sip of water       Heart,blood pressure,seizure,thyroid or breathing medications-use your inhalers-bring any rescue inhalers with you DOS       DO NOT take blood pressure medications ending in \"katharina\" or \"pril\" the AM of procedure or evening prior  Dr Jefferson patients are not to take any medications the AM of surgery and will be on clear liquids the day before  Take half or your normal dose of any long acting insulins the night before your procedure-do not take any diabetic medications the AM of procedure. If you take a weekly injection for diabetes or weight loss-do not take one week prior to surgery/procedure.If you have already taken your injection this week,contact your surgeon  Follow your doctors instructions regarding stopping or taking  any blood thinners-if you do not have instructions-call them  Any questions call your doctor  Other ______________________________________________________________                VISITOR POLICY(subject to change)             The current policy is 2 visitors per patient.There are no children allowed.Mask at discretion of facility. Visiting hours are 8a-8p.Overnight visitors will be at

## 2025-02-03 PROBLEM — R07.9 CHEST PAIN: Status: RESOLVED | Noted: 2024-11-16 | Resolved: 2025-02-03

## 2025-02-03 PROBLEM — R10.9 ABDOMINAL PAIN: Status: RESOLVED | Noted: 2024-01-21 | Resolved: 2025-02-03

## 2025-02-03 PROBLEM — R05.1 ACUTE COUGH: Status: RESOLVED | Noted: 2024-11-17 | Resolved: 2025-02-03

## 2025-02-03 PROBLEM — E87.6 HYPOKALEMIA: Status: RESOLVED | Noted: 2024-11-17 | Resolved: 2025-02-03

## 2025-02-03 NOTE — PROGRESS NOTES
04/08/2021); Upper gastrointestinal endoscopy (N/A, 04/08/2021); colectomy (N/A, 04/13/2021); Cardioversion (01/28/2022); Cardioversion (02/22/2022); Upper gastrointestinal endoscopy (N/A, 03/04/2022); ventral hernia repair (N/A, 9/27/2022); Colonoscopy (N/A, 10/27/2023); Colonoscopy (10/27/2023); Upper gastrointestinal endoscopy (N/A, 1/22/2024); and Upper gastrointestinal endoscopy (N/A, 2/7/2025).     Social History:  Personally Reviewed.  reports that she has never smoked. She has never been exposed to tobacco smoke. She has never used smokeless tobacco. She reports that she does not drink alcohol and does not use drugs.     Family History:  Personally Reviewed. family history includes Cancer (age of onset: 70) in her father; Heart Failure in her sister; High Blood Pressure in her mother; No Known Problems in her brother and sister.     Review of Systems:  Constitutional: Negative for fever, night sweats, chills, weight changes, or weakness  Skin: Negative for rash, dry skin, pruritus, bruising, bleeding, blood clots, or changes in skin pigment  HEENT: Negative for vision changes, ringing in the ears, sore throat, dysphagia, or swollen lymph nodes  Respiratory: Reviewed in HPI  Cardiovascular: Reviewed in HPI  Gastrointestinal: Positive for diarrhea, abd pain. Negative for N/V, constipation, or black/tarry stools  Genito-Urinary: Negative for dysuria, incontinence, urgency, or hematuria  Musculoskeletal: Positive for back pain (chronic). Negative for joint swelling, muscle pain, or injuries  Neurological/Psych: Negative for confusion, seizures, dizziness, headaches, balance issues or TIA-like symptoms. No anxiety, depression, or insomnia    Physical Examination:  Vitals:    03/03/25 0909   BP: 120/80   Pulse: 78       Wt Readings from Last 3 Encounters:   03/03/25 81.8 kg (180 lb 6.4 oz)   02/07/25 85.7 kg (189 lb)   01/10/25 85.7 kg (189 lb)     Constitutional: Cooperative and in no apparent distress, and

## 2025-02-07 ENCOUNTER — ANESTHESIA EVENT (OUTPATIENT)
Dept: ENDOSCOPY | Age: 60
End: 2025-02-07
Payer: COMMERCIAL

## 2025-02-07 ENCOUNTER — ANESTHESIA (OUTPATIENT)
Dept: ENDOSCOPY | Age: 60
End: 2025-02-07
Payer: COMMERCIAL

## 2025-02-07 ENCOUNTER — HOSPITAL ENCOUNTER (OUTPATIENT)
Age: 60
Setting detail: OUTPATIENT SURGERY
Discharge: HOME | End: 2025-02-07
Attending: INTERNAL MEDICINE | Admitting: INTERNAL MEDICINE
Payer: COMMERCIAL

## 2025-02-07 VITALS
RESPIRATION RATE: 16 BRPM | TEMPERATURE: 97.3 F | OXYGEN SATURATION: 100 % | BODY MASS INDEX: 29.66 KG/M2 | WEIGHT: 189 LBS | DIASTOLIC BLOOD PRESSURE: 89 MMHG | SYSTOLIC BLOOD PRESSURE: 144 MMHG | HEART RATE: 75 BPM | HEIGHT: 67 IN

## 2025-02-07 PROCEDURE — 2709999900 HC NON-CHARGEABLE SUPPLY: Performed by: INTERNAL MEDICINE

## 2025-02-07 PROCEDURE — 3700000000 HC ANESTHESIA ATTENDED CARE: Performed by: INTERNAL MEDICINE

## 2025-02-07 PROCEDURE — 7100000011 HC PHASE II RECOVERY - ADDTL 15 MIN: Performed by: INTERNAL MEDICINE

## 2025-02-07 PROCEDURE — 3609017100 HC EGD: Performed by: INTERNAL MEDICINE

## 2025-02-07 PROCEDURE — 7100000010 HC PHASE II RECOVERY - FIRST 15 MIN: Performed by: INTERNAL MEDICINE

## 2025-02-07 PROCEDURE — 6360000002 HC RX W HCPCS: Performed by: NURSE ANESTHETIST, CERTIFIED REGISTERED

## 2025-02-07 PROCEDURE — 2580000003 HC RX 258: Performed by: NURSE ANESTHETIST, CERTIFIED REGISTERED

## 2025-02-07 PROCEDURE — 3700000001 HC ADD 15 MINUTES (ANESTHESIA): Performed by: INTERNAL MEDICINE

## 2025-02-07 RX ORDER — LIDOCAINE HYDROCHLORIDE 20 MG/ML
INJECTION, SOLUTION INFILTRATION; PERINEURAL
Status: DISCONTINUED | OUTPATIENT
Start: 2025-02-07 | End: 2025-02-07 | Stop reason: SDUPTHER

## 2025-02-07 RX ORDER — PROPOFOL 10 MG/ML
INJECTION, EMULSION INTRAVENOUS
Status: DISCONTINUED | OUTPATIENT
Start: 2025-02-07 | End: 2025-02-07 | Stop reason: SDUPTHER

## 2025-02-07 RX ORDER — METOCLOPRAMIDE 10 MG/1
5 TABLET ORAL
Qty: 20 TABLET | Refills: 0 | Status: SHIPPED | OUTPATIENT
Start: 2025-02-07

## 2025-02-07 RX ORDER — SODIUM CHLORIDE 9 MG/ML
INJECTION, SOLUTION INTRAVENOUS
Status: DISCONTINUED | OUTPATIENT
Start: 2025-02-07 | End: 2025-02-07 | Stop reason: SDUPTHER

## 2025-02-07 RX ADMIN — PROPOFOL 50 MG: 10 INJECTION, EMULSION INTRAVENOUS at 09:25

## 2025-02-07 RX ADMIN — PROPOFOL 150 MCG/KG/MIN: 10 INJECTION, EMULSION INTRAVENOUS at 09:24

## 2025-02-07 RX ADMIN — SODIUM CHLORIDE: 9 INJECTION, SOLUTION INTRAVENOUS at 08:53

## 2025-02-07 RX ADMIN — LIDOCAINE HYDROCHLORIDE 100 MG: 20 INJECTION, SOLUTION INFILTRATION; PERINEURAL at 09:25

## 2025-02-07 ASSESSMENT — PAIN - FUNCTIONAL ASSESSMENT
PAIN_FUNCTIONAL_ASSESSMENT: 0-10

## 2025-02-07 NOTE — PROGRESS NOTES
Reviewed patient's medical and surgical history in electronic record and with patient at the bedside. All questions regarding procedure answered.   Scope verified using 2 person system.  Family in waiting room.  Electronically signed by Katlyn Farah RN on 2/7/2025 at 9:24 AM

## 2025-02-07 NOTE — ANESTHESIA POSTPROCEDURE EVALUATION
Department of Anesthesiology  Postprocedure Note    Patient: Chelsi Lock  MRN: [id number]  YOB: 1965  Date of evaluation: 2/7/2025    Procedure Summary       Date: 02/07/25 Room / Location: [facility] ENDO 01 / [facility]    Anesthesia Start: 0920 Anesthesia Stop: 0941    Procedure: ESOPHAGOGASTRODUODENOSCOPY DIAGNOSTIC ONLY (Abdomen) Diagnosis:       Anemia, unspecified type      (Anemia, unspecified type [D64.9])    Surgeons: Fred Phillips MD Responsible Provider: Moris Barron DO    Anesthesia Type: MAC ASA Status: 3            Anesthesia Type: No value filed.    Sandra Phase I: Sandra Score: 10    Sandra Phase II:      Anesthesia Post Evaluation    Patient location during evaluation: PACU  Patient participation: complete - patient participated  Level of consciousness: awake  Airway patency: patent  Nausea & Vomiting: no nausea  Cardiovascular status: hemodynamically stable  Respiratory status: acceptable  Hydration status: euvolemic  Multimodal analgesia pain management approach  Pain management: adequate    No notable events documented.

## 2025-02-07 NOTE — ANESTHESIA PRE PROCEDURE
Department of Anesthesiology  Preprocedure Note       Name:  Chelsi Lock   Age:  59 y.o.  :  1965                                          MRN:  [id number]         Date:  2025      Surgeon: Surgeon(s):  Fred Phillips MD    Procedure: Procedure(s):  ESOPHAGOGASTRODUODENOSCOPY DIAGNOSTIC ONLY    Medications prior to admission:   Prior to Admission medications    Medication Sig Start Date End Date Taking? Authorizing Provider   ondansetron (ZOFRAN-ODT) 4 MG disintegrating tablet Take 1 tablet by mouth 3 times daily as needed for Nausea or Vomiting 25  Yes Liam Green PA   Magnesium Oxide -Mg Supplement (MAG-OXIDE) 200 MG TABS Take 400 mg by mouth in the morning and at bedtime 24  Yes Minoo Arriaga MD   acetaminophen (TYLENOL) 500 MG tablet Take 1 tablet by mouth every 6 hours as needed for Pain   Yes ProviderKell MD   ENTRESTO 49-51 MG per tablet TAKE 1 TABLET BY MOUTH TWICE A DAY 24  Yes Rema Rivers APRN - CNS   D3-1000 25 MCG (1000 UT) TABS tablet TAKE ONE TABLET BY MOUTH DAILY  Patient taking differently: Take 1 tablet by mouth daily 24  Yes Fred Granados APRN - CNP   ammonium lactate (AMLACTIN) 12 % cream Apply 1 Bottle topically as needed for Dry Skin (elbows) 24  Yes Evelin Yousif APRN - CNP   furosemide (LASIX) 20 MG tablet Take 1 tablet by mouth once a week . 24  Yes Alicia Ivory APRN - CNP   Wheat Dextrin (BENEFIBER) POWD Take 1 tablespoon daily 24  Yes Alicia Ivory APRN - CNP   carvedilol (COREG) 25 MG tablet TAKE ONE TABLET BY MOUTH TWICE A DAY  Patient taking differently: Take 1 tablet by mouth 2 times daily TAKE ONE TABLET BY MOUTH TWICE A DAY 23  Yes Fred Granados APRN - CNP   pantoprazole (PROTONIX) 40 MG tablet Take 1 tablet by mouth every morning (before breakfast) 23  Yes Fred Granados APRN - CNP   fluticasone (FLONASE) 50 MCG/ACT nasal spray 2 sprays by Each Nostril route

## 2025-02-07 NOTE — PROGRESS NOTES
Procedure completed as routine. Bedside handoff given to PACU RN.  Electronically signed by Katlyn Farah RN on 2/7/2025 at 9:37 AM

## 2025-02-07 NOTE — H&P
[facility]   Pre-operative History and Physical    Patient: Chelsi Lock  : 1965  CSN:     History Obtained From: patient and/or guardian.      HISTORY OF PRESENT ILLNESS:    The patient is a 59 y.o. female with abdominal pain, N/V, weight loss here for EGD.   Past Medical History:        Diagnosis Date    Anemia     Anxiety     Atrial fibrillation and flutter (HCC)     Atrial flutter (HCC)     CHB (complete heart block) (HCC)     Chronic combined systolic and diastolic heart failure (HCC)     Class 1 obesity without serious comorbidity with body mass index (BMI) of 33.0 to 33.9 in adult 2019    Congenital heart disease     Difficult intravenous access 10/06/2022    SEE NOTE    Dilated cardiomyopathy (HCC)     GERD (gastroesophageal reflux disease)     Headache(784.0)     Hyperlipidemia     Hypertension     Hypovitaminosis     ICD (implantable cardioverter-defibrillator), biventricular, in situ     LV dysfunction     Migraine     Obstructive sleep apnea (adult) (pediatric)     Paresthesia of right foot     PONV (postoperative nausea and vomiting)     Prolonged emergence from general anesthesia     sensitive to meds, slow to wake    Sciatica of left side     Sleep apnea     uses CPAP    Syncope      Past Surgical History:        Procedure Laterality Date    CARDIAC DEFIBRILLATOR PLACEMENT  2021    [employer]    CARDIOVERSION  2022    CARDIOVERSION  2022    COLECTOMY N/A 2021    EXPLORATORY LAPAROTOMY, RESECTION OF DUODENAL MASS, CHOLECYSTECTOMY WITH INTRAOPERATIVE CHOLANGIOGRAM performed by Flavio Coronel MD at [facility] OR    COLONOSCOPY N/A 10/27/2020    COLONOSCOPY POLYPECTOMY SNARE/COLD BIOPSY performed by Fred Phillips MD at [facility] ENDOSCOPY    COLONOSCOPY N/A 10/27/2023    COLONOSCOPY WITH BIOPSY performed by Fred Phillips MD at [facility] ENDOSCOPY    COLONOSCOPY  10/27/2023    COLONOSCOPY POLYPECTOMY SNARE/COLD BIOPSY performed by Fred Phillips MD

## 2025-02-07 NOTE — DISCHARGE INSTRUCTIONS
EGD DISCHARGE INSTRUCTIONS    Use salt water gargle, lozenges, or Chloraseptic spray as needed for sore throat.    If you have fever, chills, excessive bleeding, severe chest pain, or abdominal pain, or any other problems, contact your physician's office immediately at [phone].    If you had an esophageal dilatation, and experience fever, chills, excessive bleeding, shortness of breath, chest or abdominal pain, or any other unusual symptom, call the office immediately at [phone].    Continue home medications as directed.    Call physician's office in 14 business days for biopsy results or further instructions.    Call your physician at [phone] if any problems or concerns.    See your physician's report for procedure details and recommendations.      ANESTHESIA DISCHARGE INSTRUCTIONS    Wear your seatbelt home.    A responsible adult needs to be with you for 24 hours  You are under the influence of drugs-do not drink alcohol, drive ,operate machinery,or make any important decisions or sign any legal documentsfor 24 hours. You may resume normal activities tomorrow.    You may experience lightheadedness,dizziness,or sleepiness following surgery.  Rest at home today - increase activity as tolerated. It is recommended to take a four hour nap after procedure.    Progress slowly to a regular diet unless your physician has instructed you otherwise. Avoid spicy and greasy food on first meal.  Drink plenty of water.  If nausea becomes a problem call your physician.    Call your doctor if concerns arise.      Gastroesophageal Reflux Disease (GERD): Care Instructions  Overview     Gastroesophageal reflux disease (GERD) is the backward flow of stomach acid into the esophagus. The esophagus is the tube that leads from your throat to your stomach. A one-way valve prevents the stomach acid from backing up into this tube. But when you have GERD, this valve does not close tightly enough. This can also cause pain and

## 2025-02-21 ENCOUNTER — TELEPHONE (OUTPATIENT)
Dept: FAMILY MEDICINE CLINIC | Age: 60
End: 2025-02-21

## 2025-02-21 DIAGNOSIS — L40.9 PSORIASIS: Primary | ICD-10-CM

## 2025-02-21 RX ORDER — AMMONIUM LACTATE 12 G/100G
140 CREAM TOPICAL PRN
Qty: 1 EACH | Refills: 5 | Status: SHIPPED | OUTPATIENT
Start: 2025-02-21

## 2025-02-21 RX ORDER — AMMONIUM LACTATE 12 G/100G
CREAM TOPICAL
Qty: 385 G | Refills: 0 | OUTPATIENT
Start: 2025-02-21

## 2025-02-21 NOTE — TELEPHONE ENCOUNTER
Medication:   Requested Prescriptions     Pending Prescriptions Disp Refills    ammonium lactate (AMLACTIN) 12 % cream 1 each 5     Sig: Apply 1 Bottle topically as needed for Dry Skin (elbows)        Last Filled:  07/19/24 1 each 5 refills     Patient Phone Number: [phone] (home)     Last appt: 1/10/2025   Next appt: 2/21/2025    Last OARRS:       10/4/2022    11:28 AM   RX Monitoring   Periodic Controlled Substance Monitoring No signs of potential drug abuse or diversion identified.

## 2025-02-21 NOTE — TELEPHONE ENCOUNTER
Patient called and needs a referral for  Obstetric and gyn. The one that was referred last year is to [facility] for patient and also needs a female obgyn   Patient lives in [facility].      Patient will also need a refill on her       ammonium lactate (AMLACTIN) 12 % cream [[id number]]     Sent to [facility] on [email or web address]     Please advise

## 2025-02-21 NOTE — TELEPHONE ENCOUNTER
Medication:   Requested Prescriptions     Pending Prescriptions Disp Refills    ammonium lactate (AMLACTIN) 12 % cream [Pharmacy Med Name: AMMONIUM LACTATE 12% CREAM] 385 g 0     Sig: APPLY TOPICALLY AS NEEDED MAX ONCE DAILY        Last Filled:  7/19/2024, 1, 5    Patient Phone Number: [phone] (home)     Last appt: 1/10/2025   Next appt: Visit date not found    Last OARRS:       10/4/2022    11:28 AM   RX Monitoring   Periodic Controlled Substance Monitoring No signs of potential drug abuse or diversion identified.

## 2025-03-03 ENCOUNTER — OFFICE VISIT (OUTPATIENT)
Dept: CARDIOLOGY CLINIC | Age: 60
End: 2025-03-03
Payer: COMMERCIAL

## 2025-03-03 ENCOUNTER — NURSE ONLY (OUTPATIENT)
Dept: CARDIOLOGY CLINIC | Age: 60
End: 2025-03-03
Payer: COMMERCIAL

## 2025-03-03 VITALS
HEIGHT: 66 IN | BODY MASS INDEX: 28.99 KG/M2 | HEART RATE: 78 BPM | DIASTOLIC BLOOD PRESSURE: 80 MMHG | WEIGHT: 180.4 LBS | SYSTOLIC BLOOD PRESSURE: 120 MMHG

## 2025-03-03 DIAGNOSIS — Z95.810 AICD (AUTOMATIC CARDIOVERTER/DEFIBRILLATOR) PRESENT: ICD-10-CM

## 2025-03-03 DIAGNOSIS — Z95.810 AICD (AUTOMATIC CARDIOVERTER/DEFIBRILLATOR) PRESENT: Primary | ICD-10-CM

## 2025-03-03 DIAGNOSIS — I51.7 LVH (LEFT VENTRICULAR HYPERTROPHY): ICD-10-CM

## 2025-03-03 DIAGNOSIS — I44.2 CHB (COMPLETE HEART BLOCK) (HCC): ICD-10-CM

## 2025-03-03 DIAGNOSIS — I47.20 VT (VENTRICULAR TACHYCARDIA) (HCC): ICD-10-CM

## 2025-03-03 DIAGNOSIS — I10 PRIMARY HYPERTENSION: ICD-10-CM

## 2025-03-03 DIAGNOSIS — I48.0 PAF (PAROXYSMAL ATRIAL FIBRILLATION) (HCC): Primary | ICD-10-CM

## 2025-03-03 DIAGNOSIS — G47.33 OBSTRUCTIVE SLEEP APNEA (ADULT) (PEDIATRIC): ICD-10-CM

## 2025-03-03 DIAGNOSIS — I48.0 PAF (PAROXYSMAL ATRIAL FIBRILLATION) (HCC): ICD-10-CM

## 2025-03-03 DIAGNOSIS — I42.0 DILATED CARDIOMYOPATHY (HCC): ICD-10-CM

## 2025-03-03 PROCEDURE — 1036F TOBACCO NON-USER: CPT | Performed by: NURSE PRACTITIONER

## 2025-03-03 PROCEDURE — G8417 CALC BMI ABV UP PARAM F/U: HCPCS | Performed by: NURSE PRACTITIONER

## 2025-03-03 PROCEDURE — G2211 COMPLEX E/M VISIT ADD ON: HCPCS | Performed by: NURSE PRACTITIONER

## 2025-03-03 PROCEDURE — G8427 DOCREV CUR MEDS BY ELIG CLIN: HCPCS | Performed by: NURSE PRACTITIONER

## 2025-03-03 PROCEDURE — 93000 ELECTROCARDIOGRAM COMPLETE: CPT | Performed by: NURSE PRACTITIONER

## 2025-03-03 PROCEDURE — 3017F COLORECTAL CA SCREEN DOC REV: CPT | Performed by: NURSE PRACTITIONER

## 2025-03-03 PROCEDURE — 3074F SYST BP LT 130 MM HG: CPT | Performed by: NURSE PRACTITIONER

## 2025-03-03 PROCEDURE — 3079F DIAST BP 80-89 MM HG: CPT | Performed by: NURSE PRACTITIONER

## 2025-03-03 PROCEDURE — 99214 OFFICE O/P EST MOD 30 MIN: CPT | Performed by: NURSE PRACTITIONER

## 2025-03-05 PROCEDURE — 93284 PRGRMG EVAL IMPLANTABLE DFB: CPT | Performed by: INTERNAL MEDICINE

## 2025-03-06 ENCOUNTER — TELEPHONE (OUTPATIENT)
Dept: FAMILY MEDICINE CLINIC | Age: 60
End: 2025-03-06

## 2025-03-06 DIAGNOSIS — N82.9 FISTULA INVOLVING FEMALE GENITAL TRACT: Primary | ICD-10-CM

## 2025-03-06 NOTE — TELEPHONE ENCOUNTER
Please call patient  Let her know that cardiology reached out to me regarding her having an issue with having some stool in her vaginal vault and I would like her to see a gynecologist as soon as possible.  I have placed a referral to Dr. Marty Caballero, she will need to call for an appointment.  Please give her the phone number on the referral so she can call

## 2025-03-17 ENCOUNTER — OFFICE VISIT (OUTPATIENT)
Age: 60
End: 2025-03-17
Payer: COMMERCIAL

## 2025-03-17 VITALS
BODY MASS INDEX: 30 KG/M2 | WEIGHT: 188 LBS | HEART RATE: 83 BPM | DIASTOLIC BLOOD PRESSURE: 88 MMHG | SYSTOLIC BLOOD PRESSURE: 137 MMHG

## 2025-03-17 DIAGNOSIS — D25.1 INTRAMURAL LEIOMYOMA OF UTERUS: ICD-10-CM

## 2025-03-17 DIAGNOSIS — N95.2 VAGINAL ATROPHY: ICD-10-CM

## 2025-03-17 DIAGNOSIS — Z01.419 WELL WOMAN EXAM WITH ROUTINE GYNECOLOGICAL EXAM: Primary | ICD-10-CM

## 2025-03-17 PROCEDURE — 99386 PREV VISIT NEW AGE 40-64: CPT | Performed by: OBSTETRICS & GYNECOLOGY

## 2025-03-17 PROCEDURE — 3075F SYST BP GE 130 - 139MM HG: CPT | Performed by: OBSTETRICS & GYNECOLOGY

## 2025-03-17 PROCEDURE — 3079F DIAST BP 80-89 MM HG: CPT | Performed by: OBSTETRICS & GYNECOLOGY

## 2025-03-17 RX ORDER — ESTRADIOL 0.1 MG/G
0.5 CREAM VAGINAL
Qty: 42.5 G | Refills: 1 | Status: SHIPPED | OUTPATIENT
Start: 2025-03-17

## 2025-03-17 NOTE — PROGRESS NOTES
Chelsi Lock is an 59 y.o. year old woman who presents for annual gyn exam.    The patient reports that she will get some loose stools when she uses her magnesium sulfate.  She reports that this exacerbates from her history of IBS.    1/6/25 CT  Pelvis: The uterus is enlarged, containing multiple calcified and  noncalcified fibroids.  The bladder is unremarkable.    10/25 mammogram category 2    7/24 CT  IMPRESSION:  1. No acute intra-abdominal or intrapelvic pathology is identified.  Specifically, there is no CT evidence to suggest pyelonephritis.  2. Small right-sided Spigelian hernia, which contains a mild amount of  peritoneal fat and the distal tip of the appendix, though no evidence of  bowel involvement or incarceration.  3. Fibroid uterus.  4. Status post cholecystectomy.    REVIEW OF SYSTEMS:  No complaints of symptoms involving:  Constitutional: there has been no unanticipated weight loss. There's been no change in activity level. Negative for fever, chills.  Eyes: No visual changes, double vision, or scotomata. No scleral icterus.  HENT: No Headaches, hearing loss or vertigo. No sore throat, ear pain or nasal congestion  Respiratory: no cough or wheezing, no sputum production, no hemoptysis.    Gastrointestinal: No abdominal pain, appetite loss, blood in stools. No change in bowel habits.  Genitourinary: No dysuria, trouble voiding, or hematuria. No change in bladder habits.  Musculoskeletal:  No gait disturbance,no weakness or joint complaints.  Skin: No rash or pruritis.  Neurological: No headache, vision changes, change in muscle strength, numbness or tingling. No change in gait, balance, coordination.  Psychiatric: No anxiety, or depression. No change in mood or behavior.  Endocrine: No temperature intolerance. No excessive thirst, fluid intake, or urination. No tremor.  Hematologic/Lymphatic: No abnormal bruising or bleeding, blood clots or swollen lymph nodes.       Patient Active Problem List

## 2025-03-17 NOTE — PROGRESS NOTES
The sensitive parts of the examination were performed with Susie Cox LPN as a chaperone.  Susie was present during the entirety of the sensitive parts of the examination.

## 2025-03-18 LAB
HPV HR 12 DNA SPEC QL NAA+PROBE: NOT DETECTED
HPV16 DNA SPEC QL NAA+PROBE: NOT DETECTED
HPV16+18+H RISK 12 DNA SPEC-IMP: NORMAL
HPV18 DNA SPEC QL NAA+PROBE: NOT DETECTED

## 2025-03-24 ENCOUNTER — OFFICE VISIT (OUTPATIENT)
Dept: CARDIOLOGY CLINIC | Age: 60
End: 2025-03-24
Payer: COMMERCIAL

## 2025-03-24 ENCOUNTER — HOSPITAL ENCOUNTER (OUTPATIENT)
Age: 60
Discharge: HOME | End: 2025-03-26
Payer: COMMERCIAL

## 2025-03-24 VITALS
DIASTOLIC BLOOD PRESSURE: 89 MMHG | WEIGHT: 188 LBS | HEIGHT: 66 IN | BODY MASS INDEX: 30.22 KG/M2 | SYSTOLIC BLOOD PRESSURE: 144 MMHG

## 2025-03-24 VITALS
OXYGEN SATURATION: 99 % | HEIGHT: 66 IN | WEIGHT: 188 LBS | SYSTOLIC BLOOD PRESSURE: 130 MMHG | HEART RATE: 79 BPM | DIASTOLIC BLOOD PRESSURE: 80 MMHG | BODY MASS INDEX: 30.22 KG/M2

## 2025-03-24 DIAGNOSIS — I50.42 CHRONIC COMBINED SYSTOLIC AND DIASTOLIC HEART FAILURE (HCC): Primary | ICD-10-CM

## 2025-03-24 DIAGNOSIS — E55.9 HYPOVITAMINOSIS D: ICD-10-CM

## 2025-03-24 DIAGNOSIS — I50.22 CHRONIC SYSTOLIC CONGESTIVE HEART FAILURE (HCC): ICD-10-CM

## 2025-03-24 DIAGNOSIS — D50.9 IRON DEFICIENCY ANEMIA, UNSPECIFIED IRON DEFICIENCY ANEMIA TYPE: ICD-10-CM

## 2025-03-24 DIAGNOSIS — I50.42 CHRONIC COMBINED SYSTOLIC AND DIASTOLIC HEART FAILURE (HCC): ICD-10-CM

## 2025-03-24 DIAGNOSIS — Z95.810 AICD (AUTOMATIC CARDIOVERTER/DEFIBRILLATOR) PRESENT: ICD-10-CM

## 2025-03-24 DIAGNOSIS — I48.19 PERSISTENT ATRIAL FIBRILLATION (HCC): ICD-10-CM

## 2025-03-24 DIAGNOSIS — G47.33 OSA (OBSTRUCTIVE SLEEP APNEA): ICD-10-CM

## 2025-03-24 PROCEDURE — 3075F SYST BP GE 130 - 139MM HG: CPT | Performed by: CLINICAL NURSE SPECIALIST

## 2025-03-24 PROCEDURE — 99214 OFFICE O/P EST MOD 30 MIN: CPT | Performed by: CLINICAL NURSE SPECIALIST

## 2025-03-24 PROCEDURE — G8427 DOCREV CUR MEDS BY ELIG CLIN: HCPCS | Performed by: CLINICAL NURSE SPECIALIST

## 2025-03-24 PROCEDURE — 93308 TTE F-UP OR LMTD: CPT

## 2025-03-24 PROCEDURE — 3017F COLORECTAL CA SCREEN DOC REV: CPT | Performed by: CLINICAL NURSE SPECIALIST

## 2025-03-24 PROCEDURE — 1036F TOBACCO NON-USER: CPT | Performed by: CLINICAL NURSE SPECIALIST

## 2025-03-24 PROCEDURE — G8417 CALC BMI ABV UP PARAM F/U: HCPCS | Performed by: CLINICAL NURSE SPECIALIST

## 2025-03-24 PROCEDURE — 3079F DIAST BP 80-89 MM HG: CPT | Performed by: CLINICAL NURSE SPECIALIST

## 2025-03-24 RX ORDER — MAGNESIUM GLYCINATE 100 MG
100 CAPSULE ORAL DAILY
Qty: 30 CAPSULE | Refills: 0 | Status: SHIPPED | OUTPATIENT
Start: 2025-03-24

## 2025-03-24 NOTE — PATIENT INSTRUCTIONS
Stop magnesium oxide and change  to magnesium gylcinate  Continue all other medications  Will let you know echo results later today  RTO in 6 months

## 2025-03-24 NOTE — PROGRESS NOTES
[facility]  Progress Note    Primary Care Doctor:  Evelin Yousif, APRN - CNP    Chief Complaint   Patient presents with    Follow-up    Congestive Heart Failure    Shortness of Breath        History of Present Illness:  59 y.o. female with history of sHF, LVH, AF on xarelto (follows with Dr Deleon), had been on flecainide, dual chamber pacemaker 2012 due to congenital heart block  LVEF had been 55% in 2015 and now 25%.  No lisinopril due to cough.  She is a cook at [employer]  Lip numbness to entresto 3/2020 hydralazine caused headaches  10/2020 EGD (hiatal hernia) and colonoscopy (polyp)   ICD placed 1/22/21, LHC done 1/20/21 normal cors  4/21 benign duodenal mass with resection Dr Homer FISCHER had the pleasure of seeing Chelsi Lock in follow up for systolic heart failure with LVEF up to 40-45%.  She is ambulatory and by her self.  She had an echo done today which preliminary is 54%.  Her optival shows normal thoracic impedence.  She had blood work done 1/2025 and all reviewed.  She had an  EGD done which she said was normal.  She is taking all her medications.  No chest pain, palpitations, edema or lightheadedness.  She is still having shortness of breath.  The magnesium oxide is causing a lot of diarrhea    Past Medical History:   has a past medical history of Anemia, Anxiety, Atrial fibrillation and flutter (HCC), Atrial flutter (HCC), CHB (complete heart block) (HCC), Chronic combined systolic and diastolic heart failure (HCC), Class 1 obesity without serious comorbidity with body mass index (BMI) of 33.0 to 33.9 in adult, Congenital heart disease, Difficult intravenous access, Dilated cardiomyopathy (HCC), GERD (gastroesophageal reflux disease), Headache(784.0), Hyperlipidemia, Hypertension, Hypovitaminosis, ICD (implantable cardioverter-defibrillator), biventricular, in situ, LV dysfunction, Migraine, Obstructive sleep apnea (adult) (pediatric), Paresthesia of right foot,

## 2025-03-25 ENCOUNTER — RESULTS FOLLOW-UP (OUTPATIENT)
Dept: GYNECOLOGY | Age: 60
End: 2025-03-25

## 2025-03-25 ENCOUNTER — RESULTS FOLLOW-UP (OUTPATIENT)
Dept: CARDIOLOGY CLINIC | Age: 60
End: 2025-03-25

## 2025-03-25 LAB
ECHO AO ASC DIAM: 3.2 CM
ECHO AO ASCENDING AORTA INDEX: 1.64 CM/M2
ECHO AO ROOT DIAM: 2.5 CM
ECHO AO ROOT INDEX: 1.28 CM/M2
ECHO AV MEAN GRADIENT: 6 MMHG
ECHO AV MEAN VELOCITY: 1.1 M/S
ECHO AV PEAK GRADIENT: 11 MMHG
ECHO AV PEAK VELOCITY: 1.7 M/S
ECHO AV VTI: 29.8 CM
ECHO BSA: 1.99 M2
ECHO LA AREA 2C: 24.1 CM2
ECHO LA AREA 4C: 25.9 CM2
ECHO LA MAJOR AXIS: 6.3 CM
ECHO LA MINOR AXIS: 6 CM
ECHO LA VOL BP: 82 ML (ref 22–52)
ECHO LA VOL MOD A2C: 81 ML (ref 22–52)
ECHO LA VOL MOD A4C: 84 ML (ref 22–52)
ECHO LA VOL/BSA BIPLANE: 42 ML/M2 (ref 16–34)
ECHO LA VOLUME INDEX MOD A2C: 42 ML/M2 (ref 16–34)
ECHO LA VOLUME INDEX MOD A4C: 43 ML/M2 (ref 16–34)
ECHO LV EDV A2C: 58 ML
ECHO LV EDV A4C: 65 ML
ECHO LV EDV INDEX A4C: 33 ML/M2
ECHO LV EDV NDEX A2C: 30 ML/M2
ECHO LV EF PHYSICIAN: 55 %
ECHO LV EJECTION FRACTION A2C: 35 %
ECHO LV EJECTION FRACTION A4C: 61 %
ECHO LV EJECTION FRACTION BIPLANE: 54 % (ref 55–100)
ECHO LV ESV A2C: 38 ML
ECHO LV ESV A4C: 25 ML
ECHO LV ESV INDEX A2C: 19 ML/M2
ECHO LV ESV INDEX A4C: 13 ML/M2
ECHO LV FRACTIONAL SHORTENING: 25 % (ref 28–44)
ECHO LV INTERNAL DIMENSION DIASTOLE INDEX: 2.92 CM/M2
ECHO LV INTERNAL DIMENSION DIASTOLIC: 5.7 CM (ref 3.9–5.3)
ECHO LV INTERNAL DIMENSION SYSTOLIC INDEX: 2.21 CM/M2
ECHO LV INTERNAL DIMENSION SYSTOLIC: 4.3 CM
ECHO LV IVSD: 1.1 CM (ref 0.6–0.9)
ECHO LV MASS 2D: 272.5 G (ref 67–162)
ECHO LV MASS INDEX 2D: 139.7 G/M2 (ref 43–95)
ECHO LV POSTERIOR WALL DIASTOLIC: 1.2 CM (ref 0.6–0.9)
ECHO LV RELATIVE WALL THICKNESS RATIO: 0.42
ECHO LVOT AREA: 3.8 CM2
ECHO LVOT DIAM: 2.2 CM
ECHO MV A VELOCITY: 0.46 M/S
ECHO MV E VELOCITY: 0.76 M/S
ECHO MV E/A RATIO: 1.65
ECHO MV MAX VELOCITY: 0.9 M/S
ECHO MV MEAN GRADIENT: 1 MMHG
ECHO MV MEAN VELOCITY: 0.6 M/S
ECHO MV PEAK GRADIENT: 3 MMHG
ECHO MV REGURGITANT PEAK GRADIENT: 96 MMHG
ECHO MV REGURGITANT PEAK VELOCITY: 4.9 M/S
ECHO MV VTI: 22.8 CM
ECHO RA AREA 4C: 16 CM2
ECHO RA END SYSTOLIC VOLUME APICAL 4 CHAMBER INDEX BSA: 23 ML/M2
ECHO RA VOLUME: 45 ML
ECHO RV BASAL DIMENSION: 4.1 CM
ECHO RV FREE WALL PEAK S': 11.3 CM/S
ECHO RV LONGITUDINAL DIMENSION: 7.5 CM
ECHO RV MID DIMENSION: 2.5 CM
ECHO RV TAPSE: 2.2 CM (ref 1.7–?)

## 2025-03-25 RX ORDER — SPIRONOLACTONE 25 MG/1
25 TABLET ORAL DAILY
Qty: 90 TABLET | Refills: 0 | Status: SHIPPED | OUTPATIENT
Start: 2025-03-25

## 2025-03-25 NOTE — TELEPHONE ENCOUNTER
----- Message from CATRACHITO Nelson CNP sent at 3/25/2025 12:43 PM EDT -----  Echo looks good, EF is 50-55%. ANTONV    Tried to reach patient LMOM for her to return our call. Spoke to patient she verbalized understanding.

## 2025-04-01 ENCOUNTER — TELEPHONE (OUTPATIENT)
Dept: FAMILY MEDICINE CLINIC | Age: 60
End: 2025-04-01

## 2025-04-01 NOTE — TELEPHONE ENCOUNTER
ECC transfer to Nurse triage    Patient is calling with complaint of NUMBNESS  in the right hip and giving out cannot lay on it at night taking tylenol and   pain  on a scale of 1-10, 10 when ambulating and laying on it none at rest denies injury     This has been going on  1 week     Patient is scheduled 04/21/25  pt preference      Has patient tried any over the counter medications? yes -   Tylenol     Pt encouraged to ice and alternate heat therapy as well to help pain

## 2025-04-03 RX ORDER — POTASSIUM CHLORIDE 1500 MG/1
40 TABLET, EXTENDED RELEASE ORAL DAILY
Qty: 60 TABLET | Refills: 0 | Status: SHIPPED | OUTPATIENT
Start: 2025-04-03 | End: 2025-05-03

## 2025-04-03 NOTE — TELEPHONE ENCOUNTER
Medication Refill    Medication needing refilled:  potassium chloride     Dosage of the medication: 20meq    How are you taking this medication (QD, BID, TID, QID, PRN):   Take 2 tablets by mouth daily     30 or 90 day supply called in: 60    When will you run out of your medication:    Which Pharmacy are we sending the medication to?:     [facility] [id number]  [id number] [facility] [address] [address]  Phone: [phone]    Fax: [phone]

## 2025-04-12 ENCOUNTER — HOSPITAL ENCOUNTER (OUTPATIENT)
Dept: GENERAL RADIOLOGY | Age: 60
Discharge: HOME | End: 2025-04-12
Payer: COMMERCIAL

## 2025-04-12 ENCOUNTER — HOSPITAL ENCOUNTER (OUTPATIENT)
Age: 60
Discharge: HOME | End: 2025-04-12
Payer: COMMERCIAL

## 2025-04-12 DIAGNOSIS — M25.551 RIGHT HIP PAIN: ICD-10-CM

## 2025-04-12 PROCEDURE — 73502 X-RAY EXAM HIP UNI 2-3 VIEWS: CPT

## 2025-04-14 ENCOUNTER — TELEPHONE (OUTPATIENT)
Dept: FAMILY MEDICINE CLINIC | Age: 60
End: 2025-04-14

## 2025-04-14 NOTE — TELEPHONE ENCOUNTER
Pt called and had received a phone call regarding appt on the 4/21/25 with Dr. Vick.    She was asked if she could come in sooner, due to Dr. Vick's schedule I was not able to schedule pt sooner.    Is this able to be done?    Please advise  [phone]

## 2025-04-21 ENCOUNTER — OFFICE VISIT (OUTPATIENT)
Dept: FAMILY MEDICINE CLINIC | Age: 60
End: 2025-04-21
Payer: COMMERCIAL

## 2025-04-21 VITALS
DIASTOLIC BLOOD PRESSURE: 80 MMHG | HEIGHT: 66 IN | WEIGHT: 187 LBS | OXYGEN SATURATION: 99 % | BODY MASS INDEX: 30.05 KG/M2 | SYSTOLIC BLOOD PRESSURE: 132 MMHG | RESPIRATION RATE: 16 BRPM | HEART RATE: 87 BPM

## 2025-04-21 DIAGNOSIS — M70.61 TROCHANTERIC BURSITIS OF RIGHT HIP: Primary | ICD-10-CM

## 2025-04-21 DIAGNOSIS — R30.0 DYSURIA: ICD-10-CM

## 2025-04-21 DIAGNOSIS — M85.852 OSTEOPENIA OF BOTH HIPS: ICD-10-CM

## 2025-04-21 DIAGNOSIS — M85.851 OSTEOPENIA OF BOTH HIPS: ICD-10-CM

## 2025-04-21 LAB
BILIRUBIN, POC: NORMAL
BLOOD URINE, POC: NORMAL
CLARITY, POC: NORMAL
COLOR, POC: NORMAL
GLUCOSE URINE, POC: NORMAL MG/DL
KETONES, POC: NORMAL MG/DL
LEUKOCYTE EST, POC: NORMAL
NITRITE, POC: NORMAL
PH, POC: 6
PROTEIN, POC: 30 MG/DL
SPECIFIC GRAVITY, POC: 1.02
UROBILINOGEN, POC: 0.2 MG/DL

## 2025-04-21 PROCEDURE — 3079F DIAST BP 80-89 MM HG: CPT | Performed by: FAMILY MEDICINE

## 2025-04-21 PROCEDURE — 3017F COLORECTAL CA SCREEN DOC REV: CPT | Performed by: FAMILY MEDICINE

## 2025-04-21 PROCEDURE — 20610 DRAIN/INJ JOINT/BURSA W/O US: CPT | Performed by: FAMILY MEDICINE

## 2025-04-21 PROCEDURE — G8427 DOCREV CUR MEDS BY ELIG CLIN: HCPCS | Performed by: FAMILY MEDICINE

## 2025-04-21 PROCEDURE — G8417 CALC BMI ABV UP PARAM F/U: HCPCS | Performed by: FAMILY MEDICINE

## 2025-04-21 PROCEDURE — 81002 URINALYSIS NONAUTO W/O SCOPE: CPT | Performed by: FAMILY MEDICINE

## 2025-04-21 PROCEDURE — 3075F SYST BP GE 130 - 139MM HG: CPT | Performed by: FAMILY MEDICINE

## 2025-04-21 PROCEDURE — 99213 OFFICE O/P EST LOW 20 MIN: CPT | Performed by: FAMILY MEDICINE

## 2025-04-21 PROCEDURE — 1036F TOBACCO NON-USER: CPT | Performed by: FAMILY MEDICINE

## 2025-04-21 ASSESSMENT — ENCOUNTER SYMPTOMS
CONSTIPATION: 0
COUGH: 0
WHEEZING: 0
SHORTNESS OF BREATH: 0
VOMITING: 0
NAUSEA: 0
DIARRHEA: 0
ABDOMINAL PAIN: 0
BACK PAIN: 0

## 2025-04-21 ASSESSMENT — PATIENT HEALTH QUESTIONNAIRE - PHQ9
2. FEELING DOWN, DEPRESSED OR HOPELESS: NOT AT ALL
SUM OF ALL RESPONSES TO PHQ QUESTIONS 1-9: 0
1. LITTLE INTEREST OR PLEASURE IN DOING THINGS: NOT AT ALL
SUM OF ALL RESPONSES TO PHQ QUESTIONS 1-9: 0

## 2025-04-21 NOTE — PROGRESS NOTES
Chelsi Lock (:  1965) is a 59 y.o. female,Established patient, here for evaluation of the following chief complaint(s):  Hip Pain (Right hip pain, had an xray )         Assessment & Plan  Trochanteric bursitis of right hip  Right greater trochanteric bursitis injection performed today.  Tolerated well by the patient.  Provided with home exercises for snapping hip and trochanteric bursitis.  If not improving will give consideration for formal physical therapy.  Avoid NSAIDs due to anticoagulation         Osteopenia of both hips    DEXA scan ordered    Orders:    DEXA BONE DENSITY AXIAL SKELETON; Future    Dysuria    Point-of-care urine with small leukocytes.  Will send out for culture and await results before prescribing antibiotics.    Orders:    POCT Urinalysis no Micro    Culture, Urine      No follow-ups on file.       Subjective   Hip Pain       Patient is here for right hip pain that has been ongoing for the past 1 month.  It has been affecting her sleep at night.  She has pain when laying on the right side that does not radiate elsewhere.  Denies any pain into the groin or into the back of the hip.  X-ray hips reviewed with patient showing mild osteoarthritis bilaterally and hip joint and also diffuse osteopenia.  Discussed osteoporosis diagnosis and treatment.  Patient also with several months of dysuria and increased urinary frequency.  Patient had previous urine evaluations that were unremarkable.    Review of Systems   Constitutional:  Negative for activity change, appetite change, fatigue and fever.   Respiratory:  Negative for cough, shortness of breath and wheezing.    Cardiovascular:  Negative for chest pain and palpitations.   Gastrointestinal:  Negative for abdominal pain, constipation, diarrhea, nausea and vomiting.   Genitourinary:  Positive for dysuria and frequency. Negative for hematuria.   Musculoskeletal:  Positive for arthralgias. Negative for back pain.          Objective

## 2025-04-22 LAB — BACTERIA UR CULT: NORMAL

## 2025-04-24 ENCOUNTER — TELEPHONE (OUTPATIENT)
Dept: FAMILY MEDICINE CLINIC | Age: 60
End: 2025-04-24

## 2025-04-24 RX ORDER — NITROFURANTOIN 25; 75 MG/1; MG/1
100 CAPSULE ORAL 2 TIMES DAILY
Qty: 10 CAPSULE | Refills: 0 | Status: SHIPPED | OUTPATIENT
Start: 2025-04-24 | End: 2025-04-29

## 2025-04-24 NOTE — TELEPHONE ENCOUNTER
Pt called in checking on urine culture results. Pt states she is still experiencing urine frequency and burning. Please advise.

## 2025-05-01 RX ORDER — POTASSIUM CHLORIDE 1500 MG/1
40 TABLET, EXTENDED RELEASE ORAL DAILY
Qty: 60 TABLET | Refills: 0 | Status: SHIPPED | OUTPATIENT
Start: 2025-05-01

## 2025-05-05 ENCOUNTER — HOSPITAL ENCOUNTER (OUTPATIENT)
Dept: GENERAL RADIOLOGY | Age: 60
Discharge: HOME | End: 2025-05-05
Attending: FAMILY MEDICINE
Payer: COMMERCIAL

## 2025-05-05 ENCOUNTER — RESULTS FOLLOW-UP (OUTPATIENT)
Dept: FAMILY MEDICINE CLINIC | Age: 60
End: 2025-05-05

## 2025-05-05 ENCOUNTER — TELEPHONE (OUTPATIENT)
Dept: FAMILY MEDICINE CLINIC | Age: 60
End: 2025-05-05

## 2025-05-05 DIAGNOSIS — B37.31 VAGINAL YEAST INFECTION: Primary | ICD-10-CM

## 2025-05-05 DIAGNOSIS — M85.852 OSTEOPENIA OF BOTH HIPS: ICD-10-CM

## 2025-05-05 DIAGNOSIS — M85.851 OSTEOPENIA OF BOTH HIPS: ICD-10-CM

## 2025-05-05 PROCEDURE — 77080 DXA BONE DENSITY AXIAL: CPT

## 2025-05-05 RX ORDER — FLUCONAZOLE 150 MG/1
150 TABLET ORAL ONCE
Qty: 1 TABLET | Refills: 0 | Status: SHIPPED | OUTPATIENT
Start: 2025-05-05 | End: 2025-05-05

## 2025-05-05 NOTE — TELEPHONE ENCOUNTER
Patient advised she was diagnosed with a UTI and has been taking antibiotics and she took her last dose on 5/2/25    Patient is experiencing irritation and advised due to taking the antibiotics she believes she has a yeast infection.       She would like something sent to pharmacy listed.    Please advise  [phone]    [facility] [id number] - [facility] [address] [phone] - F [phone]

## 2025-05-05 NOTE — TELEPHONE ENCOUNTER
Please call and let patient know that her DEXA scan shows that her bone density is actually normal.  This is much more accurate than the x-ray of her hip that was performed.    Liam Vick, DO

## 2025-05-07 ENCOUNTER — HOSPITAL ENCOUNTER (EMERGENCY)
Age: 60
Discharge: HOME | End: 2025-05-07
Payer: COMMERCIAL

## 2025-05-07 ENCOUNTER — APPOINTMENT (OUTPATIENT)
Dept: ULTRASOUND IMAGING | Age: 60
End: 2025-05-07
Payer: COMMERCIAL

## 2025-05-07 ENCOUNTER — APPOINTMENT (OUTPATIENT)
Dept: CT IMAGING | Age: 60
End: 2025-05-07
Payer: COMMERCIAL

## 2025-05-07 VITALS
TEMPERATURE: 98.1 F | DIASTOLIC BLOOD PRESSURE: 86 MMHG | RESPIRATION RATE: 18 BRPM | HEIGHT: 66 IN | HEART RATE: 84 BPM | BODY MASS INDEX: 30.05 KG/M2 | SYSTOLIC BLOOD PRESSURE: 146 MMHG | OXYGEN SATURATION: 100 % | WEIGHT: 187 LBS

## 2025-05-07 DIAGNOSIS — D25.2 SUBSEROUS LEIOMYOMA OF UTERUS: ICD-10-CM

## 2025-05-07 DIAGNOSIS — N95.0 POST-MENOPAUSAL BLEEDING: Primary | ICD-10-CM

## 2025-05-07 LAB
BACTERIA GENITAL QL WET PREP: NORMAL
BACTERIA URNS QL MICRO: NORMAL /HPF
BASOPHILS # BLD: 0.1 K/UL (ref 0–0.2)
BASOPHILS NFR BLD: 1.1 %
BILIRUB UR QL STRIP.AUTO: NEGATIVE
CLARITY UR: CLEAR
CLUE CELLS SPEC QL WET PREP: NORMAL
COLOR UR: YELLOW
DEPRECATED RDW RBC AUTO: 15.3 % (ref 12.4–15.4)
EOSINOPHIL # BLD: 0.2 K/UL (ref 0–0.6)
EOSINOPHIL NFR BLD: 3.7 %
EPI CELLS #/AREA URNS AUTO: 2 /HPF (ref 0–5)
EPI CELLS SPEC QL WET PREP: NORMAL
GLUCOSE UR STRIP.AUTO-MCNC: NEGATIVE MG/DL
HCT VFR BLD AUTO: 33.9 % (ref 36–48)
HGB BLD-MCNC: 11.5 G/DL (ref 12–16)
HGB UR QL STRIP.AUTO: NEGATIVE
HYALINE CASTS #/AREA URNS AUTO: 0 /LPF (ref 0–8)
KETONES UR STRIP.AUTO-MCNC: NEGATIVE MG/DL
LEUKOCYTE ESTERASE UR QL STRIP.AUTO: ABNORMAL
LYMPHOCYTES # BLD: 1.2 K/UL (ref 1–5.1)
LYMPHOCYTES NFR BLD: 23.4 %
MCH RBC QN AUTO: 31.6 PG (ref 26–34)
MCHC RBC AUTO-ENTMCNC: 33.9 G/DL (ref 31–36)
MCV RBC AUTO: 93.3 FL (ref 80–100)
MONOCYTES # BLD: 0.4 K/UL (ref 0–1.3)
MONOCYTES NFR BLD: 8.6 %
NEUTROPHILS # BLD: 3.3 K/UL (ref 1.7–7.7)
NEUTROPHILS NFR BLD: 63.2 %
NITRITE UR QL STRIP.AUTO: NEGATIVE
PH UR STRIP.AUTO: 6.5 [PH] (ref 5–8)
PLATELET # BLD AUTO: 256 K/UL (ref 135–450)
PMV BLD AUTO: 7.6 FL (ref 5–10.5)
PROT UR STRIP.AUTO-MCNC: NEGATIVE MG/DL
RBC # BLD AUTO: 3.64 M/UL (ref 4–5.2)
RBC CLUMPS #/AREA URNS AUTO: 1 /HPF (ref 0–4)
RBC SPEC QL WET PREP: NORMAL
REASON FOR REJECTION: NORMAL
REJECTED TEST: NORMAL
SP GR UR STRIP.AUTO: <=1.005 (ref 1–1.03)
SPECIMEN SOURCE FLD: NORMAL
T VAGINALIS GENITAL QL WET PREP: NORMAL
UA COMPLETE W REFLEX CULTURE PNL UR: ABNORMAL
UA DIPSTICK W REFLEX MICRO PNL UR: YES
URN SPEC COLLECT METH UR: ABNORMAL
UROBILINOGEN UR STRIP-ACNC: 1 E.U./DL
WBC # BLD AUTO: 5.1 K/UL (ref 4–11)
WBC #/AREA URNS AUTO: 3 /HPF (ref 0–5)
WBC SPEC QL WET PREP: NORMAL
YEAST GENITAL QL WET PREP: NORMAL

## 2025-05-07 PROCEDURE — 93975 VASCULAR STUDY: CPT

## 2025-05-07 PROCEDURE — 76830 TRANSVAGINAL US NON-OB: CPT

## 2025-05-07 PROCEDURE — 74176 CT ABD & PELVIS W/O CONTRAST: CPT

## 2025-05-07 PROCEDURE — 87491 CHLMYD TRACH DNA AMP PROBE: CPT

## 2025-05-07 PROCEDURE — 87210 SMEAR WET MOUNT SALINE/INK: CPT

## 2025-05-07 PROCEDURE — 81001 URINALYSIS AUTO W/SCOPE: CPT

## 2025-05-07 PROCEDURE — 85025 COMPLETE CBC W/AUTO DIFF WBC: CPT

## 2025-05-07 PROCEDURE — 87591 N.GONORRHOEAE DNA AMP PROB: CPT

## 2025-05-07 PROCEDURE — 99284 EMERGENCY DEPT VISIT MOD MDM: CPT

## 2025-05-07 PROCEDURE — 70450 CT HEAD/BRAIN W/O DYE: CPT

## 2025-05-07 RX ORDER — PROCHLORPERAZINE EDISYLATE 5 MG/ML
10 INJECTION INTRAMUSCULAR; INTRAVENOUS ONCE
Status: DISCONTINUED | OUTPATIENT
Start: 2025-05-07 | End: 2025-05-07

## 2025-05-07 RX ORDER — DIPHENHYDRAMINE HYDROCHLORIDE 50 MG/ML
25 INJECTION, SOLUTION INTRAMUSCULAR; INTRAVENOUS ONCE
Status: DISCONTINUED | OUTPATIENT
Start: 2025-05-07 | End: 2025-05-07

## 2025-05-07 RX ORDER — ACETAMINOPHEN 500 MG
1000 TABLET ORAL ONCE
Status: DISCONTINUED | OUTPATIENT
Start: 2025-05-07 | End: 2025-05-07

## 2025-05-07 ASSESSMENT — PAIN SCALES - GENERAL: PAINLEVEL_OUTOF10: 0

## 2025-05-07 ASSESSMENT — ENCOUNTER SYMPTOMS
DIARRHEA: 0
NAUSEA: 1
VOMITING: 1
ABDOMINAL PAIN: 0
SHORTNESS OF BREATH: 0
CHEST TIGHTNESS: 0

## 2025-05-07 ASSESSMENT — PAIN - FUNCTIONAL ASSESSMENT: PAIN_FUNCTIONAL_ASSESSMENT: 0-10

## 2025-05-07 NOTE — ED PROVIDER NOTES
[facility] EMERGENCY DEPARTMENT  EMERGENCY DEPARTMENT ENCOUNTER        Pt Name: Chelsi Lock  MRN: [id number]  Birthdate 1965  Date of evaluation: 5/7/2025  Provider: CATRACHITO Schroeder CNP  PCP: Evelin Yousif APRN - CNP  Note Started: 7:24 PM EDT 5/7/25      LOUIS. I have evaluated this patient.        CHIEF COMPLAINT       Chief Complaint   Patient presents with    Vaginal Bleeding     Scant amount of bright red blood with mild ABD pain for approx 2 weeks.  Recently treated by PCP for UTI with antibiotics & seen by OBGYN without definitive dx.  Hx of fibroids.  Takes blood thinner.            HISTORY OF PRESENT ILLNESS: 1 or more Elements     History From: patient  Limitations to history : None    Chelsi Lock is a 59 y.o. female who presents to the ER with complaint of vaginal bleeding that began yesterday and since been heavier since starting.  She reports that she was given an abx last week for 5 day course and did complete as she is experiencing pressure and some pelvic cramping.  She does report an early menopause, she was treated for uterine fibroids about 20 years ago and has not had vaginal bleeding since.  She is not on any hormones.  States that she was sick last week with vomiting and severe headache/lightheadedness..  reports that she has been seen by primary care as well as gynecology, she did have a reported normal PAP smear this year.  No hx of uterine cancer.    Denies any fever, visual disturbances.  No chest pain or pressure.  No neck or back pain.  No shortness of breath, cough, or congestion.  No diarrhea, constipation, or dysuria.  No rash.    Nursing Notes were all reviewed and agreed with or any disagreements were addressed in the HPI.    REVIEW OF SYSTEMS :      Review of Systems   Constitutional:  Negative for activity change, chills and fever.   Respiratory:  Negative for chest tightness and shortness of breath.    Cardiovascular:  Negative for chest pain.

## 2025-05-08 ENCOUNTER — TELEPHONE (OUTPATIENT)
Dept: FAMILY MEDICINE CLINIC | Age: 60
End: 2025-05-08

## 2025-05-08 NOTE — TELEPHONE ENCOUNTER
----- Message from Tarsha PEREZ sent at 5/8/2025 12:34 PM EDT -----  Regarding: ECC Message to Provider  ECC Escalation To Practice      Type of Escalation: Red Flag Symptom  --------------------------------------------------------------------------------------------------------------------------    Information for Provider:  Patient is looking for appointment for: Symptom, Genital Pain  Reasons for Message: Unable to reach practice     Additional Information, pt called in to inform Evelin Yousif APRN - CNP that she went to ER last night due to vaginal bleeding/pain and did Ultrasound and CAT Scan.  --------------------------------------------------------------------------------------------------------------------------    Relationship to Patient: Self  Call Back Info: OK to leave message on voicemail  Preferred Call Back Number: Phone [phone]

## 2025-05-08 NOTE — DISCHARGE INSTRUCTIONS
Impression:    A large partially calcified mass at the uterine fundus measuring up to 8.2 cm  is most compatible with a subserosal fibroid.

## 2025-05-09 ENCOUNTER — TELEPHONE (OUTPATIENT)
Dept: CARDIOLOGY CLINIC | Age: 60
End: 2025-05-09

## 2025-05-09 ENCOUNTER — OFFICE VISIT (OUTPATIENT)
Dept: GYNECOLOGY | Age: 60
End: 2025-05-09
Payer: COMMERCIAL

## 2025-05-09 ENCOUNTER — TELEPHONE (OUTPATIENT)
Dept: FAMILY MEDICINE CLINIC | Age: 60
End: 2025-05-09

## 2025-05-09 VITALS
HEART RATE: 84 BPM | OXYGEN SATURATION: 100 % | DIASTOLIC BLOOD PRESSURE: 80 MMHG | SYSTOLIC BLOOD PRESSURE: 124 MMHG | BODY MASS INDEX: 29.86 KG/M2 | WEIGHT: 185 LBS

## 2025-05-09 DIAGNOSIS — I10 PRIMARY HYPERTENSION: ICD-10-CM

## 2025-05-09 DIAGNOSIS — I44.2 CHB (COMPLETE HEART BLOCK) (HCC): ICD-10-CM

## 2025-05-09 DIAGNOSIS — D25.1 INTRAMURAL LEIOMYOMA OF UTERUS: ICD-10-CM

## 2025-05-09 DIAGNOSIS — R93.89 ENDOMETRIAL THICKENING ON ULTRASOUND: ICD-10-CM

## 2025-05-09 DIAGNOSIS — I51.7 LVH (LEFT VENTRICULAR HYPERTROPHY): ICD-10-CM

## 2025-05-09 DIAGNOSIS — I48.0 PAF (PAROXYSMAL ATRIAL FIBRILLATION) (HCC): ICD-10-CM

## 2025-05-09 DIAGNOSIS — I50.22 SYSTOLIC CHF, CHRONIC (HCC): ICD-10-CM

## 2025-05-09 DIAGNOSIS — Z95.810 AICD (AUTOMATIC CARDIOVERTER/DEFIBRILLATOR) PRESENT: ICD-10-CM

## 2025-05-09 DIAGNOSIS — N95.0 POSTMENOPAUSAL BLEEDING: Primary | ICD-10-CM

## 2025-05-09 LAB
C TRACH DNA CVX QL NAA+PROBE: NORMAL
N GONORRHOEA DNA CERV MUCUS QL NAA+PROBE: NORMAL

## 2025-05-09 PROCEDURE — G8427 DOCREV CUR MEDS BY ELIG CLIN: HCPCS | Performed by: OBSTETRICS & GYNECOLOGY

## 2025-05-09 PROCEDURE — 3017F COLORECTAL CA SCREEN DOC REV: CPT | Performed by: OBSTETRICS & GYNECOLOGY

## 2025-05-09 PROCEDURE — 1036F TOBACCO NON-USER: CPT | Performed by: OBSTETRICS & GYNECOLOGY

## 2025-05-09 PROCEDURE — 99203 OFFICE O/P NEW LOW 30 MIN: CPT | Performed by: OBSTETRICS & GYNECOLOGY

## 2025-05-09 PROCEDURE — 3079F DIAST BP 80-89 MM HG: CPT | Performed by: OBSTETRICS & GYNECOLOGY

## 2025-05-09 PROCEDURE — 3074F SYST BP LT 130 MM HG: CPT | Performed by: OBSTETRICS & GYNECOLOGY

## 2025-05-09 PROCEDURE — G8417 CALC BMI ABV UP PARAM F/U: HCPCS | Performed by: OBSTETRICS & GYNECOLOGY

## 2025-05-09 NOTE — TELEPHONE ENCOUNTER
Dr Arroyo is returning [facility] call.  states he is in clinic so please leave a message and he will call back after he is out of room.

## 2025-05-09 NOTE — PROGRESS NOTES
The sensitive parts of the examination were performed with Jeri Porras MA as a chaperone.  Jeri was present during the entirety of the sensitive parts of the examination.

## 2025-05-09 NOTE — TELEPHONE ENCOUNTER
RMM left message for Dr. Arroyo stating that patient may hold xarelto for a few days if bleeding is significant. He would like for her to come in and see an NP next week to discuss these issues further.   LVM for patient to call back to schedule next week. Per Kandace, she can be placed in any AF clinic spot next week. Thanks!

## 2025-05-09 NOTE — PROGRESS NOTES
Chelsi Lock is a 59 y.o. female who is here today for evaluation of abnormal uterine bleeding.    Chelsi Lock has been experiencing postmenopausal bleeding.  She denies any CVA tenderness or changes in voiding issues.  She denies CVA pain.  She denies any changes in lower extremity edema.  She denies any unintended weight loss.    PMB:  Patient recently seen in the emergency department with a 2-week history of light bleeding and mild abdominal pain.    Recently treated for UTI with antibiotics per PCP.    Anticoagulated.  Xarelto 20    History uterine fibroids    Vaginal estrogen    Risk factors: Hypertension.  Left ventricular hypertrophy.  Dilated cardiomyopathy. atrial fibrillation.  Ventricular tachycardia. complete heart block.  Congestive heart failure.  History of left subclavian vein thrombosis.  Reduced ejection fraction.    Has AICD implanted    1/6/25 CT  Pelvis: The uterus is enlarged, containing multiple calcified and  noncalcified fibroids.  The bladder is unremarkable.    7/24 CT  IMPRESSION:  1. No acute intra-abdominal or intrapelvic pathology is identified.  Specifically, there is no CT evidence to suggest pyelonephritis.  2. Small right-sided Spigelian hernia, which contains a mild amount of  peritoneal fat and the distal tip of the appendix, though no evidence of  bowel involvement or incarceration.  3. Fibroid uterus.  4. Status post cholecystectomy.    US 5/7/25  FINDINGS:  Measurements:  Uterus: 10.3 x 8.7 x 4.9 cm  Endometrial stripe: Not measured.  Ultrasound Findings:  Uterus: Partially calcified mass at the fundus measures 8.2 x 8.2 x 5.1 cm.  Endometrium: Not well visualized.  Right Ovary: Not visualized.  Left Ovary:  Not visualized.  Free Fluid: None seen.  IMPRESSION:  A large partially calcified mass at the uterine fundus measuring up to 8.2 cm  is most compatible with a subserosal fibroid.    Review of systems:  No complaints of symptoms involving:  Constitutional: negative for

## 2025-05-09 NOTE — TELEPHONE ENCOUNTER
Patient was seen for an in person biopsy, starting having A fib due to the amount of bleeding. Dr Arroyo recommended patient stop or slow down on her blood thinner due to her amount of bleeding. Her referred patient for direction from her PCP.     Please advise    Patient is also going to have the biopsy done being put under. She will need a pre op which is scheduled for 5/14/25

## 2025-05-10 DIAGNOSIS — I50.42 CHRONIC COMBINED SYSTOLIC AND DIASTOLIC HEART FAILURE (HCC): ICD-10-CM

## 2025-05-11 NOTE — PROGRESS NOTES
[facility]   Electrophysiology  Fred Granados, APRN-CNP  Attending EP: Dr. Franck Gillis    Date: 5/14/2025  I had the privilege of visiting Chelsi Lock in the office.     Chief Complaint:   Chief Complaint   Patient presents with    Cardiac Clearance    Atrial Fibrillation    Fatigue     History of Present Illness: History obtained from patient and medical record.    Chelsi Lock is 59 y.o. female with a past medical history of HTN, CHF, AF, CHB s/p PPM, and VT s/p RV lead extraction with upgrade to Biv AICD    -Interval history: Today, Chelsi Lock is being seen for follow up due to need for surgery clearance. She is doing well from a cardiac standpoint. Her device is functioning normally. She continues to have hematuria and needs clearance for hysteroscopy. She states every time she takes her xarelto, then she proceeds to urinate with a fair amount of blood. She is working on her Master's degree.     Denies having chest pain, palpitations, shortness of breath, orthopnea/PND, cough, or dizziness at the time of this visit.    With regard to medication therapy the patient has been compliant with prescribed regimen. They have tolerated therapy to date.     Allergies:  Allergies   Allergen Reactions    Latex Hives     rash    Codeine Other (See Comments) and Hives     Hives    Morphine Shortness Of Breath    Entresto [Sacubitril-Valsartan]      Lips and tongue swelling (as of 1/28/25 pt states able to take entresto)    Furosemide Other (See Comments)     Pt states able to take 1 x week    Jardiance [Empagliflozin] Itching     Yeast Infection    Nitroglycerin Hives    Other/Food      CT dyes; recently noticed that after receiving CT dye notices vaginal irritation and discomfort.     Hydralazine      headaches    Lisinopril Nausea And Vomiting     cough    Metronidazole Nausea And Vomiting    Sulfa Antibiotics Nausea Only, Other (See Comments) and Nausea And Vomiting    Valsartan Nausea And Vomiting

## 2025-05-12 RX ORDER — SACUBITRIL AND VALSARTAN 49; 51 MG/1; MG/1
1 TABLET, FILM COATED ORAL 2 TIMES DAILY
Qty: 180 TABLET | Refills: 1 | Status: SHIPPED | OUTPATIENT
Start: 2025-05-12

## 2025-05-12 NOTE — TELEPHONE ENCOUNTER
Prescription refill    Last OV:03/24/2025    Last Refill:11/04/2024    Labs:05/07/2025    Future Appt:09/22/2025

## 2025-05-13 ENCOUNTER — TELEPHONE (OUTPATIENT)
Dept: GYNECOLOGY | Age: 60
End: 2025-05-13

## 2025-05-13 ENCOUNTER — PREP FOR PROCEDURE (OUTPATIENT)
Dept: GYNECOLOGY | Age: 60
End: 2025-05-13

## 2025-05-13 DIAGNOSIS — N95.0 PMB (POSTMENOPAUSAL BLEEDING): ICD-10-CM

## 2025-05-13 DIAGNOSIS — R93.89 THICKENED ENDOMETRIUM: ICD-10-CM

## 2025-05-13 RX ORDER — SODIUM CHLORIDE, SODIUM LACTATE, POTASSIUM CHLORIDE, CALCIUM CHLORIDE 600; 310; 30; 20 MG/100ML; MG/100ML; MG/100ML; MG/100ML
INJECTION, SOLUTION INTRAVENOUS CONTINUOUS
Status: CANCELLED | OUTPATIENT
Start: 2025-06-05

## 2025-05-13 RX ORDER — SODIUM CHLORIDE 0.9 % (FLUSH) 0.9 %
5-40 SYRINGE (ML) INJECTION PRN
Status: CANCELLED | OUTPATIENT
Start: 2025-06-05

## 2025-05-13 RX ORDER — SODIUM CHLORIDE 0.9 % (FLUSH) 0.9 %
5-40 SYRINGE (ML) INJECTION EVERY 12 HOURS SCHEDULED
Status: CANCELLED | OUTPATIENT
Start: 2025-06-05

## 2025-05-13 RX ORDER — ACETAMINOPHEN 325 MG/1
1000 TABLET ORAL ONCE
Status: CANCELLED | OUTPATIENT
Start: 2025-06-05 | End: 2025-05-13

## 2025-05-13 NOTE — TELEPHONE ENCOUNTER
Left message for patient to call back.    Case Scheduled  Received: Yesterday  [facility] - [facility]  Case for Chelsi Lock ([id number]) has been scheduled.    Procedures: Procedure(s):  DILATATION AND CURETTAGE HYSTEROSCOPY  Date: 6/5/2025  Time: 1115  Location: [facility] OR

## 2025-05-14 ENCOUNTER — CLINICAL SUPPORT (OUTPATIENT)
Dept: CARDIOLOGY CLINIC | Age: 60
End: 2025-05-14

## 2025-05-14 ENCOUNTER — OFFICE VISIT (OUTPATIENT)
Dept: FAMILY MEDICINE CLINIC | Age: 60
End: 2025-05-14
Payer: COMMERCIAL

## 2025-05-14 ENCOUNTER — OFFICE VISIT (OUTPATIENT)
Dept: CARDIOLOGY CLINIC | Age: 60
End: 2025-05-14
Payer: COMMERCIAL

## 2025-05-14 VITALS
SYSTOLIC BLOOD PRESSURE: 124 MMHG | BODY MASS INDEX: 29.86 KG/M2 | DIASTOLIC BLOOD PRESSURE: 72 MMHG | OXYGEN SATURATION: 99 % | HEART RATE: 78 BPM | WEIGHT: 185.8 LBS | HEIGHT: 66 IN

## 2025-05-14 VITALS
SYSTOLIC BLOOD PRESSURE: 124 MMHG | DIASTOLIC BLOOD PRESSURE: 70 MMHG | OXYGEN SATURATION: 99 % | HEIGHT: 65 IN | RESPIRATION RATE: 18 BRPM | HEART RATE: 80 BPM | WEIGHT: 186 LBS | BODY MASS INDEX: 30.99 KG/M2

## 2025-05-14 DIAGNOSIS — I47.20 VT (VENTRICULAR TACHYCARDIA) (HCC): ICD-10-CM

## 2025-05-14 DIAGNOSIS — I48.0 PAF (PAROXYSMAL ATRIAL FIBRILLATION) (HCC): ICD-10-CM

## 2025-05-14 DIAGNOSIS — I44.2 CHB (COMPLETE HEART BLOCK) (HCC): ICD-10-CM

## 2025-05-14 DIAGNOSIS — I48.0 PAF (PAROXYSMAL ATRIAL FIBRILLATION) (HCC): Primary | ICD-10-CM

## 2025-05-14 DIAGNOSIS — G47.33 OBSTRUCTIVE SLEEP APNEA (ADULT) (PEDIATRIC): ICD-10-CM

## 2025-05-14 DIAGNOSIS — I42.0 DILATED CARDIOMYOPATHY (HCC): ICD-10-CM

## 2025-05-14 DIAGNOSIS — D25.9 UTERINE LEIOMYOMA, UNSPECIFIED LOCATION: Primary | ICD-10-CM

## 2025-05-14 DIAGNOSIS — Z95.810 AICD (AUTOMATIC CARDIOVERTER/DEFIBRILLATOR) PRESENT: Primary | ICD-10-CM

## 2025-05-14 DIAGNOSIS — Z01.818 PREOP EXAMINATION: ICD-10-CM

## 2025-05-14 DIAGNOSIS — I10 PRIMARY HYPERTENSION: ICD-10-CM

## 2025-05-14 DIAGNOSIS — I50.22 SYSTOLIC CHF, CHRONIC (HCC): ICD-10-CM

## 2025-05-14 DIAGNOSIS — I50.20 HFREF (HEART FAILURE WITH REDUCED EJECTION FRACTION) (HCC): ICD-10-CM

## 2025-05-14 PROBLEM — Z95.0 CARDIAC PACEMAKER IN SITU: Status: ACTIVE | Noted: 2025-05-14

## 2025-05-14 PROBLEM — I82.B12 ACUTE EMBOLISM AND THROMBOSIS OF LEFT SUBCLAVIAN VEIN (HCC): Status: ACTIVE | Noted: 2025-05-14

## 2025-05-14 PROCEDURE — 3074F SYST BP LT 130 MM HG: CPT | Performed by: NURSE PRACTITIONER

## 2025-05-14 PROCEDURE — 3078F DIAST BP <80 MM HG: CPT | Performed by: NURSE PRACTITIONER

## 2025-05-14 PROCEDURE — 99214 OFFICE O/P EST MOD 30 MIN: CPT | Performed by: NURSE PRACTITIONER

## 2025-05-14 PROCEDURE — 3017F COLORECTAL CA SCREEN DOC REV: CPT | Performed by: NURSE PRACTITIONER

## 2025-05-14 PROCEDURE — G8417 CALC BMI ABV UP PARAM F/U: HCPCS | Performed by: NURSE PRACTITIONER

## 2025-05-14 PROCEDURE — 1036F TOBACCO NON-USER: CPT | Performed by: NURSE PRACTITIONER

## 2025-05-14 PROCEDURE — G8427 DOCREV CUR MEDS BY ELIG CLIN: HCPCS | Performed by: NURSE PRACTITIONER

## 2025-05-14 PROCEDURE — 93000 ELECTROCARDIOGRAM COMPLETE: CPT | Performed by: NURSE PRACTITIONER

## 2025-05-14 ASSESSMENT — PATIENT HEALTH QUESTIONNAIRE - PHQ9
SUM OF ALL RESPONSES TO PHQ QUESTIONS 1-9: 0
1. LITTLE INTEREST OR PLEASURE IN DOING THINGS: NOT AT ALL
SUM OF ALL RESPONSES TO PHQ QUESTIONS 1-9: 0
2. FEELING DOWN, DEPRESSED OR HOPELESS: NOT AT ALL

## 2025-05-14 NOTE — TELEPHONE ENCOUNTER
Case Scheduled  Received: Yesterday  [facility]  Case for Chelsi oLck ([id number]) has been scheduled.    Procedures: Procedure(s):  DILATATION AND CURETTAGE HYSTEROSCOPY  Date: 6/5/2025  Time: 1115  Location: [facility] OR 01

## 2025-05-14 NOTE — PROGRESS NOTES
Preoperative Consultation    Chelsi Lokc  YOB: 1965    Date of Service:  5/14/2025    Vitals:    05/14/25 1215   BP: 124/70   BP Site: Left Upper Arm   Patient Position: Sitting   BP Cuff Size: Medium Adult   Pulse: 80   Resp: 18   SpO2: 99%   Weight: 84.4 kg (186 lb)   Height: 1.651 m (5' 5\")      Wt Readings from Last 2 Encounters:   05/14/25 84.4 kg (186 lb)   05/14/25 84.3 kg (185 lb 12.8 oz)     BP Readings from Last 3 Encounters:   05/14/25 124/70   05/14/25 124/72   05/09/25 124/80      Chief Complaint   Patient presents with    Pre-op Exam     6/5/2025, DILATATION AND CURETTAGE HYSTEROSCOPY, Dr. Marty Arroyo      Allergies   Allergen Reactions    Latex Hives     rash    Codeine Other (See Comments) and Hives     Hives    Morphine Shortness Of Breath    Entresto [Sacubitril-Valsartan]      Lips and tongue swelling (as of 1/28/25 pt states able to take entresto)    Furosemide Other (See Comments)     Pt states able to take 1 x week    Jardiance [Empagliflozin] Itching     Yeast Infection    Nitroglycerin Hives    Other/Food      CT dyes; recently noticed that after receiving CT dye notices vaginal irritation and discomfort.     Hydralazine      headaches    Lisinopril Nausea And Vomiting     cough    Metronidazole Nausea And Vomiting    Sulfa Antibiotics Nausea Only, Other (See Comments) and Nausea And Vomiting    Valsartan Nausea And Vomiting     Outpatient Medications Marked as Taking for the 5/14/25 encounter (Office Visit) with Evelin Yousif APRN - CNP   Medication Sig Dispense Refill    rivaroxaban (XARELTO) 20 MG TABS tablet Hold until hysteroscopy. Resume upon discretion of surgeon 90 tablet 1    ENTRESTO 49-51 MG per tablet TAKE 1 TABLET BY MOUTH TWICE A  tablet 1    KLOR-CON M20 20 MEQ extended release tablet TAKE 2 TABLETS BY MOUTH DAILY 60 tablet 0    spironolactone (ALDACTONE) 25 MG tablet TAKE 1 TABLET BY MOUTH DAILY 90 tablet 0    Magnesium Glycinate 100 MG CAPS Take

## 2025-05-15 DIAGNOSIS — D25.9 UTERINE LEIOMYOMA, UNSPECIFIED LOCATION: ICD-10-CM

## 2025-05-15 LAB
ALBUMIN SERPL-MCNC: 4.5 G/DL (ref 3.4–5)
ALBUMIN/GLOB SERPL: 2.1 {RATIO} (ref 1.1–2.2)
ALP SERPL-CCNC: 60 U/L (ref 40–129)
ALT SERPL-CCNC: 16 U/L (ref 10–40)
ANION GAP SERPL CALCULATED.3IONS-SCNC: 9 MMOL/L (ref 3–16)
APTT BLD: 29.8 SEC (ref 22.1–36.4)
AST SERPL-CCNC: 19 U/L (ref 15–37)
BASOPHILS # BLD: 0.1 K/UL (ref 0–0.2)
BASOPHILS NFR BLD: 1.1 %
BILIRUB SERPL-MCNC: 0.5 MG/DL (ref 0–1)
BUN SERPL-MCNC: 12 MG/DL (ref 7–20)
CALCIUM SERPL-MCNC: 9.1 MG/DL (ref 8.3–10.6)
CHLORIDE SERPL-SCNC: 107 MMOL/L (ref 99–110)
CO2 SERPL-SCNC: 26 MMOL/L (ref 21–32)
CREAT SERPL-MCNC: 0.8 MG/DL (ref 0.6–1.2)
DEPRECATED RDW RBC AUTO: 15.6 % (ref 12.4–15.4)
EOSINOPHIL # BLD: 0.2 K/UL (ref 0–0.6)
EOSINOPHIL NFR BLD: 3.7 %
GFR SERPLBLD CREATININE-BSD FMLA CKD-EPI: 84 ML/MIN/{1.73_M2}
GLUCOSE P FAST SERPL-MCNC: 90 MG/DL (ref 70–99)
HCT VFR BLD AUTO: 34.6 % (ref 36–48)
HGB BLD-MCNC: 11.6 G/DL (ref 12–16)
INR PPP: 1.07 (ref 0.85–1.15)
LYMPHOCYTES # BLD: 1.2 K/UL (ref 1–5.1)
LYMPHOCYTES NFR BLD: 27.3 %
MCH RBC QN AUTO: 31.2 PG (ref 26–34)
MCHC RBC AUTO-ENTMCNC: 33.4 G/DL (ref 31–36)
MCV RBC AUTO: 93.5 FL (ref 80–100)
MONOCYTES # BLD: 0.3 K/UL (ref 0–1.3)
MONOCYTES NFR BLD: 6.1 %
NEUTROPHILS # BLD: 2.8 K/UL (ref 1.7–7.7)
NEUTROPHILS NFR BLD: 61.8 %
PLATELET # BLD AUTO: 234 K/UL (ref 135–450)
PMV BLD AUTO: 8.4 FL (ref 5–10.5)
POTASSIUM SERPL-SCNC: 4.1 MMOL/L (ref 3.5–5.1)
PROT SERPL-MCNC: 6.6 G/DL (ref 6.4–8.2)
PROTHROMBIN TIME: 14.1 SEC (ref 11.9–14.9)
RBC # BLD AUTO: 3.7 M/UL (ref 4–5.2)
SODIUM SERPL-SCNC: 142 MMOL/L (ref 136–145)
WBC # BLD AUTO: 4.5 K/UL (ref 4–11)

## 2025-05-16 ENCOUNTER — RESULTS FOLLOW-UP (OUTPATIENT)
Dept: FAMILY MEDICINE CLINIC | Age: 60
End: 2025-05-16

## 2025-06-02 ENCOUNTER — TELEPHONE (OUTPATIENT)
Dept: CARDIOLOGY CLINIC | Age: 60
End: 2025-06-02

## 2025-06-02 NOTE — PROGRESS NOTES
6/2/25 @ 2883. DR LINDSEY NOTIFIED OF [employer] AICD. PATIENT IS PACER DEPENDENT. ATRIAL PACED 79.6%, VENTRICULAR PACED 93.6 % HES

## 2025-06-02 NOTE — PROGRESS NOTES
[facility] PRE-SURGICAL TESTING INSTRUCTIONS                      PRIOR TO PROCEDURE DATE:    1. PLEASE FOLLOW ANY INSTRUCTIONS GIVEN TO YOU PER YOUR SURGEON.      2. Arrange for someone to drive you home and be with you for the first 24 hours after discharge for your safety after your procedure for which you received sedation. Ensure it is someone we can share information with regarding your discharge.     NOTE: At this time ONLY 2 ADULTS may accompany you   One person ENCOURAGED to stay at hospital entire time if outpatient surgery      3. You must contact your surgeon for instructions IF:  You are taking any blood thinners, aspirin, anti-inflammatory or vitamins.  There is a change in your physical condition such as a cold, fever, rash, cuts, sores, or any other infection, especially near your surgical site.    4. Do not drink alcohol the day before or day of your procedure.  Do not use any recreational marijuana at least 24 hours or street drugs (heroin, cocaine) at minimum 5 days prior to your procedure.     5. A Pre-Surgical History and Physical MUST be completed WITHIN 30 DAYS OR LESS prior to your procedure.by your Physician or an Urgent Care        THE DAY OF YOUR PROCEDURE:  1.  Follow instructions for ARRIVAL TIME as DIRECTED BY YOUR SURGEON.     2. Enter the MAIN entrance from Located within [facility] and follow the signs to the free Parking Garage or  Parking (offered free of charge 7 am-5pm).      3. Enter the Main Entrance of the hospital (do not enter from the lower level of the parking garage). Upon entrance, check in with the  at the surgical information desk on your LEFT.   Bring your insurance card and photo ID to register      4. DO NOT EAT ANYTHING AFTER MIDNIGHT prior to arrival for surgery.    NOTE: ALL Gastric, Bariatric & Bowel surgery patients - you MUST follow your surgeon's instructions regarding eating/ drinking as you will have very specific instructions to follow.  If  oxygen at home, please bring your oxygen tank with you to hospital..     11. We recommend that valuable personal belongings such as cash, cell phones, e-tablets, or jewelry, be left at home during your stay. The hospital will not be responsible for valuables that are not secured in the hospital safe. However, if your insurance requires a co-pay, you may want to bring a method of payment, i.e., Check or credit card, if you wish to pay your co-pay the day of surgery.      12. If you are to stay overnight, you may bring a bag with personal items. Please have any large items you may need brought in by your family after your arrival to your hospital room.    13. If you have a Living Will or Durable Power of , please bring a copy on the day of your procedure.     How we keep you safe and work to prevent surgical site infections:   1. Health care workers should always check your ID bracelet to verify your name and birth date. You will be asked many times to state your name, date of birth, and allergies.    2. Health care workers should always clean their hands with soap or alcohol gel before providing care to you. It is okay to ask anyone if they cleaned their hands before they touch you.    3. You will be actively involved in verifying the type of procedure you are having and ensuring the correct surgical site. This will be confirmed multiple times prior to your procedure. Do NOT pepe your surgery site UNLESS instructed to by your surgeon.     4. When you are in the operating room, your surgical site will be cleansed with a special soap, and in most cases, you will be given an antibiotic before the surgery begins.      What to expect AFTER your procedure?  1. Immediately following your procedure, your will be taken to the PACU for the first phase of your recovery.  Your nurse will help you recover from any potential side effects of anesthesia, such as extreme drowsiness, changes in your vital signs or breathing

## 2025-06-02 NOTE — TELEPHONE ENCOUNTER
Dunia from surgery scheduled called stating pt is scheduled for surgery 6/5, they are requesting EKG tracing to get  faxed to [phone]    It there are any additional questions please call [phone]

## 2025-06-04 NOTE — H&P
Update History & Physical    The patient's History and Physical of May 14, 2025 was reviewed with the patient and I examined the patient. There was no change. The surgical site was confirmed by the patient and me.       Plan: The risks, benefits, expected outcome, and alternative to the recommended procedure have been discussed with the patient. Patient understands and wants to proceed with the procedure.

## 2025-06-04 NOTE — DISCHARGE INSTRUCTIONS
Activity:  You may feel sleepy for the next 24 hours. This is due to the medication you received during your procedure. You should have a responsible adult with you for the rest of the day and during the night. This is for your own safety and protection. You may be up and about according to your instructions. You should be urinating every 4-6 hours as needed.    You may use stairs.  You may shower.  Nothing in the vagina: no sex or tampons for 4 weeks.    For the next 24 hours, you should:  Not drive a car  Not operate machinery or power tools  Not drink alcoholic beverages, including beer  Not make any important decisions or sign important papers     The following restrictions should be observed:  Take showers instead of tub baths  No hot tubs, whirlpools, bath tubs, swimming pools  No sexual activity until advised  Rest for the day, then resume normal activity as you are able  No tampons or douching until advised    What to expect  Cramping  Bleeding (intermittently up to 2-3 weeks)  Some soreness in legs and back     Diet:  Progress slowly to a regular diet, especially if you have had a general anesthetic. Start by taking liquids. If you have no nausea, try soup and crackers, then solid food.     Medications:  You may have some pain and or lower abdominal cramping.   Take medication with food to prevent an upset stomach.    You can use ibuprofen 600 mg (3 regular strength tablets) every 6 hours as needed for pain.    You can use Tylenol 1000 mg (2 extra strength tablets) every 8 hours as needed for pain with the ibuprofen.    You can restart your Xarelto on Sunday, June 8 provided you are not having heavy bleeding.  Follow-up in the office with Dr. Arroyo in 2 weeks.    When to call:  If you have a temperature of 100.4 degrees Fahrenheit orally  If you have severe pain or cramping  If you have bleeding heavier than your normal period or you pass large clots  If you have foul smelling discharge  If you are  including potential side effects  []Magda’s egress test  []Pain Ball management  []FAQ Catheter associated blood stream infections  []FAQ Surgical Site Infections  []Other-    I have read and understand the instructions given to me: ____________________________________________   (Patient/S.O. Signature)            Date/time 6/5/2025 2:23 PM         If you smoke STOP. We care about your health!

## 2025-06-04 NOTE — OP NOTE
Operative Note      Patient: Chelsi Lcok  YOB: 1965  MRN: [id number]    Date of Procedure: 6/5/2025    Pre-Op Diagnosis Codes:      * PMB (postmenopausal bleeding) [N95.0]     * Thickened endometrium [R93.89]    Post-Op Diagnosis: Same       Procedure(s):  DILATATION AND CURETTAGE HYSTEROSCOPY    Surgeon(s):  Marty Arroyo MD    ANESTHESIA: MAC.     INDICATIONS: Chelsi Lock is a 59 y.o. female who has postmenopausal bleeding. She desires the proposed procedure.  She declines expectant management or medical management or alternative therapies. She was made aware of the risks of the procedure including but not limited to bleeding, blood clot, infection, uterine perforation, damage to internal organs, need for additional surgery, failure to diagnose. All questions were answered and she wished to proceed.     OPERATIVE FINDINGS: Normal uterine cavity. Atrophic appearing. Scant specimen.    TECHNIQUE: The patient was taken to the operating room and placed in supine position. Anesthesia was administered by anesthesia department. She was then placed in modified dorsal lithotomy position in candy cane stirrups.  Examination was performed. She was then prepped and draped in usual sterile fashion for the proposed procedure. Time-out was taken to positively identify the patient and confirm the procedure and confirm that the patient received prophylactic antibiotics. Pre-op briefing with the OR team was done as per the surgeon's routine.    A speculum was placed in the vagina. The anterior lip of the cervix was grasped with an Allis clamp. Gentle cervical dilation was performed. Hysteroscopy was carried out in usual fashion and revealed the aforementioned findings.    Endometrial curettage was performed and sent to Pathology for analysis.    All instruments were removed from the vagina and the cervix was inspected and found to be hemostatic.    No complications were experienced. The patient was  taken to the recovery room in stable condition. Counts were correct. Estimated blood loss was minimal.

## 2025-06-05 ENCOUNTER — HOSPITAL ENCOUNTER (OUTPATIENT)
Age: 60
Setting detail: OUTPATIENT SURGERY
Discharge: HOME | End: 2025-06-05
Attending: OBSTETRICS & GYNECOLOGY | Admitting: OBSTETRICS & GYNECOLOGY
Payer: COMMERCIAL

## 2025-06-05 ENCOUNTER — ANESTHESIA (OUTPATIENT)
Dept: OPERATING ROOM | Age: 60
End: 2025-06-05
Payer: COMMERCIAL

## 2025-06-05 ENCOUNTER — ANESTHESIA EVENT (OUTPATIENT)
Dept: OPERATING ROOM | Age: 60
End: 2025-06-05
Payer: COMMERCIAL

## 2025-06-05 VITALS
RESPIRATION RATE: 14 BRPM | OXYGEN SATURATION: 99 % | HEART RATE: 76 BPM | TEMPERATURE: 96.9 F | HEIGHT: 66 IN | SYSTOLIC BLOOD PRESSURE: 137 MMHG | DIASTOLIC BLOOD PRESSURE: 85 MMHG | BODY MASS INDEX: 29.54 KG/M2 | WEIGHT: 183.8 LBS

## 2025-06-05 DIAGNOSIS — N95.0 PMB (POSTMENOPAUSAL BLEEDING): ICD-10-CM

## 2025-06-05 DIAGNOSIS — R93.89 THICKENED ENDOMETRIUM: ICD-10-CM

## 2025-06-05 LAB
ABO/RH: NORMAL
ANTIBODY SCREEN: NORMAL

## 2025-06-05 PROCEDURE — 3700000001 HC ADD 15 MINUTES (ANESTHESIA): Performed by: OBSTETRICS & GYNECOLOGY

## 2025-06-05 PROCEDURE — 2500000003 HC RX 250 WO HCPCS: Performed by: ANESTHESIOLOGY

## 2025-06-05 PROCEDURE — 6370000000 HC RX 637 (ALT 250 FOR IP): Performed by: OBSTETRICS & GYNECOLOGY

## 2025-06-05 PROCEDURE — 2500000003 HC RX 250 WO HCPCS: Performed by: OBSTETRICS & GYNECOLOGY

## 2025-06-05 PROCEDURE — 7100000000 HC PACU RECOVERY - FIRST 15 MIN: Performed by: OBSTETRICS & GYNECOLOGY

## 2025-06-05 PROCEDURE — 2709999900 HC NON-CHARGEABLE SUPPLY: Performed by: OBSTETRICS & GYNECOLOGY

## 2025-06-05 PROCEDURE — 7100000011 HC PHASE II RECOVERY - ADDTL 15 MIN: Performed by: OBSTETRICS & GYNECOLOGY

## 2025-06-05 PROCEDURE — 6360000002 HC RX W HCPCS: Performed by: NURSE ANESTHETIST, CERTIFIED REGISTERED

## 2025-06-05 PROCEDURE — 3600000004 HC SURGERY LEVEL 4 BASE: Performed by: OBSTETRICS & GYNECOLOGY

## 2025-06-05 PROCEDURE — 3700000000 HC ANESTHESIA ATTENDED CARE: Performed by: OBSTETRICS & GYNECOLOGY

## 2025-06-05 PROCEDURE — 7100000001 HC PACU RECOVERY - ADDTL 15 MIN: Performed by: OBSTETRICS & GYNECOLOGY

## 2025-06-05 PROCEDURE — 3600000014 HC SURGERY LEVEL 4 ADDTL 15MIN: Performed by: OBSTETRICS & GYNECOLOGY

## 2025-06-05 PROCEDURE — 6370000000 HC RX 637 (ALT 250 FOR IP): Performed by: ANESTHESIOLOGY

## 2025-06-05 PROCEDURE — 86900 BLOOD TYPING SEROLOGIC ABO: CPT

## 2025-06-05 PROCEDURE — 58558 HYSTEROSCOPY BIOPSY: CPT | Performed by: OBSTETRICS & GYNECOLOGY

## 2025-06-05 PROCEDURE — 7100000010 HC PHASE II RECOVERY - FIRST 15 MIN: Performed by: OBSTETRICS & GYNECOLOGY

## 2025-06-05 PROCEDURE — 6360000002 HC RX W HCPCS: Performed by: OBSTETRICS & GYNECOLOGY

## 2025-06-05 PROCEDURE — 2580000003 HC RX 258: Performed by: OBSTETRICS & GYNECOLOGY

## 2025-06-05 PROCEDURE — 88305 TISSUE EXAM BY PATHOLOGIST: CPT

## 2025-06-05 PROCEDURE — 86850 RBC ANTIBODY SCREEN: CPT

## 2025-06-05 PROCEDURE — 86901 BLOOD TYPING SEROLOGIC RH(D): CPT

## 2025-06-05 PROCEDURE — 2500000003 HC RX 250 WO HCPCS: Performed by: NURSE ANESTHETIST, CERTIFIED REGISTERED

## 2025-06-05 RX ORDER — LABETALOL HYDROCHLORIDE 5 MG/ML
10 INJECTION, SOLUTION INTRAVENOUS
Status: DISCONTINUED | OUTPATIENT
Start: 2025-06-05 | End: 2025-06-05 | Stop reason: HOSPADM

## 2025-06-05 RX ORDER — NALOXONE HYDROCHLORIDE 0.4 MG/ML
INJECTION, SOLUTION INTRAMUSCULAR; INTRAVENOUS; SUBCUTANEOUS PRN
Status: DISCONTINUED | OUTPATIENT
Start: 2025-06-05 | End: 2025-06-05 | Stop reason: HOSPADM

## 2025-06-05 RX ORDER — SODIUM CHLORIDE, SODIUM LACTATE, POTASSIUM CHLORIDE, CALCIUM CHLORIDE 600; 310; 30; 20 MG/100ML; MG/100ML; MG/100ML; MG/100ML
INJECTION, SOLUTION INTRAVENOUS CONTINUOUS
Status: DISCONTINUED | OUTPATIENT
Start: 2025-06-05 | End: 2025-06-05 | Stop reason: HOSPADM

## 2025-06-05 RX ORDER — ONDANSETRON 2 MG/ML
4 INJECTION INTRAMUSCULAR; INTRAVENOUS
Status: DISCONTINUED | OUTPATIENT
Start: 2025-06-05 | End: 2025-06-05 | Stop reason: HOSPADM

## 2025-06-05 RX ORDER — DIPHENHYDRAMINE HYDROCHLORIDE 50 MG/ML
12.5 INJECTION, SOLUTION INTRAMUSCULAR; INTRAVENOUS
Status: DISCONTINUED | OUTPATIENT
Start: 2025-06-05 | End: 2025-06-05 | Stop reason: HOSPADM

## 2025-06-05 RX ORDER — SODIUM CHLORIDE 0.9 % (FLUSH) 0.9 %
5-40 SYRINGE (ML) INJECTION EVERY 12 HOURS SCHEDULED
Status: DISCONTINUED | OUTPATIENT
Start: 2025-06-05 | End: 2025-06-05 | Stop reason: HOSPADM

## 2025-06-05 RX ORDER — PROPOFOL 10 MG/ML
INJECTION, EMULSION INTRAVENOUS
Status: DISCONTINUED | OUTPATIENT
Start: 2025-06-05 | End: 2025-06-05 | Stop reason: SDUPTHER

## 2025-06-05 RX ORDER — LIDOCAINE HYDROCHLORIDE 20 MG/ML
INJECTION, SOLUTION INTRAVENOUS
Status: DISCONTINUED | OUTPATIENT
Start: 2025-06-05 | End: 2025-06-05 | Stop reason: SDUPTHER

## 2025-06-05 RX ORDER — SODIUM CHLORIDE 0.9 % (FLUSH) 0.9 %
5-40 SYRINGE (ML) INJECTION PRN
Status: DISCONTINUED | OUTPATIENT
Start: 2025-06-05 | End: 2025-06-05 | Stop reason: HOSPADM

## 2025-06-05 RX ORDER — SODIUM CHLORIDE 9 MG/ML
INJECTION, SOLUTION INTRAVENOUS PRN
Status: DISCONTINUED | OUTPATIENT
Start: 2025-06-05 | End: 2025-06-05 | Stop reason: HOSPADM

## 2025-06-05 RX ORDER — ONDANSETRON 2 MG/ML
INJECTION INTRAMUSCULAR; INTRAVENOUS
Status: DISCONTINUED | OUTPATIENT
Start: 2025-06-05 | End: 2025-06-05 | Stop reason: SDUPTHER

## 2025-06-05 RX ORDER — PROCHLORPERAZINE EDISYLATE 5 MG/ML
5 INJECTION INTRAMUSCULAR; INTRAVENOUS
Status: DISCONTINUED | OUTPATIENT
Start: 2025-06-05 | End: 2025-06-05 | Stop reason: HOSPADM

## 2025-06-05 RX ORDER — ACETAMINOPHEN 500 MG
1000 TABLET ORAL ONCE
Status: COMPLETED | OUTPATIENT
Start: 2025-06-05 | End: 2025-06-05

## 2025-06-05 RX ORDER — MEPERIDINE HYDROCHLORIDE 25 MG/ML
12.5 INJECTION INTRAMUSCULAR; INTRAVENOUS; SUBCUTANEOUS EVERY 5 MIN PRN
Status: DISCONTINUED | OUTPATIENT
Start: 2025-06-05 | End: 2025-06-05 | Stop reason: HOSPADM

## 2025-06-05 RX ORDER — HYDROMORPHONE HYDROCHLORIDE 1 MG/ML
0.5 INJECTION, SOLUTION INTRAMUSCULAR; INTRAVENOUS; SUBCUTANEOUS EVERY 5 MIN PRN
Status: DISCONTINUED | OUTPATIENT
Start: 2025-06-05 | End: 2025-06-05 | Stop reason: HOSPADM

## 2025-06-05 RX ORDER — POTASSIUM CHLORIDE 1500 MG/1
40 TABLET, EXTENDED RELEASE ORAL DAILY
Qty: 60 TABLET | Refills: 0 | Status: ON HOLD | OUTPATIENT
Start: 2025-06-05

## 2025-06-05 RX ORDER — KETAMINE HCL IN NACL, ISO-OSM 20 MG/2 ML
SYRINGE (ML) INJECTION
Status: DISCONTINUED | OUTPATIENT
Start: 2025-06-05 | End: 2025-06-05 | Stop reason: SDUPTHER

## 2025-06-05 RX ORDER — MAGNESIUM HYDROXIDE 1200 MG/15ML
LIQUID ORAL CONTINUOUS PRN
Status: DISCONTINUED | OUTPATIENT
Start: 2025-06-05 | End: 2025-06-05 | Stop reason: HOSPADM

## 2025-06-05 RX ORDER — KETOROLAC TROMETHAMINE 30 MG/ML
INJECTION, SOLUTION INTRAMUSCULAR; INTRAVENOUS
Status: DISCONTINUED | OUTPATIENT
Start: 2025-06-05 | End: 2025-06-05 | Stop reason: SDUPTHER

## 2025-06-05 RX ORDER — FENTANYL CITRATE 50 UG/ML
25 INJECTION, SOLUTION INTRAMUSCULAR; INTRAVENOUS EVERY 5 MIN PRN
Status: DISCONTINUED | OUTPATIENT
Start: 2025-06-05 | End: 2025-06-05 | Stop reason: HOSPADM

## 2025-06-05 RX ORDER — OXYCODONE HYDROCHLORIDE 5 MG/1
5 TABLET ORAL
Status: DISCONTINUED | OUTPATIENT
Start: 2025-06-05 | End: 2025-06-05 | Stop reason: HOSPADM

## 2025-06-05 RX ORDER — MIDAZOLAM HYDROCHLORIDE 1 MG/ML
INJECTION, SOLUTION INTRAMUSCULAR; INTRAVENOUS
Status: DISCONTINUED | OUTPATIENT
Start: 2025-06-05 | End: 2025-06-05 | Stop reason: SDUPTHER

## 2025-06-05 RX ORDER — ACETAMINOPHEN 325 MG/1
1000 TABLET ORAL ONCE
Status: COMPLETED | OUTPATIENT
Start: 2025-06-05 | End: 2025-06-05

## 2025-06-05 RX ORDER — IPRATROPIUM BROMIDE AND ALBUTEROL SULFATE 2.5; .5 MG/3ML; MG/3ML
1 SOLUTION RESPIRATORY (INHALATION)
Status: DISCONTINUED | OUTPATIENT
Start: 2025-06-05 | End: 2025-06-05 | Stop reason: HOSPADM

## 2025-06-05 RX ADMIN — ACETAMINOPHEN 975 MG: 325 TABLET ORAL at 09:33

## 2025-06-05 RX ADMIN — LIDOCAINE HYDROCHLORIDE 50 MG: 20 INJECTION, SOLUTION INTRAVENOUS at 13:17

## 2025-06-05 RX ADMIN — HYDROMORPHONE HYDROCHLORIDE 0.5 MG: 1 INJECTION, SOLUTION INTRAMUSCULAR; INTRAVENOUS; SUBCUTANEOUS at 14:07

## 2025-06-05 RX ADMIN — WATER 2000 MG: 1 INJECTION INTRAMUSCULAR; INTRAVENOUS; SUBCUTANEOUS at 13:16

## 2025-06-05 RX ADMIN — ONDANSETRON 4 MG: 2 INJECTION, SOLUTION INTRAMUSCULAR; INTRAVENOUS at 13:35

## 2025-06-05 RX ADMIN — PROPOFOL 150 MCG/KG/MIN: 10 INJECTION, EMULSION INTRAVENOUS at 13:17

## 2025-06-05 RX ADMIN — ACETAMINOPHEN 1000 MG: 500 TABLET ORAL at 14:26

## 2025-06-05 RX ADMIN — KETOROLAC TROMETHAMINE 30 MG: 30 INJECTION, SOLUTION INTRAMUSCULAR at 13:41

## 2025-06-05 RX ADMIN — HYDROMORPHONE HYDROCHLORIDE 0.5 MG: 1 INJECTION, SOLUTION INTRAMUSCULAR; INTRAVENOUS; SUBCUTANEOUS at 13:57

## 2025-06-05 RX ADMIN — MIDAZOLAM HYDROCHLORIDE 2 MG: 1 INJECTION, SOLUTION INTRAMUSCULAR; INTRAVENOUS at 13:07

## 2025-06-05 RX ADMIN — HYDROMORPHONE HYDROCHLORIDE 0.5 MG: 1 INJECTION, SOLUTION INTRAMUSCULAR; INTRAVENOUS; SUBCUTANEOUS at 14:02

## 2025-06-05 RX ADMIN — SODIUM CHLORIDE, SODIUM LACTATE, POTASSIUM CHLORIDE, AND CALCIUM CHLORIDE: .6; .31; .03; .02 INJECTION, SOLUTION INTRAVENOUS at 10:06

## 2025-06-05 RX ADMIN — Medication 20 MG: at 13:22

## 2025-06-05 ASSESSMENT — PAIN DESCRIPTION - DESCRIPTORS
DESCRIPTORS: CRAMPING
DESCRIPTORS: CRAMPING

## 2025-06-05 ASSESSMENT — PAIN SCALES - GENERAL
PAINLEVEL_OUTOF10: 4
PAINLEVEL_OUTOF10: 3
PAINLEVEL_OUTOF10: 5
PAINLEVEL_OUTOF10: 7
PAINLEVEL_OUTOF10: 8

## 2025-06-05 ASSESSMENT — PAIN - FUNCTIONAL ASSESSMENT
PAIN_FUNCTIONAL_ASSESSMENT: ACTIVITIES ARE NOT PREVENTED
PAIN_FUNCTIONAL_ASSESSMENT: 0-10
PAIN_FUNCTIONAL_ASSESSMENT: NONE - DENIES PAIN

## 2025-06-05 ASSESSMENT — PAIN DESCRIPTION - LOCATION: LOCATION: PELVIS

## 2025-06-05 NOTE — PROGRESS NOTES
Patient admitted to PACU # 13 from OR at 1350 post DILATATION AND CURETTAGE HYSTEROSCOPY  per Marty Arroyo MD .  Attached to PACU monitoring system and report received from anesthesia provider.  Patient was reported to be hemodynamically stable during procedure.  Patient drowsy on admission and denied pain.

## 2025-06-05 NOTE — ANESTHESIA POSTPROCEDURE EVALUATION
Department of Anesthesiology  Postprocedure Note    Patient: Chelsi Lock  MRN: [id number]  YOB: 1965  Date of evaluation: 6/5/2025    Procedure Summary       Date: 06/05/25 Room / Location: [place] [address] / [facility]    Anesthesia Start: 1312 Anesthesia Stop: 1352    Procedure: DILATATION AND CURETTAGE HYSTEROSCOPY Diagnosis:       PMB (postmenopausal bleeding)      Thickened endometrium      (PMB (postmenopausal bleeding) [N95.0])      (Thickened endometrium [R93.89])    Surgeons: Marty Arroyo MD Responsible Provider: Cornelia Hoff MD    Anesthesia Type: general, MAC ASA Status: 3            Anesthesia Type: No value filed.    Sandra Phase I: Sandra Score: 10    Sandar Phase II: Sandra Score: 10    Anesthesia Post Evaluation    Patient location during evaluation: PACU  Patient participation: complete - patient participated  Level of consciousness: awake  Pain score: 2  Airway patency: patent  Cardiovascular status: blood pressure returned to baseline  Respiratory status: acceptable  Hydration status: euvolemic  Pain management: adequate    No notable events documented.

## 2025-06-05 NOTE — PROGRESS NOTES
Vitals:    06/05/25 1445   BP: 137/82   Pulse: 75   Resp: 18   Temp: 97.5 °F (36.4 °C)   SpO2: 98%         Intake/Output Summary (Last 24 hours) at 6/5/2025 1449  Last data filed at 6/5/2025 1448  Gross per 24 hour   Intake 1050 ml   Output --   Net 1050 ml       Pain assessment:  present - adequately treated  Pain Level: 3    Patient transferred to care of DANIEL RN.    6/5/2025 2:49 PM

## 2025-06-05 NOTE — FLOWSHEET NOTE
Received pt for procedure with Dr Arroyo. Pt alert and oriented. States she has been having abnormal bleeding that led to her seeing doctor. She states she has severe abdominal cramping at times, but denies pain at this time.  Pt states that every time she has a procedure, it leads to her having to have a blood transfusion. She has concerns regarding being discharged then possibly needing a blood transfusion. Concerns discussed with Dr Arroyo. Supporitive care given.

## 2025-06-05 NOTE — ANESTHESIA PRE PROCEDURE
Anesthesia Evaluation     history of anesthetic complications:   Airway: Mallampati: II  TM distance: >3 FB   Neck ROM: full  Mouth opening: > = 3 FB   Dental:          Pulmonary: breath sounds clear to auscultation  (+)     sleep apnea:                                  Cardiovascular:  Exercise tolerance: good (>4 METS)  (+) hypertension:, CHF:        Rhythm: irregular  Rate: normal                 ROS comment: PACEMAKER/AICD - indication is for CHB and VT episodes; AMCAD phone number is [phone]; Cardiac office number [phone]      3/2025 ECHO (prior ECHO reports assessed Efs from 30% to 40% - significantly improved in this march 2025 ECHO)      Findings Text    Left Ventricle Normal left ventricular systolic function with a visually estimated EF of 50 - 55%. EF by 2D Simpsons Biplane is 54%. Left ventricle is dilated. Increased wall thickness. Findings consistent with mild concentric hypertrophy. Global hypokinesis present.  Right Ventricle Right ventricle size is normal. Lead present in the right ventricle. Normal systolic function.  Left Atrium Left atrium is moderately dilated. LA Vol Index is 42 ml/m2 mL/m2.  Right Atrium Lead present in the right atrium. Right atrium size is normal.  IVC/SVC IVC was not well visualized.  Mitral Valve Thickened leaflets. Mild regurgitation. No stenosis noted.  Tricuspid Valve Valve structure is normal. Trace regurgitation. No stenosis noted.  Aortic Valve Thickened cusps. Sclerosis of the aortic valve cusps. No regurgitation. No stenosis.  Pulmonic Valve Valve structure is normal. Trace regurgitation. No stenosis noted.  Ascending Aorta Normal sized aortic root and ascending aorta.  Pericardium No pericardial effusion.           Neuro/Psych:   (+) neuromuscular disease:, headaches:            GI/Hepatic/Renal:   (+) GERD:          Endo/Other:                     Abdominal:             Vascular:          Other Findings:       Anesthesia

## 2025-06-05 NOTE — TELEPHONE ENCOUNTER
Cornelia from [facility] requesting to speak to staff regarding pt monitor   Please advise  Thank you

## 2025-06-05 NOTE — PROGRESS NOTES
Ambulatory Surgery/Procedure Discharge Note    Vitals:    06/05/25 1500   BP: 137/85   Pulse: 76   Resp: 14   Temp: 96.9 °F (36.1 °C)   SpO2: 99%     Pt meets discharge criteria per Sandra score.  In: 1050 [P.O.:50; I.V.:1000]  Out: -     Restroom use offered before discharge.  Yes    Pain assessment:  4/10 Treated in PACU      Pt and S.O./family states \"ready to go home\". Pt alert and oriented x4. IV removed. Denies N/V or pain. No bleeding noted. Pt tolerating po intake. Discharge instructions given to pt and boyfriend with pt permission. Pt and boyfriend verbalized understanding of all instructions. Left with all belongings, and discharge instructions.     Patient discharged to home/self care. Patient discharged via wheel chair by transporter to waiting family/S.O.       6/5/2025 3:55 PM

## 2025-06-06 ENCOUNTER — TELEPHONE (OUTPATIENT)
Dept: GYNECOLOGY | Age: 60
End: 2025-06-06

## 2025-06-06 ENCOUNTER — RESULTS FOLLOW-UP (OUTPATIENT)
Dept: GYNECOLOGY | Age: 60
End: 2025-06-06

## 2025-06-06 NOTE — TELEPHONE ENCOUNTER
Called pt back after speaking with Dr Cano (Who is covering for Dr Arroyo while he is out) Both I and Dr Cano agreed that should go to the hospital. Pt was agreeable

## 2025-06-06 NOTE — TELEPHONE ENCOUNTER
Patient called bc she had a procedure done yesterday and is now experiencing pain in her nose (its black & burning) and pain in her thigh/pelvic region. She is wondering if it would be an allergic reaction.     Patient can be reached at [phone]

## 2025-06-08 ENCOUNTER — APPOINTMENT (OUTPATIENT)
Dept: CT IMAGING | Age: 60
End: 2025-06-08
Payer: COMMERCIAL

## 2025-06-08 ENCOUNTER — APPOINTMENT (OUTPATIENT)
Dept: GENERAL RADIOLOGY | Age: 60
End: 2025-06-08
Payer: COMMERCIAL

## 2025-06-08 ENCOUNTER — HOSPITAL ENCOUNTER (OUTPATIENT)
Age: 60
Setting detail: OBSERVATION
Discharge: HOME | End: 2025-06-10
Attending: INTERNAL MEDICINE | Admitting: INTERNAL MEDICINE
Payer: COMMERCIAL

## 2025-06-08 DIAGNOSIS — R07.89 CHEST PAIN, NON-CARDIAC: ICD-10-CM

## 2025-06-08 DIAGNOSIS — R07.9 CHEST PAIN, UNSPECIFIED TYPE: Primary | ICD-10-CM

## 2025-06-08 PROBLEM — I71.9 DESCENDING AORTIC ANEURYSM: Status: ACTIVE | Noted: 2025-06-08

## 2025-06-08 PROBLEM — K59.89: Status: ACTIVE | Noted: 2025-06-08

## 2025-06-08 LAB
ALBUMIN SERPL-MCNC: 4.2 G/DL (ref 3.4–5)
ALBUMIN/GLOB SERPL: 1.6 {RATIO} (ref 1.1–2.2)
ALP SERPL-CCNC: 65 U/L (ref 40–129)
ALT SERPL-CCNC: 14 U/L (ref 10–40)
ANION GAP SERPL CALCULATED.3IONS-SCNC: 10 MMOL/L (ref 3–16)
AST SERPL-CCNC: 24 U/L (ref 15–37)
BASOPHILS # BLD: 0 K/UL (ref 0–0.2)
BASOPHILS NFR BLD: 1 %
BILIRUB SERPL-MCNC: 0.5 MG/DL (ref 0–1)
BUN SERPL-MCNC: 8 MG/DL (ref 7–20)
CALCIUM SERPL-MCNC: 9.6 MG/DL (ref 8.3–10.6)
CHLORIDE SERPL-SCNC: 106 MMOL/L (ref 99–110)
CO2 SERPL-SCNC: 23 MMOL/L (ref 21–32)
CREAT SERPL-MCNC: 0.9 MG/DL (ref 0.6–1.2)
DEPRECATED RDW RBC AUTO: 15.3 % (ref 12.4–15.4)
EOSINOPHIL # BLD: 0.2 K/UL (ref 0–0.6)
EOSINOPHIL NFR BLD: 5.1 %
GFR SERPLBLD CREATININE-BSD FMLA CKD-EPI: 73 ML/MIN/{1.73_M2}
GLUCOSE SERPL-MCNC: 103 MG/DL (ref 70–99)
HCT VFR BLD AUTO: 35 % (ref 36–48)
HGB BLD-MCNC: 12 G/DL (ref 12–16)
LYMPHOCYTES # BLD: 0.9 K/UL (ref 1–5.1)
LYMPHOCYTES NFR BLD: 24.3 %
MCH RBC QN AUTO: 31.5 PG (ref 26–34)
MCHC RBC AUTO-ENTMCNC: 34.2 G/DL (ref 31–36)
MCV RBC AUTO: 92.1 FL (ref 80–100)
MONOCYTES # BLD: 0.3 K/UL (ref 0–1.3)
MONOCYTES NFR BLD: 7.2 %
NEUTROPHILS # BLD: 2.4 K/UL (ref 1.7–7.7)
NEUTROPHILS NFR BLD: 62.4 %
PLATELET # BLD AUTO: 242 K/UL (ref 135–450)
PMV BLD AUTO: 7.8 FL (ref 5–10.5)
POTASSIUM SERPL-SCNC: 3.9 MMOL/L (ref 3.5–5.1)
PROT SERPL-MCNC: 6.9 G/DL (ref 6.4–8.2)
RBC # BLD AUTO: 3.8 M/UL (ref 4–5.2)
SODIUM SERPL-SCNC: 139 MMOL/L (ref 136–145)
TROPONIN, HIGH SENSITIVITY: 7 NG/L (ref 0–14)
TROPONIN, HIGH SENSITIVITY: <6 NG/L (ref 0–14)
WBC # BLD AUTO: 3.9 K/UL (ref 4–11)

## 2025-06-08 PROCEDURE — 2500000003 HC RX 250 WO HCPCS: Performed by: INTERNAL MEDICINE

## 2025-06-08 PROCEDURE — 93005 ELECTROCARDIOGRAM TRACING: CPT | Performed by: PHYSICIAN ASSISTANT

## 2025-06-08 PROCEDURE — 71045 X-RAY EXAM CHEST 1 VIEW: CPT

## 2025-06-08 PROCEDURE — 99285 EMERGENCY DEPT VISIT HI MDM: CPT

## 2025-06-08 PROCEDURE — 6360000004 HC RX CONTRAST MEDICATION: Performed by: PHYSICIAN ASSISTANT

## 2025-06-08 PROCEDURE — 85025 COMPLETE CBC W/AUTO DIFF WBC: CPT

## 2025-06-08 PROCEDURE — G0378 HOSPITAL OBSERVATION PER HR: HCPCS

## 2025-06-08 PROCEDURE — 71260 CT THORAX DX C+: CPT

## 2025-06-08 PROCEDURE — 36415 COLL VENOUS BLD VENIPUNCTURE: CPT

## 2025-06-08 PROCEDURE — 80053 COMPREHEN METABOLIC PANEL: CPT

## 2025-06-08 PROCEDURE — 84484 ASSAY OF TROPONIN QUANT: CPT

## 2025-06-08 PROCEDURE — 83690 ASSAY OF LIPASE: CPT

## 2025-06-08 PROCEDURE — 2580000003 HC RX 258: Performed by: INTERNAL MEDICINE

## 2025-06-08 PROCEDURE — 6370000000 HC RX 637 (ALT 250 FOR IP): Performed by: INTERNAL MEDICINE

## 2025-06-08 RX ORDER — SODIUM CHLORIDE 9 MG/ML
INJECTION, SOLUTION INTRAVENOUS PRN
Status: DISCONTINUED | OUTPATIENT
Start: 2025-06-08 | End: 2025-06-10 | Stop reason: HOSPADM

## 2025-06-08 RX ORDER — VITAMIN B COMPLEX
1000 TABLET ORAL DAILY
Status: DISCONTINUED | OUTPATIENT
Start: 2025-06-09 | End: 2025-06-10 | Stop reason: HOSPADM

## 2025-06-08 RX ORDER — POLYETHYLENE GLYCOL 3350 17 G/17G
17 POWDER, FOR SOLUTION ORAL DAILY PRN
Status: DISCONTINUED | OUTPATIENT
Start: 2025-06-08 | End: 2025-06-10 | Stop reason: HOSPADM

## 2025-06-08 RX ORDER — POTASSIUM CHLORIDE 1500 MG/1
40 TABLET, EXTENDED RELEASE ORAL PRN
Status: DISCONTINUED | OUTPATIENT
Start: 2025-06-08 | End: 2025-06-10 | Stop reason: HOSPADM

## 2025-06-08 RX ORDER — SPIRONOLACTONE 25 MG/1
25 TABLET ORAL DAILY
Status: DISCONTINUED | OUTPATIENT
Start: 2025-06-09 | End: 2025-06-10 | Stop reason: HOSPADM

## 2025-06-08 RX ORDER — ONDANSETRON 4 MG/1
4 TABLET, ORALLY DISINTEGRATING ORAL EVERY 8 HOURS PRN
Status: DISCONTINUED | OUTPATIENT
Start: 2025-06-08 | End: 2025-06-10 | Stop reason: HOSPADM

## 2025-06-08 RX ORDER — IOPAMIDOL 755 MG/ML
75 INJECTION, SOLUTION INTRAVASCULAR
Status: COMPLETED | OUTPATIENT
Start: 2025-06-08 | End: 2025-06-08

## 2025-06-08 RX ORDER — SODIUM CHLORIDE 0.9 % (FLUSH) 0.9 %
5-40 SYRINGE (ML) INJECTION PRN
Status: DISCONTINUED | OUTPATIENT
Start: 2025-06-08 | End: 2025-06-10 | Stop reason: HOSPADM

## 2025-06-08 RX ORDER — ACETAMINOPHEN 650 MG/1
650 SUPPOSITORY RECTAL EVERY 6 HOURS PRN
Status: DISCONTINUED | OUTPATIENT
Start: 2025-06-08 | End: 2025-06-10 | Stop reason: HOSPADM

## 2025-06-08 RX ORDER — SODIUM CHLORIDE 0.9 % (FLUSH) 0.9 %
5-40 SYRINGE (ML) INJECTION EVERY 12 HOURS SCHEDULED
Status: DISCONTINUED | OUTPATIENT
Start: 2025-06-08 | End: 2025-06-10 | Stop reason: HOSPADM

## 2025-06-08 RX ORDER — SODIUM CHLORIDE 9 MG/ML
INJECTION, SOLUTION INTRAVENOUS CONTINUOUS
Status: ACTIVE | OUTPATIENT
Start: 2025-06-08 | End: 2025-06-09

## 2025-06-08 RX ORDER — HYDROMORPHONE HYDROCHLORIDE 1 MG/ML
0.5 INJECTION, SOLUTION INTRAMUSCULAR; INTRAVENOUS; SUBCUTANEOUS EVERY 4 HOURS PRN
Status: DISCONTINUED | OUTPATIENT
Start: 2025-06-08 | End: 2025-06-10 | Stop reason: HOSPADM

## 2025-06-08 RX ORDER — MAGNESIUM GLYCINATE 100 MG
100 CAPSULE ORAL DAILY
Status: DISCONTINUED | OUTPATIENT
Start: 2025-06-08 | End: 2025-06-08 | Stop reason: RX

## 2025-06-08 RX ORDER — MAGNESIUM HYDROXIDE/ALUMINUM HYDROXICE/SIMETHICONE 120; 1200; 1200 MG/30ML; MG/30ML; MG/30ML
30 SUSPENSION ORAL EVERY 6 HOURS PRN
Status: DISCONTINUED | OUTPATIENT
Start: 2025-06-08 | End: 2025-06-10 | Stop reason: HOSPADM

## 2025-06-08 RX ORDER — ONDANSETRON 2 MG/ML
4 INJECTION INTRAMUSCULAR; INTRAVENOUS EVERY 6 HOURS PRN
Status: DISCONTINUED | OUTPATIENT
Start: 2025-06-08 | End: 2025-06-10 | Stop reason: HOSPADM

## 2025-06-08 RX ORDER — POTASSIUM CHLORIDE 7.45 MG/ML
10 INJECTION INTRAVENOUS PRN
Status: DISCONTINUED | OUTPATIENT
Start: 2025-06-08 | End: 2025-06-10 | Stop reason: HOSPADM

## 2025-06-08 RX ORDER — CARVEDILOL 25 MG/1
25 TABLET ORAL 2 TIMES DAILY WITH MEALS
Status: DISCONTINUED | OUTPATIENT
Start: 2025-06-08 | End: 2025-06-10 | Stop reason: HOSPADM

## 2025-06-08 RX ORDER — POTASSIUM CHLORIDE 1500 MG/1
40 TABLET, EXTENDED RELEASE ORAL DAILY
Status: DISCONTINUED | OUTPATIENT
Start: 2025-06-09 | End: 2025-06-10 | Stop reason: HOSPADM

## 2025-06-08 RX ORDER — ATORVASTATIN CALCIUM 40 MG/1
40 TABLET, FILM COATED ORAL DAILY
Status: DISCONTINUED | OUTPATIENT
Start: 2025-06-09 | End: 2025-06-10 | Stop reason: HOSPADM

## 2025-06-08 RX ORDER — FLUTICASONE PROPIONATE 50 MCG
2 SPRAY, SUSPENSION (ML) NASAL DAILY PRN
Status: DISCONTINUED | OUTPATIENT
Start: 2025-06-08 | End: 2025-06-10 | Stop reason: HOSPADM

## 2025-06-08 RX ORDER — AMMONIUM LACTATE 12 G/100G
140 CREAM TOPICAL PRN
Status: DISCONTINUED | OUTPATIENT
Start: 2025-06-08 | End: 2025-06-10 | Stop reason: HOSPADM

## 2025-06-08 RX ORDER — FUROSEMIDE 20 MG/1
20 TABLET ORAL WEEKLY
Status: DISCONTINUED | OUTPATIENT
Start: 2025-06-11 | End: 2025-06-10 | Stop reason: HOSPADM

## 2025-06-08 RX ORDER — MAGNESIUM SULFATE IN WATER 40 MG/ML
2000 INJECTION, SOLUTION INTRAVENOUS PRN
Status: DISCONTINUED | OUTPATIENT
Start: 2025-06-08 | End: 2025-06-10 | Stop reason: HOSPADM

## 2025-06-08 RX ORDER — ESTRADIOL 0.1 MG/G
0.5 CREAM VAGINAL
Status: DISCONTINUED | OUTPATIENT
Start: 2025-06-09 | End: 2025-06-10 | Stop reason: HOSPADM

## 2025-06-08 RX ORDER — ASPIRIN 325 MG
325 TABLET ORAL ONCE
Status: DISCONTINUED | OUTPATIENT
Start: 2025-06-08 | End: 2025-06-10 | Stop reason: HOSPADM

## 2025-06-08 RX ORDER — METOCLOPRAMIDE 10 MG/1
5 TABLET ORAL
Status: DISCONTINUED | OUTPATIENT
Start: 2025-06-08 | End: 2025-06-08

## 2025-06-08 RX ORDER — ACETAMINOPHEN 325 MG/1
650 TABLET ORAL EVERY 6 HOURS PRN
Status: DISCONTINUED | OUTPATIENT
Start: 2025-06-08 | End: 2025-06-10 | Stop reason: HOSPADM

## 2025-06-08 RX ORDER — PANTOPRAZOLE SODIUM 40 MG/10ML
40 INJECTION, POWDER, LYOPHILIZED, FOR SOLUTION INTRAVENOUS 2 TIMES DAILY
Status: DISCONTINUED | OUTPATIENT
Start: 2025-06-08 | End: 2025-06-10 | Stop reason: HOSPADM

## 2025-06-08 RX ADMIN — SODIUM CHLORIDE: 0.9 INJECTION, SOLUTION INTRAVENOUS at 18:47

## 2025-06-08 RX ADMIN — IOPAMIDOL 75 ML: 755 INJECTION, SOLUTION INTRAVENOUS at 13:55

## 2025-06-08 RX ADMIN — CARVEDILOL 25 MG: 25 TABLET, FILM COATED ORAL at 21:38

## 2025-06-08 RX ADMIN — SODIUM CHLORIDE, PRESERVATIVE FREE 10 ML: 5 INJECTION INTRAVENOUS at 21:27

## 2025-06-08 ASSESSMENT — PAIN SCALES - GENERAL
PAINLEVEL_OUTOF10: 7
PAINLEVEL_OUTOF10: 8
PAINLEVEL_OUTOF10: 5
PAINLEVEL_OUTOF10: 5
PAINLEVEL_OUTOF10: 0
PAINLEVEL_OUTOF10: 7

## 2025-06-08 ASSESSMENT — PAIN DESCRIPTION - PAIN TYPE: TYPE: ACUTE PAIN

## 2025-06-08 ASSESSMENT — PAIN DESCRIPTION - ORIENTATION
ORIENTATION: MID
ORIENTATION: MID;LOWER

## 2025-06-08 ASSESSMENT — PAIN DESCRIPTION - LOCATION
LOCATION: CHEST
LOCATION: CHEST;ABDOMEN

## 2025-06-08 ASSESSMENT — PAIN - FUNCTIONAL ASSESSMENT: PAIN_FUNCTIONAL_ASSESSMENT: 0-10

## 2025-06-08 ASSESSMENT — PAIN DESCRIPTION - DESCRIPTORS: DESCRIPTORS: ACHING;CRAMPING

## 2025-06-08 ASSESSMENT — HEART SCORE: ECG: NORMAL

## 2025-06-08 NOTE — ED NOTES
Patient Name: Chelsi Lock  : 1965 59 y.o.  MRN: [id number]  ED Room #: ED-0005/05     Chief complaint:   Chief Complaint   Patient presents with    Chest Pain     Pt brought in by [facility] from home with chest pain, pod 3 from gyn surgery at [facility].  Stopped her blood thinners and restarted yesterday.  Chest discomfort noted.  Thought it was more gas, pain increasing today     Hospital Problem/Diagnosis:   Hospital Problems           Last Modified POA    * (Principal) Chest pain 2025 Yes    PAF (paroxysmal atrial fibrillation) ([facility]) 2025 Yes    Systolic CHF, chronic ([facility]) 2025 Yes    Dilated cardiomyopathy ([facility]) 2019 Yes    VT (ventricular tachycardia) ([facility]) 2025 Yes    AICD (automatic cardioverter/defibrillator) present 2025 Yes    Overview Signed 2021  9:08 AM by Cher Oliver   Patient has [employer] [id number] Claria MRI Quad CRT-D BiV ICD Implant-2021-Dr. 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 ([facility]) 2025 Yes    Descending aortic aneurysm 2025 Yes    Dilation of duodenum 2025 Yes         O2 Flow Rate:O2 Device: None (Room air)   (if applicable)  Cardiac Rhythm:   (if applicable)  Active LDA's:   Peripheral IV 25 Right Antecubital (Active)   Site Assessment Clean, dry & intact 25 1154   Line Status Normal saline locked;Blood return noted;Specimen collected 25 1154   Phlebitis Assessment No symptoms 25 1154   Infiltration Assessment 0 25 1154   Alcohol Cap Used No 25 1154   Dressing Status New dressing applied;Clean, dry & intact 25 1154   Dressing Type Transparent 25 1154   Dressing Intervention New 25 1154            How does patient ambulate? Stand by assist    2. How does patient take pills? Whole with Water    3. Is patient alert? Alert    4. Is patient oriented?     5.   Patient arrived from:  home  Facility Name: ___________________________________________    6. If

## 2025-06-08 NOTE — ED PROVIDER NOTES
[place]    FAMILYHISTORY       Family History   Problem Relation Age of Onset    High Blood Pressure Mother     Cancer Father 70        lung    Heart Failure Sister     No Known Problems Sister     No Known Problems Brother     Heart Disease Neg Hx     High Cholesterol Neg Hx         SOCIAL HISTORY       Social History     Tobacco Use    Smoking status: Never     Passive exposure: Never    Smokeless tobacco: Never   Vaping Use    Vaping status: Never Used   Substance Use Topics    Alcohol use: No    Drug use: No       SCREENINGS     NIHSS:     GCS: Heron Coma Scale  Eye Opening: Spontaneous  Best Verbal Response: Oriented  Best Motor Response: Obeys commands  Heron Coma Scale Score: 15    Heart Score  Heart Score for chest pain patients  History: Moderately Suspicious  ECG: Normal  Patient Age: > 45 and < 65 years  *Risk factors for Atherosclerotic disease: Hypercholesterolemia, Hypertension, Obesity  Risk Factors: > 3 Risk factors or history of atherosclerotic disease*  Troponin: < 1X normal limit  Heart Score Total: 4   PECARN Last:       CIWA: CIWA Assessment  BP: (!) 132/92  Pulse: 78  COW Score: No data recorded   CURB 65 Last:     PORT Score:     WELL Criteria:     PERC Score:     CURB 65:      PHYSICAL EXAM  1 or more Elements     ED Triage Vitals [06/08/25 1142]   BP Systolic BP Percentile Diastolic BP Percentile Temp Temp Source Pulse Respirations SpO2   (!) 144/95 -- -- 98.7 °F (37.1 °C) Oral 76 18 100 %      Height Weight - Scale         1.651 m (5' 5\") 83.4 kg (183 lb 13.8 oz)             Physical Exam  Vitals and nursing note reviewed.   Constitutional:       General: She is not in acute distress.     Appearance: She is not ill-appearing.   Cardiovascular:      Rate and Rhythm: Normal rate and regular rhythm.      Heart sounds: Normal heart sounds.      Comments: Bilateral radial pulses equal intact 2+.  Pulmonary:      Effort: Pulmonary effort is normal.      Breath sounds: Normal breath sounds.

## 2025-06-08 NOTE — ACP (ADVANCE CARE PLANNING)
Advanced Care Planning Note.    Purpose of Encounter: Advanced care planning in light of chest pain  Parties In Attendance: Patient  Decisional Capacity: Yes  Subjective: Patient with chest pain  Objective: Cr 0.9  Goals of Care Determination: Patient wants full support (CPR, vent, surgery, HD, trach, PEG)  Plan:  IVF, Cardio/Vascular/GI consults, Dilaudid IV PRN  Code Status: Full code   Time spent on Advanced care Plannin minutes  Advanced Care Planning Documents: Completed advanced directives on chart, sister is the POA.    Wali Greco MD  2025 5:24 PM

## 2025-06-09 ENCOUNTER — APPOINTMENT (OUTPATIENT)
Age: 60
End: 2025-06-09
Attending: INTERNAL MEDICINE
Payer: COMMERCIAL

## 2025-06-09 ENCOUNTER — ANESTHESIA (OUTPATIENT)
Dept: ENDOSCOPY | Age: 60
End: 2025-06-09
Payer: COMMERCIAL

## 2025-06-09 ENCOUNTER — ANESTHESIA EVENT (OUTPATIENT)
Dept: ENDOSCOPY | Age: 60
End: 2025-06-09
Payer: COMMERCIAL

## 2025-06-09 DIAGNOSIS — I71.23 ANEURYSM OF DESCENDING THORACIC AORTA WITHOUT RUPTURE: Primary | ICD-10-CM

## 2025-06-09 PROBLEM — G47.33 OSA (OBSTRUCTIVE SLEEP APNEA): Status: ACTIVE | Noted: 2025-06-09

## 2025-06-09 PROBLEM — I28.8 ENLARGED PULMONARY ARTERY (HCC): Status: ACTIVE | Noted: 2025-06-09

## 2025-06-09 PROBLEM — Z79.01 CURRENT USE OF LONG TERM ANTICOAGULATION: Status: ACTIVE | Noted: 2025-06-09

## 2025-06-09 LAB
ANION GAP SERPL CALCULATED.3IONS-SCNC: 10 MMOL/L (ref 3–16)
BASOPHILS # BLD: 0 K/UL (ref 0–0.2)
BASOPHILS NFR BLD: 1 %
BUN SERPL-MCNC: 7 MG/DL (ref 7–20)
CALCIUM SERPL-MCNC: 9 MG/DL (ref 8.3–10.6)
CHLORIDE SERPL-SCNC: 106 MMOL/L (ref 99–110)
CO2 SERPL-SCNC: 24 MMOL/L (ref 21–32)
CREAT SERPL-MCNC: 0.8 MG/DL (ref 0.6–1.2)
DEPRECATED RDW RBC AUTO: 15.6 % (ref 12.4–15.4)
ECHO AO ASC DIAM: 3.3 CM
ECHO AO ASCENDING AORTA INDEX: 1.73 CM/M2
ECHO AO ROOT DIAM: 3 CM
ECHO AO ROOT INDEX: 1.57 CM/M2
ECHO AV AREA PEAK VELOCITY: 2.6 CM2
ECHO AV AREA VTI: 2.9 CM2
ECHO AV AREA/BSA PEAK VELOCITY: 1.4 CM2/M2
ECHO AV AREA/BSA VTI: 1.5 CM2/M2
ECHO AV MEAN GRADIENT: 3 MMHG
ECHO AV MEAN VELOCITY: 0.8 M/S
ECHO AV PEAK GRADIENT: 9 MMHG
ECHO AV PEAK VELOCITY: 1.5 M/S
ECHO AV VELOCITY RATIO: 0.6
ECHO AV VTI: 23.2 CM
ECHO BSA: 1.96 M2
ECHO EST RA PRESSURE: 3 MMHG
ECHO IVC INSP: 0.6 CM
ECHO IVC PROX: 1.2 CM
ECHO LA AREA 2C: 26.8 CM2
ECHO LA AREA 4C: 24.5 CM2
ECHO LA MAJOR AXIS: 5.8 CM
ECHO LA MINOR AXIS: 6.1 CM
ECHO LA VOL BP: 92 ML (ref 22–52)
ECHO LA VOL MOD A2C: 97 ML (ref 22–52)
ECHO LA VOL MOD A4C: 83 ML (ref 22–52)
ECHO LA VOL/BSA BIPLANE: 48 ML/M2 (ref 16–34)
ECHO LA VOLUME INDEX MOD A2C: 51 ML/M2 (ref 16–34)
ECHO LA VOLUME INDEX MOD A4C: 43 ML/M2 (ref 16–34)
ECHO LV E' LATERAL VELOCITY: 4.78 CM/S
ECHO LV E' SEPTAL VELOCITY: 3.83 CM/S
ECHO LV EDV A2C: 124 ML
ECHO LV EDV A4C: 120 ML
ECHO LV EDV INDEX A4C: 63 ML/M2
ECHO LV EDV NDEX A2C: 65 ML/M2
ECHO LV EJECTION FRACTION 3D: 56 %
ECHO LV EJECTION FRACTION A2C: 52 %
ECHO LV EJECTION FRACTION A4C: 56 %
ECHO LV EJECTION FRACTION BIPLANE: 55 % (ref 55–100)
ECHO LV ESV A2C: 60 ML
ECHO LV ESV A4C: 53 ML
ECHO LV ESV INDEX A2C: 31 ML/M2
ECHO LV ESV INDEX A4C: 28 ML/M2
ECHO LV FRACTIONAL SHORTENING: 21 % (ref 28–44)
ECHO LV INTERNAL DIMENSION DIASTOLE INDEX: 2.72 CM/M2
ECHO LV INTERNAL DIMENSION DIASTOLIC: 5.2 CM (ref 3.9–5.3)
ECHO LV INTERNAL DIMENSION SYSTOLIC INDEX: 2.15 CM/M2
ECHO LV INTERNAL DIMENSION SYSTOLIC: 4.1 CM
ECHO LV IVSD: 1 CM (ref 0.6–0.9)
ECHO LV MASS 2D: 207.3 G (ref 67–162)
ECHO LV MASS INDEX 2D: 108.5 G/M2 (ref 43–95)
ECHO LV POSTERIOR WALL DIASTOLIC: 1.1 CM (ref 0.6–0.9)
ECHO LV RELATIVE WALL THICKNESS RATIO: 0.42
ECHO LVOT AREA: 4.2 CM2
ECHO LVOT AV VTI INDEX: 0.69
ECHO LVOT DIAM: 2.3 CM
ECHO LVOT MEAN GRADIENT: 2 MMHG
ECHO LVOT PEAK GRADIENT: 3 MMHG
ECHO LVOT PEAK VELOCITY: 0.9 M/S
ECHO LVOT STROKE VOLUME INDEX: 34.8 ML/M2
ECHO LVOT SV: 66.4 ML
ECHO LVOT VTI: 16 CM
ECHO MV A VELOCITY: 0.38 M/S
ECHO MV E DECELERATION TIME (DT): 201 MS
ECHO MV E VELOCITY: 0.55 M/S
ECHO MV E/A RATIO: 1.45
ECHO MV E/E' LATERAL: 11.51
ECHO MV E/E' RATIO (AVERAGED): 12.93
ECHO MV E/E' SEPTAL: 14.36
ECHO MV REGURGITANT PEAK GRADIENT: 112 MMHG
ECHO MV REGURGITANT PEAK VELOCITY: 5.3 M/S
ECHO MV REGURGITANT VTIA: 160 CM
ECHO PV MAX VELOCITY: 0.9 M/S
ECHO PV PEAK GRADIENT: 3 MMHG
ECHO RA AREA 4C: 13.1 CM2
ECHO RA END SYSTOLIC VOLUME APICAL 4 CHAMBER INDEX BSA: 17 ML/M2
ECHO RA VOLUME: 32 ML
ECHO RIGHT VENTRICULAR SYSTOLIC PRESSURE (RVSP): 22 MMHG
ECHO RV BASAL DIMENSION: 3.8 CM
ECHO RV FREE WALL PEAK S': 8.3 CM/S
ECHO RV TAPSE: 1.5 CM (ref 1.7–?)
ECHO TV REGURGITANT MAX VELOCITY: 2.17 M/S
ECHO TV REGURGITANT PEAK GRADIENT: 19 MMHG
EKG ATRIAL RATE: 77 BPM
EKG DIAGNOSIS: NORMAL
EKG P AXIS: 90 DEGREES
EKG P-R INTERVAL: 128 MS
EKG Q-T INTERVAL: 454 MS
EKG QRS DURATION: 150 MS
EKG QTC CALCULATION (BAZETT): 513 MS
EKG R AXIS: -86 DEGREES
EKG T AXIS: 84 DEGREES
EKG VENTRICULAR RATE: 77 BPM
EOSINOPHIL # BLD: 0.2 K/UL (ref 0–0.6)
EOSINOPHIL NFR BLD: 6.1 %
FERRITIN SERPL IA-MCNC: 124 NG/ML (ref 15–150)
FOLATE SERPL-MCNC: 4.35 NG/ML (ref 4.78–24.2)
GFR SERPLBLD CREATININE-BSD FMLA CKD-EPI: 84 ML/MIN/{1.73_M2}
GLUCOSE SERPL-MCNC: 91 MG/DL (ref 70–99)
HCT VFR BLD AUTO: 32.5 % (ref 36–48)
HGB BLD-MCNC: 11.1 G/DL (ref 12–16)
IRON SATN MFR SERPL: 45 % (ref 15–50)
IRON SERPL-MCNC: 105 UG/DL (ref 37–145)
LIPASE SERPL-CCNC: 27 U/L (ref 13–60)
LYMPHOCYTES # BLD: 1 K/UL (ref 1–5.1)
LYMPHOCYTES NFR BLD: 29.6 %
MCH RBC QN AUTO: 31.5 PG (ref 26–34)
MCHC RBC AUTO-ENTMCNC: 34.2 G/DL (ref 31–36)
MCV RBC AUTO: 92.1 FL (ref 80–100)
MONOCYTES # BLD: 0.3 K/UL (ref 0–1.3)
MONOCYTES NFR BLD: 7.5 %
NEUTROPHILS # BLD: 1.9 K/UL (ref 1.7–7.7)
NEUTROPHILS NFR BLD: 55.8 %
PLATELET # BLD AUTO: 207 K/UL (ref 135–450)
PMV BLD AUTO: 7.5 FL (ref 5–10.5)
POTASSIUM SERPL-SCNC: 4 MMOL/L (ref 3.5–5.1)
RBC # BLD AUTO: 3.53 M/UL (ref 4–5.2)
SODIUM SERPL-SCNC: 140 MMOL/L (ref 136–145)
TIBC SERPL-MCNC: 232 UG/DL (ref 260–445)
VIT B12 SERPL-MCNC: 279 PG/ML (ref 211–911)
WBC # BLD AUTO: 3.5 K/UL (ref 4–11)

## 2025-06-09 PROCEDURE — 76376 3D RENDER W/INTRP POSTPROCES: CPT | Performed by: INTERNAL MEDICINE

## 2025-06-09 PROCEDURE — 2500000003 HC RX 250 WO HCPCS: Performed by: INTERNAL MEDICINE

## 2025-06-09 PROCEDURE — 93356 MYOCRD STRAIN IMG SPCKL TRCK: CPT | Performed by: INTERNAL MEDICINE

## 2025-06-09 PROCEDURE — 85025 COMPLETE CBC W/AUTO DIFF WBC: CPT

## 2025-06-09 PROCEDURE — G0378 HOSPITAL OBSERVATION PER HR: HCPCS

## 2025-06-09 PROCEDURE — 99223 1ST HOSP IP/OBS HIGH 75: CPT | Performed by: INTERNAL MEDICINE

## 2025-06-09 PROCEDURE — 93306 TTE W/DOPPLER COMPLETE: CPT | Performed by: INTERNAL MEDICINE

## 2025-06-09 PROCEDURE — 83540 ASSAY OF IRON: CPT

## 2025-06-09 PROCEDURE — APPNB30 APP NON BILLABLE TIME 0-30 MINS: Performed by: NURSE PRACTITIONER

## 2025-06-09 PROCEDURE — 6360000002 HC RX W HCPCS: Performed by: INTERNAL MEDICINE

## 2025-06-09 PROCEDURE — 80048 BASIC METABOLIC PNL TOTAL CA: CPT

## 2025-06-09 PROCEDURE — 3700000000 HC ANESTHESIA ATTENDED CARE: Performed by: INTERNAL MEDICINE

## 2025-06-09 PROCEDURE — 93010 ELECTROCARDIOGRAM REPORT: CPT | Performed by: INTERNAL MEDICINE

## 2025-06-09 PROCEDURE — 82607 VITAMIN B-12: CPT

## 2025-06-09 PROCEDURE — 82728 ASSAY OF FERRITIN: CPT

## 2025-06-09 PROCEDURE — 6360000002 HC RX W HCPCS: Performed by: NURSE ANESTHETIST, CERTIFIED REGISTERED

## 2025-06-09 PROCEDURE — 88305 TISSUE EXAM BY PATHOLOGIST: CPT

## 2025-06-09 PROCEDURE — 93356 MYOCRD STRAIN IMG SPCKL TRCK: CPT

## 2025-06-09 PROCEDURE — 7100000001 HC PACU RECOVERY - ADDTL 15 MIN: Performed by: INTERNAL MEDICINE

## 2025-06-09 PROCEDURE — 83550 IRON BINDING TEST: CPT

## 2025-06-09 PROCEDURE — 7100000000 HC PACU RECOVERY - FIRST 15 MIN: Performed by: INTERNAL MEDICINE

## 2025-06-09 PROCEDURE — 6370000000 HC RX 637 (ALT 250 FOR IP): Performed by: INTERNAL MEDICINE

## 2025-06-09 PROCEDURE — 99204 OFFICE O/P NEW MOD 45 MIN: CPT | Performed by: SURGERY

## 2025-06-09 PROCEDURE — 96374 THER/PROPH/DIAG INJ IV PUSH: CPT

## 2025-06-09 PROCEDURE — 2709999900 HC NON-CHARGEABLE SUPPLY: Performed by: INTERNAL MEDICINE

## 2025-06-09 PROCEDURE — 82746 ASSAY OF FOLIC ACID SERUM: CPT

## 2025-06-09 PROCEDURE — APPSS60 APP SPLIT SHARED TIME 46-60 MINUTES: Performed by: NURSE PRACTITIONER

## 2025-06-09 PROCEDURE — 3700000001 HC ADD 15 MINUTES (ANESTHESIA): Performed by: INTERNAL MEDICINE

## 2025-06-09 PROCEDURE — 36415 COLL VENOUS BLD VENIPUNCTURE: CPT

## 2025-06-09 PROCEDURE — 3609012400 HC EGD TRANSORAL BIOPSY SINGLE/MULTIPLE: Performed by: INTERNAL MEDICINE

## 2025-06-09 RX ORDER — NALOXONE HYDROCHLORIDE 0.4 MG/ML
INJECTION, SOLUTION INTRAMUSCULAR; INTRAVENOUS; SUBCUTANEOUS PRN
Status: CANCELLED | OUTPATIENT
Start: 2025-06-09

## 2025-06-09 RX ORDER — SODIUM CHLORIDE 0.9 % (FLUSH) 0.9 %
5-40 SYRINGE (ML) INJECTION PRN
Status: CANCELLED | OUTPATIENT
Start: 2025-06-09

## 2025-06-09 RX ORDER — FENTANYL CITRATE 50 UG/ML
25 INJECTION, SOLUTION INTRAMUSCULAR; INTRAVENOUS EVERY 5 MIN PRN
Refills: 0 | Status: CANCELLED | OUTPATIENT
Start: 2025-06-09

## 2025-06-09 RX ORDER — PROPOFOL 10 MG/ML
INJECTION, EMULSION INTRAVENOUS
Status: DISCONTINUED | OUTPATIENT
Start: 2025-06-09 | End: 2025-06-09 | Stop reason: SDUPTHER

## 2025-06-09 RX ORDER — LIDOCAINE HYDROCHLORIDE 20 MG/ML
INJECTION, SOLUTION EPIDURAL; INFILTRATION; INTRACAUDAL; PERINEURAL
Status: DISCONTINUED | OUTPATIENT
Start: 2025-06-09 | End: 2025-06-09 | Stop reason: SDUPTHER

## 2025-06-09 RX ORDER — PROCHLORPERAZINE EDISYLATE 5 MG/ML
5 INJECTION INTRAMUSCULAR; INTRAVENOUS
Status: CANCELLED | OUTPATIENT
Start: 2025-06-09

## 2025-06-09 RX ORDER — SODIUM CHLORIDE 9 MG/ML
INJECTION, SOLUTION INTRAVENOUS PRN
Status: CANCELLED | OUTPATIENT
Start: 2025-06-09

## 2025-06-09 RX ORDER — OXYCODONE HYDROCHLORIDE 5 MG/1
5 TABLET ORAL PRN
Refills: 0 | Status: CANCELLED | OUTPATIENT
Start: 2025-06-09 | End: 2025-06-09

## 2025-06-09 RX ORDER — DIPHENHYDRAMINE HYDROCHLORIDE 50 MG/ML
12.5 INJECTION, SOLUTION INTRAMUSCULAR; INTRAVENOUS
Status: CANCELLED | OUTPATIENT
Start: 2025-06-09

## 2025-06-09 RX ORDER — ONDANSETRON 2 MG/ML
4 INJECTION INTRAMUSCULAR; INTRAVENOUS
Status: CANCELLED | OUTPATIENT
Start: 2025-06-09

## 2025-06-09 RX ORDER — OXYCODONE HYDROCHLORIDE 5 MG/1
10 TABLET ORAL PRN
Refills: 0 | Status: CANCELLED | OUTPATIENT
Start: 2025-06-09 | End: 2025-06-09

## 2025-06-09 RX ORDER — SODIUM CHLORIDE 0.9 % (FLUSH) 0.9 %
5-40 SYRINGE (ML) INJECTION EVERY 12 HOURS SCHEDULED
Status: CANCELLED | OUTPATIENT
Start: 2025-06-09

## 2025-06-09 RX ORDER — LABETALOL HYDROCHLORIDE 5 MG/ML
5 INJECTION, SOLUTION INTRAVENOUS EVERY 10 MIN PRN
Status: CANCELLED | OUTPATIENT
Start: 2025-06-09

## 2025-06-09 RX ADMIN — SACUBITRIL AND VALSARTAN 1 TABLET: 49; 51 TABLET, FILM COATED ORAL at 21:59

## 2025-06-09 RX ADMIN — LIDOCAINE HYDROCHLORIDE 60 MG: 20 INJECTION, SOLUTION EPIDURAL; INFILTRATION; INTRACAUDAL; PERINEURAL at 14:50

## 2025-06-09 RX ADMIN — PANTOPRAZOLE SODIUM 40 MG: 40 INJECTION, POWDER, FOR SOLUTION INTRAVENOUS at 09:38

## 2025-06-09 RX ADMIN — ALUMINUM HYDROXIDE, MAGNESIUM HYDROXIDE, AND SIMETHICONE 30 ML: 200; 200; 20 SUSPENSION ORAL at 22:10

## 2025-06-09 RX ADMIN — PANTOPRAZOLE SODIUM 40 MG: 40 INJECTION, POWDER, FOR SOLUTION INTRAVENOUS at 21:59

## 2025-06-09 RX ADMIN — PROPOFOL 100 MG: 10 INJECTION, EMULSION INTRAVENOUS at 14:50

## 2025-06-09 RX ADMIN — SODIUM CHLORIDE, PRESERVATIVE FREE 10 ML: 5 INJECTION INTRAVENOUS at 22:00

## 2025-06-09 RX ADMIN — ACETAMINOPHEN 650 MG: 325 TABLET ORAL at 15:52

## 2025-06-09 RX ADMIN — PROPOFOL 50 MG: 10 INJECTION, EMULSION INTRAVENOUS at 15:00

## 2025-06-09 RX ADMIN — ATORVASTATIN CALCIUM 40 MG: 40 TABLET, FILM COATED ORAL at 18:10

## 2025-06-09 RX ADMIN — PROPOFOL 50 MG: 10 INJECTION, EMULSION INTRAVENOUS at 14:52

## 2025-06-09 RX ADMIN — CARVEDILOL 25 MG: 25 TABLET, FILM COATED ORAL at 09:38

## 2025-06-09 RX ADMIN — SACUBITRIL AND VALSARTAN 1 TABLET: 49; 51 TABLET, FILM COATED ORAL at 09:38

## 2025-06-09 RX ADMIN — PROPOFOL 160 MCG/KG/MIN: 10 INJECTION, EMULSION INTRAVENOUS at 14:50

## 2025-06-09 RX ADMIN — POTASSIUM CHLORIDE 40 MEQ: 1500 TABLET, EXTENDED RELEASE ORAL at 15:52

## 2025-06-09 RX ADMIN — SPIRONOLACTONE 25 MG: 25 TABLET ORAL at 15:53

## 2025-06-09 RX ADMIN — ALUMINUM HYDROXIDE, MAGNESIUM HYDROXIDE, AND SIMETHICONE 30 ML: 200; 200; 20 SUSPENSION ORAL at 15:53

## 2025-06-09 RX ADMIN — CARVEDILOL 25 MG: 25 TABLET, FILM COATED ORAL at 21:59

## 2025-06-09 RX ADMIN — PROPOFOL 50 MG: 10 INJECTION, EMULSION INTRAVENOUS at 14:58

## 2025-06-09 RX ADMIN — RIVAROXABAN 20 MG: 20 TABLET, FILM COATED ORAL at 08:03

## 2025-06-09 RX ADMIN — Medication 1000 UNITS: at 15:53

## 2025-06-09 ASSESSMENT — PAIN DESCRIPTION - ORIENTATION: ORIENTATION: UPPER

## 2025-06-09 ASSESSMENT — PAIN - FUNCTIONAL ASSESSMENT
PAIN_FUNCTIONAL_ASSESSMENT: NONE - DENIES PAIN
PAIN_FUNCTIONAL_ASSESSMENT: NONE - DENIES PAIN
PAIN_FUNCTIONAL_ASSESSMENT: 0-10

## 2025-06-09 ASSESSMENT — PAIN SCALES - GENERAL
PAINLEVEL_OUTOF10: 5
PAINLEVEL_OUTOF10: 5

## 2025-06-09 ASSESSMENT — PAIN DESCRIPTION - DESCRIPTORS: DESCRIPTORS: BURNING

## 2025-06-09 ASSESSMENT — PAIN DESCRIPTION - LOCATION: LOCATION: ABDOMEN

## 2025-06-09 ASSESSMENT — PAIN DESCRIPTION - PAIN TYPE: TYPE: ACUTE PAIN

## 2025-06-09 NOTE — H&P
Gastroenterology Note             Pre-operative History and Physical    Patient: Chelsi Lock  : 1965  CSN:     History Obtained From:  patient and/or guardian.     HISTORY OF PRESENT ILLNESS:    The patient is a 59 y.o. female  here for EGD  This very pleasant 59-year-old female comes in and she has been having some issues with what appears to be a noncardiac chest pain she does have an AICD and pacemaker in she had for last Xarelto dose yesterday    She had a CT scan which showed thickening of the duodenum so we are going to take a look at her duodenum she did have a recent endoscopy in February with Dr. Phillips which did not show any abnormality      Past Medical History:    Past Medical History:   Diagnosis Date    Anemia     Anxiety     Atrial fibrillation and flutter (HCC)     Atrial flutter (HCC)     CHB (complete heart block) (HCC)     Chronic combined systolic and diastolic heart failure (HCC)     Class 1 obesity without serious comorbidity with body mass index (BMI) of 33.0 to 33.9 in adult 2019    Congenital heart disease     Difficult intravenous access 10/06/2022    SEE NOTE    Dilated cardiomyopathy (HCC)     GERD (gastroesophageal reflux disease)     Headache(784.0)     History of blood transfusion     Hx of blood clots     Hyperlipidemia     Hypertension     Hypovitaminosis     ICD (implantable cardioverter-defibrillator), biventricular, in situ     LV dysfunction     Migraine     Obstructive sleep apnea (adult) (pediatric)     Paresthesia of right foot     PONV (postoperative nausea and vomiting)     Prolonged emergence from general anesthesia     sensitive to meds, slow to wake    Sciatica of left side     Sleep apnea     uses CPAP    Syncope     Wears glasses      Past Surgical History:    Past Surgical History:   Procedure Laterality Date    CARDIAC DEFIBRILLATOR PLACEMENT  2021    [employer]    CARDIOVERSION  2022    CARDIOVERSION  2022    COLECTOMY N/A 
prophylaxis Xarelto  Code status Full code  Diet CLD, NPO p MN  IV access Peripheral  Schultz Catheter No    Place in observation. I anticipate hospitalization spanning less than two midnights for investigation and treatment of the above medically necessary diagnoses.    Discussed with patient and nursing.     Wali Greco MD    6/8/2025 5:24 PM

## 2025-06-09 NOTE — MANAGEMENT PLAN
CARDIOLOGY UPDATE:    Echo this visit resulted, demonstrating stable findings from Echo 3/24/25  Device interrogation download 6/9/25 reviewed demonstrating no VT or AT/AF  No further inpatient cardiac needs  Cardiology will signoff with outpatient non urgent follow up    Mona Bernabe DO PENG [facility]      06/08/25    ECHO (TTE) COMPLETE (PRN CONTRAST/BUBBLE/STRAIN/3D) 06/09/2025  2:41 PM (Final)    Interpretation Summary    Left Ventricle: The EF by visual approximation is 50 - 55%. EF by 2D Simpsons Biplane is 55%. EF 3D is 56%. Left ventricle is dilated. Increased wall thickness. Findings consistent with moderate concentric hypertrophy. No regional wall motion abnormality.    Right Ventricle: Size is not well visualized. Mildly reduced systolic function by objective assessment.    Mitral Valve: Mild to moderate regurgitation.    Tricuspid Valve: Mild regurgitation. RVSP is 22 mmHg.    Left Atrium: Left atrium is severely dilated.    Image quality is adequate.    Signed by: Mona Bernabe DO on 6/9/2025  2:41 PM

## 2025-06-09 NOTE — CARE COORDINATION
Discharge Planning Note:    Chart reviewed and it appears that patient has minimal needs for discharge at this time. Risk Score N/A in OBS %     Primary Care Physician is Evelin Yousif APRN - CNP    Primary insurance is [employer]     Please notify case management if any discharge needs are identified.      Case management will continue to follow progress and update discharge plan as needed.    Electronically signed by Delmar Tena on 6/9/2025 at 7:44 AM

## 2025-06-09 NOTE — PLAN OF CARE
Problem: Chronic Conditions and Co-morbidities  Goal: Patient's chronic conditions and co-morbidity symptoms are monitored and maintained or improved  Outcome: Progressing  Flowsheets (Taken 6/9/2025 0820)  Care Plan - Patient's Chronic Conditions and Co-Morbidity Symptoms are Monitored and Maintained or Improved: Monitor and assess patient's chronic conditions and comorbid symptoms for stability, deterioration, or improvement     Problem: Discharge Planning  Goal: Discharge to home or other facility with appropriate resources  Outcome: Progressing  Flowsheets (Taken 6/9/2025 0820)  Discharge to home or other facility with appropriate resources: Identify barriers to discharge with patient and caregiver     Problem: Pain  Goal: Verbalizes/displays adequate comfort level or baseline comfort level  Outcome: Progressing

## 2025-06-09 NOTE — ANESTHESIA POSTPROCEDURE EVALUATION
Department of Anesthesiology  Postprocedure Note    Patient: Chelsi Lock  MRN: [id number]  YOB: 1965  Date of evaluation: 6/9/2025    Procedure Summary       Date: 06/09/25 Room / Location: [place] [address] / [facility]    Anesthesia Start: 1445 Anesthesia Stop: 1510    Procedure: ESOPHAGOGASTRODUODENOSCOPY BIOPSY (Abdomen) Diagnosis:       Chest pain, non-cardiac      (Chest pain, non-cardiac [R07.89])    Surgeons: Eduardo Argueta MD Responsible Provider: Claude Frank MD    Anesthesia Type: MAC ASA Status: 3            Anesthesia Type: No value filed.    Sandra Phase I: Sandra Score: 10    Sandra Phase II:      Anesthesia Post Evaluation    Patient location during evaluation: PACU  Patient participation: complete - patient participated  Level of consciousness: awake  Airway patency: patent  Nausea & Vomiting: no nausea and no vomiting  Cardiovascular status: blood pressure returned to baseline and hemodynamically stable  Respiratory status: acceptable  Hydration status: euvolemic  Multimodal analgesia pain management approach  Pain management: adequate    No notable events documented.

## 2025-06-09 NOTE — ANESTHESIA PRE PROCEDURE
Plan      MAC     ASA 3       Induction: intravenous.    MIPS: Postoperative opioids intended and Prophylactic antiemetics administered.  Anesthetic plan and risks discussed with patient.    Use of blood products discussed with patient whom consented to blood products.    Plan discussed with CRNA.    Attending anesthesiologist reviewed and agrees with Preprocedure content            Moris Barron DO   6/9/2025

## 2025-06-10 VITALS
DIASTOLIC BLOOD PRESSURE: 88 MMHG | TEMPERATURE: 97.8 F | RESPIRATION RATE: 16 BRPM | HEART RATE: 76 BPM | HEIGHT: 65 IN | BODY MASS INDEX: 30.96 KG/M2 | WEIGHT: 185.8 LBS | SYSTOLIC BLOOD PRESSURE: 126 MMHG | OXYGEN SATURATION: 100 %

## 2025-06-10 LAB
DEPRECATED RDW RBC AUTO: 15.4 % (ref 12.4–15.4)
FERRITIN SERPL IA-MCNC: 139 NG/ML (ref 15–150)
FOLATE SERPL-MCNC: 4.67 NG/ML (ref 4.78–24.2)
HCT VFR BLD AUTO: 33.7 % (ref 36–48)
HGB BLD-MCNC: 11.6 G/DL (ref 12–16)
IRON SATN MFR SERPL: 24 % (ref 15–50)
IRON SERPL-MCNC: 58 UG/DL (ref 37–145)
MCH RBC QN AUTO: 31.7 PG (ref 26–34)
MCHC RBC AUTO-ENTMCNC: 34.5 G/DL (ref 31–36)
MCV RBC AUTO: 92 FL (ref 80–100)
PLATELET # BLD AUTO: 210 K/UL (ref 135–450)
PMV BLD AUTO: 7.3 FL (ref 5–10.5)
RBC # BLD AUTO: 3.66 M/UL (ref 4–5.2)
TIBC SERPL-MCNC: 243 UG/DL (ref 260–445)
VIT B12 SERPL-MCNC: 306 PG/ML (ref 211–911)
WBC # BLD AUTO: 3.2 K/UL (ref 4–11)

## 2025-06-10 PROCEDURE — 82746 ASSAY OF FOLIC ACID SERUM: CPT

## 2025-06-10 PROCEDURE — 6370000000 HC RX 637 (ALT 250 FOR IP): Performed by: INTERNAL MEDICINE

## 2025-06-10 PROCEDURE — 83540 ASSAY OF IRON: CPT

## 2025-06-10 PROCEDURE — 82607 VITAMIN B-12: CPT

## 2025-06-10 PROCEDURE — 96376 TX/PRO/DX INJ SAME DRUG ADON: CPT

## 2025-06-10 PROCEDURE — G0378 HOSPITAL OBSERVATION PER HR: HCPCS

## 2025-06-10 PROCEDURE — 83550 IRON BINDING TEST: CPT

## 2025-06-10 PROCEDURE — 2500000003 HC RX 250 WO HCPCS: Performed by: INTERNAL MEDICINE

## 2025-06-10 PROCEDURE — 82728 ASSAY OF FERRITIN: CPT

## 2025-06-10 PROCEDURE — 85027 COMPLETE CBC AUTOMATED: CPT

## 2025-06-10 PROCEDURE — 6360000002 HC RX W HCPCS: Performed by: INTERNAL MEDICINE

## 2025-06-10 PROCEDURE — 36415 COLL VENOUS BLD VENIPUNCTURE: CPT

## 2025-06-10 RX ORDER — FOLIC ACID 1 MG/1
1 TABLET ORAL DAILY
Status: DISCONTINUED | OUTPATIENT
Start: 2025-06-10 | End: 2025-06-10 | Stop reason: HOSPADM

## 2025-06-10 RX ORDER — FOLIC ACID 1 MG/1
1 TABLET ORAL DAILY
Qty: 30 TABLET | Refills: 3 | Status: SHIPPED | OUTPATIENT
Start: 2025-06-10

## 2025-06-10 RX ADMIN — POTASSIUM CHLORIDE 40 MEQ: 1500 TABLET, EXTENDED RELEASE ORAL at 08:19

## 2025-06-10 RX ADMIN — PANTOPRAZOLE SODIUM 40 MG: 40 INJECTION, POWDER, FOR SOLUTION INTRAVENOUS at 08:21

## 2025-06-10 RX ADMIN — CARVEDILOL 25 MG: 25 TABLET, FILM COATED ORAL at 08:19

## 2025-06-10 RX ADMIN — SODIUM CHLORIDE, PRESERVATIVE FREE 10 ML: 5 INJECTION INTRAVENOUS at 08:21

## 2025-06-10 RX ADMIN — SACUBITRIL AND VALSARTAN 1 TABLET: 49; 51 TABLET, FILM COATED ORAL at 08:19

## 2025-06-10 RX ADMIN — Medication 1000 UNITS: at 08:19

## 2025-06-10 RX ADMIN — SPIRONOLACTONE 25 MG: 25 TABLET ORAL at 08:19

## 2025-06-10 RX ADMIN — RIVAROXABAN 20 MG: 20 TABLET, FILM COATED ORAL at 08:19

## 2025-06-10 NOTE — FLOWSHEET NOTE
Patient with discharge orders. Patient IV removed, patient dressed and collected all personal belongings by self. Patient provided discharge paperwork and given opportunity yo ask questions and get clarification. Patient to discharge home. Patient ambulated to main entrance, picking up proscriptions in outpatient pharmacy prior to leaving, discharged with family by private vehicle,

## 2025-06-10 NOTE — PLAN OF CARE
Problem: Chronic Conditions and Co-morbidities  Goal: Patient's chronic conditions and co-morbidity symptoms are monitored and maintained or improved  6/10/2025 1028 by Alena Dailey RN  Outcome: Completed     Problem: Discharge Planning  Goal: Discharge to home or other facility with appropriate resources  6/10/2025 1028 by Alena Dailey, RN  Outcome: Completed     Problem: Pain  Goal: Verbalizes/displays adequate comfort level or baseline comfort level  6/10/2025 1028 by Alena Dailey, RN  Outcome: Completed     Problem: ABCDS Injury Assessment  Goal: Absence of physical injury  Outcome: Completed

## 2025-06-10 NOTE — PLAN OF CARE
Problem: Chronic Conditions and Co-morbidities  Goal: Patient's chronic conditions and co-morbidity symptoms are monitored and maintained or improved  6/10/2025 0032 by Anyi Thurman RN  Outcome: Progressing  6/9/2025 1857 by Sara Vega RN  Outcome: Progressing  Flowsheets (Taken 6/9/2025 0820)  Care Plan - Patient's Chronic Conditions and Co-Morbidity Symptoms are Monitored and Maintained or Improved: Monitor and assess patient's chronic conditions and comorbid symptoms for stability, deterioration, or improvement     Problem: Discharge Planning  Goal: Discharge to home or other facility with appropriate resources  6/10/2025 0032 by Anyi Thurman RN  Outcome: Progressing  6/9/2025 1857 by Sara Vega RN  Outcome: Progressing  Flowsheets (Taken 6/9/2025 0820)  Discharge to home or other facility with appropriate resources: Identify barriers to discharge with patient and caregiver     Problem: Pain  Goal: Verbalizes/displays adequate comfort level or baseline comfort level  6/10/2025 0032 by Anyi Thurman RN  Outcome: Progressing  6/9/2025 1857 by Sara Vega RN  Outcome: Progressing

## 2025-06-10 NOTE — DISCHARGE INSTRUCTIONS
Follow up with your PCP within 4 to 5 days of discharge.  Have PCP repeat CBC  Follow up with gastroenterology as instructed   Follow up with cardiology as instructed   Follow-up with vascular surgery; recommended outpatient surveillance with repeat CTA chest in 1 year   Take all your medications as prescribed.

## 2025-06-10 NOTE — PROGRESS NOTES
Gastroenterology Progress Note      Chelsi Lock is a 59 y.o. female patient.  1. Chest pain, unspecified type    2. Chest pain, non-cardiac        SUBJECTIVE: Feels almost back to normal.  Tolerating diet.        Physical    VITALS:  /88   Pulse 76   Temp 97.8 °F (36.6 °C) (Oral)   Resp 16   Ht 1.651 m (5' 5\")   Wt 84.3 kg (185 lb 12.8 oz)   SpO2 100%   BMI 30.92 kg/m²   TEMPERATURE:  Current - Temp: 97.8 °F (36.6 °C); Max - Temp  Av.5 °F (36.4 °C)  Min: 97.1 °F (36.2 °C)  Max: 97.8 °F (36.6 °C)    NAD  RRR, Nl s1s2  Lungs CTA Bilaterally, normal effort  Abdomen soft, ND, NT, no HSM, Bowel sounds normal      Data    Data Review:    Recent Labs     25  1204 25  0519 06/10/25  0605   WBC 3.9* 3.5* 3.2*   HGB 12.0 11.1* 11.6*   HCT 35.0* 32.5* 33.7*   MCV 92.1 92.1 92.0    207 210     Recent Labs     25  1204 25  0519    140   K 3.9 4.0    106   CO2 23 24   BUN 8 7   CREATININE 0.9 0.8     Recent Labs     25  1204   AST 24   ALT 14   BILITOT 0.5   ALKPHOS 65     Recent Labs     25  1204   LIPASE 27.0     No results for input(s): \"PROTIME\", \"INR\" in the last 72 hours.  Invalid input(s): \"PTT\"           ASSESSMENT / PLAN      Chest/epigastric pain -began about 3 days PTA.  Some nausea but no vomiting.  Liver enzymes and lipase normal.  CT chest showed dilation and wall thickening of the second portion of the duodenum.  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  ? If CT findings are related to this.  Had an EGD 2025 for epigastric pain and nausea which was normal.  Was started on Reglan daily prior to dinner at that time by Dr. Phillips.  Repeat EGD 2025 with 2 cm hiatal hernia, erosions and erythema in gastric antrum (biopsied), duodenum appeared normal.  Symptoms resolving.  Tolerating diet.  Feels ready to go home.  - PPI  - f/u path  -Diet as tolerated     History of A-fib and left 
4 Eyes Skin Assessment     NAME:  Chelsi Lock  YOB: 1965  MEDICAL RECORD NUMBER:  [id number]    The patient is being assessed for  Admission    I agree that at least one RN has performed a thorough Head to Toe Skin Assessment on the patient. ALL assessment sites listed below have been assessed.      Areas assessed by both nurses:    Head, Face, Ears, Shoulders, Back, Chest, Arms, Elbows, Hands, Sacrum. Buttock, Coccyx, Ischium, Legs. Feet and Heels, and Under Medical Devices         Does the Patient have a Wound? No noted wound(s)       Lars Prevention initiated by RN: No  Wound Care Orders initiated by RN: No    Pressure Injury (Stage 3,4, Unstageable, DTI, NWPT, and Complex wounds) if present, place Wound referral order by RN under : No    New Ostomies, if present place, Ostomy referral order under : No     Nurse 1 eSignature: Electronically signed by Sara Vega RN on 6/8/25 at 6:43 PM EDT    **SHARE this note so that the co-signing nurse can place an eSignature**    Nurse 2 eSignature: Electronically signed by Cece Padilla RN on 6/8/25 at 6:43 PM EDT   
Belongings returned to pt by security  
CLINICAL PHARMACY NOTE: MEDS TO BEDS    Total # of Prescriptions Filled: 1   The following medications were delivered to the patient:  FOLIC ACID 1MG    Additional Documentation:  Patient picked up in outpatient pharmacy  Marlys Porras CPhT  
Night shift assessment completed. Routine vitals have been obtained. Scheduled medications given. Patient is awake, alert and oriented/ talking to staff. Respirations are easy and unlabored with no c/o SOB. Skin has been assessed per writer. IV site is C/D/I. Patient does not appear to be in distress. Call light within reach. Standard safety measures are in place. All needs have been met at this time.   
Pt admitted from ED. Aox4, VSS, RA. All orders reviewed with pt, pt agreeable to POC. Provided pt with clear liquids. Pt denies further need at this time. Call light in reach.   
Pt nahid locked up in security along with bank card  
Teaching / education initiated regarding perioperative experience, expectations, and pain management during stay. Patient verbalized understanding.  
after 4 days of incubation. 08/04/2023 01:14 PM     Lab Results   Component Value Date/Time    BLOODCULT2 No Growth after 4 days of incubation. 08/04/2023 01:14 PM     Organism: No results found for: \"ORG\"      Electronically signed by Audra Ardon MD on 6/9/2025 at 11:26 AM

## 2025-06-11 ENCOUNTER — TELEPHONE (OUTPATIENT)
Dept: GYNECOLOGY | Age: 60
End: 2025-06-11

## 2025-06-11 ENCOUNTER — TELEPHONE (OUTPATIENT)
Dept: FAMILY MEDICINE CLINIC | Age: 60
End: 2025-06-11

## 2025-06-11 NOTE — TELEPHONE ENCOUNTER
Patient called bc they had a procedure done last Thursday and she said that she was experiencing pain (this the patient that had a black/burning nose). She said that she went to the hospital after that and they told her that her blood count was getting lower. She stated that her bleeding is getting heavier and that she doesn't know what to do. she said she like to speak to someone to confirm if her symptoms were normal or not.     Patient can be reached at [phone]

## 2025-06-11 NOTE — TELEPHONE ENCOUNTER
Care Transitions Initial Follow Up Call    Outreach made within 2 business days of discharge: Yes    Patient: Chelsi Lock Patient : 1965   MRN: [id number]  Reason for Admission: Chest pain  Discharge Date: 6/10/25       Spoke with: Chelsi    Discharge department/facility: [facility]     TCM Interactive Patient Contact:  Was patient able to fill all prescriptions: Yes  Was patient instructed to bring all medications to the follow-up visit: Yes  Is patient taking all medications as directed in the discharge summary?no the patient received   Does patient understand their discharge instructions: Yes  Does patient have questions or concerns that need addressed prior to 7-14 day follow up office visit: no    Additional needs identified to be addressed with provider  No needs identified             Scheduled appointment with PCP within 7-14 days    Follow Up  Future Appointments   Date Time Provider Department Center   2025  8:10 AM Evelin Yousif APRN - CNP SD[facility] ECC DEP   2025  9:00 AM Marty Arroyo MD ROOK[facility]   2025 10:00 AM Rema Rivers APRN - CNS [facility]   2025  9:30 AM SCHEDULE, Agenda DEVICE CHECK FF Cardio [facility]   2025  9:30 AM Fred Granados APRN - CNP FF [facility]   2025  8:30 AM Rema Rivers APRN - CNS [facility]   2026  8:30 AM F CT RM 1 [facility] [place] [facility]   6/10/2026  8:30 AM Garret Saeed, DO FF VASC/ENDO [facility]       Carol Rojo RN

## 2025-06-12 ENCOUNTER — OFFICE VISIT (OUTPATIENT)
Dept: FAMILY MEDICINE CLINIC | Age: 60
End: 2025-06-12

## 2025-06-12 VITALS
RESPIRATION RATE: 16 BRPM | OXYGEN SATURATION: 99 % | SYSTOLIC BLOOD PRESSURE: 120 MMHG | WEIGHT: 181 LBS | HEART RATE: 78 BPM | DIASTOLIC BLOOD PRESSURE: 78 MMHG | HEIGHT: 65 IN | BODY MASS INDEX: 30.16 KG/M2

## 2025-06-12 DIAGNOSIS — R53.1 WEAKNESS: ICD-10-CM

## 2025-06-12 DIAGNOSIS — Z09 HOSPITAL DISCHARGE FOLLOW-UP: Primary | ICD-10-CM

## 2025-06-12 DIAGNOSIS — R31.0 GROSS HEMATURIA: ICD-10-CM

## 2025-06-12 LAB
BILIRUBIN, POC: NORMAL
BLOOD URINE, POC: NORMAL
CLARITY, POC: NORMAL
COLOR, POC: YELLOW
GLUCOSE URINE, POC: NORMAL MG/DL
KETONES, POC: NORMAL MG/DL
LEUKOCYTE EST, POC: NORMAL
NITRITE, POC: NORMAL
PH, POC: 6
PROTEIN, POC: NORMAL MG/DL
SPECIFIC GRAVITY, POC: 1.02
UROBILINOGEN, POC: 0.2 MG/DL

## 2025-06-12 NOTE — PROGRESS NOTES
morning (before breakfast) 90 tablet 3    fluticasone (FLONASE) 50 MCG/ACT nasal spray 2 sprays by Each Nostril route daily as needed for Allergies      atorvastatin (LIPITOR) 40 MG tablet Take 1 tablet by mouth daily 60 tablet 2      Medications patient taking as of now reconciled against medications ordered at time of hospital discharge: Yes    A comprehensive review of systems was negative except for what was noted in the HPI.    Objective:    /78 (BP Site: Left Upper Arm, Patient Position: Sitting, BP Cuff Size: Medium Adult)   Pulse 78   Resp 16   Ht 1.651 m (5' 5\")   Wt 82.1 kg (181 lb)   SpO2 99%   BMI 30.12 kg/m²   General Appearance: alert and oriented to person, place and time, well developed and well- nourished, in no acute distress  Skin: warm and dry, no rash or erythema  Head: normocephalic and atraumatic  Eyes: pupils equal, round, and reactive to light, extraocular eye movements intact, conjunctivae normal  ENT: tympanic membrane, external ear and ear canal normal bilaterally, nose without deformity, nasal mucosa and turbinates normal without polyps  Neck: supple and non-tender without mass, no thyromegaly or thyroid nodules, no cervical lymphadenopathy  Pulmonary/Chest: clear to auscultation bilaterally- no wheezes, rales or rhonchi, normal air movement, no respiratory distress  Cardiovascular: normal rate, regular rhythm, normal S1 and S2, no murmurs, rubs, clicks, or gallops, distal pulses intact, no carotid bruits  Abdomen: soft, non-tender, non-distended, normal bowel sounds, no masses or organomegaly  Extremities: no cyanosis, clubbing or edema  Musculoskeletal: normal range of motion, no joint swelling, deformity or tenderness  Neurologic: reflexes normal and symmetric, no cranial nerve deficit, gait, coordination and speech normal    An electronic signature was used to authenticate this note.  --AN SHANKAR, APRN - CNP

## 2025-06-13 DIAGNOSIS — R31.0 GROSS HEMATURIA: ICD-10-CM

## 2025-06-13 DIAGNOSIS — R53.1 WEAKNESS: ICD-10-CM

## 2025-06-13 LAB
ANION GAP SERPL CALCULATED.3IONS-SCNC: 10 MMOL/L (ref 3–16)
BASOPHILS # BLD: 0 K/UL (ref 0–0.2)
BASOPHILS NFR BLD: 1.3 %
BUN SERPL-MCNC: 8 MG/DL (ref 7–20)
CALCIUM SERPL-MCNC: 9.8 MG/DL (ref 8.3–10.6)
CHLORIDE SERPL-SCNC: 105 MMOL/L (ref 99–110)
CO2 SERPL-SCNC: 26 MMOL/L (ref 21–32)
CREAT SERPL-MCNC: 0.9 MG/DL (ref 0.6–1.2)
DEPRECATED RDW RBC AUTO: 15.7 % (ref 12.4–15.4)
EOSINOPHIL # BLD: 0.4 K/UL (ref 0–0.6)
EOSINOPHIL NFR BLD: 11.7 %
FERRITIN SERPL IA-MCNC: 125 NG/ML (ref 15–150)
FOLATE SERPL-MCNC: 6.42 NG/ML (ref 4.78–24.2)
GFR SERPLBLD CREATININE-BSD FMLA CKD-EPI: 73 ML/MIN/{1.73_M2}
GLUCOSE SERPL-MCNC: 89 MG/DL (ref 70–99)
HCT VFR BLD AUTO: 35.4 % (ref 36–48)
HGB BLD-MCNC: 11.9 G/DL (ref 12–16)
IMM RETICS NFR: 0.35 % (ref 0.21–0.37)
IRON SATN MFR SERPL: 36 % (ref 15–50)
IRON SERPL-MCNC: 100 UG/DL (ref 37–145)
LYMPHOCYTES # BLD: 0.9 K/UL (ref 1–5.1)
LYMPHOCYTES NFR BLD: 25.2 %
MCH RBC QN AUTO: 31.7 PG (ref 26–34)
MCHC RBC AUTO-ENTMCNC: 33.5 G/DL (ref 31–36)
MCV RBC AUTO: 94.7 FL (ref 80–100)
MONOCYTES # BLD: 0.3 K/UL (ref 0–1.3)
MONOCYTES NFR BLD: 7.8 %
NEUTROPHILS # BLD: 2 K/UL (ref 1.7–7.7)
NEUTROPHILS NFR BLD: 54 %
PLATELET # BLD AUTO: 265 K/UL (ref 135–450)
PMV BLD AUTO: 7.9 FL (ref 5–10.5)
POTASSIUM SERPL-SCNC: 4.5 MMOL/L (ref 3.5–5.1)
RBC # BLD AUTO: 3.74 M/UL (ref 4–5.2)
RETICS # AUTO: 0.05 M/UL (ref 0.02–0.1)
RETICS/RBC NFR AUTO: 1.37 % (ref 0.5–2.18)
SODIUM SERPL-SCNC: 141 MMOL/L (ref 136–145)
TIBC SERPL-MCNC: 278 UG/DL (ref 260–445)
VIT B12 SERPL-MCNC: 342 PG/ML (ref 211–911)
WBC # BLD AUTO: 3.8 K/UL (ref 4–11)

## 2025-06-13 NOTE — DISCHARGE SUMMARY
that some part of this chart was generated using Dragon dictation software. Although every effort was made to ensure the accuracy of this automated transcription, some errors in transcription may have occurred inadvertently. If you may need any clarification, please do not hesitate to contact me through EPIC.

## 2025-06-14 LAB — BACTERIA UR CULT: NORMAL

## 2025-06-15 ENCOUNTER — RESULTS FOLLOW-UP (OUTPATIENT)
Dept: FAMILY MEDICINE CLINIC | Age: 60
End: 2025-06-15

## 2025-06-16 ENCOUNTER — TELEPHONE (OUTPATIENT)
Dept: FAMILY MEDICINE CLINIC | Age: 60
End: 2025-06-16

## 2025-06-16 NOTE — TELEPHONE ENCOUNTER
ECC transfer to Nurse triage    Patient is calling with complaint of weakness  called Thursday had labs drawn Friday now difficulty ambulating, blurred vision , SOB , denies cough, congestion , headache , or other sx    This has been going on  2 weeks     Patient is not scheduled she went to urgent care to be seen she feels she needs a chest Xray       Has patient tried any over the counter medications? no

## 2025-06-16 NOTE — TELEPHONE ENCOUNTER
Please let pt know Evelin is out of the office this week. I recommend in office appt with available provider this week.

## 2025-06-23 ENCOUNTER — OFFICE VISIT (OUTPATIENT)
Age: 60
End: 2025-06-23
Payer: COMMERCIAL

## 2025-06-23 VITALS
SYSTOLIC BLOOD PRESSURE: 102 MMHG | HEART RATE: 83 BPM | WEIGHT: 181 LBS | DIASTOLIC BLOOD PRESSURE: 77 MMHG | OXYGEN SATURATION: 100 % | BODY MASS INDEX: 30.12 KG/M2

## 2025-06-23 DIAGNOSIS — N95.0 PMB (POSTMENOPAUSAL BLEEDING): Primary | ICD-10-CM

## 2025-06-23 PROCEDURE — 3017F COLORECTAL CA SCREEN DOC REV: CPT | Performed by: OBSTETRICS & GYNECOLOGY

## 2025-06-23 PROCEDURE — 3074F SYST BP LT 130 MM HG: CPT | Performed by: OBSTETRICS & GYNECOLOGY

## 2025-06-23 PROCEDURE — G8427 DOCREV CUR MEDS BY ELIG CLIN: HCPCS | Performed by: OBSTETRICS & GYNECOLOGY

## 2025-06-23 PROCEDURE — 99214 OFFICE O/P EST MOD 30 MIN: CPT | Performed by: OBSTETRICS & GYNECOLOGY

## 2025-06-23 PROCEDURE — 3078F DIAST BP <80 MM HG: CPT | Performed by: OBSTETRICS & GYNECOLOGY

## 2025-06-23 PROCEDURE — 1036F TOBACCO NON-USER: CPT | Performed by: OBSTETRICS & GYNECOLOGY

## 2025-06-23 PROCEDURE — G8417 CALC BMI ABV UP PARAM F/U: HCPCS | Performed by: OBSTETRICS & GYNECOLOGY

## 2025-06-23 RX ORDER — MEDROXYPROGESTERONE ACETATE 10 MG
10 TABLET ORAL DAILY
Qty: 90 TABLET | Refills: 0 | Status: SHIPPED | OUTPATIENT
Start: 2025-06-23

## 2025-06-23 NOTE — PROGRESS NOTES
Physical Activity     Physical Activity: 0   Recent Concern: Physical Activity - At Risk (1/14/2020)    Received from [employer]    Physical Activity     Physical Activity: 2   Stress: Not on File (1/14/2020)    Received from [facility]    Stress     Stress: 0   Social Connections: Not on File (1/14/2020)    Received from [employer]    Social Connections     Connectedness: 0   Intimate Partner Violence: Unknown (1/23/2024)    Received from [facility], [facility]    Interpersonal Safety     Feel physically or emotionally unsafe where currently live: Not on file     Harm by anyone: Not on file     Emotionally Harmed: Not on file   Housing Stability: Low Risk  (6/8/2025)    Housing Stability Vital Sign     Unable to Pay for Housing in the Last Year: No     Number of Times Moved in the Last Year: 0     Homeless in the Last Year: No       Physical exam:  /77 (BP Site: Left Upper Arm, Patient Position: Sitting, BP Cuff Size: Medium Adult)   Pulse 83   Wt 82.1 kg (181 lb)   SpO2 100%   BMI 30.12 kg/m²  Body mass index is 30.12 kg/m².  Constitutional: alert, no acute distress, non-toxic, normal respiratory effort  Eyes: Sclera are clear and nonicteric.  Noninjected.  Lids are grossly normal.  Pupils are round equal.  Irises are grossly normal.  Mouth/ENT: Lips, teeth, gums grossly normal.  Psychiatric: Judgment and insight are intact.  Mood and affect are appropriate, calm.  Skin:  no lesions,no rashes  Neck: Symmetric without gross mass effect.  Trachea midline.  Respiratory: Normal respiratory effort.  No accessory muscles used.  Gastrointestinal/Abdominal: Nondistended    Pathology is reviewed and benign.  polyp with disordered proliferative     Diagnosis Orders   1. PMB (postmenopausal bleeding)  medroxyPROGESTERone (PROVERA) 10 MG tablet        Differential diagnosis and management/testing options:  Status post hysteroscopy and D&C.  No evidence of

## 2025-06-26 ENCOUNTER — TELEPHONE (OUTPATIENT)
Dept: FAMILY MEDICINE CLINIC | Age: 60
End: 2025-06-26

## 2025-07-01 ENCOUNTER — TELEPHONE (OUTPATIENT)
Dept: CARDIOLOGY CLINIC | Age: 60
End: 2025-07-01

## 2025-07-01 DIAGNOSIS — I50.42 CHRONIC COMBINED SYSTOLIC AND DIASTOLIC HEART FAILURE (HCC): ICD-10-CM

## 2025-07-01 RX ORDER — SPIRONOLACTONE 25 MG/1
25 TABLET ORAL DAILY
Qty: 90 TABLET | Refills: 0 | Status: SHIPPED | OUTPATIENT
Start: 2025-07-01

## 2025-07-01 NOTE — TELEPHONE ENCOUNTER
Requested Prescriptions     Pending Prescriptions Disp Refills    spironolactone (ALDACTONE) 25 MG tablet 90 tablet 0     Sig: Take 1 tablet by mouth daily      Last OV:  5/14/2025 NPSR    Next OV: 7/30/2025 NPRG    Last Labs: 06/13/2025 BMP    Last Filled: 03/25/2025 NPKV

## 2025-07-01 NOTE — TELEPHONE ENCOUNTER
Pt called wanting to know if it is of if they take medroxyPROGESTERone with blood thinner?    Pt stated they may be in class, it will be ok to leave a message on vm

## 2025-07-03 RX ORDER — POTASSIUM CHLORIDE 1500 MG/1
40 TABLET, EXTENDED RELEASE ORAL DAILY
Qty: 60 TABLET | Refills: 0 | Status: SHIPPED | OUTPATIENT
Start: 2025-07-03

## 2025-07-11 NOTE — TELEPHONE ENCOUNTER
Requested Prescriptions     Pending Prescriptions Disp Refills    rivaroxaban (XARELTO) 20 MG TABS tablet 90 tablet 1     Sig: Hold until hysteroscopy. Resume upon discretion of surgeon      Last OV:  5/14/2025 NPRG    Next OV: 7/30/2025 NPRG    Last Labs: 06/10/2025 CBC    Last Filled: 05/14/2025 NPSR

## 2025-07-11 NOTE — TELEPHONE ENCOUNTER
Medication Refill    Medication needing refilled:  rivaroxaban (XARELTO)     Dosage of the medication:  20 MG TABS tablet     How are you taking this medication (QD, BID, TID, QID, PRN):    30 or 90 day supply called in:  90    When will you run out of your medication:    Which Pharmacy are we sending the medication to?:    [id number] [address] [facility] [place] [phone] - F [phone]     Patient states she was put back on this medication

## 2025-07-16 RX ORDER — MAGNESIUM GLYCINATE 100 MG
100 CAPSULE ORAL DAILY
Qty: 30 CAPSULE | Refills: 0 | Status: SHIPPED | OUTPATIENT
Start: 2025-07-16

## 2025-07-16 NOTE — TELEPHONE ENCOUNTER
Medication Refill    Medication needing refilled:  Magnesium Glycinate       Dosage of the medication:   100mg      How are you taking this medication (QD, BID, TID, QID, PRN):   Take 100 mg by mouth daily     30 or 90 day supply called in:   30    When will you run out of your medication:    Which Pharmacy are we sending the medication to?:       [facility] [id number]   [id number] [facility] [address]  Phone: [phone]  Fax: [phone]

## 2025-07-16 NOTE — TELEPHONE ENCOUNTER
Medication Refill    Medication needing refilled:  XARELTO     Dosage of the medication:   20mg      How are you taking this medication (QD, BID, TID, QID, PRN):   Please fill in the directions on how the Pt takes this med      30 or 90 day supply called in:   30    When will you run out of your medication:    Which Pharmacy are we sending the medication to?:       [facility] [id number]   [id number] [facility] [address]  Phone: [phone]  Fax: [phone]     NOTE:  The Pharmacy did not fill the script from 07/11 it did not have the direction on it.  Please fill out the how the Pt takes the med and re-send to Pharmacy.  Thank you

## 2025-07-22 NOTE — PLAN OF CARE
Problem: Chronic Conditions and Co-morbidities  Goal: Patient's chronic conditions and co-morbidity symptoms are monitored and maintained or improved  Outcome: Progressing     Problem: Discharge Planning  Goal: Discharge to home or other facility with appropriate resources  Outcome: Progressing  Flowsheets (Taken 9/27/2022 2987)  Discharge to home or other facility with appropriate resources: Identify barriers to discharge with patient and caregiver     Problem: Pain  Goal: Verbalizes/displays adequate comfort level or baseline comfort level  Outcome: Progressing You were seen in the emergency department for weakness, numbness.  Your imaging was unremarkable.  MRI showing degenerative changes, mild.  You were seen by the neurology team no further in-hospital testing necessary.  Please follow-up outpatient with information provided please call number to schedule an appointment.  If any new, concerning, worsening symptoms please return to the ER.

## 2025-07-30 ENCOUNTER — OFFICE VISIT (OUTPATIENT)
Dept: CARDIOLOGY CLINIC | Age: 60
End: 2025-07-30
Payer: COMMERCIAL

## 2025-07-30 VITALS
HEIGHT: 65 IN | WEIGHT: 189 LBS | DIASTOLIC BLOOD PRESSURE: 80 MMHG | BODY MASS INDEX: 31.49 KG/M2 | OXYGEN SATURATION: 98 % | SYSTOLIC BLOOD PRESSURE: 120 MMHG | HEART RATE: 61 BPM

## 2025-07-30 DIAGNOSIS — D50.9 IRON DEFICIENCY ANEMIA, UNSPECIFIED IRON DEFICIENCY ANEMIA TYPE: ICD-10-CM

## 2025-07-30 DIAGNOSIS — Z95.810 ICD (IMPLANTABLE CARDIOVERTER-DEFIBRILLATOR), BIVENTRICULAR, IN SITU: ICD-10-CM

## 2025-07-30 DIAGNOSIS — I50.42 CHRONIC COMBINED SYSTOLIC AND DIASTOLIC HEART FAILURE (HCC): Primary | ICD-10-CM

## 2025-07-30 DIAGNOSIS — G47.33 OSA (OBSTRUCTIVE SLEEP APNEA): ICD-10-CM

## 2025-07-30 DIAGNOSIS — I48.0 PAF (PAROXYSMAL ATRIAL FIBRILLATION) (HCC): ICD-10-CM

## 2025-07-30 DIAGNOSIS — E55.9 HYPOVITAMINOSIS D: ICD-10-CM

## 2025-07-30 PROCEDURE — G8427 DOCREV CUR MEDS BY ELIG CLIN: HCPCS | Performed by: CLINICAL NURSE SPECIALIST

## 2025-07-30 PROCEDURE — 3017F COLORECTAL CA SCREEN DOC REV: CPT | Performed by: CLINICAL NURSE SPECIALIST

## 2025-07-30 PROCEDURE — 1036F TOBACCO NON-USER: CPT | Performed by: CLINICAL NURSE SPECIALIST

## 2025-07-30 PROCEDURE — G8417 CALC BMI ABV UP PARAM F/U: HCPCS | Performed by: CLINICAL NURSE SPECIALIST

## 2025-07-30 PROCEDURE — 99215 OFFICE O/P EST HI 40 MIN: CPT | Performed by: CLINICAL NURSE SPECIALIST

## 2025-07-30 PROCEDURE — 3074F SYST BP LT 130 MM HG: CPT | Performed by: CLINICAL NURSE SPECIALIST

## 2025-07-30 PROCEDURE — 3079F DIAST BP 80-89 MM HG: CPT | Performed by: CLINICAL NURSE SPECIALIST

## 2025-07-30 RX ORDER — CARVEDILOL 25 MG/1
TABLET ORAL
Qty: 180 TABLET | Refills: 1 | Status: SHIPPED | OUTPATIENT
Start: 2025-07-30

## 2025-07-30 RX ORDER — SPIRONOLACTONE 25 MG/1
25 TABLET ORAL DAILY
Qty: 90 TABLET | Refills: 1 | Status: SHIPPED | OUTPATIENT
Start: 2025-07-30

## 2025-07-30 RX ORDER — ATORVASTATIN CALCIUM 40 MG/1
40 TABLET, FILM COATED ORAL DAILY
Qty: 90 TABLET | Refills: 1 | Status: CANCELLED | OUTPATIENT
Start: 2025-07-30

## 2025-07-30 RX ORDER — POTASSIUM CHLORIDE 1500 MG/1
40 TABLET, EXTENDED RELEASE ORAL DAILY
Qty: 180 TABLET | Refills: 1 | Status: SHIPPED | OUTPATIENT
Start: 2025-07-30

## 2025-07-30 RX ORDER — MULTIVIT-MIN/IRON/FOLIC ACID/K 18-600-40
1 CAPSULE ORAL DAILY
Qty: 90 TABLET | Refills: 3 | Status: SHIPPED | OUTPATIENT
Start: 2025-07-30

## 2025-07-30 RX ORDER — POTASSIUM CHLORIDE 1500 MG/1
40 TABLET, EXTENDED RELEASE ORAL DAILY
Qty: 60 TABLET | Refills: 0 | Status: SHIPPED | OUTPATIENT
Start: 2025-07-30 | End: 2025-07-30 | Stop reason: SDUPTHER

## 2025-07-30 RX ORDER — FUROSEMIDE 20 MG/1
20 TABLET ORAL
Qty: 36 TABLET | Refills: 1 | Status: SHIPPED | OUTPATIENT
Start: 2025-07-30

## 2025-07-30 RX ORDER — SACUBITRIL AND VALSARTAN 49; 51 MG/1; MG/1
1 TABLET, FILM COATED ORAL 2 TIMES DAILY
Qty: 180 TABLET | Refills: 1 | Status: SHIPPED | OUTPATIENT
Start: 2025-07-30

## 2025-07-30 NOTE — PATIENT INSTRUCTIONS
Increase lasix to 20 mg on mon wed and Friday  Blood work in 2 weeks   Refills done  Copy of CT given to patient  RTO sept 22 at 830 am  Work letter done  Let me know if weight does not come down

## 2025-07-30 NOTE — PROGRESS NOTES
regurgitation. RVSP is 22 mmHg.    Left Atrium: Left atrium is severely dilated.    Image quality is adequate.    Echo 3/24/2025     Left Ventricle: Normal left ventricular systolic function with a visually estimated EF of 50 - 55%. EF by 2D Simpsons Biplane is 54%. Left ventricle is dilated. Increased wall thickness. Findings consistent with mild concentric hypertrophy. Global hypokinesis present.    Mitral Valve: Mild regurgitation.    Left Atrium: Left atrium is moderately dilated. LA Vol Index is  42 ml/m2 mL/m2.    Image quality is adequate.    Echo pending 3/27/2023   Summary  -Left ventricular cavity size is mildly dilated. There is moderately increased left ventricular wall thickness. Overall left ventricular systolic  function appears moderately reduced. Ejection fraction is visually estimated to be 40-45%. E/e'= 17.8  -There is severe hypokinesis of the inferior walls.  -Grade II diastolic dysfunction with elevated LV filling pressures.  -The mitral valve leaflets appear myxomatous. Mild mitral regurgitation.  -The left atrium is severely dilated.  -Aortic valve appears sclerotic but opens adequately.  -Trivial aortic regurgitation.  -Pacer / ICD wire is visualized in the right heart.  -Mild to moderate tricuspid regurgitation.  -Systolic pulmonary artery pressure (SPAP) estimated at 34 mmHg (RA pressure 3 mmHg).  -The right atrium is mildly dilated.  -IVC size is normal (<2.1cm) and collapses > 50% with respiration consistent with normal RA pressure (3mmHg).    Echo 5/10/2022   This is a limited study to assess left ventricular ejection fraction.   Definity was used to assist in endocardial border delineation.   Moderate concentric left ventricular hypertrophy. Mildly dilated left   ventricular cavity size. Moderately reduced left ventricular systolic   function with an estimated ejection fraction of 35-40%.   Global hypokinesis noted.   Heavy trabeculations seen near the apex of the left ventricle.   The 
No. CAITLYN screening performed.  STOP BANG Legend: 0-2 = LOW Risk; 3-4 = INTERMEDIATE Risk; 5-8 = HIGH Risk

## 2025-08-01 ENCOUNTER — TELEPHONE (OUTPATIENT)
Dept: GYNECOLOGY | Age: 60
End: 2025-08-01

## 2025-08-01 DIAGNOSIS — I50.22 SYSTOLIC CHF, CHRONIC (HCC): ICD-10-CM

## 2025-08-01 DIAGNOSIS — N95.0 POSTMENOPAUSAL BLEEDING: Primary | ICD-10-CM

## 2025-08-01 DIAGNOSIS — I51.7 LVH (LEFT VENTRICULAR HYPERTROPHY): ICD-10-CM

## 2025-08-01 DIAGNOSIS — R93.89 THICKENED ENDOMETRIUM: ICD-10-CM

## 2025-08-01 RX ORDER — PROGESTERONE 200 MG/1
200 CAPSULE ORAL NIGHTLY
Qty: 60 CAPSULE | Refills: 0 | Status: SHIPPED | OUTPATIENT
Start: 2025-08-01 | End: 2025-09-30

## 2025-08-01 NOTE — TELEPHONE ENCOUNTER
Please advise patient to discontinue her Provera.  A prescription for natural micronized progesterone will be sent to her pharmacy instead.

## 2025-08-01 NOTE — TELEPHONE ENCOUNTER
Patient stated that the medication (PROVERA) Dr. Arroyo put her on is having side effects such as her picking up 10 pounds, swelled up hands & ankles, chest feels heavy. Patient's heart doctor told her to call.    Patient is wanting to know if she can take another medication.    Patient can be reached at [phone].

## 2025-08-07 PROBLEM — R07.9 CHEST PAIN: Status: RESOLVED | Noted: 2018-09-11 | Resolved: 2025-08-07

## 2025-08-18 ENCOUNTER — TELEPHONE (OUTPATIENT)
Dept: CARDIOLOGY CLINIC | Age: 60
End: 2025-08-18

## 2025-08-18 ENCOUNTER — RESULTS FOLLOW-UP (OUTPATIENT)
Dept: CARDIOLOGY CLINIC | Age: 60
End: 2025-08-18

## 2025-08-18 ENCOUNTER — HOSPITAL ENCOUNTER (OUTPATIENT)
Age: 60
Discharge: HOME | End: 2025-08-18
Payer: COMMERCIAL

## 2025-08-18 DIAGNOSIS — I50.42 CHRONIC COMBINED SYSTOLIC AND DIASTOLIC HEART FAILURE (HCC): ICD-10-CM

## 2025-08-18 LAB
ANION GAP SERPL CALCULATED.3IONS-SCNC: 13 MMOL/L (ref 3–16)
BUN SERPL-MCNC: 8 MG/DL (ref 7–20)
CALCIUM SERPL-MCNC: 9.3 MG/DL (ref 8.3–10.6)
CHLORIDE SERPL-SCNC: 106 MMOL/L (ref 99–110)
CO2 SERPL-SCNC: 21 MMOL/L (ref 21–32)
CREAT SERPL-MCNC: 0.8 MG/DL (ref 0.6–1.2)
GFR SERPLBLD CREATININE-BSD FMLA CKD-EPI: 84 ML/MIN/{1.73_M2}
GLUCOSE SERPL-MCNC: 95 MG/DL (ref 70–99)
NT-PROBNP SERPL-MCNC: 457 PG/ML (ref 0–124)
POTASSIUM SERPL-SCNC: 3.6 MMOL/L (ref 3.5–5.1)
SODIUM SERPL-SCNC: 140 MMOL/L (ref 136–145)

## 2025-08-18 PROCEDURE — 36415 COLL VENOUS BLD VENIPUNCTURE: CPT

## 2025-08-18 PROCEDURE — 80048 BASIC METABOLIC PNL TOTAL CA: CPT

## 2025-08-18 PROCEDURE — 83880 ASSAY OF NATRIURETIC PEPTIDE: CPT

## 2025-08-19 RX ORDER — FUROSEMIDE 20 MG/1
TABLET ORAL
Qty: 50 TABLET | Refills: 1 | Status: SHIPPED | OUTPATIENT
Start: 2025-08-20

## 2025-08-20 ENCOUNTER — TELEPHONE (OUTPATIENT)
Dept: CARDIOLOGY CLINIC | Age: 60
End: 2025-08-20

## 2025-08-28 ENCOUNTER — HOSPITAL ENCOUNTER (EMERGENCY)
Age: 60
Discharge: HOME HEALTH | End: 2025-08-28
Attending: EMERGENCY MEDICINE
Payer: COMMERCIAL

## 2025-08-28 ENCOUNTER — APPOINTMENT (OUTPATIENT)
Dept: GENERAL RADIOLOGY | Age: 60
End: 2025-08-28
Payer: COMMERCIAL

## 2025-08-28 VITALS
SYSTOLIC BLOOD PRESSURE: 150 MMHG | HEIGHT: 66 IN | RESPIRATION RATE: 16 BRPM | OXYGEN SATURATION: 100 % | WEIGHT: 190 LBS | DIASTOLIC BLOOD PRESSURE: 98 MMHG | TEMPERATURE: 98.6 F | BODY MASS INDEX: 30.53 KG/M2 | HEART RATE: 90 BPM

## 2025-08-28 DIAGNOSIS — Z76.0 ENCOUNTER FOR MEDICATION REFILL: ICD-10-CM

## 2025-08-28 DIAGNOSIS — J06.9 ACUTE UPPER RESPIRATORY INFECTION: Primary | ICD-10-CM

## 2025-08-28 LAB
FLUAV RNA RESP QL NAA+PROBE: NOT DETECTED
FLUBV RNA RESP QL NAA+PROBE: NOT DETECTED
SARS-COV-2 RNA RESP QL NAA+PROBE: NOT DETECTED

## 2025-08-28 PROCEDURE — 6370000000 HC RX 637 (ALT 250 FOR IP): Performed by: EMERGENCY MEDICINE

## 2025-08-28 PROCEDURE — 87636 SARSCOV2 & INF A&B AMP PRB: CPT

## 2025-08-28 PROCEDURE — 71046 X-RAY EXAM CHEST 2 VIEWS: CPT

## 2025-08-28 PROCEDURE — 99284 EMERGENCY DEPT VISIT MOD MDM: CPT

## 2025-08-28 RX ORDER — BENZONATATE 100 MG/1
100 CAPSULE ORAL ONCE
Status: COMPLETED | OUTPATIENT
Start: 2025-08-28 | End: 2025-08-28

## 2025-08-28 RX ORDER — AZITHROMYCIN 250 MG/1
TABLET, FILM COATED ORAL
Qty: 6 TABLET | Refills: 0 | Status: SHIPPED | OUTPATIENT
Start: 2025-08-28 | End: 2025-09-07

## 2025-08-28 RX ORDER — BENZONATATE 100 MG/1
100 CAPSULE ORAL 3 TIMES DAILY PRN
Qty: 30 CAPSULE | Refills: 0 | Status: SHIPPED | OUTPATIENT
Start: 2025-08-28 | End: 2025-09-04

## 2025-08-28 RX ORDER — PREDNISONE 20 MG/1
40 TABLET ORAL ONCE
Status: COMPLETED | OUTPATIENT
Start: 2025-08-28 | End: 2025-08-28

## 2025-08-28 RX ORDER — ACETAMINOPHEN 325 MG/1
650 TABLET ORAL ONCE
Status: COMPLETED | OUTPATIENT
Start: 2025-08-28 | End: 2025-08-28

## 2025-08-28 RX ORDER — PREDNISONE 10 MG/1
60 TABLET ORAL DAILY
Qty: 30 TABLET | Refills: 0 | Status: SHIPPED | OUTPATIENT
Start: 2025-08-28 | End: 2025-09-02

## 2025-08-28 RX ADMIN — BENZONATATE 100 MG: 100 CAPSULE ORAL at 09:08

## 2025-08-28 RX ADMIN — ACETAMINOPHEN 650 MG: 325 TABLET ORAL at 09:08

## 2025-08-28 RX ADMIN — PREDNISONE 40 MG: 20 TABLET ORAL at 09:08

## 2025-08-28 ASSESSMENT — ENCOUNTER SYMPTOMS
COUGH: 1
SHORTNESS OF BREATH: 1
NAUSEA: 0
CHEST TIGHTNESS: 1
SINUS PRESSURE: 1
DIARRHEA: 0
ABDOMINAL PAIN: 0
VOMITING: 0

## 2025-08-28 ASSESSMENT — PAIN SCALES - GENERAL: PAINLEVEL_OUTOF10: 9

## 2025-08-29 ENCOUNTER — TELEPHONE (OUTPATIENT)
Dept: CARDIOLOGY CLINIC | Age: 60
End: 2025-08-29

## 2025-08-29 ENCOUNTER — OFFICE VISIT (OUTPATIENT)
Dept: FAMILY MEDICINE CLINIC | Age: 60
End: 2025-08-29
Payer: COMMERCIAL

## 2025-08-29 VITALS
HEART RATE: 80 BPM | BODY MASS INDEX: 30.37 KG/M2 | HEIGHT: 66 IN | DIASTOLIC BLOOD PRESSURE: 86 MMHG | SYSTOLIC BLOOD PRESSURE: 138 MMHG | OXYGEN SATURATION: 99 % | RESPIRATION RATE: 16 BRPM | WEIGHT: 189 LBS

## 2025-08-29 DIAGNOSIS — B37.31 VAGINAL YEAST INFECTION: ICD-10-CM

## 2025-08-29 DIAGNOSIS — I50.42 CHRONIC COMBINED SYSTOLIC AND DIASTOLIC HEART FAILURE (HCC): ICD-10-CM

## 2025-08-29 DIAGNOSIS — J06.9 VIRAL URI: Primary | ICD-10-CM

## 2025-08-29 DIAGNOSIS — R93.89 ABNORMAL CT SCAN: ICD-10-CM

## 2025-08-29 DIAGNOSIS — Q43.9 DUODENAL ANOMALY: ICD-10-CM

## 2025-08-29 PROCEDURE — 3075F SYST BP GE 130 - 139MM HG: CPT | Performed by: NURSE PRACTITIONER

## 2025-08-29 PROCEDURE — 3079F DIAST BP 80-89 MM HG: CPT | Performed by: NURSE PRACTITIONER

## 2025-08-29 PROCEDURE — G2211 COMPLEX E/M VISIT ADD ON: HCPCS | Performed by: NURSE PRACTITIONER

## 2025-08-29 PROCEDURE — 99214 OFFICE O/P EST MOD 30 MIN: CPT | Performed by: NURSE PRACTITIONER

## 2025-08-29 RX ORDER — FLUCONAZOLE 150 MG/1
150 TABLET ORAL ONCE
Qty: 1 TABLET | Refills: 0 | Status: SHIPPED | OUTPATIENT
Start: 2025-08-29 | End: 2025-08-29

## 2025-08-29 RX ORDER — FUROSEMIDE 20 MG/1
TABLET ORAL
Qty: 50 TABLET | Refills: 1 | Status: SHIPPED | OUTPATIENT
Start: 2025-08-29

## 2025-08-29 ASSESSMENT — ENCOUNTER SYMPTOMS
COUGH: 0
GASTROINTESTINAL NEGATIVE: 1
SHORTNESS OF BREATH: 0
WHEEZING: 0
CHEST TIGHTNESS: 0

## 2025-08-29 ASSESSMENT — PATIENT HEALTH QUESTIONNAIRE - PHQ9
SUM OF ALL RESPONSES TO PHQ QUESTIONS 1-9: 0
2. FEELING DOWN, DEPRESSED OR HOPELESS: NOT AT ALL
SUM OF ALL RESPONSES TO PHQ QUESTIONS 1-9: 0
1. LITTLE INTEREST OR PLEASURE IN DOING THINGS: NOT AT ALL

## (undated) DEVICE — GAUZE,PACKING STRIP,IODOFORM,1"X5YD,STRL: Brand: CURAD

## (undated) DEVICE — Device

## (undated) DEVICE — GOWN,SIRUS,NONRNF,SETINSLV,2XL,18/CS: Brand: MEDLINE

## (undated) DEVICE — SUTURE COAT VCRL SZ 0 L18IN ABSRB UD W/O NDL POLYGLACTIN J112T

## (undated) DEVICE — SINGLE USE AIR/WATER, SUCTION AND BIOPSY VALVES SET: Brand: ORCAPOD™

## (undated) DEVICE — BW-412T DISP COMBO CLEANING BRUSH: Brand: SINGLE USE COMBINATION CLEANING BRUSH

## (undated) DEVICE — PAD,NON-ADHERENT,3X8,STERILE,LF,1/PK: Brand: MEDLINE

## (undated) DEVICE — ENDOSCOPIC KIT 6X3/16 FT COLON W/ 1.1 OZ 2 GWN W/O BRSH

## (undated) DEVICE — LINER INCONT W7XL14IN PEACH POLYMER FLUF ADH WT CNTOUR DLX

## (undated) DEVICE — 1000ML,PRESSURE INFUSER W/STOPCOCK: Brand: MEDLINE

## (undated) DEVICE — PROCTO SWABS: Brand: DEROYAL

## (undated) DEVICE — PAD,ABDOMINAL,8"X10",ST,LF: Brand: MEDLINE

## (undated) DEVICE — GARMENT,MEDLINE,DVT,INT,CALF,MED, GEN2: Brand: MEDLINE

## (undated) DEVICE — PENCIL SMK EVAC TELSCP 3 M TBNG

## (undated) DEVICE — SOLUTION IV IRRIG WATER 500ML POUR BRL ST 2F7113

## (undated) DEVICE — SUTURE VCRL + SZ 2-0 L36IN ABSRB UD L36MM CT-1 1/2 CIR VCP945H

## (undated) DEVICE — AIR/WATER CLEANING ADAPTER FOR OLYMPUS® GI ENDOSCOPE: Brand: BULLDOG®

## (undated) DEVICE — TOTAL TRAY, DB, 100% SILI FOLEY, 16FR 10: Brand: MEDLINE

## (undated) DEVICE — SUTURE PERMAHAND SZ 2-0 L30IN NONABSORBABLE BLK SILK W/O A305H

## (undated) DEVICE — SOLUTION IRRIG 500ML STRL H2O NONPYROGENIC

## (undated) DEVICE — LEGGINGS, PAIR, 31X48, STERILE: Brand: MEDLINE

## (undated) DEVICE — SOLUTION IV IRRIG POUR BRL 0.9% SODIUM CHL 2F7124

## (undated) DEVICE — PROCEDURE KIT ENDOSCP CUST

## (undated) DEVICE — STAPLER SKIN H3.9MM WIRE DIA0.58MM CRWN 6.9MM 35 STPL ROT

## (undated) DEVICE — TUBING, SUCTION, 1/4" X 12', STRAIGHT: Brand: MEDLINE

## (undated) DEVICE — GOWN AURORA NONREINF LG: Brand: MEDLINE INDUSTRIES, INC.

## (undated) DEVICE — GOWN,SIRUS,POLYRNF,BRTHSLV,LG,30/CS: Brand: MEDLINE

## (undated) DEVICE — MAJOR SET UP PK

## (undated) DEVICE — FORCEPS BX L240CM WRK CHN 2.8MM STD CAP W/ NDL MIC MESH

## (undated) DEVICE — DRAPE,LAP,CHOLE,W/TROUGHS,STERILE: Brand: MEDLINE

## (undated) DEVICE — DRAPE C ARM UNIV W41XL74IN CLR PLAS XR VELC CLSR POLY STRP

## (undated) DEVICE — GOWN,AURORA,NONREINF,RAGLAN,XXL,STERILE: Brand: MEDLINE

## (undated) DEVICE — SOLUTION INJ LR VISIV 1000ML BG

## (undated) DEVICE — GAUZE,SPONGE,4"X4",8PLY,STRL,LF,10/TRAY: Brand: MEDLINE

## (undated) DEVICE — CHLORAPREP 26ML ORANGE

## (undated) DEVICE — STANDARD D&C: Brand: MEDLINE INDUSTRIES, INC.

## (undated) DEVICE — PMI DISPOSABLE PUNCTURE CLOSURE DEVICE / SUTURE GRASPER: Brand: PMI

## (undated) DEVICE — TOWEL,STOP FLAG GOLD N-W: Brand: MEDLINE

## (undated) DEVICE — TRAP SPEC RETRV CLR PLAS POLYP IN LN SUCT QUIK CTCH

## (undated) DEVICE — DRAIN CHN 19FR L0.25IN DIA6.3MM SIL RND HUBLESS FULL FLUT

## (undated) DEVICE — CAP WATER BTL AIR TBNG L 16 IN CO2 TBNG L 48 IN ENDOSCP

## (undated) DEVICE — SUTURE PDS II SZ 1 L96IN ABSRB VLT TP-1 L65MM 1/2 CIR Z880G

## (undated) DEVICE — BLADE ES ELASTOMERIC COAT INSUL DURABLE BEND UPTO 90DEG

## (undated) DEVICE — SUTURE VCRL SZ 3-0 L18IN ABSRB UD L26MM SH 1/2 CIR J864D

## (undated) DEVICE — WET SKIN PREP TRAY: Brand: MEDLINE INDUSTRIES, INC.

## (undated) DEVICE — ADHESIVE SKIN CLSR 0.7ML TOP DERMBND ADV

## (undated) DEVICE — MOUTHPIECE ENDOSCP L CTRL OPN AND SIDE PORTS DISP

## (undated) DEVICE — APPLICATOR MEDICATED 26 CC SOLUTION HI LT ORNG CHLORAPREP

## (undated) DEVICE — SUTURE VCRL + SZ 0 L18IN ABSRB CLR VCP112G

## (undated) DEVICE — NEEDLE 25GAX5.0MM INJ CARR LOCKE

## (undated) DEVICE — SUTURE ETHLN SZ 2-0 L30IN NONABSORBABLE BLK L36MM FSLX 3/8 1674H

## (undated) DEVICE — WORKING LENGTH 155CM, WORKING CHANNEL 2.8MM: Brand: RESOLUTION 360 CLIP

## (undated) DEVICE — MAT FLR W32XL58IN

## (undated) DEVICE — PAD ABSRB W8XL10IN ABD HYDROPHOBIC NONWOVEN THCK LAYR CELOS

## (undated) DEVICE — SUTURE PERMAHAND SZ 3-0 L18IN NONABSORBABLE BLK L26MM SH C013D

## (undated) DEVICE — APPLIER LIG CLP M L11IN TI STR RNG HNDL FOR 20 CLP DISP

## (undated) DEVICE — MERCY FAIRFIELD TURNOVER KIT: Brand: MEDLINE INDUSTRIES, INC.

## (undated) DEVICE — SNARE COLD DIAMOND 15MM THIN

## (undated) DEVICE — SYRINGE, LUER LOCK, 10ML: Brand: MEDLINE

## (undated) DEVICE — VALVE SUCTION AIR H2O SET ORCA POD + DISP

## (undated) DEVICE — SPONGE LAP W18XL18IN WHT COT 4 PLY FLD STRUNG RADPQ DISP ST

## (undated) DEVICE — SUTURE VCRL + SZ 3-0 L18IN CT 1 CR ABSRB VCP838D

## (undated) DEVICE — SUTURE PDS II SZ 0 L60IN ABSRB VLT L48MM CTX 1/2 CIR Z990G

## (undated) DEVICE — STERILE POLYISOPRENE POWDER-FREE SURGICAL GLOVES: Brand: PROTEXIS

## (undated) DEVICE — STRAP RESTRN W3.5XL18IN STD ALL PURP DISP W/ SLNG RNG

## (undated) DEVICE — SNARE ENDOSCP L240CM SHTH DIA24MM LOOP W10MM POLYP RND REINF

## (undated) DEVICE — STAPLER SKIN LEG L3.9MM DIA0.53MM WIDE ROT HD FOR WND CLSR

## (undated) DEVICE — LIGHT HANDLE: Brand: DEVON

## (undated) DEVICE — PRESSURE TUBING: Brand: TRUWAVE

## (undated) DEVICE — SUTURE MCRYL + SZ 4-0 L18IN ABSRB UD L19MM PS-2 3/8 CIR MCP496G

## (undated) DEVICE — COVER LT HNDL BLU PLAS

## (undated) DEVICE — SET EXTN PRIMING 4.9ML L30IN INCL SLDE CLMP SPIN M LUERLOCK

## (undated) DEVICE — TOWEL,OR,DSP,ST,BLUE,STD,4/PK,20PK/CS: Brand: MEDLINE

## (undated) DEVICE — SUTURE VCRL + SZ 3-0 L18IN ABSRB UD SH 1/2 CIR TAPERCUT NDL VCP864D

## (undated) DEVICE — SHEET,DRAPE,53X77,STERILE: Brand: MEDLINE

## (undated) DEVICE — SUTURE PERMAHAND SZ 3-0 L30IN NONABSORBABLE BLK SILK BRAID A304H

## (undated) DEVICE — INDICATED FOR USE DURING OPEN AND LAPAROSCOPIC CHOLECYSTECTOMY PROCEDURES TO INJECT RADIOPAQUE MEDIA THROUGH THE CYSTIC DUCT INTO THE BILIARY TREE.: Brand: AEROSTAT®

## (undated) DEVICE — ELECTRODE PT RET AD L9FT HI MOIST COND ADH HYDRGEL CORDED

## (undated) DEVICE — NEEDLE HYPO 22GA L1.5IN BLK POLYPR HUB S STL REG BVL STR

## (undated) DEVICE — SET VLV 3 PC AWS DISPOSABLE GRDIAN SCOPEVALET

## (undated) DEVICE — 3M™ IOBAN™ 2 ANTIMICROBIAL INCISE DRAPE 6650EZ: Brand: IOBAN™ 2

## (undated) DEVICE — SOLUTION IRRIG 1000ML 0.9% SOD CHL USP POUR PLAS BTL

## (undated) DEVICE — GLOVE ORTHO 8   MSG9480

## (undated) DEVICE — DRESSING,GAUZE,XEROFORM,CURAD,5"X9",ST: Brand: CURAD

## (undated) DEVICE — JEWISH HOSPITAL TURNOVER KIT: Brand: MEDLINE INDUSTRIES, INC.

## (undated) DEVICE — SUTURE VCRL + SZ 2-0 L18IN ABSRB UD CT1 L36MM 1/2 CIR VCP839D

## (undated) DEVICE — SET,IRRIGATION,CYSTO,Y-TYPE,90": Brand: MEDLINE

## (undated) DEVICE — DRAPE,UNDERBUTTOCKS,PCH,STERILE: Brand: MEDLINE

## (undated) DEVICE — SUTURE PERMAHAND SZ 0 L30IN NONABSORBABLE BLK SILK UNIDIR SA86G

## (undated) DEVICE — TUBING IRRIG COMPATIBLE W ERBE MEDIVATOR PMP HYDR